# Patient Record
Sex: FEMALE | Race: BLACK OR AFRICAN AMERICAN | NOT HISPANIC OR LATINO
[De-identification: names, ages, dates, MRNs, and addresses within clinical notes are randomized per-mention and may not be internally consistent; named-entity substitution may affect disease eponyms.]

---

## 2017-01-09 ENCOUNTER — APPOINTMENT (OUTPATIENT)
Dept: OPHTHALMOLOGY | Facility: CLINIC | Age: 72
End: 2017-01-09

## 2017-02-27 ENCOUNTER — APPOINTMENT (OUTPATIENT)
Dept: OPHTHALMOLOGY | Facility: CLINIC | Age: 72
End: 2017-02-27

## 2017-04-24 ENCOUNTER — APPOINTMENT (OUTPATIENT)
Dept: OPHTHALMOLOGY | Facility: CLINIC | Age: 72
End: 2017-04-24

## 2017-06-18 ENCOUNTER — INPATIENT (INPATIENT)
Facility: HOSPITAL | Age: 72
LOS: 11 days | Discharge: HOME HEALTH | End: 2017-06-30
Attending: HOSPITALIST | Admitting: HOSPITALIST
Payer: MEDICARE

## 2017-06-18 VITALS
RESPIRATION RATE: 16 BRPM | TEMPERATURE: 99 F | HEART RATE: 72 BPM | OXYGEN SATURATION: 97 % | SYSTOLIC BLOOD PRESSURE: 157 MMHG | DIASTOLIC BLOOD PRESSURE: 56 MMHG

## 2017-06-18 DIAGNOSIS — Z98.89 OTHER SPECIFIED POSTPROCEDURAL STATES: Chronic | ICD-10-CM

## 2017-06-18 DIAGNOSIS — E11.9 TYPE 2 DIABETES MELLITUS WITHOUT COMPLICATIONS: ICD-10-CM

## 2017-06-18 DIAGNOSIS — Z89.511 ACQUIRED ABSENCE OF RIGHT LEG BELOW KNEE: Chronic | ICD-10-CM

## 2017-06-18 DIAGNOSIS — L08.9 LOCAL INFECTION OF THE SKIN AND SUBCUTANEOUS TISSUE, UNSPECIFIED: ICD-10-CM

## 2017-06-18 DIAGNOSIS — I25.10 ATHEROSCLEROTIC HEART DISEASE OF NATIVE CORONARY ARTERY WITHOUT ANGINA PECTORIS: ICD-10-CM

## 2017-06-18 DIAGNOSIS — Z90.710 ACQUIRED ABSENCE OF BOTH CERVIX AND UTERUS: Chronic | ICD-10-CM

## 2017-06-18 DIAGNOSIS — I73.9 PERIPHERAL VASCULAR DISEASE, UNSPECIFIED: ICD-10-CM

## 2017-06-18 DIAGNOSIS — N18.3 CHRONIC KIDNEY DISEASE, STAGE 3 (MODERATE): ICD-10-CM

## 2017-06-18 DIAGNOSIS — I10 ESSENTIAL (PRIMARY) HYPERTENSION: ICD-10-CM

## 2017-06-18 DIAGNOSIS — Z29.9 ENCOUNTER FOR PROPHYLACTIC MEASURES, UNSPECIFIED: ICD-10-CM

## 2017-06-18 DIAGNOSIS — Z95.1 PRESENCE OF AORTOCORONARY BYPASS GRAFT: Chronic | ICD-10-CM

## 2017-06-18 LAB
ALBUMIN SERPL ELPH-MCNC: 3.9 G/DL — SIGNIFICANT CHANGE UP (ref 3.3–5)
ALP SERPL-CCNC: 88 U/L — SIGNIFICANT CHANGE UP (ref 40–120)
ALT FLD-CCNC: 14 U/L — SIGNIFICANT CHANGE UP (ref 4–33)
APPEARANCE UR: CLEAR — SIGNIFICANT CHANGE UP
AST SERPL-CCNC: 16 U/L — SIGNIFICANT CHANGE UP (ref 4–32)
BASE EXCESS BLDV CALC-SCNC: 0.6 MMOL/L — SIGNIFICANT CHANGE UP
BASOPHILS # BLD AUTO: 0.02 K/UL — SIGNIFICANT CHANGE UP (ref 0–0.2)
BASOPHILS NFR BLD AUTO: 0.2 % — SIGNIFICANT CHANGE UP (ref 0–2)
BILIRUB SERPL-MCNC: 0.3 MG/DL — SIGNIFICANT CHANGE UP (ref 0.2–1.2)
BILIRUB UR-MCNC: NEGATIVE — SIGNIFICANT CHANGE UP
BLOOD GAS VENOUS - CREATININE: 1.37 MG/DL — HIGH (ref 0.5–1.3)
BLOOD UR QL VISUAL: NEGATIVE — SIGNIFICANT CHANGE UP
BUN SERPL-MCNC: 37 MG/DL — HIGH (ref 7–23)
CALCIUM SERPL-MCNC: 8.9 MG/DL — SIGNIFICANT CHANGE UP (ref 8.4–10.5)
CHLORIDE BLDV-SCNC: 110 MMOL/L — HIGH (ref 96–108)
CHLORIDE SERPL-SCNC: 102 MMOL/L — SIGNIFICANT CHANGE UP (ref 98–107)
CO2 SERPL-SCNC: 25 MMOL/L — SIGNIFICANT CHANGE UP (ref 22–31)
COLOR SPEC: SIGNIFICANT CHANGE UP
CREAT SERPL-MCNC: 1.46 MG/DL — HIGH (ref 0.5–1.3)
CRP SERPL-MCNC: 89.2 MG/L — HIGH (ref 0.3–5)
EOSINOPHIL # BLD AUTO: 0.31 K/UL — SIGNIFICANT CHANGE UP (ref 0–0.5)
EOSINOPHIL NFR BLD AUTO: 3.5 % — SIGNIFICANT CHANGE UP (ref 0–6)
ERYTHROCYTE [SEDIMENTATION RATE] IN BLOOD: 94 MM/HR — HIGH (ref 4–25)
GAS PNL BLDV: 136 MMOL/L — SIGNIFICANT CHANGE UP (ref 136–146)
GLUCOSE BLDV-MCNC: 190 — HIGH (ref 70–99)
GLUCOSE SERPL-MCNC: 225 MG/DL — HIGH (ref 70–99)
GLUCOSE UR-MCNC: NEGATIVE — SIGNIFICANT CHANGE UP
HBA1C BLD-MCNC: 7.8 % — HIGH (ref 4–5.6)
HCO3 BLDV-SCNC: 23 MMOL/L — SIGNIFICANT CHANGE UP (ref 20–27)
HCT VFR BLD CALC: 33.5 % — LOW (ref 34.5–45)
HCT VFR BLDV CALC: 31.8 % — LOW (ref 34.5–45)
HGB BLD-MCNC: 10.3 G/DL — LOW (ref 11.5–15.5)
HGB BLDV-MCNC: 10.3 G/DL — LOW (ref 11.5–15.5)
IMM GRANULOCYTES NFR BLD AUTO: 0.1 % — SIGNIFICANT CHANGE UP (ref 0–1.5)
KETONES UR-MCNC: NEGATIVE — SIGNIFICANT CHANGE UP
LACTATE BLDV-MCNC: 1.1 MMOL/L — SIGNIFICANT CHANGE UP (ref 0.5–2)
LEUKOCYTE ESTERASE UR-ACNC: HIGH
LYMPHOCYTES # BLD AUTO: 2.17 K/UL — SIGNIFICANT CHANGE UP (ref 1–3.3)
LYMPHOCYTES # BLD AUTO: 24.5 % — SIGNIFICANT CHANGE UP (ref 13–44)
MCHC RBC-ENTMCNC: 28 PG — SIGNIFICANT CHANGE UP (ref 27–34)
MCHC RBC-ENTMCNC: 30.7 % — LOW (ref 32–36)
MCV RBC AUTO: 91 FL — SIGNIFICANT CHANGE UP (ref 80–100)
MONOCYTES # BLD AUTO: 0.69 K/UL — SIGNIFICANT CHANGE UP (ref 0–0.9)
MONOCYTES NFR BLD AUTO: 7.8 % — SIGNIFICANT CHANGE UP (ref 2–14)
NEUTROPHILS # BLD AUTO: 5.65 K/UL — SIGNIFICANT CHANGE UP (ref 1.8–7.4)
NEUTROPHILS NFR BLD AUTO: 63.9 % — SIGNIFICANT CHANGE UP (ref 43–77)
NITRITE UR-MCNC: NEGATIVE — SIGNIFICANT CHANGE UP
PCO2 BLDV: 52 MMHG — HIGH (ref 41–51)
PH BLDV: 7.32 PH — SIGNIFICANT CHANGE UP (ref 7.32–7.43)
PH UR: 6 — SIGNIFICANT CHANGE UP (ref 4.6–8)
PLATELET # BLD AUTO: 247 K/UL — SIGNIFICANT CHANGE UP (ref 150–400)
PMV BLD: 11.8 FL — SIGNIFICANT CHANGE UP (ref 7–13)
PO2 BLDV: < 24 MMHG — LOW (ref 35–40)
POTASSIUM BLDV-SCNC: 4.7 MMOL/L — HIGH (ref 3.4–4.5)
POTASSIUM SERPL-MCNC: 4.6 MMOL/L — SIGNIFICANT CHANGE UP (ref 3.5–5.3)
POTASSIUM SERPL-SCNC: 4.6 MMOL/L — SIGNIFICANT CHANGE UP (ref 3.5–5.3)
PROT SERPL-MCNC: 8 G/DL — SIGNIFICANT CHANGE UP (ref 6–8.3)
PROT UR-MCNC: 10 — SIGNIFICANT CHANGE UP
RBC # BLD: 3.68 M/UL — LOW (ref 3.8–5.2)
RBC # FLD: 14.8 % — HIGH (ref 10.3–14.5)
RBC CASTS # UR COMP ASSIST: SIGNIFICANT CHANGE UP (ref 0–?)
SAO2 % BLDV: 25.7 % — LOW (ref 60–85)
SODIUM SERPL-SCNC: 140 MMOL/L — SIGNIFICANT CHANGE UP (ref 135–145)
SP GR SPEC: 1.01 — SIGNIFICANT CHANGE UP (ref 1–1.03)
SQUAMOUS # UR AUTO: SIGNIFICANT CHANGE UP
UROBILINOGEN FLD QL: NORMAL E.U. — SIGNIFICANT CHANGE UP (ref 0.1–0.2)
WBC # BLD: 8.85 K/UL — SIGNIFICANT CHANGE UP (ref 3.8–10.5)
WBC # FLD AUTO: 8.85 K/UL — SIGNIFICANT CHANGE UP (ref 3.8–10.5)
WBC UR QL: SIGNIFICANT CHANGE UP (ref 0–?)

## 2017-06-18 PROCEDURE — 93971 EXTREMITY STUDY: CPT | Mod: 26,LT

## 2017-06-18 PROCEDURE — 71010: CPT | Mod: 26

## 2017-06-18 PROCEDURE — 99223 1ST HOSP IP/OBS HIGH 75: CPT | Mod: GC

## 2017-06-18 PROCEDURE — 73630 X-RAY EXAM OF FOOT: CPT | Mod: 26,LT

## 2017-06-18 RX ORDER — ATORVASTATIN CALCIUM 80 MG/1
20 TABLET, FILM COATED ORAL AT BEDTIME
Qty: 0 | Refills: 0 | Status: DISCONTINUED | OUTPATIENT
Start: 2017-06-18 | End: 2017-06-30

## 2017-06-18 RX ORDER — SODIUM CHLORIDE 9 MG/ML
500 INJECTION INTRAMUSCULAR; INTRAVENOUS; SUBCUTANEOUS ONCE
Qty: 0 | Refills: 0 | Status: COMPLETED | OUTPATIENT
Start: 2017-06-18 | End: 2017-06-18

## 2017-06-18 RX ORDER — DEXTROSE 50 % IN WATER 50 %
12.5 SYRINGE (ML) INTRAVENOUS ONCE
Qty: 0 | Refills: 0 | Status: DISCONTINUED | OUTPATIENT
Start: 2017-06-18 | End: 2017-06-30

## 2017-06-18 RX ORDER — INSULIN LISPRO 100/ML
VIAL (ML) SUBCUTANEOUS
Qty: 0 | Refills: 0 | Status: DISCONTINUED | OUTPATIENT
Start: 2017-06-18 | End: 2017-06-30

## 2017-06-18 RX ORDER — CARVEDILOL PHOSPHATE 80 MG/1
12.5 CAPSULE, EXTENDED RELEASE ORAL EVERY 12 HOURS
Qty: 0 | Refills: 0 | Status: DISCONTINUED | OUTPATIENT
Start: 2017-06-18 | End: 2017-06-30

## 2017-06-18 RX ORDER — INSULIN GLARGINE 100 [IU]/ML
80 INJECTION, SOLUTION SUBCUTANEOUS AT BEDTIME
Qty: 0 | Refills: 0 | Status: DISCONTINUED | OUTPATIENT
Start: 2017-06-18 | End: 2017-06-21

## 2017-06-18 RX ORDER — VALSARTAN 80 MG/1
320 TABLET ORAL DAILY
Qty: 0 | Refills: 0 | Status: DISCONTINUED | OUTPATIENT
Start: 2017-06-18 | End: 2017-06-30

## 2017-06-18 RX ORDER — INSULIN LISPRO 100/ML
VIAL (ML) SUBCUTANEOUS AT BEDTIME
Qty: 0 | Refills: 0 | Status: DISCONTINUED | OUTPATIENT
Start: 2017-06-18 | End: 2017-06-30

## 2017-06-18 RX ORDER — ASPIRIN/CALCIUM CARB/MAGNESIUM 324 MG
81 TABLET ORAL DAILY
Qty: 0 | Refills: 0 | Status: DISCONTINUED | OUTPATIENT
Start: 2017-06-18 | End: 2017-06-30

## 2017-06-18 RX ORDER — DEXTROSE 50 % IN WATER 50 %
1 SYRINGE (ML) INTRAVENOUS ONCE
Qty: 0 | Refills: 0 | Status: DISCONTINUED | OUTPATIENT
Start: 2017-06-18 | End: 2017-06-30

## 2017-06-18 RX ORDER — KETOROLAC TROMETHAMINE 0.5 %
1 DROPS OPHTHALMIC (EYE)
Qty: 0 | Refills: 0 | Status: DISCONTINUED | OUTPATIENT
Start: 2017-06-18 | End: 2017-06-30

## 2017-06-18 RX ORDER — VANCOMYCIN HCL 1 G
1000 VIAL (EA) INTRAVENOUS EVERY 24 HOURS
Qty: 0 | Refills: 0 | Status: DISCONTINUED | OUTPATIENT
Start: 2017-06-19 | End: 2017-06-20

## 2017-06-18 RX ORDER — TIMOLOL 0.5 %
1 DROPS OPHTHALMIC (EYE)
Qty: 0 | Refills: 0 | Status: DISCONTINUED | OUTPATIENT
Start: 2017-06-18 | End: 2017-06-30

## 2017-06-18 RX ORDER — ISOSORBIDE MONONITRATE 60 MG/1
30 TABLET, EXTENDED RELEASE ORAL DAILY
Qty: 0 | Refills: 0 | Status: DISCONTINUED | OUTPATIENT
Start: 2017-06-18 | End: 2017-06-30

## 2017-06-18 RX ORDER — PIPERACILLIN AND TAZOBACTAM 4; .5 G/20ML; G/20ML
3.38 INJECTION, POWDER, LYOPHILIZED, FOR SOLUTION INTRAVENOUS ONCE
Qty: 0 | Refills: 0 | Status: COMPLETED | OUTPATIENT
Start: 2017-06-18 | End: 2017-06-18

## 2017-06-18 RX ORDER — BRIMONIDINE TARTRATE 2 MG/MG
1 SOLUTION/ DROPS OPHTHALMIC
Qty: 0 | Refills: 0 | Status: DISCONTINUED | OUTPATIENT
Start: 2017-06-18 | End: 2017-06-30

## 2017-06-18 RX ORDER — PIPERACILLIN AND TAZOBACTAM 4; .5 G/20ML; G/20ML
3.38 INJECTION, POWDER, LYOPHILIZED, FOR SOLUTION INTRAVENOUS EVERY 12 HOURS
Qty: 0 | Refills: 0 | Status: DISCONTINUED | OUTPATIENT
Start: 2017-06-18 | End: 2017-06-27

## 2017-06-18 RX ORDER — HEPARIN SODIUM 5000 [USP'U]/ML
5000 INJECTION INTRAVENOUS; SUBCUTANEOUS EVERY 8 HOURS
Qty: 0 | Refills: 0 | Status: DISCONTINUED | OUTPATIENT
Start: 2017-06-18 | End: 2017-06-30

## 2017-06-18 RX ORDER — VANCOMYCIN HCL 1 G
1000 VIAL (EA) INTRAVENOUS ONCE
Qty: 0 | Refills: 0 | Status: COMPLETED | OUTPATIENT
Start: 2017-06-18 | End: 2017-06-18

## 2017-06-18 RX ORDER — DEXTROSE 50 % IN WATER 50 %
25 SYRINGE (ML) INTRAVENOUS ONCE
Qty: 0 | Refills: 0 | Status: DISCONTINUED | OUTPATIENT
Start: 2017-06-18 | End: 2017-06-30

## 2017-06-18 RX ORDER — GLUCAGON INJECTION, SOLUTION 0.5 MG/.1ML
1 INJECTION, SOLUTION SUBCUTANEOUS ONCE
Qty: 0 | Refills: 0 | Status: DISCONTINUED | OUTPATIENT
Start: 2017-06-18 | End: 2017-06-30

## 2017-06-18 RX ORDER — INSULIN LISPRO 100/ML
26 VIAL (ML) SUBCUTANEOUS
Qty: 0 | Refills: 0 | Status: DISCONTINUED | OUTPATIENT
Start: 2017-06-18 | End: 2017-06-21

## 2017-06-18 RX ADMIN — HEPARIN SODIUM 5000 UNIT(S): 5000 INJECTION INTRAVENOUS; SUBCUTANEOUS at 22:36

## 2017-06-18 RX ADMIN — BRIMONIDINE TARTRATE 1 DROP(S): 2 SOLUTION/ DROPS OPHTHALMIC at 22:36

## 2017-06-18 RX ADMIN — INSULIN GLARGINE 80 UNIT(S): 100 INJECTION, SOLUTION SUBCUTANEOUS at 22:37

## 2017-06-18 RX ADMIN — Medication 250 MILLIGRAM(S): at 14:27

## 2017-06-18 RX ADMIN — Medication 1 DROP(S): at 22:36

## 2017-06-18 RX ADMIN — ATORVASTATIN CALCIUM 20 MILLIGRAM(S): 80 TABLET, FILM COATED ORAL at 22:36

## 2017-06-18 RX ADMIN — PIPERACILLIN AND TAZOBACTAM 200 GRAM(S): 4; .5 INJECTION, POWDER, LYOPHILIZED, FOR SOLUTION INTRAVENOUS at 20:01

## 2017-06-18 RX ADMIN — Medication: at 19:40

## 2017-06-18 RX ADMIN — SODIUM CHLORIDE 500 MILLILITER(S): 9 INJECTION INTRAMUSCULAR; INTRAVENOUS; SUBCUTANEOUS at 13:43

## 2017-06-18 RX ADMIN — Medication: at 22:47

## 2017-06-18 NOTE — DISCHARGE NOTE ADULT - CARE PLAN
Principal Discharge DX:	Foot infection  Goal:	Treatment of the infection.  Instructions for follow-up, activity and diet:	Continue to take antibiotics at home. You were on an IV antibiotic in the hospital. Now you will take oral medication. Take levaquin 500 mg daily until 7/7 (you need to take it for a total of 10 days after your surgery, which was on 6/28).  Secondary Diagnosis:	CAD (coronary artery disease)  Goal:	Maintenance treatment.  Instructions for follow-up, activity and diet:	Continue taking your aspirin and plavix  Secondary Diagnosis:	Hypertension  Goal:	Control of blood pressure  Instructions for follow-up, activity and diet:	Continue to take your home blood pressure medications (coreg and valsartan)  Secondary Diagnosis:	Hypercholesterolemia  Goal:	Maintenance treatment  Instructions for follow-up, activity and diet:	Continue to take your cholesterol lowering medication (atorvastatin 20 mg).  Secondary Diagnosis:	DM (diabetes mellitus)  Goal:	Control of blood sugars  Instructions for follow-up, activity and diet:	Continue to take your long and short acting insulin. You will go home with 50 Units of Lantus, which you will take every night at bedtime. Continue your short acting insulin (Humalog).  Secondary Diagnosis:	PVD (peripheral vascular disease)  Goal:	Maintenance treatment  Instructions for follow-up, activity and diet:	Continue to take your aspirin and plavix.  Follow up with your vascular surgeon (name and number listed below) Principal Discharge DX:	Foot infection  Goal:	Treatment of the infection.  Instructions for follow-up, activity and diet:	Continue to take antibiotics at home. You were on an IV antibiotic in the hospital. Now you will take oral medication. Take levaquin 500 mg daily until 7/7 (you need to take it for a total of 10 days after your surgery, which was on 6/28).   Please follow up with Dr. Trimble at [facility] care center [phone]   Weight bearing as tolerated in surgical shoe  Visiting Nurse service to change packing daily and cover with 4x4 sterile gauze, abdominal pad and kerlix.  Secondary Diagnosis:	CAD (coronary artery disease)  Goal:	Maintenance treatment.  Instructions for follow-up, activity and diet:	Continue taking your aspirin and plavix  Secondary Diagnosis:	Hypertension  Goal:	Control of blood pressure  Instructions for follow-up, activity and diet:	Continue to take your home blood pressure medications (coreg and valsartan)  Secondary Diagnosis:	Hypercholesterolemia  Goal:	Maintenance treatment  Instructions for follow-up, activity and diet:	Continue to take your cholesterol lowering medication (atorvastatin 20 mg).  Secondary Diagnosis:	DM (diabetes mellitus)  Goal:	Control of blood sugars  Instructions for follow-up, activity and diet:	Continue to take your long and short acting insulin. You will go home with 50 Units of Lantus, which you will take every night at bedtime. Continue your short acting insulin (Humalog).  Secondary Diagnosis:	PVD (peripheral vascular disease)  Goal:	Maintenance treatment  Instructions for follow-up, activity and diet:	Continue to take your aspirin and plavix.  Follow up with your vascular surgeon (name and number listed below) Principal Discharge DX:	Foot infection  Goal:	Treatment of the infection.  Instructions for follow-up, activity and diet:	Continue to take antibiotics at home. You were on an IV antibiotic in the hospital. Now you will take oral medication. Take levaquin 500 mg daily until 7/7/17 (you need to take it for a total of 10 days after your surgery, which was on 6/28).   Please follow up with Dr. Trimble at [facility] care center [phone]   Weight bearing as tolerated in surgical shoe.  Visiting Nurse service to change packing daily and cover with 4x4 sterile gauze, abdominal pad and kerlix.  Secondary Diagnosis:	CAD (coronary artery disease)  Goal:	Maintenance treatment.  Instructions for follow-up, activity and diet:	Continue taking your aspirin and plavix  Secondary Diagnosis:	Hypertension  Goal:	Control of blood pressure  Instructions for follow-up, activity and diet:	Continue to take your home blood pressure medications (coreg and valsartan)  Secondary Diagnosis:	Hypercholesterolemia  Goal:	Maintenance treatment  Instructions for follow-up, activity and diet:	Continue to take your cholesterol lowering medication (atorvastatin 20 mg).  Secondary Diagnosis:	DM (diabetes mellitus)  Goal:	Control of blood sugars  Instructions for follow-up, activity and diet:	Your original doses of lantus 80 units and humalog 26 units before meals were decreased while in the hospital due to hypoglycemia. You will be discharged on 40 Units of Lantus, which you will take every night at bedtime.  Secondary Diagnosis:	PVD (peripheral vascular disease)  Goal:	Maintenance treatment  Instructions for follow-up, activity and diet:	You received a stent in one of the leg vessels while in the hospital and was started on plavix.  Continue to take your aspirin and plavix.  Follow up with your vascular surgeon (name and number listed below) Principal Discharge DX:	Foot infection  Goal:	Treatment of the infection.  Instructions for follow-up, activity and diet:	Continue to take antibiotics at home. You were on an IV antibiotic in the hospital. Now you will take oral medication. Take levaquin 500 mg daily until 7/7/17 (you need to take it for a total of 10 days after your surgery, which was on 6/28).   Please follow up with Dr. Trimble at [facility] care center [phone]   Weight bearing as tolerated in surgical shoe.  Visiting Nurse service to change packing daily and cover with 4x4 sterile gauze, abdominal pad and kerlix.  Secondary Diagnosis:	CAD (coronary artery disease)  Goal:	Maintenance treatment.  Instructions for follow-up, activity and diet:	Continue taking your aspirin and plavix  Secondary Diagnosis:	Hypertension  Goal:	Control of blood pressure  Instructions for follow-up, activity and diet:	Continue to take your home blood pressure medications (coreg and valsartan)  Secondary Diagnosis:	Hypercholesterolemia  Goal:	Maintenance treatment  Instructions for follow-up, activity and diet:	Continue to take your cholesterol lowering medication (atorvastatin 20 mg).  Secondary Diagnosis:	DM (diabetes mellitus)  Goal:	Control of blood sugars  Instructions for follow-up, activity and diet:	Your original doses of lantus 80 units and humalog 26 units before meals were decreased while in the hospital due to hypoglycemia. You will be discharged on 40 Units of Lantus, which you will take every night at bedtime.  Secondary Diagnosis:	PVD (peripheral vascular disease)  Goal:	Maintenance treatment  Instructions for follow-up, activity and diet:	You received a stent in one of the leg vessels while in the hospital and was started on plavix.  Continue to take your aspirin and plavix.  Follow up with your vascular surgeon (name and number listed below) Principal Discharge DX:	Foot infection  Goal:	Treatment of the infection.  Instructions for follow-up, activity and diet:	Continue to take antibiotics at home. You were on an IV antibiotic in the hospital. Now you will take oral medication. Take levaquin 500 mg daily until 7/7/17 (you need to take it for a total of 10 days after your surgery, which was on 6/28).   Please follow up with Dr. Trimble at [facility] care center [phone]   Weight bearing as tolerated in surgical shoe.  Visiting Nurse service to change packing daily and cover with 4x4 sterile gauze, abdominal pad and kerlix.  Secondary Diagnosis:	CAD (coronary artery disease)  Goal:	Maintenance treatment.  Instructions for follow-up, activity and diet:	Continue taking your aspirin and plavix  Secondary Diagnosis:	Hypertension  Goal:	Control of blood pressure  Instructions for follow-up, activity and diet:	Continue to take your home blood pressure medications (coreg and valsartan)  Secondary Diagnosis:	Hypercholesterolemia  Goal:	Maintenance treatment  Instructions for follow-up, activity and diet:	Continue to take your cholesterol lowering medication (atorvastatin 20 mg).  Secondary Diagnosis:	DM (diabetes mellitus)  Goal:	Control of blood sugars  Instructions for follow-up, activity and diet:	Your original doses of lantus 80 units and humalog 26 units before meals were decreased while in the hospital due to hypoglycemia. You will be discharged on 40 Units of Lantus, which you will take every night at bedtime.  Secondary Diagnosis:	PVD (peripheral vascular disease)  Goal:	Maintenance treatment  Instructions for follow-up, activity and diet:	You received a stent in one of the leg vessels while in the hospital and was started on plavix.  Continue to take your aspirin and plavix.  Follow up with your vascular surgeon (name and number listed below) Principal Discharge DX:	Foot infection  Goal:	Treatment of the infection.  Instructions for follow-up, activity and diet:	Continue to take antibiotics at home. You were on an IV antibiotic in the hospital. Now you will take oral medication. Take levaquin 500 mg daily until 7/7/17 (you need to take it for a total of 10 days after your surgery, which was on 6/28).   Please follow up with Dr. Trimble at [facility] care center [phone]   Weight bearing as tolerated in surgical shoe.  Visiting Nurse service to change packing daily and cover with 4x4 sterile gauze, abdominal pad and kerlix.  Secondary Diagnosis:	CAD (coronary artery disease)  Goal:	Maintenance treatment.  Instructions for follow-up, activity and diet:	Continue taking your aspirin and plavix  Secondary Diagnosis:	Hypertension  Goal:	Control of blood pressure  Instructions for follow-up, activity and diet:	Continue to take your home blood pressure medications (coreg and valsartan)  Secondary Diagnosis:	Hypercholesterolemia  Goal:	Maintenance treatment  Instructions for follow-up, activity and diet:	Continue to take your cholesterol lowering medication (atorvastatin 20 mg).  Secondary Diagnosis:	DM (diabetes mellitus)  Goal:	Control of blood sugars  Instructions for follow-up, activity and diet:	Your original doses of lantus 80 units and humalog 26 units before meals were decreased while in the hospital due to hypoglycemia. You will be discharged on 40 Units of Lantus, which you will take every night at bedtime.  Secondary Diagnosis:	PVD (peripheral vascular disease)  Goal:	Maintenance treatment  Instructions for follow-up, activity and diet:	You received a stent in one of the leg vessels while in the hospital and was started on plavix.   Continue to take your aspirin and plavix.  Follow up with your vascular surgeon (name and number listed below) Principal Discharge DX:	Foot infection  Goal:	Treatment of the infection.  Instructions for follow-up, activity and diet:	Continue to take antibiotics at home. You were on an IV antibiotic in the hospital. Now you will take oral medication. Take levaquin 500 mg daily until 7/7/17 (you need to take it for a total of 10 days after your surgery, which was on 6/28).   Please follow up with Dr. Trimble at [facility] care center [phone]   Weight bearing as tolerated in surgical shoe.  Visiting Nurse service to change packing daily and cover with 4x4 sterile gauze, abdominal pad and kerlix.  Secondary Diagnosis:	CAD (coronary artery disease)  Goal:	Maintenance treatment.  Instructions for follow-up, activity and diet:	Continue taking your aspirin and plavix  Secondary Diagnosis:	Hypertension  Goal:	Control of blood pressure  Instructions for follow-up, activity and diet:	Continue to take your home blood pressure medications (coreg and valsartan)  Secondary Diagnosis:	Hypercholesterolemia  Goal:	Maintenance treatment  Instructions for follow-up, activity and diet:	Continue to take your cholesterol lowering medication (atorvastatin 20 mg).  Secondary Diagnosis:	DM (diabetes mellitus)  Goal:	Control of blood sugars  Instructions for follow-up, activity and diet:	Your original doses of lantus 80 units and humalog 26 units before meals were decreased while in the hospital due to hypoglycemia. You will be discharged on 40 Units of Lantus, which you will take every night at bedtime.  Secondary Diagnosis:	PVD (peripheral vascular disease)  Goal:	Maintenance treatment  Instructions for follow-up, activity and diet:	You received a stent in one of the leg vessels while in the hospital and was started on plavix.   Continue to take your aspirin and plavix.  Follow up with your vascular surgeon (name and number listed below) Principal Discharge DX:	Foot infection  Goal:	Treatment of the infection.  Instructions for follow-up, activity and diet:	Continue to take antibiotics at home. You were on an IV antibiotic in the hospital. Now you will take oral medication. Take levaquin 500 mg daily until 7/7/17 (you need to take it for a total of 10 days after your surgery, which was on 6/28).   Please follow up with Dr. Trimble at [facility] care center [phone]   Weight bearing as tolerated in surgical shoe.  Visiting Nurse service to change packing daily and cover with 4x4 sterile gauze, abdominal pad and kerlix.  Secondary Diagnosis:	CAD (coronary artery disease)  Goal:	Maintenance treatment.  Instructions for follow-up, activity and diet:	Continue taking your aspirin and plavix  Secondary Diagnosis:	Hypertension  Goal:	Control of blood pressure  Instructions for follow-up, activity and diet:	Continue to take your home blood pressure medications (coreg and valsartan)  Secondary Diagnosis:	Hypercholesterolemia  Goal:	Maintenance treatment  Instructions for follow-up, activity and diet:	Continue to take your cholesterol lowering medication (atorvastatin 20 mg).  Secondary Diagnosis:	DM (diabetes mellitus)  Goal:	Control of blood sugars  Instructions for follow-up, activity and diet:	Your original doses of lantus 80 units and humalog 26 units before meals were decreased while in the hospital due to hypoglycemia. You will be discharged on 40 Units of Lantus, which you will take every night at bedtime.  Secondary Diagnosis:	PVD (peripheral vascular disease)  Goal:	Maintenance treatment  Instructions for follow-up, activity and diet:	You received a stent in one of the leg vessels while in the hospital and was started on plavix.   Continue to take your aspirin and plavix.  Follow up with your vascular surgeon (name and number listed below) Principal Discharge DX:	Foot infection  Goal:	Treatment of the infection.  Instructions for follow-up, activity and diet:	Continue to take antibiotics at home. You were on an IV antibiotic in the hospital. Now you will take oral medication. Take levaquin 500 mg daily until 7/7/17 (you need to take it for a total of 10 days after your surgery, which was on 6/28).   Please follow up with Dr. Trimble at [facility] [phone]   Weight bearing as tolerated in surgical shoe.  Visiting Nurse service to assess the gauze covering the surgical site.  Keep site covered with 4x4 sterile gauze, abdominal pad and kerlix until follow up in wound care center.  You should follow up with the wound care center within 1 week of hospital discharge.  Secondary Diagnosis:	CAD (coronary artery disease)  Goal:	Maintenance treatment.  Instructions for follow-up, activity and diet:	Continue taking your aspirin and plavix.  Secondary Diagnosis:	Hypertension  Goal:	Control of blood pressure  Instructions for follow-up, activity and diet:	Continue to take your home blood pressure medications (coreg and valsartan)  Secondary Diagnosis:	Hypercholesterolemia  Goal:	Maintenance treatment  Instructions for follow-up, activity and diet:	Continue to take your cholesterol lowering medication (atorvastatin 20 mg).  Secondary Diagnosis:	DM (diabetes mellitus)  Goal:	Control of blood sugars  Instructions for follow-up, activity and diet:	Your original doses of lantus 80 units and humalog 26 units before meals were decreased while in the hospital due to hypoglycemia. You will be discharged on 40 Units of Lantus, which you will take every night at bedtime.  Secondary Diagnosis:	PVD (peripheral vascular disease)  Goal:	Maintenance treatment  Instructions for follow-up, activity and diet:	You received a stent in one of the leg vessels while in the hospital and was started on plavix.   Continue to take your aspirin and plavix.  Follow up with your vascular surgeon (name and number listed below)

## 2017-06-18 NOTE — H&P ADULT - PROBLEM SELECTOR PLAN 4
HgbA1c of 7.8 here  - c/w home dose of Lantus 80 units qhs and humalog 26 units tidac  - monitor FS qac and qhs and titrate insulin accordingly  - carb consistent diet and hypoglycemia protocol

## 2017-06-18 NOTE — DISCHARGE NOTE ADULT - CARE PROVIDER_API CALL
Opal Hackett), Internal Medicine  [address] [place] [address]  Phone: [phone]  Fax: [phone]    Leonid Moraes), Surgery  Vascular  [address] [place] [address]  Phone: [phone]  Fax: [phone] Opal Hackett), Internal Medicine  [address] [place] [address]  Phone: [phone]  Fax: [phone]    Leonid Moraes), Surgery  Vascular  [address] [place] [address]  Phone: [phone]  Fax: [phone]    Emilia Blake ([email or web address]), Surgery  Dept Director  [address] [place] [address]  Phone: [phone]  Fax: [phone]

## 2017-06-18 NOTE — H&P ADULT - ATTENDING COMMENTS
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 +warm mild erythema /with overlying stasis changes +PT pulse.5th Lt digit ulcer Xray neg for OM  neg DVT on LT LE   diabetic foot ulcer-MRI LE r/o OM                            -DUC/PVR                           -podiatry consult                           -empiric abx vanco/zosyn

## 2017-06-18 NOTE — H&P ADULT - PMH
CAD (coronary artery disease)    CAD (coronary artery disease)    Carotid artery stenosis    Carotid stenosis    Diabetes    DM (diabetes mellitus)    HTN (hypertension)    Hypercholesterolemia    Hypertension    Obesity    PAD (peripheral artery disease)  s/p R BKA  PVD (peripheral vascular disease)    S/P CABG x 3  in 2004, [facility]  Stented coronary artery  2010 [facility]  UTI (urinary tract infection)

## 2017-06-18 NOTE — H&P ADULT - PROBLEM SELECTOR PLAN 1
Pt with likely diabetic foot ulcer, no radiographic evidence of OM however with elevated ESR and CRP, afebrile here and no leukocytosis  - podiatry consulted  - c/w renally dosed vancomycin and zosyn  - follow up blood cultures  - f/u MRI foot and DUC/PVR of L foot

## 2017-06-18 NOTE — CONSULT NOTE ADULT - PROBLEM SELECTOR RECOMMENDATION 9
- Pt seen and examined  - sharp excisional debridement of both 4th and 5th L toe wounds to the level of the subQ tissue including debridement of subQ tissue but not beyond to bone or tendon using #15 blade.   - Pt tolerated well  - 4th toe wound flushed and Cx  - Wounds dressed with betadine, DSD  - MRI ordered to r/o OM of 4th or 5th  - DUC/PVR ordered due to hx of PVD and non-palpable DP  - Will discuss w/ attending  - May need sx intervention if MRI positive for OM. No sx planned at this time  - Cont IV abx per ID  - WBAT in sx shoe

## 2017-06-18 NOTE — DISCHARGE NOTE ADULT - PROVIDER TOKENS
LELO:'[id number]',[id number]' TOKEN:'[id number]:'[id number]:'[id number]'

## 2017-06-18 NOTE — ED PROVIDER NOTE - LOCATION
foot/first , forth and fifth toe apparea disclored and whoel foot is erythematous and dislcored, though normal DP palpted/toe

## 2017-06-18 NOTE — ED PROVIDER NOTE - MEDICAL DECISION MAKING DETAILS
will check labs, do xr foot and start iv antibiotics for foot infection with cellulitis of foot, due to previous poor prognosis , will also r/o dvt and admit patient for iv antibiotics and vascular consult, r/o osteomyelitis of foot

## 2017-06-18 NOTE — ED PROVIDER NOTE - OBJECTIVE STATEMENT
71 y/o F with h/o dm, on insulin, htn, hld, cad with cabg, pvd, left carotid endarterectomy, rt aka with prosthetic leg p/w left pinky toe and forth toe infection for 2 days w/o pain and states that left foot is discolored and red. Pt had fever yesterday, chills, no cough,. dysuria.Pt is non smoker

## 2017-06-18 NOTE — DISCHARGE NOTE ADULT - MEDICATION SUMMARY - MEDICATIONS TO STOP TAKING
I will STOP taking the medications listed below when I get home from the hospital:    NovoLog 100 units/mL subcutaneous solution  -- 28 to 30 unit(s) subcutaneous 3 times a day (before meals) based on glucose level per sliding scale    Levemir 100 units/mL subcutaneous solution  -- 80 unit(s) subcutaneous once a day (at bedtime)

## 2017-06-18 NOTE — ED ADULT NURSE NOTE - OBJECTIVE STATEMENT
Pt received to intake room 10b with reports of blister since Thursday that has not been heeling. Open area visualized on L fifth toe, no active drainage observed at this time. Pt received awake A&Ox4, denies pain at this time. 20g PIV placed in R AC, labs drawn and sent per orders. No apparent distress observed at this time, will continue to monitor.

## 2017-06-18 NOTE — H&P ADULT - NSHPPHYSICALEXAM_GEN_ALL_CORE
Vital Signs Last 24 Hrs  T(C): 37.1, Max: 37.1 (06-18 @ 12:40)  T(F): 98.8, Max: 98.8 (06-18 @ 12:40)  HR: 95 (72 - 95)  BP: 142/91 (142/91 - 157/56)  BP(mean): --  RR: 16 (16 - 16)  SpO2: 100% (97% - 100%)      PHYSICAL EXAM:  GENERAL: resting in bed comfortably  EYES: EOMI, PERRLA, conjunctiva and sclera clear  NECK: supple, no JVD  CHEST/LUNG: clear to auscultation bilaterally; no wheezing/rales/rhonchi  HEART: regular rate and rhythm; no murmurs, rubs, or gallops  ABDOMEN: bowel sounds present, soft, non tender, non distended  EXTREMITIES:  no edema, no clubbing or cyanosis, faint DP pulse, palpable PT pulse, foot warm to touch  PSYCH: AAOx3  NEUROLOGY: no focal deficits  SKIN: L 5th toe with blister, mild surrounding erythema, no drainage, foot also warm to touch, no swelling

## 2017-06-18 NOTE — ED PROVIDER NOTE - PSH
History of hysterectomy    Hx of CABG  3v 2004  S/P angioplasty with stent  2009, 3/2014  S/P below knee amputation, right  6/2010  S/P BKA (below knee amputation) unilateral, right    S/P CABG x 3    S/P eye surgery  for glaucoma  S/P hysterectomy  2004

## 2017-06-18 NOTE — H&P ADULT - HISTORY OF PRESENT ILLNESS
73 yo F hx DM2 (on insulin) c/b neuropathy, PVD with R foot gangrene s/p R AKA (2010) now with prosthesis, CAD s/p stents and CABG, HTN, s/p CEA (2014) p/w L 5th toe blister that she noted 2 days ago. She denies any trauma or insect bites. Thinks that wearing tight shoes may have contributed to her developing a blister. It got progressively more red, filled with pus, and had associated L foot swelling, and had subjective fevers yesterday after which she decided to come to the ER Denies any pain or difficulty ambulating at this time. No cough or shortness of breath. No dysuria or increased urinary frequency. No nausea, vomiting, diarrhea, or abdominal pain. No hx of gout.     States that her diabetes is well controlled, said her last HgbA1c (in Feb) was 6.5. Has been measuring her FS at home and have ranged from 90s-150s. She also reports that her plavix was stopped after her CEA in 2014 and is currently only taking aspirin.    In the ER:  /91  HR 95  T 98.8F  RR 16  SaO2 100% RA  Given: 500 cc NS x 1, vancomycin IV x 1 73 yo F hx DM2 (on insulin) c/b neuropathy, PVD with R foot gangrene s/p R AKA (2010) now with prosthesis, CKD (baseline Cr 1.4-1.6), CAD s/p stents and CABG, HTN, s/p CEA (2014) p/w L 5th toe blister that she noted 2 days ago. She denies any trauma or insect bites. Thinks that wearing tight shoes may have contributed to her developing a blister. It got progressively more red, filled with pus, and had associated L foot swelling, and had subjective fevers yesterday after which she decided to come to the ER Denies any pain or difficulty ambulating at this time. No cough or shortness of breath. No dysuria or increased urinary frequency. No nausea, vomiting, diarrhea, or abdominal pain. No hx of gout.     States that her diabetes is well controlled, said her last HgbA1c (in Feb) was 6.5. Has been measuring her FS at home and have ranged from 90s-150s. She also reports that her plavix was stopped after her CEA in 2014 and is currently only taking aspirin.    In the ER:  /91  HR 95  T 98.8F  RR 16  SaO2 100% RA  Given: 500 cc NS x 1, vancomycin IV x 1

## 2017-06-18 NOTE — ED PROVIDER NOTE - PROGRESS NOTE DETAILS
Berta KO- pt goes to [facility] physicians, admitted to hospitalist for left foot cellulitis, possible osteomyelitis of forth and fifth toe, will need vascular and podiatry consult as needed, iv antibiotics, and MRI of foot possibly

## 2017-06-18 NOTE — DISCHARGE NOTE ADULT - CARE PROVIDERS DIRECT ADDRESSES
[email or web address]

## 2017-06-18 NOTE — H&P ADULT - ASSESSMENT
71 yo F hx DM2 (on insulin) c/b neuropathy, PVD with R foot gangrene s/p R AKA (2010) now with prosthesis, CKD (baseline Cr 1.4-1.6), CAD s/p stents and CABG, HTN, s/p CEA (2014) p/w L 5th toe blister that she noted 2 days ago a/w diabetic foot ulcer and r/o OM

## 2017-06-18 NOTE — DISCHARGE NOTE ADULT - ADDITIONAL INSTRUCTIONS
Follow up with your podiatrist. (number is given here- Dr. Marge Nicole)  Follow up with your vascular surgeon (number is given here- Dr. Moraes) Follow up with your podiatrist. (number is given here- Dr. Marge Nicole) within 1-2 weeks of hospital discharge.   Follow up with your vascular surgeon (number is given here- Dr. Moraes) within 1-2 weeks of hospital discharge. Follow up with your podiatrist. (number is given here- Dr. Marge Nicole) within 1-2 weeks of hospital discharge.     Follow up with your vascular surgeon (number is given here- Dr. Moraes) within 1-2 weeks of hospital discharge. Follow up with the wound care center and your podiatrist. (number is given here- Dr. Marge Nicole) within 1 week of hospital discharge.     Follow up with your vascular surgeon (number is given here- Dr. Moraes) within 1-2 weeks of hospital discharge.

## 2017-06-18 NOTE — DISCHARGE NOTE ADULT - SECONDARY DIAGNOSIS.
CAD (coronary artery disease) Hypertension Hypercholesterolemia DM (diabetes mellitus) PVD (peripheral vascular disease)

## 2017-06-18 NOTE — DISCHARGE NOTE ADULT - PATIENT PORTAL LINK FT
“You can access the FollowHealth Patient Portal, offered by [facility], by registering with the following website: [email or web address]”

## 2017-06-18 NOTE — H&P ADULT - PROBLEM SELECTOR PLAN 6
BP controlled  - c/w home meds (diovan, coreg, and imdur) with hold parameters and continue to monitor

## 2017-06-18 NOTE — DISCHARGE NOTE ADULT - MEDICATION SUMMARY - MEDICATIONS TO TAKE
I will START or STAY ON the medications listed below when I get home from the hospital:    aspirin 81 mg oral tablet  -- 1 tab(s) by mouth once a day  -- Indication: For CAD (coronary artery disease)    valsartan 320 mg oral tablet  -- 1 tab(s) by mouth once a day  -- Indication: For Hypertension    isosorbide mononitrate 30 mg oral tablet, extended release  -- 1 tab(s) by mouth once a day  -- Indication: For Hypertension    Lantus 100 units/mL subcutaneous solution  -- 40 subcutaneous once a day (at bedtime)  -- Do not drink alcoholic beverages when taking this medication.  It is very important that you take or use this exactly as directed.  Do not skip doses or discontinue unless directed by your doctor.  Keep in refrigerator.  Do not freeze.    -- Indication: For Diabetes mellitus    atorvastatin 20 mg oral tablet  -- 1 tab(s) by mouth once a day (at bedtime)  -- Indication: For Hyperlipidemia    clopidogrel 75 mg oral tablet  -- 1 tab(s) by mouth once a day  -- Indication: For Perpherial vascular disease status angioplasty to leg    carvedilol 12.5 mg oral tablet  -- 1 tab(s) by mouth every 12 hours  -- Indication: For Hypertension    ketorolac 0.5% ophthalmic solution  -- 1 drop(s) to each affected eye 2 times a day  -- Indication: For Glaucoma    Combigan 0.2%-0.5% ophthalmic solution  -- 1 drop(s) to each affected eye every 12 hours  -- Indication: For Glaucoma    levoFLOXacin 500 mg oral tablet  -- 1 tab(s) by mouth every 24 hours End Date: 7/7/2017  -- Indication: For Osteomyelitis

## 2017-06-18 NOTE — H&P ADULT - NSHPREVIEWOFSYSTEMS_GEN_ALL_CORE
REVIEW OF SYSTEMS:    CONSTITUTIONAL: (+) subjective fevers, no chills, no weight loss  EYES/ENT: No visual changes;  No dizziness/vertigo or throat pain   NECK: No pain or stiffness  RESPIRATORY: No shortness of breath, no cough or wheezing   CARDIOVASCULAR: No chest pain or palpitations, no dyspnea on exertion  GASTROINTESTINAL: No nausea/vomiting/diarrhea, no abdominal pain, no melena or BRBPR, no constipation  GENITOURINARY: No dysuria, increased frequency or hematuria  NEUROLOGICAL: (+) intermittent L foot numbness  SKIN: (+) L fifth toe ulcer  All other review of systems is negative unless indicated above

## 2017-06-18 NOTE — DISCHARGE NOTE ADULT - INSTRUCTIONS
If any complaints of nausea, vomiting, fever, or change in mental status call primary MD or return to emergency room Consistent Carbohydrate with evening snack

## 2017-06-18 NOTE — CONSULT NOTE ADULT - SUBJECTIVE AND OBJECTIVE BOX
Patient is a 72y old  Female who presents with a chief complaint of L fifth toe blister (18 Jun 2017 19:18)      HPI:  73 yo F hx DM2 (on insulin) c/b neuropathy, PVD with R foot gangrene s/p R AKA (2010) now with prosthesis, CKD (baseline Cr 1.4-1.6), CAD s/p stents and CABG, HTN, s/p CEA (2014) p/w L 5th toe blister that she noted 2 days ago. She denies any trauma or insect bites. Thinks that wearing tight shoes may have contributed to her developing a blister. It got progressively more red, filled with pus, and had associated L foot swelling, and had subjective fevers yesterday after which she decided to come to the ER Denies any pain or difficulty ambulating at this time. No cough or shortness of breath. No dysuria or increased urinary frequency. No nausea, vomiting, diarrhea, or abdominal pain. No hx of gout.     States that her diabetes is well controlled, said her last HgbA1c (in Feb) was 6.5. Has been measuring her FS at home and have ranged from 90s-150s. She also reports that her plavix was stopped after her CEA in 2014 and is currently only taking aspirin.    In the ER:  /91  HR 95  T 98.8F  RR 16  SaO2 100% RA  Given: 500 cc NS x 1, vancomycin IV x 1 (18 Jun 2017 18:03)      PAST MEDICAL & SURGICAL HISTORY:  UTI (urinary tract infection)  Carotid stenosis  PVD (peripheral vascular disease)  CAD (coronary artery disease)  Hypertension  Diabetes  Obesity  Hypercholesterolemia  HTN (hypertension)  DM (diabetes mellitus)  Carotid artery stenosis  PAD (peripheral artery disease): s/p R BKA  Stented coronary artery: 2010 [facility]  S/P CABG x 3: in 2004, [facility]  CAD (coronary artery disease)  History of hysterectomy  S/P BKA (below knee amputation) unilateral, right  S/P CABG x 3  S/P eye surgery: for glaucoma  S/P below knee amputation, right: 6/2010  S/P angioplasty with stent: 2009, 3/2014  S/P hysterectomy: 2004  Hx of CABG: 3v 2004  CAD (Coronary Artery Disease)  HTN (Hypertension)  Diabetes      MEDICATIONS  (STANDING):  piperacillin/tazobactam IVPB. 3.375Gram(s) IV Intermittent every 12 hours  heparin  Injectable 5000Unit(s) SubCutaneous every 8 hours  insulin glargine Injectable (LANTUS) 80Unit(s) SubCutaneous at bedtime  insulin lispro Injectable (HumaLOG) 26Unit(s) SubCutaneous three times a day before meals  insulin lispro (HumaLOG) corrective regimen sliding scale  SubCutaneous three times a day before meals  insulin lispro (HumaLOG) corrective regimen sliding scale  SubCutaneous at bedtime  dextrose 50% Injectable 12.5Gram(s) IV Push once  dextrose 50% Injectable 25Gram(s) IV Push once  dextrose 50% Injectable 25Gram(s) IV Push once  aspirin enteric coated 81milliGRAM(s) Oral daily  valsartan 320milliGRAM(s) Oral daily  isosorbide   mononitrate ER Tablet (IMDUR) 30milliGRAM(s) Oral daily  atorvastatin 20milliGRAM(s) Oral at bedtime  carvedilol 12.5milliGRAM(s) Oral every 12 hours  ketorolac 0.5% Ophthalmic Solution 1Drop(s) Both EYES two times a day  timolol 0.5% Solution 1Drop(s) Both EYES two times a day  brimonidine 0.2% Ophthalmic Solution 1Drop(s) Both EYES two times a day    MEDICATIONS  (PRN):  dextrose Gel 1Dose(s) Oral once PRN Blood Glucose LESS THAN 70 milliGRAM(s)/deciliter  glucagon  Injectable 1milliGRAM(s) IntraMuscular once PRN Glucose LESS THAN 70 milligrams/deciliter      Allergies    sulfa drugs (Hives)  sulfa drugs (Rash)  Sulfac 10% (Hives)    Intolerances        VITALS:    Vital Signs Last 24 Hrs  T(C): 37.1, Max: 37.1 (06-18 @ 12:40)  T(F): 98.8, Max: 98.8 (06-18 @ 12:40)  HR: 95 (72 - 95)  BP: 142/91 (142/91 - 157/56)  BP(mean): --  RR: 16 (16 - 16)  SpO2: 100% (97% - 100%)    LABS:                          10.3   8.85  )-----------( 247      ( 18 Jun 2017 13:31 )             33.5       06-18    140  |  102  |  37<H>  ----------------------------<  225<H>  4.6   |  25  |  1.46<H>    Ca    8.9      18 Jun 2017 13:31    TPro  8.0  /  Alb  3.9  /  TBili  0.3  /  DBili  x   /  AST  16  /  ALT  14  /  AlkPhos  88  06-18      CAPILLARY BLOOD GLUCOSE    Sedimentation Rate, Erythrocyte: 94 mm/hr (06.18.17 @ 13:31)    C-Reactive Protein, Serum: 89.2 mg/L (06.18.17 @ 13:31)    LOWER EXTREMITY PHYSICAL EXAM:    Vasular: DP/PT _/4, B/L, CFT <_ seconds B/L, Temperature gradient _, B/L.   Neuro: Epicritic sensation _ to the level of _, B/L.  Musculoskeletal/Ortho:  Skin:  Wound #1:   Location:  Size:  Depth:  Wound bed:   Drainage:   Odor:   Periwound:  Etiology:     RADIOLOGY & ADDITIONAL STUDIES:  EXAM:  RAD FOOT MIN 3 VIEWS LT      PROCEDURE DATE:  Jun 18 2017     INTERPRETATION:  CLINICAL INDICATION: left 5th toe blister; for   osteomyelitis    EXAM:  Frontal, oblique, and lateral left foot from 6/18/2017 at 1405. No   similar prior studies available for comparison.    IMPRESSION:  No tracking soft tissue gas collections, radiopaque foreign bodies, or   gross radiographic evidence for osteomyelitis.    Suggestion of an old healed proximal 5th metatarsal shaft fracture   deformity. No dislocations or acute appearing fractures.     Tarsometatarsal alignment maintained without evidence for a Lisfranc   injury.    Mild 1st MTP joint osteoarthritic change. Small well marginated cystic   erosion with overhanging cortex in dorsomedial basilar aspect of hallux   proximal phalanx may gouty in etiology the proper clinical and lab   context. Preserved remaining visualized joint spaces. No additional   suspected joint margin erosions and no intra-articular or periarticular   calcifications.    Generalized osteopenia otherwise no discrete lytic or blastic lesions.     Vascular calcifications.    BULMARO PHELAN M.D., ATTENDING RADIOLOGIST  This document has been electronically signed. Jun 18 2017  2:59PM Patient is a 72y old  Female who presents with a chief complaint of L fifth toe blister (18 Jun 2017 19:18)      HPI:  73 yo F hx DM2 (on insulin) c/b neuropathy, PVD with R foot gangrene s/p R AKA (2010) now with prosthesis, CKD (baseline Cr 1.4-1.6), CAD s/p stents and CABG, HTN, s/p CEA (2014) p/w L 5th toe blister that she noted 2 days ago. She denies any trauma or insect bites. Thinks that wearing tight shoes may have contributed to her developing a blister. It got progressively more red, filled with pus, and had associated L foot swelling, and had subjective fevers yesterday after which she decided to come to the ER Denies any pain or difficulty ambulating at this time. No cough or shortness of breath. No dysuria or increased urinary frequency. No nausea, vomiting, diarrhea, or abdominal pain. No hx of gout.     States that her diabetes is well controlled, said her last HgbA1c (in Feb) was 6.5. Has been measuring her FS at home and have ranged from 90s-150s. She also reports that her plavix was stopped after her CEA in 2014 and is currently only taking aspirin.    In the ER:  /91  HR 95  T 98.8F  RR 16  SaO2 100% RA  Given: 500 cc NS x 1, vancomycin IV x 1 (18 Jun 2017 18:03)      PAST MEDICAL & SURGICAL HISTORY:  UTI (urinary tract infection)  Carotid stenosis  PVD (peripheral vascular disease)  CAD (coronary artery disease)  Hypertension  Diabetes  Obesity  Hypercholesterolemia  HTN (hypertension)  DM (diabetes mellitus)  Carotid artery stenosis  PAD (peripheral artery disease): s/p R BKA  Stented coronary artery: 2010 [facility]  S/P CABG x 3: in 2004, [facility]  CAD (coronary artery disease)  History of hysterectomy  S/P BKA (below knee amputation) unilateral, right  S/P CABG x 3  S/P eye surgery: for glaucoma  S/P below knee amputation, right: 6/2010  S/P angioplasty with stent: 2009, 3/2014  S/P hysterectomy: 2004  Hx of CABG: 3v 2004  CAD (Coronary Artery Disease)  HTN (Hypertension)  Diabetes      MEDICATIONS  (STANDING):  piperacillin/tazobactam IVPB. 3.375Gram(s) IV Intermittent every 12 hours  heparin  Injectable 5000Unit(s) SubCutaneous every 8 hours  insulin glargine Injectable (LANTUS) 80Unit(s) SubCutaneous at bedtime  insulin lispro Injectable (HumaLOG) 26Unit(s) SubCutaneous three times a day before meals  insulin lispro (HumaLOG) corrective regimen sliding scale  SubCutaneous three times a day before meals  insulin lispro (HumaLOG) corrective regimen sliding scale  SubCutaneous at bedtime  dextrose 50% Injectable 12.5Gram(s) IV Push once  dextrose 50% Injectable 25Gram(s) IV Push once  dextrose 50% Injectable 25Gram(s) IV Push once  aspirin enteric coated 81milliGRAM(s) Oral daily  valsartan 320milliGRAM(s) Oral daily  isosorbide   mononitrate ER Tablet (IMDUR) 30milliGRAM(s) Oral daily  atorvastatin 20milliGRAM(s) Oral at bedtime  carvedilol 12.5milliGRAM(s) Oral every 12 hours  ketorolac 0.5% Ophthalmic Solution 1Drop(s) Both EYES two times a day  timolol 0.5% Solution 1Drop(s) Both EYES two times a day  brimonidine 0.2% Ophthalmic Solution 1Drop(s) Both EYES two times a day    MEDICATIONS  (PRN):  dextrose Gel 1Dose(s) Oral once PRN Blood Glucose LESS THAN 70 milliGRAM(s)/deciliter  glucagon  Injectable 1milliGRAM(s) IntraMuscular once PRN Glucose LESS THAN 70 milligrams/deciliter      Allergies    sulfa drugs (Hives)  sulfa drugs (Rash)  Sulfac 10% (Hives)    Intolerances        VITALS:    Vital Signs Last 24 Hrs  T(C): 37.1, Max: 37.1 (06-18 @ 12:40)  T(F): 98.8, Max: 98.8 (06-18 @ 12:40)  HR: 95 (72 - 95)  BP: 142/91 (142/91 - 157/56)  BP(mean): --  RR: 16 (16 - 16)  SpO2: 100% (97% - 100%)    LABS:                          10.3   8.85  )-----------( 247      ( 18 Jun 2017 13:31 )             33.5       06-18    140  |  102  |  37<H>  ----------------------------<  225<H>  4.6   |  25  |  1.46<H>    Ca    8.9      18 Jun 2017 13:31    TPro  8.0  /  Alb  3.9  /  TBili  0.3  /  DBili  x   /  AST  16  /  ALT  14  /  AlkPhos  88  06-18      CAPILLARY BLOOD GLUCOSE    Sedimentation Rate, Erythrocyte: 94 mm/hr (06.18.17 @ 13:31)    C-Reactive Protein, Serum: 89.2 mg/L (06.18.17 @ 13:31)    LOWER EXTREMITY PHYSICAL EXAM:    Vasular: DP 0/4, PT 2/4. Temp gradient WNL LLE.  Neuro: Epicritic sensation diminished to the level of the foot, L.  Musculoskeletal/Ortho: R leg s/p AKA  Skin: L foot 5th dorsal DIPJ and lateral 4th digit ulcers to subQ, no PTB, no malodor, no fluctuance, no purulence, periwound erythema and edema noted to the midfoot.     RADIOLOGY & ADDITIONAL STUDIES:  EXAM:  RAD FOOT MIN 3 VIEWS LT      PROCEDURE DATE:  Jun 18 2017     INTERPRETATION:  CLINICAL INDICATION: left 5th toe blister; for   osteomyelitis    EXAM:  Frontal, oblique, and lateral left foot from 6/18/2017 at 1405. No   similar prior studies available for comparison.    IMPRESSION:  No tracking soft tissue gas collections, radiopaque foreign bodies, or   gross radiographic evidence for osteomyelitis.    Suggestion of an old healed proximal 5th metatarsal shaft fracture   deformity. No dislocations or acute appearing fractures.     Tarsometatarsal alignment maintained without evidence for a Lisfranc   injury.    Mild 1st MTP joint osteoarthritic change. Small well marginated cystic   erosion with overhanging cortex in dorsomedial basilar aspect of hallux   proximal phalanx may gouty in etiology the proper clinical and lab   context. Preserved remaining visualized joint spaces. No additional   suspected joint margin erosions and no intra-articular or periarticular   calcifications.    Generalized osteopenia otherwise no discrete lytic or blastic lesions.     Vascular calcifications.    BULMARO PHELAN M.D., ATTENDING RADIOLOGIST  This document has been electronically signed. Jun 18 2017  2:59PM

## 2017-06-18 NOTE — H&P ADULT - NSHPLABSRESULTS_GEN_ALL_CORE
Reviewed: No leukocytosis, Cr of 1.46 (around baseline), elevated ESR of 94 and CRP of 89                          10.3   8.85  )-----------( 247      ( 18 Jun 2017 13:31 )             33.5   06-18    140  |  102  |  37<H>  ----------------------------<  225<H>  4.6   |  25  |  1.46<H>    Ca    8.9      18 Jun 2017 13:31    TPro  8.0  /  Alb  3.9  /  TBili  0.3  /  DBili  x   /  AST  16  /  ALT  14  /  AlkPhos  88  06-18    - UA pending  - Blood cultures pending results    IMAGING:  - CXR: LLL linear scarring, otherwise clear lungs  - X-ray L foot: No tracking soft tissue gas collections, radiopaque foreign bodies, or gross radiographic evidence for osteomyelitis. Suggestion of an old healed proximal 5th metatarsal shaft fracture deformity. No dislocations or acute appearing fractures. Tarsometatarsal alignment maintained without evidence for a Lisfranc injury. Mild 1st MTP joint osteoarthritic change. Small well marginated cystic erosion with overhanging cortex in dorsomedial basilar aspect of hallux proximal phalanx may gouty in etiology the proper clinical and lab context. Preserved remaining visualized joint spaces. No additional suspected joint margin erosions and no intra-articular or periarticular calcifications. Generalized osteopenia otherwise no discrete lytic or blastic lesions.     Vascular calcifications.

## 2017-06-18 NOTE — ED PROVIDER NOTE - PMH
CAD (coronary artery disease)    CAD (coronary artery disease)    Carotid artery stenosis    Carotid stenosis    Diabetes    DM (diabetes mellitus)    HTN (hypertension)    Hypercholesterolemia    Hypertension    Obesity    PAD (peripheral artery disease)  s/p R BKA  PVD (peripheral vascular disease)    S/P CABG x 3  in 2004, [facility]  Stented coronary artery  2010 [facility]  UTI (urinary tract infection)

## 2017-06-18 NOTE — H&P ADULT - PROBLEM SELECTOR PLAN 2
Pt with hx of PVD  - L foot warm to touch and palpable PT pulse, faint DP pulse  - will obtain DUC/PVR of L foot  - c/w aspirin and statin

## 2017-06-18 NOTE — DISCHARGE NOTE ADULT - HOME CARE AGENCY
[facility]  [phone]   RN visit day after discharge.  PT evaluation when available.

## 2017-06-18 NOTE — H&P ADULT - NSHPOUTPATIENTPROVIDERS_GEN_ALL_CORE
Dr. Opal Hackett (PCP, [phone])  Dr. Alondra Sebastian (Endocrinologist, [place], [phone])  Dr. Coy Cm (Cardiologist)

## 2017-06-18 NOTE — DISCHARGE NOTE ADULT - PLAN OF CARE
Treatment of the infection. Continue to take antibiotics at home. You were on an IV antibiotic in the hospital. Now you will take oral medication. Take levaquin 500 mg daily until 7/7 (you need to take it for a total of 10 days after your surgery, which was on 6/28). Maintenance treatment Continue to take your cholesterol lowering medication (atorvastatin 20 mg). Control of blood pressure Continue to take your home blood pressure medications (coreg and valsartan) Control of blood sugars Continue to take your long and short acting insulin. You will go home with 50 Units of Lantus, which you will take every night at bedtime. Continue your short acting insulin (Humalog). Continue taking your aspirin and plavix Maintenance treatment. Continue to take your aspirin and plavix.  Follow up with your vascular surgeon (name and number listed below) Continue to take antibiotics at home. You were on an IV antibiotic in the hospital. Now you will take oral medication. Take levaquin 500 mg daily until 7/7 (you need to take it for a total of 10 days after your surgery, which was on 6/28).   Please follow up with Dr. Trimble [facility] [phone]   Weight bearing as tolerated in surgical shoe  Visiting Nurse service to change packing daily and cover with 4x4 sterile gauze, abdominal pad and kerlix. Your original doses of lantus 80 units and humalog 26 units before meals were decreased while in the hospital due to hypoglycemia. You will be discharged on 40 Units of Lantus, which you will take every night at bedtime. You received a stent in one of the leg vessels while in the hospital and was started on plavix.  Continue to take your aspirin and plavix.  Follow up with your vascular surgeon (name and number listed below) Continue to take antibiotics at home. You were on an IV antibiotic in the hospital. Now you will take oral medication. Take levaquin 500 mg daily until 7/7/17 (you need to take it for a total of 10 days after your surgery, which was on 6/28).   Please follow up with Dr. Trimble at [facility] [phone]   Weight bearing as tolerated in surgical shoe.  Visiting Nurse service to change packing daily and cover with 4x4 sterile gauze, abdominal pad and kerlix. You received a stent in one of the leg vessels while in the hospital and was started on plavix.   Continue to take your aspirin and plavix.  Follow up with your vascular surgeon (name and number listed below) Continue taking your aspirin and plavix. Continue to take antibiotics at home. You were on an IV antibiotic in the hospital. Now you will take oral medication. Take levaquin 500 mg daily until 7/7/17 (you need to take it for a total of 10 days after your surgery, which was on 6/28).   Please follow up with Dr. Trimble at [facility] [phone]   Weight bearing as tolerated in surgical shoe.  Visiting Nurse service to assess the gauze covering the surgical site.  Keep site covered with 4x4 sterile gauze, abdominal pad and kerlix until follow up in wound care center.  You should follow up with the wound care center within 1 week of hospital discharge.

## 2017-06-18 NOTE — DISCHARGE NOTE ADULT - HOSPITAL COURSE
73 yo F hx DM2 (on insulin) c/b neuropathy, PVD with R foot gangrene s/p R AKA (2010) now with prosthesis, CKD (baseline Cr 1.4-1.6), CAD s/p stents and CABG, HTN, s/p CEA (2014) p/w L 5th toe blister that she noted 2 days ago. She denies any trauma or insect bites. Thinks that wearing tight shoes may have contributed to her developing a blister. It got progressively more red, filled with pus, and had associated L foot swelling, and had subjective fevers yesterday after which she decided to come to the ER Denies any pain or difficulty ambulating at this time. No cough or shortness of breath. No dysuria or increased urinary frequency. No nausea, vomiting, diarrhea, or abdominal pain. No hx of gout.     States that her diabetes is well controlled, said her last HgbA1c (in Feb) was 6.5. Has been measuring her FS at home and have ranged from 90s-150s. She also reports that her plavix was stopped after her CEA in 2014 and is currently only taking aspirin.    In the ER:  /91  HR 95  T 98.8F  RR 16  SaO2 100% RA  Given: 500 cc NS x 1, vancomycin IV x 1    Hospital course:  Pt admitted to medicine for likely infected diabetic foot ulcer and r/o OM. Podiatry was consulted and pt started on vanc/zosy, MRI of L foot and UDC/PVR showed _____. 71 yo F hx DM2 (on insulin) c/b neuropathy, PVD with R foot gangrene s/p R AKA (2010) now with prosthesis, CKD (baseline Cr 1.4-1.6), CAD s/p stents and CABG, HTN, s/p CEA (2014) p/w L 5th toe blister that she noted 2 days ago. She denies any trauma or insect bites. Thinks that wearing tight shoes may have contributed to her developing a blister. It got progressively more red, filled with pus, and had associated L foot swelling, and had subjective fevers yesterday after which she decided to come to the ER Denies any pain or difficulty ambulating at this time. No cough or shortness of breath. No dysuria or increased urinary frequency. No nausea, vomiting, diarrhea, or abdominal pain. No hx of gout.     States that her diabetes is well controlled, said her last HgbA1c (in Feb) was 6.5. Has been measuring her FS at home and have ranged from 90s-150s. She also reports that her plavix was stopped after her CEA in 2014 and is currently only taking aspirin.    In the ER:  /91  HR 95  T 98.8F  RR 16  SaO2 100% RA  Given: 500 cc NS x 1, vancomycin IV x 1    Hospital course:  Pt admitted to medicine for likely infected diabetic foot ulcer and r/o OM. Podiatry was consulted and pt started on vanc/zosyn, MRI of L foot and DUC/PVR showed _____. 73 yo F hx DM2 (on insulin) c/b neuropathy, PVD with R foot gangrene s/p R AKA (2010) now with prosthesis, CKD (baseline Cr 1.4-1.6), CAD s/p stents and CABG, HTN, s/p CEA (2014) p/w L 5th toe blister that she noted 2 days ago. She denies any trauma or insect bites. Thinks that wearing tight shoes may have contributed to her developing a blister. It got progressively more red, filled with pus, and had associated L foot swelling, and had subjective fevers yesterday after which she decided to come to the ER Denies any pain or difficulty ambulating at this time. No cough or shortness of breath. No dysuria or increased urinary frequency. No nausea, vomiting, diarrhea, or abdominal pain. No hx of gout.     States that her diabetes is well controlled, said her last HgbA1c (in Feb) was 6.5. Has been measuring her FS at home and have ranged from 90s-150s. She also reports that her plavix was stopped after her CEA in 2014 and is currently only taking aspirin.    In the ER:  /91  HR 95  T 98.8F  RR 16  SaO2 100% RA  Given: 500 cc NS x 1, vancomycin IV x 1    Hospital course:  Pt admitted to medicine for likely infected diabetic foot ulcer and r/o OM. Podiatry was consulted and pt started on vanc/zosyn, MRI of L foot showed reactive osteitis versus early or low-grade osteomyelitis involving the 5th middle and distal phalanges and proximal phalanx head. Wound cultures returned growing CNS and serratia marscescens; vancomycin was d/maritza. Blood cultures returend with no growth. Pt spiked a fever on 6/22 and repeat blood cultures were sent, which were negative. DUC/PVR showed severely decreased LLE DUC. Vascular surgery was consulted and pt was taken for angiogram with stenting to the L SFA on 6/21. Repeat DUC/PVR on 6/22 still showed severely decreased LLE DUC. Pt was scheduled for L 5th digit amputation on 6/29.    Pt became hypoglycemic on 6/20 and 6/21 and her Lantus was decreased and Humalog was held. Her FS improved and ___________.    Pt had DAMARI on 6/23, likely 2/2 vascular angiogram on 6/21. Pt was started on IVF. 71 yo F hx DM2 (on insulin) c/b neuropathy, PVD with R foot gangrene s/p R AKA (2010) now with prosthesis, CKD (baseline Cr 1.4-1.6), CAD s/p stents and CABG, HTN, s/p CEA (2014) p/w L 5th toe blister that she noted 2 days ago. She denies any trauma or insect bites. Thinks that wearing tight shoes may have contributed to her developing a blister. It got progressively more red, filled with pus, and had associated L foot swelling, and had subjective fevers yesterday after which she decided to come to the ER Denies any pain or difficulty ambulating at this time. No cough or shortness of breath. No dysuria or increased urinary frequency. No nausea, vomiting, diarrhea, or abdominal pain. No hx of gout.     States that her diabetes is well controlled, said her last HgbA1c (in Feb) was 6.5. Has been measuring her FS at home and have ranged from 90s-150s. She also reports that her plavix was stopped after her CEA in 2014 and is currently only taking aspirin.    In the ER:  .  /91  HR 95  T 98.8F  RR 16  SaO2 100% RA  Given: 500 cc NS x 1, vancomycin IV x 1    /91  HR 95  T 98.8F  RR 16  SaO2 100% RA  Given: 500 cc NS x 1, vancomycin IV x 1    Hospital course:  Pt admitted to medicine for likely infected diabetic foot ulcer and r/o OM. Podiatry was consulted and pt started on vanc/zosyn, MRI of L foot showed reactive osteitis versus early or low-grade osteomyelitis involving the 5th middle and distal phalanges and proximal phalanx head. Wound cultures returned growing CNS and serratia marscescens; vancomycin was d/maritza. Blood cultures returend with no growth. Pt spiked a fever on 6/22 and repeat blood cultures were sent, which were negative. DUC/PVR showed severely decreased LLE DUC. Vascular surgery was consulted and pt was taken for angiogram with stenting to the L SFA on 6/21. Repeat DUC/PVR on 6/22 still showed severely decreased LLE DUC. Pt was scheduled for L 5th digit amputation.  Pt became hypoglycemic on 6/20 and 6/21 and her Lantus was decreased to 50U and Humalog was held. Her FS improved.  Pt had DAMARI on 6/23, likely 2/2 vascular angiogram on 6/21. Pt was started on IVF. Creatinine gradually improved 6/24 onward, by 6/29 Creatinine levels have stabilized (Cr 1.23, at baseline). Patient had amputation of left LE 4th and 5th digits on 6/28, with bone cultures obtained.  Was switched ertapenem 1g daily on 6/27, and this is being continued until bone cultures return. The Infectious Disease team recommended continuing antibiotics for a total of 10 days post-op (until July 7th).  If cultures don't change, can change Abx to levaquin 500 mg po daily through 7/7. Patient is currently transitioned to Lantus 40U (long acting insulin was stopped temporarily due to her being NPO for procedure, then gradually increased to 25U on 6/28, and 40U on 6/29. 71 yo F hx DM2 (on insulin) c/b neuropathy, PVD with R foot gangrene s/p R AKA (2010) now with prosthesis, CKD (baseline Cr 1.4-1.6), CAD s/p stents and CABG, HTN, s/p CEA (2014) p/w L 5th toe blister that she noted 2 days ago. She denies any trauma or insect bites. Thinks that wearing tight shoes may have contributed to her developing a blister. It got progressively more red, filled with pus, and had associated L foot swelling, and had subjective fevers yesterday after which she decided to come to the ER Denies any pain or difficulty ambulating at this time. No cough or shortness of breath. No dysuria or increased urinary frequency. No nausea, vomiting, diarrhea, or abdominal pain. No hx of gout.     States that her diabetes is well controlled, said her last HgbA1c (in Feb) was 6.5. Has been measuring her FS at home and have ranged from 90s-150s. She also reports that her plavix was stopped after her CEA in 2014 and is currently only taking aspirin.    In the ER:  .  /91  HR 95  T 98.8F  RR 16  SaO2 100% RA  Given: 500 cc NS x 1, vancomycin IV x 1    /91  HR 95  T 98.8F  RR 16  SaO2 100% RA  Given: 500 cc NS x 1, vancomycin IV x 1    Hospital course:  Pt admitted to medicine for likely infected diabetic foot ulcer and r/o OM. Podiatry was consulted and pt started on vanc/zosyn, MRI of L foot showed reactive osteitis versus early or low-grade osteomyelitis involving the 5th middle and distal phalanges and proximal phalanx head. Wound cultures returned growing CNS and serratia marscescens; vancomycin was d/maritza. Blood cultures returend with no growth. Pt spiked a fever on 6/22 and repeat blood cultures were sent, which were negative. DUC/PVR showed severely decreased LLE DUC. Vascular surgery was consulted and pt was taken for angiogram with stenting to the L-SFA on 6/21. Repeat DUC/PVR on 6/22 still showed severely decreased LLE DUC. Pt was scheduled for L 5th digit amputation.  Pt became hypoglycemic on 6/20 and 6/21 and her Lantus was decreased to 50U and Humalog was held. Her FS improved.  Pt had DAMARI on 6/23, likely 2/2 vascular angiogram on 6/21. Pt was started on IVF. Creatinine gradually improved 6/24 onward, by 6/29 Creatinine levels have stabilized (Cr 1.23, at baseline). Patient had amputation of left LE 4th and 5th digits on 6/28, with bone cultures obtained.  Was switched ertapenem 1g daily on 6/27, and this is being continued until bone cultures return. The Infectious Disease team recommended continuing antibiotics for a total of 10 days post-op (until July 7th).  Cultures didnt grow any organisms so Abx changed to levaquin 500 mg po on day of discharge w/ plan to cont through 7/7.   In terms of T2DM, pt had hypoglycemia earlier in hospital course & insulin regimen was adjusted while inpatient. She is going to be discharged on lantus 40U (humalog was d/c'ed). Pt should have further titration of her insulin regimen as outpatient.  Pt will be d/c'ed to home w/ home PT & plan to follow up with podiatry and vascular surgery.

## 2017-06-19 LAB
APTT BLD: 32 SEC — SIGNIFICANT CHANGE UP (ref 27.5–37.4)
BUN SERPL-MCNC: 27 MG/DL — HIGH (ref 7–23)
CALCIUM SERPL-MCNC: 9.1 MG/DL — SIGNIFICANT CHANGE UP (ref 8.4–10.5)
CHLORIDE SERPL-SCNC: 104 MMOL/L — SIGNIFICANT CHANGE UP (ref 98–107)
CO2 SERPL-SCNC: 23 MMOL/L — SIGNIFICANT CHANGE UP (ref 22–31)
CREAT SERPL-MCNC: 1.38 MG/DL — HIGH (ref 0.5–1.3)
GLUCOSE SERPL-MCNC: 188 MG/DL — HIGH (ref 70–99)
HCT VFR BLD CALC: 31 % — LOW (ref 34.5–45)
HGB BLD-MCNC: 9.6 G/DL — LOW (ref 11.5–15.5)
INR BLD: 1.08 — SIGNIFICANT CHANGE UP (ref 0.88–1.17)
MAGNESIUM SERPL-MCNC: 1.8 MG/DL — SIGNIFICANT CHANGE UP (ref 1.6–2.6)
MCHC RBC-ENTMCNC: 27.9 PG — SIGNIFICANT CHANGE UP (ref 27–34)
MCHC RBC-ENTMCNC: 31 % — LOW (ref 32–36)
MCV RBC AUTO: 90.1 FL — SIGNIFICANT CHANGE UP (ref 80–100)
PHOSPHATE SERPL-MCNC: 3.5 MG/DL — SIGNIFICANT CHANGE UP (ref 2.5–4.5)
PLATELET # BLD AUTO: 251 K/UL — SIGNIFICANT CHANGE UP (ref 150–400)
PMV BLD: 11.5 FL — SIGNIFICANT CHANGE UP (ref 7–13)
POTASSIUM SERPL-MCNC: 5 MMOL/L — SIGNIFICANT CHANGE UP (ref 3.5–5.3)
POTASSIUM SERPL-SCNC: 5 MMOL/L — SIGNIFICANT CHANGE UP (ref 3.5–5.3)
PROTHROM AB SERPL-ACNC: 12.1 SEC — SIGNIFICANT CHANGE UP (ref 9.8–13.1)
RBC # BLD: 3.44 M/UL — LOW (ref 3.8–5.2)
RBC # FLD: 14.8 % — HIGH (ref 10.3–14.5)
SODIUM SERPL-SCNC: 140 MMOL/L — SIGNIFICANT CHANGE UP (ref 135–145)
SPECIMEN SOURCE: SIGNIFICANT CHANGE UP
SPECIMEN SOURCE: SIGNIFICANT CHANGE UP
WBC # BLD: 9.06 K/UL — SIGNIFICANT CHANGE UP (ref 3.8–10.5)
WBC # FLD AUTO: 9.06 K/UL — SIGNIFICANT CHANGE UP (ref 3.8–10.5)

## 2017-06-19 PROCEDURE — 93923 UPR/LXTR ART STDY 3+ LVLS: CPT | Mod: 26

## 2017-06-19 PROCEDURE — 99233 SBSQ HOSP IP/OBS HIGH 50: CPT | Mod: GC

## 2017-06-19 RX ORDER — ACETAMINOPHEN 500 MG
650 TABLET ORAL EVERY 6 HOURS
Qty: 0 | Refills: 0 | Status: DISCONTINUED | OUTPATIENT
Start: 2017-06-19 | End: 2017-06-30

## 2017-06-19 RX ADMIN — HEPARIN SODIUM 5000 UNIT(S): 5000 INJECTION INTRAVENOUS; SUBCUTANEOUS at 14:05

## 2017-06-19 RX ADMIN — Medication 81 MILLIGRAM(S): at 12:39

## 2017-06-19 RX ADMIN — CARVEDILOL PHOSPHATE 12.5 MILLIGRAM(S): 80 CAPSULE, EXTENDED RELEASE ORAL at 18:11

## 2017-06-19 RX ADMIN — Medication 26 UNIT(S): at 18:09

## 2017-06-19 RX ADMIN — INSULIN GLARGINE 80 UNIT(S): 100 INJECTION, SOLUTION SUBCUTANEOUS at 21:39

## 2017-06-19 RX ADMIN — Medication 1: at 08:56

## 2017-06-19 RX ADMIN — Medication 250 MILLIGRAM(S): at 14:05

## 2017-06-19 RX ADMIN — PIPERACILLIN AND TAZOBACTAM 25 GRAM(S): 4; .5 INJECTION, POWDER, LYOPHILIZED, FOR SOLUTION INTRAVENOUS at 06:00

## 2017-06-19 RX ADMIN — Medication 26 UNIT(S): at 12:39

## 2017-06-19 RX ADMIN — ISOSORBIDE MONONITRATE 30 MILLIGRAM(S): 60 TABLET, EXTENDED RELEASE ORAL at 12:39

## 2017-06-19 RX ADMIN — ATORVASTATIN CALCIUM 20 MILLIGRAM(S): 80 TABLET, FILM COATED ORAL at 21:40

## 2017-06-19 RX ADMIN — BRIMONIDINE TARTRATE 1 DROP(S): 2 SOLUTION/ DROPS OPHTHALMIC at 18:08

## 2017-06-19 RX ADMIN — CARVEDILOL PHOSPHATE 12.5 MILLIGRAM(S): 80 CAPSULE, EXTENDED RELEASE ORAL at 06:54

## 2017-06-19 RX ADMIN — PIPERACILLIN AND TAZOBACTAM 25 GRAM(S): 4; .5 INJECTION, POWDER, LYOPHILIZED, FOR SOLUTION INTRAVENOUS at 18:12

## 2017-06-19 RX ADMIN — Medication 1 DROP(S): at 06:55

## 2017-06-19 RX ADMIN — Medication 26 UNIT(S): at 08:56

## 2017-06-19 RX ADMIN — BRIMONIDINE TARTRATE 1 DROP(S): 2 SOLUTION/ DROPS OPHTHALMIC at 06:56

## 2017-06-19 RX ADMIN — Medication 650 MILLIGRAM(S): at 16:15

## 2017-06-19 RX ADMIN — HEPARIN SODIUM 5000 UNIT(S): 5000 INJECTION INTRAVENOUS; SUBCUTANEOUS at 06:54

## 2017-06-19 RX ADMIN — VALSARTAN 320 MILLIGRAM(S): 80 TABLET ORAL at 06:54

## 2017-06-19 RX ADMIN — Medication 650 MILLIGRAM(S): at 15:42

## 2017-06-19 RX ADMIN — Medication 1: at 18:08

## 2017-06-19 RX ADMIN — HEPARIN SODIUM 5000 UNIT(S): 5000 INJECTION INTRAVENOUS; SUBCUTANEOUS at 21:39

## 2017-06-19 RX ADMIN — Medication 1 DROP(S): at 18:10

## 2017-06-19 RX ADMIN — Medication 1 DROP(S): at 18:12

## 2017-06-19 NOTE — PROGRESS NOTE ADULT - PROBLEM SELECTOR PLAN 5
s/p stents and CABG, currently asymptomatic  - c/w aspirin, statin, beta blocker  - pt reports being off of plavix since 2014 after her CEA  - contact outpatient Cardiologist (Dr. Coy Cm) to clarify on Monday

## 2017-06-19 NOTE — PROGRESS NOTE ADULT - SUBJECTIVE AND OBJECTIVE BOX
Patient is a 72y old  Female who presents with a chief complaint of blister to left fifth toe (2017 07:34)      INTERVAL HPI/OVERNIGHT EVENTS: Pt feeling well, currently has no pain in her left foot. No SOB or CP, no fevers or chills.    MEDICATIONS (STANDING):  vancomycin  IVPB 1000milliGRAM(s) IV Intermittent every 24 hours  piperacillin/tazobactam IVPB. 3.375Gram(s) IV Intermittent every 12 hours  heparin  Injectable 5000Unit(s) SubCutaneous every 8 hours  insulin glargine Injectable (LANTUS) 80Unit(s) SubCutaneous at bedtime  insulin lispro Injectable (HumaLOG) 26Unit(s) SubCutaneous three times a day before meals  insulin lispro (HumaLOG) corrective regimen sliding scale  SubCutaneous three times a day before meals  insulin lispro (HumaLOG) corrective regimen sliding scale  SubCutaneous at bedtime  dextrose 50% Injectable 12.5Gram(s) IV Push once  dextrose 50% Injectable 25Gram(s) IV Push once  dextrose 50% Injectable 25Gram(s) IV Push once  aspirin enteric coated 81milliGRAM(s) Oral daily  valsartan 320milliGRAM(s) Oral daily  isosorbide   mononitrate ER Tablet (IMDUR) 30milliGRAM(s) Oral daily  atorvastatin 20milliGRAM(s) Oral at bedtime  carvedilol 12.5milliGRAM(s) Oral every 12 hours  ketorolac 0.5% Ophthalmic Solution 1Drop(s) Both EYES two times a day  timolol 0.5% Solution 1Drop(s) Both EYES two times a day  brimonidine 0.2% Ophthalmic Solution 1Drop(s) Both EYES two times a day    MEDICATIONS  (PRN):  dextrose Gel 1Dose(s) Oral once PRN  glucagon  Injectable 1milliGRAM(s) IntraMuscular once PRN      REVIEW OF SYSTEMS:  CONSTITUTIONAL: No fever  RESPIRATORY: No shortness of breath  CARDIOVASCULAR: No chest pain  GASTROINTESTINAL: No abdominal or epigastric pain   MUSCULOSKELETAL: No left foot pain    T(F): 97.8, Max: 98.8 (-18 @ 21:04)  HR: 62 (62 - 95)  BP: 121/51 (121/51 - 142/91)  RR: 18 (16 - 18)  SpO2: 97% (97% - 100%)  Wt(kg): --    CAPILLARY BLOOD GLUCOSE  125 (2017 12:13)  185 (2017 07:31)  266 (2017 22:45)    I&O's Summary      PHYSICAL EXAM:  GENERAL: NAD, well-groomed, well-developed  HEAD:  Atraumatic, Normocephalic  EYES: EOMI, PERRLA, conjunctiva and sclera clear  ENMT: Moist mucous membranes  NECK: Supple, No JVD  NERVOUS SYSTEM:  Alert & Oriented X3, Good concentration; Motor Strength 5/5 B/L upper and lower extremities  CHEST/LUNG: Clear to percussion bilaterally; No rales, rhonchi, wheezing, or rubs  HEART: Regular rate and rhythm; No murmurs, rubs, or gallops  ABDOMEN: Soft, Nontender, Nondistended; Bowel sounds present  EXTREMITIES: L foot wrapped, 2+ Peripheral Pulses, No clubbing, cyanosis, or edema  SKIN: No rashes or lesions    LABS:                        9.6    9.06  )-----------( 251      ( 2017 05:30 )             31.0         140  |  104  |  27<H>  ----------------------------<  188<H>  5.0   |  23  |  1.38<H>    Ca    9.1      2017 05:30  Phos  3.5       Mg     1.8         TPro  8.0  /  Alb  3.9  /  TBili  0.3  /  DBili  x   /  AST  16  /  ALT  14  /  AlkPhos  88  06-18    PT/INR - ( 2017 05:30 )   PT: 12.1 SEC;   INR: 1.08          PTT - ( 2017 05:30 )  PTT:32.0 SEC  Urinalysis Basic - ( 2017 17:59 )    Color: PLYEL / Appearance: CLEAR / S.008 / pH: 6.0  Gluc: NEGATIVE / Ketone: NEGATIVE  / Bili: NEGATIVE / Urobili: NORMAL E.U.   Blood: NEGATIVE / Protein: 10 / Nitrite: NEGATIVE   Leuk Esterase: LARGE / RBC: 2-5 / WBC 10-25   Sq Epi: FEW / Non Sq Epi: x / Bacteria: x          RADIOLOGY & ADDITIONAL TESTS:    XRay:    CTScan:    MRI:     Imaging Personally Reviewed:  [ ] YES  [ ] NO    Consultant(s) Notes Reviewed:  [ ] YES  [ ] NO    Care Discussed with Consultants/Other Providers [ ] YES  [ ] NO Patient is a 72y old  Female who presents with a chief complaint of blister to left fifth toe (2017 07:34)      INTERVAL HPI/OVERNIGHT EVENTS: Pt feeling well, currently has no pain in her left foot. No SOB or CP, no fevers or chills.    MEDICATIONS (STANDING):  vancomycin  IVPB 1000milliGRAM(s) IV Intermittent every 24 hours  piperacillin/tazobactam IVPB. 3.375Gram(s) IV Intermittent every 12 hours  heparin  Injectable 5000Unit(s) SubCutaneous every 8 hours  insulin glargine Injectable (LANTUS) 80Unit(s) SubCutaneous at bedtime  insulin lispro Injectable (HumaLOG) 26Unit(s) SubCutaneous three times a day before meals  insulin lispro (HumaLOG) corrective regimen sliding scale  SubCutaneous three times a day before meals  insulin lispro (HumaLOG) corrective regimen sliding scale  SubCutaneous at bedtime  dextrose 50% Injectable 12.5Gram(s) IV Push once  dextrose 50% Injectable 25Gram(s) IV Push once  dextrose 50% Injectable 25Gram(s) IV Push once  aspirin enteric coated 81milliGRAM(s) Oral daily  valsartan 320milliGRAM(s) Oral daily  isosorbide   mononitrate ER Tablet (IMDUR) 30milliGRAM(s) Oral daily  atorvastatin 20milliGRAM(s) Oral at bedtime  carvedilol 12.5milliGRAM(s) Oral every 12 hours  ketorolac 0.5% Ophthalmic Solution 1Drop(s) Both EYES two times a day  timolol 0.5% Solution 1Drop(s) Both EYES two times a day  brimonidine 0.2% Ophthalmic Solution 1Drop(s) Both EYES two times a day    MEDICATIONS  (PRN):  dextrose Gel 1Dose(s) Oral once PRN  glucagon  Injectable 1milliGRAM(s) IntraMuscular once PRN      REVIEW OF SYSTEMS:  CONSTITUTIONAL: No fever  RESPIRATORY: No shortness of breath  CARDIOVASCULAR: No chest pain  GASTROINTESTINAL: No abdominal or epigastric pain   MUSCULOSKELETAL: No left foot pain    T(F): 97.8, Max: 98.8 (-18 @ 21:04)  HR: 62 (62 - 95)  BP: 121/51 (121/51 - 142/91)  RR: 18 (16 - 18)  SpO2: 97% (97% - 100%)  Wt(kg): --    CAPILLARY BLOOD GLUCOSE  125 (2017 12:13)  185 (2017 07:31)  266 (2017 22:45)    I&O's Summary      PHYSICAL EXAM:  GENERAL: NAD, well-groomed, well-developed  HEAD:  Atraumatic, Normocephalic  EYES: EOMI, PERRLA, conjunctiva and sclera clear  ENMT: Moist mucous membranes  NECK: Supple, No JVD  NERVOUS SYSTEM:  Alert & Oriented X3, Good concentration; Motor Strength 5/5 B/L upper and lower extremities  CHEST/LUNG: Clear to percussion bilaterally; No rales, rhonchi, wheezing, or rubs  HEART: Regular rate and rhythm; No murmurs, rubs, or gallops  ABDOMEN: Soft, Nontender, Nondistended; Bowel sounds present  EXTREMITIES: L foot wrapped, R BKA, L PT pulse palpable but DP pulse not palpable; No clubbing, cyanosis, or edema  SKIN: Chronic skin darkening over LLE    LABS:                        9.6    9.06  )-----------( 251      ( 2017 05:30 )             31.0     06-19    140  |  104  |  27<H>  ----------------------------<  188<H>  5.0   |  23  |  1.38<H>    Ca    9.1      2017 05:30  Phos  3.5     -  Mg     1.8         TPro  8.0  /  Alb  3.9  /  TBili  0.3  /  DBili  x   /  AST  16  /  ALT  14  /  AlkPhos  88  06-18    PT/INR - ( 2017 05:30 )   PT: 12.1 SEC;   INR: 1.08          PTT - ( 2017 05:30 )  PTT:32.0 SEC  Urinalysis Basic - ( 2017 17:59 )    Color: PLYEL / Appearance: CLEAR / S.008 / pH: 6.0  Gluc: NEGATIVE / Ketone: NEGATIVE  / Bili: NEGATIVE / Urobili: NORMAL E.U.   Blood: NEGATIVE / Protein: 10 / Nitrite: NEGATIVE   Leuk Esterase: LARGE / RBC: 2-5 / WBC 10-25   Sq Epi: FEW / Non Sq Epi: x / Bacteria: x          RADIOLOGY & ADDITIONAL TESTS:    XRay:    CTScan:    MRI:     Imaging Personally Reviewed:  [ ] YES  [ ] NO    Consultant(s) Notes Reviewed:  [ ] YES  [ ] NO    Care Discussed with Consultants/Other Providers [ ] YES  [ ] NO

## 2017-06-19 NOTE — PROGRESS NOTE ADULT - ASSESSMENT
73 yo F hx DM2 (on insulin) c/b neuropathy, PVD with R foot gangrene s/p R AKA (2010) now with prosthesis, CKD (baseline Cr 1.4-1.6), CAD s/p stents and CABG, HTN, s/p CEA (2014) p/w L 5th toe blister that she noted 2 days ago a/w diabetic foot ulcer and r/o OM

## 2017-06-19 NOTE — PROGRESS NOTE ADULT - PROBLEM SELECTOR PLAN 4
HgbA1c of 7.8  - c/w home dose of Lantus 80 units qhs and humalog 26 units tidac  - monitor FS qac and qhs and titrate insulin accordingly  - carb consistent diet and hypoglycemia protocol

## 2017-06-19 NOTE — PROGRESS NOTE ADULT - SUBJECTIVE AND OBJECTIVE BOX
pt seen at bedside in [place], Dressing c/d/i  left foot  left 5th toe ulcer medial and lateral and 4th toe lateral ulcer slight necrotic less edema and erythema   no pus noted  applied DSD  await MRi and DUC  will follow

## 2017-06-19 NOTE — PROGRESS NOTE ADULT - PROBLEM SELECTOR PLAN 1
Pt with likely diabetic foot ulcer vs PVD ulcer, no radiographic evidence of OM however with elevated ESR and CRP, afebrile and no leukocytosis  -Podiatry consulted, recommend checking MRI of the L foot to r/o osteomyelitis and to continue empirically on IV antibiotics for now. Will decide on surgery depending on MRI results. S/p bedside debridement yesterday  - c/w renally dosed vancomycin and zosyn  - follow up blood cultures  -DUC/PVR of L foot showing severely decreased LLE DUC

## 2017-06-19 NOTE — PROGRESS NOTE ADULT - PROBLEM SELECTOR PLAN 2
Pt with hx of PVD  - L foot warm to touch and palpable PT pulse, faint DP pulse  -LLE DUC severely decreased  - c/w aspirin and statin

## 2017-06-20 LAB
BUN SERPL-MCNC: 29 MG/DL — HIGH (ref 7–23)
BUN SERPL-MCNC: 30 MG/DL — HIGH (ref 7–23)
BUN SERPL-MCNC: 30 MG/DL — HIGH (ref 7–23)
CALCIUM SERPL-MCNC: 9.2 MG/DL — SIGNIFICANT CHANGE UP (ref 8.4–10.5)
CALCIUM SERPL-MCNC: 9.3 MG/DL — SIGNIFICANT CHANGE UP (ref 8.4–10.5)
CALCIUM SERPL-MCNC: 9.3 MG/DL — SIGNIFICANT CHANGE UP (ref 8.4–10.5)
CHLORIDE SERPL-SCNC: 102 MMOL/L — SIGNIFICANT CHANGE UP (ref 98–107)
CHLORIDE SERPL-SCNC: 102 MMOL/L — SIGNIFICANT CHANGE UP (ref 98–107)
CHLORIDE SERPL-SCNC: 104 MMOL/L — SIGNIFICANT CHANGE UP (ref 98–107)
CO2 SERPL-SCNC: 19 MMOL/L — LOW (ref 22–31)
CO2 SERPL-SCNC: 23 MMOL/L — SIGNIFICANT CHANGE UP (ref 22–31)
CO2 SERPL-SCNC: 23 MMOL/L — SIGNIFICANT CHANGE UP (ref 22–31)
CREAT SERPL-MCNC: 1.54 MG/DL — HIGH (ref 0.5–1.3)
CREAT SERPL-MCNC: 1.59 MG/DL — HIGH (ref 0.5–1.3)
CREAT SERPL-MCNC: 1.61 MG/DL — HIGH (ref 0.5–1.3)
GLUCOSE SERPL-MCNC: 137 MG/DL — HIGH (ref 70–99)
GLUCOSE SERPL-MCNC: 50 MG/DL — LOW (ref 70–99)
GLUCOSE SERPL-MCNC: 78 MG/DL — SIGNIFICANT CHANGE UP (ref 70–99)
HCT VFR BLD CALC: 32.8 % — LOW (ref 34.5–45)
HGB BLD-MCNC: 10.1 G/DL — LOW (ref 11.5–15.5)
MAGNESIUM SERPL-MCNC: 1.8 MG/DL — SIGNIFICANT CHANGE UP (ref 1.6–2.6)
MCHC RBC-ENTMCNC: 28.1 PG — SIGNIFICANT CHANGE UP (ref 27–34)
MCHC RBC-ENTMCNC: 30.8 % — LOW (ref 32–36)
MCV RBC AUTO: 91.1 FL — SIGNIFICANT CHANGE UP (ref 80–100)
PHOSPHATE SERPL-MCNC: 4.1 MG/DL — SIGNIFICANT CHANGE UP (ref 2.5–4.5)
PLATELET # BLD AUTO: 275 K/UL — SIGNIFICANT CHANGE UP (ref 150–400)
PMV BLD: 11.9 FL — SIGNIFICANT CHANGE UP (ref 7–13)
POTASSIUM SERPL-MCNC: 4.5 MMOL/L — SIGNIFICANT CHANGE UP (ref 3.5–5.3)
POTASSIUM SERPL-MCNC: 5.5 MMOL/L — HIGH (ref 3.5–5.3)
POTASSIUM SERPL-MCNC: 5.6 MMOL/L — HIGH (ref 3.5–5.3)
POTASSIUM SERPL-SCNC: 4.5 MMOL/L — SIGNIFICANT CHANGE UP (ref 3.5–5.3)
POTASSIUM SERPL-SCNC: 5.5 MMOL/L — HIGH (ref 3.5–5.3)
POTASSIUM SERPL-SCNC: 5.6 MMOL/L — HIGH (ref 3.5–5.3)
RBC # BLD: 3.6 M/UL — LOW (ref 3.8–5.2)
RBC # FLD: 14.5 % — SIGNIFICANT CHANGE UP (ref 10.3–14.5)
SODIUM SERPL-SCNC: 141 MMOL/L — SIGNIFICANT CHANGE UP (ref 135–145)
SODIUM SERPL-SCNC: 142 MMOL/L — SIGNIFICANT CHANGE UP (ref 135–145)
SODIUM SERPL-SCNC: 143 MMOL/L — SIGNIFICANT CHANGE UP (ref 135–145)
SPECIMEN SOURCE: SIGNIFICANT CHANGE UP
VANCOMYCIN TROUGH SERPL-MCNC: 9.2 UG/ML — LOW (ref 10–20)
WBC # BLD: 10.76 K/UL — HIGH (ref 3.8–10.5)
WBC # FLD AUTO: 10.76 K/UL — HIGH (ref 3.8–10.5)

## 2017-06-20 PROCEDURE — 99222 1ST HOSP IP/OBS MODERATE 55: CPT | Mod: 57,GC

## 2017-06-20 PROCEDURE — 99233 SBSQ HOSP IP/OBS HIGH 50: CPT | Mod: GC

## 2017-06-20 RX ORDER — VANCOMYCIN HCL 1 G
1250 VIAL (EA) INTRAVENOUS ONCE
Qty: 0 | Refills: 0 | Status: COMPLETED | OUTPATIENT
Start: 2017-06-20 | End: 2017-06-20

## 2017-06-20 RX ORDER — SODIUM POLYSTYRENE SULFONATE 4.1 MEQ/G
30 POWDER, FOR SUSPENSION ORAL ONCE
Qty: 0 | Refills: 0 | Status: COMPLETED | OUTPATIENT
Start: 2017-06-20 | End: 2017-06-20

## 2017-06-20 RX ORDER — INSULIN LISPRO 100/ML
10 VIAL (ML) SUBCUTANEOUS ONCE
Qty: 0 | Refills: 0 | Status: COMPLETED | OUTPATIENT
Start: 2017-06-20 | End: 2017-06-20

## 2017-06-20 RX ORDER — VANCOMYCIN HCL 1 G
VIAL (EA) INTRAVENOUS
Qty: 0 | Refills: 0 | Status: DISCONTINUED | OUTPATIENT
Start: 2017-06-20 | End: 2017-06-21

## 2017-06-20 RX ORDER — VANCOMYCIN HCL 1 G
1250 VIAL (EA) INTRAVENOUS EVERY 24 HOURS
Qty: 0 | Refills: 0 | Status: DISCONTINUED | OUTPATIENT
Start: 2017-06-21 | End: 2017-06-21

## 2017-06-20 RX ORDER — SODIUM CHLORIDE 9 MG/ML
1000 INJECTION INTRAMUSCULAR; INTRAVENOUS; SUBCUTANEOUS
Qty: 0 | Refills: 0 | Status: DISCONTINUED | OUTPATIENT
Start: 2017-06-20 | End: 2017-06-21

## 2017-06-20 RX ADMIN — Medication 166.67 MILLIGRAM(S): at 16:00

## 2017-06-20 RX ADMIN — PIPERACILLIN AND TAZOBACTAM 25 GRAM(S): 4; .5 INJECTION, POWDER, LYOPHILIZED, FOR SOLUTION INTRAVENOUS at 06:15

## 2017-06-20 RX ADMIN — Medication 650 MILLIGRAM(S): at 17:43

## 2017-06-20 RX ADMIN — Medication 1 DROP(S): at 06:17

## 2017-06-20 RX ADMIN — SODIUM CHLORIDE 75 MILLILITER(S): 9 INJECTION INTRAMUSCULAR; INTRAVENOUS; SUBCUTANEOUS at 12:50

## 2017-06-20 RX ADMIN — BRIMONIDINE TARTRATE 1 DROP(S): 2 SOLUTION/ DROPS OPHTHALMIC at 17:52

## 2017-06-20 RX ADMIN — Medication 1 DROP(S): at 17:52

## 2017-06-20 RX ADMIN — VALSARTAN 320 MILLIGRAM(S): 80 TABLET ORAL at 06:16

## 2017-06-20 RX ADMIN — CARVEDILOL PHOSPHATE 12.5 MILLIGRAM(S): 80 CAPSULE, EXTENDED RELEASE ORAL at 06:16

## 2017-06-20 RX ADMIN — Medication 81 MILLIGRAM(S): at 12:50

## 2017-06-20 RX ADMIN — Medication 26 UNIT(S): at 09:08

## 2017-06-20 RX ADMIN — Medication 1: at 09:08

## 2017-06-20 RX ADMIN — Medication 10 UNIT(S): at 17:51

## 2017-06-20 RX ADMIN — ISOSORBIDE MONONITRATE 30 MILLIGRAM(S): 60 TABLET, EXTENDED RELEASE ORAL at 12:49

## 2017-06-20 RX ADMIN — Medication 650 MILLIGRAM(S): at 17:00

## 2017-06-20 RX ADMIN — HEPARIN SODIUM 5000 UNIT(S): 5000 INJECTION INTRAVENOUS; SUBCUTANEOUS at 06:15

## 2017-06-20 RX ADMIN — SODIUM POLYSTYRENE SULFONATE 30 GRAM(S): 4.1 POWDER, FOR SUSPENSION ORAL at 09:13

## 2017-06-20 RX ADMIN — INSULIN GLARGINE 80 UNIT(S): 100 INJECTION, SOLUTION SUBCUTANEOUS at 22:23

## 2017-06-20 RX ADMIN — HEPARIN SODIUM 5000 UNIT(S): 5000 INJECTION INTRAVENOUS; SUBCUTANEOUS at 22:23

## 2017-06-20 RX ADMIN — BRIMONIDINE TARTRATE 1 DROP(S): 2 SOLUTION/ DROPS OPHTHALMIC at 06:15

## 2017-06-20 RX ADMIN — Medication 1 DROP(S): at 06:15

## 2017-06-20 RX ADMIN — CARVEDILOL PHOSPHATE 12.5 MILLIGRAM(S): 80 CAPSULE, EXTENDED RELEASE ORAL at 17:51

## 2017-06-20 RX ADMIN — ATORVASTATIN CALCIUM 20 MILLIGRAM(S): 80 TABLET, FILM COATED ORAL at 22:23

## 2017-06-20 RX ADMIN — Medication 26 UNIT(S): at 12:49

## 2017-06-20 RX ADMIN — PIPERACILLIN AND TAZOBACTAM 25 GRAM(S): 4; .5 INJECTION, POWDER, LYOPHILIZED, FOR SOLUTION INTRAVENOUS at 18:20

## 2017-06-20 NOTE — CONSULT NOTE ADULT - SUBJECTIVE AND OBJECTIVE BOX
[facility] General Surgery Consultation     Patient is a 72y old  Female who presents with a chief complaint of blister to left fifth toe (2017 07:34)      HPI:  73 yo F hx DM2 (on insulin) c/b neuropathy, PVD with R foot gangrene s/p R BKA () now with prosthesis, CKD (baseline Cr 1.4-1.6), CAD s/p stents and CABG, HTN, s/p CEA () p/w L 5th toe blister that she noted 2 days ago. She denies any trauma or insect bites. Thinks that wearing tight shoes may have contributed to her developing a blister. It got progressively more red, filled with pus, and had associated L foot swelling, and had subjective fevers yesterday after which she decided to come to the ER Denies any pain or difficulty ambulating at this time. No cough or shortness of breath. No dysuria or increased urinary frequency. No nausea, vomiting, diarrhea, or abdominal pain. No hx of gout.     States that her diabetes is well controlled, said her last HgbA1c (in Feb) was 6.5. Has been measuring her FS at home and have ranged from 90s-150s. She also reports that her plavix was stopped after her CEA in  and is currently only taking aspirin.    In the ER:  /91  HR 95  T 98.8F  RR 16  SaO2 100% RA  Given: 500 cc NS x 1, vancomycin IV x 1 (2017 18:03)    CONSULT: Patient seen and examined at bedside. Denies pain currently. Endorses h/o R BKA at [place] (unclear by whom), following ?angiogram - per patient, "they did a lot of tests to try to save my foot but they couldn't". R BKA for R toe gangrene. Patient is s/p L 5th toe bedside debridement with podiatry. Consult called for PVD, evaluate for possible angiogram/angioplasty.      PAST MEDICAL & SURGICAL HISTORY:  UTI (urinary tract infection)  Carotid stenosis  PVD (peripheral vascular disease)  CAD (coronary artery disease)  Hypertension  Diabetes  Obesity  Hypercholesterolemia  HTN (hypertension)  DM (diabetes mellitus)  Carotid artery stenosis  PAD (peripheral artery disease): s/p R BKA  Stented coronary artery:  [facility]  S/P CABG x 3: in , [facility]  CAD (coronary artery disease)  History of hysterectomy  S/P BKA (below knee amputation) unilateral, right  S/P CABG x 3  S/P eye surgery: for glaucoma  S/P below knee amputation, right: 2010  S/P angioplasty with stent: , 3/2014  S/P hysterectomy:   Hx of CABG: 3v   CAD (Coronary Artery Disease)  HTN (Hypertension)  Diabetes      FAMILY HISTORY:  Family history of diabetes mellitus (Sibling)      SOCIAL HISTORY:    MEDICATIONS  (STANDING):  piperacillin/tazobactam IVPB. 3.375Gram(s) IV Intermittent every 12 hours  heparin  Injectable 5000Unit(s) SubCutaneous every 8 hours  insulin glargine Injectable (LANTUS) 80Unit(s) SubCutaneous at bedtime  insulin lispro Injectable (HumaLOG) 26Unit(s) SubCutaneous three times a day before meals  insulin lispro (HumaLOG) corrective regimen sliding scale  SubCutaneous three times a day before meals  insulin lispro (HumaLOG) corrective regimen sliding scale  SubCutaneous at bedtime  dextrose 50% Injectable 12.5Gram(s) IV Push once  dextrose 50% Injectable 25Gram(s) IV Push once  dextrose 50% Injectable 25Gram(s) IV Push once  aspirin enteric coated 81milliGRAM(s) Oral daily  valsartan 320milliGRAM(s) Oral daily  isosorbide   mononitrate ER Tablet (IMDUR) 30milliGRAM(s) Oral daily  atorvastatin 20milliGRAM(s) Oral at bedtime  carvedilol 12.5milliGRAM(s) Oral every 12 hours  ketorolac 0.5% Ophthalmic Solution 1Drop(s) Both EYES two times a day  timolol 0.5% Solution 1Drop(s) Both EYES two times a day  brimonidine 0.2% Ophthalmic Solution 1Drop(s) Both EYES two times a day  sodium chloride 0.9%. 1000milliLiter(s) IV Continuous <Continuous>  vancomycin  IVPB  IV Intermittent     MEDICATIONS  (PRN):  dextrose Gel 1Dose(s) Oral once PRN Blood Glucose LESS THAN 70 milliGRAM(s)/deciliter  glucagon  Injectable 1milliGRAM(s) IntraMuscular once PRN Glucose LESS THAN 70 milligrams/deciliter  acetaminophen   Tablet. 650milliGRAM(s) Oral every 6 hours PRN Mild and moderate pain    Allergies    sulfa drugs (Hives)  sulfa drugs (Rash)  Sulfac 10% (Hives)    Intolerances        Vital Signs Last 24 Hrs  T(C): 37.9, Max: 37.9 (-20 @ 14:41)  T(F): 100.3, Max: 100.3 (06-20 @ 14:41)  HR: 82 (61 - 82)  BP: 160/70 (112/50 - 160/70)  BP(mean): --  RR: 19 (18 - 19)  SpO2: 99% (97% - 99%)  Daily     Daily     PHYSICAL EXAM:      Constitutional: NAD    Eyes: Anicteric    Extremities: S/p R BKA. LLE with 5th toe eschar, erythema. Dressing by podiatry.    Vascular: LLE with palp femoral, pop signal, DP signal.     Neurological: AAO x 4    Psychiatric: Affect appropriate.                              10.1   10.76 )-----------( 275      ( 2017 06:20 )             32.8     06-20    141  |  102  |  30<H>  ----------------------------<  78  5.5<H>   |  19<L>  |  1.54<H>    Ca    9.3      2017 15:14  Phos  4.1     06-20  Mg     1.8     06-20      PT/INR - ( 2017 05:30 )   PT: 12.1 SEC;   INR: 1.08          PTT - ( 2017 05:30 )  PTT:32.0 SEC  Urinalysis Basic - ( 2017 17:59 )    Color: PLYEL / Appearance: CLEAR / S.008 / pH: 6.0  Gluc: NEGATIVE / Ketone: NEGATIVE  / Bili: NEGATIVE / Urobili: NORMAL E.U.   Blood: NEGATIVE / Protein: 10 / Nitrite: NEGATIVE   Leuk Esterase: LARGE / RBC: 2-5 / WBC 10-25   Sq Epi: FEW / Non Sq Epi: x / Bacteria: x        Radiographic Findings:   DUC/PVR:   "Summary/Impressions:  Left lower extremity DUC is severely decreased (0.32).  Nearly flat left toe waveforms. Waveform analysis suggests  the presence of severe infrapopliteal arterial disease,  including severe small vessel arterial disease in the left  foot."

## 2017-06-20 NOTE — CONSULT NOTE ADULT - ASSESSMENT
A/P: 72F LLE PVD, nonhealing ulcer  - Continue with ASA, Statin  - Local wound care per primary team and podiatry  - Plan for diagnostic angiogram in Cath lab tomorrow, 6/21.  - D/w Dr. Leonid Moraes.

## 2017-06-20 NOTE — PHYSICAL THERAPY INITIAL EVALUATION ADULT - PLANNED THERAPY INTERVENTIONS, PT EVAL
Pt left supine in bed in NAD; call thapa in reach; ANDREW Joseph./strengthening/bed mobility training/gait training/balance training/transfer training

## 2017-06-20 NOTE — PROGRESS NOTE ADULT - PROBLEM SELECTOR PLAN 4
HgbA1c of 7.8  - c/w home dose of Lantus 80 units qhs and humalog 26 units tidac  - FS currently well controlled  - monitor FS qac and qhs and titrate insulin accordingly  - carb consistent diet and hypoglycemia protocol

## 2017-06-20 NOTE — PROGRESS NOTE ADULT - PROBLEM SELECTOR PLAN 5
s/p stents and CABG, currently asymptomatic  - c/w aspirin, statin, beta blocker  - pt reports being off of plavix since 2014 after her CEA

## 2017-06-20 NOTE — PROGRESS NOTE ADULT - PROBLEM SELECTOR PLAN 1
Pt with likely diabetic foot ulcer vs PVD ulcer, no radiographic evidence of OM however with elevated ESR and CRP, afebrile and no leukocytosis  -Podiatry consulted, recommend checking MRI of the L foot to r/o osteomyelitis and to continue empirically on IV antibiotics for now. Will decide on surgery depending on MRI results. S/p bedside debridement on 6/18.  - c/w renally dosed vancomycin and zosyn  - Blood cultures negative at 24h  -DUC/PVR of L foot showing severely decreased LLE DUC Pt with likely diabetic foot ulcer vs PVD ulcer, no radiographic evidence of OM however with elevated ESR and CRP, afebrile and no leukocytosis  -Podiatry consulted, recommend checking MRI of the L foot to r/o osteomyelitis and to continue empirically on IV antibiotics for now. Will decide on surgery depending on MRI results. S/p bedside debridement on 6/18.  -Vascular consult  - c/w renally dosed vancomycin and zosyn  - Blood cultures negative at 24h  -DUC/PVR of L foot showing severely decreased LLE DUC

## 2017-06-20 NOTE — PROGRESS NOTE ADULT - SUBJECTIVE AND OBJECTIVE BOX
Patient is a 72y old  Female who presents with a chief complaint of blister to left fifth toe (2017 07:34)      INTERVAL HPI/OVERNIGHT EVENTS:    MEDICATIONS (STANDING):  vancomycin  IVPB 1000milliGRAM(s) IV Intermittent every 24 hours  piperacillin/tazobactam IVPB. 3.375Gram(s) IV Intermittent every 12 hours  heparin  Injectable 5000Unit(s) SubCutaneous every 8 hours  insulin glargine Injectable (LANTUS) 80Unit(s) SubCutaneous at bedtime  insulin lispro Injectable (HumaLOG) 26Unit(s) SubCutaneous three times a day before meals  insulin lispro (HumaLOG) corrective regimen sliding scale  SubCutaneous three times a day before meals  insulin lispro (HumaLOG) corrective regimen sliding scale  SubCutaneous at bedtime  dextrose 50% Injectable 12.5Gram(s) IV Push once  dextrose 50% Injectable 25Gram(s) IV Push once  dextrose 50% Injectable 25Gram(s) IV Push once  aspirin enteric coated 81milliGRAM(s) Oral daily  valsartan 320milliGRAM(s) Oral daily  isosorbide   mononitrate ER Tablet (IMDUR) 30milliGRAM(s) Oral daily  atorvastatin 20milliGRAM(s) Oral at bedtime  carvedilol 12.5milliGRAM(s) Oral every 12 hours  ketorolac 0.5% Ophthalmic Solution 1Drop(s) Both EYES two times a day  timolol 0.5% Solution 1Drop(s) Both EYES two times a day  brimonidine 0.2% Ophthalmic Solution 1Drop(s) Both EYES two times a day    MEDICATIONS  (PRN):  dextrose Gel 1Dose(s) Oral once PRN  glucagon  Injectable 1milliGRAM(s) IntraMuscular once PRN  acetaminophen   Tablet. 650milliGRAM(s) Oral every 6 hours PRN      REVIEW OF SYSTEMS:  CONSTITUTIONAL: No fever, weight loss, or fatigue  EYES: No eye pain, visual disturbances, or discharge  ENMT:  No difficulty hearing, tinnitus, vertigo; No sinus or throat pain  NECK: No pain or stiffness  BREASTS: No pain, masses, or nipple discharge  RESPIRATORY: No cough, wheezing, chills or hemoptysis; No shortness of breath  CARDIOVASCULAR: No chest pain, palpitations, dizziness, or leg swelling  GASTROINTESTINAL: No abdominal or epigastric pain. No nausea, vomiting, or hematemesis; No diarrhea or constipation. No melena or hematochezia.  GENITOURINARY: No dysuria, frequency, hematuria, or incontinence  NEUROLOGICAL: No headaches, memory loss, loss of strength, numbness, or tremors  SKIN: No itching, burning, rashes, or lesions   MUSCULOSKELETAL: No joint pain or swelling; No muscle, back, or extremity pain    T(F): 98.7, Max: 98.7 ( @ 22:02)  HR: 67 (61 - 67)  BP: 144/66 (112/50 - 144/66)  RR: 18 (17 - 18)  SpO2: 98% (97% - 98%)  Wt(kg): --  CAPILLARY BLOOD GLUCOSE  133 (2017 22:02)  156 (2017 17:30)  125 (2017 12:13)  185 (2017 07:31)    I&O's Summary      PHYSICAL EXAM:  GENERAL: NAD, well-groomed, well-developed  HEAD:  Atraumatic, Normocephalic  EYES: EOMI, PERRLA, conjunctiva and sclera clear  ENMT: No tonsillar erythema, exudates, or enlargement; Moist mucous membranes, Good dentition, No lesions  NECK: Supple, No JVD, Normal thyroid  NERVOUS SYSTEM:  Alert & Oriented X3, Good concentration; Motor Strength 5/5 B/L upper and lower extremities; DTRs 2+ intact and symmetric  CHEST/LUNG: Clear to percussion bilaterally; No rales, rhonchi, wheezing, or rubs  HEART: Regular rate and rhythm; No murmurs, rubs, or gallops  ABDOMEN: Soft, Nontender, Nondistended; Bowel sounds present  EXTREMITIES:  2+ Peripheral Pulses, No clubbing, cyanosis, or edema  LYMPH: No lymphadenopathy noted  SKIN: No rashes or lesions    LABS:                        9.6    9.06  )-----------( 251      ( 2017 05:30 )             31.0         140  |  104  |  27<H>  ----------------------------<  188<H>  5.0   |  23  |  1.38<H>    Ca    9.1      2017 05:30  Phos  3.5       Mg     1.8     06-19    TPro  8.0  /  Alb  3.9  /  TBili  0.3  /  DBili  x   /  AST  16  /  ALT  14  /  AlkPhos  88  06-18    PT/INR - ( 2017 05:30 )   PT: 12.1 SEC;   INR: 1.08          PTT - ( 2017 05:30 )  PTT:32.0 SEC  Urinalysis Basic - ( 2017 17:59 )    Color: PLYEL / Appearance: CLEAR / S.008 / pH: 6.0  Gluc: NEGATIVE / Ketone: NEGATIVE  / Bili: NEGATIVE / Urobili: NORMAL E.U.   Blood: NEGATIVE / Protein: 10 / Nitrite: NEGATIVE   Leuk Esterase: LARGE / RBC: 2-5 / WBC 10-25   Sq Epi: FEW / Non Sq Epi: x / Bacteria: x          RADIOLOGY & ADDITIONAL TESTS:    XRay:    CTScan:    MRI:     Imaging Personally Reviewed:  [ ] YES  [ ] NO    Consultant(s) Notes Reviewed:  [ ] YES  [ ] NO    Care Discussed with Consultants/Other Providers [ ] YES  [ ] NO Patient is a 72y old  Female who presents with a chief complaint of blister to left fifth toe (2017 07:34)      INTERVAL HPI/OVERNIGHT EVENTS: Pt feeling "so-so", stating that she is having intermittent sharp pain in her L lower leg around the shin/ankle area. No fevers, chills, CP, SOB or abdominal pain.    MEDICATIONS (STANDING):  vancomycin  IVPB 1000milliGRAM(s) IV Intermittent every 24 hours  piperacillin/tazobactam IVPB. 3.375Gram(s) IV Intermittent every 12 hours  heparin  Injectable 5000Unit(s) SubCutaneous every 8 hours  insulin glargine Injectable (LANTUS) 80Unit(s) SubCutaneous at bedtime  insulin lispro Injectable (HumaLOG) 26Unit(s) SubCutaneous three times a day before meals  insulin lispro (HumaLOG) corrective regimen sliding scale  SubCutaneous three times a day before meals  insulin lispro (HumaLOG) corrective regimen sliding scale  SubCutaneous at bedtime  dextrose 50% Injectable 12.5Gram(s) IV Push once  dextrose 50% Injectable 25Gram(s) IV Push once  dextrose 50% Injectable 25Gram(s) IV Push once  aspirin enteric coated 81milliGRAM(s) Oral daily  valsartan 320milliGRAM(s) Oral daily  isosorbide   mononitrate ER Tablet (IMDUR) 30milliGRAM(s) Oral daily  atorvastatin 20milliGRAM(s) Oral at bedtime  carvedilol 12.5milliGRAM(s) Oral every 12 hours  ketorolac 0.5% Ophthalmic Solution 1Drop(s) Both EYES two times a day  timolol 0.5% Solution 1Drop(s) Both EYES two times a day  brimonidine 0.2% Ophthalmic Solution 1Drop(s) Both EYES two times a day    MEDICATIONS  (PRN):  dextrose Gel 1Dose(s) Oral once PRN  glucagon  Injectable 1milliGRAM(s) IntraMuscular once PRN  acetaminophen   Tablet. 650milliGRAM(s) Oral every 6 hours PRN      REVIEW OF SYSTEMS:  CONSTITUTIONAL: No fever, weight loss, or fatigue  EYES: No eye pain, visual disturbances, or discharge  ENMT:  No difficulty hearing, tinnitus, vertigo; No sinus or throat pain  NECK: No pain or stiffness  BREASTS: No pain, masses, or nipple discharge  RESPIRATORY: No cough, wheezing, chills or hemoptysis; No shortness of breath  CARDIOVASCULAR: No chest pain, palpitations, dizziness, or leg swelling  GASTROINTESTINAL: No abdominal or epigastric pain. No nausea, vomiting, or hematemesis; No diarrhea or constipation. No melena or hematochezia.  GENITOURINARY: No dysuria, frequency, hematuria, or incontinence  NEUROLOGICAL: No headaches, memory loss, loss of strength, numbness, or tremors  SKIN: No itching, burning, rashes, or lesions   MUSCULOSKELETAL: No joint pain or swelling; No muscle, back, or extremity pain    T(F): 98.7, Max: 98.7 ( @ 22:02)  HR: 67 (61 - 67)  BP: 144/66 (112/50 - 144/66)  RR: 18 (17 - 18)  SpO2: 98% (97% - 98%)  Wt(kg): --  CAPILLARY BLOOD GLUCOSE  133 (2017 22:02)  156 (2017 17:30)  125 (2017 12:13)  185 (2017 07:31)    I&O's Summary      PHYSICAL EXAM:  GENERAL: NAD, well-groomed, well-developed  HEAD:  Atraumatic, Normocephalic  EYES: EOMI, PERRLA, conjunctiva and sclera clear  ENMT: No tonsillar erythema, exudates, or enlargement; Moist mucous membranes, Good dentition, No lesions  NECK: Supple, No JVD, Normal thyroid  NERVOUS SYSTEM:  Alert & Oriented X3, Good concentration; Motor Strength 5/5 B/L upper and lower extremities; DTRs 2+ intact and symmetric  CHEST/LUNG: Clear to percussion bilaterally; No rales, rhonchi, wheezing, or rubs  HEART: Regular rate and rhythm; No murmurs, rubs, or gallops  ABDOMEN: Soft, Nontender, Nondistended; Bowel sounds present  EXTREMITIES:  2+ Peripheral Pulses, No clubbing, cyanosis, or edema  LYMPH: No lymphadenopathy noted  SKIN: No rashes or lesions    LABS:                        9.6    9.06  )-----------( 251      ( 2017 05:30 )             31.0     -    140  |  104  |  27<H>  ----------------------------<  188<H>  5.0   |  23  |  1.38<H>    Ca    9.1      2017 05:30  Phos  3.5     06-  Mg     1.8     -    TPro  8.0  /  Alb  3.9  /  TBili  0.3  /  DBili  x   /  AST  16  /  ALT  14  /  AlkPhos  88  06-18    PT/INR - ( 2017 05:30 )   PT: 12.1 SEC;   INR: 1.08          PTT - ( 2017 05:30 )  PTT:32.0 SEC  Urinalysis Basic - ( 2017 17:59 )    Color: PLYEL / Appearance: CLEAR / S.008 / pH: 6.0  Gluc: NEGATIVE / Ketone: NEGATIVE  / Bili: NEGATIVE / Urobili: NORMAL E.U.   Blood: NEGATIVE / Protein: 10 / Nitrite: NEGATIVE   Leuk Esterase: LARGE / RBC: 2-5 / WBC 10-25   Sq Epi: FEW / Non Sq Epi: x / Bacteria: x          RADIOLOGY & ADDITIONAL TESTS:    XRay:    CTScan:    MRI:     Imaging Personally Reviewed:  [ ] YES  [ ] NO    Consultant(s) Notes Reviewed:  [ ] YES  [ ] NO    Care Discussed with Consultants/Other Providers [ ] YES  [ ] NO Patient is a 72y old  Female who presents with a chief complaint of blister to left fifth toe (2017 07:34)      INTERVAL HPI/OVERNIGHT EVENTS: Pt feeling "so-so", stating that she is having intermittent sharp pain in her L lower leg around the shin/ankle area. No fevers, chills, CP, SOB or abdominal pain.    MEDICATIONS (STANDING):  vancomycin  IVPB 1000milliGRAM(s) IV Intermittent every 24 hours  piperacillin/tazobactam IVPB. 3.375Gram(s) IV Intermittent every 12 hours  heparin  Injectable 5000Unit(s) SubCutaneous every 8 hours  insulin glargine Injectable (LANTUS) 80Unit(s) SubCutaneous at bedtime  insulin lispro Injectable (HumaLOG) 26Unit(s) SubCutaneous three times a day before meals  insulin lispro (HumaLOG) corrective regimen sliding scale  SubCutaneous three times a day before meals  insulin lispro (HumaLOG) corrective regimen sliding scale  SubCutaneous at bedtime  dextrose 50% Injectable 12.5Gram(s) IV Push once  dextrose 50% Injectable 25Gram(s) IV Push once  dextrose 50% Injectable 25Gram(s) IV Push once  aspirin enteric coated 81milliGRAM(s) Oral daily  valsartan 320milliGRAM(s) Oral daily  isosorbide   mononitrate ER Tablet (IMDUR) 30milliGRAM(s) Oral daily  atorvastatin 20milliGRAM(s) Oral at bedtime  carvedilol 12.5milliGRAM(s) Oral every 12 hours  ketorolac 0.5% Ophthalmic Solution 1Drop(s) Both EYES two times a day  timolol 0.5% Solution 1Drop(s) Both EYES two times a day  brimonidine 0.2% Ophthalmic Solution 1Drop(s) Both EYES two times a day    MEDICATIONS  (PRN):  dextrose Gel 1Dose(s) Oral once PRN  glucagon  Injectable 1milliGRAM(s) IntraMuscular once PRN  acetaminophen   Tablet. 650milliGRAM(s) Oral every 6 hours PRN      REVIEW OF SYSTEMS:  CONSTITUTIONAL: No fever, weight loss, or fatigue  RESPIRATORY: No shortness of breath  CARDIOVASCULAR: No chest pain  GASTROINTESTINAL: No abdominal or epigastric pain. No nausea, vomiting  MUSCULOSKELETAL: LLE pain    T(F): 98.7, Max: 98.7 (- @ 22:02)  HR: 67 (61 - 67)  BP: 144/66 (112/50 - 144/66)  RR: 18 (17 - 18)  SpO2: 98% (97% - 98%)  Wt(kg): --    CAPILLARY BLOOD GLUCOSE  157 (2017 08:21)  133 (2017 22:02)  156 (2017 17:30)  125 (2017 12:13)    I&O's Summary      PHYSICAL EXAM:  GENERAL: NAD, well-developed  HEAD:  Atraumatic, Normocephalic  EYES: EOMI, PERRLA, conjunctiva and sclera clear  ENMT: Moist mucous membranes  NECK: Supple, No JVD  NERVOUS SYSTEM:  Alert & Oriented X3, Good concentration; Motor Strength 5/5 LLE  CHEST/LUNG: Clear to percussion bilaterally; No rales, rhonchi, wheezing, or rubs  HEART: Regular rate and rhythm; systolic murmur, rubs, or gallops  ABDOMEN: Soft, Nontender, Nondistended; Bowel sounds present  EXTREMITIES: Unable to palpate L DP pulse, L foot wrapped  LYMPH: No lymphadenopathy noted  SKIN: No rashes or lesions    LABS:                        9.6    9.06  )-----------( 251      ( 2017 05:30 )             31.0     06-19    140  |  104  |  27<H>  ----------------------------<  188<H>  5.0   |  23  |  1.38<H>    Ca    9.1      2017 05:30  Phos  3.5     -19  Mg     1.8     -19    TPro  8.0  /  Alb  3.9  /  TBili  0.3  /  DBili  x   /  AST  16  /  ALT  14  /  AlkPhos  88  06-18    PT/INR - ( 2017 05:30 )   PT: 12.1 SEC;   INR: 1.08          PTT - ( 2017 05:30 )  PTT:32.0 SEC  Urinalysis Basic - ( 2017 17:59 )    Color: PLYEL / Appearance: CLEAR / S.008 / pH: 6.0  Gluc: NEGATIVE / Ketone: NEGATIVE  / Bili: NEGATIVE / Urobili: NORMAL E.U.   Blood: NEGATIVE / Protein: 10 / Nitrite: NEGATIVE   Leuk Esterase: LARGE / RBC: 2-5 / WBC 10-25   Sq Epi: FEW / Non Sq Epi: x / Bacteria: x          RADIOLOGY & ADDITIONAL TESTS:    XRay:    CTScan:    MRI:     Imaging Personally Reviewed:  [ ] YES  [ ] NO    Consultant(s) Notes Reviewed:  [ ] YES  [ ] NO    Care Discussed with Consultants/Other Providers [ ] YES  [ ] NO Patient is a 72y old  Female who presents with a chief complaint of blister to left fifth toe (2017 07:34)      INTERVAL HPI/OVERNIGHT EVENTS: Pt feeling "so-so", stating that she is having intermittent sharp pain in her L lower leg around the shin/ankle area. No fevers, chills, CP, SOB or abdominal pain.    MEDICATIONS (STANDING):  vancomycin  IVPB 1000milliGRAM(s) IV Intermittent every 24 hours  piperacillin/tazobactam IVPB. 3.375Gram(s) IV Intermittent every 12 hours  heparin  Injectable 5000Unit(s) SubCutaneous every 8 hours  insulin glargine Injectable (LANTUS) 80Unit(s) SubCutaneous at bedtime  insulin lispro Injectable (HumaLOG) 26Unit(s) SubCutaneous three times a day before meals  insulin lispro (HumaLOG) corrective regimen sliding scale  SubCutaneous three times a day before meals  insulin lispro (HumaLOG) corrective regimen sliding scale  SubCutaneous at bedtime  dextrose 50% Injectable 12.5Gram(s) IV Push once  dextrose 50% Injectable 25Gram(s) IV Push once  dextrose 50% Injectable 25Gram(s) IV Push once  aspirin enteric coated 81milliGRAM(s) Oral daily  valsartan 320milliGRAM(s) Oral daily  isosorbide   mononitrate ER Tablet (IMDUR) 30milliGRAM(s) Oral daily  atorvastatin 20milliGRAM(s) Oral at bedtime  carvedilol 12.5milliGRAM(s) Oral every 12 hours  ketorolac 0.5% Ophthalmic Solution 1Drop(s) Both EYES two times a day  timolol 0.5% Solution 1Drop(s) Both EYES two times a day  brimonidine 0.2% Ophthalmic Solution 1Drop(s) Both EYES two times a day    MEDICATIONS  (PRN):  dextrose Gel 1Dose(s) Oral once PRN  glucagon  Injectable 1milliGRAM(s) IntraMuscular once PRN  acetaminophen   Tablet. 650milliGRAM(s) Oral every 6 hours PRN      REVIEW OF SYSTEMS:  CONSTITUTIONAL: No fever, weight loss, or fatigue  RESPIRATORY: No shortness of breath  CARDIOVASCULAR: No chest pain  GASTROINTESTINAL: No abdominal or epigastric pain. No nausea, vomiting  MUSCULOSKELETAL: LLE pain    T(F): 98.7, Max: 98.7 (- @ 22:02)  HR: 67 (61 - 67)  BP: 144/66 (112/50 - 144/66)  RR: 18 (17 - 18)  SpO2: 98% (97% - 98%)  Wt(kg): --    CAPILLARY BLOOD GLUCOSE  157 (2017 08:21)  133 (2017 22:02)  156 (2017 17:30)  125 (2017 12:13)    I&O's Summary      PHYSICAL EXAM:  GENERAL: NAD, well-developed  HEAD:  Atraumatic, Normocephalic  EYES: EOMI, PERRLA, conjunctiva and sclera clear  ENMT: Moist mucous membranes  NECK: Supple, No JVD  NERVOUS SYSTEM:  Alert & Oriented X3, Good concentration; Motor Strength 5/5 LLE  CHEST/LUNG: Clear to percussion bilaterally; No rales, rhonchi, wheezing, or rubs  HEART: Regular rate and rhythm; systolic murmur, rubs, or gallops  ABDOMEN: Soft, Nontender, Nondistended; Bowel sounds present  EXTREMITIES: Unable to palpate L DP pulse, L foot wrapped  LYMPH: No lymphadenopathy noted  SKIN: Darkening of skin over LLE    LABS:                                   10.1   10.76 )-----------( 275      ( 2017 06:20 )             32.8     06-20    142  |  104  |  30<H>  ----------------------------<  137<H>  5.6<H>   |  23  |  1.61<H>    Ca    9.3      2017 06:20  Phos  4.1     06-20  Mg     1.8     06-20    PT/INR - ( 2017 05:30 )   PT: 12.1 SEC;   INR: 1.08          PTT - ( 2017 05:30 )  PTT:32.0 SEC  Urinalysis Basic - ( 2017 17:59 )    Color: PLYEL / Appearance: CLEAR / S.008 / pH: 6.0  Gluc: NEGATIVE / Ketone: NEGATIVE  / Bili: NEGATIVE / Urobili: NORMAL E.U.   Blood: NEGATIVE / Protein: 10 / Nitrite: NEGATIVE   Leuk Esterase: LARGE / RBC: 2-5 / WBC 10-25   Sq Epi: FEW / Non Sq Epi: x / Bacteria: x          RADIOLOGY & ADDITIONAL TESTS:    XRay:    CTScan:    MRI:     Imaging Personally Reviewed:  [ ] YES  [ ] NO    Consultant(s) Notes Reviewed:  [ ] YES  [ ] NO    Care Discussed with Consultants/Other Providers [ ] YES  [ ] NO

## 2017-06-20 NOTE — CHART NOTE - NSCHARTNOTEFT_GEN_A_CORE
Pt's pre-dinner FS 53. Pt given apple juice with improvement of FS to 105 after 20 minutes. Will hold off on full dose of pre-meal Humalog 26 units and administer 10 units as a one-time dose instead; gave verbal instructions to RN. No insulin sliding scale coverage required. Will recheck FS at bedtime to determine if full dose of Lantus 80u will be administered. Continue to monitor FS with meals afterward.

## 2017-06-20 NOTE — PROGRESS NOTE ADULT - SUBJECTIVE AND OBJECTIVE BOX
pt seen at bedside in NAD, dressing to left c/d/i  left 5th toe eschar noted on PIPJ dry stable eschar on lateral 4th noted as well dry and stable, less erythema and edema  applied betadine with DSD to left foot  await MRI  continue ABX reviewed DUC  recommend Vasc consult

## 2017-06-21 LAB
-  AMIKACIN: SIGNIFICANT CHANGE UP
-  AMPICILLIN/SULBACTAM: SIGNIFICANT CHANGE UP
-  AMPICILLIN: SIGNIFICANT CHANGE UP
-  AZTREONAM: SIGNIFICANT CHANGE UP
-  CEFAZOLIN: SIGNIFICANT CHANGE UP
-  CEFEPIME: SIGNIFICANT CHANGE UP
-  CEFOXITIN: SIGNIFICANT CHANGE UP
-  CEFTAZIDIME: SIGNIFICANT CHANGE UP
-  CEFTRIAXONE: SIGNIFICANT CHANGE UP
-  CIPROFLOXACIN: SIGNIFICANT CHANGE UP
-  ERTAPENEM: SIGNIFICANT CHANGE UP
-  GENTAMICIN: SIGNIFICANT CHANGE UP
-  IMIPENEM: SIGNIFICANT CHANGE UP
-  LEVOFLOXACIN: SIGNIFICANT CHANGE UP
-  MEROPENEM: SIGNIFICANT CHANGE UP
-  PIPERACILLIN/TAZOBACTAM: SIGNIFICANT CHANGE UP
-  TIGECYCLINE: SIGNIFICANT CHANGE UP
-  TOBRAMYCIN: SIGNIFICANT CHANGE UP
-  TRIMETHOPRIM/SULFAMETHOXAZOLE: SIGNIFICANT CHANGE UP
APTT BLD: 32.7 SEC — SIGNIFICANT CHANGE UP (ref 27.5–37.4)
BACTERIA WND CULT: SIGNIFICANT CHANGE UP
BASOPHILS # BLD AUTO: 0.03 K/UL — SIGNIFICANT CHANGE UP (ref 0–0.2)
BASOPHILS NFR BLD AUTO: 0.3 % — SIGNIFICANT CHANGE UP (ref 0–2)
BLD GP AB SCN SERPL QL: NEGATIVE — SIGNIFICANT CHANGE UP
BUN SERPL-MCNC: 28 MG/DL — HIGH (ref 7–23)
CALCIUM SERPL-MCNC: 8.7 MG/DL — SIGNIFICANT CHANGE UP (ref 8.4–10.5)
CHLORIDE SERPL-SCNC: 105 MMOL/L — SIGNIFICANT CHANGE UP (ref 98–107)
CO2 SERPL-SCNC: 25 MMOL/L — SIGNIFICANT CHANGE UP (ref 22–31)
CREAT SERPL-MCNC: 1.51 MG/DL — HIGH (ref 0.5–1.3)
EOSINOPHIL # BLD AUTO: 0.24 K/UL — SIGNIFICANT CHANGE UP (ref 0–0.5)
EOSINOPHIL NFR BLD AUTO: 2.1 % — SIGNIFICANT CHANGE UP (ref 0–6)
GLUCOSE SERPL-MCNC: 99 MG/DL — SIGNIFICANT CHANGE UP (ref 70–99)
HCT VFR BLD CALC: 31.4 % — LOW (ref 34.5–45)
HGB BLD-MCNC: 9.7 G/DL — LOW (ref 11.5–15.5)
IMM GRANULOCYTES NFR BLD AUTO: 0.2 % — SIGNIFICANT CHANGE UP (ref 0–1.5)
INR BLD: 1.15 — SIGNIFICANT CHANGE UP (ref 0.88–1.17)
LYMPHOCYTES # BLD AUTO: 2.51 K/UL — SIGNIFICANT CHANGE UP (ref 1–3.3)
LYMPHOCYTES # BLD AUTO: 21.5 % — SIGNIFICANT CHANGE UP (ref 13–44)
MAGNESIUM SERPL-MCNC: 1.6 MG/DL — SIGNIFICANT CHANGE UP (ref 1.6–2.6)
MCHC RBC-ENTMCNC: 27.7 PG — SIGNIFICANT CHANGE UP (ref 27–34)
MCHC RBC-ENTMCNC: 30.9 % — LOW (ref 32–36)
MCV RBC AUTO: 89.7 FL — SIGNIFICANT CHANGE UP (ref 80–100)
METHOD TYPE: SIGNIFICANT CHANGE UP
MONOCYTES # BLD AUTO: 0.62 K/UL — SIGNIFICANT CHANGE UP (ref 0–0.9)
MONOCYTES NFR BLD AUTO: 5.3 % — SIGNIFICANT CHANGE UP (ref 2–14)
NEUTROPHILS # BLD AUTO: 8.28 K/UL — HIGH (ref 1.8–7.4)
NEUTROPHILS NFR BLD AUTO: 70.6 % — SIGNIFICANT CHANGE UP (ref 43–77)
ORGANISM # SPEC MICROSCOPIC CNT: SIGNIFICANT CHANGE UP
PHOSPHATE SERPL-MCNC: 4.1 MG/DL — SIGNIFICANT CHANGE UP (ref 2.5–4.5)
PLATELET # BLD AUTO: 258 K/UL — SIGNIFICANT CHANGE UP (ref 150–400)
PMV BLD: 11.7 FL — SIGNIFICANT CHANGE UP (ref 7–13)
POTASSIUM SERPL-MCNC: 4.1 MMOL/L — SIGNIFICANT CHANGE UP (ref 3.5–5.3)
POTASSIUM SERPL-SCNC: 4.1 MMOL/L — SIGNIFICANT CHANGE UP (ref 3.5–5.3)
PROTHROM AB SERPL-ACNC: 12.9 SEC — SIGNIFICANT CHANGE UP (ref 9.8–13.1)
RBC # BLD: 3.5 M/UL — LOW (ref 3.8–5.2)
RBC # FLD: 14.5 % — SIGNIFICANT CHANGE UP (ref 10.3–14.5)
RH IG SCN BLD-IMP: POSITIVE — SIGNIFICANT CHANGE UP
SODIUM SERPL-SCNC: 142 MMOL/L — SIGNIFICANT CHANGE UP (ref 135–145)
SPECIMEN SOURCE: SIGNIFICANT CHANGE UP
SPECIMEN SOURCE: SIGNIFICANT CHANGE UP
WBC # BLD: 11.7 K/UL — HIGH (ref 3.8–10.5)
WBC # FLD AUTO: 11.7 K/UL — HIGH (ref 3.8–10.5)

## 2017-06-21 PROCEDURE — 37221: CPT | Mod: LT,GC

## 2017-06-21 PROCEDURE — 37228: CPT | Mod: LT,GC

## 2017-06-21 PROCEDURE — 76937 US GUIDE VASCULAR ACCESS: CPT | Mod: 26,GC

## 2017-06-21 PROCEDURE — 75630 X-RAY AORTA LEG ARTERIES: CPT | Mod: 26,59,GC

## 2017-06-21 PROCEDURE — 99233 SBSQ HOSP IP/OBS HIGH 50: CPT | Mod: GC

## 2017-06-21 PROCEDURE — 37226: CPT | Mod: LT,GC

## 2017-06-21 PROCEDURE — 73720 MRI LWR EXTREMITY W/O&W/DYE: CPT | Mod: 26,LT

## 2017-06-21 RX ORDER — SODIUM CHLORIDE 9 MG/ML
500 INJECTION INTRAMUSCULAR; INTRAVENOUS; SUBCUTANEOUS
Qty: 0 | Refills: 0 | Status: DISCONTINUED | OUTPATIENT
Start: 2017-06-21 | End: 2017-06-21

## 2017-06-21 RX ORDER — CLOPIDOGREL BISULFATE 75 MG/1
300 TABLET, FILM COATED ORAL ONCE
Qty: 0 | Refills: 0 | Status: COMPLETED | OUTPATIENT
Start: 2017-06-21 | End: 2017-06-21

## 2017-06-21 RX ORDER — INSULIN GLARGINE 100 [IU]/ML
60 INJECTION, SOLUTION SUBCUTANEOUS AT BEDTIME
Qty: 0 | Refills: 0 | Status: DISCONTINUED | OUTPATIENT
Start: 2017-06-21 | End: 2017-06-22

## 2017-06-21 RX ORDER — CLOPIDOGREL BISULFATE 75 MG/1
75 TABLET, FILM COATED ORAL DAILY
Qty: 0 | Refills: 0 | Status: DISCONTINUED | OUTPATIENT
Start: 2017-06-22 | End: 2017-06-30

## 2017-06-21 RX ADMIN — INSULIN GLARGINE 60 UNIT(S): 100 INJECTION, SOLUTION SUBCUTANEOUS at 22:34

## 2017-06-21 RX ADMIN — Medication 1 DROP(S): at 18:34

## 2017-06-21 RX ADMIN — VALSARTAN 320 MILLIGRAM(S): 80 TABLET ORAL at 05:41

## 2017-06-21 RX ADMIN — HEPARIN SODIUM 5000 UNIT(S): 5000 INJECTION INTRAVENOUS; SUBCUTANEOUS at 13:49

## 2017-06-21 RX ADMIN — Medication 1 DROP(S): at 05:42

## 2017-06-21 RX ADMIN — SODIUM CHLORIDE 75 MILLILITER(S): 9 INJECTION INTRAMUSCULAR; INTRAVENOUS; SUBCUTANEOUS at 12:42

## 2017-06-21 RX ADMIN — PIPERACILLIN AND TAZOBACTAM 25 GRAM(S): 4; .5 INJECTION, POWDER, LYOPHILIZED, FOR SOLUTION INTRAVENOUS at 18:32

## 2017-06-21 RX ADMIN — CLOPIDOGREL BISULFATE 300 MILLIGRAM(S): 75 TABLET, FILM COATED ORAL at 14:30

## 2017-06-21 RX ADMIN — BRIMONIDINE TARTRATE 1 DROP(S): 2 SOLUTION/ DROPS OPHTHALMIC at 05:43

## 2017-06-21 RX ADMIN — PIPERACILLIN AND TAZOBACTAM 25 GRAM(S): 4; .5 INJECTION, POWDER, LYOPHILIZED, FOR SOLUTION INTRAVENOUS at 06:17

## 2017-06-21 RX ADMIN — ISOSORBIDE MONONITRATE 30 MILLIGRAM(S): 60 TABLET, EXTENDED RELEASE ORAL at 13:50

## 2017-06-21 RX ADMIN — HEPARIN SODIUM 5000 UNIT(S): 5000 INJECTION INTRAVENOUS; SUBCUTANEOUS at 22:34

## 2017-06-21 RX ADMIN — ATORVASTATIN CALCIUM 20 MILLIGRAM(S): 80 TABLET, FILM COATED ORAL at 22:34

## 2017-06-21 RX ADMIN — CARVEDILOL PHOSPHATE 12.5 MILLIGRAM(S): 80 CAPSULE, EXTENDED RELEASE ORAL at 18:33

## 2017-06-21 RX ADMIN — BRIMONIDINE TARTRATE 1 DROP(S): 2 SOLUTION/ DROPS OPHTHALMIC at 18:34

## 2017-06-21 RX ADMIN — Medication 81 MILLIGRAM(S): at 08:52

## 2017-06-21 RX ADMIN — CARVEDILOL PHOSPHATE 12.5 MILLIGRAM(S): 80 CAPSULE, EXTENDED RELEASE ORAL at 05:41

## 2017-06-21 NOTE — PROGRESS NOTE ADULT - PROBLEM SELECTOR PLAN 1
Pt with likely diabetic foot ulcer vs PVD ulcer, no radiographic evidence of OM however with elevated ESR and CRP, afebrile and no leukocytosis  -Podiatry consulted, recommend checking MRI of the L foot to r/o osteomyelitis and to continue empirically on IV antibiotics for now. Will decide on surgery depending on MRI results. S/p bedside debridement on 6/18.  - c/w renally dosed vancomycin and zosyn  - Blood cultures negative at 48h Pt with likely diabetic foot ulcer vs PVD ulcer, no radiographic evidence of OM however with elevated ESR and CRP, afebrile and no leukocytosis  -Podiatry consulted, recommend checking MRI of the L foot to r/o osteomyelitis and to continue empirically on IV antibiotics for now. Will decide on surgery depending on MRI results. S/p bedside debridement on 6/18.  - C/w renally dosed vancomycin and zosyn (Day 4 of abx)  - Blood cultures negative at 48h  - Wound cultures growing CNS Staph and GNR TBD

## 2017-06-21 NOTE — PROGRESS NOTE ADULT - PROBLEM SELECTOR PLAN 4
HgbA1c of 7.8  - c/w home dose of Lantus 80 units qhs and humalog 26 units tidac  - FS currently well controlled  - monitor FS qac and qhs and titrate insulin accordingly  - carb consistent diet and hypoglycemia protocol HgbA1c of 7.8  -Pt hypoglycemic to 50s yesterday, full dose of pre-dinner Humalog not given  - c/w home dose of Lantus 80 units qhs and humalog 26 units tidac but will f/u with FS from today and readjust insulin doses as necessary  - monitor FS qac and qhs  - carb consistent diet and hypoglycemia protocol

## 2017-06-21 NOTE — BRIEF OPERATIVE NOTE - OPERATION/FINDINGS
The patient was found to have lesions in AT and SFA. Balloon angioplasty was performed with resolution of lesions in both vessels. 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

## 2017-06-21 NOTE — PROGRESS NOTE ADULT - PROBLEM SELECTOR PLAN 3
Pt with baseline Cr of 1.4-.16, currently at baseline  - renally dose medications  - monitor Cr daily

## 2017-06-21 NOTE — PROGRESS NOTE ADULT - SUBJECTIVE AND OBJECTIVE BOX
Patient is a 72y old  Female who presents with a chief complaint of blister to left fifth toe (19 Jun 2017 07:34)      INTERVAL HPI/OVERNIGHT EVENTS:    MEDICATIONS (STANDING):  piperacillin/tazobactam IVPB. 3.375Gram(s) IV Intermittent every 12 hours  heparin  Injectable 5000Unit(s) SubCutaneous every 8 hours  insulin glargine Injectable (LANTUS) 80Unit(s) SubCutaneous at bedtime  insulin lispro Injectable (HumaLOG) 26Unit(s) SubCutaneous three times a day before meals  insulin lispro (HumaLOG) corrective regimen sliding scale  SubCutaneous three times a day before meals  insulin lispro (HumaLOG) corrective regimen sliding scale  SubCutaneous at bedtime  dextrose 50% Injectable 12.5Gram(s) IV Push once  dextrose 50% Injectable 25Gram(s) IV Push once  dextrose 50% Injectable 25Gram(s) IV Push once  aspirin enteric coated 81milliGRAM(s) Oral daily  valsartan 320milliGRAM(s) Oral daily  isosorbide   mononitrate ER Tablet (IMDUR) 30milliGRAM(s) Oral daily  atorvastatin 20milliGRAM(s) Oral at bedtime  carvedilol 12.5milliGRAM(s) Oral every 12 hours  ketorolac 0.5% Ophthalmic Solution 1Drop(s) Both EYES two times a day  timolol 0.5% Solution 1Drop(s) Both EYES two times a day  brimonidine 0.2% Ophthalmic Solution 1Drop(s) Both EYES two times a day  sodium chloride 0.9%. 1000milliLiter(s) IV Continuous <Continuous>  vancomycin  IVPB 1250milliGRAM(s) IV Intermittent every 24 hours  vancomycin  IVPB  IV Intermittent     MEDICATIONS  (PRN):  dextrose Gel 1Dose(s) Oral once PRN  glucagon  Injectable 1milliGRAM(s) IntraMuscular once PRN  acetaminophen   Tablet. 650milliGRAM(s) Oral every 6 hours PRN      REVIEW OF SYSTEMS:  CONSTITUTIONAL: No fever, weight loss, or fatigue  EYES: No eye pain, visual disturbances, or discharge  ENMT:  No difficulty hearing, tinnitus, vertigo; No sinus or throat pain  NECK: No pain or stiffness  BREASTS: No pain, masses, or nipple discharge  RESPIRATORY: No cough, wheezing, chills or hemoptysis; No shortness of breath  CARDIOVASCULAR: No chest pain, palpitations, dizziness, or leg swelling  GASTROINTESTINAL: No abdominal or epigastric pain. No nausea, vomiting, or hematemesis; No diarrhea or constipation. No melena or hematochezia.  GENITOURINARY: No dysuria, frequency, hematuria, or incontinence  NEUROLOGICAL: No headaches, memory loss, loss of strength, numbness, or tremors  SKIN: No itching, burning, rashes, or lesions   MUSCULOSKELETAL: No joint pain or swelling; No muscle, back, or extremity pain    T(F): 99.1, Max: 100.4 (06-20 @ 17:49)  HR: 68 (66 - 82)  BP: 139/61 (123/56 - 160/70)  RR: 18 (16 - 19)  SpO2: 98% (97% - 99%)  Wt(kg): --  CAPILLARY BLOOD GLUCOSE  106 (21 Jun 2017 05:45)  183 (20 Jun 2017 22:04)  105 (20 Jun 2017 17:37)  53 (20 Jun 2017 17:16)  149 (20 Jun 2017 11:57)  157 (20 Jun 2017 08:21)    I&O's Summary    I & Os for current day (as of 21 Jun 2017 07:47)  =============================================  IN: 375 ml / OUT: 0 ml / NET: 375 ml      PHYSICAL EXAM:  GENERAL: NAD, well-groomed, well-developed  HEAD:  Atraumatic, Normocephalic  EYES: EOMI, PERRLA, conjunctiva and sclera clear  ENMT: No tonsillar erythema, exudates, or enlargement; Moist mucous membranes, Good dentition, No lesions  NECK: Supple, No JVD, Normal thyroid  NERVOUS SYSTEM:  Alert & Oriented X3, Good concentration; Motor Strength 5/5 B/L upper and lower extremities; DTRs 2+ intact and symmetric  CHEST/LUNG: Clear to percussion bilaterally; No rales, rhonchi, wheezing, or rubs  HEART: Regular rate and rhythm; No murmurs, rubs, or gallops  ABDOMEN: Soft, Nontender, Nondistended; Bowel sounds present  EXTREMITIES:  2+ Peripheral Pulses, No clubbing, cyanosis, or edema  LYMPH: No lymphadenopathy noted  SKIN: No rashes or lesions    LABS:                        9.7    11.70 )-----------( 258      ( 21 Jun 2017 05:50 )             31.4     06-21    142  |  105  |  28<H>  ----------------------------<  99  4.1   |  25  |  1.51<H>    Ca    8.7      21 Jun 2017 05:50  Phos  4.1     06-21  Mg     1.6     06-21      PT/INR - ( 21 Jun 2017 05:50 )   PT: 12.9 SEC;   INR: 1.15          PTT - ( 21 Jun 2017 05:50 )  PTT:32.7 SEC        RADIOLOGY & ADDITIONAL TESTS:    XRay:    CTScan:    MRI:     Imaging Personally Reviewed:  [ ] YES  [ ] NO    Consultant(s) Notes Reviewed:  [ ] YES  [ ] NO    Care Discussed with Consultants/Other Providers [ ] YES  [ ] NO Patient is a 72y old  Female who presents with a chief complaint of blister to left fifth toe (19 Jun 2017 07:34)      INTERVAL HPI/OVERNIGHT EVENTS: Pt feeling well. TMax 100.3 in last 24h.    MEDICATIONS (STANDING):  piperacillin/tazobactam IVPB. 3.375Gram(s) IV Intermittent every 12 hours  heparin  Injectable 5000Unit(s) SubCutaneous every 8 hours  insulin glargine Injectable (LANTUS) 80Unit(s) SubCutaneous at bedtime  insulin lispro Injectable (HumaLOG) 26Unit(s) SubCutaneous three times a day before meals  insulin lispro (HumaLOG) corrective regimen sliding scale  SubCutaneous three times a day before meals  insulin lispro (HumaLOG) corrective regimen sliding scale  SubCutaneous at bedtime  dextrose 50% Injectable 12.5Gram(s) IV Push once  dextrose 50% Injectable 25Gram(s) IV Push once  dextrose 50% Injectable 25Gram(s) IV Push once  aspirin enteric coated 81milliGRAM(s) Oral daily  valsartan 320milliGRAM(s) Oral daily  isosorbide   mononitrate ER Tablet (IMDUR) 30milliGRAM(s) Oral daily  atorvastatin 20milliGRAM(s) Oral at bedtime  carvedilol 12.5milliGRAM(s) Oral every 12 hours  ketorolac 0.5% Ophthalmic Solution 1Drop(s) Both EYES two times a day  timolol 0.5% Solution 1Drop(s) Both EYES two times a day  brimonidine 0.2% Ophthalmic Solution 1Drop(s) Both EYES two times a day  sodium chloride 0.9%. 1000milliLiter(s) IV Continuous <Continuous>  vancomycin  IVPB 1250milliGRAM(s) IV Intermittent every 24 hours  vancomycin  IVPB  IV Intermittent     MEDICATIONS  (PRN):  dextrose Gel 1Dose(s) Oral once PRN  glucagon  Injectable 1milliGRAM(s) IntraMuscular once PRN  acetaminophen   Tablet. 650milliGRAM(s) Oral every 6 hours PRN      REVIEW OF SYSTEMS:  CONSTITUTIONAL: No fever, weight loss, or fatigue  EYES: No eye pain, visual disturbances, or discharge  ENMT:  No difficulty hearing, tinnitus, vertigo; No sinus or throat pain  NECK: No pain or stiffness  BREASTS: No pain, masses, or nipple discharge  RESPIRATORY: No cough, wheezing, chills or hemoptysis; No shortness of breath  CARDIOVASCULAR: No chest pain, palpitations, dizziness, or leg swelling  GASTROINTESTINAL: No abdominal or epigastric pain. No nausea, vomiting, or hematemesis; No diarrhea or constipation. No melena or hematochezia.  GENITOURINARY: No dysuria, frequency, hematuria, or incontinence  NEUROLOGICAL: No headaches, memory loss, loss of strength, numbness, or tremors  SKIN: No itching, burning, rashes, or lesions   MUSCULOSKELETAL: No joint pain or swelling; No muscle, back, or extremity pain    T(F): 99.1, Max: 100.4 (06-20 @ 17:49)  HR: 68 (66 - 82)  BP: 139/61 (123/56 - 160/70)  RR: 18 (16 - 19)  SpO2: 98% (97% - 99%)  Wt(kg): --  CAPILLARY BLOOD GLUCOSE  106 (21 Jun 2017 05:45)  183 (20 Jun 2017 22:04)  105 (20 Jun 2017 17:37)  53 (20 Jun 2017 17:16)  149 (20 Jun 2017 11:57)  157 (20 Jun 2017 08:21)    I&O's Summary    I & Os for current day (as of 21 Jun 2017 07:47)  =============================================  IN: 375 ml / OUT: 0 ml / NET: 375 ml      PHYSICAL EXAM:  GENERAL: NAD, well-groomed, well-developed  HEAD:  Atraumatic, Normocephalic  EYES: EOMI, PERRLA, conjunctiva and sclera clear  ENMT: No tonsillar erythema, exudates, or enlargement; Moist mucous membranes, Good dentition, No lesions  NECK: Supple, No JVD, Normal thyroid  NERVOUS SYSTEM:  Alert & Oriented X3, Good concentration; Motor Strength 5/5 B/L upper and lower extremities; DTRs 2+ intact and symmetric  CHEST/LUNG: Clear to percussion bilaterally; No rales, rhonchi, wheezing, or rubs  HEART: Regular rate and rhythm; No murmurs, rubs, or gallops  ABDOMEN: Soft, Nontender, Nondistended; Bowel sounds present  EXTREMITIES:  2+ Peripheral Pulses, No clubbing, cyanosis, or edema  LYMPH: No lymphadenopathy noted  SKIN: No rashes or lesions    LABS:                        9.7    11.70 )-----------( 258      ( 21 Jun 2017 05:50 )             31.4     06-21    142  |  105  |  28<H>  ----------------------------<  99  4.1   |  25  |  1.51<H>    Ca    8.7      21 Jun 2017 05:50  Phos  4.1     06-21  Mg     1.6     06-21      PT/INR - ( 21 Jun 2017 05:50 )   PT: 12.9 SEC;   INR: 1.15          PTT - ( 21 Jun 2017 05:50 )  PTT:32.7 SEC        RADIOLOGY & ADDITIONAL TESTS:    XRay:    CTScan:    MRI:     Imaging Personally Reviewed:  [ ] YES  [ ] NO    Consultant(s) Notes Reviewed:  [ ] YES  [ ] NO    Care Discussed with Consultants/Other Providers [ ] YES  [ ] NO Patient is a 72y old  Female who presents with a chief complaint of blister to left fifth toe (19 Jun 2017 07:34)      INTERVAL HPI/OVERNIGHT EVENTS: Pt feeling well. TMax 100.4 in last 24h, blood cultures sent yesterday afternoon. Pain in her L foot and ankle improving. NPO after midnight for vascular angiogram today. Vancomycin dose increased to 1250 q24h for vanc trough of 9.2.    MEDICATIONS (STANDING):  piperacillin/tazobactam IVPB. 3.375Gram(s) IV Intermittent every 12 hours  heparin  Injectable 5000Unit(s) SubCutaneous every 8 hours  insulin glargine Injectable (LANTUS) 80Unit(s) SubCutaneous at bedtime  insulin lispro Injectable (HumaLOG) 26Unit(s) SubCutaneous three times a day before meals  insulin lispro (HumaLOG) corrective regimen sliding scale  SubCutaneous three times a day before meals  insulin lispro (HumaLOG) corrective regimen sliding scale  SubCutaneous at bedtime  dextrose 50% Injectable 12.5Gram(s) IV Push once  dextrose 50% Injectable 25Gram(s) IV Push once  dextrose 50% Injectable 25Gram(s) IV Push once  aspirin enteric coated 81milliGRAM(s) Oral daily  valsartan 320milliGRAM(s) Oral daily  isosorbide   mononitrate ER Tablet (IMDUR) 30milliGRAM(s) Oral daily  atorvastatin 20milliGRAM(s) Oral at bedtime  carvedilol 12.5milliGRAM(s) Oral every 12 hours  ketorolac 0.5% Ophthalmic Solution 1Drop(s) Both EYES two times a day  timolol 0.5% Solution 1Drop(s) Both EYES two times a day  brimonidine 0.2% Ophthalmic Solution 1Drop(s) Both EYES two times a day  sodium chloride 0.9%. 1000milliLiter(s) IV Continuous <Continuous>  vancomycin  IVPB 1250milliGRAM(s) IV Intermittent every 24 hours  vancomycin  IVPB  IV Intermittent     MEDICATIONS  (PRN):  dextrose Gel 1Dose(s) Oral once PRN  glucagon  Injectable 1milliGRAM(s) IntraMuscular once PRN  acetaminophen   Tablet. 650milliGRAM(s) Oral every 6 hours PRN      REVIEW OF SYSTEMS:  CONSTITUTIONAL: No fever  RESPIRATORY: No shortness of breath  CARDIOVASCULAR: No chest pain  GASTROINTESTINAL: No abdominal or epigastric pain. No nausea, vomiting  GENITOURINARY: No dysuria  SKIN: L foot wound  MUSCULOSKELETAL: LLE pain, improving    T(F): 99.1, Max: 100.4 (06-20 @ 17:49)  HR: 68 (66 - 82)  BP: 139/61 (123/56 - 160/70)  RR: 18 (16 - 19)  SpO2: 98% (97% - 99%)  Wt(kg): --    CAPILLARY BLOOD GLUCOSE  106 (21 Jun 2017 05:45)  183 (20 Jun 2017 22:04)  105 (20 Jun 2017 17:37)  53 (20 Jun 2017 17:16)  149 (20 Jun 2017 11:57)  157 (20 Jun 2017 08:21)    I&O's Summary    I & Os for current day (as of 21 Jun 2017 07:47)  =============================================  IN: 375 ml / OUT: 0 ml / NET: 375 ml    PHYSICAL EXAM:  GENERAL: NAD, well-developed  HEAD:  Atraumatic, Normocephalic  EYES: EOMI, PERRLA, conjunctiva and sclera clear  ENMT: Moist mucous membranes  NECK: Supple, No JVD  NERVOUS SYSTEM:  Alert & Oriented X3, Good concentration; Motor Strength 5/5 LLE  CHEST/LUNG: Clear to percussion bilaterally; No rales, rhonchi, wheezing, or rubs  HEART: Regular rate and rhythm; systolic murmur, rubs, or gallops  ABDOMEN: Soft, Nontender, Nondistended; Bowel sounds present  EXTREMITIES: Unable to palpate L DP pulse, L foot wrapped  LYMPH: No lymphadenopathy noted  SKIN: Darkening of skin over LLE, L foot wrapped in Kerlex, clean and dry.    LABS:                        9.7    11.70 )-----------( 258      ( 21 Jun 2017 05:50 )             31.4     06-21    142  |  105  |  28<H>  ----------------------------<  99  4.1   |  25  |  1.51<H>    Ca    8.7      21 Jun 2017 05:50  Phos  4.1     06-21  Mg     1.6     06-21      PT/INR - ( 21 Jun 2017 05:50 )   PT: 12.9 SEC;   INR: 1.15          PTT - ( 21 Jun 2017 05:50 )  PTT:32.7 SEC        RADIOLOGY & ADDITIONAL TESTS:    XRay:    CTScan:    MRI:     Imaging Personally Reviewed:  [ ] YES  [ ] NO    Consultant(s) Notes Reviewed:  [ ] YES  [ ] NO    Care Discussed with Consultants/Other Providers [ ] YES  [ ] NO

## 2017-06-21 NOTE — PROGRESS NOTE ADULT - PROBLEM SELECTOR PLAN 2
Pt with hx of PVD  -LLE DUC/PVR severely decreased  -Vascular consulted- pt NPO after midnight for vascular angiogram today  -C/w aspirin and statin -LLE DUC/PVR severely decreased  -Vascular consulted- pt NPO after midnight for vascular angiogram today  -C/w aspirin and statin

## 2017-06-21 NOTE — PROGRESS NOTE ADULT - SUBJECTIVE AND OBJECTIVE BOX
pt seen at bedside in NAD. left foot dressing c/d/i  left foot 4,5th toe eschar stable and dry  decreased edema and erythema   applied betadine with DSD  await angio today and MRI  will follow up

## 2017-06-21 NOTE — PROGRESS NOTE ADULT - SUBJECTIVE AND OBJECTIVE BOX
Vascular Surgery     The patient underwent diagnostic and therapeutic angiography. The patient was noted to have lesions present in the proximal AT and distal SFA both of which were managed with balloon angioplasty. 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 This was managed via endovascular stent deployment. At the end of the case a palpable dorsalis pedis pulse was appreciated in the left lower extremity. The patient is to have repeat DUC / PVRs and follow up in the office with Dr. Moraes in one month. Plavix was initiated as well.

## 2017-06-22 LAB
BASOPHILS # BLD AUTO: 0.01 K/UL — SIGNIFICANT CHANGE UP (ref 0–0.2)
BASOPHILS NFR BLD AUTO: 0.1 % — SIGNIFICANT CHANGE UP (ref 0–2)
BUN SERPL-MCNC: 24 MG/DL — HIGH (ref 7–23)
CALCIUM SERPL-MCNC: 8.8 MG/DL — SIGNIFICANT CHANGE UP (ref 8.4–10.5)
CHLORIDE SERPL-SCNC: 103 MMOL/L — SIGNIFICANT CHANGE UP (ref 98–107)
CO2 SERPL-SCNC: 20 MMOL/L — LOW (ref 22–31)
CREAT SERPL-MCNC: 1.42 MG/DL — HIGH (ref 0.5–1.3)
EOSINOPHIL # BLD AUTO: 0.21 K/UL — SIGNIFICANT CHANGE UP (ref 0–0.5)
EOSINOPHIL NFR BLD AUTO: 1.9 % — SIGNIFICANT CHANGE UP (ref 0–6)
GLUCOSE SERPL-MCNC: 130 MG/DL — HIGH (ref 70–99)
HCT VFR BLD CALC: 29.9 % — LOW (ref 34.5–45)
HGB BLD-MCNC: 9.2 G/DL — LOW (ref 11.5–15.5)
IMM GRANULOCYTES NFR BLD AUTO: 0.2 % — SIGNIFICANT CHANGE UP (ref 0–1.5)
LYMPHOCYTES # BLD AUTO: 1.57 K/UL — SIGNIFICANT CHANGE UP (ref 1–3.3)
LYMPHOCYTES # BLD AUTO: 13.9 % — SIGNIFICANT CHANGE UP (ref 13–44)
MAGNESIUM SERPL-MCNC: 1.6 MG/DL — SIGNIFICANT CHANGE UP (ref 1.6–2.6)
MCHC RBC-ENTMCNC: 27.7 PG — SIGNIFICANT CHANGE UP (ref 27–34)
MCHC RBC-ENTMCNC: 30.8 % — LOW (ref 32–36)
MCV RBC AUTO: 90.1 FL — SIGNIFICANT CHANGE UP (ref 80–100)
MONOCYTES # BLD AUTO: 0.73 K/UL — SIGNIFICANT CHANGE UP (ref 0–0.9)
MONOCYTES NFR BLD AUTO: 6.5 % — SIGNIFICANT CHANGE UP (ref 2–14)
NEUTROPHILS # BLD AUTO: 8.72 K/UL — HIGH (ref 1.8–7.4)
NEUTROPHILS NFR BLD AUTO: 77.4 % — HIGH (ref 43–77)
PHOSPHATE SERPL-MCNC: 2.9 MG/DL — SIGNIFICANT CHANGE UP (ref 2.5–4.5)
PLATELET # BLD AUTO: 261 K/UL — SIGNIFICANT CHANGE UP (ref 150–400)
PMV BLD: 11 FL — SIGNIFICANT CHANGE UP (ref 7–13)
POTASSIUM SERPL-MCNC: 4.4 MMOL/L — SIGNIFICANT CHANGE UP (ref 3.5–5.3)
POTASSIUM SERPL-SCNC: 4.4 MMOL/L — SIGNIFICANT CHANGE UP (ref 3.5–5.3)
RBC # BLD: 3.32 M/UL — LOW (ref 3.8–5.2)
RBC # FLD: 14.7 % — HIGH (ref 10.3–14.5)
SODIUM SERPL-SCNC: 141 MMOL/L — SIGNIFICANT CHANGE UP (ref 135–145)
WBC # BLD: 11.26 K/UL — HIGH (ref 3.8–10.5)
WBC # FLD AUTO: 11.26 K/UL — HIGH (ref 3.8–10.5)

## 2017-06-22 PROCEDURE — 93923 UPR/LXTR ART STDY 3+ LVLS: CPT | Mod: 26

## 2017-06-22 PROCEDURE — 99233 SBSQ HOSP IP/OBS HIGH 50: CPT | Mod: GC

## 2017-06-22 PROCEDURE — 99232 SBSQ HOSP IP/OBS MODERATE 35: CPT | Mod: GC

## 2017-06-22 RX ORDER — ACETAMINOPHEN 500 MG
650 TABLET ORAL EVERY 6 HOURS
Qty: 0 | Refills: 0 | Status: DISCONTINUED | OUTPATIENT
Start: 2017-06-22 | End: 2017-06-30

## 2017-06-22 RX ORDER — INSULIN GLARGINE 100 [IU]/ML
50 INJECTION, SOLUTION SUBCUTANEOUS AT BEDTIME
Qty: 0 | Refills: 0 | Status: DISCONTINUED | OUTPATIENT
Start: 2017-06-22 | End: 2017-06-26

## 2017-06-22 RX ADMIN — Medication 1 DROP(S): at 18:13

## 2017-06-22 RX ADMIN — BRIMONIDINE TARTRATE 1 DROP(S): 2 SOLUTION/ DROPS OPHTHALMIC at 18:12

## 2017-06-22 RX ADMIN — HEPARIN SODIUM 5000 UNIT(S): 5000 INJECTION INTRAVENOUS; SUBCUTANEOUS at 05:49

## 2017-06-22 RX ADMIN — Medication 1 DROP(S): at 05:50

## 2017-06-22 RX ADMIN — HEPARIN SODIUM 5000 UNIT(S): 5000 INJECTION INTRAVENOUS; SUBCUTANEOUS at 14:21

## 2017-06-22 RX ADMIN — ATORVASTATIN CALCIUM 20 MILLIGRAM(S): 80 TABLET, FILM COATED ORAL at 22:35

## 2017-06-22 RX ADMIN — CLOPIDOGREL BISULFATE 75 MILLIGRAM(S): 75 TABLET, FILM COATED ORAL at 12:29

## 2017-06-22 RX ADMIN — HEPARIN SODIUM 5000 UNIT(S): 5000 INJECTION INTRAVENOUS; SUBCUTANEOUS at 22:36

## 2017-06-22 RX ADMIN — PIPERACILLIN AND TAZOBACTAM 25 GRAM(S): 4; .5 INJECTION, POWDER, LYOPHILIZED, FOR SOLUTION INTRAVENOUS at 05:51

## 2017-06-22 RX ADMIN — Medication 1: at 18:12

## 2017-06-22 RX ADMIN — INSULIN GLARGINE 50 UNIT(S): 100 INJECTION, SOLUTION SUBCUTANEOUS at 22:36

## 2017-06-22 RX ADMIN — PIPERACILLIN AND TAZOBACTAM 25 GRAM(S): 4; .5 INJECTION, POWDER, LYOPHILIZED, FOR SOLUTION INTRAVENOUS at 18:08

## 2017-06-22 RX ADMIN — BRIMONIDINE TARTRATE 1 DROP(S): 2 SOLUTION/ DROPS OPHTHALMIC at 05:50

## 2017-06-22 RX ADMIN — Medication 650 MILLIGRAM(S): at 06:27

## 2017-06-22 RX ADMIN — CARVEDILOL PHOSPHATE 12.5 MILLIGRAM(S): 80 CAPSULE, EXTENDED RELEASE ORAL at 05:49

## 2017-06-22 RX ADMIN — CARVEDILOL PHOSPHATE 12.5 MILLIGRAM(S): 80 CAPSULE, EXTENDED RELEASE ORAL at 18:12

## 2017-06-22 RX ADMIN — VALSARTAN 320 MILLIGRAM(S): 80 TABLET ORAL at 05:49

## 2017-06-22 RX ADMIN — Medication 81 MILLIGRAM(S): at 12:28

## 2017-06-22 RX ADMIN — Medication 1 DROP(S): at 18:14

## 2017-06-22 RX ADMIN — ISOSORBIDE MONONITRATE 30 MILLIGRAM(S): 60 TABLET, EXTENDED RELEASE ORAL at 12:29

## 2017-06-22 NOTE — PROGRESS NOTE ADULT - PROBLEM SELECTOR PLAN 2
-LLE DUC/PVR severely decreased  -Vascular consulted- pt NPO after midnight for vascular angiogram today  -C/w aspirin and statin -LLE DUC/PVR severely decreased  -Vascular consulted- pt s/p angiogram with stenting yesterday  -C/w aspirin, Plavix and statin

## 2017-06-22 NOTE — PROGRESS NOTE ADULT - ASSESSMENT
71 yo F with LF 4th and 5th digital ulcerations  Pt seen and examined  Dressed with betadine and DSD  MRI reviewed   Awaiting vascular recommendations for possible amputation/intervention, pending success of vascular intervention  Cont local wound care  Cont IV abx

## 2017-06-22 NOTE — PROGRESS NOTE ADULT - SUBJECTIVE AND OBJECTIVE BOX
Patient is a 72y old  Female who presents with a chief complaint of blister to left fifth toe (19 Jun 2017 07:34)      INTERVAL HPI/OVERNIGHT EVENTS: Pt feeling okay, has not been eating as much as before. Pt hypoglycemic to     MEDICATIONS (STANDING):  piperacillin/tazobactam IVPB. 3.375Gram(s) IV Intermittent every 12 hours  heparin  Injectable 5000Unit(s) SubCutaneous every 8 hours  insulin lispro (HumaLOG) corrective regimen sliding scale  SubCutaneous three times a day before meals  insulin lispro (HumaLOG) corrective regimen sliding scale  SubCutaneous at bedtime  dextrose 50% Injectable 12.5Gram(s) IV Push once  dextrose 50% Injectable 25Gram(s) IV Push once  dextrose 50% Injectable 25Gram(s) IV Push once  aspirin enteric coated 81milliGRAM(s) Oral daily  valsartan 320milliGRAM(s) Oral daily  isosorbide   mononitrate ER Tablet (IMDUR) 30milliGRAM(s) Oral daily  atorvastatin 20milliGRAM(s) Oral at bedtime  carvedilol 12.5milliGRAM(s) Oral every 12 hours  ketorolac 0.5% Ophthalmic Solution 1Drop(s) Both EYES two times a day  timolol 0.5% Solution 1Drop(s) Both EYES two times a day  brimonidine 0.2% Ophthalmic Solution 1Drop(s) Both EYES two times a day  clopidogrel Tablet 75milliGRAM(s) Oral daily  insulin glargine Injectable (LANTUS) 60Unit(s) SubCutaneous at bedtime    MEDICATIONS  (PRN):  dextrose Gel 1Dose(s) Oral once PRN  glucagon  Injectable 1milliGRAM(s) IntraMuscular once PRN  acetaminophen   Tablet. 650milliGRAM(s) Oral every 6 hours PRN  acetaminophen   Tablet 650milliGRAM(s) Oral every 6 hours PRN      REVIEW OF SYSTEMS:  CONSTITUTIONAL: No fever, weight loss, or fatigue  EYES: No eye pain, visual disturbances, or discharge  ENMT:  No difficulty hearing, tinnitus, vertigo; No sinus or throat pain  NECK: No pain or stiffness  BREASTS: No pain, masses, or nipple discharge  RESPIRATORY: No cough, wheezing, chills or hemoptysis; No shortness of breath  CARDIOVASCULAR: No chest pain, palpitations, dizziness, or leg swelling  GASTROINTESTINAL: No abdominal or epigastric pain. No nausea, vomiting, or hematemesis; No diarrhea or constipation. No melena or hematochezia.  GENITOURINARY: No dysuria, frequency, hematuria, or incontinence  NEUROLOGICAL: No headaches, memory loss, loss of strength, numbness, or tremors  SKIN: No itching, burning, rashes, or lesions   MUSCULOSKELETAL: No joint pain or swelling; No muscle, back, or extremity pain    T(F): 99.3, Max: 100.8 (06-22 @ 05:46)  HR: 73 (70 - 81)  BP: 145/55 (145/55 - 160/76)  RR: 18 (17 - 18)  SpO2: 98% (97% - 100%)  Wt(kg): --  CAPILLARY BLOOD GLUCOSE  244 (21 Jun 2017 22:15)  113 (21 Jun 2017 18:22)  123 (21 Jun 2017 13:48)  64 (21 Jun 2017 13:12)    I&O's Summary    I & Os for current day (as of 22 Jun 2017 09:57)  =============================================  IN: 500 ml / OUT: 0 ml / NET: 500 ml      PHYSICAL EXAM:  GENERAL: NAD, well-groomed, well-developed  HEAD:  Atraumatic, Normocephalic  EYES: EOMI, PERRLA, conjunctiva and sclera clear  ENMT: No tonsillar erythema, exudates, or enlargement; Moist mucous membranes, Good dentition, No lesions  NECK: Supple, No JVD, Normal thyroid  NERVOUS SYSTEM:  Alert & Oriented X3, Good concentration; Motor Strength 5/5 B/L upper and lower extremities; DTRs 2+ intact and symmetric  CHEST/LUNG: Clear to percussion bilaterally; No rales, rhonchi, wheezing, or rubs  HEART: Regular rate and rhythm; No murmurs, rubs, or gallops  ABDOMEN: Soft, Nontender, Nondistended; Bowel sounds present  EXTREMITIES:  2+ Peripheral Pulses, No clubbing, cyanosis, or edema  LYMPH: No lymphadenopathy noted  SKIN: No rashes or lesions    LABS:                        9.2    11.26 )-----------( 261      ( 22 Jun 2017 06:00 )             29.9     06-22    141  |  103  |  24<H>  ----------------------------<  130<H>  4.4   |  20<L>  |  1.42<H>    Ca    8.8      22 Jun 2017 06:00  Phos  2.9     06-22  Mg     1.6     06-22      PT/INR - ( 21 Jun 2017 05:50 )   PT: 12.9 SEC;   INR: 1.15          PTT - ( 21 Jun 2017 05:50 )  PTT:32.7 SEC        RADIOLOGY & ADDITIONAL TESTS:    XRay:    CTScan:    MRI:     Imaging Personally Reviewed:  [ ] YES  [ ] NO    Consultant(s) Notes Reviewed:  [ ] YES  [ ] NO    Care Discussed with Consultants/Other Providers [ ] YES  [ ] NO Patient is a 72y old  Female who presents with a chief complaint of blister to left fifth toe (19 Jun 2017 07:34)      INTERVAL HPI/OVERNIGHT EVENTS: Pt feeling okay, has not been eating as much as before. Pt hypoglycemic to 64 yesterday at lunch time; Lantus was decreased to 60u and Humalog was held. Spiked fever to 100.8 this morning and given Tylenol. Went for vascular angiogram yesterday with stent to L SFA and balloon angioplasty to L popliteal.    MEDICATIONS (STANDING):  piperacillin/tazobactam IVPB. 3.375Gram(s) IV Intermittent every 12 hours  heparin  Injectable 5000Unit(s) SubCutaneous every 8 hours  insulin lispro (HumaLOG) corrective regimen sliding scale  SubCutaneous three times a day before meals  insulin lispro (HumaLOG) corrective regimen sliding scale  SubCutaneous at bedtime  dextrose 50% Injectable 12.5Gram(s) IV Push once  dextrose 50% Injectable 25Gram(s) IV Push once  dextrose 50% Injectable 25Gram(s) IV Push once  aspirin enteric coated 81milliGRAM(s) Oral daily  valsartan 320milliGRAM(s) Oral daily  isosorbide   mononitrate ER Tablet (IMDUR) 30milliGRAM(s) Oral daily  atorvastatin 20milliGRAM(s) Oral at bedtime  carvedilol 12.5milliGRAM(s) Oral every 12 hours  ketorolac 0.5% Ophthalmic Solution 1Drop(s) Both EYES two times a day  timolol 0.5% Solution 1Drop(s) Both EYES two times a day  brimonidine 0.2% Ophthalmic Solution 1Drop(s) Both EYES two times a day  clopidogrel Tablet 75milliGRAM(s) Oral daily  insulin glargine Injectable (LANTUS) 60Unit(s) SubCutaneous at bedtime    MEDICATIONS  (PRN):  dextrose Gel 1Dose(s) Oral once PRN  glucagon  Injectable 1milliGRAM(s) IntraMuscular once PRN  acetaminophen   Tablet. 650milliGRAM(s) Oral every 6 hours PRN  acetaminophen   Tablet 650milliGRAM(s) Oral every 6 hours PRN      REVIEW OF SYSTEMS:  CONSTITUTIONAL: Fever  RESPIRATORY: No shortness of breath  CARDIOVASCULAR: No chest pain  GASTROINTESTINAL: No abdominal or epigastric pain. No nausea, vomiting  SKIN: L foot wound  MUSCULOSKELETAL: No LLE pain    T(F): 99.3, Max: 100.8 (06-22 @ 05:46)  HR: 73 (70 - 81)  BP: 145/55 (145/55 - 160/76)  RR: 18 (17 - 18)  SpO2: 98% (97% - 100%)  Wt(kg): --    CAPILLARY BLOOD GLUCOSE  244 (21 Jun 2017 22:15)  113 (21 Jun 2017 18:22)  123 (21 Jun 2017 13:48)  64 (21 Jun 2017 13:12)    I&O's Summary    I & Os for current day (as of 22 Jun 2017 09:57)  =============================================  IN: 500 ml / OUT: 0 ml / NET: 500 ml    PHYSICAL EXAM:  GENERAL: NAD, well-developed  HEAD:  Atraumatic, Normocephalic  EYES: EOMI, PERRLA, conjunctiva and sclera clear  ENMT: Moist mucous membranes  NECK: Supple, No JVD  NERVOUS SYSTEM:  Alert & Oriented X3, Good concentration; Motor Strength 5/5 LLE  CHEST/LUNG: Clear to percussion bilaterally; No rales, rhonchi, wheezing, or rubs  HEART: Regular rate and rhythm; systolic murmur, rubs, or gallops  ABDOMEN: Soft, Nontender, Nondistended; Bowel sounds present  EXTREMITIES: Unable to palpate L DP pulse, L foot wrapped, R BKA  LYMPH: No lymphadenopathy noted  SKIN: Darkening of skin over LLE, L foot wrapped in Kerlex, clean and dry. Left foot warmer to touch than yesterday.    LABS:                        9.2    11.26 )-----------( 261      ( 22 Jun 2017 06:00 )             29.9     06-22    141  |  103  |  24<H>  ----------------------------<  130<H>  4.4   |  20<L>  |  1.42<H>    Ca    8.8      22 Jun 2017 06:00  Phos  2.9     06-22  Mg     1.6     06-22      PT/INR - ( 21 Jun 2017 05:50 )   PT: 12.9 SEC;   INR: 1.15          PTT - ( 21 Jun 2017 05:50 )  PTT:32.7 SEC        RADIOLOGY & ADDITIONAL TESTS:    XRay:    CTScan:    MRI:   EXAM:  MRI FOOT W O-W CON  LT        PROCEDURE DATE:  Jun 21 2017         INTERPRETATION:  CLINICAL INDICATION: Peripheral vascular disease; left   5th toe blister; evaluate for osteomyelitis    TECHNIQUE:  Multiplanar and multisequence left forefoot and midfoot MRI performed   before and after administration of Gadavist IV without reported   complications. No prior MRI studies available for comparison. Reviewed in   conjunction with left foot radiographs from 6/18/2017.     FINDINGS:  Old healed proximal 5th metatarsal shaft posttraumatic deformity again   noted.    Slightly enhancing T2 marrow signal abnormality in the 5th middle and   distal phalanges and proximal phalanx head but without gross T1 marrow   signal alteration or cortical destructive changes.    Geographic shaped areas of slightly heterogeneous marrow signal   abnormality with serpentine borders noted in the 2nd, 4th, and distal 1st   metatarsal shafts which could represent marrow infarcts.    1st MTP joint osteoarthritic change again noted with mild reactive marrow   signal alteration along the articular margins and subcortical cystic   change.    No discrete circumscribed rim-enhancing uniform fluid signal intensity   collections seen to indicate a discrete drainable abscess.    Lisfranc ligament visualized and intact with tarsometatarsal alignment   maintained.    Generalized atrophic change of the plantar musculature.  No discrete osseous or soft tissue mass lesions.    IMPRESSION:  Reactive osteitis versus early or low-grade osteomyelitis involving the   5th middle and distal phalanges and proximal phalanx head.    Marrow infarcts involving the 2nd, 4th, and distal 1st metatarsal shafts.    No discrete drainable abscess collections.    1st MTP joint arthritic change     Imaging Personally Reviewed:  [ ] YES  [ ] NO    Consultant(s) Notes Reviewed:  [X] YES  [ ] NO    Care Discussed with Consultants/Other Providers [ ] YES  [ ] NO

## 2017-06-22 NOTE — PROGRESS NOTE ADULT - PROBLEM SELECTOR PLAN 5
s/p stents and CABG, currently asymptomatic  - c/w aspirin, statin, beta blocker  - pt reports being off of plavix since 2014 after her CEA s/p stents and CABG, currently asymptomatic  - c/w aspirin, Plavix, statin, beta blocker

## 2017-06-22 NOTE — PROGRESS NOTE ADULT - PROBLEM SELECTOR PLAN 1
Pt with likely diabetic foot ulcer vs PVD ulcer, no radiographic evidence of OM however with elevated ESR and CRP, afebrile and no leukocytosis  -Podiatry consulted, recommend checking MRI of the L foot to r/o osteomyelitis and to continue empirically on IV antibiotics for now. Will decide on surgery depending on MRI results. S/p bedside debridement on 6/18.  - C/w renally dosed vancomycin and zosyn (Day 4 of abx)  - Blood cultures negative at 48h  - Wound cultures growing CNS Staph and GNR TBD Pt with likely diabetic foot ulcer vs PVD ulcer, no radiographic evidence of OM however with elevated ESR and CRP, afebrile and no leukocytosis  -Podiatry consulted, recommend checking MRI of the L foot to r/o osteomyelitis and to continue empirically on IV antibiotics for now. Will decide on surgery depending on MRI results. S/p bedside debridement on 6/18.  -MRI showing reactive osteitis versus early or low-grade osteomyelitis involving the   5th middle and distal phalanges and proximal phalanx head with no abscess  - C/w renally dosed zosyn (Day 5 of abx)  - Wound culture growing serratia marcescens sensitive to Zosyn and CNS, vanc d/maritza yesterday  - Blood cultures negative at 48h

## 2017-06-22 NOTE — PROGRESS NOTE ADULT - SUBJECTIVE AND OBJECTIVE BOX
VASCULAR SURGERY CONSULT NOTE  --------------------------------------------------------------------------------------------    Patient is a 72y old  Female who presents with a chief complaint of blister to left fifth toe (19 Jun 2017 07:34)    Patient is not having any pain in her LLE.  No acute events ovn.  Had temp this AM of 38.2.      PAST MEDICAL & SURGICAL HISTORY:  UTI (urinary tract infection)  Carotid stenosis  PVD (peripheral vascular disease)  CAD (coronary artery disease)  Hypertension  Diabetes  Obesity  Hypercholesterolemia  HTN (hypertension)  DM (diabetes mellitus)  Carotid artery stenosis  PAD (peripheral artery disease): s/p R BKA  Stented coronary artery: 2010 [facility]  S/P CABG x 3: in 2004, [facility]  CAD (coronary artery disease)  History of hysterectomy  S/P BKA (below knee amputation) unilateral, right  S/P CABG x 3  S/P eye surgery: for glaucoma  S/P below knee amputation, right: 6/2010  S/P angioplasty with stent: 2009, 3/2014  S/P hysterectomy: 2004  Hx of CABG: 3v 2004  CAD (Coronary Artery Disease)  HTN (Hypertension)  Diabetes      CURRENT MEDICATIONS  MEDICATIONS (STANDING): piperacillin/tazobactam IVPB. 3.375Gram(s) IV Intermittent every 12 hours  heparin  Injectable 5000Unit(s) SubCutaneous every 8 hours  insulin lispro (HumaLOG) corrective regimen sliding scale  SubCutaneous three times a day before meals  insulin lispro (HumaLOG) corrective regimen sliding scale  SubCutaneous at bedtime  dextrose 50% Injectable 12.5Gram(s) IV Push once  dextrose 50% Injectable 25Gram(s) IV Push once  dextrose 50% Injectable 25Gram(s) IV Push once  aspirin enteric coated 81milliGRAM(s) Oral daily  valsartan 320milliGRAM(s) Oral daily  isosorbide   mononitrate ER Tablet (IMDUR) 30milliGRAM(s) Oral daily  atorvastatin 20milliGRAM(s) Oral at bedtime  carvedilol 12.5milliGRAM(s) Oral every 12 hours  clopidogrel Tablet 75milliGRAM(s) Oral daily  insulin glargine Injectable (LANTUS) 60Unit(s) SubCutaneous at bedtime    MEDICATIONS (PRN):dextrose Gel 1Dose(s) Oral once PRN Blood Glucose LESS THAN 70 milliGRAM(s)/deciliter  glucagon  Injectable 1milliGRAM(s) IntraMuscular once PRN Glucose LESS THAN 70 milligrams/deciliter  acetaminophen   Tablet. 650milliGRAM(s) Oral every 6 hours PRN Mild and moderate pain  acetaminophen   Tablet 650milliGRAM(s) Oral every 6 hours PRN For Temp greater than 38 C (100.4 F)    --------------------------------------------------------------------------------------------    Vitals:   T(C): 38.2, Max: 38.2 (06-22 @ 05:46)  HR: 73 (70 - 81)  BP: 145/55 (145/55 - 160/76)  BP(mean): --  RR: 18 (17 - 18)  SpO2: 98% (97% - 100%)  Wt(kg): --  CAPILLARY BLOOD GLUCOSE  244 (21 Jun 2017 22:15)  113 (21 Jun 2017 18:22)  123 (21 Jun 2017 13:48)  64 (21 Jun 2017 13:12)    CAPILLARY BLOOD GLUCOSE  244 (21 Jun 2017 22:15)  113 (21 Jun 2017 18:22)  123 (21 Jun 2017 13:48)  64 (21 Jun 2017 13:12)    I & Os for 24h ending 06-21 @ 07:00  =============================================  IN:    sodium chloride 0.9%: 375 ml    Total IN: 375 ml  ---------------------------------------------  OUT:    Total OUT: 0 ml  ---------------------------------------------  Total NET: 375 ml    I & Os for current day (as of 06-22 @ 06:51)  =============================================  IN:    Oral Fluid: 400 ml    IV PiggyBack: 100 ml    Total IN: 500 ml  ---------------------------------------------  OUT:    Total OUT: 0 ml  ---------------------------------------------  Total NET: 500 ml    Height (cm): 167.6 (06-19 @ 07:31)  Weight (kg): 81.6 (06-19 @ 07:31)  BMI (kg/m2): 29 (06-19 @ 07:31)  BSA (m2): 1.91 (06-19 @ 07:31)    PHYSICAL EXAM: ***  General: NAD, Lying in bed comfortably  HEENT: NC/AT  Resp: Good effort  Vascular: dopplerable DP of LLE.  No dop PT.  --------------------------------------------------------------------------------------------    LABS  CBC (06-21 @ 05:50)                              9.7<L>                         11.70<H>  )----------------(  258        70.6  % Neutrophils, 21.5  % Lymphocytes, ANC: 8.28<H>                              31.4<L>    BMP (06-21 @ 05:50)             142     |  105     |  28<H> 		Ca++ --      Ca 8.7                ---------------------------------( 99    		Mg 1.6                4.1     |  25      |  1.51<H>			Ph 4.1         Coags (06-21 @ 05:50)  aPTT 32.7 / INR 1.15 / PT 12.9          --------------------------------------------------------------------------------------------    MICROBIOLOGY    -> BLOOD VENOUS Culture (06-20 @ 17:05)     NG    NG  NG    -> OTHER Culture (06-18 @ 21:36)     NG    Serratia marcescens  Staphylococcus sp.,coag neg  NG    -> BLOOD PERIPHERAL Culture (06-18 @ 14:59)     NG    NG  NG      --------------------------------------------------------------------------------------------    IMAGING      ASSESSMENT: Patient is a 72y old f with ****    PLAN:   -   -   -   -   - Patient seen/examined or Plan Discussed with Fellow,    - Plan to be discussed with Attending,  VASCULAR SURGERY CONSULT NOTE  --------------------------------------------------------------------------------------------    Patient is a 72y old  Female who presents with a chief complaint of blister to left fifth toe (19 Jun 2017 07:34)    Patient is not having any pain in her LLE.  No acute events ovn.  Had temp this AM of 38.2.      PAST MEDICAL & SURGICAL HISTORY:  UTI (urinary tract infection)  Carotid stenosis  PVD (peripheral vascular disease)  CAD (coronary artery disease)  Hypertension  Diabetes  Obesity  Hypercholesterolemia  HTN (hypertension)  DM (diabetes mellitus)  Carotid artery stenosis  PAD (peripheral artery disease): s/p R BKA  Stented coronary artery: 2010 [facility]  S/P CABG x 3: in 2004, [facility]  CAD (coronary artery disease)  History of hysterectomy  S/P BKA (below knee amputation) unilateral, right  S/P CABG x 3  S/P eye surgery: for glaucoma  S/P below knee amputation, right: 6/2010  S/P angioplasty with stent: 2009, 3/2014  S/P hysterectomy: 2004  Hx of CABG: 3v 2004  CAD (Coronary Artery Disease)  HTN (Hypertension)  Diabetes      CURRENT MEDICATIONS  MEDICATIONS (STANDING): piperacillin/tazobactam IVPB. 3.375Gram(s) IV Intermittent every 12 hours  heparin  Injectable 5000Unit(s) SubCutaneous every 8 hours  insulin lispro (HumaLOG) corrective regimen sliding scale  SubCutaneous three times a day before meals  insulin lispro (HumaLOG) corrective regimen sliding scale  SubCutaneous at bedtime  dextrose 50% Injectable 12.5Gram(s) IV Push once  dextrose 50% Injectable 25Gram(s) IV Push once  dextrose 50% Injectable 25Gram(s) IV Push once  aspirin enteric coated 81milliGRAM(s) Oral daily  valsartan 320milliGRAM(s) Oral daily  isosorbide   mononitrate ER Tablet (IMDUR) 30milliGRAM(s) Oral daily  atorvastatin 20milliGRAM(s) Oral at bedtime  carvedilol 12.5milliGRAM(s) Oral every 12 hours  clopidogrel Tablet 75milliGRAM(s) Oral daily  insulin glargine Injectable (LANTUS) 60Unit(s) SubCutaneous at bedtime    MEDICATIONS (PRN):dextrose Gel 1Dose(s) Oral once PRN Blood Glucose LESS THAN 70 milliGRAM(s)/deciliter  glucagon  Injectable 1milliGRAM(s) IntraMuscular once PRN Glucose LESS THAN 70 milligrams/deciliter  acetaminophen   Tablet. 650milliGRAM(s) Oral every 6 hours PRN Mild and moderate pain  acetaminophen   Tablet 650milliGRAM(s) Oral every 6 hours PRN For Temp greater than 38 C (100.4 F)    --------------------------------------------------------------------------------------------    Vitals:   T(C): 38.2, Max: 38.2 (06-22 @ 05:46)  HR: 73 (70 - 81)  BP: 145/55 (145/55 - 160/76)  BP(mean): --  RR: 18 (17 - 18)  SpO2: 98% (97% - 100%)  Wt(kg): --  CAPILLARY BLOOD GLUCOSE  244 (21 Jun 2017 22:15)  113 (21 Jun 2017 18:22)  123 (21 Jun 2017 13:48)  64 (21 Jun 2017 13:12)    CAPILLARY BLOOD GLUCOSE  244 (21 Jun 2017 22:15)  113 (21 Jun 2017 18:22)  123 (21 Jun 2017 13:48)  64 (21 Jun 2017 13:12)    I & Os for 24h ending 06-21 @ 07:00  =============================================  IN:    sodium chloride 0.9%: 375 ml    Total IN: 375 ml  ---------------------------------------------  OUT:    Total OUT: 0 ml  ---------------------------------------------  Total NET: 375 ml    I & Os for current day (as of 06-22 @ 06:51)  =============================================  IN:    Oral Fluid: 400 ml    IV PiggyBack: 100 ml    Total IN: 500 ml  ---------------------------------------------  OUT:    Total OUT: 0 ml  ---------------------------------------------  Total NET: 500 ml    Height (cm): 167.6 (06-19 @ 07:31)  Weight (kg): 81.6 (06-19 @ 07:31)  BMI (kg/m2): 29 (06-19 @ 07:31)  BSA (m2): 1.91 (06-19 @ 07:31)    PHYSICAL EXAM: ***  General: NAD, Lying in bed comfortably  HEENT: NC/AT  Resp: Good effort  Vascular: dopplerable DP of LLE.  No dop PT.  --------------------------------------------------------------------------------------------    LABS  CBC (06-21 @ 05:50)                              9.7<L>                         11.70<H>  )----------------(  258        70.6  % Neutrophils, 21.5  % Lymphocytes, ANC: 8.28<H>                              31.4<L>    BMP (06-21 @ 05:50)             142     |  105     |  28<H> 		Ca++ --      Ca 8.7                ---------------------------------( 99    		Mg 1.6                4.1     |  25      |  1.51<H>			Ph 4.1         Coags (06-21 @ 05:50)  aPTT 32.7 / INR 1.15 / PT 12.9          --------------------------------------------------------------------------------------------    MICROBIOLOGY    -> BLOOD VENOUS Culture (06-20 @ 17:05)     NG    NG  NG    -> OTHER Culture (06-18 @ 21:36)     NG    Serratia marcescens  Staphylococcus sp.,coag neg  NG    -> BLOOD PERIPHERAL Culture (06-18 @ 14:59)     NG    NG  NG      --------------------------------------------------------------------------------------------    IMAGING      ASSESSMENT: Patient is a 72y old f with s/p LLE angio with angioplasty and stenting.    PLAN:   - DUC/PVRs today.  - Continue plavix.  -Monitor clinically.

## 2017-06-22 NOTE — PROGRESS NOTE ADULT - SUBJECTIVE AND OBJECTIVE BOX
Patient is a 72y old  Female who presents with a chief complaint of blister to left fifth toe (19 Jun 2017 07:34)       INTERVAL HPI/OVERNIGHT EVENTS:  Patient seen and evaluated at bedside.  Pt is resting comfortable in NAD. Denies N/V/F/C.  Pain rated at X/10    Allergies    sulfa drugs (Hives)  sulfa drugs (Rash)  Sulfac 10% (Hives)    Intolerances        Vital Signs Last 24 Hrs  T(C): 37.4, Max: 38.2 (06-22 @ 05:46)  T(F): 99.3, Max: 100.8 (06-22 @ 05:46)  HR: 73 (70 - 81)  BP: 145/55 (145/55 - 160/76)  BP(mean): --  RR: 18 (17 - 18)  SpO2: 98% (97% - 100%)    LABS:                        9.2    11.26 )-----------( 261      ( 22 Jun 2017 06:00 )             29.9     06-22    141  |  103  |  24<H>  ----------------------------<  130<H>  4.4   |  20<L>  |  1.42<H>    Ca    8.8      22 Jun 2017 06:00  Phos  2.9     06-22  Mg     1.6     06-22      PT/INR - ( 21 Jun 2017 05:50 )   PT: 12.9 SEC;   INR: 1.15          PTT - ( 21 Jun 2017 05:50 )  PTT:32.7 SEC    CAPILLARY BLOOD GLUCOSE  244 (21 Jun 2017 22:15)  113 (21 Jun 2017 18:22)  123 (21 Jun 2017 13:48)  64 (21 Jun 2017 13:12)      Lower Extremity Physical Exam:  LF 5th dorsal, and 4th lateral digit ulcerations deep to subq. No erythema, no edema, no drainage, no purulence, no malodor, no clinical signs of active infection noted.    RADIOLOGY & ADDITIONAL TESTS:  EXAM:  MRI FOOT W O-W CON  LT        PROCEDURE DATE:  Jun 21 2017         INTERPRETATION:  CLINICAL INDICATION: Peripheral vascular disease; left   5th toe blister; evaluate for osteomyelitis    TECHNIQUE:  Multiplanar and multisequence left forefoot and midfoot MRI performed   before and after administration of Gadavist IV without reported   complications. No prior MRI studies available for comparison. Reviewed in   conjunction with left foot radiographs from 6/18/2017.     FINDINGS:  Old healed proximal 5th metatarsal shaft posttraumatic deformity again   noted.    Slightly enhancing T2 marrow signal abnormality in the 5th middle and   distal phalanges and proximal phalanx head but without gross T1 marrow   signal alteration or cortical destructive changes.    Geographic shaped areas of slightly heterogeneous marrow signal   abnormality with serpentine borders noted in the 2nd, 4th, and distal 1st   metatarsal shafts which could represent marrow infarcts.    1st MTP joint osteoarthritic change again noted with mild reactive marrow   signal alteration along the articular margins and subcortical cystic   change.    No discrete circumscribed rim-enhancing uniform fluid signal intensity   collections seen to indicate a discrete drainable abscess.    Lisfranc ligament visualized and intact with tarsometatarsal alignment   maintained.    Generalized atrophic change of the plantar musculature.  No discrete osseous or soft tissue mass lesions.    IMPRESSION:  Reactive osteitis versus early or low-grade osteomyelitis involving the   5th middle and distal phalanges and proximal phalanx head.    Marrow infarcts involving the 2nd, 4th, and distal 1st metatarsal shafts.    No discrete drainable abscess collections.    1st MTP joint arthritic change                   BULMARO PHELAN M.D., ATTENDING RADIOLOGIST  This document has been electronically signed. Jun 22 2017 10:22AM

## 2017-06-22 NOTE — PROGRESS NOTE ADULT - PROBLEM SELECTOR PLAN 4
HgbA1c of 7.8  -Pt hypoglycemic to 50s yesterday, full dose of pre-dinner Humalog not given  - c/w home dose of Lantus 80 units qhs and humalog 26 units tidac but will f/u with FS from today and readjust insulin doses as necessary  - monitor FS qac and qhs  - carb consistent diet and hypoglycemia protocol HgbA1c of 7.8  -Pt hypoglycemic to 60s yesterday, Lantus decreased to 60 and Humalog held  - c/w Lantus 60u qHS and ISS, but will f/u with FS from today and readjust insulin doses as necessary  - monitor FS qac and qhs  - carb consistent diet and hypoglycemia protocol

## 2017-06-23 DIAGNOSIS — N17.9 ACUTE KIDNEY FAILURE, UNSPECIFIED: ICD-10-CM

## 2017-06-23 LAB
BACTERIA BLD CULT: SIGNIFICANT CHANGE UP
BACTERIA BLD CULT: SIGNIFICANT CHANGE UP
BASOPHILS # BLD AUTO: 0.02 K/UL — SIGNIFICANT CHANGE UP (ref 0–0.2)
BASOPHILS NFR BLD AUTO: 0.2 % — SIGNIFICANT CHANGE UP (ref 0–2)
BUN SERPL-MCNC: 34 MG/DL — HIGH (ref 7–23)
CALCIUM SERPL-MCNC: 8.8 MG/DL — SIGNIFICANT CHANGE UP (ref 8.4–10.5)
CHLORIDE SERPL-SCNC: 103 MMOL/L — SIGNIFICANT CHANGE UP (ref 98–107)
CO2 SERPL-SCNC: 20 MMOL/L — LOW (ref 22–31)
CREAT SERPL-MCNC: 2.07 MG/DL — HIGH (ref 0.5–1.3)
EOSINOPHIL # BLD AUTO: 0.44 K/UL — SIGNIFICANT CHANGE UP (ref 0–0.5)
EOSINOPHIL NFR BLD AUTO: 4.1 % — SIGNIFICANT CHANGE UP (ref 0–6)
GLUCOSE SERPL-MCNC: 100 MG/DL — HIGH (ref 70–99)
HCT VFR BLD CALC: 28.5 % — LOW (ref 34.5–45)
HGB BLD-MCNC: 8.8 G/DL — LOW (ref 11.5–15.5)
IMM GRANULOCYTES NFR BLD AUTO: 0.3 % — SIGNIFICANT CHANGE UP (ref 0–1.5)
LYMPHOCYTES # BLD AUTO: 2.36 K/UL — SIGNIFICANT CHANGE UP (ref 1–3.3)
LYMPHOCYTES # BLD AUTO: 21.8 % — SIGNIFICANT CHANGE UP (ref 13–44)
MAGNESIUM SERPL-MCNC: 1.7 MG/DL — SIGNIFICANT CHANGE UP (ref 1.6–2.6)
MCHC RBC-ENTMCNC: 27.8 PG — SIGNIFICANT CHANGE UP (ref 27–34)
MCHC RBC-ENTMCNC: 30.9 % — LOW (ref 32–36)
MCV RBC AUTO: 89.9 FL — SIGNIFICANT CHANGE UP (ref 80–100)
MONOCYTES # BLD AUTO: 0.66 K/UL — SIGNIFICANT CHANGE UP (ref 0–0.9)
MONOCYTES NFR BLD AUTO: 6.1 % — SIGNIFICANT CHANGE UP (ref 2–14)
NEUTROPHILS # BLD AUTO: 7.34 K/UL — SIGNIFICANT CHANGE UP (ref 1.8–7.4)
NEUTROPHILS NFR BLD AUTO: 67.5 % — SIGNIFICANT CHANGE UP (ref 43–77)
PHOSPHATE SERPL-MCNC: 4 MG/DL — SIGNIFICANT CHANGE UP (ref 2.5–4.5)
PLATELET # BLD AUTO: 240 K/UL — SIGNIFICANT CHANGE UP (ref 150–400)
PMV BLD: 11.4 FL — SIGNIFICANT CHANGE UP (ref 7–13)
POTASSIUM SERPL-MCNC: 4 MMOL/L — SIGNIFICANT CHANGE UP (ref 3.5–5.3)
POTASSIUM SERPL-SCNC: 4 MMOL/L — SIGNIFICANT CHANGE UP (ref 3.5–5.3)
RBC # BLD: 3.17 M/UL — LOW (ref 3.8–5.2)
RBC # FLD: 14.6 % — HIGH (ref 10.3–14.5)
SODIUM SERPL-SCNC: 139 MMOL/L — SIGNIFICANT CHANGE UP (ref 135–145)
WBC # BLD: 10.85 K/UL — HIGH (ref 3.8–10.5)
WBC # FLD AUTO: 10.85 K/UL — HIGH (ref 3.8–10.5)

## 2017-06-23 PROCEDURE — 99233 SBSQ HOSP IP/OBS HIGH 50: CPT | Mod: GC

## 2017-06-23 PROCEDURE — 99232 SBSQ HOSP IP/OBS MODERATE 35: CPT | Mod: GC

## 2017-06-23 RX ORDER — SODIUM CHLORIDE 9 MG/ML
1000 INJECTION INTRAMUSCULAR; INTRAVENOUS; SUBCUTANEOUS
Qty: 0 | Refills: 0 | Status: DISCONTINUED | OUTPATIENT
Start: 2017-06-23 | End: 2017-06-24

## 2017-06-23 RX ADMIN — CLOPIDOGREL BISULFATE 75 MILLIGRAM(S): 75 TABLET, FILM COATED ORAL at 11:45

## 2017-06-23 RX ADMIN — BRIMONIDINE TARTRATE 1 DROP(S): 2 SOLUTION/ DROPS OPHTHALMIC at 18:02

## 2017-06-23 RX ADMIN — Medication 1 DROP(S): at 05:51

## 2017-06-23 RX ADMIN — HEPARIN SODIUM 5000 UNIT(S): 5000 INJECTION INTRAVENOUS; SUBCUTANEOUS at 12:59

## 2017-06-23 RX ADMIN — HEPARIN SODIUM 5000 UNIT(S): 5000 INJECTION INTRAVENOUS; SUBCUTANEOUS at 21:47

## 2017-06-23 RX ADMIN — PIPERACILLIN AND TAZOBACTAM 25 GRAM(S): 4; .5 INJECTION, POWDER, LYOPHILIZED, FOR SOLUTION INTRAVENOUS at 05:51

## 2017-06-23 RX ADMIN — Medication 1: at 12:58

## 2017-06-23 RX ADMIN — Medication 1: at 18:04

## 2017-06-23 RX ADMIN — PIPERACILLIN AND TAZOBACTAM 25 GRAM(S): 4; .5 INJECTION, POWDER, LYOPHILIZED, FOR SOLUTION INTRAVENOUS at 17:58

## 2017-06-23 RX ADMIN — INSULIN GLARGINE 50 UNIT(S): 100 INJECTION, SOLUTION SUBCUTANEOUS at 21:47

## 2017-06-23 RX ADMIN — Medication 1 DROP(S): at 18:03

## 2017-06-23 RX ADMIN — VALSARTAN 320 MILLIGRAM(S): 80 TABLET ORAL at 05:51

## 2017-06-23 RX ADMIN — Medication 1 DROP(S): at 18:02

## 2017-06-23 RX ADMIN — ATORVASTATIN CALCIUM 20 MILLIGRAM(S): 80 TABLET, FILM COATED ORAL at 21:47

## 2017-06-23 RX ADMIN — ISOSORBIDE MONONITRATE 30 MILLIGRAM(S): 60 TABLET, EXTENDED RELEASE ORAL at 11:45

## 2017-06-23 RX ADMIN — HEPARIN SODIUM 5000 UNIT(S): 5000 INJECTION INTRAVENOUS; SUBCUTANEOUS at 05:51

## 2017-06-23 RX ADMIN — BRIMONIDINE TARTRATE 1 DROP(S): 2 SOLUTION/ DROPS OPHTHALMIC at 05:51

## 2017-06-23 RX ADMIN — Medication 81 MILLIGRAM(S): at 11:45

## 2017-06-23 RX ADMIN — CARVEDILOL PHOSPHATE 12.5 MILLIGRAM(S): 80 CAPSULE, EXTENDED RELEASE ORAL at 05:51

## 2017-06-23 NOTE — PROGRESS NOTE ADULT - PROBLEM SELECTOR PLAN 2
-LLE DUC/PVR severely decreased  -Vascular consulted- pt s/p angiogram with stenting and repeat DUC/PVR yesterday; f/u recs regarding further intervention  -C/w aspirin, Plavix and statin

## 2017-06-23 NOTE — PROGRESS NOTE ADULT - PROBLEM SELECTOR PLAN 3
Baseline Cr of 1.4-.16  - renally dose medications  - monitor Cr daily Likely 2/2 angiogram on 6/21 and decreased PO intake  -Start NS @100cc/hr for 10h  -Baseline Cr of 1.4-.16  -Renally dose medications  -Monitor Cr daily

## 2017-06-23 NOTE — PROGRESS NOTE ADULT - SUBJECTIVE AND OBJECTIVE BOX
Patient is a 72y old  Female who presents with a chief complaint of blister to left fifth toe (19 Jun 2017 07:34)      INTERVAL HPI/OVERNIGHT EVENTS:    MEDICATIONS (STANDING):  piperacillin/tazobactam IVPB. 3.375Gram(s) IV Intermittent every 12 hours  heparin  Injectable 5000Unit(s) SubCutaneous every 8 hours  insulin lispro (HumaLOG) corrective regimen sliding scale  SubCutaneous three times a day before meals  insulin lispro (HumaLOG) corrective regimen sliding scale  SubCutaneous at bedtime  dextrose 50% Injectable 12.5Gram(s) IV Push once  dextrose 50% Injectable 25Gram(s) IV Push once  dextrose 50% Injectable 25Gram(s) IV Push once  aspirin enteric coated 81milliGRAM(s) Oral daily  valsartan 320milliGRAM(s) Oral daily  isosorbide   mononitrate ER Tablet (IMDUR) 30milliGRAM(s) Oral daily  atorvastatin 20milliGRAM(s) Oral at bedtime  carvedilol 12.5milliGRAM(s) Oral every 12 hours  ketorolac 0.5% Ophthalmic Solution 1Drop(s) Both EYES two times a day  timolol 0.5% Solution 1Drop(s) Both EYES two times a day  brimonidine 0.2% Ophthalmic Solution 1Drop(s) Both EYES two times a day  clopidogrel Tablet 75milliGRAM(s) Oral daily  insulin glargine Injectable (LANTUS) 50Unit(s) SubCutaneous at bedtime    MEDICATIONS  (PRN):  dextrose Gel 1Dose(s) Oral once PRN  glucagon  Injectable 1milliGRAM(s) IntraMuscular once PRN  acetaminophen   Tablet. 650milliGRAM(s) Oral every 6 hours PRN  acetaminophen   Tablet 650milliGRAM(s) Oral every 6 hours PRN      REVIEW OF SYSTEMS:  CONSTITUTIONAL: No fever, weight loss, or fatigue  EYES: No eye pain, visual disturbances, or discharge  ENMT:  No difficulty hearing, tinnitus, vertigo; No sinus or throat pain  NECK: No pain or stiffness  BREASTS: No pain, masses, or nipple discharge  RESPIRATORY: No cough, wheezing, chills or hemoptysis; No shortness of breath  CARDIOVASCULAR: No chest pain, palpitations, dizziness, or leg swelling  GASTROINTESTINAL: No abdominal or epigastric pain. No nausea, vomiting, or hematemesis; No diarrhea or constipation. No melena or hematochezia.  GENITOURINARY: No dysuria, frequency, hematuria, or incontinence  NEUROLOGICAL: No headaches, memory loss, loss of strength, numbness, or tremors  SKIN: No itching, burning, rashes, or lesions   MUSCULOSKELETAL: No joint pain or swelling; No muscle, back, or extremity pain    T(F): 99.1, Max: 99.3 (06-22 @ 08:33)  HR: 72 (72 - 79)  BP: 116/57 (111/52 - 151/61)  RR: 18 (16 - 18)  SpO2: 96% (96% - 100%)  Wt(kg): --  CAPILLARY BLOOD GLUCOSE  220 (22 Jun 2017 22:04)  194 (22 Jun 2017 17:30)  131 (22 Jun 2017 12:14)  102 (22 Jun 2017 08:33)    I&O's Summary    I & Os for current day (as of 23 Jun 2017 07:19)  =============================================  IN: 100 ml / OUT: 0 ml / NET: 100 ml      PHYSICAL EXAM:  GENERAL: NAD, well-groomed, well-developed  HEAD:  Atraumatic, Normocephalic  EYES: EOMI, PERRLA, conjunctiva and sclera clear  ENMT: No tonsillar erythema, exudates, or enlargement; Moist mucous membranes, Good dentition, No lesions  NECK: Supple, No JVD, Normal thyroid  NERVOUS SYSTEM:  Alert & Oriented X3, Good concentration; Motor Strength 5/5 B/L upper and lower extremities; DTRs 2+ intact and symmetric  CHEST/LUNG: Clear to percussion bilaterally; No rales, rhonchi, wheezing, or rubs  HEART: Regular rate and rhythm; No murmurs, rubs, or gallops  ABDOMEN: Soft, Nontender, Nondistended; Bowel sounds present  EXTREMITIES:  2+ Peripheral Pulses, No clubbing, cyanosis, or edema  LYMPH: No lymphadenopathy noted  SKIN: No rashes or lesions    LABS:                        9.2    11.26 )-----------( 261      ( 22 Jun 2017 06:00 )             29.9     06-22    141  |  103  |  24<H>  ----------------------------<  130<H>  4.4   |  20<L>  |  1.42<H>    Ca    8.8      22 Jun 2017 06:00  Phos  2.9     06-22  Mg     1.6     06-22              RADIOLOGY & ADDITIONAL TESTS:    XRay:    CTScan:    MRI:     Imaging Personally Reviewed:  [ ] YES  [ ] NO    Consultant(s) Notes Reviewed:  [ ] YES  [ ] NO    Care Discussed with Consultants/Other Providers [ ] YES  [ ] NO Patient is a 72y old  Female who presents with a chief complaint of blister to left fifth toe (19 Jun 2017 07:34)      INTERVAL HPI/OVERNIGHT EVENTS: Pt feeling "not bad." Currently having her L foot dressing changed by podiatry and states she is not in any pain. Reports poor appetite, has only been eating about half of her meals. Last fever 36h ago.    MEDICATIONS (STANDING):  piperacillin/tazobactam IVPB. 3.375Gram(s) IV Intermittent every 12 hours  heparin  Injectable 5000Unit(s) SubCutaneous every 8 hours  insulin lispro (HumaLOG) corrective regimen sliding scale  SubCutaneous three times a day before meals  insulin lispro (HumaLOG) corrective regimen sliding scale  SubCutaneous at bedtime  dextrose 50% Injectable 12.5Gram(s) IV Push once  dextrose 50% Injectable 25Gram(s) IV Push once  dextrose 50% Injectable 25Gram(s) IV Push once  aspirin enteric coated 81milliGRAM(s) Oral daily  valsartan 320milliGRAM(s) Oral daily  isosorbide   mononitrate ER Tablet (IMDUR) 30milliGRAM(s) Oral daily  atorvastatin 20milliGRAM(s) Oral at bedtime  carvedilol 12.5milliGRAM(s) Oral every 12 hours  ketorolac 0.5% Ophthalmic Solution 1Drop(s) Both EYES two times a day  timolol 0.5% Solution 1Drop(s) Both EYES two times a day  brimonidine 0.2% Ophthalmic Solution 1Drop(s) Both EYES two times a day  clopidogrel Tablet 75milliGRAM(s) Oral daily  insulin glargine Injectable (LANTUS) 50Unit(s) SubCutaneous at bedtime    MEDICATIONS  (PRN):  dextrose Gel 1Dose(s) Oral once PRN  glucagon  Injectable 1milliGRAM(s) IntraMuscular once PRN  acetaminophen   Tablet. 650milliGRAM(s) Oral every 6 hours PRN  acetaminophen   Tablet 650milliGRAM(s) Oral every 6 hours PRN      REVIEW OF SYSTEMS:  CONSTITUTIONAL: No fever  RESPIRATORY:  No shortness of breath  CARDIOVASCULAR: No chest pain  GASTROINTESTINAL: No abdominal or epigastric pain  SKIN: L foot wound   MUSCULOSKELETAL: No LLE pain    T(F): 99.1, Max: 99.3 (06-22 @ 08:33)  HR: 72 (72 - 79)  BP: 116/57 (111/52 - 151/61)  RR: 18 (16 - 18)  SpO2: 96% (96% - 100%)  Wt(kg): --    CAPILLARY BLOOD GLUCOSE  111 (23 Jun 2017 08:36)  220 (22 Jun 2017 22:04)  194 (22 Jun 2017 17:30)  131 (22 Jun 2017 12:14)    I&O's Summary    I & Os for current day (as of 23 Jun 2017 07:19)  =============================================  IN: 100 ml / OUT: 0 ml / NET: 100 ml    PHYSICAL EXAM:  GENERAL: NAD, well-developed  HEAD:  Atraumatic, Normocephalic  EYES: EOMI, PERRLA, conjunctiva and sclera clear  ENMT: Moist mucous membranes  NECK: Supple, No JVD  NERVOUS SYSTEM:  Alert & Oriented X3, Good concentration; Motor Strength 5/5 LLE  CHEST/LUNG: Clear to percussion bilaterally; No rales, rhonchi, wheezing, or rubs  HEART: Regular rate and rhythm; systolic murmur, rubs, or gallops  ABDOMEN: Soft, Nontender, Nondistended; Bowel sounds present  EXTREMITIES: L foot wrapped, R BKA  SKIN: Darkening of skin over LLE, L foot wrapped in Kerlex, clean and dry. Left foot warmer to touch than yesterday.    LABS:                                   8.8    10.85 )-----------( 240      ( 23 Jun 2017 06:45 )             28.5       06-23    139  |  103  |  34<H>  ----------------------------<  100<H>  4.0   |  20<L>  |  2.07<H>    Ca    8.8      23 Jun 2017 06:45  Phos  4.0     06-23  Mg     1.7     06-23    Culture - Wound with Gram Stain (06.18.17 @ 21:36)    -  Cefepime: S <=4 TAI    -  Trimethoprim/Sulfamethoxazole: S <=2/38 TAI    Culture - Wound with Gram Stain:   MODERATE    -  Amikacin: S <=16 TAI    -  Cefoxitin: R <=8 TAI    -  Ceftazidime: S <=1 TAI    -  Ciprofloxacin: S <=1 TAI    -  Meropenem: S <=1 TAI    -  Tobramycin: S <=4 TAI    -  Ampicillin: R 16 TAI    -  Ampicillin/Sulbactam: R 16/8 TAI    -  Aztreonam: S <=4 TAI    -  Cefazolin: R >16 TAI    -  Ceftriaxone: S <=1 TAI    -  Piperacillin/Tazobactam: S <=16 TAI    -  Tigecycline: S <=2 TAI    -  Ertapenem: S <=1 TAI    -  Gentamicin: S <=4 TAI    -  Imipenem: S <=1 TAI    -  Levofloxacin: S <=2 TAI    Specimen Source: OTHER    Organism Identification: Serratia marcescens  Staphylococcus sp.,coag neg    Organism: Staphylococcus sp.,coag neg    Organism: Serratia marcescens    Method Type: MICROSCAN NEG URINE COMBO 61    Culture - Blood (06.20.17 @ 17:05)    Culture - Blood:   NO ORGANISMS ISOLATED  NO ORGANISMS ISOLATED AT 48 HRS.    Specimen Source: BLOOD PERIPHERAL      RADIOLOGY & ADDITIONAL TESTS:    XRay:    CTScan:    MRI:   EXAM:  MRI FOOT W O-W CON  LT        PROCEDURE DATE:  Jun 21 2017         INTERPRETATION:  CLINICAL INDICATION: Peripheral vascular disease; left   5th toe blister; evaluate for osteomyelitis    TECHNIQUE:  Multiplanar and multisequence left forefoot and midfoot MRI performed   before and after administration of Gadavist IV without reported   complications. No prior MRI studies available for comparison. Reviewed in   conjunction with left foot radiographs from 6/18/2017.     FINDINGS:  Old healed proximal 5th metatarsal shaft posttraumatic deformity again   noted.    Slightly enhancing T2 marrow signal abnormality in the 5th middle and   distal phalanges and proximal phalanx head but without gross T1 marrow   signal alteration or cortical destructive changes.    Geographic shaped areas of slightly heterogeneous marrow signal   abnormality with serpentine borders noted in the 2nd, 4th, and distal 1st   metatarsal shafts which could represent marrow infarcts.    1st MTP joint osteoarthritic change again noted with mild reactive marrow   signal alteration along the articular margins and subcortical cystic   change.    No discrete circumscribed rim-enhancing uniform fluid signal intensity   collections seen to indicate a discrete drainable abscess.    Lisfranc ligament visualized and intact with tarsometatarsal alignment   maintained.    Generalized atrophic change of the plantar musculature.  No discrete osseous or soft tissue mass lesions.    IMPRESSION:  Reactive osteitis versus early or low-grade osteomyelitis involving the   5th middle and distal phalanges and proximal phalanx head.    Marrow infarcts involving the 2nd, 4th, and distal 1st metatarsal shafts.    No discrete drainable abscess collections.    1st MTP joint arthritic change     Imaging Personally Reviewed:  [ ] YES  [ ] NO    Consultant(s) Notes Reviewed:  [X] YES  [ ] NO    Care Discussed with Consultants/Other Providers [ ] YES  [ ] NO

## 2017-06-23 NOTE — PROGRESS NOTE ADULT - SUBJECTIVE AND OBJECTIVE BOX
VASCULAR SURGERY CONSULT NOTE  --------------------------------------------------------------------------------------------    Patient is not having any pain at this time.  No acute events overnight.      PAST MEDICAL & SURGICAL HISTORY:  UTI (urinary tract infection)  Carotid stenosis  PVD (peripheral vascular disease)  CAD (coronary artery disease)  Hypertension  Diabetes  Obesity  Hypercholesterolemia  HTN (hypertension)  DM (diabetes mellitus)  Carotid artery stenosis  PAD (peripheral artery disease): s/p R BKA  Stented coronary artery: 2010 [facility]  S/P CABG x 3: in 2004, [facility]  CAD (coronary artery disease)  History of hysterectomy  S/P BKA (below knee amputation) unilateral, right  S/P CABG x 3  S/P eye surgery: for glaucoma  S/P below knee amputation, right: 6/2010  S/P angioplasty with stent: 2009, 3/2014  S/P hysterectomy: 2004  Hx of CABG: 3v 2004  CAD (Coronary Artery Disease)  HTN (Hypertension)  Diabetes      CURRENT MEDICATIONS  MEDICATIONS (STANDING): piperacillin/tazobactam IVPB. 3.375Gram(s) IV Intermittent every 12 hours  heparin  Injectable 5000Unit(s) SubCutaneous every 8 hours  insulin lispro (HumaLOG) corrective regimen sliding scale  SubCutaneous three times a day before meals  insulin lispro (HumaLOG) corrective regimen sliding scale  SubCutaneous at bedtime  dextrose 50% Injectable 12.5Gram(s) IV Push once  dextrose 50% Injectable 25Gram(s) IV Push once  dextrose 50% Injectable 25Gram(s) IV Push once  aspirin enteric coated 81milliGRAM(s) Oral daily  valsartan 320milliGRAM(s) Oral daily  isosorbide   mononitrate ER Tablet (IMDUR) 30milliGRAM(s) Oral daily  atorvastatin 20milliGRAM(s) Oral at bedtime  carvedilol 12.5milliGRAM(s) Oral every 12 hours  clopidogrel Tablet 75milliGRAM(s) Oral daily  insulin glargine Injectable (LANTUS) 50Unit(s) SubCutaneous at bedtime    MEDICATIONS (PRN):dextrose Gel 1Dose(s) Oral once PRN Blood Glucose LESS THAN 70 milliGRAM(s)/deciliter  glucagon  Injectable 1milliGRAM(s) IntraMuscular once PRN Glucose LESS THAN 70 milligrams/deciliter  acetaminophen   Tablet. 650milliGRAM(s) Oral every 6 hours PRN Mild and moderate pain  acetaminophen   Tablet 650milliGRAM(s) Oral every 6 hours PRN For Temp greater than 38 C (100.4 F)    --------------------------------------------------------------------------------------------    Vitals:   T(C): 37.3, Max: 37.3 (06-23 @ 05:48)  HR: 72 (72 - 79)  BP: 116/57 (111/52 - 151/61)  BP(mean): --  RR: 18 (16 - 18)  SpO2: 96% (96% - 100%)  Wt(kg): --  CAPILLARY BLOOD GLUCOSE  220 (22 Jun 2017 22:04)  194 (22 Jun 2017 17:30)  131 (22 Jun 2017 12:14)    CAPILLARY BLOOD GLUCOSE  220 (22 Jun 2017 22:04)  194 (22 Jun 2017 17:30)  131 (22 Jun 2017 12:14)      I & Os for current day (as of 06-23 @ 08:37)  =============================================  IN:    IV PiggyBack: 100 ml    Total IN: 100 ml  ---------------------------------------------  OUT:    Total OUT: 0 ml  ---------------------------------------------  Total NET: 100 ml    Height (cm): 167.6 (06-19 @ 07:31)  Weight (kg): 81.6 (06-19 @ 07:31)  BMI (kg/m2): 29 (06-19 @ 07:31)  BSA (m2): 1.91 (06-19 @ 07:31)    PHYSICAL EXAM:  General: NAD,  HEENT: NC/AT  Cardio: Regular rate  Resp: Good effort  Musculoskeletal/skin: Dressing is C/D/I.  LLE is warm to touch.  --------------------------------------------------------------------------------------------    LABS  CBC (06-23 @ 06:45)                              8.8<L>                         10.85<H>  )----------------(  240        67.5  % Neutrophils, 21.8  % Lymphocytes, ANC: 7.34                                28.5<L>  CBC (06-22 @ 06:00)                              9.2<L>                         11.26<H>  )----------------(  261        77.4<H>% Neutrophils, 13.9  % Lymphocytes, ANC: 8.72<H>                              29.9<L>    BMP (06-23 @ 06:45)             139     |  103     |  34<H> 		Ca++ --      Ca 8.8                ---------------------------------( 100<H>		Mg 1.7                4.0     |  20<L>   |  2.07<H>			Ph 4.0     BMP (06-22 @ 06:00)             141     |  103     |  24<H> 		Ca++ --      Ca 8.8                ---------------------------------( 130<H>		Mg 1.6                4.4     |  20<L>   |  1.42<H>			Ph 2.9                 --------------------------------------------------------------------------------------------    MICROBIOLOGY    -> BLOOD VENOUS Culture (06-20 @ 17:05)     NG    NG  NG    -> OTHER Culture (06-18 @ 21:36)     NG    Serratia marcescens  Staphylococcus sp.,coag neg  NG    -> BLOOD PERIPHERAL Culture (06-18 @ 14:59)     NG    NG  NG      --------------------------------------------------------------------------------------------    IMAGING      ASSESSMENT: Patient is a 72y old f s/p LLE angio with angioplasty and stenting.    PLAN:   -Patient may follow up with Dr. Moraes in 1 month at his office.  -F/u podiatry recs.  -Please call with any questions # [phone].  - Plan discussed with Attending, Dr. Moraes

## 2017-06-23 NOTE — PROGRESS NOTE ADULT - PROBLEM SELECTOR PLAN 4
HgbA1c of 7.8  -Lantus decreased to 60 and Humalog held 2/2 hypoglycemia the past 2 days  - c/w Lantus 60u qHS and ISS, but will f/u with FS from today and readjust insulin doses as necessary  - monitor FS qac and qhs  - carb consistent diet and hypoglycemia protocol

## 2017-06-23 NOTE — PROGRESS NOTE ADULT - SUBJECTIVE AND OBJECTIVE BOX
Patient is a 72y old  Female who presents with a chief complaint of blister to left fifth toe (19 Jun 2017 07:34)    Podiatry - left 5th digit and 4th lateral digit ulcers to subcutaneous tissue, ischemic changes      INTERVAL HPI/OVERNIGHT EVENTS:    MEDICATIONS  (STANDING):  piperacillin/tazobactam IVPB. 3.375Gram(s) IV Intermittent every 12 hours  heparin  Injectable 5000Unit(s) SubCutaneous every 8 hours  insulin lispro (HumaLOG) corrective regimen sliding scale  SubCutaneous three times a day before meals  insulin lispro (HumaLOG) corrective regimen sliding scale  SubCutaneous at bedtime  dextrose 50% Injectable 12.5Gram(s) IV Push once  dextrose 50% Injectable 25Gram(s) IV Push once  dextrose 50% Injectable 25Gram(s) IV Push once  aspirin enteric coated 81milliGRAM(s) Oral daily  valsartan 320milliGRAM(s) Oral daily  isosorbide   mononitrate ER Tablet (IMDUR) 30milliGRAM(s) Oral daily  atorvastatin 20milliGRAM(s) Oral at bedtime  carvedilol 12.5milliGRAM(s) Oral every 12 hours  ketorolac 0.5% Ophthalmic Solution 1Drop(s) Both EYES two times a day  timolol 0.5% Solution 1Drop(s) Both EYES two times a day  brimonidine 0.2% Ophthalmic Solution 1Drop(s) Both EYES two times a day  clopidogrel Tablet 75milliGRAM(s) Oral daily  insulin glargine Injectable (LANTUS) 50Unit(s) SubCutaneous at bedtime    MEDICATIONS  (PRN):  dextrose Gel 1Dose(s) Oral once PRN Blood Glucose LESS THAN 70 milliGRAM(s)/deciliter  glucagon  Injectable 1milliGRAM(s) IntraMuscular once PRN Glucose LESS THAN 70 milligrams/deciliter  acetaminophen   Tablet. 650milliGRAM(s) Oral every 6 hours PRN Mild and moderate pain  acetaminophen   Tablet 650milliGRAM(s) Oral every 6 hours PRN For Temp greater than 38 C (100.4 F)      Allergies    sulfa drugs (Hives)  sulfa drugs (Rash)  Sulfac 10% (Hives)    Intolerances        REVIEW OF SYSTEMS:  CONSTITUTIONAL: No fever, weight loss, or fatigue  EYES: No eye pain, visual disturbances, or discharge  ENMT:  No difficulty hearing, tinnitus, vertigo; No sinus or throat pain  NECK: No pain or stiffness  BREASTS: No pain, masses, or nipple discharge  RESPIRATORY: No cough, wheezing, chills or hemoptysis; No shortness of breath  CARDIOVASCULAR: No chest pain, palpitations, dizziness, or leg swelling  GASTROINTESTINAL: No abdominal or epigastric pain. No nausea, vomiting, or hematemesis; No diarrhea or constipation. No melena or hematochezia.  GENITOURINARY: No dysuria, frequency, hematuria, or incontinence  NEUROLOGICAL: No headaches, memory loss, loss of strength, numbness, or tremors  SKIN: No itching, burning, rashes, or lesions   LYMPH NODES: No enlarged glands  ENDOCRINE: No heat or cold intolerance; No hair loss  MUSCULOSKELETAL: No joint pain or swelling; No muscle, back, or extremity pain  PSYCHIATRIC: No depression, anxiety, mood swings, or difficulty sleeping  HEME/LYMPH: No easy bruising, or bleeding gums  ALLERY AND IMMUNOLOGIC: No hives or eczema    Vital Signs Last 24 Hrs  T(C): 37.3, Max: 37.4 (06-22 @ 08:33)  T(F): 99.1, Max: 99.3 (06-22 @ 08:33)  HR: 72 (72 - 79)  BP: 116/57 (111/52 - 151/61)  BP(mean): --  RR: 18 (16 - 18)  SpO2: 96% (96% - 100%)    PHYSICAL EXAM:  GENERAL: NAD, well-groomed, well-developed  HEAD:  Atraumatic, Normocephalic  EYES: EOMI, PERRLA, conjunctiva and sclera clear  ENMT: No tonsillar erythema, exudates, or enlargement; Moist mucous membranes, Good dentition, No lesions  NECK: Supple, No JVD, Normal thyroid  NERVOUS SYSTEM:  Alert & Oriented X3, Good concentration; Motor Strength 5/5 B/L upper and lower extremities; DTRs 2+ intact and symmetric Sensorium present absent  CHEST/LUNG: Clear to percussion bilaterally; No rales, rhonchi, wheezing, or rubs  HEART: Regular rate and rhythm; No murmurs, rubs, or gallops  ABDOMEN: Soft, Nontender, Nondistended; Bowel sounds present  EXTREMITIES:  2+ Peripheral Pulses, No clubbing, cyanosis, or edema Vascularity Skin appearance hair absent or present  LYMPH: No lymphadenopathy noted  SKIN: No rashes or lesions  ORTHOPEDIC: foot deformities  ULCER site width length depth  appearence base drainage odor cellulitis granulation necrotic tissue   PAIN:    I&O's Detail    I & Os for current day (as of 23 Jun 2017 07:52)  =============================================  IN:    IV PiggyBack: 100 ml    Total IN: 100 ml  ---------------------------------------------  OUT:    Total OUT: 0 ml  ---------------------------------------------  Total NET: 100 ml      LABS:                        9.2    11.26 )-----------( 261      ( 22 Jun 2017 06:00 )             29.9     06-22    141  |  103  |  24<H>  ----------------------------<  130<H>  4.4   |  20<L>  |  1.42<H>    Ca    8.8      22 Jun 2017 06:00  Phos  2.9     06-22  Mg     1.6     06-22          CAPILLARY BLOOD GLUCOSE  220 (22 Jun 2017 22:04)  194 (22 Jun 2017 17:30)  131 (22 Jun 2017 12:14)  102 (22 Jun 2017 08:33)      MICHAELA:  LF 5th dorsal, and 4th lateral digit ulcerations deep to subq. No erythema, no edema, no drainage, no purulence, no malodor, no clinical signs of active infection noted.  Dusky appearance to 4th digit, no CFT observed to 4th digit.

## 2017-06-23 NOTE — PROGRESS NOTE ADULT - PROBLEM SELECTOR PLAN 1
Pt with likely diabetic foot ulcer vs PVD ulcer, no radiographic evidence of OM however with elevated ESR and CRP, afebrile and no leukocytosis  -Podiatry consulted, recommend checking MRI of the L foot to r/o osteomyelitis and to continue empirically on IV antibiotics for now. Will decide on surgery depending on MRI results. S/p bedside debridement on 6/18.  -MRI showing reactive osteitis versus early or low-grade osteomyelitis involving the   5th middle and distal phalanges and proximal phalanx head with no abscess  - C/w renally dosed zosyn (Day 6 of abx)  - Wound culture growing serratia marcescens sensitive to Zosyn and CNS, vanc d/maritza  - Blood cultures negative at 48h

## 2017-06-23 NOTE — PROGRESS NOTE ADULT - ASSESSMENT
71yo F with lateral 4th digit and fifth digit ulcerations to subcutaneous tissue  ·	Patient examined and evaluated  ·	Patient dressed with Betadine and DSD  ·	Patient in no acute pain at this time  ·	Booked for L fifth digit amp 6/29  ·	f/u vascular recommendations

## 2017-06-24 LAB
BASOPHILS # BLD AUTO: 0.02 K/UL — SIGNIFICANT CHANGE UP (ref 0–0.2)
BASOPHILS NFR BLD AUTO: 0.2 % — SIGNIFICANT CHANGE UP (ref 0–2)
BUN SERPL-MCNC: 36 MG/DL — HIGH (ref 7–23)
CALCIUM SERPL-MCNC: 8.9 MG/DL — SIGNIFICANT CHANGE UP (ref 8.4–10.5)
CHLORIDE SERPL-SCNC: 101 MMOL/L — SIGNIFICANT CHANGE UP (ref 98–107)
CO2 SERPL-SCNC: 19 MMOL/L — LOW (ref 22–31)
CREAT SERPL-MCNC: 1.78 MG/DL — HIGH (ref 0.5–1.3)
EOSINOPHIL # BLD AUTO: 0.52 K/UL — HIGH (ref 0–0.5)
EOSINOPHIL NFR BLD AUTO: 4.7 % — SIGNIFICANT CHANGE UP (ref 0–6)
GLUCOSE SERPL-MCNC: 195 MG/DL — HIGH (ref 70–99)
HCT VFR BLD CALC: 28.2 % — LOW (ref 34.5–45)
HGB BLD-MCNC: 8.7 G/DL — LOW (ref 11.5–15.5)
IMM GRANULOCYTES NFR BLD AUTO: 0.3 % — SIGNIFICANT CHANGE UP (ref 0–1.5)
LYMPHOCYTES # BLD AUTO: 1.63 K/UL — SIGNIFICANT CHANGE UP (ref 1–3.3)
LYMPHOCYTES # BLD AUTO: 14.7 % — SIGNIFICANT CHANGE UP (ref 13–44)
MAGNESIUM SERPL-MCNC: 1.7 MG/DL — SIGNIFICANT CHANGE UP (ref 1.6–2.6)
MCHC RBC-ENTMCNC: 27.5 PG — SIGNIFICANT CHANGE UP (ref 27–34)
MCHC RBC-ENTMCNC: 30.9 % — LOW (ref 32–36)
MCV RBC AUTO: 89.2 FL — SIGNIFICANT CHANGE UP (ref 80–100)
MONOCYTES # BLD AUTO: 0.68 K/UL — SIGNIFICANT CHANGE UP (ref 0–0.9)
MONOCYTES NFR BLD AUTO: 6.1 % — SIGNIFICANT CHANGE UP (ref 2–14)
NEUTROPHILS # BLD AUTO: 8.2 K/UL — HIGH (ref 1.8–7.4)
NEUTROPHILS NFR BLD AUTO: 74 % — SIGNIFICANT CHANGE UP (ref 43–77)
PHOSPHATE SERPL-MCNC: 3.7 MG/DL — SIGNIFICANT CHANGE UP (ref 2.5–4.5)
PLATELET # BLD AUTO: 262 K/UL — SIGNIFICANT CHANGE UP (ref 150–400)
PMV BLD: 10.3 FL — SIGNIFICANT CHANGE UP (ref 7–13)
POTASSIUM SERPL-MCNC: 4.2 MMOL/L — SIGNIFICANT CHANGE UP (ref 3.5–5.3)
POTASSIUM SERPL-SCNC: 4.2 MMOL/L — SIGNIFICANT CHANGE UP (ref 3.5–5.3)
RBC # BLD: 3.16 M/UL — LOW (ref 3.8–5.2)
RBC # FLD: 14.5 % — SIGNIFICANT CHANGE UP (ref 10.3–14.5)
SODIUM SERPL-SCNC: 139 MMOL/L — SIGNIFICANT CHANGE UP (ref 135–145)
WBC # BLD: 11.08 K/UL — HIGH (ref 3.8–10.5)
WBC # FLD AUTO: 11.08 K/UL — HIGH (ref 3.8–10.5)

## 2017-06-24 PROCEDURE — 99233 SBSQ HOSP IP/OBS HIGH 50: CPT | Mod: GC

## 2017-06-24 RX ORDER — SODIUM CHLORIDE 9 MG/ML
1000 INJECTION INTRAMUSCULAR; INTRAVENOUS; SUBCUTANEOUS
Qty: 0 | Refills: 0 | Status: DISCONTINUED | OUTPATIENT
Start: 2017-06-24 | End: 2017-06-28

## 2017-06-24 RX ADMIN — CARVEDILOL PHOSPHATE 12.5 MILLIGRAM(S): 80 CAPSULE, EXTENDED RELEASE ORAL at 17:20

## 2017-06-24 RX ADMIN — HEPARIN SODIUM 5000 UNIT(S): 5000 INJECTION INTRAVENOUS; SUBCUTANEOUS at 14:27

## 2017-06-24 RX ADMIN — Medication 1: at 09:04

## 2017-06-24 RX ADMIN — HEPARIN SODIUM 5000 UNIT(S): 5000 INJECTION INTRAVENOUS; SUBCUTANEOUS at 21:34

## 2017-06-24 RX ADMIN — Medication 1: at 12:31

## 2017-06-24 RX ADMIN — Medication 1: at 22:06

## 2017-06-24 RX ADMIN — Medication 81 MILLIGRAM(S): at 11:26

## 2017-06-24 RX ADMIN — CLOPIDOGREL BISULFATE 75 MILLIGRAM(S): 75 TABLET, FILM COATED ORAL at 11:26

## 2017-06-24 RX ADMIN — Medication 1 DROP(S): at 17:16

## 2017-06-24 RX ADMIN — Medication 1 DROP(S): at 05:59

## 2017-06-24 RX ADMIN — ISOSORBIDE MONONITRATE 30 MILLIGRAM(S): 60 TABLET, EXTENDED RELEASE ORAL at 11:26

## 2017-06-24 RX ADMIN — Medication 1 DROP(S): at 17:17

## 2017-06-24 RX ADMIN — BRIMONIDINE TARTRATE 1 DROP(S): 2 SOLUTION/ DROPS OPHTHALMIC at 05:59

## 2017-06-24 RX ADMIN — CARVEDILOL PHOSPHATE 12.5 MILLIGRAM(S): 80 CAPSULE, EXTENDED RELEASE ORAL at 05:59

## 2017-06-24 RX ADMIN — SODIUM CHLORIDE 100 MILLILITER(S): 9 INJECTION INTRAMUSCULAR; INTRAVENOUS; SUBCUTANEOUS at 12:02

## 2017-06-24 RX ADMIN — PIPERACILLIN AND TAZOBACTAM 25 GRAM(S): 4; .5 INJECTION, POWDER, LYOPHILIZED, FOR SOLUTION INTRAVENOUS at 05:59

## 2017-06-24 RX ADMIN — HEPARIN SODIUM 5000 UNIT(S): 5000 INJECTION INTRAVENOUS; SUBCUTANEOUS at 05:59

## 2017-06-24 RX ADMIN — ATORVASTATIN CALCIUM 20 MILLIGRAM(S): 80 TABLET, FILM COATED ORAL at 21:34

## 2017-06-24 RX ADMIN — VALSARTAN 320 MILLIGRAM(S): 80 TABLET ORAL at 05:59

## 2017-06-24 RX ADMIN — PIPERACILLIN AND TAZOBACTAM 25 GRAM(S): 4; .5 INJECTION, POWDER, LYOPHILIZED, FOR SOLUTION INTRAVENOUS at 17:18

## 2017-06-24 RX ADMIN — Medication 1: at 17:17

## 2017-06-24 RX ADMIN — BRIMONIDINE TARTRATE 1 DROP(S): 2 SOLUTION/ DROPS OPHTHALMIC at 17:18

## 2017-06-24 RX ADMIN — INSULIN GLARGINE 50 UNIT(S): 100 INJECTION, SOLUTION SUBCUTANEOUS at 22:06

## 2017-06-24 NOTE — PROGRESS NOTE ADULT - ASSESSMENT
71yo F with lateral 4th digit and fifth digit ulcerations to subcutaneous tissue  ·	Patient examined and evaluated  ·	Patient dressed with Betadine and DSD  ·	Patient in no acute pain at this time  ·	Booked for L fifth digit amp 6/29  ·	will try to move up  ·	f/u vascular recommendations

## 2017-06-24 NOTE — PROVIDER CONTACT NOTE (OTHER) - BACKGROUND
Pt was admitted for local infection of skin and subcutaneous tissue on 6/18/17. PMH of diabetes, CAD, UTI, PVD, HTN, obesity, carotid stenosis. Pt also has right BKA.

## 2017-06-24 NOTE — PROGRESS NOTE ADULT - SUBJECTIVE AND OBJECTIVE BOX
Patient is a 72y old  Female who presents with a chief complaint of blister to left fifth toe (19 Jun 2017 07:34)      SUBJECTIVE / OVERNIGHT EVENTS:    MEDICATIONS  (STANDING):  piperacillin/tazobactam IVPB. 3.375Gram(s) IV Intermittent every 12 hours  heparin  Injectable 5000Unit(s) SubCutaneous every 8 hours  insulin lispro (HumaLOG) corrective regimen sliding scale  SubCutaneous three times a day before meals  insulin lispro (HumaLOG) corrective regimen sliding scale  SubCutaneous at bedtime  dextrose 50% Injectable 12.5Gram(s) IV Push once  dextrose 50% Injectable 25Gram(s) IV Push once  dextrose 50% Injectable 25Gram(s) IV Push once  aspirin enteric coated 81milliGRAM(s) Oral daily  valsartan 320milliGRAM(s) Oral daily  isosorbide   mononitrate ER Tablet (IMDUR) 30milliGRAM(s) Oral daily  atorvastatin 20milliGRAM(s) Oral at bedtime  carvedilol 12.5milliGRAM(s) Oral every 12 hours  ketorolac 0.5% Ophthalmic Solution 1Drop(s) Both EYES two times a day  timolol 0.5% Solution 1Drop(s) Both EYES two times a day  brimonidine 0.2% Ophthalmic Solution 1Drop(s) Both EYES two times a day  clopidogrel Tablet 75milliGRAM(s) Oral daily  insulin glargine Injectable (LANTUS) 50Unit(s) SubCutaneous at bedtime  sodium chloride 0.9%. 1000milliLiter(s) IV Continuous <Continuous>    MEDICATIONS  (PRN):  dextrose Gel 1Dose(s) Oral once PRN Blood Glucose LESS THAN 70 milliGRAM(s)/deciliter  glucagon  Injectable 1milliGRAM(s) IntraMuscular once PRN Glucose LESS THAN 70 milligrams/deciliter  acetaminophen   Tablet. 650milliGRAM(s) Oral every 6 hours PRN Mild and moderate pain  acetaminophen   Tablet 650milliGRAM(s) Oral every 6 hours PRN For Temp greater than 38 C (100.4 F)      Vital Signs Last 24 Hrs  T(C): 37.8, Max: 38.2 (06-24 @ 05:50)  HR: 67 (59 - 73)  BP: 146/64 (105/49 - 146/64)  RR: 18 (18 - 18)  SpO2: 97% (97% - 99%)  Wt(kg): --  CAPILLARY BLOOD GLUCOSE  175 (24 Jun 2017 08:45)  238 (23 Jun 2017 21:38)  182 (23 Jun 2017 17:05)  187 (23 Jun 2017 12:24)    I&O's Summary    I & Os for current day (as of 24 Jun 2017 09:37)  =============================================  IN: 100 ml / OUT: 0 ml / NET: 100 ml      PHYSICAL EXAM:  GENERAL: NAD, well-developed  HEAD:  Atraumatic, Normocephalic  EYES: EOMI, PERRLA, conjunctiva and sclera clear  NECK: Supple, No JVD  CHEST/LUNG: Clear to auscultation bilaterally; No wheeze  HEART: Regular rate and rhythm; No murmurs, rubs, or gallops  ABDOMEN: Soft, Nontender, Nondistended; Bowel sounds present  EXTREMITIES:  2+ Peripheral Pulses, No clubbing, cyanosis, or edema  PSYCH: AAOx3  NEUROLOGY: non-focal  SKIN: No rashes or lesions    LABS:  (06-24 @ 05:35)                        8.7  11.08 )-----------( 262                 28.2    Neutrophils = 8.20 (74.0%)  Lymphocytes = 1.63 (14.7%)  Eosinophils = 0.52 (4.7%)  Basophils = 0.02 (0.2%)  Monocytes = 0.68 (6.1%)  Bands = --%    WBC Trend: 11.08<--, 10.85<--, 11.26<--  Hb Trend: 8.7<--, 8.8<--, 9.2<--, 9.7<--, 10.1<--  Plt Trend: 262<--, 240<--, 261<--, 258<--, 275<--  06-24    139  |  101  |  36<H>  ----------------------------<  195<H>  4.2   |  19<L>  |  1.78<H>    Ca    8.9      24 Jun 2017 05:35  Phos  3.7     06-24  Mg     1.7     06-24      Creatinine Trend: 1.78<--, 2.07<--, 1.42<--, 1.51<--, 1.59<--, 1.54<--            Microbiology:  Urine Cx:  Blood Cx:    RADIOLOGY & ADDITIONAL TESTS:  X- Ray:  CT:  Ultrasound:  [ ] imaging personally reviewed and interpreted by me    Consultant(s) Notes Reviewed:      Care Discussed with Consultants/Other Providers: Patient is a 72y old  Female who presents with a chief complaint of blister to left fifth toe (19 Jun 2017 07:34)      SUBJECTIVE / OVERNIGHT EVENTS: Pt feels well today.     MEDICATIONS  (STANDING):  piperacillin/tazobactam IVPB. 3.375Gram(s) IV Intermittent every 12 hours  heparin  Injectable 5000Unit(s) SubCutaneous every 8 hours  insulin lispro (HumaLOG) corrective regimen sliding scale  SubCutaneous three times a day before meals  insulin lispro (HumaLOG) corrective regimen sliding scale  SubCutaneous at bedtime  dextrose 50% Injectable 12.5Gram(s) IV Push once  dextrose 50% Injectable 25Gram(s) IV Push once  dextrose 50% Injectable 25Gram(s) IV Push once  aspirin enteric coated 81milliGRAM(s) Oral daily  valsartan 320milliGRAM(s) Oral daily  isosorbide   mononitrate ER Tablet (IMDUR) 30milliGRAM(s) Oral daily  atorvastatin 20milliGRAM(s) Oral at bedtime  carvedilol 12.5milliGRAM(s) Oral every 12 hours  ketorolac 0.5% Ophthalmic Solution 1Drop(s) Both EYES two times a day  timolol 0.5% Solution 1Drop(s) Both EYES two times a day  brimonidine 0.2% Ophthalmic Solution 1Drop(s) Both EYES two times a day  clopidogrel Tablet 75milliGRAM(s) Oral daily  insulin glargine Injectable (LANTUS) 50Unit(s) SubCutaneous at bedtime  sodium chloride 0.9%. 1000milliLiter(s) IV Continuous <Continuous>    MEDICATIONS  (PRN):  dextrose Gel 1Dose(s) Oral once PRN Blood Glucose LESS THAN 70 milliGRAM(s)/deciliter  glucagon  Injectable 1milliGRAM(s) IntraMuscular once PRN Glucose LESS THAN 70 milligrams/deciliter  acetaminophen   Tablet. 650milliGRAM(s) Oral every 6 hours PRN Mild and moderate pain  acetaminophen   Tablet 650milliGRAM(s) Oral every 6 hours PRN For Temp greater than 38 C (100.4 F)      Vital Signs Last 24 Hrs  T(C): 37.8, Max: 38.2 (06-24 @ 05:50)  HR: 67 (59 - 73)  BP: 146/64 (105/49 - 146/64)  RR: 18 (18 - 18)  SpO2: 97% (97% - 99%)  Wt(kg): --  CAPILLARY BLOOD GLUCOSE  175 (24 Jun 2017 08:45)  238 (23 Jun 2017 21:38)  182 (23 Jun 2017 17:05)  187 (23 Jun 2017 12:24)    I&O's Summary    I & Os for current day (as of 24 Jun 2017 09:37)  =============================================  IN: 100 ml / OUT: 0 ml / NET: 100 ml      PHYSICAL EXAM:  GENERAL: NAD, well-developed  HEAD:  Atraumatic, Normocephalic  EYES: EOMI, PERRLA, conjunctiva and sclera clear  NECK: Supple, No JVD  CHEST/LUNG: Clear to auscultation bilaterally; No wheeze  HEART: Regular rate and rhythm; No murmurs, rubs, or gallops  ABDOMEN: Soft, Nontender, Nondistended; Bowel sounds present  EXTREMITIES:  2+ Peripheral Pulses, No clubbing, cyanosis, or edema  PSYCH: AAOx3  NEUROLOGY: non-focal  SKIN: No rashes or lesions    LABS:  (06-24 @ 05:35)                        8.7  11.08 )-----------( 262                 28.2    Neutrophils = 8.20 (74.0%)  Lymphocytes = 1.63 (14.7%)  Eosinophils = 0.52 (4.7%)  Basophils = 0.02 (0.2%)  Monocytes = 0.68 (6.1%)  Bands = --%    WBC Trend: 11.08<--, 10.85<--, 11.26<--  Hb Trend: 8.7<--, 8.8<--, 9.2<--, 9.7<--, 10.1<--  Plt Trend: 262<--, 240<--, 261<--, 258<--, 275<--  06-24    139  |  101  |  36<H>  ----------------------------<  195<H>  4.2   |  19<L>  |  1.78<H>    Ca    8.9      24 Jun 2017 05:35  Phos  3.7     06-24  Mg     1.7     06-24      Creatinine Trend: 1.78<--, 2.07<--, 1.42<--, 1.51<--, 1.59<--, 1.54<--            Microbiology:  Urine Cx:  Blood Cx:    RADIOLOGY & ADDITIONAL TESTS:  X- Ray:  CT:  Ultrasound:  [ ] imaging personally reviewed and interpreted by me    Consultant(s) Notes Reviewed:      Care Discussed with Consultants/Other Providers: Patient is a 72y old  Female who presents with a chief complaint of blister to left fifth toe (19 Jun 2017 07:34)      SUBJECTIVE / OVERNIGHT EVENTS: Pt feels well today.  No complaints.     MEDICATIONS  (STANDING):  piperacillin/tazobactam IVPB. 3.375Gram(s) IV Intermittent every 12 hours  heparin  Injectable 5000Unit(s) SubCutaneous every 8 hours  insulin lispro (HumaLOG) corrective regimen sliding scale  SubCutaneous three times a day before meals  insulin lispro (HumaLOG) corrective regimen sliding scale  SubCutaneous at bedtime  dextrose 50% Injectable 12.5Gram(s) IV Push once  dextrose 50% Injectable 25Gram(s) IV Push once  dextrose 50% Injectable 25Gram(s) IV Push once  aspirin enteric coated 81milliGRAM(s) Oral daily  valsartan 320milliGRAM(s) Oral daily  isosorbide   mononitrate ER Tablet (IMDUR) 30milliGRAM(s) Oral daily  atorvastatin 20milliGRAM(s) Oral at bedtime  carvedilol 12.5milliGRAM(s) Oral every 12 hours  ketorolac 0.5% Ophthalmic Solution 1Drop(s) Both EYES two times a day  timolol 0.5% Solution 1Drop(s) Both EYES two times a day  brimonidine 0.2% Ophthalmic Solution 1Drop(s) Both EYES two times a day  clopidogrel Tablet 75milliGRAM(s) Oral daily  insulin glargine Injectable (LANTUS) 50Unit(s) SubCutaneous at bedtime  sodium chloride 0.9%. 1000milliLiter(s) IV Continuous <Continuous>    MEDICATIONS  (PRN):  dextrose Gel 1Dose(s) Oral once PRN Blood Glucose LESS THAN 70 milliGRAM(s)/deciliter  glucagon  Injectable 1milliGRAM(s) IntraMuscular once PRN Glucose LESS THAN 70 milligrams/deciliter  acetaminophen   Tablet. 650milliGRAM(s) Oral every 6 hours PRN Mild and moderate pain  acetaminophen   Tablet 650milliGRAM(s) Oral every 6 hours PRN For Temp greater than 38 C (100.4 F)      Vital Signs Last 24 Hrs  T(C): 37.8, Max: 38.2 (06-24 @ 05:50)  HR: 67 (59 - 73)  BP: 146/64 (105/49 - 146/64)  RR: 18 (18 - 18)  SpO2: 97% (97% - 99%)  Wt(kg): --  CAPILLARY BLOOD GLUCOSE  175 (24 Jun 2017 08:45)  238 (23 Jun 2017 21:38)  182 (23 Jun 2017 17:05)  187 (23 Jun 2017 12:24)    I&O's Summary    I & Os for current day (as of 24 Jun 2017 09:37)  =============================================  IN: 100 ml / OUT: 0 ml / NET: 100 ml      PHYSICAL EXAM:  GENERAL: NAD, well-developed  HEAD:  Atraumatic, Normocephalic  EYES: EOMI, PERRLA, conjunctiva and sclera clear  NECK: Supple, No JVD  CHEST/LUNG: Clear to auscultation bilaterally; No wheeze  HEART: Regular rate and rhythm; No murmurs, rubs, or gallops  ABDOMEN: Soft, Nontender, Nondistended; Bowel sounds present  EXTREMITIES:  s/p   PSYCH: AAOx3  NEUROLOGY: non-focal  SKIN: No rashes or lesions    LABS:  (06-24 @ 05:35)                        8.7  11.08 )-----------( 262                 28.2    Neutrophils = 8.20 (74.0%)  Lymphocytes = 1.63 (14.7%)  Eosinophils = 0.52 (4.7%)  Basophils = 0.02 (0.2%)  Monocytes = 0.68 (6.1%)  Bands = --%    WBC Trend: 11.08<--, 10.85<--, 11.26<--  Hb Trend: 8.7<--, 8.8<--, 9.2<--, 9.7<--, 10.1<--  Plt Trend: 262<--, 240<--, 261<--, 258<--, 275<--  06-24    139  |  101  |  36<H>  ----------------------------<  195<H>  4.2   |  19<L>  |  1.78<H>    Ca    8.9      24 Jun 2017 05:35  Phos  3.7     06-24  Mg     1.7     06-24      Creatinine Trend: 1.78<--, 2.07<--, 1.42<--, 1.51<--, 1.59<--, 1.54<--            Microbiology:  Urine Cx:  Blood Cx:    RADIOLOGY & ADDITIONAL TESTS:  X- Ray:  CT:  Ultrasound:  [ ] imaging personally reviewed and interpreted by me    Consultant(s) Notes Reviewed:      Care Discussed with Consultants/Other Providers: Patient is a 72y old  Female who presents with a chief complaint of blister to left fifth toe (19 Jun 2017 07:34)      SUBJECTIVE / OVERNIGHT EVENTS: Pt feels well today.  No complaints. Still having intermittent fevers.     MEDICATIONS  (STANDING):  piperacillin/tazobactam IVPB. 3.375Gram(s) IV Intermittent every 12 hours  heparin  Injectable 5000Unit(s) SubCutaneous every 8 hours  insulin lispro (HumaLOG) corrective regimen sliding scale  SubCutaneous three times a day before meals  insulin lispro (HumaLOG) corrective regimen sliding scale  SubCutaneous at bedtime  dextrose 50% Injectable 12.5Gram(s) IV Push once  dextrose 50% Injectable 25Gram(s) IV Push once  dextrose 50% Injectable 25Gram(s) IV Push once  aspirin enteric coated 81milliGRAM(s) Oral daily  valsartan 320milliGRAM(s) Oral daily  isosorbide   mononitrate ER Tablet (IMDUR) 30milliGRAM(s) Oral daily  atorvastatin 20milliGRAM(s) Oral at bedtime  carvedilol 12.5milliGRAM(s) Oral every 12 hours  ketorolac 0.5% Ophthalmic Solution 1Drop(s) Both EYES two times a day  timolol 0.5% Solution 1Drop(s) Both EYES two times a day  brimonidine 0.2% Ophthalmic Solution 1Drop(s) Both EYES two times a day  clopidogrel Tablet 75milliGRAM(s) Oral daily  insulin glargine Injectable (LANTUS) 50Unit(s) SubCutaneous at bedtime  sodium chloride 0.9%. 1000milliLiter(s) IV Continuous <Continuous>    MEDICATIONS  (PRN):  dextrose Gel 1Dose(s) Oral once PRN Blood Glucose LESS THAN 70 milliGRAM(s)/deciliter  glucagon  Injectable 1milliGRAM(s) IntraMuscular once PRN Glucose LESS THAN 70 milligrams/deciliter  acetaminophen   Tablet. 650milliGRAM(s) Oral every 6 hours PRN Mild and moderate pain  acetaminophen   Tablet 650milliGRAM(s) Oral every 6 hours PRN For Temp greater than 38 C (100.4 F)      Vital Signs Last 24 Hrs  T(C): 37.8, Max: 38.2 (06-24 @ 05:50)  HR: 67 (59 - 73)  BP: 146/64 (105/49 - 146/64)  RR: 18 (18 - 18)  SpO2: 97% (97% - 99%)  Wt(kg): --  CAPILLARY BLOOD GLUCOSE  175 (24 Jun 2017 08:45)  238 (23 Jun 2017 21:38)  182 (23 Jun 2017 17:05)  187 (23 Jun 2017 12:24)    I&O's Summary    I & Os for current day (as of 24 Jun 2017 09:37)  =============================================  IN: 100 ml / OUT: 0 ml / NET: 100 ml      PHYSICAL EXAM:  GENERAL: NAD, well-developed  HEAD:  Atraumatic, Normocephalic  EYES: EOMI, PERRLA, conjunctiva and sclera clear  NECK: Supple, No JVD  CHEST/LUNG: Clear to auscultation bilaterally; No wheeze  HEART: Regular rate and rhythm; No murmurs, rubs, or gallops  ABDOMEN: Soft, Nontender, Nondistended; Bowel sounds present  EXTREMITIES:  s/p   PSYCH: AAOx3  NEUROLOGY: non-focal  SKIN: No rashes or lesions    LABS:  (06-24 @ 05:35)                        8.7  11.08 )-----------( 262                 28.2    Neutrophils = 8.20 (74.0%)  Lymphocytes = 1.63 (14.7%)  Eosinophils = 0.52 (4.7%)  Basophils = 0.02 (0.2%)  Monocytes = 0.68 (6.1%)  Bands = --%    WBC Trend: 11.08<--, 10.85<--, 11.26<--  Hb Trend: 8.7<--, 8.8<--, 9.2<--, 9.7<--, 10.1<--  Plt Trend: 262<--, 240<--, 261<--, 258<--, 275<--  06-24    139  |  101  |  36<H>  ----------------------------<  195<H>  4.2   |  19<L>  |  1.78<H>    Ca    8.9      24 Jun 2017 05:35  Phos  3.7     06-24  Mg     1.7     06-24      Creatinine Trend: 1.78<--, 2.07<--, 1.42<--, 1.51<--, 1.59<--, 1.54<--            Microbiology:  Urine Cx:  Blood Cx:    RADIOLOGY & ADDITIONAL TESTS:  X- Ray:  CT:  Ultrasound:  [ ] imaging personally reviewed and interpreted by me    Consultant(s) Notes Reviewed:      Care Discussed with Consultants/Other Providers: Patient is a 72y old  Female who presents with a chief complaint F/U of DAMARI      SUBJECTIVE / OVERNIGHT EVENTS: Pt feels well today.  No complaints. Still having intermittent fevers.     MEDICATIONS  (STANDING):  piperacillin/tazobactam IVPB. 3.375Gram(s) IV Intermittent every 12 hours  heparin  Injectable 5000Unit(s) SubCutaneous every 8 hours  insulin lispro (HumaLOG) corrective regimen sliding scale  SubCutaneous three times a day before meals  insulin lispro (HumaLOG) corrective regimen sliding scale  SubCutaneous at bedtime  dextrose 50% Injectable 12.5Gram(s) IV Push once  dextrose 50% Injectable 25Gram(s) IV Push once  dextrose 50% Injectable 25Gram(s) IV Push once  aspirin enteric coated 81milliGRAM(s) Oral daily  valsartan 320milliGRAM(s) Oral daily  isosorbide   mononitrate ER Tablet (IMDUR) 30milliGRAM(s) Oral daily  atorvastatin 20milliGRAM(s) Oral at bedtime  carvedilol 12.5milliGRAM(s) Oral every 12 hours  ketorolac 0.5% Ophthalmic Solution 1Drop(s) Both EYES two times a day  timolol 0.5% Solution 1Drop(s) Both EYES two times a day  brimonidine 0.2% Ophthalmic Solution 1Drop(s) Both EYES two times a day  clopidogrel Tablet 75milliGRAM(s) Oral daily  insulin glargine Injectable (LANTUS) 50Unit(s) SubCutaneous at bedtime  sodium chloride 0.9%. 1000milliLiter(s) IV Continuous <Continuous>    MEDICATIONS  (PRN):  dextrose Gel 1Dose(s) Oral once PRN Blood Glucose LESS THAN 70 milliGRAM(s)/deciliter  glucagon  Injectable 1milliGRAM(s) IntraMuscular once PRN Glucose LESS THAN 70 milligrams/deciliter  acetaminophen   Tablet. 650milliGRAM(s) Oral every 6 hours PRN Mild and moderate pain  acetaminophen   Tablet 650milliGRAM(s) Oral every 6 hours PRN For Temp greater than 38 C (100.4 F)      Vital Signs Last 24 Hrs  T(C): 37.8, Max: 38.2 (06-24 @ 05:50)  HR: 67 (59 - 73)  BP: 146/64 (105/49 - 146/64)  RR: 18 (18 - 18)  SpO2: 97% (97% - 99%)  Wt(kg): --  CAPILLARY BLOOD GLUCOSE  175 (24 Jun 2017 08:45)  238 (23 Jun 2017 21:38)  182 (23 Jun 2017 17:05)  187 (23 Jun 2017 12:24)    I&O's Summary    I & Os for current day (as of 24 Jun 2017 09:37)  =============================================  IN: 100 ml / OUT: 0 ml / NET: 100 ml      PHYSICAL EXAM:  GENERAL: NAD, well-developed  HEAD:  Atraumatic, Normocephalic  EYES: EOMI, PERRLA, conjunctiva and sclera clear  NECK: Supple, No JVD  CHEST/LUNG: Clear to auscultation bilaterally; No wheeze  HEART: Regular rate and rhythm; No murmurs, rubs, or gallops  ABDOMEN: Soft, Nontender, Nondistended; Bowel sounds present  EXTREMITIES:  s/p amputation, dressing c/d/i  PSYCH: AAOx3  NEUROLOGY: non-focal  SKIN: No rashes or lesions    LABS:  (06-24 @ 05:35)                        8.7  11.08 )-----------( 262                 28.2    Neutrophils = 8.20 (74.0%)  Lymphocytes = 1.63 (14.7%)  Eosinophils = 0.52 (4.7%)  Basophils = 0.02 (0.2%)  Monocytes = 0.68 (6.1%)  Bands = --%    WBC Trend: 11.08<--, 10.85<--, 11.26<--  Hb Trend: 8.7<--, 8.8<--, 9.2<--, 9.7<--, 10.1<--  Plt Trend: 262<--, 240<--, 261<--, 258<--, 275<--  06-24    139  |  101  |  36<H>  ----------------------------<  195<H>  4.2   |  19<L>  |  1.78<H>    Ca    8.9      24 Jun 2017 05:35  Phos  3.7     06-24  Mg     1.7     06-24      Creatinine Trend: 1.78<--, 2.07<--, 1.42<--, 1.51<--, 1.59<--, 1.54<--            Microbiology:  Urine Cx:  Blood Cx:    RADIOLOGY & ADDITIONAL TESTS:  X- Ray:  CT:  Ultrasound:  [ ] imaging personally reviewed and interpreted by me    Consultant(s) Notes Reviewed:      Care Discussed with Consultants/Other Providers:

## 2017-06-24 NOTE — PROGRESS NOTE ADULT - PROBLEM SELECTOR PLAN 2
-LLE DUC/PVR severely decreased  -Vascular consulted- pt s/p angiogram with stenting and repeat DUC/PVR yesterday; f/u recs regarding further intervention  -C/w aspirin, Plavix and statin -LLE DUC/PVR severely decreased  -Vascular consulted- pt s/p angiogram with stenting   -C/w aspirin, Plavix and statin Likely DM foot ulcer vs PVD ulcer, w/ MRI showing low grade OM. Wound cx growing serratia marcescens sensitive to Zosyn.   -Podiatry following, OR scheduled for 6/29, in discussion w/ them to see if surgery can be expedited  -C/w zosyn

## 2017-06-24 NOTE — PROGRESS NOTE ADULT - PROBLEM SELECTOR PLAN 4
HgbA1c of 7.8  -Lantus decreased to 60 and Humalog held 2/2 hypoglycemia the past 2 days  - c/w Lantus 60u qHS and ISS, but will f/u with FS from today and readjust insulin doses as necessary  - monitor FS qac and qhs  - carb consistent diet and hypoglycemia protocol Initially on lantus 80U, humalog 26U TID, regimen decreased 2/2 hypoglycemia  -Currently on lantus 50U & ISS

## 2017-06-24 NOTE — PROGRESS NOTE ADULT - PROBLEM SELECTOR PLAN 3
Likely 2/2 angiogram on 6/21 and decreased PO intake  -Start NS @100cc/hr for 10h  -Baseline Cr of 1.4-.16  -Renally dose medications  -Monitor Cr daily DAMARI likely related to contrast load. Baseline Cr 1.4 to 1.6  -C/w IVF, trend BMP -LLE DUC/PVR severely decreased  -Vascular consulted- pt s/p angiogram with stenting   -C/w aspirin, Plavix and statin

## 2017-06-24 NOTE — PROGRESS NOTE ADULT - PROBLEM SELECTOR PLAN 1
Pt with likely diabetic foot ulcer vs PVD ulcer, no radiographic evidence of OM however with elevated ESR and CRP, afebrile and no leukocytosis  -Podiatry consulted, recommend checking MRI of the L foot to r/o osteomyelitis and to continue empirically on IV antibiotics for now. Will decide on surgery depending on MRI results. S/p bedside debridement on 6/18.  -MRI showing reactive osteitis versus early or low-grade osteomyelitis involving the   5th middle and distal phalanges and proximal phalanx head with no abscess  - C/w renally dosed zosyn (Day 6 of abx)  - Wound culture growing serratia marcescens sensitive to Zosyn and CNS, vanc d/maritza  - Blood cultures negative at 48h Likely DM foot ulcer vs PVD ulcer, w/ MRI showing low grade OM. Wound cx growing serratia marcescens sensitive to Zosyn.   -Podiatry following, OR scheduled for 6/29, in discussion w/ them to see if surgery can be expedited  -C/w zosyn DAMARI likely related to contrast load. Slight improvement.  -C/w IVF, trend BMP

## 2017-06-24 NOTE — PROVIDER CONTACT NOTE (OTHER) - ACTION/TREATMENT ORDERED:
Hold carvedilol, no further orders given
apple juice given, will repeat FS in 15 min.MD aware.
Above noted. FS recheck is 123.
Per MD, hold off on tylenol PO and blood cultures at this time. Repeat oral temp in 1 hour and reassess if tylenol and blood cultures are needed.
Tylenol only per MD.

## 2017-06-24 NOTE — PROVIDER CONTACT NOTE (OTHER) - ASSESSMENT
Pt is alert and oriented. No s/s of distress. Vitals WNL except for elevated temp. No complaints of pain.

## 2017-06-24 NOTE — PROVIDER CONTACT NOTE (OTHER) - RECOMMENDATIONS
Administer tylenol PO and draw blood cultures
BC,UA and tylenol
hold humalog, and administer 15g of carb
Hold carvedilol, no further orders given.

## 2017-06-24 NOTE — PROGRESS NOTE ADULT - ASSESSMENT
71 yo F hx DM2 (on insulin) c/b neuropathy, PVD with R foot gangrene s/p R AKA (2010) now with prosthesis, CKD (baseline Cr 1.4-1.6), CAD s/p stents and CABG, HTN, s/p CEA (2014) p/w L 5th toe blister that she noted 2 days ago a/w diabetic foot ulcer and r/o OM 71 yo F hx DM2 (on insulin) c/b neuropathy, PVD with R foot gangrene s/p R AKA (2010) now with prosthesis, CKD (baseline Cr 1.4-1.6), CAD s/p stents and CABG, HTN, s/p CEA (2014) p/w L 5th toe blister

## 2017-06-24 NOTE — PROGRESS NOTE ADULT - SUBJECTIVE AND OBJECTIVE BOX
Patient is a 72y old  Female who presents with a chief complaint of blister to left fifth toe (19 Jun 2017 07:34)       INTERVAL HPI/OVERNIGHT EVENTS:  Patient seen and evaluated at bedside.  Pt is resting comfortable in NAD. Denies N/V/F/C.  Pain rated at X/10    Allergies    sulfa drugs (Hives)  sulfa drugs (Rash)  Sulfac 10% (Hives)    Intolerances        Vital Signs Last 24 Hrs  T(C): 37.8, Max: 38.2 (06-24 @ 05:50)  T(F): 100.1, Max: 100.7 (06-24 @ 05:50)  HR: 67 (59 - 73)  BP: 146/64 (105/49 - 146/64)  BP(mean): --  RR: 18 (18 - 18)  SpO2: 97% (97% - 99%)    LABS:                        8.7    11.08 )-----------( 262      ( 24 Jun 2017 05:35 )             28.2     06-24    139  |  101  |  36<H>  ----------------------------<  195<H>  4.2   |  19<L>  |  1.78<H>    Ca    8.9      24 Jun 2017 05:35  Phos  3.7     06-24  Mg     1.7     06-24          CAPILLARY BLOOD GLUCOSE  238 (23 Jun 2017 21:38)  182 (23 Jun 2017 17:05)  187 (23 Jun 2017 12:24)  111 (23 Jun 2017 08:36)      MICHAELA:  LF 5th dorsal, and 4th lateral digit ulcerations deep to subq. No erythema, no edema, no drainage, no purulence, no malodor, no clinical signs of active infection noted.  Dusky appearance to 4th digit, no CFT observed to 4th digit.    RADIOLOGY & ADDITIONAL TESTS:

## 2017-06-25 LAB
BACTERIA BLD CULT: SIGNIFICANT CHANGE UP
BACTERIA BLD CULT: SIGNIFICANT CHANGE UP
BASOPHILS # BLD AUTO: 0.05 K/UL — SIGNIFICANT CHANGE UP (ref 0–0.2)
BASOPHILS NFR BLD AUTO: 0.6 % — SIGNIFICANT CHANGE UP (ref 0–2)
BUN SERPL-MCNC: 35 MG/DL — HIGH (ref 7–23)
CALCIUM SERPL-MCNC: 8.8 MG/DL — SIGNIFICANT CHANGE UP (ref 8.4–10.5)
CHLORIDE SERPL-SCNC: 105 MMOL/L — SIGNIFICANT CHANGE UP (ref 98–107)
CO2 SERPL-SCNC: 19 MMOL/L — LOW (ref 22–31)
CREAT SERPL-MCNC: 1.57 MG/DL — HIGH (ref 0.5–1.3)
EOSINOPHIL # BLD AUTO: 0.52 K/UL — HIGH (ref 0–0.5)
EOSINOPHIL NFR BLD AUTO: 5.8 % — SIGNIFICANT CHANGE UP (ref 0–6)
GLUCOSE SERPL-MCNC: 136 MG/DL — HIGH (ref 70–99)
HCT VFR BLD CALC: 25.6 % — LOW (ref 34.5–45)
HCT VFR BLD CALC: 25.6 % — LOW (ref 34.5–45)
HGB BLD-MCNC: 8 G/DL — LOW (ref 11.5–15.5)
HGB BLD-MCNC: 8 G/DL — LOW (ref 11.5–15.5)
IMM GRANULOCYTES NFR BLD AUTO: 0.2 % — SIGNIFICANT CHANGE UP (ref 0–1.5)
LYMPHOCYTES # BLD AUTO: 2 K/UL — SIGNIFICANT CHANGE UP (ref 1–3.3)
LYMPHOCYTES # BLD AUTO: 22.1 % — SIGNIFICANT CHANGE UP (ref 13–44)
MAGNESIUM SERPL-MCNC: 1.8 MG/DL — SIGNIFICANT CHANGE UP (ref 1.6–2.6)
MCHC RBC-ENTMCNC: 27.9 PG — SIGNIFICANT CHANGE UP (ref 27–34)
MCHC RBC-ENTMCNC: 27.9 PG — SIGNIFICANT CHANGE UP (ref 27–34)
MCHC RBC-ENTMCNC: 31.3 % — LOW (ref 32–36)
MCHC RBC-ENTMCNC: 31.3 % — LOW (ref 32–36)
MCV RBC AUTO: 89.2 FL — SIGNIFICANT CHANGE UP (ref 80–100)
MCV RBC AUTO: 89.2 FL — SIGNIFICANT CHANGE UP (ref 80–100)
MONOCYTES # BLD AUTO: 0.57 K/UL — SIGNIFICANT CHANGE UP (ref 0–0.9)
MONOCYTES NFR BLD AUTO: 6.3 % — SIGNIFICANT CHANGE UP (ref 2–14)
NEUTROPHILS # BLD AUTO: 5.87 K/UL — SIGNIFICANT CHANGE UP (ref 1.8–7.4)
NEUTROPHILS NFR BLD AUTO: 65 % — SIGNIFICANT CHANGE UP (ref 43–77)
PHOSPHATE SERPL-MCNC: 4.3 MG/DL — SIGNIFICANT CHANGE UP (ref 2.5–4.5)
PLATELET # BLD AUTO: 255 K/UL — SIGNIFICANT CHANGE UP (ref 150–400)
PLATELET # BLD AUTO: 255 K/UL — SIGNIFICANT CHANGE UP (ref 150–400)
PMV BLD: 11 FL — SIGNIFICANT CHANGE UP (ref 7–13)
PMV BLD: 11 FL — SIGNIFICANT CHANGE UP (ref 7–13)
POTASSIUM SERPL-MCNC: 4.4 MMOL/L — SIGNIFICANT CHANGE UP (ref 3.5–5.3)
POTASSIUM SERPL-SCNC: 4.4 MMOL/L — SIGNIFICANT CHANGE UP (ref 3.5–5.3)
RBC # BLD: 2.87 M/UL — LOW (ref 3.8–5.2)
RBC # BLD: 2.87 M/UL — LOW (ref 3.8–5.2)
RBC # FLD: 14.6 % — HIGH (ref 10.3–14.5)
RBC # FLD: 14.6 % — HIGH (ref 10.3–14.5)
SODIUM SERPL-SCNC: 139 MMOL/L — SIGNIFICANT CHANGE UP (ref 135–145)
WBC # BLD: 9.03 K/UL — SIGNIFICANT CHANGE UP (ref 3.8–10.5)
WBC # BLD: 9.03 K/UL — SIGNIFICANT CHANGE UP (ref 3.8–10.5)
WBC # FLD AUTO: 9.03 K/UL — SIGNIFICANT CHANGE UP (ref 3.8–10.5)
WBC # FLD AUTO: 9.03 K/UL — SIGNIFICANT CHANGE UP (ref 3.8–10.5)

## 2017-06-25 PROCEDURE — 99233 SBSQ HOSP IP/OBS HIGH 50: CPT | Mod: GC

## 2017-06-25 RX ADMIN — HEPARIN SODIUM 5000 UNIT(S): 5000 INJECTION INTRAVENOUS; SUBCUTANEOUS at 22:15

## 2017-06-25 RX ADMIN — HEPARIN SODIUM 5000 UNIT(S): 5000 INJECTION INTRAVENOUS; SUBCUTANEOUS at 13:19

## 2017-06-25 RX ADMIN — SODIUM CHLORIDE 75 MILLILITER(S): 9 INJECTION INTRAMUSCULAR; INTRAVENOUS; SUBCUTANEOUS at 06:32

## 2017-06-25 RX ADMIN — PIPERACILLIN AND TAZOBACTAM 25 GRAM(S): 4; .5 INJECTION, POWDER, LYOPHILIZED, FOR SOLUTION INTRAVENOUS at 18:10

## 2017-06-25 RX ADMIN — SODIUM CHLORIDE 75 MILLILITER(S): 9 INJECTION INTRAMUSCULAR; INTRAVENOUS; SUBCUTANEOUS at 18:55

## 2017-06-25 RX ADMIN — BRIMONIDINE TARTRATE 1 DROP(S): 2 SOLUTION/ DROPS OPHTHALMIC at 18:08

## 2017-06-25 RX ADMIN — CLOPIDOGREL BISULFATE 75 MILLIGRAM(S): 75 TABLET, FILM COATED ORAL at 13:18

## 2017-06-25 RX ADMIN — CARVEDILOL PHOSPHATE 12.5 MILLIGRAM(S): 80 CAPSULE, EXTENDED RELEASE ORAL at 18:55

## 2017-06-25 RX ADMIN — Medication 2: at 18:08

## 2017-06-25 RX ADMIN — Medication 1 DROP(S): at 18:09

## 2017-06-25 RX ADMIN — INSULIN GLARGINE 50 UNIT(S): 100 INJECTION, SOLUTION SUBCUTANEOUS at 22:15

## 2017-06-25 RX ADMIN — ATORVASTATIN CALCIUM 20 MILLIGRAM(S): 80 TABLET, FILM COATED ORAL at 22:15

## 2017-06-25 RX ADMIN — CARVEDILOL PHOSPHATE 12.5 MILLIGRAM(S): 80 CAPSULE, EXTENDED RELEASE ORAL at 06:23

## 2017-06-25 RX ADMIN — Medication 1 DROP(S): at 18:10

## 2017-06-25 RX ADMIN — SODIUM CHLORIDE 75 MILLILITER(S): 9 INJECTION INTRAMUSCULAR; INTRAVENOUS; SUBCUTANEOUS at 22:16

## 2017-06-25 RX ADMIN — Medication 1 DROP(S): at 06:22

## 2017-06-25 RX ADMIN — BRIMONIDINE TARTRATE 1 DROP(S): 2 SOLUTION/ DROPS OPHTHALMIC at 06:22

## 2017-06-25 RX ADMIN — HEPARIN SODIUM 5000 UNIT(S): 5000 INJECTION INTRAVENOUS; SUBCUTANEOUS at 06:23

## 2017-06-25 RX ADMIN — ISOSORBIDE MONONITRATE 30 MILLIGRAM(S): 60 TABLET, EXTENDED RELEASE ORAL at 13:19

## 2017-06-25 RX ADMIN — VALSARTAN 320 MILLIGRAM(S): 80 TABLET ORAL at 06:23

## 2017-06-25 RX ADMIN — Medication 81 MILLIGRAM(S): at 13:19

## 2017-06-25 RX ADMIN — PIPERACILLIN AND TAZOBACTAM 25 GRAM(S): 4; .5 INJECTION, POWDER, LYOPHILIZED, FOR SOLUTION INTRAVENOUS at 06:23

## 2017-06-25 NOTE — PROGRESS NOTE ADULT - PROBLEM SELECTOR PLAN 4
Initially on lantus 80U, humalog 26U TID, regimen decreased 2/2 hypoglycemia  -Currently on lantus 50U & ISS

## 2017-06-25 NOTE — PROGRESS NOTE ADULT - PROBLEM SELECTOR PLAN 2
DAMARI likely related to contrast load. Slight improvement. Creatinine decreased to 1.78 today from 2.07 (6/24 value)  -C/w IVF, trend BMP

## 2017-06-25 NOTE — PROGRESS NOTE ADULT - SUBJECTIVE AND OBJECTIVE BOX
Patient is a 72y old  Female who presents with a chief complaint of blister to left fifth toe (19 Jun 2017 07:34)       INTERVAL HPI/OVERNIGHT EVENTS: No acute events overnight. Patient reports no currentpain on her left 5th toe blister, but has had some intermittent shooting pain down the left foot. She has marked loss of sensation in the left toes. She has no sensation on her left foot 3rd-5th digits. No fevers, chills, N/V, pain or other complaints. She does have a dry cough since last week.     MEDICATIONS  (STANDING):  piperacillin/tazobactam IVPB. 3.375Gram(s) IV Intermittent every 12 hours  heparin  Injectable 5000Unit(s) SubCutaneous every 8 hours  insulin lispro (HumaLOG) corrective regimen sliding scale  SubCutaneous three times a day before meals  insulin lispro (HumaLOG) corrective regimen sliding scale  SubCutaneous at bedtime  dextrose 50% Injectable 12.5Gram(s) IV Push once  dextrose 50% Injectable 25Gram(s) IV Push once  dextrose 50% Injectable 25Gram(s) IV Push once  aspirin enteric coated 81milliGRAM(s) Oral daily  valsartan 320milliGRAM(s) Oral daily  isosorbide   mononitrate ER Tablet (IMDUR) 30milliGRAM(s) Oral daily  atorvastatin 20milliGRAM(s) Oral at bedtime  carvedilol 12.5milliGRAM(s) Oral every 12 hours  ketorolac 0.5% Ophthalmic Solution 1Drop(s) Both EYES two times a day  timolol 0.5% Solution 1Drop(s) Both EYES two times a day  brimonidine 0.2% Ophthalmic Solution 1Drop(s) Both EYES two times a day  clopidogrel Tablet 75milliGRAM(s) Oral daily  insulin glargine Injectable (LANTUS) 50Unit(s) SubCutaneous at bedtime  sodium chloride 0.9%. 1000milliLiter(s) IV Continuous <Continuous>    MEDICATIONS  (PRN):  dextrose Gel 1Dose(s) Oral once PRN Blood Glucose LESS THAN 70 milliGRAM(s)/deciliter  glucagon  Injectable 1milliGRAM(s) IntraMuscular once PRN Glucose LESS THAN 70 milligrams/deciliter  acetaminophen   Tablet. 650milliGRAM(s) Oral every 6 hours PRN Mild and moderate pain  acetaminophen   Tablet 650milliGRAM(s) Oral every 6 hours PRN For Temp greater than 38 C (100.4 F)      Allergies    sulfa drugs (Hives)  sulfa drugs (Rash)  Sulfac 10% (Hives)    Intolerances        REVIEW OF SYSTEMS:  CONSTITUTIONAL: No fever, weight loss, or fatigue  EYES: No eye pain, visual disturbances, or discharge  ENMT:  No difficulty hearing, tinnitus, vertigo; No sinus or throat pain  RESPIRATORY: No cough, wheezing, chills or hemoptysis; No shortness of breath  CARDIOVASCULAR: No chest pain, palpitations, dizziness, or leg swelling  GASTROINTESTINAL: No abdominal or epigastric pain. No nausea, vomiting, or hematemesis; No diarrhea or constipation. No melena or hematochezia.  GENITOURINARY: No dysuria, frequency, hematuria, or incontinence  NEUROLOGICAL: Numbness in left foot 3rd-5th digits. No headaches, loss of strength, or tremors  SKIN: Blister in left foot 5th digit. No itching, burning.  LYMPH NODES: No enlarged glands  ENDOCRINE: No heat or cold intolerance; No polydipsia or polyuria  MUSCULOSKELETAL: No joint pain or swelling;   PSYCHIATRIC: Denies depression, anxiety  HEME/LYMPH: No easy bruising, or bleeding gums  ALLERGY AND IMMUNOLOGIC: No hives or eczema    Vital Signs Last 24 Hrs  T(C): 37.3, Max: 37.3 (06-25 @ 06:02)  T(F): 99.1, Max: 99.1 (06-25 @ 06:02)  HR: 59 (59 - 66)  BP: 153/68 (127/51 - 153/68)  BP(mean): --  RR: 17 (16 - 18)  SpO2: 100% (98% - 100%)    PHYSICAL EXAM:  GENERAL: Obese, NAD, right lower extremity below the knee amputation, left foot bandaged over 5th digit blister.  HEAD:  Atraumatic, Normocephalic  EYES: EOMI, PERRLA, conjunctiva and sclera clear  ENMT: No tonsillar erythema, exudates, or enlargement; Moist mucous membranes, Good dentition  NECK: Supple, No JVD  NERVOUS SYSTEM: AOX3, motor intact, sensation impaired in left lower extremity 3rd-5th digits.   PSYCHIATRIC: Appropriate affect and mood  CHEST/LUNG: Clear to auscultation bilaterally; No rales, rhonchi, wheezing, or rubs  HEART: Regular rate and rhythm; No murmurs, rubs, or gallops. No LE edema  ABDOMEN: Soft, Nontender, Nondistended; Bowel sounds present  EXTREMITIES:  2+ Peripheral Pulses, No clubbing, cyanosis  SKIN: Left foot 5th digit blister, erythematous, dry, no discharge.     LABS:                        8.0    9.03  )-----------( 255      ( 25 Jun 2017 06:15 )             25.6     25 Jun 2017 06:15    139    |  105    |  35     ----------------------------<  136    4.4     |  19     |  1.57     Ca    8.8        25 Jun 2017 06:15  Phos  4.3       25 Jun 2017 06:15  Mg     1.8       25 Jun 2017 06:15        CAPILLARY BLOOD GLUCOSE  111 (25 Jun 2017 08:44)  218 (24 Jun 2017 22:05)  170 (24 Jun 2017 16:40)  172 (24 Jun 2017 12:36)    BLOOD CULTURE    RADIOLOGY & ADDITIONAL TESTS:    Imaging Personally Reviewed:  [ ] YES     Consultant(s) Notes Reviewed:      Care Discussed with Consultants/Other Providers:

## 2017-06-25 NOTE — PROGRESS NOTE ADULT - PROBLEM SELECTOR PLAN 1
Likely DM foot ulcer vs PVD ulcer, w/ MRI showing low grade OM. Wound cx growing serratia marcescens sensitive to Zosyn.   -Podiatry following, OR scheduled for 6/29, in discussion w/ them to see if surgery can be expedited  -C/w zosyn until surgery date. Likely DM foot ulcer vs PVD ulcer, w/ MRI showing low grade OM. Wound cx growing serratia marcescens sensitive to Zosyn.   -Podiatry following, OR scheduled for 6/29, earlier surgery cannot be scheduled (spoke with podiatry resident today).   -C/w zosyn until surgery date.

## 2017-06-25 NOTE — PROGRESS NOTE ADULT - ASSESSMENT
71 yo F hx DM2 (on insulin) c/b neuropathy, PVD with R foot gangrene s/p R AKA (2010) now with prosthesis, CKD (baseline Cr 1.4-1.6), CAD s/p stents and CABG, HTN, s/p CEA (2014) p/w L 5th toe blister

## 2017-06-25 NOTE — PROGRESS NOTE ADULT - PROBLEM SELECTOR PLAN 3
-LLE DUC/PVR severely decreased.  - Loss of sensation in 3rd-5th left foot digits.   -Vascular consulted- pt s/p angiogram with stenting   -C/w aspirin, Plavix and statin

## 2017-06-26 LAB
BUN SERPL-MCNC: 31 MG/DL — HIGH (ref 7–23)
CALCIUM SERPL-MCNC: 8.8 MG/DL — SIGNIFICANT CHANGE UP (ref 8.4–10.5)
CHLORIDE SERPL-SCNC: 107 MMOL/L — SIGNIFICANT CHANGE UP (ref 98–107)
CO2 SERPL-SCNC: 17 MMOL/L — LOW (ref 22–31)
CREAT SERPL-MCNC: 1.41 MG/DL — HIGH (ref 0.5–1.3)
GLUCOSE SERPL-MCNC: 141 MG/DL — HIGH (ref 70–99)
HCT VFR BLD CALC: 27.3 % — LOW (ref 34.5–45)
HGB BLD-MCNC: 8.4 G/DL — LOW (ref 11.5–15.5)
MAGNESIUM SERPL-MCNC: 1.8 MG/DL — SIGNIFICANT CHANGE UP (ref 1.6–2.6)
MCHC RBC-ENTMCNC: 27.5 PG — SIGNIFICANT CHANGE UP (ref 27–34)
MCHC RBC-ENTMCNC: 30.8 % — LOW (ref 32–36)
MCV RBC AUTO: 89.5 FL — SIGNIFICANT CHANGE UP (ref 80–100)
PHOSPHATE SERPL-MCNC: 3.7 MG/DL — SIGNIFICANT CHANGE UP (ref 2.5–4.5)
PLATELET # BLD AUTO: 278 K/UL — SIGNIFICANT CHANGE UP (ref 150–400)
PMV BLD: 10.8 FL — SIGNIFICANT CHANGE UP (ref 7–13)
POTASSIUM SERPL-MCNC: 4.3 MMOL/L — SIGNIFICANT CHANGE UP (ref 3.5–5.3)
POTASSIUM SERPL-SCNC: 4.3 MMOL/L — SIGNIFICANT CHANGE UP (ref 3.5–5.3)
RBC # BLD: 3.05 M/UL — LOW (ref 3.8–5.2)
RBC # FLD: 14.7 % — HIGH (ref 10.3–14.5)
SODIUM SERPL-SCNC: 141 MMOL/L — SIGNIFICANT CHANGE UP (ref 135–145)
WBC # BLD: 10.2 K/UL — SIGNIFICANT CHANGE UP (ref 3.8–10.5)
WBC # FLD AUTO: 10.2 K/UL — SIGNIFICANT CHANGE UP (ref 3.8–10.5)

## 2017-06-26 PROCEDURE — 99233 SBSQ HOSP IP/OBS HIGH 50: CPT | Mod: GC

## 2017-06-26 RX ORDER — INSULIN GLARGINE 100 [IU]/ML
25 INJECTION, SOLUTION SUBCUTANEOUS AT BEDTIME
Qty: 0 | Refills: 0 | Status: COMPLETED | OUTPATIENT
Start: 2017-06-26 | End: 2017-06-26

## 2017-06-26 RX ADMIN — ATORVASTATIN CALCIUM 20 MILLIGRAM(S): 80 TABLET, FILM COATED ORAL at 22:15

## 2017-06-26 RX ADMIN — PIPERACILLIN AND TAZOBACTAM 25 GRAM(S): 4; .5 INJECTION, POWDER, LYOPHILIZED, FOR SOLUTION INTRAVENOUS at 17:57

## 2017-06-26 RX ADMIN — ISOSORBIDE MONONITRATE 30 MILLIGRAM(S): 60 TABLET, EXTENDED RELEASE ORAL at 14:04

## 2017-06-26 RX ADMIN — BRIMONIDINE TARTRATE 1 DROP(S): 2 SOLUTION/ DROPS OPHTHALMIC at 17:55

## 2017-06-26 RX ADMIN — Medication 81 MILLIGRAM(S): at 14:05

## 2017-06-26 RX ADMIN — BRIMONIDINE TARTRATE 1 DROP(S): 2 SOLUTION/ DROPS OPHTHALMIC at 06:08

## 2017-06-26 RX ADMIN — Medication 1 DROP(S): at 17:55

## 2017-06-26 RX ADMIN — VALSARTAN 320 MILLIGRAM(S): 80 TABLET ORAL at 06:08

## 2017-06-26 RX ADMIN — SODIUM CHLORIDE 75 MILLILITER(S): 9 INJECTION INTRAMUSCULAR; INTRAVENOUS; SUBCUTANEOUS at 17:58

## 2017-06-26 RX ADMIN — HEPARIN SODIUM 5000 UNIT(S): 5000 INJECTION INTRAVENOUS; SUBCUTANEOUS at 22:15

## 2017-06-26 RX ADMIN — Medication 1 DROP(S): at 06:08

## 2017-06-26 RX ADMIN — INSULIN GLARGINE 25 UNIT(S): 100 INJECTION, SOLUTION SUBCUTANEOUS at 22:15

## 2017-06-26 RX ADMIN — HEPARIN SODIUM 5000 UNIT(S): 5000 INJECTION INTRAVENOUS; SUBCUTANEOUS at 06:08

## 2017-06-26 RX ADMIN — Medication 1: at 17:52

## 2017-06-26 RX ADMIN — PIPERACILLIN AND TAZOBACTAM 25 GRAM(S): 4; .5 INJECTION, POWDER, LYOPHILIZED, FOR SOLUTION INTRAVENOUS at 06:08

## 2017-06-26 RX ADMIN — HEPARIN SODIUM 5000 UNIT(S): 5000 INJECTION INTRAVENOUS; SUBCUTANEOUS at 14:04

## 2017-06-26 RX ADMIN — CARVEDILOL PHOSPHATE 12.5 MILLIGRAM(S): 80 CAPSULE, EXTENDED RELEASE ORAL at 06:08

## 2017-06-26 RX ADMIN — Medication 1 DROP(S): at 06:07

## 2017-06-26 RX ADMIN — CARVEDILOL PHOSPHATE 12.5 MILLIGRAM(S): 80 CAPSULE, EXTENDED RELEASE ORAL at 17:54

## 2017-06-26 RX ADMIN — CLOPIDOGREL BISULFATE 75 MILLIGRAM(S): 75 TABLET, FILM COATED ORAL at 14:05

## 2017-06-26 NOTE — PROGRESS NOTE ADULT - PROBLEM SELECTOR PLAN 1
Likely DM foot ulcer vs PVD ulcer, w/ MRI showing low grade OM. Wound cx growing serratia marcescens sensitive to Zosyn.   -Podiatry following, OR date changed to 6/27 per podiatry note.  -C/w zosyn until surgery date.

## 2017-06-26 NOTE — PROGRESS NOTE ADULT - ASSESSMENT
73 yo F hx DM2 (on insulin) c/b neuropathy, PVD with R foot gangrene s/p R AKA (2010) now with prosthesis, CKD (baseline Cr 1.4-1.6), CAD s/p stents and CABG, HTN, s/p CEA (2014) p/w L 5th toe blister

## 2017-06-26 NOTE — DIETITIAN INITIAL EVALUATION ADULT. - NS AS NUTRI INTERV ED CONTENT
1) Continue Consistent Carbohydrate (no snack), DASH/TLC (cholesterol and sodium restricted) diet which remains appropriate     2) Suggest outpatient follow-up with an endocrinologist and Registered Dietitian to ensure long-term diabetes diet comprehension and adherence./Purpose of the nutrition education/Priority modifications

## 2017-06-26 NOTE — PROGRESS NOTE ADULT - SUBJECTIVE AND OBJECTIVE BOX
Patient is a 72y old  Female who presents with a chief complaint of blister to left fifth toe (19 Jun 2017 07:34)       INTERVAL HPI/OVERNIGHT EVENTS: No events overnight. Patient resting comfortably. She was seen by podiatry this morning and is aware/agreeable for plan for OR for left digit amputation     MEDICATIONS  (STANDING):  piperacillin/tazobactam IVPB. 3.375Gram(s) IV Intermittent every 12 hours  heparin  Injectable 5000Unit(s) SubCutaneous every 8 hours  insulin lispro (HumaLOG) corrective regimen sliding scale  SubCutaneous three times a day before meals  insulin lispro (HumaLOG) corrective regimen sliding scale  SubCutaneous at bedtime  dextrose 50% Injectable 12.5Gram(s) IV Push once  dextrose 50% Injectable 25Gram(s) IV Push once  dextrose 50% Injectable 25Gram(s) IV Push once  aspirin enteric coated 81milliGRAM(s) Oral daily  valsartan 320milliGRAM(s) Oral daily  isosorbide   mononitrate ER Tablet (IMDUR) 30milliGRAM(s) Oral daily  atorvastatin 20milliGRAM(s) Oral at bedtime  carvedilol 12.5milliGRAM(s) Oral every 12 hours  ketorolac 0.5% Ophthalmic Solution 1Drop(s) Both EYES two times a day  timolol 0.5% Solution 1Drop(s) Both EYES two times a day  brimonidine 0.2% Ophthalmic Solution 1Drop(s) Both EYES two times a day  clopidogrel Tablet 75milliGRAM(s) Oral daily  insulin glargine Injectable (LANTUS) 50Unit(s) SubCutaneous at bedtime  sodium chloride 0.9%. 1000milliLiter(s) IV Continuous <Continuous>    MEDICATIONS  (PRN):  dextrose Gel 1Dose(s) Oral once PRN Blood Glucose LESS THAN 70 milliGRAM(s)/deciliter  glucagon  Injectable 1milliGRAM(s) IntraMuscular once PRN Glucose LESS THAN 70 milligrams/deciliter  acetaminophen   Tablet. 650milliGRAM(s) Oral every 6 hours PRN Mild and moderate pain  acetaminophen   Tablet 650milliGRAM(s) Oral every 6 hours PRN For Temp greater than 38 C (100.4 F)      Allergies    sulfa drugs (Hives)  sulfa drugs (Rash)  Sulfac 10% (Hives)    Intolerances        REVIEW OF SYSTEMS:  CONSTITUTIONAL: No fever, weight loss, or fatigue  EYES: No eye pain, visual disturbances, or discharge  ENMT:  No difficulty hearing, tinnitus, vertigo; No sinus or throat pain  RESPIRATORY: No cough, wheezing, chills or hemoptysis; No shortness of breath  CARDIOVASCULAR: No chest pain, palpitations, dizziness, or leg swelling  GASTROINTESTINAL: No abdominal or epigastric pain. No nausea, vomiting, or hematemesis; No diarrhea or constipation. No melena or hematochezia.  GENITOURINARY: No dysuria, frequency, hematuria, or incontinence  NEUROLOGICAL: No headaches, loss of strength, numbness, or tremors  SKIN: No itching, burning, rashes, or lesions   LYMPH NODES: No enlarged glands  ENDOCRINE: No heat or cold intolerance; No polydipsia or polyuria  MUSCULOSKELETAL: No joint pain or swelling;   PSYCHIATRIC: Denies depression, anxiety  HEME/LYMPH: No easy bruising, or bleeding gums  ALLERGY AND IMMUNOLOGIC: No hives or eczema    Vital Signs Last 24 Hrs  T(C): 37.2, Max: 37.6 (06-25 @ 21:36)  T(F): 98.9, Max: 99.7 (06-25 @ 21:36)  HR: 65 (65 - 68)  BP: 117/58 (117/58 - 134/50)  BP(mean): --  RR: 18 (16 - 18)  SpO2: 95% (95% - 98%)    PHYSICAL EXAM:  GENERAL: NAD, well-groomed, well-developed  HEAD:  Atraumatic, Normocephalic  EYES: EOMI, PERRLA, conjunctiva and sclera clear  ENMT: No tonsillar erythema, exudates, or enlargement; Moist mucous membranes, Good dentition  NECK: Supple, No JVD  NERVOUS SYSTEM: AOX3, motor and sensation grossly intact in b/l UE and b/l LE  PSYCHIATRIC: Appropriate affect and mood  CHEST/LUNG: Clear to auscultation bilaterally; No rales, rhonchi, wheezing, or rubs  HEART: Regular rate and rhythm; No murmurs, rubs, or gallops. No LE edema  ABDOMEN: Soft, Nontender, Nondistended; Bowel sounds present  EXTREMITIES:  2+ Peripheral Pulses, No clubbing, cyanosis  SKIN: No rashes or lesions    LABS:                        8.4    10.20 )-----------( 278      ( 26 Jun 2017 06:45 )             27.3     26 Jun 2017 06:45    141    |  107    |  31     ----------------------------<  141    4.3     |  17     |  1.41     Ca    8.8        26 Jun 2017 06:45  Phos  3.7       26 Jun 2017 06:45  Mg     1.8       26 Jun 2017 06:45        CAPILLARY BLOOD GLUCOSE  106 (26 Jun 2017 08:39)  188 (25 Jun 2017 21:52)  204 (25 Jun 2017 17:39)  137 (25 Jun 2017 12:39)    BLOOD CULTURE    RADIOLOGY & ADDITIONAL TESTS:    Imaging Personally Reviewed:  [ ] YES     Consultant(s) Notes Reviewed:      Care Discussed with Consultants/Other Providers: Patient is a 72y old  Female who presents with a chief complaint of blister to left fifth toe (19 Jun 2017 07:34)       INTERVAL HPI/OVERNIGHT EVENTS: No events overnight. Patient resting comfortably. She was seen by podiatry this morning, they plan on OR for amputation tomorrow 6/27. No complaints of pain, lightheadedness, N/V/D/C, or other complaints.     MEDICATIONS  (STANDING):  piperacillin/tazobactam IVPB. 3.375Gram(s) IV Intermittent every 12 hours  heparin  Injectable 5000Unit(s) SubCutaneous every 8 hours  insulin lispro (HumaLOG) corrective regimen sliding scale  SubCutaneous three times a day before meals  insulin lispro (HumaLOG) corrective regimen sliding scale  SubCutaneous at bedtime  dextrose 50% Injectable 12.5Gram(s) IV Push once  dextrose 50% Injectable 25Gram(s) IV Push once  dextrose 50% Injectable 25Gram(s) IV Push once  aspirin enteric coated 81milliGRAM(s) Oral daily  valsartan 320milliGRAM(s) Oral daily  isosorbide   mononitrate ER Tablet (IMDUR) 30milliGRAM(s) Oral daily  atorvastatin 20milliGRAM(s) Oral at bedtime  carvedilol 12.5milliGRAM(s) Oral every 12 hours  ketorolac 0.5% Ophthalmic Solution 1Drop(s) Both EYES two times a day  timolol 0.5% Solution 1Drop(s) Both EYES two times a day  brimonidine 0.2% Ophthalmic Solution 1Drop(s) Both EYES two times a day  clopidogrel Tablet 75milliGRAM(s) Oral daily  insulin glargine Injectable (LANTUS) 50Unit(s) SubCutaneous at bedtime  sodium chloride 0.9%. 1000milliLiter(s) IV Continuous <Continuous>    MEDICATIONS  (PRN):  dextrose Gel 1Dose(s) Oral once PRN Blood Glucose LESS THAN 70 milliGRAM(s)/deciliter  glucagon  Injectable 1milliGRAM(s) IntraMuscular once PRN Glucose LESS THAN 70 milligrams/deciliter  acetaminophen   Tablet. 650milliGRAM(s) Oral every 6 hours PRN Mild and moderate pain  acetaminophen   Tablet 650milliGRAM(s) Oral every 6 hours PRN For Temp greater than 38 C (100.4 F)      Allergies    sulfa drugs (Hives)  sulfa drugs (Rash)  Sulfac 10% (Hives)    Intolerances        REVIEW OF SYSTEMS:  CONSTITUTIONAL: No fever, weight loss, or fatigue  EYES: No eye pain, visual disturbances, or discharge  ENMT:  No difficulty hearing, tinnitus, vertigo; No sinus or throat pain  RESPIRATORY: No cough, wheezing, chills or hemoptysis; No shortness of breath  CARDIOVASCULAR: No chest pain, palpitations, dizziness, or leg swelling  GASTROINTESTINAL: No abdominal or epigastric pain. No nausea, vomiting, or hematemesis; No diarrhea or constipation. No melena or hematochezia.  GENITOURINARY: No dysuria, frequency, hematuria, or incontinence  NEUROLOGICAL: Loss of sensation in left foot digits. No headaches, loss of strength, numbness, or tremors  SKIN: Blistering in left foot 5h digit. No itching, burning.  LYMPH NODES: No enlarged glands  ENDOCRINE: No heat or cold intolerance; No polydipsia or polyuria  MUSCULOSKELETAL: No joint pain or swelling;   PSYCHIATRIC: Denies depression, anxiety        Vital Signs Last 24 Hrs  T(C): 37.2, Max: 37.6 (06-25 @ 21:36)  T(F): 98.9, Max: 99.7 (06-25 @ 21:36)  HR: 65 (65 - 68)  BP: 117/58 (117/58 - 134/50)  BP(mean): --  RR: 18 (16 - 18)  SpO2: 95% (95% - 98%)    PHYSICAL EXAM:  GENERAL: NAD, obese  HEAD:  Atraumatic, Normocephalic  EYES: EOMI, PERRLA, conjunctiva and sclera clear  ENMT: No tonsillar erythema, exudates, or enlargement; Moist mucous membranes,   NECK: Supple, No JVD  NERVOUS SYSTEM: AOX3, motor grossly intact. Sensation reduced in left lower extremity digits (3rd-5th)  PSYCHIATRIC: Appropriate affect and mood  CHEST/LUNG: Clear to auscultation bilaterally; No rales, rhonchi, wheezing, or rubs  HEART: Regular rate and rhythm; No murmurs, rubs, or gallops. No LE edema  ABDOMEN: Soft, Nontender, Nondistended; Bowel sounds present  EXTREMITIES:  Right below the knee amputation. Left foot bandaged.   SKIN: left 5th digit lateral blister (covered in wrap)    LABS:                        8.4    10.20 )-----------( 278      ( 26 Jun 2017 06:45 )             27.3     26 Jun 2017 06:45    141    |  107    |  31     ----------------------------<  141    4.3     |  17     |  1.41     Ca    8.8        26 Jun 2017 06:45  Phos  3.7       26 Jun 2017 06:45  Mg     1.8       26 Jun 2017 06:45        CAPILLARY BLOOD GLUCOSE  106 (26 Jun 2017 08:39)  188 (25 Jun 2017 21:52)  204 (25 Jun 2017 17:39)  137 (25 Jun 2017 12:39)    BLOOD CULTURE    RADIOLOGY & ADDITIONAL TESTS:    Imaging Personally Reviewed:  [ ] YES     Consultant(s) Notes Reviewed:      Care Discussed with Consultants/Other Providers:

## 2017-06-26 NOTE — PROGRESS NOTE ADULT - SUBJECTIVE AND OBJECTIVE BOX
pt seen at [facility] in NAD. dressing to left foot c/d/i  s/p revasc  left 5th toe ulcer eschar stable + om on MRI  left 4th toe ischemic noted stable  applied Betadine with DSd  plan OR tomorrow as addon for 4,5th ray amps   explained plan to pt and pt is agreeable to surgery. pt aware of all risk benefits and alternative treatments  no guarantees given or implied pt to be NPO tonite

## 2017-06-26 NOTE — PROGRESS NOTE ADULT - PROBLEM SELECTOR PLAN 2
DAMARI likely related to contrast load. Showing daily improvement. Creatinine decreased to 1.41 from 1.57.   -C/w IVF, trend BMP

## 2017-06-26 NOTE — PROGRESS NOTE ADULT - PROBLEM SELECTOR PLAN 4
Initially on lantus 80U, humalog 26U TID, regimen decreased 2/2 hypoglycemia  - Currently on lantus 50U & ISS  - Glucose levels well controlled, .

## 2017-06-26 NOTE — DIETITIAN INITIAL EVALUATION ADULT. - OTHER INFO
Nutrition assessment completed for length of stay; admitted for blister of left 5th toe. Met with patient, reports appetite is fair. Denies food allergies, GI distress (nausea/vomiting/constipation/diarrhea), or issues with chewing/swallowing. Patient reports ~25 year h/o Type 2 DM and checked Finger sticks 4 times everyday averaging "usually less than 150's". Patient is aware of HbA1C level discussed. Consistent Carbohydrate diet reviewed and reinforced. She recalls weight PTA of 189#, but unable to provide any confirmation on weight loss or changes of recent.

## 2017-06-27 LAB
APTT BLD: 33.8 SEC — SIGNIFICANT CHANGE UP (ref 27.5–37.4)
BLD GP AB SCN SERPL QL: NEGATIVE — SIGNIFICANT CHANGE UP
BUN SERPL-MCNC: 28 MG/DL — HIGH (ref 7–23)
CALCIUM SERPL-MCNC: 8.9 MG/DL — SIGNIFICANT CHANGE UP (ref 8.4–10.5)
CHLORIDE SERPL-SCNC: 108 MMOL/L — HIGH (ref 98–107)
CO2 SERPL-SCNC: 17 MMOL/L — LOW (ref 22–31)
CREAT SERPL-MCNC: 1.37 MG/DL — HIGH (ref 0.5–1.3)
GLUCOSE SERPL-MCNC: 103 MG/DL — HIGH (ref 70–99)
HCT VFR BLD CALC: 25.6 % — LOW (ref 34.5–45)
HGB BLD-MCNC: 8 G/DL — LOW (ref 11.5–15.5)
INR BLD: 1.1 — SIGNIFICANT CHANGE UP (ref 0.88–1.17)
MAGNESIUM SERPL-MCNC: 1.8 MG/DL — SIGNIFICANT CHANGE UP (ref 1.6–2.6)
MCHC RBC-ENTMCNC: 28 PG — SIGNIFICANT CHANGE UP (ref 27–34)
MCHC RBC-ENTMCNC: 31.3 % — LOW (ref 32–36)
MCV RBC AUTO: 89.5 FL — SIGNIFICANT CHANGE UP (ref 80–100)
PHOSPHATE SERPL-MCNC: 3.2 MG/DL — SIGNIFICANT CHANGE UP (ref 2.5–4.5)
PLATELET # BLD AUTO: 276 K/UL — SIGNIFICANT CHANGE UP (ref 150–400)
PMV BLD: 11.7 FL — SIGNIFICANT CHANGE UP (ref 7–13)
POTASSIUM SERPL-MCNC: 4.3 MMOL/L — SIGNIFICANT CHANGE UP (ref 3.5–5.3)
POTASSIUM SERPL-SCNC: 4.3 MMOL/L — SIGNIFICANT CHANGE UP (ref 3.5–5.3)
PROTHROM AB SERPL-ACNC: 12.4 SEC — SIGNIFICANT CHANGE UP (ref 9.8–13.1)
RBC # BLD: 2.86 M/UL — LOW (ref 3.8–5.2)
RBC # FLD: 14.9 % — HIGH (ref 10.3–14.5)
RH IG SCN BLD-IMP: POSITIVE — SIGNIFICANT CHANGE UP
SODIUM SERPL-SCNC: 140 MMOL/L — SIGNIFICANT CHANGE UP (ref 135–145)
WBC # BLD: 10.08 K/UL — SIGNIFICANT CHANGE UP (ref 3.8–10.5)
WBC # FLD AUTO: 10.08 K/UL — SIGNIFICANT CHANGE UP (ref 3.8–10.5)

## 2017-06-27 PROCEDURE — 99233 SBSQ HOSP IP/OBS HIGH 50: CPT | Mod: GC

## 2017-06-27 PROCEDURE — 99222 1ST HOSP IP/OBS MODERATE 55: CPT | Mod: GC

## 2017-06-27 RX ORDER — ERTAPENEM SODIUM 1 G/1
1000 INJECTION, POWDER, LYOPHILIZED, FOR SOLUTION INTRAMUSCULAR; INTRAVENOUS EVERY 24 HOURS
Qty: 0 | Refills: 0 | Status: DISCONTINUED | OUTPATIENT
Start: 2017-06-27 | End: 2017-06-30

## 2017-06-27 RX ADMIN — CARVEDILOL PHOSPHATE 12.5 MILLIGRAM(S): 80 CAPSULE, EXTENDED RELEASE ORAL at 18:58

## 2017-06-27 RX ADMIN — Medication 1 DROP(S): at 06:09

## 2017-06-27 RX ADMIN — PIPERACILLIN AND TAZOBACTAM 25 GRAM(S): 4; .5 INJECTION, POWDER, LYOPHILIZED, FOR SOLUTION INTRAVENOUS at 06:10

## 2017-06-27 RX ADMIN — VALSARTAN 320 MILLIGRAM(S): 80 TABLET ORAL at 06:09

## 2017-06-27 RX ADMIN — ISOSORBIDE MONONITRATE 30 MILLIGRAM(S): 60 TABLET, EXTENDED RELEASE ORAL at 12:21

## 2017-06-27 RX ADMIN — CLOPIDOGREL BISULFATE 75 MILLIGRAM(S): 75 TABLET, FILM COATED ORAL at 12:21

## 2017-06-27 RX ADMIN — ERTAPENEM SODIUM 120 MILLIGRAM(S): 1 INJECTION, POWDER, LYOPHILIZED, FOR SOLUTION INTRAMUSCULAR; INTRAVENOUS at 18:57

## 2017-06-27 RX ADMIN — Medication 1 DROP(S): at 18:57

## 2017-06-27 RX ADMIN — Medication 1 DROP(S): at 18:58

## 2017-06-27 RX ADMIN — Medication 81 MILLIGRAM(S): at 12:21

## 2017-06-27 RX ADMIN — ATORVASTATIN CALCIUM 20 MILLIGRAM(S): 80 TABLET, FILM COATED ORAL at 22:13

## 2017-06-27 RX ADMIN — SODIUM CHLORIDE 75 MILLILITER(S): 9 INJECTION INTRAMUSCULAR; INTRAVENOUS; SUBCUTANEOUS at 06:10

## 2017-06-27 RX ADMIN — BRIMONIDINE TARTRATE 1 DROP(S): 2 SOLUTION/ DROPS OPHTHALMIC at 18:58

## 2017-06-27 RX ADMIN — SODIUM CHLORIDE 75 MILLILITER(S): 9 INJECTION INTRAMUSCULAR; INTRAVENOUS; SUBCUTANEOUS at 08:15

## 2017-06-27 RX ADMIN — CARVEDILOL PHOSPHATE 12.5 MILLIGRAM(S): 80 CAPSULE, EXTENDED RELEASE ORAL at 06:09

## 2017-06-27 RX ADMIN — HEPARIN SODIUM 5000 UNIT(S): 5000 INJECTION INTRAVENOUS; SUBCUTANEOUS at 13:55

## 2017-06-27 RX ADMIN — Medication 1 DROP(S): at 06:10

## 2017-06-27 RX ADMIN — BRIMONIDINE TARTRATE 1 DROP(S): 2 SOLUTION/ DROPS OPHTHALMIC at 06:10

## 2017-06-27 NOTE — CONSULT NOTE ADULT - ASSESSMENT
71 y/o female with DM2 c/b neuropathy, PVD with R foot gangrene s/p R AKA (2010) now with prosthesis, CKD (baseline Cr 1.4-1.6), CAD s/p stents and CABG, HTN, s/p CEA (2014) p/w L 5th toe pus filled blister with fevers. s/p angioplasty now plan for amputation.    1- 71 y/o female with DM2 c/b neuropathy, PVD with R foot gangrene s/p R AKA (2010) now with prosthesis, CKD (baseline Cr 1.4-1.6), CAD s/p stents and CABG, HTN, s/p CEA (2014) p/w L 5th toe pus filled blister with fevers. s/p angioplasty now plan for amputation.    1- Left 5 th toe osteomyelitis  - to go to OR for amputation  - on zosyn day 10   - wound grew serratia

## 2017-06-27 NOTE — PROGRESS NOTE ADULT - SUBJECTIVE AND OBJECTIVE BOX
Patient is a 72y old  Female who presents with a chief complaint of blister to left fifth toe (19 Jun 2017 07:34)       INTERVAL HPI/OVERNIGHT EVENTS: No acute events overnight. Patient to go for left LE 4th and 5th digit amputation today at 11:30 (confirmed by podiatry). She has no complaints this morning. No lightheadedness, pain in the feet or elsewhere, SOB, N/V/D/C. Patient stable for surgery.     MEDICATIONS  (STANDING):  piperacillin/tazobactam IVPB. 3.375 Gram(s) IV Intermittent every 12 hours  heparin  Injectable 5000 Unit(s) SubCutaneous every 8 hours  insulin lispro (HumaLOG) corrective regimen sliding scale   SubCutaneous three times a day before meals  insulin lispro (HumaLOG) corrective regimen sliding scale   SubCutaneous at bedtime  dextrose 50% Injectable 12.5 Gram(s) IV Push once  dextrose 50% Injectable 25 Gram(s) IV Push once  dextrose 50% Injectable 25 Gram(s) IV Push once  aspirin enteric coated 81 milliGRAM(s) Oral daily  valsartan 320 milliGRAM(s) Oral daily  isosorbide   mononitrate ER Tablet (IMDUR) 30 milliGRAM(s) Oral daily  atorvastatin 20 milliGRAM(s) Oral at bedtime  carvedilol 12.5 milliGRAM(s) Oral every 12 hours  ketorolac 0.5% Ophthalmic Solution 1 Drop(s) Both EYES two times a day  timolol 0.5% Solution 1 Drop(s) Both EYES two times a day  brimonidine 0.2% Ophthalmic Solution 1 Drop(s) Both EYES two times a day  clopidogrel Tablet 75 milliGRAM(s) Oral daily  sodium chloride 0.9%. 1000 milliLiter(s) (75 mL/Hr) IV Continuous <Continuous>    MEDICATIONS  (PRN):  dextrose Gel 1 Dose(s) Oral once PRN Blood Glucose LESS THAN 70 milliGRAM(s)/deciliter  glucagon  Injectable 1 milliGRAM(s) IntraMuscular once PRN Glucose LESS THAN 70 milligrams/deciliter  acetaminophen   Tablet. 650 milliGRAM(s) Oral every 6 hours PRN Mild and moderate pain  acetaminophen   Tablet 650 milliGRAM(s) Oral every 6 hours PRN For Temp greater than 38 C (100.4 F)      Allergies    sulfa drugs (Hives)  sulfa drugs (Rash)  Sulfac 10% (Hives)    Intolerances        REVIEW OF SYSTEMS:  CONSTITUTIONAL: No fever, weight loss, or fatigue  EYES: No eye pain, visual disturbances, or discharge  ENMT:  No difficulty hearing, tinnitus, vertigo; No sinus or throat pain  RESPIRATORY: No cough, wheezing, chills or hemoptysis; No shortness of breath  CARDIOVASCULAR: No chest pain, palpitations, dizziness, or leg swelling  GASTROINTESTINAL: No abdominal or epigastric pain. No nausea, vomiting, or hematemesis; No diarrhea or constipation. No melena or hematochezia.  GENITOURINARY: No dysuria, frequency, hematuria, or incontinence  NEUROLOGICAL: No headaches, loss of strength, numbness, or tremors  SKIN: Lesion in left small toe.  No itching or burning   ENDOCRINE: No heat or cold intolerance; No polydipsia or polyuria  MUSCULOSKELETAL: No joint pain or swelling;   PSYCHIATRIC: Denies depression, anxiety      Vital Signs Last 24 Hrs  T(C): 37.7 (27 Jun 2017 06:08), Max: 37.7 (27 Jun 2017 06:08)  T(F): 99.8 (27 Jun 2017 06:08), Max: 99.8 (27 Jun 2017 06:08)  HR: 65 (27 Jun 2017 06:08) (65 - 70)  BP: 138/64 (27 Jun 2017 06:08) (129/54 - 142/62)  BP(mean): --  RR: 18 (27 Jun 2017 06:08) (18 - 19)  SpO2: 96% (27 Jun 2017 06:08) (96% - 99%)    PHYSICAL EXAM:  GENERAL: NAD, well-developed  HEAD:  Atraumatic, Normocephalic  EYES: EOMI, PERRLA, conjunctiva and sclera clear  ENMT: No tonsillar erythema, exudates, or enlargement; Moist mucous membranes, Good dentition  NECK: Supple, No JVD  NERVOUS SYSTEM: AOX3, motor and sensation grossly intact in b/l UE and b/l LE  PSYCHIATRIC: Appropriate affect and mood  CHEST/LUNG: Clear to auscultation bilaterally; No rales, rhonchi, wheezing, or rubs  HEART: Regular rate and rhythm; No murmurs, rubs, or gallops. No LE edema  ABDOMEN: Soft, obese, Nontender, Nondistended; Bowel sounds present  EXTREMITIES:  Right lower extremity below knee amputation,  2+ Peripheral Pulses, No clubbing, cyanosis  SKIN: Lesion on left LE 5th digit.     LABS:                        8.0    10.08 )-----------( 276      ( 27 Jun 2017 06:00 )             25.6     27 Jun 2017 06:00    140    |  108    |  28     ----------------------------<  103    4.3     |  17     |  1.37     Ca    8.9        27 Jun 2017 06:00  Phos  3.2       27 Jun 2017 06:00  Mg     1.8       27 Jun 2017 06:00      PT/INR - ( 27 Jun 2017 06:00 )   PT: 12.4 SEC;   INR: 1.10          PTT - ( 27 Jun 2017 06:00 )  PTT:33.8 SEC  CAPILLARY BLOOD GLUCOSE  94 (27 Jun 2017 08:50)  206 (26 Jun 2017 22:18)  184 (26 Jun 2017 17:27)  139 (26 Jun 2017 12:45)        BLOOD CULTURE    RADIOLOGY & ADDITIONAL TESTS:    Imaging Personally Reviewed:  [ ] YES     Consultant(s) Notes Reviewed:      Care Discussed with Consultants/Other Providers:

## 2017-06-27 NOTE — CONSULT NOTE ADULT - SUBJECTIVE AND OBJECTIVE BOX
Patient is a 72y old  Female who presents with a chief complaint of blister to left fifth toe (19 Jun 2017 07:34)    HPI:  73 yo F hx DM2 (on insulin) c/b neuropathy, PVD with R foot gangrene s/p R AKA (2010) now with prosthesis, CKD (baseline Cr 1.4-1.6), CAD s/p stents and CABG, HTN, s/p CEA (2014) p/w L 5th toe blister that she noted 2 days ago. She denies any trauma or insect bites. Thinks that wearing tight shoes may have contributed to her developing a blister. It got progressively more red, filled with pus, and had associated L foot swelling, and had subjective fevers yesterday after which she decided to come to the ER Denies any pain or difficulty ambulating at this time. No cough or shortness of breath. No dysuria or increased urinary frequency. No nausea, vomiting, diarrhea, or abdominal pain. No hx of gout.     States that her diabetes is well controlled, said her last HgbA1c (in Feb) was 6.5. Has been measuring her FS at home and have ranged from 90s-150s. She also reports that her plavix was stopped after her CEA in 2014 and is currently only taking aspirin.    In the ER:  /91  HR 95  T 98.8F  RR 16  SaO2 100% RA  Given: 500 cc NS x 1, vancomycin IV x 1 (18 Jun 2017 18:03)    No sick contacts. No recent travel. No recent antibiotics.     REVIEW OF SYSTEMS  All as below unless noted otherwise    General:  Denies fever and chills. 	  Ophthalmologic: Denies any visual complaints. 	  ENMT: No throat pain.  Respiratory: No cough, sputum. No shortness of breath.   Cardiovascular: No chest pain.   Gastrointestinal: No nausea or vomiting. No abdominal pain or diarrhea.   Genitourinary: No burning urine, no frequency. No flank pain  Musculoskeletal: No joint swelling or pain.   Neurological: No confusion. 	  Skin: No rash    PAST MEDICAL & SURGICAL HISTORY:  UTI (urinary tract infection)  Carotid stenosis  PVD (peripheral vascular disease)  CAD (coronary artery disease)  Hypertension  Diabetes  Obesity  Hypercholesterolemia  HTN (hypertension)  DM (diabetes mellitus)  Carotid artery stenosis  PAD (peripheral artery disease): s/p R BKA  Stented coronary artery: 2010 [facility]  S/P CABG x 3: in 2004, [facility]  CAD (coronary artery disease)  History of hysterectomy  S/P BKA (below knee amputation) unilateral, right  S/P CABG x 3  S/P eye surgery: for glaucoma  S/P below knee amputation, right: 6/2010  S/P angioplasty with stent: 2009, 3/2014  S/P hysterectomy: 2004  Hx of CABG: 3v 2004    FAMILY HISTORY:  Family history of diabetes mellitus (Sibling)    SOCIAL HISTORY:  non smoker      ANTIMICROBIALS:    piperacillin/tazobactam IVPB. 3.375 every 12 hours      OTHER MEDS:    heparin  Injectable 5000 Unit(s) SubCutaneous every 8 hours  insulin lispro (HumaLOG) corrective regimen sliding scale   SubCutaneous three times a day before meals  insulin lispro (HumaLOG) corrective regimen sliding scale   SubCutaneous at bedtime  dextrose Gel 1 Dose(s) Oral once PRN  dextrose 50% Injectable 12.5 Gram(s) IV Push once  dextrose 50% Injectable 25 Gram(s) IV Push once  dextrose 50% Injectable 25 Gram(s) IV Push once  glucagon  Injectable 1 milliGRAM(s) IntraMuscular once PRN  aspirin enteric coated 81 milliGRAM(s) Oral daily  valsartan 320 milliGRAM(s) Oral daily  isosorbide   mononitrate ER Tablet (IMDUR) 30 milliGRAM(s) Oral daily  atorvastatin 20 milliGRAM(s) Oral at bedtime  carvedilol 12.5 milliGRAM(s) Oral every 12 hours  ketorolac 0.5% Ophthalmic Solution 1 Drop(s) Both EYES two times a day  timolol 0.5% Solution 1 Drop(s) Both EYES two times a day  brimonidine 0.2% Ophthalmic Solution 1 Drop(s) Both EYES two times a day  acetaminophen   Tablet. 650 milliGRAM(s) Oral every 6 hours PRN  clopidogrel Tablet 75 milliGRAM(s) Oral daily  acetaminophen   Tablet 650 milliGRAM(s) Oral every 6 hours PRN  sodium chloride 0.9%. 1000 milliLiter(s) IV Continuous <Continuous>      Allergies    sulfa drugs (Hives)  sulfa drugs (Rash)  Sulfac 10% (Hives)    Intolerances        Vital Signs Last 24 Hrs  T(C): 37.7 (27 Jun 2017 06:08), Max: 37.7 (27 Jun 2017 06:08)  T(F): 99.8 (27 Jun 2017 06:08), Max: 99.8 (27 Jun 2017 06:08)  HR: 65 (27 Jun 2017 06:08) (65 - 70)  BP: 138/64 (27 Jun 2017 06:08) (129/54 - 142/62)  BP(mean): --  RR: 18 (27 Jun 2017 06:08) (18 - 19)  SpO2: 96% (27 Jun 2017 06:08) (96% - 99%)  CAPILLARY BLOOD GLUCOSE  105 (27 Jun 2017 12:15)        Daily     Daily     PHYSICAL EXAM:  patient in no acute respiratory distress.  Constitutional: Comfortable. Awake and alert  Eyes: PERRL EOMI. No discharge.  ENMT: No sinus tenderness.  No pharyngeal erythema.   Neck: Supple  Respiratory: Good air entry bilaterally, no wheezes, rhonchi, or crackles  Cardiovascular:S1 S2 wnl, No murmurs  Gastrointestinal: Soft, no tenderness , bowel sounds present,   Genitourinary: No suprapubic tenderness. no CVA tenderness   Musculoskeletal: No joint swelling. No edema.  Vascular: peripheral pulses felt  Neurological: No grossly focal deficits  Skin: No rash   Lymph Nodes: No palpable Lymphadenopathy   Psychiatric: Affect normal                            8.0    10.08 )-----------( 276      ( 27 Jun 2017 06:00 )             25.6     WBC Count: 10.08 (06-27 @ 06:00)  WBC Count: 10.20 (06-26 @ 06:45)  WBC Count: 9.03 (06-25 @ 06:15)  WBC Count: 9.03 (06-25 @ 06:15)  WBC Count: 11.08 (06-24 @ 05:35)  WBC Count: 10.85 (06-23 @ 06:45)  WBC Count: 11.26 (06-22 @ 06:00)  WBC Count: 11.70 (06-21 @ 05:50)    06-27    140  |  108<H>  |  28<H>  ----------------------------<  103<H>  4.3   |  17<L>  |  1.37<H>    Ca    8.9      27 Jun 2017 06:00  Phos  3.2     06-27  Mg     1.8     06-27        PT/INR - ( 27 Jun 2017 06:00 )   PT: 12.4 SEC;   INR: 1.10          PTT - ( 27 Jun 2017 06:00 )  PTT:33.8 SEC                MICROBIOLOGY:    Culture - Blood (06.20.17 @ 17:05)    Culture - Blood:   NO ORGANISMS ISOLATED    Specimen Source: BLOOD PERIPHERAL    Culture - Blood (06.20.17 @ 17:05)    Culture - Blood:   NO ORGANISMS ISOLATED    Specimen Source: BLOOD VENOUS    Culture - Wound with Gram Stain (06.18.17 @ 21:36)    -  Amikacin: S <=16 TAI    -  Cefoxitin: R <=8 TAI    -  Ceftazidime: S <=1 TAI    -  Ciprofloxacin: S <=1 TAI    -  Meropenem: S <=1 TAI    -  Tobramycin: S <=4 TAI    -  Cefepime: S <=4 TAI    -  Trimethoprim/Sulfamethoxazole: S <=2/38 TAI    Culture - Wound with Gram Stain:   MODERATE    -  Ertapenem: S <=1 TAI    -  Gentamicin: S <=4 TAI    -  Imipenem: S <=1 TAI    -  Levofloxacin: S <=2 TAI    -  Ampicillin: R 16 TAI    -  Ampicillin/Sulbactam: R 16/8 TAI    -  Aztreonam: S <=4 TAI    -  Cefazolin: R >16 TAI    -  Ceftriaxone: S <=1 TAI    -  Piperacillin/Tazobactam: S <=16 TAI    -  Tigecycline: S <=2 TAI    Specimen Source: OTHER    Organism Identification: Serratia marcescens  Staphylococcus sp.,coag neg    Organism: Staphylococcus sp.,coag neg    Organism: Serratia marcescens    Method Type: MICROSCAN NEG URINE COMBO 61          RADIOLOGY:        EXAM:  MRI FOOT W O-W CON  LT    PROCEDURE DATE:  Jun 21 2017   IMPRESSION:  Reactive osteitis versus early or low-grade osteomyelitis involving the   5th middle and distal phalanges and proximal phalanx head.  Marrow infarcts involving the 2nd, 4th, and distal 1st metatarsal shafts.  No discrete drainable abscess collections.  1st MTP joint arthritic change           EXAM:  US DPLX LWR EXT VEINS LTD LT    PROCEDURE DATE:  Jun 18 2017   IMPRESSION:   No evidence of left lower extremity deep venous thrombosis in the   visualized venous segments.        EXAM:  RAD CHEST AP PA 1 VIEW    PROCEDURE DATE:  Jun 18 2017   IMPRESSION:  Horizontally oriented linear scarring again noted in peripheral left   lower lung near the cardiac apex. Focal subsegmental atelectasis or   scarring in peripheral right lower lung CP region. Clear remaining   visualized lungs. No pleural effusions or pneumothorax.  Stable sternal wires, mediastinal clips, and cardiac and mediastinal   silhouettes.  Trachea midline.  Unremarkable osseous structures. Patient is a 72y old  Female who presents with a chief complaint of blister to left fifth toe (19 Jun 2017 07:34)    HPI:  71 y/o female with DM2 c/b neuropathy, PVD with R foot gangrene s/p R AKA (2010) now with prosthesis, CKD (baseline Cr 1.4-1.6), CAD s/p stents and CABG, HTN, s/p CEA (2014) p/w L 5th toe pus filled blister with fevers.  She denies any trauma or insect bites. Thinks that wearing tight shoes may have contributed to her developing a blister.   febrile during this admission 100.4 on 6/20, 100.8 on 6/22, 100.7 on 6/24. Highest WBC was 11 now normal.  Was on vancomycin with zosyn from 6/18 to 6/21 then just on zosyn. The wound culture grew serratia and CNS.   on 06/21/2017 for PAD she had and angiogram-lesions in AT and SFA. Balloon angioplasty was performed with resolution of lesions in both vessels. 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  plan for left LE 4th and 5th digit amputation         REVIEW OF SYSTEMS  All as below unless noted otherwise    General:  Denies fever and chills. 	  Ophthalmologic: Denies any visual complaints. 	  ENMT: No throat pain.  Respiratory: No cough, sputum. No shortness of breath.   Cardiovascular: No chest pain.   Gastrointestinal: No nausea or vomiting. No abdominal pain or diarrhea.   Genitourinary: No burning urine, no frequency. No flank pain  Musculoskeletal: No joint swelling or pain.   Neurological: No confusion. 	  Skin: No rash    PAST MEDICAL & SURGICAL HISTORY:  UTI (urinary tract infection)  Carotid stenosis  PVD (peripheral vascular disease)  CAD (coronary artery disease)  Hypertension  Diabetes  Obesity  Hypercholesterolemia  HTN (hypertension)  DM (diabetes mellitus)  Carotid artery stenosis  PAD (peripheral artery disease): s/p R BKA  Stented coronary artery: 2010 [facility]  S/P CABG x 3: in 2004, [facility]  CAD (coronary artery disease)  History of hysterectomy  S/P BKA (below knee amputation) unilateral, right  S/P CABG x 3  S/P eye surgery: for glaucoma  S/P below knee amputation, right: 6/2010  S/P angioplasty with stent: 2009, 3/2014  S/P hysterectomy: 2004  Hx of CABG: 3v 2004    FAMILY HISTORY:  Family history of diabetes mellitus (Sibling)    SOCIAL HISTORY:  non smoker      ANTIMICROBIALS:    piperacillin/tazobactam IVPB. 3.375 every 12 hours      OTHER MEDS:    heparin  Injectable 5000 Unit(s) SubCutaneous every 8 hours  insulin lispro (HumaLOG) corrective regimen sliding scale   SubCutaneous three times a day before meals  insulin lispro (HumaLOG) corrective regimen sliding scale   SubCutaneous at bedtime  aspirin enteric coated 81 milliGRAM(s) Oral daily  valsartan 320 milliGRAM(s) Oral daily  isosorbide   mononitrate ER Tablet (IMDUR) 30 milliGRAM(s) Oral daily  atorvastatin 20 milliGRAM(s) Oral at bedtime  carvedilol 12.5 milliGRAM(s) Oral every 12 hours  ketorolac 0.5% Ophthalmic Solution 1 Drop(s) Both EYES two times a day  timolol 0.5% Solution 1 Drop(s) Both EYES two times a day  brimonidine 0.2% Ophthalmic Solution 1 Drop(s) Both EYES two times a day  acetaminophen   Tablet. 650 milliGRAM(s) Oral every 6 hours PRN  clopidogrel Tablet 75 milliGRAM(s) Oral daily  acetaminophen   Tablet 650 milliGRAM(s) Oral every 6 hours PRN  sodium chloride 0.9%. 1000 milliLiter(s) IV Continuous <Continuous>      Allergies  sulfa drugs (Hives)    Vital Signs Last 24 Hrs  T(C): 37.7 (27 Jun 2017 06:08), Max: 37.7 (27 Jun 2017 06:08)  T(F): 99.8 (27 Jun 2017 06:08), Max: 99.8 (27 Jun 2017 06:08)  HR: 65 (27 Jun 2017 06:08) (65 - 70)  BP: 138/64 (27 Jun 2017 06:08) (129/54 - 142/62)  RR: 18 (27 Jun 2017 06:08) (18 - 19)  SpO2: 96% (27 Jun 2017 06:08) (96% - 99%)  CAPILLARY BLOOD GLUCOSE  105 (27 Jun 2017 12:15)      PHYSICAL EXAM:  patient in no acute respiratory distress.  Constitutional: Comfortable. Awake and alert  Eyes: PERRL EOMI. No discharge.  ENMT: No sinus tenderness.  No pharyngeal erythema.   Neck: Supple  Respiratory: Good air entry bilaterally, no wheezes, rhonchi, or crackles  Cardiovascular:S1 S2 wnl, No murmurs  Gastrointestinal: Soft, no tenderness , bowel sounds present,   Genitourinary: No suprapubic tenderness. no CVA tenderness   Musculoskeletal: No joint swelling. No edema.  Vascular: peripheral pulses felt  Neurological: No grossly focal deficits  Skin: No rash   Lymph Nodes: No palpable Lymphadenopathy   Psychiatric: Affect normal                            8.0    10.08 )-----------( 276      ( 27 Jun 2017 06:00 )             25.6     WBC Count: 10.08 (06-27 @ 06:00)  WBC Count: 10.20 (06-26 @ 06:45)  WBC Count: 9.03 (06-25 @ 06:15)  WBC Count: 9.03 (06-25 @ 06:15)  WBC Count: 11.08 (06-24 @ 05:35)  WBC Count: 10.85 (06-23 @ 06:45)  WBC Count: 11.26 (06-22 @ 06:00)  WBC Count: 11.70 (06-21 @ 05:50)    06-27    140  |  108<H>  |  28<H>  ----------------------------<  103<H>  4.3   |  17<L>  |  1.37<H>    Ca    8.9      27 Jun 2017 06:00  Phos  3.2     06-27  Mg     1.8     06-27        PT/INR - ( 27 Jun 2017 06:00 )   PT: 12.4 SEC;   INR: 1.10     PTT - ( 27 Jun 2017 06:00 )  PTT:33.8 SEC      MICROBIOLOGY:    Culture - Blood (06.20.17 @ 17:05)    Culture - Blood:   NO ORGANISMS ISOLATED    Specimen Source: BLOOD PERIPHERAL    Culture - Blood (06.20.17 @ 17:05)    Culture - Blood:   NO ORGANISMS ISOLATED    Specimen Source: BLOOD VENOUS    Culture - Wound with Gram Stain (06.18.17 @ 21:36)    -  Amikacin: S <=16 TAI    -  Cefoxitin: R <=8 TAI    -  Ceftazidime: S <=1 TAI    -  Ciprofloxacin: S <=1 TAI    -  Meropenem: S <=1 TAI    -  Tobramycin: S <=4 TAI    -  Cefepime: S <=4 TAI    -  Trimethoprim/Sulfamethoxazole: S <=2/38 TAI    Culture - Wound with Gram Stain:   MODERATE    -  Ertapenem: S <=1 TAI    -  Gentamicin: S <=4 TAI    -  Imipenem: S <=1 TAI    -  Levofloxacin: S <=2 TAI    -  Ampicillin: R 16 TAI    -  Ampicillin/Sulbactam: R 16/8 TAI    -  Aztreonam: S <=4 TAI    -  Cefazolin: R >16 TAI    -  Ceftriaxone: S <=1 TAI    -  Piperacillin/Tazobactam: S <=16 TAI    -  Tigecycline: S <=2 TAI    Specimen Source: OTHER    Organism Identification: Serratia marcescens  Staphylococcus sp.,coag neg    Organism: Staphylococcus sp.,coag neg    Organism: Serratia marcescens    Method Type: MICROSCAN NEG URINE COMBO 61          RADIOLOGY:        EXAM:  MRI FOOT W O-W CON  LT    PROCEDURE DATE:  Jun 21 2017   IMPRESSION:  Reactive osteitis versus early or low-grade osteomyelitis involving the   5th middle and distal phalanges and proximal phalanx head.  Marrow infarcts involving the 2nd, 4th, and distal 1st metatarsal shafts.  No discrete drainable abscess collections.  1st MTP joint arthritic change           EXAM:  US DPLX LWR EXT VEINS LTD LT    PROCEDURE DATE:  Jun 18 2017   IMPRESSION:   No evidence of left lower extremity deep venous thrombosis in the   visualized venous segments.        EXAM:  RAD CHEST AP PA 1 VIEW    PROCEDURE DATE:  Jun 18 2017   IMPRESSION:  Horizontally oriented linear scarring again noted in peripheral left   lower lung near the cardiac apex. Focal subsegmental atelectasis or   scarring in peripheral right lower lung CP region. Clear remaining   visualized lungs. No pleural effusions or pneumothorax.  Stable sternal wires, mediastinal clips, and cardiac and mediastinal   silhouettes.  Trachea midline.  Unremarkable osseous structures. Patient is a 72y old  Female who presents with a chief complaint of blister to left fifth toe (19 Jun 2017 07:34)    HPI:  73 y/o female with DM2 c/b neuropathy, PVD with R foot gangrene s/p R AKA (2010) now with prosthesis, CKD (baseline Cr 1.4-1.6), CAD s/p stents and CABG, HTN, s/p CEA (2014) p/w L 5th toe pus filled blister with fevers.  She denies any trauma or insect bites. Thinks that wearing tight shoes may have contributed to her developing a blister.   febrile during this admission 100.4 on 6/20, 100.8 on 6/22, 100.7 on 6/24. Highest WBC was 11 now normal.  Was on vancomycin with zosyn from 6/18 to 6/21 then just on zosyn. The wound culture grew serratia and CNS.   on 06/21/2017 for PAD she had and angiogram-lesions in AT and SFA. Balloon angioplasty was performed with resolution of lesions in both vessels. 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  plan for left LE 4th and 5th digit amputation   She has left foot pain.         REVIEW OF SYSTEMS  All as below unless noted otherwise    General:  Has no fever and chills today	  Ophthalmologic: Denies any visual complaints. 	  ENMT: No throat pain.  Respiratory: has a dry cough. No shortness of breath.   Cardiovascular: No chest pain.   Gastrointestinal: No nausea or vomiting. No abdominal pain or diarrhea.   Genitourinary: No burning urine, no frequency. No flank pain  Musculoskeletal/ skin: left foot pain   Neurological: No confusion. 	      PAST MEDICAL & SURGICAL HISTORY:  Carotid stenosis  CAD (coronary artery disease)  Hypertension  Diabetes  Obesity  Hypercholesterolemia  PAD (peripheral artery disease): s/p R BKA  Stented coronary artery: 2010 [facility]  S/P CABG x 3: in 2004, [facility]  History of hysterectomy  S/P eye surgery: for glaucoma  S/P angioplasty with stent: 2009, 3/2014  S/P hysterectomy: 2004    FAMILY HISTORY:  Family history of diabetes mellitus (Sibling)    SOCIAL HISTORY:  non smoker      ANTIMICROBIALS:    piperacillin/tazobactam IVPB. 3.375 every 12 hours      OTHER MEDS:    heparin  Injectable 5000 Unit(s) SubCutaneous every 8 hours  insulin lispro (HumaLOG) corrective regimen sliding scale   SubCutaneous three times a day before meals  insulin lispro (HumaLOG) corrective regimen sliding scale   SubCutaneous at bedtime  aspirin enteric coated 81 milliGRAM(s) Oral daily  valsartan 320 milliGRAM(s) Oral daily  isosorbide   mononitrate ER Tablet (IMDUR) 30 milliGRAM(s) Oral daily  atorvastatin 20 milliGRAM(s) Oral at bedtime  carvedilol 12.5 milliGRAM(s) Oral every 12 hours  ketorolac 0.5% Ophthalmic Solution 1 Drop(s) Both EYES two times a day  timolol 0.5% Solution 1 Drop(s) Both EYES two times a day  brimonidine 0.2% Ophthalmic Solution 1 Drop(s) Both EYES two times a day  acetaminophen   Tablet. 650 milliGRAM(s) Oral every 6 hours PRN  clopidogrel Tablet 75 milliGRAM(s) Oral daily  acetaminophen   Tablet 650 milliGRAM(s) Oral every 6 hours PRN  sodium chloride 0.9%. 1000 milliLiter(s) IV Continuous <Continuous>      Allergies  sulfa drugs - facial swelling    Vital Signs Last 24 Hrs  T(C): 37.7 (27 Jun 2017 06:08), Max: 37.7 (27 Jun 2017 06:08)  T(F): 99.8 (27 Jun 2017 06:08), Max: 99.8 (27 Jun 2017 06:08)  HR: 65 (27 Jun 2017 06:08) (65 - 70)  BP: 138/64 (27 Jun 2017 06:08) (129/54 - 142/62)  RR: 18 (27 Jun 2017 06:08) (18 - 19)  SpO2: 96% (27 Jun 2017 06:08) (96% - 99%)  CAPILLARY BLOOD GLUCOSE  105 (27 Jun 2017 12:15)      PHYSICAL EXAM:  patient in no acute respiratory distress.  Constitutional: Comfortable. Awake and alert  Eyes: PERRL EOMI. No discharge.  ENMT: No sinus tenderness.  No pharyngeal erythema.   Neck: Supple  Respiratory: Good air entry bilaterally, no wheezes, rhonchi, or crackles  Cardiovascular:S1 S2 wnl, No murmurs  Gastrointestinal: Soft, no tenderness , bowel sounds present,   Genitourinary: No suprapubic tenderness. no CVA tenderness   Musculoskeletal: Right below knee amputation  Left leg swollen warm  left foot bandaged   Vascular: peripheral pulses felt  Neurological: No grossly focal deficits  Skin: No rash   Lymph Nodes: No palpable Lymphadenopathy   Psychiatric: Affect normal                            8.0    10.08 )-----------( 276      ( 27 Jun 2017 06:00 )             25.6     WBC Count: 10.08 (06-27 @ 06:00)  WBC Count: 10.20 (06-26 @ 06:45)  WBC Count: 9.03 (06-25 @ 06:15)  WBC Count: 9.03 (06-25 @ 06:15)  WBC Count: 11.08 (06-24 @ 05:35)  WBC Count: 10.85 (06-23 @ 06:45)  WBC Count: 11.26 (06-22 @ 06:00)  WBC Count: 11.70 (06-21 @ 05:50)    06-27    140  |  108<H>  |  28<H>  ----------------------------<  103<H>  4.3   |  17<L>  |  1.37<H>    Ca    8.9      27 Jun 2017 06:00  Phos  3.2     06-27  Mg     1.8     06-27        PT/INR - ( 27 Jun 2017 06:00 )   PT: 12.4 SEC;   INR: 1.10     PTT - ( 27 Jun 2017 06:00 )  PTT:33.8 SEC      MICROBIOLOGY:    Culture - Blood (06.20.17 @ 17:05)    Culture - Blood:   NO ORGANISMS ISOLATED    Specimen Source: BLOOD PERIPHERAL    Culture - Blood (06.20.17 @ 17:05)    Culture - Blood:   NO ORGANISMS ISOLATED    Specimen Source: BLOOD VENOUS    Culture - Wound with Gram Stain (06.18.17 @ 21:36)    -  Amikacin: S <=16 TAI    -  Cefoxitin: R <=8 TAI    -  Ceftazidime: S <=1 TAI    -  Ciprofloxacin: S <=1 TAI    -  Meropenem: S <=1 TAI    -  Tobramycin: S <=4 TAI    -  Cefepime: S <=4 TAI    -  Trimethoprim/Sulfamethoxazole: S <=2/38 TAI    Culture - Wound with Gram Stain:   MODERATE    -  Ertapenem: S <=1 TAI    -  Gentamicin: S <=4 TAI    -  Imipenem: S <=1 TAI    -  Levofloxacin: S <=2 TAI    -  Ampicillin: R 16 TAI    -  Ampicillin/Sulbactam: R 16/8 TAI    -  Aztreonam: S <=4 TAI    -  Cefazolin: R >16 TAI    -  Ceftriaxone: S <=1 TAI    -  Piperacillin/Tazobactam: S <=16 TAI    -  Tigecycline: S <=2 TAI    Specimen Source: OTHER    Organism Identification: Serratia marcescens  Staphylococcus sp.,coag neg    Organism: Staphylococcus sp.,coag neg    Organism: Serratia marcescens    Method Type: MICROSCAN NEG URINE COMBO 61          RADIOLOGY:        EXAM:  MRI FOOT W O-W CON  LT    PROCEDURE DATE:  Jun 21 2017   IMPRESSION:  Reactive osteitis versus early or low-grade osteomyelitis involving the   5th middle and distal phalanges and proximal phalanx head.  Marrow infarcts involving the 2nd, 4th, and distal 1st metatarsal shafts.  No discrete drainable abscess collections.  1st MTP joint arthritic change           EXAM:  US DPLX LWR EXT VEINS LTD LT    PROCEDURE DATE:  Jun 18 2017   IMPRESSION:   No evidence of left lower extremity deep venous thrombosis in the   visualized venous segments.        EXAM:  RAD CHEST AP PA 1 VIEW    PROCEDURE DATE:  Jun 18 2017   IMPRESSION:  Horizontally oriented linear scarring again noted in peripheral left   lower lung near the cardiac apex. Focal subsegmental atelectasis or   scarring in peripheral right lower lung CP region. Clear remaining   visualized lungs. No pleural effusions or pneumothorax.  Stable sternal wires, mediastinal clips, and cardiac and mediastinal   silhouettes.  Trachea midline.  Unremarkable osseous structures.

## 2017-06-27 NOTE — CONSULT NOTE ADULT - ATTENDING COMMENTS
72 year old with DM with right fifth toe ulcer with imaging suggestive of Osteomyelitis.  Plan is for surgical resection tomorrow.    Continue zosyn pending wound cultures.    Duration of antibiotics will depend on the whether the surgical margin is clean. 72 year old with DM with right fifth toe ulcer with imaging suggestive of Osteomyelitis.  Plan is for surgical resection tomorrow.    Change zosyn to ertapenem 1 gm iv daily pending  wound cultures.    Duration of antibiotics will depend on the whether the surgical margin is clean.

## 2017-06-27 NOTE — PROGRESS NOTE ADULT - SUBJECTIVE AND OBJECTIVE BOX
Patient is a 72y old  Female who presents with a chief complaint of blister to left fifth toe (19 Jun 2017 07:34)      INTERVAL HPI/OVERNIGHT EVENTS:   Pt is scheduled for left foot partial 4th and 5th ray resection with  at 11:30 am. Patient is aware of procedure and is NPO since midnight.    MEDICATIONS  (STANDING):  piperacillin/tazobactam IVPB. 3.375 Gram(s) IV Intermittent every 12 hours  heparin  Injectable 5000 Unit(s) SubCutaneous every 8 hours  insulin lispro (HumaLOG) corrective regimen sliding scale   SubCutaneous three times a day before meals  insulin lispro (HumaLOG) corrective regimen sliding scale   SubCutaneous at bedtime  dextrose 50% Injectable 12.5 Gram(s) IV Push once  dextrose 50% Injectable 25 Gram(s) IV Push once  dextrose 50% Injectable 25 Gram(s) IV Push once  aspirin enteric coated 81 milliGRAM(s) Oral daily  valsartan 320 milliGRAM(s) Oral daily  isosorbide   mononitrate ER Tablet (IMDUR) 30 milliGRAM(s) Oral daily  atorvastatin 20 milliGRAM(s) Oral at bedtime  carvedilol 12.5 milliGRAM(s) Oral every 12 hours  ketorolac 0.5% Ophthalmic Solution 1 Drop(s) Both EYES two times a day  timolol 0.5% Solution 1 Drop(s) Both EYES two times a day  brimonidine 0.2% Ophthalmic Solution 1 Drop(s) Both EYES two times a day  clopidogrel Tablet 75 milliGRAM(s) Oral daily  sodium chloride 0.9%. 1000 milliLiter(s) (75 mL/Hr) IV Continuous <Continuous>    MEDICATIONS  (PRN):  dextrose Gel 1 Dose(s) Oral once PRN Blood Glucose LESS THAN 70 milliGRAM(s)/deciliter  glucagon  Injectable 1 milliGRAM(s) IntraMuscular once PRN Glucose LESS THAN 70 milligrams/deciliter  acetaminophen   Tablet. 650 milliGRAM(s) Oral every 6 hours PRN Mild and moderate pain  acetaminophen   Tablet 650 milliGRAM(s) Oral every 6 hours PRN For Temp greater than 38 C (100.4 F)      Allergies    sulfa drugs (Hives)  sulfa drugs (Rash)  Sulfac 10% (Hives)    Intolerances        Vital Signs Last 24 Hrs  T(C): 37.7 (27 Jun 2017 06:08), Max: 37.7 (27 Jun 2017 06:08)  T(F): 99.8 (27 Jun 2017 06:08), Max: 99.8 (27 Jun 2017 06:08)  HR: 65 (27 Jun 2017 06:08) (65 - 70)  BP: 138/64 (27 Jun 2017 06:08) (129/54 - 142/62)  BP(mean): --  RR: 18 (27 Jun 2017 06:08) (18 - 19)  SpO2: 96% (27 Jun 2017 06:08) (96% - 99%)    LABS:                        8.0    10.08 )-----------( 276      ( 27 Jun 2017 06:00 )             25.6     06-27    140  |  108<H>  |  28<H>  ----------------------------<  103<H>  4.3   |  17<L>  |  1.37<H>    Ca    8.9      27 Jun 2017 06:00  Phos  3.2     06-27  Mg     1.8     06-27      PT/INR - ( 27 Jun 2017 06:00 )   PT: 12.4 SEC;   INR: 1.10          PTT - ( 27 Jun 2017 06:00 )  PTT:33.8 SEC    CAPILLARY BLOOD GLUCOSE  206 (26 Jun 2017 22:18)  184 (26 Jun 2017 17:27)  139 (26 Jun 2017 12:45)  106 (26 Jun 2017 08:39)          RADIOLOGY & ADDITIONAL TESTS:    Plan:   To OR today at 11:30 am with Dr. Bird for a left foot partial 4th and 5th ray resection.   CXR on sunrise.  EKG on sunrise.  Medically cleared and documented in chart.  Consent signed and in chart.  Procedure was explained to patient in detail. All alternatives, risks and complications were discussed. All questions answered.

## 2017-06-27 NOTE — PROGRESS NOTE ADULT - ASSESSMENT
73 yo F hx DM2 (on insulin) c/b neuropathy, PVD with R foot gangrene s/p R AKA (2010) now with prosthesis, CKD (baseline Cr 1.4-1.6), CAD s/p stents and CABG, HTN, s/p CEA (2014) p/w L 5th toe blister 71 yo F hx DM2 (on insulin) c/b neuropathy, PVD with R foot gangrene s/p R AKA (2010) now with prosthesis, CKD (baseline Cr 1.4-1.6), CAD s/p stents and CABG, HTN, s/p CEA (2014) p/w L 5th toe blister, to go for amputation today 6/27.

## 2017-06-27 NOTE — PROGRESS NOTE ADULT - PROBLEM SELECTOR PLAN 4
Initially on lantus 80U, humalog 26U TID, regimen decreased 2/2 hypoglycemia  - Was on lantus half dose (25U) overnight due to NPO for surgery. Glucose level well controlled. Continue same dose tonight after procedure, if level rises significantly can resume 50U lantus.

## 2017-06-27 NOTE — PROGRESS NOTE ADULT - PROBLEM SELECTOR PLAN 2
DAMARI likely related to contrast load. Showing daily improvement. Creatinine decreased to 1.37.   -C/w IVF, trend BMP

## 2017-06-27 NOTE — PROGRESS NOTE ADULT - PROBLEM SELECTOR PLAN 1
Likely DM foot ulcer vs PVD ulcer, w/ MRI showing low grade OM. Wound cx growing serratia marcescens sensitive to Zosyn.   -Podiatry to perform left LE 4th and 5th digit amputation surgery today 6/27.   -C/w zosyn Likely DM foot ulcer vs PVD ulcer, w/ MRI showing low grade OM. Wound cx growing serratia marcescens sensitive to Zosyn.   -Podiatry to perform left LE 4th and 5th digit amputation surgery today 6/27.   - Patient medically stable for surgery (normotensive, afebrile, satting well on room air, Hgb 8.0 similar to previous)  -C/w zosyn

## 2017-06-28 ENCOUNTER — RESULT REVIEW (OUTPATIENT)
Age: 72
End: 2017-06-28

## 2017-06-28 LAB
APTT BLD: 35 SEC — SIGNIFICANT CHANGE UP (ref 27.5–37.4)
BUN SERPL-MCNC: 24 MG/DL — HIGH (ref 7–23)
CALCIUM SERPL-MCNC: 9.1 MG/DL — SIGNIFICANT CHANGE UP (ref 8.4–10.5)
CHLORIDE SERPL-SCNC: 107 MMOL/L — SIGNIFICANT CHANGE UP (ref 98–107)
CO2 SERPL-SCNC: 18 MMOL/L — LOW (ref 22–31)
CREAT SERPL-MCNC: 1.21 MG/DL — SIGNIFICANT CHANGE UP (ref 0.5–1.3)
GLUCOSE SERPL-MCNC: 140 MG/DL — HIGH (ref 70–99)
GRAM STN WND: SIGNIFICANT CHANGE UP
GRAM STN WND: SIGNIFICANT CHANGE UP
HCT VFR BLD CALC: 26.9 % — LOW (ref 34.5–45)
HGB BLD-MCNC: 8.3 G/DL — LOW (ref 11.5–15.5)
INR BLD: 1.1 — SIGNIFICANT CHANGE UP (ref 0.88–1.17)
MCHC RBC-ENTMCNC: 27.9 PG — SIGNIFICANT CHANGE UP (ref 27–34)
MCHC RBC-ENTMCNC: 30.9 % — LOW (ref 32–36)
MCV RBC AUTO: 90.6 FL — SIGNIFICANT CHANGE UP (ref 80–100)
PLATELET # BLD AUTO: 299 K/UL — SIGNIFICANT CHANGE UP (ref 150–400)
PMV BLD: 11.3 FL — SIGNIFICANT CHANGE UP (ref 7–13)
POTASSIUM SERPL-MCNC: 4.5 MMOL/L — SIGNIFICANT CHANGE UP (ref 3.5–5.3)
POTASSIUM SERPL-SCNC: 4.5 MMOL/L — SIGNIFICANT CHANGE UP (ref 3.5–5.3)
PROTHROM AB SERPL-ACNC: 12.3 SEC — SIGNIFICANT CHANGE UP (ref 9.8–13.1)
RBC # BLD: 2.97 M/UL — LOW (ref 3.8–5.2)
RBC # FLD: 14.9 % — HIGH (ref 10.3–14.5)
SODIUM SERPL-SCNC: 142 MMOL/L — SIGNIFICANT CHANGE UP (ref 135–145)
SPECIMEN SOURCE: SIGNIFICANT CHANGE UP
SPECIMEN SOURCE: SIGNIFICANT CHANGE UP
WBC # BLD: 12.09 K/UL — HIGH (ref 3.8–10.5)
WBC # FLD AUTO: 12.09 K/UL — HIGH (ref 3.8–10.5)

## 2017-06-28 PROCEDURE — 99232 SBSQ HOSP IP/OBS MODERATE 35: CPT

## 2017-06-28 PROCEDURE — 99233 SBSQ HOSP IP/OBS HIGH 50: CPT | Mod: GC

## 2017-06-28 PROCEDURE — 73630 X-RAY EXAM OF FOOT: CPT | Mod: 26,LT

## 2017-06-28 PROCEDURE — 88311 DECALCIFY TISSUE: CPT | Mod: 26

## 2017-06-28 PROCEDURE — 88305 TISSUE EXAM BY PATHOLOGIST: CPT | Mod: 26

## 2017-06-28 RX ORDER — SODIUM CHLORIDE 9 MG/ML
1000 INJECTION INTRAMUSCULAR; INTRAVENOUS; SUBCUTANEOUS
Qty: 0 | Refills: 0 | Status: DISCONTINUED | OUTPATIENT
Start: 2017-06-28 | End: 2017-06-28

## 2017-06-28 RX ORDER — INSULIN GLARGINE 100 [IU]/ML
25 INJECTION, SOLUTION SUBCUTANEOUS AT BEDTIME
Qty: 0 | Refills: 0 | Status: DISCONTINUED | OUTPATIENT
Start: 2017-06-28 | End: 2017-06-29

## 2017-06-28 RX ADMIN — CARVEDILOL PHOSPHATE 12.5 MILLIGRAM(S): 80 CAPSULE, EXTENDED RELEASE ORAL at 17:15

## 2017-06-28 RX ADMIN — Medication 81 MILLIGRAM(S): at 11:16

## 2017-06-28 RX ADMIN — Medication 1 DROP(S): at 05:58

## 2017-06-28 RX ADMIN — ERTAPENEM SODIUM 120 MILLIGRAM(S): 1 INJECTION, POWDER, LYOPHILIZED, FOR SOLUTION INTRAMUSCULAR; INTRAVENOUS at 18:00

## 2017-06-28 RX ADMIN — CARVEDILOL PHOSPHATE 12.5 MILLIGRAM(S): 80 CAPSULE, EXTENDED RELEASE ORAL at 05:59

## 2017-06-28 RX ADMIN — CLOPIDOGREL BISULFATE 75 MILLIGRAM(S): 75 TABLET, FILM COATED ORAL at 11:16

## 2017-06-28 RX ADMIN — BRIMONIDINE TARTRATE 1 DROP(S): 2 SOLUTION/ DROPS OPHTHALMIC at 05:58

## 2017-06-28 RX ADMIN — BRIMONIDINE TARTRATE 1 DROP(S): 2 SOLUTION/ DROPS OPHTHALMIC at 17:14

## 2017-06-28 RX ADMIN — SODIUM CHLORIDE 50 MILLILITER(S): 9 INJECTION INTRAMUSCULAR; INTRAVENOUS; SUBCUTANEOUS at 10:08

## 2017-06-28 RX ADMIN — Medication 1 DROP(S): at 17:13

## 2017-06-28 RX ADMIN — VALSARTAN 320 MILLIGRAM(S): 80 TABLET ORAL at 05:59

## 2017-06-28 RX ADMIN — SODIUM CHLORIDE 75 MILLILITER(S): 9 INJECTION INTRAMUSCULAR; INTRAVENOUS; SUBCUTANEOUS at 06:01

## 2017-06-28 RX ADMIN — Medication 1: at 17:15

## 2017-06-28 RX ADMIN — ATORVASTATIN CALCIUM 20 MILLIGRAM(S): 80 TABLET, FILM COATED ORAL at 22:26

## 2017-06-28 RX ADMIN — INSULIN GLARGINE 25 UNIT(S): 100 INJECTION, SOLUTION SUBCUTANEOUS at 22:35

## 2017-06-28 RX ADMIN — ISOSORBIDE MONONITRATE 30 MILLIGRAM(S): 60 TABLET, EXTENDED RELEASE ORAL at 11:16

## 2017-06-28 RX ADMIN — Medication 1: at 12:26

## 2017-06-28 RX ADMIN — HEPARIN SODIUM 5000 UNIT(S): 5000 INJECTION INTRAVENOUS; SUBCUTANEOUS at 13:07

## 2017-06-28 RX ADMIN — HEPARIN SODIUM 5000 UNIT(S): 5000 INJECTION INTRAVENOUS; SUBCUTANEOUS at 22:26

## 2017-06-28 NOTE — PROGRESS NOTE ADULT - PROBLEM SELECTOR PLAN 2
DAMARI likely related to contrast load. Showing daily improvement. Creatinine decreased to 1.21 from  1.37.   -C/w IVF, trend BMP

## 2017-06-28 NOTE — PROGRESS NOTE ADULT - SUBJECTIVE AND OBJECTIVE BOX
Patient is a 72y old  Female who presents with a chief complaint of blister to left fifth toe (19 Jun 2017 07:34)       INTERVAL HPI/OVERNIGHT EVENTS: No acute events overnight. Denied lightheadedness, pain, SOB, or N/V/D/C.  Patient was NPO after midnight for Left LE 4th and 5th digit amputation. Patient underwent surgery this morning, returned with no issues. Her zosyn was switched to IV ertapenem 1g IV daily, pending wound cultures.     MEDICATIONS  (STANDING):  heparin  Injectable 5000 Unit(s) SubCutaneous every 8 hours  insulin lispro (HumaLOG) corrective regimen sliding scale   SubCutaneous three times a day before meals  insulin lispro (HumaLOG) corrective regimen sliding scale   SubCutaneous at bedtime  dextrose 50% Injectable 12.5 Gram(s) IV Push once  dextrose 50% Injectable 25 Gram(s) IV Push once  dextrose 50% Injectable 25 Gram(s) IV Push once  aspirin enteric coated 81 milliGRAM(s) Oral daily  valsartan 320 milliGRAM(s) Oral daily  isosorbide   mononitrate ER Tablet (IMDUR) 30 milliGRAM(s) Oral daily  atorvastatin 20 milliGRAM(s) Oral at bedtime  carvedilol 12.5 milliGRAM(s) Oral every 12 hours  ketorolac 0.5% Ophthalmic Solution 1 Drop(s) Both EYES two times a day  timolol 0.5% Solution 1 Drop(s) Both EYES two times a day  brimonidine 0.2% Ophthalmic Solution 1 Drop(s) Both EYES two times a day  clopidogrel Tablet 75 milliGRAM(s) Oral daily  sodium chloride 0.9%. 1000 milliLiter(s) (75 mL/Hr) IV Continuous <Continuous>  ertapenem  IVPB 1000 milliGRAM(s) IV Intermittent every 24 hours  sodium chloride 0.9%. 1000 milliLiter(s) (50 mL/Hr) IV Continuous <Continuous>    MEDICATIONS  (PRN):  dextrose Gel 1 Dose(s) Oral once PRN Blood Glucose LESS THAN 70 milliGRAM(s)/deciliter  glucagon  Injectable 1 milliGRAM(s) IntraMuscular once PRN Glucose LESS THAN 70 milligrams/deciliter  acetaminophen   Tablet. 650 milliGRAM(s) Oral every 6 hours PRN Mild and moderate pain  acetaminophen   Tablet 650 milliGRAM(s) Oral every 6 hours PRN For Temp greater than 38 C (100.4 F)      Allergies    sulfa drugs (Hives)  sulfa drugs (Rash)  Sulfac 10% (Hives)    Intolerances        REVIEW OF SYSTEMS:  CONSTITUTIONAL: No fever, weight loss, or fatigue  EYES: No eye pain, visual disturbances, or discharge  ENMT:  No difficulty hearing, tinnitus, vertigo; No sinus or throat pain  RESPIRATORY: No cough, wheezing, chills or hemoptysis; No shortness of breath  CARDIOVASCULAR: No chest pain, palpitations, dizziness, or leg swelling  GASTROINTESTINAL: No abdominal or epigastric pain. No nausea, vomiting, or hematemesis; No diarrhea or constipation. No melena or hematochezia.  GENITOURINARY: No dysuria, frequency, hematuria, or incontinence  NEUROLOGICAL: No headaches, loss of strength, numbness, or tremors  SKIN: No itching, burning, rashes, or lesions   LYMPH NODES: No enlarged glands  ENDOCRINE: No heat or cold intolerance; No polydipsia or polyuria  MUSCULOSKELETAL: No joint pain or swelling;   PSYCHIATRIC: Denies depression, anxiety  HEME/LYMPH: No easy bruising, or bleeding gums  ALLERGY AND IMMUNOLOGIC: No hives or eczema    Vital Signs Last 24 Hrs  T(C): 37.1 (28 Jun 2017 11:20), Max: 38.4 (28 Jun 2017 07:05)  T(F): 98.8 (28 Jun 2017 11:20), Max: 101.1 (28 Jun 2017 07:05)  HR: 66 (28 Jun 2017 11:20) (62 - 70)  BP: 148/66 (28 Jun 2017 11:20) (139/56 - 169/65)  BP(mean): --  RR: 16 (28 Jun 2017 11:20) (11 - 20)  SpO2: 97% (28 Jun 2017 11:20) (94% - 100%)    PHYSICAL EXAM: unable to assess, patient was not in room  GENERAL: NAD, well-groomed, well-developed  HEAD:  Atraumatic, Normocephalic  EYES: EOMI, PERRLA, conjunctiva and sclera clear  ENMT: No tonsillar erythema, exudates, or enlargement; Moist mucous membranes, Good dentition  NECK: Supple, No JVD  NERVOUS SYSTEM: AOX3, motor and sensation grossly intact in b/l UE and b/l LE  PSYCHIATRIC: Appropriate affect and mood  CHEST/LUNG: Clear to auscultation bilaterally; No rales, rhonchi, wheezing, or rubs  HEART: Regular rate and rhythm; No murmurs, rubs, or gallops. No LE edema  ABDOMEN: Soft, Nontender, Nondistended; Bowel sounds present  EXTREMITIES: Right LE amputation below the knee, Left foot bandaged with ulceration in 5th digit.  2+ Peripheral Pulses, No clubbing, cyanosis  SKIN: No rashes or lesions    LABS:                        8.3    12.09 )-----------( 299      ( 28 Jun 2017 06:15 )             26.9     28 Jun 2017 06:15    142    |  107    |  24     ----------------------------<  140    4.5     |  18     |  1.21     Ca    9.1        28 Jun 2017 06:15      PT/INR - ( 28 Jun 2017 06:00 )   PT: 12.3 SEC;   INR: 1.10          PTT - ( 28 Jun 2017 06:00 )  PTT:35.0 SEC  CAPILLARY BLOOD GLUCOSE  153 (28 Jun 2017 12:19)  135 (28 Jun 2017 06:24)  135 (28 Jun 2017 06:06)  181 (27 Jun 2017 22:16)  142 (27 Jun 2017 16:58)        BLOOD CULTURE    RADIOLOGY & ADDITIONAL TESTS:    Imaging Personally Reviewed:  [ ] YES     Consultant(s) Notes Reviewed:      Care Discussed with Consultants/Other Providers:

## 2017-06-28 NOTE — PROGRESS NOTE ADULT - PROBLEM SELECTOR PLAN 4
Initially on lantus 80U, humalog 26U TID, regimen decreased 2/2 hypoglycemia  - Was on lantus half dose (25U) overnight due to NPO for surgery. Glucose level well controlled. Resume 50U lantus tonight as patient will be eating again.

## 2017-06-28 NOTE — PROGRESS NOTE ADULT - ASSESSMENT
72 year old with DM with right fifth toe ulcer with imaging suggestive of Osteomyelitis.  S/p amputation  Continue ertapenem pending OR cultures.    Podiatry suspects margins are clean.  If suspicion is low for residual OM, should be able to change to po antibiotics soon.

## 2017-06-28 NOTE — PROGRESS NOTE ADULT - PROBLEM SELECTOR PLAN 1
Likely DM foot ulcer vs PVD ulcer, w/ MRI showing low grade OM. Wound cx growing serratia marcescens sensitive to Zosyn.   -S/p left LE 4th and 5th digit amputation surgery today 6/28.   - Zosyn d/c'ed 6/27, switched to IV ertapenem 1g daily, continue until wound culture returns.

## 2017-06-28 NOTE — PROGRESS NOTE ADULT - ASSESSMENT
73 yo F hx DM2 (on insulin) c/b neuropathy, PVD with R foot gangrene s/p R AKA (2010) now with prosthesis, CKD (baseline Cr 1.4-1.6), CAD s/p stents and CABG, HTN, s/p CEA (2014) p/w L 5th toe blister, s/p amputation of left LE 4th and 5th digits on 6/28.

## 2017-06-28 NOTE — PROGRESS NOTE ADULT - SUBJECTIVE AND OBJECTIVE BOX
Podiatry Attending Pre-op  Pt to OR for amputation of left 4,5th ray due to OM and beginning of wet gangrene. Pt with low grade temp in holding 100.1 maybe from beginning of wet gangrene. pt aware of all risks benefits and alternative treatments. Consent signed and in chart  all questions answered no guarantees given or implied

## 2017-06-28 NOTE — PROGRESS NOTE ADULT - SUBJECTIVE AND OBJECTIVE BOX
Follow Up:      Inverval History/ROS:Patient is a 72y old  Female who presents with a chief complaint of blister to left fifth toe (19 Jun 2017 07:34)  S/p 4th/ 5th toe amputation. Pain controlled. No fever.       Allergies    sulfa drugs (Hives)  sulfa drugs (Rash)  Sulfac 10% (Hives)    Intolerances        ANTIMICROBIALS:  ertapenem  IVPB 1000 every 24 hours      OTHER MEDS:  heparin  Injectable 5000 Unit(s) SubCutaneous every 8 hours  insulin lispro (HumaLOG) corrective regimen sliding scale   SubCutaneous three times a day before meals  insulin lispro (HumaLOG) corrective regimen sliding scale   SubCutaneous at bedtime  dextrose Gel 1 Dose(s) Oral once PRN  dextrose 50% Injectable 12.5 Gram(s) IV Push once  dextrose 50% Injectable 25 Gram(s) IV Push once  dextrose 50% Injectable 25 Gram(s) IV Push once  glucagon  Injectable 1 milliGRAM(s) IntraMuscular once PRN  aspirin enteric coated 81 milliGRAM(s) Oral daily  valsartan 320 milliGRAM(s) Oral daily  isosorbide   mononitrate ER Tablet (IMDUR) 30 milliGRAM(s) Oral daily  atorvastatin 20 milliGRAM(s) Oral at bedtime  carvedilol 12.5 milliGRAM(s) Oral every 12 hours  ketorolac 0.5% Ophthalmic Solution 1 Drop(s) Both EYES two times a day  timolol 0.5% Solution 1 Drop(s) Both EYES two times a day  brimonidine 0.2% Ophthalmic Solution 1 Drop(s) Both EYES two times a day  acetaminophen   Tablet. 650 milliGRAM(s) Oral every 6 hours PRN  clopidogrel Tablet 75 milliGRAM(s) Oral daily  acetaminophen   Tablet 650 milliGRAM(s) Oral every 6 hours PRN  sodium chloride 0.9%. 1000 milliLiter(s) IV Continuous <Continuous>  sodium chloride 0.9%. 1000 milliLiter(s) IV Continuous <Continuous>      Vital Signs Last 24 Hrs  T(C): 37.1 (28 Jun 2017 11:20), Max: 38.4 (28 Jun 2017 07:05)  T(F): 98.8 (28 Jun 2017 11:20), Max: 101.1 (28 Jun 2017 07:05)  HR: 66 (28 Jun 2017 11:20) (62 - 70)  BP: 148/66 (28 Jun 2017 11:20) (139/56 - 169/65)  BP(mean): --  RR: 16 (28 Jun 2017 11:20) (11 - 20)  SpO2: 97% (28 Jun 2017 11:20) (94% - 100%)    PHYSICAL EXAM:  General: [ x] non-toxic  HEAD/EYES: [ ] PERRL [x] white sclera [ ] icterus  ENT:  [ ] normal [ x] supple [ ] thrush [ ] pharyngeal exudate  Cardiovascular:   [ ] murmur [ x] normal [ ] PPM/AICD  Respiratory:  [x ] clear to ausculation bilaterally  GI:  [ x] soft, non-tender, normal bowel sounds  :  [ ] howe [ ] no CVA tenderness   Musculoskeletal:  [ ] no synovitis  Neurologic:  [ x] non-focal exam   Skin:  [x ] no rash  Lymph: [ ] no lymphadenopathy  Psychiatric:  [ ] appropriate affect [x ] alert & oriented  Lines:  [x ] no phlebitis [ ] central line                                8.3    12.09 )-----------( 299      ( 28 Jun 2017 06:15 )             26.9       06-28    142  |  107  |  24<H>  ----------------------------<  140<H>  4.5   |  18<L>  |  1.21    Ca    9.1      28 Jun 2017 06:15  Phos  3.2     06-27  Mg     1.8     06-27            MICROBIOLOGY:    RADIOLOGY:

## 2017-06-28 NOTE — BRIEF OPERATIVE NOTE - POST-OP DX
PAD (peripheral artery disease)  06/21/2017    Active  Alex Harper
Other acute osteomyelitis of left foot  06/28/2017    Active  Beau Tena  PAD (peripheral artery disease)  06/21/2017    Active  Alex Harper

## 2017-06-29 ENCOUNTER — TRANSCRIPTION ENCOUNTER (OUTPATIENT)
Age: 72
End: 2017-06-29

## 2017-06-29 LAB
BUN SERPL-MCNC: 24 MG/DL — HIGH (ref 7–23)
CALCIUM SERPL-MCNC: 8.9 MG/DL — SIGNIFICANT CHANGE UP (ref 8.4–10.5)
CHLORIDE SERPL-SCNC: 107 MMOL/L — SIGNIFICANT CHANGE UP (ref 98–107)
CO2 SERPL-SCNC: 19 MMOL/L — LOW (ref 22–31)
CREAT SERPL-MCNC: 1.23 MG/DL — SIGNIFICANT CHANGE UP (ref 0.5–1.3)
CULTURE - ACID FAST SMEAR CONCENTRATED: SIGNIFICANT CHANGE UP
CULTURE - ACID FAST SMEAR CONCENTRATED: SIGNIFICANT CHANGE UP
GLUCOSE SERPL-MCNC: 163 MG/DL — HIGH (ref 70–99)
HCT VFR BLD CALC: 25.4 % — LOW (ref 34.5–45)
HGB BLD-MCNC: 7.8 G/DL — LOW (ref 11.5–15.5)
MCHC RBC-ENTMCNC: 27.9 PG — SIGNIFICANT CHANGE UP (ref 27–34)
MCHC RBC-ENTMCNC: 30.7 % — LOW (ref 32–36)
MCV RBC AUTO: 90.7 FL — SIGNIFICANT CHANGE UP (ref 80–100)
NRBC # FLD: 0 — SIGNIFICANT CHANGE UP
PLATELET # BLD AUTO: 287 K/UL — SIGNIFICANT CHANGE UP (ref 150–400)
PMV BLD: 11.6 FL — SIGNIFICANT CHANGE UP (ref 7–13)
POTASSIUM SERPL-MCNC: 4.8 MMOL/L — SIGNIFICANT CHANGE UP (ref 3.5–5.3)
POTASSIUM SERPL-SCNC: 4.8 MMOL/L — SIGNIFICANT CHANGE UP (ref 3.5–5.3)
RBC # BLD: 2.8 M/UL — LOW (ref 3.8–5.2)
RBC # FLD: 14.6 % — HIGH (ref 10.3–14.5)
SODIUM SERPL-SCNC: 140 MMOL/L — SIGNIFICANT CHANGE UP (ref 135–145)
SPECIMEN SOURCE: SIGNIFICANT CHANGE UP
WBC # BLD: 9.63 K/UL — SIGNIFICANT CHANGE UP (ref 3.8–10.5)
WBC # FLD AUTO: 9.63 K/UL — SIGNIFICANT CHANGE UP (ref 3.8–10.5)

## 2017-06-29 PROCEDURE — 99232 SBSQ HOSP IP/OBS MODERATE 35: CPT

## 2017-06-29 PROCEDURE — 99233 SBSQ HOSP IP/OBS HIGH 50: CPT | Mod: GC

## 2017-06-29 RX ORDER — INSULIN GLARGINE 100 [IU]/ML
50 INJECTION, SOLUTION SUBCUTANEOUS AT BEDTIME
Qty: 0 | Refills: 0 | Status: DISCONTINUED | OUTPATIENT
Start: 2017-06-29 | End: 2017-06-29

## 2017-06-29 RX ORDER — INSULIN GLARGINE 100 [IU]/ML
40 INJECTION, SOLUTION SUBCUTANEOUS AT BEDTIME
Qty: 0 | Refills: 0 | Status: DISCONTINUED | OUTPATIENT
Start: 2017-06-29 | End: 2017-06-30

## 2017-06-29 RX ADMIN — Medication 1 DROP(S): at 17:26

## 2017-06-29 RX ADMIN — HEPARIN SODIUM 5000 UNIT(S): 5000 INJECTION INTRAVENOUS; SUBCUTANEOUS at 13:02

## 2017-06-29 RX ADMIN — ERTAPENEM SODIUM 120 MILLIGRAM(S): 1 INJECTION, POWDER, LYOPHILIZED, FOR SOLUTION INTRAMUSCULAR; INTRAVENOUS at 18:00

## 2017-06-29 RX ADMIN — INSULIN GLARGINE 40 UNIT(S): 100 INJECTION, SOLUTION SUBCUTANEOUS at 22:05

## 2017-06-29 RX ADMIN — CARVEDILOL PHOSPHATE 12.5 MILLIGRAM(S): 80 CAPSULE, EXTENDED RELEASE ORAL at 17:27

## 2017-06-29 RX ADMIN — BRIMONIDINE TARTRATE 1 DROP(S): 2 SOLUTION/ DROPS OPHTHALMIC at 17:26

## 2017-06-29 RX ADMIN — ISOSORBIDE MONONITRATE 30 MILLIGRAM(S): 60 TABLET, EXTENDED RELEASE ORAL at 12:27

## 2017-06-29 RX ADMIN — Medication 81 MILLIGRAM(S): at 12:27

## 2017-06-29 RX ADMIN — ATORVASTATIN CALCIUM 20 MILLIGRAM(S): 80 TABLET, FILM COATED ORAL at 22:06

## 2017-06-29 RX ADMIN — Medication 1 DROP(S): at 06:36

## 2017-06-29 RX ADMIN — Medication 1: at 08:51

## 2017-06-29 RX ADMIN — Medication 650 MILLIGRAM(S): at 12:57

## 2017-06-29 RX ADMIN — CLOPIDOGREL BISULFATE 75 MILLIGRAM(S): 75 TABLET, FILM COATED ORAL at 12:27

## 2017-06-29 RX ADMIN — HEPARIN SODIUM 5000 UNIT(S): 5000 INJECTION INTRAVENOUS; SUBCUTANEOUS at 22:06

## 2017-06-29 RX ADMIN — VALSARTAN 320 MILLIGRAM(S): 80 TABLET ORAL at 06:36

## 2017-06-29 RX ADMIN — Medication 650 MILLIGRAM(S): at 13:57

## 2017-06-29 RX ADMIN — BRIMONIDINE TARTRATE 1 DROP(S): 2 SOLUTION/ DROPS OPHTHALMIC at 06:36

## 2017-06-29 RX ADMIN — CARVEDILOL PHOSPHATE 12.5 MILLIGRAM(S): 80 CAPSULE, EXTENDED RELEASE ORAL at 06:36

## 2017-06-29 RX ADMIN — Medication 2: at 12:26

## 2017-06-29 RX ADMIN — HEPARIN SODIUM 5000 UNIT(S): 5000 INJECTION INTRAVENOUS; SUBCUTANEOUS at 06:36

## 2017-06-29 RX ADMIN — Medication 2: at 17:28

## 2017-06-29 NOTE — PROGRESS NOTE ADULT - PROBLEM SELECTOR PLAN 1
Likely DM foot ulcer vs PVD ulcer, w/ MRI showing low grade OM. Wound cx growing serratia marcescens sensitive to Zosyn.   -S/p left LE 4th and 5th digit amputation surgery today 6/28.   - Zosyn d/c'ed 6/27, switched to IV ertapenem 1g daily, continue until final bone culture returns. Gram stain revealed no WBC's or organisms. Culture negative for yeast/mold.

## 2017-06-29 NOTE — PROGRESS NOTE ADULT - SUBJECTIVE AND OBJECTIVE BOX
Patient is a 72y old  Female who presents with a chief complaint of blister to left fifth toe (19 Jun 2017 07:34)       INTERVAL HPI/OVERNIGHT EVENTS:  Patient seen and evaluated at bedside.  Pt is resting comfortable in NAD. Denies N/V/F/C.  Pain rated at X/10    Allergies    sulfa drugs (Hives)  sulfa drugs (Rash)  Sulfac 10% (Hives)    Intolerances        Vital Signs Last 24 Hrs  T(C): 37.5 (29 Jun 2017 06:33), Max: 37.7 (28 Jun 2017 21:24)  T(F): 99.5 (29 Jun 2017 06:33), Max: 99.8 (28 Jun 2017 21:24)  HR: 67 (29 Jun 2017 06:33) (66 - 78)  BP: 144/69 (29 Jun 2017 06:33) (143/67 - 153/70)  BP(mean): --  RR: 18 (29 Jun 2017 06:33) (16 - 18)  SpO2: 99% (29 Jun 2017 06:33) (97% - 99%)    LABS:                        7.8    9.63  )-----------( 287      ( 29 Jun 2017 05:50 )             25.4     06-29    140  |  107  |  24<H>  ----------------------------<  163<H>  4.8   |  19<L>  |  1.23    Ca    8.9      29 Jun 2017 05:50      PT/INR - ( 28 Jun 2017 06:00 )   PT: 12.3 SEC;   INR: 1.10          PTT - ( 28 Jun 2017 06:00 )  PTT:35.0 SEC    CAPILLARY BLOOD GLUCOSE  169 (29 Jun 2017 08:18)  181 (28 Jun 2017 22:19)  151 (28 Jun 2017 17:15)  153 (28 Jun 2017 12:19)          Lower Extremity Physical Exam:  Vascular: DP/PT _/4 B/L, CFT <_sec x 10, Temp gradient_ B/L  Neurology: Epicritic sensation _ to level of _, B/L  Musculoskeletal/Ortho: RIGHT BKA, LEFT partial ray resection 4 and 5.  Skin: proximal incision closed with sutures (intact).  Open distally with packing. Minimal sanguinous drainage.  No acute SOI. No malodor.      RADIOLOGY & ADDITIONAL TESTS:  < from: Xray Foot AP + Lateral + Oblique, Left (06.28.17 @ 09:56) >    EXAM:  RAD FOOT MIN 3 VIEWS LT        PROCEDURE DATE:  Jun 28 2017         INTERPRETATION:  CLINICAL INDICATION: baseline postoperative evaluation   status post partial left 4th and 5th ray amputations    EXAM:  Frontal, oblique, and lateral left foot from 6/28/2017 at 0947. Compared   to prior study from 6/18/2017.    IMPRESSION:  Partial 4th and 5th ray amputation defects through the mid metatarsal   shafts noted with postsurgical changes in the overlying soft tissues.    No proximally tracking gas collections beyond the amputation margins and   no focal areas of osteomyelitis.    Remainder of foot unchanged including old healed proximal 5th metatarsal   shaft fracture deformity, 1st MTP joint arthritic change, and granular   dystrophic calcifications in the distal medial left calf.    Correlate with intraoperative findings.                  BULMARO PHELAN M.D., ATTENDING RADIOLOGIST  This document has been electronically signed. Jun 29 2017  9:09AM                  < end of copied text >

## 2017-06-29 NOTE — PROGRESS NOTE ADULT - PROBLEM SELECTOR PLAN 4
Initially on lantus 80U, humalog 26U TID, regimen decreased 2/2 hypoglycemia  - Was on lantus half dose (25U) overnight due to NPO for surgery. Glucose level well controlled. Resume 50U lantus tonight as patient will be eating again. Initially on lantus 80U, humalog 26U TID, regimen decreased 2/2 hypoglycemia  - Patient's FS in past day have been normal to elevated, can resume 50U lantus at bedtime.

## 2017-06-29 NOTE — PROGRESS NOTE ADULT - SUBJECTIVE AND OBJECTIVE BOX
ANESTHESIA POSTOP CHECK    72y Female POSTOP DAY 1 S/P     Vital Signs Last 24 Hrs  T(C): 37.5 (29 Jun 2017 06:33), Max: 37.7 (28 Jun 2017 21:24)  T(F): 99.5 (29 Jun 2017 06:33), Max: 99.8 (28 Jun 2017 21:24)  HR: 67 (29 Jun 2017 06:33) (67 - 78)  BP: 144/69 (29 Jun 2017 06:33) (143/67 - 153/70)  BP(mean): --  RR: 18 (29 Jun 2017 06:33) (16 - 18)  SpO2: 99% (29 Jun 2017 06:33) (97% - 99%)  I&O's Summary    28 Jun 2017 07:01  -  29 Jun 2017 07:00  --------------------------------------------------------  IN: 500 mL / OUT: 450 mL / NET: 50 mL        [x ] NO APPARENT ANESTHESIA COMPLICATIONS      Comments: NO ANESTHESIA RELATED ISSUES POSTOP.

## 2017-06-29 NOTE — PROGRESS NOTE ADULT - SUBJECTIVE AND OBJECTIVE BOX
F/u: Left 5 th toe osteomyelitis s/p amputation    Interval History/ROS:  no fever or chills.  no chest pain. no sob. no cough.   no nausea or vomiting.   no abdominal pain, diarrhea or burning urine.   no headache.     Allergies  sulfa drugs (Hives)      ANTIMICROBIALS:    ertapenem  IVPB 1000 every 24 hours      Vital Signs Last 24 Hrs  T(C): 37.5 (29 Jun 2017 06:33), Max: 37.7 (28 Jun 2017 21:24)  T(F): 99.5 (29 Jun 2017 06:33), Max: 99.8 (28 Jun 2017 21:24)  HR: 67 (29 Jun 2017 06:33) (66 - 78)  BP: 144/69 (29 Jun 2017 06:33) (143/67 - 153/70)  RR: 18 (29 Jun 2017 06:33) (16 - 18)  SpO2: 99% (29 Jun 2017 06:33) (97% - 99%)    PHYSICAL EXAM:  General:  non-toxic  HEAD/EYES:  PERRL  white sclera  ENT:  normal  supple  Cardiovascular:   no murmur   Respiratory:   clear to ausculation bilaterally  GI:   soft, non-tender, normal bowel sounds  :   no CVA tenderness   Musculoskeletal:  no synovitis  Neurologic:   non-focal exam   Skin:   no rash  Lymph:  no lymphadenopathy  Psychiatric:   appropriate affect, alert & oriented  Lines:  no phlebitis                          7.8    9.63  )-----------( 287      ( 29 Jun 2017 05:50 )             25.4      06-29    140  |  107  |  24<H>  ----------------------------<  163<H>  4.8   |  19<L>  |  1.23    Ca    8.9      29 Jun 2017 05:50      MIcrobiology:   Culture - Surg Site Aerob/Anaer w/Gm St (06.28.17 @ 10:26)    Gram Stain Wound:   NOS^No Organisms Seen  WBC^White Blood Cells  QNTY CELLS IN GRAM STAIN: NO CELLS SEEN    Specimen Source: BONE    Culture - Surg Site Aerob/Anaer w/Gm St (06.28.17 @ 10:23)    Gram Stain Wound:   NOS^No Organisms Seen  WBC^White Blood Cells  QNTY CELLS IN GRAM STAIN: NO CELLS SEEN    Culture - Surgical Site:   NO GROWTH - PRELIMINARY RESULTS    Specimen Source: BONE    Culture - Blood (06.20.17 @ 17:05)    Culture - Blood:   NO ORGANISMS ISOLATED    Specimen Source: BLOOD PERIPHERAL        RADIOLOGY:    EXAM:  RAD FOOT MIN 3 VIEWS LT    PROCEDURE DATE:  Jun 28 2017   IMPRESSION:  Partial 4th and 5th ray amputation defects through the mid metatarsal   shafts noted with postsurgical changes in the overlying soft tissues.  No proximally tracking gas collections beyond the amputation margins and   no focal areas of osteomyelitis.  Remainder of foot unchanged including old healed proximal 5th metatarsal   shaft fracture deformity, 1st MTP joint arthritic change, and granular   dystrophic calcifications in the distal medial left calf.  Correlate with intraoperative findings. F/u: Left 5 th toe osteomyelitis s/p amputation    Interval History/ROS: No left foot pain.   no fever or chills.  no chest pain. no sob. no cough.   no nausea or vomiting.   no abdominal pain, diarrhea or burning urine.   no headache.     Allergies  sulfa drugs (Hives)      ANTIMICROBIALS:    ertapenem  IVPB 1000 every 24 hours      Vital Signs Last 24 Hrs  T(C): 37.5 (29 Jun 2017 06:33), Max: 37.7 (28 Jun 2017 21:24)  T(F): 99.5 (29 Jun 2017 06:33), Max: 99.8 (28 Jun 2017 21:24)  HR: 67 (29 Jun 2017 06:33) (66 - 78)  BP: 144/69 (29 Jun 2017 06:33) (143/67 - 153/70)  RR: 18 (29 Jun 2017 06:33) (16 - 18)  SpO2: 99% (29 Jun 2017 06:33) (97% - 99%)    PHYSICAL EXAM:  General:  non-toxic  HEAD/EYES:  PERRL  white sclera  ENT:  normal  supple  Cardiovascular:   no murmur   Respiratory:   clear to ausculation bilaterally  GI:   soft, non-tender, normal bowel sounds  :   no CVA tenderness   Musculoskeletal:  left foot bandage.   Neurologic:   non-focal exam   Skin:   no rash  Lymph:  no lymphadenopathy  Psychiatric:   appropriate affect, alert & oriented  Lines:  no phlebitis                          7.8    9.63  )-----------( 287      ( 29 Jun 2017 05:50 )             25.4      06-29    140  |  107  |  24<H>  ----------------------------<  163<H>  4.8   |  19<L>  |  1.23    Ca    8.9      29 Jun 2017 05:50      MIcrobiology:   Culture - Surg Site Aerob/Anaer w/Gm St (06.28.17 @ 10:26)    Gram Stain Wound:   NOS^No Organisms Seen  WBC^White Blood Cells  QNTY CELLS IN GRAM STAIN: NO CELLS SEEN    Specimen Source: BONE    Culture - Surg Site Aerob/Anaer w/Gm St (06.28.17 @ 10:23)    Gram Stain Wound:   NOS^No Organisms Seen  WBC^White Blood Cells  QNTY CELLS IN GRAM STAIN: NO CELLS SEEN    Culture - Surgical Site:   NO GROWTH - PRELIMINARY RESULTS    Specimen Source: BONE    Culture - Blood (06.20.17 @ 17:05)    Culture - Blood:   NO ORGANISMS ISOLATED    Specimen Source: BLOOD PERIPHERAL        RADIOLOGY:    EXAM:  RAD FOOT MIN 3 VIEWS LT    PROCEDURE DATE:  Jun 28 2017   IMPRESSION:  Partial 4th and 5th ray amputation defects through the mid metatarsal   shafts noted with postsurgical changes in the overlying soft tissues.  No proximally tracking gas collections beyond the amputation margins and   no focal areas of osteomyelitis.  Remainder of foot unchanged including old healed proximal 5th metatarsal   shaft fracture deformity, 1st MTP joint arthritic change, and granular   dystrophic calcifications in the distal medial left calf.  Correlate with intraoperative findings.

## 2017-06-29 NOTE — PROGRESS NOTE ADULT - PROBLEM SELECTOR PLAN 2
DAMARI likely related to contrast load. Showing daily improvement and today appears to have stabilized. Creatinine decreased to 1.21 from  1.37.   -C/w IVF, trend BMP

## 2017-06-29 NOTE — PROGRESS NOTE ADULT - ASSESSMENT
71 y/o female with DM2 c/b neuropathy, PVD with R foot gangrene s/p R AKA (2010) now with prosthesis, CKD (baseline Cr 1.4-1.6), CAD s/p stents and CABG, HTN, s/p CEA (2014) p/w L 5th toe pus filled blister with fevers. s/p angioplasty now plan for amputation.    1- Left 5 th toe osteomyelitis  - S/p left LE 4th and 5th digit amputation   - bone looks clean  - on Ertapenem day 3  - wound grew serratia

## 2017-06-29 NOTE — PROGRESS NOTE ADULT - ASSESSMENT
POD #1 s/p LEFT 4 and 5 partial ray resection.  Plan: Patient seen and evaluated.  All questions answered. Wound without SOI at this time.  Will tailor antibiotics per culture results, will likely require 10-14 days PO antibiotics.  PT consult pending.  When stable recommend D/C to [facility].  Patient to follow up in wound care center.

## 2017-06-29 NOTE — PROGRESS NOTE ADULT - SUBJECTIVE AND OBJECTIVE BOX
Patient is a 72y old  Female who presents with a chief complaint of blister to left fifth toe (19 Jun 2017 07:34)       INTERVAL HPI/OVERNIGHT EVENTS: No acute events overnight. Patient has mild pain at site of amputation. She complains of a dry cough that causes pain at her ribs bilaterally. Otherwise no complaints. No fevers, chills, N/V/D/C, SOB, or lightheadedness.     MEDICATIONS  (STANDING):  heparin  Injectable 5000 Unit(s) SubCutaneous every 8 hours  insulin lispro (HumaLOG) corrective regimen sliding scale   SubCutaneous three times a day before meals  insulin lispro (HumaLOG) corrective regimen sliding scale   SubCutaneous at bedtime  dextrose 50% Injectable 12.5 Gram(s) IV Push once  dextrose 50% Injectable 25 Gram(s) IV Push once  dextrose 50% Injectable 25 Gram(s) IV Push once  aspirin enteric coated 81 milliGRAM(s) Oral daily  valsartan 320 milliGRAM(s) Oral daily  isosorbide   mononitrate ER Tablet (IMDUR) 30 milliGRAM(s) Oral daily  atorvastatin 20 milliGRAM(s) Oral at bedtime  carvedilol 12.5 milliGRAM(s) Oral every 12 hours  ketorolac 0.5% Ophthalmic Solution 1 Drop(s) Both EYES two times a day  timolol 0.5% Solution 1 Drop(s) Both EYES two times a day  brimonidine 0.2% Ophthalmic Solution 1 Drop(s) Both EYES two times a day  clopidogrel Tablet 75 milliGRAM(s) Oral daily  ertapenem  IVPB 1000 milliGRAM(s) IV Intermittent every 24 hours  insulin glargine Injectable (LANTUS) 25 Unit(s) SubCutaneous at bedtime    MEDICATIONS  (PRN):  dextrose Gel 1 Dose(s) Oral once PRN Blood Glucose LESS THAN 70 milliGRAM(s)/deciliter  glucagon  Injectable 1 milliGRAM(s) IntraMuscular once PRN Glucose LESS THAN 70 milligrams/deciliter  acetaminophen   Tablet. 650 milliGRAM(s) Oral every 6 hours PRN Mild and moderate pain  acetaminophen   Tablet 650 milliGRAM(s) Oral every 6 hours PRN For Temp greater than 38 C (100.4 F)      Allergies    sulfa drugs (Hives)  sulfa drugs (Rash)  Sulfac 10% (Hives)    Intolerances        REVIEW OF SYSTEMS:  CONSTITUTIONAL: No fever, weight loss, or fatigue  RESPIRATORY: Nonproductive cough. No wheezing, no hemoptysis; No shortness of breath  CARDIOVASCULAR: No chest pain or dizziness  GASTROINTESTINAL: No abdominal or epigastric pain. No nausea, vomiting, or hematemesis; No diarrhea or constipation.   GENITOURINARY: No dysuria, frequency, hematuria, or incontinence  NEUROLOGICAL: No headache      Vital Signs Last 24 Hrs  T(C): 37.5 (29 Jun 2017 06:33), Max: 37.7 (28 Jun 2017 21:24)  T(F): 99.5 (29 Jun 2017 06:33), Max: 99.8 (28 Jun 2017 21:24)  HR: 67 (29 Jun 2017 06:33) (66 - 78)  BP: 144/69 (29 Jun 2017 06:33) (143/67 - 153/70)  BP(mean): --  RR: 18 (29 Jun 2017 06:33) (16 - 18)  SpO2: 99% (29 Jun 2017 06:33) (97% - 99%)    PHYSICAL EXAM:  GENERAL: NAD, obese  HEAD:  Atraumatic, Normocephalic  EYES: EOMI, PERRLA, conjunctiva and sclera clear  ENMT:  Moist mucous membranes, Good dentition  NERVOUS SYSTEM: AOX3, motor and sensation grossly intact in b/l UE and b/l LE  PSYCHIATRIC: Appropriate affect and mood  CHEST/LUNG: Clear to auscultation bilaterally; No rales, rhonchi, wheezing, or rubs  HEART: Regular rate and rhythm; No murmurs, rubs, or gallops.   ABDOMEN: Soft, obese, nontender, Nondistended; Bowel sounds present  EXTREMITIES: Right LE amputation below the knee, left foot in bandage, 4th and 5th LE digits amputated. 2+ Peripheral Pulses, No clubbing, cyanosis  SKIN: No rashes or lesions    LABS:                        7.8    9.63  )-----------( 287      ( 29 Jun 2017 05:50 )             25.4     29 Jun 2017 05:50    140    |  107    |  24     ----------------------------<  163    4.8     |  19     |  1.23     Ca    8.9        29 Jun 2017 05:50      PT/INR - ( 28 Jun 2017 06:00 )   PT: 12.3 SEC;   INR: 1.10          PTT - ( 28 Jun 2017 06:00 )  PTT:35.0 SEC  CAPILLARY BLOOD GLUCOSE  169 (29 Jun 2017 08:18)  181 (28 Jun 2017 22:19)  151 (28 Jun 2017 17:15)  153 (28 Jun 2017 12:19)        BLOOD CULTURE    RADIOLOGY & ADDITIONAL TESTS:    Imaging Personally Reviewed:  [ ] YES     Consultant(s) Notes Reviewed:      Care Discussed with Consultants/Other Providers:

## 2017-06-30 VITALS
HEART RATE: 69 BPM | DIASTOLIC BLOOD PRESSURE: 52 MMHG | OXYGEN SATURATION: 96 % | RESPIRATION RATE: 18 BRPM | TEMPERATURE: 100 F | SYSTOLIC BLOOD PRESSURE: 152 MMHG

## 2017-06-30 LAB
HCT VFR BLD CALC: 25.3 % — LOW (ref 34.5–45)
HGB BLD-MCNC: 7.9 G/DL — LOW (ref 11.5–15.5)
MCHC RBC-ENTMCNC: 28.2 PG — SIGNIFICANT CHANGE UP (ref 27–34)
MCHC RBC-ENTMCNC: 31.2 % — LOW (ref 32–36)
MCV RBC AUTO: 90.4 FL — SIGNIFICANT CHANGE UP (ref 80–100)
NRBC # FLD: 0 — SIGNIFICANT CHANGE UP
PLATELET # BLD AUTO: 313 K/UL — SIGNIFICANT CHANGE UP (ref 150–400)
PMV BLD: 11.6 FL — SIGNIFICANT CHANGE UP (ref 7–13)
RBC # BLD: 2.8 M/UL — LOW (ref 3.8–5.2)
RBC # FLD: 14.8 % — HIGH (ref 10.3–14.5)
WBC # BLD: 9.98 K/UL — SIGNIFICANT CHANGE UP (ref 3.8–10.5)
WBC # FLD AUTO: 9.98 K/UL — SIGNIFICANT CHANGE UP (ref 3.8–10.5)

## 2017-06-30 PROCEDURE — 99239 HOSP IP/OBS DSCHRG MGMT >30: CPT

## 2017-06-30 RX ORDER — INSULIN ASPART 100 [IU]/ML
20 INJECTION, SOLUTION SUBCUTANEOUS
Qty: 0 | Refills: 0 | COMMUNITY

## 2017-06-30 RX ORDER — INSULIN GLARGINE 100 [IU]/ML
40 INJECTION, SOLUTION SUBCUTANEOUS
Qty: 30 | Refills: 0 | OUTPATIENT
Start: 2017-06-30 | End: 2017-07-30

## 2017-06-30 RX ORDER — VALSARTAN 80 MG/1
1 TABLET ORAL
Qty: 0 | Refills: 0 | COMMUNITY

## 2017-06-30 RX ORDER — CARVEDILOL PHOSPHATE 80 MG/1
1 CAPSULE, EXTENDED RELEASE ORAL
Qty: 60 | Refills: 0 | OUTPATIENT
Start: 2017-06-30 | End: 2017-07-30

## 2017-06-30 RX ORDER — CARVEDILOL PHOSPHATE 80 MG/1
1 CAPSULE, EXTENDED RELEASE ORAL
Qty: 0 | Refills: 0 | COMMUNITY

## 2017-06-30 RX ORDER — CLOPIDOGREL BISULFATE 75 MG/1
1 TABLET, FILM COATED ORAL
Qty: 30 | Refills: 0 | OUTPATIENT
Start: 2017-06-30 | End: 2017-07-30

## 2017-06-30 RX ORDER — ISOSORBIDE MONONITRATE 60 MG/1
1 TABLET, EXTENDED RELEASE ORAL
Qty: 0 | Refills: 0 | COMMUNITY
Start: 2017-06-30

## 2017-06-30 RX ORDER — INSULIN DETEMIR 100/ML (3)
80 INSULIN PEN (ML) SUBCUTANEOUS
Qty: 0 | Refills: 0 | COMMUNITY

## 2017-06-30 RX ORDER — VALSARTAN 80 MG/1
1 TABLET ORAL
Qty: 0 | Refills: 0 | COMMUNITY
Start: 2017-06-30

## 2017-06-30 RX ADMIN — ISOSORBIDE MONONITRATE 30 MILLIGRAM(S): 60 TABLET, EXTENDED RELEASE ORAL at 11:35

## 2017-06-30 RX ADMIN — Medication 1 DROP(S): at 05:54

## 2017-06-30 RX ADMIN — Medication 1: at 08:39

## 2017-06-30 RX ADMIN — HEPARIN SODIUM 5000 UNIT(S): 5000 INJECTION INTRAVENOUS; SUBCUTANEOUS at 05:54

## 2017-06-30 RX ADMIN — Medication 81 MILLIGRAM(S): at 11:35

## 2017-06-30 RX ADMIN — CARVEDILOL PHOSPHATE 12.5 MILLIGRAM(S): 80 CAPSULE, EXTENDED RELEASE ORAL at 05:54

## 2017-06-30 RX ADMIN — VALSARTAN 320 MILLIGRAM(S): 80 TABLET ORAL at 05:54

## 2017-06-30 RX ADMIN — CLOPIDOGREL BISULFATE 75 MILLIGRAM(S): 75 TABLET, FILM COATED ORAL at 11:35

## 2017-06-30 RX ADMIN — Medication 2: at 12:23

## 2017-06-30 RX ADMIN — BRIMONIDINE TARTRATE 1 DROP(S): 2 SOLUTION/ DROPS OPHTHALMIC at 05:54

## 2017-06-30 NOTE — PROGRESS NOTE ADULT - PROBLEM SELECTOR PLAN 4
Initially on lantus 80U, humalog 26U TID, regimen decreased 2/2 hypoglycemia  - Patient's FS in past day have been normal to elevated (FS this morning is 179), was increased from 25 to 40U lantus at bedtime on 6/29, can resume 50U lantus at bedtime today and after discharge to home. .

## 2017-06-30 NOTE — PROGRESS NOTE ADULT - PROBLEM SELECTOR PROBLEM 6
Hypertension

## 2017-06-30 NOTE — PROGRESS NOTE ADULT - PROVIDER SPECIALTY LIST ADULT
Anesthesia
Infectious Disease
Infectious Disease
Internal Medicine
Podiatry
Surgery
Vascular Surgery
Vascular Surgery
Internal Medicine
Podiatry
Podiatry

## 2017-06-30 NOTE — PROGRESS NOTE ADULT - ASSESSMENT
POD #1 s/p LEFT 4 and 5 partial ray resection.  Plan:   Patient seen and evaluated.     Wound without SOI at this time.    PO Levaquin till 7/7 per ID recs  When stable recommend D/C to [facility].    Patient to follow up in wound care center.

## 2017-06-30 NOTE — PROGRESS NOTE ADULT - ATTENDING COMMENTS
pt case was cancelled due to pt eating. will try to book for tomorrow NPO past midnight
reactive osteitis on MRI left 5th toe  Surgery next week  Await vascular consult  podiatry to follow
72 year old with DM s/p toe amputation- podiatry has low suspicion for residual infection.    Continue antibiotics for 10 days post op.  Cultures to date without growth from OR.    Continue ertapenem, if culture results do not change- can change to levaquin 500 mg po daily through 7/7    Call ID service with questions
Pain controlled. C/w Vanc/Zosyn. Awaiting MRI. Apprec Podiatry f/u.
Pt seen and examined, chart and labs reviewed.  The patient is s/p L 4th and 5th toe amputations and tolerated procedure well.  Had low grade periprocedural temperature 100.2, but is currently being appropriately treated with IV abx.  Will await official pathology, bone culture and sensitivity of wound culture result prior to tailoring abx therapy. Appreciate ID recommendations.
Pt seen and examined, agree with resident note as above.  Pt denies acute complaints, was seen ambulating with physical therapy this morning.  Bone cultures so far negative and surgical margins appear clean.  ID recs appreciated.  Can likely d/c home tomorrow with PO antibiotics.
Pt seen and examined, chart and labs reviewed.  Case discussed with team and pt at bedside.  Pt currently denies acute complaints, anxiously awaiting procedure for tomorrow.  Pt states that her last operation was in 2014 for a carotid endoarterectomy and she tolerated general anesthesia well.  Currently is able to ambulate 1-2 blocks without any SOB, chest pain; is limited due to chronic back pain.  RCRI score of 2, the patient is at intermediate risk for a low risk operative procedure.  The patient is otherwise medically optimized for scheduled procedure.  Given that she had recent vascular stent placed in lower extremity, would keep patient on ASA/Plavix.  Pt to receive 1/2 dose of Lantus tonight for NPO status.
Pt seen and examined, chart and labs reviewed.  Agree with resident note as above.  Pt to follow up with wound care/podiatry as outpatient.  Medically stable for d/c home today. D/C time 33 minutes.
Pt seen and examined, chart and labs reviewed.  Case discussed with patient team and patient at bedside.  Pt was supposed to be scheduled for today, but was found to be eating so case cancelled.  Podiatry to take patient tomorrow for toe amputations.   Continue with IV Zosyn for now, will likely be able to get definitive bone cultures and biopsy from surgery.
Pt seen and examined, plan of care d/w patient and daughter at bedside. Awaiting MRI. c/w Vanc/Zosyn. f/u blood cultures. Appreciate Podiatry f/u. LLE DUC severely decreased, Vascular Sx consult
Low grade temp of 100.3 and 100.4 yesterday PM, blood cultures resent. Vancomycin dosing adjusted for subtherapeutic level. S/p angiogram this AM w/ balloon angioplasties and stent. Plavix started. Awaiting MRI. Wound cx growing Serratia, sensitive to Zosyn.
Still w/ low grade fevers despite abx. S/p LLE angioplasty & stenting yesterday. MRI inconclusive for OM. f/u Podiatry and Vascular Surgery re ? surgical intervention. Insulin regimen downtitrated 2/2 hypoglycemic episodes, monitor FS closely.
Patient seen and examined by me. Case discussed with resident and agree with findings and plan as documented in the resident's note.  Awaiting OR time.  Renal function better; Optimize glycemic control.
Appreciate Podiatry f/u - per their note, plan for L 4th and 5th toe amputation on 6/29, will f/u Podiatry whether OR date can be moved up, or if patient can be d/c'ed home in the interim. Rise in SCr today, likely 2/2 angiogram on Wednesday, started IVF hydration. Titrating insulin regimen with close monitoring of FS.
Appreciate Podiatry f/u - per their note, plan for L 4th and 5th toe amputation on 6/29, will f/u Podiatry whether OR date can be moved up, or if patient can be d/c'ed home in the interim. DAMARI - likely 2/2 angiogram on Wednesday, on IVF hydration - improving. Titrating insulin regimen with close monitoring of FS.  Patient seen and examined. Agree with resident note.

## 2017-06-30 NOTE — PROGRESS NOTE ADULT - PROBLEM SELECTOR PLAN 3
-LLE DUC/PVR severely decreased.  - Loss of sensation in 3rd-5th left foot digits.   -Vascular consulted- pt s/p angiogram with stenting   Patient instructed to follow up with vascular surgery.   -C/w aspirin, Plavix and statin

## 2017-06-30 NOTE — PROGRESS NOTE ADULT - PROBLEM SELECTOR PLAN 2
DAMARI likely related to contrast load. Creatinine has stabilized back to baseline levels s/p IV fluids.    - DAMARI likely related to contrast load. Creatinine has stabilized back to baseline levels s/p IV fluids.

## 2017-06-30 NOTE — PROGRESS NOTE ADULT - SUBJECTIVE AND OBJECTIVE BOX
Patient is a 72y old  Female who presents with a chief complaint of blister to left fifth toe (19 Jun 2017 07:34)       INTERVAL HPI/OVERNIGHT EVENTS: No issues overnight. Patient has no complaints of fever, pain at the amputation site, SOB, lightheadedness, dizziness, N/V/D/C. I explained to her to follow up with vascular surgery and podiatry after her discharge.     MEDICATIONS  (STANDING):  heparin  Injectable 5000 Unit(s) SubCutaneous every 8 hours  insulin lispro (HumaLOG) corrective regimen sliding scale   SubCutaneous three times a day before meals  insulin lispro (HumaLOG) corrective regimen sliding scale   SubCutaneous at bedtime  dextrose 50% Injectable 12.5 Gram(s) IV Push once  dextrose 50% Injectable 25 Gram(s) IV Push once  dextrose 50% Injectable 25 Gram(s) IV Push once  aspirin enteric coated 81 milliGRAM(s) Oral daily  valsartan 320 milliGRAM(s) Oral daily  isosorbide   mononitrate ER Tablet (IMDUR) 30 milliGRAM(s) Oral daily  atorvastatin 20 milliGRAM(s) Oral at bedtime  carvedilol 12.5 milliGRAM(s) Oral every 12 hours  ketorolac 0.5% Ophthalmic Solution 1 Drop(s) Both EYES two times a day  timolol 0.5% Solution 1 Drop(s) Both EYES two times a day  brimonidine 0.2% Ophthalmic Solution 1 Drop(s) Both EYES two times a day  clopidogrel Tablet 75 milliGRAM(s) Oral daily  ertapenem  IVPB 1000 milliGRAM(s) IV Intermittent every 24 hours  insulin glargine Injectable (LANTUS) 40 Unit(s) SubCutaneous at bedtime    MEDICATIONS  (PRN):  dextrose Gel 1 Dose(s) Oral once PRN Blood Glucose LESS THAN 70 milliGRAM(s)/deciliter  glucagon  Injectable 1 milliGRAM(s) IntraMuscular once PRN Glucose LESS THAN 70 milligrams/deciliter  acetaminophen   Tablet. 650 milliGRAM(s) Oral every 6 hours PRN Mild and moderate pain  acetaminophen   Tablet 650 milliGRAM(s) Oral every 6 hours PRN For Temp greater than 38 C (100.4 F)      Allergies    sulfa drugs (Hives)  sulfa drugs (Rash)  Sulfac 10% (Hives)    Intolerances        REVIEW OF SYSTEMS:  CONSTITUTIONAL: No fever  RESPIRATORY: Nonproductive cough. No wheezing, chills or hemoptysis; No shortness of breath  CARDIOVASCULAR: No chest pain or dizziness.   GASTROINTESTINAL: No abdominal pain. No nausea or vomiting. No diarrhea or constipation. No melena or hematochezia.  GENITOURINARY: No dysuria, frequency, hematuria, or incontinence  NEUROLOGICAL: No headaches, loss of strength, numbness, or tremors  SKIN: No itching, burning, rashes, or lesions   MUSCULOSKELETAL: No joint pain or swelling; No pain at amputation site on LE 4th and 5th digits      Vital Signs Last 24 Hrs  T(C): 36.8 (30 Jun 2017 05:46), Max: 37.7 (29 Jun 2017 12:22)  T(F): 98.3 (30 Jun 2017 05:46), Max: 99.9 (29 Jun 2017 12:22)  HR: 64 (30 Jun 2017 05:46) (62 - 65)  BP: 128/58 (30 Jun 2017 05:46) (112/50 - 136/62)  BP(mean): --  RR: 18 (30 Jun 2017 05:46) (18 - 18)  SpO2: 97% (30 Jun 2017 05:46) (95% - 100%)    PHYSICAL EXAM:  GENERAL: NAD  HEAD:  Atraumatic, Normocephalic  EYES: EOMI, PERRLA, conjunctiva and sclera clear  ENMT: Moist mucous membranes  NERVOUS SYSTEM: AOX3, motor and sensation grossly intact in b/l UE and b/l LE  PSYCHIATRIC: Appropriate affect and mood  CHEST/LUNG: Clear to auscultation bilaterally; No rales, rhonchi, wheezing, or rubs  HEART: Regular rate and rhythm; No murmurs, rubs, or gallops.   ABDOMEN: Soft, obese, Nontender, Nondistended; Bowel sounds present  EXTREMITIES: Right LE amputation below the knee. Left LE amputation 4th and 5th digits, now bandaged. 2+ Peripheral Pulses, No clubbing, cyanosis      LABS:                        7.9    9.98  )-----------( 313      ( 30 Jun 2017 05:19 )             25.3       Ca    8.9        29 Jun 2017 05:50        CAPILLARY BLOOD GLUCOSE  179 (30 Jun 2017 08:27)  246 (29 Jun 2017 22:18)  202 (29 Jun 2017 17:11)  207 (29 Jun 2017 12:24)        BLOOD CULTURE    RADIOLOGY & ADDITIONAL TESTS:    Imaging Personally Reviewed:  [ ] YES     Consultant(s) Notes Reviewed:      Care Discussed with Consultants/Other Providers:

## 2017-06-30 NOTE — PROGRESS NOTE ADULT - PROBLEM SELECTOR PROBLEM 4
Diabetes

## 2017-06-30 NOTE — PROGRESS NOTE ADULT - PROBLEM SELECTOR PLAN 1
Likely DM foot ulcer vs PVD ulcer, w/ MRI showing low grade OM. Wound cx growing serratia marcescens sensitive to Zosyn.   -S/p left LE 4th and 5th digit amputation surgery on 6/28.   - Zosyn d/c'ed 6/27, switched to IV ertapenem 1g daily, continue until final bone culture returns. Gram stain revealed no WBC's or organisms. Culture negative for yeast/mold. Likely DM foot ulcer vs PVD ulcer, w/ MRI showing low grade OM. s/p Zosyn treatment, d/c'ed 6/27  -S/p left LE 4th and 5th digit amputation surgery on 6/28.   - Patient started IV ertapenem 1g daily on 6/27, continue until final bone culture returns. Gram stain revealed no WBC's or organisms. Culture negative for yeast/mold. Bone/wound showing no signs of infection now, and patient afebrile, no leukocytosis.  - Ertapenem IV changed to PO levaquin 500 mg daily, which per ID recommendations will be continued for a total of 10 days post op (until 7/7)

## 2017-06-30 NOTE — PROGRESS NOTE ADULT - SUBJECTIVE AND OBJECTIVE BOX
Patient is a 72y old  female s/p LF P4, P5RR packed open on 6/28. Patient is AOx3.        INTERVAL HPI/OVERNIGHT EVENTS:  Patient seen and evaluated at bedside.  Pt is resting comfortable in NAD. Denies N/V/F/C.  Pain rated at 0/10    Allergies    sulfa drugs (Hives)  sulfa drugs (Rash)  Sulfac 10% (Hives)    Intolerances        Vital Signs Last 24 Hrs  T(C): 36.8 (30 Jun 2017 05:46), Max: 37.7 (29 Jun 2017 12:22)  T(F): 98.3 (30 Jun 2017 05:46), Max: 99.9 (29 Jun 2017 12:22)  HR: 64 (30 Jun 2017 05:46) (62 - 65)  BP: 128/58 (30 Jun 2017 05:46) (112/50 - 136/62)  BP(mean): --  RR: 18 (30 Jun 2017 05:46) (18 - 18)  SpO2: 97% (30 Jun 2017 05:46) (95% - 100%)    LABS:                        7.9    9.98  )-----------( 313      ( 30 Jun 2017 05:19 )             25.3     06-29    140  |  107  |  24<H>  ----------------------------<  163<H>  4.8   |  19<L>  |  1.23    Ca    8.9      29 Jun 2017 05:50          CAPILLARY BLOOD GLUCOSE  246 (29 Jun 2017 22:18)  202 (29 Jun 2017 17:11)  207 (29 Jun 2017 12:24)          Lower Extremity Physical Exam:  Vascular: DP/PT _/4 B/L, CFT <_sec x 10, Temp gradient_ B/L  Neurology: Epicritic sensation _ to level of _, B/L  Musculoskeletal/Ortho: RIGHT BKA, LEFT partial ray resection 4 and 5.  Skin: proximal incision closed with sutures (intact).  Open distally with packing. Minimal sanguinous drainage.  No acute SOI. No malodor.Lower Extremity Physical Patient is a 72y old  female s/p LF P4, P5RR packed open on 6/28. Patient is AOx3.        INTERVAL HPI/OVERNIGHT EVENTS:  Patient seen and evaluated at bedside.  Pt is resting comfortable in NAD. Denies N/V/F/C.  Pain rated at 0/10    Allergies    sulfa drugs (Hives)  sulfa drugs (Rash)  Sulfac 10% (Hives)    Intolerances        Vital Signs Last 24 Hrs  T(C): 36.8 (30 Jun 2017 05:46), Max: 37.7 (29 Jun 2017 12:22)  T(F): 98.3 (30 Jun 2017 05:46), Max: 99.9 (29 Jun 2017 12:22)  HR: 64 (30 Jun 2017 05:46) (62 - 65)  BP: 128/58 (30 Jun 2017 05:46) (112/50 - 136/62)  BP(mean): --  RR: 18 (30 Jun 2017 05:46) (18 - 18)  SpO2: 97% (30 Jun 2017 05:46) (95% - 100%)    LABS:                        7.9    9.98  )-----------( 313      ( 30 Jun 2017 05:19 )             25.3     06-29    140  |  107  |  24<H>  ----------------------------<  163<H>  4.8   |  19<L>  |  1.23    Ca    8.9      29 Jun 2017 05:50          CAPILLARY BLOOD GLUCOSE  246 (29 Jun 2017 22:18)  202 (29 Jun 2017 17:11)  207 (29 Jun 2017 12:24)          Lower Extremity Physical Exam:  Vascular/Neurology: Neurovascular status unchanged.  Musculoskeletal/Ortho: RIGHT BKA, LEFT partial ray resection 4 and 5.  Skin: proximal incision closed with sutures (intact).  Open distally with packing. Minimal sanguinous drainage.  No acute SOI. No malodor.Lower Extremity Physical

## 2017-06-30 NOTE — PROGRESS NOTE ADULT - PROBLEM SELECTOR PLAN 6
BP controlled  - c/w home meds (diovan, coreg, and imdur) with hold parameters and continue to monitor
BP controlled but elevated today at 153/68.  - c/w home meds (diovan, coreg, and imdur) with hold parameters and continue to monitor
BP controlled on home medications.   - c/w home meds (diovan, coreg, and imdur) with hold parameters and continue to monitor

## 2017-06-30 NOTE — PROGRESS NOTE ADULT - PROBLEM SELECTOR PROBLEM 5
CAD (coronary artery disease)

## 2017-06-30 NOTE — PROGRESS NOTE ADULT - PROBLEM SELECTOR PROBLEM 7
Need for prophylactic measure

## 2017-06-30 NOTE — PROGRESS NOTE ADULT - ASSESSMENT
71 yo F hx DM2 (on insulin) c/b neuropathy, PVD with R foot gangrene s/p R AKA (2010) now with prosthesis, CKD (baseline Cr 1.4-1.6), CAD s/p stents and CABG, HTN, s/p CEA (2014) p/w L 5th toe blister, s/p amputation of left LE 4th and 5th digits on 6/28. Currently stable.

## 2017-07-03 LAB
CULTURE - SURGICAL SITE: SIGNIFICANT CHANGE UP
CULTURE - SURGICAL SITE: SIGNIFICANT CHANGE UP
SURGICAL PATHOLOGY STUDY: SIGNIFICANT CHANGE UP

## 2017-07-04 ENCOUNTER — INPATIENT (INPATIENT)
Facility: HOSPITAL | Age: 72
LOS: 8 days | Discharge: NURSING, REHAB OR LONG-TERM CARE | End: 2017-07-13
Attending: HOSPITALIST | Admitting: HOSPITALIST
Payer: MEDICARE

## 2017-07-04 VITALS
RESPIRATION RATE: 18 BRPM | OXYGEN SATURATION: 98 % | SYSTOLIC BLOOD PRESSURE: 123 MMHG | DIASTOLIC BLOOD PRESSURE: 65 MMHG | HEART RATE: 78 BPM | TEMPERATURE: 100 F

## 2017-07-04 DIAGNOSIS — Z98.89 OTHER SPECIFIED POSTPROCEDURAL STATES: Chronic | ICD-10-CM

## 2017-07-04 DIAGNOSIS — I25.810 ATHEROSCLEROSIS OF CORONARY ARTERY BYPASS GRAFT(S) WITHOUT ANGINA PECTORIS: ICD-10-CM

## 2017-07-04 DIAGNOSIS — Z95.1 PRESENCE OF AORTOCORONARY BYPASS GRAFT: Chronic | ICD-10-CM

## 2017-07-04 DIAGNOSIS — Z90.710 ACQUIRED ABSENCE OF BOTH CERVIX AND UTERUS: Chronic | ICD-10-CM

## 2017-07-04 DIAGNOSIS — Z29.9 ENCOUNTER FOR PROPHYLACTIC MEASURES, UNSPECIFIED: ICD-10-CM

## 2017-07-04 DIAGNOSIS — E11.21 TYPE 2 DIABETES MELLITUS WITH DIABETIC NEPHROPATHY: ICD-10-CM

## 2017-07-04 DIAGNOSIS — Z89.511 ACQUIRED ABSENCE OF RIGHT LEG BELOW KNEE: Chronic | ICD-10-CM

## 2017-07-04 DIAGNOSIS — L08.9 LOCAL INFECTION OF THE SKIN AND SUBCUTANEOUS TISSUE, UNSPECIFIED: ICD-10-CM

## 2017-07-04 DIAGNOSIS — L02.612 CUTANEOUS ABSCESS OF LEFT FOOT: ICD-10-CM

## 2017-07-04 DIAGNOSIS — N17.9 ACUTE KIDNEY FAILURE, UNSPECIFIED: ICD-10-CM

## 2017-07-04 LAB
ALBUMIN SERPL ELPH-MCNC: 3 G/DL — LOW (ref 3.3–5)
ALP SERPL-CCNC: 76 U/L — SIGNIFICANT CHANGE UP (ref 40–120)
ALT FLD-CCNC: 17 U/L — SIGNIFICANT CHANGE UP (ref 4–33)
AST SERPL-CCNC: 16 U/L — SIGNIFICANT CHANGE UP (ref 4–32)
BASE EXCESS BLDV CALC-SCNC: 0.8 MMOL/L — SIGNIFICANT CHANGE UP
BASOPHILS # BLD AUTO: 0.02 K/UL — SIGNIFICANT CHANGE UP (ref 0–0.2)
BASOPHILS NFR BLD AUTO: 0.2 % — SIGNIFICANT CHANGE UP (ref 0–2)
BILIRUB SERPL-MCNC: 0.2 MG/DL — SIGNIFICANT CHANGE UP (ref 0.2–1.2)
BLOOD GAS VENOUS - CREATININE: 1.26 MG/DL — SIGNIFICANT CHANGE UP (ref 0.5–1.3)
BUN SERPL-MCNC: 20 MG/DL — SIGNIFICANT CHANGE UP (ref 7–23)
CALCIUM SERPL-MCNC: 9 MG/DL — SIGNIFICANT CHANGE UP (ref 8.4–10.5)
CHLORIDE BLDV-SCNC: 109 MMOL/L — HIGH (ref 96–108)
CHLORIDE SERPL-SCNC: 103 MMOL/L — SIGNIFICANT CHANGE UP (ref 98–107)
CO2 SERPL-SCNC: 23 MMOL/L — SIGNIFICANT CHANGE UP (ref 22–31)
CREAT SERPL-MCNC: 1.31 MG/DL — HIGH (ref 0.5–1.3)
EOSINOPHIL # BLD AUTO: 0.39 K/UL — SIGNIFICANT CHANGE UP (ref 0–0.5)
EOSINOPHIL NFR BLD AUTO: 4.8 % — SIGNIFICANT CHANGE UP (ref 0–6)
GAS PNL BLDV: 137 MMOL/L — SIGNIFICANT CHANGE UP (ref 136–146)
GLUCOSE BLDV-MCNC: 122 — HIGH (ref 70–99)
GLUCOSE SERPL-MCNC: 124 MG/DL — HIGH (ref 70–99)
HCO3 BLDV-SCNC: 24 MMOL/L — SIGNIFICANT CHANGE UP (ref 20–27)
HCT VFR BLD CALC: 24.7 % — LOW (ref 34.5–45)
HCT VFR BLDV CALC: 24.7 % — LOW (ref 34.5–45)
HGB BLD-MCNC: 7.7 G/DL — LOW (ref 11.5–15.5)
HGB BLDV-MCNC: 7.9 G/DL — LOW (ref 11.5–15.5)
IMM GRANULOCYTES # BLD AUTO: 0.03 # — SIGNIFICANT CHANGE UP
IMM GRANULOCYTES NFR BLD AUTO: 0.4 % — SIGNIFICANT CHANGE UP (ref 0–1.5)
LACTATE BLDV-MCNC: 1.6 MMOL/L — SIGNIFICANT CHANGE UP (ref 0.5–2)
LYMPHOCYTES # BLD AUTO: 1.62 K/UL — SIGNIFICANT CHANGE UP (ref 1–3.3)
LYMPHOCYTES # BLD AUTO: 19.9 % — SIGNIFICANT CHANGE UP (ref 13–44)
MCHC RBC-ENTMCNC: 28.3 PG — SIGNIFICANT CHANGE UP (ref 27–34)
MCHC RBC-ENTMCNC: 31.2 % — LOW (ref 32–36)
MCV RBC AUTO: 90.8 FL — SIGNIFICANT CHANGE UP (ref 80–100)
MONOCYTES # BLD AUTO: 0.46 K/UL — SIGNIFICANT CHANGE UP (ref 0–0.9)
MONOCYTES NFR BLD AUTO: 5.6 % — SIGNIFICANT CHANGE UP (ref 2–14)
NEUTROPHILS # BLD AUTO: 5.64 K/UL — SIGNIFICANT CHANGE UP (ref 1.8–7.4)
NEUTROPHILS NFR BLD AUTO: 69.1 % — SIGNIFICANT CHANGE UP (ref 43–77)
NRBC # FLD: 0 — SIGNIFICANT CHANGE UP
PCO2 BLDV: 42 MMHG — SIGNIFICANT CHANGE UP (ref 41–51)
PH BLDV: 7.39 PH — SIGNIFICANT CHANGE UP (ref 7.32–7.43)
PLATELET # BLD AUTO: 367 K/UL — SIGNIFICANT CHANGE UP (ref 150–400)
PMV BLD: 11 FL — SIGNIFICANT CHANGE UP (ref 7–13)
PO2 BLDV: 24 MMHG — LOW (ref 35–40)
POTASSIUM BLDV-SCNC: 4.4 MMOL/L — SIGNIFICANT CHANGE UP (ref 3.4–4.5)
POTASSIUM SERPL-MCNC: 4.7 MMOL/L — SIGNIFICANT CHANGE UP (ref 3.5–5.3)
POTASSIUM SERPL-SCNC: 4.7 MMOL/L — SIGNIFICANT CHANGE UP (ref 3.5–5.3)
PROT SERPL-MCNC: 7.6 G/DL — SIGNIFICANT CHANGE UP (ref 6–8.3)
RBC # BLD: 2.72 M/UL — LOW (ref 3.8–5.2)
RBC # FLD: 15 % — HIGH (ref 10.3–14.5)
SAO2 % BLDV: 33.5 % — LOW (ref 60–85)
SODIUM SERPL-SCNC: 141 MMOL/L — SIGNIFICANT CHANGE UP (ref 135–145)
WBC # BLD: 8.16 K/UL — SIGNIFICANT CHANGE UP (ref 3.8–10.5)
WBC # FLD AUTO: 8.16 K/UL — SIGNIFICANT CHANGE UP (ref 3.8–10.5)

## 2017-07-04 PROCEDURE — 99223 1ST HOSP IP/OBS HIGH 75: CPT | Mod: GC

## 2017-07-04 PROCEDURE — 73630 X-RAY EXAM OF FOOT: CPT | Mod: 26,LT

## 2017-07-04 RX ORDER — DEXTROSE 50 % IN WATER 50 %
12.5 SYRINGE (ML) INTRAVENOUS ONCE
Qty: 0 | Refills: 0 | Status: DISCONTINUED | OUTPATIENT
Start: 2017-07-04 | End: 2017-07-13

## 2017-07-04 RX ORDER — GLUCAGON INJECTION, SOLUTION 0.5 MG/.1ML
1 INJECTION, SOLUTION SUBCUTANEOUS ONCE
Qty: 0 | Refills: 0 | Status: DISCONTINUED | OUTPATIENT
Start: 2017-07-04 | End: 2017-07-13

## 2017-07-04 RX ORDER — DEXTROSE 50 % IN WATER 50 %
25 SYRINGE (ML) INTRAVENOUS ONCE
Qty: 0 | Refills: 0 | Status: DISCONTINUED | OUTPATIENT
Start: 2017-07-04 | End: 2017-07-13

## 2017-07-04 RX ORDER — INSULIN LISPRO 100/ML
VIAL (ML) SUBCUTANEOUS
Qty: 0 | Refills: 0 | Status: DISCONTINUED | OUTPATIENT
Start: 2017-07-04 | End: 2017-07-13

## 2017-07-04 RX ORDER — INSULIN GLARGINE 100 [IU]/ML
30 INJECTION, SOLUTION SUBCUTANEOUS AT BEDTIME
Qty: 0 | Refills: 0 | Status: DISCONTINUED | OUTPATIENT
Start: 2017-07-04 | End: 2017-07-04

## 2017-07-04 RX ORDER — KETOROLAC TROMETHAMINE 0.5 %
1 DROPS OPHTHALMIC (EYE)
Qty: 0 | Refills: 0 | Status: DISCONTINUED | OUTPATIENT
Start: 2017-07-04 | End: 2017-07-13

## 2017-07-04 RX ORDER — HEPARIN SODIUM 5000 [USP'U]/ML
5000 INJECTION INTRAVENOUS; SUBCUTANEOUS EVERY 8 HOURS
Qty: 0 | Refills: 0 | Status: DISCONTINUED | OUTPATIENT
Start: 2017-07-04 | End: 2017-07-13

## 2017-07-04 RX ORDER — INSULIN GLARGINE 100 [IU]/ML
25 INJECTION, SOLUTION SUBCUTANEOUS AT BEDTIME
Qty: 0 | Refills: 0 | Status: DISCONTINUED | OUTPATIENT
Start: 2017-07-04 | End: 2017-07-05

## 2017-07-04 RX ORDER — INSULIN DETEMIR 100/ML (3)
45 INSULIN PEN (ML) SUBCUTANEOUS
Qty: 0 | Refills: 0 | COMMUNITY

## 2017-07-04 RX ORDER — ZINC SULFATE TAB 220 MG (50 MG ZINC EQUIVALENT) 220 (50 ZN) MG
220 TAB ORAL DAILY
Qty: 0 | Refills: 0 | Status: DISCONTINUED | OUTPATIENT
Start: 2017-07-04 | End: 2017-07-13

## 2017-07-04 RX ORDER — CARVEDILOL PHOSPHATE 80 MG/1
12.5 CAPSULE, EXTENDED RELEASE ORAL EVERY 12 HOURS
Qty: 0 | Refills: 0 | Status: DISCONTINUED | OUTPATIENT
Start: 2017-07-04 | End: 2017-07-05

## 2017-07-04 RX ORDER — DEXTROSE 50 % IN WATER 50 %
50 SYRINGE (ML) INTRAVENOUS ONCE
Qty: 0 | Refills: 0 | Status: COMPLETED | OUTPATIENT
Start: 2017-07-04 | End: 2017-07-04

## 2017-07-04 RX ORDER — ASPIRIN/CALCIUM CARB/MAGNESIUM 324 MG
81 TABLET ORAL DAILY
Qty: 0 | Refills: 0 | Status: DISCONTINUED | OUTPATIENT
Start: 2017-07-04 | End: 2017-07-13

## 2017-07-04 RX ORDER — ATORVASTATIN CALCIUM 80 MG/1
20 TABLET, FILM COATED ORAL AT BEDTIME
Qty: 0 | Refills: 0 | Status: DISCONTINUED | OUTPATIENT
Start: 2017-07-04 | End: 2017-07-13

## 2017-07-04 RX ORDER — ERTAPENEM SODIUM 1 G/1
1000 INJECTION, POWDER, LYOPHILIZED, FOR SOLUTION INTRAMUSCULAR; INTRAVENOUS EVERY 24 HOURS
Qty: 0 | Refills: 0 | Status: DISCONTINUED | OUTPATIENT
Start: 2017-07-05 | End: 2017-07-11

## 2017-07-04 RX ORDER — CLOPIDOGREL BISULFATE 75 MG/1
75 TABLET, FILM COATED ORAL DAILY
Qty: 0 | Refills: 0 | Status: DISCONTINUED | OUTPATIENT
Start: 2017-07-04 | End: 2017-07-13

## 2017-07-04 RX ORDER — TIMOLOL 0.5 %
1 DROPS OPHTHALMIC (EYE)
Qty: 0 | Refills: 0 | Status: DISCONTINUED | OUTPATIENT
Start: 2017-07-04 | End: 2017-07-13

## 2017-07-04 RX ORDER — BRIMONIDINE TARTRATE 2 MG/MG
1 SOLUTION/ DROPS OPHTHALMIC
Qty: 0 | Refills: 0 | Status: DISCONTINUED | OUTPATIENT
Start: 2017-07-04 | End: 2017-07-13

## 2017-07-04 RX ORDER — INSULIN LISPRO 100/ML
VIAL (ML) SUBCUTANEOUS AT BEDTIME
Qty: 0 | Refills: 0 | Status: DISCONTINUED | OUTPATIENT
Start: 2017-07-04 | End: 2017-07-13

## 2017-07-04 RX ORDER — ACETAMINOPHEN 500 MG
650 TABLET ORAL EVERY 6 HOURS
Qty: 0 | Refills: 0 | Status: DISCONTINUED | OUTPATIENT
Start: 2017-07-04 | End: 2017-07-13

## 2017-07-04 RX ORDER — ISOSORBIDE MONONITRATE 60 MG/1
30 TABLET, EXTENDED RELEASE ORAL DAILY
Qty: 0 | Refills: 0 | Status: DISCONTINUED | OUTPATIENT
Start: 2017-07-04 | End: 2017-07-05

## 2017-07-04 RX ORDER — DEXTROSE 50 % IN WATER 50 %
1 SYRINGE (ML) INTRAVENOUS ONCE
Qty: 0 | Refills: 0 | Status: DISCONTINUED | OUTPATIENT
Start: 2017-07-04 | End: 2017-07-13

## 2017-07-04 RX ORDER — SODIUM CHLORIDE 9 MG/ML
1000 INJECTION INTRAMUSCULAR; INTRAVENOUS; SUBCUTANEOUS
Qty: 0 | Refills: 0 | Status: DISCONTINUED | OUTPATIENT
Start: 2017-07-04 | End: 2017-07-05

## 2017-07-04 RX ORDER — SODIUM CHLORIDE 9 MG/ML
1000 INJECTION, SOLUTION INTRAVENOUS
Qty: 0 | Refills: 0 | Status: DISCONTINUED | OUTPATIENT
Start: 2017-07-04 | End: 2017-07-13

## 2017-07-04 RX ADMIN — Medication 50 MILLILITER(S): at 17:04

## 2017-07-04 RX ADMIN — SODIUM CHLORIDE 75 MILLILITER(S): 9 INJECTION INTRAMUSCULAR; INTRAVENOUS; SUBCUTANEOUS at 21:09

## 2017-07-04 RX ADMIN — ATORVASTATIN CALCIUM 20 MILLIGRAM(S): 80 TABLET, FILM COATED ORAL at 22:26

## 2017-07-04 RX ADMIN — INSULIN GLARGINE 25 UNIT(S): 100 INJECTION, SOLUTION SUBCUTANEOUS at 22:31

## 2017-07-04 RX ADMIN — Medication 100 MILLIGRAM(S): at 16:14

## 2017-07-04 RX ADMIN — HEPARIN SODIUM 5000 UNIT(S): 5000 INJECTION INTRAVENOUS; SUBCUTANEOUS at 22:29

## 2017-07-04 NOTE — ED PROVIDER NOTE - PMH
CAD (coronary artery disease)    CAD (coronary artery disease)    Carotid artery stenosis    Carotid stenosis    Diabetes    DM (diabetes mellitus)    HTN (hypertension)    Hypercholesterolemia    Hypertension    Obesity    PAD (peripheral artery disease)  s/p R BKA  PVD (peripheral vascular disease)    S/P CABG x 3  in 2004, [facility]  Stented coronary artery  2010 [facility]  UTI (urinary tract infection)

## 2017-07-04 NOTE — H&P ADULT - PROBLEM SELECTOR PLAN 3
- Will continue aspirin, Plavix, carvedilol, isosorbide, atorvastatin No acute issues presently  Will continue aspirin, Plavix, carvedilol, isosorbide, atorvastatin

## 2017-07-04 NOTE — H&P ADULT - NSHPSOCIALHISTORY_GEN_ALL_CORE
Lives with , and two daughters  No drug, alcohol, tobacco use  Administers medications herself  Functional

## 2017-07-04 NOTE — H&P ADULT - NSHPLABSRESULTS_GEN_ALL_CORE
07-04    141  |  103  |  20  ----------------------------<  124<H>  4.7   |  23  |  1.31<H>    Ca    9.0      04 Jul 2017 14:40    TPro  7.6  /  Alb  3.0<L>  /  TBili  0.2  /  DBili  x   /  AST  16  /  ALT  17  /  AlkPhos  76  07-04                                              7.7    8.16  )-----------( 367      ( 04 Jul 2017 14:40 )             24.7     CAPILLARY BLOOD GLUCOSE  183 (04 Jul 2017 18:06)  66 (04 Jul 2017 16:54)

## 2017-07-04 NOTE — CONSULT NOTE ADULT - SUBJECTIVE AND OBJECTIVE BOX
Vascular Surgery Consult    HPI: 72F h/o peripheral vascular disease s/p right AKA and recent admission for infected wound of L foot s/p angiogram with balloon angioplasty of AT and SFA as well as stent placement into SFA and 4th and 5th toe amputations, now presenting with wound of 1st digit of L foot. She has had fevers over past few days, including an objective one in ED today as well as pruritis along the medial aspect of shin. She had been discharged on a course of levaquin to be completed on 7/7/17 for a total of 10 days. This morning her wound care nurse noted purulent discharge from her first toe and recommended that she come into hospital. She denies leg pain. She has been able to ambulate with her R leg prosthesis.       PAST MEDICAL & SURGICAL HISTORY:  UTI (urinary tract infection)  Carotid stenosis  PVD (peripheral vascular disease)  CAD (coronary artery disease)  Hypertension  Diabetes  Obesity  Hypercholesterolemia  HTN (hypertension)  DM (diabetes mellitus)  Carotid artery stenosis  PAD (peripheral artery disease): s/p R BKA  Stented coronary artery: 2010 [facility]  S/P CABG x 3: in 2004, [facility]  CAD (coronary artery disease)  History of hysterectomy  S/P BKA (below knee amputation) unilateral, right  S/P CABG x 3  S/P eye surgery: for glaucoma  S/P below knee amputation, right: 6/2010  S/P angioplasty with stent: 2009, 3/2014  S/P hysterectomy: 2004  Hx of CABG: 3v 2004      MEDICATIONS  (STANDING):  heparin  Injectable 5000 Unit(s) SubCutaneous every 8 hours  insulin lispro (HumaLOG) corrective regimen sliding scale   SubCutaneous three times a day before meals  insulin lispro (HumaLOG) corrective regimen sliding scale   SubCutaneous at bedtime  dextrose 5%. 1000 milliLiter(s) (50 mL/Hr) IV Continuous <Continuous>  dextrose 50% Injectable 12.5 Gram(s) IV Push once  dextrose 50% Injectable 25 Gram(s) IV Push once  aspirin  chewable 81 milliGRAM(s) Oral daily  isosorbide   mononitrate ER Tablet (IMDUR) 30 milliGRAM(s) Oral daily  dextrose 50% Injectable 25 Gram(s) IV Push once  insulin glargine Injectable (LANTUS) 25 Unit(s) SubCutaneous at bedtime  atorvastatin 20 milliGRAM(s) Oral at bedtime  clopidogrel Tablet 75 milliGRAM(s) Oral daily  carvedilol 12.5 milliGRAM(s) Oral every 12 hours  ketorolac 0.5% Ophthalmic Solution 1 Drop(s) Both EYES two times a day  brimonidine 0.2% Ophthalmic Solution 1 Drop(s) Both EYES two times a day  timolol 0.5% Solution 1 Drop(s) Both EYES two times a day    MEDICATIONS  (PRN):  acetaminophen   Tablet 650 milliGRAM(s) Oral every 6 hours PRN For Temp greater than 38 C (100.4 F)  dextrose Gel 1 Dose(s) Oral once PRN Blood Glucose LESS THAN 70 milliGRAM(s)/deciliter  glucagon  Injectable 1 milliGRAM(s) IntraMuscular once PRN Glucose LESS THAN 70 milligrams/deciliter      Allergies    sulfa drugs (Hives)  sulfa drugs (Rash)  Sulfac 10% (Hives)    Intolerances        Vital Signs Last 24 Hrs  T(C): 37.4 (04 Jul 2017 16:54), Max: 38 (04 Jul 2017 13:41)  T(F): 99.3 (04 Jul 2017 16:54), Max: 100.4 (04 Jul 2017 13:41)  HR: 78 (04 Jul 2017 16:54) (73 - 78)  BP: 129/62 (04 Jul 2017 16:54) (123/65 - 139/53)  BP(mean): --  RR: 16 (04 Jul 2017 16:54) (16 - 18)  SpO2: 100% (04 Jul 2017 16:54) (98% - 100%)    I&O's Summary      Physical Exam:  General: NAD  Respiratory: Nonlabored  Cardiovascular: RRR  Abdominal: Soft, nondistended, nontender  Extremities: Right lower extremity with prosthesis in place. Left lower extremity warm. Chronic skin changes along shin. Dry/cracking skin on dorsum and plantar aspect of foot. Wound proximal to toe-nail on dorsum of 1st toe. Unable to extrude pus.   The site of 4th and 5th toe amputations is partially open, no purulent discharge, no bleeding. No tenderness.  Pulse exam: Left DP signal, no PT signal.     LABS:                        7.7    8.16  )-----------( 367      ( 04 Jul 2017 14:40 )             24.7     07-04    141  |  103  |  20  ----------------------------<  124<H>  4.7   |  23  |  1.31<H>    Ca    9.0      04 Jul 2017 14:40    TPro  7.6  /  Alb  3.0<L>  /  TBili  0.2  /  DBili  x   /  AST  16  /  ALT  17  /  AlkPhos  76  07-04        IMAGING:    Assessment/Plan: AndersonyFemale presents with wound of her left foot and low grade fevers, no leukocytosis, s/p recent angioplasty and stent placement with preserved DP signal.  -Wound care  -IV antibiotics  -MRI to assess for osteomyelitis  -Repeat DUC/PVR's  -Continue patient's clopidogrel and aspirin    Discussed with Vascular Fellow.

## 2017-07-04 NOTE — H&P ADULT - PROBLEM SELECTOR PLAN 6
DVT ppx: heparin SQ  Diet: DASH, CC'  Out of bed to chair  Ambulation as tolerated (wears RLE prosthesis)  Fall precautions

## 2017-07-04 NOTE — H&P ADULT - PROBLEM SELECTOR PLAN 2
most likely prerenal in setting of infection  - Will given IVF for 10 hours and recheck BMP in AM  - Holding ARB most likely prerenal in setting of infection and dehydration   - Will given IVF for 10 hours and recheck BMP in AM  - Holding ARB presently 1,31(previously 1.23 on 06/29/2017  most likely prerenal in setting of infection and dehydration   - Will given IVF for 10 hours and recheck BMP in AM  - Holding ARB for now (re-evaluate for continuation)

## 2017-07-04 NOTE — ED PROVIDER NOTE - PROGRESS NOTE DETAILS
Podiatry saw pt, they request mri foot and admission under med service. They will follow pt in-house. Vascular surgery consulted.

## 2017-07-04 NOTE — ED PROVIDER NOTE - MEDICAL DECISION MAKING DETAILS
left hallus black with drainage, pt with low grade fever at home->iv abx, blood gas, basic labs, xray toe, adm for amputation

## 2017-07-04 NOTE — H&P ADULT - NSHPPHYSICALEXAM_GEN_ALL_CORE
PHYSICAL EXAM:  GENERAL: NAD, well-groomed, well-developed  HEAD:  Atraumatic, Normocephalic  EYES: EOMI, conjunctiva and sclera clear  ENMT: No tonsillar erythema, exudates, or enlargement; Moist mucous membranes, Good dentition, No lesions  NECK: L carotid bruit    CHEST/LUNG: Clear to percussion bilaterally; No rales, rhonchi, wheezing, or rubs  HEART: Regular rate and rhythm; No murmurs, rubs, or gallops  ABDOMEN: Soft, Nontender, Nondistended; Bowel sounds present  EXTREMITIES:  R LE prosthetic, L LE dressed- 2+ pitting edema to knees   SKIN: hyperpigmentation and venous changes on LLE  NERVOUS SYSTEM:  Alert & Oriented X3, Good concentration

## 2017-07-04 NOTE — H&P ADULT - PROBLEM SELECTOR PLAN 5
DVT ppx: heparin SQ  Diet: DASH, CC Hemoglobin = 7.7  - No sign of gross blood loss  - Most likely due to chronic diseases  - Consider anemia profile for possibility of coexisting iron deficiency

## 2017-07-04 NOTE — ED PROVIDER NOTE - OBJECTIVE STATEMENT
72yF hx IDDM2, neuropathy, PVD with R foot gangrene s/p R AKA (2010), L foot s/p 4th and 5th digit amputation on 6/29, CAD s/p stents and CABG, s/p CEA (2014), HTN sent to ED for hallus infxn. Pt discharged 6/30 with levaquin outpt. Despite taking her abx has noted continued swelling and pain to lle and drainage from left foot. Has daily wound care rn visits. Today wound care rn noted pus draining from lateral aspect of left hallus. Pt unable to feel toes. Sent to ED by Wound Care rn in setting of temp >100F.

## 2017-07-04 NOTE — ED ADULT NURSE NOTE - PMH
CAD (coronary artery disease)    CAD (coronary artery disease)    Carotid artery stenosis    Carotid stenosis    Diabetes    DM (diabetes mellitus)    HTN (hypertension)    Hypercholesterolemia    Hypertension    Obesity    PAD (peripheral artery disease)  s/p R BKA  PVD (peripheral vascular disease)    S/P CABG x 3  in 2004, [facility]  Stented coronary artery  2010 [facility]  UTI (urinary tract infection)

## 2017-07-04 NOTE — H&P ADULT - PROBLEM SELECTOR PLAN 1
s/p bedside I & D with 5 cc of pus removal   - Will continue clindamycin 600 q8hrs  - Would cultures pending  - Foot XRay pending, MRI pending for osteomyelitis rule out  - Podiatry team following  - Tylenol as needed for fevers   - Wound care consulted s/p bedside I & D with 5 cc of pus removal   - Will start ertapenem   - Would cultures pending  - Foot XRay pending, MRI pending for osteomyelitis rule out  - Podiatry team following  - Tylenol as needed for fevers   - Wound care consulted s/p bedside I & D with 5 cc of pus removal   - Was discharged home on Levaquin 500 mg QD  - S/P Clindamycin 900 mg in the ED, will instead resume Ertapenem 1000 mg IV QD that patient was receiving prior to discharge  - Would cultures pending  - Foot XRay official results in progress (please follow/up)   - MRI pending for osteomyelitis rule out (ordered, but not yet performed)  - Podiatry team following (appreciated)  - Tylenol as needed for fevers   - multivitamin (nephro-jennifer), zinc sulfate to aid wound healing (no Vit C in the setting of renal failure)  - Wound care consulted  - Suggest ID consult in the AM

## 2017-07-04 NOTE — ED PROVIDER NOTE - ATTENDING CONTRIBUTION TO CARE
agree with resident note  72yF hx IDDM2, neuropathy, PVD with R foot gangrene s/p R AKA (2010), L foot s/p 4th and 5th digit amputation on 6/29, CAD s/p stents and CABG, s/p CEA (2014), HTN.  who presents with left hallux infection.  Visiting nurse saw pt and saw pus draining from big toe.  Denies fever.    PE: well appearing; afebrile; VSS; CTAB/L; s1 s2 no m/r/g exT: left foot ulcer to lateral aspect with minimal drainage; pus draining from big toe

## 2017-07-04 NOTE — CONSULT NOTE ADULT - ASSESSMENT
73 yo F presents to ER for left hallux wound with purulent drainage.   - Pt seen and examined  - I & D performed including a 2 cm incision, with 5 cc of pus expressed from abscess  - Dry, sterile, dressing applied.  - Wound culture ordered  - Waiting on final xray read  - MRI ordered   - Rec admission for OM workup and IV abx  - D/w attending

## 2017-07-04 NOTE — ED ADULT NURSE NOTE - OBJECTIVE STATEMENT
Awake, oriented x3.  Purulent drainage noted from the L great toe.  Warm to touch.  Vitals stable.  IV accessed, labs sent.  Waiting for MD evaluation.  Fall precaution in place.  Family at the bedside.

## 2017-07-04 NOTE — H&P ADULT - PMH
CAD (coronary artery disease)    CAD (coronary artery disease)    Carotid artery stenosis    Carotid stenosis    Diabetes    DM (diabetes mellitus)    HTN (hypertension)    Hypercholesterolemia    Hypertension    Obesity    PAD (peripheral artery disease)  s/p R BKA  PVD (peripheral vascular disease)    S/P CABG x 3  in 2004, [facility]  Stented coronary artery  2010 [facility]  UTI (urinary tract infection) CAD (coronary artery disease)    Carotid artery stenosis    DM (diabetes mellitus)    Hypercholesterolemia    Hypertension    Obesity    PAD (peripheral artery disease)  s/p R BKA  S/P CABG x 3  in 2004, [facility]  Stented coronary artery  2010 [facility]

## 2017-07-04 NOTE — ED PROVIDER NOTE - PHYSICAL EXAMINATION
LLE: darkening and tenderness to lower leg, numbness to toes, s/p 4th and 5th digit amputation, sutures in place and pus draining, 1st digit dark w/ pus draining from lateral aspect, no sensation in 1st digit.

## 2017-07-04 NOTE — ED ADULT NURSE NOTE - CHIEF COMPLAINT
The patient is a 72y Female c/o L ankle pain on and off today, post op for L foot 4th, 5th toe amputation, d/maritza last friday.  Wound care visiting nurse came today and noted purulent draining to the L great toe, sent to ED.  History of dm, R bka, walks with R prosthesis, uses cane or walker as baseline.

## 2017-07-04 NOTE — H&P ADULT - HISTORY OF PRESENT ILLNESS
72 F pmhx DM2 c/b neuropathy, PVD s/p R AKA (2010) w/ prosthesis, CKD Stage III, CAD s/p stents, CABG and L CEA, HTN presenting from home because wound care nurse advised patient to come to the ED for concerns for L first toe infection. Patient was recently discharged from the hospital after being diagnosed with L 4th and 5th digit infection s/p stenting to the L SFA on 6/21 and L 4th and 5th partial metatarsal amputations. Patient was discharged on Levoquin with plans to complete antibiotic course on July 7th. Patient reports she has been having low grade temperatures  at home since she was discharged. Wound care nurse came for scheduled dressing changes today and noted open wound and drainage from L first digit. Patient was advised to come to the ED. Patient reports no recent trauma at the site. Daughter notes the patient's skin was dry and cracked. Patient has been taking her recommended insulin regimen of Lantus 40 at bedtime. Fingersticks have been . Patient did not have any symptoms of hypoglycemia. Denies nausea, vomiting, headaches, chest pain, SOB, palpitations, abdominal pain, diarrhea, constipation, dysuria, polyuria.     In the ED T 100.4 HR 78 /65 RR18 SpO2 98 FS 66. Patient was given clindamycin and dextrose.

## 2017-07-04 NOTE — H&P ADULT - ASSESSMENT
72 F pmhx DM2 c/b neuropathy, PVD s/p R AKA (2010) w/ prosthesis, CKD Stage III, CAD s/p stents, CABG and L CEA, HTN presenting L hallux infection s/p bedside I & D with 5 cc of pus expressed from abscess

## 2017-07-04 NOTE — H&P ADULT - NSHPREVIEWOFSYSTEMS_GEN_ALL_CORE
REVIEW OF SYSTEMS:  CONSTITUTIONAL: no weight loss, or fatigue  EYES: No eye pain, visual disturbances, or discharge  ENMT:  No sinus or throat pain  RESPIRATORY: No cough, wheezing, chills or hemoptysis; No shortness of breath  CARDIOVASCULAR: No chest pain, palpitations, dizziness, or leg swelling  GASTROINTESTINAL: No abdominal or epigastric pain. No nausea, vomiting, or hematemesis; No diarrhea or constipation.   GENITOURINARY: No dysuria, frequency, hematuria, or incontinence  NEUROLOGICAL: No headaches, memory loss, loss of strength, numbness, or tremors  SKIN: No itching, burning, rashes, or lesions   MUSCULOSKELETAL: L leg neuropathy pain   HEME/LYMPH: No easy bruising, or bleeding gums  ALLERY AND IMMUNOLOGIC: No hives or eczema

## 2017-07-04 NOTE — H&P ADULT - PROBLEM SELECTOR PLAN 4
Ha1c 7.9 06/17  - Will given Lantus 25 at bedtime (instead of home Lantus 40) in setting of hypoglycemia   - ISS and FS Ha1c 7.9 06/17  - Will given Lantus 25 at bedtime (instead of home Lantus 40) in setting of hypoglycemia (FS = 66 in the ED)  - ISS and FS

## 2017-07-05 DIAGNOSIS — I73.9 PERIPHERAL VASCULAR DISEASE, UNSPECIFIED: ICD-10-CM

## 2017-07-05 DIAGNOSIS — D64.9 ANEMIA, UNSPECIFIED: ICD-10-CM

## 2017-07-05 DIAGNOSIS — N17.9 ACUTE KIDNEY FAILURE, UNSPECIFIED: ICD-10-CM

## 2017-07-05 DIAGNOSIS — N18.3 CHRONIC KIDNEY DISEASE, STAGE 3 (MODERATE): ICD-10-CM

## 2017-07-05 LAB
24R-OH-CALCIDIOL SERPL-MCNC: 8.5 NG/ML — LOW (ref 30–100)
BUN SERPL-MCNC: 16 MG/DL — SIGNIFICANT CHANGE UP (ref 7–23)
CALCIUM SERPL-MCNC: 8.7 MG/DL — SIGNIFICANT CHANGE UP (ref 8.4–10.5)
CHLORIDE SERPL-SCNC: 105 MMOL/L — SIGNIFICANT CHANGE UP (ref 98–107)
CO2 SERPL-SCNC: 23 MMOL/L — SIGNIFICANT CHANGE UP (ref 22–31)
CREAT SERPL-MCNC: 1.08 MG/DL — SIGNIFICANT CHANGE UP (ref 0.5–1.3)
FERRITIN SERPL-MCNC: 114 NG/ML — SIGNIFICANT CHANGE UP (ref 15–150)
GLUCOSE SERPL-MCNC: 146 MG/DL — HIGH (ref 70–99)
HCT VFR BLD CALC: 27 % — LOW (ref 34.5–45)
HGB BLD-MCNC: 8.2 G/DL — LOW (ref 11.5–15.5)
IRON SATN MFR SERPL: 186 UG/DL — SIGNIFICANT CHANGE UP (ref 140–530)
IRON SATN MFR SERPL: 19 UG/DL — LOW (ref 30–160)
MAGNESIUM SERPL-MCNC: 1.8 MG/DL — SIGNIFICANT CHANGE UP (ref 1.6–2.6)
MCHC RBC-ENTMCNC: 27.9 PG — SIGNIFICANT CHANGE UP (ref 27–34)
MCHC RBC-ENTMCNC: 30.4 % — LOW (ref 32–36)
MCV RBC AUTO: 91.8 FL — SIGNIFICANT CHANGE UP (ref 80–100)
NRBC # FLD: 0 — SIGNIFICANT CHANGE UP
OB PNL STL: NEGATIVE — SIGNIFICANT CHANGE UP
PHOSPHATE SERPL-MCNC: 3.4 MG/DL — SIGNIFICANT CHANGE UP (ref 2.5–4.5)
PLATELET # BLD AUTO: 401 K/UL — HIGH (ref 150–400)
PMV BLD: 11.4 FL — SIGNIFICANT CHANGE UP (ref 7–13)
POTASSIUM SERPL-MCNC: 4.6 MMOL/L — SIGNIFICANT CHANGE UP (ref 3.5–5.3)
POTASSIUM SERPL-SCNC: 4.6 MMOL/L — SIGNIFICANT CHANGE UP (ref 3.5–5.3)
RBC # BLD: 2.94 M/UL — LOW (ref 3.8–5.2)
RBC # FLD: 15 % — HIGH (ref 10.3–14.5)
RETICS #: 76.7 10X3/UL — HIGH (ref 17–73)
RETICS/RBC NFR: 2.6 % — HIGH (ref 0.5–2.5)
SODIUM SERPL-SCNC: 144 MMOL/L — SIGNIFICANT CHANGE UP (ref 135–145)
SPECIMEN SOURCE: SIGNIFICANT CHANGE UP
TSH SERPL-MCNC: 0.6 UIU/ML — SIGNIFICANT CHANGE UP (ref 0.27–4.2)
UIBC SERPL-MCNC: 167 UG/DL — SIGNIFICANT CHANGE UP (ref 110–370)
WBC # BLD: 6.47 K/UL — SIGNIFICANT CHANGE UP (ref 3.8–10.5)
WBC # FLD AUTO: 6.47 K/UL — SIGNIFICANT CHANGE UP (ref 3.8–10.5)

## 2017-07-05 PROCEDURE — 99222 1ST HOSP IP/OBS MODERATE 55: CPT

## 2017-07-05 PROCEDURE — 99232 SBSQ HOSP IP/OBS MODERATE 35: CPT

## 2017-07-05 PROCEDURE — 99233 SBSQ HOSP IP/OBS HIGH 50: CPT | Mod: GC

## 2017-07-05 PROCEDURE — 99222 1ST HOSP IP/OBS MODERATE 55: CPT | Mod: GC

## 2017-07-05 PROCEDURE — 93922 UPR/L XTREMITY ART 2 LEVELS: CPT | Mod: 26,LT

## 2017-07-05 PROCEDURE — 73720 MRI LWR EXTREMITY W/O&W/DYE: CPT | Mod: 26,LT

## 2017-07-05 RX ORDER — VANCOMYCIN HCL 1 G
VIAL (EA) INTRAVENOUS
Qty: 0 | Refills: 0 | Status: DISCONTINUED | OUTPATIENT
Start: 2017-07-05 | End: 2017-07-07

## 2017-07-05 RX ORDER — INSULIN GLARGINE 100 [IU]/ML
30 INJECTION, SOLUTION SUBCUTANEOUS AT BEDTIME
Qty: 0 | Refills: 0 | Status: DISCONTINUED | OUTPATIENT
Start: 2017-07-05 | End: 2017-07-06

## 2017-07-05 RX ORDER — ISOSORBIDE MONONITRATE 60 MG/1
30 TABLET, EXTENDED RELEASE ORAL DAILY
Qty: 0 | Refills: 0 | Status: DISCONTINUED | OUTPATIENT
Start: 2017-07-05 | End: 2017-07-13

## 2017-07-05 RX ORDER — VANCOMYCIN HCL 1 G
1250 VIAL (EA) INTRAVENOUS ONCE
Qty: 0 | Refills: 0 | Status: COMPLETED | OUTPATIENT
Start: 2017-07-05 | End: 2017-07-05

## 2017-07-05 RX ORDER — VANCOMYCIN HCL 1 G
1250 VIAL (EA) INTRAVENOUS EVERY 24 HOURS
Qty: 0 | Refills: 0 | Status: DISCONTINUED | OUTPATIENT
Start: 2017-07-06 | End: 2017-07-07

## 2017-07-05 RX ORDER — CARVEDILOL PHOSPHATE 80 MG/1
12.5 CAPSULE, EXTENDED RELEASE ORAL EVERY 12 HOURS
Qty: 0 | Refills: 0 | Status: DISCONTINUED | OUTPATIENT
Start: 2017-07-05 | End: 2017-07-13

## 2017-07-05 RX ORDER — VALSARTAN 80 MG/1
320 TABLET ORAL DAILY
Qty: 0 | Refills: 0 | Status: DISCONTINUED | OUTPATIENT
Start: 2017-07-05 | End: 2017-07-13

## 2017-07-05 RX ADMIN — Medication 81 MILLIGRAM(S): at 12:33

## 2017-07-05 RX ADMIN — Medication 1 TABLET(S): at 12:33

## 2017-07-05 RX ADMIN — Medication 1: at 09:18

## 2017-07-05 RX ADMIN — Medication 1 DROP(S): at 06:25

## 2017-07-05 RX ADMIN — BRIMONIDINE TARTRATE 1 DROP(S): 2 SOLUTION/ DROPS OPHTHALMIC at 17:54

## 2017-07-05 RX ADMIN — Medication 1 DROP(S): at 17:54

## 2017-07-05 RX ADMIN — ERTAPENEM SODIUM 120 MILLIGRAM(S): 1 INJECTION, POWDER, LYOPHILIZED, FOR SOLUTION INTRAMUSCULAR; INTRAVENOUS at 00:46

## 2017-07-05 RX ADMIN — ATORVASTATIN CALCIUM 20 MILLIGRAM(S): 80 TABLET, FILM COATED ORAL at 21:57

## 2017-07-05 RX ADMIN — INSULIN GLARGINE 30 UNIT(S): 100 INJECTION, SOLUTION SUBCUTANEOUS at 22:40

## 2017-07-05 RX ADMIN — ZINC SULFATE TAB 220 MG (50 MG ZINC EQUIVALENT) 220 MILLIGRAM(S): 220 (50 ZN) TAB at 12:33

## 2017-07-05 RX ADMIN — CLOPIDOGREL BISULFATE 75 MILLIGRAM(S): 75 TABLET, FILM COATED ORAL at 12:32

## 2017-07-05 RX ADMIN — HEPARIN SODIUM 5000 UNIT(S): 5000 INJECTION INTRAVENOUS; SUBCUTANEOUS at 15:01

## 2017-07-05 RX ADMIN — VALSARTAN 320 MILLIGRAM(S): 80 TABLET ORAL at 09:19

## 2017-07-05 RX ADMIN — HEPARIN SODIUM 5000 UNIT(S): 5000 INJECTION INTRAVENOUS; SUBCUTANEOUS at 06:19

## 2017-07-05 RX ADMIN — Medication 1: at 12:35

## 2017-07-05 RX ADMIN — ISOSORBIDE MONONITRATE 30 MILLIGRAM(S): 60 TABLET, EXTENDED RELEASE ORAL at 12:33

## 2017-07-05 RX ADMIN — CARVEDILOL PHOSPHATE 12.5 MILLIGRAM(S): 80 CAPSULE, EXTENDED RELEASE ORAL at 17:52

## 2017-07-05 RX ADMIN — CARVEDILOL PHOSPHATE 12.5 MILLIGRAM(S): 80 CAPSULE, EXTENDED RELEASE ORAL at 06:19

## 2017-07-05 RX ADMIN — Medication 1 DROP(S): at 06:24

## 2017-07-05 RX ADMIN — Medication 166.67 MILLIGRAM(S): at 09:19

## 2017-07-05 RX ADMIN — BRIMONIDINE TARTRATE 1 DROP(S): 2 SOLUTION/ DROPS OPHTHALMIC at 06:25

## 2017-07-05 RX ADMIN — Medication 2: at 17:52

## 2017-07-05 RX ADMIN — HEPARIN SODIUM 5000 UNIT(S): 5000 INJECTION INTRAVENOUS; SUBCUTANEOUS at 21:58

## 2017-07-05 NOTE — PROGRESS NOTE ADULT - PROBLEM SELECTOR PLAN 1
S/p bedside I & D in ED  - S/P Clindamycin 900 mg x1 in ED, will instead resume Ertapenem 1000 mg IV QD that patient was receiving prior to discharge and start vancomycin to cover for polymicrobial infection  - Will consult ID for further antibiotic recommendations  - F/u podiatry recs  - F/u vascular surgery recs- recommend repeat DUC/PVRs in order to determine necessity of interventions  - Wound cultures pending, f/u blood cultures  - F/u L foot x-ray final read  - MRI pending to r/o osteomyelitis  - Wound care consulted

## 2017-07-05 NOTE — PROGRESS NOTE ADULT - ASSESSMENT
Assessment/Plan: 72y Female s/p LLE angio with angioplasty of AT and SFA, SFA stent placement; R AKA (2010) w/ prosthesis, CKD Stage III, CAD s/p stents, CABG, HTN, DM c/b neuropathy presents with a L hallux wound s/p I&D by podiatry. XR preliminarily negative for OM.     - Need for vascular intervention pending results of DUC/PVR  - MRI to r/o OM pending   - C/w Abx for wound  - F/u wound cultures Assessment/Plan: 72y Female s/p LLE angio with angioplasty of AT and SFA, SFA stent placement; R AKA (2010) w/ prosthesis, CKD Stage III, CAD s/p stents, CABG, HTN, DM c/b neuropathy presents with a L hallux wound s/p I&D by podiatry. XR preliminarily negative for OM. DUC/PVR during last admission after angioplasty and stent shows improvement.    - Need for vascular intervention pending results of DUC/PVR  - MRI to r/o OM pending   - F/u podiatry  - C/w Abx for wound. F/u wound cultures.  - C/w ASA and Plavix  - Rest of care per primary team Assessment/Plan: 72y Female s/p LLE angio with angioplasty of AT and SFA, SFA stent placement; R AKA (2010) w/ prosthesis, CKD Stage III, CAD s/p stents, CABG, HTN, DM c/b neuropathy presents with a L hallux wound s/p I&D by podiatry. Afebrile, no leukocytosis. XR preliminarily negative for OM. DUC/PVR during last admission after angioplasty and stent shows improvement.    - Need for vascular intervention pending results of DUC/PVR  - MRI to r/o OM pending   - F/u podiatry  - C/w Abx for wound. F/u wound cultures.  - C/w ASA and Plavix  - Rest of care per primary team

## 2017-07-05 NOTE — PROGRESS NOTE ADULT - PROBLEM SELECTOR PLAN 3
S/p stent to L SFA on last admission in June 2017  -F/u vascular surgery recs  -F/u repeat DUC/PVRs  -C/w ASA and Plavix

## 2017-07-05 NOTE — PROGRESS NOTE ADULT - PROBLEM SELECTOR PLAN 5
Ha1c 7.9 06/17  - Given Lantus 25u given hypoglycemia to 66 in ED yesterday  - Monitor FS today and will readjust Lantus dose as needed, pt currently not on any pre-meal Humalog  - ISS and FS with meals and at bedtime

## 2017-07-05 NOTE — CONSULT NOTE ADULT - ASSESSMENT
72 year old with DM, CKD, PVD s/p revascularization/ toe amputation who presents with low grade fever and pus at first toe.       Left 4th/ 5th toes- s/p amputation. Margins are clear. Antibiotics through 7/7 should be adequate for this process    Left First toe- now draining pus. There is a left tow abscess and concern for underlying OM  Podiatry is following  Continue vanco/ ertapenem pending would culture/ blood culture/ imaging

## 2017-07-05 NOTE — PROGRESS NOTE ADULT - ASSESSMENT
73 yo F presents to ER for left hallux wound with purulent drainage.   - Pt seen and examined  - hallux wound dressed with betadine. Collagenase to open surgical wound  - Awaiting Wound culture   - MRI pending  - awaiting MRI for pod plan  - seen w/ attending

## 2017-07-05 NOTE — PROGRESS NOTE ADULT - SUBJECTIVE AND OBJECTIVE BOX
Patient is a 72y old  Female who presents with a chief complaint of Toe infection (04 Jul 2017 23:00)       INTERVAL HPI/OVERNIGHT EVENTS:  Patient seen and evaluated at bedside.  Pt is resting comfortable in NAD. Denies N/V/F/C.  Pain rated at 0/10    Allergies    sulfa drugs (Hives)  sulfa drugs (Rash)  Sulfac 10% (Hives)    Intolerances        Vital Signs Last 24 Hrs  T(C): 36.7 (05 Jul 2017 06:15), Max: 38 (04 Jul 2017 13:41)  T(F): 98.1 (05 Jul 2017 06:15), Max: 100.4 (04 Jul 2017 13:41)  HR: 62 (05 Jul 2017 09:14) (62 - 80)  BP: 141/62 (05 Jul 2017 09:14) (123/65 - 183/77)  BP(mean): --  RR: 16 (05 Jul 2017 06:15) (16 - 18)  SpO2: 98% (05 Jul 2017 06:15) (97% - 100%)    LABS:                        8.2    6.47  )-----------( 401      ( 05 Jul 2017 05:20 )             27.0     07-05    144  |  105  |  16  ----------------------------<  146<H>  4.6   |  23  |  1.08    Ca    8.7      05 Jul 2017 05:20  Phos  3.4     07-05  Mg     1.8     07-05    TPro  7.6  /  Alb  3.0<L>  /  TBili  0.2  /  DBili  x   /  AST  16  /  ALT  17  /  AlkPhos  76  07-04        CAPILLARY BLOOD GLUCOSE  164 (05 Jul 2017 08:27)  203 (04 Jul 2017 20:55)  183 (04 Jul 2017 18:06)  66 (04 Jul 2017 16:54)          Lower Extremity Physical Exam:  Vascular: DP 0/4, PT 2/4. Temp gradient WNL LLE.  Neuro: Epicritic sensation diminished to the level of the foot, L.  Musculoskeletal/Ortho: R leg s/p AKA, L foot s/p partial 4th and 5th ray resections. Surgical site left partially open.   proximal incision closed with sutures (intact). Open distally with packing. Minimal sanguinous drainage.  fibronecrotic base, no signs of infection    Left hallux, i and D site dorsal wound probes to capsule, no malodor, mild erythema noted, no active drainage, no tunneling, no purulence noted    RADIOLOGY & ADDITIONAL TESTS:

## 2017-07-05 NOTE — CONSULT NOTE ADULT - SUBJECTIVE AND OBJECTIVE BOX
HPI:  72 F pmhx DM2 c/b neuropathy, PVD s/p R AKA (2010) w/ prosthesis, CKD Stage III, CAD s/p stents, CABG and L CEA, HTN presenting from home because wound care nurse advised patient to come to the ED for concerns for L first toe infection.    katlyn was recently discharged from the hospital after being diagnosed with L 4th and 5th digit infection s/p stenting to the L SFA on 6/21 and L 4th and 5th partial metatarsal amputations. Patient was discharged on Levaquin with plans to complete antibiotic course on July 7th.     Patient reports she has been having low grade temperatures  at home since she was discharged. Wound care nurse came for scheduled dressing changes today and noted open wound and drainage from L first digit. Patient was advised to come to the ED. Patient reports no recent trauma at the site. Daughter notes the patient's skin was dry and cracked. Patient has been taking her recommended insulin regimen of Lantus 40 at bedtime. Fingersticks have been . Patient did not have any symptoms of hypoglycemia. Denies nausea, vomiting, headaches, chest pain, SOB, palpitations, abdominal pain, diarrhea, constipation, dysuria, polyuria.     In the ED T 100.4 HR 78 /65 RR18 SpO2 98 FS 66. Patient was given clindamycin and dextrose. (04 Jul 2017 19:41)      PAST MEDICAL & SURGICAL HISTORY:  CAD (coronary artery disease)  Hypertension  Obesity  Hypercholesterolemia  DM (diabetes mellitus)  Carotid artery stenosis  PAD (peripheral artery disease): s/p R BKA  Stented coronary artery: 2010 [facility]  S/P CABG x 3: in 2004, [facility]  History of hysterectomy  S/P BKA (below knee amputation) unilateral, right  S/P CABG x 3  S/P eye surgery: for glaucoma  S/P below knee amputation, right: 6/2010  S/P angioplasty with stent: 2009, 3/2014  S/P hysterectomy: 2004  Hx of CABG: 3v 2004      Allergies    sulfa drugs (Hives)  sulfa drugs (Rash)  Sulfac 10% (Hives)    Intolerances        ANTIMICROBIALS:  ertapenem  IVPB 1000 every 24 hours  vancomycin  IVPB        OTHER MEDS:  heparin  Injectable 5000 Unit(s) SubCutaneous every 8 hours  acetaminophen   Tablet 650 milliGRAM(s) Oral every 6 hours PRN  insulin lispro (HumaLOG) corrective regimen sliding scale   SubCutaneous three times a day before meals  insulin lispro (HumaLOG) corrective regimen sliding scale   SubCutaneous at bedtime  dextrose 5%. 1000 milliLiter(s) IV Continuous <Continuous>  dextrose Gel 1 Dose(s) Oral once PRN  dextrose 50% Injectable 12.5 Gram(s) IV Push once  dextrose 50% Injectable 25 Gram(s) IV Push once  aspirin  chewable 81 milliGRAM(s) Oral daily  dextrose 50% Injectable 25 Gram(s) IV Push once  glucagon  Injectable 1 milliGRAM(s) IntraMuscular once PRN  insulin glargine Injectable (LANTUS) 25 Unit(s) SubCutaneous at bedtime  atorvastatin 20 milliGRAM(s) Oral at bedtime  clopidogrel Tablet 75 milliGRAM(s) Oral daily  ketorolac 0.5% Ophthalmic Solution 1 Drop(s) Both EYES two times a day  brimonidine 0.2% Ophthalmic Solution 1 Drop(s) Both EYES two times a day  timolol 0.5% Solution 1 Drop(s) Both EYES two times a day  zinc sulfate 220 milliGRAM(s) Oral daily  Nephro-jennifer 1 Tablet(s) Oral daily  carvedilol 12.5 milliGRAM(s) Oral every 12 hours  isosorbide   mononitrate ER Tablet (IMDUR) 30 milliGRAM(s) Oral daily  valsartan 320 milliGRAM(s) Oral daily      SOCIAL HISTORY: no tobacco, no IDU    FAMILY HISTORY:  Family history of diabetes mellitus (Sibling)      REVIEW OF SYSTEMS  [  ] ROS unobtainable because:    [ x ] All other systems negative except as noted below:	    Constitutional:  [x ] fever [ ] weight loss  Skin:  [ x] draining would left toe	  Eyes: [ ] icterus [ ] inflammation	  ENMT: [ ] discharge [ ] thrush [ ] ulcers [ ] exudates  Respiratory: [ ] dyspnea [ ] hemoptysis [ ] cough [ ] sputum	  Cardiovascular:  [ ] chest pain [ ] palpitations [ ] edema	  Gastrointestinal:  [ ] nausea [ ] vomiting [ ] diarrhea [ ] constipation [ ] pain	  Genitourinary:  [ ] dysuria [ ] frequency [ ] hematuria [ ] discharge [ ] flank pain  Musculoskeletal:  [ ] myalgias [ ] arthralgias [ ] arthritis	  Neurological:  [ ] headache [ ] seizures	  Psychiatric:  [ ] anxiety [ ] depression	  Hematology/Lymphatics:  [ ] lymphadenopathy  Endocrine:  [ ] adrenal [ ] thyroid  Allergic/Immunologic:	 [ ] transplant [ ] seasonal    PHYSICAL EXAM:  General: [x ] non-toxic  HEAD/EYES: [ ] PERRL [x ] white sclera [ ] icterus  ENT:  [x ] normal [x ] supple [ ] thrush [ ] pharyngeal exudate  Cardiovascular:   [ ] murmur [x ] normal [ ] PPM/AICD  Respiratory:  [x ] clear to ausculation bilaterally  GI:  [x ] soft, non-tender, normal bowel sounds  :  [ ] howe [x ] no CVA tenderness   Musculoskeletal:  [x ] no synovitis  Neurologic:  [x ] non-focal exam   Skin:  [ ] dressing in place left foot  Lymph: [ ] no lymphadenopathy  Psychiatric:  [ ] appropriate affect [x ] alert & oriented  Lines:  x[ ] no phlebitis [ ] central line          Drug Dosing Weight  Height (cm): 167.64 (04 Jul 2017 20:55)  Weight (kg): 88.4 (04 Jul 2017 20:55)  BMI (kg/m2): 31.5 (04 Jul 2017 20:55)  BSA (m2): 1.98 (04 Jul 2017 20:55)    Vital Signs Last 24 Hrs  T(F): 98.1 (07-05-17 @ 06:15), Max: 100.4 (07-04-17 @ 13:41)    Vital Signs Last 24 Hrs  HR: 62 (07-05-17 @ 09:14) (62 - 80)  BP: 141/62 (07-05-17 @ 09:14) (123/65 - 183/77)  RR: 16 (07-05-17 @ 06:15)  SpO2: 98% (07-05-17 @ 06:15) (97% - 100%)  Wt(kg): --                          8.2    6.47  )-----------( 401      ( 05 Jul 2017 05:20 )             27.0       07-05    144  |  105  |  16  ----------------------------<  146<H>  4.6   |  23  |  1.08    Ca    8.7      05 Jul 2017 05:20  Phos  3.4     07-05  Mg     1.8     07-05    TPro  7.6  /  Alb  3.0<L>  /  TBili  0.2  /  DBili  x   /  AST  16  /  ALT  17  /  AlkPhos  76  07-04          MICROBIOLOGY:  Blood Culture 7/4: testing  Wound culture 7/4: testing    RADIOLOGY:

## 2017-07-05 NOTE — PHYSICAL THERAPY INITIAL EVALUATION ADULT - ACTIVE RANGE OF MOTION EXAMINATION, REHAB EVAL
bilateral  lower extremity Active ROM was WFL (within functional limits)/except s/p R BKA/bilateral upper extremity Active ROM was WFL (within functional limits)

## 2017-07-05 NOTE — PROGRESS NOTE ADULT - PROBLEM SELECTOR PLAN 2
Creatinine currently at pt's baseline   - S/p IVF for 10h  - Will restart home dose of valsartan given elevated BP

## 2017-07-05 NOTE — PROGRESS NOTE ADULT - SUBJECTIVE AND OBJECTIVE BOX
Patient is a 72y old  Female who presents with a chief complaint of Toe infection (04 Jul 2017 23:00)      INTERVAL HPI/OVERNIGHT EVENTS:    MEDICATIONS (STANDING):  heparin  Injectable 5000 Unit(s) SubCutaneous every 8 hours  insulin lispro (HumaLOG) corrective regimen sliding scale   SubCutaneous three times a day before meals  insulin lispro (HumaLOG) corrective regimen sliding scale   SubCutaneous at bedtime  dextrose 5%. 1000 milliLiter(s) IV Continuous <Continuous>  dextrose 50% Injectable 12.5 Gram(s) IV Push once  dextrose 50% Injectable 25 Gram(s) IV Push once  aspirin  chewable 81 milliGRAM(s) Oral daily  dextrose 50% Injectable 25 Gram(s) IV Push once  insulin glargine Injectable (LANTUS) 25 Unit(s) SubCutaneous at bedtime  atorvastatin 20 milliGRAM(s) Oral at bedtime  clopidogrel Tablet 75 milliGRAM(s) Oral daily  ketorolac 0.5% Ophthalmic Solution 1 Drop(s) Both EYES two times a day  brimonidine 0.2% Ophthalmic Solution 1 Drop(s) Both EYES two times a day  timolol 0.5% Solution 1 Drop(s) Both EYES two times a day  sodium chloride 0.9%. 1000 milliLiter(s) IV Continuous <Continuous>  ertapenem  IVPB 1000 milliGRAM(s) IV Intermittent every 24 hours  zinc sulfate 220 milliGRAM(s) Oral daily  Nephro-jennifer 1 Tablet(s) Oral daily  carvedilol 12.5 milliGRAM(s) Oral every 12 hours  isosorbide   mononitrate ER Tablet (IMDUR) 30 milliGRAM(s) Oral daily    MEDICATIONS  (PRN):  acetaminophen   Tablet 650 milliGRAM(s) Oral every 6 hours PRN  dextrose Gel 1 Dose(s) Oral once PRN  glucagon  Injectable 1 milliGRAM(s) IntraMuscular once PRN      REVIEW OF SYSTEMS:  CONSTITUTIONAL: No fever, weight loss, or fatigue  EYES: No eye pain, visual disturbances, or discharge  ENMT:  No difficulty hearing, tinnitus, vertigo; No sinus or throat pain  NECK: No pain or stiffness  BREASTS: No pain, masses, or nipple discharge  RESPIRATORY: No cough, wheezing, chills or hemoptysis; No shortness of breath  CARDIOVASCULAR: No chest pain, palpitations, dizziness, or leg swelling  GASTROINTESTINAL: No abdominal or epigastric pain. No nausea, vomiting, or hematemesis; No diarrhea or constipation. No melena or hematochezia.  GENITOURINARY: No dysuria, frequency, hematuria, or incontinence  NEUROLOGICAL: No headaches, memory loss, loss of strength, numbness, or tremors  SKIN: No itching, burning, rashes, or lesions   MUSCULOSKELETAL: No joint pain or swelling; No muscle, back, or extremity pain    T(F): 98.1 (07-05-17 @ 06:15), Max: 100.4 (07-04-17 @ 13:41)  HR: 64 (07-05-17 @ 07:15) (64 - 80)  BP: 131/59 (07-05-17 @ 07:15) (123/65 - 183/77)  RR: 16 (07-05-17 @ 06:15) (16 - 18)  SpO2: 98% (07-05-17 @ 06:15) (97% - 100%)  Wt(kg): --  CAPILLARY BLOOD GLUCOSE  203 (04 Jul 2017 20:55)  183 (04 Jul 2017 18:06)  66 (04 Jul 2017 16:54)        I&O's Summary      PHYSICAL EXAM:  GENERAL: NAD, well-groomed, well-developed  HEAD:  Atraumatic, Normocephalic  EYES: EOMI, PERRLA, conjunctiva and sclera clear  ENMT: No tonsillar erythema, exudates, or enlargement; Moist mucous membranes, Good dentition, No lesions  NECK: Supple, No JVD, Normal thyroid  NERVOUS SYSTEM:  Alert & Oriented X3, Good concentration; Motor Strength 5/5 B/L upper and lower extremities; DTRs 2+ intact and symmetric  CHEST/LUNG: Clear to percussion bilaterally; No rales, rhonchi, wheezing, or rubs  HEART: Regular rate and rhythm; No murmurs, rubs, or gallops  ABDOMEN: Soft, Nontender, Nondistended; Bowel sounds present  EXTREMITIES:  2+ Peripheral Pulses, No clubbing, cyanosis, or edema  LYMPH: No lymphadenopathy noted  SKIN: No rashes or lesions    LABS:                        8.2    6.47  )-----------( 401      ( 05 Jul 2017 05:20 )             27.0     07-04    141  |  103  |  20  ----------------------------<  124<H>  4.7   |  23  |  1.31<H>    Ca    9.0      04 Jul 2017 14:40    TPro  7.6  /  Alb  3.0<L>  /  TBili  0.2  /  DBili  x   /  AST  16  /  ALT  17  /  AlkPhos  76  07-04            RADIOLOGY & ADDITIONAL TESTS:    XRay:    CTScan:    MRI:     Imaging Personally Reviewed:  [ ] YES  [ ] NO    Consultant(s) Notes Reviewed:  [ ] YES  [ ] NO    Care Discussed with Consultants/Other Providers [ ] YES  [ ] NO Patient is a 72y old  Female who presents with a chief complaint of Toe infection (04 Jul 2017 23:00)      INTERVAL HPI/OVERNIGHT EVENTS: Tmax 100.4 in ED. Pt feeling well, has no pain in her left leg. Dry cough improving.    MEDICATIONS (STANDING):  heparin  Injectable 5000 Unit(s) SubCutaneous every 8 hours  insulin lispro (HumaLOG) corrective regimen sliding scale   SubCutaneous three times a day before meals  insulin lispro (HumaLOG) corrective regimen sliding scale   SubCutaneous at bedtime  dextrose 5%. 1000 milliLiter(s) IV Continuous <Continuous>  dextrose 50% Injectable 12.5 Gram(s) IV Push once  dextrose 50% Injectable 25 Gram(s) IV Push once  aspirin  chewable 81 milliGRAM(s) Oral daily  dextrose 50% Injectable 25 Gram(s) IV Push once  insulin glargine Injectable (LANTUS) 25 Unit(s) SubCutaneous at bedtime  atorvastatin 20 milliGRAM(s) Oral at bedtime  clopidogrel Tablet 75 milliGRAM(s) Oral daily  ketorolac 0.5% Ophthalmic Solution 1 Drop(s) Both EYES two times a day  brimonidine 0.2% Ophthalmic Solution 1 Drop(s) Both EYES two times a day  timolol 0.5% Solution 1 Drop(s) Both EYES two times a day  sodium chloride 0.9%. 1000 milliLiter(s) IV Continuous <Continuous>  ertapenem  IVPB 1000 milliGRAM(s) IV Intermittent every 24 hours  zinc sulfate 220 milliGRAM(s) Oral daily  Nephro-jennifer 1 Tablet(s) Oral daily  carvedilol 12.5 milliGRAM(s) Oral every 12 hours  isosorbide   mononitrate ER Tablet (IMDUR) 30 milliGRAM(s) Oral daily    MEDICATIONS  (PRN):  acetaminophen   Tablet 650 milliGRAM(s) Oral every 6 hours PRN  dextrose Gel 1 Dose(s) Oral once PRN  glucagon  Injectable 1 milliGRAM(s) IntraMuscular once PRN      REVIEW OF SYSTEMS:  CONSTITUTIONAL: Fever  RESPIRATORY: Cough  CARDIOVASCULAR: No chest pain  GASTROINTESTINAL: No abdominal or epigastric pain. No nausea, vomiting  NEUROLOGICAL: Decreased sensation LLE  SKIN: Abscess of L hallux  MUSCULOSKELETAL: No joint pain    T(F): 98.1 (07-05-17 @ 06:15), Max: 100.4 (07-04-17 @ 13:41)  HR: 64 (07-05-17 @ 07:15) (64 - 80)  BP: 131/59 (07-05-17 @ 07:15) (123/65 - 183/77)  RR: 16 (07-05-17 @ 06:15) (16 - 18)  SpO2: 98% (07-05-17 @ 06:15) (97% - 100%)  Wt(kg): --    CAPILLARY BLOOD GLUCOSE  164 (05 Jul 2017 08:27)  203 (04 Jul 2017 20:55)  183 (04 Jul 2017 18:06)  66 (04 Jul 2017 16:54)    I&O's Summary      PHYSICAL EXAM:  GENERAL: NAD, well-groomed, well-developed  HEAD:  Atraumatic, Normocephalic  EYES: EOMI, PERRLA, conjunctiva and sclera clear  ENMT: No tonsillar erythema, exudates, or enlargement; Moist mucous membranes, Good dentition, No lesions  NECK: Supple, No JVD, Normal thyroid  NERVOUS SYSTEM:  Alert & Oriented X3, Good concentration; Motor Strength 5/5 B/L upper and lower extremities; DTRs 2+ intact and symmetric  CHEST/LUNG: Clear to percussion bilaterally; No rales, rhonchi, wheezing, or rubs  HEART: Regular rate and rhythm; No murmurs, rubs, or gallops  ABDOMEN: Soft, Nontender, Nondistended; Bowel sounds present  EXTREMITIES: R BKA  LYMPH: No lymphadenopathy noted  SKIN: No rashes or lesions    LABS:                        8.2    6.47  )-----------( 401      ( 05 Jul 2017 05:20 )             27.0     07-04    141  |  103  |  20  ----------------------------<  124<H>  4.7   |  23  |  1.31<H>    Ca    9.0      04 Jul 2017 14:40    TPro  7.6  /  Alb  3.0<L>  /  TBili  0.2  /  DBili  x   /  AST  16  /  ALT  17  /  AlkPhos  76  07-04            RADIOLOGY & ADDITIONAL TESTS:    XRay:    CTScan:    MRI:     Imaging Personally Reviewed:  [ ] YES  [ ] NO    Consultant(s) Notes Reviewed:  [ ] YES  [ ] NO    Care Discussed with Consultants/Other Providers [ ] YES  [ ] NO Patient is a 72y old  Female who presents with a chief complaint of Toe infection (04 Jul 2017 23:00)      INTERVAL HPI/OVERNIGHT EVENTS: Tmax 100.4 in ED. Pt feeling well, has no pain in her left leg. Dry cough improving.    MEDICATIONS (STANDING):  heparin  Injectable 5000 Unit(s) SubCutaneous every 8 hours  insulin lispro (HumaLOG) corrective regimen sliding scale   SubCutaneous three times a day before meals  insulin lispro (HumaLOG) corrective regimen sliding scale   SubCutaneous at bedtime  dextrose 5%. 1000 milliLiter(s) IV Continuous <Continuous>  dextrose 50% Injectable 12.5 Gram(s) IV Push once  dextrose 50% Injectable 25 Gram(s) IV Push once  aspirin  chewable 81 milliGRAM(s) Oral daily  dextrose 50% Injectable 25 Gram(s) IV Push once  insulin glargine Injectable (LANTUS) 25 Unit(s) SubCutaneous at bedtime  atorvastatin 20 milliGRAM(s) Oral at bedtime  clopidogrel Tablet 75 milliGRAM(s) Oral daily  ketorolac 0.5% Ophthalmic Solution 1 Drop(s) Both EYES two times a day  brimonidine 0.2% Ophthalmic Solution 1 Drop(s) Both EYES two times a day  timolol 0.5% Solution 1 Drop(s) Both EYES two times a day  sodium chloride 0.9%. 1000 milliLiter(s) IV Continuous <Continuous>  ertapenem  IVPB 1000 milliGRAM(s) IV Intermittent every 24 hours  zinc sulfate 220 milliGRAM(s) Oral daily  Nephro-jennifer 1 Tablet(s) Oral daily  carvedilol 12.5 milliGRAM(s) Oral every 12 hours  isosorbide   mononitrate ER Tablet (IMDUR) 30 milliGRAM(s) Oral daily    MEDICATIONS  (PRN):  acetaminophen   Tablet 650 milliGRAM(s) Oral every 6 hours PRN  dextrose Gel 1 Dose(s) Oral once PRN  glucagon  Injectable 1 milliGRAM(s) IntraMuscular once PRN      REVIEW OF SYSTEMS:  CONSTITUTIONAL: Fever  RESPIRATORY: Cough  CARDIOVASCULAR: No chest pain  GASTROINTESTINAL: No abdominal or epigastric pain. No nausea, vomiting  NEUROLOGICAL: Decreased sensation LLE  SKIN: Abscess of L hallux  MUSCULOSKELETAL: No joint pain    T(F): 98.1 (07-05-17 @ 06:15), Max: 100.4 (07-04-17 @ 13:41)  HR: 64 (07-05-17 @ 07:15) (64 - 80)  BP: 131/59 (07-05-17 @ 07:15) (123/65 - 183/77)  RR: 16 (07-05-17 @ 06:15) (16 - 18)  SpO2: 98% (07-05-17 @ 06:15) (97% - 100%)  Wt(kg): --    CAPILLARY BLOOD GLUCOSE  164 (05 Jul 2017 08:27)  203 (04 Jul 2017 20:55)  183 (04 Jul 2017 18:06)  66 (04 Jul 2017 16:54)    I&O's Summary      PHYSICAL EXAM:  GENERAL: NAD, well-groomed, well-developed  HEAD:  Atraumatic, Normocephalic  EYES: EOMI, PERRLA, conjunctiva and sclera clear  ENMT: Moist mucous membranes  NECK: Supple, No JVD  NERVOUS SYSTEM:  Alert & Oriented X3, Good concentration; Motor Strength 5/5 B/L upper and lower extremities  CHEST/LUNG: Clear to percussion bilaterally; No rales, rhonchi, wheezing, or rubs  HEART: Regular rate and rhythm; systolic murmur, rubs, or gallops  ABDOMEN: Soft, Nontender, Nondistended; Bowel sounds present  EXTREMITIES: R BKA, L foot wrapped in Kerlix  LYMPH: No lymphadenopathy noted  SKIN: L foot wrapped in Kerlix, dry/intact with betadyne staining    LABS:                        8.2    6.47  )-----------( 401      ( 05 Jul 2017 05:20 )             27.0     07-04    141  |  103  |  20  ----------------------------<  124<H>  4.7   |  23  |  1.31<H>    Ca    9.0      04 Jul 2017 14:40    TPro  7.6  /  Alb  3.0<L>  /  TBili  0.2  /  DBili  x   /  AST  16  /  ALT  17  /  AlkPhos  76  07-04            RADIOLOGY & ADDITIONAL TESTS:    XRay:    CTScan:    MRI:     Imaging Personally Reviewed:  [ ] YES  [ ] NO    Consultant(s) Notes Reviewed:  [ ] YES  [ ] NO    Care Discussed with Consultants/Other Providers [ ] YES  [ ] NO Patient is a 72y old  Female who presents with a chief complaint of Toe infection (04 Jul 2017 23:00)      INTERVAL HPI/OVERNIGHT EVENTS: Tmax 100.4 in ED. Pt feeling well, has no pain in her left leg. Dry cough improving.    MEDICATIONS (STANDING):  heparin  Injectable 5000 Unit(s) SubCutaneous every 8 hours  insulin lispro (HumaLOG) corrective regimen sliding scale   SubCutaneous three times a day before meals  insulin lispro (HumaLOG) corrective regimen sliding scale   SubCutaneous at bedtime  dextrose 5%. 1000 milliLiter(s) IV Continuous <Continuous>  dextrose 50% Injectable 12.5 Gram(s) IV Push once  dextrose 50% Injectable 25 Gram(s) IV Push once  aspirin  chewable 81 milliGRAM(s) Oral daily  dextrose 50% Injectable 25 Gram(s) IV Push once  insulin glargine Injectable (LANTUS) 25 Unit(s) SubCutaneous at bedtime  atorvastatin 20 milliGRAM(s) Oral at bedtime  clopidogrel Tablet 75 milliGRAM(s) Oral daily  ketorolac 0.5% Ophthalmic Solution 1 Drop(s) Both EYES two times a day  brimonidine 0.2% Ophthalmic Solution 1 Drop(s) Both EYES two times a day  timolol 0.5% Solution 1 Drop(s) Both EYES two times a day  sodium chloride 0.9%. 1000 milliLiter(s) IV Continuous <Continuous>  ertapenem  IVPB 1000 milliGRAM(s) IV Intermittent every 24 hours  zinc sulfate 220 milliGRAM(s) Oral daily  Nephro-jennifer 1 Tablet(s) Oral daily  carvedilol 12.5 milliGRAM(s) Oral every 12 hours  isosorbide   mononitrate ER Tablet (IMDUR) 30 milliGRAM(s) Oral daily    MEDICATIONS  (PRN):  acetaminophen   Tablet 650 milliGRAM(s) Oral every 6 hours PRN  dextrose Gel 1 Dose(s) Oral once PRN  glucagon  Injectable 1 milliGRAM(s) IntraMuscular once PRN      REVIEW OF SYSTEMS:  CONSTITUTIONAL: Fever  RESPIRATORY: Cough  CARDIOVASCULAR: No chest pain  GASTROINTESTINAL: No abdominal or epigastric pain. No nausea, vomiting  NEUROLOGICAL: Decreased sensation LLE  SKIN: Abscess of L hallux  MUSCULOSKELETAL: No joint pain    T(F): 98.1 (07-05-17 @ 06:15), Max: 100.4 (07-04-17 @ 13:41)  HR: 64 (07-05-17 @ 07:15) (64 - 80)  BP: 131/59 (07-05-17 @ 07:15) (123/65 - 183/77)  RR: 16 (07-05-17 @ 06:15) (16 - 18)  SpO2: 98% (07-05-17 @ 06:15) (97% - 100%)  Wt(kg): --    CAPILLARY BLOOD GLUCOSE  164 (05 Jul 2017 08:27)  203 (04 Jul 2017 20:55)  183 (04 Jul 2017 18:06)  66 (04 Jul 2017 16:54)    I&O's Summary      PHYSICAL EXAM:  GENERAL: NAD, well-groomed, well-developed  HEAD:  Atraumatic, Normocephalic  EYES: EOMI, PERRLA, conjunctiva and sclera clear  ENMT: Moist mucous membranes  NECK: Supple, No JVD  NERVOUS SYSTEM:  Alert & Oriented X3, Good concentration; Motor Strength 5/5 B/L upper and LLE. Decreased sensation of left foot  CHEST/LUNG: Clear to percussion bilaterally; No rales, rhonchi, wheezing, or rubs  HEART: Regular rate and rhythm; systolic murmur, rubs, or gallops  ABDOMEN: Soft, Nontender, Nondistended; Bowel sounds present  EXTREMITIES: R BKA, L foot wrapped in Kerlix  LYMPH: No lymphadenopathy noted  SKIN: L foot wrapped in Kerlix, dry/intact with betadyne staining    LABS:                        8.2    6.47  )-----------( 401      ( 05 Jul 2017 05:20 )             27.0     07-05    144  |  105  |  16  ----------------------------<  146<H>  4.6   |  23  |  1.08    Ca    8.7      05 Jul 2017 05:20  Phos  3.4     07-05  Mg     1.8     07-05    TPro  7.6  /  Alb  3.0<L>  /  TBili  0.2  /  DBili  x   /  AST  16  /  ALT  17  /  AlkPhos  76  07-04    RADIOLOGY & ADDITIONAL TESTS:    XRay:    EXAM:  RAD FOOT MIN 3 VIEWS LT        PROCEDURE DATE:  Jul 4 2017         INTERPRETATION:  CLINICAL INFORMATION: Infected toe ulcer.    TECHNIQUE: 3 views of the left foot.    COMPARISON: X-ray of the left foot dated 6/20/2017.    FINDINGS:     The patient is status post transmetatarsal resection of the left fourth   and fifth digits. The surgical margins are sharp and unchanged in   appearance from 6/28/2017. A chronic fracture deformity of the base of   the left fifth metatarsal is again noted. The remaining bones of the left   foot demonstrate generalized osteopenia without evidence of acute   fracture or dislocation. Degenerative changes of the first   metatarsophalangeal joint. There is no osseous erosion or periosteal   reaction. Vascular calcifications are noted in the soft tissues.    IMPRESSION:    No radiographic evidence of advanced osteomyelitis. Postsurgical and   chronic degenerative changes as above. If there is persistent high   clinical concern for osteomyelitis, MRI may be obtained for better   evaluation.    CTScan:    MRI:     Imaging Personally Reviewed:  [ ] YES  [ ] NO    Consultant(s) Notes Reviewed:  [X] YES  [ ] NO    Care Discussed with Consultants/Other Providers [ ] YES  [ ] NO

## 2017-07-05 NOTE — PROGRESS NOTE ADULT - ASSESSMENT
73yo F with PMH of Type 2 DM (HgbA1c 7.8%) c/b neuropathy, PVD s/p R BKA (2010) and stent to L SFA, CKD Stage III, CAD s/p stents, CABG and L CEA, and HTN presenting with L hallux infection s/p bedside I & D with 5 cc of pus expressed from abscess

## 2017-07-05 NOTE — PROGRESS NOTE ADULT - SUBJECTIVE AND OBJECTIVE BOX
VASCULAR SURGERY  Pager: [phone]      SUBJECTIVE:   Patient seen and examined for follow-up of L foot hallux wound. Denies pain, fevers/chills. Decreased sensation, but intact motor.     OBJECTIVE:   T(C): 36.7 (07-05-17 @ 06:15), Max: 38 (07-04-17 @ 13:41)  HR: 80 (07-05-17 @ 06:15) (71 - 80)  BP: 183/77 (07-05-17 @ 06:15) (123/65 - 183/77)  RR: 16 (07-05-17 @ 06:15) (16 - 18)  SpO2: 98% (07-05-17 @ 06:15) (97% - 100%)  Wt(kg): --  CAPILLARY BLOOD GLUCOSE  203 (04 Jul 2017 20:55)  183 (04 Jul 2017 18:06)  66 (04 Jul 2017 16:54)        Physical exam:  General: NAD VASCULAR SURGERY  Pager: [phone]      SUBJECTIVE:   Patient seen and examined for follow-up of L foot hallux wound. Denies pain, fevers/chills. Decreased sensation, but intact motor.     OBJECTIVE:   T(C): 36.7 (07-05-17 @ 06:15), Max: 38 (07-04-17 @ 13:41)  HR: 80 (07-05-17 @ 06:15) (71 - 80)  BP: 183/77 (07-05-17 @ 06:15) (123/65 - 183/77)  RR: 16 (07-05-17 @ 06:15) (16 - 18)  SpO2: 98% (07-05-17 @ 06:15) (97% - 100%)  Wt(kg): --  CAPILLARY BLOOD GLUCOSE  203 (04 Jul 2017 20:55)  183 (04 Jul 2017 18:06)  66 (04 Jul 2017 16:54)        Physical exam:  General: NAD  L hallux wound - erythematous, but no wendy purulence  Foot warm, paresthesia secondary to DM, motor intact VASCULAR SURGERY  Pager: [phone]      SUBJECTIVE:   Patient seen and examined for follow-up of L foot hallux wound. Denies pain, fevers/chills. Decreased sensation, but intact motor.     OBJECTIVE:   T(C): 36.7 (07-05-17 @ 06:15), Max: 38 (07-04-17 @ 13:41)  HR: 80 (07-05-17 @ 06:15) (71 - 80)  BP: 183/77 (07-05-17 @ 06:15) (123/65 - 183/77)  RR: 16 (07-05-17 @ 06:15) (16 - 18)  SpO2: 98% (07-05-17 @ 06:15) (97% - 100%)  Wt(kg): --  CAPILLARY BLOOD GLUCOSE  203 (04 Jul 2017 20:55)  183 (04 Jul 2017 18:06)  66 (04 Jul 2017 16:54)        Physical exam:  General: NAD  L hallux wound - erythematous, but no wendy purulence  Foot warm, paresthesia secondary to DM, motor intact      LABS:  CBC Full  -  ( 05 Jul 2017 05:20 )  WBC Count : 6.47 K/uL  Hemoglobin : 8.2 g/dL  Hematocrit : 27.0 %  Platelet Count - Automated : 401 K/uL  Mean Cell Volume : 91.8 fL  Mean Cell Hemoglobin : 27.9 pg  Mean Cell Hemoglobin Concentration : 30.4 %      07-05    144  |  105  |  16  ----------------------------<  146<H>  4.6   |  23  |  1.08    Ca    8.7      05 Jul 2017 05:20  Phos  3.4     07-05  Mg     1.8     07-05

## 2017-07-06 DIAGNOSIS — E55.9 VITAMIN D DEFICIENCY, UNSPECIFIED: ICD-10-CM

## 2017-07-06 LAB
BASOPHILS # BLD AUTO: 0.01 K/UL — SIGNIFICANT CHANGE UP (ref 0–0.2)
BASOPHILS NFR BLD AUTO: 0.2 % — SIGNIFICANT CHANGE UP (ref 0–2)
BASOPHILS NFR SPEC: 0 % — SIGNIFICANT CHANGE UP (ref 0–2)
BUN SERPL-MCNC: 14 MG/DL — SIGNIFICANT CHANGE UP (ref 7–23)
CALCIUM SERPL-MCNC: 8.6 MG/DL — SIGNIFICANT CHANGE UP (ref 8.4–10.5)
CHLORIDE SERPL-SCNC: 105 MMOL/L — SIGNIFICANT CHANGE UP (ref 98–107)
CO2 SERPL-SCNC: 23 MMOL/L — SIGNIFICANT CHANGE UP (ref 22–31)
CREAT SERPL-MCNC: 0.92 MG/DL — SIGNIFICANT CHANGE UP (ref 0.5–1.3)
EOSINOPHIL # BLD AUTO: 0.4 K/UL — SIGNIFICANT CHANGE UP (ref 0–0.5)
EOSINOPHIL NFR BLD AUTO: 7.8 % — HIGH (ref 0–6)
EOSINOPHIL NFR FLD: 2 % — SIGNIFICANT CHANGE UP (ref 0–6)
ERYTHROCYTE [SEDIMENTATION RATE] IN BLOOD: 111 MM/HR — HIGH (ref 4–25)
GLUCOSE SERPL-MCNC: 175 MG/DL — HIGH (ref 70–99)
HCT VFR BLD CALC: 25.6 % — LOW (ref 34.5–45)
HGB BLD-MCNC: 7.8 G/DL — LOW (ref 11.5–15.5)
IMM GRANULOCYTES # BLD AUTO: 0.02 # — SIGNIFICANT CHANGE UP
IMM GRANULOCYTES NFR BLD AUTO: 0.4 % — SIGNIFICANT CHANGE UP (ref 0–1.5)
LG PLATELETS BLD QL AUTO: SLIGHT — SIGNIFICANT CHANGE UP
LYMPHOCYTES # BLD AUTO: 1.74 K/UL — SIGNIFICANT CHANGE UP (ref 1–3.3)
LYMPHOCYTES # BLD AUTO: 33.9 % — SIGNIFICANT CHANGE UP (ref 13–44)
LYMPHOCYTES NFR SPEC AUTO: 36 % — SIGNIFICANT CHANGE UP (ref 13–44)
MACROCYTES BLD QL: SIGNIFICANT CHANGE UP
MANUAL SMEAR VERIFICATION: SIGNIFICANT CHANGE UP
MCHC RBC-ENTMCNC: 27.8 PG — SIGNIFICANT CHANGE UP (ref 27–34)
MCHC RBC-ENTMCNC: 30.5 % — LOW (ref 32–36)
MCV RBC AUTO: 91.1 FL — SIGNIFICANT CHANGE UP (ref 80–100)
MICROCYTES BLD QL: SLIGHT — SIGNIFICANT CHANGE UP
MONOCYTES # BLD AUTO: 0.39 K/UL — SIGNIFICANT CHANGE UP (ref 0–0.9)
MONOCYTES NFR BLD AUTO: 7.6 % — SIGNIFICANT CHANGE UP (ref 2–14)
MONOCYTES NFR BLD: 5 % — SIGNIFICANT CHANGE UP (ref 2–9)
MYELOCYTES NFR BLD: 1 % — HIGH (ref 0–0)
NEUTROPHIL AB SER-ACNC: 55 % — SIGNIFICANT CHANGE UP (ref 43–77)
NEUTROPHILS # BLD AUTO: 2.57 K/UL — SIGNIFICANT CHANGE UP (ref 1.8–7.4)
NEUTROPHILS NFR BLD AUTO: 50.1 % — SIGNIFICANT CHANGE UP (ref 43–77)
NEUTS BAND # BLD: 1 % — SIGNIFICANT CHANGE UP (ref 0–6)
NRBC # FLD: 0 — SIGNIFICANT CHANGE UP
OVALOCYTES BLD QL SMEAR: SLIGHT — SIGNIFICANT CHANGE UP
PLATELET # BLD AUTO: 306 K/UL — SIGNIFICANT CHANGE UP (ref 150–400)
PLATELET COUNT - ESTIMATE: NORMAL — SIGNIFICANT CHANGE UP
PMV BLD: 11.2 FL — SIGNIFICANT CHANGE UP (ref 7–13)
POTASSIUM SERPL-MCNC: 4.5 MMOL/L — SIGNIFICANT CHANGE UP (ref 3.5–5.3)
POTASSIUM SERPL-SCNC: 4.5 MMOL/L — SIGNIFICANT CHANGE UP (ref 3.5–5.3)
RBC # BLD: 2.81 M/UL — LOW (ref 3.8–5.2)
RBC # FLD: 14.9 % — HIGH (ref 10.3–14.5)
SODIUM SERPL-SCNC: 141 MMOL/L — SIGNIFICANT CHANGE UP (ref 135–145)
WBC # BLD: 5.13 K/UL — SIGNIFICANT CHANGE UP (ref 3.8–10.5)
WBC # FLD AUTO: 5.13 K/UL — SIGNIFICANT CHANGE UP (ref 3.8–10.5)

## 2017-07-06 PROCEDURE — 99233 SBSQ HOSP IP/OBS HIGH 50: CPT | Mod: GC

## 2017-07-06 PROCEDURE — 93010 ELECTROCARDIOGRAM REPORT: CPT

## 2017-07-06 PROCEDURE — 99232 SBSQ HOSP IP/OBS MODERATE 35: CPT

## 2017-07-06 RX ORDER — ERGOCALCIFEROL 1.25 MG/1
50000 CAPSULE ORAL
Qty: 0 | Refills: 0 | Status: DISCONTINUED | OUTPATIENT
Start: 2017-07-06 | End: 2017-07-13

## 2017-07-06 RX ORDER — INSULIN GLARGINE 100 [IU]/ML
15 INJECTION, SOLUTION SUBCUTANEOUS AT BEDTIME
Qty: 0 | Refills: 0 | Status: DISCONTINUED | OUTPATIENT
Start: 2017-07-06 | End: 2017-07-07

## 2017-07-06 RX ADMIN — ERGOCALCIFEROL 50000 UNIT(S): 1.25 CAPSULE ORAL at 15:14

## 2017-07-06 RX ADMIN — ISOSORBIDE MONONITRATE 30 MILLIGRAM(S): 60 TABLET, EXTENDED RELEASE ORAL at 13:06

## 2017-07-06 RX ADMIN — Medication 2: at 13:08

## 2017-07-06 RX ADMIN — Medication 166.67 MILLIGRAM(S): at 09:24

## 2017-07-06 RX ADMIN — Medication 1: at 18:06

## 2017-07-06 RX ADMIN — Medication 1 DROP(S): at 18:47

## 2017-07-06 RX ADMIN — Medication 1 DROP(S): at 06:21

## 2017-07-06 RX ADMIN — CLOPIDOGREL BISULFATE 75 MILLIGRAM(S): 75 TABLET, FILM COATED ORAL at 13:06

## 2017-07-06 RX ADMIN — BRIMONIDINE TARTRATE 1 DROP(S): 2 SOLUTION/ DROPS OPHTHALMIC at 18:47

## 2017-07-06 RX ADMIN — HEPARIN SODIUM 5000 UNIT(S): 5000 INJECTION INTRAVENOUS; SUBCUTANEOUS at 22:49

## 2017-07-06 RX ADMIN — Medication 81 MILLIGRAM(S): at 13:06

## 2017-07-06 RX ADMIN — BRIMONIDINE TARTRATE 1 DROP(S): 2 SOLUTION/ DROPS OPHTHALMIC at 06:22

## 2017-07-06 RX ADMIN — Medication 1: at 09:21

## 2017-07-06 RX ADMIN — Medication 1: at 22:49

## 2017-07-06 RX ADMIN — HEPARIN SODIUM 5000 UNIT(S): 5000 INJECTION INTRAVENOUS; SUBCUTANEOUS at 15:14

## 2017-07-06 RX ADMIN — CARVEDILOL PHOSPHATE 12.5 MILLIGRAM(S): 80 CAPSULE, EXTENDED RELEASE ORAL at 18:46

## 2017-07-06 RX ADMIN — CARVEDILOL PHOSPHATE 12.5 MILLIGRAM(S): 80 CAPSULE, EXTENDED RELEASE ORAL at 06:21

## 2017-07-06 RX ADMIN — ATORVASTATIN CALCIUM 20 MILLIGRAM(S): 80 TABLET, FILM COATED ORAL at 22:49

## 2017-07-06 RX ADMIN — ZINC SULFATE TAB 220 MG (50 MG ZINC EQUIVALENT) 220 MILLIGRAM(S): 220 (50 ZN) TAB at 13:07

## 2017-07-06 RX ADMIN — ERTAPENEM SODIUM 120 MILLIGRAM(S): 1 INJECTION, POWDER, LYOPHILIZED, FOR SOLUTION INTRAMUSCULAR; INTRAVENOUS at 00:14

## 2017-07-06 RX ADMIN — Medication 1 TABLET(S): at 13:07

## 2017-07-06 RX ADMIN — VALSARTAN 320 MILLIGRAM(S): 80 TABLET ORAL at 06:21

## 2017-07-06 RX ADMIN — HEPARIN SODIUM 5000 UNIT(S): 5000 INJECTION INTRAVENOUS; SUBCUTANEOUS at 06:21

## 2017-07-06 RX ADMIN — INSULIN GLARGINE 15 UNIT(S): 100 INJECTION, SOLUTION SUBCUTANEOUS at 22:50

## 2017-07-06 NOTE — PROGRESS NOTE ADULT - PROBLEM SELECTOR PLAN 2
Creatinine currently at pt's baseline   - Monitor BMP daily  - Avoid nephrotoxic drugs  - Renally dose medications

## 2017-07-06 NOTE — PROGRESS NOTE ADULT - PROBLEM SELECTOR PLAN 1
S/p bedside I & D in ED  - S/P Clindamycin 900 mg x1 in ED, instead resumed Ertapenem 1000 mg IV QD that patient was receiving prior to discharge and added vancomycin to cover for polymicrobial infection  - Will consult ID for further antibiotic recommendations  - F/u podiatry recs  - F/u vascular surgery recs- awaiting comment on DUC/PVRs in order to determine necessity of interventions  - Wound cultures pending,   - blood cultures show no growth at 24 hours  - F/u L foot x-ray final read  - MRI pending to r/o osteomyelitis  - Wound care consulted S/p bedside I & D in ED  - S/P Clindamycin 900 mg x1 in ED, instead resumed Ertapenem 1000 mg IV QD that patient was receiving prior to discharge and added vancomycin to cover for polymicrobial infection  - ID agrees with antibiotic regimen. Advises to continue until 7/7  - F/u podiatry recs  - F/u vascular surgery recs- awaiting comment on DUC/PVRs in order to determine necessity of interventions  - Wound cultures pending,   - blood cultures show no growth at 24 hours  - MRI pending to r/o osteomyelitis  - Wound care consulted

## 2017-07-06 NOTE — PROGRESS NOTE ADULT - PROBLEM SELECTOR PLAN 5
Ha1c 7.9 06/17  - Given Lantus 25u given hypoglycemia to 66 in ED tuesday  - pre-dinner  and bedtime 221; increase Lantus to 30  - ISS and FS with meals and at bedtime Ha1c 7.9 06/17  - Given Lantus 25u given hypoglycemia to 66 in ED tuesday  - increased Lantus to 30; morning   - ISS and FS with meals and at bedtime

## 2017-07-06 NOTE — PROGRESS NOTE ADULT - PROBLEM SELECTOR PLAN 3
S/p stent to L SFA on last admission in June 2017  -F/u vascular surgery recs  -Repeat DUC/PVRs showing mild improvement from last procedure  -C/w ASA and Plavix

## 2017-07-06 NOTE — PROGRESS NOTE ADULT - ASSESSMENT
72 year old with DM, CKD, PVD s/p revascularization/ toe amputation who presents with low grade fever and pus at first toe.       Left 4th/ 5th toes- s/p amputation. Margins are clear. Antibiotics through 7/7 should be adequate for this process    Left First toe- now draining pus. There is a left tow abscess and concern for underlying OM  Podiatry is following  Continue vanco/ ertapenem pending would culture/ imaging

## 2017-07-06 NOTE — PROGRESS NOTE ADULT - SUBJECTIVE AND OBJECTIVE BOX
Patient is a 72y old  Female who presents with a chief complaint of Toe infection (04 Jul 2017 23:00)       INTERVAL HPI/OVERNIGHT EVENTS:  Patient seen and evaluated at bedside.  Pt is resting comfortable in NAD. Denies N/V/F/C.     Allergies    sulfa drugs (Hives)  sulfa drugs (Rash)  Sulfac 10% (Hives)    Intolerances        Vital Signs Last 24 Hrs  T(C): 37.2 (06 Jul 2017 06:13), Max: 37.3 (05 Jul 2017 15:25)  T(F): 99 (06 Jul 2017 06:13), Max: 99.2 (05 Jul 2017 15:25)  HR: 67 (06 Jul 2017 06:13) (65 - 77)  BP: 145/59 (06 Jul 2017 06:13) (129/53 - 161/66)  BP(mean): --  RR: 16 (06 Jul 2017 06:13) (16 - 18)  SpO2: 98% (06 Jul 2017 06:13) (96% - 98%)    LABS:                        7.8    5.13  )-----------( 306      ( 06 Jul 2017 06:00 )             25.6     07-06    141  |  105  |  14  ----------------------------<  175<H>  4.5   |  23  |  0.92    Ca    8.6      06 Jul 2017 06:00  Phos  3.4     07-05  Mg     1.8     07-05    TPro  7.6  /  Alb  3.0<L>  /  TBili  0.2  /  DBili  x   /  AST  16  /  ALT  17  /  AlkPhos  76  07-04        CAPILLARY BLOOD GLUCOSE  167 (06 Jul 2017 08:31)  214 (05 Jul 2017 22:20)  221 (05 Jul 2017 17:04)  193 (05 Jul 2017 12:22)          Lower Extremity Physical Exam:  Patient is a 72y old  Female who presents with a chief complaint of Toe infection (04 Jul 2017 23:00)       INTERVAL HPI/OVERNIGHT EVENTS:  Patient seen and evaluated at bedside.  Pt is resting comfortable in NAD. Denies N/V/F/C.  Pain rated at X/10    Allergies    sulfa drugs (Hives)  sulfa drugs (Rash)  Sulfac 10% (Hives)    Intolerances        Vital Signs Last 24 Hrs  T(C): 37.2 (06 Jul 2017 06:13), Max: 37.3 (05 Jul 2017 15:25)  T(F): 99 (06 Jul 2017 06:13), Max: 99.2 (05 Jul 2017 15:25)  HR: 67 (06 Jul 2017 06:13) (65 - 77)  BP: 145/59 (06 Jul 2017 06:13) (129/53 - 161/66)  BP(mean): --  RR: 16 (06 Jul 2017 06:13) (16 - 18)  SpO2: 98% (06 Jul 2017 06:13) (96% - 98%)    LABS:                        7.8    5.13  )-----------( 306      ( 06 Jul 2017 06:00 )             25.6     07-06    141  |  105  |  14  ----------------------------<  175<H>  4.5   |  23  |  0.92    Ca    8.6      06 Jul 2017 06:00  Phos  3.4     07-05  Mg     1.8     07-05    TPro  7.6  /  Alb  3.0<L>  /  TBili  0.2  /  DBili  x   /  AST  16  /  ALT  17  /  AlkPhos  76  07-04      Lower Extremity Physical Exam:    CAPILLARY BLOOD GLUCOSE  Vascular: DP 0/4, PT 2/4. Temp gradient WNL LLE.  Neuro: Epicritic sensation diminished to the level of the foot, L.  Musculoskeletal/Ortho: R leg s/p AKA, L foot s/p partial 4th and 5th ray resections. Surgical site left partially open.   proximal incision closed with sutures (intact). Open distally with packing. Minimal sanguinous drainage.  fibronecrotic base, no signs of infection    Left hallux, i and D site dorsal wound probes to bone, no malodor, mild erythema noted, no active drainage, no tunneling, no purulence [id number] (06 Jul 2017 08:31)  214 (05 Jul 2017 22:20)  221 (05 Jul 2017 17:04)  193 (05 Jul 2017 12:22)        RADIOLOGY & ADDITIONAL TESTS:  < from: Xray Foot AP + Lateral + Oblique, Left (07.04.17 @ 17:30) >  NTERPRETATION:  CLINICAL INFORMATION: Infected toe ulcer.    TECHNIQUE: 3 views of the left foot.    COMPARISON: X-ray of the left foot dated 6/20/2017.    FINDINGS:     The patient is status post transmetatarsal resection of the left fourth   and fifth digits. The surgical margins are sharp and unchanged in   appearance from 6/28/2017. A chronic fracture deformity of the base of   the left fifth metatarsal is again noted. The remaining bones of the left   foot demonstrate generalized osteopenia without evidence of acute   fracture or dislocation. Degenerative changes of the first   metatarsophalangeal joint. There is no osseous erosion or periosteal   reaction. Vascular calcifications are noted in the soft tissues.    IMPRESSION:    No radiographic evidence of advanced osteomyelitis. Postsurgical and   chronic degenerative changes as above. If there is persistent high   clinical concern for osteomyelitis, MRI may be obtained for better   evaluation.    < end of copied text >

## 2017-07-06 NOTE — PROGRESS NOTE ADULT - PROBLEM SELECTOR PLAN 1
S/p bedside I & D in ED  - C/w vancomycin and ertapenem for broad spectrum coverage  - F/u ID recommendations  - F/u podiatry recs  - F/u vascular surgery recs- recommend repeat DUC/PVRs in order to determine necessity of interventions  - Wound cultures pending, f/u blood cultures NGTD at 24h  - L foot x-ray negative for OM  - MRI pending to r/o osteomyelitis

## 2017-07-06 NOTE — PROGRESS NOTE ADULT - PROBLEM SELECTOR PLAN 3
S/p stent to L SFA on last admission in June 2017  -F/u vascular surgery recs  -F/u repeat UDC/PVRs  -C/w ASA and Plavix

## 2017-07-06 NOTE — PROGRESS NOTE ADULT - ASSESSMENT
71 yo F presents to ER for left hallux wound with purulent drainage.   - Pt seen and examined  - Wounds dressed with betadine  - MRI shows changes consistent with OM - awaiting official read  - Pod plan for left foot transmetatarsal amputation pending vascular opinion   - seen w/ attending 71 yo F presents to ER for left hallux wound with purulent drainage.   - Pt seen and examined  - Wounds dressed with betadine  - MRI shows changes consistent with OM - awaiting official read  - Pt added on for left foot TMA with NIDIA tomorrow  - seen w/ attending

## 2017-07-06 NOTE — PROGRESS NOTE ADULT - SUBJECTIVE AND OBJECTIVE BOX
Patient is a 72y old  Female who presents with a chief complaint of Toe infection (04 Jul 2017 23:00)      INTERVAL HPI/OVERNIGHT EVENTS: Pt feeling well, has no pain in her left leg but mild itching at wound dressing site. Dry cough improving.    MEDICATIONS (STANDING):  heparin  Injectable 5000 Unit(s) SubCutaneous every 8 hours  insulin lispro (HumaLOG) corrective regimen sliding scale   SubCutaneous three times a day before meals  insulin lispro (HumaLOG) corrective regimen sliding scale   SubCutaneous at bedtime  dextrose 5%. 1000 milliLiter(s) IV Continuous <Continuous>  dextrose 50% Injectable 12.5 Gram(s) IV Push once  dextrose 50% Injectable 25 Gram(s) IV Push once  aspirin  chewable 81 milliGRAM(s) Oral daily  dextrose 50% Injectable 25 Gram(s) IV Push once  insulin glargine Injectable (LANTUS) 25 Unit(s) SubCutaneous at bedtime  atorvastatin 20 milliGRAM(s) Oral at bedtime  clopidogrel Tablet 75 milliGRAM(s) Oral daily  ketorolac 0.5% Ophthalmic Solution 1 Drop(s) Both EYES two times a day  brimonidine 0.2% Ophthalmic Solution 1 Drop(s) Both EYES two times a day  timolol 0.5% Solution 1 Drop(s) Both EYES two times a day  sodium chloride 0.9%. 1000 milliLiter(s) IV Continuous <Continuous>  ertapenem  IVPB 1000 milliGRAM(s) IV Intermittent every 24 hours  zinc sulfate 220 milliGRAM(s) Oral daily  Nephro-jennifer 1 Tablet(s) Oral daily  carvedilol 12.5 milliGRAM(s) Oral every 12 hours  isosorbide   mononitrate ER Tablet (IMDUR) 30 milliGRAM(s) Oral daily    MEDICATIONS  (PRN):  acetaminophen   Tablet 650 milliGRAM(s) Oral every 6 hours PRN  dextrose Gel 1 Dose(s) Oral once PRN  glucagon  Injectable 1 milliGRAM(s) IntraMuscular once PRN      REVIEW OF SYSTEMS:  CONSTITUTIONAL: Fever; no headaches or fatigue  RESPIRATORY: dry Cough improving; no hematemesis.  CARDIOVASCULAR: No chest pain, palpitations, or dizziness  GASTROINTESTINAL: No abdominal or epigastric pain. No nausea, vomiting  NEUROLOGICAL: Decreased sensation LLE  SKIN: Abscess of L hallux  MUSCULOSKELETAL: long-standing mild R lower back pain upon ambulation.    Vital Signs Last 24 Hrs  T(C): 37.2 (06 Jul 2017 06:13), Max: 37.3 (05 Jul 2017 15:25)  T(F): 99 (06 Jul 2017 06:13), Max: 99.2 (05 Jul 2017 15:25)  HR: 67 (06 Jul 2017 06:13) (62 - 77)  BP: 145/59 (06 Jul 2017 06:13) (129/53 - 161/66)  BP(mean): --  RR: 16 (06 Jul 2017 06:13) (16 - 18)  SpO2: 98% (06 Jul 2017 06:13) (96% - 98%)      CAPILLARY BLOOD GLUCOSECAPILLARY BLOOD GLUCOSE  214 (05 Jul 2017 22:20)  221 (05 Jul 2017 17:04)  193 (05 Jul 2017 12:22)  164 (05 Jul 2017 08:27)      I&O's Summary      PHYSICAL EXAM:  GENERAL: NAD, well-groomed, well-developed  HEAD:  Atraumatic, Normocephalic  EYES: EOMI, PERRLA, conjunctiva and sclera clear  ENMT: Moist mucous membranes  NECK: Supple  NERVOUS SYSTEM:  Alert & Oriented X3, Good concentration; Motor Strength 5/5 B/L upper and LLE. Decreased sensation of left foot  CHEST/LUNG: Clear to percussion bilaterally; No rales, rhonchi, wheezing, or rubs  HEART: Regular rate and rhythm; systolic murmur, no rubs or gallops  ABDOMEN: Soft, Nontender, Nondistended; Bowel sounds present  EXTREMITIES: R BKA, L foot wrapped in Kerlix  LYMPH: No lymphadenopathy noted  SKIN: L foot wrapped in Kerlix, dry with betadyne staining. small crack at heel of L foot. dark pigmentation and warmth of LLE.    LABS:  CBC Full  -  ( 06 Jul 2017 06:00 )  WBC Count : 5.13 K/uL  Hemoglobin : 7.8 g/dL  Hematocrit : 25.6 %  Platelet Count - Automated : 306 K/uL  Mean Cell Volume : 91.1 fL  Mean Cell Hemoglobin : 27.8 pg  Mean Cell Hemoglobin Concentration : 30.5 %  Auto Neutrophil # : 2.57 K/uL  Auto Lymphocyte # : 1.74 K/uL  Auto Monocyte # : 0.39 K/uL  Auto Eosinophil # : 0.40 K/uL  Auto Basophil # : 0.01 K/uL  Auto Neutrophil % : 50.1 %  Auto Lymphocyte % : 33.9 %  Auto Monocyte % : 7.6 %  Auto Eosinophil % : 7.8 %  Auto Basophil % : 0.2 %          07-05    144  |  105  |  16  ----------------------------<  146<H>  4.6   |  23  |  1.08    Ca    8.7      05 Jul 2017 05:20  Phos  3.4     07-05  Mg     1.8     07-05    TPro  7.6  /  Alb  3.0<L>  /  TBili  0.2  /  DBili  x   /  AST  16  /  ALT  17  /  AlkPhos  76  07-04                    RADIOLOGY & ADDITIONAL TESTS:    XRay:    EXAM:  RAD FOOT MIN 3 VIEWS LT        PROCEDURE DATE:  Jul 4 2017         INTERPRETATION:  CLINICAL INFORMATION: Infected toe ulcer.    TECHNIQUE: 3 views of the left foot.    COMPARISON: X-ray of the left foot dated 6/20/2017.    FINDINGS:     The patient is status post transmetatarsal resection of the left fourth   and fifth digits. The surgical margins are sharp and unchanged in   appearance from 6/28/2017. A chronic fracture deformity of the base of   the left fifth metatarsal is again noted. The remaining bones of the left   foot demonstrate generalized osteopenia without evidence of acute   fracture or dislocation. Degenerative changes of the first   metatarsophalangeal joint. There is no osseous erosion or periosteal   reaction. Vascular calcifications are noted in the soft tissues.    IMPRESSION:    No radiographic evidence of advanced osteomyelitis. Postsurgical and   chronic degenerative changes as above. If there is persistent high   clinical concern for osteomyelitis, MRI may be obtained for better   evaluation.    CTScan:    MRI:         Imaging Personally Reviewed:  [ ] YES  [ ] NO    Consultant(s) Notes Reviewed:  [X] YES  [ ] NO    Care Discussed with Consultants/Other Providers [ ] YES  [ ] NO Patient is a 72y old  Female who presents with a chief complaint of Toe infection (04 Jul 2017 23:00)      INTERVAL HPI/OVERNIGHT EVENTS: Pt feeling well, has no pain in her left leg but mild itching at wound dressing site. Dry cough improving.    MEDICATIONS (STANDING):  heparin  Injectable 5000 Unit(s) SubCutaneous every 8 hours  insulin lispro (HumaLOG) corrective regimen sliding scale   SubCutaneous three times a day before meals  insulin lispro (HumaLOG) corrective regimen sliding scale   SubCutaneous at bedtime  dextrose 5%. 1000 milliLiter(s) IV Continuous <Continuous>  dextrose 50% Injectable 12.5 Gram(s) IV Push once  dextrose 50% Injectable 25 Gram(s) IV Push once  aspirin  chewable 81 milliGRAM(s) Oral daily  dextrose 50% Injectable 25 Gram(s) IV Push once  insulin glargine Injectable (LANTUS) 25 Unit(s) SubCutaneous at bedtime  atorvastatin 20 milliGRAM(s) Oral at bedtime  clopidogrel Tablet 75 milliGRAM(s) Oral daily  ketorolac 0.5% Ophthalmic Solution 1 Drop(s) Both EYES two times a day  brimonidine 0.2% Ophthalmic Solution 1 Drop(s) Both EYES two times a day  timolol 0.5% Solution 1 Drop(s) Both EYES two times a day  sodium chloride 0.9%. 1000 milliLiter(s) IV Continuous <Continuous>  ertapenem  IVPB 1000 milliGRAM(s) IV Intermittent every 24 hours  zinc sulfate 220 milliGRAM(s) Oral daily  Nephro-jennifer 1 Tablet(s) Oral daily  carvedilol 12.5 milliGRAM(s) Oral every 12 hours  isosorbide   mononitrate ER Tablet (IMDUR) 30 milliGRAM(s) Oral daily    MEDICATIONS  (PRN):  acetaminophen   Tablet 650 milliGRAM(s) Oral every 6 hours PRN  dextrose Gel 1 Dose(s) Oral once PRN  glucagon  Injectable 1 milliGRAM(s) IntraMuscular once PRN      REVIEW OF SYSTEMS:  CONSTITUTIONAL: Fever; no headaches or fatigue  RESPIRATORY: dry Cough improving; no hematemesis.  CARDIOVASCULAR: No chest pain, palpitations, or dizziness  GASTROINTESTINAL: No abdominal or epigastric pain. No nausea, vomiting  NEUROLOGICAL: Decreased sensation LLE  SKIN: Abscess of L hallux  MUSCULOSKELETAL: long-standing mild R lower back pain upon ambulation.    Vital Signs Last 24 Hrs  T(C): 37.2 (06 Jul 2017 06:13), Max: 37.3 (05 Jul 2017 15:25)  T(F): 99 (06 Jul 2017 06:13), Max: 99.2 (05 Jul 2017 15:25)  HR: 67 (06 Jul 2017 06:13) (62 - 77)  BP: 145/59 (06 Jul 2017 06:13) (129/53 - 161/66)  BP(mean): --  RR: 16 (06 Jul 2017 06:13) (16 - 18)  SpO2: 98% (06 Jul 2017 06:13) (96% - 98%)      07-06    141  |  105  |  14  ----------------------------<  175<H>  4.5   |  23  |  0.92    Ca    8.6      06 Jul 2017 06:00  Phos  3.4     07-05  Mg     1.8     07-05    TPro  7.6  /  Alb  3.0<L>  /  TBili  0.2  /  DBili  x   /  AST  16  /  ALT  17  /  AlkPhos  76  07-04    CAPILLARY BLOOD GLUCOSE  214 (05 Jul 2017 22:20)  221 (05 Jul 2017 17:04)  193 (05 Jul 2017 12:22)  164 (05 Jul 2017 08:27)      I&O's Summary      PHYSICAL EXAM:  GENERAL: NAD, well-groomed, well-developed  HEAD:  Atraumatic, Normocephalic  EYES: EOMI, PERRLA, conjunctiva and sclera clear  ENMT: Moist mucous membranes  NECK: Supple  NERVOUS SYSTEM:  Alert & Oriented X3, Good concentration; Motor Strength 5/5 B/L upper and LLE. Decreased sensation of left foot  CHEST/LUNG: Clear to percussion bilaterally; No rales, rhonchi, wheezing, or rubs  HEART: Regular rate and rhythm; systolic murmur, no rubs or gallops  ABDOMEN: Soft, Nontender, Nondistended; Bowel sounds present  EXTREMITIES: R BKA, L foot wrapped in Kerlix  LYMPH: No lymphadenopathy noted  SKIN: L foot wrapped in Kerlix, dry with betadyne staining. small crack at heel of L foot. dark pigmentation and warmth of LLE.    LABS:  CBC Full  -  ( 06 Jul 2017 06:00 )  WBC Count : 5.13 K/uL  Hemoglobin : 7.8 g/dL  Hematocrit : 25.6 %  Platelet Count - Automated : 306 K/uL  Mean Cell Volume : 91.1 fL  Mean Cell Hemoglobin : 27.8 pg  Mean Cell Hemoglobin Concentration : 30.5 %  Auto Neutrophil # : 2.57 K/uL  Auto Lymphocyte # : 1.74 K/uL  Auto Monocyte # : 0.39 K/uL  Auto Eosinophil # : 0.40 K/uL  Auto Basophil # : 0.01 K/uL  Auto Neutrophil % : 50.1 %  Auto Lymphocyte % : 33.9 %  Auto Monocyte % : 7.6 %  Auto Eosinophil % : 7.8 %  Auto Basophil % : 0.2 %      07-06    141  |  105  |  14  ----------------------------<  175<H>  4.5   |  23  |  0.92    Ca    8.6      06 Jul 2017 06:00  Phos  3.4     07-05  Mg     1.8     07-05    TPro  7.6  /  Alb  3.0<L>  /  TBili  0.2  /  DBili  x   /  AST  16  /  ALT  17  /  AlkPhos  76  07-04                    RADIOLOGY & ADDITIONAL TESTS:    XRay:    EXAM:  RAD FOOT MIN 3 VIEWS LT        PROCEDURE DATE:  Jul 4 2017         INTERPRETATION:  CLINICAL INFORMATION: Infected toe ulcer.    TECHNIQUE: 3 views of the left foot.    COMPARISON: X-ray of the left foot dated 6/20/2017.    FINDINGS:     The patient is status post transmetatarsal resection of the left fourth   and fifth digits. The surgical margins are sharp and unchanged in   appearance from 6/28/2017. A chronic fracture deformity of the base of   the left fifth metatarsal is again noted. The remaining bones of the left   foot demonstrate generalized osteopenia without evidence of acute   fracture or dislocation. Degenerative changes of the first   metatarsophalangeal joint. There is no osseous erosion or periosteal   reaction. Vascular calcifications are noted in the soft tissues.    IMPRESSION:    No radiographic evidence of advanced osteomyelitis. Postsurgical and   chronic degenerative changes as above. If there is persistent high   clinical concern for osteomyelitis, MRI may be obtained for better   evaluation.    CTScan:    MRI:         Imaging Personally Reviewed:  [ ] YES  [ ] NO    Consultant(s) Notes Reviewed:  [X] YES  [ ] NO    Care Discussed with Consultants/Other Providers [X ] YES  [ ] NO

## 2017-07-06 NOTE — PROGRESS NOTE ADULT - SUBJECTIVE AND OBJECTIVE BOX
Patient is a 72y old  Female who presents with a chief complaint of Toe infection (04 Jul 2017 23:00)      INTERVAL HPI/OVERNIGHT EVENTS: Pt feeling well. Has some mild itching on her L foot where her dressing is. Afebrile overnight.    MEDICATIONS (STANDING):  heparin  Injectable 5000 Unit(s) SubCutaneous every 8 hours  insulin lispro (HumaLOG) corrective regimen sliding scale   SubCutaneous three times a day before meals  insulin lispro (HumaLOG) corrective regimen sliding scale   SubCutaneous at bedtime  dextrose 5%. 1000 milliLiter(s) IV Continuous <Continuous>  dextrose 50% Injectable 12.5 Gram(s) IV Push once  dextrose 50% Injectable 25 Gram(s) IV Push once  dextrose 50% Injectable 25 Gram(s) IV Push once  aspirin  chewable 81 milliGRAM(s) Oral daily  atorvastatin 20 milliGRAM(s) Oral at bedtime  clopidogrel Tablet 75 milliGRAM(s) Oral daily  ketorolac 0.5% Ophthalmic Solution 1 Drop(s) Both EYES two times a day  brimonidine 0.2% Ophthalmic Solution 1 Drop(s) Both EYES two times a day  timolol 0.5% Solution 1 Drop(s) Both EYES two times a day  ertapenem  IVPB 1000 milliGRAM(s) IV Intermittent every 24 hours  zinc sulfate 220 milliGRAM(s) Oral daily  Nephro-jennifer 1 Tablet(s) Oral daily  carvedilol 12.5 milliGRAM(s) Oral every 12 hours  isosorbide   mononitrate ER Tablet (IMDUR) 30 milliGRAM(s) Oral daily  vancomycin  IVPB   IV Intermittent   vancomycin  IVPB 1250 milliGRAM(s) IV Intermittent every 24 hours  valsartan 320 milliGRAM(s) Oral daily  insulin glargine Injectable (LANTUS) 30 Unit(s) SubCutaneous at bedtime  ergocalciferol 50871 Unit(s) Oral <User Schedule>    MEDICATIONS  (PRN):  acetaminophen   Tablet 650 milliGRAM(s) Oral every 6 hours PRN  dextrose Gel 1 Dose(s) Oral once PRN  glucagon  Injectable 1 milliGRAM(s) IntraMuscular once PRN      REVIEW OF SYSTEMS:  CONSTITUTIONAL: No fever  RESPIRATORY: No shortness of breath  CARDIOVASCULAR: No chest pain  GASTROINTESTINAL: No abdominal or epigastric pain. No nausea, vomiting  SKIN: Itching  MUSCULOSKELETAL: No left foot pain    T(F): 99 (07-06-17 @ 06:13), Max: 99.2 (07-05-17 @ 15:25)  HR: 64 (07-06-17 @ 13:04) (64 - 77)  BP: 158/65 (07-06-17 @ 13:04) (129/53 - 158/65)  RR: 16 (07-06-17 @ 06:13) (16 - 18)  SpO2: 98% (07-06-17 @ 06:13) (96% - 98%)  Wt(kg): --    CAPILLARY BLOOD GLUCOSE  212 (06 Jul 2017 12:16)  167 (06 Jul 2017 08:31)  214 (05 Jul 2017 22:20)  221 (05 Jul 2017 17:04)      I&O's Summary    PHYSICAL EXAM:  GENERAL: NAD, well-groomed, well-developed  HEAD:  Atraumatic, Normocephalic  EYES: EOMI, PERRLA, conjunctiva and sclera clear  ENMT: Moist mucous membranes  NECK: Supple, No JVD  NERVOUS SYSTEM:  Alert & Oriented X3, Good concentration; Motor Strength 5/5 B/L upper and LLE. Decreased sensation of left foot  CHEST/LUNG: Clear to percussion bilaterally; No rales, rhonchi, wheezing, or rubs  HEART: Regular rate and rhythm; systolic murmur, rubs, or gallops  ABDOMEN: Soft, Nontender, Nondistended; Bowel sounds present  EXTREMITIES: R BKA, L foot wrapped in Kerlix  LYMPH: No lymphadenopathy noted  SKIN: L foot wrapped in Kerlix, dry/intact      LABS:                        7.8    5.13  )-----------( 306      ( 06 Jul 2017 06:00 )             25.6     07-06    141  |  105  |  14  ----------------------------<  175<H>  4.5   |  23  |  0.92    Ca    8.6      06 Jul 2017 06:00  Phos  3.4     07-05  Mg     1.8     07-05    TPro  7.6  /  Alb  3.0<L>  /  TBili  0.2  /  DBili  x   /  AST  16  /  ALT  17  /  AlkPhos  76  07-04            RADIOLOGY & ADDITIONAL TESTS:    XRay:     EXAM:  RAD FOOT MIN 3 VIEWS LT        PROCEDURE DATE:  Jul 4 2017       INTERPRETATION:  CLINICAL INFORMATION: Infected toe ulcer.    TECHNIQUE: 3 views of the left foot.    COMPARISON: X-ray of the left foot dated 6/20/2017.    FINDINGS:     The patient is status post transmetatarsal resection of the left fourth   and fifth digits. The surgical margins are sharp and unchanged in   appearance from 6/28/2017. A chronic fracture deformity of the base of   the left fifth metatarsal is again noted. The remaining bones of the left   foot demonstrate generalized osteopenia without evidence of acute   fracture or dislocation. Degenerative changes of the first   metatarsophalangeal joint. There is no osseous erosion or periosteal   reaction. Vascular calcifications are noted in the soft tissues.    IMPRESSION:    No radiographic evidence of advanced osteomyelitis. Postsurgical and   chronic degenerative changes as above. If there is persistent high   clinical concern for osteomyelitis, MRI may be obtained for better   evaluation.    CTScan:    MRI:     Imaging Personally Reviewed:  [ ] YES  [ ] NO    Consultant(s) Notes Reviewed:  [X] YES  [ ] NO    Care Discussed with Consultants/Other Providers [X] YES  [ ] NO

## 2017-07-06 NOTE — PROGRESS NOTE ADULT - SUBJECTIVE AND OBJECTIVE BOX
Follow Up:      Inverval History/ROS:Patient is a 72y old  Female who presents with a chief complaint of Toe infection (04 Jul 2017 23:00)    No fever. No diarrhea.     Allergies    sulfa drugs (Hives)  sulfa drugs (Rash)  Sulfac 10% (Hives)    Intolerances        ANTIMICROBIALS:  ertapenem  IVPB 1000 every 24 hours  vancomycin  IVPB    vancomycin  IVPB 1250 every 24 hours      OTHER MEDS:  heparin  Injectable 5000 Unit(s) SubCutaneous every 8 hours  acetaminophen   Tablet 650 milliGRAM(s) Oral every 6 hours PRN  insulin lispro (HumaLOG) corrective regimen sliding scale   SubCutaneous three times a day before meals  insulin lispro (HumaLOG) corrective regimen sliding scale   SubCutaneous at bedtime  dextrose 5%. 1000 milliLiter(s) IV Continuous <Continuous>  dextrose Gel 1 Dose(s) Oral once PRN  dextrose 50% Injectable 12.5 Gram(s) IV Push once  dextrose 50% Injectable 25 Gram(s) IV Push once  aspirin  chewable 81 milliGRAM(s) Oral daily  dextrose 50% Injectable 25 Gram(s) IV Push once  glucagon  Injectable 1 milliGRAM(s) IntraMuscular once PRN  atorvastatin 20 milliGRAM(s) Oral at bedtime  clopidogrel Tablet 75 milliGRAM(s) Oral daily  ketorolac 0.5% Ophthalmic Solution 1 Drop(s) Both EYES two times a day  brimonidine 0.2% Ophthalmic Solution 1 Drop(s) Both EYES two times a day  timolol 0.5% Solution 1 Drop(s) Both EYES two times a day  zinc sulfate 220 milliGRAM(s) Oral daily  Nephro-jennifer 1 Tablet(s) Oral daily  carvedilol 12.5 milliGRAM(s) Oral every 12 hours  isosorbide   mononitrate ER Tablet (IMDUR) 30 milliGRAM(s) Oral daily  valsartan 320 milliGRAM(s) Oral daily  insulin glargine Injectable (LANTUS) 30 Unit(s) SubCutaneous at bedtime  ergocalciferol 56087 Unit(s) Oral <User Schedule>      Vital Signs Last 24 Hrs  T(C): 37.2 (06 Jul 2017 06:13), Max: 37.3 (05 Jul 2017 15:25)  T(F): 99 (06 Jul 2017 06:13), Max: 99.2 (05 Jul 2017 15:25)  HR: 67 (06 Jul 2017 06:13) (65 - 77)  BP: 145/59 (06 Jul 2017 06:13) (129/53 - 161/66)  BP(mean): --  RR: 16 (06 Jul 2017 06:13) (16 - 18)  SpO2: 98% (06 Jul 2017 06:13) (96% - 98%)    PHYSICAL EXAM:  General: [x ] non-toxic  HEAD/EYES: [ ] PERRL [x ] white sclera [ ] icterus  ENT:  [ ] normal [x ] supple [ ] thrush [ ] pharyngeal exudate  Cardiovascular:   [ ] murmur [ x] normal [ ] PPM/AICD  Respiratory:  [ ] clear to ausculation bilaterally  GI:  [x ] soft, non-tender, normal bowel sounds  :  [ ] howe [ ] no CVA tenderness   Musculoskeletal:  [ ] no synovitis  Neurologic:  [x ] non-focal exam   Skin:  [ ] no rash  Lymph: [x ] no lymphadenopathy  Psychiatric:  [ ] appropriate affect [x ] alert & oriented  Lines:  [x ] no phlebitis [ ] central line                                7.8    5.13  )-----------( 306      ( 06 Jul 2017 06:00 )             25.6       07-06    141  |  105  |  14  ----------------------------<  175<H>  4.5   |  23  |  0.92    Ca    8.6      06 Jul 2017 06:00  Phos  3.4     07-05  Mg     1.8     07-05    TPro  7.6  /  Alb  3.0<L>  /  TBili  0.2  /  DBili  x   /  AST  16  /  ALT  17  /  AlkPhos  76  07-04          MICROBIOLOGY:    Blood culture 7/4 : NGTD    Wound culture: testing    RADIOLOGY:

## 2017-07-06 NOTE — PROGRESS NOTE ADULT - ASSESSMENT
71yo F with PMH of Type 2 DM (HgbA1c 7.8%) c/b neuropathy, PVD s/p R BKA (2010) and stent to L SFA, CKD Stage III, CAD s/p stents, CABG and L CEA, and HTN presenting with L hallux infection s/p bedside I & D with 5 cc of pus expressed from abscess

## 2017-07-06 NOTE — PROGRESS NOTE ADULT - PROBLEM SELECTOR PLAN 5
Ha1c 7.9 06/17  - Lantus increased to 30u qHS  - Monitor FS today and will readjust Lantus dose as needed, pt currently not on any pre-meal Humalog  - ISS and FS with meals and at bedtime

## 2017-07-06 NOTE — PROGRESS NOTE ADULT - PROBLEM SELECTOR PLAN 2
Creatinine currently at pt's baseline   - S/p IVF for 10h  - restarted home dose of valsartan given elevated BP

## 2017-07-07 ENCOUNTER — RESULT REVIEW (OUTPATIENT)
Age: 72
End: 2017-07-07

## 2017-07-07 DIAGNOSIS — D72.1 EOSINOPHILIA: ICD-10-CM

## 2017-07-07 LAB
BASOPHILS # BLD AUTO: 0 K/UL — SIGNIFICANT CHANGE UP (ref 0–0.2)
BASOPHILS NFR BLD AUTO: 0 % — SIGNIFICANT CHANGE UP (ref 0–2)
BASOPHILS NFR SPEC: 0 % — SIGNIFICANT CHANGE UP (ref 0–2)
EOSINOPHIL # BLD AUTO: 0.47 K/UL — SIGNIFICANT CHANGE UP (ref 0–0.5)
EOSINOPHIL NFR BLD AUTO: 9 % — HIGH (ref 0–6)
EOSINOPHIL NFR FLD: 2 % — SIGNIFICANT CHANGE UP (ref 0–6)
GRAM STN WND: SIGNIFICANT CHANGE UP
HCT VFR BLD CALC: 28.2 % — LOW (ref 34.5–45)
HGB BLD-MCNC: 8.5 G/DL — LOW (ref 11.5–15.5)
IMM GRANULOCYTES # BLD AUTO: 0.01 # — SIGNIFICANT CHANGE UP
IMM GRANULOCYTES NFR BLD AUTO: 0.2 % — SIGNIFICANT CHANGE UP (ref 0–1.5)
LG PLATELETS BLD QL AUTO: SLIGHT — SIGNIFICANT CHANGE UP
LYMPHOCYTES # BLD AUTO: 1.69 K/UL — SIGNIFICANT CHANGE UP (ref 1–3.3)
LYMPHOCYTES # BLD AUTO: 32.2 % — SIGNIFICANT CHANGE UP (ref 13–44)
LYMPHOCYTES NFR SPEC AUTO: 36 % — SIGNIFICANT CHANGE UP (ref 13–44)
MACROCYTES BLD QL: SLIGHT — SIGNIFICANT CHANGE UP
MANUAL SMEAR VERIFICATION: SIGNIFICANT CHANGE UP
MCHC RBC-ENTMCNC: 27.3 PG — SIGNIFICANT CHANGE UP (ref 27–34)
MCHC RBC-ENTMCNC: 30.1 % — LOW (ref 32–36)
MCV RBC AUTO: 90.7 FL — SIGNIFICANT CHANGE UP (ref 80–100)
MONOCYTES # BLD AUTO: 0.29 K/UL — SIGNIFICANT CHANGE UP (ref 0–0.9)
MONOCYTES NFR BLD AUTO: 5.5 % — SIGNIFICANT CHANGE UP (ref 2–14)
MONOCYTES NFR BLD: 3 % — SIGNIFICANT CHANGE UP (ref 2–9)
NEUTROPHIL AB SER-ACNC: 58 % — SIGNIFICANT CHANGE UP (ref 43–77)
NEUTROPHILS # BLD AUTO: 2.79 K/UL — SIGNIFICANT CHANGE UP (ref 1.8–7.4)
NEUTROPHILS NFR BLD AUTO: 53.1 % — SIGNIFICANT CHANGE UP (ref 43–77)
NEUTS BAND # BLD: 1 % — SIGNIFICANT CHANGE UP (ref 0–6)
NRBC # FLD: 0 — SIGNIFICANT CHANGE UP
OVALOCYTES BLD QL SMEAR: SLIGHT — SIGNIFICANT CHANGE UP
PLATELET # BLD AUTO: 124 K/UL — LOW (ref 150–400)
PLATELET COUNT - ESTIMATE: SIGNIFICANT CHANGE UP
PMV BLD: 11.5 FL — SIGNIFICANT CHANGE UP (ref 7–13)
RBC # BLD: 3.11 M/UL — LOW (ref 3.8–5.2)
RBC # FLD: 14.8 % — HIGH (ref 10.3–14.5)
REVIEW TO FOLLOW: YES — SIGNIFICANT CHANGE UP
SPECIMEN SOURCE: SIGNIFICANT CHANGE UP
VANCOMYCIN TROUGH SERPL-MCNC: 9.7 UG/ML — LOW (ref 10–20)
WBC # BLD: 5.25 K/UL — SIGNIFICANT CHANGE UP (ref 3.8–10.5)
WBC # FLD AUTO: 5.25 K/UL — SIGNIFICANT CHANGE UP (ref 3.8–10.5)

## 2017-07-07 PROCEDURE — 99233 SBSQ HOSP IP/OBS HIGH 50: CPT | Mod: GC

## 2017-07-07 PROCEDURE — 88305 TISSUE EXAM BY PATHOLOGIST: CPT | Mod: 26

## 2017-07-07 PROCEDURE — 88311 DECALCIFY TISSUE: CPT | Mod: 26

## 2017-07-07 PROCEDURE — 99232 SBSQ HOSP IP/OBS MODERATE 35: CPT

## 2017-07-07 PROCEDURE — 73630 X-RAY EXAM OF FOOT: CPT | Mod: 26,LT

## 2017-07-07 RX ORDER — ACETAMINOPHEN 500 MG
650 TABLET ORAL EVERY 6 HOURS
Qty: 0 | Refills: 0 | Status: DISCONTINUED | OUTPATIENT
Start: 2017-07-07 | End: 2017-07-13

## 2017-07-07 RX ORDER — SODIUM CHLORIDE 9 MG/ML
1000 INJECTION INTRAMUSCULAR; INTRAVENOUS; SUBCUTANEOUS
Qty: 0 | Refills: 0 | Status: DISCONTINUED | OUTPATIENT
Start: 2017-07-07 | End: 2017-07-07

## 2017-07-07 RX ORDER — MORPHINE SULFATE 50 MG/1
2 CAPSULE, EXTENDED RELEASE ORAL EVERY 6 HOURS
Qty: 0 | Refills: 0 | Status: DISCONTINUED | OUTPATIENT
Start: 2017-07-07 | End: 2017-07-13

## 2017-07-07 RX ORDER — FENTANYL CITRATE 50 UG/ML
50 INJECTION INTRAVENOUS
Qty: 0 | Refills: 0 | Status: DISCONTINUED | OUTPATIENT
Start: 2017-07-07 | End: 2017-07-07

## 2017-07-07 RX ORDER — INSULIN GLARGINE 100 [IU]/ML
30 INJECTION, SOLUTION SUBCUTANEOUS AT BEDTIME
Qty: 0 | Refills: 0 | Status: DISCONTINUED | OUTPATIENT
Start: 2017-07-07 | End: 2017-07-08

## 2017-07-07 RX ORDER — VANCOMYCIN HCL 1 G
1000 VIAL (EA) INTRAVENOUS EVERY 12 HOURS
Qty: 0 | Refills: 0 | Status: DISCONTINUED | OUTPATIENT
Start: 2017-07-08 | End: 2017-07-09

## 2017-07-07 RX ORDER — OXYCODONE AND ACETAMINOPHEN 5; 325 MG/1; MG/1
2 TABLET ORAL EVERY 6 HOURS
Qty: 0 | Refills: 0 | Status: DISCONTINUED | OUTPATIENT
Start: 2017-07-07 | End: 2017-07-13

## 2017-07-07 RX ORDER — ONDANSETRON 8 MG/1
4 TABLET, FILM COATED ORAL ONCE
Qty: 0 | Refills: 0 | Status: DISCONTINUED | OUTPATIENT
Start: 2017-07-07 | End: 2017-07-07

## 2017-07-07 RX ORDER — VANCOMYCIN HCL 1 G
VIAL (EA) INTRAVENOUS
Qty: 0 | Refills: 0 | Status: DISCONTINUED | OUTPATIENT
Start: 2017-07-07 | End: 2017-07-07

## 2017-07-07 RX ORDER — VANCOMYCIN HCL 1 G
1250 VIAL (EA) INTRAVENOUS ONCE
Qty: 0 | Refills: 0 | Status: COMPLETED | OUTPATIENT
Start: 2017-07-07 | End: 2017-07-07

## 2017-07-07 RX ORDER — FENTANYL CITRATE 50 UG/ML
25 INJECTION INTRAVENOUS
Qty: 0 | Refills: 0 | Status: DISCONTINUED | OUTPATIENT
Start: 2017-07-07 | End: 2017-07-07

## 2017-07-07 RX ADMIN — INSULIN GLARGINE 30 UNIT(S): 100 INJECTION, SOLUTION SUBCUTANEOUS at 21:42

## 2017-07-07 RX ADMIN — ATORVASTATIN CALCIUM 20 MILLIGRAM(S): 80 TABLET, FILM COATED ORAL at 21:42

## 2017-07-07 RX ADMIN — CARVEDILOL PHOSPHATE 12.5 MILLIGRAM(S): 80 CAPSULE, EXTENDED RELEASE ORAL at 05:18

## 2017-07-07 RX ADMIN — VALSARTAN 320 MILLIGRAM(S): 80 TABLET ORAL at 05:18

## 2017-07-07 RX ADMIN — HEPARIN SODIUM 5000 UNIT(S): 5000 INJECTION INTRAVENOUS; SUBCUTANEOUS at 21:42

## 2017-07-07 RX ADMIN — BRIMONIDINE TARTRATE 1 DROP(S): 2 SOLUTION/ DROPS OPHTHALMIC at 05:17

## 2017-07-07 RX ADMIN — Medication 2: at 17:11

## 2017-07-07 RX ADMIN — HEPARIN SODIUM 5000 UNIT(S): 5000 INJECTION INTRAVENOUS; SUBCUTANEOUS at 15:11

## 2017-07-07 RX ADMIN — Medication 166.67 MILLIGRAM(S): at 09:30

## 2017-07-07 RX ADMIN — ERTAPENEM SODIUM 120 MILLIGRAM(S): 1 INJECTION, POWDER, LYOPHILIZED, FOR SOLUTION INTRAMUSCULAR; INTRAVENOUS at 00:54

## 2017-07-07 RX ADMIN — Medication 1 DROP(S): at 05:17

## 2017-07-07 RX ADMIN — CARVEDILOL PHOSPHATE 12.5 MILLIGRAM(S): 80 CAPSULE, EXTENDED RELEASE ORAL at 17:10

## 2017-07-07 RX ADMIN — Medication 1: at 22:47

## 2017-07-07 RX ADMIN — BRIMONIDINE TARTRATE 1 DROP(S): 2 SOLUTION/ DROPS OPHTHALMIC at 17:10

## 2017-07-07 RX ADMIN — Medication 1 DROP(S): at 17:10

## 2017-07-07 NOTE — PROGRESS NOTE ADULT - PROBLEM SELECTOR PLAN 5
Ha1c 7.9 06/17  - Lantus increased to 30u qHS, gave 15u last night due to planned surgery today  - Monitor FS today and will readjust Lantus dose as needed, pt currently not on any pre-meal Humalog  - ISS and FS with meals and at bedtime

## 2017-07-07 NOTE — PROGRESS NOTE ADULT - PROBLEM SELECTOR PLAN 1
S/p bedside I & D in ED  - C/w vancomycin and ertapenem for broad spectrum coverage  - MRI of L foot showing changes consistent with OM  - Pt to go to OR today for L transmetatarsal amputation  - F/u ID recommendations  - F/u podiatry recs  - F/u vascular surgery recs- recommend repeat DUC/PVRs which showed mild improvement from last DUC/PVRs  - Wound cultures pending, f/u blood cultures NGTD at 48h

## 2017-07-07 NOTE — PROGRESS NOTE ADULT - SUBJECTIVE AND OBJECTIVE BOX
VASCULAR SURGERY  Pager: [phone]    SUBJECTIVE:   No complaints of pain from L foot hallux wound. Decreased sensation, but intact motor.     OBJECTIVE:   Vital Signs Last 24 Hrs  T(C): 36.9 (07 Jul 2017 08:17), Max: 37.2 (06 Jul 2017 13:55)  T(F): 98.4 (07 Jul 2017 08:17), Max: 99 (06 Jul 2017 13:55)  HR: 63 (07 Jul 2017 08:17) (62 - 72)  BP: 183/73 (07 Jul 2017 08:17) (158/65 - 189/92)  BP(mean): --  RR: 16 (07 Jul 2017 08:17) (16 - 20)  SpO2: 96% (07 Jul 2017 08:17) (95% - 99%)    Physical exam:  General: NAD  L hallux wound - erythematous, no purulence  Foot warm with paresthesia secondary to DM      LABS:  CBC Full  -  ( 07 Jul 2017 07:30 )  WBC Count : 5.25 K/uL  Hemoglobin : 8.5 g/dL  Hematocrit : 28.2 %  Platelet Count - Automated : 124 K/uL  Mean Cell Volume : 90.7 fL  Mean Cell Hemoglobin : 27.3 pg  Mean Cell Hemoglobin Concentration : 30.1 %  Auto Neutrophil # : 2.79 K/uL  Auto Lymphocyte # : 1.69 K/uL  Auto Monocyte # : 0.29 K/uL  Auto Eosinophil # : 0.47 K/uL  Auto Basophil # : 0.00 K/uL  Auto Neutrophil % : 53.1 %  Auto Lymphocyte % : 32.2 %  Auto Monocyte % : 5.5 %  Auto Eosinophil % : 9.0 %  Auto Basophil % : 0.0 %

## 2017-07-07 NOTE — CHART NOTE - NSCHARTNOTEFT_GEN_A_CORE
Pt's vancomycin trough from this morning 9.7. Received call from OR that pt will need vancomycin STAT in OR and that 1250 bag of vancomycin was sent down. Pt will receive 1-time dose of vancomycin 1250 in OR this morning despite low vancomycin trough, will increase vancomycin dose to 1500 q24h with next dose due tomorrow morning.

## 2017-07-07 NOTE — PROVIDER CONTACT NOTE (OTHER) - ACTION/TREATMENT ORDERED:
Give pt antihypertensive medications as ordered.
Give antihypertensive medications as prescribed. Reassess.

## 2017-07-07 NOTE — PROGRESS NOTE ADULT - SUBJECTIVE AND OBJECTIVE BOX
Patient is a 72y old  Female who presents with a chief complaint of Toe infection (04 Jul 2017 23:00)      INTERVAL HPI/OVERNIGHT EVENTS: Pt feeling well, left for surgery at 750 am.    MEDICATIONS (STANDING):  heparin  Injectable 5000 Unit(s) SubCutaneous every 8 hours  insulin lispro (HumaLOG) corrective regimen sliding scale   SubCutaneous three times a day before meals  insulin lispro (HumaLOG) corrective regimen sliding scale   SubCutaneous at bedtime  dextrose 5%. 1000 milliLiter(s) IV Continuous <Continuous>  dextrose 50% Injectable 12.5 Gram(s) IV Push once  dextrose 50% Injectable 25 Gram(s) IV Push once  aspirin  chewable 81 milliGRAM(s) Oral daily  dextrose 50% Injectable 25 Gram(s) IV Push once  insulin glargine Injectable (LANTUS) 25 Unit(s) SubCutaneous at bedtime  atorvastatin 20 milliGRAM(s) Oral at bedtime  clopidogrel Tablet 75 milliGRAM(s) Oral daily  ketorolac 0.5% Ophthalmic Solution 1 Drop(s) Both EYES two times a day  brimonidine 0.2% Ophthalmic Solution 1 Drop(s) Both EYES two times a day  timolol 0.5% Solution 1 Drop(s) Both EYES two times a day  sodium chloride 0.9%. 1000 milliLiter(s) IV Continuous <Continuous>  ertapenem  IVPB 1000 milliGRAM(s) IV Intermittent every 24 hours  zinc sulfate 220 milliGRAM(s) Oral daily  Nephro-jennifer 1 Tablet(s) Oral daily  carvedilol 12.5 milliGRAM(s) Oral every 12 hours  isosorbide   mononitrate ER Tablet (IMDUR) 30 milliGRAM(s) Oral daily    MEDICATIONS  (PRN):  acetaminophen   Tablet 650 milliGRAM(s) Oral every 6 hours PRN  dextrose Gel 1 Dose(s) Oral once PRN  glucagon  Injectable 1 milliGRAM(s) IntraMuscular once PRN      REVIEW OF SYSTEMS:  CONSTITUTIONAL: Fever; no headaches or fatigue  RESPIRATORY: dry Cough improving; no hematemesis.  CARDIOVASCULAR: No chest pain, palpitations, or dizziness  GASTROINTESTINAL: No abdominal or epigastric pain. No nausea, vomiting  NEUROLOGICAL: Decreased sensation LLE  SKIN: Abscess of L hallux  MUSCULOSKELETAL: long-standing mild R lower back pain upon ambulation.    Vital Signs Last 24 Hrs  T(C): 36.9 (07 Jul 2017 07:35), Max: 37.2 (06 Jul 2017 13:55)  T(F): 98.4 (07 Jul 2017 07:35), Max: 99 (06 Jul 2017 13:55)  HR: 62 (07 Jul 2017 07:35) (62 - 72)  BP: 168/66 (07 Jul 2017 07:35) (158/65 - 189/92)  BP(mean): --  RR: 18 (07 Jul 2017 07:35) (16 - 20)  SpO2: 98% (07 Jul 2017 07:35) (95% - 99%)Vital Signs Last 24 Hrs      CAPILLARY BLOOD GLUCOSE  252 (06 Jul 2017 22:38)  193 (06 Jul 2017 17:07)  212 (06 Jul 2017 12:16)  167 (06 Jul 2017 08:31)      I&O's Summary      PHYSICAL EXAM:  GENERAL: NAD, well-groomed, well-developed  HEAD:  Atraumatic, Normocephalic  EYES: EOMI, PERRLA, conjunctiva and sclera clear  ENMT: Moist mucous membranes  NECK: Supple  NERVOUS SYSTEM:  Alert & Oriented X3, Good concentration; Motor Strength 5/5 B/L upper and LLE. Decreased sensation of left foot  CHEST/LUNG: Clear to percussion bilaterally; No rales, rhonchi, wheezing, or rubs  HEART: Regular rate and rhythm; systolic murmur, no rubs or gallops  ABDOMEN: Soft, Nontender, Nondistended; Bowel sounds present  EXTREMITIES: R BKA, L foot wrapped in Kerlix  LYMPH: No lymphadenopathy noted  SKIN: L foot wrapped in Kerlix, dry with betadyne staining. small crack at heel of L foot. dark pigmentation and warmth of LLE.    LABS:  CBC Full  -  ( 06 Jul 2017 06:00 )  WBC Count : 5.13 K/uL  Hemoglobin : 7.8 g/dL  Hematocrit : 25.6 %  Platelet Count - Automated : 306 K/uL  Mean Cell Volume : 91.1 fL  Mean Cell Hemoglobin : 27.8 pg  Mean Cell Hemoglobin Concentration : 30.5 %  Auto Neutrophil # : 2.57 K/uL  Auto Lymphocyte # : 1.74 K/uL  Auto Monocyte # : 0.39 K/uL  Auto Eosinophil # : 0.40 K/uL  Auto Basophil # : 0.01 K/uL  Auto Neutrophil % : 50.1 %  Auto Lymphocyte % : 33.9 %  Auto Monocyte % : 7.6 %  Auto Eosinophil % : 7.8 %  Auto Basophil % : 0.2 %      07-06    141  |  105  |  14  ----------------------------<  175<H>  4.5   |  23  |  0.92    Ca    8.6      06 Jul 2017 06:00  Phos  3.4     07-05  Mg     1.8     07-05    TPro  7.6  /  Alb  3.0<L>  /  TBili  0.2  /  DBili  x   /  AST  16  /  ALT  17  /  AlkPhos  76  07-04                    RADIOLOGY & ADDITIONAL TESTS:    XRay:    EXAM:  RAD FOOT MIN 3 VIEWS LT        PROCEDURE DATE:  Jul 4 2017         INTERPRETATION:  CLINICAL INFORMATION: Infected toe ulcer.    TECHNIQUE: 3 views of the left foot.    COMPARISON: X-ray of the left foot dated 6/20/2017.    FINDINGS:     The patient is status post transmetatarsal resection of the left fourth   and fifth digits. The surgical margins are sharp and unchanged in   appearance from 6/28/2017. A chronic fracture deformity of the base of   the left fifth metatarsal is again noted. The remaining bones of the left   foot demonstrate generalized osteopenia without evidence of acute   fracture or dislocation. Degenerative changes of the first   metatarsophalangeal joint. There is no osseous erosion or periosteal   reaction. Vascular calcifications are noted in the soft tissues.    IMPRESSION:    No radiographic evidence of advanced osteomyelitis. Postsurgical and   chronic degenerative changes as above. If there is persistent high   clinical concern for osteomyelitis, MRI may be obtained for better   evaluation.    CTScan:    MRI: INTERPRETATION:  CLINICAL INDICATION: Left hallux infection; evaluate for   abscess and/or osteomyelitis    TECHNIQUE:  Multiplanar and multisequence left forefoot and midfoot MRI performed   before and after administration of 8 cc of Gadavist IV without reported   complications and with 2 cc discarded. Compared to prior MRI study from   6/21/2017 and reviewed in conjunction with left foot radiographs from   7/4/2017.    FINDINGS:  Partial 4th and 5th ray amputation defects with overlying soft tissue   defect again noted.    Developed hallux soft tissue swelling with postcontrast enhancement   compatible with cellulitis. Developed rim-enhancing fluid collections   compatible with abscess along dorsal and dorsomedial aspects of 1st MTP   joint and developed overlying fluid-filled medial skin surface blister.   The more dorsal collection is less than a centimeter. The dorsomedial   collection is broad narrow and elongated measuring approximately 2.6 cm   dorsoplantar x 0.3 cm.    Extensive heterogeneous postcontrast enhancement of the hallux proximal   phalanx, 1st metatarsal head, and hallux distal phalangeal tuft noted   with associated cortical destructive changes involving the proximal   phalanx and 1st metatarsal head compatible with changes of osteomyelitis.     Redemonstrated marrow infarct related signal abnormality in the 2nd,   remnant 4th, and distal 1st metatarsal shafts. No additional marrow   signal abnormalities.    IMPRESSION:  New hallux cellulitis with underlying abscess collections dorsally and   dorsal medially and with evidence for osteomyelitis involving the hallux   phalanges and 1st metatarsal head.    Residual marrow infarct related signal abnormalities in the 2nd, remnant   4th, and distal 1st metatarsal shafts.            Imaging Personally Reviewed:  [ ] YES  [ ] NO    Consultant(s) Notes Reviewed:  [X] YES  [ ] NO    Care Discussed with Consultants/Other Providers [X ] YES  [ ] NO

## 2017-07-07 NOTE — PROGRESS NOTE ADULT - SUBJECTIVE AND OBJECTIVE BOX
Patient is a 72y old  Female who presents with a chief complaint of Toe infection (04 Jul 2017 23:00)      INTERVAL HPI/OVERNIGHT EVENTS:    MEDICATIONS (STANDING):  heparin  Injectable 5000 Unit(s) SubCutaneous every 8 hours  insulin lispro (HumaLOG) corrective regimen sliding scale   SubCutaneous three times a day before meals  insulin lispro (HumaLOG) corrective regimen sliding scale   SubCutaneous at bedtime  dextrose 5%. 1000 milliLiter(s) IV Continuous <Continuous>  dextrose 50% Injectable 12.5 Gram(s) IV Push once  dextrose 50% Injectable 25 Gram(s) IV Push once  dextrose 50% Injectable 25 Gram(s) IV Push once  aspirin  chewable 81 milliGRAM(s) Oral daily  atorvastatin 20 milliGRAM(s) Oral at bedtime  clopidogrel Tablet 75 milliGRAM(s) Oral daily  ketorolac 0.5% Ophthalmic Solution 1 Drop(s) Both EYES two times a day  brimonidine 0.2% Ophthalmic Solution 1 Drop(s) Both EYES two times a day  timolol 0.5% Solution 1 Drop(s) Both EYES two times a day  ertapenem  IVPB 1000 milliGRAM(s) IV Intermittent every 24 hours  zinc sulfate 220 milliGRAM(s) Oral daily  Nephro-jennifer 1 Tablet(s) Oral daily  carvedilol 12.5 milliGRAM(s) Oral every 12 hours  isosorbide   mononitrate ER Tablet (IMDUR) 30 milliGRAM(s) Oral daily  vancomycin  IVPB   IV Intermittent   vancomycin  IVPB 1250 milliGRAM(s) IV Intermittent every 24 hours  valsartan 320 milliGRAM(s) Oral daily  ergocalciferol 87383 Unit(s) Oral <User Schedule>  insulin glargine Injectable (LANTUS) 15 Unit(s) SubCutaneous at bedtime    MEDICATIONS  (PRN):  acetaminophen   Tablet 650 milliGRAM(s) Oral every 6 hours PRN  dextrose Gel 1 Dose(s) Oral once PRN  glucagon  Injectable 1 milliGRAM(s) IntraMuscular once PRN      REVIEW OF SYSTEMS:  CONSTITUTIONAL: No fever, weight loss, or fatigue  EYES: No eye pain, visual disturbances, or discharge  ENMT:  No difficulty hearing, tinnitus, vertigo; No sinus or throat pain  NECK: No pain or stiffness  BREASTS: No pain, masses, or nipple discharge  RESPIRATORY: No cough, wheezing, chills or hemoptysis; No shortness of breath  CARDIOVASCULAR: No chest pain, palpitations, dizziness, or leg swelling  GASTROINTESTINAL: No abdominal or epigastric pain. No nausea, vomiting, or hematemesis; No diarrhea or constipation. No melena or hematochezia.  GENITOURINARY: No dysuria, frequency, hematuria, or incontinence  NEUROLOGICAL: No headaches, memory loss, loss of strength, numbness, or tremors  SKIN: No itching, burning, rashes, or lesions   MUSCULOSKELETAL: No joint pain or swelling; No muscle, back, or extremity pain    T(F): 98.4 (07-07-17 @ 07:35), Max: 99 (07-06-17 @ 13:55)  HR: 62 (07-07-17 @ 07:35) (62 - 72)  BP: 168/66 (07-07-17 @ 07:35) (158/65 - 189/92)  RR: 18 (07-07-17 @ 07:35) (16 - 20)  SpO2: 98% (07-07-17 @ 07:35) (95% - 99%)  Wt(kg): --  CAPILLARY BLOOD GLUCOSE  252 (06 Jul 2017 22:38)  193 (06 Jul 2017 17:07)  212 (06 Jul 2017 12:16)  167 (06 Jul 2017 08:31)        I&O's Summary      PHYSICAL EXAM:  GENERAL: NAD, well-groomed, well-developed  HEAD:  Atraumatic, Normocephalic  EYES: EOMI, PERRLA, conjunctiva and sclera clear  ENMT: No tonsillar erythema, exudates, or enlargement; Moist mucous membranes, Good dentition, No lesions  NECK: Supple, No JVD, Normal thyroid  NERVOUS SYSTEM:  Alert & Oriented X3, Good concentration; Motor Strength 5/5 B/L upper and lower extremities; DTRs 2+ intact and symmetric  CHEST/LUNG: Clear to percussion bilaterally; No rales, rhonchi, wheezing, or rubs  HEART: Regular rate and rhythm; No murmurs, rubs, or gallops  ABDOMEN: Soft, Nontender, Nondistended; Bowel sounds present  EXTREMITIES:  2+ Peripheral Pulses, No clubbing, cyanosis, or edema  LYMPH: No lymphadenopathy noted  SKIN: No rashes or lesions    LABS:                        7.8    5.13  )-----------( 306      ( 06 Jul 2017 06:00 )             25.6     07-06    141  |  105  |  14  ----------------------------<  175<H>  4.5   |  23  |  0.92    Ca    8.6      06 Jul 2017 06:00              RADIOLOGY & ADDITIONAL TESTS:    XRay:    CTScan:    MRI:     Imaging Personally Reviewed:  [ ] YES  [ ] NO    Consultant(s) Notes Reviewed:  [ ] YES  [ ] NO    Care Discussed with Consultants/Other Providers [ ] YES  [ ] NO Patient is a 72y old  Female who presents with a chief complaint of Toe infection (04 Jul 2017 23:00)      INTERVAL HPI/OVERNIGHT EVENTS: Afebrile, going to OR today for L TMA. Given 15u Lantus yesterday night.    MEDICATIONS (STANDING):  heparin  Injectable 5000 Unit(s) SubCutaneous every 8 hours  insulin lispro (HumaLOG) corrective regimen sliding scale   SubCutaneous three times a day before meals  insulin lispro (HumaLOG) corrective regimen sliding scale   SubCutaneous at bedtime  dextrose 5%. 1000 milliLiter(s) IV Continuous <Continuous>  dextrose 50% Injectable 12.5 Gram(s) IV Push once  dextrose 50% Injectable 25 Gram(s) IV Push once  dextrose 50% Injectable 25 Gram(s) IV Push once  aspirin  chewable 81 milliGRAM(s) Oral daily  atorvastatin 20 milliGRAM(s) Oral at bedtime  clopidogrel Tablet 75 milliGRAM(s) Oral daily  ketorolac 0.5% Ophthalmic Solution 1 Drop(s) Both EYES two times a day  brimonidine 0.2% Ophthalmic Solution 1 Drop(s) Both EYES two times a day  timolol 0.5% Solution 1 Drop(s) Both EYES two times a day  ertapenem  IVPB 1000 milliGRAM(s) IV Intermittent every 24 hours  zinc sulfate 220 milliGRAM(s) Oral daily  Nephro-jennifer 1 Tablet(s) Oral daily  carvedilol 12.5 milliGRAM(s) Oral every 12 hours  isosorbide   mononitrate ER Tablet (IMDUR) 30 milliGRAM(s) Oral daily  vancomycin  IVPB   IV Intermittent   vancomycin  IVPB 1250 milliGRAM(s) IV Intermittent every 24 hours  valsartan 320 milliGRAM(s) Oral daily  ergocalciferol 74633 Unit(s) Oral <User Schedule>  insulin glargine Injectable (LANTUS) 15 Unit(s) SubCutaneous at bedtime    MEDICATIONS  (PRN):  acetaminophen   Tablet 650 milliGRAM(s) Oral every 6 hours PRN  dextrose Gel 1 Dose(s) Oral once PRN  glucagon  Injectable 1 milliGRAM(s) IntraMuscular once PRN      REVIEW OF SYSTEMS:  Unable to obtain as pt off the floor in OR.    T(F): 98.4 (07-07-17 @ 07:35), Max: 99 (07-06-17 @ 13:55)  HR: 62 (07-07-17 @ 07:35) (62 - 72)  BP: 168/66 (07-07-17 @ 07:35) (158/65 - 189/92)  RR: 18 (07-07-17 @ 07:35) (16 - 20)  SpO2: 98% (07-07-17 @ 07:35) (95% - 99%)  Wt(kg): --    CAPILLARY BLOOD GLUCOSE  252 (06 Jul 2017 22:38)  193 (06 Jul 2017 17:07)  212 (06 Jul 2017 12:16)  167 (06 Jul 2017 08:31)        I&O's Summary      PHYSICAL EXAM:    Did not examine pt as pt is currently off the floor in OR.    LABS:                                   8.5    5.25  )-----------( 124      ( 07 Jul 2017 07:30 )             28.2     07-06    141  |  105  |  14  ----------------------------<  175<H>  4.5   |  23  |  0.92    Ca    8.6      06 Jul 2017 06:00    RADIOLOGY & ADDITIONAL TESTS:    XRay:    CTScan:    MRI:     Imaging Personally Reviewed:  [ ] YES  [ ] NO    Consultant(s) Notes Reviewed:  [X] YES  [ ] NO    Care Discussed with Consultants/Other Providers [X] YES  [ ] NO

## 2017-07-07 NOTE — PROGRESS NOTE ADULT - PROBLEM SELECTOR PLAN 7
Vitamin D level 8  - pt given 50k units ergocalciferol Stable, at baseline  - Likely 2/2 anemia of chronic disease,

## 2017-07-07 NOTE — PROGRESS NOTE ADULT - ASSESSMENT
Assessment:   Patient is s/p transmetatarsal amputation and NIDIA of LEFT foot . POD #0.    Plan:   - Pt seen and examined.  - Continue IV abx.   - Awaiting OR culutures/pathology.  - Patient is strictly NWB  - Elevate L LE  - Continue fluids until pt can tolerate food   - Pain control as needed

## 2017-07-07 NOTE — PROGRESS NOTE ADULT - SUBJECTIVE AND OBJECTIVE BOX
Follow Up:      Inverval History/ROS:Patient is a 72y old  Female who presents with a chief complaint of Toe infection (04 Jul 2017 23:00)  Her MRi shows an abscess and OM.    Plan is for OR today.       Allergies    sulfa drugs (Hives)  sulfa drugs (Rash)  Sulfac 10% (Hives)    Intolerances        ANTIMICROBIALS:  ertapenem  IVPB 1000 every 24 hours  vancomycin  IVPB    vancomycin  IVPB 1250 every 24 hours      OTHER MEDS:  heparin  Injectable 5000 Unit(s) SubCutaneous every 8 hours  acetaminophen   Tablet 650 milliGRAM(s) Oral every 6 hours PRN  insulin lispro (HumaLOG) corrective regimen sliding scale   SubCutaneous three times a day before meals  insulin lispro (HumaLOG) corrective regimen sliding scale   SubCutaneous at bedtime  dextrose 5%. 1000 milliLiter(s) IV Continuous <Continuous>  dextrose Gel 1 Dose(s) Oral once PRN  dextrose 50% Injectable 12.5 Gram(s) IV Push once  dextrose 50% Injectable 25 Gram(s) IV Push once  dextrose 50% Injectable 25 Gram(s) IV Push once  glucagon  Injectable 1 milliGRAM(s) IntraMuscular once PRN  aspirin  chewable 81 milliGRAM(s) Oral daily  atorvastatin 20 milliGRAM(s) Oral at bedtime  clopidogrel Tablet 75 milliGRAM(s) Oral daily  ketorolac 0.5% Ophthalmic Solution 1 Drop(s) Both EYES two times a day  brimonidine 0.2% Ophthalmic Solution 1 Drop(s) Both EYES two times a day  timolol 0.5% Solution 1 Drop(s) Both EYES two times a day  zinc sulfate 220 milliGRAM(s) Oral daily  Nephro-jennifer 1 Tablet(s) Oral daily  carvedilol 12.5 milliGRAM(s) Oral every 12 hours  isosorbide   mononitrate ER Tablet (IMDUR) 30 milliGRAM(s) Oral daily  valsartan 320 milliGRAM(s) Oral daily  ergocalciferol 78178 Unit(s) Oral <User Schedule>  insulin glargine Injectable (LANTUS) 15 Unit(s) SubCutaneous at bedtime      Vital Signs Last 24 Hrs  T(C): 36.7 (07 Jul 2017 05:12), Max: 37.2 (06 Jul 2017 13:55)  T(F): 98.1 (07 Jul 2017 05:12), Max: 99 (06 Jul 2017 13:55)  HR: 69 (07 Jul 2017 05:12) (62 - 72)  BP: 189/92 (07 Jul 2017 05:12) (158/65 - 189/92)  BP(mean): --  RR: 16 (07 Jul 2017 05:12) (16 - 20)  SpO2: 95% (07 Jul 2017 05:12) (95% - 99%)    PHYSICAL EXAM:  General: [x ] non-toxic  HEAD/EYES: [ ] PERRL x[ ] white sclera [ ] icterus  ENT:  [ ] normal [x ] supple [ ] thrush [ ] pharyngeal exudate  Cardiovascular:   [ ] murmur [ x] normal [ ] PPM/AICD  Respiratory:  [ x] clear to ausculation bilaterally  GI:  [x ] soft, non-tender, normal bowel sounds  :  [ ] howe [ ] no CVA tenderness   Musculoskeletal:  [ ] no synovitis  Neurologic:  [x ] non-focal exam   Skin:  [ ] no rash  Lymph: [ ] no lymphadenopathy  Psychiatric:  [ ] appropriate affect [ ] alert & oriented  Lines:  [ x) no phlebitis                              7.8    5.13  )-----------( 306      ( 06 Jul 2017 06:00 )             25.6       07-06    141  |  105  |  14  ----------------------------<  175<H>  4.5   |  23  |  0.92    Ca    8.6      06 Jul 2017 06:00            MICROBIOLOGY:    RADIOLOGY:

## 2017-07-07 NOTE — PROGRESS NOTE ADULT - PROBLEM SELECTOR PLAN 2
Creatinine currently at pt's baseline   - S/p IVF for 10h  - now home dose of valsartan given elevated BP

## 2017-07-07 NOTE — PROGRESS NOTE ADULT - PROBLEM SELECTOR PLAN 5
Ha1c 7.9 06/17  - given half-dose of lantus (15 Units) in prep for surgery   - ISS and FS with meals and at bedtime

## 2017-07-07 NOTE — PRE-OP CHECKLIST - SELECT TESTS ORDERED
CMP/INR/CBC/Urinalysis/PT/PTT PT/PTT/Hepatic Function/Urinalysis/INR/CMP/CBC PT/PTT/CMP/Type and Screen/INR/CBC/Urinalysis/Hepatic Function

## 2017-07-07 NOTE — PROGRESS NOTE ADULT - PROBLEM SELECTOR PLAN 1
S/p bedside I & D in ED  - S/P Clindamycin 900 mg x1 in ED, instead resumed Ertapenem 1000 mg IV QD that patient was receiving prior to discharge and added vancomycin to cover for polymicrobial infection  - ID agrees with antibiotic regimen. Advises to continue until 7/7  - podiatry recommends TMA with NIDIA this morning.  - Wound cultures show no growth at 24   - blood cultures show no growth at 48 hours  - MRI pending to r/o osteomyelitis  - Wound care consulted

## 2017-07-07 NOTE — PROGRESS NOTE ADULT - PROBLEM SELECTOR PLAN 3
S/p stent to L SFA on last admission in June 2017  - TMA  -C/w ASA and Plavix S/p stent to L SFA on last admission in June 2017  - TMA today  -C/w ASA and Plavix after surgery

## 2017-07-07 NOTE — PROGRESS NOTE ADULT - ASSESSMENT
72 year old with DM, CKD, PVD s/p revascularization/ toe amputation who presents with low grade fever and pus at first toe.     Imaging with first toe abscess with associated OM.  Plan is for OR debridement    Continue vanco/ ertapenem pending would OR cultures  Check a vanco through.     Please call the ID service [phone] with questions or concerns over the weekend

## 2017-07-07 NOTE — PROGRESS NOTE ADULT - PROBLEM SELECTOR PLAN 6
Stable, at baseline  - Likely 2/2 anemia of chronic disease, 9.0 % today. 7.8% on 7/6  - consulted with ID, was told to keep monitoring but is no cause for alteration in therapy.

## 2017-07-07 NOTE — PROGRESS NOTE ADULT - ASSESSMENT
73yo F with PMH of Type 2 DM (HgbA1c 7.8%) c/b neuropathy, PVD s/p R BKA (2010) and stent to L SFA, CKD Stage III, CAD s/p stents, CABG and L CEA, and HTN presenting with L hallux infection s/p bedside I & D. MRI positive for OM of the L hallux, pt to go to OR on 7/7 for L transmetatarsal amputation.

## 2017-07-07 NOTE — BRIEF OPERATIVE NOTE - PROCEDURE
Transmetatarsal amputation of left foot  07/07/2017    Active  CVALLE Transmetatarsal amputation of left foot  07/07/2017    Active  Hansel Childers

## 2017-07-07 NOTE — PROGRESS NOTE ADULT - PROBLEM SELECTOR PLAN 8
7.8% on 7/6  - consult with ID if not lower with AM labs today Vitamin D level 8  - pt given 50k units ergocalciferol

## 2017-07-07 NOTE — PROGRESS NOTE ADULT - SUBJECTIVE AND OBJECTIVE BOX
Patient is a 72y old  Female who presents with a chief complaint of Toe infection (04 Jul 2017 23:00)      INTERVAL HPI/OVERNIGHT EVENTS:   Pt is s/p transmetatarsal of LEFT foot with NIDIA.  Pt is POD #0.  Pt reports pain well controlled,   Pt seen bedside     MEDICATIONS  (STANDING):  heparin  Injectable 5000 Unit(s) SubCutaneous every 8 hours  insulin lispro (HumaLOG) corrective regimen sliding scale   SubCutaneous three times a day before meals  insulin lispro (HumaLOG) corrective regimen sliding scale   SubCutaneous at bedtime  dextrose 5%. 1000 milliLiter(s) (50 mL/Hr) IV Continuous <Continuous>  dextrose 50% Injectable 12.5 Gram(s) IV Push once  dextrose 50% Injectable 25 Gram(s) IV Push once  dextrose 50% Injectable 25 Gram(s) IV Push once  aspirin  chewable 81 milliGRAM(s) Oral daily  atorvastatin 20 milliGRAM(s) Oral at bedtime  clopidogrel Tablet 75 milliGRAM(s) Oral daily  ketorolac 0.5% Ophthalmic Solution 1 Drop(s) Both EYES two times a day  brimonidine 0.2% Ophthalmic Solution 1 Drop(s) Both EYES two times a day  timolol 0.5% Solution 1 Drop(s) Both EYES two times a day  ertapenem  IVPB 1000 milliGRAM(s) IV Intermittent every 24 hours  zinc sulfate 220 milliGRAM(s) Oral daily  Nephro-jennifer 1 Tablet(s) Oral daily  carvedilol 12.5 milliGRAM(s) Oral every 12 hours  isosorbide   mononitrate ER Tablet (IMDUR) 30 milliGRAM(s) Oral daily  valsartan 320 milliGRAM(s) Oral daily  ergocalciferol 43272 Unit(s) Oral <User Schedule>  insulin glargine Injectable (LANTUS) 15 Unit(s) SubCutaneous at bedtime  sodium chloride 0.9%. 1000 milliLiter(s) (70 mL/Hr) IV Continuous <Continuous>    MEDICATIONS  (PRN):  acetaminophen   Tablet 650 milliGRAM(s) Oral every 6 hours PRN For Temp greater than 38 C (100.4 F)  dextrose Gel 1 Dose(s) Oral once PRN Blood Glucose LESS THAN 70 milliGRAM(s)/deciliter  glucagon  Injectable 1 milliGRAM(s) IntraMuscular once PRN Glucose LESS THAN 70 milligrams/deciliter  fentaNYL    Injectable 25 MICROGram(s) IV Push every 5 minutes PRN Moderate Pain  fentaNYL    Injectable 50 MICROGram(s) IV Push every 5 minutes PRN Severe Pain  ondansetron Injectable 4 milliGRAM(s) IV Push once PRN Nausea and/or Vomiting  acetaminophen   Tablet. 650 milliGRAM(s) Oral every 6 hours PRN Mild Pain (1 - 3)  oxyCODONE    5 mG/acetaminophen 325 mG 2 Tablet(s) Oral every 6 hours PRN Moderate Pain (4 - 6)  morphine  - Injectable 2 milliGRAM(s) IV Push every 6 hours PRN Severe Pain (7 - 10)      Allergies    sulfa drugs (Hives)  sulfa drugs (Rash)  Sulfac 10% (Hives)    Intolerances        Vital Signs Last 24 Hrs  T(C): 36.7 (07 Jul 2017 11:20), Max: 37.2 (06 Jul 2017 13:55)  T(F): 98.1 (07 Jul 2017 11:20), Max: 99 (06 Jul 2017 13:55)  HR: 63 (07 Jul 2017 11:35) (62 - 72)  BP: 139/48 (07 Jul 2017 11:35) (111/- - 189/92)  BP(mean): --  RR: 20 (07 Jul 2017 11:35) (14 - 20)  SpO2: 97% (07 Jul 2017 11:35) (95% - 99%)    LABS:                        8.5    5.25  )-----------( 124      ( 07 Jul 2017 07:30 )             28.2     07-06    141  |  105  |  14  ----------------------------<  175<H>  4.5   |  23  |  0.92    Ca    8.6      06 Jul 2017 06:00          CAPILLARY BLOOD GLUCOSE  180 (07 Jul 2017 08:30)  180 (07 Jul 2017 08:17)  180 (07 Jul 2017 03:01)  252 (06 Jul 2017 22:38)  193 (06 Jul 2017 17:07)  212 (06 Jul 2017 12:16)          RADIOLOGY & ADDITIONAL TESTS:    Imaging Personally Reviewed:  [x] YES  [ ] NO    Consultant(s) Notes Reviewed:  [x] YES  [ ] NO    Eosinophilia  Vitamin D deficiency  PVD (peripheral vascular disease)  CKD (chronic kidney disease) stage 3, GFR 30-59 ml/min  Normocytic normochromic anemia  Acute on chronic renal failure  Need for prophylactic measure  Type 2 diabetes mellitus with diabetic nephropathy, with long-term current use of insulin  Coronary artery disease involving coronary bypass graft of native heart without angina pectoris  DAMARI (acute kidney injury)  Abscess of toe, left      Lower Extremity Physical Exam:  Dressing clean, dry and intact. No strikethrough. Pt is doing Well. Posterior splint intact. Temp grad WNL

## 2017-07-07 NOTE — BRIEF OPERATIVE NOTE - POST-OP DX
Osteomyelitis of foot, left, acute  07/07/2017    Active  Hansel Childers Abscess of foot including toes, left  07/08/2017    Active  Kavon Kraus  Osteomyelitis of foot, left, acute  07/07/2017    Active  Hansel Childers

## 2017-07-07 NOTE — PROGRESS NOTE ADULT - ASSESSMENT
Assessment/Plan: 72y Female s/p LLE angio with angioplasty of AT and SFA, SFA stent placement; R AKA (2010) w/ prosthesis, CKD Stage III, CAD s/p stents, CABG, HTN, DM c/b neuropathy presents with a L hallux wound s/p I&D by podiatry. Repeat DUC/PVR significantly improved compared to last admission.    - No vascular intervention needed at this time  - C/w ASA and Plavix  - F/u with podiatry  - Rest of care per primary team  - Please page [phone] if there are further questions

## 2017-07-08 ENCOUNTER — TRANSCRIPTION ENCOUNTER (OUTPATIENT)
Age: 72
End: 2017-07-08

## 2017-07-08 LAB
BASOPHILS # BLD AUTO: 0.01 K/UL — SIGNIFICANT CHANGE UP (ref 0–0.2)
BASOPHILS NFR BLD AUTO: 0.1 % — SIGNIFICANT CHANGE UP (ref 0–2)
BUN SERPL-MCNC: 13 MG/DL — SIGNIFICANT CHANGE UP (ref 7–23)
CALCIUM SERPL-MCNC: 8 MG/DL — LOW (ref 8.4–10.5)
CHLORIDE SERPL-SCNC: 105 MMOL/L — SIGNIFICANT CHANGE UP (ref 98–107)
CO2 SERPL-SCNC: 24 MMOL/L — SIGNIFICANT CHANGE UP (ref 22–31)
CREAT SERPL-MCNC: 0.94 MG/DL — SIGNIFICANT CHANGE UP (ref 0.5–1.3)
EOSINOPHIL # BLD AUTO: 0.41 K/UL — SIGNIFICANT CHANGE UP (ref 0–0.5)
EOSINOPHIL NFR BLD AUTO: 4.7 % — SIGNIFICANT CHANGE UP (ref 0–6)
GLUCOSE SERPL-MCNC: 200 MG/DL — HIGH (ref 70–99)
HCT VFR BLD CALC: 22.3 % — LOW (ref 34.5–45)
HCT VFR BLD CALC: 24.8 % — LOW (ref 34.5–45)
HGB BLD-MCNC: 7 G/DL — CRITICAL LOW (ref 11.5–15.5)
HGB BLD-MCNC: 7.6 G/DL — LOW (ref 11.5–15.5)
IMM GRANULOCYTES # BLD AUTO: 0.07 # — SIGNIFICANT CHANGE UP
IMM GRANULOCYTES NFR BLD AUTO: 0.8 % — SIGNIFICANT CHANGE UP (ref 0–1.5)
LYMPHOCYTES # BLD AUTO: 1.89 K/UL — SIGNIFICANT CHANGE UP (ref 1–3.3)
LYMPHOCYTES # BLD AUTO: 21.8 % — SIGNIFICANT CHANGE UP (ref 13–44)
MAGNESIUM SERPL-MCNC: 1.6 MG/DL — SIGNIFICANT CHANGE UP (ref 1.6–2.6)
MANUAL SMEAR VERIFICATION: SIGNIFICANT CHANGE UP
MCHC RBC-ENTMCNC: 27.2 PG — SIGNIFICANT CHANGE UP (ref 27–34)
MCHC RBC-ENTMCNC: 28.1 PG — SIGNIFICANT CHANGE UP (ref 27–34)
MCHC RBC-ENTMCNC: 30.6 % — LOW (ref 32–36)
MCHC RBC-ENTMCNC: 31.4 % — LOW (ref 32–36)
MCV RBC AUTO: 88.9 FL — SIGNIFICANT CHANGE UP (ref 80–100)
MCV RBC AUTO: 89.6 FL — SIGNIFICANT CHANGE UP (ref 80–100)
MONOCYTES # BLD AUTO: 0.47 K/UL — SIGNIFICANT CHANGE UP (ref 0–0.9)
MONOCYTES NFR BLD AUTO: 5.4 % — SIGNIFICANT CHANGE UP (ref 2–14)
NEUTROPHILS # BLD AUTO: 5.8 K/UL — SIGNIFICANT CHANGE UP (ref 1.8–7.4)
NEUTROPHILS NFR BLD AUTO: 67.2 % — SIGNIFICANT CHANGE UP (ref 43–77)
NRBC # FLD: 0 — SIGNIFICANT CHANGE UP
NRBC # FLD: 0 — SIGNIFICANT CHANGE UP
PHOSPHATE SERPL-MCNC: 3.4 MG/DL — SIGNIFICANT CHANGE UP (ref 2.5–4.5)
PLATELET # BLD AUTO: 315 K/UL — SIGNIFICANT CHANGE UP (ref 150–400)
PLATELET # BLD AUTO: 338 K/UL — SIGNIFICANT CHANGE UP (ref 150–400)
PMV BLD: 11.2 FL — SIGNIFICANT CHANGE UP (ref 7–13)
PMV BLD: 11.4 FL — SIGNIFICANT CHANGE UP (ref 7–13)
POTASSIUM SERPL-MCNC: 4.5 MMOL/L — SIGNIFICANT CHANGE UP (ref 3.5–5.3)
POTASSIUM SERPL-SCNC: 4.5 MMOL/L — SIGNIFICANT CHANGE UP (ref 3.5–5.3)
RBC # BLD: 2.49 M/UL — LOW (ref 3.8–5.2)
RBC # BLD: 2.79 M/UL — LOW (ref 3.8–5.2)
RBC # FLD: 14.9 % — HIGH (ref 10.3–14.5)
RBC # FLD: 15 % — HIGH (ref 10.3–14.5)
SODIUM SERPL-SCNC: 142 MMOL/L — SIGNIFICANT CHANGE UP (ref 135–145)
WBC # BLD: 10.27 K/UL — SIGNIFICANT CHANGE UP (ref 3.8–10.5)
WBC # BLD: 8.65 K/UL — SIGNIFICANT CHANGE UP (ref 3.8–10.5)
WBC # FLD AUTO: 10.27 K/UL — SIGNIFICANT CHANGE UP (ref 3.8–10.5)
WBC # FLD AUTO: 8.65 K/UL — SIGNIFICANT CHANGE UP (ref 3.8–10.5)

## 2017-07-08 PROCEDURE — 99233 SBSQ HOSP IP/OBS HIGH 50: CPT

## 2017-07-08 RX ORDER — INSULIN GLARGINE 100 [IU]/ML
40 INJECTION, SOLUTION SUBCUTANEOUS AT BEDTIME
Qty: 0 | Refills: 0 | Status: DISCONTINUED | OUTPATIENT
Start: 2017-07-08 | End: 2017-07-13

## 2017-07-08 RX ADMIN — CARVEDILOL PHOSPHATE 12.5 MILLIGRAM(S): 80 CAPSULE, EXTENDED RELEASE ORAL at 06:33

## 2017-07-08 RX ADMIN — Medication 81 MILLIGRAM(S): at 12:25

## 2017-07-08 RX ADMIN — Medication 1: at 22:13

## 2017-07-08 RX ADMIN — BRIMONIDINE TARTRATE 1 DROP(S): 2 SOLUTION/ DROPS OPHTHALMIC at 17:39

## 2017-07-08 RX ADMIN — HEPARIN SODIUM 5000 UNIT(S): 5000 INJECTION INTRAVENOUS; SUBCUTANEOUS at 13:53

## 2017-07-08 RX ADMIN — ATORVASTATIN CALCIUM 20 MILLIGRAM(S): 80 TABLET, FILM COATED ORAL at 22:12

## 2017-07-08 RX ADMIN — INSULIN GLARGINE 40 UNIT(S): 100 INJECTION, SOLUTION SUBCUTANEOUS at 22:13

## 2017-07-08 RX ADMIN — CLOPIDOGREL BISULFATE 75 MILLIGRAM(S): 75 TABLET, FILM COATED ORAL at 12:25

## 2017-07-08 RX ADMIN — ERTAPENEM SODIUM 120 MILLIGRAM(S): 1 INJECTION, POWDER, LYOPHILIZED, FOR SOLUTION INTRAMUSCULAR; INTRAVENOUS at 00:06

## 2017-07-08 RX ADMIN — Medication 2: at 08:41

## 2017-07-08 RX ADMIN — CARVEDILOL PHOSPHATE 12.5 MILLIGRAM(S): 80 CAPSULE, EXTENDED RELEASE ORAL at 17:39

## 2017-07-08 RX ADMIN — Medication 1 DROP(S): at 06:34

## 2017-07-08 RX ADMIN — Medication 1 DROP(S): at 17:39

## 2017-07-08 RX ADMIN — ISOSORBIDE MONONITRATE 30 MILLIGRAM(S): 60 TABLET, EXTENDED RELEASE ORAL at 12:25

## 2017-07-08 RX ADMIN — ZINC SULFATE TAB 220 MG (50 MG ZINC EQUIVALENT) 220 MILLIGRAM(S): 220 (50 ZN) TAB at 12:25

## 2017-07-08 RX ADMIN — Medication 1 TABLET(S): at 12:25

## 2017-07-08 RX ADMIN — VALSARTAN 320 MILLIGRAM(S): 80 TABLET ORAL at 06:33

## 2017-07-08 RX ADMIN — Medication 2: at 17:40

## 2017-07-08 RX ADMIN — HEPARIN SODIUM 5000 UNIT(S): 5000 INJECTION INTRAVENOUS; SUBCUTANEOUS at 22:12

## 2017-07-08 RX ADMIN — Medication 2: at 12:28

## 2017-07-08 RX ADMIN — BRIMONIDINE TARTRATE 1 DROP(S): 2 SOLUTION/ DROPS OPHTHALMIC at 06:34

## 2017-07-08 RX ADMIN — Medication 250 MILLIGRAM(S): at 17:47

## 2017-07-08 RX ADMIN — Medication 250 MILLIGRAM(S): at 06:34

## 2017-07-08 RX ADMIN — HEPARIN SODIUM 5000 UNIT(S): 5000 INJECTION INTRAVENOUS; SUBCUTANEOUS at 06:33

## 2017-07-08 NOTE — PROGRESS NOTE ADULT - PROBLEM SELECTOR PLAN 3
S/p stent to L SFA on last admission in June 2017  -C/w ASA and Plavix after surgery Ha1c 7.9 06/17  - AM   - increase lantus to 40  - ISS and FS with meals and at bedtime

## 2017-07-08 NOTE — PROGRESS NOTE ADULT - ASSESSMENT
73yo F with PMH of Type 2 DM (HgbA1c 7.8%) c/b neuropathy, PVD s/p R BKA (2010) and stent to L SFA, CKD Stage III, CAD s/p stents, CABG and L CEA, and HTN presenting with L hallux infection s/p bedside I & D with 5 cc of pus expressed from abscess. s/p L TMA with NIDIA.

## 2017-07-08 NOTE — PROGRESS NOTE ADULT - PROBLEM SELECTOR PLAN 5
Ha1c 7.9 06/17  - Lantus increased to 30u qHS, gave 15u last night due to planned surgery today  - Monitor FS today and will readjust Lantus dose as needed, pt currently not on any pre-meal Humalog  - ISS and FS with meals and at bedtime Ha1c 7.9 06/17  - Lantus increased to 40u qHS, gave 15u 2 nights ago due to planned surgery  - Monitor FS and will readjust Lantus dose as needed, pt currently not on any pre-meal Humalog  - ISS and FS with meals and at bedtime

## 2017-07-08 NOTE — PROGRESS NOTE ADULT - ASSESSMENT
Assessment:   Patient is s/p LEFT TMA and NIDIA. POD #1.    Plan:   - Pt seen and examined.  - Dressings left intact, will change tomorrow.  - Continue IV abx.   - Awaiting OR culutures/pathology.  - Continue NWB in posterior splint.  CAM boot ordered.  - Will continue to follow.

## 2017-07-08 NOTE — PROGRESS NOTE ADULT - PROBLEM SELECTOR PROBLEM 3
PVD (peripheral vascular disease) Type 2 diabetes mellitus with diabetic nephropathy, with long-term current use of insulin

## 2017-07-08 NOTE — PROGRESS NOTE ADULT - PROBLEM SELECTOR PLAN 5
Ha1c 7.9 06/17  - AM   - increase lantus to 40  - ISS and FS with meals and at bedtime Stable  -C/w aspirin, Plavix, carvedilol, isosorbide, atorvastatin

## 2017-07-08 NOTE — PROGRESS NOTE ADULT - PROBLEM SELECTOR PLAN 4
Stable  -C/w aspirin, Plavix, carvedilol, isosorbide, atorvastatin Stable  -C/w ASA, Plavix, carvedilol, isosorbide, atorvastatin Stable, no current CP  -C/w ASA, Plavix, carvedilol, isosorbide, atorvastatin

## 2017-07-08 NOTE — PROGRESS NOTE ADULT - PROBLEM SELECTOR PLAN 1
s/p TMA with NIDIA  - postop XR of L foot showed no areas of osteomyelitis  - ID agrees with current antibiotic regimen of vanc and ertapenem, pending surgical pathology report.  - Wound cultures preop show no growth at 48 hrs  - blood cultures show no growth at 48 hours  - Wound care consulted

## 2017-07-08 NOTE — PROGRESS NOTE ADULT - PROBLEM SELECTOR PLAN 1
S/p bedside I & D in ED  - C/w vancomycin and ertapenem for broad spectrum coverage  - MRI of L foot showing changes consistent with OM  - Pt to go to OR today for L transmetatarsal amputation  - F/u ID recommendations  - F/u podiatry recs  - F/u vascular surgery recs- recommend repeat DUC/PVRs which showed mild improvement from last DUC/PVRs  - Wound cultures pending, f/u blood cultures NGTD at 48h POD #1 for L TMA  - C/w vancomycin and ertapenem for broad spectrum coverage as per ID recs, will f/u OR surg path and wound cultures  - MRI of L foot showing changes consistent with OM  - F/u ID recommendations  - F/u podiatry recs  - Wound cultures with NGTD at 24h, f/u blood cultures NGTD at 48h

## 2017-07-08 NOTE — PROGRESS NOTE ADULT - PROBLEM SELECTOR PLAN 3
S/p stent to L SFA on last admission in June 2017  -F/u vascular surgery recs  -Repeat DUC/PVRs showing mild improvement from last procedure  -C/w ASA and Plavix S/p stent to L SFA on last admission in June 2017  -F/u vascular surgery recs- no further interventions required at this time  -Repeat DUC/PVRs showing improvement from last procedure  -C/w ASA and Plavix

## 2017-07-08 NOTE — PROGRESS NOTE ADULT - PROBLEM SELECTOR PLAN 6
4.8% today, down from 9.0 yesterday S/p stent to L SFA on last admission in June 2017  -C/w ASA and Plavix after surgery

## 2017-07-08 NOTE — PROGRESS NOTE ADULT - SUBJECTIVE AND OBJECTIVE BOX
Patient is a 72y old  Female who presents with a chief complaint of Toe infection (04 Jul 2017 23:00)      INTERVAL HPI/OVERNIGHT EVENTS:   Pt is s/p LEFT TMA and NIDIA.  Pt is POD #1.  Pt reports pain well controlled, no overnight events. Pt in NAD.    MEDICATIONS  (STANDING):  heparin  Injectable 5000 Unit(s) SubCutaneous every 8 hours  insulin lispro (HumaLOG) corrective regimen sliding scale   SubCutaneous three times a day before meals  insulin lispro (HumaLOG) corrective regimen sliding scale   SubCutaneous at bedtime  dextrose 5%. 1000 milliLiter(s) (50 mL/Hr) IV Continuous <Continuous>  dextrose 50% Injectable 12.5 Gram(s) IV Push once  dextrose 50% Injectable 25 Gram(s) IV Push once  dextrose 50% Injectable 25 Gram(s) IV Push once  aspirin  chewable 81 milliGRAM(s) Oral daily  atorvastatin 20 milliGRAM(s) Oral at bedtime  clopidogrel Tablet 75 milliGRAM(s) Oral daily  ketorolac 0.5% Ophthalmic Solution 1 Drop(s) Both EYES two times a day  brimonidine 0.2% Ophthalmic Solution 1 Drop(s) Both EYES two times a day  timolol 0.5% Solution 1 Drop(s) Both EYES two times a day  ertapenem  IVPB 1000 milliGRAM(s) IV Intermittent every 24 hours  zinc sulfate 220 milliGRAM(s) Oral daily  Nephro-jennifer 1 Tablet(s) Oral daily  carvedilol 12.5 milliGRAM(s) Oral every 12 hours  isosorbide   mononitrate ER Tablet (IMDUR) 30 milliGRAM(s) Oral daily  valsartan 320 milliGRAM(s) Oral daily  ergocalciferol 17236 Unit(s) Oral <User Schedule>  insulin glargine Injectable (LANTUS) 30 Unit(s) SubCutaneous at bedtime  vancomycin  IVPB 1000 milliGRAM(s) IV Intermittent every 12 hours    MEDICATIONS  (PRN):  acetaminophen   Tablet 650 milliGRAM(s) Oral every 6 hours PRN For Temp greater than 38 C (100.4 F)  dextrose Gel 1 Dose(s) Oral once PRN Blood Glucose LESS THAN 70 milliGRAM(s)/deciliter  glucagon  Injectable 1 milliGRAM(s) IntraMuscular once PRN Glucose LESS THAN 70 milligrams/deciliter  acetaminophen   Tablet. 650 milliGRAM(s) Oral every 6 hours PRN Mild Pain (1 - 3)  oxyCODONE    5 mG/acetaminophen 325 mG 2 Tablet(s) Oral every 6 hours PRN Moderate Pain (4 - 6)  morphine  - Injectable 2 milliGRAM(s) IV Push every 6 hours PRN Severe Pain (7 - 10)      Allergies    sulfa drugs (Hives)  sulfa drugs (Rash)  Sulfac 10% (Hives)    Intolerances        Vital Signs Last 24 Hrs  T(C): 37.2 (08 Jul 2017 06:06), Max: 37.7 (07 Jul 2017 21:46)  T(F): 99 (08 Jul 2017 06:06), Max: 99.8 (07 Jul 2017 21:46)  HR: 74 (08 Jul 2017 06:06) (63 - 78)  BP: 140/64 (08 Jul 2017 06:06) (111/- - 167/58)  BP(mean): --  RR: 18 (08 Jul 2017 06:06) (14 - 20)  SpO2: 97% (08 Jul 2017 06:06) (96% - 99%)    LABS:                        7.0    8.65  )-----------( 315      ( 08 Jul 2017 05:55 )             22.3     07-08    142  |  105  |  13  ----------------------------<  200<H>  4.5   |  24  |  0.94    Ca    8.0<L>      08 Jul 2017 05:55  Phos  3.4     07-08  Mg     1.6     07-08          CAPILLARY BLOOD GLUCOSE  292 (07 Jul 2017 22:20)  242 (07 Jul 2017 16:59)  180 (07 Jul 2017 08:30)          RADIOLOGY & ADDITIONAL TESTS:    Imaging Personally Reviewed:  [x] YES  [ ] NO    Consultant(s) Notes Reviewed:  [x] YES  [ ] NO    Eosinophilia  Vitamin D deficiency  PVD (peripheral vascular disease)  CKD (chronic kidney disease) stage 3, GFR 30-59 ml/min  Normocytic normochromic anemia  Acute on chronic renal failure  Need for prophylactic measure  Type 2 diabetes mellitus with diabetic nephropathy, with long-term current use of insulin  Coronary artery disease involving coronary bypass graft of native heart without angina pectoris  DAMARI (acute kidney injury)  Abscess of toe, left      Lower Extremity Physical Exam:  Vascular:  DP pulses palpable.  PT pulses palpable.  CFT <5 seconds to all digits.  Mild edema present, consistent with post op course.    Neuro:  Epicritic sensation absent. Denies numbness, tingling, burning.     Derm:  Skin is warm, dry, supple. Sutures intact, incision well coapted, no signs of dehiscence. No hematomas. No acute SOI.  Dressings C/D/I.    Msk:  LEFT TMA, RIGHT BKA    Radiology  Patient ID: [id number] Patient Name: NIK MELISSA   YOB: 1945 Sex: F            EXAM: RAD FOOT MIN 3 VIEWS LT      PROCEDURE DATE: Jul 7 2017        INTERPRETATION: CLINICAL INDICATION: baseline postoperative evaluation  status post left foot TMA    EXAM:  Portable frontal, oblique, and lateral left foot from 7/7/2017 1156.  Compared to preoperative left foot radiographs from 7/4/2017.    IMPRESSION:  Status post TMA with sharp and smooth osseous stump margins and with  multiple radiodense antibiotic cement beads predominantly over the medial  osseous stumps.    Postsurgical changes in the overlying soft tissues.    No proximally tracking gas collections beyond the amputation site and no  focal areas of osteomyelitis.    Redemonstrated vascular calcifications in distal calf subcutaneous  dystrophic calcifications.    Remainder of the foot is unremarkable.    Splint material supports the plantar and posterior surfaces of the extremity.                  BULMARO PHELAN M.D., ATTENDING RADIOLOGIST  This document has been electronically signed. Jul 7 2017 1:42PM

## 2017-07-08 NOTE — PROGRESS NOTE ADULT - PROBLEM SELECTOR PLAN 8
Vitamin D level 8  - pt given 50k units ergocalciferol 4.8% today, down from 9.0 yesterday  -resolved

## 2017-07-08 NOTE — PROGRESS NOTE ADULT - PROBLEM SELECTOR PLAN 7
Hb 7.0, Hct 22.0  - Likely 2/2 anemia of chronic disease, Vitamin D level 8  - pt given 50k units ergocalciferol

## 2017-07-08 NOTE — PROGRESS NOTE ADULT - PROBLEM SELECTOR PLAN 2
Creatinine improved compared to baseline  - c/w home dose of valsartan Hb 7.0, Hct 22.0  - Likely 2/2 anemia of chronic disease,

## 2017-07-08 NOTE — PROGRESS NOTE ADULT - PROBLEM SELECTOR PLAN 2
Creatinine currently at pt's baseline   - Monitor BMP daily  - Avoid nephrotoxic drugs  - Renally dose medications Creatinine currently improved from pt's baseline and GFR >70  - Monitor BMP daily  - Avoid nephrotoxic drugs  - Unclear if pt has CKD given improvement in creatinine to WNL over the past 5 days, will continue to monitor; does not need med to be renally dosed

## 2017-07-08 NOTE — PROGRESS NOTE ADULT - PROBLEM SELECTOR PLAN 9
DVT ppx: HSQ was stopped prior to surgery.  Diet: DASH, CC  Pending PT consult DVT ppx: HSQ  Diet: DASH, CC  Pending PT consult

## 2017-07-08 NOTE — PROGRESS NOTE ADULT - PROBLEM SELECTOR PROBLEM 4
Coronary artery disease involving coronary bypass graft of native heart without angina pectoris CKD (chronic kidney disease) stage 3, GFR 30-59 ml/min

## 2017-07-08 NOTE — PROGRESS NOTE ADULT - SUBJECTIVE AND OBJECTIVE BOX
Patient is a 72y old  Female who presents with a chief complaint of Toe infection (04 Jul 2017 23:00)      INTERVAL HPI/OVERNIGHT EVENTS: pt slept well, complains of sharp pain in L foot around area of hallux. no fever or shortness of breath    MEDICATIONS  (STANDING):  heparin  Injectable 5000 Unit(s) SubCutaneous every 8 hours  insulin lispro (HumaLOG) corrective regimen sliding scale   SubCutaneous three times a day before meals  insulin lispro (HumaLOG) corrective regimen sliding scale   SubCutaneous at bedtime  dextrose 5%. 1000 milliLiter(s) (50 mL/Hr) IV Continuous <Continuous>  dextrose 50% Injectable 12.5 Gram(s) IV Push once  dextrose 50% Injectable 25 Gram(s) IV Push once  dextrose 50% Injectable 25 Gram(s) IV Push once  aspirin  chewable 81 milliGRAM(s) Oral daily  atorvastatin 20 milliGRAM(s) Oral at bedtime  clopidogrel Tablet 75 milliGRAM(s) Oral daily  ketorolac 0.5% Ophthalmic Solution 1 Drop(s) Both EYES two times a day  brimonidine 0.2% Ophthalmic Solution 1 Drop(s) Both EYES two times a day  timolol 0.5% Solution 1 Drop(s) Both EYES two times a day  ertapenem  IVPB 1000 milliGRAM(s) IV Intermittent every 24 hours  zinc sulfate 220 milliGRAM(s) Oral daily  Nephro-jennifer 1 Tablet(s) Oral daily  carvedilol 12.5 milliGRAM(s) Oral every 12 hours  isosorbide   mononitrate ER Tablet (IMDUR) 30 milliGRAM(s) Oral daily  valsartan 320 milliGRAM(s) Oral daily  ergocalciferol 51091 Unit(s) Oral <User Schedule>  insulin glargine Injectable (LANTUS) 30 Unit(s) SubCutaneous at bedtime  vancomycin  IVPB 1000 milliGRAM(s) IV Intermittent every 12 hours    MEDICATIONS  (PRN):  acetaminophen   Tablet 650 milliGRAM(s) Oral every 6 hours PRN For Temp greater than 38 C (100.4 F)  dextrose Gel 1 Dose(s) Oral once PRN Blood Glucose LESS THAN 70 milliGRAM(s)/deciliter  glucagon  Injectable 1 milliGRAM(s) IntraMuscular once PRN Glucose LESS THAN 70 milligrams/deciliter  acetaminophen   Tablet. 650 milliGRAM(s) Oral every 6 hours PRN Mild Pain (1 - 3)  oxyCODONE    5 mG/acetaminophen 325 mG 2 Tablet(s) Oral every 6 hours PRN Moderate Pain (4 - 6)  morphine  - Injectable 2 milliGRAM(s) IV Push every 6 hours PRN Severe Pain (7 - 10)        REVIEW OF SYSTEMS:  CONSTITUTIONAL: Fever; no headaches or fatigue  RESPIRATORY: no cough, no shortness of breath; no hematemesis.  CARDIOVASCULAR: No chest pain, palpitations, or dizziness  GASTROINTESTINAL: No abdominal or epigastric pain. No nausea, vomiting  NEUROLOGICAL: Decreased sensation LLE, sharp pain around left big toe.  SKIN: mild itching in groin area that is improving.  MUSCULOSKELETAL: long-standing mild R lower back pain upon ambulation.    Vital Signs Last 24 Hrs  T(C): 37.2 (08 Jul 2017 06:06), Max: 37.7 (07 Jul 2017 21:46)  T(F): 99 (08 Jul 2017 06:06), Max: 99.8 (07 Jul 2017 21:46)  HR: 74 (08 Jul 2017 06:06) (63 - 78)  BP: 140/64 (08 Jul 2017 06:06) (111/- - 167/58)  RR: 18 (08 Jul 2017 06:06) (14 - 20)  SpO2: 97% (08 Jul 2017 06:06) (96% - 99%)      CAPILLARY BLOOD GLUCOSE  243 (08 Jul 2017 08:30)  292 (07 Jul 2017 22:20)  242 (07 Jul 2017 16:59)      I&O's Summary      PHYSICAL EXAM:  GENERAL: NAD, well-groomed, well-developed  HEAD:  Atraumatic, Normocephalic  EYES: EOMI, PERRLA, conjunctiva and sclera clear  ENMT: Moist mucous membranes  NECK: Supple  NERVOUS SYSTEM:  Alert & Oriented X3, Good concentration; Motor Strength 5/5 B/L upper and LLE. Decreased sensation of left foot  CHEST/LUNG: Clear to auscultation bilaterally; No rales, rhonchi, wheezing, or rubs  HEART: Regular rate and rhythm; systolic murmur; no rubs or gallops  ABDOMEN: Soft, Nontender, Nondistended; Bowel sounds present  EXTREMITIES: R BKA, L foot s/p TMA, wrapped in splint and boot. no swelling or tenderness of L foot.  LYMPH: No lymphadenopathy noted  SKIN: L foot wrapped in Kerlix, dry with betadyne staining. small crack at heel of L foot.    LABS:    CBC Full  -  ( 08 Jul 2017 05:55 )  WBC Count : 8.65 K/uL  Hemoglobin : 7.0 g/dL  Hematocrit : 22.3 %  Platelet Count - Automated : 315 K/uL  Mean Cell Volume : 89.6 fL  Mean Cell Hemoglobin : 28.1 pg  Mean Cell Hemoglobin Concentration : 31.4 %  Auto Neutrophil # : 5.80 K/uL  Auto Lymphocyte # : 1.89 K/uL  Auto Monocyte # : 0.47 K/uL  Auto Eosinophil # : 0.41 K/uL  Auto Basophil # : 0.01 K/uL  Auto Neutrophil % : 67.2 %  Auto Lymphocyte % : 21.8 %  Auto Monocyte % : 5.4 %  Auto Eosinophil % : 4.7 %  Auto Basophil % : 0.1 %      07-08    142  |  105  |  13  ----------------------------<  200<H>  4.5   |  24  |  0.94    Ca    8.0<L>      08 Jul 2017 05:55  Phos  3.4     07-08  Mg     1.6     07-08                        RADIOLOGY & ADDITIONAL TESTS:    XRay:  EXAM:  RAD FOOT MIN 3 VIEWS LT        PROCEDURE DATE:  Jul 7 2017         INTERPRETATION:  CLINICAL INDICATION: baseline postoperative evaluation   status post left foot TMA    EXAM:  Portable frontal, oblique, and lateral left foot from 7/7/2017 1156.   Compared to preoperative left foot radiographs from 7/4/2017.    IMPRESSION:  Status post TMA with sharp and smooth osseous stump margins and with   multiple radiodense antibiotic cement beads predominantly over the medial   osseous stumps.     Postsurgical changes in the overlying soft tissues.    No proximally tracking gas collections beyond the amputation site and no   focal areas of osteomyelitis.    Redemonstrated vascular calcifications in distal calf subcutaneous   dystrophic calcifications.    Remainder of the foot is unremarkable.    Splint material supports the plantar and posterior surfaces of the   extremity.            CTScan:    MRI: INTERPRETATION:  CLINICAL INDICATION: Left hallux infection; evaluate for   abscess and/or osteomyelitis    TECHNIQUE:  Multiplanar and multisequence left forefoot and midfoot MRI performed   < from: Xray Foot AP + Lateral + Oblique, Left (07.07.17 @ 12:05) >  before and after administration of 8 cc of Gadavist IV without reported   complications and with 2 cc discarded. Compared to prior MRI study from   6/21/2017 and reviewed in conjunction with left foot radiographs from   7/4/2017.    FINDINGS:  Partial 4th and 5th ray amputation defects with overlying soft tissue   defect again noted.    Developed hallux soft tissue swelling with postcontrast enhancement   compatible with cellulitis. Developed rim-enhancing fluid collections   compatible with abscess along dorsal and dorsomedial aspects of 1st MTP   joint and developed overlying fluid-filled medial skin surface blister.   The more dorsal collection is less than a centimeter. The dorsomedial   collection is broad narrow and elongated measuring approximately 2.6 cm   dorsoplantar x 0.3 cm.    Extensive heterogeneous postcontrast enhancement of the hallux proximal   phalanx, 1st metatarsal head, and hallux distal phalangeal tuft noted   with associated cortical destructive changes involving the proximal   phalanx and 1st metatarsal head compatible with changes of osteomyelitis.     Redemonstrated marrow infarct related signal abnormality in the 2nd,   remnant 4th, and distal 1st metatarsal shafts. No additional marrow   signal abnormalities.    IMPRESSION:  New hallux cellulitis with underlying abscess collections dorsally and   dorsal medially and with evidence for osteomyelitis involving the hallux   phalanges and 1st metatarsal head.    Residual marrow infarct related signal abnormalities in the 2nd, remnant   4th, and distal 1st metatarsal shafts.            Imaging Personally Reviewed:  [ ] YES  [ ] NO    Consultant(s) Notes Reviewed:  [X] YES  [ ] NO    Care Discussed with Consultants/Other Providers [X ] YES  [ ] NO Patient is a 72y old  Female who presents with a chief complaint of Toe infection (04 Jul 2017 23:00)      INTERVAL HPI/OVERNIGHT EVENTS: pt slept well, complains of sharp pain in L foot around area of hallux. no fever or shortness of breath    MEDICATIONS  (STANDING):  heparin  Injectable 5000 Unit(s) SubCutaneous every 8 hours  insulin lispro (HumaLOG) corrective regimen sliding scale   SubCutaneous three times a day before meals  insulin lispro (HumaLOG) corrective regimen sliding scale   SubCutaneous at bedtime  dextrose 5%. 1000 milliLiter(s) (50 mL/Hr) IV Continuous <Continuous>  dextrose 50% Injectable 12.5 Gram(s) IV Push once  dextrose 50% Injectable 25 Gram(s) IV Push once  dextrose 50% Injectable 25 Gram(s) IV Push once  aspirin  chewable 81 milliGRAM(s) Oral daily  atorvastatin 20 milliGRAM(s) Oral at bedtime  clopidogrel Tablet 75 milliGRAM(s) Oral daily  ketorolac 0.5% Ophthalmic Solution 1 Drop(s) Both EYES two times a day  brimonidine 0.2% Ophthalmic Solution 1 Drop(s) Both EYES two times a day  timolol 0.5% Solution 1 Drop(s) Both EYES two times a day  ertapenem  IVPB 1000 milliGRAM(s) IV Intermittent every 24 hours  zinc sulfate 220 milliGRAM(s) Oral daily  Nephro-jennifer 1 Tablet(s) Oral daily  carvedilol 12.5 milliGRAM(s) Oral every 12 hours  isosorbide   mononitrate ER Tablet (IMDUR) 30 milliGRAM(s) Oral daily  valsartan 320 milliGRAM(s) Oral daily  ergocalciferol 10434 Unit(s) Oral <User Schedule>  insulin glargine Injectable (LANTUS) 30 Unit(s) SubCutaneous at bedtime  vancomycin  IVPB 1000 milliGRAM(s) IV Intermittent every 12 hours    MEDICATIONS  (PRN):  acetaminophen   Tablet 650 milliGRAM(s) Oral every 6 hours PRN For Temp greater than 38 C (100.4 F)  dextrose Gel 1 Dose(s) Oral once PRN Blood Glucose LESS THAN 70 milliGRAM(s)/deciliter  glucagon  Injectable 1 milliGRAM(s) IntraMuscular once PRN Glucose LESS THAN 70 milligrams/deciliter  acetaminophen   Tablet. 650 milliGRAM(s) Oral every 6 hours PRN Mild Pain (1 - 3)  oxyCODONE    5 mG/acetaminophen 325 mG 2 Tablet(s) Oral every 6 hours PRN Moderate Pain (4 - 6)  morphine  - Injectable 2 milliGRAM(s) IV Push every 6 hours PRN Severe Pain (7 - 10)        REVIEW OF SYSTEMS:  CONSTITUTIONAL: no fever; no headaches or fatigue  RESPIRATORY: no cough, no shortness of breath; no hematemesis.  CARDIOVASCULAR: No chest pain, palpitations, or dizziness  GASTROINTESTINAL: No abdominal or epigastric pain. No nausea, vomiting  NEUROLOGICAL: Decreased sensation LLE, sharp pain around left big toe.  SKIN: mild itching in groin area that is improving.  MUSCULOSKELETAL: long-standing mild R lower back pain upon ambulation.    Vital Signs Last 24 Hrs  T(C): 37.2 (08 Jul 2017 06:06), Max: 37.7 (07 Jul 2017 21:46)  T(F): 99 (08 Jul 2017 06:06), Max: 99.8 (07 Jul 2017 21:46)  HR: 74 (08 Jul 2017 06:06) (63 - 78)  BP: 140/64 (08 Jul 2017 06:06) (111/- - 167/58)  RR: 18 (08 Jul 2017 06:06) (14 - 20)  SpO2: 97% (08 Jul 2017 06:06) (96% - 99%)      CAPILLARY BLOOD GLUCOSE  243 (08 Jul 2017 08:30)  292 (07 Jul 2017 22:20)  242 (07 Jul 2017 16:59)      I&O's Summary      PHYSICAL EXAM:  GENERAL: NAD, well-groomed, well-developed  HEAD:  Atraumatic, Normocephalic  EYES: EOMI, PERRLA, conjunctiva and sclera clear  ENMT: Moist mucous membranes  NECK: Supple  NERVOUS SYSTEM:  Alert & Oriented X3, Good concentration; Motor Strength 5/5 B/L upper and LLE. Decreased sensation of left foot  CHEST/LUNG: Clear to auscultation bilaterally; No rales, rhonchi, wheezing, or rubs  HEART: Regular rate and rhythm; systolic murmur; no rubs or gallops  ABDOMEN: Soft, Nontender, Nondistended; Bowel sounds present  EXTREMITIES: R BKA, L foot s/p TMA, wrapped in splint and boot. no swelling or tenderness of L foot.  LYMPH: No lymphadenopathy noted  SKIN: L foot wrapped in splint and boot.    LABS:    CBC Full  -  ( 08 Jul 2017 05:55 )  WBC Count : 8.65 K/uL  Hemoglobin : 7.0 g/dL  Hematocrit : 22.3 %  Platelet Count - Automated : 315 K/uL  Mean Cell Volume : 89.6 fL  Mean Cell Hemoglobin : 28.1 pg  Mean Cell Hemoglobin Concentration : 31.4 %  Auto Neutrophil # : 5.80 K/uL  Auto Lymphocyte # : 1.89 K/uL  Auto Monocyte # : 0.47 K/uL  Auto Eosinophil # : 0.41 K/uL  Auto Basophil # : 0.01 K/uL  Auto Neutrophil % : 67.2 %  Auto Lymphocyte % : 21.8 %  Auto Monocyte % : 5.4 %  Auto Eosinophil % : 4.7 %  Auto Basophil % : 0.1 %      07-08    142  |  105  |  13  ----------------------------<  200<H>  4.5   |  24  |  0.94    Ca    8.0<L>      08 Jul 2017 05:55  Phos  3.4     07-08  Mg     1.6     07-08                        RADIOLOGY & ADDITIONAL TESTS:    XRay:  EXAM:  RAD FOOT MIN 3 VIEWS LT        PROCEDURE DATE:  Jul 7 2017         INTERPRETATION:  CLINICAL INDICATION: baseline postoperative evaluation   status post left foot TMA    EXAM:  Portable frontal, oblique, and lateral left foot from 7/7/2017 1156.   Compared to preoperative left foot radiographs from 7/4/2017.    IMPRESSION:  Status post TMA with sharp and smooth osseous stump margins and with   multiple radiodense antibiotic cement beads predominantly over the medial   osseous stumps.     Postsurgical changes in the overlying soft tissues.    No proximally tracking gas collections beyond the amputation site and no   focal areas of osteomyelitis.    Redemonstrated vascular calcifications in distal calf subcutaneous   dystrophic calcifications.    Remainder of the foot is unremarkable.    Splint material supports the plantar and posterior surfaces of the   extremity.            CTScan:    MRI: INTERPRETATION:  CLINICAL INDICATION: Left hallux infection; evaluate for   abscess and/or osteomyelitis    TECHNIQUE:  Multiplanar and multisequence left forefoot and midfoot MRI performed   < from: Xray Foot AP + Lateral + Oblique, Left (07.07.17 @ 12:05) >  before and after administration of 8 cc of Gadavist IV without reported   complications and with 2 cc discarded. Compared to prior MRI study from   6/21/2017 and reviewed in conjunction with left foot radiographs from   7/4/2017.    FINDINGS:  Partial 4th and 5th ray amputation defects with overlying soft tissue   defect again noted.    Developed hallux soft tissue swelling with postcontrast enhancement   compatible with cellulitis. Developed rim-enhancing fluid collections   compatible with abscess along dorsal and dorsomedial aspects of 1st MTP   joint and developed overlying fluid-filled medial skin surface blister.   The more dorsal collection is less than a centimeter. The dorsomedial   collection is broad narrow and elongated measuring approximately 2.6 cm   dorsoplantar x 0.3 cm.    Extensive heterogeneous postcontrast enhancement of the hallux proximal   phalanx, 1st metatarsal head, and hallux distal phalangeal tuft noted   with associated cortical destructive changes involving the proximal   phalanx and 1st metatarsal head compatible with changes of osteomyelitis.     Redemonstrated marrow infarct related signal abnormality in the 2nd,   remnant 4th, and distal 1st metatarsal shafts. No additional marrow   signal abnormalities.    IMPRESSION:  New hallux cellulitis with underlying abscess collections dorsally and   dorsal medially and with evidence for osteomyelitis involving the hallux   phalanges and 1st metatarsal head.    Residual marrow infarct related signal abnormalities in the 2nd, remnant   4th, and distal 1st metatarsal shafts.            Imaging Personally Reviewed:  [ ] YES  [ ] NO    Consultant(s) Notes Reviewed:  [X] YES  [ ] NO    Care Discussed with Consultants/Other Providers [X ] YES  [ ] NO

## 2017-07-08 NOTE — PROGRESS NOTE ADULT - SUBJECTIVE AND OBJECTIVE BOX
Patient is a 72y old  Female who presents with a chief complaint of Toe infection (04 Jul 2017 23:00)      INTERVAL HPI/OVERNIGHT EVENTS: Pt having some pain in her L foot whenever she tries to move it and has some mild itching. No SOB, chest pain or abdominal pain. Afebrile overnight.    MEDICATIONS (STANDING):  heparin  Injectable 5000 Unit(s) SubCutaneous every 8 hours  insulin lispro (HumaLOG) corrective regimen sliding scale   SubCutaneous three times a day before meals  insulin lispro (HumaLOG) corrective regimen sliding scale   SubCutaneous at bedtime  dextrose 5%. 1000 milliLiter(s) IV Continuous <Continuous>  dextrose 50% Injectable 12.5 Gram(s) IV Push once  dextrose 50% Injectable 25 Gram(s) IV Push once  dextrose 50% Injectable 25 Gram(s) IV Push once  aspirin  chewable 81 milliGRAM(s) Oral daily  atorvastatin 20 milliGRAM(s) Oral at bedtime  clopidogrel Tablet 75 milliGRAM(s) Oral daily  ketorolac 0.5% Ophthalmic Solution 1 Drop(s) Both EYES two times a day  brimonidine 0.2% Ophthalmic Solution 1 Drop(s) Both EYES two times a day  timolol 0.5% Solution 1 Drop(s) Both EYES two times a day  ertapenem  IVPB 1000 milliGRAM(s) IV Intermittent every 24 hours  zinc sulfate 220 milliGRAM(s) Oral daily  Nephro-jennifer 1 Tablet(s) Oral daily  carvedilol 12.5 milliGRAM(s) Oral every 12 hours  isosorbide   mononitrate ER Tablet (IMDUR) 30 milliGRAM(s) Oral daily  valsartan 320 milliGRAM(s) Oral daily  ergocalciferol 93003 Unit(s) Oral <User Schedule>  vancomycin  IVPB 1000 milliGRAM(s) IV Intermittent every 12 hours  insulin glargine Injectable (LANTUS) 40 Unit(s) SubCutaneous at bedtime    MEDICATIONS  (PRN):  acetaminophen   Tablet 650 milliGRAM(s) Oral every 6 hours PRN  dextrose Gel 1 Dose(s) Oral once PRN  glucagon  Injectable 1 milliGRAM(s) IntraMuscular once PRN  acetaminophen   Tablet. 650 milliGRAM(s) Oral every 6 hours PRN  oxyCODONE    5 mG/acetaminophen 325 mG 2 Tablet(s) Oral every 6 hours PRN  morphine  - Injectable 2 milliGRAM(s) IV Push every 6 hours PRN      REVIEW OF SYSTEMS:  CONSTITUTIONAL: No fevere  RESPIRATORY: No shortness of breath  CARDIOVASCULAR: No chest pain  GASTROINTESTINAL: No abdominal or epigastric pain. No nausea, vomiting  SKIN: Mild itching  MUSCULOSKELETAL: L foot pain    T(F): 99 (07-08-17 @ 06:06), Max: 99.8 (07-07-17 @ 21:46)  HR: 74 (07-08-17 @ 06:06) (63 - 78)  BP: 140/64 (07-08-17 @ 06:06) (111/- - 167/58)  RR: 18 (07-08-17 @ 06:06) (14 - 20)  SpO2: 97% (07-08-17 @ 06:06) (96% - 99%)  Wt(kg): --    CAPILLARY BLOOD GLUCOSE  243 (08 Jul 2017 08:30)  292 (07 Jul 2017 22:20)  242 (07 Jul 2017 16:59)        I&O's Summary    07 Jul 2017 07:01  -  08 Jul 2017 07:00  --------------------------------------------------------  IN: 550 mL / OUT: 400 mL / NET: 150 mL      PHYSICAL EXAM:  GENERAL: NAD, well-groomed, well-developed  HEAD:  Atraumatic, Normocephalic  EYES: EOMI, PERRLA, conjunctiva and sclera clear  ENMT: Moist mucous membranes  NECK: Supple, No JVD  NERVOUS SYSTEM:  Alert & Oriented X3, Good concentration; Motor Strength 5/5 B/L upper and LLE. Decreased sensation of left foot  CHEST/LUNG: Clear to percussion bilaterally; No rales, rhonchi, wheezing, or rubs  HEART: Regular rate and rhythm; systolic murmur, rubs, or gallops  ABDOMEN: Soft, Nontender, Nondistended; Bowel sounds present  EXTREMITIES: R BKA, L foot wrapped in Kerlix  LYMPH: No lymphadenopathy noted  SKIN: L foot wrapped in Kerlix, dry/intact    LABS:                        7.0    8.65  )-----------( 315      ( 08 Jul 2017 05:55 )             22.3     07-08    142  |  105  |  13  ----------------------------<  200<H>  4.5   |  24  |  0.94    Ca    8.0<L>      08 Jul 2017 05:55  Phos  3.4     07-08  Mg     1.6     07-08              RADIOLOGY & ADDITIONAL TESTS:    XRay:    CTScan:    MRI:     Imaging Personally Reviewed:  [ ] YES  [ ] NO    Consultant(s) Notes Reviewed:  [ ] YES  [ ] NO    Care Discussed with Consultants/Other Providers [ ] YES  [ ] NO Patient is a 72y old  Female who presents with a chief complaint of Toe infection (04 Jul 2017 23:00)      INTERVAL HPI/OVERNIGHT EVENTS: Pt having some pain in her L foot whenever she tries to move it and has some mild itching. No SOB, chest pain or abdominal pain. Afebrile overnight.    MEDICATIONS (STANDING):  heparin  Injectable 5000 Unit(s) SubCutaneous every 8 hours  insulin lispro (HumaLOG) corrective regimen sliding scale   SubCutaneous three times a day before meals  insulin lispro (HumaLOG) corrective regimen sliding scale   SubCutaneous at bedtime  dextrose 5%. 1000 milliLiter(s) IV Continuous <Continuous>  dextrose 50% Injectable 12.5 Gram(s) IV Push once  dextrose 50% Injectable 25 Gram(s) IV Push once  dextrose 50% Injectable 25 Gram(s) IV Push once  aspirin  chewable 81 milliGRAM(s) Oral daily  atorvastatin 20 milliGRAM(s) Oral at bedtime  clopidogrel Tablet 75 milliGRAM(s) Oral daily  ketorolac 0.5% Ophthalmic Solution 1 Drop(s) Both EYES two times a day  brimonidine 0.2% Ophthalmic Solution 1 Drop(s) Both EYES two times a day  timolol 0.5% Solution 1 Drop(s) Both EYES two times a day  ertapenem  IVPB 1000 milliGRAM(s) IV Intermittent every 24 hours  zinc sulfate 220 milliGRAM(s) Oral daily  Nephro-jennifer 1 Tablet(s) Oral daily  carvedilol 12.5 milliGRAM(s) Oral every 12 hours  isosorbide   mononitrate ER Tablet (IMDUR) 30 milliGRAM(s) Oral daily  valsartan 320 milliGRAM(s) Oral daily  ergocalciferol 76274 Unit(s) Oral <User Schedule>  vancomycin  IVPB 1000 milliGRAM(s) IV Intermittent every 12 hours  insulin glargine Injectable (LANTUS) 40 Unit(s) SubCutaneous at bedtime    MEDICATIONS  (PRN):  acetaminophen   Tablet 650 milliGRAM(s) Oral every 6 hours PRN  dextrose Gel 1 Dose(s) Oral once PRN  glucagon  Injectable 1 milliGRAM(s) IntraMuscular once PRN  acetaminophen   Tablet. 650 milliGRAM(s) Oral every 6 hours PRN  oxyCODONE    5 mG/acetaminophen 325 mG 2 Tablet(s) Oral every 6 hours PRN  morphine  - Injectable 2 milliGRAM(s) IV Push every 6 hours PRN      REVIEW OF SYSTEMS:  CONSTITUTIONAL: No fevere  RESPIRATORY: No shortness of breath  CARDIOVASCULAR: No chest pain  GASTROINTESTINAL: No abdominal or epigastric pain. No nausea, vomiting  SKIN: Mild itching  MUSCULOSKELETAL: L foot pain    T(F): 99 (07-08-17 @ 06:06), Max: 99.8 (07-07-17 @ 21:46)  HR: 74 (07-08-17 @ 06:06) (63 - 78)  BP: 140/64 (07-08-17 @ 06:06) (111/- - 167/58)  RR: 18 (07-08-17 @ 06:06) (14 - 20)  SpO2: 97% (07-08-17 @ 06:06) (96% - 99%)  Wt(kg): --    CAPILLARY BLOOD GLUCOSE  243 (08 Jul 2017 08:30)  292 (07 Jul 2017 22:20)  242 (07 Jul 2017 16:59)        I&O's Summary    07 Jul 2017 07:01  -  08 Jul 2017 07:00  --------------------------------------------------------  IN: 550 mL / OUT: 400 mL / NET: 150 mL      PHYSICAL EXAM:  GENERAL: NAD, well-groomed, well-developed  HEAD:  Atraumatic, Normocephalic  EYES: EOMI, PERRLA, conjunctiva and sclera clear  ENMT: Moist mucous membranes  NECK: Supple, No JVD  NERVOUS SYSTEM:  Alert & Oriented X3, Good concentration; Motor Strength 5/5 B/L upper and LLE  CHEST/LUNG: Clear to percussion bilaterally; No rales, rhonchi, wheezing, or rubs  HEART: Regular rate and rhythm; systolic murmur, rubs, or gallops  ABDOMEN: Soft, Nontender, Nondistended; Bowel sounds present  EXTREMITIES: R BKA, L foot in boot and wrapped in Kerlix  LYMPH: No lymphadenopathy noted  SKIN: L foot in boot and wrapped in Kerlix, dry/intact    LABS:                        7.0    8.65  )-----------( 315      ( 08 Jul 2017 05:55 )             22.3     07-08    142  |  105  |  13  ----------------------------<  200<H>  4.5   |  24  |  0.94    Ca    8.0<L>      08 Jul 2017 05:55  Phos  3.4     07-08  Mg     1.6     07-08              RADIOLOGY & ADDITIONAL TESTS:    XRay:    CTScan:    MRI:     Imaging Personally Reviewed:  [ ] YES  [ ] NO    Consultant(s) Notes Reviewed:  [X] YES  [ ] NO    Care Discussed with Consultants/Other Providers [X] YES  [ ] NO

## 2017-07-08 NOTE — PROGRESS NOTE ADULT - ASSESSMENT
73yo F with PMH of Type 2 DM (HgbA1c 7.8%) c/b neuropathy, PVD s/p R BKA (2010) and stent to L SFA, CKD Stage III, CAD s/p stents, CABG and L CEA, and HTN presenting with L hallux infection s/p bedside I & D. MRI positive for OM of the L hallux, pt to go to OR on 7/7 for L transmetatarsal amputation. 73yo F with PMH of Type 2 DM (HgbA1c 7.8%) c/b neuropathy, PVD s/p R BKA (2010) and stent to L SFA, CKD Stage III, CAD s/p stents, CABG and L CEA, and HTN presenting with L hallux infection s/p bedside I & D. MRI positive for OM of the L hallux, s/p OR on 7/7 for L transmetatarsal amputation.

## 2017-07-08 NOTE — PROGRESS NOTE ADULT - PROBLEM SELECTOR PLAN 4
Stable  -C/w aspirin, Plavix, carvedilol, isosorbide, atorvastatin Creatinine improved compared to baseline  - c/w home dose of valsartan

## 2017-07-08 NOTE — PROGRESS NOTE ADULT - PROBLEM SELECTOR PLAN 8
DVT ppx: HSQ (AM dose held today)  Diet: DASH, CC  PT recs- will re-evaluate after surgery DVT ppx: HSQ  Diet: DASH, CC  PT recs- will re-evaluate after surgery

## 2017-07-08 NOTE — PROGRESS NOTE ADULT - PROBLEM SELECTOR PROBLEM 5
Type 2 diabetes mellitus with diabetic nephropathy, with long-term current use of insulin Coronary artery disease involving coronary bypass graft of native heart without angina pectoris

## 2017-07-09 ENCOUNTER — TRANSCRIPTION ENCOUNTER (OUTPATIENT)
Age: 72
End: 2017-07-09

## 2017-07-09 LAB
BACTERIA BLD CULT: SIGNIFICANT CHANGE UP
BACTERIA BLD CULT: SIGNIFICANT CHANGE UP
BASOPHILS # BLD AUTO: 0.01 K/UL — SIGNIFICANT CHANGE UP (ref 0–0.2)
BASOPHILS NFR BLD AUTO: 0.1 % — SIGNIFICANT CHANGE UP (ref 0–2)
BUN SERPL-MCNC: 13 MG/DL — SIGNIFICANT CHANGE UP (ref 7–23)
CALCIUM SERPL-MCNC: 8.6 MG/DL — SIGNIFICANT CHANGE UP (ref 8.4–10.5)
CHLORIDE SERPL-SCNC: 104 MMOL/L — SIGNIFICANT CHANGE UP (ref 98–107)
CO2 SERPL-SCNC: 22 MMOL/L — SIGNIFICANT CHANGE UP (ref 22–31)
CREAT SERPL-MCNC: 0.97 MG/DL — SIGNIFICANT CHANGE UP (ref 0.5–1.3)
EOSINOPHIL # BLD AUTO: 0.65 K/UL — HIGH (ref 0–0.5)
EOSINOPHIL NFR BLD AUTO: 7.1 % — HIGH (ref 0–6)
GLUCOSE SERPL-MCNC: 175 MG/DL — HIGH (ref 70–99)
HCT VFR BLD CALC: 23 % — LOW (ref 34.5–45)
HGB BLD-MCNC: 7.1 G/DL — LOW (ref 11.5–15.5)
IMM GRANULOCYTES # BLD AUTO: 0.04 # — SIGNIFICANT CHANGE UP
IMM GRANULOCYTES NFR BLD AUTO: 0.4 % — SIGNIFICANT CHANGE UP (ref 0–1.5)
LYMPHOCYTES # BLD AUTO: 2.17 K/UL — SIGNIFICANT CHANGE UP (ref 1–3.3)
LYMPHOCYTES # BLD AUTO: 23.6 % — SIGNIFICANT CHANGE UP (ref 13–44)
MAGNESIUM SERPL-MCNC: 1.6 MG/DL — SIGNIFICANT CHANGE UP (ref 1.6–2.6)
MCHC RBC-ENTMCNC: 27.7 PG — SIGNIFICANT CHANGE UP (ref 27–34)
MCHC RBC-ENTMCNC: 30.9 % — LOW (ref 32–36)
MCV RBC AUTO: 89.8 FL — SIGNIFICANT CHANGE UP (ref 80–100)
MONOCYTES # BLD AUTO: 0.51 K/UL — SIGNIFICANT CHANGE UP (ref 0–0.9)
MONOCYTES NFR BLD AUTO: 5.6 % — SIGNIFICANT CHANGE UP (ref 2–14)
NEUTROPHILS # BLD AUTO: 5.8 K/UL — SIGNIFICANT CHANGE UP (ref 1.8–7.4)
NEUTROPHILS NFR BLD AUTO: 63.2 % — SIGNIFICANT CHANGE UP (ref 43–77)
NRBC # FLD: 0 — SIGNIFICANT CHANGE UP
PHOSPHATE SERPL-MCNC: 2.8 MG/DL — SIGNIFICANT CHANGE UP (ref 2.5–4.5)
PLATELET # BLD AUTO: 315 K/UL — SIGNIFICANT CHANGE UP (ref 150–400)
PMV BLD: 11.3 FL — SIGNIFICANT CHANGE UP (ref 7–13)
POTASSIUM SERPL-MCNC: 4.4 MMOL/L — SIGNIFICANT CHANGE UP (ref 3.5–5.3)
POTASSIUM SERPL-SCNC: 4.4 MMOL/L — SIGNIFICANT CHANGE UP (ref 3.5–5.3)
RBC # BLD: 2.56 M/UL — LOW (ref 3.8–5.2)
RBC # FLD: 14.8 % — HIGH (ref 10.3–14.5)
SODIUM SERPL-SCNC: 142 MMOL/L — SIGNIFICANT CHANGE UP (ref 135–145)
VANCOMYCIN TROUGH SERPL-MCNC: 23.2 UG/ML — HIGH (ref 10–20)
WBC # BLD: 9.18 K/UL — SIGNIFICANT CHANGE UP (ref 3.8–10.5)
WBC # FLD AUTO: 9.18 K/UL — SIGNIFICANT CHANGE UP (ref 3.8–10.5)

## 2017-07-09 PROCEDURE — 99233 SBSQ HOSP IP/OBS HIGH 50: CPT

## 2017-07-09 RX ORDER — INSULIN LISPRO 100/ML
2 VIAL (ML) SUBCUTANEOUS
Qty: 0 | Refills: 0 | Status: DISCONTINUED | OUTPATIENT
Start: 2017-07-09 | End: 2017-07-10

## 2017-07-09 RX ORDER — VANCOMYCIN HCL 1 G
750 VIAL (EA) INTRAVENOUS EVERY 12 HOURS
Qty: 0 | Refills: 0 | Status: DISCONTINUED | OUTPATIENT
Start: 2017-07-10 | End: 2017-07-11

## 2017-07-09 RX ADMIN — HEPARIN SODIUM 5000 UNIT(S): 5000 INJECTION INTRAVENOUS; SUBCUTANEOUS at 06:10

## 2017-07-09 RX ADMIN — ERTAPENEM SODIUM 120 MILLIGRAM(S): 1 INJECTION, POWDER, LYOPHILIZED, FOR SOLUTION INTRAMUSCULAR; INTRAVENOUS at 00:54

## 2017-07-09 RX ADMIN — Medication 1 DROP(S): at 18:44

## 2017-07-09 RX ADMIN — VALSARTAN 320 MILLIGRAM(S): 80 TABLET ORAL at 06:09

## 2017-07-09 RX ADMIN — BRIMONIDINE TARTRATE 1 DROP(S): 2 SOLUTION/ DROPS OPHTHALMIC at 06:10

## 2017-07-09 RX ADMIN — HEPARIN SODIUM 5000 UNIT(S): 5000 INJECTION INTRAVENOUS; SUBCUTANEOUS at 14:11

## 2017-07-09 RX ADMIN — INSULIN GLARGINE 40 UNIT(S): 100 INJECTION, SOLUTION SUBCUTANEOUS at 21:53

## 2017-07-09 RX ADMIN — CARVEDILOL PHOSPHATE 12.5 MILLIGRAM(S): 80 CAPSULE, EXTENDED RELEASE ORAL at 18:43

## 2017-07-09 RX ADMIN — HEPARIN SODIUM 5000 UNIT(S): 5000 INJECTION INTRAVENOUS; SUBCUTANEOUS at 21:54

## 2017-07-09 RX ADMIN — ZINC SULFATE TAB 220 MG (50 MG ZINC EQUIVALENT) 220 MILLIGRAM(S): 220 (50 ZN) TAB at 12:56

## 2017-07-09 RX ADMIN — ATORVASTATIN CALCIUM 20 MILLIGRAM(S): 80 TABLET, FILM COATED ORAL at 21:53

## 2017-07-09 RX ADMIN — ISOSORBIDE MONONITRATE 30 MILLIGRAM(S): 60 TABLET, EXTENDED RELEASE ORAL at 12:56

## 2017-07-09 RX ADMIN — Medication 1 DROP(S): at 06:11

## 2017-07-09 RX ADMIN — CLOPIDOGREL BISULFATE 75 MILLIGRAM(S): 75 TABLET, FILM COATED ORAL at 12:56

## 2017-07-09 RX ADMIN — Medication 81 MILLIGRAM(S): at 12:56

## 2017-07-09 RX ADMIN — CARVEDILOL PHOSPHATE 12.5 MILLIGRAM(S): 80 CAPSULE, EXTENDED RELEASE ORAL at 06:09

## 2017-07-09 RX ADMIN — Medication 1: at 17:37

## 2017-07-09 RX ADMIN — Medication 1 TABLET(S): at 12:56

## 2017-07-09 RX ADMIN — BRIMONIDINE TARTRATE 1 DROP(S): 2 SOLUTION/ DROPS OPHTHALMIC at 18:44

## 2017-07-09 RX ADMIN — Medication 2: at 08:53

## 2017-07-09 RX ADMIN — Medication 3: at 12:56

## 2017-07-09 RX ADMIN — Medication 2 UNIT(S): at 17:38

## 2017-07-09 RX ADMIN — Medication 250 MILLIGRAM(S): at 06:10

## 2017-07-09 NOTE — DISCHARGE NOTE ADULT - PATIENT PORTAL LINK FT
“You can access the [employer] Patient Portal, offered by [facility], by registering with the following website: [email or web address]”

## 2017-07-09 NOTE — DISCHARGE NOTE ADULT - COMMUNITY RESOURCES
[facility], [address] [place] [address], [place] [address], [phone]. [facility]

## 2017-07-09 NOTE — PROGRESS NOTE ADULT - PROBLEM SELECTOR PLAN 1
POD #2 for L TMA  - Patient stable for discharge per podiatry reccs.  - Need to consult ID to discuss what oral antibiotics patient can go home with.   Will then discontinue vancomycin and ertapenem for broad spectrum coverage.  - MRI of L foot showing changes consistent with OM  - Wound cultures and blood cultures with no growth to date. POD #2 for L TMA  - Patient stable for discharge per podiatry reccs.  - ID consulted to discuss what oral antibiotics patient can go home with. Can continue on levofloxacin 500 mg QD x 3 days when discharged.   Until then, will continue IV vancomycin and ertapenem for broad spectrum coverage.  - MRI of L foot showing changes consistent with OM  - Wound cultures and blood cultures with no growth to date.

## 2017-07-09 NOTE — PROGRESS NOTE ADULT - PROBLEM SELECTOR PLAN 3
S/p stent to L SFA on last admission in June 2017  -Repeat DUC/PVRs showing improvement from last procedure  -C/w ASA and Plavix

## 2017-07-09 NOTE — PROGRESS NOTE ADULT - SUBJECTIVE AND OBJECTIVE BOX
Patient is a 72y old  Female who presents with a chief complaint of Toe infection (04 Jul 2017 23:00)       INTERVAL HPI/OVERNIGHT EVENTS:  Patient seen and evaluated at bedside POD #2 s/p LEFT TMA and NIDIA.  Pt is resting comfortable in NAD. Denies N/V/F/C.  Pain is well controlled.    Allergies    sulfa drugs (Hives)  sulfa drugs (Rash)  Sulfac 10% (Hives)    Intolerances        Vital Signs Last 24 Hrs  T(C): 36.7 (09 Jul 2017 06:37), Max: 37.4 (08 Jul 2017 21:05)  T(F): 98.1 (09 Jul 2017 06:37), Max: 99.4 (08 Jul 2017 21:05)  HR: 74 (09 Jul 2017 06:37) (74 - 76)  BP: 146/58 (09 Jul 2017 06:37) (134/51 - 146/58)  BP(mean): --  RR: 18 (09 Jul 2017 06:37) (16 - 18)  SpO2: 95% (09 Jul 2017 06:37) (93% - 95%)    LABS:                        7.1    9.18  )-----------( 315      ( 09 Jul 2017 06:07 )             23.0     07-09    142  |  104  |  13  ----------------------------<  175<H>  4.4   |  22  |  0.97    Ca    8.6      09 Jul 2017 06:07  Phos  2.8     07-09  Mg     1.6     07-09          CAPILLARY BLOOD GLUCOSE  284 (08 Jul 2017 22:10)  202 (08 Jul 2017 16:55)  207 (08 Jul 2017 12:27)  243 (08 Jul 2017 08:30)          Lower Extremity Physical Exam:  No change in neurovascular status.  LEFT TMA and NIDIA incisions are clean, closed, and dry.  Dressings C/D/I.  Sutures intact.  No signs of wound dehiscence. No drainage, no purulence, no erythema, no malodor, nor any other acute SOI.  No calf pain with compression.      RADIOLOGY & ADDITIONAL TESTS:  < from: Xray Foot AP + Lateral + Oblique, Left (07.07.17 @ 12:05) >    IMPRESSION:  Status post TMA with sharp and smooth osseous stump margins and with   multiple radiodense antibiotic cement beads predominantly over the medial   osseous stumps.     Postsurgical changes in the overlying soft tissues.    No proximally tracking gas collections beyond the amputation site and no   focal areas of osteomyelitis.    Redemonstrated vascular calcifications in distal calf subcutaneous   dystrophic calcifications.    Remainder of the foot is unremarkable.    Splint material supports the plantar and posterior surfaces of the   extremity.    < end of copied text >

## 2017-07-09 NOTE — PROGRESS NOTE ADULT - ASSESSMENT
s/p LEFT TMA and NIDIA    Plan: Patient seen and evaluated.  All questions answered to patient's satisfaction.  Cultures and postop films reviewed. I believe that I resected out all infected bone and soft tissue during the procedure.  The patient is to remain NWB until further instructed to the LEFT foot.  From my standpoint, patient is stable for D/C.  We can tailor abx in outpatient setting.  She is to follow up as outpatient within 1 week.

## 2017-07-09 NOTE — DISCHARGE NOTE ADULT - MEDICATION SUMMARY - MEDICATIONS TO CHANGE
I will SWITCH the dose or number of times a day I take the medications listed below when I get home from the hospital:    levoFLOXacin 500 mg oral tablet  -- 1 tab(s) by mouth every 24 hours End Date: 7/7/2017    Levemir 100 units/mL subcutaneous solution  -- 40 unit(s) subcutaneous once a day (at bedtime)

## 2017-07-09 NOTE — DISCHARGE NOTE ADULT - SECONDARY DIAGNOSIS.
Abscess of toe, left Acute on chronic renal failure Normocytic normochromic anemia Type 2 diabetes mellitus with diabetic nephropathy, with long-term current use of insulin Vitamin D deficiency Coronary artery disease involving coronary bypass graft of native heart without angina pectoris CKD (chronic kidney disease) stage 3, GFR 30-59 ml/min

## 2017-07-09 NOTE — DISCHARGE NOTE ADULT - CARE PROVIDER_API CALL
Opal Hackett), Internal Medicine  [address] [place] [address]  Phone: [phone]  Fax: [phone]    Kavon Kraus (DPRIVER), Surgery  [address] [place] [address]  Phone: [phone]  Fax: [phone]

## 2017-07-09 NOTE — PROGRESS NOTE ADULT - PROBLEM SELECTOR PLAN 5
Ha1c 7.9 06/17  - Lantus increased to 40u qHS yesterday. FS continue to be elevated > 200.   - Will add pre-meal humalog to control blood glucose.   - Monitor FS and will readjust Lantus dose as needed, pt currently not on any pre-meal Humalog  - ISS and FS with meals and at bedtime Ha1c 7.9 06/17  - Lantus increased to 40u qHS yesterday. FS continue to be elevated > 200.   - Will add 2U pre-meal humalog to control blood glucose.   - Monitor FS and will readjust as needed  - ISS and FS with meals and at bedtime

## 2017-07-09 NOTE — DISCHARGE NOTE ADULT - CONDITION (STATED IN TERMS THAT PERMIT A SPECIFIC MEASURABLE COMPARISON WITH CONDITION ON ADMISSION):
pt has been stable at the present time, has been denies any pain. Had FS level within normal limits.VSS,afebrile.

## 2017-07-09 NOTE — DISCHARGE NOTE ADULT - PLAN OF CARE
amputation of Left metatarsals, clearance of infection You were hospitalized because you developed an infection in your left big toe that spread to the bones in your foot. As a result, we performed surgery on July 7 to remove the infected bone on your foot. We put you on antibiotics to help treat the infection as well. You will continue your recovery in a rehab facility. We are giving you one more day of antibiotic medication to help clear the infection. resolution of kidney injury Your kidney function was decreased when you arrived in our hospital. Your kidney function then improved and stabilized within the hospital Improvement of hemoglobin level Your anemia worsened during following your foot surgery, and on Tuesday your hemoglobin level was 6.2, so we gave you a transfusion of 1 unit of red blood cells. Your hemoglobin has improved back to 7.9, which is a safe level to have you discharged. We recommend that you take iron pills to continue your anemia recovery. stabilization of blood sugar levels your blood sugar levels were controlled with insulin at bedtime and before meals. We set your bedtime insulin Lantus level to 35 Units and your premeal Humalog levels at 4 units before each meal. improvement of your vitamin D levels we noticed that your vitamin D levels were low, so we gave you 50,000 units of ergocalciferol. You should take this medication by mouth once per week. your coronary artery disease was stable. You should continue your medications as they were before. Your kidney function was decreased when you arrived in our hospital. Your kidney function then improved and stabilized within your hospital stay. Clearance of infection You were hospitalized because you developed an infection in your left big toe that spread to the bones in your foot. As a result, you had surgery on July 7 to remove the infected bone on your foot. We put you on antibiotics to help treat the infection as well. You will continue your recovery in a rehab facility. We are giving you one more day of antibiotic medication to help clear the infection. Improvement of hemoglobin levels Blood sugar control Your blood sugar levels were controlled with insulin at bedtime and before meals. You are being discharged on 35 units of Levemir at bedtime and 3 units of Humalog with meals. Your Vitamin D levels were low and you were started on  You should take this medication by mouth once per week. Your anemia worsened following your foot surgery, and on Tuesday your hemoglobin level was 6.2, so you were given a transfusion of 1 unit of red blood cells. Your hemoglobin improved back to 7.9, which is a safe level to have you discharged. We recommend that you take iron pills for iron deficiency. You were hospitalized because you developed an infection in your left big toe that spread to the bones in your foot. As a result, you had surgery on July 7 to remove the infected bone in your foot. You were started on antibiotics to help treat the infection as well. You will continue your recovery in a rehab facility. You will be given one more day of antibiotics to help clear the infection. Your coronary artery disease was stable. You should continue your home medications. Your kidney function remained stable while you were in the hospital.

## 2017-07-09 NOTE — PROGRESS NOTE ADULT - SUBJECTIVE AND OBJECTIVE BOX
Patient is a 72y old  Female who presents with a chief complaint of Toe infection (09 Jul 2017 09:44)       INTERVAL HPI/OVERNIGHT EVENTS: Patient is POD #2. No acute events overnight. Patient afebrile. Denies N/V/ SOB or severe pain. Only has mild itching on left leg proximal to amputation site.     MEDICATIONS  (STANDING):  heparin  Injectable 5000 Unit(s) SubCutaneous every 8 hours  insulin lispro (HumaLOG) corrective regimen sliding scale   SubCutaneous three times a day before meals  insulin lispro (HumaLOG) corrective regimen sliding scale   SubCutaneous at bedtime  dextrose 5%. 1000 milliLiter(s) (50 mL/Hr) IV Continuous <Continuous>  dextrose 50% Injectable 12.5 Gram(s) IV Push once  dextrose 50% Injectable 25 Gram(s) IV Push once  dextrose 50% Injectable 25 Gram(s) IV Push once  aspirin  chewable 81 milliGRAM(s) Oral daily  atorvastatin 20 milliGRAM(s) Oral at bedtime  clopidogrel Tablet 75 milliGRAM(s) Oral daily  ketorolac 0.5% Ophthalmic Solution 1 Drop(s) Both EYES two times a day  brimonidine 0.2% Ophthalmic Solution 1 Drop(s) Both EYES two times a day  timolol 0.5% Solution 1 Drop(s) Both EYES two times a day  ertapenem  IVPB 1000 milliGRAM(s) IV Intermittent every 24 hours  zinc sulfate 220 milliGRAM(s) Oral daily  Nephro-jennifer 1 Tablet(s) Oral daily  carvedilol 12.5 milliGRAM(s) Oral every 12 hours  isosorbide   mononitrate ER Tablet (IMDUR) 30 milliGRAM(s) Oral daily  valsartan 320 milliGRAM(s) Oral daily  ergocalciferol 88555 Unit(s) Oral <User Schedule>  vancomycin  IVPB 1000 milliGRAM(s) IV Intermittent every 12 hours  insulin glargine Injectable (LANTUS) 40 Unit(s) SubCutaneous at bedtime    MEDICATIONS  (PRN):  acetaminophen   Tablet 650 milliGRAM(s) Oral every 6 hours PRN For Temp greater than 38 C (100.4 F)  dextrose Gel 1 Dose(s) Oral once PRN Blood Glucose LESS THAN 70 milliGRAM(s)/deciliter  glucagon  Injectable 1 milliGRAM(s) IntraMuscular once PRN Glucose LESS THAN 70 milligrams/deciliter  acetaminophen   Tablet. 650 milliGRAM(s) Oral every 6 hours PRN Mild Pain (1 - 3)  oxyCODONE    5 mG/acetaminophen 325 mG 2 Tablet(s) Oral every 6 hours PRN Moderate Pain (4 - 6)  morphine  - Injectable 2 milliGRAM(s) IV Push every 6 hours PRN Severe Pain (7 - 10)      Allergies    sulfa drugs (Hives)  sulfa drugs (Rash)  Sulfac 10% (Hives)    Intolerances        REVIEW OF SYSTEMS:  CONSTITUTIONAL: No fever  RESPIRATORY: No shortness of breath  CARDIOVASCULAR: No chest pain or dizziness  GASTROINTESTINAL: No nausea, vomiting, or diarrhea.   NEUROLOGICAL: Numbness at LLE at amputation site.   SKIN: Itching proximal to LLE amputation site.       Vital Signs Last 24 Hrs  T(C): 36.7 (09 Jul 2017 06:37), Max: 37.4 (08 Jul 2017 21:05)  T(F): 98.1 (09 Jul 2017 06:37), Max: 99.4 (08 Jul 2017 21:05)  HR: 74 (09 Jul 2017 06:37) (74 - 76)  BP: 146/58 (09 Jul 2017 06:37) (134/51 - 146/58)  BP(mean): --  RR: 18 (09 Jul 2017 06:37) (16 - 18)  SpO2: 95% (09 Jul 2017 06:37) (93% - 95%)    PHYSICAL EXAM:  GENERAL: NAD  HEAD:  Atraumatic, Normocephalic  EYES: EOMI, PERRLA, conjunctiva and sclera clear  ENMT:  Moist mucous membranes  NERVOUS SYSTEM: AOX3  PSYCHIATRIC: Appropriate affect and mood  CHEST/LUNG: Clear to auscultation bilaterally  HEART: Regular rate and rhythm  ABDOMEN: Soft, Nontender, Nondistended; Bowel sounds present  EXTREMITIES:  2+ Peripheral Pulses, RLE below the knee amputation, LLE s/p TMA POD #2, wrapped in dressing      LABS:                        7.1    9.18  )-----------( 315      ( 09 Jul 2017 06:07 )             23.0     09 Jul 2017 06:07    142    |  104    |  13     ----------------------------<  175    4.4     |  22     |  0.97     Ca    8.6        09 Jul 2017 06:07  Phos  2.8       09 Jul 2017 06:07  Mg     1.6       09 Jul 2017 06:07        CAPILLARY BLOOD GLUCOSE  277 (09 Jul 2017 12:29)  213 (09 Jul 2017 08:44)  284 (08 Jul 2017 22:10)  202 (08 Jul 2017 16:55)        BLOOD CULTURE- not growing organisms    RADIOLOGY & ADDITIONAL TESTS:    Imaging Personally Reviewed:  [X ] YES     Consultant(s) Notes Reviewed:      Care Discussed with Consultants/Other Providers:

## 2017-07-09 NOTE — PROGRESS NOTE ADULT - ASSESSMENT
73yo F with PMH of Type 2 DM (HgbA1c 7.8%) c/b neuropathy, PVD s/p R BKA (2010) and stent to L SFA, CKD Stage III, CAD s/p stents, CABG and L CEA, and HTN presenting with L hallux infection s/p bedside I & D. MRI positive for OM of the L hallux, s/p OR on 7/7 for L transmetatarsal amputation.

## 2017-07-09 NOTE — DISCHARGE NOTE ADULT - HOSPITAL COURSE
72 F pmhx DM2 c/b neuropathy, PVD s/p R AKA (2010) w/ prosthesis, CKD Stage III, CAD s/p stents, CABG and L CEA, HTN presenting from home because wound care nurse advised patient to come to the ED for concerns for L first toe infection. Patient was recently discharged from the hospital after being diagnosed with L 4th and 5th digit infection s/p stenting to the L SFA on 6/21 and L 4th and 5th partial metatarsal amputations. Patient was discharged on Levoquin with plans to complete antibiotic course on July 7th. Patient reports she has been having low grade temperatures  at home since she was discharged. Wound care nurse noted open wound and drainage from L first digit. Patient was advised to come to the ED. Patient reports no recent trauma at the site. Daughter notes the patient's skin was dry and cracked. Patient has been taking her recommended insulin regimen of Lantus 40 at bedtime. Fingersticks have been . Patient did not have any symptoms of hypoglycemia. Denies nausea, vomiting, headaches, chest pain, SOB, palpitations, abdominal pain, diarrhea, constipation, dysuria, polyuria.     In the ED T 100.4 HR 78 /65 RR18 SpO2 98 FS 66. Patient was given clindamycin and dextrose. Patient had bedside I&D with 5 cc of pus removal in the ED.     Pt. resumed on ertapenem 1000 IV QD and started on vancomycin IV. She was given IV fluids due to acute on chronic renal failure (likely prerenal in setting of infection an ddehydration). Patient underwent transmetatarsal amputation of the left foot with NIDIA on 7/7/2017. She is non weight bearing and a CAM boot was ordered. 72 F pmhx DM2 c/b neuropathy, PVD s/p R AKA (2010) w/ prosthesis, CKD Stage III, CAD s/p stents, CABG and L CEA, HTN presenting from home because wound care nurse advised patient to come to the ED for concerns for L first toe infection. Patient was recently discharged from the hospital after being diagnosed with L 4th and 5th digit infection s/p stenting to the L SFA on 6/21 and L 4th and 5th partial metatarsal amputations. Patient was discharged on Levoquin with plans to complete antibiotic course on July 7th. Patient reports she has been having low grade temperatures  at home since she was discharged. Wound care nurse noted open wound and drainage from L first digit. Patient was advised to come to the ED. Patient reports no recent trauma at the site. Daughter notes the patient's skin was dry and cracked. Patient has been taking her recommended insulin regimen of Lantus 40 at bedtime. Fingersticks have been . Patient did not have any symptoms of hypoglycemia. Denies nausea, vomiting, headaches, chest pain, SOB, palpitations, abdominal pain, diarrhea, constipation, dysuria, polyuria.     In the ED T 100.4 HR 78 /65 RR18 SpO2 98 FS 66. Patient was given clindamycin and dextrose. Patient had bedside I&D with 5 cc of pus removal in the ED.     Pt. resumed ertapenem 1000 IV QD and started on vancomycin IV. She was given IV fluids due to acute on chronic renal failure (likely prerenal in setting of infection and dehydration). MRI of L Foot showed Acute OM, and patient underwent transmetatarsal amputation of the left foot with NIDIA on 7/7/2017. She is non weight bearing and a CAM boot was provided to her to use during rehab. Pt was transitioned to oral levofloxacin 500 mg QD for 3 days on 7/12.    Following surgery, pt developed worsening of anemia on 7/8 with Hb trending down to 6.2 on 7/11, at which point 1u PRBCs was transfused. Pt Hb subsequently resolved to 7.9 on 7/13. 72 F pmhx DM2 c/b neuropathy, PVD s/p R AKA (2010) w/ prosthesis, CKD Stage III, CAD s/p stents, CABG and L CEA, HTN presenting from home because wound care nurse advised patient to come to the ED for concerns for L first toe infection. Patient was recently discharged from the hospital after being diagnosed with L 4th and 5th digit infection s/p stenting to the L SFA on 6/21 and L 4th and 5th partial metatarsal amputations. Patient was discharged on Levoquin with plans to complete antibiotic course on July 7th. Patient reports she has been having low grade temperatures  at home since she was discharged. Wound care nurse noted open wound and drainage from L first digit. Patient was advised to come to the ED. Patient reports no recent trauma at the site. Daughter notes the patient's skin was dry and cracked. Patient has been taking her recommended insulin regimen of Lantus 40 at bedtime. Fingersticks have been . Patient did not have any symptoms of hypoglycemia. Denies nausea, vomiting, headaches, chest pain, SOB, palpitations, abdominal pain, diarrhea, constipation, dysuria, polyuria.   72 F pmhx DM2 c/b neuropathy, PVD s/p R AKA (2010) w/ prosthesis, CKD Stage III, CAD s/p stents, CABG and L CEA, HTN presenting from home because wound care nurse advised patient to come to the ED for concerns for L first toe infection. Patient was recently discharged from the hospital after being diagnosed with L 4th and 5th digit infection s/p stenting to the L SFA on 6/21 and L 4th and 5th partial metatarsal amputations. Patient was discharged on Levoquin with plans to complete antibiotic course on July 7th. Patient reports she has been having low grade temperatures  at home since she was discharged. Wound care nurse came for scheduled dressing changes today and noted open wound and drainage from L first digit. Patient was advised to come to the ED. Patient reports no recent trauma at the site. Daughter notes the patient's skin was dry and cracked. Patient has been taking her recommended insulin regimen of Lantus 40 at bedtime. Fingersticks have been . Patient did not have any symptoms of hypoglycemia. Denies nausea, vomiting, headaches, chest pain, SOB, palpitations, abdominal pain, diarrhea, constipation, dysuria, polyuria.     In the ED T 100.4 HR 78 /65 RR18 SpO2 98 FS 66. Patient was given clindamycin and dextrose. Patient had bedside I&D with 5 cc of pus removal in the ED.     Pt was admitted to the medicine service. Pt was continued on ertapenem and started on vancomycin IV. ID was consulted and agreed with continuing empirically on ertapenem vancomycin. She was given IV fluids due to DAMARI (likely prerenal in setting of infection and dehydration). MRI of the L Foot showed acute OM, and patient underwent transmetatarsal amputation of the left foot with NIDIA on 7/7/2017. She is non weight bearing and a CAM boot was provided to her to use during rehab. Wound cultures from the OR returned negative at 72h and pt was transitioned to oral levofloxacin 500 mg QD for 3 days to be completed on 7/14.    Following surgery, pt developed worsening of anemia on 7/8 with Hb trending down to 6.2 on 7/11, at which point 1u PRBCs was transfused. Pt Hb subsequently resolved to 7.9 on 7/13.    Pt was restarted on her home dose of Lantus and remained hyperglycemic; she was subsequently started on Humalog qmeals. On the day of discharge, pt's AM FS was 80 and her Lantus at bedtime was decreased from 40 units to 35 units. 72 F pmhx DM2 c/b neuropathy, PVD s/p R AKA (2010) w/ prosthesis, CKD Stage III, CAD s/p stents, CABG and L CEA, HTN presenting from home because wound care nurse advised patient to come to the ED for concerns for L first toe infection. Patient was recently discharged from the hospital after being diagnosed with L 4th and 5th digit infection s/p stenting to the L SFA on 6/21 and L 4th and 5th partial metatarsal amputations. Patient was discharged on Levoquin with plans to complete antibiotic course on July 7th. Patient reports she has been having low grade temperatures  at home since she was discharged. Wound care nurse noted open wound and drainage from L first digit. Patient was advised to come to the ED. Patient reports no recent trauma at the site. Daughter notes the patient's skin was dry and cracked. Patient has been taking her recommended insulin regimen of Lantus 40 at bedtime. Fingersticks have been . Patient did not have any symptoms of hypoglycemia. Denies nausea, vomiting, headaches, chest pain, SOB, palpitations, abdominal pain, diarrhea, constipation, dysuria, polyuria.   72 F pmhx DM2 c/b neuropathy, PVD s/p R AKA (2010) w/ prosthesis, CKD Stage III, CAD s/p stents, CABG and L CEA, HTN presenting from home because wound care nurse advised patient to come to the ED for concerns for L first toe infection. Patient was recently discharged from the hospital after being diagnosed with L 4th and 5th digit infection s/p stenting to the L SFA on 6/21 and L 4th and 5th partial metatarsal amputations. Patient was discharged on Levoquin with plans to complete antibiotic course on July 7th. Patient reports she has been having low grade temperatures  at home since she was discharged. Wound care nurse came for scheduled dressing changes today and noted open wound and drainage from L first digit. Patient was advised to come to the ED. Patient reports no recent trauma at the site. Daughter notes the patient's skin was dry and cracked. Patient has been taking her recommended insulin regimen of Lantus 40 at bedtime. Fingersticks have been . Patient did not have any symptoms of hypoglycemia. Denies nausea, vomiting, headaches, chest pain, SOB, palpitations, abdominal pain, diarrhea, constipation, dysuria, polyuria.     In the ED T 100.4 HR 78 /65 RR18 SpO2 98 FS 66. Patient was given clindamycin and dextrose. Patient had bedside I&D with 5 cc of pus removal in the ED.     Pt was admitted to the medicine service. Pt was continued on ertapenem and started on vancomycin IV. ID was consulted and agreed with continuing empirically on ertapenem vancomycin. She was given IV fluids due to DAMARI (likely prerenal in setting of infection and dehydration). MRI of the L Foot showed acute OM, and patient underwent transmetatarsal amputation of the left foot with NIDIA on 7/7/2017. She is non weight bearing and a CAM boot was provided to her to use during rehab. Wound cultures from the OR returned negative at 72h and pt was transitioned to oral levofloxacin 500 mg QD for 3 days to be completed on 7/14.    Following surgery, pt developed worsening of anemia on 7/8 with Hb trending down to 6.2 on 7/11, at which point 1u PRBCs was transfused. Pt Hb subsequently resolved to 7.9 on 7/13.    Pt was restarted on her home dose of Lantus and remained hyperglycemic; she was subsequently started on Humalog qmeals. On the day of discharge, pt's AM FS was 80 and her Lantus at bedtime was decreased from 40 units to 35 units. Pt will be d/maritza on Lantus 35 and Humalog 3 qmeals. 72 F pmhx DM2 c/b neuropathy, PVD s/p R AKA (2010) w/ prosthesis, CKD Stage III, CAD s/p stents, CABG and L CEA, HTN presenting from home because wound care nurse advised patient to come to the ED for concerns for L first toe infection. Patient was recently discharged from the hospital after being diagnosed with L 4th and 5th digit infection s/p stenting to the L SFA on 6/21 and L 4th and 5th partial metatarsal amputations. Patient was discharged on Levoquin with plans to complete antibiotic course on July 7th. Patient reports she has been having low grade temperatures  at home since she was discharged. Wound care nurse noted open wound and drainage from L first digit. Patient was advised to come to the ED. Patient reports no recent trauma at the site. Daughter notes the patient's skin was dry and cracked. Patient has been taking her recommended insulin regimen of Lantus 40 at bedtime. Fingersticks have been . Patient did not have any symptoms of hypoglycemia. Denies nausea, vomiting, headaches, chest pain, SOB, palpitations, abdominal pain, diarrhea, constipation, dysuria, polyuria.   72 F pmhx DM2 c/b neuropathy, PVD s/p R AKA (2010) w/ prosthesis, CKD Stage III, CAD s/p stents, CABG and L CEA, HTN presenting from home because wound care nurse advised patient to come to the ED for concerns for L first toe infection. Patient was recently discharged from the hospital after being diagnosed with L 4th and 5th digit infection s/p stenting to the L SFA on 6/21 and L 4th and 5th partial metatarsal amputations. Patient was discharged on Levoquin with plans to complete antibiotic course on July 7th. Patient reports she has been having low grade temperatures  at home since she was discharged. Wound care nurse came for scheduled dressing changes today and noted open wound and drainage from L first digit. Patient was advised to come to the ED. Patient reports no recent trauma at the site. Daughter notes the patient's skin was dry and cracked. Patient has been taking her recommended insulin regimen of Lantus 40 at bedtime. Fingersticks have been . Patient did not have any symptoms of hypoglycemia. Denies nausea, vomiting, headaches, chest pain, SOB, palpitations, abdominal pain, diarrhea, constipation, dysuria, polyuria.     In the ED T 100.4 HR 78 /65 RR18 SpO2 98 FS 66. Patient was given clindamycin and dextrose. Patient had bedside I&D with 5 cc of pus removal in the ED.     Pt was admitted to the medicine service. Pt was continued on ertapenem and started on vancomycin IV. ID was consulted and agreed with continuing empirically on ertapenem vancomycin. She was given IV fluids due to DAMARI (likely prerenal in setting of infection and dehydration). MRI of the L Foot showed acute OM, and patient underwent transmetatarsal amputation of the left foot with NIDIA on 7/7/2017. She is non weight bearing and a CAM boot was provided to her to use during rehab. Wound cultures from the OR returned negative at 72h and pt was transitioned to oral levofloxacin 500 mg QD for 3 days to be completed on 7/14.    Following surgery, pt developed worsening of anemia on 7/8 with Hb trending down to 6.2 on 7/11, at which point 1u PRBCs was transfused. Pt Hb subsequently resolved to 7.9 on 7/13.    Pt was restarted on her home dose of Lantus and remained hyperglycemic; she was subsequently started on Humalog qmeals. On the day of discharge, pt's AM FS was 80 and her Lantus at bedtime was decreased from 40 units to 35 units. Pt will be d/maritza on Lantus 35 and Humalog 3 qmeals.  medicine attending addendum- she p/w OM requiring transmet. she was previously revascularized. attention to infection and offloading the foot were implemented with podiatry.   post op course c/b anemia- likely all due to surgery as not other focus of blood loss found. she also had mild fluid overload (HFpEF) that responded to lasix x 1, also had atelectasis.  stable upon DC.

## 2017-07-09 NOTE — DISCHARGE NOTE ADULT - ADDITIONAL INSTRUCTIONS
Wound-care: Please keep dressing and posterior splint clean, dry and intact until follow-up with Dr. Kraus  Follow-up: Please follow up with Dr. Kraus w/i 1wk of discharge, please call [phone] ([place]) or [phone] ([place]) to schedule appointment  Weight-bearing: Pt to remain non-weightbearing to the left foot  Antibiotics: Please continue antibiotics as per infectious disease Wound-care: Please apply 4x4, Kerlix, and ACE bandage.  Please apply very light compression with ACE.  Please apply CAM boot.    Follow-up: Please follow up with Dr. Kraus w/i 1wk of discharge, please call [phone] ([place]) or [phone] ([place]) to schedule appointment  Weight-bearing: Pt to remain non-weightbearing to the left foot  Antibiotics: Please continue antibiotics as per infectious disease Wound-care: Please apply 4x4, Kerlix, and ACE bandage.  Please apply very light compression with ACE.  Please apply CAM boot.    Follow-up: Please follow up with Dr. Kraus w/i 1wk of discharge, please call [phone] ([place]) or [phone] ([place]) to schedule appointment  Weight-bearing: Pt to remain non-weightbearing to the left foot  Antibiotics: Please continue antibiotics until 7/14. Wound-care: Please apply 4x4, Kerlix, and ACE bandage.  Please apply very light compression with ACE.  Please apply CAM boot.    Follow-up: Please follow up with Dr. Kraus within 1 week of discharge, please call [phone] ([place]) or [phone] ([place]) to schedule an appointment.    Weight-bearing: Pt to remain non-weight bearing on the left foot.    Antibiotics: Please continue antibiotics until 7/14.

## 2017-07-09 NOTE — DISCHARGE NOTE ADULT - MEDICATION SUMMARY - MEDICATIONS TO TAKE
I will START or STAY ON the medications listed below when I get home from the hospital:    aspirin 81 mg oral tablet  -- 1 tab(s) by mouth once a day  -- Indication: For PVD (peripheral vascular disease)    Tylenol 325 mg oral tablet  -- 2 tab(s) by mouth every 4 hours, As Needed  -- Indication: For Pain    valsartan 320 mg oral tablet  -- 1 tab(s) by mouth once a day  -- Indication: For Blood pressure    isosorbide mononitrate 30 mg oral tablet, extended release  -- 1 tab(s) by mouth once a day  -- Indication: For Coronary artery disease involving coronary bypass graft of native heart without angina pectoris    Levemir 100 units/mL subcutaneous solution  -- 35 unit(s) subcutaneous once a day (at bedtime)  -- Indication: For Diabetes    insulin lispro 100 units/mL subcutaneous solution  -- 3 unit(s) subcutaneous 3 times a day (before meals)  -- Indication: For Diabetes    atorvastatin 20 mg oral tablet  -- 1 tab(s) by mouth once a day (at bedtime)  -- Indication: For High cholesterol    clopidogrel 75 mg oral tablet  -- 1 tab(s) by mouth once a day  -- Indication: For PVD (peripheral vascular disease)    carvedilol 12.5 mg oral tablet  -- 1 tab(s) by mouth every 12 hours  -- Indication: For Coronary artery disease involving coronary bypass graft of native heart without angina pectoris    ketorolac 0.5% ophthalmic solution  -- 1 drop(s) to each affected eye 2 times a day  -- Indication: For Eye drops    Combigan 0.2%-0.5% ophthalmic solution  -- 1 drop(s) to each affected eye every 12 hours  -- Indication: For Eye drops    levoFLOXacin 500 mg oral tablet  -- 1 tab(s) by mouth once a day, last dose on 7/14  -- Indication: For Osteomyelitis I will START or STAY ON the medications listed below when I get home from the hospital:    aspirin 81 mg oral tablet  -- 1 tab(s) by mouth once a day  -- Indication: For PVD (peripheral vascular disease)    Tylenol 325 mg oral tablet  -- 2 tab(s) by mouth every 4 hours, As Needed  -- Indication: For Pain    valsartan 320 mg oral tablet  -- 1 tab(s) by mouth once a day  -- Indication: For Blood pressure    isosorbide mononitrate 30 mg oral tablet, extended release  -- 1 tab(s) by mouth once a day  -- Indication: For Coronary artery disease involving coronary bypass graft of native heart without angina pectoris    Levemir 100 units/mL subcutaneous solution  -- 35 unit(s) subcutaneous once a day (at bedtime)  -- Indication: For Diabetes    insulin lispro 100 units/mL subcutaneous solution  -- 3 unit(s) subcutaneous 3 times a day (before meals)  -- Indication: For Diabetes    atorvastatin 20 mg oral tablet  -- 1 tab(s) by mouth once a day (at bedtime)  -- Indication: For High cholesterol    clopidogrel 75 mg oral tablet  -- 1 tab(s) by mouth once a day  -- Indication: For PVD (peripheral vascular disease)    carvedilol 12.5 mg oral tablet  -- 1 tab(s) by mouth every 12 hours  -- Indication: For Coronary artery disease involving coronary bypass graft of native heart without angina pectoris    ketorolac 0.5% ophthalmic solution  -- 1 drop(s) to each affected eye 2 times a day  -- Indication: For Eye drops    Combigan 0.2%-0.5% ophthalmic solution  -- 1 drop(s) to each affected eye every 12 hours  -- Indication: For Eye drops    levoFLOXacin 500 mg oral tablet  -- 1 tab(s) by mouth once a day, last dose on 7/14  -- Indication: For Osteomyelitis    ergocalciferol 50,000 intl units oral tablet  -- 1 tab(s) by mouth every 7 days  -- Indication: For Vitamin D deficiency

## 2017-07-09 NOTE — DISCHARGE NOTE ADULT - CARE PLAN
Principal Discharge DX:	Acute osteomyelitis  Secondary Diagnosis:	Abscess of toe, left  Secondary Diagnosis:	Acute on chronic renal failure  Secondary Diagnosis:	Normocytic normochromic anemia  Secondary Diagnosis:	Type 2 diabetes mellitus with diabetic nephropathy, with long-term current use of insulin  Secondary Diagnosis:	Vitamin D deficiency Principal Discharge DX:	Acute osteomyelitis  Goal:	amputation of Left metatarsals, clearance of infection  Instructions for follow-up, activity and diet:	You were hospitalized because you developed an infection in your left big toe that spread to the bones in your foot. As a result, we performed surgery on July 7 to remove the infected bone on your foot. We put you on antibiotics to help treat the infection as well. You will continue your recovery in a rehab facility. We are giving you one more day of antibiotic medication to help clear the infection.  Secondary Diagnosis:	Acute on chronic renal failure  Goal:	resolution of kidney injury  Instructions for follow-up, activity and diet:	Your kidney function was decreased when you arrived in our hospital. Your kidney function then improved and stabilized within the hospital  Secondary Diagnosis:	Normocytic normochromic anemia  Secondary Diagnosis:	Type 2 diabetes mellitus with diabetic nephropathy, with long-term current use of insulin  Secondary Diagnosis:	Vitamin D deficiency  Secondary Diagnosis:	Coronary artery disease involving coronary bypass graft of native heart without angina pectoris Principal Discharge DX:	Acute osteomyelitis  Goal:	amputation of Left metatarsals, clearance of infection  Instructions for follow-up, activity and diet:	You were hospitalized because you developed an infection in your left big toe that spread to the bones in your foot. As a result, we performed surgery on July 7 to remove the infected bone on your foot. We put you on antibiotics to help treat the infection as well. You will continue your recovery in a rehab facility. We are giving you one more day of antibiotic medication to help clear the infection.  Secondary Diagnosis:	Acute on chronic renal failure  Goal:	resolution of kidney injury  Instructions for follow-up, activity and diet:	Your kidney function was decreased when you arrived in our hospital. Your kidney function then improved and stabilized within your hospital stay.  Secondary Diagnosis:	Normocytic normochromic anemia  Goal:	Improvement of hemoglobin level  Instructions for follow-up, activity and diet:	Your anemia worsened during following your foot surgery, and on Tuesday your hemoglobin level was 6.2, so we gave you a transfusion of 1 unit of red blood cells. Your hemoglobin has improved back to 7.9, which is a safe level to have you discharged. We recommend that you take iron pills to continue your anemia recovery.  Secondary Diagnosis:	Type 2 diabetes mellitus with diabetic nephropathy, with long-term current use of insulin  Goal:	stabilization of blood sugar levels  Instructions for follow-up, activity and diet:	your blood sugar levels were controlled with insulin at bedtime and before meals. We set your bedtime insulin Lantus level to 35 Units and your premeal Humalog levels at 4 units before each meal.  Secondary Diagnosis:	Vitamin D deficiency  Goal:	improvement of your vitamin D levels  Instructions for follow-up, activity and diet:	we noticed that your vitamin D levels were low, so we gave you 50,000 units of ergocalciferol. You should take this medication by mouth once per week.  Secondary Diagnosis:	Coronary artery disease involving coronary bypass graft of native heart without angina pectoris  Instructions for follow-up, activity and diet:	your coronary artery disease was stable. You should continue your medications as they were before. Principal Discharge DX:	Acute osteomyelitis  Goal:	Clearance of infection  Instructions for follow-up, activity and diet:	You were hospitalized because you developed an infection in your left big toe that spread to the bones in your foot. As a result, you had surgery on July 7 to remove the infected bone on your foot. We put you on antibiotics to help treat the infection as well. You will continue your recovery in a rehab facility. We are giving you one more day of antibiotic medication to help clear the infection.  Secondary Diagnosis:	Acute on chronic renal failure  Goal:	resolution of kidney injury  Instructions for follow-up, activity and diet:	Your kidney function was decreased when you arrived in our hospital. Your kidney function then improved and stabilized within your hospital stay.  Secondary Diagnosis:	Normocytic normochromic anemia  Goal:	Improvement of hemoglobin level  Instructions for follow-up, activity and diet:	Your anemia worsened during following your foot surgery, and on Tuesday your hemoglobin level was 6.2, so we gave you a transfusion of 1 unit of red blood cells. Your hemoglobin has improved back to 7.9, which is a safe level to have you discharged. We recommend that you take iron pills to continue your anemia recovery.  Secondary Diagnosis:	Type 2 diabetes mellitus with diabetic nephropathy, with long-term current use of insulin  Goal:	stabilization of blood sugar levels  Instructions for follow-up, activity and diet:	your blood sugar levels were controlled with insulin at bedtime and before meals. We set your bedtime insulin Lantus level to 35 Units and your premeal Humalog levels at 4 units before each meal.  Secondary Diagnosis:	Vitamin D deficiency  Goal:	improvement of your vitamin D levels  Instructions for follow-up, activity and diet:	we noticed that your vitamin D levels were low, so we gave you 50,000 units of ergocalciferol. You should take this medication by mouth once per week.  Secondary Diagnosis:	Coronary artery disease involving coronary bypass graft of native heart without angina pectoris  Instructions for follow-up, activity and diet:	your coronary artery disease was stable. You should continue your medications as they were before. Principal Discharge DX:	Acute osteomyelitis  Goal:	Clearance of infection  Instructions for follow-up, activity and diet:	You were hospitalized because you developed an infection in your left big toe that spread to the bones in your foot. As a result, you had surgery on July 7 to remove the infected bone in your foot. You were started on antibiotics to help treat the infection as well. You will continue your recovery in a rehab facility. You will be given one more day of antibiotics to help clear the infection.  Secondary Diagnosis:	Normocytic normochromic anemia  Goal:	Improvement of hemoglobin levels  Instructions for follow-up, activity and diet:	Your anemia worsened following your foot surgery, and on Tuesday your hemoglobin level was 6.2, so you were given a transfusion of 1 unit of red blood cells. Your hemoglobin improved back to 7.9, which is a safe level to have you discharged. We recommend that you take iron pills for iron deficiency.  Secondary Diagnosis:	Type 2 diabetes mellitus with diabetic nephropathy, with long-term current use of insulin  Goal:	Blood sugar control  Instructions for follow-up, activity and diet:	Your blood sugar levels were controlled with insulin at bedtime and before meals. You are being discharged on 35 units of Levemir at bedtime and 3 units of Humalog with meals.  Secondary Diagnosis:	Vitamin D deficiency  Instructions for follow-up, activity and diet:	Your Vitamin D levels were low and you were started on  You should take this medication by mouth once per week.  Secondary Diagnosis:	Coronary artery disease involving coronary bypass graft of native heart without angina pectoris  Instructions for follow-up, activity and diet:	your coronary artery disease was stable. You should continue your medications as they were before.  Secondary Diagnosis:	CKD (chronic kidney disease) stage 3, GFR 30-59 ml/min Principal Discharge DX:	Acute osteomyelitis  Goal:	Clearance of infection  Instructions for follow-up, activity and diet:	You were hospitalized because you developed an infection in your left big toe that spread to the bones in your foot. As a result, you had surgery on July 7 to remove the infected bone in your foot. You were started on antibiotics to help treat the infection as well. You will continue your recovery in a rehab facility. You will be given one more day of antibiotics to help clear the infection.  Secondary Diagnosis:	Normocytic normochromic anemia  Goal:	Improvement of hemoglobin levels  Instructions for follow-up, activity and diet:	Your anemia worsened following your foot surgery, and on Tuesday your hemoglobin level was 6.2, so you were given a transfusion of 1 unit of red blood cells. Your hemoglobin improved back to 7.9, which is a safe level to have you discharged. We recommend that you take iron pills for iron deficiency.  Secondary Diagnosis:	Type 2 diabetes mellitus with diabetic nephropathy, with long-term current use of insulin  Goal:	Blood sugar control  Instructions for follow-up, activity and diet:	Your blood sugar levels were controlled with insulin at bedtime and before meals. You are being discharged on 35 units of Levemir at bedtime and 3 units of Humalog with meals.  Secondary Diagnosis:	Vitamin D deficiency  Instructions for follow-up, activity and diet:	Your Vitamin D levels were low and you were started on  You should take this medication by mouth once per week.  Secondary Diagnosis:	Coronary artery disease involving coronary bypass graft of native heart without angina pectoris  Instructions for follow-up, activity and diet:	Your coronary artery disease was stable. You should continue your home medications.  Secondary Diagnosis:	CKD (chronic kidney disease) stage 3, GFR 30-59 ml/min  Instructions for follow-up, activity and diet:	Your kidney function remained stable while you were in the hospital.

## 2017-07-10 DIAGNOSIS — M86.10 OTHER ACUTE OSTEOMYELITIS, UNSPECIFIED SITE: ICD-10-CM

## 2017-07-10 DIAGNOSIS — D50.0 IRON DEFICIENCY ANEMIA SECONDARY TO BLOOD LOSS (CHRONIC): ICD-10-CM

## 2017-07-10 LAB
BUN SERPL-MCNC: 15 MG/DL — SIGNIFICANT CHANGE UP (ref 7–23)
CALCIUM SERPL-MCNC: 8.5 MG/DL — SIGNIFICANT CHANGE UP (ref 8.4–10.5)
CHLORIDE SERPL-SCNC: 104 MMOL/L — SIGNIFICANT CHANGE UP (ref 98–107)
CO2 SERPL-SCNC: 24 MMOL/L — SIGNIFICANT CHANGE UP (ref 22–31)
CREAT SERPL-MCNC: 1.11 MG/DL — SIGNIFICANT CHANGE UP (ref 0.5–1.3)
GLUCOSE SERPL-MCNC: 125 MG/DL — HIGH (ref 70–99)
HCT VFR BLD CALC: 22.4 % — LOW (ref 34.5–45)
HCT VFR BLD CALC: 23.1 % — LOW (ref 34.5–45)
HGB BLD-MCNC: 7 G/DL — CRITICAL LOW (ref 11.5–15.5)
HGB BLD-MCNC: 7.1 G/DL — LOW (ref 11.5–15.5)
MAGNESIUM SERPL-MCNC: 1.7 MG/DL — SIGNIFICANT CHANGE UP (ref 1.6–2.6)
MCHC RBC-ENTMCNC: 27.8 PG — SIGNIFICANT CHANGE UP (ref 27–34)
MCHC RBC-ENTMCNC: 28.2 PG — SIGNIFICANT CHANGE UP (ref 27–34)
MCHC RBC-ENTMCNC: 30.7 % — LOW (ref 32–36)
MCHC RBC-ENTMCNC: 31.3 % — LOW (ref 32–36)
MCV RBC AUTO: 90.3 FL — SIGNIFICANT CHANGE UP (ref 80–100)
MCV RBC AUTO: 90.6 FL — SIGNIFICANT CHANGE UP (ref 80–100)
NRBC # FLD: 0 — SIGNIFICANT CHANGE UP
NRBC # FLD: 0 — SIGNIFICANT CHANGE UP
PHOSPHATE SERPL-MCNC: 3.9 MG/DL — SIGNIFICANT CHANGE UP (ref 2.5–4.5)
PLATELET # BLD AUTO: 311 K/UL — SIGNIFICANT CHANGE UP (ref 150–400)
PLATELET # BLD AUTO: 313 K/UL — SIGNIFICANT CHANGE UP (ref 150–400)
PMV BLD: 11.3 FL — SIGNIFICANT CHANGE UP (ref 7–13)
PMV BLD: 11.3 FL — SIGNIFICANT CHANGE UP (ref 7–13)
POTASSIUM SERPL-MCNC: 3.9 MMOL/L — SIGNIFICANT CHANGE UP (ref 3.5–5.3)
POTASSIUM SERPL-SCNC: 3.9 MMOL/L — SIGNIFICANT CHANGE UP (ref 3.5–5.3)
RBC # BLD: 2.48 M/UL — LOW (ref 3.8–5.2)
RBC # BLD: 2.55 M/UL — LOW (ref 3.8–5.2)
RBC # FLD: 15 % — HIGH (ref 10.3–14.5)
RBC # FLD: 15.1 % — HIGH (ref 10.3–14.5)
SODIUM SERPL-SCNC: 140 MMOL/L — SIGNIFICANT CHANGE UP (ref 135–145)
SPECIMEN SOURCE: SIGNIFICANT CHANGE UP
WBC # BLD: 10.99 K/UL — HIGH (ref 3.8–10.5)
WBC # BLD: 9.37 K/UL — SIGNIFICANT CHANGE UP (ref 3.8–10.5)
WBC # FLD AUTO: 10.99 K/UL — HIGH (ref 3.8–10.5)
WBC # FLD AUTO: 9.37 K/UL — SIGNIFICANT CHANGE UP (ref 3.8–10.5)

## 2017-07-10 PROCEDURE — 99233 SBSQ HOSP IP/OBS HIGH 50: CPT | Mod: GC

## 2017-07-10 RX ORDER — INSULIN LISPRO 100/ML
4 VIAL (ML) SUBCUTANEOUS
Qty: 0 | Refills: 0 | Status: DISCONTINUED | OUTPATIENT
Start: 2017-07-10 | End: 2017-07-13

## 2017-07-10 RX ADMIN — INSULIN GLARGINE 40 UNIT(S): 100 INJECTION, SOLUTION SUBCUTANEOUS at 22:10

## 2017-07-10 RX ADMIN — ZINC SULFATE TAB 220 MG (50 MG ZINC EQUIVALENT) 220 MILLIGRAM(S): 220 (50 ZN) TAB at 13:23

## 2017-07-10 RX ADMIN — CARVEDILOL PHOSPHATE 12.5 MILLIGRAM(S): 80 CAPSULE, EXTENDED RELEASE ORAL at 05:58

## 2017-07-10 RX ADMIN — Medication 2: at 18:03

## 2017-07-10 RX ADMIN — Medication 2: at 13:23

## 2017-07-10 RX ADMIN — HEPARIN SODIUM 5000 UNIT(S): 5000 INJECTION INTRAVENOUS; SUBCUTANEOUS at 21:31

## 2017-07-10 RX ADMIN — HEPARIN SODIUM 5000 UNIT(S): 5000 INJECTION INTRAVENOUS; SUBCUTANEOUS at 15:16

## 2017-07-10 RX ADMIN — ATORVASTATIN CALCIUM 20 MILLIGRAM(S): 80 TABLET, FILM COATED ORAL at 21:31

## 2017-07-10 RX ADMIN — BRIMONIDINE TARTRATE 1 DROP(S): 2 SOLUTION/ DROPS OPHTHALMIC at 05:59

## 2017-07-10 RX ADMIN — Medication 1 DROP(S): at 18:06

## 2017-07-10 RX ADMIN — VALSARTAN 320 MILLIGRAM(S): 80 TABLET ORAL at 05:58

## 2017-07-10 RX ADMIN — Medication 2 UNIT(S): at 13:24

## 2017-07-10 RX ADMIN — BRIMONIDINE TARTRATE 1 DROP(S): 2 SOLUTION/ DROPS OPHTHALMIC at 18:06

## 2017-07-10 RX ADMIN — HEPARIN SODIUM 5000 UNIT(S): 5000 INJECTION INTRAVENOUS; SUBCUTANEOUS at 05:58

## 2017-07-10 RX ADMIN — CLOPIDOGREL BISULFATE 75 MILLIGRAM(S): 75 TABLET, FILM COATED ORAL at 13:23

## 2017-07-10 RX ADMIN — Medication 1 DROP(S): at 18:05

## 2017-07-10 RX ADMIN — Medication 1 TABLET(S): at 13:23

## 2017-07-10 RX ADMIN — Medication 2 UNIT(S): at 09:03

## 2017-07-10 RX ADMIN — Medication 81 MILLIGRAM(S): at 13:23

## 2017-07-10 RX ADMIN — Medication 2 UNIT(S): at 18:05

## 2017-07-10 RX ADMIN — ISOSORBIDE MONONITRATE 30 MILLIGRAM(S): 60 TABLET, EXTENDED RELEASE ORAL at 13:23

## 2017-07-10 RX ADMIN — Medication 1 DROP(S): at 05:59

## 2017-07-10 RX ADMIN — ERTAPENEM SODIUM 120 MILLIGRAM(S): 1 INJECTION, POWDER, LYOPHILIZED, FOR SOLUTION INTRAMUSCULAR; INTRAVENOUS at 23:35

## 2017-07-10 RX ADMIN — Medication 150 MILLIGRAM(S): at 19:25

## 2017-07-10 RX ADMIN — Medication 150 MILLIGRAM(S): at 05:58

## 2017-07-10 RX ADMIN — CARVEDILOL PHOSPHATE 12.5 MILLIGRAM(S): 80 CAPSULE, EXTENDED RELEASE ORAL at 18:03

## 2017-07-10 RX ADMIN — ERTAPENEM SODIUM 120 MILLIGRAM(S): 1 INJECTION, POWDER, LYOPHILIZED, FOR SOLUTION INTRAMUSCULAR; INTRAVENOUS at 00:53

## 2017-07-10 NOTE — PROGRESS NOTE ADULT - PROBLEM SELECTOR PLAN 2
Creatinine currently improved from pt's baseline and GFR >70  - Monitor BMP daily  - Avoid nephrotoxic drugs  - Unclear if pt has CKD given improvement in creatinine to WNL over the past 5 days, will continue to monitor; does not need med to be renally dosed Likely 2/2 recent surgery  -Hgb 7.0 in AM, repeat 7.1, will need to discuss with pt if she is agreeable to blood transfusion  -Monitor CBC daily, no overt signs of bleeding  -Monitor vitals

## 2017-07-10 NOTE — PROGRESS NOTE ADULT - ASSESSMENT
73 y/o F s/p L foot TMA (7/7/17)  - Pt seen and examined  - Dressing changed  - Keep non weight bearing to L foot  - Cont w/ posterior splint  - Keep dressing clean, dry and intact  - Awaiting OR cultures for d/c abx   - Cont w/ ID recs for IV abx

## 2017-07-10 NOTE — PROGRESS NOTE ADULT - SUBJECTIVE AND OBJECTIVE BOX
Patient is a 72y old  Female who presents with a chief complaint of Toe infection (09 Jul 2017 09:44)       INTERVAL HPI/OVERNIGHT EVENTS: Patient is POD #3. No acute events overnight. Patient afebrile. Denies N/V/SOB or severe pain. Only has mild itching on left leg proximal to splint.     MEDICATIONS  (STANDING):  heparin  Injectable 5000 Unit(s) SubCutaneous every 8 hours  insulin lispro (HumaLOG) corrective regimen sliding scale   SubCutaneous three times a day before meals  insulin lispro (HumaLOG) corrective regimen sliding scale   SubCutaneous at bedtime  dextrose 5%. 1000 milliLiter(s) (50 mL/Hr) IV Continuous <Continuous>  dextrose 50% Injectable 12.5 Gram(s) IV Push once  dextrose 50% Injectable 25 Gram(s) IV Push once  dextrose 50% Injectable 25 Gram(s) IV Push once  aspirin  chewable 81 milliGRAM(s) Oral daily  atorvastatin 20 milliGRAM(s) Oral at bedtime  clopidogrel Tablet 75 milliGRAM(s) Oral daily  ketorolac 0.5% Ophthalmic Solution 1 Drop(s) Both EYES two times a day  brimonidine 0.2% Ophthalmic Solution 1 Drop(s) Both EYES two times a day  timolol 0.5% Solution 1 Drop(s) Both EYES two times a day  ertapenem  IVPB 1000 milliGRAM(s) IV Intermittent every 24 hours  zinc sulfate 220 milliGRAM(s) Oral daily  Nephro-jennifer 1 Tablet(s) Oral daily  carvedilol 12.5 milliGRAM(s) Oral every 12 hours  isosorbide   mononitrate ER Tablet (IMDUR) 30 milliGRAM(s) Oral daily  valsartan 320 milliGRAM(s) Oral daily  ergocalciferol 92133 Unit(s) Oral <User Schedule>  vancomycin  IVPB 1000 milliGRAM(s) IV Intermittent every 12 hours  insulin glargine Injectable (LANTUS) 40 Unit(s) SubCutaneous at bedtime    MEDICATIONS  (PRN):  acetaminophen   Tablet 650 milliGRAM(s) Oral every 6 hours PRN For Temp greater than 38 C (100.4 F)  dextrose Gel 1 Dose(s) Oral once PRN Blood Glucose LESS THAN 70 milliGRAM(s)/deciliter  glucagon  Injectable 1 milliGRAM(s) IntraMuscular once PRN Glucose LESS THAN 70 milligrams/deciliter  acetaminophen   Tablet. 650 milliGRAM(s) Oral every 6 hours PRN Mild Pain (1 - 3)  oxyCODONE    5 mG/acetaminophen 325 mG 2 Tablet(s) Oral every 6 hours PRN Moderate Pain (4 - 6)  morphine  - Injectable 2 milliGRAM(s) IV Push every 6 hours PRN Severe Pain (7 - 10)      Allergies    sulfa drugs (Hives)  sulfa drugs (Rash)  Sulfac 10% (Hives)    Intolerances        REVIEW OF SYSTEMS:  CONSTITUTIONAL: No fever, no fatigue, no headaches  RESPIRATORY: No shortness of breath, improving dry cough  CARDIOVASCULAR: No chest pain or dizziness, no palpitations  GASTROINTESTINAL: no abdominal pain. No nausea, vomiting, or diarrhea.   NEUROLOGICAL: numbness at LLE at amputation site.   SKIN: Itching proximal to LLE splint site.       Vital Signs Last 24 Hrs  T(C): 37.1 (10 Jul 2017 06:15), Max: 37.4 (09 Jul 2017 21:07)  T(F): 98.8 (10 Jul 2017 06:15), Max: 99.4 (09 Jul 2017 21:07)  HR: 74 (10 Jul 2017 06:15) (69 - 80)  BP: 137/64 (10 Jul 2017 06:15) (133/61 - 149/72)  BP(mean): --  RR: 18 (10 Jul 2017 06:15) (18 - 18)  SpO2: 96% (10 Jul 2017 06:15) (95% - 96%)    PHYSICAL EXAM:  GENERAL: NAD  HEAD:  Atraumatic, Normocephalic  EYES: EOMI, conjunctiva and sclera clear  ENMT:  Moist mucous membranes  NERVOUS SYSTEM: A&O x 3  PSYCHIATRIC: Appropriate affect and mood  CHEST/LUNG: Clear to auscultation bilaterally  HEART: Regular rate and rhythm  ABDOMEN: Soft, Nontender, Nondistended; Bowel sounds present  EXTREMITIES:  2+ Peripheral Pulses, RLE below the knee amputation, LLE s/p TMA POD #2, wrapped in dressing      LABS:                                   7.0    9.37  )-----------( 311      ( 10 Jul 2017 06:45 )             22.4       07-10    140  |  104  |  15  ----------------------------<  125<H>  3.9   |  24  |  1.11    Ca    8.5      10 Jul 2017 06:45  Phos  3.9     07-10  Mg     1.7     07-10            CAPILLARY BLOOD GLUCOSE  144 (09 Jul 2017 22:09)  195 (09 Jul 2017 16:34)  277 (09 Jul 2017 12:29)  213 (09 Jul 2017 08:44)              BLOOD CULTURE- not growing organisms (culture from 7-4)  WOUND CULTURE - not growing organisms after 4 days  SURGICAL CULTURE - not growing organisms after 1 day    RADIOLOGY & ADDITIONAL TESTS:    Imaging Personally Reviewed:  [X ] YES     Consultant(s) Notes Reviewed:      Care Discussed with Consultants/Other Providers:

## 2017-07-10 NOTE — PROGRESS NOTE ADULT - PROBLEM SELECTOR PLAN 6
Ha1c 7.9 06/17  - FS elevated > 200 yesterday, added 2U pre-meal humalog to control blood glucose.   - today .  - Monitor FS and will readjust as needed  - ISS and FS with meals and at bedtime

## 2017-07-10 NOTE — PROGRESS NOTE ADULT - PROBLEM SELECTOR PROBLEM 6
Normocytic normochromic anemia Type 2 diabetes mellitus with diabetic nephropathy, with long-term current use of insulin

## 2017-07-10 NOTE — PROGRESS NOTE ADULT - PROBLEM SELECTOR PROBLEM 4
Coronary artery disease involving coronary bypass graft of native heart without angina pectoris PVD (peripheral vascular disease)

## 2017-07-10 NOTE — PROGRESS NOTE ADULT - PROBLEM SELECTOR PLAN 1
POD #3 for L TMA  - C/w vancomycin and ertapenem for broad spectrum coverage as per ID recs, d/c on Levaquin for 3 days, will f/u OR surg path and wound cultures  - MRI of L foot showing changes consistent with OM  - F/u ID recommendations  - F/u podiatry recs  - Wound cultures with NGTD at 24h, f/u blood cultures NGTD at 48h POD #1 for L TMA  - C/w vancomycin and ertapenem for broad spectrum coverage as per ID recs, will f/u OR surg path and wound cultures  - MRI of L foot showing changes consistent with OM  - F/u ID recommendations  - F/u podiatry recs  - Wound cultures with NGTD at 72h, f/u blood cultures NGTD at 48h.

## 2017-07-10 NOTE — PROGRESS NOTE ADULT - PROBLEM SELECTOR PLAN 6
Stable, at baseline  - Likely 2/2 anemia of chronic disease  - Trend CBC daily Ha1c 7.9 06/17  - C/w Lantus 40u qHS and Humalog 2u qmeals  - ISS and FS with meals and at bedtime

## 2017-07-10 NOTE — PROGRESS NOTE ADULT - PROBLEM SELECTOR PLAN 1
POD #3 for L TMA  - Patient stable for discharge per podiatry reccs.  - ID consulted to discuss what oral antibiotics patient can go home with. Can continue on levofloxacin 500 mg QD x 3 days when discharged.   Until then, will continue IV vancomycin and ertapenem for broad spectrum coverage.  - MRI of L foot showing changes consistent with OM  - Wound cultures and blood cultures with no growth to date.

## 2017-07-10 NOTE — PROGRESS NOTE ADULT - SUBJECTIVE AND OBJECTIVE BOX
Patient is a 72y old  Female who presents with a chief complaint of Toe infection (09 Jul 2017 09:44)      INTERVAL HPI/OVERNIGHT EVENTS:    MEDICATIONS (STANDING):  heparin  Injectable 5000 Unit(s) SubCutaneous every 8 hours  insulin lispro (HumaLOG) corrective regimen sliding scale   SubCutaneous three times a day before meals  insulin lispro (HumaLOG) corrective regimen sliding scale   SubCutaneous at bedtime  dextrose 5%. 1000 milliLiter(s) IV Continuous <Continuous>  dextrose 50% Injectable 12.5 Gram(s) IV Push once  dextrose 50% Injectable 25 Gram(s) IV Push once  dextrose 50% Injectable 25 Gram(s) IV Push once  aspirin  chewable 81 milliGRAM(s) Oral daily  atorvastatin 20 milliGRAM(s) Oral at bedtime  clopidogrel Tablet 75 milliGRAM(s) Oral daily  ketorolac 0.5% Ophthalmic Solution 1 Drop(s) Both EYES two times a day  brimonidine 0.2% Ophthalmic Solution 1 Drop(s) Both EYES two times a day  timolol 0.5% Solution 1 Drop(s) Both EYES two times a day  ertapenem  IVPB 1000 milliGRAM(s) IV Intermittent every 24 hours  zinc sulfate 220 milliGRAM(s) Oral daily  Nephro-jennifer 1 Tablet(s) Oral daily  carvedilol 12.5 milliGRAM(s) Oral every 12 hours  isosorbide   mononitrate ER Tablet (IMDUR) 30 milliGRAM(s) Oral daily  valsartan 320 milliGRAM(s) Oral daily  ergocalciferol 08711 Unit(s) Oral <User Schedule>  insulin glargine Injectable (LANTUS) 40 Unit(s) SubCutaneous at bedtime  insulin lispro Injectable (HumaLOG) 2 Unit(s) SubCutaneous three times a day before meals  vancomycin  IVPB 750 milliGRAM(s) IV Intermittent every 12 hours    MEDICATIONS  (PRN):  acetaminophen   Tablet 650 milliGRAM(s) Oral every 6 hours PRN  dextrose Gel 1 Dose(s) Oral once PRN  glucagon  Injectable 1 milliGRAM(s) IntraMuscular once PRN  acetaminophen   Tablet. 650 milliGRAM(s) Oral every 6 hours PRN  oxyCODONE    5 mG/acetaminophen 325 mG 2 Tablet(s) Oral every 6 hours PRN  morphine  - Injectable 2 milliGRAM(s) IV Push every 6 hours PRN      REVIEW OF SYSTEMS:  CONSTITUTIONAL: No fever, weight loss, or fatigue  EYES: No eye pain, visual disturbances, or discharge  ENMT:  No difficulty hearing, tinnitus, vertigo; No sinus or throat pain  NECK: No pain or stiffness  BREASTS: No pain, masses, or nipple discharge  RESPIRATORY: No cough, wheezing, chills or hemoptysis; No shortness of breath  CARDIOVASCULAR: No chest pain, palpitations, dizziness, or leg swelling  GASTROINTESTINAL: No abdominal or epigastric pain. No nausea, vomiting, or hematemesis; No diarrhea or constipation. No melena or hematochezia.  GENITOURINARY: No dysuria, frequency, hematuria, or incontinence  NEUROLOGICAL: No headaches, memory loss, loss of strength, numbness, or tremors  SKIN: No itching, burning, rashes, or lesions   MUSCULOSKELETAL: No joint pain or swelling; No muscle, back, or extremity pain    T(F): 98.8 (07-10-17 @ 06:15), Max: 99.4 (07-09-17 @ 21:07)  HR: 74 (07-10-17 @ 06:15) (69 - 80)  BP: 137/64 (07-10-17 @ 06:15) (133/61 - 149/72)  RR: 18 (07-10-17 @ 06:15) (18 - 18)  SpO2: 96% (07-10-17 @ 06:15) (95% - 96%)  Wt(kg): --  CAPILLARY BLOOD GLUCOSE  119 (10 Jul 2017 08:31)  144 (09 Jul 2017 22:09)  195 (09 Jul 2017 16:34)  277 (09 Jul 2017 12:29)        I&O's Summary    09 Jul 2017 07:01  -  10 Jul 2017 07:00  --------------------------------------------------------  IN: 490 mL / OUT: 0 mL / NET: 490 mL        PHYSICAL EXAM:  GENERAL: NAD, well-groomed, well-developed  HEAD:  Atraumatic, Normocephalic  EYES: EOMI, PERRLA, conjunctiva and sclera clear  ENMT: No tonsillar erythema, exudates, or enlargement; Moist mucous membranes, Good dentition, No lesions  NECK: Supple, No JVD, Normal thyroid  NERVOUS SYSTEM:  Alert & Oriented X3, Good concentration; Motor Strength 5/5 B/L upper and lower extremities; DTRs 2+ intact and symmetric  CHEST/LUNG: Clear to percussion bilaterally; No rales, rhonchi, wheezing, or rubs  HEART: Regular rate and rhythm; No murmurs, rubs, or gallops  ABDOMEN: Soft, Nontender, Nondistended; Bowel sounds present  EXTREMITIES:  2+ Peripheral Pulses, No clubbing, cyanosis, or edema  LYMPH: No lymphadenopathy noted  SKIN: No rashes or lesions    LABS:                        7.0    9.37  )-----------( 311      ( 10 Jul 2017 06:45 )             22.4     07-10    140  |  104  |  15  ----------------------------<  125<H>  3.9   |  24  |  1.11    Ca    8.5      10 Jul 2017 06:45  Phos  3.9     07-10  Mg     1.7     07-10              RADIOLOGY & ADDITIONAL TESTS:    XRay:    CTScan:    MRI:     Imaging Personally Reviewed:  [ ] YES  [ ] NO    Consultant(s) Notes Reviewed:  [ ] YES  [ ] NO    Care Discussed with Consultants/Other Providers [ ] YES  [ ] NO Patient is a 72y old  Female who presents with a chief complaint of Toe infection (09 Jul 2017 09:44)      INTERVAL HPI/OVERNIGHT EVENTS: No overnight events, pt feeling well. Afebrile overnight. No pain in left foot.    MEDICATIONS (STANDING):  heparin  Injectable 5000 Unit(s) SubCutaneous every 8 hours  insulin lispro (HumaLOG) corrective regimen sliding scale   SubCutaneous three times a day before meals  insulin lispro (HumaLOG) corrective regimen sliding scale   SubCutaneous at bedtime  dextrose 5%. 1000 milliLiter(s) IV Continuous <Continuous>  dextrose 50% Injectable 12.5 Gram(s) IV Push once  dextrose 50% Injectable 25 Gram(s) IV Push once  dextrose 50% Injectable 25 Gram(s) IV Push once  aspirin  chewable 81 milliGRAM(s) Oral daily  atorvastatin 20 milliGRAM(s) Oral at bedtime  clopidogrel Tablet 75 milliGRAM(s) Oral daily  ketorolac 0.5% Ophthalmic Solution 1 Drop(s) Both EYES two times a day  brimonidine 0.2% Ophthalmic Solution 1 Drop(s) Both EYES two times a day  timolol 0.5% Solution 1 Drop(s) Both EYES two times a day  ertapenem  IVPB 1000 milliGRAM(s) IV Intermittent every 24 hours  zinc sulfate 220 milliGRAM(s) Oral daily  Nephro-jennifer 1 Tablet(s) Oral daily  carvedilol 12.5 milliGRAM(s) Oral every 12 hours  isosorbide   mononitrate ER Tablet (IMDUR) 30 milliGRAM(s) Oral daily  valsartan 320 milliGRAM(s) Oral daily  ergocalciferol 86651 Unit(s) Oral <User Schedule>  insulin glargine Injectable (LANTUS) 40 Unit(s) SubCutaneous at bedtime  insulin lispro Injectable (HumaLOG) 2 Unit(s) SubCutaneous three times a day before meals  vancomycin  IVPB 750 milliGRAM(s) IV Intermittent every 12 hours    MEDICATIONS  (PRN):  acetaminophen   Tablet 650 milliGRAM(s) Oral every 6 hours PRN  dextrose Gel 1 Dose(s) Oral once PRN  glucagon  Injectable 1 milliGRAM(s) IntraMuscular once PRN  acetaminophen   Tablet. 650 milliGRAM(s) Oral every 6 hours PRN  oxyCODONE    5 mG/acetaminophen 325 mG 2 Tablet(s) Oral every 6 hours PRN  morphine  - Injectable 2 milliGRAM(s) IV Push every 6 hours PRN      REVIEW OF SYSTEMS:  CONSTITUTIONAL: No fever  RESPIRATORY: No cough, No shortness of breath  CARDIOVASCULAR: No chest pain  GASTROINTESTINAL: No abdominal or epigastric pain. No nausea, vomiting  SKIN: Mild itching    T(F): 98.8 (07-10-17 @ 06:15), Max: 99.4 (07-09-17 @ 21:07)  HR: 74 (07-10-17 @ 06:15) (69 - 80)  BP: 137/64 (07-10-17 @ 06:15) (133/61 - 149/72)  RR: 18 (07-10-17 @ 06:15) (18 - 18)  SpO2: 96% (07-10-17 @ 06:15) (95% - 96%)  Wt(kg): --    CAPILLARY BLOOD GLUCOSE  119 (10 Jul 2017 08:31)  144 (09 Jul 2017 22:09)  195 (09 Jul 2017 16:34)  277 (09 Jul 2017 12:29)        I&O's Summary    09 Jul 2017 07:01  -  10 Jul 2017 07:00  --------------------------------------------------------  IN: 490 mL / OUT: 0 mL / NET: 490 mL    PHYSICAL EXAM:  GENERAL: NAD, well-groomed, well-developed  HEAD:  Atraumatic, Normocephalic  EYES: EOMI, PERRLA, conjunctiva and sclera clear  ENMT: Moist mucous membranes  NECK: Supple, No JVD  NERVOUS SYSTEM:  Alert & Oriented X3, Good concentration; Motor Strength 5/5 B/L upper and LLE  CHEST/LUNG: Clear to percussion bilaterally; No rales, rhonchi, wheezing, or rubs  HEART: Regular rate and rhythm; systolic murmur, rubs, or gallops  ABDOMEN: Soft, Nontender, Nondistended; Bowel sounds present  EXTREMITIES: R BKA, L foot in boot and wrapped in Kerlix  LYMPH: No lymphadenopathy noted  SKIN: L foot in boot and wrapped in Kerlix, dry/intact    LABS:                        7.0    9.37  )-----------( 311      ( 10 Jul 2017 06:45 )             22.4     07-10    140  |  104  |  15  ----------------------------<  125<H>  3.9   |  24  |  1.11    Ca    8.5      10 Jul 2017 06:45  Phos  3.9     07-10  Mg     1.7     07-10              RADIOLOGY & ADDITIONAL TESTS:    XRay:    CTScan:    MRI:     Imaging Personally Reviewed:  [ ] YES  [ ] NO    Consultant(s) Notes Reviewed:  [X] YES  [ ] NO    Care Discussed with Consultants/Other Providers [ ] YES  [ ] NO

## 2017-07-10 NOTE — PROGRESS NOTE ADULT - PROBLEM SELECTOR PLAN 3
S/p stent to L SFA on last admission in June 2017  -F/u vascular surgery recs- no further interventions required at this time  -Repeat DUC/PVRs showing improvement from last procedure  -C/w ASA and Plavix Creatinine currently improved from pt's baseline  - Monitor BMP daily  - Avoid nephrotoxic drugs  - Unclear if pt has CKD given improvement in creatinine to WNL over the past 5 days, will continue to monitor; does not need med to be renally dosed

## 2017-07-10 NOTE — PROGRESS NOTE ADULT - PROBLEM SELECTOR PLAN 3
Hb 7.0 from AM labs  - Likely 2/2 anemia of chronic disease  - repeat CBC at noon for consideration of transfusion with blood typing  - Trend CBC daily

## 2017-07-10 NOTE — PROGRESS NOTE ADULT - PROBLEM SELECTOR PLAN 5
Ha1c 7.9 06/17  - Lantus increased to 40u qHS, gave 15u 2 nights ago due to planned surgery  - Monitor FS and will readjust Lantus dose as needed, pt currently not on any pre-meal Humalog  - ISS and FS with meals and at bedtime Stable, no current CP  -C/w ASA, Plavix, carvedilol, isosorbide, atorvastatin

## 2017-07-10 NOTE — PROGRESS NOTE ADULT - SUBJECTIVE AND OBJECTIVE BOX
Patient is a 72y old  Female who presents with a chief complaint of Toe infection (09 Jul 2017 09:44)    INTERVAL HPI/OVERNIGHT EVENTS:  Patient seen and evaluated at bedside.  Pt is resting comfortable in NAD. Denies N/V/F/C.  Pain rated at 0/10    Allergies    sulfa drugs (Hives)  sulfa drugs (Rash)  Sulfac 10% (Hives)    Intolerances    Vital Signs Last 24 Hrs  T(C): 37.1 (10 Jul 2017 06:15), Max: 37.4 (09 Jul 2017 21:07)  T(F): 98.8 (10 Jul 2017 06:15), Max: 99.4 (09 Jul 2017 21:07)  HR: 74 (10 Jul 2017 06:15) (69 - 80)  BP: 137/64 (10 Jul 2017 06:15) (133/61 - 149/72)  BP(mean): --  RR: 18 (10 Jul 2017 06:15) (18 - 18)  SpO2: 96% (10 Jul 2017 06:15) (95% - 96%)    LABS:                        7.0    9.37  )-----------( 311      ( 10 Jul 2017 06:45 )             22.4     07-10    140  |  104  |  15  ----------------------------<  125<H>  3.9   |  24  |  1.11    Ca    8.5      10 Jul 2017 06:45  Phos  3.9     07-10  Mg     1.7     07-10    CAPILLARY BLOOD GLUCOSE  119 (10 Jul 2017 08:31)  144 (09 Jul 2017 22:09)  195 (09 Jul 2017 16:34)  277 (09 Jul 2017 12:29)  213 (09 Jul 2017 08:44)    Lower Extremity Physical Exam:  Vasc: Pedal pulses weakly palpable, becca.   Neuro: Epicritic sensation diminished to ankles, becca  Msk: R AKA, L TMA  Derm: L foot s/p TMA, closed, sutures intact, incision well coapted, no dehiscence, no drainage, no signs of necrosis, no hematoma.     RADIOLOGY & ADDITIONAL TESTS:  < from: Xray Foot AP + Lateral + Oblique, Left (07.07.17 @ 12:05) >  EXAM:  RAD FOOT MIN 3 VIEWS LT        PROCEDURE DATE:  Jul 7 2017         INTERPRETATION:  CLINICAL INDICATION: baseline postoperative evaluation   status post left foot TMA    EXAM:  Portable frontal, oblique, and lateral left foot from 7/7/2017 1156.   Compared to preoperative left foot radiographs from 7/4/2017.    IMPRESSION:  Status post TMA with sharp and smooth osseous stump margins and with   multiple radiodense antibiotic cement beads predominantly over the medial   osseous stumps.     Postsurgical changes in the overlying soft tissues.    No proximally tracking gas collections beyond the amputation site and no   focal areas of osteomyelitis.    Redemonstrated vascular calcifications in distal calf subcutaneous   dystrophic calcifications.    Remainder of the foot is unremarkable.    Splint material supports the plantar and posterior surfaces of the   extremity.    < end of copied text >      Culture - Surg Site Aerob/Anaer w/Gm St (07.07.17 @ 13:10)    Culture - Surgical Site:   NO GROWTH - PRELIMINARY RESULTS  NO ORGANISMS ISOLATED AT 24 HOURS    Gram Stain Wound:   NOS^No Organisms Seen  WBC^White Blood Cells  QNTY CELLS IN GRAM STAIN: NO CELLS SEEN    Specimen Source: FOOT

## 2017-07-10 NOTE — PROGRESS NOTE ADULT - ASSESSMENT
71yo F with PMH of Type 2 DM (HgbA1c 7.8%) c/b neuropathy, PVD s/p R BKA (2010) and stent to L SFA, CKD Stage III, CAD s/p stents, CABG and L CEA, and HTN presenting with L hallux infection s/p bedside I & D. MRI positive for OM of the L hallux, s/p OR on 7/7 for L transmetatarsal amputation.

## 2017-07-10 NOTE — PROGRESS NOTE ADULT - PROBLEM SELECTOR PLAN 4
Stable, no current CP  -C/w ASA, Plavix, carvedilol, isosorbide, atorvastatin S/p stent to L SFA on last admission in June 2017  -F/u vascular surgery recs- no further interventions required at this time  -Repeat DUC/PVRs showing improvement from last procedure  -C/w ASA and Plavix

## 2017-07-11 DIAGNOSIS — A49.8 OTHER BACTERIAL INFECTIONS OF UNSPECIFIED SITE: ICD-10-CM

## 2017-07-11 LAB
BASOPHILS # BLD AUTO: 0 K/UL — SIGNIFICANT CHANGE UP (ref 0–0.2)
BASOPHILS NFR BLD AUTO: 0 % — SIGNIFICANT CHANGE UP (ref 0–2)
BLD GP AB SCN SERPL QL: NEGATIVE — SIGNIFICANT CHANGE UP
BUN SERPL-MCNC: 19 MG/DL — SIGNIFICANT CHANGE UP (ref 7–23)
CALCIUM SERPL-MCNC: 8.3 MG/DL — LOW (ref 8.4–10.5)
CHLORIDE SERPL-SCNC: 102 MMOL/L — SIGNIFICANT CHANGE UP (ref 98–107)
CO2 SERPL-SCNC: 24 MMOL/L — SIGNIFICANT CHANGE UP (ref 22–31)
CREAT SERPL-MCNC: 1.25 MG/DL — SIGNIFICANT CHANGE UP (ref 0.5–1.3)
EOSINOPHIL # BLD AUTO: 0.79 K/UL — HIGH (ref 0–0.5)
EOSINOPHIL NFR BLD AUTO: 9.8 % — HIGH (ref 0–6)
GLUCOSE SERPL-MCNC: 184 MG/DL — HIGH (ref 70–99)
HCT VFR BLD CALC: 19.6 % — CRITICAL LOW (ref 34.5–45)
HCT VFR BLD CALC: 21.1 % — LOW (ref 34.5–45)
HCT VFR BLD CALC: 24.8 % — LOW (ref 34.5–45)
HGB BLD-MCNC: 6.2 G/DL — CRITICAL LOW (ref 11.5–15.5)
HGB BLD-MCNC: 6.6 G/DL — CRITICAL LOW (ref 11.5–15.5)
HGB BLD-MCNC: 8 G/DL — LOW (ref 11.5–15.5)
IMM GRANULOCYTES # BLD AUTO: 0.02 # — SIGNIFICANT CHANGE UP
IMM GRANULOCYTES NFR BLD AUTO: 0.2 % — SIGNIFICANT CHANGE UP (ref 0–1.5)
LYMPHOCYTES # BLD AUTO: 1.8 K/UL — SIGNIFICANT CHANGE UP (ref 1–3.3)
LYMPHOCYTES # BLD AUTO: 22.4 % — SIGNIFICANT CHANGE UP (ref 13–44)
MAGNESIUM SERPL-MCNC: 1.7 MG/DL — SIGNIFICANT CHANGE UP (ref 1.6–2.6)
MCHC RBC-ENTMCNC: 28.1 PG — SIGNIFICANT CHANGE UP (ref 27–34)
MCHC RBC-ENTMCNC: 28.3 PG — SIGNIFICANT CHANGE UP (ref 27–34)
MCHC RBC-ENTMCNC: 28.4 PG — SIGNIFICANT CHANGE UP (ref 27–34)
MCHC RBC-ENTMCNC: 31.3 % — LOW (ref 32–36)
MCHC RBC-ENTMCNC: 31.6 % — LOW (ref 32–36)
MCHC RBC-ENTMCNC: 32.3 % — SIGNIFICANT CHANGE UP (ref 32–36)
MCV RBC AUTO: 87.9 FL — SIGNIFICANT CHANGE UP (ref 80–100)
MCV RBC AUTO: 89.5 FL — SIGNIFICANT CHANGE UP (ref 80–100)
MCV RBC AUTO: 89.8 FL — SIGNIFICANT CHANGE UP (ref 80–100)
MONOCYTES # BLD AUTO: 0.41 K/UL — SIGNIFICANT CHANGE UP (ref 0–0.9)
MONOCYTES NFR BLD AUTO: 5.1 % — SIGNIFICANT CHANGE UP (ref 2–14)
NEUTROPHILS # BLD AUTO: 5.01 K/UL — SIGNIFICANT CHANGE UP (ref 1.8–7.4)
NEUTROPHILS NFR BLD AUTO: 62.5 % — SIGNIFICANT CHANGE UP (ref 43–77)
NRBC # FLD: 0 — SIGNIFICANT CHANGE UP
PHOSPHATE SERPL-MCNC: 3.8 MG/DL — SIGNIFICANT CHANGE UP (ref 2.5–4.5)
PLATELET # BLD AUTO: 269 K/UL — SIGNIFICANT CHANGE UP (ref 150–400)
PLATELET # BLD AUTO: 272 K/UL — SIGNIFICANT CHANGE UP (ref 150–400)
PLATELET # BLD AUTO: 280 K/UL — SIGNIFICANT CHANGE UP (ref 150–400)
PMV BLD: 10.8 FL — SIGNIFICANT CHANGE UP (ref 7–13)
PMV BLD: 11.1 FL — SIGNIFICANT CHANGE UP (ref 7–13)
PMV BLD: 11.3 FL — SIGNIFICANT CHANGE UP (ref 7–13)
POTASSIUM SERPL-MCNC: 4.1 MMOL/L — SIGNIFICANT CHANGE UP (ref 3.5–5.3)
POTASSIUM SERPL-SCNC: 4.1 MMOL/L — SIGNIFICANT CHANGE UP (ref 3.5–5.3)
RBC # BLD: 2.19 M/UL — LOW (ref 3.8–5.2)
RBC # BLD: 2.35 M/UL — LOW (ref 3.8–5.2)
RBC # BLD: 2.82 M/UL — LOW (ref 3.8–5.2)
RBC # FLD: 15 % — HIGH (ref 10.3–14.5)
RBC # FLD: 15 % — HIGH (ref 10.3–14.5)
RBC # FLD: 15.5 % — HIGH (ref 10.3–14.5)
RH IG SCN BLD-IMP: POSITIVE — SIGNIFICANT CHANGE UP
SODIUM SERPL-SCNC: 139 MMOL/L — SIGNIFICANT CHANGE UP (ref 135–145)
WBC # BLD: 8.03 K/UL — SIGNIFICANT CHANGE UP (ref 3.8–10.5)
WBC # BLD: 8.87 K/UL — SIGNIFICANT CHANGE UP (ref 3.8–10.5)
WBC # BLD: 9.61 K/UL — SIGNIFICANT CHANGE UP (ref 3.8–10.5)
WBC # FLD AUTO: 8.03 K/UL — SIGNIFICANT CHANGE UP (ref 3.8–10.5)
WBC # FLD AUTO: 8.87 K/UL — SIGNIFICANT CHANGE UP (ref 3.8–10.5)
WBC # FLD AUTO: 9.61 K/UL — SIGNIFICANT CHANGE UP (ref 3.8–10.5)

## 2017-07-11 PROCEDURE — 99233 SBSQ HOSP IP/OBS HIGH 50: CPT | Mod: GC

## 2017-07-11 PROCEDURE — 99232 SBSQ HOSP IP/OBS MODERATE 35: CPT

## 2017-07-11 RX ORDER — SENNA PLUS 8.6 MG/1
2 TABLET ORAL AT BEDTIME
Qty: 0 | Refills: 0 | Status: DISCONTINUED | OUTPATIENT
Start: 2017-07-11 | End: 2017-07-13

## 2017-07-11 RX ORDER — DOCUSATE SODIUM 100 MG
100 CAPSULE ORAL THREE TIMES A DAY
Qty: 0 | Refills: 0 | Status: DISCONTINUED | OUTPATIENT
Start: 2017-07-11 | End: 2017-07-13

## 2017-07-11 RX ADMIN — Medication 4 UNIT(S): at 17:58

## 2017-07-11 RX ADMIN — ATORVASTATIN CALCIUM 20 MILLIGRAM(S): 80 TABLET, FILM COATED ORAL at 21:10

## 2017-07-11 RX ADMIN — HEPARIN SODIUM 5000 UNIT(S): 5000 INJECTION INTRAVENOUS; SUBCUTANEOUS at 21:10

## 2017-07-11 RX ADMIN — Medication 1 DROP(S): at 18:00

## 2017-07-11 RX ADMIN — HEPARIN SODIUM 5000 UNIT(S): 5000 INJECTION INTRAVENOUS; SUBCUTANEOUS at 05:26

## 2017-07-11 RX ADMIN — Medication 1 TABLET(S): at 12:46

## 2017-07-11 RX ADMIN — Medication 100 MILLIGRAM(S): at 21:10

## 2017-07-11 RX ADMIN — Medication 1 DROP(S): at 05:26

## 2017-07-11 RX ADMIN — Medication 1: at 17:58

## 2017-07-11 RX ADMIN — Medication 2: at 08:54

## 2017-07-11 RX ADMIN — Medication 1: at 12:44

## 2017-07-11 RX ADMIN — INSULIN GLARGINE 40 UNIT(S): 100 INJECTION, SOLUTION SUBCUTANEOUS at 22:17

## 2017-07-11 RX ADMIN — CARVEDILOL PHOSPHATE 12.5 MILLIGRAM(S): 80 CAPSULE, EXTENDED RELEASE ORAL at 05:26

## 2017-07-11 RX ADMIN — CARVEDILOL PHOSPHATE 12.5 MILLIGRAM(S): 80 CAPSULE, EXTENDED RELEASE ORAL at 17:59

## 2017-07-11 RX ADMIN — Medication 150 MILLIGRAM(S): at 06:12

## 2017-07-11 RX ADMIN — ISOSORBIDE MONONITRATE 30 MILLIGRAM(S): 60 TABLET, EXTENDED RELEASE ORAL at 12:46

## 2017-07-11 RX ADMIN — CLOPIDOGREL BISULFATE 75 MILLIGRAM(S): 75 TABLET, FILM COATED ORAL at 12:46

## 2017-07-11 RX ADMIN — BRIMONIDINE TARTRATE 1 DROP(S): 2 SOLUTION/ DROPS OPHTHALMIC at 05:26

## 2017-07-11 RX ADMIN — SENNA PLUS 2 TABLET(S): 8.6 TABLET ORAL at 21:10

## 2017-07-11 RX ADMIN — Medication 4 UNIT(S): at 08:54

## 2017-07-11 RX ADMIN — VALSARTAN 320 MILLIGRAM(S): 80 TABLET ORAL at 05:28

## 2017-07-11 RX ADMIN — ZINC SULFATE TAB 220 MG (50 MG ZINC EQUIVALENT) 220 MILLIGRAM(S): 220 (50 ZN) TAB at 12:45

## 2017-07-11 RX ADMIN — Medication 4 UNIT(S): at 12:44

## 2017-07-11 RX ADMIN — BRIMONIDINE TARTRATE 1 DROP(S): 2 SOLUTION/ DROPS OPHTHALMIC at 17:59

## 2017-07-11 RX ADMIN — Medication 81 MILLIGRAM(S): at 12:45

## 2017-07-11 NOTE — PROGRESS NOTE ADULT - ASSESSMENT
73yo F with PMH of Type 2 DM (HgbA1c 7.8%) c/b neuropathy, PVD s/p R BKA (2010) and stent to L SFA, CKD Stage III, CAD s/p stents, CABG and L CEA, and HTN presenting with L hallux infection s/p bedside I & D. MRI positive for OM of the L hallux, s/p OR on 7/7 for L transmetatarsal amputation. Course c/b acute blood loss anemia post-op, s/p 1u PRBCs.

## 2017-07-11 NOTE — PROGRESS NOTE ADULT - PROBLEM SELECTOR PLAN 3
Hb 7.0 from AM labs  - Likely 2/2 anemia of chronic disease  - repeat CBC at noon for consideration of transfusion with blood typing  - Trend CBC daily Creatinine trending up, but still improved from pt's baseline (1.3-1.5) and GFR >70  - Avoid nephrotoxic drugs  - continue to monitor BMP

## 2017-07-11 NOTE — PROGRESS NOTE ADULT - NSHPATTENDINGPLANDISCUSS_GEN_ALL_CORE
patient
resident
Patient and Dr. Nasreen Hernadez who are in agreement with plan described above.
R2

## 2017-07-11 NOTE — PROGRESS NOTE ADULT - PROBLEM SELECTOR PLAN 6
Ha1c 7.9 06/17  - C/w Lantus 40u qHS and Humalog 2u qmeals  - ISS and FS with meals and at bedtime Ha1c 7.9 06/17  - C/w Lantus 40u qHS and Humalog increased to 4u qmeals  - Continue to monitor FS today, may need further increase in Lantus and Humalog  - ISS and FS with meals and at bedtime

## 2017-07-11 NOTE — PROGRESS NOTE ADULT - PROBLEM SELECTOR PLAN 2
Creatinine currently improved from pt's baseline (1.3-1.5) and GFR >70  - Avoid nephrotoxic drugs Hb 6.6 from AM labs; stat Hb showed Hb 6.2  - Likely 2/2 anemia of chronic disease or complication of surgery  - pt will receive 1u PRBCs  - Trend CBC daily

## 2017-07-11 NOTE — PROGRESS NOTE ADULT - ASSESSMENT
POD#4 s/p LEFT TMA and NIDIA.    Plan: Patient seen and evaluated.  All questions answered to patient's satisfaction.  Although no clean margins were taken, the bone remaining in the foot following the surgery was devoid of any signs of infection.  It was white, hard, and intact.  I believe that all infected bone was resected.  From my standpoint, the patient can be discharged to rehab and follow up in the wound care center.  She is to remain NWB to the LEFT foot with CAM boot until further instructed.  Once the sutures are removed she will likely be able to begin WB with CAM boot.

## 2017-07-11 NOTE — PROGRESS NOTE ADULT - PROBLEM SELECTOR PLAN 1
-s/p tma  -podiatry input noted -all infected bone/tissue removed  -no need for long term abx  -local care per podiatry  -levofloxacin 500 mg po daily x 3 days from 7/12

## 2017-07-11 NOTE — PROGRESS NOTE ADULT - PROBLEM SELECTOR PLAN 4
S/p stent to L SFA on last admission in June 2017  -Repeat DUC/PVRs showing improvement from last procedure  -C/w ASA and Plavix Ha1c 7.9 06/17  - FS continued to be elevated > 200 yesterday, increased to 4U pre-meal humalog to control blood glucose.   - today .  - Monitor FS and will readjust as needed  - ISS and FS with meals and at bedtime

## 2017-07-11 NOTE — PROGRESS NOTE ADULT - PROBLEM SELECTOR PLAN 4
S/p stent to L SFA on last admission in June 2017  -F/u vascular surgery recs- no further interventions required at this time  -Repeat DUC/PVRs showing improvement from last procedure  -C/w ASA and Plavix S/p stent to L SFA on last admission in June 2017  -Vascular surgery recs- no further interventions required at this time  -Repeat DUC/PVRs showing improvement from last procedure  -C/w ASA and Plavix

## 2017-07-11 NOTE — PROGRESS NOTE ADULT - SUBJECTIVE AND OBJECTIVE BOX
NIK MELISSA 72y [id number]    Patient is a 72y old  Female who presents with a chief complaint of Toe infection (09 Jul 2017 09:44)      Follow Up/CC:  foot ifnection    Interval History/ROS: stable    Allergies    sulfa drugs (Hives)  sulfa drugs (Rash)  Sulfac 10% (Hives)    Intolerances        ANTIMICROBIALS:      MEDICATIONS  (STANDING):  heparin  Injectable 5000 Unit(s) SubCutaneous every 8 hours  insulin lispro (HumaLOG) corrective regimen sliding scale   SubCutaneous three times a day before meals  insulin lispro (HumaLOG) corrective regimen sliding scale   SubCutaneous at bedtime  dextrose 5%. 1000 milliLiter(s) (50 mL/Hr) IV Continuous <Continuous>  dextrose 50% Injectable 12.5 Gram(s) IV Push once  dextrose 50% Injectable 25 Gram(s) IV Push once  dextrose 50% Injectable 25 Gram(s) IV Push once  aspirin  chewable 81 milliGRAM(s) Oral daily  atorvastatin 20 milliGRAM(s) Oral at bedtime  clopidogrel Tablet 75 milliGRAM(s) Oral daily  ketorolac 0.5% Ophthalmic Solution 1 Drop(s) Both EYES two times a day  brimonidine 0.2% Ophthalmic Solution 1 Drop(s) Both EYES two times a day  timolol 0.5% Solution 1 Drop(s) Both EYES two times a day  zinc sulfate 220 milliGRAM(s) Oral daily  Nephro-jennifer 1 Tablet(s) Oral daily  carvedilol 12.5 milliGRAM(s) Oral every 12 hours  isosorbide   mononitrate ER Tablet (IMDUR) 30 milliGRAM(s) Oral daily  valsartan 320 milliGRAM(s) Oral daily  ergocalciferol 42621 Unit(s) Oral <User Schedule>  insulin glargine Injectable (LANTUS) 40 Unit(s) SubCutaneous at bedtime  insulin lispro Injectable (HumaLOG) 4 Unit(s) SubCutaneous three times a day before meals  docusate sodium 100 milliGRAM(s) Oral three times a day  senna 2 Tablet(s) Oral at bedtime    MEDICATIONS  (PRN):  acetaminophen   Tablet 650 milliGRAM(s) Oral every 6 hours PRN For Temp greater than 38 C (100.4 F)  dextrose Gel 1 Dose(s) Oral once PRN Blood Glucose LESS THAN 70 milliGRAM(s)/deciliter  glucagon  Injectable 1 milliGRAM(s) IntraMuscular once PRN Glucose LESS THAN 70 milligrams/deciliter  acetaminophen   Tablet. 650 milliGRAM(s) Oral every 6 hours PRN Mild Pain (1 - 3)  oxyCODONE    5 mG/acetaminophen 325 mG 2 Tablet(s) Oral every 6 hours PRN Moderate Pain (4 - 6)  morphine  - Injectable 2 milliGRAM(s) IV Push every 6 hours PRN Severe Pain (7 - 10)        Vital Signs Last 24 Hrs  T(C): 37.4 (11 Jul 2017 12:33), Max: 37.7 (11 Jul 2017 09:50)  T(F): 99.4 (11 Jul 2017 12:33), Max: 99.9 (11 Jul 2017 09:50)  HR: 69 (11 Jul 2017 12:33) (68 - 71)  BP: 163/60 (11 Jul 2017 12:33) (125/57 - 163/60)  BP(mean): --  RR: 16 (11 Jul 2017 12:33) (16 - 18)  SpO2: 96% (11 Jul 2017 12:33) (93% - 96%)    CBC Full  -  ( 11 Jul 2017 15:00 )  WBC Count : 9.61 K/uL  Hemoglobin : 8.0 g/dL  Hematocrit : 24.8 %  Platelet Count - Automated : 269 K/uL  Mean Cell Volume : 87.9 fL  Mean Cell Hemoglobin : 28.4 pg  Mean Cell Hemoglobin Concentration : 32.3 %  Auto Neutrophil # : x  Auto Lymphocyte # : x  Auto Monocyte # : x  Auto Eosinophil # : x  Auto Basophil # : x  Auto Neutrophil % : x  Auto Lymphocyte % : x  Auto Monocyte % : x  Auto Eosinophil % : x  Auto Basophil % : x    07-11    139  |  102  |  19  ----------------------------<  184<H>  4.1   |  24  |  1.25    Ca    8.3<L>      11 Jul 2017 05:20  Phos  3.8     07-11  Mg     1.7     07-11            MICROBIOLOGY:    Culture - Yeast and Fungus (07.07.17 @ 13:10)    Culture - Yeast and Fungus:   CULTURE NEGATIVE FOR YEASTS AND MOLDS AFTER 2 DAYS    Specimen Source: FOOT    Culture - Surg Site Aerob/Anaer w/Gm St (07.07.17 @ 13:10)    Culture - Surgical Site:   NO GROWTH - PRELIMINARY RESULTS  NO ORGANISMS ISOLATED AT 24 HOURS  NO ORGANISMS ISOLATED AT 48 HRS.  NO ORGANISMS AT 72 HRS.    Gram Stain Wound:   NOS^No Organisms Seen  WBC^White Blood Cells  QNTY CELLS IN GRAM STAIN: NO CELLS SEEN    Specimen Source: FOOT    Culture - Wound with Gram Stain (07.04.17 @ 17:35)    Culture - Wound with Gram Stain:   NO ORGANISMS ISOLATED AT 24 HOURS  NO ORGANISMS ISOLATED AT 48 HRS.  NO ORGANISMS AT 72 HRS.  NO GROWTH AFTER 4 DAYS INCUBATION  NO GROWTH AFTER 4 DAYS INCUBATION  NO GROWTH AFTER 5 DAYS INCUBATION    Specimen Source: OTHER                      RADIOLOGY    CXR:    CT HEAD:    CT CHEST:    CT ABDOMEN:    MRI:    OTHER:

## 2017-07-11 NOTE — PROGRESS NOTE ADULT - PROBLEM SELECTOR PLAN 1
POD #3 for L TMA  - Patient stable for discharge per podiatry recs.  - ID consulted to discuss what oral antibiotics patient can go home with. Can continue on levofloxacin 500 mg QD x 3 days when discharged.   Until then, will continue IV vancomycin and ertapenem for broad spectrum coverage.  - MRI of L foot showing changes consistent with OM  - Wound cultures and blood cultures with no growth to date.

## 2017-07-11 NOTE — PROVIDER CONTACT NOTE (CRITICAL VALUE NOTIFICATION) - ACTION/TREATMENT ORDERED:
repeat CBC done and sent as ordered. pt seen by team awaiting for repeat CBC results. will endorse to incoming nurse

## 2017-07-11 NOTE — PROGRESS NOTE ADULT - PROBLEM SELECTOR PLAN 2
Likely 2/2 recent surgery  -Hgb 7.0 in AM, repeat 7.1, will need to discuss with pt if she is agreeable to blood transfusion  -Monitor CBC daily, no overt signs of bleeding  -Monitor vitals Likely 2/2 recent surgery  -Hgb 6.6, repeat 6.2, 1u PRBCs ordered  - F/u post-transfusion CBC, transfuse if Hgb<7  -Monitor CBC daily, no overt signs of bleeding  -Monitor vitals

## 2017-07-11 NOTE — PROGRESS NOTE ADULT - PROBLEM SELECTOR PLAN 1
POD #1 for L TMA  - C/w vancomycin and ertapenem for broad spectrum coverage as per ID recs, will f/u OR surg path and wound cultures  - MRI of L foot showing changes consistent with OM  - F/u ID recommendations  - F/u podiatry recs  - Wound cultures with NGTD at 72h, f/u blood cultures NGTD at 48h. POD #1 for L TMA  - C/w vancomycin and ertapenem for broad spectrum coverage as per ID recs, will f/u OR surg path and wound cultures; if wound margins are clear, can discharge on Levaquin 500mg PO for 3 days after discharge  - MRI of L foot showing changes consistent with OM  - F/u ID recommendations  - F/u podiatry recs  - Wound cultures with NGTD at 72h, f/u blood cultures NGTD at 48h.

## 2017-07-11 NOTE — PROGRESS NOTE ADULT - SUBJECTIVE AND OBJECTIVE BOX
Patient is a 72y old  Female who presents with a chief complaint of Toe infection (09 Jul 2017 09:44)       INTERVAL HPI/OVERNIGHT EVENTS:  Patient seen and evaluated at bedside POD#4 s/p LEFT TMA and NIDIA.  Pt is resting comfortable in NAD. Denies N/V/F/C.  Pain rated at 1/10    Allergies    sulfa drugs (Hives)  sulfa drugs (Rash)  Sulfac 10% (Hives)    Intolerances        Vital Signs Last 24 Hrs  T(C): 37.2 (11 Jul 2017 05:24), Max: 37.4 (10 Jul 2017 21:09)  T(F): 99 (11 Jul 2017 05:24), Max: 99.4 (10 Jul 2017 21:09)  HR: 70 (11 Jul 2017 05:24) (70 - 75)  BP: 134/59 (11 Jul 2017 05:24) (125/57 - 134/59)  BP(mean): --  RR: 18 (11 Jul 2017 05:24) (18 - 18)  SpO2: 95% (11 Jul 2017 05:24) (93% - 95%)    LABS:                        6.2    8.87  )-----------( 280      ( 11 Jul 2017 07:00 )             19.6     07-11    139  |  102  |  19  ----------------------------<  184<H>  4.1   |  24  |  1.25    Ca    8.3<L>      11 Jul 2017 05:20  Phos  3.8     07-11  Mg     1.7     07-11          CAPILLARY BLOOD GLUCOSE  200 (11 Jul 2017 08:28)  245 (10 Jul 2017 21:51)  210 (10 Jul 2017 17:08)  219 (10 Jul 2017 12:07)          Lower Extremity Physical Exam:  No change in neurovascular status.  There is mild serous drainage medially consistent with abx beads and postop status.  The wound is clean with intact sutures. No erythema, mild edema, no purulence, no crepitus, no fluctuance, no warmth, no malodor.  Flap viable with brisk capillary refill. NIDIA site clean, closed, and dry.    RADIOLOGY & ADDITIONAL TESTS:

## 2017-07-11 NOTE — PROGRESS NOTE ADULT - PROBLEM SELECTOR PLAN 3
Creatinine currently improved from pt's baseline  - Monitor BMP daily  - Avoid nephrotoxic drugs  - Unclear if pt has CKD given improvement in creatinine to WNL over the past 5 days, will continue to monitor; does not need med to be renally dosed Creatinine currently improved from pt's baseline  - Creatinine increasing over past few days since starting on vancomycin  - Monitor BMP daily  - Avoid nephrotoxic drugs  - Unclear if pt has CKD given improvement in creatinine to WNL over the past 5 days, will continue to monitor

## 2017-07-11 NOTE — PROGRESS NOTE ADULT - SUBJECTIVE AND OBJECTIVE BOX
Patient is a 72y old  Female who presents with a chief complaint of Toe infection (09 Jul 2017 09:44)       INTERVAL HPI/OVERNIGHT EVENTS: Patient is POD #4. AM labs showed Hb 6.6, ordered stat CBC and are considering transfusion. Patient afebrile. Denies N/V/SOB or severe pain. Only has mild itching on left leg proximal to splint.     MEDICATIONS  (STANDING):  heparin  Injectable 5000 Unit(s) SubCutaneous every 8 hours  insulin lispro (HumaLOG) corrective regimen sliding scale   SubCutaneous three times a day before meals  insulin lispro (HumaLOG) corrective regimen sliding scale   SubCutaneous at bedtime  dextrose 5%. 1000 milliLiter(s) (50 mL/Hr) IV Continuous <Continuous>  dextrose 50% Injectable 12.5 Gram(s) IV Push once  dextrose 50% Injectable 25 Gram(s) IV Push once  dextrose 50% Injectable 25 Gram(s) IV Push once  aspirin  chewable 81 milliGRAM(s) Oral daily  atorvastatin 20 milliGRAM(s) Oral at bedtime  clopidogrel Tablet 75 milliGRAM(s) Oral daily  ketorolac 0.5% Ophthalmic Solution 1 Drop(s) Both EYES two times a day  brimonidine 0.2% Ophthalmic Solution 1 Drop(s) Both EYES two times a day  timolol 0.5% Solution 1 Drop(s) Both EYES two times a day  ertapenem  IVPB 1000 milliGRAM(s) IV Intermittent every 24 hours  zinc sulfate 220 milliGRAM(s) Oral daily  Nephro-jennifer 1 Tablet(s) Oral daily  carvedilol 12.5 milliGRAM(s) Oral every 12 hours  isosorbide   mononitrate ER Tablet (IMDUR) 30 milliGRAM(s) Oral daily  valsartan 320 milliGRAM(s) Oral daily  ergocalciferol 14642 Unit(s) Oral <User Schedule>  insulin glargine Injectable (LANTUS) 40 Unit(s) SubCutaneous at bedtime  vancomycin  IVPB 750 milliGRAM(s) IV Intermittent every 12 hours  insulin lispro Injectable (HumaLOG) 4 Unit(s) SubCutaneous three times a day before meals    MEDICATIONS  (PRN):  acetaminophen   Tablet 650 milliGRAM(s) Oral every 6 hours PRN For Temp greater than 38 C (100.4 F)  dextrose Gel 1 Dose(s) Oral once PRN Blood Glucose LESS THAN 70 milliGRAM(s)/deciliter  glucagon  Injectable 1 milliGRAM(s) IntraMuscular once PRN Glucose LESS THAN 70 milligrams/deciliter  acetaminophen   Tablet. 650 milliGRAM(s) Oral every 6 hours PRN Mild Pain (1 - 3)  oxyCODONE    5 mG/acetaminophen 325 mG 2 Tablet(s) Oral every 6 hours PRN Moderate Pain (4 - 6)  morphine  - Injectable 2 milliGRAM(s) IV Push every 6 hours PRN Severe Pain (7 - 10)      Allergies    sulfa drugs (Hives)  sulfa drugs (Rash)  Sulfac 10% (Hives)    Intolerances        REVIEW OF SYSTEMS:  CONSTITUTIONAL: No fever, no fatigue, no headaches  RESPIRATORY: No shortness of breath, improving dry cough  CARDIOVASCULAR: No chest pain or dizziness, no palpitations  GASTROINTESTINAL: no abdominal pain. No nausea, vomiting, or diarrhea.   NEUROLOGICAL: numbness at LLE at amputation site.   SKIN: Itching proximal to LLE splint site.       Vital Signs Last 24 Hrs  T(C): 37.2 (11 Jul 2017 05:24), Max: 37.4 (10 Jul 2017 21:09)  T(F): 99 (11 Jul 2017 05:24), Max: 99.4 (10 Jul 2017 21:09)  HR: 70 (11 Jul 2017 05:24) (70 - 75)  BP: 134/59 (11 Jul 2017 05:24) (125/57 - 134/59)  BP(mean): --  RR: 18 (11 Jul 2017 05:24) (18 - 18)  SpO2: 95% (11 Jul 2017 05:24) (93% - 95%)    PHYSICAL EXAM:  GENERAL: NAD  HEAD:  Atraumatic, Normocephalic  EYES: EOMI, conjunctiva and sclera clear  ENMT:  Moist mucous membranes  NERVOUS SYSTEM: A&O x 3  PSYCHIATRIC: Appropriate affect and mood  CHEST/LUNG: Clear to auscultation bilaterally  HEART: Regular rate and rhythm  ABDOMEN: Soft, Nontender, Nondistended; Bowel sounds present  EXTREMITIES:  2+ Peripheral Pulses, RLE below the knee amputation, LLE s/p TMA POD #2, wrapped in dressing      LABS:                                              6.6    8.03  )-----------( 272      ( 11 Jul 2017 05:20 )             21.1     07-11    139  |  102  |  19  ----------------------------<  184<H>  4.1   |  24  |  1.25    Ca    8.3<L>      11 Jul 2017 05:20  Phos  3.8     07-11  Mg     1.7     07-11            CAPILLARY BLOOD GLUCOSE  245 (10 Jul 2017 21:51)  210 (10 Jul 2017 17:08)  219 (10 Jul 2017 12:07)  119 (10 Jul 2017 08:31)              BLOOD CULTURE- not growing organisms (culture from 7-4)  WOUND CULTURE - not growing organisms after 4 days  SURGICAL CULTURE - not growing organisms after 1 day    RADIOLOGY & ADDITIONAL TESTS:    Imaging Personally Reviewed:  [X ] YES     Consultant(s) Notes Reviewed:      Care Discussed with Consultants/Other Providers: Patient is a 72y old  Female who presents with a chief complaint of Toe infection (09 Jul 2017 09:44)       INTERVAL HPI/OVERNIGHT EVENTS: Patient is POD #4. AM labs showed Hb 6.6, ordered stat CBC and are planning for transfusion. Patient afebrile, vitals are stable. Denies N/V/SOB or severe pain. Has not had BM overnight.     MEDICATIONS  (STANDING):  heparin  Injectable 5000 Unit(s) SubCutaneous every 8 hours  insulin lispro (HumaLOG) corrective regimen sliding scale   SubCutaneous three times a day before meals  insulin lispro (HumaLOG) corrective regimen sliding scale   SubCutaneous at bedtime  dextrose 5%. 1000 milliLiter(s) (50 mL/Hr) IV Continuous <Continuous>  dextrose 50% Injectable 12.5 Gram(s) IV Push once  dextrose 50% Injectable 25 Gram(s) IV Push once  dextrose 50% Injectable 25 Gram(s) IV Push once  aspirin  chewable 81 milliGRAM(s) Oral daily  atorvastatin 20 milliGRAM(s) Oral at bedtime  clopidogrel Tablet 75 milliGRAM(s) Oral daily  ketorolac 0.5% Ophthalmic Solution 1 Drop(s) Both EYES two times a day  brimonidine 0.2% Ophthalmic Solution 1 Drop(s) Both EYES two times a day  timolol 0.5% Solution 1 Drop(s) Both EYES two times a day  ertapenem  IVPB 1000 milliGRAM(s) IV Intermittent every 24 hours  zinc sulfate 220 milliGRAM(s) Oral daily  Nephro-jennifer 1 Tablet(s) Oral daily  carvedilol 12.5 milliGRAM(s) Oral every 12 hours  isosorbide   mononitrate ER Tablet (IMDUR) 30 milliGRAM(s) Oral daily  valsartan 320 milliGRAM(s) Oral daily  ergocalciferol 84277 Unit(s) Oral <User Schedule>  insulin glargine Injectable (LANTUS) 40 Unit(s) SubCutaneous at bedtime  vancomycin  IVPB 750 milliGRAM(s) IV Intermittent every 12 hours  insulin lispro Injectable (HumaLOG) 4 Unit(s) SubCutaneous three times a day before meals    MEDICATIONS  (PRN):  acetaminophen   Tablet 650 milliGRAM(s) Oral every 6 hours PRN For Temp greater than 38 C (100.4 F)  dextrose Gel 1 Dose(s) Oral once PRN Blood Glucose LESS THAN 70 milliGRAM(s)/deciliter  glucagon  Injectable 1 milliGRAM(s) IntraMuscular once PRN Glucose LESS THAN 70 milligrams/deciliter  acetaminophen   Tablet. 650 milliGRAM(s) Oral every 6 hours PRN Mild Pain (1 - 3)  oxyCODONE    5 mG/acetaminophen 325 mG 2 Tablet(s) Oral every 6 hours PRN Moderate Pain (4 - 6)  morphine  - Injectable 2 milliGRAM(s) IV Push every 6 hours PRN Severe Pain (7 - 10)      Allergies    sulfa drugs (Hives)  sulfa drugs (Rash)  Sulfac 10% (Hives)    Intolerances        REVIEW OF SYSTEMS:  CONSTITUTIONAL: No fever, no fatigue, no headaches  RESPIRATORY: No shortness of breath, improving dry cough  CARDIOVASCULAR: No chest pain or dizziness, no palpitations  GASTROINTESTINAL: no abdominal pain. No nausea, vomiting, or diarrhea.   NEUROLOGICAL: numbness at LLE at amputation site.   SKIN: Itching proximal to LLE splint site.       Vital Signs Last 24 Hrs  T(C): 37.2 (11 Jul 2017 05:24), Max: 37.4 (10 Jul 2017 21:09)  T(F): 99 (11 Jul 2017 05:24), Max: 99.4 (10 Jul 2017 21:09)  HR: 70 (11 Jul 2017 05:24) (70 - 75)  BP: 134/59 (11 Jul 2017 05:24) (125/57 - 134/59)  RR: 18 (11 Jul 2017 05:24) (18 - 18)  SpO2: 95% (11 Jul 2017 05:24) (93% - 95%)    PHYSICAL EXAM:  GENERAL: NAD  HEAD:  Atraumatic, Normocephalic  EYES: EOMI, conjunctiva and sclera clear  ENMT:  Moist mucous membranes  NERVOUS SYSTEM: A&O x 3  PSYCHIATRIC: Appropriate affect and mood  CHEST/LUNG: Clear to auscultation bilaterally  HEART: Regular rate and rhythm  ABDOMEN: Soft, Nontender, Nondistended; Bowel sounds present  EXTREMITIES:  2+ Peripheral Pulses, RLE below the knee amputation, LLE s/p TMA POD #4, wrapped in dressing      LABS:                                                       6.2    8.87  )-----------( 280      ( 11 Jul 2017 07:00 )             19.6     07-11    139  |  102  |  19  ----------------------------<  184<H>  4.1   |  24  |  1.25    Ca    8.3<L>      11 Jul 2017 05:20  Phos  3.8     07-11  Mg     1.7     07-11            CAPILLARY BLOOD GLUCOSE  245 (10 Jul 2017 21:51)  210 (10 Jul 2017 17:08)  219 (10 Jul 2017 12:07)  119 (10 Jul 2017 08:31)              BLOOD CULTURE- not growing organisms (culture from 7-4)  WOUND CULTURE - not growing organisms after 4 days  SURGICAL CULTURE - not growing organisms after 1 day    RADIOLOGY & ADDITIONAL TESTS:    Imaging Personally Reviewed:  [X ] YES     Consultant(s) Notes Reviewed:      Care Discussed with Consultants/Other Providers: Patient is a 72y old  Female who presents with a chief complaint of Toe infection (09 Jul 2017 09:44)       INTERVAL HPI/OVERNIGHT EVENTS: Patient is POD #4. AM labs showed Hb 6.6, ordered stat CBC which showed 6.2; pt agrees to transfusion. Patient afebrile, vitals are stable. Denies N/V/SOB or severe pain. Has not had BM overnight.     MEDICATIONS  (STANDING):  heparin  Injectable 5000 Unit(s) SubCutaneous every 8 hours  insulin lispro (HumaLOG) corrective regimen sliding scale   SubCutaneous three times a day before meals  insulin lispro (HumaLOG) corrective regimen sliding scale   SubCutaneous at bedtime  dextrose 5%. 1000 milliLiter(s) (50 mL/Hr) IV Continuous <Continuous>  dextrose 50% Injectable 12.5 Gram(s) IV Push once  dextrose 50% Injectable 25 Gram(s) IV Push once  dextrose 50% Injectable 25 Gram(s) IV Push once  aspirin  chewable 81 milliGRAM(s) Oral daily  atorvastatin 20 milliGRAM(s) Oral at bedtime  clopidogrel Tablet 75 milliGRAM(s) Oral daily  ketorolac 0.5% Ophthalmic Solution 1 Drop(s) Both EYES two times a day  brimonidine 0.2% Ophthalmic Solution 1 Drop(s) Both EYES two times a day  timolol 0.5% Solution 1 Drop(s) Both EYES two times a day  ertapenem  IVPB 1000 milliGRAM(s) IV Intermittent every 24 hours  zinc sulfate 220 milliGRAM(s) Oral daily  Nephro-jennifer 1 Tablet(s) Oral daily  carvedilol 12.5 milliGRAM(s) Oral every 12 hours  isosorbide   mononitrate ER Tablet (IMDUR) 30 milliGRAM(s) Oral daily  valsartan 320 milliGRAM(s) Oral daily  ergocalciferol 15744 Unit(s) Oral <User Schedule>  insulin glargine Injectable (LANTUS) 40 Unit(s) SubCutaneous at bedtime  vancomycin  IVPB 750 milliGRAM(s) IV Intermittent every 12 hours  insulin lispro Injectable (HumaLOG) 4 Unit(s) SubCutaneous three times a day before meals    MEDICATIONS  (PRN):  acetaminophen   Tablet 650 milliGRAM(s) Oral every 6 hours PRN For Temp greater than 38 C (100.4 F)  dextrose Gel 1 Dose(s) Oral once PRN Blood Glucose LESS THAN 70 milliGRAM(s)/deciliter  glucagon  Injectable 1 milliGRAM(s) IntraMuscular once PRN Glucose LESS THAN 70 milligrams/deciliter  acetaminophen   Tablet. 650 milliGRAM(s) Oral every 6 hours PRN Mild Pain (1 - 3)  oxyCODONE    5 mG/acetaminophen 325 mG 2 Tablet(s) Oral every 6 hours PRN Moderate Pain (4 - 6)  morphine  - Injectable 2 milliGRAM(s) IV Push every 6 hours PRN Severe Pain (7 - 10)      Allergies    sulfa drugs (Hives)  sulfa drugs (Rash)  Sulfac 10% (Hives)    Intolerances        REVIEW OF SYSTEMS:  CONSTITUTIONAL: No fever, no fatigue, no headaches  RESPIRATORY: No shortness of breath, improving dry cough  CARDIOVASCULAR: No chest pain or dizziness, no palpitations  GASTROINTESTINAL: no abdominal pain. No nausea, vomiting, or diarrhea.   NEUROLOGICAL: numbness at LLE at amputation site.   SKIN: no rashes or hives.       Vital Signs Last 24 Hrs  T(C): 37.2 (11 Jul 2017 05:24), Max: 37.4 (10 Jul 2017 21:09)  T(F): 99 (11 Jul 2017 05:24), Max: 99.4 (10 Jul 2017 21:09)  HR: 70 (11 Jul 2017 05:24) (70 - 75)  BP: 134/59 (11 Jul 2017 05:24) (125/57 - 134/59)  RR: 18 (11 Jul 2017 05:24) (18 - 18)  SpO2: 95% (11 Jul 2017 05:24) (93% - 95%)    PHYSICAL EXAM:  GENERAL: NAD  HEAD:  Atraumatic, Normocephalic  EYES: EOMI, conjunctiva pale; and sclera clear  ENMT:  Moist mucous membranes  NERVOUS SYSTEM: A&O x 3  PSYCHIATRIC: Appropriate affect and mood  CHEST/LUNG: Clear to auscultation bilaterally  HEART: Regular rate and rhythm, systolic murmur.  ABDOMEN: Soft, Nontender, Nondistended; Bowel sounds present  EXTREMITIES:  2+ Peripheral Pulses, RLE below the knee amputation, LLE s/p TMA POD #4, wrapped in dressing      LABS:                                                       6.2    8.87  )-----------( 280      ( 11 Jul 2017 07:00 )             19.6     07-11    139  |  102  |  19  ----------------------------<  184<H>  4.1   |  24  |  1.25    Ca    8.3<L>      11 Jul 2017 05:20  Phos  3.8     07-11  Mg     1.7     07-11            CAPILLARY BLOOD GLUCOSE  245 (10 Jul 2017 21:51)  210 (10 Jul 2017 17:08)  219 (10 Jul 2017 12:07)  119 (10 Jul 2017 08:31)              BLOOD CULTURE- not growing organisms (culture from 7-4)  WOUND CULTURE - not growing organisms after 4 days  SURGICAL CULTURE - not growing organisms after 1 day    RADIOLOGY & ADDITIONAL TESTS:    Imaging Personally Reviewed:  [X ] YES     Consultant(s) Notes Reviewed:      Care Discussed with Consultants/Other Providers:

## 2017-07-11 NOTE — PROGRESS NOTE ADULT - PROBLEM SELECTOR PLAN 8
DVT ppx: HSQ  Diet: DASH, CC  PT recs- will re-evaluate after surgery DVT ppx: HSQ  Diet: DASH, CC  PT recs- Rehab

## 2017-07-11 NOTE — PROGRESS NOTE ADULT - ASSESSMENT
71yo F with PMH of Type 2 DM (HgbA1c 7.8%) c/b neuropathy, PVD s/p R BKA (2010) and stent to L SFA, CKD Stage III, CAD s/p stents, CABG and L CEA, and HTN presenting with L hallux infection s/p bedside I & D. MRI positive for OM of the L hallux, s/p OR on 7/7 for L transmetatarsal amputation. 73yo F with PMH of Type 2 DM (HgbA1c 7.8%) c/b neuropathy, PVD s/p R BKA (2010) and stent to L SFA, CKD Stage III, CAD s/p stents, CABG and L CEA, and HTN presenting with L hallux infection s/p bedside I & D. MRI positive for OM of the L hallux, s/p OR on 7/7 for L transmetatarsal amputation. Course c/b acute blood loss anemia post-op, s/p 1u PRBCs.

## 2017-07-11 NOTE — PROGRESS NOTE ADULT - PROBLEM SELECTOR PLAN 6
Ha1c 7.9 06/17  - FS elevated > 200 yesterday, added 2U pre-meal humalog to control blood glucose.   - today .  - Monitor FS and will readjust as needed  - ISS and FS with meals and at bedtime Stable, no current CP  -C/w ASA, Plavix, carvedilol, isosorbide, atorvastatin

## 2017-07-11 NOTE — PROGRESS NOTE ADULT - SUBJECTIVE AND OBJECTIVE BOX
Patient is a 72y old  Female who presents with a chief complaint of Toe infection (09 Jul 2017 09:44)      INTERVAL HPI/OVERNIGHT EVENTS:    MEDICATIONS (STANDING):  heparin  Injectable 5000 Unit(s) SubCutaneous every 8 hours  insulin lispro (HumaLOG) corrective regimen sliding scale   SubCutaneous three times a day before meals  insulin lispro (HumaLOG) corrective regimen sliding scale   SubCutaneous at bedtime  dextrose 5%. 1000 milliLiter(s) IV Continuous <Continuous>  dextrose 50% Injectable 12.5 Gram(s) IV Push once  dextrose 50% Injectable 25 Gram(s) IV Push once  dextrose 50% Injectable 25 Gram(s) IV Push once  aspirin  chewable 81 milliGRAM(s) Oral daily  atorvastatin 20 milliGRAM(s) Oral at bedtime  clopidogrel Tablet 75 milliGRAM(s) Oral daily  ketorolac 0.5% Ophthalmic Solution 1 Drop(s) Both EYES two times a day  brimonidine 0.2% Ophthalmic Solution 1 Drop(s) Both EYES two times a day  timolol 0.5% Solution 1 Drop(s) Both EYES two times a day  ertapenem  IVPB 1000 milliGRAM(s) IV Intermittent every 24 hours  zinc sulfate 220 milliGRAM(s) Oral daily  Nephro-jennifer 1 Tablet(s) Oral daily  carvedilol 12.5 milliGRAM(s) Oral every 12 hours  isosorbide   mononitrate ER Tablet (IMDUR) 30 milliGRAM(s) Oral daily  valsartan 320 milliGRAM(s) Oral daily  ergocalciferol 52511 Unit(s) Oral <User Schedule>  insulin glargine Injectable (LANTUS) 40 Unit(s) SubCutaneous at bedtime  vancomycin  IVPB 750 milliGRAM(s) IV Intermittent every 12 hours  insulin lispro Injectable (HumaLOG) 4 Unit(s) SubCutaneous three times a day before meals  docusate sodium 100 milliGRAM(s) Oral three times a day  senna 2 Tablet(s) Oral at bedtime    MEDICATIONS  (PRN):  acetaminophen   Tablet 650 milliGRAM(s) Oral every 6 hours PRN  dextrose Gel 1 Dose(s) Oral once PRN  glucagon  Injectable 1 milliGRAM(s) IntraMuscular once PRN  acetaminophen   Tablet. 650 milliGRAM(s) Oral every 6 hours PRN  oxyCODONE    5 mG/acetaminophen 325 mG 2 Tablet(s) Oral every 6 hours PRN  morphine  - Injectable 2 milliGRAM(s) IV Push every 6 hours PRN      REVIEW OF SYSTEMS:  CONSTITUTIONAL: No fever, weight loss, or fatigue  EYES: No eye pain, visual disturbances, or discharge  ENMT:  No difficulty hearing, tinnitus, vertigo; No sinus or throat pain  NECK: No pain or stiffness  BREASTS: No pain, masses, or nipple discharge  RESPIRATORY: No cough, wheezing, chills or hemoptysis; No shortness of breath  CARDIOVASCULAR: No chest pain, palpitations, dizziness, or leg swelling  GASTROINTESTINAL: No abdominal or epigastric pain. No nausea, vomiting, or hematemesis; No diarrhea or constipation. No melena or hematochezia.  GENITOURINARY: No dysuria, frequency, hematuria, or incontinence  NEUROLOGICAL: No headaches, memory loss, loss of strength, numbness, or tremors  SKIN: No itching, burning, rashes, or lesions   MUSCULOSKELETAL: No joint pain or swelling; No muscle, back, or extremity pain    T(F): 99.9 (07-11-17 @ 09:50), Max: 99.9 (07-11-17 @ 09:50)  HR: 69 (07-11-17 @ 09:50) (68 - 75)  BP: 128/49 (07-11-17 @ 09:50) (125/57 - 136/50)  RR: 18 (07-11-17 @ 09:50) (16 - 18)  SpO2: 95% (07-11-17 @ 09:50) (93% - 95%)  Wt(kg): --  CAPILLARY BLOOD GLUCOSE  200 (11 Jul 2017 08:28)  245 (10 Jul 2017 21:51)  210 (10 Jul 2017 17:08)  219 (10 Jul 2017 12:07)        I&O's Summary      PHYSICAL EXAM:  GENERAL: NAD, well-groomed, well-developed  HEAD:  Atraumatic, Normocephalic  EYES: EOMI, PERRLA, conjunctiva and sclera clear  ENMT: No tonsillar erythema, exudates, or enlargement; Moist mucous membranes, Good dentition, No lesions  NECK: Supple, No JVD, Normal thyroid  NERVOUS SYSTEM:  Alert & Oriented X3, Good concentration; Motor Strength 5/5 B/L upper and lower extremities; DTRs 2+ intact and symmetric  CHEST/LUNG: Clear to percussion bilaterally; No rales, rhonchi, wheezing, or rubs  HEART: Regular rate and rhythm; No murmurs, rubs, or gallops  ABDOMEN: Soft, Nontender, Nondistended; Bowel sounds present  EXTREMITIES:  2+ Peripheral Pulses, No clubbing, cyanosis, or edema  LYMPH: No lymphadenopathy noted  SKIN: No rashes or lesions    LABS:                        6.2    8.87  )-----------( 280      ( 11 Jul 2017 07:00 )             19.6     07-11    139  |  102  |  19  ----------------------------<  184<H>  4.1   |  24  |  1.25    Ca    8.3<L>      11 Jul 2017 05:20  Phos  3.8     07-11  Mg     1.7     07-11              RADIOLOGY & ADDITIONAL TESTS:    XRay:    CTScan:    MRI:     Imaging Personally Reviewed:  [ ] YES  [ ] NO    Consultant(s) Notes Reviewed:  [ ] YES  [ ] NO    Care Discussed with Consultants/Other Providers [ ] YES  [ ] NO Patient is a 72y old  Female who presents with a chief complaint of Toe infection (09 Jul 2017 09:44)      INTERVAL HPI/OVERNIGHT EVENTS: Pt feeling well but hasn't been eating as much. Her daughter requested to see a nutritionist yesterday. Afebrile overnight. Minimal pain in left foot.    MEDICATIONS (STANDING):  heparin  Injectable 5000 Unit(s) SubCutaneous every 8 hours  insulin lispro (HumaLOG) corrective regimen sliding scale   SubCutaneous three times a day before meals  insulin lispro (HumaLOG) corrective regimen sliding scale   SubCutaneous at bedtime  dextrose 5%. 1000 milliLiter(s) IV Continuous <Continuous>  dextrose 50% Injectable 12.5 Gram(s) IV Push once  dextrose 50% Injectable 25 Gram(s) IV Push once  dextrose 50% Injectable 25 Gram(s) IV Push once  aspirin  chewable 81 milliGRAM(s) Oral daily  atorvastatin 20 milliGRAM(s) Oral at bedtime  clopidogrel Tablet 75 milliGRAM(s) Oral daily  ketorolac 0.5% Ophthalmic Solution 1 Drop(s) Both EYES two times a day  brimonidine 0.2% Ophthalmic Solution 1 Drop(s) Both EYES two times a day  timolol 0.5% Solution 1 Drop(s) Both EYES two times a day  ertapenem  IVPB 1000 milliGRAM(s) IV Intermittent every 24 hours  zinc sulfate 220 milliGRAM(s) Oral daily  Nephro-jennifer 1 Tablet(s) Oral daily  carvedilol 12.5 milliGRAM(s) Oral every 12 hours  isosorbide   mononitrate ER Tablet (IMDUR) 30 milliGRAM(s) Oral daily  valsartan 320 milliGRAM(s) Oral daily  ergocalciferol 74352 Unit(s) Oral <User Schedule>  insulin glargine Injectable (LANTUS) 40 Unit(s) SubCutaneous at bedtime  vancomycin  IVPB 750 milliGRAM(s) IV Intermittent every 12 hours  insulin lispro Injectable (HumaLOG) 4 Unit(s) SubCutaneous three times a day before meals  docusate sodium 100 milliGRAM(s) Oral three times a day  senna 2 Tablet(s) Oral at bedtime    MEDICATIONS  (PRN):  acetaminophen   Tablet 650 milliGRAM(s) Oral every 6 hours PRN  dextrose Gel 1 Dose(s) Oral once PRN  glucagon  Injectable 1 milliGRAM(s) IntraMuscular once PRN  acetaminophen   Tablet. 650 milliGRAM(s) Oral every 6 hours PRN  oxyCODONE    5 mG/acetaminophen 325 mG 2 Tablet(s) Oral every 6 hours PRN  morphine  - Injectable 2 milliGRAM(s) IV Push every 6 hours PRN      REVIEW OF SYSTEMS:  CONSTITUTIONAL: No fever  RESPIRATORY: No shortness of breath  CARDIOVASCULAR: No chest pain  GASTROINTESTINAL: No abdominal or epigastric pain. No nausea, vomiting  SKIN: No itching, burning, rashes, or lesions   MUSCULOSKELETAL: Minimal left foot pain    T(F): 99.9 (07-11-17 @ 09:50), Max: 99.9 (07-11-17 @ 09:50)  HR: 69 (07-11-17 @ 09:50) (68 - 75)  BP: 128/49 (07-11-17 @ 09:50) (125/57 - 136/50)  RR: 18 (07-11-17 @ 09:50) (16 - 18)  SpO2: 95% (07-11-17 @ 09:50) (93% - 95%)  Wt(kg): --    CAPILLARY BLOOD GLUCOSE  200 (11 Jul 2017 08:28)  245 (10 Jul 2017 21:51)  210 (10 Jul 2017 17:08)  219 (10 Jul 2017 12:07)        I&O's Summary    PHYSICAL EXAM:  GENERAL: NAD, well-groomed, well-developed  HEAD:  Atraumatic, Normocephalic  EYES: EOMI, PERRLA, conjunctiva and sclera clear  ENMT: Moist mucous membranes  NECK: Supple, No JVD  NERVOUS SYSTEM:  Alert & Oriented X3, Good concentration; Motor Strength 5/5 B/L upper and LLE  CHEST/LUNG: Clear to percussion bilaterally; No rales, rhonchi, wheezing, or rubs  HEART: Regular rate and rhythm; systolic murmur, rubs, or gallops  ABDOMEN: Soft, Nontender, Nondistended; Bowel sounds present  EXTREMITIES: R BKA, L foot in boot and wrapped in Kerlix  LYMPH: No lymphadenopathy noted  SKIN: L foot in boot and wrapped in Kerlix, dry/intact    LABS:                        6.2    8.87  )-----------( 280      ( 11 Jul 2017 07:00 )             19.6     07-11    139  |  102  |  19  ----------------------------<  184<H>  4.1   |  24  |  1.25    Ca    8.3<L>      11 Jul 2017 05:20  Phos  3.8     07-11  Mg     1.7     07-11              RADIOLOGY & ADDITIONAL TESTS:    XRay:    CTScan:    MRI:     Imaging Personally Reviewed:  [ ] YES  [ ] NO    Consultant(s) Notes Reviewed:  [ ] YES  [ ] NO    Care Discussed with Consultants/Other Providers [ ] YES  [ ] NO

## 2017-07-11 NOTE — PROVIDER CONTACT NOTE (CRITICAL VALUE NOTIFICATION) - ASSESSMENT
Pt alert and responsive. no signs of acute distress. denies discomfort. no visible signs of active bleed. vital signs stable.

## 2017-07-11 NOTE — PROGRESS NOTE ADULT - PROBLEM SELECTOR PLAN 5
Stable, no current CP  -C/w ASA, Plavix, carvedilol, isosorbide, atorvastatin S/p stent to L SFA on last admission in June 2017  -Repeat DUC/PVRs showing improvement from last procedure  -C/w ASA and Plavix

## 2017-07-11 NOTE — PROGRESS NOTE ADULT - PROBLEM SELECTOR PLAN 8
DVT ppx: HSQ  Diet: DASH, CC  PT recs- needs reevaluation before patient can be discharged DVT ppx: HSQ  Diet: DASH, CC  PT recs- now recommends inpat rehab, which patient agrees to.

## 2017-07-12 LAB
BASOPHILS # BLD AUTO: 0.02 K/UL — SIGNIFICANT CHANGE UP (ref 0–0.2)
BASOPHILS NFR BLD AUTO: 0.2 % — SIGNIFICANT CHANGE UP (ref 0–2)
BUN SERPL-MCNC: 19 MG/DL — SIGNIFICANT CHANGE UP (ref 7–23)
CALCIUM SERPL-MCNC: 8.5 MG/DL — SIGNIFICANT CHANGE UP (ref 8.4–10.5)
CHLORIDE SERPL-SCNC: 106 MMOL/L — SIGNIFICANT CHANGE UP (ref 98–107)
CO2 SERPL-SCNC: 22 MMOL/L — SIGNIFICANT CHANGE UP (ref 22–31)
CREAT SERPL-MCNC: 1.2 MG/DL — SIGNIFICANT CHANGE UP (ref 0.5–1.3)
EOSINOPHIL # BLD AUTO: 0.88 K/UL — HIGH (ref 0–0.5)
EOSINOPHIL NFR BLD AUTO: 10.3 % — HIGH (ref 0–6)
GLUCOSE SERPL-MCNC: 128 MG/DL — HIGH (ref 70–99)
HCT VFR BLD CALC: 25.3 % — LOW (ref 34.5–45)
HGB BLD-MCNC: 8.2 G/DL — LOW (ref 11.5–15.5)
IMM GRANULOCYTES # BLD AUTO: 0.03 # — SIGNIFICANT CHANGE UP
IMM GRANULOCYTES NFR BLD AUTO: 0.4 % — SIGNIFICANT CHANGE UP (ref 0–1.5)
LYMPHOCYTES # BLD AUTO: 1.94 K/UL — SIGNIFICANT CHANGE UP (ref 1–3.3)
LYMPHOCYTES # BLD AUTO: 22.7 % — SIGNIFICANT CHANGE UP (ref 13–44)
MCHC RBC-ENTMCNC: 28.5 PG — SIGNIFICANT CHANGE UP (ref 27–34)
MCHC RBC-ENTMCNC: 32.4 % — SIGNIFICANT CHANGE UP (ref 32–36)
MCV RBC AUTO: 87.8 FL — SIGNIFICANT CHANGE UP (ref 80–100)
MONOCYTES # BLD AUTO: 0.46 K/UL — SIGNIFICANT CHANGE UP (ref 0–0.9)
MONOCYTES NFR BLD AUTO: 5.4 % — SIGNIFICANT CHANGE UP (ref 2–14)
NEUTROPHILS # BLD AUTO: 5.23 K/UL — SIGNIFICANT CHANGE UP (ref 1.8–7.4)
NEUTROPHILS NFR BLD AUTO: 61 % — SIGNIFICANT CHANGE UP (ref 43–77)
NRBC # FLD: 0 — SIGNIFICANT CHANGE UP
PLATELET # BLD AUTO: 299 K/UL — SIGNIFICANT CHANGE UP (ref 150–400)
PMV BLD: 11.1 FL — SIGNIFICANT CHANGE UP (ref 7–13)
POTASSIUM SERPL-MCNC: 4.1 MMOL/L — SIGNIFICANT CHANGE UP (ref 3.5–5.3)
POTASSIUM SERPL-SCNC: 4.1 MMOL/L — SIGNIFICANT CHANGE UP (ref 3.5–5.3)
RBC # BLD: 2.88 M/UL — LOW (ref 3.8–5.2)
RBC # FLD: 15.5 % — HIGH (ref 10.3–14.5)
SODIUM SERPL-SCNC: 143 MMOL/L — SIGNIFICANT CHANGE UP (ref 135–145)
WBC # BLD: 8.56 K/UL — SIGNIFICANT CHANGE UP (ref 3.8–10.5)
WBC # FLD AUTO: 8.56 K/UL — SIGNIFICANT CHANGE UP (ref 3.8–10.5)

## 2017-07-12 PROCEDURE — 99239 HOSP IP/OBS DSCHRG MGMT >30: CPT

## 2017-07-12 PROCEDURE — 99232 SBSQ HOSP IP/OBS MODERATE 35: CPT

## 2017-07-12 RX ORDER — FUROSEMIDE 40 MG
20 TABLET ORAL ONCE
Qty: 0 | Refills: 0 | Status: COMPLETED | OUTPATIENT
Start: 2017-07-12 | End: 2017-07-12

## 2017-07-12 RX ADMIN — Medication 4 UNIT(S): at 13:01

## 2017-07-12 RX ADMIN — ZINC SULFATE TAB 220 MG (50 MG ZINC EQUIVALENT) 220 MILLIGRAM(S): 220 (50 ZN) TAB at 13:00

## 2017-07-12 RX ADMIN — Medication 1 DROP(S): at 18:01

## 2017-07-12 RX ADMIN — VALSARTAN 320 MILLIGRAM(S): 80 TABLET ORAL at 05:38

## 2017-07-12 RX ADMIN — HEPARIN SODIUM 5000 UNIT(S): 5000 INJECTION INTRAVENOUS; SUBCUTANEOUS at 05:38

## 2017-07-12 RX ADMIN — Medication 1: at 18:02

## 2017-07-12 RX ADMIN — SENNA PLUS 2 TABLET(S): 8.6 TABLET ORAL at 21:56

## 2017-07-12 RX ADMIN — BRIMONIDINE TARTRATE 1 DROP(S): 2 SOLUTION/ DROPS OPHTHALMIC at 18:03

## 2017-07-12 RX ADMIN — Medication 1 TABLET(S): at 13:00

## 2017-07-12 RX ADMIN — CARVEDILOL PHOSPHATE 12.5 MILLIGRAM(S): 80 CAPSULE, EXTENDED RELEASE ORAL at 05:38

## 2017-07-12 RX ADMIN — INSULIN GLARGINE 40 UNIT(S): 100 INJECTION, SOLUTION SUBCUTANEOUS at 21:56

## 2017-07-12 RX ADMIN — Medication 1 DROP(S): at 05:38

## 2017-07-12 RX ADMIN — ATORVASTATIN CALCIUM 20 MILLIGRAM(S): 80 TABLET, FILM COATED ORAL at 21:56

## 2017-07-12 RX ADMIN — Medication 81 MILLIGRAM(S): at 13:00

## 2017-07-12 RX ADMIN — Medication 4 UNIT(S): at 08:50

## 2017-07-12 RX ADMIN — Medication 1 DROP(S): at 17:55

## 2017-07-12 RX ADMIN — Medication 100 MILLIGRAM(S): at 21:56

## 2017-07-12 RX ADMIN — HEPARIN SODIUM 5000 UNIT(S): 5000 INJECTION INTRAVENOUS; SUBCUTANEOUS at 14:01

## 2017-07-12 RX ADMIN — CLOPIDOGREL BISULFATE 75 MILLIGRAM(S): 75 TABLET, FILM COATED ORAL at 13:00

## 2017-07-12 RX ADMIN — CARVEDILOL PHOSPHATE 12.5 MILLIGRAM(S): 80 CAPSULE, EXTENDED RELEASE ORAL at 18:01

## 2017-07-12 RX ADMIN — Medication 4 UNIT(S): at 18:03

## 2017-07-12 RX ADMIN — Medication 20 MILLIGRAM(S): at 18:06

## 2017-07-12 RX ADMIN — BRIMONIDINE TARTRATE 1 DROP(S): 2 SOLUTION/ DROPS OPHTHALMIC at 05:38

## 2017-07-12 RX ADMIN — Medication 100 MILLIGRAM(S): at 16:41

## 2017-07-12 RX ADMIN — Medication 100 MILLIGRAM(S): at 05:38

## 2017-07-12 RX ADMIN — ISOSORBIDE MONONITRATE 30 MILLIGRAM(S): 60 TABLET, EXTENDED RELEASE ORAL at 13:00

## 2017-07-12 RX ADMIN — HEPARIN SODIUM 5000 UNIT(S): 5000 INJECTION INTRAVENOUS; SUBCUTANEOUS at 21:56

## 2017-07-12 NOTE — PROGRESS NOTE ADULT - ASSESSMENT
71yo F with PMH of Type 2 DM (HgbA1c 7.8%) c/b neuropathy, PVD s/p R BKA (2010) and stent to L SFA, CKD Stage III, CAD s/p stents, CABG and L CEA, and HTN presenting with L hallux infection s/p bedside I & D. MRI positive for OM of the L hallux, s/p OR on 7/7 for L transmetatarsal amputation. Course c/b acute blood loss anemia post-op, s/p 1u PRBCs.

## 2017-07-12 NOTE — PROGRESS NOTE ADULT - PROBLEM SELECTOR PLAN 3
Creatinine currently improved from pt's baseline  - Creatinine increasing over past few days since starting on vancomycin, improving today  - Monitor BMP daily  - Avoid nephrotoxic drugs  - Unclear if pt has CKD given improvement in creatinine to WNL over the past 5 days, will continue to monitor

## 2017-07-12 NOTE — PROGRESS NOTE ADULT - PROBLEM SELECTOR PLAN 4
Ha1c 7.9 06/17  - FS continued to be elevated > 200 yesterday, increased to 4U pre-meal humalog to control blood glucose.   - today .  - Monitor FS and will readjust as needed  - ISS and FS with meals and at bedtime Ha1c 7.9 06/17  - FS at 172 at bedtime after increase to 4U pre-meal humalog to control blood glucose.  - Monitor FS and will readjust as needed  - ISS and FS with meals and at bedtime Ha1c 7.9 06/17  - FS at 172 at bedtime after increase to 4U pre-meal humalog to control blood glucose. AM . consider lowering humalog of still low after lunch.  - Monitor FS and will readjust as needed  - ISS and FS with meals and at bedtime

## 2017-07-12 NOTE — PROGRESS NOTE ADULT - ASSESSMENT
A/P: 72 year old female s/p LF TMA w/ NIDIA, closed   Patient seen and evaluated.  All questions answered to patient's satisfaction.    From my standpoint, the patient can be discharged to rehab and follow up in the wound care center.    She is to remain NWB to the LEFT foot with CAM boot until further instructed.    Once the sutures are removed she will likely be able to begin WB with CAM boot.  Will discuss with attending A/P: 72 year old female s/p LF TMA w/ NIDIA, and application of antibiotic beads, closed   Patient seen and evaluated.  All questions answered to patient's satisfaction.    From my standpoint, the patient can be discharged to rehab and follow up in the wound care center.    She is to remain NWB to the LEFT foot with CAM boot until further instructed.    Once the sutures are removed she will likely be able to begin WB with CAM boot.  Will discuss with attending

## 2017-07-12 NOTE — PROGRESS NOTE ADULT - SUBJECTIVE AND OBJECTIVE BOX
Patient is a 72y old  Female who presents with a chief complaint of Toe infection (09 Jul 2017 09:44)       INTERVAL HPI/OVERNIGHT EVENTS: Patient is POD #5. Says she slept well, has had no dizziness or shortness of breath overnight. AM Hb showed Hb 8.2. ID says pt can go start PO Levaquin today.     MEDICATIONS  (STANDING):  heparin  Injectable 5000 Unit(s) SubCutaneous every 8 hours  insulin lispro (HumaLOG) corrective regimen sliding scale   SubCutaneous three times a day before meals  insulin lispro (HumaLOG) corrective regimen sliding scale   SubCutaneous at bedtime  dextrose 5%. 1000 milliLiter(s) (50 mL/Hr) IV Continuous <Continuous>  dextrose 50% Injectable 12.5 Gram(s) IV Push once  dextrose 50% Injectable 25 Gram(s) IV Push once  dextrose 50% Injectable 25 Gram(s) IV Push once  aspirin  chewable 81 milliGRAM(s) Oral daily  atorvastatin 20 milliGRAM(s) Oral at bedtime  clopidogrel Tablet 75 milliGRAM(s) Oral daily  ketorolac 0.5% Ophthalmic Solution 1 Drop(s) Both EYES two times a day  brimonidine 0.2% Ophthalmic Solution 1 Drop(s) Both EYES two times a day  timolol 0.5% Solution 1 Drop(s) Both EYES two times a day  zinc sulfate 220 milliGRAM(s) Oral daily  Nephro-jennifer 1 Tablet(s) Oral daily  carvedilol 12.5 milliGRAM(s) Oral every 12 hours  isosorbide   mononitrate ER Tablet (IMDUR) 30 milliGRAM(s) Oral daily  valsartan 320 milliGRAM(s) Oral daily  ergocalciferol 35724 Unit(s) Oral <User Schedule>  insulin glargine Injectable (LANTUS) 40 Unit(s) SubCutaneous at bedtime  insulin lispro Injectable (HumaLOG) 4 Unit(s) SubCutaneous three times a day before meals  docusate sodium 100 milliGRAM(s) Oral three times a day  senna 2 Tablet(s) Oral at bedtime  levoFLOXacin  Tablet 500 milliGRAM(s) Oral daily    MEDICATIONS  (PRN):  acetaminophen   Tablet 650 milliGRAM(s) Oral every 6 hours PRN For Temp greater than 38 C (100.4 F)  dextrose Gel 1 Dose(s) Oral once PRN Blood Glucose LESS THAN 70 milliGRAM(s)/deciliter  glucagon  Injectable 1 milliGRAM(s) IntraMuscular once PRN Glucose LESS THAN 70 milligrams/deciliter  acetaminophen   Tablet. 650 milliGRAM(s) Oral every 6 hours PRN Mild Pain (1 - 3)  oxyCODONE    5 mG/acetaminophen 325 mG 2 Tablet(s) Oral every 6 hours PRN Moderate Pain (4 - 6)  morphine  - Injectable 2 milliGRAM(s) IV Push every 6 hours PRN Severe Pain (7 - 10)        Allergies    sulfa drugs (Hives)  sulfa drugs (Rash)  Sulfac 10% (Hives)    Intolerances        REVIEW OF SYSTEMS:  CONSTITUTIONAL: No fever, no fatigue, no headaches  RESPIRATORY: No shortness of breath, dry cough stable  CARDIOVASCULAR: No chest pain or dizziness, no palpitations  GASTROINTESTINAL: no abdominal pain. No nausea, vomiting, or diarrhea.   NEUROLOGICAL: numbness at LLE at amputation site. no headaches.  SKIN: no rashes or hives.     Vital Signs Last 24 Hrs  T(C): 37.2 (12 Jul 2017 05:36), Max: 37.7 (11 Jul 2017 09:50)  T(F): 99 (12 Jul 2017 05:36), Max: 99.9 (11 Jul 2017 09:50)  HR: 70 (12 Jul 2017 05:36) (68 - 72)  BP: 154/72 (12 Jul 2017 05:36) (128/49 - 163/60)  RR: 17 (12 Jul 2017 05:36) (16 - 18)  SpO2: 95% (12 Jul 2017 05:36) (93% - 96%)    PHYSICAL EXAM:  GENERAL: NAD  HEAD:  Atraumatic, Normocephalic  EYES: EOMI, conjunctiva pale; and sclera clear  ENMT:  Moist mucous membranes  NERVOUS SYSTEM: A&O x 3  PSYCHIATRIC: Appropriate affect and mood  CHEST/LUNG: Bilateral inspiratory rales at lower lung fields.  HEART: Regular rate and rhythm; systolic murmur.  ABDOMEN: Soft, Nontender, Nondistended; Bowel sounds present  EXTREMITIES:  2+ Peripheral Pulses, RLE below the knee amputation, LLE s/p TMA POD #5, wrapped in dressing and walking boot      LABS:                                                                  8.2    8.56  )-----------( 299      ( 12 Jul 2017 05:50 )             25.3   Auto Eosinophils: 10.3 %      07-12    143  |  106  |  19  ----------------------------<  128<H>  4.1   |  22  |  1.20    Ca    8.5      12 Jul 2017 05:50  Phos  3.8     07-11  Mg     1.7     07-11      CAPILLARY BLOOD GLUCOSE  172 (11 Jul 2017 21:56)  158 (11 Jul 2017 17:02)  181 (11 Jul 2017 12:16)  200 (11 Jul 2017 08:28)                    BLOOD CULTURE- not growing organisms (culture from 7-4)  WOUND CULTURE - not growing organisms after 4 days  SURGICAL CULTURE - not growing organisms after 1 day    RADIOLOGY & ADDITIONAL TESTS:    Imaging Personally Reviewed:  [X ] YES     Consultant(s) Notes Reviewed:      Care Discussed with Consultants/Other Providers: Patient is a 72y old  Female who presents with a chief complaint of Toe infection (09 Jul 2017 09:44)       INTERVAL HPI/OVERNIGHT EVENTS: Patient is POD #5. Says she slept well, has had no dizziness or shortness of breath overnight. AM Hb showed Hb 8.2. ID says pt can start PO Levaquin today. Podiatry inspected wound and found no sign of hematoma or necrosis.    MEDICATIONS  (STANDING):  heparin  Injectable 5000 Unit(s) SubCutaneous every 8 hours  insulin lispro (HumaLOG) corrective regimen sliding scale   SubCutaneous three times a day before meals  insulin lispro (HumaLOG) corrective regimen sliding scale   SubCutaneous at bedtime  dextrose 5%. 1000 milliLiter(s) (50 mL/Hr) IV Continuous <Continuous>  dextrose 50% Injectable 12.5 Gram(s) IV Push once  dextrose 50% Injectable 25 Gram(s) IV Push once  dextrose 50% Injectable 25 Gram(s) IV Push once  aspirin  chewable 81 milliGRAM(s) Oral daily  atorvastatin 20 milliGRAM(s) Oral at bedtime  clopidogrel Tablet 75 milliGRAM(s) Oral daily  ketorolac 0.5% Ophthalmic Solution 1 Drop(s) Both EYES two times a day  brimonidine 0.2% Ophthalmic Solution 1 Drop(s) Both EYES two times a day  timolol 0.5% Solution 1 Drop(s) Both EYES two times a day  zinc sulfate 220 milliGRAM(s) Oral daily  Nephro-jennifer 1 Tablet(s) Oral daily  carvedilol 12.5 milliGRAM(s) Oral every 12 hours  isosorbide   mononitrate ER Tablet (IMDUR) 30 milliGRAM(s) Oral daily  valsartan 320 milliGRAM(s) Oral daily  ergocalciferol 45747 Unit(s) Oral <User Schedule>  insulin glargine Injectable (LANTUS) 40 Unit(s) SubCutaneous at bedtime  insulin lispro Injectable (HumaLOG) 4 Unit(s) SubCutaneous three times a day before meals  docusate sodium 100 milliGRAM(s) Oral three times a day  senna 2 Tablet(s) Oral at bedtime  levoFLOXacin  Tablet 500 milliGRAM(s) Oral daily    MEDICATIONS  (PRN):  acetaminophen   Tablet 650 milliGRAM(s) Oral every 6 hours PRN For Temp greater than 38 C (100.4 F)  dextrose Gel 1 Dose(s) Oral once PRN Blood Glucose LESS THAN 70 milliGRAM(s)/deciliter  glucagon  Injectable 1 milliGRAM(s) IntraMuscular once PRN Glucose LESS THAN 70 milligrams/deciliter  acetaminophen   Tablet. 650 milliGRAM(s) Oral every 6 hours PRN Mild Pain (1 - 3)  oxyCODONE    5 mG/acetaminophen 325 mG 2 Tablet(s) Oral every 6 hours PRN Moderate Pain (4 - 6)  morphine  - Injectable 2 milliGRAM(s) IV Push every 6 hours PRN Severe Pain (7 - 10)        Allergies    sulfa drugs (Hives)  sulfa drugs (Rash)  Sulfac 10% (Hives)    Intolerances        REVIEW OF SYSTEMS:  CONSTITUTIONAL: No fever, no fatigue, no headaches  RESPIRATORY: No shortness of breath, dry cough stable  CARDIOVASCULAR: No chest pain or dizziness, no palpitations  GASTROINTESTINAL: no abdominal pain. No nausea, vomiting, hematochezia, or diarrhea.   NEUROLOGICAL: no headaches.  SKIN: no rashes or hives.     Vital Signs Last 24 Hrs  T(C): 37.2 (12 Jul 2017 05:36), Max: 37.7 (11 Jul 2017 09:50)  T(F): 99 (12 Jul 2017 05:36), Max: 99.9 (11 Jul 2017 09:50)  HR: 70 (12 Jul 2017 05:36) (68 - 72)  BP: 154/72 (12 Jul 2017 05:36) (128/49 - 163/60)  RR: 17 (12 Jul 2017 05:36) (16 - 18)  SpO2: 95% (12 Jul 2017 05:36) (93% - 96%)    PHYSICAL EXAM:  GENERAL: NAD  HEAD:  Atraumatic, Normocephalic  EYES: EOMI, conjunctiva pale; and sclera clear  ENMT:  Moist mucous membranes  NERVOUS SYSTEM: A&O x 3  PSYCHIATRIC: Appropriate affect and mood  CHEST/LUNG: Bilateral inspiratory rales at lower lung fields.  HEART: Regular rate and rhythm; systolic murmur.  ABDOMEN: Soft, Nontender, Nondistended; Bowel sounds present  EXTREMITIES:  2+ Peripheral Pulses, RLE below the knee amputation, LLE s/p TMA POD #5, wrapped in dressing and walking boot; upon removal of dressing, an axial suture line is noted over the distal edge of the left foot with a small area of serous drainage at the medial edge. No signs of fluctuance or hematoma are present. no areas of necrosis visible over plantar aspect. sensation is intact on plantar aspect of foot.      LABS:                                                                  8.2    8.56  )-----------( 299      ( 12 Jul 2017 05:50 )             25.3   Auto Eosinophils: 10.3 %      07-12    143  |  106  |  19  ----------------------------<  128<H>  4.1   |  22  |  1.20    Ca    8.5      12 Jul 2017 05:50  Phos  3.8     07-11  Mg     1.7     07-11      CAPILLARY BLOOD GLUCOSE  172 (11 Jul 2017 21:56)  158 (11 Jul 2017 17:02)  181 (11 Jul 2017 12:16)  200 (11 Jul 2017 08:28)        BLOOD CULTURE- not growing organisms (culture from 7-4)  WOUND CULTURE - not growing organisms after 4 days  SURGICAL CULTURE - not growing organisms after 1 day    RADIOLOGY & ADDITIONAL TESTS:    Imaging Personally Reviewed:  [X ] YES     Consultant(s) Notes Reviewed:      Care Discussed with Consultants/Other Providers: Patient is a 72y old  Female who presents with a chief complaint of Toe infection (09 Jul 2017 09:44)       INTERVAL HPI/OVERNIGHT EVENTS: Patient is POD #5. Says she slept well, has had no dizziness or shortness of breath overnight. AM Hb showed Hb 8.2. ID says pt can start PO Levaquin today. Podiatry inspected wound and found no sign of hematoma or necrosis.    MEDICATIONS  (STANDING):  heparin  Injectable 5000 Unit(s) SubCutaneous every 8 hours  insulin lispro (HumaLOG) corrective regimen sliding scale   SubCutaneous three times a day before meals  insulin lispro (HumaLOG) corrective regimen sliding scale   SubCutaneous at bedtime  dextrose 5%. 1000 milliLiter(s) (50 mL/Hr) IV Continuous <Continuous>  dextrose 50% Injectable 12.5 Gram(s) IV Push once  dextrose 50% Injectable 25 Gram(s) IV Push once  dextrose 50% Injectable 25 Gram(s) IV Push once  aspirin  chewable 81 milliGRAM(s) Oral daily  atorvastatin 20 milliGRAM(s) Oral at bedtime  clopidogrel Tablet 75 milliGRAM(s) Oral daily  ketorolac 0.5% Ophthalmic Solution 1 Drop(s) Both EYES two times a day  brimonidine 0.2% Ophthalmic Solution 1 Drop(s) Both EYES two times a day  timolol 0.5% Solution 1 Drop(s) Both EYES two times a day  zinc sulfate 220 milliGRAM(s) Oral daily  Nephro-jennifer 1 Tablet(s) Oral daily  carvedilol 12.5 milliGRAM(s) Oral every 12 hours  isosorbide   mononitrate ER Tablet (IMDUR) 30 milliGRAM(s) Oral daily  valsartan 320 milliGRAM(s) Oral daily  ergocalciferol 05234 Unit(s) Oral <User Schedule>  insulin glargine Injectable (LANTUS) 40 Unit(s) SubCutaneous at bedtime  insulin lispro Injectable (HumaLOG) 4 Unit(s) SubCutaneous three times a day before meals  docusate sodium 100 milliGRAM(s) Oral three times a day  senna 2 Tablet(s) Oral at bedtime  levoFLOXacin  Tablet 500 milliGRAM(s) Oral daily    MEDICATIONS  (PRN):  acetaminophen   Tablet 650 milliGRAM(s) Oral every 6 hours PRN For Temp greater than 38 C (100.4 F)  dextrose Gel 1 Dose(s) Oral once PRN Blood Glucose LESS THAN 70 milliGRAM(s)/deciliter  glucagon  Injectable 1 milliGRAM(s) IntraMuscular once PRN Glucose LESS THAN 70 milligrams/deciliter  acetaminophen   Tablet. 650 milliGRAM(s) Oral every 6 hours PRN Mild Pain (1 - 3)  oxyCODONE    5 mG/acetaminophen 325 mG 2 Tablet(s) Oral every 6 hours PRN Moderate Pain (4 - 6)  morphine  - Injectable 2 milliGRAM(s) IV Push every 6 hours PRN Severe Pain (7 - 10)        Allergies    sulfa drugs (Hives)  sulfa drugs (Rash)  Sulfac 10% (Hives)    Intolerances        REVIEW OF SYSTEMS:  CONSTITUTIONAL: No fever, no fatigue, no headaches  RESPIRATORY: No shortness of breath, dry cough stable  CARDIOVASCULAR: No chest pain or dizziness, no palpitations  GASTROINTESTINAL: no abdominal pain. No nausea, vomiting, hematochezia, or diarrhea.   NEUROLOGICAL: no headaches.  SKIN: no rashes or hives.     Vital Signs Last 24 Hrs  T(C): 37.2 (12 Jul 2017 05:36), Max: 37.7 (11 Jul 2017 09:50)  T(F): 99 (12 Jul 2017 05:36), Max: 99.9 (11 Jul 2017 09:50)  HR: 70 (12 Jul 2017 05:36) (68 - 72)  BP: 154/72 (12 Jul 2017 05:36) (128/49 - 163/60)  RR: 17 (12 Jul 2017 05:36) (16 - 18)  SpO2: 95% (12 Jul 2017 05:36) (93% - 96%)    PHYSICAL EXAM:  GENERAL: NAD  HEAD:  Atraumatic, Normocephalic  EYES: EOMI, conjunctiva pale; and sclera clear  ENMT:  Moist mucous membranes  NERVOUS SYSTEM: A&O x 3  PSYCHIATRIC: Appropriate affect and mood  CHEST/LUNG: Bilateral inspiratory rales at lower lung fields.  HEART: Regular rate and rhythm; systolic murmur.  ABDOMEN: Soft, Nontender, Nondistended; Bowel sounds present  EXTREMITIES:  2+ Peripheral Pulses, RLE below the knee amputation, LLE s/p TMA POD #5, wrapped in dressing and walking boot; upon removal of dressing, an axial suture line is noted over the distal edge of the left foot with a small area of serous drainage at the medial edge. No signs of fluctuance or hematoma are present. no areas of necrosis visible over plantar aspect. sensation is intact on plantar aspect of foot.      LABS:                                                                  8.2    8.56  )-----------( 299      ( 12 Jul 2017 05:50 )             25.3   Auto Eosinophils: 10.3 %      07-12    143  |  106  |  19  ----------------------------<  128<H>  4.1   |  22  |  1.20    Ca    8.5      12 Jul 2017 05:50  Phos  3.8     07-11  Mg     1.7     07-11      CAPILLARY BLOOD GLUCOSE  127 (12 Jul 2017 08:29)  172 (11 Jul 2017 21:56)  158 (11 Jul 2017 17:02)  181 (11 Jul 2017 12:16)    BLOOD CULTURE- not growing organisms (culture from 7-4)  WOUND CULTURE - not growing organisms after 4 days  SURGICAL CULTURE - not growing organisms after 1 day    RADIOLOGY & ADDITIONAL TESTS:    Imaging Personally Reviewed:  [X ] YES     Consultant(s) Notes Reviewed:      Care Discussed with Consultants/Other Providers:

## 2017-07-12 NOTE — PROGRESS NOTE ADULT - SUBJECTIVE AND OBJECTIVE BOX
Patient is a 72y old  Female who presents with a chief complaint of Toe infection (09 Jul 2017 09:44)       INTERVAL HPI/OVERNIGHT EVENTS:  Patient seen and evaluated at bedside.  Pt is resting comfortable in NAD. Denies N/V/F/C.  Pain rated at 0/10    Allergies    sulfa drugs (Hives)  sulfa drugs (Rash)  Sulfac 10% (Hives)    Intolerances        Vital Signs Last 24 Hrs  T(C): 37.2 (12 Jul 2017 05:36), Max: 37.7 (11 Jul 2017 09:50)  T(F): 99 (12 Jul 2017 05:36), Max: 99.9 (11 Jul 2017 09:50)  HR: 70 (12 Jul 2017 05:36) (68 - 72)  BP: 154/72 (12 Jul 2017 05:36) (128/49 - 163/60)  BP(mean): --  RR: 17 (12 Jul 2017 05:36) (16 - 18)  SpO2: 95% (12 Jul 2017 05:36) (93% - 96%)    LABS:                        8.2    8.56  )-----------( 299      ( 12 Jul 2017 05:50 )             25.3     07-12    143  |  106  |  19  ----------------------------<  128<H>  4.1   |  22  |  1.20    Ca    8.5      12 Jul 2017 05:50  Phos  3.8     07-11  Mg     1.7     07-11          CAPILLARY BLOOD GLUCOSE  172 (11 Jul 2017 21:56)  158 (11 Jul 2017 17:02)  181 (11 Jul 2017 12:16)  200 (11 Jul 2017 08:28)          Lower Extremity Physical Exam:  No change in neurovascular status.  There is mild serous drainage medially consistent with abx beads and postop status.  The wound is clean with intact sutures. No erythema, mild edema, no purulence, no crepitus, no fluctuance, no warmth, no malodor.  Flap viable with brisk capillary refill. NIDIA site clean, closed, and dry.POD#4 s/p LEFT TMA and NIDIA. Patient is a 72y old  Female who presents with a chief complaint of Toe infection (09 Jul 2017 09:44)       INTERVAL HPI/OVERNIGHT EVENTS:  Patient seen and evaluated at bedside.  Pt is resting comfortable in NAD. Denies N/V/F/C.  Pain rated at 0/10    Allergies    sulfa drugs (Hives)  sulfa drugs (Rash)  Sulfac 10% (Hives)    Intolerances        Vital Signs Last 24 Hrs  T(C): 37.2 (12 Jul 2017 05:36), Max: 37.7 (11 Jul 2017 09:50)  T(F): 99 (12 Jul 2017 05:36), Max: 99.9 (11 Jul 2017 09:50)  HR: 70 (12 Jul 2017 05:36) (68 - 72)  BP: 154/72 (12 Jul 2017 05:36) (128/49 - 163/60)  BP(mean): --  RR: 17 (12 Jul 2017 05:36) (16 - 18)  SpO2: 95% (12 Jul 2017 05:36) (93% - 96%)    LABS:                        8.2    8.56  )-----------( 299      ( 12 Jul 2017 05:50 )             25.3     07-12    143  |  106  |  19  ----------------------------<  128<H>  4.1   |  22  |  1.20    Ca    8.5      12 Jul 2017 05:50  Phos  3.8     07-11  Mg     1.7     07-11          CAPILLARY BLOOD GLUCOSE  172 (11 Jul 2017 21:56)  158 (11 Jul 2017 17:02)  181 (11 Jul 2017 12:16)  200 (11 Jul 2017 08:28)          Lower Extremity Physical Exam:  No change in neurovascular status.  There is mild serous drainage medially consistent with abx beads and postop status.  The wound is clean with intact sutures. No erythema, mild edema, no purulence, no crepitus, no fluctuance, no warmth, no malodor.  Flap viable with brisk capillary refill. NIDIA site clean, closed, and dry.POD#5 s/p LEFT TMA and NIDIA. Patient is a 72y old  Female who presents with a chief complaint of Toe infection (09 Jul 2017 09:44)       INTERVAL HPI/OVERNIGHT EVENTS:  Patient seen and evaluated at bedside 6 days s/p LEFT TMA.  Pt is resting comfortable in NAD. Denies N/V/F/C.  Pain rated at 0/10    Allergies    sulfa drugs (Hives)  sulfa drugs (Rash)  Sulfac 10% (Hives)    Intolerances        Vital Signs Last 24 Hrs  T(C): 37.2 (12 Jul 2017 05:36), Max: 37.7 (11 Jul 2017 09:50)  T(F): 99 (12 Jul 2017 05:36), Max: 99.9 (11 Jul 2017 09:50)  HR: 70 (12 Jul 2017 05:36) (68 - 72)  BP: 154/72 (12 Jul 2017 05:36) (128/49 - 163/60)  BP(mean): --  RR: 17 (12 Jul 2017 05:36) (16 - 18)  SpO2: 95% (12 Jul 2017 05:36) (93% - 96%)    LABS:                        8.2    8.56  )-----------( 299      ( 12 Jul 2017 05:50 )             25.3     07-12    143  |  106  |  19  ----------------------------<  128<H>  4.1   |  22  |  1.20    Ca    8.5      12 Jul 2017 05:50  Phos  3.8     07-11  Mg     1.7     07-11          CAPILLARY BLOOD GLUCOSE  172 (11 Jul 2017 21:56)  158 (11 Jul 2017 17:02)  181 (11 Jul 2017 12:16)  200 (11 Jul 2017 08:28)          Lower Extremity Physical Exam:  No change in neurovascular status.  There is mild serous drainage medially consistent with abx beads and postop status.  The wound is clean with intact sutures. No erythema, mild edema, no purulence, no crepitus, no fluctuance, no warmth, no malodor.  Flap viable with brisk capillary refill. NIDIA site clean, closed, and dry.POD#6 s/p LEFT TMA and NIDIA.

## 2017-07-12 NOTE — PROGRESS NOTE ADULT - PROBLEM SELECTOR PLAN 8
DVT ppx: HSQ  Diet: DASH, CC  PT recs- now recommends inpat rehab, which patient agrees to. DVT ppx: HSQ  Diet: DASH, CC  PT recs- inpat rehab

## 2017-07-12 NOTE — PROGRESS NOTE ADULT - SUBJECTIVE AND OBJECTIVE BOX
Patient is a 72y old  Female who presents with a chief complaint of Toe infection (09 Jul 2017 09:44)      INTERVAL HPI/OVERNIGHT EVENTS:    MEDICATIONS (STANDING):  heparin  Injectable 5000 Unit(s) SubCutaneous every 8 hours  insulin lispro (HumaLOG) corrective regimen sliding scale   SubCutaneous three times a day before meals  insulin lispro (HumaLOG) corrective regimen sliding scale   SubCutaneous at bedtime  dextrose 5%. 1000 milliLiter(s) IV Continuous <Continuous>  dextrose 50% Injectable 12.5 Gram(s) IV Push once  dextrose 50% Injectable 25 Gram(s) IV Push once  dextrose 50% Injectable 25 Gram(s) IV Push once  aspirin  chewable 81 milliGRAM(s) Oral daily  atorvastatin 20 milliGRAM(s) Oral at bedtime  clopidogrel Tablet 75 milliGRAM(s) Oral daily  ketorolac 0.5% Ophthalmic Solution 1 Drop(s) Both EYES two times a day  brimonidine 0.2% Ophthalmic Solution 1 Drop(s) Both EYES two times a day  timolol 0.5% Solution 1 Drop(s) Both EYES two times a day  zinc sulfate 220 milliGRAM(s) Oral daily  Nephro-jennifer 1 Tablet(s) Oral daily  carvedilol 12.5 milliGRAM(s) Oral every 12 hours  isosorbide   mononitrate ER Tablet (IMDUR) 30 milliGRAM(s) Oral daily  valsartan 320 milliGRAM(s) Oral daily  ergocalciferol 21655 Unit(s) Oral <User Schedule>  insulin glargine Injectable (LANTUS) 40 Unit(s) SubCutaneous at bedtime  insulin lispro Injectable (HumaLOG) 4 Unit(s) SubCutaneous three times a day before meals  docusate sodium 100 milliGRAM(s) Oral three times a day  senna 2 Tablet(s) Oral at bedtime  levoFLOXacin  Tablet 500 milliGRAM(s) Oral daily    MEDICATIONS  (PRN):  acetaminophen   Tablet 650 milliGRAM(s) Oral every 6 hours PRN  dextrose Gel 1 Dose(s) Oral once PRN  glucagon  Injectable 1 milliGRAM(s) IntraMuscular once PRN  acetaminophen   Tablet. 650 milliGRAM(s) Oral every 6 hours PRN  oxyCODONE    5 mG/acetaminophen 325 mG 2 Tablet(s) Oral every 6 hours PRN  morphine  - Injectable 2 milliGRAM(s) IV Push every 6 hours PRN      REVIEW OF SYSTEMS:  CONSTITUTIONAL: No fever, weight loss, or fatigue  EYES: No eye pain, visual disturbances, or discharge  ENMT:  No difficulty hearing, tinnitus, vertigo; No sinus or throat pain  NECK: No pain or stiffness  BREASTS: No pain, masses, or nipple discharge  RESPIRATORY: No cough, wheezing, chills or hemoptysis; No shortness of breath  CARDIOVASCULAR: No chest pain, palpitations, dizziness, or leg swelling  GASTROINTESTINAL: No abdominal or epigastric pain. No nausea, vomiting, or hematemesis; No diarrhea or constipation. No melena or hematochezia.  GENITOURINARY: No dysuria, frequency, hematuria, or incontinence  NEUROLOGICAL: No headaches, memory loss, loss of strength, numbness, or tremors  SKIN: No itching, burning, rashes, or lesions   MUSCULOSKELETAL: No joint pain or swelling; No muscle, back, or extremity pain    T(F): 99 (07-12-17 @ 05:36), Max: 99.9 (07-11-17 @ 09:50)  HR: 70 (07-12-17 @ 05:36) (68 - 72)  BP: 154/72 (07-12-17 @ 05:36) (128/49 - 163/60)  RR: 17 (07-12-17 @ 05:36) (16 - 18)  SpO2: 95% (07-12-17 @ 05:36) (93% - 96%)  Wt(kg): --  CAPILLARY BLOOD GLUCOSE  127 (12 Jul 2017 08:29)  172 (11 Jul 2017 21:56)  158 (11 Jul 2017 17:02)  181 (11 Jul 2017 12:16)        I&O's Summary      PHYSICAL EXAM:  GENERAL: NAD, well-groomed, well-developed  HEAD:  Atraumatic, Normocephalic  EYES: EOMI, PERRLA, conjunctiva and sclera clear  ENMT: No tonsillar erythema, exudates, or enlargement; Moist mucous membranes, Good dentition, No lesions  NECK: Supple, No JVD, Normal thyroid  NERVOUS SYSTEM:  Alert & Oriented X3, Good concentration; Motor Strength 5/5 B/L upper and lower extremities; DTRs 2+ intact and symmetric  CHEST/LUNG: Clear to percussion bilaterally; No rales, rhonchi, wheezing, or rubs  HEART: Regular rate and rhythm; No murmurs, rubs, or gallops  ABDOMEN: Soft, Nontender, Nondistended; Bowel sounds present  EXTREMITIES:  2+ Peripheral Pulses, No clubbing, cyanosis, or edema  LYMPH: No lymphadenopathy noted  SKIN: No rashes or lesions    LABS:                        8.2    8.56  )-----------( 299      ( 12 Jul 2017 05:50 )             25.3     07-12    143  |  106  |  19  ----------------------------<  128<H>  4.1   |  22  |  1.20    Ca    8.5      12 Jul 2017 05:50  Phos  3.8     07-11  Mg     1.7     07-11              RADIOLOGY & ADDITIONAL TESTS:    XRay:    CTScan:    MRI:     Imaging Personally Reviewed:  [ ] YES  [ ] NO    Consultant(s) Notes Reviewed:  [ ] YES  [ ] NO    Care Discussed with Consultants/Other Providers [ ] YES  [ ] NO Patient is a 72y old  Female who presents with a chief complaint of Toe infection (09 Jul 2017 09:44)      INTERVAL HPI/OVERNIGHT EVENTS: Pt feeling well, no complaints and afebrile overnight. Looking forward to being discharged from the hospital.    MEDICATIONS (STANDING):  heparin  Injectable 5000 Unit(s) SubCutaneous every 8 hours  insulin lispro (HumaLOG) corrective regimen sliding scale   SubCutaneous three times a day before meals  insulin lispro (HumaLOG) corrective regimen sliding scale   SubCutaneous at bedtime  dextrose 5%. 1000 milliLiter(s) IV Continuous <Continuous>  dextrose 50% Injectable 12.5 Gram(s) IV Push once  dextrose 50% Injectable 25 Gram(s) IV Push once  dextrose 50% Injectable 25 Gram(s) IV Push once  aspirin  chewable 81 milliGRAM(s) Oral daily  atorvastatin 20 milliGRAM(s) Oral at bedtime  clopidogrel Tablet 75 milliGRAM(s) Oral daily  ketorolac 0.5% Ophthalmic Solution 1 Drop(s) Both EYES two times a day  brimonidine 0.2% Ophthalmic Solution 1 Drop(s) Both EYES two times a day  timolol 0.5% Solution 1 Drop(s) Both EYES two times a day  zinc sulfate 220 milliGRAM(s) Oral daily  Nephro-jennifer 1 Tablet(s) Oral daily  carvedilol 12.5 milliGRAM(s) Oral every 12 hours  isosorbide   mononitrate ER Tablet (IMDUR) 30 milliGRAM(s) Oral daily  valsartan 320 milliGRAM(s) Oral daily  ergocalciferol 44119 Unit(s) Oral <User Schedule>  insulin glargine Injectable (LANTUS) 40 Unit(s) SubCutaneous at bedtime  insulin lispro Injectable (HumaLOG) 4 Unit(s) SubCutaneous three times a day before meals  docusate sodium 100 milliGRAM(s) Oral three times a day  senna 2 Tablet(s) Oral at bedtime  levoFLOXacin  Tablet 500 milliGRAM(s) Oral daily    MEDICATIONS  (PRN):  acetaminophen   Tablet 650 milliGRAM(s) Oral every 6 hours PRN  dextrose Gel 1 Dose(s) Oral once PRN  glucagon  Injectable 1 milliGRAM(s) IntraMuscular once PRN  acetaminophen   Tablet. 650 milliGRAM(s) Oral every 6 hours PRN  oxyCODONE    5 mG/acetaminophen 325 mG 2 Tablet(s) Oral every 6 hours PRN  morphine  - Injectable 2 milliGRAM(s) IV Push every 6 hours PRN      REVIEW OF SYSTEMS:  CONSTITUTIONAL: No fever  RESPIRATORY: Dry cough  CARDIOVASCULAR: No chest pain  GASTROINTESTINAL: No abdominal or epigastric pain. No nausea, vomiting  SKIN: Itching     T(F): 99 (07-12-17 @ 05:36), Max: 99.9 (07-11-17 @ 09:50)  HR: 70 (07-12-17 @ 05:36) (68 - 72)  BP: 154/72 (07-12-17 @ 05:36) (128/49 - 163/60)  RR: 17 (07-12-17 @ 05:36) (16 - 18)  SpO2: 95% (07-12-17 @ 05:36) (93% - 96%)  Wt(kg): --    CAPILLARY BLOOD GLUCOSE  127 (12 Jul 2017 08:29)  172 (11 Jul 2017 21:56)  158 (11 Jul 2017 17:02)  181 (11 Jul 2017 12:16)        I&O's Summary    PHYSICAL EXAM:  GENERAL: NAD, well-groomed, well-developed  HEAD:  Atraumatic, Normocephalic  EYES: EOMI, PERRLA, conjunctiva and sclera clear  ENMT: Moist mucous membranes  NECK: Supple, No JVD  NERVOUS SYSTEM:  Alert & Oriented X3, Good concentration; Motor Strength 5/5 B/L upper and LLE  CHEST/LUNG: Rhonchi b/l lower lobes  HEART: Regular rate and rhythm; systolic murmur, rubs, or gallops  ABDOMEN: Soft, Nontender, Nondistended; Bowel sounds present  EXTREMITIES: R BKA, L foot in boot and wrapped in Kerlix  LYMPH: No lymphadenopathy noted  SKIN: L foot in boot and wrapped in Kerlix, dry/intact    LABS:                        8.2    8.56  )-----------( 299      ( 12 Jul 2017 05:50 )             25.3     07-12    143  |  106  |  19  ----------------------------<  128<H>  4.1   |  22  |  1.20    Ca    8.5      12 Jul 2017 05:50  Phos  3.8     07-11  Mg     1.7     07-11              RADIOLOGY & ADDITIONAL TESTS:    XRay:    CTScan:    MRI:     Imaging Personally Reviewed:  [ ] YES  [ ] NO    Consultant(s) Notes Reviewed:  [X] YES  [ ] NO    Care Discussed with Consultants/Other Providers [ ] YES  [ ] NO

## 2017-07-12 NOTE — PROGRESS NOTE ADULT - PROBLEM SELECTOR PLAN 1
POD #5 for L TMA  - Vanco and ertapenem yesterday changed to Levaquin 500mg PO qd, to continue for 2 more days  - MRI of L foot showing changes consistent with OM  - F/u podiatry recs  - Wound cultures with NGTD at 72h, f/u blood cultures NGTD at 48h.

## 2017-07-12 NOTE — DIETITIAN INITIAL EVALUATION ADULT. - PERTINENT MEDS FT
Colace, Senna, Humalog Sliding Scale, Lantus, Vitamin D, Coreg, Imdur, Lipitor, Nephrovite, Zinc Sulfate

## 2017-07-12 NOTE — PROGRESS NOTE ADULT - PROBLEM SELECTOR PLAN 3
Creatinine trending up, but still improved from pt's baseline (1.3-1.5) and GFR >70  - Avoid nephrotoxic drugs  - continue to monitor BMP Cr stabilized at 1.20, still below baseline  - Avoid nephrotoxic drugs  - continue to monitor BMP

## 2017-07-12 NOTE — DIETITIAN INITIAL EVALUATION ADULT. - OTHER INFO
Nutrition consult received for poor PO; admitted for left first toe infection. Met with patient, reports feeling overall discomfort contributing to lack of appetite. Denies food allergies, GI distress (nausea/vomiting/constipation/diarrhea), or issues with chewing/swallowing. Patient is familiar to this dietitian from a recent previous admission (see Dietitian Initial Eval 6/26/17) where she has been provided with Consistent Carbohydrate, Carb counting diet education. UBW reported at the time "189#" and (6/19) 179.8#; however current weight (7/7) 194.8#. Patient denies any significant recent weight changes. Consistent Carbohydrate diet reinforced briefly as extensive education is not appropriate given patient's current condition. Amenable to Glucerna Shakes twice a day which she has had in the past.

## 2017-07-12 NOTE — PROGRESS NOTE ADULT - PROBLEM SELECTOR PLAN 2
Likely 2/2 recent surgery  - Transfused 1u PRBCs yesterday with improvement in Hgb to 8.2 today  - Monitor CBC daily, no overt signs of bleeding, transfuse if Hgb<7  - Monitor vitals

## 2017-07-12 NOTE — PROGRESS NOTE ADULT - PROBLEM SELECTOR PLAN 4
S/p stent to L SFA on last admission in June 2017  -Vascular surgery recs- no further interventions required at this time  -Repeat DUC/PVRs showing improvement from last procedure  -C/w ASA and Plavix

## 2017-07-12 NOTE — PROGRESS NOTE ADULT - PROBLEM SELECTOR PLAN 6
Ha1c 7.9 06/17  - C/w Lantus 40u qHS and Humalog 4u qmeals  - Continue to monitor FS today, may need to decrease Humalog dose  - ISS and FS with meals and at bedtime Ha1c 7.9 06/17  - C/w Lantus 40u qHS and Humalog 4u qmeals  - Continue to monitor FS, currently well controlled  - ISS and FS with meals and at bedtime

## 2017-07-12 NOTE — PROGRESS NOTE ADULT - PROBLEM SELECTOR PLAN 1
POD #3 for L TMA  - Patient stable for discharge per podiatry recs.  - ID consulted to discuss what oral antibiotics patient can go home with. Can continue on levofloxacin 500 mg QD x 3 days when discharged.   Until then, will continue IV vancomycin and ertapenem for broad spectrum coverage.  - MRI of L foot showing changes consistent with OM  - Wound cultures and blood cultures with no growth to date. POD #5 for L TMA  - Patient stable for discharge per podiatry recs.  - ID consulted to discuss what oral antibiotics patient can go home with. Can start levofloxacin 500 mg QD x 3 days today. stable for discharge per ID.  - MRI of L foot showing changes consistent with OM  - Wound cultures and blood cultures with no growth to date.

## 2017-07-12 NOTE — DIETITIAN INITIAL EVALUATION ADULT. - NS AS NUTRI INTERV MEALS SNACK
Carbohydrate - modified diet/General/healthful diet/1) Continue Consistent Carbohydrate, DASH/TLC (cholesterol and sodium restricted) diet     2) Recommend Glucerna Shake 8oz. 2x daily (will provide additional ~440kcals, 20g protein)    3) Suggest outpatient follow-up with an endocrinologist and Registered Dietitian to ensure long-term diabetes diet comprehension and adherence.

## 2017-07-12 NOTE — PROGRESS NOTE ADULT - SUBJECTIVE AND OBJECTIVE BOX
Follow Up:      Inverval History/ROS:Patient is a 72y old  Female who presents with a chief complaint of Toe infection (09 Jul 2017 09:44)  No events. No diarrhea. No fever.       Allergies    sulfa drugs (Hives)  sulfa drugs (Rash)  Sulfac 10% (Hives)    Intolerances        ANTIMICROBIALS:  levoFLOXacin  Tablet 500 daily      OTHER MEDS:  heparin  Injectable 5000 Unit(s) SubCutaneous every 8 hours  acetaminophen   Tablet 650 milliGRAM(s) Oral every 6 hours PRN  insulin lispro (HumaLOG) corrective regimen sliding scale   SubCutaneous three times a day before meals  insulin lispro (HumaLOG) corrective regimen sliding scale   SubCutaneous at bedtime  dextrose 5%. 1000 milliLiter(s) IV Continuous <Continuous>  dextrose Gel 1 Dose(s) Oral once PRN  dextrose 50% Injectable 12.5 Gram(s) IV Push once  dextrose 50% Injectable 25 Gram(s) IV Push once  dextrose 50% Injectable 25 Gram(s) IV Push once  glucagon  Injectable 1 milliGRAM(s) IntraMuscular once PRN  aspirin  chewable 81 milliGRAM(s) Oral daily  atorvastatin 20 milliGRAM(s) Oral at bedtime  clopidogrel Tablet 75 milliGRAM(s) Oral daily  ketorolac 0.5% Ophthalmic Solution 1 Drop(s) Both EYES two times a day  brimonidine 0.2% Ophthalmic Solution 1 Drop(s) Both EYES two times a day  timolol 0.5% Solution 1 Drop(s) Both EYES two times a day  zinc sulfate 220 milliGRAM(s) Oral daily  Nephro-jennifer 1 Tablet(s) Oral daily  carvedilol 12.5 milliGRAM(s) Oral every 12 hours  isosorbide   mononitrate ER Tablet (IMDUR) 30 milliGRAM(s) Oral daily  valsartan 320 milliGRAM(s) Oral daily  ergocalciferol 82098 Unit(s) Oral <User Schedule>  acetaminophen   Tablet. 650 milliGRAM(s) Oral every 6 hours PRN  oxyCODONE    5 mG/acetaminophen 325 mG 2 Tablet(s) Oral every 6 hours PRN  morphine  - Injectable 2 milliGRAM(s) IV Push every 6 hours PRN  insulin glargine Injectable (LANTUS) 40 Unit(s) SubCutaneous at bedtime  insulin lispro Injectable (HumaLOG) 4 Unit(s) SubCutaneous three times a day before meals  docusate sodium 100 milliGRAM(s) Oral three times a day  senna 2 Tablet(s) Oral at bedtime      Vital Signs Last 24 Hrs  T(C): 37.2 (12 Jul 2017 05:36), Max: 37.3 (11 Jul 2017 21:02)  T(F): 99 (12 Jul 2017 05:36), Max: 99.2 (11 Jul 2017 21:02)  HR: 70 (12 Jul 2017 05:36) (70 - 72)  BP: 154/72 (12 Jul 2017 05:36) (154/60 - 155/77)  BP(mean): --  RR: 17 (12 Jul 2017 05:36) (16 - 17)  SpO2: 95% (12 Jul 2017 05:36) (93% - 95%)    PHYSICAL EXAM:  General: [x ] non-toxic  HEAD/EYES: [ ] PERRL [x ] white sclera [ ] icterus  ENT:  [x ] normal [ ] supple [ ] thrush [ ] pharyngeal exudate  Cardiovascular:   [ ] murmur [ x] normal [ ] PPM/AICD  Respiratory:  [x ] clear to ausculation bilaterally  GI:  [x ] soft, non-tender, normal bowel sounds  :  [ ] howe [ ] no CVA tenderness   Musculoskeletal:  [x ] no synovitis  Neurologic:  [ ] non-focal exam   Skin:  [ ] no rash  Lymph: [ ] no lymphadenopathy  Psychiatric:  [ ] appropriate affect [ ] alert & oriented  Lines:  [ x] no phlebitis [ ] central line                                8.2    8.56  )-----------( 299      ( 12 Jul 2017 05:50 )             25.3       07-12    143  |  106  |  19  ----------------------------<  128<H>  4.1   |  22  |  1.20    Ca    8.5      12 Jul 2017 05:50  Phos  3.8     07-11  Mg     1.7     07-11            MICROBIOLOGY:    RADIOLOGY:

## 2017-07-12 NOTE — PROGRESS NOTE ADULT - PROBLEM SELECTOR PLAN 2
Hb 6.6 from AM labs; stat Hb showed Hb 6.2  - Likely 2/2 anemia of chronic disease or complication of surgery  - pt will receive 1u PRBCs  - Trend CBC daily Hb 8.2 s/p 1u PRBCs yesterday.  - Likely 2/2 anemia of chronic disease or complication of surgery  - Trend CBC daily

## 2017-07-13 VITALS
OXYGEN SATURATION: 95 % | DIASTOLIC BLOOD PRESSURE: 61 MMHG | TEMPERATURE: 100 F | HEART RATE: 79 BPM | SYSTOLIC BLOOD PRESSURE: 160 MMHG | RESPIRATION RATE: 18 BRPM

## 2017-07-13 LAB
BASOPHILS # BLD AUTO: 0.01 K/UL — SIGNIFICANT CHANGE UP (ref 0–0.2)
BASOPHILS NFR BLD AUTO: 0.1 % — SIGNIFICANT CHANGE UP (ref 0–2)
BUN SERPL-MCNC: 18 MG/DL — SIGNIFICANT CHANGE UP (ref 7–23)
CALCIUM SERPL-MCNC: 8.7 MG/DL — SIGNIFICANT CHANGE UP (ref 8.4–10.5)
CHLORIDE SERPL-SCNC: 106 MMOL/L — SIGNIFICANT CHANGE UP (ref 98–107)
CO2 SERPL-SCNC: 22 MMOL/L — SIGNIFICANT CHANGE UP (ref 22–31)
CREAT SERPL-MCNC: 1.21 MG/DL — SIGNIFICANT CHANGE UP (ref 0.5–1.3)
CULTURE - SURGICAL SITE: SIGNIFICANT CHANGE UP
EOSINOPHIL # BLD AUTO: 1.03 K/UL — HIGH (ref 0–0.5)
EOSINOPHIL NFR BLD AUTO: 10.6 % — HIGH (ref 0–6)
GLUCOSE SERPL-MCNC: 76 MG/DL — SIGNIFICANT CHANGE UP (ref 70–99)
HCT VFR BLD CALC: 25 % — LOW (ref 34.5–45)
HGB BLD-MCNC: 7.9 G/DL — LOW (ref 11.5–15.5)
IMM GRANULOCYTES # BLD AUTO: 0.04 # — SIGNIFICANT CHANGE UP
IMM GRANULOCYTES NFR BLD AUTO: 0.4 % — SIGNIFICANT CHANGE UP (ref 0–1.5)
LYMPHOCYTES # BLD AUTO: 2.1 K/UL — SIGNIFICANT CHANGE UP (ref 1–3.3)
LYMPHOCYTES # BLD AUTO: 21.6 % — SIGNIFICANT CHANGE UP (ref 13–44)
MCHC RBC-ENTMCNC: 27.5 PG — SIGNIFICANT CHANGE UP (ref 27–34)
MCHC RBC-ENTMCNC: 31.6 % — LOW (ref 32–36)
MCV RBC AUTO: 87.1 FL — SIGNIFICANT CHANGE UP (ref 80–100)
MONOCYTES # BLD AUTO: 0.49 K/UL — SIGNIFICANT CHANGE UP (ref 0–0.9)
MONOCYTES NFR BLD AUTO: 5 % — SIGNIFICANT CHANGE UP (ref 2–14)
NEUTROPHILS # BLD AUTO: 6.04 K/UL — SIGNIFICANT CHANGE UP (ref 1.8–7.4)
NEUTROPHILS NFR BLD AUTO: 62.3 % — SIGNIFICANT CHANGE UP (ref 43–77)
NRBC # FLD: 0 — SIGNIFICANT CHANGE UP
PLATELET # BLD AUTO: 279 K/UL — SIGNIFICANT CHANGE UP (ref 150–400)
PMV BLD: 11.1 FL — SIGNIFICANT CHANGE UP (ref 7–13)
POTASSIUM SERPL-MCNC: 4.4 MMOL/L — SIGNIFICANT CHANGE UP (ref 3.5–5.3)
POTASSIUM SERPL-SCNC: 4.4 MMOL/L — SIGNIFICANT CHANGE UP (ref 3.5–5.3)
RBC # BLD: 2.87 M/UL — LOW (ref 3.8–5.2)
RBC # FLD: 15.1 % — HIGH (ref 10.3–14.5)
SODIUM SERPL-SCNC: 141 MMOL/L — SIGNIFICANT CHANGE UP (ref 135–145)
WBC # BLD: 9.71 K/UL — SIGNIFICANT CHANGE UP (ref 3.8–10.5)
WBC # FLD AUTO: 9.71 K/UL — SIGNIFICANT CHANGE UP (ref 3.8–10.5)

## 2017-07-13 PROCEDURE — 99233 SBSQ HOSP IP/OBS HIGH 50: CPT

## 2017-07-13 RX ORDER — INSULIN GLARGINE 100 [IU]/ML
35 INJECTION, SOLUTION SUBCUTANEOUS AT BEDTIME
Qty: 0 | Refills: 0 | Status: DISCONTINUED | OUTPATIENT
Start: 2017-07-13 | End: 2017-07-13

## 2017-07-13 RX ORDER — INSULIN DETEMIR 100/ML (3)
40 INSULIN PEN (ML) SUBCUTANEOUS
Qty: 0 | Refills: 0 | COMMUNITY

## 2017-07-13 RX ORDER — INSULIN LISPRO 100/ML
3 VIAL (ML) SUBCUTANEOUS
Qty: 0 | Refills: 0 | COMMUNITY
Start: 2017-07-13

## 2017-07-13 RX ADMIN — Medication 1 DROP(S): at 06:42

## 2017-07-13 RX ADMIN — HEPARIN SODIUM 5000 UNIT(S): 5000 INJECTION INTRAVENOUS; SUBCUTANEOUS at 06:42

## 2017-07-13 RX ADMIN — ZINC SULFATE TAB 220 MG (50 MG ZINC EQUIVALENT) 220 MILLIGRAM(S): 220 (50 ZN) TAB at 13:05

## 2017-07-13 RX ADMIN — Medication 100 MILLIGRAM(S): at 06:41

## 2017-07-13 RX ADMIN — HEPARIN SODIUM 5000 UNIT(S): 5000 INJECTION INTRAVENOUS; SUBCUTANEOUS at 13:05

## 2017-07-13 RX ADMIN — CARVEDILOL PHOSPHATE 12.5 MILLIGRAM(S): 80 CAPSULE, EXTENDED RELEASE ORAL at 06:42

## 2017-07-13 RX ADMIN — BRIMONIDINE TARTRATE 1 DROP(S): 2 SOLUTION/ DROPS OPHTHALMIC at 06:42

## 2017-07-13 RX ADMIN — VALSARTAN 320 MILLIGRAM(S): 80 TABLET ORAL at 06:41

## 2017-07-13 RX ADMIN — CLOPIDOGREL BISULFATE 75 MILLIGRAM(S): 75 TABLET, FILM COATED ORAL at 13:04

## 2017-07-13 RX ADMIN — ERGOCALCIFEROL 50000 UNIT(S): 1.25 CAPSULE ORAL at 13:04

## 2017-07-13 RX ADMIN — ISOSORBIDE MONONITRATE 30 MILLIGRAM(S): 60 TABLET, EXTENDED RELEASE ORAL at 13:04

## 2017-07-13 RX ADMIN — Medication 81 MILLIGRAM(S): at 13:04

## 2017-07-13 RX ADMIN — Medication 100 MILLIGRAM(S): at 13:04

## 2017-07-13 RX ADMIN — Medication 1 TABLET(S): at 13:03

## 2017-07-13 RX ADMIN — Medication 4 UNIT(S): at 13:05

## 2017-07-13 NOTE — PROGRESS NOTE ADULT - SUBJECTIVE AND OBJECTIVE BOX
Patient is a 72y old  Female who presents with a chief complaint of Toe infection (09 Jul 2017 09:44)      INTERVAL HPI/OVERNIGHT EVENTS:    MEDICATIONS (STANDING):  heparin  Injectable 5000 Unit(s) SubCutaneous every 8 hours  insulin lispro (HumaLOG) corrective regimen sliding scale   SubCutaneous three times a day before meals  insulin lispro (HumaLOG) corrective regimen sliding scale   SubCutaneous at bedtime  dextrose 5%. 1000 milliLiter(s) IV Continuous <Continuous>  dextrose 50% Injectable 12.5 Gram(s) IV Push once  dextrose 50% Injectable 25 Gram(s) IV Push once  dextrose 50% Injectable 25 Gram(s) IV Push once  aspirin  chewable 81 milliGRAM(s) Oral daily  atorvastatin 20 milliGRAM(s) Oral at bedtime  clopidogrel Tablet 75 milliGRAM(s) Oral daily  ketorolac 0.5% Ophthalmic Solution 1 Drop(s) Both EYES two times a day  brimonidine 0.2% Ophthalmic Solution 1 Drop(s) Both EYES two times a day  timolol 0.5% Solution 1 Drop(s) Both EYES two times a day  zinc sulfate 220 milliGRAM(s) Oral daily  Nephro-jennifer 1 Tablet(s) Oral daily  carvedilol 12.5 milliGRAM(s) Oral every 12 hours  isosorbide   mononitrate ER Tablet (IMDUR) 30 milliGRAM(s) Oral daily  valsartan 320 milliGRAM(s) Oral daily  ergocalciferol 11364 Unit(s) Oral <User Schedule>  insulin lispro Injectable (HumaLOG) 4 Unit(s) SubCutaneous three times a day before meals  docusate sodium 100 milliGRAM(s) Oral three times a day  senna 2 Tablet(s) Oral at bedtime  levoFLOXacin  Tablet 500 milliGRAM(s) Oral daily  insulin glargine Injectable (LANTUS) 35 Unit(s) SubCutaneous at bedtime    MEDICATIONS  (PRN):  acetaminophen   Tablet 650 milliGRAM(s) Oral every 6 hours PRN  dextrose Gel 1 Dose(s) Oral once PRN  glucagon  Injectable 1 milliGRAM(s) IntraMuscular once PRN  acetaminophen   Tablet. 650 milliGRAM(s) Oral every 6 hours PRN  oxyCODONE    5 mG/acetaminophen 325 mG 2 Tablet(s) Oral every 6 hours PRN  morphine  - Injectable 2 milliGRAM(s) IV Push every 6 hours PRN      REVIEW OF SYSTEMS:  CONSTITUTIONAL: No fever, weight loss, or fatigue  EYES: No eye pain, visual disturbances, or discharge  ENMT:  No difficulty hearing, tinnitus, vertigo; No sinus or throat pain  NECK: No pain or stiffness  BREASTS: No pain, masses, or nipple discharge  RESPIRATORY: No cough, wheezing, chills or hemoptysis; No shortness of breath  CARDIOVASCULAR: No chest pain, palpitations, dizziness, or leg swelling  GASTROINTESTINAL: No abdominal or epigastric pain. No nausea, vomiting, or hematemesis; No diarrhea or constipation. No melena or hematochezia.  GENITOURINARY: No dysuria, frequency, hematuria, or incontinence  NEUROLOGICAL: No headaches, memory loss, loss of strength, numbness, or tremors  SKIN: No itching, burning, rashes, or lesions   MUSCULOSKELETAL: No joint pain or swelling; No muscle, back, or extremity pain    T(F): 98.5 (07-13-17 @ 06:35), Max: 99.3 (07-12-17 @ 21:05)  HR: 66 (07-13-17 @ 06:35) (61 - 76)  BP: 155/66 (07-13-17 @ 06:35) (139/42 - 166/70)  RR: 16 (07-13-17 @ 06:35) (16 - 17)  SpO2: 94% (07-13-17 @ 06:35) (94% - 95%)  Wt(kg): --  CAPILLARY BLOOD GLUCOSE  80 (13 Jul 2017 08:55)  73 (13 Jul 2017 08:31)  145 (12 Jul 2017 21:38)  155 (12 Jul 2017 16:42)  140 (12 Jul 2017 12:09)        I&O's Summary      PHYSICAL EXAM:  GENERAL: NAD, well-groomed, well-developed  HEAD:  Atraumatic, Normocephalic  EYES: EOMI, PERRLA, conjunctiva and sclera clear  ENMT: No tonsillar erythema, exudates, or enlargement; Moist mucous membranes, Good dentition, No lesions  NECK: Supple, No JVD, Normal thyroid  NERVOUS SYSTEM:  Alert & Oriented X3, Good concentration; Motor Strength 5/5 B/L upper and lower extremities; DTRs 2+ intact and symmetric  CHEST/LUNG: Clear to percussion bilaterally; No rales, rhonchi, wheezing, or rubs  HEART: Regular rate and rhythm; No murmurs, rubs, or gallops  ABDOMEN: Soft, Nontender, Nondistended; Bowel sounds present  EXTREMITIES:  2+ Peripheral Pulses, No clubbing, cyanosis, or edema  LYMPH: No lymphadenopathy noted  SKIN: No rashes or lesions    LABS:                        7.9    9.71  )-----------( 279      ( 13 Jul 2017 06:30 )             25.0     07-13    141  |  106  |  18  ----------------------------<  76  4.4   |  22  |  1.21    Ca    8.7      13 Jul 2017 06:30              RADIOLOGY & ADDITIONAL TESTS:    XRay:    CTScan:    MRI:     Imaging Personally Reviewed:  [ ] YES  [ ] NO    Consultant(s) Notes Reviewed:  [ ] YES  [ ] NO    Care Discussed with Consultants/Other Providers [ ] YES  [ ] NO Patient is a 72y old  Female who presents with a chief complaint of Toe infection (09 Jul 2017 09:44)      INTERVAL HPI/OVERNIGHT EVENTS: Pt feeling well. No pain in L foot, no nausea or vomiting. Afebrile overnight.    MEDICATIONS (STANDING):  heparin  Injectable 5000 Unit(s) SubCutaneous every 8 hours  insulin lispro (HumaLOG) corrective regimen sliding scale   SubCutaneous three times a day before meals  insulin lispro (HumaLOG) corrective regimen sliding scale   SubCutaneous at bedtime  dextrose 5%. 1000 milliLiter(s) IV Continuous <Continuous>  dextrose 50% Injectable 12.5 Gram(s) IV Push once  dextrose 50% Injectable 25 Gram(s) IV Push once  dextrose 50% Injectable 25 Gram(s) IV Push once  aspirin  chewable 81 milliGRAM(s) Oral daily  atorvastatin 20 milliGRAM(s) Oral at bedtime  clopidogrel Tablet 75 milliGRAM(s) Oral daily  ketorolac 0.5% Ophthalmic Solution 1 Drop(s) Both EYES two times a day  brimonidine 0.2% Ophthalmic Solution 1 Drop(s) Both EYES two times a day  timolol 0.5% Solution 1 Drop(s) Both EYES two times a day  zinc sulfate 220 milliGRAM(s) Oral daily  Nephro-jennifer 1 Tablet(s) Oral daily  carvedilol 12.5 milliGRAM(s) Oral every 12 hours  isosorbide   mononitrate ER Tablet (IMDUR) 30 milliGRAM(s) Oral daily  valsartan 320 milliGRAM(s) Oral daily  ergocalciferol 86838 Unit(s) Oral <User Schedule>  insulin lispro Injectable (HumaLOG) 4 Unit(s) SubCutaneous three times a day before meals  docusate sodium 100 milliGRAM(s) Oral three times a day  senna 2 Tablet(s) Oral at bedtime  levoFLOXacin  Tablet 500 milliGRAM(s) Oral daily  insulin glargine Injectable (LANTUS) 35 Unit(s) SubCutaneous at bedtime    MEDICATIONS  (PRN):  acetaminophen   Tablet 650 milliGRAM(s) Oral every 6 hours PRN  dextrose Gel 1 Dose(s) Oral once PRN  glucagon  Injectable 1 milliGRAM(s) IntraMuscular once PRN  acetaminophen   Tablet. 650 milliGRAM(s) Oral every 6 hours PRN  oxyCODONE    5 mG/acetaminophen 325 mG 2 Tablet(s) Oral every 6 hours PRN  morphine  - Injectable 2 milliGRAM(s) IV Push every 6 hours PRN      REVIEW OF SYSTEMS:  CONSTITUTIONAL: No fever  RESPIRATORY: No shortness of breath  CARDIOVASCULAR: No chest pain  GASTROINTESTINAL: No abdominal or epigastric pain. No nausea, vomiting  MUSCULOSKELETAL: No left foot pain    T(F): 98.5 (07-13-17 @ 06:35), Max: 99.3 (07-12-17 @ 21:05)  HR: 66 (07-13-17 @ 06:35) (61 - 76)  BP: 155/66 (07-13-17 @ 06:35) (139/42 - 166/70)  RR: 16 (07-13-17 @ 06:35) (16 - 17)  SpO2: 94% (07-13-17 @ 06:35) (94% - 95%)  Wt(kg): --    CAPILLARY BLOOD GLUCOSE  80 (13 Jul 2017 08:55)  73 (13 Jul 2017 08:31)  145 (12 Jul 2017 21:38)  155 (12 Jul 2017 16:42)  140 (12 Jul 2017 12:09)        I&O's Summary    PHYSICAL EXAM:  GENERAL: NAD, well-groomed, well-developed  HEAD:  Atraumatic, Normocephalic  EYES: EOMI, PERRLA, conjunctiva and sclera clear  ENMT: Moist mucous membranes  NECK: Supple, No JVD  NERVOUS SYSTEM:  Alert & Oriented X3, Good concentration; Motor Strength 5/5 B/L upper and LLE  CHEST/LUNG: CTA b/l  HEART: Regular rate and rhythm; systolic murmur, rubs, or gallops  ABDOMEN: Soft, Nontender, Nondistended; Bowel sounds present  EXTREMITIES: R BKA, L foot in boot and wrapped in Kerlix  LYMPH: No lymphadenopathy noted  SKIN: L foot in boot and wrapped in Kerlix, dry/intact    LABS:                        7.9    9.71  )-----------( 279      ( 13 Jul 2017 06:30 )             25.0     07-13    141  |  106  |  18  ----------------------------<  76  4.4   |  22  |  1.21    Ca    8.7      13 Jul 2017 06:30              RADIOLOGY & ADDITIONAL TESTS:    XRay:    CTScan:    MRI:     Imaging Personally Reviewed:  [ ] YES  [ ] NO    Consultant(s) Notes Reviewed:  [X] YES  [ ] NO    Care Discussed with Consultants/Other Providers [ ] YES  [ ] NO

## 2017-07-13 NOTE — PROGRESS NOTE ADULT - PROBLEM SELECTOR PLAN 6
Ha1c 7.9 06/17  - Lantus decreased from 40u to 35u and Humalog 4u qmeals, FS this AM 73, AM Humalog dose held  - Continue to monitor FS, currently well controlled  - ISS and FS with meals and at bedtime

## 2017-07-13 NOTE — PROGRESS NOTE ADULT - PROBLEM SELECTOR PLAN 2
Likely 2/2 recent surgery  - S/p 1u PRBCs on 7/10, Hgb stable today at 7.9  - Monitor CBC daily, no overt signs of bleeding, transfuse if Hgb<7  - Monitor vitals

## 2017-07-13 NOTE — PROGRESS NOTE ADULT - ASSESSMENT
A/P: 72 year old female s/p LF TMA w/ NIDIA, and application of antibiotic beads, closed   Patient seen and evaluated.  All questions answered to patient's satisfaction.    From my standpoint, the patient can be discharged to rehab and follow up in the wound care center.    She is to remain NWB to the LEFT foot with CAM boot until further instructed.    Once the sutures are removed she will likely be able to begin WB with CAM boot.  Will discuss with attending

## 2017-07-13 NOTE — PROGRESS NOTE ADULT - ATTENDING COMMENTS
agree with above
agree with above
plan for repeat DUC/PVR
Will sign off. Call with questions.
Pt seen and examined, chart and labs reviewed.  Agree with resident note as above.  Pt is s/p L TMA, will await surgical pathology for clean margins.  Continue with IV abx for now until surgical pathology returns.    #L hallux osteomyelitis: s/p L TMA, cont IV abx, follow up surg path.  If surgical margins are negative, can likely transition to PO abx, await wound cultures for sensitivities.    #PVD s/p L SFA: on ASA/Plavix    Agree with remainder of resident note.
hgb stable.  she had some rales at bases that decreased post-tussive, but some remained and there was a ? of JVD.  she may have a component of HFpEF, will c/w BP control and fluid management, one dose of lasix now.  of note, she is asymptomatic and thus can go to rehab if bed is available. an outpatient echo is fine.
Patient seen and examined, d/w housestaff, agree with progress note above.    Left hallux OM s/p TMA, appreciate Podiatry and ID input, stable from podiatry perspective for discharge, c/w IV vanco/ertapenem for now, can do PO levofloxacin on discharge per team d/w ID, CAM boot, f/u final PT recs. Premeal humalog added to insulin regimen, monitor FS, titrate regimen as needed for better glycemic control to aid healing. If remains stable, hopefully will be able to start d/c planning in AM...
Podiatry input appreciated.  no complaints.  stable for DC- see dc summary for details.    rales improved but still evidence of atelectasis on exam- add incentive spirometry.  no evidence of ADHF.
Patient seen and examined, d/w resident. Agree with progress note above.     In brief, 73 yo F, DM2 A1C 7.9, c/b neuropathy, CKD (?III), PVD s/p R BKA and L FA stent, CAD s/p stents, CABG, HTN s/p L TMA for left hallux OM, currently on IV vanco/ertapenem per ID/pod recs pending surgical path, dressing changes as per podiatry, eventual PT eval, CAM boot. Monitor vanc level. Pain control, monitor for constipation. Lantus dose recently increased (to 40 U), FS in 200s, to consider adding on premeal humalog for better glycemic control to aid healing, team to continue to monitor FS and titrate regimen as needed. Hgb 7.6 on repeat CBC, no need for transfusion at this time, will continue to monitor. Continue rest of meds as above.
Pt seen and examined, chart and labs reviewed.  Agree with resident note as above. MRI results now reported, which is consistent with L hallux osteomyelitis, pt scheduled for TMA by podiatry in AM.  The patient is intermediate risk for an intermediate risk procedure.  RCRI is 2 and pt has tolerated general anesthesia in the past (most recent amputation was under local with sedation).  The patient is medically optimized for scheduled procedure and may proceed to OR.  Would continue with ASA/Plavix given recent SFA stent placement.  Will half Lantus dose tonight given NPO status after midnight.  Hold AM dose of HSQ in anticipation of procedure.
the drop in hgb is likely from the surgical wound.  no other obvious source.  will transfuse I unit of PRBC's and reassess.
Patient seen and examined by me. Case discussed with resident and agree with the resident's findings and plan as documented in the resident's note.  -DUC/PVR reviewed with evidence of disease in left foot; f/u vasc recs  -Awaiting Wound culture   -MRI pending to r/o osteomyelitis  -awaiting MRI for podiatry plan; f/u podiatry plan  -c/w vanco and ertapenem IV  -check ESR in am  -Left 4th/ 5th toes s/p amputation. Margins are clear. Antibiotics through 7/7 should be adequate for this process per ID; f/u ID recs
Jaiden Bennett  Division of Infectious Disease  Pager [phone]  After 5pm/weekend #[phone]    Will sign off, recall ID if needed #[phone].
stable s/p transmet for OM.  attention to offloading the foot, infection, and vascular supply all being addressed.    await path to decide on duration of abx's.

## 2017-07-13 NOTE — PROGRESS NOTE ADULT - PROBLEM SELECTOR PLAN 1
POD #6 for L TMA  - C/w Levaquin 500mg PO qd, to continue for 1 more day  - MRI of L foot showing changes consistent with OM  - F/u podiatry recs  - Wound cultures with NGTD at 72h, f/u blood cultures NGTD at 48h.

## 2017-07-13 NOTE — PROGRESS NOTE ADULT - SUBJECTIVE AND OBJECTIVE BOX
Patient is a 72y old  Female who presents with a chief complaint of Toe infection (09 Jul 2017 09:44)       INTERVAL HPI/OVERNIGHT EVENTS: Patient is POD #6. Says she slept well, has had no dizziness or shortness of breath overnight.  MEDICATIONS  (STANDING):  heparin  Injectable 5000 Unit(s) SubCutaneous every 8 hours  insulin lispro (HumaLOG) corrective regimen sliding scale   SubCutaneous three times a day before meals  insulin lispro (HumaLOG) corrective regimen sliding scale   SubCutaneous at bedtime  dextrose 5%. 1000 milliLiter(s) (50 mL/Hr) IV Continuous <Continuous>  dextrose 50% Injectable 12.5 Gram(s) IV Push once  dextrose 50% Injectable 25 Gram(s) IV Push once  dextrose 50% Injectable 25 Gram(s) IV Push once  aspirin  chewable 81 milliGRAM(s) Oral daily  atorvastatin 20 milliGRAM(s) Oral at bedtime  clopidogrel Tablet 75 milliGRAM(s) Oral daily  ketorolac 0.5% Ophthalmic Solution 1 Drop(s) Both EYES two times a day  brimonidine 0.2% Ophthalmic Solution 1 Drop(s) Both EYES two times a day  timolol 0.5% Solution 1 Drop(s) Both EYES two times a day  zinc sulfate 220 milliGRAM(s) Oral daily  Nephro-jennifer 1 Tablet(s) Oral daily  carvedilol 12.5 milliGRAM(s) Oral every 12 hours  isosorbide   mononitrate ER Tablet (IMDUR) 30 milliGRAM(s) Oral daily  valsartan 320 milliGRAM(s) Oral daily  ergocalciferol 56046 Unit(s) Oral <User Schedule>  insulin glargine Injectable (LANTUS) 40 Unit(s) SubCutaneous at bedtime  insulin lispro Injectable (HumaLOG) 4 Unit(s) SubCutaneous three times a day before meals  docusate sodium 100 milliGRAM(s) Oral three times a day  senna 2 Tablet(s) Oral at bedtime  levoFLOXacin  Tablet 500 milliGRAM(s) Oral daily    MEDICATIONS  (PRN):  acetaminophen   Tablet 650 milliGRAM(s) Oral every 6 hours PRN For Temp greater than 38 C (100.4 F)  dextrose Gel 1 Dose(s) Oral once PRN Blood Glucose LESS THAN 70 milliGRAM(s)/deciliter  glucagon  Injectable 1 milliGRAM(s) IntraMuscular once PRN Glucose LESS THAN 70 milligrams/deciliter  acetaminophen   Tablet. 650 milliGRAM(s) Oral every 6 hours PRN Mild Pain (1 - 3)  oxyCODONE    5 mG/acetaminophen 325 mG 2 Tablet(s) Oral every 6 hours PRN Moderate Pain (4 - 6)  morphine  - Injectable 2 milliGRAM(s) IV Push every 6 hours PRN Severe Pain (7 - 10)        Allergies    sulfa drugs (Hives)  sulfa drugs (Rash)  Sulfac 10% (Hives)    Intolerances        REVIEW OF SYSTEMS:  CONSTITUTIONAL: No fever, no fatigue, no headaches  RESPIRATORY: No shortness of breath, dry cough stable  CARDIOVASCULAR: No chest pain or dizziness, no palpitations  GASTROINTESTINAL: no abdominal pain. No nausea, vomiting, hematochezia, or diarrhea.   NEUROLOGICAL: no headaches.  SKIN: no rashes or hives.     Vital Signs Last 24 Hrs  T(C): 36.9 (13 Jul 2017 06:35), Max: 37.4 (12 Jul 2017 21:05)  T(F): 98.5 (13 Jul 2017 06:35), Max: 99.3 (12 Jul 2017 21:05)  HR: 66 (13 Jul 2017 06:35) (61 - 76)  BP: 155/66 (13 Jul 2017 06:35) (139/42 - 166/70)  BP(mean): --  RR: 16 (13 Jul 2017 06:35) (16 - 17)  SpO2: 94% (13 Jul 2017 06:35) (94% - 95%)    PHYSICAL EXAM:    GENERAL: NAD  HEAD:  Atraumatic, Normocephalic  EYES: EOMI, conjunctiva pale; and sclera clear  ENMT:  Moist mucous membranes  NERVOUS SYSTEM: A&O x 3  PSYCHIATRIC: Appropriate affect and mood  CHEST/LUNG: faint inspiratory rales at lower lung fields bilaterally.  HEART: Regular rate and rhythm; systolic murmur.  ABDOMEN: Soft, Nontender, Nondistended; Bowel sounds present  EXTREMITIES:  2+ Peripheral Pulses, RLE below the knee amputation, LLE s/p TMA POD #6, wrapped in dressing and walking boot.      LABS:                                                                  8.2    8.56  )-----------( 299      ( 12 Jul 2017 05:50 )             25.3   Auto Eosinophils: 10.3 %      07-12    143  |  106  |  19  ----------------------------<  128<H>  4.1   |  22  |  1.20    Ca    8.5      12 Jul 2017 05:50  Phos  3.8     07-11  Mg     1.7     07-11      CAPILLARY BLOOD GLUCOSE  145 (12 Jul 2017 21:38)  155 (12 Jul 2017 16:42)  140 (12 Jul 2017 12:09)  127 (12 Jul 2017 08:29)            BLOOD CULTURE- not growing organisms (culture from 7-4)  WOUND CULTURE - not growing organisms after 4 days  SURGICAL CULTURE - not growing organisms after 1 day    RADIOLOGY & ADDITIONAL TESTS:    Surgical Pathology Report (07.07.17 @ 09:55)    Surgical Pathology Report:   ACCESSION No:  [id number]    NIK MELISSA                       1        Surgical Final Report          Final Diagnosis    Foot, left forefoot, amputation  - Skin ulceration with underlying soft tissue necrosis and  panniculitis  - Marrow fibrosis and minimal lymphoplasmacytic infiltrates  at shaved bone resection margin    Verified by: LUCIE LIMA MD Pathologist  (Electronic Signature)  Reported on: 07/12/17 10:36 EDT,[address] [place] [address]  _________________________________________________________________      Imaging Personally Reviewed:  [ ] YES     Consultant(s) Notes Reviewed:      Care Discussed with Consultants/Other Providers:

## 2017-07-13 NOTE — PROGRESS NOTE ADULT - PROBLEM SELECTOR PLAN 1
POD #6 for L TMA  - Patient stable for discharge per podiatry recs.  - ID consulted to discuss what oral antibiotics patient can go home with. on day 2 levofloxacin 500 mg QD x 3 days today. stable for discharge per ID.  - MRI of L foot showing changes consistent with OM  - Wound cultures and blood cultures with no growth to date.

## 2017-07-13 NOTE — PROGRESS NOTE ADULT - PROBLEM SELECTOR PLAN 3
Creatinine currently improved from pt's baseline  - Monitor BMP daily  - Avoid nephrotoxic drugs  - Unclear if pt has CKD given improvement in creatinine to WNL over the past 5 days, will continue to monitor

## 2017-07-13 NOTE — PROGRESS NOTE ADULT - PROBLEM SELECTOR PLAN 4
Ha1c 7.9 06/17  - FS at 145 at bedtime  - Monitor FS and will readjust as needed  - ISS and FS with meals and at bedtime

## 2017-07-13 NOTE — PROGRESS NOTE ADULT - SUBJECTIVE AND OBJECTIVE BOX
Patient is a 72y old  Female who presents with a chief complaint of Toe infection (09 Jul 2017 09:44)       INTERVAL HPI/OVERNIGHT EVENTS:  Patient seen and evaluated at bedside 6 days s/p LEFT TMA.  Pt is resting comfortable in NAD. Denies N/V/F/C.  Pain rated at 0/10    Allergies    sulfa drugs (Hives)  sulfa drugs (Rash)  Sulfac 10% (Hives)    Intolerances        Vital Signs Last 24 Hrs  T(C): 37.2 (12 Jul 2017 05:36), Max: 37.7 (11 Jul 2017 09:50)  T(F): 99 (12 Jul 2017 05:36), Max: 99.9 (11 Jul 2017 09:50)  HR: 70 (12 Jul 2017 05:36) (68 - 72)  BP: 154/72 (12 Jul 2017 05:36) (128/49 - 163/60)  BP(mean): --  RR: 17 (12 Jul 2017 05:36) (16 - 18)  SpO2: 95% (12 Jul 2017 05:36) (93% - 96%)    LABS:                        8.2    8.56  )-----------( 299      ( 12 Jul 2017 05:50 )             25.3     07-12    143  |  106  |  19  ----------------------------<  128<H>  4.1   |  22  |  1.20    Ca    8.5      12 Jul 2017 05:50  Phos  3.8     07-11  Mg     1.7     07-11          CAPILLARY BLOOD GLUCOSE  172 (11 Jul 2017 21:56)  158 (11 Jul 2017 17:02)  181 (11 Jul 2017 12:16)  200 (11 Jul 2017 08:28)          Lower Extremity Physical Exam:  No change in neurovascular status.  There is mild serous drainage medially consistent with abx beads and postop status.  The wound is clean with intact sutures. No erythema, mild edema, no purulence, no crepitus, no fluctuance, no warmth, no malodor.  Flap viable with brisk capillary refill. NIDIA site clean, closed, and dry.POD#6 s/p LEFT TMA and NIDIA.

## 2017-07-13 NOTE — PROGRESS NOTE ADULT - PROBLEM SELECTOR PLAN 2
Hb 8.2 s/p 1u PRBCs yesterday.  - Likely 2/2 anemia of chronic disease or complication of surgery  - Trend CBC daily

## 2017-07-14 ENCOUNTER — APPOINTMENT (OUTPATIENT)
Dept: WOUND CARE | Facility: CLINIC | Age: 72
End: 2017-07-14

## 2017-07-24 ENCOUNTER — APPOINTMENT (OUTPATIENT)
Dept: OPHTHALMOLOGY | Facility: CLINIC | Age: 72
End: 2017-07-24

## 2017-07-25 LAB
FUNGUS SPEC QL CULT: SIGNIFICANT CHANGE UP
FUNGUS SPEC QL CULT: SIGNIFICANT CHANGE UP

## 2017-08-07 ENCOUNTER — APPOINTMENT (OUTPATIENT)
Dept: WOUND CARE | Facility: CLINIC | Age: 72
End: 2017-08-07
Payer: MEDICARE

## 2017-08-07 VITALS — HEART RATE: 70 BPM | SYSTOLIC BLOOD PRESSURE: 179 MMHG | DIASTOLIC BLOOD PRESSURE: 73 MMHG | RESPIRATION RATE: 16 BRPM

## 2017-08-07 LAB — FUNGUS SPEC QL CULT: SIGNIFICANT CHANGE UP

## 2017-08-07 PROCEDURE — 99204 OFFICE O/P NEW MOD 45 MIN: CPT

## 2017-08-07 RX ORDER — SYRINGE AND NEEDLE,INSULIN,1ML 31 GX5/16"
31G X 5/16" SYRINGE, EMPTY DISPOSABLE MISCELLANEOUS
Qty: 200 | Refills: 0 | Status: ACTIVE | COMMUNITY
Start: 2016-08-17

## 2017-08-09 LAB
ACID FAST STN SPEC: SIGNIFICANT CHANGE UP
ACID FAST STN SPEC: SIGNIFICANT CHANGE UP

## 2017-08-14 ENCOUNTER — APPOINTMENT (OUTPATIENT)
Dept: WOUND CARE | Facility: CLINIC | Age: 72
End: 2017-08-14

## 2017-08-25 ENCOUNTER — APPOINTMENT (OUTPATIENT)
Dept: WOUND CARE | Facility: CLINIC | Age: 72
End: 2017-08-25
Payer: MEDICARE

## 2017-08-25 ENCOUNTER — RESULT REVIEW (OUTPATIENT)
Age: 72
End: 2017-08-25

## 2017-08-25 VITALS
HEART RATE: 70 BPM | DIASTOLIC BLOOD PRESSURE: 74 MMHG | TEMPERATURE: 97.6 F | BODY MASS INDEX: 29.73 KG/M2 | RESPIRATION RATE: 16 BRPM | SYSTOLIC BLOOD PRESSURE: 145 MMHG | HEIGHT: 66 IN | WEIGHT: 185 LBS

## 2017-08-25 PROCEDURE — 99203 OFFICE O/P NEW LOW 30 MIN: CPT | Mod: 25

## 2017-08-25 PROCEDURE — 11042 DBRDMT SUBQ TIS 1ST 20SQCM/<: CPT

## 2017-08-29 ENCOUNTER — APPOINTMENT (OUTPATIENT)
Dept: WOUND CARE | Facility: CLINIC | Age: 72
End: 2017-08-29
Payer: MEDICARE

## 2017-08-29 PROCEDURE — 11042 DBRDMT SUBQ TIS 1ST 20SQCM/<: CPT

## 2017-09-01 ENCOUNTER — APPOINTMENT (OUTPATIENT)
Dept: WOUND CARE | Facility: CLINIC | Age: 72
End: 2017-09-01
Payer: MEDICARE

## 2017-09-01 PROCEDURE — 11042 DBRDMT SUBQ TIS 1ST 20SQCM/<: CPT

## 2017-09-08 ENCOUNTER — APPOINTMENT (OUTPATIENT)
Dept: WOUND CARE | Facility: CLINIC | Age: 72
End: 2017-09-08

## 2017-09-18 ENCOUNTER — APPOINTMENT (OUTPATIENT)
Dept: OPHTHALMOLOGY | Facility: CLINIC | Age: 72
End: 2017-09-18
Payer: MEDICARE

## 2017-09-18 DIAGNOSIS — H25.11 AGE-RELATED NUCLEAR CATARACT, RIGHT EYE: ICD-10-CM

## 2017-09-18 PROCEDURE — 92012 INTRM OPH EXAM EST PATIENT: CPT

## 2017-09-26 ENCOUNTER — APPOINTMENT (OUTPATIENT)
Dept: WOUND CARE | Facility: CLINIC | Age: 72
End: 2017-09-26
Payer: MEDICARE

## 2017-09-26 PROCEDURE — 11042 DBRDMT SUBQ TIS 1ST 20SQCM/<: CPT

## 2017-10-04 ENCOUNTER — OTHER (OUTPATIENT)
Age: 72
End: 2017-10-04

## 2017-10-09 ENCOUNTER — APPOINTMENT (OUTPATIENT)
Dept: HYPERBARIC MEDICINE | Facility: CLINIC | Age: 72
End: 2017-10-09

## 2017-10-10 ENCOUNTER — APPOINTMENT (OUTPATIENT)
Dept: HYPERBARIC MEDICINE | Facility: CLINIC | Age: 72
End: 2017-10-10

## 2017-10-11 ENCOUNTER — APPOINTMENT (OUTPATIENT)
Dept: HYPERBARIC MEDICINE | Facility: CLINIC | Age: 72
End: 2017-10-11
Payer: MEDICARE

## 2017-10-11 ENCOUNTER — OUTPATIENT (OUTPATIENT)
Dept: OUTPATIENT SERVICES | Facility: HOSPITAL | Age: 72
LOS: 1 days | End: 2017-10-11
Payer: COMMERCIAL

## 2017-10-11 DIAGNOSIS — Z90.710 ACQUIRED ABSENCE OF BOTH CERVIX AND UTERUS: Chronic | ICD-10-CM

## 2017-10-11 DIAGNOSIS — Z95.1 PRESENCE OF AORTOCORONARY BYPASS GRAFT: Chronic | ICD-10-CM

## 2017-10-11 DIAGNOSIS — M79.673 PAIN IN UNSPECIFIED FOOT: ICD-10-CM

## 2017-10-11 DIAGNOSIS — Z98.89 OTHER SPECIFIED POSTPROCEDURAL STATES: Chronic | ICD-10-CM

## 2017-10-11 DIAGNOSIS — Z89.511 ACQUIRED ABSENCE OF RIGHT LEG BELOW KNEE: Chronic | ICD-10-CM

## 2017-10-11 PROCEDURE — 99183 HYPERBARIC OXYGEN THERAPY: CPT

## 2017-10-11 PROCEDURE — G0277: CPT

## 2017-10-11 PROCEDURE — 82962 GLUCOSE BLOOD TEST: CPT

## 2017-10-12 ENCOUNTER — OUTPATIENT (OUTPATIENT)
Dept: OUTPATIENT SERVICES | Facility: HOSPITAL | Age: 72
LOS: 1 days | End: 2017-10-12
Payer: COMMERCIAL

## 2017-10-12 ENCOUNTER — APPOINTMENT (OUTPATIENT)
Dept: HYPERBARIC MEDICINE | Facility: CLINIC | Age: 72
End: 2017-10-12
Payer: MEDICARE

## 2017-10-12 DIAGNOSIS — M79.673 PAIN IN UNSPECIFIED FOOT: ICD-10-CM

## 2017-10-12 DIAGNOSIS — Z95.1 PRESENCE OF AORTOCORONARY BYPASS GRAFT: Chronic | ICD-10-CM

## 2017-10-12 DIAGNOSIS — Z89.511 ACQUIRED ABSENCE OF RIGHT LEG BELOW KNEE: Chronic | ICD-10-CM

## 2017-10-12 DIAGNOSIS — Z90.710 ACQUIRED ABSENCE OF BOTH CERVIX AND UTERUS: Chronic | ICD-10-CM

## 2017-10-12 DIAGNOSIS — Z98.89 OTHER SPECIFIED POSTPROCEDURAL STATES: Chronic | ICD-10-CM

## 2017-10-12 PROCEDURE — 82962 GLUCOSE BLOOD TEST: CPT

## 2017-10-12 PROCEDURE — G0277: CPT

## 2017-10-12 PROCEDURE — 99183 HYPERBARIC OXYGEN THERAPY: CPT

## 2017-10-13 ENCOUNTER — APPOINTMENT (OUTPATIENT)
Dept: HYPERBARIC MEDICINE | Facility: CLINIC | Age: 72
End: 2017-10-13
Payer: MEDICARE

## 2017-10-13 ENCOUNTER — OUTPATIENT (OUTPATIENT)
Dept: OUTPATIENT SERVICES | Facility: HOSPITAL | Age: 72
LOS: 1 days | End: 2017-10-13
Payer: COMMERCIAL

## 2017-10-13 ENCOUNTER — APPOINTMENT (OUTPATIENT)
Dept: WOUND CARE | Facility: CLINIC | Age: 72
End: 2017-10-13
Payer: MEDICARE

## 2017-10-13 DIAGNOSIS — Z90.710 ACQUIRED ABSENCE OF BOTH CERVIX AND UTERUS: Chronic | ICD-10-CM

## 2017-10-13 DIAGNOSIS — Z98.89 OTHER SPECIFIED POSTPROCEDURAL STATES: Chronic | ICD-10-CM

## 2017-10-13 DIAGNOSIS — Z89.511 ACQUIRED ABSENCE OF RIGHT LEG BELOW KNEE: Chronic | ICD-10-CM

## 2017-10-13 DIAGNOSIS — Z95.1 PRESENCE OF AORTOCORONARY BYPASS GRAFT: Chronic | ICD-10-CM

## 2017-10-13 PROCEDURE — 99183 HYPERBARIC OXYGEN THERAPY: CPT

## 2017-10-13 PROCEDURE — 82962 GLUCOSE BLOOD TEST: CPT

## 2017-10-13 PROCEDURE — G0277: CPT

## 2017-10-13 PROCEDURE — 11042 DBRDMT SUBQ TIS 1ST 20SQCM/<: CPT | Mod: 79

## 2017-10-16 ENCOUNTER — APPOINTMENT (OUTPATIENT)
Dept: HYPERBARIC MEDICINE | Facility: CLINIC | Age: 72
End: 2017-10-16
Payer: MEDICARE

## 2017-10-16 ENCOUNTER — OUTPATIENT (OUTPATIENT)
Dept: OUTPATIENT SERVICES | Facility: HOSPITAL | Age: 72
LOS: 1 days | End: 2017-10-16
Payer: COMMERCIAL

## 2017-10-16 DIAGNOSIS — Z98.89 OTHER SPECIFIED POSTPROCEDURAL STATES: Chronic | ICD-10-CM

## 2017-10-16 DIAGNOSIS — M79.673 PAIN IN UNSPECIFIED FOOT: ICD-10-CM

## 2017-10-16 DIAGNOSIS — Z95.1 PRESENCE OF AORTOCORONARY BYPASS GRAFT: Chronic | ICD-10-CM

## 2017-10-16 DIAGNOSIS — Z90.710 ACQUIRED ABSENCE OF BOTH CERVIX AND UTERUS: Chronic | ICD-10-CM

## 2017-10-16 DIAGNOSIS — Z89.511 ACQUIRED ABSENCE OF RIGHT LEG BELOW KNEE: Chronic | ICD-10-CM

## 2017-10-16 PROCEDURE — 82962 GLUCOSE BLOOD TEST: CPT

## 2017-10-16 PROCEDURE — 99183 HYPERBARIC OXYGEN THERAPY: CPT

## 2017-10-16 PROCEDURE — G0277: CPT

## 2017-10-17 ENCOUNTER — APPOINTMENT (OUTPATIENT)
Dept: HYPERBARIC MEDICINE | Facility: CLINIC | Age: 72
End: 2017-10-17
Payer: MEDICARE

## 2017-10-17 ENCOUNTER — OUTPATIENT (OUTPATIENT)
Dept: OUTPATIENT SERVICES | Facility: HOSPITAL | Age: 72
LOS: 1 days | End: 2017-10-17
Payer: COMMERCIAL

## 2017-10-17 DIAGNOSIS — S98.312A COMPLETE TRAUMATIC AMPUTATION OF LEFT MIDFOOT, INITIAL ENCOUNTER: ICD-10-CM

## 2017-10-17 DIAGNOSIS — E11.621 TYPE 2 DIABETES MELLITUS WITH FOOT ULCER: ICD-10-CM

## 2017-10-17 DIAGNOSIS — Z95.1 PRESENCE OF AORTOCORONARY BYPASS GRAFT: Chronic | ICD-10-CM

## 2017-10-17 DIAGNOSIS — Z90.710 ACQUIRED ABSENCE OF BOTH CERVIX AND UTERUS: Chronic | ICD-10-CM

## 2017-10-17 DIAGNOSIS — Z98.89 OTHER SPECIFIED POSTPROCEDURAL STATES: Chronic | ICD-10-CM

## 2017-10-17 DIAGNOSIS — T86.821 SKIN GRAFT (ALLOGRAFT) (AUTOGRAFT) FAILURE: ICD-10-CM

## 2017-10-17 DIAGNOSIS — Z89.511 ACQUIRED ABSENCE OF RIGHT LEG BELOW KNEE: Chronic | ICD-10-CM

## 2017-10-17 DIAGNOSIS — M79.673 PAIN IN UNSPECIFIED FOOT: ICD-10-CM

## 2017-10-17 PROCEDURE — G0277: CPT

## 2017-10-17 PROCEDURE — 82962 GLUCOSE BLOOD TEST: CPT

## 2017-10-17 PROCEDURE — 99183 HYPERBARIC OXYGEN THERAPY: CPT

## 2017-10-18 ENCOUNTER — APPOINTMENT (OUTPATIENT)
Dept: HYPERBARIC MEDICINE | Facility: CLINIC | Age: 72
End: 2017-10-18
Payer: MEDICARE

## 2017-10-18 ENCOUNTER — OUTPATIENT (OUTPATIENT)
Dept: OUTPATIENT SERVICES | Facility: HOSPITAL | Age: 72
LOS: 1 days | End: 2017-10-18
Payer: COMMERCIAL

## 2017-10-18 DIAGNOSIS — Z90.710 ACQUIRED ABSENCE OF BOTH CERVIX AND UTERUS: Chronic | ICD-10-CM

## 2017-10-18 DIAGNOSIS — Z89.511 ACQUIRED ABSENCE OF RIGHT LEG BELOW KNEE: Chronic | ICD-10-CM

## 2017-10-18 DIAGNOSIS — Z98.89 OTHER SPECIFIED POSTPROCEDURAL STATES: Chronic | ICD-10-CM

## 2017-10-18 DIAGNOSIS — Z95.1 PRESENCE OF AORTOCORONARY BYPASS GRAFT: Chronic | ICD-10-CM

## 2017-10-18 PROCEDURE — 99183 HYPERBARIC OXYGEN THERAPY: CPT

## 2017-10-18 PROCEDURE — G0277: CPT

## 2017-10-18 PROCEDURE — 82962 GLUCOSE BLOOD TEST: CPT

## 2017-10-19 ENCOUNTER — APPOINTMENT (OUTPATIENT)
Dept: HYPERBARIC MEDICINE | Facility: CLINIC | Age: 72
End: 2017-10-19
Payer: MEDICARE

## 2017-10-19 ENCOUNTER — OUTPATIENT (OUTPATIENT)
Dept: OUTPATIENT SERVICES | Facility: HOSPITAL | Age: 72
LOS: 1 days | End: 2017-10-19
Payer: COMMERCIAL

## 2017-10-19 DIAGNOSIS — Z90.710 ACQUIRED ABSENCE OF BOTH CERVIX AND UTERUS: Chronic | ICD-10-CM

## 2017-10-19 DIAGNOSIS — M79.673 PAIN IN UNSPECIFIED FOOT: ICD-10-CM

## 2017-10-19 DIAGNOSIS — Z98.89 OTHER SPECIFIED POSTPROCEDURAL STATES: Chronic | ICD-10-CM

## 2017-10-19 DIAGNOSIS — Z89.511 ACQUIRED ABSENCE OF RIGHT LEG BELOW KNEE: Chronic | ICD-10-CM

## 2017-10-19 DIAGNOSIS — Z95.1 PRESENCE OF AORTOCORONARY BYPASS GRAFT: Chronic | ICD-10-CM

## 2017-10-19 PROCEDURE — 99183 HYPERBARIC OXYGEN THERAPY: CPT

## 2017-10-19 PROCEDURE — 82962 GLUCOSE BLOOD TEST: CPT

## 2017-10-19 PROCEDURE — G0277: CPT

## 2017-10-20 ENCOUNTER — APPOINTMENT (OUTPATIENT)
Dept: WOUND CARE | Facility: CLINIC | Age: 72
End: 2017-10-20
Payer: MEDICARE

## 2017-10-20 ENCOUNTER — APPOINTMENT (OUTPATIENT)
Dept: HYPERBARIC MEDICINE | Facility: CLINIC | Age: 72
End: 2017-10-20
Payer: MEDICARE

## 2017-10-20 ENCOUNTER — OUTPATIENT (OUTPATIENT)
Dept: OUTPATIENT SERVICES | Facility: HOSPITAL | Age: 72
LOS: 1 days | End: 2017-10-20
Payer: COMMERCIAL

## 2017-10-20 VITALS
DIASTOLIC BLOOD PRESSURE: 70 MMHG | SYSTOLIC BLOOD PRESSURE: 153 MMHG | WEIGHT: 185 LBS | HEART RATE: 64 BPM | TEMPERATURE: 97.5 F | RESPIRATION RATE: 16 BRPM | HEIGHT: 66 IN | BODY MASS INDEX: 29.73 KG/M2

## 2017-10-20 DIAGNOSIS — Z89.511 ACQUIRED ABSENCE OF RIGHT LEG BELOW KNEE: Chronic | ICD-10-CM

## 2017-10-20 DIAGNOSIS — Z98.89 OTHER SPECIFIED POSTPROCEDURAL STATES: Chronic | ICD-10-CM

## 2017-10-20 DIAGNOSIS — Z90.710 ACQUIRED ABSENCE OF BOTH CERVIX AND UTERUS: Chronic | ICD-10-CM

## 2017-10-20 DIAGNOSIS — Z95.1 PRESENCE OF AORTOCORONARY BYPASS GRAFT: Chronic | ICD-10-CM

## 2017-10-20 PROCEDURE — G0277: CPT

## 2017-10-20 PROCEDURE — 82962 GLUCOSE BLOOD TEST: CPT

## 2017-10-20 PROCEDURE — 11042 DBRDMT SUBQ TIS 1ST 20SQCM/<: CPT | Mod: 79

## 2017-10-20 PROCEDURE — 99183 HYPERBARIC OXYGEN THERAPY: CPT

## 2017-10-23 ENCOUNTER — OUTPATIENT (OUTPATIENT)
Dept: OUTPATIENT SERVICES | Facility: HOSPITAL | Age: 72
LOS: 1 days | End: 2017-10-23
Payer: COMMERCIAL

## 2017-10-23 ENCOUNTER — APPOINTMENT (OUTPATIENT)
Dept: HYPERBARIC MEDICINE | Facility: CLINIC | Age: 72
End: 2017-10-23
Payer: MEDICARE

## 2017-10-23 DIAGNOSIS — Z98.89 OTHER SPECIFIED POSTPROCEDURAL STATES: Chronic | ICD-10-CM

## 2017-10-23 DIAGNOSIS — Z90.710 ACQUIRED ABSENCE OF BOTH CERVIX AND UTERUS: Chronic | ICD-10-CM

## 2017-10-23 DIAGNOSIS — Z95.1 PRESENCE OF AORTOCORONARY BYPASS GRAFT: Chronic | ICD-10-CM

## 2017-10-23 DIAGNOSIS — Z89.511 ACQUIRED ABSENCE OF RIGHT LEG BELOW KNEE: Chronic | ICD-10-CM

## 2017-10-23 PROCEDURE — 99183 HYPERBARIC OXYGEN THERAPY: CPT

## 2017-10-23 PROCEDURE — 82962 GLUCOSE BLOOD TEST: CPT

## 2017-10-23 PROCEDURE — G0277: CPT

## 2017-10-24 ENCOUNTER — OUTPATIENT (OUTPATIENT)
Dept: OUTPATIENT SERVICES | Facility: HOSPITAL | Age: 72
LOS: 1 days | End: 2017-10-24
Payer: COMMERCIAL

## 2017-10-24 ENCOUNTER — APPOINTMENT (OUTPATIENT)
Dept: HYPERBARIC MEDICINE | Facility: CLINIC | Age: 72
End: 2017-10-24
Payer: MEDICARE

## 2017-10-24 DIAGNOSIS — Z98.89 OTHER SPECIFIED POSTPROCEDURAL STATES: Chronic | ICD-10-CM

## 2017-10-24 DIAGNOSIS — Z89.511 ACQUIRED ABSENCE OF RIGHT LEG BELOW KNEE: Chronic | ICD-10-CM

## 2017-10-24 DIAGNOSIS — M79.673 PAIN IN UNSPECIFIED FOOT: ICD-10-CM

## 2017-10-24 DIAGNOSIS — Z90.710 ACQUIRED ABSENCE OF BOTH CERVIX AND UTERUS: Chronic | ICD-10-CM

## 2017-10-24 DIAGNOSIS — Z95.1 PRESENCE OF AORTOCORONARY BYPASS GRAFT: Chronic | ICD-10-CM

## 2017-10-24 PROCEDURE — G0277: CPT

## 2017-10-24 PROCEDURE — 99183 HYPERBARIC OXYGEN THERAPY: CPT

## 2017-10-24 PROCEDURE — 82962 GLUCOSE BLOOD TEST: CPT

## 2017-10-25 ENCOUNTER — APPOINTMENT (OUTPATIENT)
Dept: HYPERBARIC MEDICINE | Facility: CLINIC | Age: 72
End: 2017-10-25
Payer: MEDICARE

## 2017-10-25 ENCOUNTER — OUTPATIENT (OUTPATIENT)
Dept: OUTPATIENT SERVICES | Facility: HOSPITAL | Age: 72
LOS: 1 days | End: 2017-10-25
Payer: COMMERCIAL

## 2017-10-25 DIAGNOSIS — Z98.89 OTHER SPECIFIED POSTPROCEDURAL STATES: Chronic | ICD-10-CM

## 2017-10-25 DIAGNOSIS — M79.673 PAIN IN UNSPECIFIED FOOT: ICD-10-CM

## 2017-10-25 DIAGNOSIS — Z95.1 PRESENCE OF AORTOCORONARY BYPASS GRAFT: Chronic | ICD-10-CM

## 2017-10-25 DIAGNOSIS — Z90.710 ACQUIRED ABSENCE OF BOTH CERVIX AND UTERUS: Chronic | ICD-10-CM

## 2017-10-25 DIAGNOSIS — Z89.511 ACQUIRED ABSENCE OF RIGHT LEG BELOW KNEE: Chronic | ICD-10-CM

## 2017-10-25 PROCEDURE — 99183 HYPERBARIC OXYGEN THERAPY: CPT

## 2017-10-25 PROCEDURE — G0277: CPT

## 2017-10-25 PROCEDURE — 82962 GLUCOSE BLOOD TEST: CPT

## 2017-10-26 ENCOUNTER — APPOINTMENT (OUTPATIENT)
Dept: HYPERBARIC MEDICINE | Facility: CLINIC | Age: 72
End: 2017-10-26
Payer: MEDICARE

## 2017-10-26 ENCOUNTER — OUTPATIENT (OUTPATIENT)
Dept: OUTPATIENT SERVICES | Facility: HOSPITAL | Age: 72
LOS: 1 days | End: 2017-10-26
Payer: COMMERCIAL

## 2017-10-26 DIAGNOSIS — Z90.710 ACQUIRED ABSENCE OF BOTH CERVIX AND UTERUS: Chronic | ICD-10-CM

## 2017-10-26 DIAGNOSIS — Z95.1 PRESENCE OF AORTOCORONARY BYPASS GRAFT: Chronic | ICD-10-CM

## 2017-10-26 DIAGNOSIS — Z98.89 OTHER SPECIFIED POSTPROCEDURAL STATES: Chronic | ICD-10-CM

## 2017-10-26 DIAGNOSIS — Z89.511 ACQUIRED ABSENCE OF RIGHT LEG BELOW KNEE: Chronic | ICD-10-CM

## 2017-10-26 PROCEDURE — 99183 HYPERBARIC OXYGEN THERAPY: CPT

## 2017-10-26 PROCEDURE — G0277: CPT

## 2017-10-26 PROCEDURE — 82962 GLUCOSE BLOOD TEST: CPT

## 2017-10-27 ENCOUNTER — OUTPATIENT (OUTPATIENT)
Dept: OUTPATIENT SERVICES | Facility: HOSPITAL | Age: 72
LOS: 1 days | End: 2017-10-27
Payer: COMMERCIAL

## 2017-10-27 ENCOUNTER — APPOINTMENT (OUTPATIENT)
Dept: HYPERBARIC MEDICINE | Facility: CLINIC | Age: 72
End: 2017-10-27
Payer: MEDICARE

## 2017-10-27 DIAGNOSIS — Z90.710 ACQUIRED ABSENCE OF BOTH CERVIX AND UTERUS: Chronic | ICD-10-CM

## 2017-10-27 DIAGNOSIS — M79.673 PAIN IN UNSPECIFIED FOOT: ICD-10-CM

## 2017-10-27 DIAGNOSIS — Z98.89 OTHER SPECIFIED POSTPROCEDURAL STATES: Chronic | ICD-10-CM

## 2017-10-27 DIAGNOSIS — Z89.511 ACQUIRED ABSENCE OF RIGHT LEG BELOW KNEE: Chronic | ICD-10-CM

## 2017-10-27 DIAGNOSIS — Z95.1 PRESENCE OF AORTOCORONARY BYPASS GRAFT: Chronic | ICD-10-CM

## 2017-10-27 PROCEDURE — G0277: CPT

## 2017-10-27 PROCEDURE — 82962 GLUCOSE BLOOD TEST: CPT

## 2017-10-27 PROCEDURE — 99183 HYPERBARIC OXYGEN THERAPY: CPT

## 2017-10-30 ENCOUNTER — OUTPATIENT (OUTPATIENT)
Dept: OUTPATIENT SERVICES | Facility: HOSPITAL | Age: 72
LOS: 1 days | End: 2017-10-30
Payer: COMMERCIAL

## 2017-10-30 ENCOUNTER — APPOINTMENT (OUTPATIENT)
Dept: HYPERBARIC MEDICINE | Facility: CLINIC | Age: 72
End: 2017-10-30
Payer: MEDICARE

## 2017-10-30 DIAGNOSIS — T86.821 SKIN GRAFT (ALLOGRAFT) (AUTOGRAFT) FAILURE: ICD-10-CM

## 2017-10-30 DIAGNOSIS — Z89.511 ACQUIRED ABSENCE OF RIGHT LEG BELOW KNEE: Chronic | ICD-10-CM

## 2017-10-30 DIAGNOSIS — S98.312A COMPLETE TRAUMATIC AMPUTATION OF LEFT MIDFOOT, INITIAL ENCOUNTER: ICD-10-CM

## 2017-10-30 DIAGNOSIS — E11.621 TYPE 2 DIABETES MELLITUS WITH FOOT ULCER: ICD-10-CM

## 2017-10-30 DIAGNOSIS — M79.673 PAIN IN UNSPECIFIED FOOT: ICD-10-CM

## 2017-10-30 DIAGNOSIS — Z95.1 PRESENCE OF AORTOCORONARY BYPASS GRAFT: Chronic | ICD-10-CM

## 2017-10-30 DIAGNOSIS — Z98.89 OTHER SPECIFIED POSTPROCEDURAL STATES: Chronic | ICD-10-CM

## 2017-10-30 DIAGNOSIS — Z90.710 ACQUIRED ABSENCE OF BOTH CERVIX AND UTERUS: Chronic | ICD-10-CM

## 2017-10-30 PROCEDURE — 99183 HYPERBARIC OXYGEN THERAPY: CPT

## 2017-10-30 PROCEDURE — 82962 GLUCOSE BLOOD TEST: CPT

## 2017-10-30 PROCEDURE — G0277: CPT

## 2017-10-31 ENCOUNTER — OUTPATIENT (OUTPATIENT)
Dept: OUTPATIENT SERVICES | Facility: HOSPITAL | Age: 72
LOS: 1 days | End: 2017-10-31
Payer: COMMERCIAL

## 2017-10-31 ENCOUNTER — APPOINTMENT (OUTPATIENT)
Dept: HYPERBARIC MEDICINE | Facility: CLINIC | Age: 72
End: 2017-10-31
Payer: MEDICARE

## 2017-10-31 DIAGNOSIS — Z89.511 ACQUIRED ABSENCE OF RIGHT LEG BELOW KNEE: Chronic | ICD-10-CM

## 2017-10-31 DIAGNOSIS — Z98.89 OTHER SPECIFIED POSTPROCEDURAL STATES: Chronic | ICD-10-CM

## 2017-10-31 DIAGNOSIS — Z95.1 PRESENCE OF AORTOCORONARY BYPASS GRAFT: Chronic | ICD-10-CM

## 2017-10-31 DIAGNOSIS — M79.673 PAIN IN UNSPECIFIED FOOT: ICD-10-CM

## 2017-10-31 DIAGNOSIS — Z90.710 ACQUIRED ABSENCE OF BOTH CERVIX AND UTERUS: Chronic | ICD-10-CM

## 2017-10-31 PROCEDURE — 99183 HYPERBARIC OXYGEN THERAPY: CPT

## 2017-10-31 PROCEDURE — 82962 GLUCOSE BLOOD TEST: CPT

## 2017-10-31 PROCEDURE — G0277: CPT

## 2017-11-01 ENCOUNTER — APPOINTMENT (OUTPATIENT)
Dept: HYPERBARIC MEDICINE | Facility: CLINIC | Age: 72
End: 2017-11-01

## 2017-11-02 ENCOUNTER — OUTPATIENT (OUTPATIENT)
Dept: OUTPATIENT SERVICES | Facility: HOSPITAL | Age: 72
LOS: 1 days | End: 2017-11-02
Payer: COMMERCIAL

## 2017-11-02 ENCOUNTER — APPOINTMENT (OUTPATIENT)
Dept: HYPERBARIC MEDICINE | Facility: CLINIC | Age: 72
End: 2017-11-02
Payer: MEDICARE

## 2017-11-02 DIAGNOSIS — Z95.1 PRESENCE OF AORTOCORONARY BYPASS GRAFT: Chronic | ICD-10-CM

## 2017-11-02 DIAGNOSIS — Z90.710 ACQUIRED ABSENCE OF BOTH CERVIX AND UTERUS: Chronic | ICD-10-CM

## 2017-11-02 DIAGNOSIS — Z89.511 ACQUIRED ABSENCE OF RIGHT LEG BELOW KNEE: Chronic | ICD-10-CM

## 2017-11-02 DIAGNOSIS — Z98.89 OTHER SPECIFIED POSTPROCEDURAL STATES: Chronic | ICD-10-CM

## 2017-11-02 PROCEDURE — 82962 GLUCOSE BLOOD TEST: CPT

## 2017-11-02 PROCEDURE — 99183 HYPERBARIC OXYGEN THERAPY: CPT

## 2017-11-02 PROCEDURE — G0277: CPT

## 2017-11-03 ENCOUNTER — APPOINTMENT (OUTPATIENT)
Dept: HYPERBARIC MEDICINE | Facility: CLINIC | Age: 72
End: 2017-11-03
Payer: MEDICARE

## 2017-11-03 ENCOUNTER — OUTPATIENT (OUTPATIENT)
Dept: OUTPATIENT SERVICES | Facility: HOSPITAL | Age: 72
LOS: 1 days | End: 2017-11-03
Payer: COMMERCIAL

## 2017-11-03 ENCOUNTER — APPOINTMENT (OUTPATIENT)
Dept: WOUND CARE | Facility: CLINIC | Age: 72
End: 2017-11-03
Payer: MEDICARE

## 2017-11-03 DIAGNOSIS — Z89.511 ACQUIRED ABSENCE OF RIGHT LEG BELOW KNEE: Chronic | ICD-10-CM

## 2017-11-03 DIAGNOSIS — Z90.710 ACQUIRED ABSENCE OF BOTH CERVIX AND UTERUS: Chronic | ICD-10-CM

## 2017-11-03 DIAGNOSIS — Z98.89 OTHER SPECIFIED POSTPROCEDURAL STATES: Chronic | ICD-10-CM

## 2017-11-03 DIAGNOSIS — Z95.1 PRESENCE OF AORTOCORONARY BYPASS GRAFT: Chronic | ICD-10-CM

## 2017-11-03 PROCEDURE — G0277: CPT

## 2017-11-03 PROCEDURE — 82962 GLUCOSE BLOOD TEST: CPT

## 2017-11-03 PROCEDURE — 99183 HYPERBARIC OXYGEN THERAPY: CPT

## 2017-11-03 PROCEDURE — 11042 DBRDMT SUBQ TIS 1ST 20SQCM/<: CPT

## 2017-11-06 ENCOUNTER — APPOINTMENT (OUTPATIENT)
Dept: HYPERBARIC MEDICINE | Facility: CLINIC | Age: 72
End: 2017-11-06
Payer: MEDICARE

## 2017-11-06 ENCOUNTER — OUTPATIENT (OUTPATIENT)
Dept: OUTPATIENT SERVICES | Facility: HOSPITAL | Age: 72
LOS: 1 days | End: 2017-11-06
Payer: COMMERCIAL

## 2017-11-06 DIAGNOSIS — S98.312A COMPLETE TRAUMATIC AMPUTATION OF LEFT MIDFOOT, INITIAL ENCOUNTER: ICD-10-CM

## 2017-11-06 DIAGNOSIS — Z95.1 PRESENCE OF AORTOCORONARY BYPASS GRAFT: Chronic | ICD-10-CM

## 2017-11-06 DIAGNOSIS — T86.821 SKIN GRAFT (ALLOGRAFT) (AUTOGRAFT) FAILURE: ICD-10-CM

## 2017-11-06 DIAGNOSIS — E11.621 TYPE 2 DIABETES MELLITUS WITH FOOT ULCER: ICD-10-CM

## 2017-11-06 DIAGNOSIS — Z98.89 OTHER SPECIFIED POSTPROCEDURAL STATES: Chronic | ICD-10-CM

## 2017-11-06 DIAGNOSIS — L97.523 NON-PRESSURE CHRONIC ULCER OF OTHER PART OF LEFT FOOT WITH NECROSIS OF MUSCLE: ICD-10-CM

## 2017-11-06 DIAGNOSIS — Z89.511 ACQUIRED ABSENCE OF RIGHT LEG BELOW KNEE: Chronic | ICD-10-CM

## 2017-11-06 DIAGNOSIS — M79.673 PAIN IN UNSPECIFIED FOOT: ICD-10-CM

## 2017-11-06 DIAGNOSIS — Z90.710 ACQUIRED ABSENCE OF BOTH CERVIX AND UTERUS: Chronic | ICD-10-CM

## 2017-11-06 PROCEDURE — 82962 GLUCOSE BLOOD TEST: CPT

## 2017-11-06 PROCEDURE — 99183 HYPERBARIC OXYGEN THERAPY: CPT

## 2017-11-06 PROCEDURE — G0277: CPT

## 2017-11-07 ENCOUNTER — APPOINTMENT (OUTPATIENT)
Dept: HYPERBARIC MEDICINE | Facility: CLINIC | Age: 72
End: 2017-11-07
Payer: MEDICARE

## 2017-11-07 ENCOUNTER — OUTPATIENT (OUTPATIENT)
Dept: OUTPATIENT SERVICES | Facility: HOSPITAL | Age: 72
LOS: 1 days | End: 2017-11-07
Payer: COMMERCIAL

## 2017-11-07 DIAGNOSIS — L97.523 NON-PRESSURE CHRONIC ULCER OF OTHER PART OF LEFT FOOT WITH NECROSIS OF MUSCLE: ICD-10-CM

## 2017-11-07 DIAGNOSIS — Z98.89 OTHER SPECIFIED POSTPROCEDURAL STATES: Chronic | ICD-10-CM

## 2017-11-07 DIAGNOSIS — E11.621 TYPE 2 DIABETES MELLITUS WITH FOOT ULCER: ICD-10-CM

## 2017-11-07 DIAGNOSIS — T86.821 SKIN GRAFT (ALLOGRAFT) (AUTOGRAFT) FAILURE: ICD-10-CM

## 2017-11-07 DIAGNOSIS — Z89.511 ACQUIRED ABSENCE OF RIGHT LEG BELOW KNEE: Chronic | ICD-10-CM

## 2017-11-07 DIAGNOSIS — Z90.710 ACQUIRED ABSENCE OF BOTH CERVIX AND UTERUS: Chronic | ICD-10-CM

## 2017-11-07 DIAGNOSIS — Z95.1 PRESENCE OF AORTOCORONARY BYPASS GRAFT: Chronic | ICD-10-CM

## 2017-11-07 DIAGNOSIS — S98.312A COMPLETE TRAUMATIC AMPUTATION OF LEFT MIDFOOT, INITIAL ENCOUNTER: ICD-10-CM

## 2017-11-07 PROCEDURE — 99183 HYPERBARIC OXYGEN THERAPY: CPT

## 2017-11-07 PROCEDURE — G0277: CPT

## 2017-11-07 PROCEDURE — 82962 GLUCOSE BLOOD TEST: CPT

## 2017-11-08 ENCOUNTER — OUTPATIENT (OUTPATIENT)
Dept: OUTPATIENT SERVICES | Facility: HOSPITAL | Age: 72
LOS: 1 days | End: 2017-11-08
Payer: COMMERCIAL

## 2017-11-08 ENCOUNTER — APPOINTMENT (OUTPATIENT)
Dept: HYPERBARIC MEDICINE | Facility: CLINIC | Age: 72
End: 2017-11-08
Payer: MEDICARE

## 2017-11-08 DIAGNOSIS — Z95.1 PRESENCE OF AORTOCORONARY BYPASS GRAFT: Chronic | ICD-10-CM

## 2017-11-08 DIAGNOSIS — Z90.710 ACQUIRED ABSENCE OF BOTH CERVIX AND UTERUS: Chronic | ICD-10-CM

## 2017-11-08 DIAGNOSIS — Z98.89 OTHER SPECIFIED POSTPROCEDURAL STATES: Chronic | ICD-10-CM

## 2017-11-08 DIAGNOSIS — Z89.511 ACQUIRED ABSENCE OF RIGHT LEG BELOW KNEE: Chronic | ICD-10-CM

## 2017-11-08 PROCEDURE — 82962 GLUCOSE BLOOD TEST: CPT

## 2017-11-08 PROCEDURE — G0277: CPT

## 2017-11-08 PROCEDURE — 99183 HYPERBARIC OXYGEN THERAPY: CPT

## 2017-11-09 ENCOUNTER — OUTPATIENT (OUTPATIENT)
Dept: OUTPATIENT SERVICES | Facility: HOSPITAL | Age: 72
LOS: 1 days | End: 2017-11-09
Payer: COMMERCIAL

## 2017-11-09 ENCOUNTER — APPOINTMENT (OUTPATIENT)
Dept: HYPERBARIC MEDICINE | Facility: CLINIC | Age: 72
End: 2017-11-09
Payer: MEDICARE

## 2017-11-09 DIAGNOSIS — Z90.710 ACQUIRED ABSENCE OF BOTH CERVIX AND UTERUS: Chronic | ICD-10-CM

## 2017-11-09 DIAGNOSIS — Z89.511 ACQUIRED ABSENCE OF RIGHT LEG BELOW KNEE: Chronic | ICD-10-CM

## 2017-11-09 DIAGNOSIS — Z95.1 PRESENCE OF AORTOCORONARY BYPASS GRAFT: Chronic | ICD-10-CM

## 2017-11-09 DIAGNOSIS — Z98.89 OTHER SPECIFIED POSTPROCEDURAL STATES: Chronic | ICD-10-CM

## 2017-11-09 PROCEDURE — 99183 HYPERBARIC OXYGEN THERAPY: CPT

## 2017-11-09 PROCEDURE — G0277: CPT

## 2017-11-09 PROCEDURE — 82962 GLUCOSE BLOOD TEST: CPT

## 2017-11-10 ENCOUNTER — APPOINTMENT (OUTPATIENT)
Dept: WOUND CARE | Facility: CLINIC | Age: 72
End: 2017-11-10
Payer: MEDICARE

## 2017-11-10 ENCOUNTER — APPOINTMENT (OUTPATIENT)
Dept: HYPERBARIC MEDICINE | Facility: CLINIC | Age: 72
End: 2017-11-10
Payer: MEDICARE

## 2017-11-10 ENCOUNTER — OUTPATIENT (OUTPATIENT)
Dept: OUTPATIENT SERVICES | Facility: HOSPITAL | Age: 72
LOS: 1 days | End: 2017-11-10
Payer: COMMERCIAL

## 2017-11-10 DIAGNOSIS — Z90.710 ACQUIRED ABSENCE OF BOTH CERVIX AND UTERUS: Chronic | ICD-10-CM

## 2017-11-10 DIAGNOSIS — M79.673 PAIN IN UNSPECIFIED FOOT: ICD-10-CM

## 2017-11-10 DIAGNOSIS — Z95.1 PRESENCE OF AORTOCORONARY BYPASS GRAFT: Chronic | ICD-10-CM

## 2017-11-10 DIAGNOSIS — Z98.89 OTHER SPECIFIED POSTPROCEDURAL STATES: Chronic | ICD-10-CM

## 2017-11-10 DIAGNOSIS — Z89.511 ACQUIRED ABSENCE OF RIGHT LEG BELOW KNEE: Chronic | ICD-10-CM

## 2017-11-10 PROCEDURE — G0277: CPT

## 2017-11-10 PROCEDURE — 82962 GLUCOSE BLOOD TEST: CPT

## 2017-11-10 PROCEDURE — 11042 DBRDMT SUBQ TIS 1ST 20SQCM/<: CPT

## 2017-11-10 PROCEDURE — 99183 HYPERBARIC OXYGEN THERAPY: CPT

## 2017-11-13 ENCOUNTER — APPOINTMENT (OUTPATIENT)
Dept: HYPERBARIC MEDICINE | Facility: CLINIC | Age: 72
End: 2017-11-13
Payer: MEDICARE

## 2017-11-13 ENCOUNTER — OUTPATIENT (OUTPATIENT)
Dept: OUTPATIENT SERVICES | Facility: HOSPITAL | Age: 72
LOS: 1 days | End: 2017-11-13
Payer: COMMERCIAL

## 2017-11-13 DIAGNOSIS — S98.312A COMPLETE TRAUMATIC AMPUTATION OF LEFT MIDFOOT, INITIAL ENCOUNTER: ICD-10-CM

## 2017-11-13 DIAGNOSIS — E11.621 TYPE 2 DIABETES MELLITUS WITH FOOT ULCER: ICD-10-CM

## 2017-11-13 DIAGNOSIS — Z89.511 ACQUIRED ABSENCE OF RIGHT LEG BELOW KNEE: Chronic | ICD-10-CM

## 2017-11-13 DIAGNOSIS — T86.821 SKIN GRAFT (ALLOGRAFT) (AUTOGRAFT) FAILURE: ICD-10-CM

## 2017-11-13 DIAGNOSIS — Z90.710 ACQUIRED ABSENCE OF BOTH CERVIX AND UTERUS: Chronic | ICD-10-CM

## 2017-11-13 DIAGNOSIS — Z95.1 PRESENCE OF AORTOCORONARY BYPASS GRAFT: Chronic | ICD-10-CM

## 2017-11-13 DIAGNOSIS — L97.523 NON-PRESSURE CHRONIC ULCER OF OTHER PART OF LEFT FOOT WITH NECROSIS OF MUSCLE: ICD-10-CM

## 2017-11-13 DIAGNOSIS — Z98.89 OTHER SPECIFIED POSTPROCEDURAL STATES: Chronic | ICD-10-CM

## 2017-11-13 PROCEDURE — 99183 HYPERBARIC OXYGEN THERAPY: CPT

## 2017-11-13 PROCEDURE — 82962 GLUCOSE BLOOD TEST: CPT

## 2017-11-13 PROCEDURE — G0277: CPT

## 2017-11-14 ENCOUNTER — APPOINTMENT (OUTPATIENT)
Dept: HYPERBARIC MEDICINE | Facility: CLINIC | Age: 72
End: 2017-11-14
Payer: MEDICARE

## 2017-11-14 ENCOUNTER — OUTPATIENT (OUTPATIENT)
Dept: OUTPATIENT SERVICES | Facility: HOSPITAL | Age: 72
LOS: 1 days | End: 2017-11-14
Payer: COMMERCIAL

## 2017-11-14 DIAGNOSIS — M79.673 PAIN IN UNSPECIFIED FOOT: ICD-10-CM

## 2017-11-14 DIAGNOSIS — Z89.511 ACQUIRED ABSENCE OF RIGHT LEG BELOW KNEE: Chronic | ICD-10-CM

## 2017-11-14 DIAGNOSIS — Z98.89 OTHER SPECIFIED POSTPROCEDURAL STATES: Chronic | ICD-10-CM

## 2017-11-14 DIAGNOSIS — Z90.710 ACQUIRED ABSENCE OF BOTH CERVIX AND UTERUS: Chronic | ICD-10-CM

## 2017-11-14 DIAGNOSIS — Z95.1 PRESENCE OF AORTOCORONARY BYPASS GRAFT: Chronic | ICD-10-CM

## 2017-11-14 PROCEDURE — 99183 HYPERBARIC OXYGEN THERAPY: CPT

## 2017-11-14 PROCEDURE — G0277: CPT

## 2017-11-14 PROCEDURE — 82962 GLUCOSE BLOOD TEST: CPT

## 2017-11-15 ENCOUNTER — APPOINTMENT (OUTPATIENT)
Dept: HYPERBARIC MEDICINE | Facility: CLINIC | Age: 72
End: 2017-11-15
Payer: MEDICARE

## 2017-11-15 ENCOUNTER — OUTPATIENT (OUTPATIENT)
Dept: OUTPATIENT SERVICES | Facility: HOSPITAL | Age: 72
LOS: 1 days | End: 2017-11-15
Payer: COMMERCIAL

## 2017-11-15 DIAGNOSIS — Z90.710 ACQUIRED ABSENCE OF BOTH CERVIX AND UTERUS: Chronic | ICD-10-CM

## 2017-11-15 DIAGNOSIS — Z98.89 OTHER SPECIFIED POSTPROCEDURAL STATES: Chronic | ICD-10-CM

## 2017-11-15 DIAGNOSIS — Z89.511 ACQUIRED ABSENCE OF RIGHT LEG BELOW KNEE: Chronic | ICD-10-CM

## 2017-11-15 DIAGNOSIS — M79.673 PAIN IN UNSPECIFIED FOOT: ICD-10-CM

## 2017-11-15 DIAGNOSIS — Z95.1 PRESENCE OF AORTOCORONARY BYPASS GRAFT: Chronic | ICD-10-CM

## 2017-11-15 PROCEDURE — 82962 GLUCOSE BLOOD TEST: CPT

## 2017-11-15 PROCEDURE — G0277: CPT

## 2017-11-15 PROCEDURE — 99183 HYPERBARIC OXYGEN THERAPY: CPT

## 2017-11-16 ENCOUNTER — OUTPATIENT (OUTPATIENT)
Dept: OUTPATIENT SERVICES | Facility: HOSPITAL | Age: 72
LOS: 1 days | End: 2017-11-16
Payer: COMMERCIAL

## 2017-11-16 ENCOUNTER — APPOINTMENT (OUTPATIENT)
Dept: HYPERBARIC MEDICINE | Facility: CLINIC | Age: 72
End: 2017-11-16
Payer: MEDICARE

## 2017-11-16 DIAGNOSIS — Z98.89 OTHER SPECIFIED POSTPROCEDURAL STATES: Chronic | ICD-10-CM

## 2017-11-16 DIAGNOSIS — Z95.1 PRESENCE OF AORTOCORONARY BYPASS GRAFT: Chronic | ICD-10-CM

## 2017-11-16 DIAGNOSIS — Z90.710 ACQUIRED ABSENCE OF BOTH CERVIX AND UTERUS: Chronic | ICD-10-CM

## 2017-11-16 DIAGNOSIS — Z89.511 ACQUIRED ABSENCE OF RIGHT LEG BELOW KNEE: Chronic | ICD-10-CM

## 2017-11-16 PROCEDURE — 82962 GLUCOSE BLOOD TEST: CPT

## 2017-11-16 PROCEDURE — G0277: CPT

## 2017-11-16 PROCEDURE — 99183 HYPERBARIC OXYGEN THERAPY: CPT

## 2017-11-17 ENCOUNTER — OUTPATIENT (OUTPATIENT)
Dept: OUTPATIENT SERVICES | Facility: HOSPITAL | Age: 72
LOS: 1 days | End: 2017-11-17
Payer: COMMERCIAL

## 2017-11-17 ENCOUNTER — APPOINTMENT (OUTPATIENT)
Dept: HYPERBARIC MEDICINE | Facility: CLINIC | Age: 72
End: 2017-11-17
Payer: MEDICARE

## 2017-11-17 DIAGNOSIS — Z89.511 ACQUIRED ABSENCE OF RIGHT LEG BELOW KNEE: Chronic | ICD-10-CM

## 2017-11-17 DIAGNOSIS — Z90.710 ACQUIRED ABSENCE OF BOTH CERVIX AND UTERUS: Chronic | ICD-10-CM

## 2017-11-17 DIAGNOSIS — Z98.89 OTHER SPECIFIED POSTPROCEDURAL STATES: Chronic | ICD-10-CM

## 2017-11-17 DIAGNOSIS — Z95.1 PRESENCE OF AORTOCORONARY BYPASS GRAFT: Chronic | ICD-10-CM

## 2017-11-17 PROCEDURE — G0277: CPT

## 2017-11-17 PROCEDURE — 99183 HYPERBARIC OXYGEN THERAPY: CPT

## 2017-11-17 PROCEDURE — 82962 GLUCOSE BLOOD TEST: CPT

## 2017-11-20 ENCOUNTER — APPOINTMENT (OUTPATIENT)
Dept: HYPERBARIC MEDICINE | Facility: CLINIC | Age: 72
End: 2017-11-20
Payer: MEDICARE

## 2017-11-20 ENCOUNTER — OUTPATIENT (OUTPATIENT)
Dept: OUTPATIENT SERVICES | Facility: HOSPITAL | Age: 72
LOS: 1 days | End: 2017-11-20
Payer: COMMERCIAL

## 2017-11-20 DIAGNOSIS — Z90.710 ACQUIRED ABSENCE OF BOTH CERVIX AND UTERUS: Chronic | ICD-10-CM

## 2017-11-20 DIAGNOSIS — Z98.89 OTHER SPECIFIED POSTPROCEDURAL STATES: Chronic | ICD-10-CM

## 2017-11-20 DIAGNOSIS — M79.673 PAIN IN UNSPECIFIED FOOT: ICD-10-CM

## 2017-11-20 DIAGNOSIS — Z89.511 ACQUIRED ABSENCE OF RIGHT LEG BELOW KNEE: Chronic | ICD-10-CM

## 2017-11-20 DIAGNOSIS — E11.621 TYPE 2 DIABETES MELLITUS WITH FOOT ULCER: ICD-10-CM

## 2017-11-20 DIAGNOSIS — Z95.1 PRESENCE OF AORTOCORONARY BYPASS GRAFT: Chronic | ICD-10-CM

## 2017-11-20 DIAGNOSIS — T86.821 SKIN GRAFT (ALLOGRAFT) (AUTOGRAFT) FAILURE: ICD-10-CM

## 2017-11-20 DIAGNOSIS — S98.312A COMPLETE TRAUMATIC AMPUTATION OF LEFT MIDFOOT, INITIAL ENCOUNTER: ICD-10-CM

## 2017-11-20 PROCEDURE — 99183 HYPERBARIC OXYGEN THERAPY: CPT

## 2017-11-20 PROCEDURE — G0277: CPT

## 2017-11-20 PROCEDURE — 82962 GLUCOSE BLOOD TEST: CPT

## 2017-11-21 ENCOUNTER — APPOINTMENT (OUTPATIENT)
Dept: HYPERBARIC MEDICINE | Facility: CLINIC | Age: 72
End: 2017-11-21
Payer: MEDICARE

## 2017-11-21 ENCOUNTER — OUTPATIENT (OUTPATIENT)
Dept: OUTPATIENT SERVICES | Facility: HOSPITAL | Age: 72
LOS: 1 days | End: 2017-11-21
Payer: COMMERCIAL

## 2017-11-21 ENCOUNTER — APPOINTMENT (OUTPATIENT)
Dept: WOUND CARE | Facility: CLINIC | Age: 72
End: 2017-11-21
Payer: MEDICARE

## 2017-11-21 DIAGNOSIS — Z89.511 ACQUIRED ABSENCE OF RIGHT LEG BELOW KNEE: Chronic | ICD-10-CM

## 2017-11-21 DIAGNOSIS — Z95.1 PRESENCE OF AORTOCORONARY BYPASS GRAFT: Chronic | ICD-10-CM

## 2017-11-21 DIAGNOSIS — Z98.89 OTHER SPECIFIED POSTPROCEDURAL STATES: Chronic | ICD-10-CM

## 2017-11-21 DIAGNOSIS — Z90.710 ACQUIRED ABSENCE OF BOTH CERVIX AND UTERUS: Chronic | ICD-10-CM

## 2017-11-21 PROCEDURE — G0277: CPT

## 2017-11-21 PROCEDURE — 75710 ARTERY X-RAYS ARM/LEG: CPT | Mod: 26

## 2017-11-21 PROCEDURE — C9733: CPT

## 2017-11-21 PROCEDURE — 82962 GLUCOSE BLOOD TEST: CPT

## 2017-11-21 PROCEDURE — 15860 IV NJX TST VASC FLO FLAP/GRF: CPT

## 2017-11-21 PROCEDURE — 99183 HYPERBARIC OXYGEN THERAPY: CPT

## 2017-11-21 PROCEDURE — 11042 DBRDMT SUBQ TIS 1ST 20SQCM/<: CPT

## 2017-11-22 ENCOUNTER — APPOINTMENT (OUTPATIENT)
Dept: HYPERBARIC MEDICINE | Facility: CLINIC | Age: 72
End: 2017-11-22
Payer: MEDICARE

## 2017-11-22 ENCOUNTER — OUTPATIENT (OUTPATIENT)
Dept: OUTPATIENT SERVICES | Facility: HOSPITAL | Age: 72
LOS: 1 days | End: 2017-11-22
Payer: COMMERCIAL

## 2017-11-22 DIAGNOSIS — L97.523 NON-PRESSURE CHRONIC ULCER OF OTHER PART OF LEFT FOOT WITH NECROSIS OF MUSCLE: ICD-10-CM

## 2017-11-22 DIAGNOSIS — E11.621 TYPE 2 DIABETES MELLITUS WITH FOOT ULCER: ICD-10-CM

## 2017-11-22 DIAGNOSIS — S98.312A COMPLETE TRAUMATIC AMPUTATION OF LEFT MIDFOOT, INITIAL ENCOUNTER: ICD-10-CM

## 2017-11-22 DIAGNOSIS — T86.821 SKIN GRAFT (ALLOGRAFT) (AUTOGRAFT) FAILURE: ICD-10-CM

## 2017-11-22 DIAGNOSIS — Z98.89 OTHER SPECIFIED POSTPROCEDURAL STATES: Chronic | ICD-10-CM

## 2017-11-22 DIAGNOSIS — Z95.1 PRESENCE OF AORTOCORONARY BYPASS GRAFT: Chronic | ICD-10-CM

## 2017-11-22 DIAGNOSIS — Z89.511 ACQUIRED ABSENCE OF RIGHT LEG BELOW KNEE: Chronic | ICD-10-CM

## 2017-11-22 DIAGNOSIS — Z90.710 ACQUIRED ABSENCE OF BOTH CERVIX AND UTERUS: Chronic | ICD-10-CM

## 2017-11-22 DIAGNOSIS — M79.673 PAIN IN UNSPECIFIED FOOT: ICD-10-CM

## 2017-11-22 PROCEDURE — 99183 HYPERBARIC OXYGEN THERAPY: CPT

## 2017-11-22 PROCEDURE — G0277: CPT

## 2017-11-22 PROCEDURE — 82962 GLUCOSE BLOOD TEST: CPT

## 2017-11-24 ENCOUNTER — APPOINTMENT (OUTPATIENT)
Dept: HYPERBARIC MEDICINE | Facility: CLINIC | Age: 72
End: 2017-11-24

## 2017-11-27 ENCOUNTER — APPOINTMENT (OUTPATIENT)
Dept: HYPERBARIC MEDICINE | Facility: CLINIC | Age: 72
End: 2017-11-27

## 2017-11-27 DIAGNOSIS — T86.821 SKIN GRAFT (ALLOGRAFT) (AUTOGRAFT) FAILURE: ICD-10-CM

## 2017-11-27 DIAGNOSIS — M79.673 PAIN IN UNSPECIFIED FOOT: ICD-10-CM

## 2017-11-27 DIAGNOSIS — S98.312A COMPLETE TRAUMATIC AMPUTATION OF LEFT MIDFOOT, INITIAL ENCOUNTER: ICD-10-CM

## 2017-11-27 DIAGNOSIS — L97.523 NON-PRESSURE CHRONIC ULCER OF OTHER PART OF LEFT FOOT WITH NECROSIS OF MUSCLE: ICD-10-CM

## 2017-11-27 DIAGNOSIS — E11.621 TYPE 2 DIABETES MELLITUS WITH FOOT ULCER: ICD-10-CM

## 2017-11-28 ENCOUNTER — APPOINTMENT (OUTPATIENT)
Dept: HYPERBARIC MEDICINE | Facility: CLINIC | Age: 72
End: 2017-11-28

## 2017-11-29 ENCOUNTER — APPOINTMENT (OUTPATIENT)
Dept: HYPERBARIC MEDICINE | Facility: CLINIC | Age: 72
End: 2017-11-29

## 2017-11-29 DIAGNOSIS — L97.523 NON-PRESSURE CHRONIC ULCER OF OTHER PART OF LEFT FOOT WITH NECROSIS OF MUSCLE: ICD-10-CM

## 2017-11-29 DIAGNOSIS — S98.312A COMPLETE TRAUMATIC AMPUTATION OF LEFT MIDFOOT, INITIAL ENCOUNTER: ICD-10-CM

## 2017-11-29 DIAGNOSIS — E11.621 TYPE 2 DIABETES MELLITUS WITH FOOT ULCER: ICD-10-CM

## 2017-11-29 DIAGNOSIS — T86.821 SKIN GRAFT (ALLOGRAFT) (AUTOGRAFT) FAILURE: ICD-10-CM

## 2017-11-30 ENCOUNTER — APPOINTMENT (OUTPATIENT)
Dept: HYPERBARIC MEDICINE | Facility: CLINIC | Age: 72
End: 2017-11-30

## 2017-12-01 ENCOUNTER — APPOINTMENT (OUTPATIENT)
Dept: HYPERBARIC MEDICINE | Facility: CLINIC | Age: 72
End: 2017-12-01

## 2017-12-05 ENCOUNTER — APPOINTMENT (OUTPATIENT)
Dept: WOUND CARE | Facility: CLINIC | Age: 72
End: 2017-12-05
Payer: MEDICARE

## 2017-12-05 PROCEDURE — 11042 DBRDMT SUBQ TIS 1ST 20SQCM/<: CPT

## 2017-12-11 ENCOUNTER — APPOINTMENT (OUTPATIENT)
Dept: OPHTHALMOLOGY | Facility: CLINIC | Age: 72
End: 2017-12-11
Payer: MEDICARE

## 2017-12-11 DIAGNOSIS — H20.9 UNSPECIFIED IRIDOCYCLITIS: ICD-10-CM

## 2017-12-11 PROCEDURE — 92134 CPTRZ OPH DX IMG PST SGM RTA: CPT

## 2017-12-11 PROCEDURE — 92012 INTRM OPH EXAM EST PATIENT: CPT

## 2017-12-19 ENCOUNTER — APPOINTMENT (OUTPATIENT)
Dept: WOUND CARE | Facility: CLINIC | Age: 72
End: 2017-12-19
Payer: MEDICARE

## 2017-12-19 VITALS
BODY MASS INDEX: 29.73 KG/M2 | HEART RATE: 69 BPM | TEMPERATURE: 97.6 F | DIASTOLIC BLOOD PRESSURE: 71 MMHG | HEIGHT: 66 IN | SYSTOLIC BLOOD PRESSURE: 150 MMHG | WEIGHT: 185 LBS | RESPIRATION RATE: 16 BRPM

## 2017-12-19 PROCEDURE — 11042 DBRDMT SUBQ TIS 1ST 20SQCM/<: CPT

## 2018-01-01 NOTE — PROGRESS NOTE ADULT - PROBLEM SELECTOR PROBLEM 3
DOL 7 for this 34+weeker stable in room air. S/P negative sepsis workup.  Infant is in heated isolette, failed crib.  Tolerating  feeds of Neosure or FEBM  to as tolerated Q3.  Asymptomatic VSD.  For outpatient followup. PVD (peripheral vascular disease)

## 2018-01-05 ENCOUNTER — APPOINTMENT (OUTPATIENT)
Dept: WOUND CARE | Facility: CLINIC | Age: 73
End: 2018-01-05
Payer: MEDICARE

## 2018-01-05 VITALS
RESPIRATION RATE: 16 BRPM | TEMPERATURE: 98.6 F | DIASTOLIC BLOOD PRESSURE: 76 MMHG | SYSTOLIC BLOOD PRESSURE: 186 MMHG | HEIGHT: 66 IN | HEART RATE: 65 BPM

## 2018-01-05 PROCEDURE — 11042 DBRDMT SUBQ TIS 1ST 20SQCM/<: CPT

## 2018-01-19 ENCOUNTER — APPOINTMENT (OUTPATIENT)
Dept: WOUND CARE | Facility: CLINIC | Age: 73
End: 2018-01-19
Payer: MEDICARE

## 2018-01-19 VITALS
HEART RATE: 65 BPM | HEIGHT: 66 IN | BODY MASS INDEX: 29.73 KG/M2 | TEMPERATURE: 97.6 F | SYSTOLIC BLOOD PRESSURE: 124 MMHG | DIASTOLIC BLOOD PRESSURE: 63 MMHG | RESPIRATION RATE: 16 BRPM | WEIGHT: 185 LBS

## 2018-01-19 DIAGNOSIS — T86.821 SKIN GRAFT (ALLOGRAFT) (AUTOGRAFT) FAILURE: ICD-10-CM

## 2018-01-19 PROCEDURE — 11042 DBRDMT SUBQ TIS 1ST 20SQCM/<: CPT

## 2018-02-02 ENCOUNTER — APPOINTMENT (OUTPATIENT)
Dept: WOUND CARE | Facility: CLINIC | Age: 73
End: 2018-02-02
Payer: MEDICARE

## 2018-02-02 PROCEDURE — YYYYY: CPT

## 2018-02-15 ENCOUNTER — APPOINTMENT (OUTPATIENT)
Dept: OPHTHALMOLOGY | Facility: CLINIC | Age: 73
End: 2018-02-15
Payer: MEDICARE

## 2018-02-15 DIAGNOSIS — H20.13 CHRONIC IRIDOCYCLITIS, BILATERAL: ICD-10-CM

## 2018-02-15 DIAGNOSIS — H18.51 ENDOTHELIAL CORNEAL DYSTROPHY: ICD-10-CM

## 2018-02-15 PROCEDURE — 92014 COMPRE OPH EXAM EST PT 1/>: CPT

## 2018-02-15 PROCEDURE — 92136 OPHTHALMIC BIOMETRY: CPT

## 2018-02-15 PROCEDURE — 92134 CPTRZ OPH DX IMG PST SGM RTA: CPT

## 2018-02-16 ENCOUNTER — APPOINTMENT (OUTPATIENT)
Dept: WOUND CARE | Facility: CLINIC | Age: 73
End: 2018-02-16
Payer: MEDICARE

## 2018-02-16 VITALS
TEMPERATURE: 97.6 F | SYSTOLIC BLOOD PRESSURE: 128 MMHG | HEIGHT: 66 IN | HEART RATE: 66 BPM | DIASTOLIC BLOOD PRESSURE: 78 MMHG | WEIGHT: 185 LBS | RESPIRATION RATE: 16 BRPM | BODY MASS INDEX: 29.73 KG/M2

## 2018-02-16 PROCEDURE — 11042 DBRDMT SUBQ TIS 1ST 20SQCM/<: CPT

## 2018-02-21 ENCOUNTER — APPOINTMENT (OUTPATIENT)
Dept: WOUND CARE | Facility: CLINIC | Age: 73
End: 2018-02-21
Payer: MEDICARE

## 2018-02-21 DIAGNOSIS — L97.522 NON-PRESSURE CHRONIC ULCER OF OTHER PART OF LEFT FOOT WITH FAT LAYER EXPOSED: ICD-10-CM

## 2018-02-21 PROCEDURE — 11042 DBRDMT SUBQ TIS 1ST 20SQCM/<: CPT

## 2018-02-28 ENCOUNTER — RX RENEWAL (OUTPATIENT)
Age: 73
End: 2018-02-28

## 2018-02-28 ENCOUNTER — APPOINTMENT (OUTPATIENT)
Dept: WOUND CARE | Facility: CLINIC | Age: 73
End: 2018-02-28
Payer: MEDICARE

## 2018-02-28 VITALS
HEIGHT: 66 IN | WEIGHT: 185 LBS | SYSTOLIC BLOOD PRESSURE: 129 MMHG | HEART RATE: 63 BPM | BODY MASS INDEX: 29.73 KG/M2 | RESPIRATION RATE: 16 BRPM | TEMPERATURE: 97 F | DIASTOLIC BLOOD PRESSURE: 67 MMHG

## 2018-02-28 PROCEDURE — 11042 DBRDMT SUBQ TIS 1ST 20SQCM/<: CPT

## 2018-03-03 ENCOUNTER — INPATIENT (INPATIENT)
Facility: HOSPITAL | Age: 73
LOS: 23 days | Discharge: NURSING, REHAB OR LONG-TERM CARE | End: 2018-03-27
Attending: HOSPITALIST | Admitting: HOSPITALIST
Payer: MEDICARE

## 2018-03-03 VITALS
SYSTOLIC BLOOD PRESSURE: 152 MMHG | HEART RATE: 91 BPM | DIASTOLIC BLOOD PRESSURE: 58 MMHG | TEMPERATURE: 103 F | RESPIRATION RATE: 16 BRPM | OXYGEN SATURATION: 99 %

## 2018-03-03 DIAGNOSIS — I10 ESSENTIAL (PRIMARY) HYPERTENSION: ICD-10-CM

## 2018-03-03 DIAGNOSIS — L03.116 CELLULITIS OF LEFT LOWER LIMB: ICD-10-CM

## 2018-03-03 DIAGNOSIS — Z95.1 PRESENCE OF AORTOCORONARY BYPASS GRAFT: Chronic | ICD-10-CM

## 2018-03-03 DIAGNOSIS — A41.9 SEPSIS, UNSPECIFIED ORGANISM: ICD-10-CM

## 2018-03-03 DIAGNOSIS — Z98.89 OTHER SPECIFIED POSTPROCEDURAL STATES: Chronic | ICD-10-CM

## 2018-03-03 DIAGNOSIS — Z90.710 ACQUIRED ABSENCE OF BOTH CERVIX AND UTERUS: Chronic | ICD-10-CM

## 2018-03-03 DIAGNOSIS — Z89.511 ACQUIRED ABSENCE OF RIGHT LEG BELOW KNEE: Chronic | ICD-10-CM

## 2018-03-03 DIAGNOSIS — E11.9 TYPE 2 DIABETES MELLITUS WITHOUT COMPLICATIONS: ICD-10-CM

## 2018-03-03 DIAGNOSIS — Z29.9 ENCOUNTER FOR PROPHYLACTIC MEASURES, UNSPECIFIED: ICD-10-CM

## 2018-03-03 DIAGNOSIS — N17.9 ACUTE KIDNEY FAILURE, UNSPECIFIED: ICD-10-CM

## 2018-03-03 DIAGNOSIS — E78.00 PURE HYPERCHOLESTEROLEMIA, UNSPECIFIED: ICD-10-CM

## 2018-03-03 DIAGNOSIS — I25.10 ATHEROSCLEROTIC HEART DISEASE OF NATIVE CORONARY ARTERY WITHOUT ANGINA PECTORIS: ICD-10-CM

## 2018-03-03 LAB
ALBUMIN SERPL ELPH-MCNC: 3.2 G/DL — LOW (ref 3.3–5)
ALP SERPL-CCNC: 84 U/L — SIGNIFICANT CHANGE UP (ref 40–120)
ALT FLD-CCNC: 28 U/L — SIGNIFICANT CHANGE UP (ref 4–33)
ANISOCYTOSIS BLD QL: SLIGHT — SIGNIFICANT CHANGE UP
AST SERPL-CCNC: 27 U/L — SIGNIFICANT CHANGE UP (ref 4–32)
B PERT DNA SPEC QL NAA+PROBE: SIGNIFICANT CHANGE UP
BASE EXCESS BLDV CALC-SCNC: -2.2 MMOL/L — SIGNIFICANT CHANGE UP
BASE EXCESS BLDV CALC-SCNC: 0.3 MMOL/L — SIGNIFICANT CHANGE UP
BASOPHILS # BLD AUTO: 0.02 K/UL — SIGNIFICANT CHANGE UP (ref 0–0.2)
BASOPHILS NFR BLD AUTO: 0.1 % — SIGNIFICANT CHANGE UP (ref 0–2)
BASOPHILS NFR SPEC: 0.8 % — SIGNIFICANT CHANGE UP (ref 0–2)
BILIRUB SERPL-MCNC: 0.3 MG/DL — SIGNIFICANT CHANGE UP (ref 0.2–1.2)
BLOOD GAS VENOUS - CREATININE: 1.6 MG/DL — HIGH (ref 0.5–1.3)
BLOOD GAS VENOUS - CREATININE: 1.67 MG/DL — HIGH (ref 0.5–1.3)
BUN SERPL-MCNC: 42 MG/DL — HIGH (ref 7–23)
C PNEUM DNA SPEC QL NAA+PROBE: NOT DETECTED — SIGNIFICANT CHANGE UP
CALCIUM SERPL-MCNC: 8.7 MG/DL — SIGNIFICANT CHANGE UP (ref 8.4–10.5)
CHLORIDE BLDV-SCNC: 100 MMOL/L — SIGNIFICANT CHANGE UP (ref 96–108)
CHLORIDE BLDV-SCNC: 98 MMOL/L — SIGNIFICANT CHANGE UP (ref 96–108)
CHLORIDE SERPL-SCNC: 95 MMOL/L — LOW (ref 98–107)
CO2 SERPL-SCNC: 23 MMOL/L — SIGNIFICANT CHANGE UP (ref 22–31)
CREAT SERPL-MCNC: 1.71 MG/DL — HIGH (ref 0.5–1.3)
CRP SERPL-MCNC: 343.8 MG/L — HIGH
EOSINOPHIL # BLD AUTO: 0.02 K/UL — SIGNIFICANT CHANGE UP (ref 0–0.5)
EOSINOPHIL NFR BLD AUTO: 0.1 % — SIGNIFICANT CHANGE UP (ref 0–6)
EOSINOPHIL NFR FLD: 0 % — SIGNIFICANT CHANGE UP (ref 0–6)
ERYTHROCYTE [SEDIMENTATION RATE] IN BLOOD: 96 MM/HR — HIGH (ref 4–25)
FLUAV H1 2009 PAND RNA SPEC QL NAA+PROBE: NOT DETECTED — SIGNIFICANT CHANGE UP
FLUAV H1 RNA SPEC QL NAA+PROBE: NOT DETECTED — SIGNIFICANT CHANGE UP
FLUAV H3 RNA SPEC QL NAA+PROBE: NOT DETECTED — SIGNIFICANT CHANGE UP
FLUAV SUBTYP SPEC NAA+PROBE: SIGNIFICANT CHANGE UP
FLUBV RNA SPEC QL NAA+PROBE: NOT DETECTED — SIGNIFICANT CHANGE UP
GAS PNL BLDV: 128 MMOL/L — LOW (ref 136–146)
GAS PNL BLDV: 129 MMOL/L — LOW (ref 136–146)
GIANT PLATELETS BLD QL SMEAR: PRESENT — SIGNIFICANT CHANGE UP
GLUCOSE BLDC GLUCOMTR-MCNC: 304 MG/DL — HIGH (ref 70–99)
GLUCOSE BLDC GLUCOMTR-MCNC: 349 MG/DL — HIGH (ref 70–99)
GLUCOSE BLDV-MCNC: 235 — HIGH (ref 70–99)
GLUCOSE BLDV-MCNC: 267 — HIGH (ref 70–99)
GLUCOSE SERPL-MCNC: 248 MG/DL — HIGH (ref 70–99)
HADV DNA SPEC QL NAA+PROBE: NOT DETECTED — SIGNIFICANT CHANGE UP
HCO3 BLDV-SCNC: 22 MMOL/L — SIGNIFICANT CHANGE UP (ref 20–27)
HCO3 BLDV-SCNC: 24 MMOL/L — SIGNIFICANT CHANGE UP (ref 20–27)
HCOV 229E RNA SPEC QL NAA+PROBE: NOT DETECTED — SIGNIFICANT CHANGE UP
HCOV HKU1 RNA SPEC QL NAA+PROBE: NOT DETECTED — SIGNIFICANT CHANGE UP
HCOV NL63 RNA SPEC QL NAA+PROBE: NOT DETECTED — SIGNIFICANT CHANGE UP
HCOV OC43 RNA SPEC QL NAA+PROBE: NOT DETECTED — SIGNIFICANT CHANGE UP
HCT VFR BLD CALC: 31.9 % — LOW (ref 34.5–45)
HCT VFR BLDV CALC: 26.6 % — LOW (ref 34.5–45)
HCT VFR BLDV CALC: 30.8 % — LOW (ref 34.5–45)
HGB BLD-MCNC: 10 G/DL — LOW (ref 11.5–15.5)
HGB BLDV-MCNC: 10 G/DL — LOW (ref 11.5–15.5)
HGB BLDV-MCNC: 8.6 G/DL — LOW (ref 11.5–15.5)
HMPV RNA SPEC QL NAA+PROBE: NOT DETECTED — SIGNIFICANT CHANGE UP
HPIV1 RNA SPEC QL NAA+PROBE: NOT DETECTED — SIGNIFICANT CHANGE UP
HPIV2 RNA SPEC QL NAA+PROBE: NOT DETECTED — SIGNIFICANT CHANGE UP
HPIV3 RNA SPEC QL NAA+PROBE: NOT DETECTED — SIGNIFICANT CHANGE UP
HPIV4 RNA SPEC QL NAA+PROBE: NOT DETECTED — SIGNIFICANT CHANGE UP
IMM GRANULOCYTES # BLD AUTO: 0.13 # — SIGNIFICANT CHANGE UP
IMM GRANULOCYTES NFR BLD AUTO: 0.9 % — SIGNIFICANT CHANGE UP (ref 0–1.5)
LACTATE BLDV-MCNC: 1.5 MMOL/L — SIGNIFICANT CHANGE UP (ref 0.5–2)
LACTATE BLDV-MCNC: 2.5 MMOL/L — HIGH (ref 0.5–2)
LYMPHOCYTES # BLD AUTO: 1.03 K/UL — SIGNIFICANT CHANGE UP (ref 1–3.3)
LYMPHOCYTES # BLD AUTO: 6.9 % — LOW (ref 13–44)
LYMPHOCYTES NFR SPEC AUTO: 8.7 % — LOW (ref 13–44)
M PNEUMO DNA SPEC QL NAA+PROBE: NOT DETECTED — SIGNIFICANT CHANGE UP
MACROCYTES BLD QL: SLIGHT — SIGNIFICANT CHANGE UP
MCHC RBC-ENTMCNC: 26.5 PG — LOW (ref 27–34)
MCHC RBC-ENTMCNC: 31.3 % — LOW (ref 32–36)
MCV RBC AUTO: 84.4 FL — SIGNIFICANT CHANGE UP (ref 80–100)
MONOCYTES # BLD AUTO: 0.77 K/UL — SIGNIFICANT CHANGE UP (ref 0–0.9)
MONOCYTES NFR BLD AUTO: 5.2 % — SIGNIFICANT CHANGE UP (ref 2–14)
MONOCYTES NFR BLD: 0.9 % — LOW (ref 2–9)
NEUTROPHIL AB SER-ACNC: 83.5 % — HIGH (ref 43–77)
NEUTROPHILS # BLD AUTO: 12.9 K/UL — HIGH (ref 1.8–7.4)
NEUTROPHILS NFR BLD AUTO: 86.8 % — HIGH (ref 43–77)
NEUTS BAND # BLD: 2.6 % — SIGNIFICANT CHANGE UP (ref 0–6)
NRBC # FLD: 0 — SIGNIFICANT CHANGE UP
PCO2 BLDV: 43 MMHG — SIGNIFICANT CHANGE UP (ref 41–51)
PCO2 BLDV: 43 MMHG — SIGNIFICANT CHANGE UP (ref 41–51)
PH BLDV: 7.34 PH — SIGNIFICANT CHANGE UP (ref 7.32–7.43)
PH BLDV: 7.38 PH — SIGNIFICANT CHANGE UP (ref 7.32–7.43)
PLATELET # BLD AUTO: 209 K/UL — SIGNIFICANT CHANGE UP (ref 150–400)
PLATELET COUNT - ESTIMATE: NORMAL — SIGNIFICANT CHANGE UP
PMV BLD: 11.7 FL — SIGNIFICANT CHANGE UP (ref 7–13)
PO2 BLDV: 35 MMHG — SIGNIFICANT CHANGE UP (ref 35–40)
PO2 BLDV: < 24 MMHG — LOW (ref 35–40)
POIKILOCYTOSIS BLD QL AUTO: SLIGHT — SIGNIFICANT CHANGE UP
POTASSIUM BLDV-SCNC: 5 MMOL/L — HIGH (ref 3.4–4.5)
POTASSIUM BLDV-SCNC: 5.6 MMOL/L — HIGH (ref 3.4–4.5)
POTASSIUM SERPL-MCNC: 5.5 MMOL/L — HIGH (ref 3.5–5.3)
POTASSIUM SERPL-SCNC: 5.5 MMOL/L — HIGH (ref 3.5–5.3)
PROT SERPL-MCNC: 7.8 G/DL — SIGNIFICANT CHANGE UP (ref 6–8.3)
RBC # BLD: 3.78 M/UL — LOW (ref 3.8–5.2)
RBC # FLD: 15.5 % — HIGH (ref 10.3–14.5)
REVIEW TO FOLLOW: YES — SIGNIFICANT CHANGE UP
RSV RNA SPEC QL NAA+PROBE: NOT DETECTED — SIGNIFICANT CHANGE UP
RV+EV RNA SPEC QL NAA+PROBE: NOT DETECTED — SIGNIFICANT CHANGE UP
SAO2 % BLDV: 33.8 % — LOW (ref 60–85)
SAO2 % BLDV: 61.5 % — SIGNIFICANT CHANGE UP (ref 60–85)
SODIUM SERPL-SCNC: 130 MMOL/L — LOW (ref 135–145)
VARIANT LYMPHS # BLD: 3.5 % — SIGNIFICANT CHANGE UP
WBC # BLD: 14.87 K/UL — HIGH (ref 3.8–10.5)
WBC # FLD AUTO: 14.87 K/UL — HIGH (ref 3.8–10.5)

## 2018-03-03 PROCEDURE — 73620 X-RAY EXAM OF FOOT: CPT | Mod: 26,LT

## 2018-03-03 PROCEDURE — 99223 1ST HOSP IP/OBS HIGH 75: CPT | Mod: GC

## 2018-03-03 PROCEDURE — 71046 X-RAY EXAM CHEST 2 VIEWS: CPT | Mod: 26

## 2018-03-03 RX ORDER — ACETAMINOPHEN 500 MG
975 TABLET ORAL ONCE
Qty: 0 | Refills: 0 | Status: COMPLETED | OUTPATIENT
Start: 2018-03-03 | End: 2018-03-03

## 2018-03-03 RX ORDER — ACETAMINOPHEN 500 MG
2 TABLET ORAL
Qty: 0 | Refills: 0 | COMMUNITY

## 2018-03-03 RX ORDER — VANCOMYCIN HCL 1 G
1000 VIAL (EA) INTRAVENOUS EVERY 24 HOURS
Qty: 0 | Refills: 0 | Status: DISCONTINUED | OUTPATIENT
Start: 2018-03-04 | End: 2018-03-06

## 2018-03-03 RX ORDER — SODIUM CHLORIDE 9 MG/ML
1000 INJECTION INTRAMUSCULAR; INTRAVENOUS; SUBCUTANEOUS
Qty: 0 | Refills: 0 | Status: DISCONTINUED | OUTPATIENT
Start: 2018-03-03 | End: 2018-03-04

## 2018-03-03 RX ORDER — VANCOMYCIN HCL 1 G
1000 VIAL (EA) INTRAVENOUS ONCE
Qty: 0 | Refills: 0 | Status: COMPLETED | OUTPATIENT
Start: 2018-03-03 | End: 2018-03-03

## 2018-03-03 RX ORDER — GLUCAGON INJECTION, SOLUTION 0.5 MG/.1ML
1 INJECTION, SOLUTION SUBCUTANEOUS ONCE
Qty: 0 | Refills: 0 | Status: DISCONTINUED | OUTPATIENT
Start: 2018-03-03 | End: 2018-03-05

## 2018-03-03 RX ORDER — SODIUM CHLORIDE 9 MG/ML
1000 INJECTION, SOLUTION INTRAVENOUS
Qty: 0 | Refills: 0 | Status: DISCONTINUED | OUTPATIENT
Start: 2018-03-03 | End: 2018-03-05

## 2018-03-03 RX ORDER — INSULIN DETEMIR 100/ML (3)
25 INSULIN PEN (ML) SUBCUTANEOUS AT BEDTIME
Qty: 0 | Refills: 0 | Status: DISCONTINUED | OUTPATIENT
Start: 2018-03-03 | End: 2018-03-04

## 2018-03-03 RX ORDER — ERGOCALCIFEROL 1.25 MG/1
1 CAPSULE ORAL
Qty: 0 | Refills: 0 | COMMUNITY

## 2018-03-03 RX ORDER — PIPERACILLIN AND TAZOBACTAM 4; .5 G/20ML; G/20ML
3.38 INJECTION, POWDER, LYOPHILIZED, FOR SOLUTION INTRAVENOUS ONCE
Qty: 0 | Refills: 0 | Status: COMPLETED | OUTPATIENT
Start: 2018-03-03 | End: 2018-03-03

## 2018-03-03 RX ORDER — DEXTROSE 50 % IN WATER 50 %
1 SYRINGE (ML) INTRAVENOUS ONCE
Qty: 0 | Refills: 0 | Status: DISCONTINUED | OUTPATIENT
Start: 2018-03-03 | End: 2018-03-05

## 2018-03-03 RX ORDER — ATORVASTATIN CALCIUM 80 MG/1
20 TABLET, FILM COATED ORAL AT BEDTIME
Qty: 0 | Refills: 0 | Status: DISCONTINUED | OUTPATIENT
Start: 2018-03-03 | End: 2018-03-27

## 2018-03-03 RX ORDER — INSULIN LISPRO 100/ML
VIAL (ML) SUBCUTANEOUS
Qty: 0 | Refills: 0 | Status: DISCONTINUED | OUTPATIENT
Start: 2018-03-03 | End: 2018-03-27

## 2018-03-03 RX ORDER — DEXTROSE 50 % IN WATER 50 %
25 SYRINGE (ML) INTRAVENOUS ONCE
Qty: 0 | Refills: 0 | Status: DISCONTINUED | OUTPATIENT
Start: 2018-03-03 | End: 2018-03-05

## 2018-03-03 RX ORDER — DEXTROSE 50 % IN WATER 50 %
25 SYRINGE (ML) INTRAVENOUS ONCE
Qty: 0 | Refills: 0 | Status: DISCONTINUED | OUTPATIENT
Start: 2018-03-03 | End: 2018-03-27

## 2018-03-03 RX ORDER — PIPERACILLIN AND TAZOBACTAM 4; .5 G/20ML; G/20ML
3.38 INJECTION, POWDER, LYOPHILIZED, FOR SOLUTION INTRAVENOUS EVERY 8 HOURS
Qty: 0 | Refills: 0 | Status: DISCONTINUED | OUTPATIENT
Start: 2018-03-03 | End: 2018-03-05

## 2018-03-03 RX ORDER — INSULIN DETEMIR 100/ML (3)
35 INSULIN PEN (ML) SUBCUTANEOUS
Qty: 0 | Refills: 0 | COMMUNITY

## 2018-03-03 RX ORDER — SODIUM POLYSTYRENE SULFONATE 4.1 MEQ/G
30 POWDER, FOR SUSPENSION ORAL ONCE
Qty: 0 | Refills: 0 | Status: COMPLETED | OUTPATIENT
Start: 2018-03-03 | End: 2018-03-03

## 2018-03-03 RX ORDER — SODIUM CHLORIDE 9 MG/ML
1000 INJECTION INTRAMUSCULAR; INTRAVENOUS; SUBCUTANEOUS ONCE
Qty: 0 | Refills: 0 | Status: COMPLETED | OUTPATIENT
Start: 2018-03-03 | End: 2018-03-03

## 2018-03-03 RX ORDER — MIDODRINE HYDROCHLORIDE 2.5 MG/1
10 TABLET ORAL ONCE
Qty: 0 | Refills: 0 | Status: COMPLETED | OUTPATIENT
Start: 2018-03-03 | End: 2018-03-03

## 2018-03-03 RX ORDER — KETOROLAC TROMETHAMINE 0.5 %
1 DROPS OPHTHALMIC (EYE)
Qty: 0 | Refills: 0 | COMMUNITY

## 2018-03-03 RX ORDER — HEPARIN SODIUM 5000 [USP'U]/ML
5000 INJECTION INTRAVENOUS; SUBCUTANEOUS EVERY 12 HOURS
Qty: 0 | Refills: 0 | Status: DISCONTINUED | OUTPATIENT
Start: 2018-03-03 | End: 2018-03-12

## 2018-03-03 RX ORDER — CLOPIDOGREL BISULFATE 75 MG/1
75 TABLET, FILM COATED ORAL DAILY
Qty: 0 | Refills: 0 | Status: DISCONTINUED | OUTPATIENT
Start: 2018-03-03 | End: 2018-03-12

## 2018-03-03 RX ORDER — INSULIN LISPRO 100/ML
8 VIAL (ML) SUBCUTANEOUS
Qty: 0 | Refills: 0 | Status: DISCONTINUED | OUTPATIENT
Start: 2018-03-03 | End: 2018-03-04

## 2018-03-03 RX ORDER — DEXTROSE 50 % IN WATER 50 %
12.5 SYRINGE (ML) INTRAVENOUS ONCE
Qty: 0 | Refills: 0 | Status: DISCONTINUED | OUTPATIENT
Start: 2018-03-03 | End: 2018-03-07

## 2018-03-03 RX ORDER — ASPIRIN/CALCIUM CARB/MAGNESIUM 324 MG
81 TABLET ORAL DAILY
Qty: 0 | Refills: 0 | Status: DISCONTINUED | OUTPATIENT
Start: 2018-03-03 | End: 2018-03-10

## 2018-03-03 RX ADMIN — Medication 975 MILLIGRAM(S): at 12:46

## 2018-03-03 RX ADMIN — Medication 8 UNIT(S): at 17:31

## 2018-03-03 RX ADMIN — ATORVASTATIN CALCIUM 20 MILLIGRAM(S): 80 TABLET, FILM COATED ORAL at 21:51

## 2018-03-03 RX ADMIN — SODIUM CHLORIDE 75 MILLILITER(S): 9 INJECTION INTRAMUSCULAR; INTRAVENOUS; SUBCUTANEOUS at 17:31

## 2018-03-03 RX ADMIN — SODIUM CHLORIDE 2000 MILLILITER(S): 9 INJECTION INTRAMUSCULAR; INTRAVENOUS; SUBCUTANEOUS at 16:11

## 2018-03-03 RX ADMIN — Medication 975 MILLIGRAM(S): at 12:05

## 2018-03-03 RX ADMIN — SODIUM CHLORIDE 4000 MILLILITER(S): 9 INJECTION INTRAMUSCULAR; INTRAVENOUS; SUBCUTANEOUS at 14:20

## 2018-03-03 RX ADMIN — HEPARIN SODIUM 5000 UNIT(S): 5000 INJECTION INTRAVENOUS; SUBCUTANEOUS at 17:32

## 2018-03-03 RX ADMIN — Medication 25 UNIT(S): at 22:56

## 2018-03-03 RX ADMIN — MIDODRINE HYDROCHLORIDE 10 MILLIGRAM(S): 2.5 TABLET ORAL at 17:31

## 2018-03-03 RX ADMIN — PIPERACILLIN AND TAZOBACTAM 200 GRAM(S): 4; .5 INJECTION, POWDER, LYOPHILIZED, FOR SOLUTION INTRAVENOUS at 12:05

## 2018-03-03 RX ADMIN — Medication 4: at 17:31

## 2018-03-03 RX ADMIN — PIPERACILLIN AND TAZOBACTAM 25 GRAM(S): 4; .5 INJECTION, POWDER, LYOPHILIZED, FOR SOLUTION INTRAVENOUS at 21:51

## 2018-03-03 RX ADMIN — Medication 250 MILLIGRAM(S): at 12:45

## 2018-03-03 RX ADMIN — SODIUM POLYSTYRENE SULFONATE 30 GRAM(S): 4.1 POWDER, FOR SUSPENSION ORAL at 16:15

## 2018-03-03 RX ADMIN — Medication 4: at 22:56

## 2018-03-03 NOTE — H&P ADULT - NSHPREVIEWOFSYSTEMS_GEN_ALL_CORE
REVIEW OF SYSTEMS    CONSTITUTIONAL:  + fatigue, fevers, and chills   HEENT: Denies headache, blurry vision + rhinorrhea   SKIN:  Denies rash or itching.  CARDIOVASCULAR:  Denies chest pain, DONALD  RESPIRATORY:  Denies shortness of breath, cough or sputum.  GASTROINTESTINAL:  Denies anorexia, nausea, vomiting or diarrhea. No abdominal pain or blood.  GENITOURINARY:  Denies any hematuria, dysuria  NEUROLOGICAL:  Denies headache, dizziness, syncope, paralysis, ataxia, numbness or tingling in the extremities. No change in bowel or bladder control.  MUSCULOSKELETAL:  Denies muscle, back pain, joint pain or stiffness.  HEMATOLOGIC:  Denies anemia, bleeding or bruising.  LYMPHATICS:  Denies enlarged nodes.   PSYCHIATRIC:  Denies history of depression or anxiety.  ENDOCRINOLOGIC:  Denies reports of sweating, cold or heat intolerance. No polyuria or polydipsia.  ALLERGIES:  Denies history of asthma, hives, eczema or rhinitis.

## 2018-03-03 NOTE — H&P ADULT - PROBLEM SELECTOR PLAN 3
- stable  - continue ASA and plavix - baseline Cr around 0.8-1  - current Cr at 1.71, likely pre-renal secondary to hypoperfusion given sepsis   - continue hydration with IV fluids

## 2018-03-03 NOTE — ED PROVIDER NOTE - MEDICAL DECISION MAKING DETAILS
73 Y/O F PMH DM presents with left leg pain and fever. PE reveals warmth, erythema and tenderness consistent with cellulitis. Labs obtained, broad spectrum antibiotic therapy began, podiatry consult ordered.

## 2018-03-03 NOTE — ED PROVIDER NOTE - MUSCULOSKELETAL MINIMAL EXAM
R Leg Below the Knee Amputation, L leg amputation of all digits. L leg is warm, erythematous and tender. Only visible break in the skin is at the distal foot.

## 2018-03-03 NOTE — ED PROVIDER NOTE - PMH
CAD (coronary artery disease)    Carotid artery stenosis    DM (diabetes mellitus)    Hypercholesterolemia    Hypertension    Obesity    PAD (peripheral artery disease)  s/p R BKA  S/P CABG x 3  in 2004, [facility]  Stented coronary artery  2010 [facility]

## 2018-03-03 NOTE — H&P ADULT - PROBLEM SELECTOR PLAN 4
- HgbA1c in the AM  - will reduce home doses of basal-bolus insulin while in the hospital  - Levemir 25U and humalog 8U with ISS and FS - stable  - continue ASA and plavix

## 2018-03-03 NOTE — ED PROVIDER NOTE - PROGRESS NOTE DETAILS
Benny: Seen by podiatry, no acuter surgical intervention. wbc and crp elevated. will admit to medicine.

## 2018-03-03 NOTE — H&P ADULT - PROBLEM SELECTOR PLAN 1
- patient with fevers, leukocytosis, and DAMARI  - s/p 1L of fluid in the ED, giving another fluid bolus now and midodrine 10mg x1 for relative hypotension (usually pressure runs in the 150s and now 110s)  - will continue vanc 1gm q24 based on CrCl of 40, and zosyn 3.375 q8, BCx collected in the ED  - likely source is left leg, MRI pending - patient with fevers, leukocytosis, and DAMARI  - s/p 1L of fluid in the ED, giving another fluid bolus now and midodrine 10mg x1 for relative hypotension (usually pressure runs in the 150s and now 110s)  - will continue vanc 1gm q24 based on CrCl of 40, and zosyn 3.375 q8, BCx collected in the ED, monitor vanc level  - likely source is left leg, MRI pending

## 2018-03-03 NOTE — H&P ADULT - NSHPPHYSICALEXAM_GEN_ALL_CORE
PHYSICAL EXAM:    GENERAL: Comfortable, no acute distress   HEAD:  Normocephalic, atraumatic  EYES: EOMI, PERRLA  HEENT: Moist mucous membranes, well healed scar on left side of neck  NECK: Supple, No JVD  NERVOUS SYSTEM:  AAOx3, strength and sensation grossly intact bilaterally   CHEST/LUNG: Clear to auscultation bilaterally, no wheezes or crackles   HEART: Regular rate and rhythm, no murmurs  ABDOMEN: + BS, soft, non tender, non distended  EXTREMITIES: Right BKA stump clean without erythema, L transmetatarsal amputation wrapped, c/d/i, left leg warm to touch with hyperpigmentation, erythema  MUSCULOSKELETAL: No muscle tenderness, no joint tenderness  SKIN: dry skin in LE bilaterally

## 2018-03-03 NOTE — H&P ADULT - PROBLEM SELECTOR PLAN 2
- patient s/p L transmetatarsal amputation in 7/2017, has been following with wound care/podiatry since and has been having weekly dressing changes  - podiatry saw patient in the ED, wrapped and dressed wound, appreciate recs   - elevated ESR and CRP, xrays not suggestive of osteomyelitis but will order MRI given clinical picture and suspicion \  - continue antibiotics as listed above

## 2018-03-03 NOTE — CONSULT NOTE ADULT - SUBJECTIVE AND OBJECTIVE BOX
Patient is a 72y old  Female who presents with a chief complaint of Fevers and leg pain (03 Mar 2018 15:47)      HPI:  Patient is a 71 y/o F with a PMH significant for T2DM, PVD s/p R BKA and L transmetatarsal amputation, CAD s/p CABG, CKD stage 3, and HTN who presents to the ED with fevers and left leg pain. Pt realtes that she saw Dr. Nai Caruso for her routine wound care appt. Thursday morning pt states she began feeling pain and noticing erythema in the left leg. Pt relates fevers and chills at home. Pt denies nausea or vomitting. No recent travel, no sick contacts..        PAST MEDICAL & SURGICAL HISTORY:  CAD (coronary artery disease)  Hypertension  Obesity  Hypercholesterolemia  DM (diabetes mellitus)  Carotid artery stenosis  PAD (peripheral artery disease): s/p R BKA  Stented coronary artery: 2010 [facility]  S/P CABG x 3: in 2004, [facility]  History of hysterectomy  S/P BKA (below knee amputation) unilateral, right  S/P CABG x 3  S/P eye surgery: for glaucoma  S/P below knee amputation, right: 6/2010  S/P angioplasty with stent: 2009, 3/2014  S/P hysterectomy: 2004  Hx of CABG: 3v 2004      MEDICATIONS  (STANDING):  aspirin enteric coated 81 milliGRAM(s) Oral daily  atorvastatin 20 milliGRAM(s) Oral at bedtime  clopidogrel Tablet 75 milliGRAM(s) Oral daily  dextrose 5%. 1000 milliLiter(s) (50 mL/Hr) IV Continuous <Continuous>  dextrose 50% Injectable 12.5 Gram(s) IV Push once  dextrose 50% Injectable 25 Gram(s) IV Push once  dextrose 50% Injectable 25 Gram(s) IV Push once  heparin  Injectable 5000 Unit(s) SubCutaneous every 12 hours  insulin detemir injectable (LEVEMIR) 25 Unit(s) SubCutaneous at bedtime  insulin lispro (HumaLOG) corrective regimen sliding scale   SubCutaneous Before meals and at bedtime  insulin lispro Injectable (HumaLOG) 8 Unit(s) SubCutaneous three times a day before meals  midodrine 10 milliGRAM(s) Oral once  piperacillin/tazobactam IVPB. 3.375 Gram(s) IV Intermittent every 8 hours  sodium chloride 0.9%. 1000 milliLiter(s) (75 mL/Hr) IV Continuous <Continuous>    MEDICATIONS  (PRN):  dextrose Gel 1 Dose(s) Oral once PRN Blood Glucose LESS THAN 70 milliGRAM(s)/deciliter  glucagon  Injectable 1 milliGRAM(s) IntraMuscular once PRN Glucose LESS THAN 70 milligrams/deciliter      Allergies    sulfa drugs (Hives)  sulfa drugs (Rash)  Sulfac 10% (Hives)    Intolerances        VITALS:    Vital Signs Last 24 Hrs  T(C): 37.1 (03 Mar 2018 16:54), Max: 39.5 (03 Mar 2018 12:02)  T(F): 98.7 (03 Mar 2018 16:54), Max: 103.1 (03 Mar 2018 12:02)  HR: 85 (03 Mar 2018 16:54) (81 - 93)  BP: 127/47 (03 Mar 2018 16:54) (107/40 - 165/55)  BP(mean): --  RR: 17 (03 Mar 2018 16:54) (16 - 18)  SpO2: 100% (03 Mar 2018 16:54) (97% - 100%)    LABS:                          10.0   14.87 )-----------( 209      ( 03 Mar 2018 11:50 )             31.9       03-03    130<L>  |  95<L>  |  42<H>  ----------------------------<  248<H>  5.5<H>   |  23  |  1.71<H>    Ca    8.7      03 Mar 2018 11:50    TPro  7.8  /  Alb  3.2<L>  /  TBili  0.3  /  DBili  x   /  AST  27  /  ALT  28  /  AlkPhos  84  03-03      CAPILLARY BLOOD GLUCOSE      POCT Blood Glucose.: 280 mg/dL (03 Mar 2018 10:34)          LOWER EXTREMITY PHYSICAL EXAM:    Vasular: DP/PT 0/4, CFT to LF flap difficult to assess, but foot is warm plantarly and dorsally   Neuro: Epicritic sensation absent  Musculoskeletal/Ortho: R leg BKA, L foot TMA  Skin:  Wound #1: dorsal, distal left foot 1st interspace.  Size: 3.8 x 1.2 x 2.6 (deep) cm  Depth: 2.6 cm to periosteum, does not probe to bone  Wound bed: granular   Drainage: Minimal, scant seropurulent drainage  Odor: None  Periwound: hyperkeratotic, no surrounding erythema of the wound  Etiology: Surgical    LLE cellulitis beginning at the ankle extends just distal to the knee, does not appear to originate at the wound

## 2018-03-03 NOTE — H&P ADULT - ATTENDING COMMENTS
Patient seen and examined, d/w HS, agree with above.     73 yo F w/ DM2, PVD s/p R BKA and L transmetatarsal amputation, CAD s/p CABG, CKD stage 3, HTN a/w sepsis 2/2 LLE cellulitis vs OM (concern for OM given elevated ESR/CRP) and DAMARI on CKD3. Currently reports feeling better s/p IV fluids and antibiotics. C/w broad spectrum abx (monitor vanc level), f/u culture results, MRI re: OM, appreciate podiatry recs. Will consider obtaining ID consult (especially if imaging positive for OM, previously seen by ID Dr. Kim on prior admission). Monitor renal function, avoid nephrotoxins. Hold BP meds at this time. C/w basal/bolus insulin, sliding scale, monitor FS, f/u A1C.

## 2018-03-03 NOTE — ED ADULT TRIAGE NOTE - CHIEF COMPLAINT QUOTE
c/o left foot chronic wound pain, described as a shooting pain since Wednesday night, (+) fever (t-max 103.4).  PMH: DM, HTN, HLD,

## 2018-03-03 NOTE — H&P ADULT - PMH
CAD (coronary artery disease)    Carotid artery stenosis    DM (diabetes mellitus)    Hypercholesterolemia    Hypertension    Obesity    PAD (peripheral artery disease)  s/p R BKA  S/P CABG x 3  in 2004, [facility]  Stented coronary artery  2010 [facility]

## 2018-03-03 NOTE — H&P ADULT - PROBLEM SELECTOR PLAN 7
- DVT ppx subq heparin   - Consistent Carb Diet   - Dispo pending resolution of symptoms   - PT consult - stable  - continue atorvastatin

## 2018-03-03 NOTE — H&P ADULT - ASSESSMENT
Patient is a 73 y/o F with a PMH significant for T2DM, PVD s/p R BKA and L transmetatarsal amputation, CAD s/p CABG, CKD stage 3, and HTN who presents to the ED with fevers and left leg pain, found to have sepsis likely secondary to osteomyelitis. Patient is a 73 y/o F with a PMH significant for T2DM, PVD s/p R BKA and L transmetatarsal amputation, CAD s/p CABG, CKD stage 3, and HTN who presents to the ED with fevers and left leg pain, found to have sepsis (fever, leukocytosis) likely secondary to LLE cellulitis vs osteomyelitis.

## 2018-03-03 NOTE — H&P ADULT - NSHPLABSRESULTS_GEN_ALL_CORE
10.0   14.87 )-----------( 209      ( 03 Mar 2018 11:50 )             31.9         03-03    130<L>  |  95<L>  |  42<H>  ----------------------------<  248<H>  5.5<H>   |  23  |  1.71<H>    Ca    8.7      03 Mar 2018 11:50    TPro  7.8  /  Alb  3.2<L>  /  TBili  0.3  /  DBili  x   /  AST  27  /  ALT  28  /  AlkPhos  84  03-03      Blood Cultures pending     RVP negative      < from: Xray Foot AP + Lateral, Left (03.03.18 @ 12:22) >    INTERPRETATION:  Dorsal soft tissue ulcer distal to the osteotomy margins   without radiographic evidence for underlying osteomyelitis. Given   extensive soft tissue swelling and depth of the ulcer, if there is   ongoing concern for osteomyelitis, consider contrast-enhanced MRIof the   left forefoot for more sensitive evaluation.     Follow up official report.    < end of copied text > Labs reviewed: leukocytosis (14.87), elevated ESR 96/.8, hyperkalemia s/p Kayexalate, DAMARI on CKD3, RVP negative, cultures pending                10.0   14.87 )-----------( 209      ( 03 Mar 2018 11:50 )             31.9     03-03    130<L>  |  95<L>  |  42<H>  ----------------------------<  248<H>  5.5<H>   |  23  |  1.71<H>    Ca    8.7      03 Mar 2018 11:50    TPro  7.8  /  Alb  3.2<L>  /  TBili  0.3  /  DBili  x   /  AST  27  /  ALT  28  /  AlkPhos  84  03-03      Blood Cultures pending   RVP negative      < from: Xray Foot AP + Lateral, Left (03.03.18 @ 12:22) >    INTERPRETATION:  Dorsal soft tissue ulcer distal to the osteotomy margins   without radiographic evidence for underlying osteomyelitis. Given   extensive soft tissue swelling and depth of the ulcer, if there is   ongoing concern for osteomyelitis, consider contrast-enhanced MRIof the   left forefoot for more sensitive evaluation.     Follow up official report.    < end of copied text >    MRI pending    EKG personally reviewed: NSR 78 bpm, no peaked t-waves

## 2018-03-03 NOTE — CONSULT NOTE ADULT - ASSESSMENT
71 yo F s/p L foot TMA, with chronic dorsal wound at TMA site  - Pt seen and examined  - Wound does not appear to be acutely infected, however in the setting of LLE cellulitis, wound drainage was cultured  - No debridement necessary at this time, pt was recently at her wound care visit, wound appears healthy  - Wound packed w/ 1/4" iodoform packing  - Rec admit for IV abx for LLE cellulitis  - No surgical intervention on the foot at this time  - Local wound care of left foot w/ packing  - MRI ordered to r/o abscess or OM   - D/w attending

## 2018-03-03 NOTE — ED ADULT NURSE NOTE - OBJECTIVE STATEMENT
Received pt into spot #10. Pt A/O x3 calm cooperative. Present with open wound to dorsal part of foot by where toes would be. Pt has hx of all toes amputated on that foot. Scant amount of serosangious drainage noted. Swelling noted to left foot with redness. Pt's generalized body very warm to touch. Oral temp 103.1. Dr. narayanan made aware. Tylenol 975mg oral tab dose ordered and given. Pt started on Zosyn 3.375gm IVPB as ordered.  at bedside

## 2018-03-03 NOTE — H&P ADULT - HISTORY OF PRESENT ILLNESS
Patient is a 71 y/o F with a PMH significant for T2DM, PVD s/p R BKA and L transmetatarsal amputation, CAD s/p CABG, CKD stage 3, and HTN who presents to the ED with fevers and left leg pain. The patient was in her usual state of health till Thursday when she began to experience 10/10 shooting pain down her left leg from her knee down. She denied any numbness or tingling. She also noticed fevers of 103-104 at home associated with chills and fatigue. She noticed that her leg was warm and seemed more swollen than usual. She was taking tylenol with minimal relief in her pain. She had gone to her podiatrist's office on Wednesday and her dressing had been changed and she was told that everything looked okay. She states that she noticed her foot had some foul smelling discharge She denied any CP, SOB, abdominal pain, nausea, vomiting, diarrhea, or dysuria. Endorses some rhinorrhea and cough that began a few days ago but denies sick contacts. No recent travel.    Vitals in the ED T- 102.7 (T max 103.1), HR-91 BP-152/58 RR-16 and sating 99% on room air. She received 1L of NS, 925mg tylenol PO, and one dose each of vancomycin and zosyn. Patient is a 73 yo F with a PMH significant for T2DM, PVD s/p R BKA and L transmetatarsal amputation, CAD s/p CABG, CKD stage 3, and HTN who presents to the ED with fevers and left leg pain. The patient was in her usual state of health till Thursday when she began to experience 10/10 shooting pain down her left leg from her knee down. She denied any numbness or tingling. She also noticed fevers of 103-104 at home associated with chills and fatigue. She noticed that her leg was warm and seemed more swollen than usual. She was taking tylenol with minimal relief in her pain. She had gone to her podiatrist's office on Wednesday and her dressing had been changed and she was told that everything looked okay. She states that she noticed her foot had some foul smelling discharge She denied any CP, SOB, abdominal pain, nausea, vomiting, diarrhea, or dysuria. Endorses some rhinorrhea and cough that began a few days ago but denies sick contacts. No recent travel.    Vitals in the ED T- 102.7 (T max 103.1), HR-91 BP-152/58 RR-16 and sating 99% on room air. She received 1L of NS, 925mg tylenol PO, and one dose each of vancomycin and zosyn.

## 2018-03-03 NOTE — H&P ADULT - PROBLEM SELECTOR PLAN 5
- will hold all home anti-hypertensives at this time given sepsis - HgbA1c in the AM  - will reduce home doses of basal-bolus insulin while in the hospital  - Levemir 25U and humalog 8U with ISS and FS

## 2018-03-03 NOTE — H&P ADULT - PROBLEM SELECTOR PLAN 8
- DVT ppx subq heparin   - Consistent Carb Diet   - Dispo pending resolution of symptoms   - PT consult

## 2018-03-03 NOTE — H&P ADULT - NSHPSOCIALHISTORY_GEN_ALL_CORE
Patient lives with her  and two daughters at home. She is a retired account. Denies tobacco or recreational drug use. Drinks alcohol socially.

## 2018-03-03 NOTE — ED PROVIDER NOTE - OBJECTIVE STATEMENT
73 Y/O F PMH DM, HTN, HLD, CAD,  H/O L toes and R leg amputation due to DM presents with 9/10 Left leg pain and fever for the past 4 days. S/P excision of L leg digits in July and she has been seeing wound care and receiving hyperbaric oxygen which she recently stopped requiring. She denies noticing acute injury or changes in the leg despite the leg appearing erythematous and warm on exam. She denies CP, Palpitations, SOB, Difficulty breathing or other acute changes.

## 2018-03-03 NOTE — ED PROVIDER NOTE - ATTENDING CONTRIBUTION TO CARE
72f, multiple cardiac comorbidities, s/p right bka and amputation of all toes, admission in 07/17 for ostemoyelitis. c/o 3 days of left foot pain and fever. follows in wound care and sees them once a week for dressing changes. saw her podiatrist 1 week ago and said wound was fine. she reports several days of rhinorrhea and cough as well no sore throat, body aches, no dysuria, change in stools. h/a. exam, febrile, other vs wnl, nad. HEENT xam normal, hs and lungs normal, abdo benign.left lower leg is diffusely hot, tender from the left calf to left foot. distal wound with s/a of drainage and tenderness. Cellulitis +/- foot infection/osteo. will get iv abx, foot xr. podiatry consult. will get cxr although leg is clear source of infection

## 2018-03-04 LAB
BASOPHILS # BLD AUTO: 0.03 K/UL — SIGNIFICANT CHANGE UP (ref 0–0.2)
BASOPHILS NFR BLD AUTO: 0.3 % — SIGNIFICANT CHANGE UP (ref 0–2)
BUN SERPL-MCNC: 40 MG/DL — HIGH (ref 7–23)
CALCIUM SERPL-MCNC: 8.1 MG/DL — LOW (ref 8.4–10.5)
CHLORIDE SERPL-SCNC: 100 MMOL/L — SIGNIFICANT CHANGE UP (ref 98–107)
CO2 SERPL-SCNC: 21 MMOL/L — LOW (ref 22–31)
CREAT SERPL-MCNC: 1.61 MG/DL — HIGH (ref 0.5–1.3)
EOSINOPHIL # BLD AUTO: 0.09 K/UL — SIGNIFICANT CHANGE UP (ref 0–0.5)
EOSINOPHIL NFR BLD AUTO: 0.8 % — SIGNIFICANT CHANGE UP (ref 0–6)
GLUCOSE BLDC GLUCOMTR-MCNC: 260 MG/DL — HIGH (ref 70–99)
GLUCOSE BLDC GLUCOMTR-MCNC: 266 MG/DL — HIGH (ref 70–99)
GLUCOSE BLDC GLUCOMTR-MCNC: 326 MG/DL — HIGH (ref 70–99)
GLUCOSE BLDC GLUCOMTR-MCNC: 358 MG/DL — HIGH (ref 70–99)
GLUCOSE SERPL-MCNC: 286 MG/DL — HIGH (ref 70–99)
HBA1C BLD-MCNC: 8.1 % — HIGH (ref 4–5.6)
HCT VFR BLD CALC: 27.9 % — LOW (ref 34.5–45)
HGB BLD-MCNC: 8.6 G/DL — LOW (ref 11.5–15.5)
IMM GRANULOCYTES # BLD AUTO: 0.07 # — SIGNIFICANT CHANGE UP
IMM GRANULOCYTES NFR BLD AUTO: 0.6 % — SIGNIFICANT CHANGE UP (ref 0–1.5)
LYMPHOCYTES # BLD AUTO: 1.54 K/UL — SIGNIFICANT CHANGE UP (ref 1–3.3)
LYMPHOCYTES # BLD AUTO: 13.7 % — SIGNIFICANT CHANGE UP (ref 13–44)
MAGNESIUM SERPL-MCNC: 1.8 MG/DL — SIGNIFICANT CHANGE UP (ref 1.6–2.6)
MANUAL SMEAR VERIFICATION: SIGNIFICANT CHANGE UP
MCHC RBC-ENTMCNC: 26.3 PG — LOW (ref 27–34)
MCHC RBC-ENTMCNC: 30.8 % — LOW (ref 32–36)
MCV RBC AUTO: 85.3 FL — SIGNIFICANT CHANGE UP (ref 80–100)
MONOCYTES # BLD AUTO: 0.77 K/UL — SIGNIFICANT CHANGE UP (ref 0–0.9)
MONOCYTES NFR BLD AUTO: 6.8 % — SIGNIFICANT CHANGE UP (ref 2–14)
NEUTROPHILS # BLD AUTO: 8.78 K/UL — HIGH (ref 1.8–7.4)
NEUTROPHILS NFR BLD AUTO: 77.8 % — HIGH (ref 43–77)
NRBC # FLD: 0 — SIGNIFICANT CHANGE UP
PHOSPHATE SERPL-MCNC: 3.9 MG/DL — SIGNIFICANT CHANGE UP (ref 2.5–4.5)
PLATELET # BLD AUTO: 163 K/UL — SIGNIFICANT CHANGE UP (ref 150–400)
PMV BLD: 12 FL — SIGNIFICANT CHANGE UP (ref 7–13)
POTASSIUM SERPL-MCNC: 4.2 MMOL/L — SIGNIFICANT CHANGE UP (ref 3.5–5.3)
POTASSIUM SERPL-SCNC: 4.2 MMOL/L — SIGNIFICANT CHANGE UP (ref 3.5–5.3)
RBC # BLD: 3.27 M/UL — LOW (ref 3.8–5.2)
RBC # FLD: 15.7 % — HIGH (ref 10.3–14.5)
SODIUM SERPL-SCNC: 134 MMOL/L — LOW (ref 135–145)
SPECIMEN SOURCE: SIGNIFICANT CHANGE UP
WBC # BLD: 11.28 K/UL — HIGH (ref 3.8–10.5)
WBC # FLD AUTO: 11.28 K/UL — HIGH (ref 3.8–10.5)

## 2018-03-04 PROCEDURE — 99233 SBSQ HOSP IP/OBS HIGH 50: CPT | Mod: GC

## 2018-03-04 PROCEDURE — 99222 1ST HOSP IP/OBS MODERATE 55: CPT | Mod: GC

## 2018-03-04 RX ORDER — INSULIN DETEMIR 100/ML (3)
29 INSULIN PEN (ML) SUBCUTANEOUS AT BEDTIME
Qty: 0 | Refills: 0 | Status: DISCONTINUED | OUTPATIENT
Start: 2018-03-04 | End: 2018-03-12

## 2018-03-04 RX ORDER — SODIUM CHLORIDE 9 MG/ML
1000 INJECTION INTRAMUSCULAR; INTRAVENOUS; SUBCUTANEOUS
Qty: 0 | Refills: 0 | Status: DISCONTINUED | OUTPATIENT
Start: 2018-03-04 | End: 2018-03-05

## 2018-03-04 RX ORDER — INSULIN LISPRO 100/ML
9 VIAL (ML) SUBCUTANEOUS
Qty: 0 | Refills: 0 | Status: DISCONTINUED | OUTPATIENT
Start: 2018-03-04 | End: 2018-03-06

## 2018-03-04 RX ORDER — ACETAMINOPHEN 500 MG
650 TABLET ORAL EVERY 6 HOURS
Qty: 0 | Refills: 0 | Status: DISCONTINUED | OUTPATIENT
Start: 2018-03-04 | End: 2018-03-10

## 2018-03-04 RX ADMIN — Medication 29 UNIT(S): at 22:03

## 2018-03-04 RX ADMIN — Medication 4: at 12:15

## 2018-03-04 RX ADMIN — HEPARIN SODIUM 5000 UNIT(S): 5000 INJECTION INTRAVENOUS; SUBCUTANEOUS at 05:32

## 2018-03-04 RX ADMIN — PIPERACILLIN AND TAZOBACTAM 25 GRAM(S): 4; .5 INJECTION, POWDER, LYOPHILIZED, FOR SOLUTION INTRAVENOUS at 05:32

## 2018-03-04 RX ADMIN — Medication 3: at 22:03

## 2018-03-04 RX ADMIN — Medication 5: at 17:35

## 2018-03-04 RX ADMIN — Medication 81 MILLIGRAM(S): at 12:15

## 2018-03-04 RX ADMIN — Medication 250 MILLIGRAM(S): at 12:15

## 2018-03-04 RX ADMIN — HEPARIN SODIUM 5000 UNIT(S): 5000 INJECTION INTRAVENOUS; SUBCUTANEOUS at 17:35

## 2018-03-04 RX ADMIN — Medication 9 UNIT(S): at 08:22

## 2018-03-04 RX ADMIN — Medication 650 MILLIGRAM(S): at 01:41

## 2018-03-04 RX ADMIN — Medication 9 UNIT(S): at 12:15

## 2018-03-04 RX ADMIN — Medication 3: at 08:22

## 2018-03-04 RX ADMIN — CLOPIDOGREL BISULFATE 75 MILLIGRAM(S): 75 TABLET, FILM COATED ORAL at 12:15

## 2018-03-04 RX ADMIN — ATORVASTATIN CALCIUM 20 MILLIGRAM(S): 80 TABLET, FILM COATED ORAL at 22:03

## 2018-03-04 RX ADMIN — Medication 650 MILLIGRAM(S): at 13:49

## 2018-03-04 RX ADMIN — Medication 9 UNIT(S): at 17:35

## 2018-03-04 RX ADMIN — PIPERACILLIN AND TAZOBACTAM 25 GRAM(S): 4; .5 INJECTION, POWDER, LYOPHILIZED, FOR SOLUTION INTRAVENOUS at 16:18

## 2018-03-04 NOTE — PROGRESS NOTE ADULT - SUBJECTIVE AND OBJECTIVE BOX
Patient is a 72y old  Female who presents with a chief complaint of Fevers and leg pain (03 Mar 2018 15:47)       INTERVAL HPI/OVERNIGHT EVENTS:  Patient seen and evaluated at bedside.  Pt is resting comfortable in NAD. Denies N/V/F/C.      Allergies    sulfa drugs (Hives)  sulfa drugs (Rash)  Sulfac 10% (Hives)    Intolerances        Vital Signs Last 24 Hrs  T(C): 37.4 (04 Mar 2018 09:32), Max: 38.2 (04 Mar 2018 01:15)  T(F): 99.3 (04 Mar 2018 09:32), Max: 100.7 (04 Mar 2018 01:15)  HR: 82 (04 Mar 2018 09:32) (74 - 94)  BP: 135/- (04 Mar 2018 09:32) (105/52 - 140/58)  BP(mean): --  RR: 19 (04 Mar 2018 09:32) (17 - 19)  SpO2: 98% (04 Mar 2018 09:32) (94% - 100%)    LABS:                        8.6    11.28 )-----------( 163      ( 04 Mar 2018 07:18 )             27.9     03-04    134<L>  |  100  |  40<H>  ----------------------------<  286<H>  4.2   |  21<L>  |  1.61<H>    Ca    8.1<L>      04 Mar 2018 07:18  Phos  3.9     03-04  Mg     1.8     03-04    TPro  7.8  /  Alb  3.2<L>  /  TBili  0.3  /  DBili  x   /  AST  27  /  ALT  28  /  AlkPhos  84  03-03        CAPILLARY BLOOD GLUCOSE      POCT Blood Glucose.: 326 mg/dL (04 Mar 2018 12:04)  POCT Blood Glucose.: 266 mg/dL (04 Mar 2018 08:17)  POCT Blood Glucose.: 349 mg/dL (03 Mar 2018 22:54)  POCT Blood Glucose.: 304 mg/dL (03 Mar 2018 17:29)      Lower Extremity Physical Exam:  Vasular: DP/PT 0/4, CFT to LF flap difficult to assess, but foot is warm plantarly and dorsally   Neuro: Epicritic sensation absent  Musculoskeletal/Ortho: R leg BKA, L foot TMA  Skin:  Wound #1: dorsal, distal left foot 1st interspace.  Size: 3.8 x 1.2 x 2.6 (deep) cm  Depth: 2.6 cm to periosteum, does not probe to bone  Wound bed: fibrogranular   Drainage: Minimal, serous drainage  Odor: None  Periwound: hyperkeratotic, no surrounding erythema of the wound  Etiology: Surgical    LLE cellulitis beginning at the ankle extends just distal to the knee, does not appear to originate at the wound, decreased since yesterday, posterior distal leg oozing noted with no fluctuance, no crepitus and no malodor.

## 2018-03-04 NOTE — PROGRESS NOTE ADULT - SUBJECTIVE AND OBJECTIVE BOX
Chelsey Olivares MD  Medicine Team 2  Pager [phone]        Subjective: Patient was febrile to 100.7 overnight.         VITAL SIGNS:  Vital Signs Last 24 Hrs  T(C): 37 (04 Mar 2018 05:33), Max: 39.5 (03 Mar 2018 12:02)  T(F): 98.6 (04 Mar 2018 05:33), Max: 103.1 (03 Mar 2018 12:02)  HR: 74 (04 Mar 2018 05:33) (74 - 94)  BP: 105/52 (04 Mar 2018 05:33) (105/52 - 165/55)  BP(mean): --  RR: 17 (04 Mar 2018 05:33) (16 - 18)  SpO2: 95% (04 Mar 2018 05:33) (94% - 100%)      PHYSICAL EXAM:     GENERAL: no acute distress  HEENT: PERRLA, EOMI, moist oropharynx   RESPIRATORY: CTAB, no w/c   CARDIOVASCULAR: RRR, no murmurs, gallops, rubs  ABDOMINAL: soft, non-tender, non-distended, positive bowel sounds   EXTREMITIES: no clubbing, cyanosis, or edema  NEUROLOGICAL: alert and oriented x 3, non-focal  SKIN: no rashes or lesions   MUSCULOSKELETAL: no gross joint deformity                          10.0   14.87 )-----------( 209      ( 03 Mar 2018 11:50 )             31.9     03-03    130<L>  |  95<L>  |  42<H>  ----------------------------<  248<H>  5.5<H>   |  23  |  1.71<H>    Ca    8.7      03 Mar 2018 11:50    TPro  7.8  /  Alb  3.2<L>  /  TBili  0.3  /  DBili  x   /  AST  27  /  ALT  28  /  AlkPhos  84  03-03      CAPILLARY BLOOD GLUCOSE      POCT Blood Glucose.: 349 mg/dL (03 Mar 2018 22:54)  POCT Blood Glucose.: 304 mg/dL (03 Mar 2018 17:29)  POCT Blood Glucose.: 280 mg/dL (03 Mar 2018 10:34)      MEDICATIONS  (STANDING):  aspirin enteric coated 81 milliGRAM(s) Oral daily  atorvastatin 20 milliGRAM(s) Oral at bedtime  clopidogrel Tablet 75 milliGRAM(s) Oral daily  dextrose 5%. 1000 milliLiter(s) (50 mL/Hr) IV Continuous <Continuous>  dextrose 50% Injectable 12.5 Gram(s) IV Push once  dextrose 50% Injectable 25 Gram(s) IV Push once  dextrose 50% Injectable 25 Gram(s) IV Push once  heparin  Injectable 5000 Unit(s) SubCutaneous every 12 hours  insulin detemir injectable (LEVEMIR) 25 Unit(s) SubCutaneous at bedtime  insulin lispro (HumaLOG) corrective regimen sliding scale   SubCutaneous Before meals and at bedtime  insulin lispro Injectable (HumaLOG) 8 Unit(s) SubCutaneous three times a day before meals  piperacillin/tazobactam IVPB. 3.375 Gram(s) IV Intermittent every 8 hours  sodium chloride 0.9%. 1000 milliLiter(s) (75 mL/Hr) IV Continuous <Continuous>  vancomycin  IVPB 1000 milliGRAM(s) IV Intermittent every 24 hours    MEDICATIONS  (PRN):  acetaminophen   Tablet 650 milliGRAM(s) Oral every 6 hours PRN For Temp greater than 38 C (100.4 F)  dextrose Gel 1 Dose(s) Oral once PRN Blood Glucose LESS THAN 70 milliGRAM(s)/deciliter  glucagon  Injectable 1 milliGRAM(s) IntraMuscular once PRN Glucose LESS THAN 70 milligrams/deciliter Chelsey Olivares MD  Medicine Team 2  Pager [phone]        Subjective: Patient was febrile to 100.7 overnight.         VITAL SIGNS:  Vital Signs Last 24 Hrs  T(C): 37 (04 Mar 2018 05:33), Max: 39.5 (03 Mar 2018 12:02)  T(F): 98.6 (04 Mar 2018 05:33), Max: 103.1 (03 Mar 2018 12:02)  HR: 74 (04 Mar 2018 05:33) (74 - 94)  BP: 105/52 (04 Mar 2018 05:33) (105/52 - 165/55)  BP(mean): --  RR: 17 (04 Mar 2018 05:33) (16 - 18)  SpO2: 95% (04 Mar 2018 05:33) (94% - 100%)      PHYSICAL EXAM:     GENERAL: no acute distress  HEENT: PERRLA, EOMI, moist oropharynx   RESPIRATORY: CTAB, no wheezes or crackles   CARDIOVASCULAR: RRR, no murmurs  ABDOMINAL: soft, non-tender, non-distended, positive bowel sounds   EXTREMITIES: no clubbing, cyanosis, right BKA with left transmetatarsal amputation, no drainage or purulence noted, left leg warm to touch compared to the right   NEUROLOGICAL: alert and oriented x 3, non-focal  SKIN: dry skin in extremities bilaterally   MUSCULOSKELETAL: no gross joint deformity                          10.0   14.87 )-----------( 209      ( 03 Mar 2018 11:50 )             31.9     03-03    130<L>  |  95<L>  |  42<H>  ----------------------------<  248<H>  5.5<H>   |  23  |  1.71<H>    Ca    8.7      03 Mar 2018 11:50    TPro  7.8  /  Alb  3.2<L>  /  TBili  0.3  /  DBili  x   /  AST  27  /  ALT  28  /  AlkPhos  84  03-03      CAPILLARY BLOOD GLUCOSE      POCT Blood Glucose.: 349 mg/dL (03 Mar 2018 22:54)  POCT Blood Glucose.: 304 mg/dL (03 Mar 2018 17:29)  POCT Blood Glucose.: 280 mg/dL (03 Mar 2018 10:34)      MEDICATIONS  (STANDING):  aspirin enteric coated 81 milliGRAM(s) Oral daily  atorvastatin 20 milliGRAM(s) Oral at bedtime  clopidogrel Tablet 75 milliGRAM(s) Oral daily  dextrose 5%. 1000 milliLiter(s) (50 mL/Hr) IV Continuous <Continuous>  dextrose 50% Injectable 12.5 Gram(s) IV Push once  dextrose 50% Injectable 25 Gram(s) IV Push once  dextrose 50% Injectable 25 Gram(s) IV Push once  heparin  Injectable 5000 Unit(s) SubCutaneous every 12 hours  insulin detemir injectable (LEVEMIR) 25 Unit(s) SubCutaneous at bedtime  insulin lispro (HumaLOG) corrective regimen sliding scale   SubCutaneous Before meals and at bedtime  insulin lispro Injectable (HumaLOG) 8 Unit(s) SubCutaneous three times a day before meals  piperacillin/tazobactam IVPB. 3.375 Gram(s) IV Intermittent every 8 hours  sodium chloride 0.9%. 1000 milliLiter(s) (75 mL/Hr) IV Continuous <Continuous>  vancomycin  IVPB 1000 milliGRAM(s) IV Intermittent every 24 hours    MEDICATIONS  (PRN):  acetaminophen   Tablet 650 milliGRAM(s) Oral every 6 hours PRN For Temp greater than 38 C (100.4 F)  dextrose Gel 1 Dose(s) Oral once PRN Blood Glucose LESS THAN 70 milliGRAM(s)/deciliter  glucagon  Injectable 1 milliGRAM(s) IntraMuscular once PRN Glucose LESS THAN 70 milligrams/deciliter Chelsey Olivares MD  Medicine Team 2  Pager [phone]        Subjective: Patient was febrile to 100.7 overnight. Otherwise, denies any complaints. No CP, SOB, nausea, vomiting, diarrhea.         VITAL SIGNS:  Vital Signs Last 24 Hrs  T(C): 37 (04 Mar 2018 05:33), Max: 39.5 (03 Mar 2018 12:02)  T(F): 98.6 (04 Mar 2018 05:33), Max: 103.1 (03 Mar 2018 12:02)  HR: 74 (04 Mar 2018 05:33) (74 - 94)  BP: 105/52 (04 Mar 2018 05:33) (105/52 - 165/55)  BP(mean): --  RR: 17 (04 Mar 2018 05:33) (16 - 18)  SpO2: 95% (04 Mar 2018 05:33) (94% - 100%)      PHYSICAL EXAM:     GENERAL: no acute distress  HEENT: PERRLA, EOMI, moist oropharynx   RESPIRATORY: CTAB, no wheezes or crackles   CARDIOVASCULAR: RRR, no murmurs  ABDOMINAL: soft, non-tender, non-distended, positive bowel sounds   EXTREMITIES: no clubbing, cyanosis, right BKA with left transmetatarsal amputation, no drainage or purulence noted, left leg warm to touch compared to the right   NEUROLOGICAL: alert and oriented x 3, non-focal  SKIN: dry skin in extremities bilaterally   MUSCULOSKELETAL: no gross joint deformity                          10.0   14.87 )-----------( 209      ( 03 Mar 2018 11:50 )             31.9     03-03    130<L>  |  95<L>  |  42<H>  ----------------------------<  248<H>  5.5<H>   |  23  |  1.71<H>    Ca    8.7      03 Mar 2018 11:50    TPro  7.8  /  Alb  3.2<L>  /  TBili  0.3  /  DBili  x   /  AST  27  /  ALT  28  /  AlkPhos  84  03-03      CAPILLARY BLOOD GLUCOSE      POCT Blood Glucose.: 349 mg/dL (03 Mar 2018 22:54)  POCT Blood Glucose.: 304 mg/dL (03 Mar 2018 17:29)  POCT Blood Glucose.: 280 mg/dL (03 Mar 2018 10:34)      MEDICATIONS  (STANDING):  aspirin enteric coated 81 milliGRAM(s) Oral daily  atorvastatin 20 milliGRAM(s) Oral at bedtime  clopidogrel Tablet 75 milliGRAM(s) Oral daily  dextrose 5%. 1000 milliLiter(s) (50 mL/Hr) IV Continuous <Continuous>  dextrose 50% Injectable 12.5 Gram(s) IV Push once  dextrose 50% Injectable 25 Gram(s) IV Push once  dextrose 50% Injectable 25 Gram(s) IV Push once  heparin  Injectable 5000 Unit(s) SubCutaneous every 12 hours  insulin detemir injectable (LEVEMIR) 25 Unit(s) SubCutaneous at bedtime  insulin lispro (HumaLOG) corrective regimen sliding scale   SubCutaneous Before meals and at bedtime  insulin lispro Injectable (HumaLOG) 8 Unit(s) SubCutaneous three times a day before meals  piperacillin/tazobactam IVPB. 3.375 Gram(s) IV Intermittent every 8 hours  sodium chloride 0.9%. 1000 milliLiter(s) (75 mL/Hr) IV Continuous <Continuous>  vancomycin  IVPB 1000 milliGRAM(s) IV Intermittent every 24 hours    MEDICATIONS  (PRN):  acetaminophen   Tablet 650 milliGRAM(s) Oral every 6 hours PRN For Temp greater than 38 C (100.4 F)  dextrose Gel 1 Dose(s) Oral once PRN Blood Glucose LESS THAN 70 milliGRAM(s)/deciliter  glucagon  Injectable 1 milliGRAM(s) IntraMuscular once PRN Glucose LESS THAN 70 milligrams/deciliter

## 2018-03-04 NOTE — CONSULT NOTE ADULT - SUBJECTIVE AND OBJECTIVE BOX
Infectious Diseases Consult note  Patient is a 72y old  Female who presents with a chief complaint of Fevers and leg pain (03 Mar 2018 15:47)    NIK MELISSA  MRN-[id number]  HPI:  72 female with T2DM, PVD s/p R BKA and L transmetatarsal amputation, CAD s/p CABG, CKD stage 3, HTN, HLD here with fevers 103 with chills and left leg pain for 3 days PTA. She had gone to her podiatrist's office on Wednesday and her dressing had been changed and she was told that everything looked okay. Admitted for Cellulitis of left lower extremity concern for osteomyelitis with elevated ESR and CRP on vancomycin and zosyn.   No sick contacts. No recent travel.       REVIEW OF SYSTEMS  All as below unless noted otherwise    General:  Denies fever and chills. 	  Ophthalmologic: Denies any visual complaints. 	  ENMT: No throat pain.  Respiratory: No cough, sputum. No shortness of breath.   Cardiovascular: No chest pain.   Gastrointestinal: No nausea or vomiting. No abdominal pain or diarrhea.   Genitourinary: No burning urine, no frequency. No flank pain  Musculoskeletal: No joint swelling or pain.   Neurological: No confusion. 	  Skin: No rash    PAST MEDICAL & SURGICAL HISTORY:  CAD (coronary artery disease)  Hypertension  Obesity  Hypercholesterolemia  DM (diabetes mellitus)  Carotid artery stenosis  PAD (peripheral artery disease): s/p R BKA  Stented coronary artery:  [facility]  S/P CABG x 3: in , [facility]  S/P CABG x 3  S/P eye surgery: for glaucoma  S/P below knee amputation, right: 2010  S/P angioplasty with stent: , 3/2014  S/P hysterectomy:     FAMILY HISTORY:  Family history of diabetes mellitus (Sibling)    SOCIAL HISTORY:  non smoker      ANTIMICROBIALS:    piperacillin/tazobactam IVPB. 3.375 every 8 hours  vancomycin  IVPB 1000 every 24 hours    OTHER MEDS:    acetaminophen   Tablet 650 milliGRAM(s) Oral every 6 hours PRN  aspirin enteric coated 81 milliGRAM(s) Oral daily  atorvastatin 20 milliGRAM(s) Oral at bedtime  clopidogrel Tablet 75 milliGRAM(s) Oral daily  glucagon  Injectable 1 milliGRAM(s) IntraMuscular once PRN  heparin  Injectable 5000 Unit(s) SubCutaneous every 12 hours  insulin detemir injectable (LEVEMIR) 29 Unit(s) SubCutaneous at bedtime  insulin lispro (HumaLOG) corrective regimen sliding scale   SubCutaneous Before meals and at bedtime  insulin lispro Injectable (HumaLOG) 9 Unit(s) SubCutaneous three times a day before meals  sodium chloride 0.9%. 1000 milliLiter(s) IV Continuous <Continuous>    Allergies  sulfa drugs (Hives)      Vital Signs Last 24 Hrs  T(C): 37.4 (04 Mar 2018 09:32), Max: 39.5 (03 Mar 2018 12:02)  T(F): 99.3 (04 Mar 2018 09:32), Max: 103.1 (03 Mar 2018 12:02)  HR: 82 (04 Mar 2018 09:32) (74 - 94)  BP: 135/- (04 Mar 2018 09:32) (105/52 - 165/55)  RR: 19 (04 Mar 2018 09:32) (16 - 19)  SpO2: 98% (04 Mar 2018 09:32) (94% - 100%)  POCT Blood Glucose.: 266 mg/dL (04 Mar 2018 08:17)   Daily Weight in k (04 Mar 2018 05:33)        PHYSICAL EXAM:  patient in no acute respiratory distress.  Constitutional: Comfortable. Awake and alert  Eyes: PERRL EOMI. No discharge.  ENMT: No sinus tenderness.  No pharyngeal erythema.   Neck: Supple. No thyromegaly   Respiratory: Good air entry bilaterally, no wheezes, rhonchi, or crackles  Cardiovascular:S1 S2 wnl, No murmurs  Gastrointestinal: Soft, no tenderness , bowel sounds present,   Genitourinary: No suprapubic tenderness. no CVA tenderness   Musculoskeletal: No joint swelling. No edema.  Vascular: peripheral pulses felt  Neurological: No grossly focal deficits  Skin: No rash   Lymph Nodes: No palpable Lymphadenopathy   Psychiatric: Affect normal  Lines:                           8.6    11.28 )-----------( 163      ( 04 Mar 2018 07:18 )             27.9     WBC Count: 11.28 ( @ 07:18)  WBC Count: 14.87 ( @ 11:50)        134<L>  |  100  |  40<H>  ----------------------------<  286<H>  4.2   |  21<L>  |  1.61<H>    Ca    8.1<L>      04 Mar 2018 07:18  Phos  3.9     -  Mg     1.8     -    TPro  7.8  /  Alb  3.2<L>  /  TBili  0.3  /  DBili  x   /  AST  27  /  ALT  28  /  AlkPhos  84      Blood Gas Venous - Lactate: 2.5: Please note updated reference range. mmol/L (18 @ 11:50)    Sedimentation Rate, Erythrocyte: 96 mm/hr (18 @ 11:50)    C-Reactive Protein, Serum: 343.8 mg/L (18 @ 11:50)      RVP:   @ 12:30  229E Coronavirus: --           Adenovirus: NOT DETECTED  Bordetella Pertussis NOT DETECTED  Chlamydia Pneumoniae NOT DETECTED  Entero/Rhinovirus NOT DETECTED  HKU1 Coronavirus --           hMPV NOT DETECTED  Influenza A NOT DETECTED (any subtype)  Influenza AH1 NOT DETECTED  Influenza AH1 2009 NOT DETECTED  Influenza AH3 NOT DETECTED  Influenza B NOT DETECTED  Mycoplasma pneumoniae NOT DETECTED This nucleic acid amplification assay was performed using  the [employer]. The following pathogens are tested  for: Adenovirus, Coronavirus 229E, Coronavirus HKU1,  Coronavirus NL63, Coronavirus OC43, Human Metapneumovirus  (HMPV), Rhinovirus/Enterovirus, Influenza A H1, Influenza A  H1 2009 (Pandemic H1 2009), Influenza A H3, Influenza A (Flu  A) subtype not identified, Influenza B, Parainfluenza Virus  (types 1, 2, 3, 4), Respiratory Syncytial Virus (RSV),  Bordetella pertussis, Chlamydophila pneumoniae, and  Mycoplasma pneumoniae. A negative FilmArray result does not  always exclude the possibility of viral or bacterial  infection. Laboratory results should always be interpreted  in the context of clinical findings.  NL63 Coronavirus --           OC43 Coronavirus --           Parainfluenza 1 NOT DETECTED  Parainfluenza 2 NOT DETECTED  Parainfluenza 3 NOT DETECTED  Parainfluenza 4 NOT DETECTED  Resp Syncytial Virus NOT DETECTED          MICROBIOLOGY:    Culture - Wound with Gram Stain (collected 03 Mar 2018 17:44)  Source: LEG - LEFT  Preliminary Report (04 Mar 2018 07:25):    CULTURE IN PROGRESS, FURTHER REPORT TO FOLLOW.    Blood cultures pending.      RADIOLOGY:    < from: Xray Chest 2 Views PA/Lat (18 @ 12:22) >  Left midlung linear atelectasis with otherwise clear   lungs.    Follow up official report.    < from: Xray Foot AP + Lateral, Left (18 @ 12:22) >   Dorsal soft tissue ulcer distal to the osteotomy margins   without radiographic evidence for underlying osteomyelitis. Given   extensive soft tissue swelling and depth of the ulcer, if there is   ongoing concern for osteomyelitis, consider contrast-enhanced MRIof the   left forefoot for more sensitive evaluation.     Follow up official report. Infectious Diseases Consult note  Patient is a 72y old  Female who presents with a chief complaint of Fevers and leg pain (03 Mar 2018 15:47)    NIK MELISSA  MRN-[id number]  HPI:  72 female with T2DM, PVD s/p R BKA and L transmetatarsal amputation, CAD s/p CABG, CKD stage 3, HTN, HLD here with fevers 103 with chills and left leg pain for 3 days PTA. She had gone to her podiatrist's office on Wednesday and her dressing had been changed and she was told that everything looked okay. Admitted for Cellulitis of left lower extremity concern for osteomyelitis with elevated ESR and CRP on vancomycin and zosyn.   No sick contacts. No recent travel.   in 2017 she had a culture growing serratia resistant to cefoxitin and unasyn  and coagulase negative staphylococcus.      REVIEW OF SYSTEMS  All as below unless noted otherwise    General:  Denies fever and chills. 	  Ophthalmologic: Denies any visual complaints. 	  ENMT: No throat pain.  Respiratory: No cough, sputum. No shortness of breath.   Cardiovascular: No chest pain.   Gastrointestinal: No nausea or vomiting. No abdominal pain or diarrhea.   Genitourinary: No burning urine, no frequency. No flank pain  Musculoskeletal: No joint swelling or pain.   Neurological: No confusion. 	  Skin: No rash    PAST MEDICAL & SURGICAL HISTORY:  CAD (coronary artery disease)  Hypertension  Obesity  Hypercholesterolemia  DM (diabetes mellitus)  Carotid artery stenosis  PAD (peripheral artery disease): s/p R BKA  Stented coronary artery:  [facility]  S/P CABG x 3: in , [facility]  S/P CABG x 3  S/P eye surgery: for glaucoma  S/P below knee amputation, right: 2010  S/P angioplasty with stent: , 3/2014  S/P hysterectomy:     FAMILY HISTORY:  Family history of diabetes mellitus (Sibling)    SOCIAL HISTORY:  non smoker      ANTIMICROBIALS:    piperacillin/tazobactam IVPB. 3.375 every 8 hours  vancomycin  IVPB 1000 every 24 hours    OTHER MEDS:    acetaminophen   Tablet 650 milliGRAM(s) Oral every 6 hours PRN  aspirin enteric coated 81 milliGRAM(s) Oral daily  atorvastatin 20 milliGRAM(s) Oral at bedtime  clopidogrel Tablet 75 milliGRAM(s) Oral daily  glucagon  Injectable 1 milliGRAM(s) IntraMuscular once PRN  heparin  Injectable 5000 Unit(s) SubCutaneous every 12 hours  insulin detemir injectable (LEVEMIR) 29 Unit(s) SubCutaneous at bedtime  insulin lispro (HumaLOG) corrective regimen sliding scale   SubCutaneous Before meals and at bedtime  insulin lispro Injectable (HumaLOG) 9 Unit(s) SubCutaneous three times a day before meals  sodium chloride 0.9%. 1000 milliLiter(s) IV Continuous <Continuous>    Allergies  sulfa drugs (Hives)      Vital Signs Last 24 Hrs  T(C): 37.4 (04 Mar 2018 09:32), Max: 39.5 (03 Mar 2018 12:02)  T(F): 99.3 (04 Mar 2018 09:32), Max: 103.1 (03 Mar 2018 12:02)  HR: 82 (04 Mar 2018 09:32) (74 - 94)  BP: 135/- (04 Mar 2018 09:32) (105/52 - 165/55)  RR: 19 (04 Mar 2018 09:32) (16 - 19)  SpO2: 98% (04 Mar 2018 09:32) (94% - 100%)  POCT Blood Glucose.: 266 mg/dL (04 Mar 2018 08:17)   Daily Weight in k (04 Mar 2018 05:33)        PHYSICAL EXAM:  patient in no acute respiratory distress.  Constitutional: Comfortable. Awake and alert  Eyes: PERRL EOMI. No discharge.  ENMT: No sinus tenderness.  No pharyngeal erythema.   Neck: Supple. No thyromegaly   Respiratory: Good air entry bilaterally, no wheezes, rhonchi, or crackles  Cardiovascular:S1 S2 wnl, No murmurs  Gastrointestinal: Soft, no tenderness , bowel sounds present,   Genitourinary: No suprapubic tenderness. no CVA tenderness   Musculoskeletal: No joint swelling. No edema.  Vascular: peripheral pulses felt  Neurological: No grossly focal deficits  Skin: No rash   Lymph Nodes: No palpable Lymphadenopathy   Psychiatric: Affect normal  Lines:                           8.6    11.28 )-----------( 163      ( 04 Mar 2018 07:18 )             27.9     WBC Count: 11.28 ( @ 07:18)  WBC Count: 14.87 ( @ 11:50)        134<L>  |  100  |  40<H>  ----------------------------<  286<H>  4.2   |  21<L>  |  1.61<H>    Ca    8.1<L>      04 Mar 2018 07:18  Phos  3.9     03-  Mg     1.8     -    TPro  7.8  /  Alb  3.2<L>  /  TBili  0.3  /  DBili  x   /  AST  27  /  ALT  28  /  AlkPhos  84  03-    Blood Gas Venous - Lactate: 2.5: Please note updated reference range. mmol/L (18 @ 11:50)    Sedimentation Rate, Erythrocyte: 96 mm/hr (18 @ 11:50)    C-Reactive Protein, Serum: 343.8 mg/L (18 @ 11:50)      RVP:   @ 12:30  229E Coronavirus: --           Adenovirus: NOT DETECTED  Bordetella Pertussis NOT DETECTED  Chlamydia Pneumoniae NOT DETECTED  Entero/Rhinovirus NOT DETECTED  HKU1 Coronavirus --           hMPV NOT DETECTED  Influenza A NOT DETECTED (any subtype)  Influenza AH1 NOT DETECTED  Influenza AH1 2009 NOT DETECTED  Influenza AH3 NOT DETECTED  Influenza B NOT DETECTED  Mycoplasma pneumoniae NOT DETECTED This nucleic acid amplification assay was performed using  the [employer]. The following pathogens are tested  for: Adenovirus, Coronavirus 229E, Coronavirus HKU1,  Coronavirus NL63, Coronavirus OC43, Human Metapneumovirus  (HMPV), Rhinovirus/Enterovirus, Influenza A H1, Influenza A  H1 2009 (Pandemic H1 2009), Influenza A H3, Influenza A (Flu  A) subtype not identified, Influenza B, Parainfluenza Virus  (types 1, 2, 3, 4), Respiratory Syncytial Virus (RSV),  Bordetella pertussis, Chlamydophila pneumoniae, and  Mycoplasma pneumoniae. A negative FilmArray result does not  always exclude the possibility of viral or bacterial  infection. Laboratory results should always be interpreted  in the context of clinical findings.  NL63 Coronavirus --           OC43 Coronavirus --           Parainfluenza 1 NOT DETECTED  Parainfluenza 2 NOT DETECTED  Parainfluenza 3 NOT DETECTED  Parainfluenza 4 NOT DETECTED  Resp Syncytial Virus NOT DETECTED          MICROBIOLOGY:    Culture - Wound with Gram Stain (collected 03 Mar 2018 17:44)  Source: LEG - LEFT  Preliminary Report (04 Mar 2018 07:25):    CULTURE IN PROGRESS, FURTHER REPORT TO FOLLOW.    Blood cultures pending.      RADIOLOGY:    < from: Xray Chest 2 Views PA/Lat (18 @ 12:22) >  Left midlung linear atelectasis with otherwise clear   lungs.    Follow up official report.    < from: Xray Foot AP + Lateral, Left (18 @ 12:22) >   Dorsal soft tissue ulcer distal to the osteotomy margins   without radiographic evidence for underlying osteomyelitis. Given   extensive soft tissue swelling and depth of the ulcer, if there is   ongoing concern for osteomyelitis, consider contrast-enhanced MRIof the   left forefoot for more sensitive evaluation.     Follow up official report. Infectious Diseases Consult note  Patient is a 72y old  Female who presents with a chief complaint of Fevers and leg pain (03 Mar 2018 15:47)    NIK MELISSA  MRN-[id number]  HPI:  72 female with T2DM, PVD s/p R BKA and L transmetatarsal amputation, CAD s/p CABG, CKD stage 3, HTN, HLD here with fevers 103 with chills and left leg pain for 3 days PTA. She had gone to her podiatrist's office on Wednesday and her dressing had been changed and she was told that everything looked okay. Admitted for Cellulitis of left lower extremity concern for osteomyelitis with elevated ESR and CRP on vancomycin and zosyn.   No sick contacts. No recent travel. Seen by podiatry; Wound does not appear to be acutely infected, however in the setting of LLE cellulitis, wound drainage was cultured- No surgical intervention on the foot at this time.   in 2017 she had a culture growing serratia resistant to cefoxitin and unasyn and coagulase negative staphylococcus.      REVIEW OF SYSTEMS  All as below unless noted otherwise    General:  has fever and chills. 	  Ophthalmologic: Denies any visual complaints. 	  ENMT: No throat pain.  Respiratory: has cough, no sputum. No shortness of breath.   Cardiovascular: No chest pain.   Gastrointestinal: No nausea or vomiting. No abdominal pain or diarrhea.   Genitourinary: No burning urine, no frequency. No flank pain  Musculoskeletal: has left leg pain  Neurological: No confusion. 	  Skin: No rash    PAST MEDICAL & SURGICAL HISTORY:  CAD (coronary artery disease)  Hypertension  Obesity  Hypercholesterolemia  DM (diabetes mellitus)  Carotid artery stenosis  PAD (peripheral artery disease): s/p R BKA  Stented coronary artery:  [facility]  S/P CABG x 3: in , [facility]  S/P CABG x 3  S/P eye surgery: for glaucoma  S/P below knee amputation, right: 2010  S/P angioplasty with stent: , 3/2014  S/P hysterectomy:     FAMILY HISTORY:  Family history of diabetes mellitus (Sibling)    SOCIAL HISTORY:    Patient lives with her  and two daughters at home.   She is a retired .   Denies tobacco or recreational drug use. Drinks alcohol socially.      ANTIMICROBIALS:    piperacillin/tazobactam IVPB. 3.375 every 8 hours  vancomycin  IVPB 1000 every 24 hours    OTHER MEDS:    acetaminophen   Tablet 650 milliGRAM(s) Oral every 6 hours PRN  aspirin enteric coated 81 milliGRAM(s) Oral daily  atorvastatin 20 milliGRAM(s) Oral at bedtime  clopidogrel Tablet 75 milliGRAM(s) Oral daily  glucagon  Injectable 1 milliGRAM(s) IntraMuscular once PRN  heparin  Injectable 5000 Unit(s) SubCutaneous every 12 hours  insulin detemir injectable (LEVEMIR) 29 Unit(s) SubCutaneous at bedtime  insulin lispro (HumaLOG) corrective regimen sliding scale   SubCutaneous Before meals and at bedtime  insulin lispro Injectable (HumaLOG) 9 Unit(s) SubCutaneous three times a day before meals  sodium chloride 0.9%. 1000 milliLiter(s) IV Continuous <Continuous>    Allergies  sulfa drugs (Hives)      Vital Signs Last 24 Hrs  T(C): 37.4 (04 Mar 2018 09:32), Max: 39.5 (03 Mar 2018 12:02)  T(F): 99.3 (04 Mar 2018 09:32), Max: 103.1 (03 Mar 2018 12:02)  HR: 82 (04 Mar 2018 09:32) (74 - 94)  BP: 135/- (04 Mar 2018 09:32) (105/52 - 165/55)  RR: 19 (04 Mar 2018 09:32) (16 - 19)  SpO2: 98% (04 Mar 2018 09:32) (94% - 100%)  POCT Blood Glucose.: 266 mg/dL (04 Mar 2018 08:17)   Daily Weight in k (04 Mar 2018 05:33)        PHYSICAL EXAM:  patient in no acute respiratory distress.  Constitutional: Comfortable. Awake and alert  Eyes: PERRL EOMI. No discharge.  ENMT: No sinus tenderness.  No pharyngeal erythema.   Neck: Supple. No thyromegaly   Respiratory: Good air entry bilaterally, no wheezes, rhonchi, or crackles  Cardiovascular:S1 S2 wnl, No murmurs  Gastrointestinal: Soft, no tenderness , bowel sounds present,   Genitourinary: No suprapubic tenderness. no CVA tenderness   Musculoskeletal: right BKA  left Leg erythematous, warm, tender, slightly swollen  left foot TMA  has 2 ulcers - bandaged   Vascular: peripheral pulses felt  Neurological: No grossly focal deficits  Skin: No rash   Lymph Nodes: No palpable Lymphadenopathy   Psychiatric: Affect normal  Lines:                           8.6    11.28 )-----------( 163      ( 04 Mar 2018 07:18 )             27.9     WBC Count: 11.28 ( @ 07:18)  WBC Count: 14.87 ( @ 11:50)        134<L>  |  100  |  40<H>  ----------------------------<  286<H>  4.2   |  21<L>  |  1.61<H>    Ca    8.1<L>      04 Mar 2018 07:18  Phos  3.9     -  Mg     1.8     -    TPro  7.8  /  Alb  3.2<L>  /  TBili  0.3  /  DBili  x   /  AST  27  /  ALT  28  /  AlkPhos  84      Blood Gas Venous - Lactate: 2.5: Please note updated reference range. mmol/L (18 @ 11:50)    Sedimentation Rate, Erythrocyte: 96 mm/hr (18 @ 11:50)    C-Reactive Protein, Serum: 343.8 mg/L (18 @ 11:50)      RVP:   @ 12:30  229E Coronavirus: --           Adenovirus: NOT DETECTED  Bordetella Pertussis NOT DETECTED  Chlamydia Pneumoniae NOT DETECTED  Entero/Rhinovirus NOT DETECTED  HKU1 Coronavirus --           hMPV NOT DETECTED  Influenza A NOT DETECTED (any subtype)  Influenza AH1 NOT DETECTED  Influenza AH1 2009 NOT DETECTED  Influenza AH3 NOT DETECTED  Influenza B NOT DETECTED  Mycoplasma pneumoniae NOT DETECTED This nucleic acid amplification assay was performed using  the [employer]. The following pathogens are tested  for: Adenovirus, Coronavirus 229E, Coronavirus HKU1,  Coronavirus NL63, Coronavirus OC43, Human Metapneumovirus  (HMPV), Rhinovirus/Enterovirus, Influenza A H1, Influenza A  H1 2009 (Pandemic H1 2009), Influenza A H3, Influenza A (Flu  A) subtype not identified, Influenza B, Parainfluenza Virus  (types 1, 2, 3, 4), Respiratory Syncytial Virus (RSV),  Bordetella pertussis, Chlamydophila pneumoniae, and  Mycoplasma pneumoniae. A negative FilmArray result does not  always exclude the possibility of viral or bacterial  infection. Laboratory results should always be interpreted  in the context of clinical findings.  NL63 Coronavirus --           OC43 Coronavirus --           Parainfluenza 1 NOT DETECTED  Parainfluenza 2 NOT DETECTED  Parainfluenza 3 NOT DETECTED  Parainfluenza 4 NOT DETECTED  Resp Syncytial Virus NOT DETECTED          MICROBIOLOGY:    Culture - Wound with Gram Stain (collected 03 Mar 2018 17:44)  Source: LEG - LEFT  Preliminary Report (04 Mar 2018 07:25):    CULTURE IN PROGRESS, FURTHER REPORT TO FOLLOW.    Blood cultures pending.      RADIOLOGY:    < from: Xray Chest 2 Views PA/Lat (18 @ 12:22) >  Left midlung linear atelectasis with otherwise clear   lungs.    Follow up official report.    < from: Xray Foot AP + Lateral, Left (18 @ 12:22) >   Dorsal soft tissue ulcer distal to the osteotomy margins   without radiographic evidence for underlying osteomyelitis. Given   extensive soft tissue swelling and depth of the ulcer, if there is   ongoing concern for osteomyelitis, consider contrast-enhanced MRIof the   left forefoot for more sensitive evaluation.     Follow up official report. Infectious Diseases Consult note  Patient is a 72y old  Female who presents with a chief complaint of Fevers and leg pain (03 Mar 2018 15:47)    NIK MELISSA  MRN-[id number]  HPI:  72 female with T2DM, PVD s/p R BKA and L transmetatarsal amputation, CAD s/p CABG, CKD stage 3, HTN, HLD here with fevers 103 with chills and left leg pain for 3 days PTA. She had gone to her podiatrist's office on Wednesday and her dressing had been changed and she was told that everything looked okay. Admitted for Cellulitis of left lower extremity concern for osteomyelitis with elevated ESR and CRP on vancomycin and zosyn.   No sick contacts. No recent travel. Seen by podiatry; Wound does not appear to be acutely infected, however in the setting of LLE cellulitis, wound drainage was cultured- No surgical intervention on the foot at this time.   in 2017 she had a culture growing serratia resistant to cefoxitin and unasyn and coagulase negative staphylococcus.      REVIEW OF SYSTEMS  All as below unless noted otherwise    General:  has fever and chills. 	  Ophthalmologic: Denies any visual complaints. 	  ENMT: No throat pain.  Respiratory: has cough, no sputum. No shortness of breath.   Cardiovascular: No chest pain.   Gastrointestinal: No nausea or vomiting. No abdominal pain or diarrhea.   Genitourinary: No burning urine, no frequency. No flank pain  Musculoskeletal: has left leg pain  Neurological: No confusion. 	  Skin: No rash    PAST MEDICAL & SURGICAL HISTORY:  CAD (coronary artery disease)  Hypertension  Obesity  Hypercholesterolemia  DM (diabetes mellitus)  Carotid artery stenosis  PAD (peripheral artery disease): s/p R BKA  Stented coronary artery:  [facility]  S/P CABG x 3: in , [facility]  S/P CABG x 3  S/P eye surgery: for glaucoma  S/P below knee amputation, right: 2010  S/P angioplasty with stent: , 3/2014  S/P hysterectomy:     FAMILY HISTORY:  Family history of diabetes mellitus (Sibling)    SOCIAL HISTORY:    Patient lives with her  and two daughters at home.   She is a retired .   Denies tobacco or recreational drug use. Drinks alcohol socially.      ANTIMICROBIALS:    piperacillin/tazobactam IVPB. 3.375 every 8 hours  vancomycin  IVPB 1000 every 24 hours    OTHER MEDS:    acetaminophen   Tablet 650 milliGRAM(s) Oral every 6 hours PRN  aspirin enteric coated 81 milliGRAM(s) Oral daily  atorvastatin 20 milliGRAM(s) Oral at bedtime  clopidogrel Tablet 75 milliGRAM(s) Oral daily  glucagon  Injectable 1 milliGRAM(s) IntraMuscular once PRN  heparin  Injectable 5000 Unit(s) SubCutaneous every 12 hours  insulin detemir injectable (LEVEMIR) 29 Unit(s) SubCutaneous at bedtime  insulin lispro (HumaLOG) corrective regimen sliding scale   SubCutaneous Before meals and at bedtime  insulin lispro Injectable (HumaLOG) 9 Unit(s) SubCutaneous three times a day before meals  sodium chloride 0.9%. 1000 milliLiter(s) IV Continuous <Continuous>    Allergies  sulfa drugs (Hives)      Vital Signs Last 24 Hrs  T(C): 37.4 (04 Mar 2018 09:32), Max: 39.5 (03 Mar 2018 12:02)  T(F): 99.3 (04 Mar 2018 09:32), Max: 103.1 (03 Mar 2018 12:02)  HR: 82 (04 Mar 2018 09:32) (74 - 94)  BP: 135/- (04 Mar 2018 09:32) (105/52 - 165/55)  RR: 19 (04 Mar 2018 09:32) (16 - 19)  SpO2: 98% (04 Mar 2018 09:32) (94% - 100%)  POCT Blood Glucose.: 266 mg/dL (04 Mar 2018 08:17)   Daily Weight in k (04 Mar 2018 05:33)        PHYSICAL EXAM:  patient in no acute respiratory distress.  Constitutional: Comfortable. Awake and alert  Eyes: PERRL EOMI. No discharge.  ENMT: No sinus tenderness.  No pharyngeal erythema.   Neck: Supple. No thyromegaly   Respiratory: Good air entry bilaterally, no wheezes, rhonchi, or crackles  Cardiovascular:S1 S2 wnl, No murmurs  Gastrointestinal: Soft, no tenderness , bowel sounds present,   Genitourinary: No suprapubic tenderness. no CVA tenderness   Musculoskeletal: right BKA  left Leg erythematous, warm, tender, slightly swollen  left foot TMA  has 1 ulcer - long along the TMA - no purulent drainage   Vascular: peripheral pulses felt  Neurological: No grossly focal deficits  Skin: No rash   Lymph Nodes: No palpable Lymphadenopathy   Psychiatric: Affect normal  Lines:                           8.6    11.28 )-----------( 163      ( 04 Mar 2018 07:18 )             27.9     WBC Count: 11.28 ( @ 07:18)  WBC Count: 14.87 ( @ 11:50)        134<L>  |  100  |  40<H>  ----------------------------<  286<H>  4.2   |  21<L>  |  1.61<H>    Ca    8.1<L>      04 Mar 2018 07:18  Phos  3.9     -  Mg     1.8     -    TPro  7.8  /  Alb  3.2<L>  /  TBili  0.3  /  DBili  x   /  AST  27  /  ALT  28  /  AlkPhos  84      Blood Gas Venous - Lactate: 2.5: Please note updated reference range. mmol/L (18 @ 11:50)    Sedimentation Rate, Erythrocyte: 96 mm/hr (18 @ 11:50)    C-Reactive Protein, Serum: 343.8 mg/L (18 @ 11:50)      RVP:   @ 12:30  229E Coronavirus: --           Adenovirus: NOT DETECTED  Bordetella Pertussis NOT DETECTED  Chlamydia Pneumoniae NOT DETECTED  Entero/Rhinovirus NOT DETECTED  HKU1 Coronavirus --           hMPV NOT DETECTED  Influenza A NOT DETECTED (any subtype)  Influenza AH1 NOT DETECTED  Influenza AH1 2009 NOT DETECTED  Influenza AH3 NOT DETECTED  Influenza B NOT DETECTED  Mycoplasma pneumoniae NOT DETECTED This nucleic acid amplification assay was performed using  the [employer]. The following pathogens are tested  for: Adenovirus, Coronavirus 229E, Coronavirus HKU1,  Coronavirus NL63, Coronavirus OC43, Human Metapneumovirus  (HMPV), Rhinovirus/Enterovirus, Influenza A H1, Influenza A  H1 2009 (Pandemic H1 2009), Influenza A H3, Influenza A (Flu  A) subtype not identified, Influenza B, Parainfluenza Virus  (types 1, 2, 3, 4), Respiratory Syncytial Virus (RSV),  Bordetella pertussis, Chlamydophila pneumoniae, and  Mycoplasma pneumoniae. A negative FilmArray result does not  always exclude the possibility of viral or bacterial  infection. Laboratory results should always be interpreted  in the context of clinical findings.  NL63 Coronavirus --           OC43 Coronavirus --           Parainfluenza 1 NOT DETECTED  Parainfluenza 2 NOT DETECTED  Parainfluenza 3 NOT DETECTED  Parainfluenza 4 NOT DETECTED  Resp Syncytial Virus NOT DETECTED          MICROBIOLOGY:    Culture - Wound with Gram Stain (collected 03 Mar 2018 17:44)  Source: LEG - LEFT  Preliminary Report (04 Mar 2018 07:25):    CULTURE IN PROGRESS, FURTHER REPORT TO FOLLOW.    Blood cultures pending.      RADIOLOGY:    < from: Xray Chest 2 Views PA/Lat (18 @ 12:22) >  Left midlung linear atelectasis with otherwise clear   lungs.    Follow up official report.    < from: Xray Foot AP + Lateral, Left (18 @ 12:22) >   Dorsal soft tissue ulcer distal to the osteotomy margins   without radiographic evidence for underlying osteomyelitis. Given   extensive soft tissue swelling and depth of the ulcer, if there is   ongoing concern for osteomyelitis, consider contrast-enhanced MRIof the   left forefoot for more sensitive evaluation.     Follow up official report. Infectious Diseases Consult note  Patient is a 72y old  Female who presents with a chief complaint of Fevers and leg pain (03 Mar 2018 15:47)    NIK MELISSA  MRN-[id number]  HPI:  72 female with T2DM, PVD s/p R BKA and L transmetatarsal amputation, CAD s/p CABG, CKD stage 3, HTN, HLD here with fevers 103 with chills and left leg pain for 3 days PTA. She had gone to her podiatrist's office on Wednesday and her dressing had been changed and she was told that everything looked okay. Admitted for Cellulitis of left lower extremity concern for osteomyelitis with elevated ESR and CRP on vancomycin and zosyn.   No sick contacts. No recent travel. Seen by podiatry; Wound does not appear to be acutely infected, however in the setting of LLE cellulitis, wound drainage was cultured- No surgical intervention on the foot at this time.   in 2017 she had a culture growing serratia resistant to cefoxitin and unasyn and coagulase negative staphylococcus.      REVIEW OF SYSTEMS  All as below unless noted otherwise    General:  has fever and chills. 	  Ophthalmologic: Denies any visual complaints. 	  ENMT: No throat pain.  Respiratory: has cough, no sputum. No shortness of breath.   Cardiovascular: No chest pain.   Gastrointestinal: No nausea or vomiting. No abdominal pain or diarrhea.   Genitourinary: No burning urine, no frequency. No flank pain  Musculoskeletal: has left leg pain  Neurological: No confusion. 	  Skin: No rash    PAST MEDICAL & SURGICAL HISTORY:  CAD (coronary artery disease)  Hypertension  Obesity  Hypercholesterolemia  DM (diabetes mellitus)  Carotid artery stenosis  PAD (peripheral artery disease): s/p R BKA  Stented coronary artery:  [facility]  S/P CABG x 3: in , [facility]  S/P CABG x 3  S/P eye surgery: for glaucoma  S/P below knee amputation, right: 2010  S/P angioplasty with stent: , 3/2014  S/P hysterectomy:     FAMILY HISTORY:  Family history of diabetes mellitus (Sibling)    SOCIAL HISTORY:    Patient lives with her  and two daughters at home.   She is a retired .   Denies tobacco or recreational drug use. Drinks alcohol socially.      ANTIMICROBIALS:    piperacillin/tazobactam IVPB. 3.375 every 8 hours  vancomycin  IVPB 1000 every 24 hours    OTHER MEDS:    acetaminophen   Tablet 650 milliGRAM(s) Oral every 6 hours PRN  aspirin enteric coated 81 milliGRAM(s) Oral daily  atorvastatin 20 milliGRAM(s) Oral at bedtime  clopidogrel Tablet 75 milliGRAM(s) Oral daily  glucagon  Injectable 1 milliGRAM(s) IntraMuscular once PRN  heparin  Injectable 5000 Unit(s) SubCutaneous every 12 hours  insulin detemir injectable (LEVEMIR) 29 Unit(s) SubCutaneous at bedtime  insulin lispro (HumaLOG) corrective regimen sliding scale   SubCutaneous Before meals and at bedtime  insulin lispro Injectable (HumaLOG) 9 Unit(s) SubCutaneous three times a day before meals  sodium chloride 0.9%. 1000 milliLiter(s) IV Continuous <Continuous>    Allergies  sulfa drugs (Hives)      Vital Signs Last 24 Hrs  T(C): 37.4 (04 Mar 2018 09:32), Max: 39.5 (03 Mar 2018 12:02)  T(F): 99.3 (04 Mar 2018 09:32), Max: 103.1 (03 Mar 2018 12:02)  HR: 82 (04 Mar 2018 09:32) (74 - 94)  BP: 135/- (04 Mar 2018 09:32) (105/52 - 165/55)  RR: 19 (04 Mar 2018 09:32) (16 - 19)  SpO2: 98% (04 Mar 2018 09:32) (94% - 100%)  POCT Blood Glucose.: 266 mg/dL (04 Mar 2018 08:17)   Daily Weight in k (04 Mar 2018 05:33)        PHYSICAL EXAM:  patient in no acute respiratory distress.  Constitutional: Comfortable. Awake and alert  Eyes: PERRL EOMI. No discharge.  ENMT: No sinus tenderness.  No pharyngeal erythema.   Neck: Supple. No thyromegaly   Respiratory: Good air entry bilaterally, no wheezes, rhonchi, or crackles  Cardiovascular:S1 S2 wnl, No murmurs  Gastrointestinal: Soft, no tenderness , bowel sounds present,   Genitourinary: No suprapubic tenderness. no CVA tenderness   Musculoskeletal: right BKA  left Leg erythematous, warm, tender, slightly swollen  left foot TMA  has 1 ulcer - long along the TMA - no purulent drainage   Vascular: peripheral pulses felt  Neurological: No grossly focal deficits  Skin: No rash   Lymph Nodes: No palpable Lymphadenopathy   Psychiatric: Affect normal  Lines:                           8.6    11.28 )-----------( 163      ( 04 Mar 2018 07:18 )             27.9     WBC Count: 11.28 ( @ 07:18)  WBC Count: 14.87 ( @ 11:50)        134<L>  |  100  |  40<H>  ----------------------------<  286<H>  4.2   |  21<L>  |  1.61<H>    Ca    8.1<L>      04 Mar 2018 07:18  Phos  3.9     -  Mg     1.8     -    TPro  7.8  /  Alb  3.2<L>  /  TBili  0.3  /  DBili  x   /  AST  27  /  ALT  28  /  AlkPhos  84      Blood Gas Venous - Lactate: 2.5: Please note updated reference range. mmol/L (18 @ 11:50)    Sedimentation Rate, Erythrocyte: 96 mm/hr (18 @ 11:50)    C-Reactive Protein, Serum: 343.8 mg/L (18 @ 11:50)      RVP:   @ 12:30  229E Coronavirus: --           Adenovirus: NOT DETECTED  Bordetella Pertussis NOT DETECTED  Chlamydia Pneumoniae NOT DETECTED  Entero/Rhinovirus NOT DETECTED  HKU1 Coronavirus --           hMPV NOT DETECTED  Influenza A NOT DETECTED (any subtype)  Influenza AH1 NOT DETECTED  Influenza AH1 2009 NOT DETECTED  Influenza AH3 NOT DETECTED  Influenza B NOT DETECTED  Mycoplasma pneumoniae NOT DETECTED This nucleic acid amplification assay was performed using  the [employer]. The following pathogens are tested  for: Adenovirus, Coronavirus 229E, Coronavirus HKU1,  Coronavirus NL63, Coronavirus OC43, Human Metapneumovirus  (HMPV), Rhinovirus/Enterovirus, Influenza A H1, Influenza A  H1 2009 (Pandemic H1 2009), Influenza A H3, Influenza A (Flu  A) subtype not identified, Influenza B, Parainfluenza Virus  (types 1, 2, 3, 4), Respiratory Syncytial Virus (RSV),  Bordetella pertussis, Chlamydophila pneumoniae, and  Mycoplasma pneumoniae. A negative FilmArray result does not  always exclude the possibility of viral or bacterial  infection. Laboratory results should always be interpreted  in the context of clinical findings.  NL63 Coronavirus --           OC43 Coronavirus --           Parainfluenza 1 NOT DETECTED  Parainfluenza 2 NOT DETECTED  Parainfluenza 3 NOT DETECTED  Parainfluenza 4 NOT DETECTED  Resp Syncytial Virus NOT DETECTED    MICROBIOLOGY:    Culture - Wound with Gram Stain (collected 03 Mar 2018 17:44)  Source: LEG - LEFT  Preliminary Report (04 Mar 2018 07:25):    CULTURE IN PROGRESS, FURTHER REPORT TO FOLLOW.    Blood cultures pending.      RADIOLOGY:    < from: Xray Chest 2 Views PA/Lat (18 @ 12:22) >  Left midlung linear atelectasis with otherwise clear   lungs.    Follow up official report.    < from: Xray Foot AP + Lateral, Left (18 @ 12:22) >   Dorsal soft tissue ulcer distal to the osteotomy margins   without radiographic evidence for underlying osteomyelitis. Given   extensive soft tissue swelling and depth of the ulcer, if there is   ongoing concern for osteomyelitis, consider contrast-enhanced MRIof the   left forefoot for more sensitive evaluation.     Follow up official report.

## 2018-03-04 NOTE — CONSULT NOTE ADULT - ASSESSMENT
72 female with T2DM, PVD s/p R BKA and L transmetatarsal amputation, CAD s/p CABG, CKD stage 3, HTN, HLD here with fevers 103 with chills and left leg pain. Admitted for Cellulitis of left lower extremity concern for osteomyelitis with elevated ESR and CRP.  Concern for osteomyelitis   - F/u MRI left LE  - on Vancomycin day 2  - monitor Vancomycin trough  - on Zosyn day 2  - f/u blood cultures          Pending discussion with the attending 72 female with T2DM, PVD s/p R BKA and L transmetatarsal amputation, CAD s/p CABG, CKD stage 3, HTN, HLD here with fevers 103 with chills and left leg pain. Admitted for Cellulitis of left lower extremity concern for osteomyelitis with elevated ESR and CRP.  Concern for osteomyelitis   - F/u MRI left LE  - check dopplers- r/o DVT  - on Vancomycin day 2  - monitor Vancomycin trough  - on Zosyn day 2  - f/u blood cultures          Pending discussion with the attending 72 female with T2DM, PVD s/p R BKA and L transmetatarsal amputation, CAD s/p CABG, CKD stage 3, HTN, HLD here with fevers 103F, chills and left leg pain. Admitted for cellulitis of left lower extremity concern for osteomyelitis pending MRI. Has elevated ESR and CRP. On TMA site on left foot, has an open ulcer - no drainage noted and no surrounding erythema.    Recommend:   - F/u MRI left LE  - check dopplers left LE - r/o DVT  - Continue vancomycin and zosyn  - Monitor vancomycin trough for toxic drug levels  - f/u blood cultures

## 2018-03-04 NOTE — PROGRESS NOTE ADULT - ASSESSMENT
Patient is a 73 y/o F with a PMH significant for T2DM, PVD s/p R BKA and L transmetatarsal amputation, CAD s/p CABG, CKD stage 3, and HTN who presents to the ED with fevers and left leg pain, found to have sepsis (fever, leukocytosis) likely secondary to LLE cellulitis vs osteomyelitis.

## 2018-03-04 NOTE — PROGRESS NOTE ADULT - ASSESSMENT
73 yo F s/p L foot TMA, with chronic dorsal wound at TMA site  - Pt seen and examined  - Wound does not appear to be acutely infected, however in the setting of LLE cellulitis, wound drainage was cultured  - No debridement necessary at this time, pt was recently at her wound care visit, wound appears healthy  - Wound packed w/ 1/4" iodoform packing  - No surgical intervention on the foot at this time  - Local wound care of left foot w/ packing  - MRI ordered to r/o abscess or OM   - will d/w attending

## 2018-03-04 NOTE — PROGRESS NOTE ADULT - PROBLEM SELECTOR PLAN 1
- patient with fevers, leukocytosis, and DAMARI  - s/p 1L of fluid in the ED, giving another fluid bolus now and midodrine 10mg x1 for relative hypotension (usually pressure runs in the 150s and now 110s)  - will continue vanc 1gm q24 based on CrCl of 40, and zosyn 3.375 q8, BCx collected in the ED, monitor vanc level  - likely source is left leg, MRI pending - patient with fevers, leukocytosis, and DAMARI  - continue fluids   - continue vanc 1gm q24 based on CrCl of 40, and zosyn 3.375 q8, BCx collected in the ED, monitor vanc level  - likely source is left leg, MRI pending

## 2018-03-04 NOTE — PROGRESS NOTE ADULT - PROBLEM SELECTOR PLAN 5
- HgbA1c in the AM  - will reduce home doses of basal-bolus insulin while in the hospital  - Levemir 25U and humalog 8U with ISS and FS - HgbA1c pending  - will reduce home doses of basal-bolus insulin while in the hospital  - increased Levemir 29U and humalog 9U with ISS and FS

## 2018-03-04 NOTE — PROGRESS NOTE ADULT - PROBLEM SELECTOR PLAN 3
- baseline Cr around 0.8-1  - current Cr at 1.71, likely pre-renal secondary to hypoperfusion given sepsis   - continue hydration with IV fluids - baseline Cr around 0.8-1  - improving this morning to 1.6, likely pre-renal etiology secondary to hypoperfusion from sepsis   - continue hydration with IV fluids

## 2018-03-04 NOTE — PROGRESS NOTE ADULT - PROBLEM SELECTOR PLAN 2
- patient s/p L transmetatarsal amputation in 7/2017, has been following with wound care/podiatry since and has been having weekly dressing changes  - podiatry saw patient in the ED, wrapped and dressed wound, appreciate recs   - elevated ESR and CRP, xrays not suggestive of osteomyelitis but will order MRI given clinical picture and suspicion \  - continue antibiotics as listed above - continue abx as above, wound culture pending  - podiatry following, appreciate recs  - elevated ESR and CRP, MRI pending - continue abx as above, wound culture pending  - podiatry following, appreciate recs  - elevated ESR and CRP, MRI pending  - ID called and will see the patient

## 2018-03-04 NOTE — PROVIDER CONTACT NOTE (OTHER) - SITUATION
Patient with temp. of 100.7 orally. Cultures were done in ed and she is on vanco and zosyn and iv fluids.

## 2018-03-05 LAB
BUN SERPL-MCNC: 44 MG/DL — HIGH (ref 7–23)
CALCIUM SERPL-MCNC: 8.4 MG/DL — SIGNIFICANT CHANGE UP (ref 8.4–10.5)
CHLORIDE SERPL-SCNC: 101 MMOL/L — SIGNIFICANT CHANGE UP (ref 98–107)
CO2 SERPL-SCNC: 20 MMOL/L — LOW (ref 22–31)
CREAT SERPL-MCNC: 1.64 MG/DL — HIGH (ref 0.5–1.3)
GLUCOSE BLDC GLUCOMTR-MCNC: 166 MG/DL — HIGH (ref 70–99)
GLUCOSE BLDC GLUCOMTR-MCNC: 205 MG/DL — HIGH (ref 70–99)
GLUCOSE BLDC GLUCOMTR-MCNC: 208 MG/DL — HIGH (ref 70–99)
GLUCOSE BLDC GLUCOMTR-MCNC: 268 MG/DL — HIGH (ref 70–99)
GLUCOSE BLDC GLUCOMTR-MCNC: 273 MG/DL — HIGH (ref 70–99)
GLUCOSE SERPL-MCNC: 186 MG/DL — HIGH (ref 70–99)
HCT VFR BLD CALC: 26.4 % — LOW (ref 34.5–45)
HGB BLD-MCNC: 8.7 G/DL — LOW (ref 11.5–15.5)
MAGNESIUM SERPL-MCNC: 1.9 MG/DL — SIGNIFICANT CHANGE UP (ref 1.6–2.6)
MCHC RBC-ENTMCNC: 27.9 PG — SIGNIFICANT CHANGE UP (ref 27–34)
MCHC RBC-ENTMCNC: 33 % — SIGNIFICANT CHANGE UP (ref 32–36)
MCV RBC AUTO: 84.6 FL — SIGNIFICANT CHANGE UP (ref 80–100)
NRBC # FLD: 0 — SIGNIFICANT CHANGE UP
PHOSPHATE SERPL-MCNC: 3.7 MG/DL — SIGNIFICANT CHANGE UP (ref 2.5–4.5)
PLATELET # BLD AUTO: 184 K/UL — SIGNIFICANT CHANGE UP (ref 150–400)
PMV BLD: 12.1 FL — SIGNIFICANT CHANGE UP (ref 7–13)
POTASSIUM SERPL-MCNC: 4.1 MMOL/L — SIGNIFICANT CHANGE UP (ref 3.5–5.3)
POTASSIUM SERPL-SCNC: 4.1 MMOL/L — SIGNIFICANT CHANGE UP (ref 3.5–5.3)
RBC # BLD: 3.12 M/UL — LOW (ref 3.8–5.2)
RBC # FLD: 16 % — HIGH (ref 10.3–14.5)
SODIUM SERPL-SCNC: 136 MMOL/L — SIGNIFICANT CHANGE UP (ref 135–145)
VANCOMYCIN TROUGH SERPL-MCNC: 10.5 UG/ML — SIGNIFICANT CHANGE UP (ref 10–20)
WBC # BLD: 13.53 K/UL — HIGH (ref 3.8–10.5)
WBC # FLD AUTO: 13.53 K/UL — HIGH (ref 3.8–10.5)

## 2018-03-05 PROCEDURE — 93970 EXTREMITY STUDY: CPT | Mod: 26

## 2018-03-05 PROCEDURE — 99232 SBSQ HOSP IP/OBS MODERATE 35: CPT

## 2018-03-05 PROCEDURE — 99233 SBSQ HOSP IP/OBS HIGH 50: CPT | Mod: GC

## 2018-03-05 PROCEDURE — 73719 MRI LOWER EXTREMITY W/DYE: CPT | Mod: 26,LT

## 2018-03-05 RX ORDER — MEROPENEM 1 G/30ML
1000 INJECTION INTRAVENOUS EVERY 12 HOURS
Qty: 0 | Refills: 0 | Status: DISCONTINUED | OUTPATIENT
Start: 2018-03-05 | End: 2018-03-07

## 2018-03-05 RX ORDER — SODIUM CHLORIDE 9 MG/ML
1000 INJECTION INTRAMUSCULAR; INTRAVENOUS; SUBCUTANEOUS
Qty: 0 | Refills: 0 | Status: DISCONTINUED | OUTPATIENT
Start: 2018-03-05 | End: 2018-03-07

## 2018-03-05 RX ORDER — MEROPENEM 1 G/30ML
1000 INJECTION INTRAVENOUS EVERY 12 HOURS
Qty: 0 | Refills: 0 | Status: DISCONTINUED | OUTPATIENT
Start: 2018-03-05 | End: 2018-03-05

## 2018-03-05 RX ORDER — COLLAGENASE CLOSTRIDIUM HIST. 250 UNIT/G
1 OINTMENT (GRAM) TOPICAL DAILY
Qty: 0 | Refills: 0 | Status: DISCONTINUED | OUTPATIENT
Start: 2018-03-05 | End: 2018-03-17

## 2018-03-05 RX ADMIN — Medication 1: at 08:52

## 2018-03-05 RX ADMIN — Medication 250 MILLIGRAM(S): at 17:52

## 2018-03-05 RX ADMIN — Medication 9 UNIT(S): at 14:29

## 2018-03-05 RX ADMIN — HEPARIN SODIUM 5000 UNIT(S): 5000 INJECTION INTRAVENOUS; SUBCUTANEOUS at 17:49

## 2018-03-05 RX ADMIN — Medication 1 APPLICATION(S): at 14:34

## 2018-03-05 RX ADMIN — Medication 9 UNIT(S): at 17:49

## 2018-03-05 RX ADMIN — PIPERACILLIN AND TAZOBACTAM 25 GRAM(S): 4; .5 INJECTION, POWDER, LYOPHILIZED, FOR SOLUTION INTRAVENOUS at 01:01

## 2018-03-05 RX ADMIN — ATORVASTATIN CALCIUM 20 MILLIGRAM(S): 80 TABLET, FILM COATED ORAL at 21:27

## 2018-03-05 RX ADMIN — Medication 81 MILLIGRAM(S): at 14:30

## 2018-03-05 RX ADMIN — Medication 650 MILLIGRAM(S): at 01:43

## 2018-03-05 RX ADMIN — Medication 9 UNIT(S): at 08:52

## 2018-03-05 RX ADMIN — HEPARIN SODIUM 5000 UNIT(S): 5000 INJECTION INTRAVENOUS; SUBCUTANEOUS at 06:42

## 2018-03-05 RX ADMIN — Medication 3: at 17:48

## 2018-03-05 RX ADMIN — CLOPIDOGREL BISULFATE 75 MILLIGRAM(S): 75 TABLET, FILM COATED ORAL at 14:30

## 2018-03-05 RX ADMIN — Medication 3: at 14:29

## 2018-03-05 RX ADMIN — PIPERACILLIN AND TAZOBACTAM 25 GRAM(S): 4; .5 INJECTION, POWDER, LYOPHILIZED, FOR SOLUTION INTRAVENOUS at 10:05

## 2018-03-05 RX ADMIN — Medication 29 UNIT(S): at 23:00

## 2018-03-05 RX ADMIN — SODIUM CHLORIDE 75 MILLILITER(S): 9 INJECTION INTRAMUSCULAR; INTRAVENOUS; SUBCUTANEOUS at 10:08

## 2018-03-05 RX ADMIN — MEROPENEM 100 MILLIGRAM(S): 1 INJECTION INTRAVENOUS at 18:17

## 2018-03-05 NOTE — PROGRESS NOTE ADULT - PROBLEM SELECTOR PLAN 1
- patient with fevers, leukocytosis, and DAMARI  - continue fluids   - continue vanc 1gm q24 based on CrCl of 40, and zosyn 3.375 q8, BCx collected in the ED, monitor vanc level  - likely source is left leg, MRI pending - patient with fevers and leukocytosis   - continue vanc 1gm q24 based on CrCl of 40, and zosyn 3.375 q8, BCx are preliminarily negative, monitor vanc level  - likely source is left leg, MRI pending

## 2018-03-05 NOTE — PROGRESS NOTE ADULT - ATTENDING COMMENTS
continue with antibiotics - await mri for possible osteo.  vanco trough pending.  baseline cr from PMD obtained - pt likely with ckd currently.

## 2018-03-05 NOTE — PROGRESS NOTE ADULT - PROBLEM SELECTOR PLAN 5
- HgbA1c pending  - will reduce home doses of basal-bolus insulin while in the hospital  - increased Levemir 29U and humalog 9U with ISS and FS - HgbA1c 8.1  - on reduced home doses of basal-bolus insulin while in the hospital  - continue Levemir 29U and humalog 9U with ISS and FS

## 2018-03-05 NOTE — PROGRESS NOTE ADULT - SUBJECTIVE AND OBJECTIVE BOX
CC: F/U left leg cellulitis    Interval History/ROS: Patient remains with fever and chills. Has no other complaints. Denies N/V/D/C, abd pain. Eating lunch.    Allergies  sulfa drugs (Hives)  sulfa drugs (Rash)  Sulfac 10% (Hives)      ANTIMICROBIALS:  piperacillin/tazobactam IVPB. 3.375 every 8 hours  vancomycin  IVPB 1000 every 24 hours      PE:    Vital Signs Last 24 Hrs  T(C): 36.8 (05 Mar 2018 04:50), Max: 38.7 (05 Mar 2018 01:46)  T(F): 98.2 (05 Mar 2018 04:50), Max: 101.7 (05 Mar 2018 01:46)  HR: 69 (05 Mar 2018 04:50) (69 - 84)  BP: 128/63 (05 Mar 2018 04:50) (121/61 - 145/64)  BP(mean): --  RR: 18 (05 Mar 2018 04:50) (17 - 18)  SpO2: 100% (05 Mar 2018 04:50) (98% - 100%)    Gen: AOx3, NAD  CV: S1+S2 normal, no murmurs  Resp: Clear bilat, no resp distress  Abd: Soft, nontender, +BS  Ext: Right BKA, left leg with erythema, swelling and warmth to LLE - has two ulcers, one at TMA site packed.  : No Pratt  IV/Skin: No thrombophlebitis  Neuro: no focal deficits    LABS:                          8.7    13.53 )-----------( 184      ( 05 Mar 2018 06:15 )             26.4       03-05    136  |  101  |  44<H>  ----------------------------<  186<H>  4.1   |  20<L>  |  1.64<H>    Ca    8.4      05 Mar 2018 06:15  Phos  3.7     03-05  Mg     1.9     03-05    MICROBIOLOGY:  v  LEG - LEFT  03-03-18 --  --  --      BLOOD PERIPHERAL  03-03-18 --  --  --    RADIOLOGY:  No new images.

## 2018-03-05 NOTE — PROGRESS NOTE ADULT - PROBLEM SELECTOR PLAN 2
- continue abx as above, wound culture pending  - podiatry following, appreciate recs  - elevated ESR and CRP, MRI pending  - ID called and will see the patient - continue abx as above, wound culture pending  - podiatry following, appreciate recs  - elevated ESR and CRP, MRI pending, ID on board - continue abx as above, wound culture growing GNR but waiting for final speciation  - podiatry following, appreciate recs  - elevated ESR and CRP, MRI pending, ID on board

## 2018-03-05 NOTE — PROGRESS NOTE ADULT - ASSESSMENT
72 female with T2DM, PVD s/p R BKA and L transmetatarsal amputation, CAD s/p CABG, CKD stage 3, HTN, HLD here with fevers 103F, chills and left leg pain. Admitted for cellulitis of left lower extremity with warmth and erythema to that leg and concern for osteomyelitis pending MRI. Has elevated ESR and CRP. On TMA site on left foot, has an open ulcer - packed.    Recommend:   - F/u MRI left LE  - check dopplers left LE - r/o DVT  - Continue vancomycin  - Monitor vancomycin trough for toxic drug levels  - Having fevers, chills and leukocytosis on zosyn - wound cx with GNR - would change for now zosyn to meropenem 1 gram IV q 12 hours  - f/u blood cultures    Jose Alberto Palacios MD  Pager [phone]  After 5pm/weekends call [phone]

## 2018-03-05 NOTE — PROGRESS NOTE ADULT - SUBJECTIVE AND OBJECTIVE BOX
pt seen at bedside in NAD. dressing to left with serous strikethrough noted  distal TMA ulceration noted doesn't probe deep multiple small serous bullae noted on lateral foot, ankle and leg  tender with palpation  applied betadine with DSd to bullae and wet to dry packing to distal ulceration  await MRI  continue abx   will follow up

## 2018-03-05 NOTE — PROGRESS NOTE ADULT - PROBLEM SELECTOR PLAN 3
- baseline Cr around 0.8-1  - improving this morning to 1.6, likely pre-renal etiology secondary to hypoperfusion from sepsis   - continue hydration with IV fluids - Per PCP, Creatinine in Jan 2018 was 1.18 but in November was 1.52  - currently at 1.6, may be around baseline given information from PCP, will continue to monitor BMP

## 2018-03-05 NOTE — PROGRESS NOTE ADULT - SUBJECTIVE AND OBJECTIVE BOX
Chelsey Olivares MD  Medicine Team 2  Pager [phone]        Subjective: Overnight, patient was febrile to 101.7 and received tylenol.         VITAL SIGNS:  Vital Signs Last 24 Hrs  T(C): 36.8 (05 Mar 2018 04:50), Max: 39.2 (04 Mar 2018 13:46)  T(F): 98.2 (05 Mar 2018 04:50), Max: 102.6 (04 Mar 2018 13:46)  HR: 69 (05 Mar 2018 04:50) (69 - 93)  BP: 128/63 (05 Mar 2018 04:50) (121/61 - 149/64)  BP(mean): --  RR: 18 (05 Mar 2018 04:50) (17 - 19)  SpO2: 100% (05 Mar 2018 04:50) (97% - 100%)      PHYSICAL EXAM:     GENERAL: no acute distress  HEENT: PERRLA, EOMI, moist oropharynx   RESPIRATORY: CTAB, no w/c   CARDIOVASCULAR: RRR, no murmurs, gallops, rubs  ABDOMINAL: soft, non-tender, non-distended, positive bowel sounds   EXTREMITIES: no clubbing, cyanosis, or edema  NEUROLOGICAL: alert and oriented x 3, non-focal  SKIN: no rashes or lesions   MUSCULOSKELETAL: no gross joint deformity                          8.6    11.28 )-----------( 163      ( 04 Mar 2018 07:18 )             27.9     03-04    134<L>  |  100  |  40<H>  ----------------------------<  286<H>  4.2   |  21<L>  |  1.61<H>    Ca    8.1<L>      04 Mar 2018 07:18  Phos  3.9     03-04  Mg     1.8     03-04    TPro  7.8  /  Alb  3.2<L>  /  TBili  0.3  /  DBili  x   /  AST  27  /  ALT  28  /  AlkPhos  84  03-03      CAPILLARY BLOOD GLUCOSE      POCT Blood Glucose.: 260 mg/dL (04 Mar 2018 21:25)  POCT Blood Glucose.: 358 mg/dL (04 Mar 2018 17:18)  POCT Blood Glucose.: 326 mg/dL (04 Mar 2018 12:04)  POCT Blood Glucose.: 266 mg/dL (04 Mar 2018 08:17)      MEDICATIONS  (STANDING):  aspirin enteric coated 81 milliGRAM(s) Oral daily  atorvastatin 20 milliGRAM(s) Oral at bedtime  clopidogrel Tablet 75 milliGRAM(s) Oral daily  collagenase Ointment 1 Application(s) Topical daily  dextrose 5%. 1000 milliLiter(s) (50 mL/Hr) IV Continuous <Continuous>  dextrose 50% Injectable 12.5 Gram(s) IV Push once  dextrose 50% Injectable 25 Gram(s) IV Push once  dextrose 50% Injectable 25 Gram(s) IV Push once  heparin  Injectable 5000 Unit(s) SubCutaneous every 12 hours  insulin detemir injectable (LEVEMIR) 29 Unit(s) SubCutaneous at bedtime  insulin lispro (HumaLOG) corrective regimen sliding scale   SubCutaneous Before meals and at bedtime  insulin lispro Injectable (HumaLOG) 9 Unit(s) SubCutaneous three times a day before meals  piperacillin/tazobactam IVPB. 3.375 Gram(s) IV Intermittent every 8 hours  sodium chloride 0.9%. 1000 milliLiter(s) (75 mL/Hr) IV Continuous <Continuous>  vancomycin  IVPB 1000 milliGRAM(s) IV Intermittent every 24 hours    MEDICATIONS  (PRN):  acetaminophen   Tablet 650 milliGRAM(s) Oral every 6 hours PRN For Temp greater than 38 C (100.4 F)  dextrose Gel 1 Dose(s) Oral once PRN Blood Glucose LESS THAN 70 milliGRAM(s)/deciliter  glucagon  Injectable 1 milliGRAM(s) IntraMuscular once PRN Glucose LESS THAN 70 milligrams/deciliter Chelsey Olivares MD  Medicine Team 2  Pager [phone]        Subjective: Overnight, patient was febrile to 101.7 and received tylenol. This morning patient feels pain in her left leg which is bothering her. Also has cough with minimal clear sputum production. No CP, SOB, nausea, vomiting, or diarrhea.         VITAL SIGNS:  Vital Signs Last 24 Hrs  T(C): 36.8 (05 Mar 2018 04:50), Max: 39.2 (04 Mar 2018 13:46)  T(F): 98.2 (05 Mar 2018 04:50), Max: 102.6 (04 Mar 2018 13:46)  HR: 69 (05 Mar 2018 04:50) (69 - 93)  BP: 128/63 (05 Mar 2018 04:50) (121/61 - 149/64)  BP(mean): --  RR: 18 (05 Mar 2018 04:50) (17 - 19)  SpO2: 100% (05 Mar 2018 04:50) (97% - 100%)      PHYSICAL EXAM:     GENERAL: sleeping comfortably  HEENT: PERRLA, EOMI, moist oropharynx   RESPIRATORY: CTAB, no wheezes or crackles   CARDIOVASCULAR: RRR, no murmurs  ABDOMINAL: soft, non-tender, non-distended, positive bowel sounds   EXTREMITIES: right BKA, left transmetatarsal amputation dressing c/d/i, left leg warm and erythematous with 2+ edema   NEUROLOGICAL: alert and oriented x 3, non-focal  SKIN: no rashes or lesions   MUSCULOSKELETAL: right BKA and left transmetatarsal amputation                           8.6    11.28 )-----------( 163      ( 04 Mar 2018 07:18 )             27.9     03-04    134<L>  |  100  |  40<H>  ----------------------------<  286<H>  4.2   |  21<L>  |  1.61<H>    Ca    8.1<L>      04 Mar 2018 07:18  Phos  3.9     03-04  Mg     1.8     03-04    TPro  7.8  /  Alb  3.2<L>  /  TBili  0.3  /  DBili  x   /  AST  27  /  ALT  28  /  AlkPhos  84  03-03      CAPILLARY BLOOD GLUCOSE      POCT Blood Glucose.: 260 mg/dL (04 Mar 2018 21:25)  POCT Blood Glucose.: 358 mg/dL (04 Mar 2018 17:18)  POCT Blood Glucose.: 326 mg/dL (04 Mar 2018 12:04)  POCT Blood Glucose.: 266 mg/dL (04 Mar 2018 08:17)      MEDICATIONS  (STANDING):  aspirin enteric coated 81 milliGRAM(s) Oral daily  atorvastatin 20 milliGRAM(s) Oral at bedtime  clopidogrel Tablet 75 milliGRAM(s) Oral daily  collagenase Ointment 1 Application(s) Topical daily  dextrose 5%. 1000 milliLiter(s) (50 mL/Hr) IV Continuous <Continuous>  dextrose 50% Injectable 12.5 Gram(s) IV Push once  dextrose 50% Injectable 25 Gram(s) IV Push once  dextrose 50% Injectable 25 Gram(s) IV Push once  heparin  Injectable 5000 Unit(s) SubCutaneous every 12 hours  insulin detemir injectable (LEVEMIR) 29 Unit(s) SubCutaneous at bedtime  insulin lispro (HumaLOG) corrective regimen sliding scale   SubCutaneous Before meals and at bedtime  insulin lispro Injectable (HumaLOG) 9 Unit(s) SubCutaneous three times a day before meals  piperacillin/tazobactam IVPB. 3.375 Gram(s) IV Intermittent every 8 hours  sodium chloride 0.9%. 1000 milliLiter(s) (75 mL/Hr) IV Continuous <Continuous>  vancomycin  IVPB 1000 milliGRAM(s) IV Intermittent every 24 hours    MEDICATIONS  (PRN):  acetaminophen   Tablet 650 milliGRAM(s) Oral every 6 hours PRN For Temp greater than 38 C (100.4 F)  dextrose Gel 1 Dose(s) Oral once PRN Blood Glucose LESS THAN 70 milliGRAM(s)/deciliter  glucagon  Injectable 1 milliGRAM(s) IntraMuscular once PRN Glucose LESS THAN 70 milligrams/deciliter

## 2018-03-06 ENCOUNTER — TRANSCRIPTION ENCOUNTER (OUTPATIENT)
Age: 73
End: 2018-03-06

## 2018-03-06 DIAGNOSIS — M86.9 OSTEOMYELITIS, UNSPECIFIED: ICD-10-CM

## 2018-03-06 LAB
ANISOCYTOSIS BLD QL: SLIGHT — SIGNIFICANT CHANGE UP
BASOPHILS # BLD AUTO: 0.03 K/UL — SIGNIFICANT CHANGE UP (ref 0–0.2)
BASOPHILS NFR BLD AUTO: 0.2 % — SIGNIFICANT CHANGE UP (ref 0–2)
BASOPHILS NFR SPEC: 0 % — SIGNIFICANT CHANGE UP (ref 0–2)
BUN SERPL-MCNC: 38 MG/DL — HIGH (ref 7–23)
CALCIUM SERPL-MCNC: 8.6 MG/DL — SIGNIFICANT CHANGE UP (ref 8.4–10.5)
CHLORIDE SERPL-SCNC: 103 MMOL/L — SIGNIFICANT CHANGE UP (ref 98–107)
CO2 SERPL-SCNC: 21 MMOL/L — LOW (ref 22–31)
CREAT SERPL-MCNC: 1.59 MG/DL — HIGH (ref 0.5–1.3)
EOSINOPHIL # BLD AUTO: 0.21 K/UL — SIGNIFICANT CHANGE UP (ref 0–0.5)
EOSINOPHIL NFR BLD AUTO: 1.3 % — SIGNIFICANT CHANGE UP (ref 0–6)
EOSINOPHIL NFR FLD: 2.7 % — SIGNIFICANT CHANGE UP (ref 0–6)
GIANT PLATELETS BLD QL SMEAR: PRESENT — SIGNIFICANT CHANGE UP
GLUCOSE BLDC GLUCOMTR-MCNC: 101 MG/DL — HIGH (ref 70–99)
GLUCOSE BLDC GLUCOMTR-MCNC: 211 MG/DL — HIGH (ref 70–99)
GLUCOSE BLDC GLUCOMTR-MCNC: 257 MG/DL — HIGH (ref 70–99)
GLUCOSE BLDC GLUCOMTR-MCNC: 269 MG/DL — HIGH (ref 70–99)
GLUCOSE SERPL-MCNC: 112 MG/DL — HIGH (ref 70–99)
GRAM STN SPEC: SIGNIFICANT CHANGE UP
HCT VFR BLD CALC: 27.9 % — LOW (ref 34.5–45)
HGB BLD-MCNC: 8.8 G/DL — LOW (ref 11.5–15.5)
IMM GRANULOCYTES # BLD AUTO: 0.42 # — SIGNIFICANT CHANGE UP
IMM GRANULOCYTES NFR BLD AUTO: 2.7 % — HIGH (ref 0–1.5)
LYMPHOCYTES # BLD AUTO: 13.7 % — SIGNIFICANT CHANGE UP (ref 13–44)
LYMPHOCYTES # BLD AUTO: 2.16 K/UL — SIGNIFICANT CHANGE UP (ref 1–3.3)
LYMPHOCYTES NFR SPEC AUTO: 9 % — LOW (ref 13–44)
MACROCYTES BLD QL: SIGNIFICANT CHANGE UP
MAGNESIUM SERPL-MCNC: 2 MG/DL — SIGNIFICANT CHANGE UP (ref 1.6–2.6)
MCHC RBC-ENTMCNC: 26 PG — LOW (ref 27–34)
MCHC RBC-ENTMCNC: 31.5 % — LOW (ref 32–36)
MCV RBC AUTO: 82.5 FL — SIGNIFICANT CHANGE UP (ref 80–100)
METHOD TYPE: SIGNIFICANT CHANGE UP
MONOCYTES # BLD AUTO: 0.85 K/UL — SIGNIFICANT CHANGE UP (ref 0–0.9)
MONOCYTES NFR BLD AUTO: 5.4 % — SIGNIFICANT CHANGE UP (ref 2–14)
MONOCYTES NFR BLD: 4.5 % — SIGNIFICANT CHANGE UP (ref 2–9)
NEUTROPHIL AB SER-ACNC: 79.3 % — HIGH (ref 43–77)
NEUTROPHILS # BLD AUTO: 12.14 K/UL — HIGH (ref 1.8–7.4)
NEUTROPHILS NFR BLD AUTO: 76.7 % — SIGNIFICANT CHANGE UP (ref 43–77)
NRBC # FLD: 0 — SIGNIFICANT CHANGE UP
ORGANISM # SPEC MICROSCOPIC CNT: SIGNIFICANT CHANGE UP
PHOSPHATE SERPL-MCNC: 3.7 MG/DL — SIGNIFICANT CHANGE UP (ref 2.5–4.5)
PLATELET # BLD AUTO: 203 K/UL — SIGNIFICANT CHANGE UP (ref 150–400)
PLATELET COUNT - ESTIMATE: NORMAL — SIGNIFICANT CHANGE UP
PMV BLD: 12.4 FL — SIGNIFICANT CHANGE UP (ref 7–13)
POIKILOCYTOSIS BLD QL AUTO: SLIGHT — SIGNIFICANT CHANGE UP
POTASSIUM SERPL-MCNC: 4 MMOL/L — SIGNIFICANT CHANGE UP (ref 3.5–5.3)
POTASSIUM SERPL-SCNC: 4 MMOL/L — SIGNIFICANT CHANGE UP (ref 3.5–5.3)
RBC # BLD: 3.38 M/UL — LOW (ref 3.8–5.2)
RBC # FLD: 15.9 % — HIGH (ref 10.3–14.5)
SODIUM SERPL-SCNC: 138 MMOL/L — SIGNIFICANT CHANGE UP (ref 135–145)
SPECIMEN SOURCE: SIGNIFICANT CHANGE UP
TARGETS BLD QL SMEAR: SLIGHT — SIGNIFICANT CHANGE UP
VARIANT LYMPHS # BLD: 4.5 % — SIGNIFICANT CHANGE UP
WBC # BLD: 15.81 K/UL — HIGH (ref 3.8–10.5)
WBC # FLD AUTO: 15.81 K/UL — HIGH (ref 3.8–10.5)

## 2018-03-06 PROCEDURE — 99233 SBSQ HOSP IP/OBS HIGH 50: CPT

## 2018-03-06 PROCEDURE — 99232 SBSQ HOSP IP/OBS MODERATE 35: CPT

## 2018-03-06 RX ORDER — INSULIN LISPRO 100/ML
10 VIAL (ML) SUBCUTANEOUS
Qty: 0 | Refills: 0 | Status: DISCONTINUED | OUTPATIENT
Start: 2018-03-06 | End: 2018-03-12

## 2018-03-06 RX ORDER — VANCOMYCIN HCL 1 G
750 VIAL (EA) INTRAVENOUS EVERY 12 HOURS
Qty: 0 | Refills: 0 | Status: DISCONTINUED | OUTPATIENT
Start: 2018-03-06 | End: 2018-03-07

## 2018-03-06 RX ORDER — BENZOCAINE AND MENTHOL 5; 1 G/100ML; G/100ML
1 LIQUID ORAL THREE TIMES A DAY
Qty: 0 | Refills: 0 | Status: DISCONTINUED | OUTPATIENT
Start: 2018-03-06 | End: 2018-03-18

## 2018-03-06 RX ADMIN — Medication 2: at 12:17

## 2018-03-06 RX ADMIN — Medication 10 UNIT(S): at 17:39

## 2018-03-06 RX ADMIN — Medication 2: at 00:07

## 2018-03-06 RX ADMIN — Medication 150 MILLIGRAM(S): at 10:18

## 2018-03-06 RX ADMIN — Medication 150 MILLIGRAM(S): at 18:30

## 2018-03-06 RX ADMIN — HEPARIN SODIUM 5000 UNIT(S): 5000 INJECTION INTRAVENOUS; SUBCUTANEOUS at 05:49

## 2018-03-06 RX ADMIN — MEROPENEM 100 MILLIGRAM(S): 1 INJECTION INTRAVENOUS at 17:39

## 2018-03-06 RX ADMIN — Medication 200 MILLIGRAM(S): at 16:03

## 2018-03-06 RX ADMIN — MEROPENEM 100 MILLIGRAM(S): 1 INJECTION INTRAVENOUS at 05:49

## 2018-03-06 RX ADMIN — Medication 650 MILLIGRAM(S): at 05:48

## 2018-03-06 RX ADMIN — Medication 29 UNIT(S): at 21:50

## 2018-03-06 RX ADMIN — HEPARIN SODIUM 5000 UNIT(S): 5000 INJECTION INTRAVENOUS; SUBCUTANEOUS at 17:40

## 2018-03-06 RX ADMIN — Medication 10 UNIT(S): at 12:17

## 2018-03-06 RX ADMIN — Medication 81 MILLIGRAM(S): at 12:17

## 2018-03-06 RX ADMIN — Medication 3: at 21:50

## 2018-03-06 RX ADMIN — CLOPIDOGREL BISULFATE 75 MILLIGRAM(S): 75 TABLET, FILM COATED ORAL at 12:17

## 2018-03-06 RX ADMIN — Medication 9 UNIT(S): at 08:47

## 2018-03-06 RX ADMIN — Medication 3: at 17:38

## 2018-03-06 RX ADMIN — ATORVASTATIN CALCIUM 20 MILLIGRAM(S): 80 TABLET, FILM COATED ORAL at 21:50

## 2018-03-06 NOTE — PROGRESS NOTE ADULT - PROBLEM SELECTOR PLAN 8
- DVT ppx subq heparin   - Consistent Carb Diet   - Dispo pending resolution of symptoms   - PT consult - DVT ppx subq heparin   - Consistent Carb Diet   - Dispo pending resolution of symptoms

## 2018-03-06 NOTE — PROGRESS NOTE ADULT - PROBLEM SELECTOR PLAN 1
- patient continues to have fevers, per ID switched zosyn to meropenem and continuing vancomycin  - source is left foot, MRI showing osteomyelitis

## 2018-03-06 NOTE — PROGRESS NOTE ADULT - PROBLEM SELECTOR PLAN 2
- continue vancomycin and meropenem per ID, wound culture growing GNR but waiting for final speciation  - appreciate podiatry recs about need for surgical intervention for source control - continue vancomycin and meropenem per ID, wound culture growing GNR but waiting for final speciation  - appreciate podiatry recs about need for surgical intervention for source control, will be seeing the patient later today to make a decision - continue vancomycin and meropenem per ID (Day 4), wound culture growing GNR and staph aureus  - appreciate podiatry recs about need for surgical intervention for source control, will be seeing the patient later today to make a decision

## 2018-03-06 NOTE — PROGRESS NOTE ADULT - ATTENDING COMMENTS
pt with mild improvement in her LLE pain.  MRI showing acute osteo - appreciate podiatry follow up - pt is discussing with family regarding possible surgical intervention.  appreciate ID recs - would continue with vanco and meropenem at this time and follow up cultures.  will increase premeal insulin for optimal DM control.

## 2018-03-06 NOTE — DISCHARGE NOTE ADULT - CARE PROVIDERS DIRECT ADDRESSES
[email or web address]

## 2018-03-06 NOTE — DISCHARGE NOTE ADULT - ADDITIONAL INSTRUCTIONS
1. Please follow up with you primary care doctor within 2 weeks of discharge.  2. Please follow up with Dr. Moraes (vascular surgery) within 1 week of discharge ( ~April 3rd). Please call [phone] to make an appointment.

## 2018-03-06 NOTE — PROGRESS NOTE ADULT - SUBJECTIVE AND OBJECTIVE BOX
Patient is a 72y old  Female who presents with a chief complaint of Fevers and leg pain (03 Mar 2018 15:47)       INTERVAL HPI/OVERNIGHT EVENTS:  Patient seen and evaluated at bedside.  Pt is resting comfortable in NAD. Denies N/V/F/C.     Allergies    sulfa drugs (Hives)  sulfa drugs (Rash)  Sulfac 10% (Hives)    Intolerances        Vital Signs Last 24 Hrs  T(C): 38.1 (06 Mar 2018 05:16), Max: 38.1 (06 Mar 2018 05:16)  T(F): 100.5 (06 Mar 2018 05:16), Max: 100.5 (06 Mar 2018 05:16)  HR: 83 (06 Mar 2018 05:16) (83 - 89)  BP: 126/49 (06 Mar 2018 05:16) (126/49 - 143/55)  BP(mean): --  RR: 18 (06 Mar 2018 05:16) (18 - 18)  SpO2: 93% (06 Mar 2018 05:16) (93% - 100%)    LABS:                        8.8    15.81 )-----------( 203      ( 06 Mar 2018 06:30 )             27.9     03-06    138  |  103  |  38<H>  ----------------------------<  112<H>  4.0   |  21<L>  |  1.59<H>    Ca    8.6      06 Mar 2018 06:30  Phos  3.7     03-06  Mg     2.0     03-06          CAPILLARY BLOOD GLUCOSE      POCT Blood Glucose.: 101 mg/dL (06 Mar 2018 08:33)  POCT Blood Glucose.: 205 mg/dL (05 Mar 2018 23:55)  POCT Blood Glucose.: 208 mg/dL (05 Mar 2018 22:48)  POCT Blood Glucose.: 273 mg/dL (05 Mar 2018 17:22)  POCT Blood Glucose.: 268 mg/dL (05 Mar 2018 14:25)      Lower Extremity Physical Exam:  LLE cellulitis beginning at the ankle extends just distal to the knee, does not appear to originate at the wound, decreased since yesterday, posterior distal leg oozing noted with no fluctuance, no crepitus and no malodor.    RADIOLOGY & ADDITIONAL TESTS:  < from: MR Foot w/ IV Cont, Left (03.05.18 @ 14:18) >  Findings:    Patient is status post transmetatarsal amputation of the first through   fifth rays to the level of the proximal metatarsal shafts. There is   diffuse enhancing subcutaneous edema noted throughout the imaged portion   of the ankle and dorsum of the foot consistent with cellulitis. Cutaneous   irregularity is noted along the dorsal aspect of the amputation margin.    There is a cutaneous tract extending from the dorsal cutaneous margin to   the level of the first metatarsal remnant. There is osseous edema and   enhancement within the distal portion of the first metatarsal remnant   with corresponding hypointense T1 signal consistent with acute   osteomyelitis. Acute osteomyelitis is noted throughout the second   metatarsal remnant extending from the distal margin to the proximal   diametaphysis.    There is trace osseous edema within the distal margins of the third and   fourth metatarsal remnants with grossly preserved T1 marrow signal.   Differential considerations include reactive osteitis and early   osteomyelitis. Marrow signal within the fifth metatarsal remnant and the   remainder of the hindfoot is otherwise preserved.    The tibiotalar and subtalar joints are preserved. There is nonspecific   small to moderate tibiotalar/subtalar joint effusion.    Patient is status post Achilles tenotomy at the midportion of the tendon   with a gap between tendon fibers of approximately 4 cm. There is a fluid   collection within the tendon anatomy gap measuring 1 x 2.2 x 3 cm which   may be related to postoperative seroma. Superimposed infection at the   site cannot be excluded. The remaining tendinous structures are otherwise   preserved. There is no evidence of acute ligamentous injury.    There is fatty atrophy of the visualized musculature likely related to   denervation related changes.    Impression:    Diffuse cellulitis throughout lower leg and imaged hindfoot. Status post   transmetatarsal amputation of the first through fifth rays. Acute   osteomyelitis involving the first and second metatarsal remnants distally   as above.    Mild edema and enhancement within the third and fourth metatarsal   remnants distally with grossly preserved T1 marrow signal. Differential   considerations include reactive osteitis and early osteomyelitis.    Status post Achilles tenotomy with a fluid collection in the tenotomy   gap. This may be related to postoperative seroma. Superimposed infection   at this site cannot be excluded.    < end of copied text >

## 2018-03-06 NOTE — DISCHARGE NOTE ADULT - PATIENT PORTAL LINK FT
You can access the [facility] Patient Portal, offered by [facility], by registering with the following website: [email or web address]

## 2018-03-06 NOTE — PROGRESS NOTE ADULT - ASSESSMENT
71 yo F s/p L foot TMA, with chronic dorsal wound at TMA site  - Pt seen and examined  - 2cc purulence expressed from Achilles tenotomy site, cultured  - MRI positive for OM of 1st and 2nd metatarsals  - Discussed treatment options with pt, salvage vs. BKA, salvaging would entail use of long term IV abx with surgery including risks of reinfection and chronic OM  - Pt understands the benefits and risks of both options, communicates understanding  - Pt would like to discuss treatment options with family and decide  - Seen w/ attending 71 yo F s/p L foot TMA, with chronic dorsal wound at TMA site    - Pt seen and examined  - 2cc purulence expressed from Achilles tenotomy site, cultured  - MRI positive for OM of 1st and 2nd metatarsals  - Discussed treatment options with pt, salvage vs. BKA, salvaging would entail use of long term IV abx with surgery including risks of reinfection and chronic OM  - Pt understands the benefits and risks of both options, communicates understanding  - Pt would like to discuss treatment options with family and decide  - Seen w/ attending

## 2018-03-06 NOTE — PROGRESS NOTE ADULT - ASSESSMENT
72 female with T2DM, PVD s/p R BKA and L transmetatarsal amputation, CAD s/p CABG, CKD stage 3, HTN, HLD here with fevers 103F, chills and left leg pain. Admitted for cellulitis of left lower extremity with warmth and erythema to that leg. Has elevated ESR and CRP.     MRI with diffuse cellulitis with acute osteo involving of first and second metatarsal remanants, with enhance of the third and fourth metatarsal, and fluid collection within the tenotomy gap.    Recommend:   - Continue vancomycin  - Monitor vancomycin trough prior to 4th dose for toxic drug levels  - Was having fevers, chills and leukocytosis on zosyn yesterday - wound cx with GNR and s. aureus - would continue meropenem 1 gram IV q 12 hours for now  - F/U Podiatry recommendations

## 2018-03-06 NOTE — DISCHARGE NOTE ADULT - HOSPITAL COURSE
HPI: Patient is a 71 yo F with a PMH significant for T2DM, PVD s/p R BKA and L transmetatarsal amputation, CAD s/p CABG, CKD stage 3, and HTN who presented to the ED with fevers and left leg pain. The patient was in her usual state of health till 3 days prior to admission when she began to experience 10/10 shooting pain down her left leg from her knee down. She denied any numbness or tingling. She also noticed fevers of 103-104 at home associated with chills and fatigue. She noticed that her leg was warm and seemed more swollen than usual. She was taking tylenol with minimal relief in her pain. She had gone to her podiatrist's office on Wednesday and her dressing had been changed and she was told that everything looked okay. She states that she noticed her foot had some foul smelling discharge. She denied any CP, SOB, abdominal pain, nausea, vomiting, diarrhea, or dysuria. Endorses some rhinorrhea and cough that began a few days ago but denies sick contacts. No recent travel.    Hospital Course: The patient was septic on admission with leukocytosis and fevers. She was given IV fluids and started on broad spectrum antibiotics. She received an MRI of her left foot which confirmed osteomyelitis. The cultures from her wound grew gram negative rods and staph aureus. She was treated with vancomycin and meropenem. Podiatry discussed the various options with the family, presenting BKA vs salvage debridement with IV antibiotic therapy.  The patient and family decided that they would go for the ____ HPI: Patient is a 71 yo F with a PMH significant for T2DM, PVD s/p R BKA and L transmetatarsal amputation, CAD s/p CABG, CKD stage 3, and HTN who presented to the ED with fevers and left leg pain. The patient was in her usual state of health till 3 days prior to admission when she began to experience 10/10 shooting pain down her left leg from her knee down. She denied any numbness or tingling. She also noticed fevers of 103-104 at home associated with chills and fatigue. She noticed that her leg was warm and seemed more swollen than usual. She was taking tylenol with minimal relief in her pain. She had gone to her podiatrist's office on Wednesday and her dressing had been changed and she was told that everything looked okay. She states that she noticed her foot had some foul smelling discharge. She denied any CP, SOB, abdominal pain, nausea, vomiting, diarrhea, or dysuria. Endorses some rhinorrhea and cough that began a few days ago but denies sick contacts. No recent travel.    Hospital Course: The patient was septic on admission with leukocytosis and fevers. She was given IV fluids and started on broad spectrum antibiotics. She received an MRI of her left foot which confirmed osteomyelitis. The cultures from her wound grew gram negative rods and staph aureus. She was treated with vancomycin and meropenem. Podiatry discussed the various options with the family, presenting BKA vs salvage debridement with IV antibiotic therapy.  The patient and family decided that they would go for the BKA however prior to this podiatry did an ankle disarticulation for some source control. Eventually, vascular surgery did an AKA. HPI: Patient is a 71 yo F with a PMH significant for T2DM, PVD s/p R BKA and L transmetatarsal amputation, CAD s/p CABG, CKD stage 3, and HTN who presented to the ED with fevers and left leg pain. The patient was in her usual state of health till 3 days prior to admission when she began to experience 10/10 shooting pain down her left leg from her knee down. She denied any numbness or tingling. She also noticed fevers of 103-104 at home associated with chills and fatigue. She noticed that her leg was warm and seemed more swollen than usual. She was taking tylenol with minimal relief in her pain. She had gone to her podiatrist's office on Wednesday and her dressing had been changed and she was told that everything looked okay. She states that she noticed her foot had some foul smelling discharge. She denied any CP, SOB, abdominal pain, nausea, vomiting, diarrhea, or dysuria. Endorses some rhinorrhea and cough that began a few days ago but denies sick contacts. No recent travel.    Hospital Course: The patient was septic on admission with leukocytosis and fevers. She was given IV fluids and started on broad spectrum antibiotics. She received an MRI of her left foot which confirmed osteomyelitis. The cultures from her wound grew gram negative rods and staph aureus. She was treated with vancomycin and meropenem. Podiatry discussed the various options with the family, presenting BKA vs salvage debridement with IV antibiotic therapy.  The patient and family decided that they would go for the BKA however prior to this podiatry did an ankle disarticulation for some source control. Eventually, vascular surgery did an AKA. Post op course was complicated by hypotension, contributed to blood loss and post obstructive uropathy secondary to anesthesia. Patient had resolution of her hypotension, and was restarted on all her antihypertensive medications. She failed two TOVs, and thus will be discharged with a howe with urology follow up. HPI: Patient is a 73 yo F with a PMH significant for T2DM, PVD s/p R BKA and L transmetatarsal amputation, CAD s/p CABG, CKD stage 3, and HTN who presented to the ED with fevers and left leg pain. The patient was in her usual state of health till 3 days prior to admission when she began to experience 10/10 shooting pain down her left leg from her knee down. She denied any numbness or tingling. She also noticed fevers of 103-104 at home associated with chills and fatigue. She noticed that her leg was warm and seemed more swollen than usual. She was taking tylenol with minimal relief in her pain. She had gone to her podiatrist's office on Wednesday and her dressing had been changed and she was told that everything looked okay. She states that she noticed her foot had some foul smelling discharge. She denied any CP, SOB, abdominal pain, nausea, vomiting, diarrhea, or dysuria. Endorses some rhinorrhea and cough that began a few days ago but denies sick contacts. No recent travel.    Hospital Course: The patient was septic on admission with leukocytosis and fevers. She was given IV fluids and started on broad spectrum antibiotics. She received an MRI of her left foot which confirmed osteomyelitis. The cultures from her wound grew gram negative rods and staph aureus. She was treated with vancomycin and meropenem. Podiatry discussed the various options with the family, presenting BKA vs salvage debridement with IV antibiotic therapy.  The patient and family decided that they would go for the BKA however prior to this podiatry did an ankle disarticulation for some source control. Eventually, vascular surgery did an AKA. Post op course was complicated by hypotension, contributed to blood loss and post obstructive uropathy secondary to anesthesia. Patient had resolution of her hypotension, and was restarted on all her antihypertensive medications. She failed two TOVs, and thus will be discharged with a howe with urology follow up. She is stable for acute rehab.

## 2018-03-06 NOTE — PROGRESS NOTE ADULT - PROBLEM SELECTOR PLAN 3
- Per PCP, Creatinine in Jan 2018 was 1.18 but in November was 1.52  - currently at 1.6, may be around baseline given information from PCP, will continue to monitor BMP - Cr. at 1.5 this morning, improved with fluids  - likely new baseline, will monitor BMP

## 2018-03-06 NOTE — DISCHARGE NOTE ADULT - PROVIDER TOKENS
LELO:'[id number]' TOKEN:'[id number]:'[id number]:'[id number]:'[id number]'

## 2018-03-06 NOTE — DISCHARGE NOTE ADULT - MEDICATION SUMMARY - MEDICATIONS TO CHANGE
I will SWITCH the dose or number of times a day I take the medications listed below when I get home from the hospital:    isosorbide mononitrate 30 mg oral tablet, extended release  -- 1 tab(s) by mouth once a day    Levemir 100 units/mL subcutaneous solution  -- 43 unit(s) subcutaneous once a day (at bedtime)    hydrALAZINE 50 mg oral tablet  -- 1 tab(s) by mouth 2 times a day

## 2018-03-06 NOTE — DISCHARGE NOTE ADULT - CARE PROVIDER_API CALL
Jose Carlos Trimble (DPRIVER), Podiatric Medicine and Surgery  [address] [place] [address]  Phone: [phone]  Fax: [phone] Opal Hackett), Internal Medicine  [address] [place] [address]  Phone: [phone]  Fax: [phone]    Leonid Moraes), Surgery  Vascular  [address] [place] [address]  [address]  [place] [address]  Phone: [phone]  Fax: [phone]    Alondra Landin), EndocrinologyMetabDiabetes; Internal Medicine  [address] [place] [address]  Phone: [phone]  Fax: [phone]    Ayo Bacon), Urology  [address] [facility]  [address]  [place] [address]  Phone: [phone]  Fax: [phone]

## 2018-03-06 NOTE — DISCHARGE NOTE ADULT - MEDICATION SUMMARY - MEDICATIONS TO TAKE
I will START or STAY ON the medications listed below when I get home from the hospital:    Actamin 325 mg oral tablet  -- 2 tab(s) by mouth every 6 hours, As needed, For Mild pain  -- Indication: For mild pain    oxyCODONE 5 mg oral tablet  -- 1 tab(s) by mouth every 4 hours, As needed, Moderate Pain (4 - 6)  -- Indication: For moderate pain    oxyCODONE 10 mg oral tablet  -- 1 tab(s) by mouth every 6 hours, As needed, Severe Pain (7 - 10)  -- Indication: For Severe pain    aspirin 81 mg oral tablet, chewable  -- 1 tab(s) by mouth once a day  -- Indication: For CAD (coronary artery disease)    isosorbide mononitrate 60 mg oral tablet, extended release  -- 1 tab(s) by mouth once a day  -- Indication: For Hypertension    sertraline 25 mg oral tablet  -- 1 tab(s) by mouth once a day  -- Indication: For Depression    insulin detemir 100 units/mL subcutaneous solution  -- 23 unit(s) subcutaneous once a day  -- Indication: For Diabetes mellitus    insulin lispro (concentrated) 200 units/mL subcutaneous solution  -- 7  subcutaneous 3 times a day (before meals)  -- Indication: For Diabetes mellitus    HumaLOG KwikPen (Concentrated) 200 units/mL subcutaneous solution  -- 1 units for -200  2 units for -250  3 units for -300  4 units for -350  5 units for - 400  6 units for FS >401  -- Indication: For Diabetes mellitus    atorvastatin 20 mg oral tablet  -- 1 tab(s) by mouth once a day (at bedtime)  -- Indication: For Hypercholesterolemia    clopidogrel 75 mg oral tablet  -- 1 tab(s) by mouth once a day  -- Indication: For CAD (coronary artery disease)    carvedilol 25 mg oral tablet  -- 1 tab(s) by mouth every 12 hours  -- Indication: For Hypertension    ipratropium-albuterol 0.5 mg-2.5 mg/3 mLinhalation solution  -- 3 milliliter(s) inhaled every 6 hours  -- Indication: For Shortness of breath    calamine topical lotion  -- 1 application on skin 3 times a day to affected areas  -- Indication: For Pruritis    nystatin 100,000 units/g topical cream  -- 1 application on skin 2 times a day  -- Indication: For Fungal rash    furosemide 40 mg oral tablet  -- 1 tab(s) by mouth 2 times a day  -- Indication: For Congestive heart failure    hydrALAZINE 50 mg oral tablet  -- 1 tab(s) by mouth every 8 hours  -- Indication: For Hypertension

## 2018-03-06 NOTE — PROGRESS NOTE ADULT - ASSESSMENT
Patient is a 71 y/o F with a PMH significant for T2DM, PVD s/p R BKA and L transmetatarsal amputation, CAD s/p CABG, CKD stage 3, and HTN who presents to the ED with fevers and left leg pain, found to have sepsis (fever, leukocytosis) with MRI confirming osteomyelitis.

## 2018-03-06 NOTE — DISCHARGE NOTE ADULT - PLAN OF CARE
Resolution You were admitted because you had infection of your bone. You underwent two procedures to remove the bones, and completed an extensive course of antibiotics. You were admitted because you had infection of your bone. You underwent two procedures to remove the infected bones, and completed an extensive course of antibiotics. You will need to follow up with the vascular surgeons for management of your above knee amputation. Please continue to take your medications as needed for pain. Ongoing care Please continue to take your aspirin and plavix. Please continue to take your lasix 40mg twice daily. Please continue to follow with your PMD for management of your CKD. Please follow up with a urologist outpatient for management and eventual removal of your howe. Please continue to take all your antihypertensive medications as prescribed.

## 2018-03-06 NOTE — PROGRESS NOTE ADULT - SUBJECTIVE AND OBJECTIVE BOX
Chelsey Olivares MD  Medicine Team 2  Pager [phone]      Subjective: Patient seen and examined. Early this morning, patient spiked a low grade temperature 100.5. She did not feel the fever but this morning notices that her sheets are wet. Feels okay and denies any CP, SOB, nausea, vomiting, or diarrhea but does endorse poor appetite. She has a cough that is productive of clear sputum.         VITAL SIGNS:  Vital Signs Last 24 Hrs  T(C): 38.1 (06 Mar 2018 05:16), Max: 38.1 (06 Mar 2018 05:16)  T(F): 100.5 (06 Mar 2018 05:16), Max: 100.5 (06 Mar 2018 05:16)  HR: 83 (06 Mar 2018 05:16) (83 - 89)  BP: 126/49 (06 Mar 2018 05:16) (126/49 - 143/55)  BP(mean): --  RR: 18 (06 Mar 2018 05:16) (18 - 18)  SpO2: 93% (06 Mar 2018 05:16) (93% - 100%)      PHYSICAL EXAM:     GENERAL: no acute distress, sleeping comfortably   HEENT: PERRLA, EOMI, moist oropharynx   RESPIRATORY: poor effort but no overt crackles or wheezes appreciated   CARDIOVASCULAR: RRR, no murmurs appreciated   ABDOMINAL: soft, non-tender, non-distended, positive bowel sounds   EXTREMITIES: right BKA, left transmetatarsal amputation, dressing with light yellow drainage on the plantar aspect, erythema and edema improved   NEUROLOGICAL: alert and oriented x 3, no focal deficits   SKIN: no rashes or lesions   MUSCULOSKELETAL: right BKA, left transmetatarsal amputation                         8.8    15.81 )-----------( 203      ( 06 Mar 2018 06:30 )             27.9     03-06    138  |  103  |  38<H>  ----------------------------<  112<H>  4.0   |  21<L>  |  1.59<H>    Ca    8.6      06 Mar 2018 06:30  Phos  3.7     03-06  Mg     2.0     03-06        CAPILLARY BLOOD GLUCOSE      POCT Blood Glucose.: 205 mg/dL (05 Mar 2018 23:55)  POCT Blood Glucose.: 208 mg/dL (05 Mar 2018 22:48)  POCT Blood Glucose.: 273 mg/dL (05 Mar 2018 17:22)  POCT Blood Glucose.: 268 mg/dL (05 Mar 2018 14:25)  POCT Blood Glucose.: 166 mg/dL (05 Mar 2018 08:37)      MEDICATIONS  (STANDING):  aspirin enteric coated 81 milliGRAM(s) Oral daily  atorvastatin 20 milliGRAM(s) Oral at bedtime  clopidogrel Tablet 75 milliGRAM(s) Oral daily  collagenase Ointment 1 Application(s) Topical daily  dextrose 50% Injectable 12.5 Gram(s) IV Push once  dextrose 50% Injectable 25 Gram(s) IV Push once  heparin  Injectable 5000 Unit(s) SubCutaneous every 12 hours  insulin detemir injectable (LEVEMIR) 29 Unit(s) SubCutaneous at bedtime  insulin lispro (HumaLOG) corrective regimen sliding scale   SubCutaneous Before meals and at bedtime  insulin lispro Injectable (HumaLOG) 9 Unit(s) SubCutaneous three times a day before meals  meropenem  IVPB 1000 milliGRAM(s) IV Intermittent every 12 hours  sodium chloride 0.9%. 1000 milliLiter(s) (75 mL/Hr) IV Continuous <Continuous>  vancomycin  IVPB 1000 milliGRAM(s) IV Intermittent every 24 hours    MEDICATIONS  (PRN):  acetaminophen   Tablet 650 milliGRAM(s) Oral every 6 hours PRN For Temp greater than 38 C (100.4 F)  benzocaine 15 mG/menthol 3.6 mG Lozenge 1 Lozenge Oral three times a day PRN Sore Throat  guaiFENesin   Syrup  (Sugar-Free) 200 milliGRAM(s) Oral every 6 hours PRN Cough

## 2018-03-06 NOTE — PROGRESS NOTE ADULT - SUBJECTIVE AND OBJECTIVE BOX
CC:    Interval History/ROS:    Allergies  sulfa drugs (Hives)  sulfa drugs (Rash)  Sulfac 10% (Hives)        ANTIMICROBIALS:  meropenem  IVPB 1000 every 12 hours  vancomycin  IVPB 750 every 12 hours      OTHER MEDS:  acetaminophen   Tablet 650 milliGRAM(s) Oral every 6 hours PRN  aspirin enteric coated 81 milliGRAM(s) Oral daily  atorvastatin 20 milliGRAM(s) Oral at bedtime  benzocaine 15 mG/menthol 3.6 mG Lozenge 1 Lozenge Oral three times a day PRN  clopidogrel Tablet 75 milliGRAM(s) Oral daily  collagenase Ointment 1 Application(s) Topical daily  dextrose 50% Injectable 12.5 Gram(s) IV Push once  dextrose 50% Injectable 25 Gram(s) IV Push once  guaiFENesin   Syrup  (Sugar-Free) 200 milliGRAM(s) Oral every 6 hours PRN  heparin  Injectable 5000 Unit(s) SubCutaneous every 12 hours  insulin detemir injectable (LEVEMIR) 29 Unit(s) SubCutaneous at bedtime  insulin lispro (HumaLOG) corrective regimen sliding scale   SubCutaneous Before meals and at bedtime  insulin lispro Injectable (HumaLOG) 10 Unit(s) SubCutaneous three times a day before meals  sodium chloride 0.9%. 1000 milliLiter(s) IV Continuous <Continuous>      PE:    Vital Signs Last 24 Hrs  T(C): 38.1 (06 Mar 2018 05:16), Max: 38.1 (06 Mar 2018 05:16)  T(F): 100.5 (06 Mar 2018 05:16), Max: 100.5 (06 Mar 2018 05:16)  HR: 83 (06 Mar 2018 05:16) (83 - 89)  BP: 126/49 (06 Mar 2018 05:16) (126/49 - 143/55)  BP(mean): --  RR: 18 (06 Mar 2018 05:16) (18 - 18)  SpO2: 93% (06 Mar 2018 05:16) (93% - 100%)    Gen: AOx3, NAD, non-toxic, pleasant  CV: S1+S2 normal, no murmurs, nontachycardic  Resp: Clear bilat, no resp distress, no crackles/wheezes  Abd: Soft, nontender, +BS  Ext: No LE edema, no wounds  : No Pratt  IV/Skin: No thrombophlebitis  Neuro: no focal deficits    LABS:                          8.8    15.81 )-----------( 203      ( 06 Mar 2018 06:30 )             27.9       03-06    138  |  103  |  38<H>  ----------------------------<  112<H>  4.0   |  21<L>  |  1.59<H>    Ca    8.6      06 Mar 2018 06:30  Phos  3.7     03-06  Mg     2.0     03-06    MICROBIOLOGY:  Vancomycin Level, Trough: 10.5 ug/mL (03-05-18 @ 15:47)  v  OTHER  03-06-18 --  --  --      LEG - LEFT  03-03-18 --  --  --      BLOOD PERIPHERAL  03-03-18 --  --  --    RADIOLOGY:    < from: US Duplex Venous Lower Ext Complete, Bilateral (03.05.18 @ 17:17) >  IMPRESSION:     No evidence of bilateral lower extremity deep venous thrombosis.    < end of copied text > CC: F/U cellulitis    Interval History/ROS: Patient remains febrile. Sitting in chair. Increasing WBC. MRI with cellulitis, acute osteomyelitis and fluid collection in achilles tenotomy.    Allergies  sulfa drugs (Hives)  sulfa drugs (Rash)  Sulfac 10% (Hives)    ANTIMICROBIALS:  meropenem  IVPB 1000 every 12 hours  vancomycin  IVPB 750 every 12 hours      OTHER MEDS:  acetaminophen   Tablet 650 milliGRAM(s) Oral every 6 hours PRN  aspirin enteric coated 81 milliGRAM(s) Oral daily  atorvastatin 20 milliGRAM(s) Oral at bedtime  benzocaine 15 mG/menthol 3.6 mG Lozenge 1 Lozenge Oral three times a day PRN  clopidogrel Tablet 75 milliGRAM(s) Oral daily  collagenase Ointment 1 Application(s) Topical daily  dextrose 50% Injectable 12.5 Gram(s) IV Push once  dextrose 50% Injectable 25 Gram(s) IV Push once  guaiFENesin   Syrup  (Sugar-Free) 200 milliGRAM(s) Oral every 6 hours PRN  heparin  Injectable 5000 Unit(s) SubCutaneous every 12 hours  insulin detemir injectable (LEVEMIR) 29 Unit(s) SubCutaneous at bedtime  insulin lispro (HumaLOG) corrective regimen sliding scale   SubCutaneous Before meals and at bedtime  insulin lispro Injectable (HumaLOG) 10 Unit(s) SubCutaneous three times a day before meals  sodium chloride 0.9%. 1000 milliLiter(s) IV Continuous <Continuous>      PE:    Vital Signs Last 24 Hrs  T(C): 38.1 (06 Mar 2018 05:16), Max: 38.1 (06 Mar 2018 05:16)  T(F): 100.5 (06 Mar 2018 05:16), Max: 100.5 (06 Mar 2018 05:16)  HR: 83 (06 Mar 2018 05:16) (83 - 89)  BP: 126/49 (06 Mar 2018 05:16) (126/49 - 143/55)  BP(mean): --  RR: 18 (06 Mar 2018 05:16) (18 - 18)  SpO2: 93% (06 Mar 2018 05:16) (93% - 100%)    Gen: AOx3, NAD, non-toxic, pleasant  CV: S1+S2 normal, no murmurs, nontachycardic  Resp: Clear bilat, no resp distress, no crackles/wheezes  Abd: Soft, nontender, +BS  Ext: No LE edema, no wounds  : No Pratt  IV/Skin: No thrombophlebitis  Neuro: no focal deficits    LABS:                          8.8    15.81 )-----------( 203      ( 06 Mar 2018 06:30 )             27.9       03-06    138  |  103  |  38<H>  ----------------------------<  112<H>  4.0   |  21<L>  |  1.59<H>    Ca    8.6      06 Mar 2018 06:30  Phos  3.7     03-06  Mg     2.0     03-06    MICROBIOLOGY:  Vancomycin Level, Trough: 10.5 ug/mL (03-05-18 @ 15:47)  v  OTHER  03-06-18 --  --  --      LEG - LEFT  03-03-18 --  --  --      BLOOD PERIPHERAL  03-03-18 --  --  --    RADIOLOGY:    < from: US Duplex Venous Lower Ext Complete, Bilateral (03.05.18 @ 17:17) >  IMPRESSION:     No evidence of bilateral lower extremity deep venous thrombosis.    < end of copied text > CC: F/U cellulitis    Interval History/ROS: Patient remains febrile. Sitting in chair. Increasing WBC. MRI with cellulitis, acute osteomyelitis and fluid collection in achilles tenotomy.    Allergies  sulfa drugs (Hives)  sulfa drugs (Rash)  Sulfac 10% (Hives)    ANTIMICROBIALS:  meropenem  IVPB 1000 every 12 hours  vancomycin  IVPB 750 every 12 hours      OTHER MEDS:  acetaminophen   Tablet 650 milliGRAM(s) Oral every 6 hours PRN  aspirin enteric coated 81 milliGRAM(s) Oral daily  atorvastatin 20 milliGRAM(s) Oral at bedtime  benzocaine 15 mG/menthol 3.6 mG Lozenge 1 Lozenge Oral three times a day PRN  clopidogrel Tablet 75 milliGRAM(s) Oral daily  collagenase Ointment 1 Application(s) Topical daily  dextrose 50% Injectable 12.5 Gram(s) IV Push once  dextrose 50% Injectable 25 Gram(s) IV Push once  guaiFENesin   Syrup  (Sugar-Free) 200 milliGRAM(s) Oral every 6 hours PRN  heparin  Injectable 5000 Unit(s) SubCutaneous every 12 hours  insulin detemir injectable (LEVEMIR) 29 Unit(s) SubCutaneous at bedtime  insulin lispro (HumaLOG) corrective regimen sliding scale   SubCutaneous Before meals and at bedtime  insulin lispro Injectable (HumaLOG) 10 Unit(s) SubCutaneous three times a day before meals  sodium chloride 0.9%. 1000 milliLiter(s) IV Continuous <Continuous>      PE:    Vital Signs Last 24 Hrs  T(C): 38.1 (06 Mar 2018 05:16), Max: 38.1 (06 Mar 2018 05:16)  T(F): 100.5 (06 Mar 2018 05:16), Max: 100.5 (06 Mar 2018 05:16)  HR: 83 (06 Mar 2018 05:16) (83 - 89)  BP: 126/49 (06 Mar 2018 05:16) (126/49 - 143/55)  BP(mean): --  RR: 18 (06 Mar 2018 05:16) (18 - 18)  SpO2: 93% (06 Mar 2018 05:16) (93% - 100%)    Gen: AOx3, NAD, non-toxic  CV: S1+S2 normal, no murmurs  Resp: Clear bilat, no resp distress  Abd: Soft, nontender, +BS  Ext: Right BKA, left leg with erythema, swelling and warmth to LLE   : No Pratt  IV/Skin: No thrombophlebitis  Neuro: no focal deficits    LABS:                          8.8    15.81 )-----------( 203      ( 06 Mar 2018 06:30 )             27.9       03-06    138  |  103  |  38<H>  ----------------------------<  112<H>  4.0   |  21<L>  |  1.59<H>    Ca    8.6      06 Mar 2018 06:30  Phos  3.7     03-06  Mg     2.0     03-06    MICROBIOLOGY:  Vancomycin Level, Trough: 10.5 ug/mL (03-05-18 @ 15:47)  v  OTHER  03-06-18 --  --  --      LEG - LEFT  03-03-18 --  --  --      BLOOD PERIPHERAL  03-03-18 --  --  --    RADIOLOGY:    < from: US Duplex Venous Lower Ext Complete, Bilateral (03.05.18 @ 17:17) >  IMPRESSION:     No evidence of bilateral lower extremity deep venous thrombosis.    < end of copied text >    < from: MR Foot w/ IV Cont, Left (03.05.18 @ 14:18) >  Impression:    Diffuse cellulitis throughout lower leg and imaged hindfoot. Status post   transmetatarsal amputation of the first through fifth rays. Acute   osteomyelitis involving the first and second metatarsal remnants distally   as above.    Mild edema and enhancement within the third and fourth metatarsal   remnants distally with grossly preserved T1 marrow signal. Differential   considerations include reactive osteitis and early osteomyelitis.    Status post Achilles tenotomy with a fluid collection in the tenotomy   gap. This may be related to postoperative seroma. Superimposed infection   at this site cannot be excluded.    < end of copied text >

## 2018-03-06 NOTE — DISCHARGE NOTE ADULT - MEDICATION SUMMARY - MEDICATIONS TO STOP TAKING
I will STOP taking the medications listed below when I get home from the hospital:    Combigan 0.2%-0.5% ophthalmic solution  -- 1 drop(s) to each affected eye every 12 hours

## 2018-03-06 NOTE — PROGRESS NOTE ADULT - PROBLEM SELECTOR PLAN 5
- HgbA1c 8.1  - on reduced home doses of basal-bolus insulin while in the hospital  - continue Levemir 29U and humalog 9U with ISS and FS - HgbA1c 8.1  - on reduced home doses of basal-bolus insulin while in the hospital  - continue Levemir 29U and increase humalog to 10U with ISS and FS

## 2018-03-06 NOTE — DISCHARGE NOTE ADULT - CARE PLAN
Principal Discharge DX:	Osteomyelitis of left foot  Secondary Diagnosis:	CAD (coronary artery disease)  Secondary Diagnosis:	Heart failure  Secondary Diagnosis:	Chronic kidney disease  Secondary Diagnosis:	Urinary retention Principal Discharge DX:	Osteomyelitis of left foot  Goal:	Resolution  Assessment and plan of treatment:	You were admitted because you had infection of your bone. You underwent two procedures to remove the bones, and completed an extensive course of antibiotics.  Secondary Diagnosis:	CAD (coronary artery disease)  Secondary Diagnosis:	Heart failure  Secondary Diagnosis:	Chronic kidney disease  Secondary Diagnosis:	Urinary retention Principal Discharge DX:	Osteomyelitis of left foot  Goal:	Resolution  Assessment and plan of treatment:	You were admitted because you had infection of your bone. You underwent two procedures to remove the infected bones, and completed an extensive course of antibiotics. You will need to follow up with the vascular surgeons for management of your above knee amputation. Please continue to take your medications as needed for pain.  Secondary Diagnosis:	CAD (coronary artery disease)  Goal:	Ongoing care  Assessment and plan of treatment:	Please continue to take your aspirin and plavix.  Secondary Diagnosis:	Heart failure  Goal:	Ongoing care  Assessment and plan of treatment:	Please continue to take your lasix 40mg twice daily.  Secondary Diagnosis:	Chronic kidney disease  Goal:	Ongoing care  Assessment and plan of treatment:	Please continue to follow with your PMD for management of your CKD.  Secondary Diagnosis:	Urinary retention  Goal:	Ongoing care  Assessment and plan of treatment:	Please follow up with a urologist outpatient for management and eventual removal of your howe.  Secondary Diagnosis:	Hypertension  Goal:	Ongoing care  Assessment and plan of treatment:	Please continue to take all your antihypertensive medications as prescribed.

## 2018-03-07 ENCOUNTER — APPOINTMENT (OUTPATIENT)
Dept: WOUND CARE | Facility: CLINIC | Age: 73
End: 2018-03-07

## 2018-03-07 LAB
BASOPHILS # BLD AUTO: 0.04 K/UL — SIGNIFICANT CHANGE UP (ref 0–0.2)
BASOPHILS NFR BLD AUTO: 0.2 % — SIGNIFICANT CHANGE UP (ref 0–2)
BUN SERPL-MCNC: 39 MG/DL — HIGH (ref 7–23)
CALCIUM SERPL-MCNC: 8.9 MG/DL — SIGNIFICANT CHANGE UP (ref 8.4–10.5)
CHLORIDE SERPL-SCNC: 106 MMOL/L — SIGNIFICANT CHANGE UP (ref 98–107)
CO2 SERPL-SCNC: 18 MMOL/L — LOW (ref 22–31)
CREAT SERPL-MCNC: 1.44 MG/DL — HIGH (ref 0.5–1.3)
EOSINOPHIL # BLD AUTO: 0.48 K/UL — SIGNIFICANT CHANGE UP (ref 0–0.5)
EOSINOPHIL NFR BLD AUTO: 2.3 % — SIGNIFICANT CHANGE UP (ref 0–6)
GLUCOSE BLDC GLUCOMTR-MCNC: 106 MG/DL — HIGH (ref 70–99)
GLUCOSE BLDC GLUCOMTR-MCNC: 128 MG/DL — HIGH (ref 70–99)
GLUCOSE BLDC GLUCOMTR-MCNC: 148 MG/DL — HIGH (ref 70–99)
GLUCOSE BLDC GLUCOMTR-MCNC: 159 MG/DL — HIGH (ref 70–99)
GLUCOSE SERPL-MCNC: 134 MG/DL — HIGH (ref 70–99)
HCT VFR BLD CALC: 24.8 % — LOW (ref 34.5–45)
HGB BLD-MCNC: 8.4 G/DL — LOW (ref 11.5–15.5)
IMM GRANULOCYTES # BLD AUTO: 0.26 # — SIGNIFICANT CHANGE UP
IMM GRANULOCYTES NFR BLD AUTO: 1.2 % — SIGNIFICANT CHANGE UP (ref 0–1.5)
LYMPHOCYTES # BLD AUTO: 11.3 % — LOW (ref 13–44)
LYMPHOCYTES # BLD AUTO: 2.39 K/UL — SIGNIFICANT CHANGE UP (ref 1–3.3)
MAGNESIUM SERPL-MCNC: 2 MG/DL — SIGNIFICANT CHANGE UP (ref 1.6–2.6)
MANUAL SMEAR VERIFICATION: SIGNIFICANT CHANGE UP
MCHC RBC-ENTMCNC: 27.9 PG — SIGNIFICANT CHANGE UP (ref 27–34)
MCHC RBC-ENTMCNC: 33.9 % — SIGNIFICANT CHANGE UP (ref 32–36)
MCV RBC AUTO: 82.4 FL — SIGNIFICANT CHANGE UP (ref 80–100)
METHOD TYPE: SIGNIFICANT CHANGE UP
MONOCYTES # BLD AUTO: 0.98 K/UL — HIGH (ref 0–0.9)
MONOCYTES NFR BLD AUTO: 4.6 % — SIGNIFICANT CHANGE UP (ref 2–14)
NEUTROPHILS # BLD AUTO: 16.94 K/UL — HIGH (ref 1.8–7.4)
NEUTROPHILS NFR BLD AUTO: 80.4 % — HIGH (ref 43–77)
NRBC # FLD: 0.02 — SIGNIFICANT CHANGE UP
ORGANISM # SPEC MICROSCOPIC CNT: SIGNIFICANT CHANGE UP
PHOSPHATE SERPL-MCNC: 4.1 MG/DL — SIGNIFICANT CHANGE UP (ref 2.5–4.5)
PLATELET # BLD AUTO: 252 K/UL — SIGNIFICANT CHANGE UP (ref 150–400)
PMV BLD: 11.8 FL — SIGNIFICANT CHANGE UP (ref 7–13)
POTASSIUM SERPL-MCNC: 4.2 MMOL/L — SIGNIFICANT CHANGE UP (ref 3.5–5.3)
POTASSIUM SERPL-SCNC: 4.2 MMOL/L — SIGNIFICANT CHANGE UP (ref 3.5–5.3)
RBC # BLD: 3.01 M/UL — LOW (ref 3.8–5.2)
RBC # FLD: 15.8 % — HIGH (ref 10.3–14.5)
SODIUM SERPL-SCNC: 139 MMOL/L — SIGNIFICANT CHANGE UP (ref 135–145)
VANCOMYCIN TROUGH SERPL-MCNC: 22.4 UG/ML — HIGH (ref 10–20)
WBC # BLD: 21.09 K/UL — HIGH (ref 3.8–10.5)
WBC # FLD AUTO: 21.09 K/UL — HIGH (ref 3.8–10.5)

## 2018-03-07 PROCEDURE — 99233 SBSQ HOSP IP/OBS HIGH 50: CPT | Mod: GC

## 2018-03-07 PROCEDURE — 99232 SBSQ HOSP IP/OBS MODERATE 35: CPT

## 2018-03-07 RX ORDER — CEFEPIME 1 G/1
1000 INJECTION, POWDER, FOR SOLUTION INTRAMUSCULAR; INTRAVENOUS EVERY 24 HOURS
Qty: 0 | Refills: 0 | Status: DISCONTINUED | OUTPATIENT
Start: 2018-03-07 | End: 2018-03-08

## 2018-03-07 RX ADMIN — ATORVASTATIN CALCIUM 20 MILLIGRAM(S): 80 TABLET, FILM COATED ORAL at 22:57

## 2018-03-07 RX ADMIN — Medication 200 MILLIGRAM(S): at 12:39

## 2018-03-07 RX ADMIN — HEPARIN SODIUM 5000 UNIT(S): 5000 INJECTION INTRAVENOUS; SUBCUTANEOUS at 05:34

## 2018-03-07 RX ADMIN — Medication 29 UNIT(S): at 22:57

## 2018-03-07 RX ADMIN — Medication 10 UNIT(S): at 17:52

## 2018-03-07 RX ADMIN — Medication 1: at 17:52

## 2018-03-07 RX ADMIN — MEROPENEM 100 MILLIGRAM(S): 1 INJECTION INTRAVENOUS at 05:51

## 2018-03-07 RX ADMIN — Medication 10 UNIT(S): at 12:34

## 2018-03-07 RX ADMIN — Medication 81 MILLIGRAM(S): at 12:35

## 2018-03-07 RX ADMIN — Medication 200 MILLIGRAM(S): at 05:51

## 2018-03-07 RX ADMIN — HEPARIN SODIUM 5000 UNIT(S): 5000 INJECTION INTRAVENOUS; SUBCUTANEOUS at 17:44

## 2018-03-07 RX ADMIN — BENZOCAINE AND MENTHOL 1 LOZENGE: 5; 1 LIQUID ORAL at 12:39

## 2018-03-07 RX ADMIN — Medication 10 UNIT(S): at 08:26

## 2018-03-07 RX ADMIN — Medication 1 APPLICATION(S): at 17:44

## 2018-03-07 RX ADMIN — CLOPIDOGREL BISULFATE 75 MILLIGRAM(S): 75 TABLET, FILM COATED ORAL at 12:34

## 2018-03-07 RX ADMIN — Medication 650 MILLIGRAM(S): at 14:38

## 2018-03-07 RX ADMIN — CEFEPIME 100 MILLIGRAM(S): 1 INJECTION, POWDER, FOR SOLUTION INTRAMUSCULAR; INTRAVENOUS at 12:35

## 2018-03-07 NOTE — PROGRESS NOTE ADULT - SUBJECTIVE AND OBJECTIVE BOX
CC: F/U cellulitis    Interval History/ROS: Patient remains with fever. Denies chills. States she spoke with podiatry and wants to discuss the options with her family.    Allergies  sulfa drugs (Hives)  sulfa drugs (Rash)  Sulfac 10% (Hives)    ANTIMICROBIALS:  cefepime  IVPB 1000 every 24 hours  vancomycin  IVPB 750 every 12 hours    PE:    Vital Signs Last 24 Hrs  T(C): 38.2 (07 Mar 2018 14:20), Max: 38.2 (07 Mar 2018 14:20)  T(F): 100.7 (07 Mar 2018 14:20), Max: 100.7 (07 Mar 2018 14:20)  HR: 79 (07 Mar 2018 14:20) (78 - 79)  BP: 151/65 (07 Mar 2018 14:20) (140/51 - 151/65)  BP(mean): --  RR: 18 (07 Mar 2018 14:20) (18 - 18)  SpO2: 100% (07 Mar 2018 14:20) (97% - 100%)    Gen: AOx3, NAD, pleasant, nontoxic appearing  CV: S1+S2 normal, no murmurs  Resp: Clear bilat, no resp distress  Abd: Soft, nontender, +BS  Ext: Right BKA, left leg with erythema, swelling and warmth  : No Pratt  IV/Skin: No thrombophlebitis  Neuro: no focal deficits    LABS:                          8.4    21.09 )-----------( 252      ( 07 Mar 2018 05:33 )             24.8       03-07    139  |  106  |  39<H>  ----------------------------<  134<H>  4.2   |  18<L>  |  1.44<H>    Ca    8.9      07 Mar 2018 05:33  Phos  4.1     03-07  Mg     2.0     03-07    MICROBIOLOGY:  Vancomycin Level, Trough: 22.4 ug/mL (03-07-18 @ 05:33)  v  OTHER  03-06-18   NO GROWTH - PRELIMINARY RESULTS  --  --      LEG - LEFT  03-03-18 --  --  Proteus mirabilis  Staph. aureus *MRSA*  Strep Beta Hemolytic Grp G    BLOOD PERIPHERAL  03-03-18 --  --  --    RADIOLOGY:    No new images.

## 2018-03-07 NOTE — PROGRESS NOTE ADULT - SUBJECTIVE AND OBJECTIVE BOX
pt seen at bedside in NAD. dressing to left foot c/d/i  left distal TMA with wound dry and stable no pus  posterior achilles stab incision noted that was extended and probed no further pus noted from the area. foot is not edematous and slightly warm and tender WBC at 21  applied wet to dry packing with DSD to left foot and achilles area  foot isn't presenting as source however would benefit from either salvage procedure or BKA   MRI positive for OM of 1st and 2nd metatarsals  - Discussed treatment options with pt, salvage vs. BKA, salvaging would entail use of long term IV abx with surgery including risks of reinfection and chronic OM  - Pt understands the benefits and risks of both options, communicates understanding  - Pt would like to discuss treatment options with family and decide

## 2018-03-07 NOTE — PROGRESS NOTE ADULT - ASSESSMENT
Patient is a 71 y/o F with a PMH significant for T2DM, PVD s/p R BKA and L transmetatarsal amputation, CAD s/p CABG, CKD stage 3, and HTN who presents to the ED with fevers and left leg pain, found to have sepsis (fever, leukocytosis) with MRI confirming osteomyelitis. Waiting for patient and family to make a decision regarding BKA vs salvage therapy with IV antibiotics.

## 2018-03-07 NOTE — PROGRESS NOTE ADULT - PROBLEM SELECTOR PLAN 3
- continues to resolve, Cr at 1.44 today   - monitor BMP, avoid nephrotoxic agents, dose vancomycin appropriately to avoid toxicity

## 2018-03-07 NOTE — PROGRESS NOTE ADULT - PROBLEM SELECTOR PLAN 5
- HgbA1c 8.1  - on reduced home doses of basal-bolus insulin while in the hospital  - continue Levemir 29U and humalog to 10U with ISS and FS

## 2018-03-07 NOTE — PROGRESS NOTE ADULT - ATTENDING COMMENTS
wound cultures growing proteus and MRSA; continue with vanco and cefepime; check vanco trough.  follow up with ID; follow up with pt regarding her decision regarding bka.

## 2018-03-07 NOTE — PROGRESS NOTE ADULT - PROBLEM SELECTOR PLAN 1
- leukocytosis continues to uptrend but no fevers overnight  - need source control, pending decision by family   - continue IV antibiotics for treatment - leukocytosis continues to uptrend but no fevers overnight  - need source control, pending decision by family   - continue abx as detailed below - sepsis resolved  - leukocytosis continues to uptrend but no fevers overnight  - need source control, pending decision by family   - continue abx as per ID.

## 2018-03-07 NOTE — PROGRESS NOTE ADULT - PROBLEM SELECTOR PLAN 2
- continue vancomycin and meropenem per ID (Day 5), wound culture growing GNR and staph aureus  - will adjust dose of vancomycin given elevated trough (20.5 today)   - pending decision regarding BKA vs salvage surgery with IV antibiotics - continue vancomycin and switched meropenem to cefepime given proteus is sensitive (Day 5), follow up staph aureus sensitivities   - will adjust dose of vancomycin given elevated trough (22.5 today)   - pending decision regarding BKA vs salvage surgery with IV antibiotics - continue vancomycin and switched meropenem to cefepime given proteus is sensitive (Day 5), growing MRSA  - will adjust dose of vancomycin given elevated trough (22.5 today)   - pending decision regarding BKA vs salvage surgery with IV antibiotics

## 2018-03-07 NOTE — PROGRESS NOTE ADULT - ASSESSMENT
72 female with T2DM, PVD s/p R BKA and L transmetatarsal amputation, CAD s/p CABG, CKD stage 3, HTN, HLD here with fevers 103F, chills and left leg pain. Admitted for cellulitis of left lower extremity with warmth and erythema to that leg. Has elevated ESR and CRP.     MRI with diffuse cellulitis with acute osteo involving of first and second metatarsal remanants, with enhance of the third and fourth metatarsal, and fluid collection within the tenotomy gap. Wound cx with MRSA, strep Grp G and proteus.    Recommend:   - Hold vancomycin for now - level today 22.4.  - Repeat vanco level in the am (ordered). If < 20 can restart vanco 1250 mg IV q 24 hours.  - Continue cefepime.  - F/U Podiatry recommendations

## 2018-03-07 NOTE — PROGRESS NOTE ADULT - SUBJECTIVE AND OBJECTIVE BOX
Chelsey Olivares MD  Medicine Team 2  Pager [phone]      Chief Complaint:         Subjective        VITAL SIGNS:  Vital Signs Last 24 Hrs  T(C): 37.2 (07 Mar 2018 05:20), Max: 37.7 (06 Mar 2018 21:07)  T(F): 99 (07 Mar 2018 05:20), Max: 99.9 (06 Mar 2018 21:07)  HR: 78 (07 Mar 2018 05:20) (72 - 79)  BP: 140/51 (07 Mar 2018 05:20) (140/51 - 148/59)  BP(mean): --  RR: 18 (07 Mar 2018 05:20) (18 - 18)  SpO2: 98% (07 Mar 2018 05:20) (97% - 98%)      PHYSICAL EXAM:     GENERAL: no acute distress  HEENT: PERRLA, EOMI, moist oropharynx   RESPIRATORY: CTAB, no w/c   CARDIOVASCULAR: RRR, no murmurs, gallops, rubs  ABDOMINAL: soft, non-tender, non-distended, positive bowel sounds   EXTREMITIES: no clubbing, cyanosis, or edema  NEUROLOGICAL: alert and oriented x 3, non-focal  SKIN: no rashes or lesions   MUSCULOSKELETAL: no gross joint deformity                          8.4    21.09 )-----------( 252      ( 07 Mar 2018 05:33 )             24.8     03-07    139  |  106  |  39<H>  ----------------------------<  134<H>  4.2   |  18<L>  |  1.44<H>    Ca    8.9      07 Mar 2018 05:33  Phos  4.1     03-07  Mg     2.0     03-07        CAPILLARY BLOOD GLUCOSE      POCT Blood Glucose.: 269 mg/dL (06 Mar 2018 21:47)  POCT Blood Glucose.: 257 mg/dL (06 Mar 2018 17:24)  POCT Blood Glucose.: 211 mg/dL (06 Mar 2018 12:06)  POCT Blood Glucose.: 101 mg/dL (06 Mar 2018 08:33)      MEDICATIONS  (STANDING):  aspirin enteric coated 81 milliGRAM(s) Oral daily  atorvastatin 20 milliGRAM(s) Oral at bedtime  clopidogrel Tablet 75 milliGRAM(s) Oral daily  collagenase Ointment 1 Application(s) Topical daily  dextrose 50% Injectable 25 Gram(s) IV Push once  heparin  Injectable 5000 Unit(s) SubCutaneous every 12 hours  insulin detemir injectable (LEVEMIR) 29 Unit(s) SubCutaneous at bedtime  insulin lispro (HumaLOG) corrective regimen sliding scale   SubCutaneous Before meals and at bedtime  insulin lispro Injectable (HumaLOG) 10 Unit(s) SubCutaneous three times a day before meals  meropenem  IVPB 1000 milliGRAM(s) IV Intermittent every 12 hours  vancomycin  IVPB 750 milliGRAM(s) IV Intermittent every 12 hours    MEDICATIONS  (PRN):  acetaminophen   Tablet 650 milliGRAM(s) Oral every 6 hours PRN For Temp greater than 38 C (100.4 F)  benzocaine 15 mG/menthol 3.6 mG Lozenge 1 Lozenge Oral three times a day PRN Sore Throat  guaiFENesin   Syrup  (Sugar-Free) 200 milliGRAM(s) Oral every 6 hours PRN Cough Chelsey Olivares MD  Medicine Team 2  Pager [phone]        Subjective: Patient seen and examined. No events overnight. This morning feels okay. Still thinking about whether she wants the have the BKA or a salvage therapy. No fevers, chills, CP, SOB, nausea, or vomiting. Still having some pain in her leg.         VITAL SIGNS:  Vital Signs Last 24 Hrs  T(C): 37.2 (07 Mar 2018 05:20), Max: 37.7 (06 Mar 2018 21:07)  T(F): 99 (07 Mar 2018 05:20), Max: 99.9 (06 Mar 2018 21:07)  HR: 78 (07 Mar 2018 05:20) (72 - 79)  BP: 140/51 (07 Mar 2018 05:20) (140/51 - 148/59)  BP(mean): --  RR: 18 (07 Mar 2018 05:20) (18 - 18)  SpO2: 98% (07 Mar 2018 05:20) (97% - 98%)      PHYSICAL EXAM:     GENERAL: no acute distress  HEENT: PERRLA, EOMI, moist oropharynx   RESPIRATORY: CTAB, no wheezes or crackles   CARDIOVASCULAR: RRR, no murmurs  ABDOMINAL: soft, non-tender, non-distended, positive bowel sounds   EXTREMITIES: right BKA with left transmetatarsal amputation, dressing c/d/i, leg still warm with edema   NEUROLOGICAL: alert and oriented x 3, no focal deficits   SKIN: no rashes or lesions   MUSCULOSKELETAL: no gross joint deformity                          8.4    21.09 )-----------( 252      ( 07 Mar 2018 05:33 )             24.8     03-07    139  |  106  |  39<H>  ----------------------------<  134<H>  4.2   |  18<L>  |  1.44<H>    Ca    8.9      07 Mar 2018 05:33  Phos  4.1     03-07  Mg     2.0     03-07        CAPILLARY BLOOD GLUCOSE      POCT Blood Glucose.: 269 mg/dL (06 Mar 2018 21:47)  POCT Blood Glucose.: 257 mg/dL (06 Mar 2018 17:24)  POCT Blood Glucose.: 211 mg/dL (06 Mar 2018 12:06)  POCT Blood Glucose.: 101 mg/dL (06 Mar 2018 08:33)      MEDICATIONS  (STANDING):  aspirin enteric coated 81 milliGRAM(s) Oral daily  atorvastatin 20 milliGRAM(s) Oral at bedtime  clopidogrel Tablet 75 milliGRAM(s) Oral daily  collagenase Ointment 1 Application(s) Topical daily  dextrose 50% Injectable 25 Gram(s) IV Push once  heparin  Injectable 5000 Unit(s) SubCutaneous every 12 hours  insulin detemir injectable (LEVEMIR) 29 Unit(s) SubCutaneous at bedtime  insulin lispro (HumaLOG) corrective regimen sliding scale   SubCutaneous Before meals and at bedtime  insulin lispro Injectable (HumaLOG) 10 Unit(s) SubCutaneous three times a day before meals  meropenem  IVPB 1000 milliGRAM(s) IV Intermittent every 12 hours  vancomycin  IVPB 750 milliGRAM(s) IV Intermittent every 12 hours    MEDICATIONS  (PRN):  acetaminophen   Tablet 650 milliGRAM(s) Oral every 6 hours PRN For Temp greater than 38 C (100.4 F)  benzocaine 15 mG/menthol 3.6 mG Lozenge 1 Lozenge Oral three times a day PRN Sore Throat  guaiFENesin   Syrup  (Sugar-Free) 200 milliGRAM(s) Oral every 6 hours PRN Cough

## 2018-03-08 LAB
-  AMIKACIN: SIGNIFICANT CHANGE UP
-  AMPICILLIN/SULBACTAM: SIGNIFICANT CHANGE UP
-  AMPICILLIN: SIGNIFICANT CHANGE UP
-  AZTREONAM: SIGNIFICANT CHANGE UP
-  CEFAZOLIN: SIGNIFICANT CHANGE UP
-  CEFAZOLIN: SIGNIFICANT CHANGE UP
-  CEFEPIME: SIGNIFICANT CHANGE UP
-  CEFOXITIN: SIGNIFICANT CHANGE UP
-  CEFTAZIDIME: SIGNIFICANT CHANGE UP
-  CEFTRIAXONE: SIGNIFICANT CHANGE UP
-  CEFTRIAXONE: SIGNIFICANT CHANGE UP
-  CIPROFLOXACIN: SIGNIFICANT CHANGE UP
-  CIPROFLOXACIN: SIGNIFICANT CHANGE UP
-  CLINDAMYCIN: SIGNIFICANT CHANGE UP
-  CLINDAMYCIN: SIGNIFICANT CHANGE UP
-  DAPTOMYCIN: SIGNIFICANT CHANGE UP
-  ERTAPENEM: SIGNIFICANT CHANGE UP
-  ERYTHROMYCIN: SIGNIFICANT CHANGE UP
-  ERYTHROMYCIN: SIGNIFICANT CHANGE UP
-  GENTAMICIN: SIGNIFICANT CHANGE UP
-  GENTAMICIN: SIGNIFICANT CHANGE UP
-  LEVOFLOXACIN: SIGNIFICANT CHANGE UP
-  LINEZOLID: SIGNIFICANT CHANGE UP
-  MEROPENEM: SIGNIFICANT CHANGE UP
-  MOXIFLOXACIN(AEROBIC): SIGNIFICANT CHANGE UP
-  NITROFURANTOIN: SIGNIFICANT CHANGE UP
-  OXACILLIN: SIGNIFICANT CHANGE UP
-  PENICILLIN G: SIGNIFICANT CHANGE UP
-  PENICILLIN: SIGNIFICANT CHANGE UP
-  PIPERACILLIN/TAZOBACTAM: SIGNIFICANT CHANGE UP
-  RIFAMPIN.: SIGNIFICANT CHANGE UP
-  TETRACYCLINE: SIGNIFICANT CHANGE UP
-  TOBRAMYCIN: SIGNIFICANT CHANGE UP
-  TRIMETHOPRIM/SULFAMETHOXAZOLE: SIGNIFICANT CHANGE UP
-  TRIMETHOPRIM/SULFAMETHOXAZOLE: SIGNIFICANT CHANGE UP
-  VANCOMYCIN: SIGNIFICANT CHANGE UP
-  VANCOMYCIN: SIGNIFICANT CHANGE UP
BACTERIA BLD CULT: SIGNIFICANT CHANGE UP
BACTERIA BLD CULT: SIGNIFICANT CHANGE UP
BACTERIA WND CULT: SIGNIFICANT CHANGE UP
BASOPHILS # BLD AUTO: 0.05 K/UL — SIGNIFICANT CHANGE UP (ref 0–0.2)
BASOPHILS NFR BLD AUTO: 0.2 % — SIGNIFICANT CHANGE UP (ref 0–2)
BUN SERPL-MCNC: 38 MG/DL — HIGH (ref 7–23)
CALCIUM SERPL-MCNC: 8.8 MG/DL — SIGNIFICANT CHANGE UP (ref 8.4–10.5)
CHLORIDE SERPL-SCNC: 106 MMOL/L — SIGNIFICANT CHANGE UP (ref 98–107)
CO2 SERPL-SCNC: 24 MMOL/L — SIGNIFICANT CHANGE UP (ref 22–31)
CREAT SERPL-MCNC: 1.33 MG/DL — HIGH (ref 0.5–1.3)
CULTURE RESULTS: SIGNIFICANT CHANGE UP
EOSINOPHIL # BLD AUTO: 0.33 K/UL — SIGNIFICANT CHANGE UP (ref 0–0.5)
EOSINOPHIL NFR BLD AUTO: 1.4 % — SIGNIFICANT CHANGE UP (ref 0–6)
GLUCOSE BLDC GLUCOMTR-MCNC: 124 MG/DL — HIGH (ref 70–99)
GLUCOSE BLDC GLUCOMTR-MCNC: 127 MG/DL — HIGH (ref 70–99)
GLUCOSE BLDC GLUCOMTR-MCNC: 139 MG/DL — HIGH (ref 70–99)
GLUCOSE BLDC GLUCOMTR-MCNC: 162 MG/DL — HIGH (ref 70–99)
GLUCOSE SERPL-MCNC: 145 MG/DL — HIGH (ref 70–99)
HCT VFR BLD CALC: 25.3 % — LOW (ref 34.5–45)
HGB BLD-MCNC: 8.3 G/DL — LOW (ref 11.5–15.5)
IMM GRANULOCYTES # BLD AUTO: 0.26 # — SIGNIFICANT CHANGE UP
IMM GRANULOCYTES NFR BLD AUTO: 1.1 % — SIGNIFICANT CHANGE UP (ref 0–1.5)
LYMPHOCYTES # BLD AUTO: 11.4 % — LOW (ref 13–44)
LYMPHOCYTES # BLD AUTO: 2.74 K/UL — SIGNIFICANT CHANGE UP (ref 1–3.3)
MAGNESIUM SERPL-MCNC: 2.3 MG/DL — SIGNIFICANT CHANGE UP (ref 1.6–2.6)
MCHC RBC-ENTMCNC: 26.9 PG — LOW (ref 27–34)
MCHC RBC-ENTMCNC: 32.8 % — SIGNIFICANT CHANGE UP (ref 32–36)
MCV RBC AUTO: 81.9 FL — SIGNIFICANT CHANGE UP (ref 80–100)
METHOD TYPE: SIGNIFICANT CHANGE UP
MONOCYTES # BLD AUTO: 1.04 K/UL — HIGH (ref 0–0.9)
MONOCYTES NFR BLD AUTO: 4.3 % — SIGNIFICANT CHANGE UP (ref 2–14)
NEUTROPHILS # BLD AUTO: 19.67 K/UL — HIGH (ref 1.8–7.4)
NEUTROPHILS NFR BLD AUTO: 81.6 % — HIGH (ref 43–77)
NRBC # FLD: 0 — SIGNIFICANT CHANGE UP
PHOSPHATE SERPL-MCNC: 4.6 MG/DL — HIGH (ref 2.5–4.5)
PLATELET # BLD AUTO: 327 K/UL — SIGNIFICANT CHANGE UP (ref 150–400)
PMV BLD: 11.7 FL — SIGNIFICANT CHANGE UP (ref 7–13)
POTASSIUM SERPL-MCNC: 4.5 MMOL/L — SIGNIFICANT CHANGE UP (ref 3.5–5.3)
POTASSIUM SERPL-SCNC: 4.5 MMOL/L — SIGNIFICANT CHANGE UP (ref 3.5–5.3)
RBC # BLD: 3.09 M/UL — LOW (ref 3.8–5.2)
RBC # FLD: 15.9 % — HIGH (ref 10.3–14.5)
SODIUM SERPL-SCNC: 141 MMOL/L — SIGNIFICANT CHANGE UP (ref 135–145)
VANCOMYCIN FLD-MCNC: 10.7 UG/ML — SIGNIFICANT CHANGE UP
WBC # BLD: 24.09 K/UL — HIGH (ref 3.8–10.5)
WBC # FLD AUTO: 24.09 K/UL — HIGH (ref 3.8–10.5)

## 2018-03-08 PROCEDURE — 71046 X-RAY EXAM CHEST 2 VIEWS: CPT | Mod: 26

## 2018-03-08 PROCEDURE — 93010 ELECTROCARDIOGRAM REPORT: CPT

## 2018-03-08 PROCEDURE — 99233 SBSQ HOSP IP/OBS HIGH 50: CPT | Mod: GC

## 2018-03-08 PROCEDURE — 99232 SBSQ HOSP IP/OBS MODERATE 35: CPT

## 2018-03-08 RX ORDER — HYDRALAZINE HCL 50 MG
50 TABLET ORAL
Qty: 0 | Refills: 0 | Status: DISCONTINUED | OUTPATIENT
Start: 2018-03-08 | End: 2018-03-08

## 2018-03-08 RX ORDER — ISOSORBIDE MONONITRATE 60 MG/1
30 TABLET, EXTENDED RELEASE ORAL DAILY
Qty: 0 | Refills: 0 | Status: DISCONTINUED | OUTPATIENT
Start: 2018-03-08 | End: 2018-03-21

## 2018-03-08 RX ORDER — SERTRALINE 25 MG/1
25 TABLET, FILM COATED ORAL DAILY
Qty: 0 | Refills: 0 | Status: DISCONTINUED | OUTPATIENT
Start: 2018-03-08 | End: 2018-03-27

## 2018-03-08 RX ORDER — HYDRALAZINE HCL 50 MG
50 TABLET ORAL
Qty: 0 | Refills: 0 | Status: DISCONTINUED | OUTPATIENT
Start: 2018-03-08 | End: 2018-03-18

## 2018-03-08 RX ORDER — VANCOMYCIN HCL 1 G
1250 VIAL (EA) INTRAVENOUS EVERY 24 HOURS
Qty: 0 | Refills: 0 | Status: DISCONTINUED | OUTPATIENT
Start: 2018-03-08 | End: 2018-03-13

## 2018-03-08 RX ORDER — PIPERACILLIN AND TAZOBACTAM 4; .5 G/20ML; G/20ML
3.38 INJECTION, POWDER, LYOPHILIZED, FOR SOLUTION INTRAVENOUS EVERY 8 HOURS
Qty: 0 | Refills: 0 | Status: DISCONTINUED | OUTPATIENT
Start: 2018-03-08 | End: 2018-03-09

## 2018-03-08 RX ORDER — ISOSORBIDE MONONITRATE 60 MG/1
30 TABLET, EXTENDED RELEASE ORAL DAILY
Qty: 0 | Refills: 0 | Status: DISCONTINUED | OUTPATIENT
Start: 2018-03-08 | End: 2018-03-08

## 2018-03-08 RX ADMIN — HEPARIN SODIUM 5000 UNIT(S): 5000 INJECTION INTRAVENOUS; SUBCUTANEOUS at 05:05

## 2018-03-08 RX ADMIN — CLOPIDOGREL BISULFATE 75 MILLIGRAM(S): 75 TABLET, FILM COATED ORAL at 13:13

## 2018-03-08 RX ADMIN — HEPARIN SODIUM 5000 UNIT(S): 5000 INJECTION INTRAVENOUS; SUBCUTANEOUS at 17:39

## 2018-03-08 RX ADMIN — PIPERACILLIN AND TAZOBACTAM 25 GRAM(S): 4; .5 INJECTION, POWDER, LYOPHILIZED, FOR SOLUTION INTRAVENOUS at 14:02

## 2018-03-08 RX ADMIN — ISOSORBIDE MONONITRATE 30 MILLIGRAM(S): 60 TABLET, EXTENDED RELEASE ORAL at 17:39

## 2018-03-08 RX ADMIN — ATORVASTATIN CALCIUM 20 MILLIGRAM(S): 80 TABLET, FILM COATED ORAL at 23:04

## 2018-03-08 RX ADMIN — PIPERACILLIN AND TAZOBACTAM 25 GRAM(S): 4; .5 INJECTION, POWDER, LYOPHILIZED, FOR SOLUTION INTRAVENOUS at 23:04

## 2018-03-08 RX ADMIN — Medication 50 MILLIGRAM(S): at 17:39

## 2018-03-08 RX ADMIN — SERTRALINE 25 MILLIGRAM(S): 25 TABLET, FILM COATED ORAL at 17:39

## 2018-03-08 RX ADMIN — Medication 1: at 13:13

## 2018-03-08 RX ADMIN — Medication 1 APPLICATION(S): at 13:13

## 2018-03-08 RX ADMIN — Medication 10 UNIT(S): at 13:13

## 2018-03-08 RX ADMIN — Medication 650 MILLIGRAM(S): at 14:02

## 2018-03-08 RX ADMIN — Medication 29 UNIT(S): at 23:04

## 2018-03-08 RX ADMIN — Medication 166.67 MILLIGRAM(S): at 11:18

## 2018-03-08 RX ADMIN — Medication 10 UNIT(S): at 08:54

## 2018-03-08 RX ADMIN — Medication 81 MILLIGRAM(S): at 13:13

## 2018-03-08 RX ADMIN — CEFEPIME 100 MILLIGRAM(S): 1 INJECTION, POWDER, FOR SOLUTION INTRAMUSCULAR; INTRAVENOUS at 13:18

## 2018-03-08 RX ADMIN — Medication 10 UNIT(S): at 17:39

## 2018-03-08 NOTE — PROGRESS NOTE ADULT - PROBLEM SELECTOR PLAN 3
- continues to resolve, Cr at 1.44 today   - monitor BMP, avoid nephrotoxic agents, dose vancomycin appropriately to avoid toxicity - stable  - continue ASA and plavix

## 2018-03-08 NOTE — PROGRESS NOTE ADULT - PROBLEM SELECTOR PLAN 1
- sepsis resolved  - leukocytosis continues to uptrend but no fevers overnight  - need source control, pending decision by family   - continue abx as per ID. - continue cefepime given proteus is sensitive (Day 6), growing MRSA  - pending AM vanc trough to redose vancomycin   - patient and family have decided to do BKA so will consult vascular surgery for their help in planning

## 2018-03-08 NOTE — PROGRESS NOTE ADULT - SUBJECTIVE AND OBJECTIVE BOX
Patient is a 72y old  Female who presents with a chief complaint of Fevers and leg pain (06 Mar 2018 17:24)       INTERVAL HPI/OVERNIGHT EVENTS:  Patient seen and evaluated at bedside.  Pt is resting comfortable in NAD. Denies N/V/F/C.  Pain rated at X/10    Allergies    sulfa drugs (Hives)  sulfa drugs (Rash)  Sulfac 10% (Hives)    Intolerances        Vital Signs Last 24 Hrs  T(C): 37.8 (08 Mar 2018 05:03), Max: 38.2 (07 Mar 2018 14:20)  T(F): 100 (08 Mar 2018 05:03), Max: 100.7 (07 Mar 2018 14:20)  HR: 86 (08 Mar 2018 05:03) (70 - 86)  BP: 154/93 (08 Mar 2018 05:03) (139/55 - 154/93)  BP(mean): --  RR: 18 (08 Mar 2018 05:03) (18 - 18)  SpO2: 97% (08 Mar 2018 05:03) (97% - 100%)    LABS:                        8.4    21.09 )-----------( 252      ( 07 Mar 2018 05:33 )             24.8     03-07    139  |  106  |  39<H>  ----------------------------<  134<H>  4.2   |  18<L>  |  1.44<H>    Ca    8.9      07 Mar 2018 05:33  Phos  4.1     03-07  Mg     2.0     03-07          CAPILLARY BLOOD GLUCOSE      POCT Blood Glucose.: 148 mg/dL (07 Mar 2018 22:48)  POCT Blood Glucose.: 159 mg/dL (07 Mar 2018 17:34)  POCT Blood Glucose.: 128 mg/dL (07 Mar 2018 12:25)      Lower Extremity Physical Exam:  LLE cellulitis beginning at the ankle extends just distal to the knee improving, does not appear to originate at the wound, decreased since yesterday, posterior distal leg no purulence no fluctuance, no crepitus and no malodor.    RADIOLOGY & ADDITIONAL TESTS:  < from: MR Foot w/ IV Cont, Left (03.05.18 @ 14:18) >    EXAM:  MR FOOT IC LT        PROCEDURE DATE:  Mar  5 2018         INTERPRETATION:  History: Cellulitis of the left lower extremity. History   of transmetatarsal amputation. Concern for osteomyelitis. Diabetes.   Vasculopathy.    Multiplanar multisequence MRI of the left foot was performed from the   level of the heel to the level of the amputation margin with and without   intravenous contrast.    7 cc of Gadavist was administered intravenously. 0.5 cc was discarded.    Correlation is made with radiographs from March 3, 2018.    Findings:    Patient is status post transmetatarsal amputation of the first through   fifth rays to the level of the proximal metatarsal shafts. There is   diffuse enhancing subcutaneous edema noted throughout the imaged portion   of the ankle and dorsum of the foot consistent with cellulitis. Cutaneous   irregularity is noted along the dorsal aspect of the amputation margin.    There is a cutaneous tract extending from the dorsal cutaneous margin to   the level of the first metatarsal remnant. There is osseous edema and   enhancement within the distal portion of the first metatarsal remnant   with corresponding hypointense T1 signal consistent with acute   osteomyelitis. Acute osteomyelitis is noted throughout the second   metatarsal remnant extending from the distal margin to the proximal   diametaphysis.    There is trace osseous edema within the distal margins of the third and   fourth metatarsal remnants with grossly preserved T1 marrow signal.   Differential considerations include reactive osteitis and early   osteomyelitis. Marrow signal within the fifth metatarsal remnant and the   remainder of the hindfoot is otherwise preserved.    The tibiotalar and subtalar joints are preserved. There is nonspecific   small to moderate tibiotalar/subtalar joint effusion.    Patient is status post Achilles tenotomy at the midportion of the tendon   with a gap between tendon fibers of approximately 4 cm. There is a fluid   collection within the tendon anatomy gap measuring 1 x 2.2 x 3 cm which   may be related to postoperative seroma. Superimposed infection at the   site cannot be excluded. The remaining tendinous structures are otherwise   preserved. There is no evidence of acute ligamentous injury.    There is fatty atrophy of the visualized musculature likely related to   denervation related changes.    Impression:    Diffuse cellulitis throughout lower leg and imaged hindfoot. Status post   transmetatarsal amputation of the first through fifth rays. Acute   osteomyelitis involving the first and second metatarsal remnants distally   as above.    Mild edema and enhancement within the third and fourth metatarsal   remnants distally with grossly preserved T1 marrow signal. Differential   considerations include reactive osteitis and early osteomyelitis.    Status post Achilles tenotomy with a fluid collection in the tenotomy   gap. This may be related to postoperative seroma. Superimposed infection   at this site cannot be excluded.                  RICHA LEUNG M.D., ATTENDING RADIOLOGIST  This document has been electronically signed. Mar  5 2018  4:00PM                  < end of copied text >

## 2018-03-08 NOTE — PROGRESS NOTE ADULT - ASSESSMENT
Patient is a 73 y/o F with a PMH significant for T2DM, PVD s/p R BKA and L transmetatarsal amputation, CAD s/p CABG, CKD stage 3, and HTN who presents to the ED with fevers and left leg pain, found to have sepsis (fever, leukocytosis) with MRI confirming osteomyelitis. Waiting for patient and family to make a decision regarding BKA vs salvage therapy with IV antibiotics.

## 2018-03-08 NOTE — PROGRESS NOTE ADULT - PROBLEM SELECTOR PLAN 4
- stable  - continue ASA and plavix - HgbA1c 8.1  - on reduced home doses of basal-bolus insulin while in the hospital  - continue Levemir 29U and humalog to 10U with ISS and FS

## 2018-03-08 NOTE — PROGRESS NOTE ADULT - PROBLEM SELECTOR PLAN 2
- continue vancomycin and switched meropenem to cefepime given proteus is sensitive (Day 5), growing MRSA  - will adjust dose of vancomycin given elevated trough (22.5 today)   - pending decision regarding BKA vs salvage surgery with IV antibiotics - resolving  - monitor BMP, avoid nephrotoxic agents, dose vancomycin appropriately to avoid toxicity

## 2018-03-08 NOTE — PROGRESS NOTE ADULT - PROBLEM SELECTOR PLAN 7
- stable  - continue atorvastatin - DVT ppx subq heparin   - Consistent Carb Diet   - Dispo pending resolution of symptoms

## 2018-03-08 NOTE — PROGRESS NOTE ADULT - ATTENDING COMMENTS
Pt amenable to BKA - vascular eval pending.  will reach out to pt's outpt cardio for records.  restart pt home anti-htnives.  unclear etiology of worsening leukocytosis - cxr.

## 2018-03-08 NOTE — PROGRESS NOTE ADULT - ASSESSMENT
72 female with T2DM, PVD s/p R BKA and L transmetatarsal amputation, CAD s/p CABG, CKD stage 3, HTN, HLD here with fevers 103F, chills and left leg pain. Admitted for cellulitis of left lower extremity with warmth and erythema to that leg. Has elevated ESR and CRP.     MRI with diffuse cellulitis with acute osteo involving of first and second metatarsal remanants, with enhance of the third and fourth metatarsal, and fluid collection within the tenotomy gap. Wound cx with MRSA, strep Grp G and proteus. Leukocytosis rising.    Recommend:   - Continue vancomycin and would change cefepime to zosyn since remains febrile and rising leukocytosis to broaden coverage - I suspect fever and WBC coming from her LLE infection and would need adequate source control for definitive treatment.  - Monitor vancomycin trough for toxic drug levels  - F/U vascular surgery regarding surgical intervention.

## 2018-03-08 NOTE — PROGRESS NOTE ADULT - PROBLEM SELECTOR PLAN 5
- HgbA1c 8.1  - on reduced home doses of basal-bolus insulin while in the hospital  - continue Levemir 29U and humalog to 10U with ISS and FS - will hold all home anti-hypertensives at this time given sepsis  - BP stable

## 2018-03-08 NOTE — PROGRESS NOTE ADULT - ASSESSMENT
s/p L foot TMA, with chronic dorsal wound at TMA site    Plan:  - Pt seen and examined  - MRI as above, OM of 1st and 2nd metatarsals as well as collection at the tendo Achilles.  - Discussed treatment options with pt, salvage vs. BKA, salvaging would entail use of long term IV abx with surgery including risks of reinfection and chronic OM.   - Patient has spoken with her  and has elected for BKA  - Please consult vasc surg for proximal leg amputation.  - Will cont to follow, will sign off after amputation.

## 2018-03-08 NOTE — PROGRESS NOTE ADULT - PROBLEM SELECTOR PLAN 6
- will hold all home anti-hypertensives at this time given sepsis  - BP stable - stable  - continue atorvastatin

## 2018-03-08 NOTE — PROGRESS NOTE ADULT - SUBJECTIVE AND OBJECTIVE BOX
CC: F/U cellulitis    Interval History/ROS: Patient remains with fever. States she is agreeing to surgery. Denies N/V/D/C, chills.    Allergies  sulfa drugs (Hives)  sulfa drugs (Rash)  Sulfac 10% (Hives)    ANTIMICROBIALS:  cefepime  IVPB 1000 every 24 hours  vancomycin  IVPB 1250 every 24 hours    PE:    Vital Signs Last 24 Hrs  T(C): 38 (08 Mar 2018 13:22), Max: 38.2 (07 Mar 2018 14:20)  T(F): 100.4 (08 Mar 2018 13:22), Max: 100.7 (07 Mar 2018 14:20)  HR: 80 (08 Mar 2018 13:22) (70 - 86)  BP: 162/70 (08 Mar 2018 13:22) (139/55 - 162/70)  BP(mean): --  RR: 17 (08 Mar 2018 13:22) (17 - 18)  SpO2: 100% (08 Mar 2018 13:22) (97% - 100%)    Gen: AOx3, NAD  CV: S1+S2 normal, no murmurs  Resp: Clear bilat, no resp distress  Abd: Soft, nontender, +BS  Ext: Right BKA, left leg with erythema, swelling and warmth  : No Pratt  IV/Skin: No thrombophlebitis  Neuro: no focal deficits      LABS:                          8.3    24.09 )-----------( 327      ( 08 Mar 2018 09:02 )             25.3       03-08    141  |  106  |  38<H>  ----------------------------<  145<H>  4.5   |  24  |  1.33<H>    Ca    8.8      08 Mar 2018 09:02  Phos  4.6     03-08  Mg     2.3     03-08    MICROBIOLOGY:  Vancomycin Level, Random: 10.7 ug/mL (03-08-18 @ 09:02)  v  OTHER  03-06-18   NO GROWTH - PRELIMINARY RESULTS  --  --      LEG - LEFT  03-03-18 --  --  Proteus mirabilis  Staph. aureus *MRSA*  Strep Beta Hemolytic Grp G      BLOOD PERIPHERAL  03-03-18 --  --  --    RADIOLOGY:    No new images.

## 2018-03-08 NOTE — PROGRESS NOTE ADULT - SUBJECTIVE AND OBJECTIVE BOX
Chelsey Olivares MD  Medicine Team 2  Pager [phone]          Subjective: Patient seen and examined. No events overnight. Spoke with  and has elected for BKA. Says she still has cough but denies fevers, chills, CP, SOB, nausea, or vomiting.         VITAL SIGNS:  Vital Signs Last 24 Hrs  T(C): 37.8 (08 Mar 2018 05:03), Max: 38.2 (07 Mar 2018 14:20)  T(F): 100 (08 Mar 2018 05:03), Max: 100.7 (07 Mar 2018 14:20)  HR: 86 (08 Mar 2018 05:03) (70 - 86)  BP: 154/93 (08 Mar 2018 05:03) (139/55 - 154/93)  BP(mean): --  RR: 18 (08 Mar 2018 05:03) (18 - 18)  SpO2: 97% (08 Mar 2018 05:03) (97% - 100%)      PHYSICAL EXAM:     GENERAL: no acute distress  HEENT: PERRLA, EOMI, moist oropharynx   RESPIRATORY: CTAB, no wheezes or crackles, diminished breath sounds at the bases bilaterally   CARDIOVASCULAR: RRR, no murmurs  ABDOMINAL: soft, non-tender, non-distended, positive bowel sounds   EXTREMITIES: right BKA, left transmetatarsal amputation bandage is c/d/i, swelling has decreased  NEUROLOGICAL: alert and oriented x 3, non-focal  SKIN: no rashes or lesions   MUSCULOSKELETAL: right BKA and left transmetatarsal amputation                           8.3    24.09 )-----------( 327      ( 08 Mar 2018 09:02 )             25.3     03-07    139  |  106  |  39<H>  ----------------------------<  134<H>  4.2   |  18<L>  |  1.44<H>    Ca    8.9      07 Mar 2018 05:33  Phos  4.1     03-07  Mg     2.0     03-07        CAPILLARY BLOOD GLUCOSE      POCT Blood Glucose.: 127 mg/dL (08 Mar 2018 08:26)  POCT Blood Glucose.: 148 mg/dL (07 Mar 2018 22:48)  POCT Blood Glucose.: 159 mg/dL (07 Mar 2018 17:34)  POCT Blood Glucose.: 128 mg/dL (07 Mar 2018 12:25)      MEDICATIONS  (STANDING):  aspirin enteric coated 81 milliGRAM(s) Oral daily  atorvastatin 20 milliGRAM(s) Oral at bedtime  cefepime  IVPB 1000 milliGRAM(s) IV Intermittent every 24 hours  clopidogrel Tablet 75 milliGRAM(s) Oral daily  collagenase Ointment 1 Application(s) Topical daily  dextrose 50% Injectable 25 Gram(s) IV Push once  heparin  Injectable 5000 Unit(s) SubCutaneous every 12 hours  insulin detemir injectable (LEVEMIR) 29 Unit(s) SubCutaneous at bedtime  insulin lispro (HumaLOG) corrective regimen sliding scale   SubCutaneous Before meals and at bedtime  insulin lispro Injectable (HumaLOG) 10 Unit(s) SubCutaneous three times a day before meals    MEDICATIONS  (PRN):  acetaminophen   Tablet 650 milliGRAM(s) Oral every 6 hours PRN For Temp greater than 38 C (100.4 F)  benzocaine 15 mG/menthol 3.6 mG Lozenge 1 Lozenge Oral three times a day PRN Sore Throat  guaiFENesin   Syrup  (Sugar-Free) 200 milliGRAM(s) Oral every 6 hours PRN Cough

## 2018-03-09 DIAGNOSIS — N18.9 CHRONIC KIDNEY DISEASE, UNSPECIFIED: ICD-10-CM

## 2018-03-09 LAB
BUN SERPL-MCNC: 36 MG/DL — HIGH (ref 7–23)
CALCIUM SERPL-MCNC: 8.6 MG/DL — SIGNIFICANT CHANGE UP (ref 8.4–10.5)
CHLORIDE SERPL-SCNC: 107 MMOL/L — SIGNIFICANT CHANGE UP (ref 98–107)
CO2 SERPL-SCNC: 20 MMOL/L — LOW (ref 22–31)
CREAT SERPL-MCNC: 1.43 MG/DL — HIGH (ref 0.5–1.3)
GLUCOSE BLDC GLUCOMTR-MCNC: 104 MG/DL — HIGH (ref 70–99)
GLUCOSE BLDC GLUCOMTR-MCNC: 163 MG/DL — HIGH (ref 70–99)
GLUCOSE BLDC GLUCOMTR-MCNC: 177 MG/DL — HIGH (ref 70–99)
GLUCOSE BLDC GLUCOMTR-MCNC: 222 MG/DL — HIGH (ref 70–99)
GLUCOSE SERPL-MCNC: 123 MG/DL — HIGH (ref 70–99)
HCT VFR BLD CALC: 22.2 % — LOW (ref 34.5–45)
HGB BLD-MCNC: 7.3 G/DL — LOW (ref 11.5–15.5)
MAGNESIUM SERPL-MCNC: 1.9 MG/DL — SIGNIFICANT CHANGE UP (ref 1.6–2.6)
MCHC RBC-ENTMCNC: 26.5 PG — LOW (ref 27–34)
MCHC RBC-ENTMCNC: 32.9 % — SIGNIFICANT CHANGE UP (ref 32–36)
MCV RBC AUTO: 80.7 FL — SIGNIFICANT CHANGE UP (ref 80–100)
NRBC # FLD: 0 — SIGNIFICANT CHANGE UP
PHOSPHATE SERPL-MCNC: 4.6 MG/DL — HIGH (ref 2.5–4.5)
PLATELET # BLD AUTO: 333 K/UL — SIGNIFICANT CHANGE UP (ref 150–400)
PMV BLD: 11.5 FL — SIGNIFICANT CHANGE UP (ref 7–13)
POTASSIUM SERPL-MCNC: 4.4 MMOL/L — SIGNIFICANT CHANGE UP (ref 3.5–5.3)
POTASSIUM SERPL-SCNC: 4.4 MMOL/L — SIGNIFICANT CHANGE UP (ref 3.5–5.3)
RBC # BLD: 2.75 M/UL — LOW (ref 3.8–5.2)
RBC # FLD: 15.8 % — HIGH (ref 10.3–14.5)
SODIUM SERPL-SCNC: 143 MMOL/L — SIGNIFICANT CHANGE UP (ref 135–145)
WBC # BLD: 22.94 K/UL — HIGH (ref 3.8–10.5)
WBC # FLD AUTO: 22.94 K/UL — HIGH (ref 3.8–10.5)

## 2018-03-09 PROCEDURE — 99222 1ST HOSP IP/OBS MODERATE 55: CPT

## 2018-03-09 PROCEDURE — 99233 SBSQ HOSP IP/OBS HIGH 50: CPT

## 2018-03-09 PROCEDURE — 99232 SBSQ HOSP IP/OBS MODERATE 35: CPT

## 2018-03-09 RX ORDER — PIPERACILLIN AND TAZOBACTAM 4; .5 G/20ML; G/20ML
3.38 INJECTION, POWDER, LYOPHILIZED, FOR SOLUTION INTRAVENOUS EVERY 8 HOURS
Qty: 0 | Refills: 0 | Status: COMPLETED | OUTPATIENT
Start: 2018-03-09 | End: 2018-03-24

## 2018-03-09 RX ORDER — ACETAMINOPHEN 500 MG
650 TABLET ORAL ONCE
Qty: 0 | Refills: 0 | Status: COMPLETED | OUTPATIENT
Start: 2018-03-09 | End: 2018-03-09

## 2018-03-09 RX ORDER — ACETAMINOPHEN 500 MG
650 TABLET ORAL ONCE
Qty: 0 | Refills: 0 | Status: DISCONTINUED | OUTPATIENT
Start: 2018-03-09 | End: 2018-03-09

## 2018-03-09 RX ADMIN — CLOPIDOGREL BISULFATE 75 MILLIGRAM(S): 75 TABLET, FILM COATED ORAL at 12:16

## 2018-03-09 RX ADMIN — Medication 1 APPLICATION(S): at 12:17

## 2018-03-09 RX ADMIN — PIPERACILLIN AND TAZOBACTAM 25 GRAM(S): 4; .5 INJECTION, POWDER, LYOPHILIZED, FOR SOLUTION INTRAVENOUS at 22:40

## 2018-03-09 RX ADMIN — Medication 650 MILLIGRAM(S): at 03:23

## 2018-03-09 RX ADMIN — Medication 1: at 12:16

## 2018-03-09 RX ADMIN — Medication 2: at 22:39

## 2018-03-09 RX ADMIN — Medication 81 MILLIGRAM(S): at 12:17

## 2018-03-09 RX ADMIN — ISOSORBIDE MONONITRATE 30 MILLIGRAM(S): 60 TABLET, EXTENDED RELEASE ORAL at 12:17

## 2018-03-09 RX ADMIN — Medication 200 MILLIGRAM(S): at 03:10

## 2018-03-09 RX ADMIN — Medication 10 UNIT(S): at 08:54

## 2018-03-09 RX ADMIN — Medication 10 UNIT(S): at 17:29

## 2018-03-09 RX ADMIN — Medication 29 UNIT(S): at 22:40

## 2018-03-09 RX ADMIN — Medication 50 MILLIGRAM(S): at 17:30

## 2018-03-09 RX ADMIN — HEPARIN SODIUM 5000 UNIT(S): 5000 INJECTION INTRAVENOUS; SUBCUTANEOUS at 05:47

## 2018-03-09 RX ADMIN — Medication 650 MILLIGRAM(S): at 12:16

## 2018-03-09 RX ADMIN — SERTRALINE 25 MILLIGRAM(S): 25 TABLET, FILM COATED ORAL at 12:17

## 2018-03-09 RX ADMIN — PIPERACILLIN AND TAZOBACTAM 25 GRAM(S): 4; .5 INJECTION, POWDER, LYOPHILIZED, FOR SOLUTION INTRAVENOUS at 14:23

## 2018-03-09 RX ADMIN — PIPERACILLIN AND TAZOBACTAM 25 GRAM(S): 4; .5 INJECTION, POWDER, LYOPHILIZED, FOR SOLUTION INTRAVENOUS at 05:46

## 2018-03-09 RX ADMIN — Medication 1: at 17:29

## 2018-03-09 RX ADMIN — Medication 166.67 MILLIGRAM(S): at 12:15

## 2018-03-09 RX ADMIN — Medication 50 MILLIGRAM(S): at 05:47

## 2018-03-09 RX ADMIN — ATORVASTATIN CALCIUM 20 MILLIGRAM(S): 80 TABLET, FILM COATED ORAL at 22:40

## 2018-03-09 RX ADMIN — Medication 650 MILLIGRAM(S): at 02:23

## 2018-03-09 RX ADMIN — HEPARIN SODIUM 5000 UNIT(S): 5000 INJECTION INTRAVENOUS; SUBCUTANEOUS at 17:30

## 2018-03-09 RX ADMIN — Medication 10 UNIT(S): at 12:16

## 2018-03-09 NOTE — CONSULT NOTE ADULT - SUBJECTIVE AND OBJECTIVE BOX
Patient is a 73 yo F admitted to the hospital with LLE edema and pain that started 1 week ago.  Patient has an extensive vascular hx including a R BKA in 2005 at an OSH, LLE AT and SFA angioplasty with SFA stent for dissection in 2017 ([facility]) and TMA (2017).  She follows regularly with wound care as an out patient and a week ago, the day after her normal appointment, she had acute onset of pain in the foot inhibiting her from ambulating with her R BKA prosthesis and walker.  She noticed redness and warmth to the LLE.      Initially patient was septic with daily fevers. She is now >48 hours without fevers.  She has negative blood cultures since admission and is on Antibiotics as per ID.      Podiatry has seen the patient and stated that a salvage procedure is not indicated at this time.    Vital Signs Last 24 Hrs  T(C): 38.7 (09 Mar 2018 12:15), Max: 38.7 (09 Mar 2018 12:15)  T(F): 101.7 (09 Mar 2018 12:15), Max: 101.7 (09 Mar 2018 12:15)  HR: 87 (09 Mar 2018 12:15) (63 - 87)  BP: 148/66 (09 Mar 2018 12:15) (124/56 - 162/70)  RR: 18 (09 Mar 2018 12:15) (17 - 18)  SpO2: 97% (09 Mar 2018 12:15) (96% - 100%)    Patient History:    Past Medical History:  CAD (coronary artery disease)    Carotid artery stenosis    DM (diabetes mellitus)    Hypercholesterolemia    Hypertension    Obesity    PAD (peripheral artery disease)  s/p R BKA  S/P CABG x 3  in 2004, [facility]  Stented coronary artery  2010 [facility].     Past Medical History:  CAD (coronary artery disease)    Carotid artery stenosis    DM (diabetes mellitus)    Hypercholesterolemia    Hypertension    Obesity    PAD (peripheral artery disease)  s/p R BKA  S/P CABG x 3  in 2004, [facility]  Stented coronary artery  2010 [facility].    PHYSICAL EXAM:  GENERAL: NAD, well-developed  HEAD:  Atraumatic, Normocephalic  EYES: EOMI, PERRLA, conjunctiva and sclera clear  NECK: Supple, No JVD  CHEST/LUNG: Clear to auscultation bilaterally; No wheeze  HEART: Regular rate and rhythm; No murmurs, rubs, or gallops  ABDOMEN: Soft, Nontender, Nondistended  EXTREMITIES:    Vasular: + DP/PT sig, DP/PT 0/4, CFT to LF flap difficult to assess, foot is warm  Wound  dorsal, distal left foot 1st interspace. Minimal purulent drainage from TMA incision  Size: 3.8 x 1.2 x 2.6 (deep) cm  Depth: 2.6 cm to periosteum, does not probe to bone  PSYCH: AAOx3  NEUROLOGY: non-focal  SKIN: No rashes or lesions

## 2018-03-09 NOTE — PROGRESS NOTE ADULT - PROBLEM SELECTOR PLAN 3
- stable  - continue ASA and plavix - HgbA1c 8.1  - on reduced home doses of basal-bolus insulin while in the hospital  - continue Levemir 29U and humalog to 10U with ISS and FS  - FS well controlled

## 2018-03-09 NOTE — PROGRESS NOTE ADULT - PROBLEM SELECTOR PLAN 4
- HgbA1c 8.1  - on reduced home doses of basal-bolus insulin while in the hospital  - continue Levemir 29U and humalog to 10U with ISS and FS  - FS well controlled - continue home hydralazine and imdur  - BP improved

## 2018-03-09 NOTE — CHART NOTE - NSCHARTNOTEFT_GEN_A_CORE
Will need source control  Spoke to podiatry - OR scheduled for tomorrow  Assuming no contraindication from cardiology, procedure will take place  NPO midnight Will need source control  Spoke to podiatry - OR scheduled for tomorrow  Assuming no contraindication from cardiology, procedure will take place  NPO midnight    Foot amputation by podiatry first given cellulitis  Then BKA by vascular later on once cellulitis heals      Jose Carlos Jones MD  PGY-3 Internal Medicine Resident  [facility] Contact: [phone]  [facility] Contact: Pager #[phone]

## 2018-03-09 NOTE — PROGRESS NOTE ADULT - ASSESSMENT
Patient is a 71 y/o F with a PMH significant for T2DM, PVD s/p R BKA and L transmetatarsal amputation, CAD s/p CABG, CKD stage 3, and HTN who presents to the ED with fevers and left leg pain, found to have sepsis (fever, leukocytosis) with MRI confirming osteomyelitis. Patient and family have decided to pursue BKA, vascular surgery on board.

## 2018-03-09 NOTE — PROGRESS NOTE ADULT - ATTENDING COMMENTS
cardio records from outpt provider in chart - will obtain cardio eval inpt.  follow up with surgery/podiatry regarding further recs.  continue with antibiotics.

## 2018-03-09 NOTE — PROGRESS NOTE ADULT - SUBJECTIVE AND OBJECTIVE BOX
Patient is a 72y old  Female who presents with a chief complaint of Fevers and leg pain (06 Mar 2018 17:24)       INTERVAL HPI/OVERNIGHT EVENTS:  Patient seen and evaluated at bedside.  Pt is resting comfortable in NAD. Denies N/V/F/C.  Pain rated at X/10    Allergies    sulfa drugs (Hives)  sulfa drugs (Rash)  Sulfac 10% (Hives)    Intolerances        Vital Signs Last 24 Hrs  T(C): 37 (09 Mar 2018 05:43), Max: 38 (08 Mar 2018 13:22)  T(F): 98.6 (09 Mar 2018 05:43), Max: 100.4 (08 Mar 2018 13:22)  HR: 63 (09 Mar 2018 05:43) (63 - 80)  BP: 124/56 (09 Mar 2018 05:43) (124/56 - 162/70)  BP(mean): --  RR: 18 (09 Mar 2018 05:43) (17 - 18)  SpO2: 96% (09 Mar 2018 05:43) (96% - 100%)    LABS:                        7.3    22.94 )-----------( 333      ( 09 Mar 2018 05:40 )             22.2     03-09    143  |  107  |  36<H>  ----------------------------<  123<H>  4.4   |  20<L>  |  1.43<H>    Ca    8.6      09 Mar 2018 05:40  Phos  4.6     03-09  Mg     1.9     03-09          CAPILLARY BLOOD GLUCOSE      POCT Blood Glucose.: 104 mg/dL (09 Mar 2018 08:53)  POCT Blood Glucose.: 124 mg/dL (08 Mar 2018 22:42)  POCT Blood Glucose.: 139 mg/dL (08 Mar 2018 17:09)  POCT Blood Glucose.: 162 mg/dL (08 Mar 2018 12:49)      Lower Extremity Physical Exam:  No change in neurovascular status.  Dressings C/D/I. No strikethrough appreciated.  No malodor appreciated.    RADIOLOGY & ADDITIONAL TESTS:

## 2018-03-09 NOTE — CONSULT NOTE ADULT - ATTENDING COMMENTS
agree with above
Pt has no active cardiac conditions. There is no utility in stress testing.  May safely proceed with planned procedure. Would start metoprolol 25 mg daily after the surgery but prior to BKA.
Jose Alberto Palacios MD  Pager [phone]  After 5pm/weekends call [phone]

## 2018-03-09 NOTE — PROGRESS NOTE ADULT - ASSESSMENT
72 female with T2DM, PVD s/p R BKA and L transmetatarsal amputation, CAD s/p CABG, CKD stage 3, HTN, HLD here with fevers 103F, chills and left leg pain. Admitted for cellulitis of left lower extremity with warmth and erythema to that leg. Has elevated ESR and CRP.     MRI with diffuse cellulitis with acute osteo involving of first and second metatarsal remanants, with enhance of the third and fourth metatarsal, and fluid collection within the tenotomy gap. Wound cx with MRSA, strep Grp G and proteus. Leukocytosis slightly improved.    Recommend:   - Continue vancomycin and zosyn  - Monitor vancomycin trough for toxic drug levels  - F/U vascular surgery regarding surgical intervention.

## 2018-03-09 NOTE — CONSULT NOTE ADULT - ASSESSMENT
Patient is a 71 yo F with a PMH significant for T2DM on insulin, PVD s/p R BKA and L transmetatarsal amputation, CAD s/p CABG, CKD stage 3, and HTN admitted for OM of left leg.     Patient has h/o ischemic heart disease s/p CABG in 2004. No h/o heart failure.  TTE on 7/2015 reveals EF 61%. Normal left and right ventricular systolic function. Normal mitral, tricuspid, aortic, and pulmonic valves. MIld MR. Mod pulmonary HTN. Using a prosthetic, patient is able to walk 10 feet before becoming SOB. Pt can walk up 5 steps before becoming SOB. Patient can eat, dress, and use the toilet by herself. Patient cannot do heavy house work nor participate in any type of sport. Patient denies SOB at rest, syncope, palpitations, dizziness, CP.     Functional status: 1 metabolic equivalent. RCRI: Pt has 2 RFs ~3.6% rate of MI, pulmonary edema, ventricular fibrillation, primary cardiac arrest, complete heart block.     Plan:  Given active left foot infection, recommend proceeding with vascular surgery. Patient is high risk for intermediate risk surgery. Patient is optimized from cardiac perspective. No further cardiac testing required at this time. Patient is a 71 yo F with a PMH significant for T2DM on insulin, PVD s/p R BKA and L transmetatarsal amputation, CAD s/p CABG, CKD stage 3, and HTN admitted for OM of left leg.     Patient has h/o ischemic heart disease s/p CABG in 2004. No h/o heart failure.  TTE on 7/2015 reveals EF 61%. Normal left and right ventricular systolic function. Normal mitral, tricuspid, aortic, and pulmonic valves. MIld MR. Mod pulmonary HTN. Using a prosthetic, patient is able to walk 10 feet before becoming SOB. Pt can walk up 5 steps before becoming SOB. Patient can eat, dress, and use the toilet by herself. Patient cannot do heavy house work nor participate in any type of sport. Patient denies SOB at rest, syncope, palpitations, dizziness, CP.     Functional status: 1 metabolic equivalent. RCRI: Pt has 2-3 RFs    Plan:  Given active left foot infection, recommend proceeding with necessary procedure. Patient is at elevated risk. Patient is optimized from cardiac perspective. No further cardiac testing required at this time.  Would consider starting low-dose BB after procedure.

## 2018-03-09 NOTE — PROGRESS NOTE ADULT - PROBLEM SELECTOR PLAN 1
- per ID, switched cefepime to zosyn on 3/8 given persistence of WBC and fevers (Day 7), continue vancomycin for MRSA  - pursuing BKA, appreciated vascular surgery recs  - in anticipation of surgery, EKG in chart, will call patient's cardiologist Dr. Cm ([facility]) - per ID, switched cefepime to zosyn on 3/8 given persistence of WBC and fevers (Day 7), continue vancomycin for MRSA  - pursuing BKA, appreciated vascular surgery recs  - patient will likely need cardiac clearance prior to surgery, will call cardiology to come assess patient for clearance   - EKG in chart

## 2018-03-09 NOTE — CONSULT NOTE ADULT - ASSESSMENT
P:  -podiatry with no plan for salvage at this time, family agreeable to BKA  -will discuss the need for guillotine vs completion   -continued management per primary team    KARLA Alejandro MD, PGYII [id number] A:  Patient is a 72 year old female with PVD and currently cellulitis and infection of previous TMA site.   P:  -podiatry with no plan for salvage at this time, family agreeable to BKA  -will discuss with podiatry if a distal amputation is possible, to allow for an eventual BKA with greater healing potential after further improvement in cellulitis of LLE   -will discuss the need for guillotine vs completion   -continued management per primary team    KARLA Alejandro MD, PGYII [id number]

## 2018-03-09 NOTE — PROGRESS NOTE ADULT - SUBJECTIVE AND OBJECTIVE BOX
CC: Patient is a 72y old  Female who presents with a chief complaint of Fevers and leg pain     Interval History/ROS: Patient remains with left leg pain. Denies fever, chills.    Allergies  sulfa drugs (Hives)  sulfa drugs (Rash)  Sulfac 10% (Hives)    ANTIMICROBIALS:  piperacillin/tazobactam IVPB. 3.375 every 8 hours  vancomycin  IVPB 1250 every 24 hours    PE:    Vital Signs Last 24 Hrs  T(C): 37 (09 Mar 2018 05:43), Max: 38 (08 Mar 2018 13:22)  T(F): 98.6 (09 Mar 2018 05:43), Max: 100.4 (08 Mar 2018 13:22)  HR: 63 (09 Mar 2018 05:43) (63 - 80)  BP: 124/56 (09 Mar 2018 05:43) (124/56 - 162/70)  BP(mean): --  RR: 18 (09 Mar 2018 05:43) (17 - 18)  SpO2: 96% (09 Mar 2018 05:43) (96% - 100%)    Gen: AOx3, pain from left leg  CV: S1+S2 normal, no murmurs  Resp: Clear bilat, no resp distress  Abd: Soft, nontender, +BS  Ext: Right BKA, left leg with warmth - swelling improving  : No Pratt  IV/Skin: No thrombophlebitis  Neuro: no focal deficits    LABS:                          7.3    22.94 )-----------( 333      ( 09 Mar 2018 05:40 )             22.2       03-09    143  |  107  |  36<H>  ----------------------------<  123<H>  4.4   |  20<L>  |  1.43<H>    Ca    8.6      09 Mar 2018 05:40  Phos  4.6     03-09  Mg     1.9     03-09    MICROBIOLOGY:  v  OTHER  03-06-18   RARE  STRDYS^Streptococcus dysgalactiae  --  --      LEG - LEFT  03-03-18 --  --  Proteus mirabilis  Staph. aureus *MRSA*  Strep Beta Hemolytic Grp G      BLOOD PERIPHERAL  03-03-18 --  --  --    RADIOLOGY:  < from: Xray Chest 2 Views PA/Lat (03.08.18 @ 12:12) >  IMPRESSION:   Small bilateral pleural effusions.    < end of copied text >

## 2018-03-09 NOTE — CONSULT NOTE ADULT - SUBJECTIVE AND OBJECTIVE BOX
Patient is a 72y old  Female who presents with a chief complaint of Fevers and leg pain (06 Mar 2018 17:24)      HPI:  Patient is a 71 yo F with a PMH significant for T2DM, PVD s/p R BKA and L transmetatarsal amputation, CAD s/p CABG, CKD stage 3, and HTN who presents to the ED with fevers and left leg pain. The patient was in her usual state of health till Thursday when she began to experience 10/10 shooting pain down her left leg from her knee down. She denied any numbness or tingling. She also noticed fevers of 103-104 at home associated with chills and fatigue. She noticed that her leg was warm and seemed more swollen than usual. She was taking tylenol with minimal relief in her pain. She had gone to her podiatrist's office on Wednesday and her dressing had been changed and she was told that everything looked okay. She states that she noticed her foot had some foul smelling discharge She denied any CP, SOB, abdominal pain, nausea, vomiting, diarrhea, or dysuria. Endorses some rhinorrhea and cough that began a few days ago but denies sick contacts. No recent travel.    Vitals in the ED T- 102.7 (T max 103.1), HR-91 BP-152/58 RR-16 and sating 99% on room air. She received 1L of NS, 925mg tylenol PO, and one dose each of vancomycin and zosyn. (03 Mar 2018 15:47)      PAST MEDICAL & SURGICAL HISTORY:  CAD (coronary artery disease)  Hypertension  Obesity  Hypercholesterolemia  DM (diabetes mellitus)  Carotid artery stenosis  PAD (peripheral artery disease): s/p R BKA  Stented coronary artery: 2010 [facility]  S/P CABG x 3: in 2004, [facility]  History of hysterectomy  S/P BKA (below knee amputation) unilateral, right  S/P CABG x 3  S/P eye surgery: for glaucoma  S/P below knee amputation, right: 6/2010  S/P angioplasty with stent: 2009, 3/2014  S/P hysterectomy: 2004  Hx of CABG: 3v 2004                    MEDICATIONS  (STANDING):  aspirin enteric coated 81 milliGRAM(s) Oral daily  atorvastatin 20 milliGRAM(s) Oral at bedtime  clopidogrel Tablet 75 milliGRAM(s) Oral daily  collagenase Ointment 1 Application(s) Topical daily  dextrose 50% Injectable 25 Gram(s) IV Push once  heparin  Injectable 5000 Unit(s) SubCutaneous every 12 hours  hydrALAZINE 50 milliGRAM(s) Oral two times a day  insulin detemir injectable (LEVEMIR) 29 Unit(s) SubCutaneous at bedtime  insulin lispro (HumaLOG) corrective regimen sliding scale   SubCutaneous Before meals and at bedtime  insulin lispro Injectable (HumaLOG) 10 Unit(s) SubCutaneous three times a day before meals  isosorbide   mononitrate ER Tablet (IMDUR) 30 milliGRAM(s) Oral daily  piperacillin/tazobactam IVPB. 3.375 Gram(s) IV Intermittent every 8 hours  sertraline 25 milliGRAM(s) Oral daily  vancomycin  IVPB 1250 milliGRAM(s) IV Intermittent every 24 hours    MEDICATIONS  (PRN):  acetaminophen   Tablet 650 milliGRAM(s) Oral every 6 hours PRN For Temp greater than 38 C (100.4 F)  benzocaine 15 mG/menthol 3.6 mG Lozenge 1 Lozenge Oral three times a day PRN Sore Throat  guaiFENesin   Syrup  (Sugar-Free) 200 milliGRAM(s) Oral every 6 hours PRN Cough          ECHO  FINDINGS:    < from: Transthoracic Echocardiogram (07.14.15 @ 16:32) >  OBSERVATIONS:  Mitral Valve: Mitral annular calcification, otherwise  normal mitral valve. Mild mitral regurgitation.  Aortic Root: Normal size aortic root. (Ao:2.9 cm).  Aortic Valve: Calcified trileaflet aortic valve with normal  opening.  Left Atrium: Mildly dilated left atrium.  LA volume index =  29 cc/m2.  Left Ventricle: Endocardium not well visualized; grossly  normal left ventricular systolic function. Normal left  ventricular internal dimensions and wall thicknesses.  Right Heart: Normal right atrium. The right ventricle is  not well visualized; grossly normal right ventricular  systolic function. Normal tricuspid valve.  Mild tricuspid  regurgitation. Normal pulmonic valve. Minimal pulmonic  regurgitation.  Pericardium/PleuraNormal pericardium with no pericardial  effusion.  Hemodynamic: Estimated right ventricular systolic pressure  equals 60 mm Hg, assuming right atrial pressure equals 10  mm Hg, consistent with moderate pulmonary hypertension.  ------------------------------------------------------------------------  CONCLUSIONS:  1. Mitral annular calcification, otherwise normal mitral  valve. Mild mitral regurgitation.  2. Mildly dilated left atrium.  LA volume index = 29 cc/m2.  3. Normal left ventricular internal dimensions and wall  thicknesses.  4. Endocardium not well visualized; grossly normal left  ventricular systolic function.  5. The right ventricle is not well visualized; grossly  normal right ventricular systolic function.  6. Estimated right ventricular systolic pressure equals 60  mm Hg, assuming right atrial pressure equals 10 mm Hg,  consistent with moderate pulmonary hypertension.    < end of copied text >        FAMILY HISTORY:  Family history of diabetes mellitus (Sibling)          REVIEW OF SYSTEMS  10 point ROS negative  	          Allergic/Immunologic:	  SOCIAL HISTORY: Lives at home with 2 children and      CIGARETTES: Denies    ALCOHOL: Denies    Vital Signs Last 24 Hrs  T(C): 38.7 (09 Mar 2018 12:15), Max: 38.7 (09 Mar 2018 12:15)  T(F): 101.7 (09 Mar 2018 12:15), Max: 101.7 (09 Mar 2018 12:15)  HR: 87 (09 Mar 2018 12:15) (63 - 87)  BP: 148/66 (09 Mar 2018 12:15) (124/56 - 152/66)  BP(mean): --  RR: 18 (09 Mar 2018 12:15) (18 - 18)  SpO2: 97% (09 Mar 2018 12:15) (96% - 100%)          PHYSICAL EXAM:     GENERAL: no acute distress  HEENT: PERRLA, EOMI, moist oropharynx   RESPIRATORY: poor effort but lungs grossly clear to ausculation   CARDIOVASCULAR: RRR, no murmurs  ABDOMINAL: soft, non-tender, non-distended, positive bowel sounds   EXTREMITIES: right BKA, left foot covered with clean dressing, erythema improved  NEUROLOGICAL: alert and oriented x 3, no focal deficits   SKIN: no rashes or lesions   MUSCULOSKELETAL: right BKA, left transmetatarsal amputation               ECG:    I&O's Detail      LABS:                        7.3    22.94 )-----------( 333      ( 09 Mar 2018 05:40 )             22.2     03-09    143  |  107  |  36<H>  ----------------------------<  123<H>  4.4   |  20<L>  |  1.43<H>    Ca    8.6      09 Mar 2018 05:40  Phos  4.6     03-09  Mg     1.9     03-09              I&O's Summary    BNP  RADIOLOGY & ADDITIONAL STUDIES:

## 2018-03-09 NOTE — CONSULT NOTE ADULT - CONSULT REASON
cardiology pre-op evaluation cardiology pre-op evaluation for foot amputation by podiatry first given cellulitis  Then BKA by vascular later on once cellulitis heals

## 2018-03-09 NOTE — PROGRESS NOTE ADULT - PROBLEM SELECTOR PLAN 2
- resolving  - monitor BMP, avoid nephrotoxic agents, dose vancomycin appropriately to avoid toxicity - stable  - continue ASA and plavix  - will need cardiac clearance prior to vascular surgery, will call cardiology today

## 2018-03-10 ENCOUNTER — RESULT REVIEW (OUTPATIENT)
Age: 73
End: 2018-03-10

## 2018-03-10 LAB
APTT BLD: 36.3 SEC — SIGNIFICANT CHANGE UP (ref 27.5–37.4)
BASOPHILS # BLD AUTO: 0.06 K/UL — SIGNIFICANT CHANGE UP (ref 0–0.2)
BASOPHILS NFR BLD AUTO: 0.3 % — SIGNIFICANT CHANGE UP (ref 0–2)
BLD GP AB SCN SERPL QL: NEGATIVE — SIGNIFICANT CHANGE UP
BUN SERPL-MCNC: 38 MG/DL — HIGH (ref 7–23)
BUN SERPL-MCNC: 39 MG/DL — HIGH (ref 7–23)
CALCIUM SERPL-MCNC: 8.4 MG/DL — SIGNIFICANT CHANGE UP (ref 8.4–10.5)
CALCIUM SERPL-MCNC: 8.5 MG/DL — SIGNIFICANT CHANGE UP (ref 8.4–10.5)
CHLORIDE SERPL-SCNC: 104 MMOL/L — SIGNIFICANT CHANGE UP (ref 98–107)
CHLORIDE SERPL-SCNC: 104 MMOL/L — SIGNIFICANT CHANGE UP (ref 98–107)
CK MB BLD-MCNC: 4.04 NG/ML — SIGNIFICANT CHANGE UP (ref 1–4.7)
CK MB BLD-MCNC: 8.19 NG/ML — HIGH (ref 1–4.7)
CK MB BLD-MCNC: SIGNIFICANT CHANGE UP (ref 0–2.5)
CK SERPL-CCNC: 128 U/L — SIGNIFICANT CHANGE UP (ref 25–170)
CK SERPL-CCNC: 67 U/L — SIGNIFICANT CHANGE UP (ref 25–170)
CO2 SERPL-SCNC: 17 MMOL/L — LOW (ref 22–31)
CO2 SERPL-SCNC: 18 MMOL/L — LOW (ref 22–31)
CREAT SERPL-MCNC: 1.62 MG/DL — HIGH (ref 0.5–1.3)
CREAT SERPL-MCNC: 1.63 MG/DL — HIGH (ref 0.5–1.3)
EOSINOPHIL # BLD AUTO: 0.08 K/UL — SIGNIFICANT CHANGE UP (ref 0–0.5)
EOSINOPHIL NFR BLD AUTO: 0.4 % — SIGNIFICANT CHANGE UP (ref 0–6)
GLUCOSE BLDC GLUCOMTR-MCNC: 159 MG/DL — HIGH (ref 70–99)
GLUCOSE BLDC GLUCOMTR-MCNC: 200 MG/DL — HIGH (ref 70–99)
GLUCOSE BLDC GLUCOMTR-MCNC: 245 MG/DL — HIGH (ref 70–99)
GLUCOSE SERPL-MCNC: 169 MG/DL — HIGH (ref 70–99)
GLUCOSE SERPL-MCNC: 205 MG/DL — HIGH (ref 70–99)
GRAM STN SPEC: SIGNIFICANT CHANGE UP
GRAM STN WND: SIGNIFICANT CHANGE UP
GRAM STN WND: SIGNIFICANT CHANGE UP
HCT VFR BLD CALC: 23.2 % — LOW (ref 34.5–45)
HCT VFR BLD CALC: 31 % — LOW (ref 34.5–45)
HGB BLD-MCNC: 10.2 G/DL — LOW (ref 11.5–15.5)
HGB BLD-MCNC: 7.4 G/DL — LOW (ref 11.5–15.5)
IMM GRANULOCYTES # BLD AUTO: 0.37 # — SIGNIFICANT CHANGE UP
IMM GRANULOCYTES NFR BLD AUTO: 1.8 % — HIGH (ref 0–1.5)
INR BLD: 1.31 — HIGH (ref 0.88–1.17)
LYMPHOCYTES # BLD AUTO: 1.09 K/UL — SIGNIFICANT CHANGE UP (ref 1–3.3)
LYMPHOCYTES # BLD AUTO: 5.3 % — LOW (ref 13–44)
MAGNESIUM SERPL-MCNC: 2.1 MG/DL — SIGNIFICANT CHANGE UP (ref 1.6–2.6)
MCHC RBC-ENTMCNC: 25.9 PG — LOW (ref 27–34)
MCHC RBC-ENTMCNC: 26.9 PG — LOW (ref 27–34)
MCHC RBC-ENTMCNC: 31.9 % — LOW (ref 32–36)
MCHC RBC-ENTMCNC: 32.9 % — SIGNIFICANT CHANGE UP (ref 32–36)
MCV RBC AUTO: 81.1 FL — SIGNIFICANT CHANGE UP (ref 80–100)
MCV RBC AUTO: 81.8 FL — SIGNIFICANT CHANGE UP (ref 80–100)
MONOCYTES # BLD AUTO: 0.72 K/UL — SIGNIFICANT CHANGE UP (ref 0–0.9)
MONOCYTES NFR BLD AUTO: 3.5 % — SIGNIFICANT CHANGE UP (ref 2–14)
NEUTROPHILS # BLD AUTO: 18.19 K/UL — HIGH (ref 1.8–7.4)
NEUTROPHILS NFR BLD AUTO: 88.7 % — HIGH (ref 43–77)
NRBC # FLD: 0 — SIGNIFICANT CHANGE UP
NRBC # FLD: 0 — SIGNIFICANT CHANGE UP
PHOSPHATE SERPL-MCNC: 4.6 MG/DL — HIGH (ref 2.5–4.5)
PLATELET # BLD AUTO: 363 K/UL — SIGNIFICANT CHANGE UP (ref 150–400)
PLATELET # BLD AUTO: 408 K/UL — HIGH (ref 150–400)
PMV BLD: 11.4 FL — SIGNIFICANT CHANGE UP (ref 7–13)
PMV BLD: 11.7 FL — SIGNIFICANT CHANGE UP (ref 7–13)
POTASSIUM SERPL-MCNC: 5 MMOL/L — SIGNIFICANT CHANGE UP (ref 3.5–5.3)
POTASSIUM SERPL-MCNC: 5.3 MMOL/L — SIGNIFICANT CHANGE UP (ref 3.5–5.3)
POTASSIUM SERPL-SCNC: 5 MMOL/L — SIGNIFICANT CHANGE UP (ref 3.5–5.3)
POTASSIUM SERPL-SCNC: 5.3 MMOL/L — SIGNIFICANT CHANGE UP (ref 3.5–5.3)
PROTHROM AB SERPL-ACNC: 14.6 SEC — HIGH (ref 9.8–13.1)
RBC # BLD: 2.86 M/UL — LOW (ref 3.8–5.2)
RBC # BLD: 3.79 M/UL — LOW (ref 3.8–5.2)
RBC # FLD: 15.4 % — HIGH (ref 10.3–14.5)
RBC # FLD: 15.9 % — HIGH (ref 10.3–14.5)
RH IG SCN BLD-IMP: POSITIVE — SIGNIFICANT CHANGE UP
SODIUM SERPL-SCNC: 138 MMOL/L — SIGNIFICANT CHANGE UP (ref 135–145)
SODIUM SERPL-SCNC: 139 MMOL/L — SIGNIFICANT CHANGE UP (ref 135–145)
SPECIMEN SOURCE: SIGNIFICANT CHANGE UP
TROPONIN T SERPL-MCNC: 0.26 NG/ML — HIGH (ref 0–0.06)
TROPONIN T SERPL-MCNC: 0.43 NG/ML — HIGH (ref 0–0.06)
VANCOMYCIN TROUGH SERPL-MCNC: 17.1 UG/ML — SIGNIFICANT CHANGE UP (ref 10–20)
WBC # BLD: 19.24 K/UL — HIGH (ref 3.8–10.5)
WBC # BLD: 20.51 K/UL — HIGH (ref 3.8–10.5)
WBC # FLD AUTO: 19.24 K/UL — HIGH (ref 3.8–10.5)
WBC # FLD AUTO: 20.51 K/UL — HIGH (ref 3.8–10.5)

## 2018-03-10 PROCEDURE — 88307 TISSUE EXAM BY PATHOLOGIST: CPT | Mod: 26

## 2018-03-10 PROCEDURE — 93010 ELECTROCARDIOGRAM REPORT: CPT

## 2018-03-10 PROCEDURE — 99233 SBSQ HOSP IP/OBS HIGH 50: CPT | Mod: GC

## 2018-03-10 PROCEDURE — 93010 ELECTROCARDIOGRAM REPORT: CPT | Mod: 77

## 2018-03-10 PROCEDURE — 73630 X-RAY EXAM OF FOOT: CPT | Mod: 26,LT

## 2018-03-10 PROCEDURE — 71045 X-RAY EXAM CHEST 1 VIEW: CPT | Mod: 26

## 2018-03-10 RX ORDER — HYDROMORPHONE HYDROCHLORIDE 2 MG/ML
0.5 INJECTION INTRAMUSCULAR; INTRAVENOUS; SUBCUTANEOUS
Qty: 0 | Refills: 0 | Status: DISCONTINUED | OUTPATIENT
Start: 2018-03-10 | End: 2018-03-16

## 2018-03-10 RX ORDER — ACETAMINOPHEN 500 MG
1000 TABLET ORAL ONCE
Qty: 0 | Refills: 0 | Status: DISCONTINUED | OUTPATIENT
Start: 2018-03-10 | End: 2018-03-27

## 2018-03-10 RX ORDER — INSULIN LISPRO 100/ML
5 VIAL (ML) SUBCUTANEOUS ONCE
Qty: 0 | Refills: 0 | Status: COMPLETED | OUTPATIENT
Start: 2018-03-10 | End: 2018-03-10

## 2018-03-10 RX ORDER — ASPIRIN/CALCIUM CARB/MAGNESIUM 324 MG
81 TABLET ORAL DAILY
Qty: 0 | Refills: 0 | Status: DISCONTINUED | OUTPATIENT
Start: 2018-03-10 | End: 2018-03-12

## 2018-03-10 RX ORDER — HYDROMORPHONE HYDROCHLORIDE 2 MG/ML
0.5 INJECTION INTRAMUSCULAR; INTRAVENOUS; SUBCUTANEOUS
Qty: 0 | Refills: 0 | Status: DISCONTINUED | OUTPATIENT
Start: 2018-03-10 | End: 2018-03-10

## 2018-03-10 RX ORDER — ONDANSETRON 8 MG/1
4 TABLET, FILM COATED ORAL ONCE
Qty: 0 | Refills: 0 | Status: DISCONTINUED | OUTPATIENT
Start: 2018-03-10 | End: 2018-03-16

## 2018-03-10 RX ORDER — METOCLOPRAMIDE HCL 10 MG
10 TABLET ORAL ONCE
Qty: 0 | Refills: 0 | Status: DISCONTINUED | OUTPATIENT
Start: 2018-03-10 | End: 2018-03-16

## 2018-03-10 RX ORDER — ONDANSETRON 8 MG/1
4 TABLET, FILM COATED ORAL ONCE
Qty: 0 | Refills: 0 | Status: DISCONTINUED | OUTPATIENT
Start: 2018-03-10 | End: 2018-03-10

## 2018-03-10 RX ORDER — SODIUM CHLORIDE 9 MG/ML
1000 INJECTION INTRAMUSCULAR; INTRAVENOUS; SUBCUTANEOUS
Qty: 0 | Refills: 0 | Status: DISCONTINUED | OUTPATIENT
Start: 2018-03-10 | End: 2018-03-11

## 2018-03-10 RX ORDER — METOCLOPRAMIDE HCL 10 MG
10 TABLET ORAL ONCE
Qty: 0 | Refills: 0 | Status: DISCONTINUED | OUTPATIENT
Start: 2018-03-10 | End: 2018-03-10

## 2018-03-10 RX ORDER — ASPIRIN/CALCIUM CARB/MAGNESIUM 324 MG
325 TABLET ORAL ONCE
Qty: 0 | Refills: 0 | Status: DISCONTINUED | OUTPATIENT
Start: 2018-03-10 | End: 2018-03-10

## 2018-03-10 RX ADMIN — HEPARIN SODIUM 5000 UNIT(S): 5000 INJECTION INTRAVENOUS; SUBCUTANEOUS at 16:52

## 2018-03-10 RX ADMIN — Medication 50 MILLIGRAM(S): at 05:05

## 2018-03-10 RX ADMIN — Medication 29 UNIT(S): at 22:26

## 2018-03-10 RX ADMIN — PIPERACILLIN AND TAZOBACTAM 25 GRAM(S): 4; .5 INJECTION, POWDER, LYOPHILIZED, FOR SOLUTION INTRAVENOUS at 16:52

## 2018-03-10 RX ADMIN — ATORVASTATIN CALCIUM 20 MILLIGRAM(S): 80 TABLET, FILM COATED ORAL at 21:55

## 2018-03-10 RX ADMIN — Medication 2: at 22:30

## 2018-03-10 RX ADMIN — Medication 5 UNIT(S): at 17:10

## 2018-03-10 RX ADMIN — PIPERACILLIN AND TAZOBACTAM 25 GRAM(S): 4; .5 INJECTION, POWDER, LYOPHILIZED, FOR SOLUTION INTRAVENOUS at 05:05

## 2018-03-10 RX ADMIN — Medication 166.67 MILLIGRAM(S): at 16:22

## 2018-03-10 RX ADMIN — CLOPIDOGREL BISULFATE 75 MILLIGRAM(S): 75 TABLET, FILM COATED ORAL at 14:24

## 2018-03-10 RX ADMIN — SODIUM CHLORIDE 65 MILLILITER(S): 9 INJECTION INTRAMUSCULAR; INTRAVENOUS; SUBCUTANEOUS at 12:12

## 2018-03-10 RX ADMIN — SERTRALINE 25 MILLIGRAM(S): 25 TABLET, FILM COATED ORAL at 16:52

## 2018-03-10 RX ADMIN — Medication 81 MILLIGRAM(S): at 14:07

## 2018-03-10 NOTE — PROGRESS NOTE ADULT - ASSESSMENT
Plan:   Pt is scheduled for LEFT foot ankle disarticulation with Dr. Meraz at 730. Patient is aware of procedure and is NPO since midnight.  CXR on sunrise.  EKG on sunrise.  Medical/Cardiac clearance since 3/9 and documented in chart.  Consent signed and in chart.  Procedure was explained to patient in detail. All alternatives, risks and complications were discussed. All questions answered.

## 2018-03-10 NOTE — PROGRESS NOTE ADULT - ASSESSMENT
72F h/o DM2, PVD s/p R BKA and L transmetatarsal amputation, CAD s/p CABG, CKD III, HTN adm 3/3/18 with fevers and LLLE pain found to be in sepsis 2/2 cellulitis and OM of LLE.  Plan for ankle disarticulation today 3/10 with Podiatry.

## 2018-03-10 NOTE — BRIEF OPERATIVE NOTE - OPERATION/FINDINGS
s/p LA symes amp, ankle disarticulation and incision and drainage of ankle and Achilles tendon abscess. Stage procedure for BKA w/ Vasc. 5cc of Tophi vs. liquefactive material in Ankle joint. 3cc of purulence from medial mal and lat mal. Tracking of achilles tendon wound up post leg approx 7 cm s/p LA Syme's ankle disarticulation and incision and drainage of ankle and Achilles tendon abscess.  Staged procedure for BKA w/ Vasc. 5cc of Tophi vs. liquefactive material in Ankle joint. 3cc of purulence from medial mal and lat mal. Tracking of achilles tendon wound up post leg approx 5 cm

## 2018-03-10 NOTE — CHART NOTE - NSCHARTNOTEFT_GEN_A_CORE
VASCULAR POST OP CHECK    S: Pt seen and examined. Doing well postoperatively. Pain well controlled. Denies N/V/CP/SOB.       O:   PE:  Gen: NAD, laying comfortably in bed, but appears confused  Resp: Unlabored breathing  Dressing: c/d/i                          10.2   20.51 )-----------( 408      ( 10 Mar 2018 12:20 )             31.0     03-10    139  |  104  |  38<H>  ----------------------------<  205<H>  5.3   |  18<L>  |  1.62<H>    Ca    8.5      10 Mar 2018 12:20  Phos  4.6     03-10  Mg     2.1     03-10        Vital Signs Last 24 Hrs  T(C): 37.1 (10 Mar 2018 16:00), Max: 37.7 (09 Mar 2018 21:31)  T(F): 98.7 (10 Mar 2018 16:00), Max: 99.8 (09 Mar 2018 21:31)  HR: 71 (10 Mar 2018 16:00) (69 - 78)  BP: 124/51 (10 Mar 2018 16:00) (116/99 - 138/72)  BP(mean): --  RR: 19 (10 Mar 2018 16:00) (17 - 28)  SpO2: 100% (10 Mar 2018 16:00) (95% - 100%)  I&O's Summary    CARDIAC MARKERS ( 10 Mar 2018 12:20 )  x     / 0.26 ng/mL / 67 u/L / 4.04 ng/mL / x            A/P s/p ankle disarticulation and I&D with purulence noted tracking to the mid-tibia and EKG intraop    -pain control  - trend cardiac enzymes  -source control  -c/w abx  -pt will likely need BKA at least given spread of disease  - care per primary team    [id number]

## 2018-03-10 NOTE — PROGRESS NOTE ADULT - PROBLEM SELECTOR PLAN 7
- Diet: NPO for procedure, CC otherwise.  - Holding pharmacologic DVT PPX this AM - otherwise HSQ.    Dispo: pending surgical procedure.    Corrina Olson MD  PGY-2 | Internal Medicine  [phone] / [phone]

## 2018-03-10 NOTE — PROGRESS NOTE ADULT - ATTENDING COMMENTS
patient seen and examine at bed side  agree with above plan and management   72F h/o DM2, PVD s/p R BKA and L transmetatarsal amputation, CAD s/p CABG, CKD III, HTN adm 3/3/18 with fevers and LLLE pain found to be in sepsis 2/2 cellulitis and OM of LLE.  Plan for ankle disarticulation today 3/10 with Podiatry.  osteo - continue abx  f/u with ID  CAD/ HTN   continue cardiac medications   f/u with cardio

## 2018-03-10 NOTE — BRIEF OPERATIVE NOTE - PROCEDURE
<<-----Click on this checkbox to enter Procedure Disarticulation of ankle  03/10/2018    Active  DEACON

## 2018-03-10 NOTE — PROGRESS NOTE ADULT - SUBJECTIVE AND OBJECTIVE BOX
Patient is a 72y old  Female who presents with a chief complaint of Fevers and leg pain (06 Mar 2018 17:24)      SUBJECTIVE / OVERNIGHT EVENTS: NAEON.  Spoke to Podiatry resident, who requested 1U pRBCs prior to taking to OR.  T+S pending currently.    MEDICATIONS  (STANDING):  aspirin enteric coated 81 milliGRAM(s) Oral daily  atorvastatin 20 milliGRAM(s) Oral at bedtime  clopidogrel Tablet 75 milliGRAM(s) Oral daily  collagenase Ointment 1 Application(s) Topical daily  dextrose 50% Injectable 25 Gram(s) IV Push once  heparin  Injectable 5000 Unit(s) SubCutaneous every 12 hours  hydrALAZINE 50 milliGRAM(s) Oral two times a day  insulin detemir injectable (LEVEMIR) 29 Unit(s) SubCutaneous at bedtime  insulin lispro (HumaLOG) corrective regimen sliding scale   SubCutaneous Before meals and at bedtime  insulin lispro Injectable (HumaLOG) 10 Unit(s) SubCutaneous three times a day before meals  isosorbide   mononitrate ER Tablet (IMDUR) 30 milliGRAM(s) Oral daily  piperacillin/tazobactam IVPB. 3.375 Gram(s) IV Intermittent every 8 hours  sertraline 25 milliGRAM(s) Oral daily  vancomycin  IVPB 1250 milliGRAM(s) IV Intermittent every 24 hours    MEDICATIONS  (PRN):  acetaminophen   Tablet 650 milliGRAM(s) Oral every 6 hours PRN For Temp greater than 38 C (100.4 F)  benzocaine 15 mG/menthol 3.6 mG Lozenge 1 Lozenge Oral three times a day PRN Sore Throat  guaiFENesin   Syrup  (Sugar-Free) 200 milliGRAM(s) Oral every 6 hours PRN Cough      T(C): 37.5 (03-10-18 @ 06:19), Max: 38.7 (03-09-18 @ 12:15)  HR: 78 (03-10-18 @ 06:19) (74 - 87)  BP: 137/67 (03-10-18 @ 06:19) (118/57 - 148/66)  RR: 18 (03-10-18 @ 06:19) (17 - 18)  SpO2: 97% (03-10-18 @ 06:19) (97% - 98%)    CAPILLARY BLOOD GLUCOSE      POCT Blood Glucose.: 159 mg/dL (10 Mar 2018 06:31)  POCT Blood Glucose.: 222 mg/dL (09 Mar 2018 22:37)  POCT Blood Glucose.: 163 mg/dL (09 Mar 2018 17:20)  POCT Blood Glucose.: 177 mg/dL (09 Mar 2018 12:11)  POCT Blood Glucose.: 104 mg/dL (09 Mar 2018 08:53)    I&O's Summary      PHYSICAL EXAM:  GENERAL:  HEAD:  EYES:  NECK:  CHEST/LUNG:  HEART:  ABDOMEN:  EXTREMITIES:  PSYCH:  NEUROLOGY:  SKIN:    LABS:                        7.4    19.24 )-----------( 363      ( 10 Mar 2018 05:30 )             23.2     03-10    138  |  104  |  39<H>  ----------------------------<  169<H>  5.0   |  17<L>  |  1.63<H>    Ca    8.4      10 Mar 2018 05:30  Phos  4.6     03-10  Mg     2.1     03-10      PT/INR - ( 10 Mar 2018 05:30 )   PT: 14.6 SEC;   INR: 1.31          PTT - ( 10 Mar 2018 05:30 )  PTT:36.3 SEC          RADIOLOGY & ADDITIONAL TESTS:    Imaging Personally Reviewed: no new imaging.    Consultant(s) Notes Reviewed: Podiatry.    Care Discussed with Consultants/Other Providers: Patient is a 72y old  Female who presents with a chief complaint of Fevers and leg pain (06 Mar 2018 17:24)      SUBJECTIVE / OVERNIGHT EVENTS: NAEON.  Spoke to Podiatry resident, who requested 1U pRBCs prior to taking to OR.  Patient reports continued L leg pain today.  She denies chest pain, SOB, N/V, abd pain.    MEDICATIONS  (STANDING):  aspirin enteric coated 81 milliGRAM(s) Oral daily  atorvastatin 20 milliGRAM(s) Oral at bedtime  clopidogrel Tablet 75 milliGRAM(s) Oral daily  collagenase Ointment 1 Application(s) Topical daily  dextrose 50% Injectable 25 Gram(s) IV Push once  heparin  Injectable 5000 Unit(s) SubCutaneous every 12 hours  hydrALAZINE 50 milliGRAM(s) Oral two times a day  insulin detemir injectable (LEVEMIR) 29 Unit(s) SubCutaneous at bedtime  insulin lispro (HumaLOG) corrective regimen sliding scale   SubCutaneous Before meals and at bedtime  insulin lispro Injectable (HumaLOG) 10 Unit(s) SubCutaneous three times a day before meals  isosorbide   mononitrate ER Tablet (IMDUR) 30 milliGRAM(s) Oral daily  piperacillin/tazobactam IVPB. 3.375 Gram(s) IV Intermittent every 8 hours  sertraline 25 milliGRAM(s) Oral daily  vancomycin  IVPB 1250 milliGRAM(s) IV Intermittent every 24 hours    MEDICATIONS  (PRN):  acetaminophen   Tablet 650 milliGRAM(s) Oral every 6 hours PRN For Temp greater than 38 C (100.4 F)  benzocaine 15 mG/menthol 3.6 mG Lozenge 1 Lozenge Oral three times a day PRN Sore Throat  guaiFENesin   Syrup  (Sugar-Free) 200 milliGRAM(s) Oral every 6 hours PRN Cough      T(C): 37.5 (03-10-18 @ 06:19), Max: 38.7 (03-09-18 @ 12:15)  HR: 78 (03-10-18 @ 06:19) (74 - 87)  BP: 137/67 (03-10-18 @ 06:19) (118/57 - 148/66)  RR: 18 (03-10-18 @ 06:19) (17 - 18)  SpO2: 97% (03-10-18 @ 06:19) (97% - 98%)    CAPILLARY BLOOD GLUCOSE      POCT Blood Glucose.: 159 mg/dL (10 Mar 2018 06:31)  POCT Blood Glucose.: 222 mg/dL (09 Mar 2018 22:37)  POCT Blood Glucose.: 163 mg/dL (09 Mar 2018 17:20)  POCT Blood Glucose.: 177 mg/dL (09 Mar 2018 12:11)  POCT Blood Glucose.: 104 mg/dL (09 Mar 2018 08:53)    I&O's Summary    Gen: awake, alert  HENT:  MMM  Eye: sclerae anicteric  CV: normal rate, regular rhythm  Pulm: CTAB anteriorly, no w/r/r auscultated  Abd: +BS, soft, NT, ND  Skin: warmth and tenderness of LLE below knee; s/p R BKA  Ext: no LLE edema  Neuro: answering questions appropriately, following commands appropriately, recent and remote memory intact  Psych: normal mood / affect    LABS:                        7.4    19.24 )-----------( 363      ( 10 Mar 2018 05:30 )             23.2     03-10    138  |  104  |  39<H>  ----------------------------<  169<H>  5.0   |  17<L>  |  1.63<H>    Ca    8.4      10 Mar 2018 05:30  Phos  4.6     03-10  Mg     2.1     03-10      PT/INR - ( 10 Mar 2018 05:30 )   PT: 14.6 SEC;   INR: 1.31          PTT - ( 10 Mar 2018 05:30 )  PTT:36.3 SEC          RADIOLOGY & ADDITIONAL TESTS:    Imaging Personally Reviewed: no new imaging.    Consultant(s) Notes Reviewed: Podiatry.    Care Discussed with Consultants/Other Providers:

## 2018-03-10 NOTE — CHART NOTE - NSCHARTNOTEFT_GEN_A_CORE
R2 MEDICINE UPDATE NOTE    Called to bedside by Podiatry resident Hansel for EKG changes noted on post-operative EKG.  Evaluated patient at bedside.  HR 70s, /76, SpO2 97% post-extubation on face tent.  Patient denies chest pain, chest discomfort, abd pain, SOB.  Spoke to Cardiology fellow Crow, we will trend cardiac enzymes, perform serial EKGs, and continue to monitor for chest pain.  Per Cardiology, TWI could possibly be 2/2 catecholaminergic surge in the immediate post-operative setting given that patient does not have chest pain.  If the patient experiences chest pain or Edwardo elevated or TWI worsen, Cardiology will be informed.  Plan for Edwardo now, then 6pm, as well as EKG @ 2pm, then 6pm.    Informed coverage Medicine resident Dexter of new plan, who expressed understanding.    Corrina Olson MD  PGY-2 | Internal Medicine  [phone] / [phone] R2 MEDICINE UPDATE NOTE    Called to bedside by Podiatry resident Hansel for EKG changes noted on post-operative EKG.  Evaluated patient at bedside.  HR 70s, /76, SpO2 97% post-extubation on face tent.  Patient denies chest pain, chest discomfort, abd pain, SOB.  Spoke to Cardiology fellow Crow, we will trend cardiac enzymes, perform serial EKGs, and continue to monitor for chest pain.  Per Cardiology, TWI could possibly be 2/2 catecholaminergic surge in the immediate post-operative setting given that patient does not have chest pain.  If the patient experiences chest pain or Edwardo elevated or TWI worsen, Cardiology will be informed.  Plan for Edwardo now, then 6pm, as well as EKG @ 6pm.  TTE also ordered per Cardiology recommendations.    Informed coverage Medicine resident Dexter of new plan, who expressed understanding.  Plan to be communicated to NF.    Corrina Olson MD  PGY-2 | Internal Medicine  [phone] / [phone] R2 MEDICINE UPDATE NOTE    Called to bedside by Podiatry resident Hansel for EKG changes noted on post-operative EKG.  Evaluated patient at bedside.  HR 70s, /76, SpO2 97% post-extubation on face tent.  Patient denies chest pain, chest discomfort, abd pain, SOB.  EKG reviewed, new TWI noted in I, II, V4-6.  Spoke to Cardiology fellow Crow, we will trend cardiac enzymes, perform serial EKGs, and continue to monitor for chest pain.  Per Cardiology, TWI could possibly be 2/2 catecholaminergic surge in the immediate post-operative setting given that patient does not have chest pain.  If the patient experiences chest pain or Edwardo elevated or TWI worsen, Cardiology will be informed.  Plan for Edwardo now, then 6pm, as well as EKG @ 6pm.  TTE also ordered per Cardiology recommendations.    Informed coverage Medicine resident Dexter of new plan, who expressed understanding.  Plan to be communicated to NF.    Corrina Olson MD  PGY-2 | Internal Medicine  [phone] / [phone] R2 MEDICINE UPDATE NOTE    Called to bedside by Podiatry resident Hansel for EKG changes noted on post-operative EKG.  Evaluated patient at bedside.  HR 70s, /76, SpO2 97% post-extubation on face tent.  Patient denies chest pain, chest discomfort, abd pain, SOB.  EKG reviewed, new TWI noted in I, II, V4-6.  Spoke to Cardiology fellow Crow, we will trend cardiac enzymes, perform serial EKGs, and continue to monitor for chest pain.  Per Cardiology, TWI could possibly be 2/2 catecholaminergic surge in the immediate post-operative setting given that patient does not have chest pain.  If the patient experiences chest pain or Edwardo elevated or TWI worsen, Cardiology will be informed.  Plan for Edwardo now, then 6pm, as well as EKG @ 6pm.  TTE also ordered per Cardiology recommendations.    Informed coverage Medicine resident Dexter of new plan, who expressed understanding.  Plan to be communicated to NF by coverage resident.    Corrina Olson MD  PGY-2 | Internal Medicine  [phone] / [id number] R2 MEDICINE UPDATE NOTE    Called to bedside by Podiatry resident Hansel for EKG changes noted on post-operative EKG.  Evaluated patient at bedside.  HR 70s, /76, SpO2 97% post-extubation on face tent.  Patient denies chest pain, chest discomfort, abd pain, SOB.  EKG reviewed, new TWI noted in I, II, V4-6.  Spoke to Cardiology fellow Crow, we will trend cardiac enzymes, perform serial EKGs, and continue to monitor for chest pain.  Per Cardiology, TWI could possibly be 2/2 catecholaminergic surge in the immediate post-operative setting given that patient does not have chest pain.  If the patient experiences chest pain or Edwardo elevated or TWI worsen, Cardiology will be informed.  Plan for Edwardo now, then 6pm, as well as EKG @ 6pm.  TTE also ordered per Cardiology recommendations.  Pt to be transferred to tele.    Informed coverage Medicine resident Dexter of new plan, who expressed understanding.  Plan to be communicated to NF by coverage resident.    Corrina Oslon MD  PGY-2 | Internal Medicine  [phone] / [phone] R2 MEDICINE UPDATE NOTE    Called to bedside by Podiatry resident Hansel for EKG changes noted on post-operative EKG.  Evaluated patient at bedside.  HR 70s, /76, SpO2 97% post-extubation on face tent.  Patient denies chest pain, chest discomfort, abd pain, SOB.  EKG reviewed, new TWI noted in I, II, V4-6.  Spoke to Cardiology fellow Crow, we will trend cardiac enzymes, perform serial EKGs, and continue to monitor for chest pain.  Per Cardiology, TWI could possibly be 2/2 catecholaminergic surge in the immediate post-operative setting given that patient does not have chest pain.  If the patient experiences chest pain or Edwardo elevated or TWI worsen, Cardiology will be informed.  Plan for Edwardo now, then 6pm and 12:01am on 3/11, as well as EKG @ 6pm.  TTE also ordered per Cardiology recommendations.  Pt to be transferred to tele.    Informed coverage Medicine resident Dexter of new plan, who expressed understanding.  Plan to be communicated to NF by coverage resident.    Corrina Olson MD  PGY-2 | Internal Medicine  [phone] / [phone]

## 2018-03-10 NOTE — PROGRESS NOTE ADULT - SUBJECTIVE AND OBJECTIVE BOX
Patient is a 72y old  Female who presents with a chief complaint of Fevers and leg pain (06 Mar 2018 17:24)      INTERVAL HPI/OVERNIGHT EVENTS:   Pt is scheduled for LEFT foot ankle disarticulation with Dr. Meraz at 730. Patient is aware of procedure and is NPO since midnight.    MEDICATIONS  (STANDING):  aspirin enteric coated 81 milliGRAM(s) Oral daily  atorvastatin 20 milliGRAM(s) Oral at bedtime  clopidogrel Tablet 75 milliGRAM(s) Oral daily  collagenase Ointment 1 Application(s) Topical daily  dextrose 50% Injectable 25 Gram(s) IV Push once  heparin  Injectable 5000 Unit(s) SubCutaneous every 12 hours  hydrALAZINE 50 milliGRAM(s) Oral two times a day  insulin detemir injectable (LEVEMIR) 29 Unit(s) SubCutaneous at bedtime  insulin lispro (HumaLOG) corrective regimen sliding scale   SubCutaneous Before meals and at bedtime  insulin lispro Injectable (HumaLOG) 10 Unit(s) SubCutaneous three times a day before meals  isosorbide   mononitrate ER Tablet (IMDUR) 30 milliGRAM(s) Oral daily  piperacillin/tazobactam IVPB. 3.375 Gram(s) IV Intermittent every 8 hours  sertraline 25 milliGRAM(s) Oral daily  vancomycin  IVPB 1250 milliGRAM(s) IV Intermittent every 24 hours    MEDICATIONS  (PRN):  acetaminophen   Tablet 650 milliGRAM(s) Oral every 6 hours PRN For Temp greater than 38 C (100.4 F)  benzocaine 15 mG/menthol 3.6 mG Lozenge 1 Lozenge Oral three times a day PRN Sore Throat  guaiFENesin   Syrup  (Sugar-Free) 200 milliGRAM(s) Oral every 6 hours PRN Cough    Allergies    sulfa drugs (Hives)  sulfa drugs (Rash)  Sulfac 10% (Hives)    Intolerances    Vital Signs Last 24 Hrs  T(C): 37.5 (10 Mar 2018 06:19), Max: 38.7 (09 Mar 2018 12:15)  F): 99.5 (10 Mar 2018 06:19), Max: 101.7 (09 Mar 2018 12:15)  HR: 78 (10 Mar 2018 06:19) (74 - 87)  BP: 137/67 (10 Mar 2018 06:19) (118/57 - 148/66)  BP(mean): --  RR: 18 (10 Mar 2018 06:19) (17 - 18)  SpO2: 97% (10 Mar 2018 06:19) (97% - 98%)    LABS:                        7.4    19.24 )-----------( 363      ( 10 Mar 2018 05:30 )             23.2     03-10    138  |  104  |  39<H>  ----------------------------<  169<H>  5.0   |  17<L>  |  1.63<H>    Ca    8.4      10 Mar 2018 05:30  Phos  4.6     03-10  Mg     2.1     03-10    PT/INR - ( 10 Mar 2018 05:30 )   PT: 14.6 SEC;   INR: 1.31       PTT - ( 10 Mar 2018 05:30 )  PTT:36.3 SEC    CAPILLARY BLOOD GLUCOSE    POCT Blood Glucose.: 159 mg/dL (10 Mar 2018 06:31)  POCT Blood Glucose.: 222 mg/dL (09 Mar 2018 22:37)  POCT Blood Glucose.: 163 mg/dL (09 Mar 2018 17:20)  POCT Blood Glucose.: 177 mg/dL (09 Mar 2018 12:11)  POCT Blood Glucose.: 104 mg/dL (09 Mar 2018 08:53)      RADIOLOGY & ADDITIONAL TESTS:

## 2018-03-10 NOTE — PACU DISCHARGE NOTE - COMMENTS
Patient with pronounced ST segment depressions in inferior leads at the end of surgery and into the post-op period.  Medicine and Cardiology notified. CXR - clear, cardiac enzymes showed CK and CKMB normal and troponin = 0.26.  Patient without CP or SOB. Medicine aware that patient is being signed out to telemetry and will follow enzymes.

## 2018-03-10 NOTE — PROGRESS NOTE ADULT - PROBLEM SELECTOR PLAN 1
- ID on board, appreciate involvement.  On vanc / zosyn (day 8 abx).  - Plan for L ankle disarticulation today with Podiatry.  - Cardiology evaluated, patient optimized for procedure given necessity.  Recommended low-dose metoprolol to be started after procedure. - ID on board, appreciate involvement.  On loreto / zosyn (day 8 abx).  - Plan for L ankle disarticulation today with Podiatry.  - Spoke with Podiatry resident, requesting 1U pRBCs prior to procedure, with plan to give another 1U pRBCs due to high vascularity of surgical area and likely blood loss from procedure.  1U pRBCs ordered, to be given in pre-operative area, confirmed by ANDREW Kirkland with pre-op staff.  - Cardiology evaluated, patient optimized for procedure given necessity.  Recommended low-dose metoprolol to be started after procedure.

## 2018-03-10 NOTE — CHART NOTE - NSCHARTNOTEFT_GEN_A_CORE
Called by Nurse in recovery room, informing me that patient had a troponin of 0.26 after EKG post op showed new T wave inversions. Called Crow (cardiology fellow) regarding findings and stated this is likely d/t to DAMARI and catecholaminergic surge in the immediate post-operative setting, as patient is not having active chest pain. Will perform serial EKGs and cardiac enzymes, and if there is CK > 200, MB fraction > 2.5%, and worsening of TWI, we will contact cardiology. Will sign out to NF to recheck Cardiac Enzymes at 8pm and recheck EKG.     Robert Renteria PGY-1  [id number]/[phone]

## 2018-03-10 NOTE — PROGRESS NOTE ADULT - SUBJECTIVE AND OBJECTIVE BOX
ANESTHESIA POSTOP CHECK    72y Female POSTOP Left ankle Decirculation    Intra-operatively the patient became hypotensive and bradycardic.  She was treated with pressors, a transfusion of 2 units pRBCs,  and was intubated, the original anesthetic plan was with an LMA.  The patient seemed to have stabilized.  Then later in the case, the patient developed ST depressions in leads 1 and 2.  Podiatry surgeon was closing the leg at the time of the incident.  The patient was managed with 100% O2 and with BP and heart rate stabililty.  A 12-lead EKG, CXR, and cardiac enzymes were ordered in PACU.   Medicine at the bedside.      Comments:     Will f/u EKG, CXR, and enzymes.  Will discuss with medicine and cardiology.

## 2018-03-11 LAB
BUN SERPL-MCNC: 39 MG/DL — HIGH (ref 7–23)
BUN SERPL-MCNC: 40 MG/DL — HIGH (ref 7–23)
CALCIUM SERPL-MCNC: 8 MG/DL — LOW (ref 8.4–10.5)
CALCIUM SERPL-MCNC: 8.1 MG/DL — LOW (ref 8.4–10.5)
CHLORIDE SERPL-SCNC: 103 MMOL/L — SIGNIFICANT CHANGE UP (ref 98–107)
CHLORIDE SERPL-SCNC: 104 MMOL/L — SIGNIFICANT CHANGE UP (ref 98–107)
CK MB BLD-MCNC: 4.9 NG/ML — HIGH (ref 1–4.7)
CK SERPL-CCNC: 92 U/L — SIGNIFICANT CHANGE UP (ref 25–170)
CO2 SERPL-SCNC: 18 MMOL/L — LOW (ref 22–31)
CO2 SERPL-SCNC: 19 MMOL/L — LOW (ref 22–31)
CREAT SERPL-MCNC: 1.64 MG/DL — HIGH (ref 0.5–1.3)
CREAT SERPL-MCNC: 1.65 MG/DL — HIGH (ref 0.5–1.3)
GLUCOSE BLDC GLUCOMTR-MCNC: 203 MG/DL — HIGH (ref 70–99)
GLUCOSE BLDC GLUCOMTR-MCNC: 207 MG/DL — HIGH (ref 70–99)
GLUCOSE BLDC GLUCOMTR-MCNC: 225 MG/DL — HIGH (ref 70–99)
GLUCOSE BLDC GLUCOMTR-MCNC: 225 MG/DL — HIGH (ref 70–99)
GLUCOSE BLDC GLUCOMTR-MCNC: 252 MG/DL — HIGH (ref 70–99)
GLUCOSE BLDC GLUCOMTR-MCNC: 509 MG/DL — CRITICAL HIGH (ref 70–99)
GLUCOSE SERPL-MCNC: 223 MG/DL — HIGH (ref 70–99)
GLUCOSE SERPL-MCNC: 224 MG/DL — HIGH (ref 70–99)
HCT VFR BLD CALC: 25.5 % — LOW (ref 34.5–45)
HGB BLD-MCNC: 8.5 G/DL — LOW (ref 11.5–15.5)
MAGNESIUM SERPL-MCNC: 2 MG/DL — SIGNIFICANT CHANGE UP (ref 1.6–2.6)
MAGNESIUM SERPL-MCNC: 2 MG/DL — SIGNIFICANT CHANGE UP (ref 1.6–2.6)
MCHC RBC-ENTMCNC: 27.2 PG — SIGNIFICANT CHANGE UP (ref 27–34)
MCHC RBC-ENTMCNC: 33.3 % — SIGNIFICANT CHANGE UP (ref 32–36)
MCV RBC AUTO: 81.7 FL — SIGNIFICANT CHANGE UP (ref 80–100)
NRBC # FLD: 0 — SIGNIFICANT CHANGE UP
PHOSPHATE SERPL-MCNC: 3.9 MG/DL — SIGNIFICANT CHANGE UP (ref 2.5–4.5)
PHOSPHATE SERPL-MCNC: 4 MG/DL — SIGNIFICANT CHANGE UP (ref 2.5–4.5)
PLATELET # BLD AUTO: 357 K/UL — SIGNIFICANT CHANGE UP (ref 150–400)
PMV BLD: 11.4 FL — SIGNIFICANT CHANGE UP (ref 7–13)
POTASSIUM SERPL-MCNC: 4.6 MMOL/L — SIGNIFICANT CHANGE UP (ref 3.5–5.3)
POTASSIUM SERPL-MCNC: 4.7 MMOL/L — SIGNIFICANT CHANGE UP (ref 3.5–5.3)
POTASSIUM SERPL-SCNC: 4.6 MMOL/L — SIGNIFICANT CHANGE UP (ref 3.5–5.3)
POTASSIUM SERPL-SCNC: 4.7 MMOL/L — SIGNIFICANT CHANGE UP (ref 3.5–5.3)
RBC # BLD: 3.12 M/UL — LOW (ref 3.8–5.2)
RBC # FLD: 15.7 % — HIGH (ref 10.3–14.5)
SODIUM SERPL-SCNC: 136 MMOL/L — SIGNIFICANT CHANGE UP (ref 135–145)
SODIUM SERPL-SCNC: 136 MMOL/L — SIGNIFICANT CHANGE UP (ref 135–145)
TROPONIN T SERPL-MCNC: 0.4 NG/ML — HIGH (ref 0–0.06)
WBC # BLD: 17.88 K/UL — HIGH (ref 3.8–10.5)
WBC # FLD AUTO: 17.88 K/UL — HIGH (ref 3.8–10.5)

## 2018-03-11 PROCEDURE — 93010 ELECTROCARDIOGRAM REPORT: CPT

## 2018-03-11 PROCEDURE — 99233 SBSQ HOSP IP/OBS HIGH 50: CPT

## 2018-03-11 RX ORDER — CARVEDILOL PHOSPHATE 80 MG/1
25 CAPSULE, EXTENDED RELEASE ORAL EVERY 12 HOURS
Qty: 0 | Refills: 0 | Status: DISCONTINUED | OUTPATIENT
Start: 2018-03-11 | End: 2018-03-21

## 2018-03-11 RX ADMIN — Medication 50 MILLIGRAM(S): at 06:49

## 2018-03-11 RX ADMIN — HEPARIN SODIUM 5000 UNIT(S): 5000 INJECTION INTRAVENOUS; SUBCUTANEOUS at 06:00

## 2018-03-11 RX ADMIN — Medication 2: at 09:58

## 2018-03-11 RX ADMIN — ISOSORBIDE MONONITRATE 30 MILLIGRAM(S): 60 TABLET, EXTENDED RELEASE ORAL at 13:09

## 2018-03-11 RX ADMIN — CLOPIDOGREL BISULFATE 75 MILLIGRAM(S): 75 TABLET, FILM COATED ORAL at 13:09

## 2018-03-11 RX ADMIN — Medication 10 UNIT(S): at 13:10

## 2018-03-11 RX ADMIN — Medication 50 MILLIGRAM(S): at 18:09

## 2018-03-11 RX ADMIN — PIPERACILLIN AND TAZOBACTAM 25 GRAM(S): 4; .5 INJECTION, POWDER, LYOPHILIZED, FOR SOLUTION INTRAVENOUS at 09:59

## 2018-03-11 RX ADMIN — Medication 10 UNIT(S): at 09:58

## 2018-03-11 RX ADMIN — Medication 2: at 13:09

## 2018-03-11 RX ADMIN — HYDROMORPHONE HYDROCHLORIDE 0.5 MILLIGRAM(S): 2 INJECTION INTRAMUSCULAR; INTRAVENOUS; SUBCUTANEOUS at 15:53

## 2018-03-11 RX ADMIN — Medication 166.67 MILLIGRAM(S): at 11:45

## 2018-03-11 RX ADMIN — Medication 81 MILLIGRAM(S): at 13:11

## 2018-03-11 RX ADMIN — ATORVASTATIN CALCIUM 20 MILLIGRAM(S): 80 TABLET, FILM COATED ORAL at 23:16

## 2018-03-11 RX ADMIN — SERTRALINE 25 MILLIGRAM(S): 25 TABLET, FILM COATED ORAL at 13:09

## 2018-03-11 RX ADMIN — HYDROMORPHONE HYDROCHLORIDE 0.5 MILLIGRAM(S): 2 INJECTION INTRAMUSCULAR; INTRAVENOUS; SUBCUTANEOUS at 16:36

## 2018-03-11 RX ADMIN — Medication 10 UNIT(S): at 18:09

## 2018-03-11 RX ADMIN — Medication 1 APPLICATION(S): at 13:10

## 2018-03-11 RX ADMIN — Medication 29 UNIT(S): at 23:16

## 2018-03-11 RX ADMIN — CARVEDILOL PHOSPHATE 25 MILLIGRAM(S): 80 CAPSULE, EXTENDED RELEASE ORAL at 18:09

## 2018-03-11 RX ADMIN — Medication 2: at 18:09

## 2018-03-11 RX ADMIN — PIPERACILLIN AND TAZOBACTAM 25 GRAM(S): 4; .5 INJECTION, POWDER, LYOPHILIZED, FOR SOLUTION INTRAVENOUS at 01:35

## 2018-03-11 RX ADMIN — HEPARIN SODIUM 5000 UNIT(S): 5000 INJECTION INTRAVENOUS; SUBCUTANEOUS at 18:09

## 2018-03-11 NOTE — PROGRESS NOTE ADULT - ATTENDING COMMENTS
patient seen and examine at bed side  agree with above plan and management     72F h/o DM2, PVD s/p R BKA and L transmetatarsal amputation, CAD s/p CABG, CKD III, HTN adm 3/3/18 with fevers and LLE pain found to be in sepsis 2/2 cellulitis and OM of LLE. Patient is now s/p L ankle disarticulation by podiatry on 3/10 complicated by uptrending troponins with T wave inversions.   positive trop with EKG changes possible demand ischemia  patient is asymptomatic   f/u with cardio   cont abx

## 2018-03-11 NOTE — PROGRESS NOTE ADULT - PROBLEM SELECTOR PLAN 4
- continue Hydralazine, isosorbide mononitrate. - continue Hydralazine, isosorbide mononitrate.  - will restart low dose beta blocker now that patient is post op

## 2018-03-11 NOTE — PHYSICAL THERAPY INITIAL EVALUATION ADULT - NS ASR WT BEARING DETAIL LLE
nonweight-bearing
weight-bearing as tolerated/Although, PT deciding to keep Left LE NWB as pt. is pending MRI of Left LE to r/o osteomyelitis

## 2018-03-11 NOTE — PROGRESS NOTE ADULT - PROBLEM SELECTOR PLAN 5
- CKD III.  - Cr 1.63 today, CrCl remains > 30.  - Continue to monitor. - CKD III.  - Cr 1.64 today, CrCl remains > 30.  - Continue to monitor.

## 2018-03-11 NOTE — PROGRESS NOTE ADULT - SUBJECTIVE AND OBJECTIVE BOX
Patient is a 72y old  Female who presents with a chief complaint of Fevers and leg pain (06 Mar 2018 17:24)       INTERVAL HPI/OVERNIGHT EVENTS:  Patient seen and evaluated at bedside.  Pt is resting comfortable in NAD. Denies N/V/F/C.  Pain rated at 0/10    Allergies    sulfa drugs (Hives)  sulfa drugs (Rash)  Sulfac 10% (Hives)    Intolerances        Vital Signs Last 24 Hrs  T(C): 37.2 (11 Mar 2018 06:48), Max: 37.4 (10 Mar 2018 20:07)  T(F): 99 (11 Mar 2018 06:48), Max: 99.3 (10 Mar 2018 20:07)  HR: 78 (11 Mar 2018 06:48) (69 - 80)  BP: 116/60 (11 Mar 2018 06:48) (116/60 - 140/60)  BP(mean): --  RR: 18 (11 Mar 2018 06:48) (16 - 28)  SpO2: 96% (11 Mar 2018 06:48) (95% - 100%)    LABS:                        8.5    17.88 )-----------( 357      ( 11 Mar 2018 05:20 )             25.5     03-10    139  |  104  |  38<H>  ----------------------------<  205<H>  5.3   |  18<L>  |  1.62<H>    Ca    8.5      10 Mar 2018 12:20  Phos  4.6     03-10  Mg     2.1     03-10      PT/INR - ( 10 Mar 2018 05:30 )   PT: 14.6 SEC;   INR: 1.31          PTT - ( 10 Mar 2018 05:30 )  PTT:36.3 SEC    CAPILLARY BLOOD GLUCOSE      POCT Blood Glucose.: 203 mg/dL (11 Mar 2018 09:41)  POCT Blood Glucose.: 245 mg/dL (10 Mar 2018 22:10)  POCT Blood Glucose.: 200 mg/dL (10 Mar 2018 16:49)      Lower Extremity Physical Exam:  s/p left foot ankle disarticulation, open w/ 1 suture laterally to allow for packing. Cellulitis of the leg appears to be improving, tissue surrounding the distal leg appears viable

## 2018-03-11 NOTE — PROGRESS NOTE ADULT - SUBJECTIVE AND OBJECTIVE BOX
Ace Cedillo M.D.  Pager: [phone]  Off hours and weekends call -  [phone]: [phone]    [id number]    Overnight events: No acute Overnight Events. Pt s/p TMA c/b T wave inversions noted on EKG and mild Troponin. Pt denies any postoperative or current chest pain.    Review Of Systems: Denies SOB/MCDANIEL/CP/Fevers/Chills/Nausea/Vomiting/Diarrhea    Medications:  acetaminophen  IVPB. 1000 milliGRAM(s) IV Intermittent once PRN  aspirin  chewable 81 milliGRAM(s) Oral daily  atorvastatin 20 milliGRAM(s) Oral at bedtime  benzocaine 15 mG/menthol 3.6 mG Lozenge 1 Lozenge Oral three times a day PRN  clopidogrel Tablet 75 milliGRAM(s) Oral daily  collagenase Ointment 1 Application(s) Topical daily  dextrose 50% Injectable 25 Gram(s) IV Push once  guaiFENesin   Syrup  (Sugar-Free) 200 milliGRAM(s) Oral every 6 hours PRN  heparin  Injectable 5000 Unit(s) SubCutaneous every 12 hours  hydrALAZINE 50 milliGRAM(s) Oral two times a day  HYDROmorphone  Injectable 0.5 milliGRAM(s) IV Push every 10 minutes PRN  insulin detemir injectable (LEVEMIR) 29 Unit(s) SubCutaneous at bedtime  insulin lispro (HumaLOG) corrective regimen sliding scale   SubCutaneous Before meals and at bedtime  insulin lispro Injectable (HumaLOG) 10 Unit(s) SubCutaneous three times a day before meals  isosorbide   mononitrate ER Tablet (IMDUR) 30 milliGRAM(s) Oral daily  metoclopramide Injectable 10 milliGRAM(s) IV Push once PRN  ondansetron Injectable 4 milliGRAM(s) IV Push once PRN  piperacillin/tazobactam IVPB. 3.375 Gram(s) IV Intermittent every 8 hours  sertraline 25 milliGRAM(s) Oral daily  vancomycin  IVPB 1250 milliGRAM(s) IV Intermittent every 24 hours    PMH/PSH/FH/SH: Unchanged  Vitals:  T(F): 99, Max: 99 (18 @ 06:48)  HR: 78 (78 - 78)  BP: 116/60 (116/60 - 116/60)  BP(mean): --  RR: 18 (18 - 18)  SpO2: 96% (96% - 96%)  Daily     Daily Weight in k (11 Mar 2018 06:48)  I&O's Summary    10 Mar 2018 06:  -  11 Mar 2018 07:00  --------------------------------------------------------  IN: 300 mL / OUT: 0 mL / NET: 300 mL    11 Mar 2018 07:  -  11 Mar 2018 11:57  --------------------------------------------------------  IN: 120 mL / OUT: 200 mL / NET: -80 mL        Physical Exam:  Appearance:  NAD  Eyes: PERRL, EOMI  HENT: NC/AT  Cardiovascular: S1, S2, RRR, No m/r/g appreciated, No edema, no elevation in JVP  Respiratory: Clear to auscultation bilaterally  Gastrointestinal: Soft, Non-tender, Non-distended, BS+  Musculoskeletal:  No clubbing, BLE amputations  Neurologic: Grossly Normal  Lymphatic: No lymphadenopathy  Psychiatry: AAOx3, Mood & affect appropriate  Skin: No rashes, No ecchymoses, No cyanosis    Labs:                        8.5    17.88 )-----------( 357      ( 11 Mar 2018 05:20 )             25.5     03-11    136  |  104  |  39<H>  ----------------------------<  224<H>  4.6   |  18<L>  |  1.64<H>    Ca    8.0<L>      11 Mar 2018 11:00  Phos  4.0     03-11  Mg     2.0     03-11      PTT:   INR:  CARDIAC MARKERS ( 11 Mar 2018 05:20 )  x     / 0.40 ng/mL / 92 u/L / 4.90 ng/mL / x      CARDIAC MARKERS ( 10 Mar 2018 22:16 )  x     / 0.43 ng/mL / 128 u/L / 8.19 ng/mL / x      CARDIAC MARKERS ( 10 Mar 2018 12:20 )  x     / 0.26 ng/mL / 67 u/L / 4.04 ng/mL / x            EKG: NSR w/ Twave inversions noted in V4/5/6 and mild downsloping ST-Depression    Echo:  < from: Transthoracic Echocardiogram (07.14.15 @ 16:32) >  CONCLUSIONS:  1. Mitral annular calcification, otherwise normal mitral  valve. Mild mitral regurgitation.  2. Mildly dilated left atrium.  LA volume index = 29 cc/m2.  3. Normal left ventricular internal dimensions and wall  thicknesses.  4. Endocardium not well visualized; grossly normal left  ventricular systolic function.  5. The right ventricle is not well visualized; grossly  normal right ventricular systolic function.  6. Estimated right ventricular systolic pressure equals 60  mm Hg, assuming right atrial pressure equals 10 mm Hg,  consistent with moderate pulmonary hypertension.  ------------------------------------------------------------------------  Confirmed on  2015 - 18:28:35 by Kavon Meraz M.D.  ------------------------------------------------------------------------    < end of copied text >    Interpretation of Telemetry: Telemetry reviewed, NSR, no acute overnight events.

## 2018-03-11 NOTE — PHYSICAL THERAPY INITIAL EVALUATION ADULT - PLANNED THERAPY INTERVENTIONS, PT EVAL
Atrial flutter
gait training/postural re-education/balance training/bed mobility training/strengthening/neuromuscular re-education/ROM/transfer training
balance training/strengthening/bed mobility training

## 2018-03-11 NOTE — PHYSICAL THERAPY INITIAL EVALUATION ADULT - PERTINENT HX OF CURRENT PROBLEM, REHAB EVAL
pt is 73 y/o female admitted with history of right BKA, s/p left TMA
Pt presents with history of T2DM, PVD s/p R BKA and L transmetatarsal amputation, CAD s/p CABG, CKD stage 3, and HTN who presents to the ED with fevers and left leg pain, found to have sepsis

## 2018-03-11 NOTE — PHYSICAL THERAPY INITIAL EVALUATION ADULT - ACTIVE RANGE OF MOTION EXAMINATION, REHAB EVAL
Left LE ~1/4 range
bilateral upper extremity Active ROM was WFL (within functional limits)/both hip and knee wfl

## 2018-03-11 NOTE — PHYSICAL THERAPY INITIAL EVALUATION ADULT - CRITERIA FOR SKILLED THERAPEUTIC INTERVENTIONS
therapy frequency/predicted duration of therapy intervention/anticipated equipment needs at discharge/risk reduction/prevention/rehab potential/impairments found/anticipated discharge recommendation
functional limitations in following categories/impairments found

## 2018-03-11 NOTE — PHYSICAL THERAPY INITIAL EVALUATION ADULT - LEVEL OF INDEPENDENCE, REHAB EVAL
Pt deferring mobility today secondary to feeling feverish (102.4). PT will continue to assess when pt. symptoms improve.
minimum assist (75% patients effort)

## 2018-03-11 NOTE — PROGRESS NOTE ADULT - ASSESSMENT
72F h/o DM2, PVD s/p R BKA and L transmetatarsal amputation, CAD s/p CABG, CKD III, HTN adm 3/3/18 with fevers and LLE pain found to be in sepsis 2/2 cellulitis and OM of LLE. Patient is now s/p L ankle disarticulation by podiatry on 3/10 complicated by uptrending troponins with T wave inversions.

## 2018-03-11 NOTE — PROGRESS NOTE ADULT - ASSESSMENT
72F PMHx DM, PVD s/p R BKA and L transmetatarsal amputation, CAD s/p CABG, CKD stage 3, and HTN admitted for OM of left leg now s/p Lt ankle disarticulation w/ EKG changes and mild troponins likely c/w demand ischemia in setting of post operative state unlikely ACS.    - check TTE to eval LV function, last checked in 2015 per EMR.  - c/w asa/plavix  - c/w statin  - c/w hydralazine  - reintroduce home dose of carvedilol  - c/w imdur

## 2018-03-11 NOTE — PROGRESS NOTE ADULT - PROBLEM SELECTOR PLAN 1
- ID on board, appreciate involvement.  On vanc / zosyn (day 9 abx).  - s/p L ankle disarticulation by podiatry on 3/10, appreciate their recs  - will still need BKA by vascular surgery for adequate control - ID on board, appreciate involvement. On vanc / zosyn (day 9 abx).  - s/p L ankle disarticulation by podiatry on 3/10, appreciate their recs  - will still need BKA by vascular surgery for adequate control

## 2018-03-11 NOTE — PROGRESS NOTE ADULT - PROBLEM SELECTOR PLAN 7
- Diet: NPO for procedure, CC otherwise.  - Holding pharmacologic DVT PPX this AM - otherwise HSQ.    Dispo: pending surgical procedure.    Corrina Olson MD  PGY-2 | Internal Medicine  [phone] / [phone] - DVT ppx heparin subq  - CC diet  - Dispo pening ANKIT - DVT ppx heparin subq  - CC diet  - Dispo pending BKA

## 2018-03-11 NOTE — PROGRESS NOTE ADULT - PROBLEM SELECTOR PLAN 2
- Continue ASA, clopidogrel.  - Cardiology on board, want to start low dose beta blocker   - post op period complicated by troponins and T wave inversions an EKG, cardiology on board, troponins are now downtrending   - patient with DAMARI on CKD, likely demand ischemia secondary to stress of surgery

## 2018-03-11 NOTE — PHYSICAL THERAPY INITIAL EVALUATION ADULT - GENERAL OBSERVATIONS, REHAB EVAL
pt seen supine in bed, offer no complaint of.
Consult received, chart reviewed. Patient received supine in bed, NAD, +Left LE dressing c/d/i, Right LE prosthesis present. Patient agreed to EVALUATION from Physical Therapist.

## 2018-03-11 NOTE — PHYSICAL THERAPY INITIAL EVALUATION ADULT - ADDITIONAL COMMENTS
Pt has rolling walker and shower chair    Pt left supine in bed, NAD, all lines/devices intact,  present, +bed alarm. RN aware.
pt report lives with her children in house, amb with right BKA prosthesis and walker.

## 2018-03-11 NOTE — PROGRESS NOTE ADULT - SUBJECTIVE AND OBJECTIVE BOX
Chelsey Olivares MD  Medicine Team 2  Pager [phone]        Subjective: Patient went for her ankle disarticulation yesterday. Post op period was complicated by T wave inversions on EKG with positive trops. Patient was asymptomatic without any CP. Cardiology was notified. Set 2 was uptrending but set 3 this morning is downtrending. Patient continues to be asymptomatic.         VITAL SIGNS:  Vital Signs Last 24 Hrs  T(C): 37.2 (11 Mar 2018 06:48), Max: 37.4 (10 Mar 2018 20:07)  T(F): 99 (11 Mar 2018 06:48), Max: 99.3 (10 Mar 2018 20:07)  HR: 78 (11 Mar 2018 06:48) (69 - 80)  BP: 116/60 (11 Mar 2018 06:48) (116/60 - 140/60)  BP(mean): --  RR: 18 (11 Mar 2018 06:48) (16 - 28)  SpO2: 96% (11 Mar 2018 06:48) (95% - 100%)      PHYSICAL EXAM:     GENERAL: no acute distress  HEENT: PERRLA, EOMI, moist oropharynx   RESPIRATORY: CTAB, no w/c   CARDIOVASCULAR: RRR, no murmurs, gallops, rubs  ABDOMINAL: soft, non-tender, non-distended, positive bowel sounds   EXTREMITIES: no clubbing, cyanosis, or edema  NEUROLOGICAL: alert and oriented x 3, non-focal  SKIN: no rashes or lesions   MUSCULOSKELETAL: no gross joint deformity                          8.5    17.88 )-----------( 357      ( 11 Mar 2018 05:20 )             25.5     03-10    139  |  104  |  38<H>  ----------------------------<  205<H>  5.3   |  18<L>  |  1.62<H>    Ca    8.5      10 Mar 2018 12:20  Phos  4.6     03-10  Mg     2.1     03-10        CAPILLARY BLOOD GLUCOSE      POCT Blood Glucose.: 245 mg/dL (10 Mar 2018 22:10)  POCT Blood Glucose.: 200 mg/dL (10 Mar 2018 16:49)      MEDICATIONS  (STANDING):  aspirin  chewable 81 milliGRAM(s) Oral daily  atorvastatin 20 milliGRAM(s) Oral at bedtime  clopidogrel Tablet 75 milliGRAM(s) Oral daily  collagenase Ointment 1 Application(s) Topical daily  dextrose 50% Injectable 25 Gram(s) IV Push once  heparin  Injectable 5000 Unit(s) SubCutaneous every 12 hours  hydrALAZINE 50 milliGRAM(s) Oral two times a day  insulin detemir injectable (LEVEMIR) 29 Unit(s) SubCutaneous at bedtime  insulin lispro (HumaLOG) corrective regimen sliding scale   SubCutaneous Before meals and at bedtime  insulin lispro Injectable (HumaLOG) 10 Unit(s) SubCutaneous three times a day before meals  isosorbide   mononitrate ER Tablet (IMDUR) 30 milliGRAM(s) Oral daily  piperacillin/tazobactam IVPB. 3.375 Gram(s) IV Intermittent every 8 hours  sertraline 25 milliGRAM(s) Oral daily  sodium chloride 0.9%. 1000 milliLiter(s) (65 mL/Hr) IV Continuous <Continuous>  vancomycin  IVPB 1250 milliGRAM(s) IV Intermittent every 24 hours    MEDICATIONS  (PRN):  acetaminophen  IVPB. 1000 milliGRAM(s) IV Intermittent once PRN Mild Pain (1 - 3)  benzocaine 15 mG/menthol 3.6 mG Lozenge 1 Lozenge Oral three times a day PRN Sore Throat  guaiFENesin   Syrup  (Sugar-Free) 200 milliGRAM(s) Oral every 6 hours PRN Cough  HYDROmorphone  Injectable 0.5 milliGRAM(s) IV Push every 10 minutes PRN Moderate Pain (4 - 6)  metoclopramide Injectable 10 milliGRAM(s) IV Push once PRN Nausea and/or Vomiting  ondansetron Injectable 4 milliGRAM(s) IV Push once PRN Nausea and/or Vomiting Chelsey Olivares MD  Medicine Team 2  Pager [phone]        Subjective: Patient went for her ankle disarticulation yesterday. Post op period was complicated by T wave inversions on EKG with positive trops. Patient was asymptomatic without any CP. Cardiology was notified. Set 2 was uptrending but set 3 this morning is downtrending. Patient continues to be asymptomatic. She denies any CP, SOB        VITAL SIGNS:  Vital Signs Last 24 Hrs  T(C): 37.2 (11 Mar 2018 06:48), Max: 37.4 (10 Mar 2018 20:07)  T(F): 99 (11 Mar 2018 06:48), Max: 99.3 (10 Mar 2018 20:07)  HR: 78 (11 Mar 2018 06:48) (69 - 80)  BP: 116/60 (11 Mar 2018 06:48) (116/60 - 140/60)  BP(mean): --  RR: 18 (11 Mar 2018 06:48) (16 - 28)  SpO2: 96% (11 Mar 2018 06:48) (95% - 100%)      PHYSICAL EXAM:     GENERAL: no acute distress  HEENT: PERRLA, EOMI, moist oropharynx   RESPIRATORY: CTAB, no w/c   CARDIOVASCULAR: RRR, no murmurs, gallops, rubs  ABDOMINAL: soft, non-tender, non-distended, positive bowel sounds   EXTREMITIES: no clubbing, cyanosis, or edema  NEUROLOGICAL: alert and oriented x 3, non-focal  SKIN: no rashes or lesions   MUSCULOSKELETAL: no gross joint deformity                          8.5    17.88 )-----------( 357      ( 11 Mar 2018 05:20 )             25.5     03-10    139  |  104  |  38<H>  ----------------------------<  205<H>  5.3   |  18<L>  |  1.62<H>    Ca    8.5      10 Mar 2018 12:20  Phos  4.6     03-10  Mg     2.1     03-10        CAPILLARY BLOOD GLUCOSE      POCT Blood Glucose.: 245 mg/dL (10 Mar 2018 22:10)  POCT Blood Glucose.: 200 mg/dL (10 Mar 2018 16:49)      MEDICATIONS  (STANDING):  aspirin  chewable 81 milliGRAM(s) Oral daily  atorvastatin 20 milliGRAM(s) Oral at bedtime  clopidogrel Tablet 75 milliGRAM(s) Oral daily  collagenase Ointment 1 Application(s) Topical daily  dextrose 50% Injectable 25 Gram(s) IV Push once  heparin  Injectable 5000 Unit(s) SubCutaneous every 12 hours  hydrALAZINE 50 milliGRAM(s) Oral two times a day  insulin detemir injectable (LEVEMIR) 29 Unit(s) SubCutaneous at bedtime  insulin lispro (HumaLOG) corrective regimen sliding scale   SubCutaneous Before meals and at bedtime  insulin lispro Injectable (HumaLOG) 10 Unit(s) SubCutaneous three times a day before meals  isosorbide   mononitrate ER Tablet (IMDUR) 30 milliGRAM(s) Oral daily  piperacillin/tazobactam IVPB. 3.375 Gram(s) IV Intermittent every 8 hours  sertraline 25 milliGRAM(s) Oral daily  sodium chloride 0.9%. 1000 milliLiter(s) (65 mL/Hr) IV Continuous <Continuous>  vancomycin  IVPB 1250 milliGRAM(s) IV Intermittent every 24 hours    MEDICATIONS  (PRN):  acetaminophen  IVPB. 1000 milliGRAM(s) IV Intermittent once PRN Mild Pain (1 - 3)  benzocaine 15 mG/menthol 3.6 mG Lozenge 1 Lozenge Oral three times a day PRN Sore Throat  guaiFENesin   Syrup  (Sugar-Free) 200 milliGRAM(s) Oral every 6 hours PRN Cough  HYDROmorphone  Injectable 0.5 milliGRAM(s) IV Push every 10 minutes PRN Moderate Pain (4 - 6)  metoclopramide Injectable 10 milliGRAM(s) IV Push once PRN Nausea and/or Vomiting  ondansetron Injectable 4 milliGRAM(s) IV Push once PRN Nausea and/or Vomiting Chelsey Olivares MD  Medicine Team 2  Pager [phone]        Subjective: Patient went for her ankle disarticulation yesterday. Post op period was complicated by T wave inversions on EKG with positive trops. Patient was asymptomatic without any CP. Cardiology was notified. Set 2 was uptrending but set 3 this morning is downtrending. Patient continues to be asymptomatic. She denies any CP, SOB, nausea, vomiting, or abdominal pain. She endorses some mild right sided back pain that she attributes to her positioning in bed. No radiation down her legs. Still has occasional cough.         VITAL SIGNS:  Vital Signs Last 24 Hrs  T(C): 37.2 (11 Mar 2018 06:48), Max: 37.4 (10 Mar 2018 20:07)  T(F): 99 (11 Mar 2018 06:48), Max: 99.3 (10 Mar 2018 20:07)  HR: 78 (11 Mar 2018 06:48) (69 - 80)  BP: 116/60 (11 Mar 2018 06:48) (116/60 - 140/60)  BP(mean): --  RR: 18 (11 Mar 2018 06:48) (16 - 28)  SpO2: 96% (11 Mar 2018 06:48) (95% - 100%)      PHYSICAL EXAM:     GENERAL: no acute distress  HEENT: dry mucus membranes   RESPIRATORY: CTAB, no wheezes or crackles appreciated   CARDIOVASCULAR: RRR, no murmurs  ABDOMINAL: soft, non-tender, non-distended, positive bowel sounds   EXTREMITIES: right BKA, left ankle disarticulation with leg covered in ACE bandage   NEUROLOGICAL: alert and oriented x 3, no focal deficits   SKIN: no rashes or lesions   MUSCULOSKELETAL: no gross joint deformity                          8.5    17.88 )-----------( 357      ( 11 Mar 2018 05:20 )             25.5     03-10    139  |  104  |  38<H>  ----------------------------<  205<H>  5.3   |  18<L>  |  1.62<H>    Ca    8.5      10 Mar 2018 12:20  Phos  4.6     03-10  Mg     2.1     03-10        CAPILLARY BLOOD GLUCOSE      POCT Blood Glucose.: 245 mg/dL (10 Mar 2018 22:10)  POCT Blood Glucose.: 200 mg/dL (10 Mar 2018 16:49)      MEDICATIONS  (STANDING):  aspirin  chewable 81 milliGRAM(s) Oral daily  atorvastatin 20 milliGRAM(s) Oral at bedtime  clopidogrel Tablet 75 milliGRAM(s) Oral daily  collagenase Ointment 1 Application(s) Topical daily  dextrose 50% Injectable 25 Gram(s) IV Push once  heparin  Injectable 5000 Unit(s) SubCutaneous every 12 hours  hydrALAZINE 50 milliGRAM(s) Oral two times a day  insulin detemir injectable (LEVEMIR) 29 Unit(s) SubCutaneous at bedtime  insulin lispro (HumaLOG) corrective regimen sliding scale   SubCutaneous Before meals and at bedtime  insulin lispro Injectable (HumaLOG) 10 Unit(s) SubCutaneous three times a day before meals  isosorbide   mononitrate ER Tablet (IMDUR) 30 milliGRAM(s) Oral daily  piperacillin/tazobactam IVPB. 3.375 Gram(s) IV Intermittent every 8 hours  sertraline 25 milliGRAM(s) Oral daily  sodium chloride 0.9%. 1000 milliLiter(s) (65 mL/Hr) IV Continuous <Continuous>  vancomycin  IVPB 1250 milliGRAM(s) IV Intermittent every 24 hours    MEDICATIONS  (PRN):  acetaminophen  IVPB. 1000 milliGRAM(s) IV Intermittent once PRN Mild Pain (1 - 3)  benzocaine 15 mG/menthol 3.6 mG Lozenge 1 Lozenge Oral three times a day PRN Sore Throat  guaiFENesin   Syrup  (Sugar-Free) 200 milliGRAM(s) Oral every 6 hours PRN Cough  HYDROmorphone  Injectable 0.5 milliGRAM(s) IV Push every 10 minutes PRN Moderate Pain (4 - 6)  metoclopramide Injectable 10 milliGRAM(s) IV Push once PRN Nausea and/or Vomiting  ondansetron Injectable 4 milliGRAM(s) IV Push once PRN Nausea and/or Vomiting

## 2018-03-11 NOTE — PROGRESS NOTE ADULT - ASSESSMENT
s/p L foot ankle disarticulation  - POD #1  - Dressing changed, packing pulled, cellulitis improving  - Wound re-packed  - Cont IV abx  - Awaiting final BKA  - Podiatry will follow until BKA  - Seen with attending s/p LEFT ankle disarticulation    Plan:  - POD #1  - Dressing changed, packing pulled, cellulitis improving  - Wound re-packed  - Cont IV abx  - Awaiting final BKA  - Podiatry will follow until BKA  - Seen with attending

## 2018-03-12 ENCOUNTER — APPOINTMENT (OUTPATIENT)
Dept: OPHTHALMOLOGY | Facility: HOSPITAL | Age: 73
End: 2018-03-12

## 2018-03-12 LAB
BASOPHILS # BLD AUTO: 0.03 K/UL — SIGNIFICANT CHANGE UP (ref 0–0.2)
BASOPHILS NFR BLD AUTO: 0.2 % — SIGNIFICANT CHANGE UP (ref 0–2)
BUN SERPL-MCNC: 45 MG/DL — HIGH (ref 7–23)
CALCIUM SERPL-MCNC: 8.3 MG/DL — LOW (ref 8.4–10.5)
CHLORIDE SERPL-SCNC: 104 MMOL/L — SIGNIFICANT CHANGE UP (ref 98–107)
CK MB BLD-MCNC: 1.66 NG/ML — SIGNIFICANT CHANGE UP (ref 1–4.7)
CK SERPL-CCNC: 35 U/L — SIGNIFICANT CHANGE UP (ref 25–170)
CO2 SERPL-SCNC: 19 MMOL/L — LOW (ref 22–31)
CREAT SERPL-MCNC: 1.84 MG/DL — HIGH (ref 0.5–1.3)
EOSINOPHIL # BLD AUTO: 0.33 K/UL — SIGNIFICANT CHANGE UP (ref 0–0.5)
EOSINOPHIL NFR BLD AUTO: 2.3 % — SIGNIFICANT CHANGE UP (ref 0–6)
GLUCOSE BLDC GLUCOMTR-MCNC: 124 MG/DL — HIGH (ref 70–99)
GLUCOSE BLDC GLUCOMTR-MCNC: 133 MG/DL — HIGH (ref 70–99)
GLUCOSE BLDC GLUCOMTR-MCNC: 202 MG/DL — HIGH (ref 70–99)
GLUCOSE BLDC GLUCOMTR-MCNC: 204 MG/DL — HIGH (ref 70–99)
GLUCOSE BLDC GLUCOMTR-MCNC: 208 MG/DL — HIGH (ref 70–99)
GLUCOSE SERPL-MCNC: 152 MG/DL — HIGH (ref 70–99)
HCT VFR BLD CALC: 24.5 % — LOW (ref 34.5–45)
HGB BLD-MCNC: 8.3 G/DL — LOW (ref 11.5–15.5)
IMM GRANULOCYTES # BLD AUTO: 0.12 # — SIGNIFICANT CHANGE UP
IMM GRANULOCYTES NFR BLD AUTO: 0.8 % — SIGNIFICANT CHANGE UP (ref 0–1.5)
LYMPHOCYTES # BLD AUTO: 1.54 K/UL — SIGNIFICANT CHANGE UP (ref 1–3.3)
LYMPHOCYTES # BLD AUTO: 10.5 % — LOW (ref 13–44)
MAGNESIUM SERPL-MCNC: 2.1 MG/DL — SIGNIFICANT CHANGE UP (ref 1.6–2.6)
MCHC RBC-ENTMCNC: 28.2 PG — SIGNIFICANT CHANGE UP (ref 27–34)
MCHC RBC-ENTMCNC: 33.9 % — SIGNIFICANT CHANGE UP (ref 32–36)
MCV RBC AUTO: 83.3 FL — SIGNIFICANT CHANGE UP (ref 80–100)
MONOCYTES # BLD AUTO: 0.64 K/UL — SIGNIFICANT CHANGE UP (ref 0–0.9)
MONOCYTES NFR BLD AUTO: 4.4 % — SIGNIFICANT CHANGE UP (ref 2–14)
NEUTROPHILS # BLD AUTO: 11.99 K/UL — HIGH (ref 1.8–7.4)
NEUTROPHILS NFR BLD AUTO: 81.8 % — HIGH (ref 43–77)
NRBC # FLD: 0.02 — SIGNIFICANT CHANGE UP
PHOSPHATE SERPL-MCNC: 4.5 MG/DL — SIGNIFICANT CHANGE UP (ref 2.5–4.5)
PLATELET # BLD AUTO: 358 K/UL — SIGNIFICANT CHANGE UP (ref 150–400)
PMV BLD: 11.2 FL — SIGNIFICANT CHANGE UP (ref 7–13)
POTASSIUM SERPL-MCNC: 4.8 MMOL/L — SIGNIFICANT CHANGE UP (ref 3.5–5.3)
POTASSIUM SERPL-SCNC: 4.8 MMOL/L — SIGNIFICANT CHANGE UP (ref 3.5–5.3)
RBC # BLD: 2.94 M/UL — LOW (ref 3.8–5.2)
RBC # FLD: 15.9 % — HIGH (ref 10.3–14.5)
SODIUM SERPL-SCNC: 135 MMOL/L — SIGNIFICANT CHANGE UP (ref 135–145)
SPECIMEN SOURCE: SIGNIFICANT CHANGE UP
TROPONIN T SERPL-MCNC: 0.43 NG/ML — HIGH (ref 0–0.06)
WBC # BLD: 14.65 K/UL — HIGH (ref 3.8–10.5)
WBC # FLD AUTO: 14.65 K/UL — HIGH (ref 3.8–10.5)

## 2018-03-12 PROCEDURE — 99233 SBSQ HOSP IP/OBS HIGH 50: CPT | Mod: GC

## 2018-03-12 PROCEDURE — 99232 SBSQ HOSP IP/OBS MODERATE 35: CPT

## 2018-03-12 RX ORDER — SODIUM CHLORIDE 9 MG/ML
1000 INJECTION INTRAMUSCULAR; INTRAVENOUS; SUBCUTANEOUS
Qty: 0 | Refills: 0 | Status: DISCONTINUED | OUTPATIENT
Start: 2018-03-12 | End: 2018-03-15

## 2018-03-12 RX ORDER — INSULIN DETEMIR 100/ML (3)
15 INSULIN PEN (ML) SUBCUTANEOUS AT BEDTIME
Qty: 0 | Refills: 0 | Status: COMPLETED | OUTPATIENT
Start: 2018-03-12 | End: 2018-03-12

## 2018-03-12 RX ORDER — INSULIN DETEMIR 100/ML (3)
29 INSULIN PEN (ML) SUBCUTANEOUS AT BEDTIME
Qty: 0 | Refills: 0 | Status: DISCONTINUED | OUTPATIENT
Start: 2018-03-13 | End: 2018-03-15

## 2018-03-12 RX ADMIN — PIPERACILLIN AND TAZOBACTAM 25 GRAM(S): 4; .5 INJECTION, POWDER, LYOPHILIZED, FOR SOLUTION INTRAVENOUS at 10:18

## 2018-03-12 RX ADMIN — Medication 50 MILLIGRAM(S): at 18:49

## 2018-03-12 RX ADMIN — Medication 2: at 23:45

## 2018-03-12 RX ADMIN — Medication 81 MILLIGRAM(S): at 10:16

## 2018-03-12 RX ADMIN — CARVEDILOL PHOSPHATE 25 MILLIGRAM(S): 80 CAPSULE, EXTENDED RELEASE ORAL at 18:49

## 2018-03-12 RX ADMIN — Medication 15 UNIT(S): at 23:37

## 2018-03-12 RX ADMIN — CARVEDILOL PHOSPHATE 25 MILLIGRAM(S): 80 CAPSULE, EXTENDED RELEASE ORAL at 05:56

## 2018-03-12 RX ADMIN — PIPERACILLIN AND TAZOBACTAM 25 GRAM(S): 4; .5 INJECTION, POWDER, LYOPHILIZED, FOR SOLUTION INTRAVENOUS at 03:11

## 2018-03-12 RX ADMIN — ATORVASTATIN CALCIUM 20 MILLIGRAM(S): 80 TABLET, FILM COATED ORAL at 23:37

## 2018-03-12 RX ADMIN — SERTRALINE 25 MILLIGRAM(S): 25 TABLET, FILM COATED ORAL at 10:17

## 2018-03-12 RX ADMIN — PIPERACILLIN AND TAZOBACTAM 25 GRAM(S): 4; .5 INJECTION, POWDER, LYOPHILIZED, FOR SOLUTION INTRAVENOUS at 18:50

## 2018-03-12 RX ADMIN — Medication 10 UNIT(S): at 18:49

## 2018-03-12 RX ADMIN — Medication 50 MILLIGRAM(S): at 05:56

## 2018-03-12 RX ADMIN — BENZOCAINE AND MENTHOL 1 LOZENGE: 5; 1 LIQUID ORAL at 03:17

## 2018-03-12 RX ADMIN — HEPARIN SODIUM 5000 UNIT(S): 5000 INJECTION INTRAVENOUS; SUBCUTANEOUS at 05:56

## 2018-03-12 RX ADMIN — ISOSORBIDE MONONITRATE 30 MILLIGRAM(S): 60 TABLET, EXTENDED RELEASE ORAL at 13:35

## 2018-03-12 RX ADMIN — Medication 2: at 18:49

## 2018-03-12 RX ADMIN — CLOPIDOGREL BISULFATE 75 MILLIGRAM(S): 75 TABLET, FILM COATED ORAL at 10:16

## 2018-03-12 RX ADMIN — Medication 166.67 MILLIGRAM(S): at 13:36

## 2018-03-12 RX ADMIN — SODIUM CHLORIDE 75 MILLILITER(S): 9 INJECTION INTRAMUSCULAR; INTRAVENOUS; SUBCUTANEOUS at 13:42

## 2018-03-12 NOTE — PROGRESS NOTE ADULT - PROBLEM SELECTOR PLAN 4
- continue Hydralazine, isosorbide mononitrate.  - will restart low dose beta blocker now that patient is post op - stable  - continue Hydralazine, isosorbide mononitrate, and coreg

## 2018-03-12 NOTE — PROGRESS NOTE ADULT - SUBJECTIVE AND OBJECTIVE BOX
CC: Patient is a 72y old  Female who presents with a chief complaint of Fevers and leg pain     Interval History/ROS: Patient remains with left leg pain. Denies fever, chills.    Allergies  sulfa drugs (Hives)  sulfa drugs (Rash)  Sulfac 10% (Hives)    ANTIMICROBIALS:  piperacillin/tazobactam IVPB. 3.375 every 8 hours  vancomycin  IVPB 1250 every 24 hours    PE:    Vital Signs Last 24 Hrs  T(C): 37.1 (12 Mar 2018 12:58), Max: 37.2 (11 Mar 2018 18:07)  T(F): 98.7 (12 Mar 2018 12:58), Max: 99 (11 Mar 2018 18:07)  HR: 63 (12 Mar 2018 12:58) (63 - 72)  BP: 125/63 (12 Mar 2018 12:58) (105/55 - 125/63)  BP(mean): --  RR: 18 (12 Mar 2018 12:58) (18 - 18)  SpO2: 97% (12 Mar 2018 12:58) (97% - 98%)    Gen: AOx3, NAD  CV: S1+S2 normal, no murmurs  Resp: Clear bilat, no resp distress  Abd: Soft, nontender, +BS  Ext: Right BKA, left leg with warmth, bandaged  : No Pratt  IV/Skin: No thrombophlebitis  Neuro: no focal deficits      LABS:                          8.3    14.65 )-----------( 358      ( 12 Mar 2018 05:53 )             24.5       03-12    135  |  104  |  45<H>  ----------------------------<  152<H>  4.8   |  19<L>  |  1.84<H>    Ca    8.3<L>      12 Mar 2018 05:53  Phos  4.5     03-12  Mg     2.1     03-12    MICROBIOLOGY:  v  ANKLE  03-10-18 --  --  --      ANKLE  03-10-18   NO GROWTH - PRELIMINARY RESULTS  NO ORGANISMS ISOLATED AT 24 HOURS  --  --      ANUS  03-10-18 --  --  --      OTHER  03-06-18   RARE  STRDYS^Streptococcus dysgalactiae  --  --      LEG - LEFT  03-03-18 --  --  Proteus mirabilis  Staph. aureus *MRSA*  Strep Beta Hemolytic Grp G      BLOOD PERIPHERAL  03-03-18 --  --  --    RADIOLOGY:    No new images.

## 2018-03-12 NOTE — PROGRESS NOTE ADULT - ASSESSMENT
72F PMHx DM, PVD s/p R BKA and L transmetatarsal amputation, CAD s/p CABG, CKD stage 3, and HTN admitted for OM of left leg now s/p Lt ankle disarticulation w/ EKG changes and mild troponins likely c/w demand ischemia in setting of post operative state unlikely ACS.    - f/u TTE to eval LV function, last checked in 2015 per EMR.  - c/w asa/plavix  - c/w statin  - c/w hydralazine  - c/w coreg  - c/w imdur

## 2018-03-12 NOTE — PROGRESS NOTE ADULT - ATTENDING COMMENTS
Patient seen and examined.  Case discussed with house staff.  Agree with above as edited.   72yoF with h/o PVD s/p R BKA and L TMA,  DM type 2, CAD s/p CABG, CKD III, HTN admitted with left foot osteomyelitis and cellulitis s/p ankle disarticulation c/b demand ischemia.  d/w ID - c/w current IV abx. Cardiology, vascular, podiatry f/u appreciated.  Plan discussed with patient and family.

## 2018-03-12 NOTE — PROGRESS NOTE ADULT - PROBLEM SELECTOR PLAN 2
- Continue ASA, clopidogrel.  - continue beta blocker  - post op period complicated by troponins and T wave inversions an EKG, cardiology on board  - patient with DAMARI on CKD, likely demand ischemia secondary to stress of surgery - Continue ASA, clopidogrel, and beta blocker  - after ankle disarticulation, EKG showed T wave inversions and uptrending troponins, cards on board, likely demand ischemia secondary to stress of procedure with CKD  - patient continues to be asymptomatic, no events on tele - continue ASA, clopidogrel, and beta blocker  - after ankle disarticulation, EKG showed T wave inversions and uptrending troponins, cards on board, likely demand ischemia secondary to stress of procedure with CKD  - patient continues to be asymptomatic, no events on tele

## 2018-03-12 NOTE — PROGRESS NOTE ADULT - SUBJECTIVE AND OBJECTIVE BOX
pt seen at bedside in NAD. dressing to left c/d/i  s/p left ankle disarticulation   left foot open surgical wound noted with plantar flap the is cool to palpation  tissue granular flap tissue fibrotic no pus no malodor erythema and edema decreased  applied wet to dry packing with DSD  await OR cultures await Vasc plan for BKA  continue local care and abx  will follow

## 2018-03-12 NOTE — PROGRESS NOTE ADULT - ASSESSMENT
72 female with T2DM, PVD s/p R BKA and L transmetatarsal amputation, CAD s/p CABG, CKD stage 3, HTN, HLD here with fevers 103F, chills and left leg pain. Admitted for cellulitis of left lower extremity with warmth and erythema to that leg. Has elevated ESR and CRP.     MRI with diffuse cellulitis with acute osteo involving of first and second metatarsal remanants, with enhance of the third and fourth metatarsal, and fluid collection within the tenotomy gap. Wound cx with MRSA, strep Grp G and proteus; other wound cx with strep dysgalactiae. Leukocytosis improving.     s/p OR for ankle disarticulation and incision and drainage of ankle and Achilles tendon abscess.     Recommend:   - Continue vancomycin and zosyn  - Monitor vancomycin trough for toxic drug levels  - F/U vascular surgery regarding surgical intervention.    Jose Alberto Palacios MD  Pager [phone]  After 5pm/weekends call [phone]

## 2018-03-12 NOTE — PROGRESS NOTE ADULT - ASSESSMENT
[ ] Type & Screen: ordered for am labs  [ ] CBC: ordered for am labs  [ ] BMP: ordered for am labs  [ ] PT/PTT/INR: ordered for am labs  [ ] Urinalysis: n/a  [x ] Chest X-ray: 3/10/18  [ ] EKG: paper chart 3/10/18  [x ] NPO/IVF@ Mdn  [x ] Consent: in chart  [ ] Clearance  [x ] Added on to OR Schedule: 2nd case for 3/13/18 [x ] Type & Screen: ordered for am labs  [x ] CBC: ordered for am labs  [x ] BMP: ordered for am labs  [x ] PT/PTT/INR: ordered for am labs  [ ] Urinalysis: n/a  [x ] Chest X-ray: in PACS 3/10/18  [x ] EKG: in paper chart 3/10/18  [x ] NPO/IVF@ Mdn  [x ] Consent: in chart  [ ] Clearance  [x ] Added on to OR Schedule: 2nd case for 3/13/18

## 2018-03-12 NOTE — PROGRESS NOTE ADULT - SUBJECTIVE AND OBJECTIVE BOX
Overnight Events:   Patient denies CP or SOB this AM.             Medications:  acetaminophen  IVPB. 1000 milliGRAM(s) IV Intermittent once PRN  aspirin  chewable 81 milliGRAM(s) Oral daily  atorvastatin 20 milliGRAM(s) Oral at bedtime  benzocaine 15 mG/menthol 3.6 mG Lozenge 1 Lozenge Oral three times a day PRN  carvedilol 25 milliGRAM(s) Oral every 12 hours  clopidogrel Tablet 75 milliGRAM(s) Oral daily  collagenase Ointment 1 Application(s) Topical daily  dextrose 50% Injectable 25 Gram(s) IV Push once  guaiFENesin   Syrup  (Sugar-Free) 200 milliGRAM(s) Oral every 6 hours PRN  heparin  Injectable 5000 Unit(s) SubCutaneous every 12 hours  hydrALAZINE 50 milliGRAM(s) Oral two times a day  HYDROmorphone  Injectable 0.5 milliGRAM(s) IV Push every 10 minutes PRN  insulin detemir injectable (LEVEMIR) 29 Unit(s) SubCutaneous at bedtime  insulin lispro (HumaLOG) corrective regimen sliding scale   SubCutaneous Before meals and at bedtime  insulin lispro Injectable (HumaLOG) 10 Unit(s) SubCutaneous three times a day before meals  isosorbide   mononitrate ER Tablet (IMDUR) 30 milliGRAM(s) Oral daily  metoclopramide Injectable 10 milliGRAM(s) IV Push once PRN  ondansetron Injectable 4 milliGRAM(s) IV Push once PRN  piperacillin/tazobactam IVPB. 3.375 Gram(s) IV Intermittent every 8 hours  sertraline 25 milliGRAM(s) Oral daily  sodium chloride 0.9%. 1000 milliLiter(s) IV Continuous <Continuous>  vancomycin  IVPB 1250 milliGRAM(s) IV Intermittent every 24 hours      PAST MEDICAL & SURGICAL HISTORY:  CAD (coronary artery disease)  Hypertension  Obesity  Hypercholesterolemia  DM (diabetes mellitus)  Carotid artery stenosis  PAD (peripheral artery disease): s/p R BKA  Stented coronary artery: 2010 [facility]  S/P CABG x 3: in 2004, [facility]  History of hysterectomy  S/P BKA (below knee amputation) unilateral, right  S/P CABG x 3  S/P eye surgery: for glaucoma  S/P below knee amputation, right: 6/2010  S/P angioplasty with stent: 2009, 3/2014  S/P hysterectomy: 2004  Hx of CABG: 3v 2004      Vitals:  T(F): 98.7 (03-12), Max: 99 (03-11)  HR: 63 (03-12) (63 - 72)  BP: 125/63 (03-12) (105/55 - 125/63)  RR: 18 (03-12)  SpO2: 97% (03-12)  I&O's Summary    11 Mar 2018 07:01  -  12 Mar 2018 07:00  --------------------------------------------------------  IN: 120 mL / OUT: 300 mL / NET: -180 mL    12 Mar 2018 07:01  -  12 Mar 2018 17:21  --------------------------------------------------------  IN: 460 mL / OUT: 0 mL / NET: 460 mL        Physical Exam:  Appearance: No acute distress  Eyes: PERRL, EOMI  HENT: Normal oral muscosa  Cardiovascular: RRR, S1, S2, no murmurs, rubs, or gallops; no edema; no JVD  Respiratory: Clear to auscultation bilaterally  Gastrointestinal: soft, non-tender  Musculoskeletal: No clubbing  Neurologic: Non-focal  Psychiatry: AAOx3, mood & affect appropriate                          8.3    14.65 )-----------( 358      ( 12 Mar 2018 05:53 )             24.5     03-12    135  |  104  |  45<H>  ----------------------------<  152<H>  4.8   |  19<L>  |  1.84<H>    Ca    8.3<L>      12 Mar 2018 05:53  Phos  4.5     03-12  Mg     2.1     03-12        CARDIAC MARKERS ( 12 Mar 2018 05:53 )  x     / 0.43 ng/mL / 35 u/L / 1.66 ng/mL / x      CARDIAC MARKERS ( 11 Mar 2018 05:20 )  x     / 0.40 ng/mL / 92 u/L / 4.90 ng/mL / x      CARDIAC MARKERS ( 10 Mar 2018 22:16 )  x     / 0.43 ng/mL / 128 u/L / 8.19 ng/mL / x

## 2018-03-12 NOTE — PROGRESS NOTE ADULT - PROBLEM SELECTOR PLAN 5
- CKD III.  - Cr 1.64 today, CrCl remains > 30.  - Continue to monitor. - CKD III.  - Cr 1.8 today, CrCl remains > 30.  - Continue to monitor. - CKD III at baseline  - Cr 1.8 today, will hydrate with fluids today and check BMP tomorrow  - patient with poor PO intake, likely pre-renal in etiology, urine lytes pending

## 2018-03-12 NOTE — PROGRESS NOTE ADULT - PROBLEM SELECTOR PLAN 3
- HgbA1c 8.1  - A1c 8.1.  - On levemir 29U qHS, lispro 10U qAC.  - FS acceptable.  - Continue ISS. - HgbA1c 8.1  - On levemir 29U qHS, lispro 10U qAC  - FS elevated post surgery, will increase premeal insulin   - Continue ISS. - HgbA1c 8.1  - On levemir 29U qHS, lispro 10U qAC with ISS for correction  - FS elevated post surgery but at goal today so will continue with current regimen

## 2018-03-12 NOTE — PROGRESS NOTE ADULT - ASSESSMENT
72F h/o DM2, PVD s/p R BKA and L transmetatarsal amputation, CAD s/p CABG, CKD III, HTN adm 3/3/18 with fevers and LLE pain found to be in sepsis 2/2 cellulitis and OM of LLE. Patient is now s/p L ankle disarticulation by podiatry on 3/10 complicated by uptrending troponins with T wave inversions. 72F h/o DM2, PVD s/p R BKA and L transmetatarsal amputation, CAD s/p CABG, CKD III, HTN adm 3/3/18 with fevers and LLE pain found to be in sepsis 2/2 cellulitis and OM of LLE. Patient is now s/p L ankle disarticulation by podiatry on 3/10 complicated by uptrending troponins with T wave inversions. Patient has been asymptomatic and hemodynamically stable without any events on telemetry. Pending BKA by vascular surgery.

## 2018-03-12 NOTE — PROGRESS NOTE ADULT - SUBJECTIVE AND OBJECTIVE BOX
Chelsey Olivares MD  Medicine Team 2  Pager [phone]        Subjective: Overnight, no events. This morning        VITAL SIGNS:  Vital Signs Last 24 Hrs  T(C): 36.9 (12 Mar 2018 05:54), Max: 37.4 (11 Mar 2018 13:13)  T(F): 98.4 (12 Mar 2018 05:54), Max: 99.3 (11 Mar 2018 13:13)  HR: 64 (12 Mar 2018 05:54) (64 - 84)  BP: 105/55 (12 Mar 2018 05:54) (105/55 - 134/68)  BP(mean): --  RR: 18 (12 Mar 2018 05:54) (16 - 18)  SpO2: 97% (12 Mar 2018 05:54) (97% - 98%)      PHYSICAL EXAM:     GENERAL: no acute distress  HEENT: PERRLA, EOMI, moist oropharynx   RESPIRATORY: CTAB, no w/c   CARDIOVASCULAR: RRR, no murmurs, gallops, rubs  ABDOMINAL: soft, non-tender, non-distended, positive bowel sounds   EXTREMITIES: no clubbing, cyanosis, or edema  NEUROLOGICAL: alert and oriented x 3, non-focal  SKIN: no rashes or lesions   MUSCULOSKELETAL: no gross joint deformity                          8.3    14.65 )-----------( 358      ( 12 Mar 2018 05:53 )             24.5     03-12    135  |  104  |  45<H>  ----------------------------<  152<H>  4.8   |  19<L>  |  1.84<H>    Ca    8.3<L>      12 Mar 2018 05:53  Phos  4.5     03-12  Mg     2.1     03-12        CAPILLARY BLOOD GLUCOSE      POCT Blood Glucose.: 225 mg/dL (11 Mar 2018 21:42)  POCT Blood Glucose.: 252 mg/dL (11 Mar 2018 21:40)  POCT Blood Glucose.: 509 mg/dL (11 Mar 2018 21:39)  POCT Blood Glucose.: 225 mg/dL (11 Mar 2018 18:04)  POCT Blood Glucose.: 207 mg/dL (11 Mar 2018 13:07)  POCT Blood Glucose.: 203 mg/dL (11 Mar 2018 09:41)      MEDICATIONS  (STANDING):  aspirin  chewable 81 milliGRAM(s) Oral daily  atorvastatin 20 milliGRAM(s) Oral at bedtime  carvedilol 25 milliGRAM(s) Oral every 12 hours  clopidogrel Tablet 75 milliGRAM(s) Oral daily  collagenase Ointment 1 Application(s) Topical daily  dextrose 50% Injectable 25 Gram(s) IV Push once  heparin  Injectable 5000 Unit(s) SubCutaneous every 12 hours  hydrALAZINE 50 milliGRAM(s) Oral two times a day  insulin detemir injectable (LEVEMIR) 29 Unit(s) SubCutaneous at bedtime  insulin lispro (HumaLOG) corrective regimen sliding scale   SubCutaneous Before meals and at bedtime  insulin lispro Injectable (HumaLOG) 10 Unit(s) SubCutaneous three times a day before meals  isosorbide   mononitrate ER Tablet (IMDUR) 30 milliGRAM(s) Oral daily  piperacillin/tazobactam IVPB. 3.375 Gram(s) IV Intermittent every 8 hours  sertraline 25 milliGRAM(s) Oral daily  vancomycin  IVPB 1250 milliGRAM(s) IV Intermittent every 24 hours    MEDICATIONS  (PRN):  acetaminophen  IVPB. 1000 milliGRAM(s) IV Intermittent once PRN Mild Pain (1 - 3)  benzocaine 15 mG/menthol 3.6 mG Lozenge 1 Lozenge Oral three times a day PRN Sore Throat  guaiFENesin   Syrup  (Sugar-Free) 200 milliGRAM(s) Oral every 6 hours PRN Cough  HYDROmorphone  Injectable 0.5 milliGRAM(s) IV Push every 10 minutes PRN Moderate Pain (4 - 6)  metoclopramide Injectable 10 milliGRAM(s) IV Push once PRN Nausea and/or Vomiting  ondansetron Injectable 4 milliGRAM(s) IV Push once PRN Nausea and/or Vomiting Chelsey Olivares MD  Medicine Team 2  Pager [phone]        Subjective: Overnight, no events. This morning the patient feels okay. Denies any fevers, chills, CP, SOB, nausea, vomiting, or diarrhea. Says cough is improving. Worked with PT yesterday.         VITAL SIGNS:  Vital Signs Last 24 Hrs  T(C): 36.9 (12 Mar 2018 05:54), Max: 37.4 (11 Mar 2018 13:13)  T(F): 98.4 (12 Mar 2018 05:54), Max: 99.3 (11 Mar 2018 13:13)  HR: 64 (12 Mar 2018 05:54) (64 - 84)  BP: 105/55 (12 Mar 2018 05:54) (105/55 - 134/68)  BP(mean): --  RR: 18 (12 Mar 2018 05:54) (16 - 18)  SpO2: 97% (12 Mar 2018 05:54) (97% - 98%)      PHYSICAL EXAM:     GENERAL: no acute distress  HEENT: PERRLA, MMM  RESPIRATORY: CTAB, no wheezes or crackles appreciated   CARDIOVASCULAR: RRR, no murmurs  ABDOMINAL: soft, non-tender, non-distended, positive bowel sounds   EXTREMITIES: right BKA, left transmetatarsal amputation, wrapped with ace bandage, edema improved still warm to touch  NEUROLOGICAL: alert and oriented x 3, non-focal  SKIN: no rashes or lesions   MUSCULOSKELETAL: amputations as listed above, able to move all extremities                           8.3    14.65 )-----------( 358      ( 12 Mar 2018 05:53 )             24.5     03-12    135  |  104  |  45<H>  ----------------------------<  152<H>  4.8   |  19<L>  |  1.84<H>    Ca    8.3<L>      12 Mar 2018 05:53  Phos  4.5     03-12  Mg     2.1     03-12        CAPILLARY BLOOD GLUCOSE      POCT Blood Glucose.: 225 mg/dL (11 Mar 2018 21:42)  POCT Blood Glucose.: 252 mg/dL (11 Mar 2018 21:40)  POCT Blood Glucose.: 509 mg/dL (11 Mar 2018 21:39)  POCT Blood Glucose.: 225 mg/dL (11 Mar 2018 18:04)  POCT Blood Glucose.: 207 mg/dL (11 Mar 2018 13:07)  POCT Blood Glucose.: 203 mg/dL (11 Mar 2018 09:41)      MEDICATIONS  (STANDING):  aspirin  chewable 81 milliGRAM(s) Oral daily  atorvastatin 20 milliGRAM(s) Oral at bedtime  carvedilol 25 milliGRAM(s) Oral every 12 hours  clopidogrel Tablet 75 milliGRAM(s) Oral daily  collagenase Ointment 1 Application(s) Topical daily  dextrose 50% Injectable 25 Gram(s) IV Push once  heparin  Injectable 5000 Unit(s) SubCutaneous every 12 hours  hydrALAZINE 50 milliGRAM(s) Oral two times a day  insulin detemir injectable (LEVEMIR) 29 Unit(s) SubCutaneous at bedtime  insulin lispro (HumaLOG) corrective regimen sliding scale   SubCutaneous Before meals and at bedtime  insulin lispro Injectable (HumaLOG) 10 Unit(s) SubCutaneous three times a day before meals  isosorbide   mononitrate ER Tablet (IMDUR) 30 milliGRAM(s) Oral daily  piperacillin/tazobactam IVPB. 3.375 Gram(s) IV Intermittent every 8 hours  sertraline 25 milliGRAM(s) Oral daily  vancomycin  IVPB 1250 milliGRAM(s) IV Intermittent every 24 hours    MEDICATIONS  (PRN):  acetaminophen  IVPB. 1000 milliGRAM(s) IV Intermittent once PRN Mild Pain (1 - 3)  benzocaine 15 mG/menthol 3.6 mG Lozenge 1 Lozenge Oral three times a day PRN Sore Throat  guaiFENesin   Syrup  (Sugar-Free) 200 milliGRAM(s) Oral every 6 hours PRN Cough  HYDROmorphone  Injectable 0.5 milliGRAM(s) IV Push every 10 minutes PRN Moderate Pain (4 - 6)  metoclopramide Injectable 10 milliGRAM(s) IV Push once PRN Nausea and/or Vomiting  ondansetron Injectable 4 milliGRAM(s) IV Push once PRN Nausea and/or Vomiting Chelsey Olivares MD  Medicine Team 2  Pager [phone]    Patient is a 72y old  Female who presents with a chief complaint of Fevers and leg pain (06 Mar 2018 17:24)      Subjective: Overnight, no events. This morning the patient feels okay. Denies any fevers, chills, CP, SOB, nausea, vomiting, or diarrhea. Says cough is improving. Worked with PT yesterday.         VITAL SIGNS:  Vital Signs Last 24 Hrs  T(C): 36.9 (12 Mar 2018 05:54), Max: 37.4 (11 Mar 2018 13:13)  T(F): 98.4 (12 Mar 2018 05:54), Max: 99.3 (11 Mar 2018 13:13)  HR: 64 (12 Mar 2018 05:54) (64 - 84)  BP: 105/55 (12 Mar 2018 05:54) (105/55 - 134/68)  BP(mean): --  RR: 18 (12 Mar 2018 05:54) (16 - 18)  SpO2: 97% (12 Mar 2018 05:54) (97% - 98%)      PHYSICAL EXAM:     GENERAL: no acute distress  HEENT: PERRLA, MMM  RESPIRATORY: CTAB, no wheezes or crackles appreciated   CARDIOVASCULAR: RRR, no murmurs  ABDOMINAL: soft, non-tender, non-distended, positive bowel sounds   EXTREMITIES: right BKA, left transmetatarsal amputation, wrapped with ace bandage, edema improved still warm to touch  NEUROLOGICAL: alert and oriented x 3, non-focal  SKIN: no rashes or lesions   MUSCULOSKELETAL: amputations as listed above, able to move all extremities                           8.3    14.65 )-----------( 358      ( 12 Mar 2018 05:53 )             24.5     03-12    135  |  104  |  45<H>  ----------------------------<  152<H>  4.8   |  19<L>  |  1.84<H>    Ca    8.3<L>      12 Mar 2018 05:53  Phos  4.5     03-12  Mg     2.1     03-12        CAPILLARY BLOOD GLUCOSE      POCT Blood Glucose.: 225 mg/dL (11 Mar 2018 21:42)  POCT Blood Glucose.: 252 mg/dL (11 Mar 2018 21:40)  POCT Blood Glucose.: 509 mg/dL (11 Mar 2018 21:39)  POCT Blood Glucose.: 225 mg/dL (11 Mar 2018 18:04)  POCT Blood Glucose.: 207 mg/dL (11 Mar 2018 13:07)  POCT Blood Glucose.: 203 mg/dL (11 Mar 2018 09:41)      MEDICATIONS  (STANDING):  aspirin  chewable 81 milliGRAM(s) Oral daily  atorvastatin 20 milliGRAM(s) Oral at bedtime  carvedilol 25 milliGRAM(s) Oral every 12 hours  clopidogrel Tablet 75 milliGRAM(s) Oral daily  collagenase Ointment 1 Application(s) Topical daily  dextrose 50% Injectable 25 Gram(s) IV Push once  heparin  Injectable 5000 Unit(s) SubCutaneous every 12 hours  hydrALAZINE 50 milliGRAM(s) Oral two times a day  insulin detemir injectable (LEVEMIR) 29 Unit(s) SubCutaneous at bedtime  insulin lispro (HumaLOG) corrective regimen sliding scale   SubCutaneous Before meals and at bedtime  insulin lispro Injectable (HumaLOG) 10 Unit(s) SubCutaneous three times a day before meals  isosorbide   mononitrate ER Tablet (IMDUR) 30 milliGRAM(s) Oral daily  piperacillin/tazobactam IVPB. 3.375 Gram(s) IV Intermittent every 8 hours  sertraline 25 milliGRAM(s) Oral daily  vancomycin  IVPB 1250 milliGRAM(s) IV Intermittent every 24 hours    MEDICATIONS  (PRN):  acetaminophen  IVPB. 1000 milliGRAM(s) IV Intermittent once PRN Mild Pain (1 - 3)  benzocaine 15 mG/menthol 3.6 mG Lozenge 1 Lozenge Oral three times a day PRN Sore Throat  guaiFENesin   Syrup  (Sugar-Free) 200 milliGRAM(s) Oral every 6 hours PRN Cough  HYDROmorphone  Injectable 0.5 milliGRAM(s) IV Push every 10 minutes PRN Moderate Pain (4 - 6)  metoclopramide Injectable 10 milliGRAM(s) IV Push once PRN Nausea and/or Vomiting  ondansetron Injectable 4 milliGRAM(s) IV Push once PRN Nausea and/or Vomiting

## 2018-03-12 NOTE — PROGRESS NOTE ADULT - PROBLEM SELECTOR PLAN 1
- ID on board, appreciate involvement. On vanc / zosyn (day 10 abx).  - s/p L ankle disarticulation by podiatry on 3/10, appreciate their recs  - will still need BKA by vascular surgery for adequate control - ID on board, appreciate involvement. On vanc / zosyn (day 10 abx).  - s/p L ankle disarticulation by podiatry on 3/10 and still pending BKA by vascular surgery once there is improvement in cellulitis of left leg - ID on board, appreciate involvement, continue vanc / zosyn (day 10)  - s/p L ankle disarticulation by podiatry on 3/10 and still pending BKA by vascular surgery once there is improvement in cellulitis of left leg  - waiting to hear from vascular about when they will plan for surgery or whether patient will go home and come back - ID on board, appreciate involvement, continue vanc / zosyn (day 10)  - s/p L ankle disarticulation by podiatry on 3/10 and still pending BKA by vascular surgery once there is improvement in cellulitis of left leg  - plan for OR tomorrow for BKA, patient is aware, NPO after midnight and preop labs ordered

## 2018-03-12 NOTE — PROGRESS NOTE ADULT - SUBJECTIVE AND OBJECTIVE BOX
Vascular Surgery Pre-op Note  [id number]        Patient is a 72y old  Female who presents with a chief complaint of Fevers and leg pain (06 Mar 2018 17:24)    Diagnosis: osteomyelitis of L foot  Procedure: Left lower extremity below knee amputation  Attending: CORNEL Moraes                          8.3    14.65 )-----------( 358      ( 12 Mar 2018 05:53 )             24.5     03-12    135  |  104  |  45<H>  ----------------------------<  152<H>  4.8   |  19<L>  |  1.84<H>    Ca    8.3<L>      12 Mar 2018 05:53  Phos  4.5     03-12  Mg     2.1     03-12        Type + Screen (03.10.18 @ 06:00)    ABO Interpretation: A    Rh Interpretation: Positive    Antibody Screen: Negative    Xray Chest 1 View-PORTABLE IMMEDIATE (03.10.18 @ 12:33) >  IMPRESSION:  Lungs underinflated.    Increased left basilar markings and strand-like opacities could be   compatible with subsegmental atelectatic changes or infiltrate/pneumonia   in the proper clinical context. Clear remaining visualized lungs. No   pleural effusion or pneumothorax.    Stable sternal wires, multiple small surgical clips, and slightly   prominent appearing cardiac and mediastinal silhouettes.    Trachea midline.    Left neck surgical clips.    < end of copied text >      [ ] Type & Screen: ordered  [ ] CBC: ordered  [ ] BMP: ordered  [ ] PT/PTT/INR: ordered  [ ] Urinalysis: n/a  [x ] Chest X-ray: 3/10/18  [ ] EKG: paper chart 3/10/18  [x ] NPO/IVF@ Mdn  [ ] Consent  [ ] Clearance  [x ] Added on to OR Schedule: 2nd case for 3/13/18 Vascular Surgery Pre-op Note  [id number]        Patient is a 72y old  Female who presents with a chief complaint of Fevers and leg pain (06 Mar 2018 17:24)    Diagnosis: osteomyelitis of L foot  Procedure: Left lower extremity below knee amputation  Attending: CORNEL Moraes    Added on to OR schedule for Tuesday 3/13/18                          8.3    14.65 )-----------( 358      ( 12 Mar 2018 05:53 )             24.5     03-12    135  |  104  |  45<H>  ----------------------------<  152<H>  4.8   |  19<L>  |  1.84<H>    Ca    8.3<L>      12 Mar 2018 05:53  Phos  4.5     03-12  Mg     2.1     03-12        Type + Screen (03.10.18 @ 06:00)    ABO Interpretation: A    Rh Interpretation: Positive    Antibody Screen: Negative    Xray Chest 1 View-PORTABLE IMMEDIATE (03.10.18 @ 12:33) >  IMPRESSION:  Lungs underinflated.    Increased left basilar markings and strand-like opacities could be   compatible with subsegmental atelectatic changes or infiltrate/pneumonia   in the proper clinical context. Clear remaining visualized lungs. No   pleural effusion or pneumothorax.    Stable sternal wires, multiple small surgical clips, and slightly   prominent appearing cardiac and mediastinal silhouettes.    Trachea midline.    Left neck surgical clips.

## 2018-03-13 ENCOUNTER — APPOINTMENT (OUTPATIENT)
Dept: OPHTHALMOLOGY | Facility: CLINIC | Age: 73
End: 2018-03-13

## 2018-03-13 ENCOUNTER — TRANSCRIPTION ENCOUNTER (OUTPATIENT)
Age: 73
End: 2018-03-13

## 2018-03-13 LAB
APTT BLD: 33 SEC — SIGNIFICANT CHANGE UP (ref 27.5–37.4)
BLD GP AB SCN SERPL QL: NEGATIVE — SIGNIFICANT CHANGE UP
BUN SERPL-MCNC: 49 MG/DL — HIGH (ref 7–23)
CALCIUM SERPL-MCNC: 8 MG/DL — LOW (ref 8.4–10.5)
CHLORIDE SERPL-SCNC: 103 MMOL/L — SIGNIFICANT CHANGE UP (ref 98–107)
CO2 SERPL-SCNC: 16 MMOL/L — LOW (ref 22–31)
CREAT SERPL-MCNC: 1.79 MG/DL — HIGH (ref 0.5–1.3)
GLUCOSE BLDC GLUCOMTR-MCNC: 117 MG/DL — HIGH (ref 70–99)
GLUCOSE BLDC GLUCOMTR-MCNC: 131 MG/DL — HIGH (ref 70–99)
GLUCOSE BLDC GLUCOMTR-MCNC: 140 MG/DL — HIGH (ref 70–99)
GLUCOSE BLDC GLUCOMTR-MCNC: 171 MG/DL — HIGH (ref 70–99)
GLUCOSE SERPL-MCNC: 142 MG/DL — HIGH (ref 70–99)
HCT VFR BLD CALC: 25.7 % — LOW (ref 34.5–45)
HGB BLD-MCNC: 8.2 G/DL — LOW (ref 11.5–15.5)
INR BLD: 1.2 — HIGH (ref 0.88–1.17)
MAGNESIUM SERPL-MCNC: 2.3 MG/DL — SIGNIFICANT CHANGE UP (ref 1.6–2.6)
MCHC RBC-ENTMCNC: 27.1 PG — SIGNIFICANT CHANGE UP (ref 27–34)
MCHC RBC-ENTMCNC: 31.9 % — LOW (ref 32–36)
MCV RBC AUTO: 84.8 FL — SIGNIFICANT CHANGE UP (ref 80–100)
NRBC # FLD: 0 — SIGNIFICANT CHANGE UP
PHOSPHATE SERPL-MCNC: 4.7 MG/DL — HIGH (ref 2.5–4.5)
PLATELET # BLD AUTO: 411 K/UL — HIGH (ref 150–400)
PMV BLD: 11.6 FL — SIGNIFICANT CHANGE UP (ref 7–13)
POTASSIUM SERPL-MCNC: 4.8 MMOL/L — SIGNIFICANT CHANGE UP (ref 3.5–5.3)
POTASSIUM SERPL-SCNC: 4.8 MMOL/L — SIGNIFICANT CHANGE UP (ref 3.5–5.3)
PROTHROM AB SERPL-ACNC: 13.8 SEC — HIGH (ref 9.8–13.1)
RBC # BLD: 3.03 M/UL — LOW (ref 3.8–5.2)
RBC # FLD: 16.8 % — HIGH (ref 10.3–14.5)
RH IG SCN BLD-IMP: POSITIVE — SIGNIFICANT CHANGE UP
SODIUM SERPL-SCNC: 135 MMOL/L — SIGNIFICANT CHANGE UP (ref 135–145)
VANCOMYCIN TROUGH SERPL-MCNC: 26 UG/ML — CRITICAL HIGH (ref 10–20)
WBC # BLD: 13.72 K/UL — HIGH (ref 3.8–10.5)
WBC # FLD AUTO: 13.72 K/UL — HIGH (ref 3.8–10.5)

## 2018-03-13 PROCEDURE — 99232 SBSQ HOSP IP/OBS MODERATE 35: CPT

## 2018-03-13 PROCEDURE — 99233 SBSQ HOSP IP/OBS HIGH 50: CPT | Mod: GC

## 2018-03-13 PROCEDURE — 93306 TTE W/DOPPLER COMPLETE: CPT | Mod: 26

## 2018-03-13 RX ORDER — CLOPIDOGREL BISULFATE 75 MG/1
75 TABLET, FILM COATED ORAL DAILY
Qty: 0 | Refills: 0 | Status: DISCONTINUED | OUTPATIENT
Start: 2018-03-13 | End: 2018-03-27

## 2018-03-13 RX ORDER — INSULIN LISPRO 100/ML
10 VIAL (ML) SUBCUTANEOUS
Qty: 0 | Refills: 0 | Status: DISCONTINUED | OUTPATIENT
Start: 2018-03-13 | End: 2018-03-13

## 2018-03-13 RX ORDER — INSULIN LISPRO 100/ML
7 VIAL (ML) SUBCUTANEOUS
Qty: 0 | Refills: 0 | Status: DISCONTINUED | OUTPATIENT
Start: 2018-03-13 | End: 2018-03-15

## 2018-03-13 RX ORDER — HEPARIN SODIUM 5000 [USP'U]/ML
5000 INJECTION INTRAVENOUS; SUBCUTANEOUS EVERY 12 HOURS
Qty: 0 | Refills: 0 | Status: DISCONTINUED | OUTPATIENT
Start: 2018-03-13 | End: 2018-03-27

## 2018-03-13 RX ORDER — ASPIRIN/CALCIUM CARB/MAGNESIUM 324 MG
81 TABLET ORAL DAILY
Qty: 0 | Refills: 0 | Status: DISCONTINUED | OUTPATIENT
Start: 2018-03-13 | End: 2018-03-27

## 2018-03-13 RX ADMIN — ATORVASTATIN CALCIUM 20 MILLIGRAM(S): 80 TABLET, FILM COATED ORAL at 21:55

## 2018-03-13 RX ADMIN — Medication 81 MILLIGRAM(S): at 18:13

## 2018-03-13 RX ADMIN — Medication 29 UNIT(S): at 22:29

## 2018-03-13 RX ADMIN — HEPARIN SODIUM 5000 UNIT(S): 5000 INJECTION INTRAVENOUS; SUBCUTANEOUS at 18:13

## 2018-03-13 RX ADMIN — PIPERACILLIN AND TAZOBACTAM 25 GRAM(S): 4; .5 INJECTION, POWDER, LYOPHILIZED, FOR SOLUTION INTRAVENOUS at 18:13

## 2018-03-13 RX ADMIN — CARVEDILOL PHOSPHATE 25 MILLIGRAM(S): 80 CAPSULE, EXTENDED RELEASE ORAL at 18:13

## 2018-03-13 RX ADMIN — SODIUM CHLORIDE 75 MILLILITER(S): 9 INJECTION INTRAMUSCULAR; INTRAVENOUS; SUBCUTANEOUS at 06:00

## 2018-03-13 RX ADMIN — Medication 50 MILLIGRAM(S): at 18:13

## 2018-03-13 RX ADMIN — SERTRALINE 25 MILLIGRAM(S): 25 TABLET, FILM COATED ORAL at 12:41

## 2018-03-13 RX ADMIN — CARVEDILOL PHOSPHATE 25 MILLIGRAM(S): 80 CAPSULE, EXTENDED RELEASE ORAL at 06:01

## 2018-03-13 RX ADMIN — PIPERACILLIN AND TAZOBACTAM 25 GRAM(S): 4; .5 INJECTION, POWDER, LYOPHILIZED, FOR SOLUTION INTRAVENOUS at 02:42

## 2018-03-13 RX ADMIN — CLOPIDOGREL BISULFATE 75 MILLIGRAM(S): 75 TABLET, FILM COATED ORAL at 18:13

## 2018-03-13 RX ADMIN — Medication 50 MILLIGRAM(S): at 06:01

## 2018-03-13 RX ADMIN — PIPERACILLIN AND TAZOBACTAM 25 GRAM(S): 4; .5 INJECTION, POWDER, LYOPHILIZED, FOR SOLUTION INTRAVENOUS at 11:27

## 2018-03-13 RX ADMIN — Medication 1: at 22:29

## 2018-03-13 RX ADMIN — ISOSORBIDE MONONITRATE 30 MILLIGRAM(S): 60 TABLET, EXTENDED RELEASE ORAL at 12:41

## 2018-03-13 NOTE — PROGRESS NOTE ADULT - SUBJECTIVE AND OBJECTIVE BOX
CC: Patient is a 72y old  Female who presents with a chief complaint of Fevers and leg pain     Interval History/ROS: Patient remains with leg pain. Denies fever, chills.     Allergies  sulfa drugs (Hives)  sulfa drugs (Rash)  Sulfac 10% (Hives)    ANTIMICROBIALS:  piperacillin/tazobactam IVPB. 3.375 every 8 hours    PE:    Vital Signs Last 24 Hrs  T(C): 36.2 (13 Mar 2018 11:16), Max: 37.1 (12 Mar 2018 23:34)  T(F): 97.2 (13 Mar 2018 11:16), Max: 98.7 (12 Mar 2018 23:34)  HR: 56 (13 Mar 2018 13:11) (56 - 63)  BP: 128/67 (13 Mar 2018 13:11) (124/58 - 134/69)  BP(mean): --  RR: 16 (13 Mar 2018 13:11) (16 - 18)  SpO2: 97% (13 Mar 2018 13:11) (97% - 98%)    Gen: AOx3, NAD  CV: S1+S2 normal, no murmurs  Resp: Clear bilat, no resp distress  Abd: Soft, nontender, +BS  Ext: Right BKA, left leg with warmth, bandaged  : No Pratt  IV/Skin: No thrombophlebitis  Neuro: no focal deficits      LABS:                          8.2    13.72 )-----------( 411      ( 13 Mar 2018 05:35 )             25.7       03-13    135  |  103  |  49<H>  ----------------------------<  142<H>  4.8   |  16<L>  |  1.79<H>    Ca    8.0<L>      13 Mar 2018 05:35  Phos  4.7     03-13  Mg     2.3     03-13    MICROBIOLOGY:  Vancomycin Level, Trough: 26.0 ug/mL (03-13-18 @ 12:46)  v  ANKLE  03-10-18 --  --  --      ANKLE  03-10-18   NO GROWTH - PRELIMINARY RESULTS  NO ORGANISMS ISOLATED AT 24 HOURS  NO ORGANISMS ISOLATED AT 48 HRS.  --  --      ANUS  03-10-18 --  --  --      OTHER  03-06-18   RARE  STRDYS^Streptococcus dysgalactiae  --  --      LEG - LEFT  03-03-18 --  --  Proteus mirabilis  Staph. aureus *MRSA*  Strep Beta Hemolytic Grp G      BLOOD PERIPHERAL  03-03-18 --  --  --    RADIOLOGY:    No new images.

## 2018-03-13 NOTE — PROGRESS NOTE ADULT - ATTENDING COMMENTS
Patient seen and examined.  Case discussed with house staff.  Agree with above as edited.   72yoF with h/o PVD s/p R BKA and L TMA,  DM type 2, CAD s/p CABG, CKD III, HTN admitted with left foot osteomyelitis and cellulitis s/p ankle disarticulation c/b demand ischemia.  c/w current IV abx. Ultimately, source control likely needed with BKA. Plan was for BKA today but case cancelled this am. Per vascular, concerned about surgical site healing given degree of infection. prefer c/w abx and hope for clinical improvement prior to BKA.  Cardiology, vascular, podiatry f/u appreciated.

## 2018-03-13 NOTE — PROGRESS NOTE ADULT - ASSESSMENT
72F h/o DM2, PVD s/p R BKA and L transmetatarsal amputation, CAD s/p CABG, CKD III, HTN adm 3/3/18 with fevers and LLE pain found to be in sepsis 2/2 cellulitis and OM of LLE. Patient is now s/p L ankle disarticulation by podiatry on 3/10 complicated by uptrending troponins with T wave inversions. Patient has been asymptomatic and hemodynamically stable without any events on telemetry. Pending BKA by vascular surgery today 3/13.

## 2018-03-13 NOTE — PROGRESS NOTE ADULT - PROBLEM SELECTOR PLAN 1
- ID on board, appreciate involvement, continue vanc / zosyn (day 11)  - s/p L ankle disarticulation by podiatry on 3/10 and plan for BKA by vascular surgery today, has been NPO and anticoagulants held  - vascular wants echo done prior to OR, will expedite - ID on board, appreciate involvement, continue vanc / zosyn (day 11)  - s/p L ankle disarticulation by podiatry on 3/10 and plan for BKA by vascular surgery today, has been NPO and anticoagulants held  - called echo lab to expedite echo prior to surgery - ID on board, appreciate involvement, continue vanc / zosyn (day 11)  - s/p L ankle disarticulation by podiatry on 3/10 and plan for BKA by vascular surgery --> was cancelled for today because wound still needs to heal, no date for surgery at this time - ID on board, appreciate involvement, continue vanc / zosyn (day 11)  - vancomycin level elevated at 26 this morning, holding today's dose and rechecking level tomorrow per ID recs, will likely have to redose to 1gm q24  - s/p L ankle disarticulation by podiatry on 3/10 and plan for BKA by vascular surgery --> was cancelled for today because wound still needs to heal, no date for surgery at this time

## 2018-03-13 NOTE — PROGRESS NOTE ADULT - PROBLEM SELECTOR PLAN 2
- continue beta blocker, held ASA and plavix this AM for surgery  - no events on tele, asymptomatic - continue beta blocker, held ASA and plavix this AM for surgery but will restart today  - no events on tele, asymptomatic

## 2018-03-13 NOTE — PROGRESS NOTE ADULT - PROBLEM SELECTOR PLAN 5
- CKD III at baseline  - Cr 1.8 today, will hydrate with fluids today and check BMP tomorrow  - patient with poor PO intake, likely pre-renal in etiology, urine lytes pending - CKD III at baseline  - Cr 1.79, will continue to hydrate and monitor   - patient with poor PO intake, likely pre-renal in etiology, urine lytes pending - CKD III at baseline  - Cr 1.79, will continue to hydrate and monitor   - patient with poor PO intake, likely pre-renal in etiology

## 2018-03-13 NOTE — PROGRESS NOTE ADULT - PROBLEM SELECTOR PLAN 3
- HgbA1c 8.1  - On levemir 29U qHS, lispro 10U qAC with ISS for correction  - will monitor FS carefull after surgery, may need to increase insulin - HgbA1c 8.1  - On levemir 29U qHS, lispro 10U qAC with ISS for correction  - will monitor FS carefully and increase as needed - HgbA1c 8.1  - On levemir 29U qHS, will hold premeal insulin given low FS and reassess tomorrow   - will monitor FS carefully and increase as needed - HgbA1c 8.1  - On levemir 29U qHS, will reduce premeal insulin for today given low FS (likely secondary to being NPO for surgery) and readjust tomorrow   - will monitor FS carefully and increase as needed

## 2018-03-13 NOTE — PROVIDER CONTACT NOTE (CRITICAL VALUE NOTIFICATION) - ACTION/TREATMENT ORDERED:
WIll contact Cardiology.
awaiting orders  will continue to monitor
MD Olivares aware. Initiate contact precautions. Will continue to monitor.

## 2018-03-13 NOTE — PROGRESS NOTE ADULT - ASSESSMENT
72 female with T2DM, PVD s/p R BKA and L transmetatarsal amputation, CAD s/p CABG, CKD stage 3, HTN, HLD here with fevers 103F, chills and left leg pain. Admitted for cellulitis of left lower extremity with warmth and erythema to that leg. Has elevated ESR and CRP.     MRI with diffuse cellulitis with acute osteo involving of first and second metatarsal remanants, with enhance of the third and fourth metatarsal, and fluid collection within the tenotomy gap. Wound cx with MRSA, strep Grp G and proteus; other wound cx with strep dysgalactiae. Leukocytosis improving.     s/p OR for ankle disarticulation and incision and drainage of ankle and Achilles tendon abscess.     Recommend:   - Vanco level 26 - would hold vancomycin and recheck level in the AM.  - If repeat vanco level <20, can restart vanco at 1 gram IV q 24 hours.  - Continue zosyn.  - F/U vascular surgery regarding further surgical intervention.

## 2018-03-13 NOTE — PROGRESS NOTE ADULT - SUBJECTIVE AND OBJECTIVE BOX
Chelsey Olivares MD  Medicine Team 2  Pager [phone]    Patient is a 72y old  Female who presents with a chief complaint of Fevers and leg pain (06 Mar 2018 17:24)        Subjective: Patient seen and examined.         VITAL SIGNS:  Vital Signs Last 24 Hrs  T(C): 36.6 (13 Mar 2018 05:54), Max: 37.1 (12 Mar 2018 12:58)  T(F): 97.8 (13 Mar 2018 05:54), Max: 98.7 (12 Mar 2018 12:58)  HR: 61 (13 Mar 2018 05:54) (61 - 63)  BP: 134/69 (13 Mar 2018 05:54) (124/58 - 134/69)  BP(mean): --  RR: 16 (13 Mar 2018 05:54) (16 - 18)  SpO2: 97% (13 Mar 2018 05:54) (97% - 97%)      PHYSICAL EXAM:     GENERAL: no acute distress  HEENT: PERRLA, EOMI, moist oropharynx   RESPIRATORY: CTAB, no w/c   CARDIOVASCULAR: RRR, no murmurs, gallops, rubs  ABDOMINAL: soft, non-tender, non-distended, positive bowel sounds   EXTREMITIES: no clubbing, cyanosis, or edema  NEUROLOGICAL: alert and oriented x 3, non-focal  SKIN: no rashes or lesions   MUSCULOSKELETAL: no gross joint deformity                          8.3    14.65 )-----------( 358      ( 12 Mar 2018 05:53 )             24.5     03-12    135  |  104  |  45<H>  ----------------------------<  152<H>  4.8   |  19<L>  |  1.84<H>    Ca    8.3<L>      12 Mar 2018 05:53  Phos  4.5     03-12  Mg     2.1     03-12        CAPILLARY BLOOD GLUCOSE      POCT Blood Glucose.: 202 mg/dL (12 Mar 2018 23:44)  POCT Blood Glucose.: 208 mg/dL (12 Mar 2018 21:31)  POCT Blood Glucose.: 204 mg/dL (12 Mar 2018 17:50)  POCT Blood Glucose.: 124 mg/dL (12 Mar 2018 12:52)  POCT Blood Glucose.: 133 mg/dL (12 Mar 2018 09:02)      MEDICATIONS  (STANDING):  atorvastatin 20 milliGRAM(s) Oral at bedtime  carvedilol 25 milliGRAM(s) Oral every 12 hours  collagenase Ointment 1 Application(s) Topical daily  dextrose 50% Injectable 25 Gram(s) IV Push once  hydrALAZINE 50 milliGRAM(s) Oral two times a day  insulin detemir injectable (LEVEMIR) 29 Unit(s) SubCutaneous at bedtime  insulin lispro (HumaLOG) corrective regimen sliding scale   SubCutaneous Before meals and at bedtime  isosorbide   mononitrate ER Tablet (IMDUR) 30 milliGRAM(s) Oral daily  piperacillin/tazobactam IVPB. 3.375 Gram(s) IV Intermittent every 8 hours  sertraline 25 milliGRAM(s) Oral daily  sodium chloride 0.9%. 1000 milliLiter(s) (75 mL/Hr) IV Continuous <Continuous>  vancomycin  IVPB 1250 milliGRAM(s) IV Intermittent every 24 hours    MEDICATIONS  (PRN):  acetaminophen  IVPB. 1000 milliGRAM(s) IV Intermittent once PRN Mild Pain (1 - 3)  benzocaine 15 mG/menthol 3.6 mG Lozenge 1 Lozenge Oral three times a day PRN Sore Throat  guaiFENesin   Syrup  (Sugar-Free) 200 milliGRAM(s) Oral every 6 hours PRN Cough  HYDROmorphone  Injectable 0.5 milliGRAM(s) IV Push every 10 minutes PRN Moderate Pain (4 - 6)  metoclopramide Injectable 10 milliGRAM(s) IV Push once PRN Nausea and/or Vomiting  ondansetron Injectable 4 milliGRAM(s) IV Push once PRN Nausea and/or Vomiting Chelsey Olivares MD  Medicine Team 2  Pager [phone]    Patient is a 72y old  Female who presents with a chief complaint of Fevers and leg pain (06 Mar 2018 17:24)        Subjective: Patient seen and examined. Overnight there were no acute events. This morning she feels okay. Denies fevers, chills, CP, SOB, nausea, or vomiting.         VITAL SIGNS:  Vital Signs Last 24 Hrs  T(C): 36.6 (13 Mar 2018 05:54), Max: 37.1 (12 Mar 2018 12:58)  T(F): 97.8 (13 Mar 2018 05:54), Max: 98.7 (12 Mar 2018 12:58)  HR: 61 (13 Mar 2018 05:54) (61 - 63)  BP: 134/69 (13 Mar 2018 05:54) (124/58 - 134/69)  BP(mean): --  RR: 16 (13 Mar 2018 05:54) (16 - 18)  SpO2: 97% (13 Mar 2018 05:54) (97% - 97%)      PHYSICAL EXAM:     GENERAL: no acute distress  HEENT: PERRLA, EOMI, moist oropharynx   RESPIRATORY: CTAB, no wheezes or crackles   CARDIOVASCULAR: RRR, no murmurs  ABDOMINAL: soft, non-tender, non-distended, positive bowel sounds   EXTREMITIES: right BKA, left transmetatarsal amputation, erythema improved but still warm to touch, 1+ edema, CE bandage present around foot  NEUROLOGICAL: alert and oriented x 3, non-focal  SKIN: no rashes or lesions   MUSCULOSKELETAL: right BKA, left transmetatarsal amputation                           8.3    14.65 )-----------( 358      ( 12 Mar 2018 05:53 )             24.5     03-12    135  |  104  |  45<H>  ----------------------------<  152<H>  4.8   |  19<L>  |  1.84<H>    Ca    8.3<L>      12 Mar 2018 05:53  Phos  4.5     03-12  Mg     2.1     03-12        CAPILLARY BLOOD GLUCOSE      POCT Blood Glucose.: 202 mg/dL (12 Mar 2018 23:44)  POCT Blood Glucose.: 208 mg/dL (12 Mar 2018 21:31)  POCT Blood Glucose.: 204 mg/dL (12 Mar 2018 17:50)  POCT Blood Glucose.: 124 mg/dL (12 Mar 2018 12:52)  POCT Blood Glucose.: 133 mg/dL (12 Mar 2018 09:02)      MEDICATIONS  (STANDING):  atorvastatin 20 milliGRAM(s) Oral at bedtime  carvedilol 25 milliGRAM(s) Oral every 12 hours  collagenase Ointment 1 Application(s) Topical daily  dextrose 50% Injectable 25 Gram(s) IV Push once  hydrALAZINE 50 milliGRAM(s) Oral two times a day  insulin detemir injectable (LEVEMIR) 29 Unit(s) SubCutaneous at bedtime  insulin lispro (HumaLOG) corrective regimen sliding scale   SubCutaneous Before meals and at bedtime  isosorbide   mononitrate ER Tablet (IMDUR) 30 milliGRAM(s) Oral daily  piperacillin/tazobactam IVPB. 3.375 Gram(s) IV Intermittent every 8 hours  sertraline 25 milliGRAM(s) Oral daily  sodium chloride 0.9%. 1000 milliLiter(s) (75 mL/Hr) IV Continuous <Continuous>  vancomycin  IVPB 1250 milliGRAM(s) IV Intermittent every 24 hours    MEDICATIONS  (PRN):  acetaminophen  IVPB. 1000 milliGRAM(s) IV Intermittent once PRN Mild Pain (1 - 3)  benzocaine 15 mG/menthol 3.6 mG Lozenge 1 Lozenge Oral three times a day PRN Sore Throat  guaiFENesin   Syrup  (Sugar-Free) 200 milliGRAM(s) Oral every 6 hours PRN Cough  HYDROmorphone  Injectable 0.5 milliGRAM(s) IV Push every 10 minutes PRN Moderate Pain (4 - 6)  metoclopramide Injectable 10 milliGRAM(s) IV Push once PRN Nausea and/or Vomiting  ondansetron Injectable 4 milliGRAM(s) IV Push once PRN Nausea and/or Vomiting

## 2018-03-14 PROBLEM — L97.522 CHRONIC TOE ULCER, LEFT, WITH FAT LAYER EXPOSED: Status: ACTIVE | Noted: 2018-03-13

## 2018-03-14 LAB
BUN SERPL-MCNC: 47 MG/DL — HIGH (ref 7–23)
CALCIUM SERPL-MCNC: 8.2 MG/DL — LOW (ref 8.4–10.5)
CHLORIDE SERPL-SCNC: 106 MMOL/L — SIGNIFICANT CHANGE UP (ref 98–107)
CO2 SERPL-SCNC: 17 MMOL/L — LOW (ref 22–31)
CREAT SERPL-MCNC: 1.54 MG/DL — HIGH (ref 0.5–1.3)
GLUCOSE BLDC GLUCOMTR-MCNC: 105 MG/DL — HIGH (ref 70–99)
GLUCOSE BLDC GLUCOMTR-MCNC: 105 MG/DL — HIGH (ref 70–99)
GLUCOSE BLDC GLUCOMTR-MCNC: 146 MG/DL — HIGH (ref 70–99)
GLUCOSE BLDC GLUCOMTR-MCNC: 77 MG/DL — SIGNIFICANT CHANGE UP (ref 70–99)
GLUCOSE SERPL-MCNC: 147 MG/DL — HIGH (ref 70–99)
HCT VFR BLD CALC: 26.5 % — LOW (ref 34.5–45)
HGB BLD-MCNC: 8.7 G/DL — LOW (ref 11.5–15.5)
MAGNESIUM SERPL-MCNC: 2.3 MG/DL — SIGNIFICANT CHANGE UP (ref 1.6–2.6)
MCHC RBC-ENTMCNC: 28.7 PG — SIGNIFICANT CHANGE UP (ref 27–34)
MCHC RBC-ENTMCNC: 32.8 % — SIGNIFICANT CHANGE UP (ref 32–36)
MCV RBC AUTO: 87.5 FL — SIGNIFICANT CHANGE UP (ref 80–100)
NRBC # FLD: 0 — SIGNIFICANT CHANGE UP
PHOSPHATE SERPL-MCNC: 4.6 MG/DL — HIGH (ref 2.5–4.5)
PLATELET # BLD AUTO: 426 K/UL — HIGH (ref 150–400)
PMV BLD: 11.4 FL — SIGNIFICANT CHANGE UP (ref 7–13)
POTASSIUM SERPL-MCNC: 4.5 MMOL/L — SIGNIFICANT CHANGE UP (ref 3.5–5.3)
POTASSIUM SERPL-SCNC: 4.5 MMOL/L — SIGNIFICANT CHANGE UP (ref 3.5–5.3)
RBC # BLD: 3.03 M/UL — LOW (ref 3.8–5.2)
RBC # FLD: 17.3 % — HIGH (ref 10.3–14.5)
SODIUM SERPL-SCNC: 136 MMOL/L — SIGNIFICANT CHANGE UP (ref 135–145)
VANCOMYCIN FLD-MCNC: 20.2 UG/ML — SIGNIFICANT CHANGE UP
WBC # BLD: 12.48 K/UL — HIGH (ref 3.8–10.5)
WBC # FLD AUTO: 12.48 K/UL — HIGH (ref 3.8–10.5)

## 2018-03-14 PROCEDURE — 99232 SBSQ HOSP IP/OBS MODERATE 35: CPT

## 2018-03-14 PROCEDURE — 99233 SBSQ HOSP IP/OBS HIGH 50: CPT | Mod: GC

## 2018-03-14 RX ORDER — VANCOMYCIN HCL 1 G
1000 VIAL (EA) INTRAVENOUS ONCE
Qty: 0 | Refills: 0 | Status: DISCONTINUED | OUTPATIENT
Start: 2018-03-14 | End: 2018-03-14

## 2018-03-14 RX ORDER — INSULIN DETEMIR 100/ML (3)
20 INSULIN PEN (ML) SUBCUTANEOUS ONCE
Qty: 0 | Refills: 0 | Status: COMPLETED | OUTPATIENT
Start: 2018-03-14 | End: 2018-03-14

## 2018-03-14 RX ADMIN — PIPERACILLIN AND TAZOBACTAM 25 GRAM(S): 4; .5 INJECTION, POWDER, LYOPHILIZED, FOR SOLUTION INTRAVENOUS at 17:10

## 2018-03-14 RX ADMIN — Medication 81 MILLIGRAM(S): at 10:13

## 2018-03-14 RX ADMIN — Medication 50 MILLIGRAM(S): at 05:23

## 2018-03-14 RX ADMIN — Medication 7 UNIT(S): at 09:55

## 2018-03-14 RX ADMIN — SERTRALINE 25 MILLIGRAM(S): 25 TABLET, FILM COATED ORAL at 10:13

## 2018-03-14 RX ADMIN — Medication 7 UNIT(S): at 18:16

## 2018-03-14 RX ADMIN — SODIUM CHLORIDE 75 MILLILITER(S): 9 INJECTION INTRAMUSCULAR; INTRAVENOUS; SUBCUTANEOUS at 23:26

## 2018-03-14 RX ADMIN — HEPARIN SODIUM 5000 UNIT(S): 5000 INJECTION INTRAVENOUS; SUBCUTANEOUS at 17:10

## 2018-03-14 RX ADMIN — ISOSORBIDE MONONITRATE 30 MILLIGRAM(S): 60 TABLET, EXTENDED RELEASE ORAL at 10:13

## 2018-03-14 RX ADMIN — Medication 50 MILLIGRAM(S): at 17:10

## 2018-03-14 RX ADMIN — PIPERACILLIN AND TAZOBACTAM 25 GRAM(S): 4; .5 INJECTION, POWDER, LYOPHILIZED, FOR SOLUTION INTRAVENOUS at 10:13

## 2018-03-14 RX ADMIN — CARVEDILOL PHOSPHATE 25 MILLIGRAM(S): 80 CAPSULE, EXTENDED RELEASE ORAL at 05:22

## 2018-03-14 RX ADMIN — SODIUM CHLORIDE 75 MILLILITER(S): 9 INJECTION INTRAMUSCULAR; INTRAVENOUS; SUBCUTANEOUS at 09:54

## 2018-03-14 RX ADMIN — SODIUM CHLORIDE 75 MILLILITER(S): 9 INJECTION INTRAMUSCULAR; INTRAVENOUS; SUBCUTANEOUS at 00:00

## 2018-03-14 RX ADMIN — PIPERACILLIN AND TAZOBACTAM 25 GRAM(S): 4; .5 INJECTION, POWDER, LYOPHILIZED, FOR SOLUTION INTRAVENOUS at 02:18

## 2018-03-14 RX ADMIN — HEPARIN SODIUM 5000 UNIT(S): 5000 INJECTION INTRAVENOUS; SUBCUTANEOUS at 05:22

## 2018-03-14 RX ADMIN — CLOPIDOGREL BISULFATE 75 MILLIGRAM(S): 75 TABLET, FILM COATED ORAL at 10:13

## 2018-03-14 RX ADMIN — Medication 20 UNIT(S): at 23:25

## 2018-03-14 RX ADMIN — ATORVASTATIN CALCIUM 20 MILLIGRAM(S): 80 TABLET, FILM COATED ORAL at 22:17

## 2018-03-14 RX ADMIN — Medication 7 UNIT(S): at 13:33

## 2018-03-14 NOTE — PROGRESS NOTE ADULT - ASSESSMENT
pt seen at bedside in NAD. dressing to left c/d/i  s/p left ankle disarticulation   applied wet to dry packing with DSD  await OR cultures await Vasc plan for BKA  continue local care and abx  will follow

## 2018-03-14 NOTE — PROGRESS NOTE ADULT - ATTENDING COMMENTS
Patient seen and examined.  Case discussed with house staff.  Agree with above as edited.   72yoF with h/o PVD s/p R BKA and L TMA,  DM type 2, CAD s/p CABG, CKD III, HTN admitted with left foot osteomyelitis and cellulitis s/p ankle disarticulation c/b demand ischemia.  c/w current IV abx.   Patient requires BKA for further source control. Vascular surgery following - they want continued abx to improve surgical site to increase chance of wound healing.  Cardiology, vascular, podiatry f/u appreciated.

## 2018-03-14 NOTE — PROGRESS NOTE ADULT - PROBLEM SELECTOR PLAN 5
- CKD III at baseline  - Cr fluctuating between 1.6-1.8  - patient with poor PO intake, avoid nephrotoxic agents, monitor vancomycin levels carefully

## 2018-03-14 NOTE — PROGRESS NOTE ADULT - SUBJECTIVE AND OBJECTIVE BOX
CC: Patient is a 72y old  Female who presents with a chief complaint of Fevers and leg pain     Interval History/ROS: Patient has no complaints today. Denies fever, chills.    Allergies  sulfa drugs (Hives)  sulfa drugs (Rash)  Sulfac 10% (Hives)    ANTIMICROBIALS:  piperacillin/tazobactam IVPB. 3.375 every 8 hours    PE:    Vital Signs Last 24 Hrs  T(C): 36.7 (14 Mar 2018 05:00), Max: 36.7 (14 Mar 2018 05:00)  T(F): 98 (14 Mar 2018 05:00), Max: 98 (14 Mar 2018 05:00)  HR: 60 (14 Mar 2018 05:00) (55 - 60)  BP: 131/78 (14 Mar 2018 05:00) (128/67 - 131/78)  BP(mean): --  RR: 18 (14 Mar 2018 05:00) (16 - 18)  SpO2: 100% (14 Mar 2018 05:00) (97% - 100%)    Gen: AOx3, NAD  CV: S1+S2 normal, no murmurs  Resp: Clear bilat, no resp distress  Abd: Soft, nontender, +BS  Ext: Right BKA, left leg with warmth, bandaged  : No Pratt  IV/Skin: No thrombophlebitis  Neuro: no focal deficits        LABS:                          8.7    12.48 )-----------( 426      ( 14 Mar 2018 04:00 )             26.5       03-14    136  |  106  |  47<H>  ----------------------------<  147<H>  4.5   |  17<L>  |  1.54<H>    Ca    8.2<L>      14 Mar 2018 04:00  Phos  4.6     03-14  Mg     2.3     03-14    MICROBIOLOGY:  Vancomycin Level, Random: 20.2 ug/mL (03-14-18 @ 04:00)  Vancomycin Level, Trough: 26.0 ug/mL (03-13-18 @ 12:46)  v  ANKLE  03-10-18 --  --  --      ANKLE  03-10-18   NO GROWTH - PRELIMINARY RESULTS  NO ORGANISMS ISOLATED AT 24 HOURS  NO ORGANISMS ISOLATED AT 48 HRS.  NO ORGANISMS ISOLATED AT 72 HRS.  --  --      ANUS  03-10-18 --  --  --      OTHER  03-06-18   RARE  STRDYS^Streptococcus dysgalactiae  --  --      LEG - LEFT  03-03-18 --  --  Proteus mirabilis  Staph. aureus *MRSA*  Strep Beta Hemolytic Grp G      BLOOD PERIPHERAL  03-03-18 --  --  --    RADIOLOGY:    No new images.

## 2018-03-14 NOTE — PROGRESS NOTE ADULT - PROBLEM SELECTOR PLAN 1
- ID on board, appreciate involvement, continue vanc / zosyn (day 12)  - pending random vanc level this morning, if below 20 will redose vancomycin per ID recs (1gm q24)  - s/p L ankle disarticulation by podiatry on 3/10 and plan for BKA by vascular surgery, awaiting time/date for surgery - ID on board, appreciate involvement, continue vanc / zosyn (day 12)  - vanc level 20.6 this AM --> will give 1gm today and recheck tomorrow morning and dose by level  - s/p L ankle disarticulation by podiatry on 3/10 and plan for BKA by vascular surgery, awaiting time/date for surgery - ID on board, appreciate involvement, continue vanc / zosyn (day 12)  - vanc level 20.6 this AM, per ID will hold vanc today and recheck level in morning  - s/p L ankle disarticulation by podiatry on 3/10 and plan for BKA by vascular surgery, awaiting time/date for surgery

## 2018-03-14 NOTE — PROGRESS NOTE ADULT - PROBLEM SELECTOR PLAN 3
- HgbA1c 8.1  - On levemir 29U qHS and humalog 7U premeal, will increase if patient's FS are elevated

## 2018-03-14 NOTE — PROGRESS NOTE ADULT - SUBJECTIVE AND OBJECTIVE BOX
Patient is a 72y old  Female who presents with a chief complaint of Fevers and leg pain (06 Mar 2018 17:24)       INTERVAL HPI/OVERNIGHT EVENTS:  Patient seen and evaluated at bedside.  Pt is resting comfortable in NAD. Denies N/V/F/C.  Pain rated at X010    Allergies    sulfa drugs (Hives)  sulfa drugs (Rash)  Sulfac 10% (Hives)    Intolerances    Vital Signs Last 24 Hrs  T(C): 36.7 (14 Mar 2018 05:00), Max: 36.7 (14 Mar 2018 05:00)  T(F): 98 (14 Mar 2018 05:00), Max: 98 (14 Mar 2018 05:00)  HR: 60 (14 Mar 2018 05:00) (55 - 60)  BP: 131/78 (14 Mar 2018 05:00) (128/67 - 131/78)  BP(mean): --  RR: 18 (14 Mar 2018 05:00) (16 - 18)  SpO2: 100% (14 Mar 2018 05:00) (97% - 100%)    LABS:                        8.7    12.48 )-----------( 426      ( 14 Mar 2018 04:00 )             26.5     03-13    135  |  103  |  49<H>  ----------------------------<  142<H>  4.8   |  16<L>  |  1.79<H>    Ca    8.0<L>      13 Mar 2018 05:35  Phos  4.7     03-13  Mg     2.3     03-13      PT/INR - ( 13 Mar 2018 05:35 )   PT: 13.8 SEC;   INR: 1.20       PTT - ( 13 Mar 2018 05:35 )  PTT:33.0 SEC    CAPILLARY BLOOD GLUCOSE    POCT Blood Glucose.: 171 mg/dL (13 Mar 2018 21:52)  POCT Blood Glucose.: 140 mg/dL (13 Mar 2018 17:38)  POCT Blood Glucose.: 117 mg/dL (13 Mar 2018 13:07)  POCT Blood Glucose.: 131 mg/dL (13 Mar 2018 09:09)    Lower Extremity Physical Exam:  s/p left ankle disarticulation   left foot open surgical wound noted with plantar flap the is cool to palpation  tissue granular flap tissue fibrotic no pus no malodor erythema and edema decreased    RADIOLOGY & ADDITIONAL TESTS:

## 2018-03-14 NOTE — PROGRESS NOTE ADULT - ASSESSMENT
72 female with T2DM, PVD s/p R BKA and L transmetatarsal amputation, CAD s/p CABG, CKD stage 3, HTN, HLD here with fevers 103F, chills and left leg pain. Admitted for cellulitis of left lower extremity with warmth and erythema to that leg. Has elevated ESR and CRP.     MRI with diffuse cellulitis with acute osteo involving of first and second metatarsal remanants, with enhance of the third and fourth metatarsal, and fluid collection within the tenotomy gap. Wound cx with MRSA, strep Grp G and proteus; other wound cx with strep dysgalactiae. Leukocytosis improving.     s/p OR for ankle disarticulation and incision and drainage of ankle and Achilles tendon abscess.     Recommend:   - If repeat vanco level <20, can restart vanco at 1 gram IV q 24 hours.  - Continue zosyn.  - F/U vascular surgery regarding further surgical intervention.

## 2018-03-14 NOTE — PROGRESS NOTE ADULT - SUBJECTIVE AND OBJECTIVE BOX
Chelsey Olivares MD  Medicine Team 2  Pager [phone]      Patient is a 72y old  Female who presents with a chief complaint of Fevers and leg pain (06 Mar 2018 17:24)          Subjective: Patient was planned to go for BKA yesterday but case was cancelled by vascular for concern regarding inadequate healing. This morning, patient denies any fevers, chills, CP, SOB nausea, vomiting, or diarrhea. Cough is improved.         VITAL SIGNS:  Vital Signs Last 24 Hrs  T(C): 36.7 (14 Mar 2018 05:00), Max: 36.7 (14 Mar 2018 05:00)  T(F): 98 (14 Mar 2018 05:00), Max: 98 (14 Mar 2018 05:00)  HR: 60 (14 Mar 2018 05:00) (55 - 60)  BP: 131/78 (14 Mar 2018 05:00) (128/67 - 131/78)  BP(mean): --  RR: 18 (14 Mar 2018 05:00) (16 - 18)  SpO2: 100% (14 Mar 2018 05:00) (97% - 100%)      PHYSICAL EXAM:     GENERAL: no acute distress  HEENT: PERRLA, EOMI, moist oropharynx   RESPIRATORY: poor effort but clear to ausculation   CARDIOVASCULAR: RRR, no murmurs  ABDOMINAL: +BS, soft, non tender, non distended   EXTREMITIES: right BKA, left leg with dressing in tact, improved erythema and warmth from before, 1+edema  NEUROLOGICAL: alert and oriented x 3, no focal deficits   SKIN: dry skin on left shin  MUSCULOSKELETAL: strength 5/5 in bl lower extremities                           8.2    13.72 )-----------( 411      ( 13 Mar 2018 05:35 )             25.7     03-13    135  |  103  |  49<H>  ----------------------------<  142<H>  4.8   |  16<L>  |  1.79<H>    Ca    8.0<L>      13 Mar 2018 05:35  Phos  4.7     03-13  Mg     2.3     03-13        CAPILLARY BLOOD GLUCOSE      POCT Blood Glucose.: 171 mg/dL (13 Mar 2018 21:52)  POCT Blood Glucose.: 140 mg/dL (13 Mar 2018 17:38)  POCT Blood Glucose.: 117 mg/dL (13 Mar 2018 13:07)  POCT Blood Glucose.: 131 mg/dL (13 Mar 2018 09:09)      MEDICATIONS  (STANDING):  aspirin  chewable 81 milliGRAM(s) Oral daily  atorvastatin 20 milliGRAM(s) Oral at bedtime  carvedilol 25 milliGRAM(s) Oral every 12 hours  clopidogrel Tablet 75 milliGRAM(s) Oral daily  collagenase Ointment 1 Application(s) Topical daily  dextrose 50% Injectable 25 Gram(s) IV Push once  heparin  Injectable 5000 Unit(s) SubCutaneous every 12 hours  hydrALAZINE 50 milliGRAM(s) Oral two times a day  insulin detemir injectable (LEVEMIR) 29 Unit(s) SubCutaneous at bedtime  insulin lispro (HumaLOG) corrective regimen sliding scale   SubCutaneous Before meals and at bedtime  insulin lispro Injectable (HumaLOG) 7 Unit(s) SubCutaneous three times a day before meals  isosorbide   mononitrate ER Tablet (IMDUR) 30 milliGRAM(s) Oral daily  piperacillin/tazobactam IVPB. 3.375 Gram(s) IV Intermittent every 8 hours  sertraline 25 milliGRAM(s) Oral daily  sodium chloride 0.9%. 1000 milliLiter(s) (75 mL/Hr) IV Continuous <Continuous>    MEDICATIONS  (PRN):  acetaminophen  IVPB. 1000 milliGRAM(s) IV Intermittent once PRN Mild Pain (1 - 3)  benzocaine 15 mG/menthol 3.6 mG Lozenge 1 Lozenge Oral three times a day PRN Sore Throat  guaiFENesin   Syrup  (Sugar-Free) 200 milliGRAM(s) Oral every 6 hours PRN Cough  HYDROmorphone  Injectable 0.5 milliGRAM(s) IV Push every 10 minutes PRN Moderate Pain (4 - 6)  metoclopramide Injectable 10 milliGRAM(s) IV Push once PRN Nausea and/or Vomiting  ondansetron Injectable 4 milliGRAM(s) IV Push once PRN Nausea and/or Vomiting

## 2018-03-15 LAB
BUN SERPL-MCNC: 38 MG/DL — HIGH (ref 7–23)
CALCIUM SERPL-MCNC: 8.2 MG/DL — LOW (ref 8.4–10.5)
CHLORIDE SERPL-SCNC: 112 MMOL/L — HIGH (ref 98–107)
CO2 SERPL-SCNC: 16 MMOL/L — LOW (ref 22–31)
CREAT SERPL-MCNC: 1.25 MG/DL — SIGNIFICANT CHANGE UP (ref 0.5–1.3)
GLUCOSE BLDC GLUCOMTR-MCNC: 189 MG/DL — HIGH (ref 70–99)
GLUCOSE BLDC GLUCOMTR-MCNC: 251 MG/DL — HIGH (ref 70–99)
GLUCOSE BLDC GLUCOMTR-MCNC: 74 MG/DL — SIGNIFICANT CHANGE UP (ref 70–99)
GLUCOSE BLDC GLUCOMTR-MCNC: 94 MG/DL — SIGNIFICANT CHANGE UP (ref 70–99)
GLUCOSE SERPL-MCNC: 66 MG/DL — LOW (ref 70–99)
HCT VFR BLD CALC: 28.8 % — LOW (ref 34.5–45)
HGB BLD-MCNC: 9 G/DL — LOW (ref 11.5–15.5)
MAGNESIUM SERPL-MCNC: 2.2 MG/DL — SIGNIFICANT CHANGE UP (ref 1.6–2.6)
MCHC RBC-ENTMCNC: 27.1 PG — SIGNIFICANT CHANGE UP (ref 27–34)
MCHC RBC-ENTMCNC: 31.3 % — LOW (ref 32–36)
MCV RBC AUTO: 86.7 FL — SIGNIFICANT CHANGE UP (ref 80–100)
NRBC # FLD: 0 — SIGNIFICANT CHANGE UP
PHOSPHATE SERPL-MCNC: 4.2 MG/DL — SIGNIFICANT CHANGE UP (ref 2.5–4.5)
PLATELET # BLD AUTO: 526 K/UL — HIGH (ref 150–400)
PMV BLD: 11.3 FL — SIGNIFICANT CHANGE UP (ref 7–13)
POTASSIUM SERPL-MCNC: 4.6 MMOL/L — SIGNIFICANT CHANGE UP (ref 3.5–5.3)
POTASSIUM SERPL-SCNC: 4.6 MMOL/L — SIGNIFICANT CHANGE UP (ref 3.5–5.3)
RBC # BLD: 3.32 M/UL — LOW (ref 3.8–5.2)
RBC # FLD: 17.9 % — HIGH (ref 10.3–14.5)
SODIUM SERPL-SCNC: 143 MMOL/L — SIGNIFICANT CHANGE UP (ref 135–145)
VANCOMYCIN FLD-MCNC: 13.4 UG/ML — SIGNIFICANT CHANGE UP
WBC # BLD: 12.6 K/UL — HIGH (ref 3.8–10.5)
WBC # FLD AUTO: 12.6 K/UL — HIGH (ref 3.8–10.5)

## 2018-03-15 PROCEDURE — 99233 SBSQ HOSP IP/OBS HIGH 50: CPT | Mod: GC

## 2018-03-15 RX ORDER — VANCOMYCIN HCL 1 G
1000 VIAL (EA) INTRAVENOUS DAILY
Qty: 0 | Refills: 0 | Status: DISCONTINUED | OUTPATIENT
Start: 2018-03-15 | End: 2018-03-17

## 2018-03-15 RX ORDER — INSULIN DETEMIR 100/ML (3)
15 INSULIN PEN (ML) SUBCUTANEOUS DAILY
Qty: 0 | Refills: 0 | Status: DISCONTINUED | OUTPATIENT
Start: 2018-03-15 | End: 2018-03-16

## 2018-03-15 RX ADMIN — SERTRALINE 25 MILLIGRAM(S): 25 TABLET, FILM COATED ORAL at 10:20

## 2018-03-15 RX ADMIN — CLOPIDOGREL BISULFATE 75 MILLIGRAM(S): 75 TABLET, FILM COATED ORAL at 10:20

## 2018-03-15 RX ADMIN — Medication 250 MILLIGRAM(S): at 13:34

## 2018-03-15 RX ADMIN — Medication 3: at 21:55

## 2018-03-15 RX ADMIN — Medication 50 MILLIGRAM(S): at 06:37

## 2018-03-15 RX ADMIN — Medication 1: at 17:47

## 2018-03-15 RX ADMIN — Medication 1 APPLICATION(S): at 10:20

## 2018-03-15 RX ADMIN — Medication 50 MILLIGRAM(S): at 17:25

## 2018-03-15 RX ADMIN — ATORVASTATIN CALCIUM 20 MILLIGRAM(S): 80 TABLET, FILM COATED ORAL at 21:45

## 2018-03-15 RX ADMIN — Medication 81 MILLIGRAM(S): at 10:20

## 2018-03-15 RX ADMIN — PIPERACILLIN AND TAZOBACTAM 25 GRAM(S): 4; .5 INJECTION, POWDER, LYOPHILIZED, FOR SOLUTION INTRAVENOUS at 10:53

## 2018-03-15 RX ADMIN — PIPERACILLIN AND TAZOBACTAM 25 GRAM(S): 4; .5 INJECTION, POWDER, LYOPHILIZED, FOR SOLUTION INTRAVENOUS at 01:57

## 2018-03-15 RX ADMIN — PIPERACILLIN AND TAZOBACTAM 25 GRAM(S): 4; .5 INJECTION, POWDER, LYOPHILIZED, FOR SOLUTION INTRAVENOUS at 17:25

## 2018-03-15 RX ADMIN — ISOSORBIDE MONONITRATE 30 MILLIGRAM(S): 60 TABLET, EXTENDED RELEASE ORAL at 10:20

## 2018-03-15 NOTE — PROGRESS NOTE ADULT - ATTENDING COMMENTS
Patient seen and examined.  Case discussed with house staff.  Agree with above as edited.   72yoF with h/o PVD s/p R BKA and L TMA,  DM type 2, CAD s/p CABG, CKD III, HTN admitted with left foot osteomyelitis and cellulitis s/p ankle disarticulation c/b demand ischemia.  c/w current IV abx.   Patient will need BKA for definitive source control. Vascular surgery following - they want continued abx to improve surgical site to increase chance of wound healing.  Cardiology, vascular, podiatry following

## 2018-03-15 NOTE — PROGRESS NOTE ADULT - PROBLEM SELECTOR PLAN 2
- continue beta blocker, ASA, and plavix  - no events on tele, asymptomatic - HgbA1c 8.1  - concern for hypoglycemia given 66 on BMP this AM, patient endorses poor appetite  - will dose 15U levemir tonight, if next FS is still low will consider starting D5, continue to hold premeal insulin for now

## 2018-03-15 NOTE — DIETITIAN INITIAL EVALUATION ADULT. - PROBLEM SELECTOR PLAN 1
- patient with fevers, leukocytosis, and DAMARI  - s/p 1L of fluid in the ED, giving another fluid bolus now and midodrine 10mg x1 for relative hypotension (usually pressure runs in the 150s and now 110s)  - will continue vanc 1gm q24 based on CrCl of 40, and zosyn 3.375 q8, BCx collected in the ED, monitor vanc level  - likely source is left leg, MRI pending

## 2018-03-15 NOTE — DIETITIAN INITIAL EVALUATION ADULT. - NS AS NUTRI INTERV MEALS SNACK
Diets modified for specific foods and ingredients/1. Suggest: Continue current PO diet order: Consistent Carbohydrate (no snacks), DASH/TLC (cholesterol and sodium restricted); PO supplement: Glucerna Shake 8oz. 1x daily (will provide additional ~220kcals, ~10g protein);       2. Encourage & assist Pt with meals; Monitor PO diet tolerance; Honor food preferences;            3. Monitor labs, weights, hydration status;/Other (specify)

## 2018-03-15 NOTE — DIETITIAN INITIAL EVALUATION ADULT. - OTHER INFO
Pt seen for Length of stay. Pt 73 yo female with Admit diagnosis: Cellulitis of L Lower Extremity; Possible L BKA. Pt appears alert, oriented. Per Pt her appetite not well for "past few months." Food preferences discussed with Pt. No chew/swallow problem voiced; no nausea/vomiting/diarrhea reported @ present. At home Pt tries to avoid sugar reported. No report of weight loss or weight changes voice either. RDN remains available, Pt made aware. Pt seen for Length of stay. Pt 73 yo female with Admit diagnosis: Cellulitis of L Lower Extremity; Possible L BKA. Pt appears alert, oriented. Per Pt her appetite not well for "past few days." Food preferences discussed with Pt. No chew/swallow problem voiced; no nausea/vomiting/diarrhea reported @ present. At home Pt tries to avoid sugar reported. No report of weight loss or weight changes voice either. RDN remains available, Pt made aware.

## 2018-03-15 NOTE — PROGRESS NOTE ADULT - PROBLEM SELECTOR PLAN 5
- CKD III at baseline  - Cr fluctuating between 1.6-1.8  - patient with poor PO intake, avoid nephrotoxic agents, monitor vancomycin levels carefully - CKD III at baseline  - Cr stable  - patient with poor PO intake, avoid nephrotoxic agents, monitor vancomycin levels carefully

## 2018-03-15 NOTE — PROGRESS NOTE ADULT - ASSESSMENT
72M PMHx PVD and currently cellulitis and infection of previous TMA site s/p L ankle disarticulation by Podiatry on 3/10;     -awaiting improvement in wound/cellulitis prior to proceeding w/ formal BKA   -abx per ID  -continued management per primary team      KENTRELL Bal MD (PGY2)    (Please page C team Vascular [phone] for questions/concerns.)

## 2018-03-15 NOTE — PROGRESS NOTE ADULT - ASSESSMENT
s/p LEFT ankle disarticulation    Plan:  Patient seen and evaluated. All questions answered to patient's satisfaction.  Wet to dry dressings applied  Cont abx  NWB LLE  Will cont to follow

## 2018-03-15 NOTE — PROGRESS NOTE ADULT - SUBJECTIVE AND OBJECTIVE BOX
Chelsey Olivares MD  Medicine Team 2  Pager [phone]      Patient is a 72y old  Female who presents with a chief complaint of Fevers and leg pain (06 Mar 2018 17:24)          Subjective: Overnight, no acute events.         VITAL SIGNS:  Vital Signs Last 24 Hrs  T(C): 36.4 (15 Mar 2018 06:00), Max: 36.4 (14 Mar 2018 22:18)  T(F): 97.5 (15 Mar 2018 06:00), Max: 97.6 (14 Mar 2018 22:18)  HR: 55 (15 Mar 2018 06:00) (55 - 61)  BP: 145/63 (15 Mar 2018 06:00) (135/84 - 149/75)  BP(mean): --  RR: 16 (15 Mar 2018 06:00) (16 - 16)  SpO2: 100% (15 Mar 2018 06:00) (98% - 100%)      PHYSICAL EXAM:     GENERAL: no acute distress  HEENT: PERRLA, EOMI, moist oropharynx   RESPIRATORY: CTAB, no w/c   CARDIOVASCULAR: RRR, no murmurs, gallops, rubs  ABDOMINAL: soft, non-tender, non-distended, positive bowel sounds   EXTREMITIES: no clubbing, cyanosis, or edema  NEUROLOGICAL: alert and oriented x 3, non-focal  SKIN: no rashes or lesions   MUSCULOSKELETAL: no gross joint deformity                          8.7    12.48 )-----------( 426      ( 14 Mar 2018 04:00 )             26.5     03-14    136  |  106  |  47<H>  ----------------------------<  147<H>  4.5   |  17<L>  |  1.54<H>    Ca    8.2<L>      14 Mar 2018 04:00  Phos  4.6     03-14  Mg     2.3     03-14        CAPILLARY BLOOD GLUCOSE      POCT Blood Glucose.: 77 mg/dL (14 Mar 2018 22:03)  POCT Blood Glucose.: 105 mg/dL (14 Mar 2018 17:37)  POCT Blood Glucose.: 105 mg/dL (14 Mar 2018 12:45)  POCT Blood Glucose.: 146 mg/dL (14 Mar 2018 09:14)      MEDICATIONS  (STANDING):  aspirin  chewable 81 milliGRAM(s) Oral daily  atorvastatin 20 milliGRAM(s) Oral at bedtime  carvedilol 25 milliGRAM(s) Oral every 12 hours  clopidogrel Tablet 75 milliGRAM(s) Oral daily  collagenase Ointment 1 Application(s) Topical daily  dextrose 50% Injectable 25 Gram(s) IV Push once  heparin  Injectable 5000 Unit(s) SubCutaneous every 12 hours  hydrALAZINE 50 milliGRAM(s) Oral two times a day  insulin detemir injectable (LEVEMIR) 29 Unit(s) SubCutaneous at bedtime  insulin lispro (HumaLOG) corrective regimen sliding scale   SubCutaneous Before meals and at bedtime  insulin lispro Injectable (HumaLOG) 7 Unit(s) SubCutaneous three times a day before meals  isosorbide   mononitrate ER Tablet (IMDUR) 30 milliGRAM(s) Oral daily  piperacillin/tazobactam IVPB. 3.375 Gram(s) IV Intermittent every 8 hours  sertraline 25 milliGRAM(s) Oral daily  sodium chloride 0.9%. 1000 milliLiter(s) (75 mL/Hr) IV Continuous <Continuous>    MEDICATIONS  (PRN):  acetaminophen  IVPB. 1000 milliGRAM(s) IV Intermittent once PRN Mild Pain (1 - 3)  benzocaine 15 mG/menthol 3.6 mG Lozenge 1 Lozenge Oral three times a day PRN Sore Throat  guaiFENesin   Syrup  (Sugar-Free) 200 milliGRAM(s) Oral every 6 hours PRN Cough  HYDROmorphone  Injectable 0.5 milliGRAM(s) IV Push every 10 minutes PRN Moderate Pain (4 - 6)  metoclopramide Injectable 10 milliGRAM(s) IV Push once PRN Nausea and/or Vomiting  ondansetron Injectable 4 milliGRAM(s) IV Push once PRN Nausea and/or Vomiting Chelsey Olivares MD  Medicine Team 2  Pager [phone]      Patient is a 72y old  Female who presents with a chief complaint of Fevers and leg pain (06 Mar 2018 17:24)          Subjective: Overnight, no acute events. This morning patient appears more tired. She says she still has a mild cough but no fevers, chills, CP, SOB, nausea, vomiting, or diarrhea. Worked with PT yesterday and has been out of bed to chair. Endorses poor appetite.         VITAL SIGNS:  Vital Signs Last 24 Hrs  T(C): 36.4 (15 Mar 2018 06:00), Max: 36.4 (14 Mar 2018 22:18)  T(F): 97.5 (15 Mar 2018 06:00), Max: 97.6 (14 Mar 2018 22:18)  HR: 55 (15 Mar 2018 06:00) (55 - 61)  BP: 145/63 (15 Mar 2018 06:00) (135/84 - 149/75)  BP(mean): --  RR: 16 (15 Mar 2018 06:00) (16 - 16)  SpO2: 100% (15 Mar 2018 06:00) (98% - 100%)      PHYSICAL EXAM:     GENERAL: no acute distress, appears tired.   HEENT: dry mucus membranes   RESPIRATORY: coarse breath sounds but no crackles   CARDIOVASCULAR: RRR, no murmurs  ABDOMINAL: +BS, soft, non tender, non distended   EXTREMITIES: right BKA, left foot with dressing that is c/d/i, 1+ edema, warm, dry skin noted on shin   NEUROLOGICAL: alert and oriented x 3, no focal deficits   SKIN: no rashes or lesions   MUSCULOSKELETAL: no gross joint deformity                          8.7    12.48 )-----------( 426      ( 14 Mar 2018 04:00 )             26.5     03-14    136  |  106  |  47<H>  ----------------------------<  147<H>  4.5   |  17<L>  |  1.54<H>    Ca    8.2<L>      14 Mar 2018 04:00  Phos  4.6     03-14  Mg     2.3     03-14        CAPILLARY BLOOD GLUCOSE      POCT Blood Glucose.: 77 mg/dL (14 Mar 2018 22:03)  POCT Blood Glucose.: 105 mg/dL (14 Mar 2018 17:37)  POCT Blood Glucose.: 105 mg/dL (14 Mar 2018 12:45)  POCT Blood Glucose.: 146 mg/dL (14 Mar 2018 09:14)      MEDICATIONS  (STANDING):  aspirin  chewable 81 milliGRAM(s) Oral daily  atorvastatin 20 milliGRAM(s) Oral at bedtime  carvedilol 25 milliGRAM(s) Oral every 12 hours  clopidogrel Tablet 75 milliGRAM(s) Oral daily  collagenase Ointment 1 Application(s) Topical daily  dextrose 50% Injectable 25 Gram(s) IV Push once  heparin  Injectable 5000 Unit(s) SubCutaneous every 12 hours  hydrALAZINE 50 milliGRAM(s) Oral two times a day  insulin detemir injectable (LEVEMIR) 29 Unit(s) SubCutaneous at bedtime  insulin lispro (HumaLOG) corrective regimen sliding scale   SubCutaneous Before meals and at bedtime  insulin lispro Injectable (HumaLOG) 7 Unit(s) SubCutaneous three times a day before meals  isosorbide   mononitrate ER Tablet (IMDUR) 30 milliGRAM(s) Oral daily  piperacillin/tazobactam IVPB. 3.375 Gram(s) IV Intermittent every 8 hours  sertraline 25 milliGRAM(s) Oral daily  sodium chloride 0.9%. 1000 milliLiter(s) (75 mL/Hr) IV Continuous <Continuous>    MEDICATIONS  (PRN):  acetaminophen  IVPB. 1000 milliGRAM(s) IV Intermittent once PRN Mild Pain (1 - 3)  benzocaine 15 mG/menthol 3.6 mG Lozenge 1 Lozenge Oral three times a day PRN Sore Throat  guaiFENesin   Syrup  (Sugar-Free) 200 milliGRAM(s) Oral every 6 hours PRN Cough  HYDROmorphone  Injectable 0.5 milliGRAM(s) IV Push every 10 minutes PRN Moderate Pain (4 - 6)  metoclopramide Injectable 10 milliGRAM(s) IV Push once PRN Nausea and/or Vomiting  ondansetron Injectable 4 milliGRAM(s) IV Push once PRN Nausea and/or Vomiting

## 2018-03-15 NOTE — DIETITIAN INITIAL EVALUATION ADULT. - PROBLEM SELECTOR PLAN 5
- HgbA1c in the AM  - will reduce home doses of basal-bolus insulin while in the hospital  - Levemir 25U and humalog 8U with ISS and FS

## 2018-03-15 NOTE — DIETITIAN INITIAL EVALUATION ADULT. - ENERGY NEEDS
Pt's height: 66"             IBW: 130#+/-10% Pt's height: 66"             IBW: 120.77#+/-10% (adjusted for BKA)

## 2018-03-15 NOTE — DIETITIAN INITIAL EVALUATION ADULT. - PROBLEM SELECTOR PLAN 3
- baseline Cr around 0.8-1  - current Cr at 1.71, likely pre-renal secondary to hypoperfusion given sepsis   - continue hydration with IV fluids

## 2018-03-15 NOTE — PROGRESS NOTE ADULT - ASSESSMENT
72F h/o DM2, PVD s/p R BKA and L transmetatarsal amputation, CAD s/p CABG, CKD III, HTN adm 3/3/18 with fevers and LLE pain found to be in sepsis 2/2 cellulitis and OM of LLE. Patient is now s/p L ankle disarticulation by podiatry on 3/10 complicated by uptrending troponins with T wave inversions. Patient has been asymptomatic and hemodynamically stable without any events on telemetry. Pending BKA by vascular surgery, date unknown at this time.

## 2018-03-15 NOTE — PROGRESS NOTE ADULT - SUBJECTIVE AND OBJECTIVE BOX
Patient is a 72y old  Female who presents with a chief complaint of Fevers and leg pain (06 Mar 2018 17:24)       INTERVAL HPI/OVERNIGHT EVENTS:  Patient seen and evaluated at bedside.  Pt is resting comfortable in NAD. Denies N/V/F/C.  Pain rated at X/10    Allergies    sulfa drugs (Hives)  sulfa drugs (Rash)  Sulfac 10% (Hives)    Intolerances        Vital Signs Last 24 Hrs  T(C): 36.4 (15 Mar 2018 06:00), Max: 36.4 (14 Mar 2018 22:18)  T(F): 97.5 (15 Mar 2018 06:00), Max: 97.6 (14 Mar 2018 22:18)  HR: 55 (15 Mar 2018 06:00) (55 - 61)  BP: 145/63 (15 Mar 2018 06:00) (135/84 - 149/75)  BP(mean): --  RR: 16 (15 Mar 2018 06:00) (16 - 16)  SpO2: 100% (15 Mar 2018 06:00) (98% - 100%)    LABS:                        8.7    12.48 )-----------( 426      ( 14 Mar 2018 04:00 )             26.5     03-14    136  |  106  |  47<H>  ----------------------------<  147<H>  4.5   |  17<L>  |  1.54<H>    Ca    8.2<L>      14 Mar 2018 04:00  Phos  4.6     03-14  Mg     2.3     03-14          CAPILLARY BLOOD GLUCOSE      POCT Blood Glucose.: 77 mg/dL (14 Mar 2018 22:03)  POCT Blood Glucose.: 105 mg/dL (14 Mar 2018 17:37)  POCT Blood Glucose.: 105 mg/dL (14 Mar 2018 12:45)  POCT Blood Glucose.: 146 mg/dL (14 Mar 2018 09:14)      Lower Extremity Physical Exam:  s/p left ankle disarticulation   Dressings with moderate serous strikethrough  LEFT foot open surgical wound noted with plantar flap cool to palpation  tissue granular flap tissue fibrotic no purulence no malodor erythema and edema decreased.    RADIOLOGY & ADDITIONAL TESTS:

## 2018-03-15 NOTE — PROGRESS NOTE ADULT - PROBLEM SELECTOR PLAN 3
- HgbA1c 8.1  - On levemir 20U qHS and humalog 7U premeal, will increase if patient's FS are elevated - continue beta blocker, ASA, and plavix  - no events on tele, asymptomatic

## 2018-03-15 NOTE — PROGRESS NOTE ADULT - SUBJECTIVE AND OBJECTIVE BOX
Date of Admission:    24H hour events:     MEDICATIONS:  aspirin  chewable 81 milliGRAM(s) Oral daily  carvedilol 25 milliGRAM(s) Oral every 12 hours  clopidogrel Tablet 75 milliGRAM(s) Oral daily  heparin  Injectable 5000 Unit(s) SubCutaneous every 12 hours  hydrALAZINE 50 milliGRAM(s) Oral two times a day  isosorbide   mononitrate ER Tablet (IMDUR) 30 milliGRAM(s) Oral daily    piperacillin/tazobactam IVPB. 3.375 Gram(s) IV Intermittent every 8 hours    guaiFENesin   Syrup  (Sugar-Free) 200 milliGRAM(s) Oral every 6 hours PRN    acetaminophen  IVPB. 1000 milliGRAM(s) IV Intermittent once PRN  HYDROmorphone  Injectable 0.5 milliGRAM(s) IV Push every 10 minutes PRN  metoclopramide Injectable 10 milliGRAM(s) IV Push once PRN  ondansetron Injectable 4 milliGRAM(s) IV Push once PRN  sertraline 25 milliGRAM(s) Oral daily      atorvastatin 20 milliGRAM(s) Oral at bedtime  dextrose 50% Injectable 25 Gram(s) IV Push once  insulin detemir injectable (LEVEMIR) 29 Unit(s) SubCutaneous at bedtime  insulin lispro (HumaLOG) corrective regimen sliding scale   SubCutaneous Before meals and at bedtime    benzocaine 15 mG/menthol 3.6 mG Lozenge 1 Lozenge Oral three times a day PRN  collagenase Ointment 1 Application(s) Topical daily  sodium chloride 0.9%. 1000 milliLiter(s) IV Continuous <Continuous>      REVIEW OF SYSTEMS:  Complete 10point ROS negative.    PHYSICAL EXAM:  T(C): 36.4 (03-15-18 @ 06:00), Max: 36.4 (03-14-18 @ 22:18)  HR: 55 (03-15-18 @ 06:00) (55 - 61)  BP: 145/63 (03-15-18 @ 06:00) (135/84 - 149/75)  RR: 16 (03-15-18 @ 06:00) (16 - 16)  SpO2: 100% (03-15-18 @ 06:00) (98% - 100%)  Wt(kg): --  I&O's Summary    14 Mar 2018 07:01  -  15 Mar 2018 07:00  --------------------------------------------------------  IN: 900 mL / OUT: 0 mL / NET: 900 mL    15 Mar 2018 07:01  -  15 Mar 2018 10:36  --------------------------------------------------------  IN: 75 mL / OUT: 125 mL / NET: -50 mL        Appearance: Normal	  HEENT:   Normal oral mucosa, PERRL, EOMI	  Lymphatic: No lymphadenopathy  Cardiovascular: Normal S1 S2, No JVD, No murmurs, No edema  Respiratory: Lungs clear to auscultation	  Psychiatry: A & O x 3, Mood & affect appropriate  Gastrointestinal:  Soft, Non-tender, + BS	  Skin: No rashes, No ecchymoses, No cyanosis	  Neurologic: Non-focal  Extremities: Normal range of motion, No clubbing, cyanosis or edema  Vascular: Peripheral pulses palpable 2+ bilaterally        LABS:	 	    CBC Full  -  ( 15 Mar 2018 07:10 )  WBC Count : 12.60 K/uL  Hemoglobin : 9.0 g/dL  Hematocrit : 28.8 %  Platelet Count - Automated : 526 K/uL  Mean Cell Volume : 86.7 fL  Mean Cell Hemoglobin : 27.1 pg  Mean Cell Hemoglobin Concentration : 31.3 %  Auto Neutrophil # : x  Auto Lymphocyte # : x  Auto Monocyte # : x  Auto Eosinophil # : x  Auto Basophil # : x  Auto Neutrophil % : x  Auto Lymphocyte % : x  Auto Monocyte % : x  Auto Eosinophil % : x  Auto Basophil % : x    03-15    143  |  112<H>  |  38<H>  ----------------------------<  66<L>  4.6   |  16<L>  |  1.25  03-14    136  |  106  |  47<H>  ----------------------------<  147<H>  4.5   |  17<L>  |  1.54<H>    Ca    8.2<L>      15 Mar 2018 07:10  Ca    8.2<L>      14 Mar 2018 04:00  Phos  4.2     03-15  Phos  4.6     03-14  Mg     2.2     03-15  Mg     2.3     03-14      	  	  ASSESSMENT/PLAN: 	    Echo with moderate LV dysfunction, but no active CHF.  May safely proceed with planned BKA. Judicious fluid management.        Guillermo Reaves MD, FACC  [phone]

## 2018-03-15 NOTE — PROGRESS NOTE ADULT - SUBJECTIVE AND OBJECTIVE BOX
Vascular Surgery Progress Note  C team [id number]      Subjective/interval events:  -complaining of itching of LLE.     Objective:  Vital Signs Last 24 Hrs  T(C): 36.4 (15 Mar 2018 06:00), Max: 36.4 (14 Mar 2018 22:18)  T(F): 97.5 (15 Mar 2018 06:00), Max: 97.6 (14 Mar 2018 22:18)  HR: 55 (15 Mar 2018 06:00) (55 - 61)  BP: 145/63 (15 Mar 2018 06:00) (135/84 - 149/75)  BP(mean): --  RR: 16 (15 Mar 2018 06:00) (16 - 16)  SpO2: 100% (15 Mar 2018 06:00) (98% - 100%)      I&O's Summary    14 Mar 2018 07:01  -  15 Mar 2018 07:00  --------------------------------------------------------  IN: 900 mL / OUT: 0 mL / NET: 900 mL    15 Mar 2018 07:01  -  15 Mar 2018 12:33  --------------------------------------------------------  IN: 595 mL / OUT: 125 mL / NET: 470 mL      PE:  Gen: NAD, eating breakfast  Ext: RLE s/p amputation; LLE s/p ankle disarticulation, bleeding deep in wound, exposed bone, no pus or drainage, plantar flap cool to touch      Labs:                        9.0    12.60 )-----------( 526      ( 15 Mar 2018 07:10 )             28.8                         8.3    14.65 )-----------( 358      ( 12 Mar 2018 05:53 )             24.5     03-15    143  |  112<H>  |  38<H>  ----------------------------<  66<L>  4.6   |  16<L>  |  1.25    Ca    8.2<L>      15 Mar 2018 07:10  Phos  4.2     03-15  Mg     2.2     03-15      03-12    135  |  104  |  45<H>  ----------------------------<  152<H>  4.8   |  19<L>  |  1.84<H>    Ca    8.3<L>      12 Mar 2018 05:53  Phos  4.5     03-12  Mg     2.1     03-12        Type + Screen (03.10.18 @ 06:00)    ABO Interpretation: A    Rh Interpretation: Positive    Antibody Screen: Negative    Imaging:    US Duplex Venous Lower Ext Complete, Bilateral (03.05.18 @ 17:17) >  FINDINGS:    There is normal compressibility of the bilateral common femoral, femoral   and popliteal veins. Calf veins are not visualized in the right lower   extremity due to below the knee amputation. Left peroneal veins are not   visualized.    Doppler examination shows normal spontaneous and phasic flow.    IMPRESSION:     No evidence of bilateral lower extremity deep venous thrombosis.      MR Foot w/ IV Cont, Left (03.05.18 @ 14:18) >    Diffuse cellulitis throughout lower leg and imaged hindfoot. Status post   transmetatarsal amputation of the first through fifth rays. Acute   osteomyelitis involving the first and second metatarsal remnants distally   as above.    Mild edema and enhancement within the third and fourth metatarsal   remnants distally with grossly preserved T1 marrow signal. Differential   considerations include reactive osteitis and early osteomyelitis.    Status post Achilles tenotomy with a fluid collection in the tenotomy   gap. This may be related to postoperative seroma. Superimposed infection   at this site cannot be excluded.      Xray Chest 1 View-PORTABLE IMMEDIATE (03.10.18 @ 12:33) >  IMPRESSION:  Lungs underinflated.    Increased left basilar markings and strand-like opacities could be   compatible with subsegmental atelectatic changes or infiltrate/pneumonia   in the proper clinical context. Clear remaining visualized lungs. No   pleural effusion or pneumothorax.    Stable sternal wires, multiple small surgical clips, and slightly   prominent appearing cardiac and mediastinal silhouettes.    Trachea midline.    Left neck surgical clips.

## 2018-03-15 NOTE — DIETITIAN INITIAL EVALUATION ADULT. - DIET TYPE
supplement (specify)/Glucerna Shake 8oz. 1x daily (will provide additional ~220kcals, ~10g protein);/consistent carbohydrate (no snacks)/DASH/TLC (sodium and cholesterol restricted diet)

## 2018-03-15 NOTE — PROGRESS NOTE ADULT - PROBLEM SELECTOR PLAN 1
- ID on board, appreciate involvement, continue vanc / zosyn (day 13)  - dose vanc by level, pending level this AM  - s/p L ankle disarticulation by podiatry on 3/10 and plan for BKA by vascular surgery, awaiting time/date for surgery

## 2018-03-16 LAB
BUN SERPL-MCNC: 31 MG/DL — HIGH (ref 7–23)
CALCIUM SERPL-MCNC: 8.3 MG/DL — LOW (ref 8.4–10.5)
CHLORIDE SERPL-SCNC: 110 MMOL/L — HIGH (ref 98–107)
CO2 SERPL-SCNC: 19 MMOL/L — LOW (ref 22–31)
CREAT SERPL-MCNC: 1.1 MG/DL — SIGNIFICANT CHANGE UP (ref 0.5–1.3)
CULTURE - SURGICAL SITE: SIGNIFICANT CHANGE UP
CULTURE - SURGICAL SITE: SIGNIFICANT CHANGE UP
CULTURE RESULTS: SIGNIFICANT CHANGE UP
GLUCOSE BLDC GLUCOMTR-MCNC: 245 MG/DL — HIGH (ref 70–99)
GLUCOSE BLDC GLUCOMTR-MCNC: 248 MG/DL — HIGH (ref 70–99)
GLUCOSE BLDC GLUCOMTR-MCNC: 274 MG/DL — HIGH (ref 70–99)
GLUCOSE BLDC GLUCOMTR-MCNC: 278 MG/DL — HIGH (ref 70–99)
GLUCOSE SERPL-MCNC: 247 MG/DL — HIGH (ref 70–99)
HCT VFR BLD CALC: 28 % — LOW (ref 34.5–45)
HGB BLD-MCNC: 8.9 G/DL — LOW (ref 11.5–15.5)
MAGNESIUM SERPL-MCNC: 3.1 MG/DL — HIGH (ref 1.6–2.6)
MCHC RBC-ENTMCNC: 28.3 PG — SIGNIFICANT CHANGE UP (ref 27–34)
MCHC RBC-ENTMCNC: 31.8 % — LOW (ref 32–36)
MCV RBC AUTO: 89.2 FL — SIGNIFICANT CHANGE UP (ref 80–100)
NRBC # FLD: 0 — SIGNIFICANT CHANGE UP
PHOSPHATE SERPL-MCNC: 3.7 MG/DL — SIGNIFICANT CHANGE UP (ref 2.5–4.5)
PLATELET # BLD AUTO: 518 K/UL — HIGH (ref 150–400)
PMV BLD: 11.4 FL — SIGNIFICANT CHANGE UP (ref 7–13)
POTASSIUM SERPL-MCNC: 4.9 MMOL/L — SIGNIFICANT CHANGE UP (ref 3.5–5.3)
POTASSIUM SERPL-SCNC: 4.9 MMOL/L — SIGNIFICANT CHANGE UP (ref 3.5–5.3)
RBC # BLD: 3.14 M/UL — LOW (ref 3.8–5.2)
RBC # FLD: 18.4 % — HIGH (ref 10.3–14.5)
SODIUM SERPL-SCNC: 141 MMOL/L — SIGNIFICANT CHANGE UP (ref 135–145)
VANCOMYCIN FLD-MCNC: 17.8 UG/ML — SIGNIFICANT CHANGE UP
WBC # BLD: 12.15 K/UL — HIGH (ref 3.8–10.5)
WBC # FLD AUTO: 12.15 K/UL — HIGH (ref 3.8–10.5)

## 2018-03-16 PROCEDURE — 99232 SBSQ HOSP IP/OBS MODERATE 35: CPT

## 2018-03-16 PROCEDURE — 99233 SBSQ HOSP IP/OBS HIGH 50: CPT | Mod: GC

## 2018-03-16 RX ORDER — INSULIN LISPRO 100/ML
5 VIAL (ML) SUBCUTANEOUS
Qty: 0 | Refills: 0 | Status: DISCONTINUED | OUTPATIENT
Start: 2018-03-16 | End: 2018-03-17

## 2018-03-16 RX ORDER — INSULIN DETEMIR 100/ML (3)
20 INSULIN PEN (ML) SUBCUTANEOUS DAILY
Qty: 0 | Refills: 0 | Status: DISCONTINUED | OUTPATIENT
Start: 2018-03-16 | End: 2018-03-17

## 2018-03-16 RX ORDER — IPRATROPIUM/ALBUTEROL SULFATE 18-103MCG
3 AEROSOL WITH ADAPTER (GRAM) INHALATION ONCE
Qty: 0 | Refills: 0 | Status: COMPLETED | OUTPATIENT
Start: 2018-03-16 | End: 2018-03-16

## 2018-03-16 RX ADMIN — ISOSORBIDE MONONITRATE 30 MILLIGRAM(S): 60 TABLET, EXTENDED RELEASE ORAL at 13:45

## 2018-03-16 RX ADMIN — Medication 250 MILLIGRAM(S): at 15:55

## 2018-03-16 RX ADMIN — Medication 20 UNIT(S): at 22:13

## 2018-03-16 RX ADMIN — Medication 50 MILLIGRAM(S): at 17:13

## 2018-03-16 RX ADMIN — PIPERACILLIN AND TAZOBACTAM 25 GRAM(S): 4; .5 INJECTION, POWDER, LYOPHILIZED, FOR SOLUTION INTRAVENOUS at 17:13

## 2018-03-16 RX ADMIN — Medication 3: at 22:12

## 2018-03-16 RX ADMIN — SERTRALINE 25 MILLIGRAM(S): 25 TABLET, FILM COATED ORAL at 13:44

## 2018-03-16 RX ADMIN — CARVEDILOL PHOSPHATE 25 MILLIGRAM(S): 80 CAPSULE, EXTENDED RELEASE ORAL at 05:59

## 2018-03-16 RX ADMIN — Medication 3: at 18:30

## 2018-03-16 RX ADMIN — CARVEDILOL PHOSPHATE 25 MILLIGRAM(S): 80 CAPSULE, EXTENDED RELEASE ORAL at 17:14

## 2018-03-16 RX ADMIN — Medication 50 MILLIGRAM(S): at 05:59

## 2018-03-16 RX ADMIN — Medication 2: at 09:29

## 2018-03-16 RX ADMIN — PIPERACILLIN AND TAZOBACTAM 25 GRAM(S): 4; .5 INJECTION, POWDER, LYOPHILIZED, FOR SOLUTION INTRAVENOUS at 03:45

## 2018-03-16 RX ADMIN — ATORVASTATIN CALCIUM 20 MILLIGRAM(S): 80 TABLET, FILM COATED ORAL at 22:13

## 2018-03-16 RX ADMIN — CLOPIDOGREL BISULFATE 75 MILLIGRAM(S): 75 TABLET, FILM COATED ORAL at 13:45

## 2018-03-16 RX ADMIN — PIPERACILLIN AND TAZOBACTAM 25 GRAM(S): 4; .5 INJECTION, POWDER, LYOPHILIZED, FOR SOLUTION INTRAVENOUS at 09:33

## 2018-03-16 RX ADMIN — Medication 3 MILLILITER(S): at 21:22

## 2018-03-16 RX ADMIN — Medication 81 MILLIGRAM(S): at 13:44

## 2018-03-16 NOTE — PROGRESS NOTE ADULT - SUBJECTIVE AND OBJECTIVE BOX
Patient is a 72y old  Female who presents with a chief complaint of Fevers and leg pain (06 Mar 2018 17:24)       INTERVAL HPI/OVERNIGHT EVENTS:  Patient seen and evaluated at bedside.  Pt is resting comfortable in NAD. Denies N/V/F/C.      Allergies    sulfa drugs (Hives)  sulfa drugs (Rash)  Sulfac 10% (Hives)    Intolerances        Vital Signs Last 24 Hrs  T(C): 37.1 (16 Mar 2018 06:00), Max: 37.1 (16 Mar 2018 06:00)  T(F): 98.7 (16 Mar 2018 06:00), Max: 98.7 (16 Mar 2018 06:00)  HR: 62 (16 Mar 2018 06:00) (58 - 69)  BP: 146/83 (16 Mar 2018 06:00) (136/80 - 157/76)  BP(mean): --  RR: 16 (16 Mar 2018 06:00) (16 - 18)  SpO2: 99% (16 Mar 2018 06:00) (97% - 99%)    LABS:                        8.9    12.15 )-----------( 518      ( 16 Mar 2018 04:00 )             28.0     03-16    141  |  110<H>  |  31<H>  ----------------------------<  247<H>  4.9   |  19<L>  |  1.10    Ca    8.3<L>      16 Mar 2018 04:00  Phos  3.7     03-16  Mg     3.1     03-16          CAPILLARY BLOOD GLUCOSE      POCT Blood Glucose.: 251 mg/dL (15 Mar 2018 21:45)  POCT Blood Glucose.: 189 mg/dL (15 Mar 2018 17:32)  POCT Blood Glucose.: 94 mg/dL (15 Mar 2018 12:51)  POCT Blood Glucose.: 74 mg/dL (15 Mar 2018 09:18)      Lower Extremity Physical Exam:  s/p left ankle disarticulation   Dressings with moderate serous strikethrough  LEFT foot open surgical wound noted with plantar flap cool to palpation, mild necrosis of flap medially.  tissue granular flap tissue fibrotic no purulence no malodor erythema and edema decreased.

## 2018-03-16 NOTE — PROGRESS NOTE ADULT - ASSESSMENT
72 female with T2DM, PVD s/p R BKA and L transmetatarsal amputation, CAD s/p CABG, CKD stage 3, HTN, HLD here with fevers 103F, chills and left leg pain. Admitted for cellulitis of left lower extremity with warmth and erythema to that leg. Has elevated ESR and CRP.     MRI with diffuse cellulitis with acute osteo involving of first and second metatarsal remanants, with enhance of the third and fourth metatarsal, and fluid collection within the tenotomy gap. Wound cx with MRSA, strep Grp G and proteus; other wound cx with strep dysgalactiae. Leukocytosis improving.     s/p OR for ankle disarticulation and incision and drainage of ankle and Achilles tendon abscess.     Recommend:   - Continue vanco/ zosyn.  - F/U vascular surgery regarding further surgical intervention.

## 2018-03-16 NOTE — PROGRESS NOTE ADULT - ASSESSMENT
s/p LEFT ankle disarticulation    Plan:  Patient seen and evaluated. All questions answered to patient's satisfaction.  Wet to dry dressings applied  Cont abx  NWB LLE  Awaiting resolution of cellulitis, then vascular likely to perform BKA.  Will cont to follow

## 2018-03-16 NOTE — PROGRESS NOTE ADULT - PROBLEM SELECTOR PLAN 5
- CKD III at baseline  - Cr stable  - patient with poor PO intake, avoid nephrotoxic agents, monitor vancomycin levels carefully

## 2018-03-16 NOTE — PROGRESS NOTE ADULT - SUBJECTIVE AND OBJECTIVE BOX
Chelsey Olivares MD  Medicine Team 2  Pager [phone]      Chief Complaint:         Subjective        VITAL SIGNS:  Vital Signs Last 24 Hrs  T(C): 37.1 (16 Mar 2018 06:00), Max: 37.1 (16 Mar 2018 06:00)  T(F): 98.7 (16 Mar 2018 06:00), Max: 98.7 (16 Mar 2018 06:00)  HR: 62 (16 Mar 2018 06:00) (58 - 69)  BP: 146/83 (16 Mar 2018 06:00) (136/80 - 157/76)  BP(mean): --  RR: 16 (16 Mar 2018 06:00) (16 - 18)  SpO2: 99% (16 Mar 2018 06:00) (97% - 99%)      PHYSICAL EXAM:     GENERAL: no acute distress  HEENT: PERRLA, EOMI, moist oropharynx   RESPIRATORY: CTAB, no w/c   CARDIOVASCULAR: RRR, no murmurs, gallops, rubs  ABDOMINAL: soft, non-tender, non-distended, positive bowel sounds   EXTREMITIES: no clubbing, cyanosis, or edema  NEUROLOGICAL: alert and oriented x 3, non-focal  SKIN: no rashes or lesions   MUSCULOSKELETAL: no gross joint deformity                          8.9    12.15 )-----------( 518      ( 16 Mar 2018 04:00 )             28.0     03-16    141  |  110<H>  |  31<H>  ----------------------------<  247<H>  4.9   |  19<L>  |  1.10    Ca    8.3<L>      16 Mar 2018 04:00  Phos  3.7     03-16  Mg     3.1     03-16        CAPILLARY BLOOD GLUCOSE      POCT Blood Glucose.: 251 mg/dL (15 Mar 2018 21:45)  POCT Blood Glucose.: 189 mg/dL (15 Mar 2018 17:32)  POCT Blood Glucose.: 94 mg/dL (15 Mar 2018 12:51)  POCT Blood Glucose.: 74 mg/dL (15 Mar 2018 09:18)      MEDICATIONS  (STANDING):  aspirin  chewable 81 milliGRAM(s) Oral daily  atorvastatin 20 milliGRAM(s) Oral at bedtime  carvedilol 25 milliGRAM(s) Oral every 12 hours  clopidogrel Tablet 75 milliGRAM(s) Oral daily  collagenase Ointment 1 Application(s) Topical daily  dextrose 50% Injectable 25 Gram(s) IV Push once  heparin  Injectable 5000 Unit(s) SubCutaneous every 12 hours  hydrALAZINE 50 milliGRAM(s) Oral two times a day  insulin detemir injectable (LEVEMIR) 15 Unit(s) SubCutaneous daily  insulin lispro (HumaLOG) corrective regimen sliding scale   SubCutaneous Before meals and at bedtime  isosorbide   mononitrate ER Tablet (IMDUR) 30 milliGRAM(s) Oral daily  piperacillin/tazobactam IVPB. 3.375 Gram(s) IV Intermittent every 8 hours  sertraline 25 milliGRAM(s) Oral daily  vancomycin  IVPB 1000 milliGRAM(s) IV Intermittent daily    MEDICATIONS  (PRN):  acetaminophen  IVPB. 1000 milliGRAM(s) IV Intermittent once PRN Mild Pain (1 - 3)  benzocaine 15 mG/menthol 3.6 mG Lozenge 1 Lozenge Oral three times a day PRN Sore Throat  guaiFENesin   Syrup  (Sugar-Free) 200 milliGRAM(s) Oral every 6 hours PRN Cough  HYDROmorphone  Injectable 0.5 milliGRAM(s) IV Push every 10 minutes PRN Moderate Pain (4 - 6)  metoclopramide Injectable 10 milliGRAM(s) IV Push once PRN Nausea and/or Vomiting  ondansetron Injectable 4 milliGRAM(s) IV Push once PRN Nausea and/or Vomiting Chelsey Olivares MD  Medicine Team 2  Pager [phone]      Patient is a 72y old  Female who presents with a chief complaint of Fevers and leg pain (06 Mar 2018 17:24)          Subjective: Overnight, no acute events. This morning denies any complaints but frustrated with waiting around for surgery. Denies fevers, chills, CP, SOB, nausea, vomiting, or diarrhea. Worked with PT yesterday.         VITAL SIGNS:  Vital Signs Last 24 Hrs  T(C): 37.1 (16 Mar 2018 06:00), Max: 37.1 (16 Mar 2018 06:00)  T(F): 98.7 (16 Mar 2018 06:00), Max: 98.7 (16 Mar 2018 06:00)  HR: 62 (16 Mar 2018 06:00) (58 - 69)  BP: 146/83 (16 Mar 2018 06:00) (136/80 - 157/76)  BP(mean): --  RR: 16 (16 Mar 2018 06:00) (16 - 18)  SpO2: 99% (16 Mar 2018 06:00) (97% - 99%)      PHYSICAL EXAM:     GENERAL: no acute distress, appears tired.   HEENT: dry mucus membranes   RESPIRATORY: coarse breath sounds but no crackles   CARDIOVASCULAR: RRR, no murmurs  ABDOMINAL: +BS, soft, non tender, non distended   EXTREMITIES: right BKA, left foot with dressing that is c/d/i, 1+ edema, warm, dry skin noted on shin   NEUROLOGICAL: alert and oriented x 3, no focal deficits   SKIN: no rashes or lesions   MUSCULOSKELETAL: no gross joint deformity                          8.9    12.15 )-----------( 518      ( 16 Mar 2018 04:00 )             28.0     03-16    141  |  110<H>  |  31<H>  ----------------------------<  247<H>  4.9   |  19<L>  |  1.10    Ca    8.3<L>      16 Mar 2018 04:00  Phos  3.7     03-16  Mg     3.1     03-16        CAPILLARY BLOOD GLUCOSE      POCT Blood Glucose.: 251 mg/dL (15 Mar 2018 21:45)  POCT Blood Glucose.: 189 mg/dL (15 Mar 2018 17:32)  POCT Blood Glucose.: 94 mg/dL (15 Mar 2018 12:51)  POCT Blood Glucose.: 74 mg/dL (15 Mar 2018 09:18)      MEDICATIONS  (STANDING):  aspirin  chewable 81 milliGRAM(s) Oral daily  atorvastatin 20 milliGRAM(s) Oral at bedtime  carvedilol 25 milliGRAM(s) Oral every 12 hours  clopidogrel Tablet 75 milliGRAM(s) Oral daily  collagenase Ointment 1 Application(s) Topical daily  dextrose 50% Injectable 25 Gram(s) IV Push once  heparin  Injectable 5000 Unit(s) SubCutaneous every 12 hours  hydrALAZINE 50 milliGRAM(s) Oral two times a day  insulin detemir injectable (LEVEMIR) 15 Unit(s) SubCutaneous daily  insulin lispro (HumaLOG) corrective regimen sliding scale   SubCutaneous Before meals and at bedtime  isosorbide   mononitrate ER Tablet (IMDUR) 30 milliGRAM(s) Oral daily  piperacillin/tazobactam IVPB. 3.375 Gram(s) IV Intermittent every 8 hours  sertraline 25 milliGRAM(s) Oral daily  vancomycin  IVPB 1000 milliGRAM(s) IV Intermittent daily    MEDICATIONS  (PRN):  acetaminophen  IVPB. 1000 milliGRAM(s) IV Intermittent once PRN Mild Pain (1 - 3)  benzocaine 15 mG/menthol 3.6 mG Lozenge 1 Lozenge Oral three times a day PRN Sore Throat  guaiFENesin   Syrup  (Sugar-Free) 200 milliGRAM(s) Oral every 6 hours PRN Cough  HYDROmorphone  Injectable 0.5 milliGRAM(s) IV Push every 10 minutes PRN Moderate Pain (4 - 6)  metoclopramide Injectable 10 milliGRAM(s) IV Push once PRN Nausea and/or Vomiting  ondansetron Injectable 4 milliGRAM(s) IV Push once PRN Nausea and/or Vomiting

## 2018-03-16 NOTE — PROGRESS NOTE ADULT - PROBLEM SELECTOR PLAN 1
- ID on board, appreciate involvement, continue vanc / zosyn (day 14)  - vancomycin level appropriate 17.8, continue 1gm q24 hours  - s/p L ankle disarticulation by podiatry on 3/10 and plan for BKA by vascular surgery, still waiting for improvement in wound

## 2018-03-16 NOTE — PROGRESS NOTE ADULT - SUBJECTIVE AND OBJECTIVE BOX
CC: Patient is a 72y old  Female who presents with a chief complaint of Fevers and leg pain     Interval History/ROS: Patient has no complaints. Denies fever, chills.    Allergies  sulfa drugs (Hives)  sulfa drugs (Rash)  Sulfac 10% (Hives)      ANTIMICROBIALS:  piperacillin/tazobactam IVPB. 3.375 every 8 hours  vancomycin  IVPB 1000 daily    PE:    Vital Signs Last 24 Hrs  T(C): 36.3 (16 Mar 2018 12:38), Max: 37.1 (16 Mar 2018 06:00)  T(F): 97.4 (16 Mar 2018 12:38), Max: 98.7 (16 Mar 2018 06:00)  HR: 66 (16 Mar 2018 12:38) (62 - 69)  BP: 157/80 (16 Mar 2018 12:38) (136/80 - 157/80)  BP(mean): --  RR: 17 (16 Mar 2018 12:38) (16 - 17)  SpO2: 96% (16 Mar 2018 12:38) (96% - 99%)    Gen: AOx3, NAD, non-toxic  CV: S1+S2 normal, no murmurs  Resp: Clear bilat, no resp distress  Abd: Soft, nontender, +BS  Ext: Right BKA, left leg bandaged  : No Pratt  IV/Skin: No thrombophlebitis  Neuro: no focal deficits    LABS:                          8.9    12.15 )-----------( 518      ( 16 Mar 2018 04:00 )             28.0       03-16    141  |  110<H>  |  31<H>  ----------------------------<  247<H>  4.9   |  19<L>  |  1.10    Ca    8.3<L>      16 Mar 2018 04:00  Phos  3.7     03-16  Mg     3.1     03-16            MICROBIOLOGY:  Vancomycin Level, Random: 17.8 ug/mL (03-16-18 @ 04:00)  v  ANKLE  03-10-18 --  --  --      ANKLE  03-10-18   NO GROWTH - PRELIMINARY RESULTS  NO ORGANISMS ISOLATED AT 24 HOURS  NO ORGANISMS ISOLATED AT 48 HRS.  NO ORGANISMS ISOLATED AT 72 HRS.  NO GROWTH AFTER 4 DAYS INCUBATION  NO GROWTH AFTER 5 DAYS INCUBATION  --  --    ANUS  03-10-18 --  --  --    OTHER  03-06-18   RARE  STRDYS^Streptococcus dysgalactiae  --  --    LEG - LEFT  03-03-18 --  --  Proteus mirabilis  Staph. aureus *MRSA*  Strep Beta Hemolytic Grp G    BLOOD PERIPHERAL  03-03-18 --  --  --    RADIOLOGY:    No new images.

## 2018-03-16 NOTE — CHART NOTE - NSCHARTNOTEFT_GEN_A_CORE
Vascular Surgery Chart Note  [facility] team [id number]       Patient added to OR schedule for Tuesday 3/20/18 for LEFT above knee amputation.       Please ensure:  -NPO @ Mdn the night prior w/ IVF  -Pre-op labs sent the afternoon prior to surgery (CBC, BMP/Mg/Phos, PT/PTT/INR, T&S)  -CXR on 3/19/18  -please adjust patient's insulin regimen to reflect NPO status beginning the night prior to surgery  -Vascular Surgery will ensure consent for procedure      KENTRELL Bal MD (PGY2)    (Please page C team [phone] for questions/concerns.)

## 2018-03-16 NOTE — PROGRESS NOTE ADULT - ATTENDING COMMENTS
Patient seen and examined.  Case discussed with house staff.  Agree with above as edited.   72yoF with h/o PVD s/p R BKA and L TMA,  DM type 2, CAD s/p CABG, CKD III, HTN admitted with left foot osteomyelitis and cellulitis s/p ankle disarticulation c/b demand ischemia.  c/w current IV abx.   d/w vascular surgery attending. Plan for OR on 3/20 - will likely require an AKA as BKA may not heal.  d/w patient.  Cardiology, vascular, podiatry following

## 2018-03-16 NOTE — PROGRESS NOTE ADULT - PROBLEM SELECTOR PLAN 2
- HgbA1c 8.1  - FS improved during the day yesterday, will continue levemir 15U and calculate humalog based on 24 hour requirements.

## 2018-03-17 LAB
BUN SERPL-MCNC: 28 MG/DL — HIGH (ref 7–23)
CALCIUM SERPL-MCNC: 8.3 MG/DL — LOW (ref 8.4–10.5)
CHLORIDE SERPL-SCNC: 109 MMOL/L — HIGH (ref 98–107)
CO2 SERPL-SCNC: 18 MMOL/L — LOW (ref 22–31)
CREAT SERPL-MCNC: 1.17 MG/DL — SIGNIFICANT CHANGE UP (ref 0.5–1.3)
GLUCOSE BLDC GLUCOMTR-MCNC: 212 MG/DL — HIGH (ref 70–99)
GLUCOSE BLDC GLUCOMTR-MCNC: 214 MG/DL — HIGH (ref 70–99)
GLUCOSE BLDC GLUCOMTR-MCNC: 225 MG/DL — HIGH (ref 70–99)
GLUCOSE BLDC GLUCOMTR-MCNC: 248 MG/DL — HIGH (ref 70–99)
GLUCOSE SERPL-MCNC: 249 MG/DL — HIGH (ref 70–99)
HCT VFR BLD CALC: 26.8 % — LOW (ref 34.5–45)
HGB BLD-MCNC: 8.4 G/DL — LOW (ref 11.5–15.5)
MAGNESIUM SERPL-MCNC: 2.1 MG/DL — SIGNIFICANT CHANGE UP (ref 1.6–2.6)
MCHC RBC-ENTMCNC: 28 PG — SIGNIFICANT CHANGE UP (ref 27–34)
MCHC RBC-ENTMCNC: 31.3 % — LOW (ref 32–36)
MCV RBC AUTO: 89.3 FL — SIGNIFICANT CHANGE UP (ref 80–100)
NRBC # FLD: 0.02 — SIGNIFICANT CHANGE UP
PHOSPHATE SERPL-MCNC: 3.3 MG/DL — SIGNIFICANT CHANGE UP (ref 2.5–4.5)
PLATELET # BLD AUTO: 456 K/UL — HIGH (ref 150–400)
PMV BLD: 11.2 FL — SIGNIFICANT CHANGE UP (ref 7–13)
POTASSIUM SERPL-MCNC: 5 MMOL/L — SIGNIFICANT CHANGE UP (ref 3.5–5.3)
POTASSIUM SERPL-SCNC: 5 MMOL/L — SIGNIFICANT CHANGE UP (ref 3.5–5.3)
RBC # BLD: 3 M/UL — LOW (ref 3.8–5.2)
RBC # FLD: 18.1 % — HIGH (ref 10.3–14.5)
SODIUM SERPL-SCNC: 140 MMOL/L — SIGNIFICANT CHANGE UP (ref 135–145)
VANCOMYCIN FLD-MCNC: 20.5 UG/ML — SIGNIFICANT CHANGE UP
WBC # BLD: 9.21 K/UL — SIGNIFICANT CHANGE UP (ref 3.8–10.5)
WBC # FLD AUTO: 9.21 K/UL — SIGNIFICANT CHANGE UP (ref 3.8–10.5)

## 2018-03-17 PROCEDURE — 99233 SBSQ HOSP IP/OBS HIGH 50: CPT | Mod: GC

## 2018-03-17 RX ORDER — IPRATROPIUM/ALBUTEROL SULFATE 18-103MCG
3 AEROSOL WITH ADAPTER (GRAM) INHALATION ONCE
Qty: 0 | Refills: 0 | Status: COMPLETED | OUTPATIENT
Start: 2018-03-17 | End: 2018-03-17

## 2018-03-17 RX ORDER — TIOTROPIUM BROMIDE 18 UG/1
1 CAPSULE ORAL; RESPIRATORY (INHALATION) DAILY
Qty: 0 | Refills: 0 | Status: DISCONTINUED | OUTPATIENT
Start: 2018-03-17 | End: 2018-03-18

## 2018-03-17 RX ORDER — INSULIN LISPRO 100/ML
7 VIAL (ML) SUBCUTANEOUS
Qty: 0 | Refills: 0 | Status: DISCONTINUED | OUTPATIENT
Start: 2018-03-17 | End: 2018-03-27

## 2018-03-17 RX ORDER — IPRATROPIUM/ALBUTEROL SULFATE 18-103MCG
3 AEROSOL WITH ADAPTER (GRAM) INHALATION EVERY 6 HOURS
Qty: 0 | Refills: 0 | Status: DISCONTINUED | OUTPATIENT
Start: 2018-03-17 | End: 2018-03-27

## 2018-03-17 RX ORDER — INSULIN DETEMIR 100/ML (3)
23 INSULIN PEN (ML) SUBCUTANEOUS DAILY
Qty: 0 | Refills: 0 | Status: DISCONTINUED | OUTPATIENT
Start: 2018-03-17 | End: 2018-03-19

## 2018-03-17 RX ORDER — ALBUTEROL 90 UG/1
1 AEROSOL, METERED ORAL EVERY 4 HOURS
Qty: 0 | Refills: 0 | Status: DISCONTINUED | OUTPATIENT
Start: 2018-03-17 | End: 2018-03-18

## 2018-03-17 RX ADMIN — Medication 50 MILLIGRAM(S): at 18:27

## 2018-03-17 RX ADMIN — Medication 81 MILLIGRAM(S): at 09:16

## 2018-03-17 RX ADMIN — PIPERACILLIN AND TAZOBACTAM 25 GRAM(S): 4; .5 INJECTION, POWDER, LYOPHILIZED, FOR SOLUTION INTRAVENOUS at 18:27

## 2018-03-17 RX ADMIN — Medication 5 UNIT(S): at 09:15

## 2018-03-17 RX ADMIN — Medication 3 MILLILITER(S): at 22:56

## 2018-03-17 RX ADMIN — Medication 23 UNIT(S): at 22:49

## 2018-03-17 RX ADMIN — Medication 3 MILLILITER(S): at 10:57

## 2018-03-17 RX ADMIN — Medication 2: at 13:21

## 2018-03-17 RX ADMIN — ISOSORBIDE MONONITRATE 30 MILLIGRAM(S): 60 TABLET, EXTENDED RELEASE ORAL at 09:17

## 2018-03-17 RX ADMIN — Medication 7 UNIT(S): at 18:27

## 2018-03-17 RX ADMIN — ATORVASTATIN CALCIUM 20 MILLIGRAM(S): 80 TABLET, FILM COATED ORAL at 21:16

## 2018-03-17 RX ADMIN — Medication 2: at 22:49

## 2018-03-17 RX ADMIN — HEPARIN SODIUM 5000 UNIT(S): 5000 INJECTION INTRAVENOUS; SUBCUTANEOUS at 06:28

## 2018-03-17 RX ADMIN — CLOPIDOGREL BISULFATE 75 MILLIGRAM(S): 75 TABLET, FILM COATED ORAL at 09:17

## 2018-03-17 RX ADMIN — Medication 3 MILLILITER(S): at 07:13

## 2018-03-17 RX ADMIN — Medication 2: at 09:14

## 2018-03-17 RX ADMIN — Medication 2: at 18:27

## 2018-03-17 RX ADMIN — Medication 50 MILLIGRAM(S): at 06:28

## 2018-03-17 RX ADMIN — SERTRALINE 25 MILLIGRAM(S): 25 TABLET, FILM COATED ORAL at 09:17

## 2018-03-17 RX ADMIN — PIPERACILLIN AND TAZOBACTAM 25 GRAM(S): 4; .5 INJECTION, POWDER, LYOPHILIZED, FOR SOLUTION INTRAVENOUS at 09:14

## 2018-03-17 RX ADMIN — PIPERACILLIN AND TAZOBACTAM 25 GRAM(S): 4; .5 INJECTION, POWDER, LYOPHILIZED, FOR SOLUTION INTRAVENOUS at 01:19

## 2018-03-17 RX ADMIN — Medication 3 MILLILITER(S): at 15:39

## 2018-03-17 NOTE — PROGRESS NOTE ADULT - ASSESSMENT
72F h/o DM2, PVD s/p R BKA and L transmetatarsal amputation, CAD s/p CABG, CKD III, HTN adm 3/3/18 with fevers and LLE pain found to be in sepsis 2/2 cellulitis and OM of LLE. Patient is now s/p L ankle disarticulation by podiatry on 3/10 and pending most likely a left AKA on 3/20 by vascular surgery.

## 2018-03-17 NOTE — PROGRESS NOTE ADULT - PROBLEM SELECTOR PLAN 7
- DVT ppx heparin subq  - CC diet  - Dispo pending BKA - DVT ppx heparin subq  - CC diet  - Dispo pending AKA

## 2018-03-17 NOTE — PROGRESS NOTE ADULT - ATTENDING COMMENTS
Patient seen and examined at bedside.  Agree with above as edited.     72 yoF with h/o PVD s/p R BKA and L TMA, uncontrolled DM type 2 with hyperglycemia, CAD s/p CABG, CKD III, HTN admitted with left foot acute osteomyelitis and cellulitis s/p ankle disarticulation c/b demand ischemia now requiring AKA 2/2 spread of infection. c/w IV abx, holding vancomycin today with repeat level in the AM. Patient still with hyperglycemia 2/2 DM2. Increase Levemir to 23U QHS and Humalog 7/7/7.

## 2018-03-17 NOTE — PROGRESS NOTE ADULT - PROBLEM SELECTOR PLAN 2
- HgbA1c 8.1  - FS improved during the day yesterday, will continue levemir 15U and increase premeal to 7U  - continue to check FS - HgbA1c 8.1  - FS still above goal, will increase to Levemir 23U QHS and Humalog 7/7/7  - continue to check FS

## 2018-03-17 NOTE — PROGRESS NOTE ADULT - SUBJECTIVE AND OBJECTIVE BOX
Chelsey Olivares MD  Medicine Team 2  Pager [phone]      Patient is a 72y old  Female who presents with a chief complaint of Fevers and leg pain (06 Mar 2018 17:24)          Subjective: Patient seen and examined. Overnight, continued to complain of audible wheeze so standing duonebs were ordered. This morning, denies fevers, chills, CP, SOB, nausea, vomiting, or abdominal pain. Aware of vascular' s plan for OR on Tuesday.         VITAL SIGNS:  Vital Signs Last 24 Hrs  T(C): 36.5 (17 Mar 2018 06:25), Max: 36.5 (17 Mar 2018 06:25)  T(F): 97.7 (17 Mar 2018 06:25), Max: 97.7 (17 Mar 2018 06:25)  HR: 53 (17 Mar 2018 06:25) (53 - 66)  BP: 155/71 (17 Mar 2018 06:25) (155/71 - 157/80)  BP(mean): --  RR: 18 (17 Mar 2018 06:25) (17 - 18)  SpO2: 100% (17 Mar 2018 06:25) (96% - 100%)      PHYSICAL EXAM:     GENERAL: no acute distress  HEENT: EOMI, moist mucus membranes, no audible stridor on ausculation of the neck  RESPIRATORY: CTAB, no wheezes or crackles appreciated   CARDIOVASCULAR: RRR, no murmurs  ABDOMINAL: soft, non-tender, non-distended, positive bowel sounds   EXTREMITIES: right BKA, left foot with clean bandage around ankle, improved erythema, 1+ edema  NEUROLOGICAL: alert and oriented x 3, no focal deficits   SKIN: scaly dry skin on left shin  MUSCULOSKELETAL: right BKA, left ankle disarticulation                           8.4    9.21  )-----------( 456      ( 17 Mar 2018 05:00 )             26.8     03-17    140  |  109<H>  |  28<H>  ----------------------------<  249<H>  5.0   |  18<L>  |  1.17    Ca    8.3<L>      17 Mar 2018 05:00  Phos  3.3     03-17  Mg     2.1     03-17        CAPILLARY BLOOD GLUCOSE      POCT Blood Glucose.: 278 mg/dL (16 Mar 2018 21:39)  POCT Blood Glucose.: 274 mg/dL (16 Mar 2018 17:31)  POCT Blood Glucose.: 245 mg/dL (16 Mar 2018 12:35)  POCT Blood Glucose.: 248 mg/dL (16 Mar 2018 09:14)      MEDICATIONS  (STANDING):  ALBUTerol/ipratropium for Nebulization 3 milliLiter(s) Nebulizer once  aspirin  chewable 81 milliGRAM(s) Oral daily  atorvastatin 20 milliGRAM(s) Oral at bedtime  carvedilol 25 milliGRAM(s) Oral every 12 hours  clopidogrel Tablet 75 milliGRAM(s) Oral daily  collagenase Ointment 1 Application(s) Topical daily  dextrose 50% Injectable 25 Gram(s) IV Push once  heparin  Injectable 5000 Unit(s) SubCutaneous every 12 hours  hydrALAZINE 50 milliGRAM(s) Oral two times a day  insulin detemir injectable (LEVEMIR) 20 Unit(s) SubCutaneous daily  insulin lispro (HumaLOG) corrective regimen sliding scale   SubCutaneous Before meals and at bedtime  insulin lispro Injectable (HumaLOG) 5 Unit(s) SubCutaneous three times a day before meals  isosorbide   mononitrate ER Tablet (IMDUR) 30 milliGRAM(s) Oral daily  piperacillin/tazobactam IVPB. 3.375 Gram(s) IV Intermittent every 8 hours  sertraline 25 milliGRAM(s) Oral daily  vancomycin  IVPB 1000 milliGRAM(s) IV Intermittent daily    MEDICATIONS  (PRN):  acetaminophen  IVPB. 1000 milliGRAM(s) IV Intermittent once PRN Mild Pain (1 - 3)  benzocaine 15 mG/menthol 3.6 mG Lozenge 1 Lozenge Oral three times a day PRN Sore Throat  guaiFENesin   Syrup  (Sugar-Free) 200 milliGRAM(s) Oral every 6 hours PRN Cough

## 2018-03-17 NOTE — PROGRESS NOTE ADULT - PROBLEM SELECTOR PLAN 1
- ID on board, appreciate involvement, continue vanc / zosyn (day 15)  - vancomycin level 20.5, appreciate ID input, will hold this dose and recheck level tomorrow  - s/p L ankle disarticulation by podiatry on 3/10 and plan for BKA by vascular surgery likely on 3/20 - ID on board, appreciate involvement, continue vanc / zosyn (day 15)  - vancomycin level 20.5, appreciate ID input, will hold this dose and recheck level tomorrow  - s/p L ankle disarticulation by podiatry on 3/10 and plan for AKA by vascular surgery likely on 3/20

## 2018-03-18 LAB
BUN SERPL-MCNC: 22 MG/DL — SIGNIFICANT CHANGE UP (ref 7–23)
CALCIUM SERPL-MCNC: 8.7 MG/DL — SIGNIFICANT CHANGE UP (ref 8.4–10.5)
CHLORIDE SERPL-SCNC: 110 MMOL/L — HIGH (ref 98–107)
CO2 SERPL-SCNC: 20 MMOL/L — LOW (ref 22–31)
CREAT SERPL-MCNC: 1 MG/DL — SIGNIFICANT CHANGE UP (ref 0.5–1.3)
GLUCOSE BLDC GLUCOMTR-MCNC: 129 MG/DL — HIGH (ref 70–99)
GLUCOSE BLDC GLUCOMTR-MCNC: 144 MG/DL — HIGH (ref 70–99)
GLUCOSE BLDC GLUCOMTR-MCNC: 152 MG/DL — HIGH (ref 70–99)
GLUCOSE BLDC GLUCOMTR-MCNC: 152 MG/DL — HIGH (ref 70–99)
GLUCOSE SERPL-MCNC: 153 MG/DL — HIGH (ref 70–99)
HCT VFR BLD CALC: 30.9 % — LOW (ref 34.5–45)
HGB BLD-MCNC: 9.7 G/DL — LOW (ref 11.5–15.5)
MAGNESIUM SERPL-MCNC: 1.9 MG/DL — SIGNIFICANT CHANGE UP (ref 1.6–2.6)
MCHC RBC-ENTMCNC: 27.6 PG — SIGNIFICANT CHANGE UP (ref 27–34)
MCHC RBC-ENTMCNC: 31.4 % — LOW (ref 32–36)
MCV RBC AUTO: 88 FL — SIGNIFICANT CHANGE UP (ref 80–100)
NRBC # FLD: 0 — SIGNIFICANT CHANGE UP
PHOSPHATE SERPL-MCNC: 3 MG/DL — SIGNIFICANT CHANGE UP (ref 2.5–4.5)
PLATELET # BLD AUTO: 585 K/UL — HIGH (ref 150–400)
PMV BLD: 11.1 FL — SIGNIFICANT CHANGE UP (ref 7–13)
POTASSIUM SERPL-MCNC: 4.7 MMOL/L — SIGNIFICANT CHANGE UP (ref 3.5–5.3)
POTASSIUM SERPL-SCNC: 4.7 MMOL/L — SIGNIFICANT CHANGE UP (ref 3.5–5.3)
RBC # BLD: 3.51 M/UL — LOW (ref 3.8–5.2)
RBC # FLD: 18.3 % — HIGH (ref 10.3–14.5)
SODIUM SERPL-SCNC: 142 MMOL/L — SIGNIFICANT CHANGE UP (ref 135–145)
VANCOMYCIN FLD-MCNC: 11.8 UG/ML — SIGNIFICANT CHANGE UP
WBC # BLD: 10.22 K/UL — SIGNIFICANT CHANGE UP (ref 3.8–10.5)
WBC # FLD AUTO: 10.22 K/UL — SIGNIFICANT CHANGE UP (ref 3.8–10.5)

## 2018-03-18 PROCEDURE — 71045 X-RAY EXAM CHEST 1 VIEW: CPT | Mod: 26

## 2018-03-18 PROCEDURE — 99233 SBSQ HOSP IP/OBS HIGH 50: CPT | Mod: GC

## 2018-03-18 RX ORDER — VANCOMYCIN HCL 1 G
1000 VIAL (EA) INTRAVENOUS ONCE
Qty: 0 | Refills: 0 | Status: COMPLETED | OUTPATIENT
Start: 2018-03-18 | End: 2018-03-18

## 2018-03-18 RX ORDER — VANCOMYCIN HCL 1 G
1000 VIAL (EA) INTRAVENOUS EVERY 24 HOURS
Qty: 0 | Refills: 0 | Status: COMPLETED | OUTPATIENT
Start: 2018-03-19 | End: 2018-03-24

## 2018-03-18 RX ORDER — FUROSEMIDE 40 MG
20 TABLET ORAL ONCE
Qty: 0 | Refills: 0 | Status: COMPLETED | OUTPATIENT
Start: 2018-03-18 | End: 2018-03-18

## 2018-03-18 RX ORDER — HYDRALAZINE HCL 50 MG
50 TABLET ORAL EVERY 8 HOURS
Qty: 0 | Refills: 0 | Status: DISCONTINUED | OUTPATIENT
Start: 2018-03-18 | End: 2018-03-21

## 2018-03-18 RX ORDER — VANCOMYCIN HCL 1 G
VIAL (EA) INTRAVENOUS
Qty: 0 | Refills: 0 | Status: COMPLETED | OUTPATIENT
Start: 2018-03-18 | End: 2018-03-24

## 2018-03-18 RX ORDER — FUROSEMIDE 40 MG
40 TABLET ORAL ONCE
Qty: 0 | Refills: 0 | Status: DISCONTINUED | OUTPATIENT
Start: 2018-03-18 | End: 2018-03-18

## 2018-03-18 RX ADMIN — Medication 7 UNIT(S): at 09:32

## 2018-03-18 RX ADMIN — ATORVASTATIN CALCIUM 20 MILLIGRAM(S): 80 TABLET, FILM COATED ORAL at 21:57

## 2018-03-18 RX ADMIN — Medication 250 MILLIGRAM(S): at 09:46

## 2018-03-18 RX ADMIN — Medication 50 MILLIGRAM(S): at 21:57

## 2018-03-18 RX ADMIN — Medication 1: at 18:00

## 2018-03-18 RX ADMIN — Medication 1: at 22:37

## 2018-03-18 RX ADMIN — Medication 7 UNIT(S): at 13:23

## 2018-03-18 RX ADMIN — ISOSORBIDE MONONITRATE 30 MILLIGRAM(S): 60 TABLET, EXTENDED RELEASE ORAL at 09:32

## 2018-03-18 RX ADMIN — PIPERACILLIN AND TAZOBACTAM 25 GRAM(S): 4; .5 INJECTION, POWDER, LYOPHILIZED, FOR SOLUTION INTRAVENOUS at 11:12

## 2018-03-18 RX ADMIN — CLOPIDOGREL BISULFATE 75 MILLIGRAM(S): 75 TABLET, FILM COATED ORAL at 09:32

## 2018-03-18 RX ADMIN — PIPERACILLIN AND TAZOBACTAM 25 GRAM(S): 4; .5 INJECTION, POWDER, LYOPHILIZED, FOR SOLUTION INTRAVENOUS at 17:59

## 2018-03-18 RX ADMIN — Medication 20 MILLIGRAM(S): at 17:59

## 2018-03-18 RX ADMIN — Medication 3 MILLILITER(S): at 03:29

## 2018-03-18 RX ADMIN — Medication 3 MILLILITER(S): at 23:38

## 2018-03-18 RX ADMIN — SERTRALINE 25 MILLIGRAM(S): 25 TABLET, FILM COATED ORAL at 09:32

## 2018-03-18 RX ADMIN — Medication 20 MILLIGRAM(S): at 08:33

## 2018-03-18 RX ADMIN — Medication 50 MILLIGRAM(S): at 05:35

## 2018-03-18 RX ADMIN — Medication 23 UNIT(S): at 22:37

## 2018-03-18 RX ADMIN — Medication 3 MILLILITER(S): at 15:45

## 2018-03-18 RX ADMIN — CARVEDILOL PHOSPHATE 25 MILLIGRAM(S): 80 CAPSULE, EXTENDED RELEASE ORAL at 17:59

## 2018-03-18 RX ADMIN — Medication 81 MILLIGRAM(S): at 09:32

## 2018-03-18 RX ADMIN — PIPERACILLIN AND TAZOBACTAM 25 GRAM(S): 4; .5 INJECTION, POWDER, LYOPHILIZED, FOR SOLUTION INTRAVENOUS at 03:11

## 2018-03-18 RX ADMIN — Medication 50 MILLIGRAM(S): at 13:23

## 2018-03-18 RX ADMIN — HEPARIN SODIUM 5000 UNIT(S): 5000 INJECTION INTRAVENOUS; SUBCUTANEOUS at 18:00

## 2018-03-18 RX ADMIN — Medication 7 UNIT(S): at 18:00

## 2018-03-18 RX ADMIN — HEPARIN SODIUM 5000 UNIT(S): 5000 INJECTION INTRAVENOUS; SUBCUTANEOUS at 05:35

## 2018-03-18 RX ADMIN — Medication 3 MILLILITER(S): at 11:43

## 2018-03-18 NOTE — PROGRESS NOTE ADULT - ATTENDING COMMENTS
73 yo F with DM2, PVD s/p R BKA and L transmetatarsal amputation, CAD s/p CABG, CKD III, HTN adm 3/3/18 with fevers and LLE pain found to be in sepsis 2/2 cellulitis and OM of LLE. Patient now reporting some MCDANIEL and wheeze yesterday. Also noted to have worsening HTN and appears more edematous today. Lungs CTAB on my exam but CXR reveals mild pulmonary edema. Lasix 20mg IV x1 given with some effect, will give another. Increase hydralazine to 50mg po TID for now from BID. Strict I/O's, daily weights. Will have cards re-eval patient given pending AKA on Tuesday. Finger sticks improving. Will need modification on PM on 3/19 given pending vascular surgery.

## 2018-03-18 NOTE — PROGRESS NOTE ADULT - PROBLEM SELECTOR PLAN 1
- ID on board, appreciate involvement, continue vanc / zosyn (day 15)  - vancomycin level 20.5, appreciate ID input, will hold this dose and recheck level tomorrow  - s/p L ankle disarticulation by podiatry on 3/10 and plan for AKA by vascular surgery likely on 3/20 - ID on board, appreciate involvement, continue vanc / zosyn (day 16)  - s/p L ankle disarticulation by podiatry on 3/10 and plan for AKA by vascular surgery likely on 3/20

## 2018-03-18 NOTE — PROGRESS NOTE ADULT - PROBLEM SELECTOR PLAN 4
- stable  - continue Hydralazine, isosorbide mononitrate, and coreg - continue Hydralazine, isosorbide mononitrate, and coreg  - will increase hydralazine to TID instead of BID as pt still hypertensive

## 2018-03-18 NOTE — PROGRESS NOTE ADULT - PROBLEM SELECTOR PLAN 2
- HgbA1c 8.1  - FS still above goal, will increase to Levemir 23U QHS and Humalog 7/7/7  - continue to check FS - HgbA1c 8.1  - Levemir 23U QHS and Humalog 7/7/7; will monitor FSG and adjust accordingly  - Will need change on night of 3/19, for anticipated NPO for surgery

## 2018-03-18 NOTE — PROGRESS NOTE ADULT - SUBJECTIVE AND OBJECTIVE BOX
Patient is a 72y old  Female who presents with a chief complaint of Fevers and leg pain (06 Mar 2018 17:24)    Subjective: Patient seen and examined. Overnight, continued to complain of audible wheeze so standing duonebs were ordered. This morning, denies fevers, chills, CP, SOB, nausea, vomiting, or abdominal pain. Aware of vascular' s plan for OR on Tuesday.         VITAL SIGNS:  Vital Signs Last 24 Hrs  T(C): 36.5 (17 Mar 2018 06:25), Max: 36.5 (17 Mar 2018 06:25)  T(F): 97.7 (17 Mar 2018 06:25), Max: 97.7 (17 Mar 2018 06:25)  HR: 53 (17 Mar 2018 06:25) (53 - 66)  BP: 155/71 (17 Mar 2018 06:25) (155/71 - 157/80)  BP(mean): --  RR: 18 (17 Mar 2018 06:25) (17 - 18)  SpO2: 100% (17 Mar 2018 06:25) (96% - 100%)      PHYSICAL EXAM:     GENERAL: no acute distress  HEENT: EOMI, moist mucus membranes, no audible stridor on ausculation of the neck  RESPIRATORY: CTAB, no wheezes or crackles appreciated   CARDIOVASCULAR: RRR, no murmurs  ABDOMINAL: soft, non-tender, non-distended, positive bowel sounds   EXTREMITIES: right BKA, left foot with clean bandage around ankle, improved erythema, 1+ edema  NEUROLOGICAL: alert and oriented x 3, no focal deficits   SKIN: scaly dry skin on left shin  MUSCULOSKELETAL: right BKA, left ankle disarticulation                           8.4    9.21  )-----------( 456      ( 17 Mar 2018 05:00 )             26.8     03-17    140  |  109<H>  |  28<H>  ----------------------------<  249<H>  5.0   |  18<L>  |  1.17    Ca    8.3<L>      17 Mar 2018 05:00  Phos  3.3     03-17  Mg     2.1     03-17        CAPILLARY BLOOD GLUCOSE      POCT Blood Glucose.: 278 mg/dL (16 Mar 2018 21:39)  POCT Blood Glucose.: 274 mg/dL (16 Mar 2018 17:31)  POCT Blood Glucose.: 245 mg/dL (16 Mar 2018 12:35)  POCT Blood Glucose.: 248 mg/dL (16 Mar 2018 09:14)      MEDICATIONS  (STANDING):  ALBUTerol/ipratropium for Nebulization 3 milliLiter(s) Nebulizer once  aspirin  chewable 81 milliGRAM(s) Oral daily  atorvastatin 20 milliGRAM(s) Oral at bedtime  carvedilol 25 milliGRAM(s) Oral every 12 hours  clopidogrel Tablet 75 milliGRAM(s) Oral daily  collagenase Ointment 1 Application(s) Topical daily  dextrose 50% Injectable 25 Gram(s) IV Push once  heparin  Injectable 5000 Unit(s) SubCutaneous every 12 hours  hydrALAZINE 50 milliGRAM(s) Oral two times a day  insulin detemir injectable (LEVEMIR) 20 Unit(s) SubCutaneous daily  insulin lispro (HumaLOG) corrective regimen sliding scale   SubCutaneous Before meals and at bedtime  insulin lispro Injectable (HumaLOG) 5 Unit(s) SubCutaneous three times a day before meals  isosorbide   mononitrate ER Tablet (IMDUR) 30 milliGRAM(s) Oral daily  piperacillin/tazobactam IVPB. 3.375 Gram(s) IV Intermittent every 8 hours  sertraline 25 milliGRAM(s) Oral daily  vancomycin  IVPB 1000 milliGRAM(s) IV Intermittent daily    MEDICATIONS  (PRN):  acetaminophen  IVPB. 1000 milliGRAM(s) IV Intermittent once PRN Mild Pain (1 - 3)  benzocaine 15 mG/menthol 3.6 mG Lozenge 1 Lozenge Oral three times a day PRN Sore Throat  guaiFENesin   Syrup  (Sugar-Free) 200 milliGRAM(s) Oral every 6 hours PRN Cough Patient is a 72y old  Female who presents with a chief complaint of Fevers and leg pain (06 Mar 2018 17:24)    Subjective: Patient has no complaints. Says she is coping with the depressing news about needing and amputation on Tuesday. Denies f/c/n/v/sob/cp    Patient has wheezing again, and now s/p furosemide 20 IV.    VITAL SIGNS:  Vital Signs Last 24 Hrs  T(C): 36.4 (18 Mar 2018 05:30), Max: 36.4 (17 Mar 2018 21:10)  T(F): 97.5 (18 Mar 2018 05:30), Max: 97.5 (17 Mar 2018 21:10)  HR: 59 (18 Mar 2018 07:34) (55 - 66)  BP: 183/86 (18 Mar 2018 07:34) (160/78 - 191/92)  RR: 18 (18 Mar 2018 07:34) (18 - 18)  SpO2: 98% (18 Mar 2018 07:34) (97% - 100%)    PHYSICAL EXAM:   GENERAL: no acute distress  HEENT: EOMI, moist mucus membranes, no audible stridor on ausculation of the neck  RESPIRATORY: CTAB, mild wheezing at the bases  CARDIOVASCULAR: RRR, no murmurs  ABDOMINAL: soft, non-tender, non-distended, positive bowel sounds   EXTREMITIES: right BKA, left foot with clean bandage around ankle, improved erythema, 1+ edema  NEUROLOGICAL: alert and oriented x 3, no focal deficits   SKIN: scaly dry skin on left shin, no more erythema noted  MUSCULOSKELETAL: right BKA, left ankle disarticulation             LABS:             9.7    10.22 )-----------( 585      ( 18 Mar 2018 06:50 )             30.9     Auto Eosinophil # x     / Auto Eosinophil % x     / Auto Neutrophil # x     / Auto Neutrophil % x     / BANDS % x                            8.4    9.21  )-----------( 456      ( 17 Mar 2018 05:00 )             26.8     Auto Eosinophil # x     / Auto Eosinophil % x     / Auto Neutrophil # x     / Auto Neutrophil % x     / BANDS % x        03-18    142  |  110<H>  |  22  ----------------------------<  153<H>  4.7   |  20<L>  |  1.00  03-17    140  |  109<H>  |  28<H>  ----------------------------<  249<H>  5.0   |  18<L>  |  1.17    Ca    8.7      18 Mar 2018 06:50  Mg     1.9     03-18  Phos  3.0     03-18      CAPILLARY BLOOD GLUCOSE  POCT Blood Glucose.: 278 mg/dL (16 Mar 2018 21:39)  POCT Blood Glucose.: 274 mg/dL (16 Mar 2018 17:31)  POCT Blood Glucose.: 245 mg/dL (16 Mar 2018 12:35)  POCT Blood Glucose.: 248 mg/dL (16 Mar 2018 09:14)      MEDICATIONS  (STANDING):  ALBUTerol    90 MICROgram(s) HFA Inhaler 1 Puff(s) Inhalation every 4 hours  ALBUTerol/ipratropium for Nebulization 3 milliLiter(s) Nebulizer every 6 hours  aspirin  chewable 81 milliGRAM(s) Oral daily  atorvastatin 20 milliGRAM(s) Oral at bedtime  carvedilol 25 milliGRAM(s) Oral every 12 hours  clopidogrel Tablet 75 milliGRAM(s) Oral daily  dextrose 50% Injectable 25 Gram(s) IV Push once  heparin  Injectable 5000 Unit(s) SubCutaneous every 12 hours  hydrALAZINE 50 milliGRAM(s) Oral two times a day  insulin detemir injectable (LEVEMIR) 23 Unit(s) SubCutaneous daily  insulin lispro (HumaLOG) corrective regimen sliding scale   SubCutaneous Before meals and at bedtime  insulin lispro Injectable (HumaLOG) 7 Unit(s) SubCutaneous three times a day before meals  isosorbide   mononitrate ER Tablet (IMDUR) 30 milliGRAM(s) Oral daily  piperacillin/tazobactam IVPB. 3.375 Gram(s) IV Intermittent every 8 hours  sertraline 25 milliGRAM(s) Oral daily  tiotropium 18 MICROgram(s) Capsule 1 Capsule(s) Inhalation daily    MEDICATIONS  (PRN):  acetaminophen  IVPB. 1000 milliGRAM(s) IV Intermittent once PRN Mild Pain (1 - 3)  benzocaine 15 mG/menthol 3.6 mG Lozenge 1 Lozenge Oral three times a day PRN Sore Throat  guaiFENesin   Syrup  (Sugar-Free) 200 milliGRAM(s) Oral every 6 hours PRN Cough Patient is a 72y old  Female who presents with a chief complaint of Fevers and leg pain (06 Mar 2018 17:24)    Subjective: Patient has no complaints. Says she is coping with the depressing news about needing and amputation on Tuesday. Denies f/c/n/v/sob/cp    Patient has wheezing again, and now s/p furosemide 20 IV.    VITAL SIGNS:  Vital Signs Last 24 Hrs  T(C): 36.4 (18 Mar 2018 05:30), Max: 36.4 (17 Mar 2018 21:10)  T(F): 97.5 (18 Mar 2018 05:30), Max: 97.5 (17 Mar 2018 21:10)  HR: 59 (18 Mar 2018 07:34) (55 - 66)  BP: 183/86 (18 Mar 2018 07:34) (160/78 - 191/92)  RR: 18 (18 Mar 2018 07:34) (18 - 18)  SpO2: 98% (18 Mar 2018 07:34) (97% - 100%)    PHYSICAL EXAM:   GENERAL: no acute distress  HEENT: EOMI, moist mucus membranes, no audible stridor on ausculation of the neck  RESPIRATORY: CTAB, mild wheezing at the bases  CARDIOVASCULAR: RRR, no murmurs  ABDOMINAL: soft, non-tender, non-distended, positive bowel sounds   EXTREMITIES: right BKA, left foot with clean bandage around ankle, improved erythema, 1+ edema  NEUROLOGICAL: alert and oriented x 3, no focal deficits   SKIN: scaly dry skin on left shin, no more erythema noted  MUSCULOSKELETAL: right BKA, left ankle disarticulation             LABS:             9.7    10.22 )-----------( 585      ( 18 Mar 2018 06:50 )             30.9     Auto Eosinophil # x     / Auto Eosinophil % x     / Auto Neutrophil # x     / Auto Neutrophil % x     / BANDS % x                            8.4    9.21  )-----------( 456      ( 17 Mar 2018 05:00 )             26.8     Auto Eosinophil # x     / Auto Eosinophil % x     / Auto Neutrophil # x     / Auto Neutrophil % x     / BANDS % x        03-18    142  |  110<H>  |  22  ----------------------------<  153<H>  4.7   |  20<L>  |  1.00  03-17    140  |  109<H>  |  28<H>  ----------------------------<  249<H>  5.0   |  18<L>  |  1.17    Ca    8.7      18 Mar 2018 06:50  Mg     1.9     03-18  Phos  3.0     03-18      CAPILLARY BLOOD GLUCOSE  POCT Blood Glucose.: 278 mg/dL (16 Mar 2018 21:39)  POCT Blood Glucose.: 274 mg/dL (16 Mar 2018 17:31)  POCT Blood Glucose.: 245 mg/dL (16 Mar 2018 12:35)  POCT Blood Glucose.: 248 mg/dL (16 Mar 2018 09:14)      MEDICATIONS  (STANDING):  ALBUTerol    90 MICROgram(s) HFA Inhaler 1 Puff(s) Inhalation every 4 hours  ALBUTerol/ipratropium for Nebulization 3 milliLiter(s) Nebulizer every 6 hours  aspirin  chewable 81 milliGRAM(s) Oral daily  atorvastatin 20 milliGRAM(s) Oral at bedtime  carvedilol 25 milliGRAM(s) Oral every 12 hours  clopidogrel Tablet 75 milliGRAM(s) Oral daily  dextrose 50% Injectable 25 Gram(s) IV Push once  heparin  Injectable 5000 Unit(s) SubCutaneous every 12 hours  hydrALAZINE 50 milliGRAM(s) Oral two times a day  insulin detemir injectable (LEVEMIR) 23 Unit(s) SubCutaneous daily  insulin lispro (HumaLOG) corrective regimen sliding scale   SubCutaneous Before meals and at bedtime  insulin lispro Injectable (HumaLOG) 7 Unit(s) SubCutaneous three times a day before meals  isosorbide   mononitrate ER Tablet (IMDUR) 30 milliGRAM(s) Oral daily  piperacillin/tazobactam IVPB. 3.375 Gram(s) IV Intermittent every 8 hours  sertraline 25 milliGRAM(s) Oral daily  tiotropium 18 MICROgram(s) Capsule 1 Capsule(s) Inhalation daily    MEDICATIONS  (PRN):  acetaminophen  IVPB. 1000 milliGRAM(s) IV Intermittent once PRN Mild Pain (1 - 3)  benzocaine 15 mG/menthol 3.6 mG Lozenge 1 Lozenge Oral three times a day PRN Sore Throat  guaiFENesin   Syrup  (Sugar-Free) 200 milliGRAM(s) Oral every 6 hours PRN Cough    IMAGING PERSONALLY REVIEWED:  < from: Xray Chest 1 View- PORTABLE-Urgent (03.18.18 @ 08:49) >  IMPRESSION:   Mild CHF with small bilateral pleural effusions.    < end of copied text >

## 2018-03-19 ENCOUNTER — TRANSCRIPTION ENCOUNTER (OUTPATIENT)
Age: 73
End: 2018-03-19

## 2018-03-19 DIAGNOSIS — E83.42 HYPOMAGNESEMIA: ICD-10-CM

## 2018-03-19 DIAGNOSIS — I50.33 ACUTE ON CHRONIC DIASTOLIC (CONGESTIVE) HEART FAILURE: ICD-10-CM

## 2018-03-19 LAB
ALBUMIN SERPL ELPH-MCNC: 2.4 G/DL — LOW (ref 3.3–5)
ALP SERPL-CCNC: 150 U/L — HIGH (ref 40–120)
ALT FLD-CCNC: 42 U/L — HIGH (ref 4–33)
APTT BLD: 35.2 SEC — SIGNIFICANT CHANGE UP (ref 27.5–37.4)
AST SERPL-CCNC: 45 U/L — HIGH (ref 4–32)
BILIRUB SERPL-MCNC: 0.5 MG/DL — SIGNIFICANT CHANGE UP (ref 0.2–1.2)
BLD GP AB SCN SERPL QL: NEGATIVE — SIGNIFICANT CHANGE UP
BUN SERPL-MCNC: 22 MG/DL — SIGNIFICANT CHANGE UP (ref 7–23)
BUN SERPL-MCNC: 22 MG/DL — SIGNIFICANT CHANGE UP (ref 7–23)
CALCIUM SERPL-MCNC: 8.4 MG/DL — SIGNIFICANT CHANGE UP (ref 8.4–10.5)
CALCIUM SERPL-MCNC: 8.5 MG/DL — SIGNIFICANT CHANGE UP (ref 8.4–10.5)
CHLORIDE SERPL-SCNC: 106 MMOL/L — SIGNIFICANT CHANGE UP (ref 98–107)
CHLORIDE SERPL-SCNC: 109 MMOL/L — HIGH (ref 98–107)
CO2 SERPL-SCNC: 24 MMOL/L — SIGNIFICANT CHANGE UP (ref 22–31)
CO2 SERPL-SCNC: 24 MMOL/L — SIGNIFICANT CHANGE UP (ref 22–31)
CREAT SERPL-MCNC: 1 MG/DL — SIGNIFICANT CHANGE UP (ref 0.5–1.3)
CREAT SERPL-MCNC: 1.02 MG/DL — SIGNIFICANT CHANGE UP (ref 0.5–1.3)
GLUCOSE BLDC GLUCOMTR-MCNC: 142 MG/DL — HIGH (ref 70–99)
GLUCOSE SERPL-MCNC: 153 MG/DL — HIGH (ref 70–99)
GLUCOSE SERPL-MCNC: 155 MG/DL — HIGH (ref 70–99)
HCT VFR BLD CALC: 29.2 % — LOW (ref 34.5–45)
HGB BLD-MCNC: 9 G/DL — LOW (ref 11.5–15.5)
INR BLD: 1.28 — HIGH (ref 0.88–1.17)
MAGNESIUM SERPL-MCNC: 1.5 MG/DL — LOW (ref 1.6–2.6)
MAGNESIUM SERPL-MCNC: 1.7 MG/DL — SIGNIFICANT CHANGE UP (ref 1.6–2.6)
MCHC RBC-ENTMCNC: 27.1 PG — SIGNIFICANT CHANGE UP (ref 27–34)
MCHC RBC-ENTMCNC: 30.8 % — LOW (ref 32–36)
MCV RBC AUTO: 88 FL — SIGNIFICANT CHANGE UP (ref 80–100)
NRBC # FLD: 0 — SIGNIFICANT CHANGE UP
PHOSPHATE SERPL-MCNC: 3.3 MG/DL — SIGNIFICANT CHANGE UP (ref 2.5–4.5)
PHOSPHATE SERPL-MCNC: 3.5 MG/DL — SIGNIFICANT CHANGE UP (ref 2.5–4.5)
PLATELET # BLD AUTO: 513 K/UL — HIGH (ref 150–400)
PMV BLD: 10.6 FL — SIGNIFICANT CHANGE UP (ref 7–13)
POTASSIUM SERPL-MCNC: 4.2 MMOL/L — SIGNIFICANT CHANGE UP (ref 3.5–5.3)
POTASSIUM SERPL-MCNC: 4.4 MMOL/L — SIGNIFICANT CHANGE UP (ref 3.5–5.3)
POTASSIUM SERPL-SCNC: 4.2 MMOL/L — SIGNIFICANT CHANGE UP (ref 3.5–5.3)
POTASSIUM SERPL-SCNC: 4.4 MMOL/L — SIGNIFICANT CHANGE UP (ref 3.5–5.3)
PROT SERPL-MCNC: 7.5 G/DL — SIGNIFICANT CHANGE UP (ref 6–8.3)
PROTHROM AB SERPL-ACNC: 14.3 SEC — HIGH (ref 9.8–13.1)
RBC # BLD: 3.32 M/UL — LOW (ref 3.8–5.2)
RBC # FLD: 18.2 % — HIGH (ref 10.3–14.5)
RH IG SCN BLD-IMP: POSITIVE — SIGNIFICANT CHANGE UP
SODIUM SERPL-SCNC: 141 MMOL/L — SIGNIFICANT CHANGE UP (ref 135–145)
SODIUM SERPL-SCNC: 143 MMOL/L — SIGNIFICANT CHANGE UP (ref 135–145)
VANCOMYCIN TROUGH SERPL-MCNC: 12.4 UG/ML — SIGNIFICANT CHANGE UP (ref 10–20)
WBC # BLD: 9.92 K/UL — SIGNIFICANT CHANGE UP (ref 3.8–10.5)
WBC # FLD AUTO: 9.92 K/UL — SIGNIFICANT CHANGE UP (ref 3.8–10.5)

## 2018-03-19 PROCEDURE — 71045 X-RAY EXAM CHEST 1 VIEW: CPT | Mod: 26

## 2018-03-19 PROCEDURE — 99232 SBSQ HOSP IP/OBS MODERATE 35: CPT

## 2018-03-19 PROCEDURE — 99232 SBSQ HOSP IP/OBS MODERATE 35: CPT | Mod: GC

## 2018-03-19 PROCEDURE — 99233 SBSQ HOSP IP/OBS HIGH 50: CPT | Mod: GC

## 2018-03-19 RX ORDER — SODIUM CHLORIDE 0.65 %
1 AEROSOL, SPRAY (ML) NASAL ONCE
Qty: 0 | Refills: 0 | Status: COMPLETED | OUTPATIENT
Start: 2018-03-19 | End: 2018-03-19

## 2018-03-19 RX ORDER — SODIUM CHLORIDE 9 MG/ML
1000 INJECTION INTRAMUSCULAR; INTRAVENOUS; SUBCUTANEOUS
Qty: 0 | Refills: 0 | Status: DISCONTINUED | OUTPATIENT
Start: 2018-03-19 | End: 2018-03-19

## 2018-03-19 RX ORDER — SODIUM CHLORIDE 9 MG/ML
1000 INJECTION INTRAMUSCULAR; INTRAVENOUS; SUBCUTANEOUS
Qty: 0 | Refills: 0 | Status: DISCONTINUED | OUTPATIENT
Start: 2018-03-19 | End: 2018-03-20

## 2018-03-19 RX ORDER — INSULIN DETEMIR 100/ML (3)
11 INSULIN PEN (ML) SUBCUTANEOUS DAILY
Qty: 0 | Refills: 0 | Status: DISCONTINUED | OUTPATIENT
Start: 2018-03-19 | End: 2018-03-21

## 2018-03-19 RX ORDER — MAGNESIUM SULFATE 500 MG/ML
2 VIAL (ML) INJECTION ONCE
Qty: 0 | Refills: 0 | Status: COMPLETED | OUTPATIENT
Start: 2018-03-19 | End: 2018-03-19

## 2018-03-19 RX ORDER — FUROSEMIDE 40 MG
40 TABLET ORAL EVERY 24 HOURS
Qty: 0 | Refills: 0 | Status: DISCONTINUED | OUTPATIENT
Start: 2018-03-19 | End: 2018-03-21

## 2018-03-19 RX ORDER — HYDRALAZINE HCL 50 MG
5 TABLET ORAL ONCE
Qty: 0 | Refills: 0 | Status: COMPLETED | OUTPATIENT
Start: 2018-03-19 | End: 2018-03-19

## 2018-03-19 RX ADMIN — Medication 3 MILLILITER(S): at 09:21

## 2018-03-19 RX ADMIN — PIPERACILLIN AND TAZOBACTAM 25 GRAM(S): 4; .5 INJECTION, POWDER, LYOPHILIZED, FOR SOLUTION INTRAVENOUS at 02:18

## 2018-03-19 RX ADMIN — Medication 250 MILLIGRAM(S): at 09:24

## 2018-03-19 RX ADMIN — Medication 7 UNIT(S): at 13:40

## 2018-03-19 RX ADMIN — CLOPIDOGREL BISULFATE 75 MILLIGRAM(S): 75 TABLET, FILM COATED ORAL at 10:55

## 2018-03-19 RX ADMIN — Medication 50 MILLIGRAM(S): at 06:25

## 2018-03-19 RX ADMIN — Medication 7 UNIT(S): at 09:25

## 2018-03-19 RX ADMIN — Medication 50 GRAM(S): at 09:24

## 2018-03-19 RX ADMIN — SERTRALINE 25 MILLIGRAM(S): 25 TABLET, FILM COATED ORAL at 10:56

## 2018-03-19 RX ADMIN — Medication 50 MILLIGRAM(S): at 22:46

## 2018-03-19 RX ADMIN — ATORVASTATIN CALCIUM 20 MILLIGRAM(S): 80 TABLET, FILM COATED ORAL at 22:46

## 2018-03-19 RX ADMIN — PIPERACILLIN AND TAZOBACTAM 25 GRAM(S): 4; .5 INJECTION, POWDER, LYOPHILIZED, FOR SOLUTION INTRAVENOUS at 18:22

## 2018-03-19 RX ADMIN — HEPARIN SODIUM 5000 UNIT(S): 5000 INJECTION INTRAVENOUS; SUBCUTANEOUS at 06:25

## 2018-03-19 RX ADMIN — Medication 40 MILLIGRAM(S): at 11:57

## 2018-03-19 RX ADMIN — Medication 3 MILLILITER(S): at 04:08

## 2018-03-19 RX ADMIN — Medication 50 MILLIGRAM(S): at 13:40

## 2018-03-19 RX ADMIN — Medication 81 MILLIGRAM(S): at 10:56

## 2018-03-19 RX ADMIN — Medication 3 MILLILITER(S): at 16:42

## 2018-03-19 RX ADMIN — PIPERACILLIN AND TAZOBACTAM 25 GRAM(S): 4; .5 INJECTION, POWDER, LYOPHILIZED, FOR SOLUTION INTRAVENOUS at 11:57

## 2018-03-19 RX ADMIN — CARVEDILOL PHOSPHATE 25 MILLIGRAM(S): 80 CAPSULE, EXTENDED RELEASE ORAL at 06:25

## 2018-03-19 RX ADMIN — Medication 11 UNIT(S): at 22:46

## 2018-03-19 RX ADMIN — Medication 2: at 13:40

## 2018-03-19 RX ADMIN — Medication 1 SPRAY(S): at 17:32

## 2018-03-19 RX ADMIN — Medication 7 UNIT(S): at 18:24

## 2018-03-19 RX ADMIN — Medication 2: at 22:48

## 2018-03-19 RX ADMIN — Medication 3 MILLILITER(S): at 22:35

## 2018-03-19 RX ADMIN — ISOSORBIDE MONONITRATE 30 MILLIGRAM(S): 60 TABLET, EXTENDED RELEASE ORAL at 10:56

## 2018-03-19 NOTE — PROGRESS NOTE ADULT - PROBLEM SELECTOR PLAN 6
- continue Atorvastatin. - continue Atorvastatin - CKD III at baseline  - BUN/ Cr WNL   - c/w vanc by level

## 2018-03-19 NOTE — PROGRESS NOTE ADULT - ATTENDING COMMENTS
As above as edited. As above as edited. 73 yo F with DM2, PVD s/p R BKA and LE metatarsal amputation, CAD s/p CABG, CKD3, HTN, chronic stage 2 HFpEF presents with sepsis 2/2 LLE cellulitis and acute osteomyelitis s/p ankle disarticulation 3/18/18 wo resolution of infection now requiring likely left AKA. Plan for OR in AM with vascular surgery, NPO after MN, will give half dose Levemir tonight with preop labs. Patient with new-onset HTN to the 180's yesterday. Suspect this is multifactorial in that patient is very anxious about planned procedure, may be slightly volume overloaded and has been not receiving Coreg 2/2 bradycardia. Now improved with Coreg. Will give one additional dose of Lasix today and ask cards to reassess patient given that patient is planned for vascular procedure for tomorrow. Acute on decompensated HFpEF seems improved as no longer wheezing on exam and reports she thinks SOB is improved. Will also provide incentive spirometer. Hypomagnesemia - Replete.

## 2018-03-19 NOTE — CHART NOTE - NSCHARTNOTEFT_GEN_A_CORE
PRE OPERATIVE NOTE    3/20/18    Pre-op Diagnosis: PAD  Procedure: Left AKA  Surgeon: Dr. CORNEL Moraes                          9.0    9.92  )-----------( 513      ( 19 Mar 2018 17:30 )             29.2     03-19    141  |  106  |  22  ----------------------------<  153<H>  4.2   |  24  |  1.02    Ca    8.5      19 Mar 2018 17:30  Phos  3.5     03-19  Mg     1.7     03-19    TPro  7.5  /  Alb  2.4<L>  /  TBili  0.5  /  DBili  x   /  AST  45<H>  /  ALT  42<H>  /  AlkPhos  150<H>  03-19    LIVER FUNCTIONS - ( 19 Mar 2018 17:30 )  Alb: 2.4 g/dL / Pro: 7.5 g/dL / ALK PHOS: 150 u/L / ALT: 42 u/L / AST: 45 u/L / GGT: x           PT/INR - ( 19 Mar 2018 17:30 )   PT: 14.3 SEC;   INR: 1.28     PTT - ( 19 Mar 2018 17:30 )  PTT:35.2 SEC    CAPILLARY BLOOD GLUCOSE      POCT Blood Glucose.: 226 mg/dL (19 Mar 2018 21:38)      Type & Screen:  Type + Screen (03.19.18 @ 17:09)    ABO Interpretation: A    Rh Interpretation: Positive    Antibody Screen: Negative    CXR:  Xray Chest 1 View- PORTABLE-Urgent (03.18.18 @ 08:49) >    FINDINGS:     Cardiac silhouette not well evaluated. Sternotomy wires. Small bilateral   pleural effusions and mild CHF. No pneumothorax.    IMPRESSION:   Mild CHF with small bilateral pleural effusions.        EKG: in paper chart    Echocardiogram:   TTE with Doppler (w/Cont) (03.13.18 @ 10:08) >    CONCLUSIONS:  1. Mitral annular calcification, otherwise normal mitral  valve. Mild-moderate mitral regurgitation.  2. Normal left ventricular internal dimensions and wall  thicknesses.  3. Endocardium not well visualized; grossly moderate global  left ventricular systolic dysfunction.  Endocardial  visualization enhanced with intravenous injection of echo  contrast (Definity).  4. Unable to accurately evaluate right ventricular size or  systolic function.  5. Estimated right ventricular systolic pressure equals 58  mm Hg, assuming right atrial pressure equals 10 mm Hg,  consistent with moderate pulmonary hypertension.            A/P: 72y Female planned for above procedure  NPO past midnight, except medications  IVF as indicated  AM labs: ordered  Consent in chart  Please adjust insulin regimen to reflect NPO status    carvedilol  furosemide   Injectable  hydrALAZINE  isosorbide   mononitrate ER Tablet (IMDUR)  piperacillin/tazobactam IVPB.  vancomycin  IVPB  vancomycin  IVPB

## 2018-03-19 NOTE — PROGRESS NOTE ADULT - PROBLEM SELECTOR PLAN 7
- DVT ppx heparin subq  - CC diet  - Dispo pending AKA- likely rehab placement - continue Atorvastatin

## 2018-03-19 NOTE — PROGRESS NOTE ADULT - ATTENDING COMMENTS
Chantell Estrada is a 72 year old woman with history of DM, PVD (s/p R BKA and L trans-metatarsal amputation), CAD s/p CABG, CKD stage 3, and HTN admitted for cellulitis of left leg now s/p Left ankle disarticulation. Course noted for ECG changes and mild elevation of troponin T likely due to post-operative demand ischemia. Over the weekend (3/17 – 3/18/18) there was hypertension with  – 190’s mm Hg. TTE noted mitral annular calcification, otherwise normal mitral valve, with mild-moderate regurgitation. There was normal left ventricular size and wall thickness, with grossly moderate global left ventricular systolic dysfunction.  Estimated right ventricular systolic pressure was 58 mm Hg, consistent with moderate pulmonary hypertension. Current regimen to include:  albuterol/ipratropium for nebulization 3 mL nebulizer every 6 hours, EC aspirin 81 mg daily, atorvastatin 20 mg daily at bedtime, carvedilol 25 mg every 12 hours, clopidogrel (Plavix)75 mg daily, furosemide  40 mg IV Push every 24 hours, hydralazine 50 mg every 8 hours, isosorbide  mononitrate ER tablet (IMDUR) 30 mg daily, piperacillin/tazobactam 3.375 Gram(s) IVPB every 8 hours, sertraline 25 mg daily and vancomycin  1000 mg IVPB every 24 hours.

## 2018-03-19 NOTE — PROGRESS NOTE ADULT - SUBJECTIVE AND OBJECTIVE BOX
Overnight Events:   No CP or SOB.    Medications:  acetaminophen  IVPB. 1000 milliGRAM(s) IV Intermittent once PRN  ALBUTerol/ipratropium for Nebulization 3 milliLiter(s) Nebulizer every 6 hours  aspirin  chewable 81 milliGRAM(s) Oral daily  atorvastatin 20 milliGRAM(s) Oral at bedtime  carvedilol 25 milliGRAM(s) Oral every 12 hours  clopidogrel Tablet 75 milliGRAM(s) Oral daily  dextrose 50% Injectable 25 Gram(s) IV Push once  furosemide   Injectable 40 milliGRAM(s) IV Push every 24 hours  heparin  Injectable 5000 Unit(s) SubCutaneous every 12 hours  hydrALAZINE 50 milliGRAM(s) Oral every 8 hours  insulin detemir injectable (LEVEMIR) 23 Unit(s) SubCutaneous daily  insulin lispro (HumaLOG) corrective regimen sliding scale   SubCutaneous Before meals and at bedtime  insulin lispro Injectable (HumaLOG) 7 Unit(s) SubCutaneous three times a day before meals  isosorbide   mononitrate ER Tablet (IMDUR) 30 milliGRAM(s) Oral daily  piperacillin/tazobactam IVPB. 3.375 Gram(s) IV Intermittent every 8 hours  sertraline 25 milliGRAM(s) Oral daily  sodium chloride 0.65% Nasal 1 Spray(s) Both Nostrils once  vancomycin  IVPB      vancomycin  IVPB 1000 milliGRAM(s) IV Intermittent every 24 hours      PAST MEDICAL & SURGICAL HISTORY:  CAD (coronary artery disease)  Hypertension  Obesity  Hypercholesterolemia  DM (diabetes mellitus)  Carotid artery stenosis  PAD (peripheral artery disease): s/p R BKA  Stented coronary artery: 2010 [facility]  S/P CABG x 3: in 2004, [facility]  History of hysterectomy  S/P BKA (below knee amputation) unilateral, right  S/P CABG x 3  S/P eye surgery: for glaucoma  S/P below knee amputation, right: 6/2010  S/P angioplasty with stent: 2009, 3/2014  S/P hysterectomy: 2004  Hx of CABG: 3v 2004      Vitals:  T(F): 98 (03-19), Max: 98.1 (03-18)  HR: 65 (03-19) (59 - 78)  BP: 149/68 (03-19) (128/69 - 191/84)  RR: 18 (03-19)  SpO2: 98% (03-19)  I&O's Summary    18 Mar 2018 07:01  -  19 Mar 2018 07:00  --------------------------------------------------------  IN: 830 mL / OUT: 1700 mL / NET: -870 mL    19 Mar 2018 07:01  -  19 Mar 2018 15:47  --------------------------------------------------------  IN: 480 mL / OUT: 300 mL / NET: 180 mL        Physical Exam:  Appearance: No acute distress  Eyes: PERRL  HENT: Normal oral muscosa  Cardiovascular: RRR, S1, S2, no murmurs, rubs, or gallops; no edema; no JVD  Respiratory: Clear to auscultation bilaterally  Gastrointestinal: soft, non-tender  Musculoskeletal: No clubbing  Neurologic: Non-focal                            9.7    10.22 )-----------( 585      ( 18 Mar 2018 06:50 )             30.9     03-19    143  |  109<H>  |  22  ----------------------------<  155<H>  4.4   |  24  |  1.00    Ca    8.4      19 Mar 2018 06:06  Phos  3.3     03-19  Mg     1.5     03-19      TTE:  CONCLUSIONS:  1. Mitral annular calcification, otherwise normal mitral  valve. Mild-moderate mitral regurgitation.  2. Normal left ventricular internal dimensions and wall  thicknesses.  3. Endocardium not well visualized; grossly moderate global  left ventricular systolic dysfunction.  Endocardial  visualization enhanced with intravenous injection of echo  contrast (Definity).  4. Unable to accurately evaluate right ventricular size or  systolic function.  5. Estimated right ventricular systolic pressure equals 58  mm Hg, assuming right atrial pressure equals 10 mm Hg,  consistent with moderate pulmonary hypertension.

## 2018-03-19 NOTE — PROGRESS NOTE ADULT - SUBJECTIVE AND OBJECTIVE BOX
CC: Patient is a 72y old  Female who presents with a chief complaint of Fevers and leg pain     Interval History/ROS: Patient has no complaints. Denies fever, chills. Tolerating abx well. No diarrhea.     Allergies  sulfa drugs (Hives)  sulfa drugs (Rash)  Sulfac 10% (Hives)      ANTIMICROBIALS:  piperacillin/tazobactam IVPB. 3.375 every 8 hours  vancomycin  IVPB 1000 daily    PE:  Vital Signs Last 24 Hrs  T(C): 36.5 (19 Mar 2018 06:20), Max: 36.7 (18 Mar 2018 21:54)  T(F): 97.7 (19 Mar 2018 06:20), Max: 98.1 (18 Mar 2018 21:54)  HR: 70 (19 Mar 2018 09:21) (62 - 78)  BP: 185/84 (19 Mar 2018 06:20) (175/73 - 191/84)  BP(mean): --  RR: 18 (19 Mar 2018 06:20) (18 - 18)  SpO2: 96% (19 Mar 2018 09:21) (95% - 98%)  Gen: AOx3, NAD, non-toxic  CV: S1+S2 normal, no murmurs  Resp: Clear bilat, no resp distress  Abd: Soft, nontender, +BS  Ext: Right BKA, left leg bandaged  : No Pratt  IV/Skin: No thrombophlebitis  Neuro: no focal deficits    LABS:                           9.7    10.22 )-----------( 585      ( 18 Mar 2018 06:50 )             30.9   03-19    143  |  109<H>  |  22  ----------------------------<  155<H>  4.4   |  24  |  1.00    Ca    8.4      19 Mar 2018 06:06  Phos  3.3     03-19  Mg     1.5     03-19              MICROBIOLOGY:    Vancomycin Level, Random (03.18.18 @ 06:50)  Vancomycin Level, Random: 11.8: Therapeutic ranges have not been established for random          ANKLE  03-10-18 --  --  --NGTD    ANKLE  03-10-18   NO GROWTH - PRELIMINARY RESULTS  NO ORGANISMS ISOLATED AT 24 HOURS  NO ORGANISMS ISOLATED AT 48 HRS.  NO ORGANISMS ISOLATED AT 72 HRS.  NO GROWTH AFTER 4 DAYS INCUBATION  NO GROWTH AFTER 5 DAYS INCUBATION  --  --    ANUS  03-10-18 --  --  --  Specimen Source: ANKLE (03.10.18 @ 10:57)      OTHER  03-06-18   RARE  STRDYS^Streptococcus dysgalactiae  --  --    LEG - LEFT  03-03-18 --  --  Proteus mirabilis  Staph. aureus *MRSA*  Strep Beta Hemolytic Grp G    BLOOD PERIPHERAL  03-03-18 --  --  --    RADIOLOGY:    No new images.

## 2018-03-19 NOTE — PROGRESS NOTE ADULT - PROBLEM SELECTOR PLAN 5
- CKD III at baseline  - BUN/ Cr WNL   - c/w vanc by level - continue beta blocker, ASA, and plavix  - no events on tele, asymptomatic (sinus 60s-70s)

## 2018-03-19 NOTE — PROGRESS NOTE ADULT - PROBLEM SELECTOR PLAN 3
- continue beta blocker, ASA, and plavix  - no events on tele, asymptomatic - HgbA1c 8.1  - Levemir 23U QHS and Humalog 7/7/7; will monitor FSG and adjust accordingly  - will 1/2 lantus tonight for surgery ester -c/w Asa, Coreg, Plavix, Lipitor  -Will give additional dose on Lasix 40mg IV x1 now  -Follow-up further cards recs

## 2018-03-19 NOTE — PROGRESS NOTE ADULT - ASSESSMENT
72F PMHx DM, PVD s/p R BKA and L transmetatarsal amputation, CAD s/p CABG, CKD stage 3, and HTN admitted for OM of left leg now s/p Lt ankle disarticulation w/ EKG changes and mild troponins likely c/w demand ischemia in setting of post operative state unlikely ACS. No awaiting AKA. BP uncontrolled recently but improved after increasing hydralazine and starting furosemide    - c/w asa/plavix  - c/w statin  - c/w hydralazine, (increase as tolerated)  - c/w coreg  - c/w imdur   - consider transitioning patient to PO lasix  - no further workup from cardiac standpoint, patient optimized from cardiac standpoint

## 2018-03-19 NOTE — PROGRESS NOTE ADULT - ASSESSMENT
72F w/ PMHx of DM2, PVD s/p R BKA and L transmetatarsal amputation, CAD s/p CABG, CKD III, HTN, initially presented on 3/3 with fevers and LLE pain admitted for sepsis 2/2 to cellulitis and OM of LLE, s/p ankle adm 3/3/18 with fevers and LLE pain found to be in sepsis 2/2 cellulitis and OM of LLE, s/p ankle disarticulation by podiatry on 3/10, pending AKA on LLE on 3/19. 72F w/ PMHx of DM2, PVD s/p R BKA and L transmetatarsal amputation, CAD s/p CABG, CKD III, HTN, initially presented on 3/3 with fevers and LLE pain admitted for sepsis 2/2 to cellulitis and OM of LLE, s/p ankle adm 3/3/18 with fevers and LLE pain found to be in sepsis 2/2 cellulitis and OM of LLE, s/p ankle disarticulation by podiatry on 3/10, now pending AKA on LLE on 3/20 course c/b SOB likely 2/2 mild acute on chronic decompensated HFpEF.

## 2018-03-19 NOTE — PROGRESS NOTE ADULT - PROBLEM SELECTOR PLAN 1
- wound culture growing MRSA, strep Grp G and proteus and strep dysgalactiae  -c/w vanc/ zosyn per ID recs ( vancy by level)- abx day 17  - s/p L ankle disarticulation by podiatry on 3/10   - plan for AKA tomorrow   - Vascular recs prior to surgery: NPO @ [facility] the night prior w/ IVF, pre op labs, CXR, and insulin adjustment

## 2018-03-19 NOTE — PROGRESS NOTE ADULT - SUBJECTIVE AND OBJECTIVE BOX
Myriammala Aleida PGY 1  Pager: [phone]/ [phone]  Patient is a 72y old  Female who presents with a chief complaint of Fevers and leg pain (06 Mar 2018 17:24)      SUBJECTIVE / OVERNIGHT EVENTS:  Patient seen and examined at bedside. Overnight, patient hypertensive to 180s/80s, with no intervention.     MEDICATIONS  (STANDING):  ALBUTerol/ipratropium for Nebulization 3 milliLiter(s) Nebulizer every 6 hours  aspirin  chewable 81 milliGRAM(s) Oral daily  atorvastatin 20 milliGRAM(s) Oral at bedtime  carvedilol 25 milliGRAM(s) Oral every 12 hours  clopidogrel Tablet 75 milliGRAM(s) Oral daily  dextrose 50% Injectable 25 Gram(s) IV Push once  heparin  Injectable 5000 Unit(s) SubCutaneous every 12 hours  hydrALAZINE 50 milliGRAM(s) Oral every 8 hours  insulin detemir injectable (LEVEMIR) 23 Unit(s) SubCutaneous daily  insulin lispro (HumaLOG) corrective regimen sliding scale   SubCutaneous Before meals and at bedtime  insulin lispro Injectable (HumaLOG) 7 Unit(s) SubCutaneous three times a day before meals  isosorbide   mononitrate ER Tablet (IMDUR) 30 milliGRAM(s) Oral daily  piperacillin/tazobactam IVPB. 3.375 Gram(s) IV Intermittent every 8 hours  sertraline 25 milliGRAM(s) Oral daily  sodium chloride 0.65% Nasal 1 Spray(s) Both Nostrils once  vancomycin  IVPB      vancomycin  IVPB 1000 milliGRAM(s) IV Intermittent every 24 hours    MEDICATIONS  (PRN):  acetaminophen  IVPB. 1000 milliGRAM(s) IV Intermittent once PRN Mild Pain (1 - 3)      OBJECTIVE:    Vital Signs Last 24 Hrs  T(C): 36.5 (19 Mar 2018 06:20), Max: 36.7 (18 Mar 2018 09:30)  T(F): 97.7 (19 Mar 2018 06:20), Max: 98.1 (18 Mar 2018 21:54)  HR: 71 (19 Mar 2018 06:20) (59 - 78)  BP: 185/84 (19 Mar 2018 06:20) (166/78 - 191/84)  BP(mean): --  RR: 18 (19 Mar 2018 06:20) (18 - 18)  SpO2: 95% (19 Mar 2018 06:20) (95% - 98%)    CAPILLARY BLOOD GLUCOSE      POCT Blood Glucose.: 152 mg/dL (18 Mar 2018 22:17)  POCT Blood Glucose.: 152 mg/dL (18 Mar 2018 17:33)  POCT Blood Glucose.: 144 mg/dL (18 Mar 2018 13:03)  POCT Blood Glucose.: 129 mg/dL (18 Mar 2018 08:55)    I&O's Summary    17 Mar 2018 07:01  -  18 Mar 2018 07:00  --------------------------------------------------------  IN: 460 mL / OUT: 0 mL / NET: 460 mL    18 Mar 2018 07:01  -  19 Mar 2018 06:52  --------------------------------------------------------  IN: 830 mL / OUT: 1700 mL / NET: -870 mL        PHYSICAL EXAM:  GENERAL: NAD, well-developed  HEAD:  Atraumatic, Normocephalic  EYES: EOMI, PERRLA, conjunctiva and sclera clear  NECK: Supple, No JVD  CHEST/LUNG: Clear to auscultation bilaterally; No wheeze  HEART: Regular rate and rhythm; No murmurs, rubs, or gallops  ABDOMEN: Soft, Nontender, Nondistended; Bowel sounds present  EXTREMITIES:  2+ Peripheral Pulses, No clubbing, cyanosis, or edema  PSYCH: AAOx3  NEUROLOGY: non-focal  SKIN: No rashes or lesions    LABS:                        9.7    10.22 )-----------( 585      ( 18 Mar 2018 06:50 )             30.9     Auto Eosinophil # x     / Auto Eosinophil % x     / Auto Neutrophil # x     / Auto Neutrophil % x     / BANDS % x        03-18    142  |  110<H>  |  22  ----------------------------<  153<H>  4.7   |  20<L>  |  1.00    Ca    8.7      18 Mar 2018 06:50  Mg     1.9     03-18  Phos  3.0     03-18                RESPIRATORY  VENT:    ABG:     VBG:     RADIOLOGY & ADDITIONAL TESTS:  (Imaging Personally Reviewed)    Consultant(s) Notes Reviewed:      Care Discussed with Consultants/Other Providers: Myriammala Aleida PGY 1  Pager: [phone]/ [phone]  Patient is a 72y old  Female who presents with a chief complaint of Fevers and leg pain (06 Mar 2018 17:24)      SUBJECTIVE / OVERNIGHT EVENTS:  Patient seen and examined at bedside. Overnight, patient hypertensive to 180s/80s, with no intervention.     MEDICATIONS  (STANDING):  ALBUTerol/ipratropium for Nebulization 3 milliLiter(s) Nebulizer every 6 hours  aspirin  chewable 81 milliGRAM(s) Oral daily  atorvastatin 20 milliGRAM(s) Oral at bedtime  carvedilol 25 milliGRAM(s) Oral every 12 hours  clopidogrel Tablet 75 milliGRAM(s) Oral daily  dextrose 50% Injectable 25 Gram(s) IV Push once  heparin  Injectable 5000 Unit(s) SubCutaneous every 12 hours  hydrALAZINE 50 milliGRAM(s) Oral every 8 hours  insulin detemir injectable (LEVEMIR) 23 Unit(s) SubCutaneous daily  insulin lispro (HumaLOG) corrective regimen sliding scale   SubCutaneous Before meals and at bedtime  insulin lispro Injectable (HumaLOG) 7 Unit(s) SubCutaneous three times a day before meals  isosorbide   mononitrate ER Tablet (IMDUR) 30 milliGRAM(s) Oral daily  piperacillin/tazobactam IVPB. 3.375 Gram(s) IV Intermittent every 8 hours  sertraline 25 milliGRAM(s) Oral daily  sodium chloride 0.65% Nasal 1 Spray(s) Both Nostrils once  vancomycin  IVPB      vancomycin  IVPB 1000 milliGRAM(s) IV Intermittent every 24 hours    MEDICATIONS  (PRN):  acetaminophen  IVPB. 1000 milliGRAM(s) IV Intermittent once PRN Mild Pain (1 - 3)      OBJECTIVE:    Vital Signs Last 24 Hrs  T(C): 36.5 (19 Mar 2018 06:20), Max: 36.7 (18 Mar 2018 09:30)  T(F): 97.7 (19 Mar 2018 06:20), Max: 98.1 (18 Mar 2018 21:54)  HR: 71 (19 Mar 2018 06:20) (59 - 78)  BP: 185/84 (19 Mar 2018 06:20) (166/78 - 191/84)  BP(mean): --  RR: 18 (19 Mar 2018 06:20) (18 - 18)  SpO2: 95% (19 Mar 2018 06:20) (95% - 98%)  Tele: no events at this time- sinus 60s-70s      CAPILLARY BLOOD GLUCOSE      POCT Blood Glucose.: 152 mg/dL (18 Mar 2018 22:17)  POCT Blood Glucose.: 152 mg/dL (18 Mar 2018 17:33)  POCT Blood Glucose.: 144 mg/dL (18 Mar 2018 13:03)  POCT Blood Glucose.: 129 mg/dL (18 Mar 2018 08:55)    I&O's Summary    17 Mar 2018 07:01  -  18 Mar 2018 07:00  --------------------------------------------------------  IN: 460 mL / OUT: 0 mL / NET: 460 mL    18 Mar 2018 07:01  -  19 Mar 2018 06:52  --------------------------------------------------------  IN: 830 mL / OUT: 1700 mL / NET: -870 mL        PHYSICAL EXAM:  GENERAL: NAD, well-developed  HEAD:  Atraumatic, Normocephalic  EYES: EOMI, PERRLA, conjunctiva and sclera clear  NECK: Supple, No JVD  CHEST/LUNG: Clear to auscultation bilaterally; No wheeze  HEART: Regular rate and rhythm; No murmurs, rubs, or gallops  ABDOMEN: Soft, Nontender, Nondistended; Bowel sounds present  EXTREMITIES:  2+ Peripheral Pulses, No clubbing, cyanosis, or edema  PSYCH: AAOx3  NEUROLOGY: non-focal  SKIN: No rashes or lesions    LABS:                        9.7    10.22 )-----------( 585      ( 18 Mar 2018 06:50 )             30.9     Auto Eosinophil # x     / Auto Eosinophil % x     / Auto Neutrophil # x     / Auto Neutrophil % x     / BANDS % x        03-18    142  |  110<H>  |  22  ----------------------------<  153<H>  4.7   |  20<L>  |  1.00    Ca    8.7      18 Mar 2018 06:50  Mg     1.9     03-18  Phos  3.0     03-18                RESPIRATORY  VENT:    ABG:     VBG:     RADIOLOGY & ADDITIONAL TESTS:  (Imaging Personally Reviewed)    Consultant(s) Notes Reviewed:      Care Discussed with Consultants/Other Providers: Myriammala Aleida PGY 1  Pager: [phone]/ [phone]  Patient is a 72y old  Female who presents with a chief complaint of Fevers and leg pain (06 Mar 2018 17:24)      SUBJECTIVE / OVERNIGHT EVENTS:  Patient seen and examined at bedside. Overnight, patient hypertensive to 180s/80s, with no intervention.     MEDICATIONS  (STANDING):  ALBUTerol/ipratropium for Nebulization 3 milliLiter(s) Nebulizer every 6 hours  aspirin  chewable 81 milliGRAM(s) Oral daily  atorvastatin 20 milliGRAM(s) Oral at bedtime  carvedilol 25 milliGRAM(s) Oral every 12 hours  clopidogrel Tablet 75 milliGRAM(s) Oral daily  dextrose 50% Injectable 25 Gram(s) IV Push once  heparin  Injectable 5000 Unit(s) SubCutaneous every 12 hours  hydrALAZINE 50 milliGRAM(s) Oral every 8 hours  insulin detemir injectable (LEVEMIR) 23 Unit(s) SubCutaneous daily  insulin lispro (HumaLOG) corrective regimen sliding scale   SubCutaneous Before meals and at bedtime  insulin lispro Injectable (HumaLOG) 7 Unit(s) SubCutaneous three times a day before meals  isosorbide   mononitrate ER Tablet (IMDUR) 30 milliGRAM(s) Oral daily  piperacillin/tazobactam IVPB. 3.375 Gram(s) IV Intermittent every 8 hours  sertraline 25 milliGRAM(s) Oral daily  sodium chloride 0.65% Nasal 1 Spray(s) Both Nostrils once  vancomycin  IVPB      vancomycin  IVPB 1000 milliGRAM(s) IV Intermittent every 24 hours    MEDICATIONS  (PRN):  acetaminophen  IVPB. 1000 milliGRAM(s) IV Intermittent once PRN Mild Pain (1 - 3)      OBJECTIVE:    Vital Signs Last 24 Hrs  T(C): 36.5 (19 Mar 2018 06:20), Max: 36.7 (18 Mar 2018 09:30)  T(F): 97.7 (19 Mar 2018 06:20), Max: 98.1 (18 Mar 2018 21:54)  HR: 71 (19 Mar 2018 06:20) (59 - 78)  BP: 185/84 (19 Mar 2018 06:20) (166/78 - 191/84)  BP(mean): --  RR: 18 (19 Mar 2018 06:20) (18 - 18)  SpO2: 95% (19 Mar 2018 06:20) (95% - 98%)  Tele: no events at this time- sinus 60s-70s      CAPILLARY BLOOD GLUCOSE      POCT Blood Glucose.: 152 mg/dL (18 Mar 2018 22:17)  POCT Blood Glucose.: 152 mg/dL (18 Mar 2018 17:33)  POCT Blood Glucose.: 144 mg/dL (18 Mar 2018 13:03)  POCT Blood Glucose.: 129 mg/dL (18 Mar 2018 08:55)    I&O's Summary    17 Mar 2018 07:01  -  18 Mar 2018 07:00  --------------------------------------------------------  IN: 460 mL / OUT: 0 mL / NET: 460 mL    18 Mar 2018 07:01  -  19 Mar 2018 06:52  --------------------------------------------------------  IN: 830 mL / OUT: 1700 mL / NET: -870 mL        PHYSICAL EXAM:  GENERAL: NAD, well-developed  HEAD:  Atraumatic, Normocephalic  EYES: EOMI, PERRLA, conjunctiva and sclera clear  NECK: Supple, No JVD  CHEST/LUNG: Clear to auscultation bilaterally; No wheeze  HEART: Regular rate and rhythm; No murmurs, rubs, or gallops  ABDOMEN: Soft, Nontender, Nondistended; Bowel sounds present  EXTREMITIES:  2+ Peripheral Pulses, No clubbing, cyanosis, or edema  PSYCH: AAOx3  NEUROLOGY: non-focal  SKIN: No rashes or lesions    LABS:                        9.7    10.22 )-----------( 585      ( 18 Mar 2018 06:50 )             30.9     Auto Eosinophil # x     / Auto Eosinophil % x     / Auto Neutrophil # x     / Auto Neutrophil % x     / BANDS % x        03-18    142  |  110<H>  |  22  ----------------------------<  153<H>  4.7   |  20<L>  |  1.00    Ca    8.7      18 Mar 2018 06:50  Mg     1.9     03-18  Phos  3.0     03-18        Care Discussed with Consultants/Other Providers:  Cardio  Vascular Surgery Myriammala Aleida PGY 1  Pager: [phone]/ [phone]    Patient is a 72y old  Female who presents with a chief complaint of Fevers and leg pain (06 Mar 2018 17:24)      SUBJECTIVE / OVERNIGHT EVENTS:  Patient seen and examined at bedside. Overnight, patient hypertensive to 180s/80s, with no intervention.     MEDICATIONS  (STANDING):  ALBUTerol/ipratropium for Nebulization 3 milliLiter(s) Nebulizer every 6 hours  aspirin  chewable 81 milliGRAM(s) Oral daily  atorvastatin 20 milliGRAM(s) Oral at bedtime  carvedilol 25 milliGRAM(s) Oral every 12 hours  clopidogrel Tablet 75 milliGRAM(s) Oral daily  dextrose 50% Injectable 25 Gram(s) IV Push once  heparin  Injectable 5000 Unit(s) SubCutaneous every 12 hours  hydrALAZINE 50 milliGRAM(s) Oral every 8 hours  insulin detemir injectable (LEVEMIR) 23 Unit(s) SubCutaneous daily  insulin lispro (HumaLOG) corrective regimen sliding scale   SubCutaneous Before meals and at bedtime  insulin lispro Injectable (HumaLOG) 7 Unit(s) SubCutaneous three times a day before meals  isosorbide   mononitrate ER Tablet (IMDUR) 30 milliGRAM(s) Oral daily  piperacillin/tazobactam IVPB. 3.375 Gram(s) IV Intermittent every 8 hours  sertraline 25 milliGRAM(s) Oral daily  sodium chloride 0.65% Nasal 1 Spray(s) Both Nostrils once  vancomycin  IVPB      vancomycin  IVPB 1000 milliGRAM(s) IV Intermittent every 24 hours    MEDICATIONS  (PRN):  acetaminophen  IVPB. 1000 milliGRAM(s) IV Intermittent once PRN Mild Pain (1 - 3)      OBJECTIVE:    Vital Signs Last 24 Hrs  T(C): 36.5 (19 Mar 2018 06:20), Max: 36.7 (18 Mar 2018 09:30)  T(F): 97.7 (19 Mar 2018 06:20), Max: 98.1 (18 Mar 2018 21:54)  HR: 71 (19 Mar 2018 06:20) (59 - 78)  BP: 185/84 (19 Mar 2018 06:20) (166/78 - 191/84)  BP(mean): --  RR: 18 (19 Mar 2018 06:20) (18 - 18)  SpO2: 95% (19 Mar 2018 06:20) (95% - 98%)  Tele: no events at this time- sinus 60s-70s      CAPILLARY BLOOD GLUCOSE      POCT Blood Glucose.: 152 mg/dL (18 Mar 2018 22:17)  POCT Blood Glucose.: 152 mg/dL (18 Mar 2018 17:33)  POCT Blood Glucose.: 144 mg/dL (18 Mar 2018 13:03)  POCT Blood Glucose.: 129 mg/dL (18 Mar 2018 08:55)    I&O's Summary    17 Mar 2018 07:01  -  18 Mar 2018 07:00  --------------------------------------------------------  IN: 460 mL / OUT: 0 mL / NET: 460 mL    18 Mar 2018 07:01  -  19 Mar 2018 06:52  --------------------------------------------------------  IN: 830 mL / OUT: 1700 mL / NET: -870 mL        PHYSICAL EXAM:  GENERAL: NAD, well-developed  HEAD:  Atraumatic, Normocephalic  EYES: EOMI, PERRLA, conjunctiva and sclera clear  NECK: Supple, No JVD  CHEST/LUNG: b/l mild lower lobe crackles and expiratory wheeze in RLL;   HEART: Regular rate and rhythm; No murmurs, rubs, or gallops  ABDOMEN: Soft, Nontender, Nondistended; Bowel sounds present  EXTREMITIES:  BKA of RLE- no edema, warm to touch, and ankle disarticulation of LLE- wrapped and dressed  PSYCH: AAOx3, anxious, upset about AKA  NEUROLOGY: non-focal  SKIN: No rashes or lesions    LABS:                        9.7    10.22 )-----------( 585      ( 18 Mar 2018 06:50 )             30.9     Auto Eosinophil # x     / Auto Eosinophil % x     / Auto Neutrophil # x     / Auto Neutrophil % x     / BANDS % x        03-18    142  |  110<H>  |  22  ----------------------------<  153<H>  4.7   |  20<L>  |  1.00    Ca    8.7      18 Mar 2018 06:50  Mg     1.9     03-18  Phos  3.0     03-18        Care Discussed with Consultants/Other Providers:  Cardio  Vascular Surgery Shae Shin PGY 1  Pager: [phone]/ [phone]    Patient is a 72y old  Female who presents with a chief complaint of Fevers and leg pain (06 Mar 2018 17:24)      SUBJECTIVE / OVERNIGHT EVENTS:  Patient seen and examined at bedside. Overnight, patient hypertensive to 180s/80s, with no intervention. Patient denies any CP, SOB, palpitations, wheezing.    MEDICATIONS  (STANDING):  ALBUTerol/ipratropium for Nebulization 3 milliLiter(s) Nebulizer every 6 hours  aspirin  chewable 81 milliGRAM(s) Oral daily  atorvastatin 20 milliGRAM(s) Oral at bedtime  carvedilol 25 milliGRAM(s) Oral every 12 hours  clopidogrel Tablet 75 milliGRAM(s) Oral daily  dextrose 50% Injectable 25 Gram(s) IV Push once  heparin  Injectable 5000 Unit(s) SubCutaneous every 12 hours  hydrALAZINE 50 milliGRAM(s) Oral every 8 hours  insulin detemir injectable (LEVEMIR) 23 Unit(s) SubCutaneous daily  insulin lispro (HumaLOG) corrective regimen sliding scale   SubCutaneous Before meals and at bedtime  insulin lispro Injectable (HumaLOG) 7 Unit(s) SubCutaneous three times a day before meals  isosorbide   mononitrate ER Tablet (IMDUR) 30 milliGRAM(s) Oral daily  piperacillin/tazobactam IVPB. 3.375 Gram(s) IV Intermittent every 8 hours  sertraline 25 milliGRAM(s) Oral daily  sodium chloride 0.65% Nasal 1 Spray(s) Both Nostrils once  vancomycin  IVPB      vancomycin  IVPB 1000 milliGRAM(s) IV Intermittent every 24 hours    MEDICATIONS  (PRN):  acetaminophen  IVPB. 1000 milliGRAM(s) IV Intermittent once PRN Mild Pain (1 - 3)      OBJECTIVE:    Vital Signs Last 24 Hrs  T(C): 36.5 (19 Mar 2018 06:20), Max: 36.7 (18 Mar 2018 09:30)  T(F): 97.7 (19 Mar 2018 06:20), Max: 98.1 (18 Mar 2018 21:54)  HR: 71 (19 Mar 2018 06:20) (59 - 78)  BP: 185/84 (19 Mar 2018 06:20) (166/78 - 191/84)  BP(mean): --  RR: 18 (19 Mar 2018 06:20) (18 - 18)  SpO2: 95% (19 Mar 2018 06:20) (95% - 98%)  Tele: no events at this time- sinus 60s-70s      CAPILLARY BLOOD GLUCOSE      POCT Blood Glucose.: 152 mg/dL (18 Mar 2018 22:17)  POCT Blood Glucose.: 152 mg/dL (18 Mar 2018 17:33)  POCT Blood Glucose.: 144 mg/dL (18 Mar 2018 13:03)  POCT Blood Glucose.: 129 mg/dL (18 Mar 2018 08:55)    I&O's Summary    17 Mar 2018 07:01  -  18 Mar 2018 07:00  --------------------------------------------------------  IN: 460 mL / OUT: 0 mL / NET: 460 mL    18 Mar 2018 07:01  -  19 Mar 2018 06:52  --------------------------------------------------------  IN: 830 mL / OUT: 1700 mL / NET: -870 mL        PHYSICAL EXAM:  GENERAL: NAD, well-developed  HEAD:  Atraumatic, Normocephalic  EYES: EOMI, PERRLA, conjunctiva and sclera clear  NECK: Supple, No JVD  CHEST/LUNG: b/l mild lower lobe crackles and expiratory wheeze in RLL;   HEART: Regular rate and rhythm; No murmurs, rubs, or gallops  ABDOMEN: Soft, Nontender, Nondistended; Bowel sounds present  EXTREMITIES:  BKA of RLE- no edema, warm to touch, and ankle disarticulation of LLE- wrapped and dressed  PSYCH: AAOx3, anxious, upset about AKA  NEUROLOGY: non-focal  SKIN: No rashes or lesions    LABS:                        9.7    10.22 )-----------( 585      ( 18 Mar 2018 06:50 )             30.9     Auto Eosinophil # x     / Auto Eosinophil % x     / Auto Neutrophil # x     / Auto Neutrophil % x     / BANDS % x        03-18    142  |  110<H>  |  22  ----------------------------<  153<H>  4.7   |  20<L>  |  1.00    Ca    8.7      18 Mar 2018 06:50  Mg     1.9     03-18  Phos  3.0     03-18      Care Discussed with Consultants/Other Providers: Dr. Palacios (Infectious Disease) re: surgery  Cardio  Vascular Surgery

## 2018-03-19 NOTE — PROGRESS NOTE ADULT - PROBLEM SELECTOR PLAN 2
- HgbA1c 8.1  - Levemir 23U QHS and Humalog 7/7/7; will monitor FSG and adjust accordingly  - will 1/2 lantus tonight for surgery ester - continue Hydralazine, isosorbide mononitrate, and coreg  - hydralazine uptitrated to TID yesterday, however patient still hypertensive - continue Hydralazine, isosorbide mononitrate, and coreg  - hydralazine uptitrated to TID yesterday, however patient still hypertensive  - unknown etiology of HTN- patient normotensive 48 hours ago  - possible fluid overload, however exam does not show decompensated HF  - will start lasix 40mg IV for now, and re-eval per Cards

## 2018-03-19 NOTE — PROGRESS NOTE ADULT - PROBLEM SELECTOR PLAN 4
- continue Hydralazine, isosorbide mononitrate, and coreg  - hydralazine uptitrated to TID yesterday, however patient still hypertensive - continue beta blocker, ASA, and plavix  - no events on tele, asymptomatic - continue beta blocker, ASA, and plavix  - no events on tele, asymptomatic (sinus 60s-70s) - HgbA1c 8.1  - Levemir 23U QHS and Humalog 7/7/7; will monitor FSG and adjust accordingly  - will 1/2 lantus tonight for surgery ester

## 2018-03-19 NOTE — PROGRESS NOTE ADULT - ASSESSMENT
72 female with T2DM, PVD s/p R BKA and L transmetatarsal amputation, CAD s/p CABG, CKD stage 3, HTN, HLD here with fevers 103F, chills and left leg pain. Admitted for cellulitis of left lower extremity with warmth and erythema to that leg. Has elevated ESR and CRP.     MRI with diffuse cellulitis with acute osteo involving of first and second metatarsal remanants, with enhance of the third and fourth metatarsal, and fluid collection within the tenotomy gap. Wound cx with MRSA, strep Grp G and proteus; other wound cx with strep dysgalactiae. Leukocytosis improving.     s/p OR for ankle disarticulation and incision and drainage of ankle and Achilles tendon abscess.     Recommend:   - Continue vanco/ zosyn.  - Monitor Vanco trough  - Planned for BKA likely tomorrow 72 female with T2DM, PVD s/p R BKA and L transmetatarsal amputation, CAD s/p CABG, CKD stage 3, HTN, HLD here with fevers 103F, chills and left leg pain. Admitted for cellulitis of left lower extremity with warmth and erythema to that leg. Has elevated ESR and CRP.     MRI with diffuse cellulitis with acute osteo involving of first and second metatarsal remanants, with enhance of the third and fourth metatarsal, and fluid collection within the tenotomy gap. Wound cx with MRSA, strep Grp G and proteus; other wound cx with strep dysgalactiae. Leukocytosis improving.     s/p OR for ankle disarticulation and incision and drainage of ankle and Achilles tendon abscess.     Recommend:   - Continue vanco/ zosyn.  - Monitor vanco trough  - F/U vascular regarding further planned surgery

## 2018-03-20 ENCOUNTER — RESULT REVIEW (OUTPATIENT)
Age: 73
End: 2018-03-20

## 2018-03-20 LAB
BUN SERPL-MCNC: 25 MG/DL — HIGH (ref 7–23)
CALCIUM SERPL-MCNC: 8.4 MG/DL — SIGNIFICANT CHANGE UP (ref 8.4–10.5)
CHLORIDE SERPL-SCNC: 106 MMOL/L — SIGNIFICANT CHANGE UP (ref 98–107)
CO2 SERPL-SCNC: 24 MMOL/L — SIGNIFICANT CHANGE UP (ref 22–31)
CREAT SERPL-MCNC: 1.03 MG/DL — SIGNIFICANT CHANGE UP (ref 0.5–1.3)
GLUCOSE SERPL-MCNC: 212 MG/DL — HIGH (ref 70–99)
HCT VFR BLD CALC: 23.3 % — LOW (ref 34.5–45)
HCT VFR BLD CALC: 30.4 % — LOW (ref 34.5–45)
HGB BLD-MCNC: 7.2 G/DL — LOW (ref 11.5–15.5)
HGB BLD-MCNC: 9.3 G/DL — LOW (ref 11.5–15.5)
MAGNESIUM SERPL-MCNC: 1.7 MG/DL — SIGNIFICANT CHANGE UP (ref 1.6–2.6)
MCHC RBC-ENTMCNC: 27.2 PG — SIGNIFICANT CHANGE UP (ref 27–34)
MCHC RBC-ENTMCNC: 27.7 PG — SIGNIFICANT CHANGE UP (ref 27–34)
MCHC RBC-ENTMCNC: 30.6 % — LOW (ref 32–36)
MCHC RBC-ENTMCNC: 30.9 % — LOW (ref 32–36)
MCV RBC AUTO: 88.9 FL — SIGNIFICANT CHANGE UP (ref 80–100)
MCV RBC AUTO: 89.6 FL — SIGNIFICANT CHANGE UP (ref 80–100)
NRBC # FLD: 0 — SIGNIFICANT CHANGE UP
NRBC # FLD: 0 — SIGNIFICANT CHANGE UP
PHOSPHATE SERPL-MCNC: 3.2 MG/DL — SIGNIFICANT CHANGE UP (ref 2.5–4.5)
PLATELET # BLD AUTO: 504 K/UL — HIGH (ref 150–400)
PLATELET # BLD AUTO: 513 K/UL — HIGH (ref 150–400)
PMV BLD: 11.1 FL — SIGNIFICANT CHANGE UP (ref 7–13)
PMV BLD: 11.3 FL — SIGNIFICANT CHANGE UP (ref 7–13)
POTASSIUM SERPL-MCNC: 4.1 MMOL/L — SIGNIFICANT CHANGE UP (ref 3.5–5.3)
POTASSIUM SERPL-SCNC: 4.1 MMOL/L — SIGNIFICANT CHANGE UP (ref 3.5–5.3)
RBC # BLD: 2.6 M/UL — LOW (ref 3.8–5.2)
RBC # BLD: 3.42 M/UL — LOW (ref 3.8–5.2)
RBC # FLD: 18 % — HIGH (ref 10.3–14.5)
RBC # FLD: 18.3 % — HIGH (ref 10.3–14.5)
SODIUM SERPL-SCNC: 142 MMOL/L — SIGNIFICANT CHANGE UP (ref 135–145)
VANCOMYCIN TROUGH SERPL-MCNC: 15.6 UG/ML — SIGNIFICANT CHANGE UP (ref 10–20)
WBC # BLD: 13.23 K/UL — HIGH (ref 3.8–10.5)
WBC # BLD: 9.67 K/UL — SIGNIFICANT CHANGE UP (ref 3.8–10.5)
WBC # FLD AUTO: 13.23 K/UL — HIGH (ref 3.8–10.5)
WBC # FLD AUTO: 9.67 K/UL — SIGNIFICANT CHANGE UP (ref 3.8–10.5)

## 2018-03-20 PROCEDURE — 88311 DECALCIFY TISSUE: CPT | Mod: 26

## 2018-03-20 PROCEDURE — 99233 SBSQ HOSP IP/OBS HIGH 50: CPT | Mod: GC

## 2018-03-20 PROCEDURE — 88307 TISSUE EXAM BY PATHOLOGIST: CPT | Mod: 26

## 2018-03-20 PROCEDURE — 99232 SBSQ HOSP IP/OBS MODERATE 35: CPT

## 2018-03-20 PROCEDURE — 27590 AMPUTATE LEG AT THIGH: CPT | Mod: RT

## 2018-03-20 RX ORDER — FENTANYL CITRATE 50 UG/ML
25 INJECTION INTRAVENOUS
Qty: 0 | Refills: 0 | Status: DISCONTINUED | OUTPATIENT
Start: 2018-03-20 | End: 2018-03-21

## 2018-03-20 RX ORDER — HYDRALAZINE HCL 50 MG
5 TABLET ORAL ONCE
Qty: 0 | Refills: 0 | Status: COMPLETED | OUTPATIENT
Start: 2018-03-20 | End: 2018-03-20

## 2018-03-20 RX ORDER — OXYCODONE HYDROCHLORIDE 5 MG/1
5 TABLET ORAL EVERY 4 HOURS
Qty: 0 | Refills: 0 | Status: DISCONTINUED | OUTPATIENT
Start: 2018-03-20 | End: 2018-03-21

## 2018-03-20 RX ORDER — HYDROMORPHONE HYDROCHLORIDE 2 MG/ML
0.5 INJECTION INTRAMUSCULAR; INTRAVENOUS; SUBCUTANEOUS
Qty: 0 | Refills: 0 | Status: DISCONTINUED | OUTPATIENT
Start: 2018-03-20 | End: 2018-03-21

## 2018-03-20 RX ORDER — SODIUM CHLORIDE 9 MG/ML
1000 INJECTION INTRAMUSCULAR; INTRAVENOUS; SUBCUTANEOUS
Qty: 0 | Refills: 0 | Status: DISCONTINUED | OUTPATIENT
Start: 2018-03-20 | End: 2018-03-20

## 2018-03-20 RX ORDER — ONDANSETRON 8 MG/1
4 TABLET, FILM COATED ORAL ONCE
Qty: 0 | Refills: 0 | Status: DISCONTINUED | OUTPATIENT
Start: 2018-03-20 | End: 2018-03-21

## 2018-03-20 RX ORDER — HYDROMORPHONE HYDROCHLORIDE 2 MG/ML
0.5 INJECTION INTRAMUSCULAR; INTRAVENOUS; SUBCUTANEOUS EVERY 4 HOURS
Qty: 0 | Refills: 0 | Status: DISCONTINUED | OUTPATIENT
Start: 2018-03-20 | End: 2018-03-24

## 2018-03-20 RX ORDER — SODIUM CHLORIDE 9 MG/ML
1000 INJECTION, SOLUTION INTRAVENOUS
Qty: 0 | Refills: 0 | Status: DISCONTINUED | OUTPATIENT
Start: 2018-03-20 | End: 2018-03-21

## 2018-03-20 RX ADMIN — SODIUM CHLORIDE 60 MILLILITER(S): 9 INJECTION INTRAMUSCULAR; INTRAVENOUS; SUBCUTANEOUS at 18:51

## 2018-03-20 RX ADMIN — Medication 40 MILLIGRAM(S): at 12:12

## 2018-03-20 RX ADMIN — Medication 3: at 09:38

## 2018-03-20 RX ADMIN — SODIUM CHLORIDE 60 MILLILITER(S): 9 INJECTION INTRAMUSCULAR; INTRAVENOUS; SUBCUTANEOUS at 00:00

## 2018-03-20 RX ADMIN — PIPERACILLIN AND TAZOBACTAM 25 GRAM(S): 4; .5 INJECTION, POWDER, LYOPHILIZED, FOR SOLUTION INTRAVENOUS at 02:08

## 2018-03-20 RX ADMIN — SODIUM CHLORIDE 50 MILLILITER(S): 9 INJECTION INTRAMUSCULAR; INTRAVENOUS; SUBCUTANEOUS at 23:55

## 2018-03-20 RX ADMIN — HYDROMORPHONE HYDROCHLORIDE 0.5 MILLIGRAM(S): 2 INJECTION INTRAMUSCULAR; INTRAVENOUS; SUBCUTANEOUS at 19:40

## 2018-03-20 RX ADMIN — Medication 1: at 23:39

## 2018-03-20 RX ADMIN — PIPERACILLIN AND TAZOBACTAM 25 GRAM(S): 4; .5 INJECTION, POWDER, LYOPHILIZED, FOR SOLUTION INTRAVENOUS at 09:39

## 2018-03-20 RX ADMIN — ISOSORBIDE MONONITRATE 30 MILLIGRAM(S): 60 TABLET, EXTENDED RELEASE ORAL at 12:12

## 2018-03-20 RX ADMIN — Medication 1: at 12:54

## 2018-03-20 RX ADMIN — Medication 50 MILLIGRAM(S): at 08:29

## 2018-03-20 RX ADMIN — Medication 5 MILLIGRAM(S): at 00:04

## 2018-03-20 RX ADMIN — HEPARIN SODIUM 5000 UNIT(S): 5000 INJECTION INTRAVENOUS; SUBCUTANEOUS at 05:11

## 2018-03-20 RX ADMIN — Medication 3 MILLILITER(S): at 04:23

## 2018-03-20 RX ADMIN — HYDROMORPHONE HYDROCHLORIDE 0.5 MILLIGRAM(S): 2 INJECTION INTRAMUSCULAR; INTRAVENOUS; SUBCUTANEOUS at 19:10

## 2018-03-20 RX ADMIN — Medication 11 UNIT(S): at 23:39

## 2018-03-20 RX ADMIN — PIPERACILLIN AND TAZOBACTAM 25 GRAM(S): 4; .5 INJECTION, POWDER, LYOPHILIZED, FOR SOLUTION INTRAVENOUS at 18:51

## 2018-03-20 RX ADMIN — SERTRALINE 25 MILLIGRAM(S): 25 TABLET, FILM COATED ORAL at 12:12

## 2018-03-20 RX ADMIN — Medication 3 MILLILITER(S): at 11:38

## 2018-03-20 RX ADMIN — Medication 250 MILLIGRAM(S): at 08:29

## 2018-03-20 RX ADMIN — Medication 5 MILLIGRAM(S): at 05:28

## 2018-03-20 RX ADMIN — Medication 3 MILLILITER(S): at 23:39

## 2018-03-20 RX ADMIN — Medication 81 MILLIGRAM(S): at 12:12

## 2018-03-20 RX ADMIN — HYDROMORPHONE HYDROCHLORIDE 0.5 MILLIGRAM(S): 2 INJECTION INTRAMUSCULAR; INTRAVENOUS; SUBCUTANEOUS at 23:10

## 2018-03-20 RX ADMIN — HYDROMORPHONE HYDROCHLORIDE 0.5 MILLIGRAM(S): 2 INJECTION INTRAMUSCULAR; INTRAVENOUS; SUBCUTANEOUS at 22:55

## 2018-03-20 RX ADMIN — CARVEDILOL PHOSPHATE 25 MILLIGRAM(S): 80 CAPSULE, EXTENDED RELEASE ORAL at 08:29

## 2018-03-20 NOTE — BRIEF OPERATIVE NOTE - POST-OP DX
Osteomyelitis of ankle or foot, acute, left  03/10/2018    Active  Hansel Childers
Osteomyelitis of ankle or foot, acute, left  03/10/2018    Active  Hansel Childers

## 2018-03-20 NOTE — PROGRESS NOTE ADULT - PROBLEM SELECTOR PLAN 1
- wound culture growing MRSA, strep Grp G and proteus and strep dysgalactiae  - continuing antibiotics at this time with vanc and zosyn- day 18  - surgical interventions: s/p L ankle disarticulation by podiatry on 3/10 and  plan for AKA today

## 2018-03-20 NOTE — PRE-OP CHECKLIST - LATEX ALLERGY
Esophagogastroduodenoscopy Procedure Note    Place of Service: [facility]    Patient Name: Larry Ewing    Date of Procedure: 1/30/2018    Surgeon: Jacobo Staton MD    Referring Physician: Jcaobo Staton, *    Primary Care Provider: Kee Earl DO    Operative Procedure: EGD - esophagogastroduodenoscopy    Preoperative Diagnosis: GERD and H/o Eso CA.    Postoperative Diagnosis: Gastritis and Anastomotic site appears intact    Anesthesia Medications administered: MAC as per Anesthesia    Procedure events  Sedation Start Time: as per anesthesia.   Event Tracking     Panel 1     Procedure : ESOPHAGOGASTRODUODENOSCOPY      Event Time In    Procedure Start 1520    Scope In 1520    Cecum Reached     Scope Out 1528    Procedure End 1528             Endoscope: [id number]     Accessories: Biopsy Forceps    Procedure Description:?The patient was placed in the left lateral position and monitored continuously with automatic blood pressure, ECG tracing, pulse oximetry monitoring and direct observations. Bite block placed and oxygen administered by a nasal cannula as needed. Medications were administered incrementally over the course of the procedure to achieve an adequate level of conscious sedation. After adequate sedation, the endoscope was carefully introduced into the oropharynx and passed in to the esophagus. There was normal pharyngeal and laryngeal structure.  The esophagus and GE junction were carefully examined. After advancement of the endoscope into the stomach, a careful examination was performed, including views of the antrum, incisura angularis, corpus and retroflexed views of the cardia and fundus.?   The pylorus was then intubated without any difficulty and the endoscope was advanced to the second portion of the duodenum. Careful examination of the second portion of the duodenum and the bulb was then performed. Findings and intervention are detailed below. The stomach was 
then decompressed and the endoscope withdrawn.  Overall, the patient tolerated the procedure well without undue discomfort, hypotension or desaturation.    Esophagus:   Anastomotic site noted at 20 cm from the incisors. Appears intact. Multiple biopsies obtained    Stomach:  Gastric erythema    Duodenum/small bowel:   Normal      Estimated Blood Loss:   None     Interventions:   Biopsy (single or multiple)    Complications: no    Impression:   1. GERD. History of esophageal CA. Anastomotic site appears intact. Gastritis     Recommendations:   Await pathology results and Resume previous diet    
no
no

## 2018-03-20 NOTE — PROGRESS NOTE ADULT - PROBLEM SELECTOR PLAN 4
- HgbA1c 8.1  - Continue with levemir 23U QHS and Humalog 7/7/7; will monitor FSG and adjust accordingly - HgbA1c 8.1  - Continue with levemir 21U Qdaily for today given patient will be NPO for the OR and Humalog 7/7/7 when po again; will monitor FSG and adjust accordingly

## 2018-03-20 NOTE — PROGRESS NOTE ADULT - PROBLEM SELECTOR PLAN 3
-c/w Asa, Coreg, Plavix, Lipitor  - s/p lasix on 3/19 (40IV) with improvement in BPs  - holding lasix this AM to ensure patient is not over diuresed prior to surgery

## 2018-03-20 NOTE — PROGRESS NOTE ADULT - ASSESSMENT
72y Female s/p L AKA   - Reg diet  - Pain control  - F/u post transfusion CBC; trend H/H  - Dressing change in 48 hours  - F/u GI fxn  - OOB/DVT ppx  - AM labs

## 2018-03-20 NOTE — PROGRESS NOTE ADULT - ASSESSMENT
72 female with T2DM, PVD s/p R BKA and L transmetatarsal amputation, CAD s/p CABG, CKD stage 3, HTN, HLD here with fevers 103F, chills and left leg pain. Admitted for cellulitis of left lower extremity with warmth and erythema to that leg. Has elevated ESR and CRP.     MRI with diffuse cellulitis with acute osteo involving of first and second metatarsal remanants, with enhance of the third and fourth metatarsal, and fluid collection within the tenotomy gap. Wound cx with MRSA, strep Grp G and proteus; other wound cx with strep dysgalactiae. Leukocytosis improving.     s/p OR for ankle disarticulation and incision and drainage of ankle and Achilles tendon abscess. Scheduled for left AKA today.    Recommend:   - Continue vanco/ zosyn.  - F/U vascular regarding further planned surgery.

## 2018-03-20 NOTE — PROGRESS NOTE ADULT - SUBJECTIVE AND OBJECTIVE BOX
Shae Shin PGY 1  Pager: [phone]/ [phone]    Patient is a 72y old  Female who presents with a chief complaint of Fevers and leg pain (06 Mar 2018 17:24)    SUBJECTIVE / OVERNIGHT EVENTS:  Patient seen and examined at bedside. Overnight, patient became hypertensive to 180s, s/p IV hydralazine with adequate response.   Patient expresses being     MEDICATIONS  (STANDING):  ALBUTerol/ipratropium for Nebulization 3 milliLiter(s) Nebulizer every 6 hours  aspirin  chewable 81 milliGRAM(s) Oral daily  atorvastatin 20 milliGRAM(s) Oral at bedtime  carvedilol 25 milliGRAM(s) Oral every 12 hours  clopidogrel Tablet 75 milliGRAM(s) Oral daily  dextrose 50% Injectable 25 Gram(s) IV Push once  furosemide   Injectable 40 milliGRAM(s) IV Push every 24 hours  heparin  Injectable 5000 Unit(s) SubCutaneous every 12 hours  hydrALAZINE 50 milliGRAM(s) Oral every 8 hours  insulin detemir injectable (LEVEMIR) 11 Unit(s) SubCutaneous daily  insulin lispro (HumaLOG) corrective regimen sliding scale   SubCutaneous Before meals and at bedtime  insulin lispro Injectable (HumaLOG) 7 Unit(s) SubCutaneous three times a day before meals  isosorbide   mononitrate ER Tablet (IMDUR) 30 milliGRAM(s) Oral daily  piperacillin/tazobactam IVPB. 3.375 Gram(s) IV Intermittent every 8 hours  sertraline 25 milliGRAM(s) Oral daily  sodium chloride 0.9%. 1000 milliLiter(s) (60 mL/Hr) IV Continuous <Continuous>  vancomycin  IVPB      vancomycin  IVPB 1000 milliGRAM(s) IV Intermittent every 24 hours    MEDICATIONS  (PRN):  acetaminophen  IVPB. 1000 milliGRAM(s) IV Intermittent once PRN Mild Pain (1 - 3)      OBJECTIVE:    Vital Signs Last 24 Hrs  T(C): 36.8 (20 Mar 2018 05:07), Max: 36.8 (20 Mar 2018 05:07)  T(F): 98.2 (20 Mar 2018 05:07), Max: 98.2 (20 Mar 2018 05:07)  HR: 69 (20 Mar 2018 06:10) (57 - 74)  BP: 155/60 (20 Mar 2018 06:10) (128/69 - 184/76)  BP(mean): --  RR: 18 (20 Mar 2018 05:07) (18 - 18)  SpO2: 97% (20 Mar 2018 05:07) (96% - 100%)    CAPILLARY BLOOD GLUCOSE      POCT Blood Glucose.: 226 mg/dL (19 Mar 2018 21:38)  POCT Blood Glucose.: 147 mg/dL (19 Mar 2018 17:36)  POCT Blood Glucose.: 224 mg/dL (19 Mar 2018 12:57)  POCT Blood Glucose.: 142 mg/dL (19 Mar 2018 08:58)    I&O's Summary    18 Mar 2018 07:01  -  19 Mar 2018 07:00  --------------------------------------------------------  IN: 830 mL / OUT: 1700 mL / NET: -870 mL    19 Mar 2018 07:01  -  20 Mar 2018 06:51  --------------------------------------------------------  IN: 710 mL / OUT: 700 mL / NET: 10 mL        PHYSICAL EXAM:  GENERAL: NAD, well-developed  HEAD:  Atraumatic, Normocephalic  EYES: EOMI, PERRLA, conjunctiva and sclera clear  NECK: Supple, No JVD  CHEST/LUNG: Clear to auscultation bilaterally; No wheeze  HEART: Regular rate and rhythm; No murmurs, rubs, or gallops  ABDOMEN: Soft, Nontender, Nondistended; Bowel sounds present  EXTREMITIES:  2+ Peripheral Pulses, No clubbing, cyanosis, or edema  PSYCH: AAOx3  NEUROLOGY: non-focal  SKIN: No rashes or lesions    LABS:                        9.3    9.67  )-----------( 513      ( 20 Mar 2018 04:50 )             30.4     Auto Eosinophil # x     / Auto Eosinophil % x     / Auto Neutrophil # x     / Auto Neutrophil % x     / BANDS % x                            9.0    9.92  )-----------( 513      ( 19 Mar 2018 17:30 )             29.2     Auto Eosinophil # x     / Auto Eosinophil % x     / Auto Neutrophil # x     / Auto Neutrophil % x     / BANDS % x        03-19    141  |  106  |  22  ----------------------------<  153<H>  4.2   |  24  |  1.02  03-19    143  |  109<H>  |  22  ----------------------------<  155<H>  4.4   |  24  |  1.00    Ca    8.5      19 Mar 2018 17:30  Mg     1.7     03-19  Phos  3.5     03-19  TPro  7.5  /  Alb  2.4<L>  /  TBili  0.5  /  DBili  x   /  AST  45<H>  /  ALT  42<H>  /  AlkPhos  150<H>  03-19    PT/INR - ( 19 Mar 2018 17:30 )   PT: 14.3 SEC;   INR: 1.28          PTT - ( 19 Mar 2018 17:30 )  PTT:35.2 SEC            RESPIRATORY  VENT:    ABG:     VBG:     RADIOLOGY & ADDITIONAL TESTS:  (Imaging Personally Reviewed)    Consultant(s) Notes Reviewed:      Care Discussed with Consultants/Other Providers: Shae Shin PGY 1  Pager: [phone]/ [phone]    Patient is a 72y old  Female who presents with a chief complaint of Fevers and leg pain (06 Mar 2018 17:24)    SUBJECTIVE / OVERNIGHT EVENTS:  Patient seen and examined at bedside. Overnight, patient became hypertensive to 180s, s/p IV hydralazine with adequate response. Patient understands surgery is scheduled for today.       MEDICATIONS  (STANDING):  ALBUTerol/ipratropium for Nebulization 3 milliLiter(s) Nebulizer every 6 hours  aspirin  chewable 81 milliGRAM(s) Oral daily  atorvastatin 20 milliGRAM(s) Oral at bedtime  carvedilol 25 milliGRAM(s) Oral every 12 hours  clopidogrel Tablet 75 milliGRAM(s) Oral daily  dextrose 50% Injectable 25 Gram(s) IV Push once  furosemide   Injectable 40 milliGRAM(s) IV Push every 24 hours  heparin  Injectable 5000 Unit(s) SubCutaneous every 12 hours  hydrALAZINE 50 milliGRAM(s) Oral every 8 hours  insulin detemir injectable (LEVEMIR) 11 Unit(s) SubCutaneous daily  insulin lispro (HumaLOG) corrective regimen sliding scale   SubCutaneous Before meals and at bedtime  insulin lispro Injectable (HumaLOG) 7 Unit(s) SubCutaneous three times a day before meals  isosorbide   mononitrate ER Tablet (IMDUR) 30 milliGRAM(s) Oral daily  piperacillin/tazobactam IVPB. 3.375 Gram(s) IV Intermittent every 8 hours  sertraline 25 milliGRAM(s) Oral daily  sodium chloride 0.9%. 1000 milliLiter(s) (60 mL/Hr) IV Continuous <Continuous>  vancomycin  IVPB      vancomycin  IVPB 1000 milliGRAM(s) IV Intermittent every 24 hours    MEDICATIONS  (PRN):  acetaminophen  IVPB. 1000 milliGRAM(s) IV Intermittent once PRN Mild Pain (1 - 3)      OBJECTIVE:    Vital Signs Last 24 Hrs  T(C): 36.8 (20 Mar 2018 05:07), Max: 36.8 (20 Mar 2018 05:07)  T(F): 98.2 (20 Mar 2018 05:07), Max: 98.2 (20 Mar 2018 05:07)  HR: 69 (20 Mar 2018 06:10) (57 - 74)  BP: 155/60 (20 Mar 2018 06:10) (128/69 - 184/76)  BP(mean): --  RR: 18 (20 Mar 2018 05:07) (18 - 18)  SpO2: 97% (20 Mar 2018 05:07) (96% - 100%)    CAPILLARY BLOOD GLUCOSE      POCT Blood Glucose.: 226 mg/dL (19 Mar 2018 21:38)  POCT Blood Glucose.: 147 mg/dL (19 Mar 2018 17:36)  POCT Blood Glucose.: 224 mg/dL (19 Mar 2018 12:57)  POCT Blood Glucose.: 142 mg/dL (19 Mar 2018 08:58)    I&O's Summary    18 Mar 2018 07:01  -  19 Mar 2018 07:00  --------------------------------------------------------  IN: 830 mL / OUT: 1700 mL / NET: -870 mL    19 Mar 2018 07:01  -  20 Mar 2018 06:51  --------------------------------------------------------  IN: 710 mL / OUT: 700 mL / NET: 10 mL        PHYSICAL EXAM:  GENERAL: NAD, well-developed  NECK: Supple, No JVD, no lymphadeopathy  CHEST/LUNG:   HEART: Regular rate and rhythm; No murmurs, rubs, or gallops  ABDOMEN: Soft, Nontender, Nondistended; Bowel sounds present  EXTREMITIES:  extremities warm to touch, BKA on right leg, left leg with ankle disarticulation  PSYCH: AAOx3  NEUROLOGY: non-focal  SKIN: No rashes or lesions    LABS:                        9.3    9.67  )-----------( 513      ( 20 Mar 2018 04:50 )             30.4     Auto Eosinophil # x     / Auto Eosinophil % x     / Auto Neutrophil # x     / Auto Neutrophil % x     / BANDS % x                            9.0    9.92  )-----------( 513      ( 19 Mar 2018 17:30 )             29.2     Auto Eosinophil # x     / Auto Eosinophil % x     / Auto Neutrophil # x     / Auto Neutrophil % x     / BANDS % x        03-19    141  |  106  |  22  ----------------------------<  153<H>  4.2   |  24  |  1.02  03-19    143  |  109<H>  |  22  ----------------------------<  155<H>  4.4   |  24  |  1.00    Ca    8.5      19 Mar 2018 17:30  Mg     1.7     03-19  Phos  3.5     03-19  TPro  7.5  /  Alb  2.4<L>  /  TBili  0.5  /  DBili  x   /  AST  45<H>  /  ALT  42<H>  /  AlkPhos  150<H>  03-19    PT/INR - ( 19 Mar 2018 17:30 )   PT: 14.3 SEC;   INR: 1.28          PTT - ( 19 Mar 2018 17:30 )  PTT:35.2 SEC

## 2018-03-20 NOTE — PROGRESS NOTE ADULT - SUBJECTIVE AND OBJECTIVE BOX
CC: Patient is a 72y old  Female who presents with a chief complaint of Fevers and leg pain (06 Mar 2018 17:24)      Interval History/ROS: Patient has no complaints today. Denies fever, chills. Scheduled for Left AKA today.    Allergies  sulfa drugs (Hives)  sulfa drugs (Rash)  Sulfac 10% (Hives)      ANTIMICROBIALS:  piperacillin/tazobactam IVPB. 3.375 every 8 hours  vancomycin  IVPB    vancomycin  IVPB 1000 every 24 hours    PE:    Vital Signs Last 24 Hrs  T(C): 36.8 (20 Mar 2018 08:25), Max: 36.8 (20 Mar 2018 05:07)  T(F): 98.3 (20 Mar 2018 08:25), Max: 98.3 (20 Mar 2018 08:25)  HR: 70 (20 Mar 2018 09:35) (57 - 75)  BP: 133/53 (20 Mar 2018 09:35) (128/69 - 184/76)  BP(mean): --  RR: 18 (20 Mar 2018 09:35) (18 - 18)  SpO2: 97% (20 Mar 2018 09:35) (96% - 100%)    Gen: AOx3, NAD, non-toxic, pleasant  CV: S1+S2 normal, no murmurs  Resp: Clear bilat, no resp distress  Abd: Soft, nontender, +BS  Ext: Right BKA, left leg bandaged  : No Pratt  IV/Skin: No thrombophlebitis  Neuro: no focal deficits      LABS:                          9.3    9.67  )-----------( 513      ( 20 Mar 2018 04:50 )             30.4       03-20    142  |  106  |  25<H>  ----------------------------<  212<H>  4.1   |  24  |  1.03    Ca    8.4      20 Mar 2018 04:50  Phos  3.2     03-20  Mg     1.7     03-20    TPro  7.5  /  Alb  2.4<L>  /  TBili  0.5  /  DBili  x   /  AST  45<H>  /  ALT  42<H>  /  AlkPhos  150<H>  03-19          MICROBIOLOGY:  Vancomycin Level, Trough: 15.6 ug/mL (03-20-18 @ 04:50)  v  ANKLE  03-10-18 --  --  --      ANKLE  03-10-18   NO GROWTH - PRELIMINARY RESULTS  NO ORGANISMS ISOLATED AT 24 HOURS  NO ORGANISMS ISOLATED AT 48 HRS.  NO ORGANISMS ISOLATED AT 72 HRS.  NO GROWTH AFTER 4 DAYS INCUBATION  NO GROWTH AFTER 5 DAYS INCUBATION  --  --      ANUS  03-10-18 --  --  --      OTHER  03-06-18   RARE  STRDYS^Streptococcus dysgalactiae  --  --      LEG - LEFT  03-03-18 --  --  Proteus mirabilis  Staph. aureus *MRSA*  Strep Beta Hemolytic Grp G      BLOOD PERIPHERAL  03-03-18 --  --  --    RADIOLOGY:    < from: Xray Chest 1 View- PORTABLE-Urgent (03.18.18 @ 08:49) >  IMPRESSION:   Mild CHF with small bilateral pleural effusions.    < end of copied text >

## 2018-03-20 NOTE — PROGRESS NOTE ADULT - SUBJECTIVE AND OBJECTIVE BOX
[facility] GENERAL SURGERY POST-OP NOTE    SUBJECTIVE: Pt seen and evaluated at bedside. Moderate amounts of blood loss during case requiring post-op 1unit pRBC transfusion. Resting comfortably in bed. Pain controlled. Denies nausea/vomiting, CP, palpitations, SOB, lightheaded, dizziness. No PO intake as of yet. Afebrile.     Objective:  Gen: NAD   Pulm: No work on breathing  Card: RRR  Abd: soft, NT/ND  Ext: L amputation stump covered w dressing with no saturated dressings.     Vital Signs Last 24 Hrs  T(C): 36.4 (20 Mar 2018 21:20), Max: 36.8 (20 Mar 2018 04:18)  T(F): 97.5 (20 Mar 2018 21:20), Max: 98.3 (20 Mar 2018 08:25)  HR: 53 (20 Mar 2018 21:30) (50 - 75)  BP: 130/52 (20 Mar 2018 21:30) (109/48 - 184/76)  BP(mean): --  RR: 12 (20 Mar 2018 21:30) (11 - 18)  SpO2: 100% (20 Mar 2018 21:30) (89% - 100%)  I&O's Summary    19 Mar 2018 07:01  -  20 Mar 2018 07:00  --------------------------------------------------------  IN: 710 mL / OUT: 700 mL / NET: 10 mL    20 Mar 2018 07:01  -  20 Mar 2018 21:48  --------------------------------------------------------  IN: 0 mL / OUT: 340 mL / NET: -340 mL      I&O's Detail    19 Mar 2018 07:01  -  20 Mar 2018 07:00  --------------------------------------------------------  IN:    IV PiggyBack: 100 mL    Oral Fluid: 580 mL    sodium chloride 0.9%: 30 mL  Total IN: 710 mL    OUT:    Voided: 700 mL  Total OUT: 700 mL    Total NET: 10 mL      20 Mar 2018 07:01  -  20 Mar 2018 21:48  --------------------------------------------------------  IN:  Total IN: 0 mL    OUT:    Indwelling Catheter - Urethral: 190 mL    Voided: 150 mL  Total OUT: 340 mL    Total NET: -340 mL          MEDICATIONS  (STANDING):  ALBUTerol/ipratropium for Nebulization 3 milliLiter(s) Nebulizer every 6 hours  aspirin  chewable 81 milliGRAM(s) Oral daily  atorvastatin 20 milliGRAM(s) Oral at bedtime  carvedilol 25 milliGRAM(s) Oral every 12 hours  clopidogrel Tablet 75 milliGRAM(s) Oral daily  dextrose 50% Injectable 25 Gram(s) IV Push once  furosemide   Injectable 40 milliGRAM(s) IV Push every 24 hours  heparin  Injectable 5000 Unit(s) SubCutaneous every 12 hours  hydrALAZINE 50 milliGRAM(s) Oral every 8 hours  insulin detemir injectable (LEVEMIR) 11 Unit(s) SubCutaneous daily  insulin lispro (HumaLOG) corrective regimen sliding scale   SubCutaneous Before meals and at bedtime  insulin lispro Injectable (HumaLOG) 7 Unit(s) SubCutaneous three times a day before meals  isosorbide   mononitrate ER Tablet (IMDUR) 30 milliGRAM(s) Oral daily  piperacillin/tazobactam IVPB. 3.375 Gram(s) IV Intermittent every 8 hours  sertraline 25 milliGRAM(s) Oral daily  vancomycin  IVPB      vancomycin  IVPB 1000 milliGRAM(s) IV Intermittent every 24 hours    MEDICATIONS  (PRN):  acetaminophen  IVPB. 1000 milliGRAM(s) IV Intermittent once PRN Mild Pain (1 - 3)  fentaNYL    Injectable 25 MICROGram(s) IV Push every 5 minutes PRN Mild Pain (1 - 3)  HYDROmorphone  Injectable 0.5 milliGRAM(s) IV Push every 4 hours PRN Severe Pain (7 - 10)  HYDROmorphone  Injectable 0.5 milliGRAM(s) IV Push every 10 minutes PRN Severe Pain (7 - 10)  ondansetron Injectable 4 milliGRAM(s) IV Push once PRN Nausea and/or Vomiting  oxyCODONE    IR 5 milliGRAM(s) Oral every 4 hours PRN Moderate Pain (4 - 6)      LABS:                        7.2    13.23 )-----------( 504      ( 20 Mar 2018 18:20 )             23.3     03-20    142  |  106  |  25<H>  ----------------------------<  212<H>  4.1   |  24  |  1.03    Ca    8.4      20 Mar 2018 04:50  Phos  3.2     03-20  Mg     1.7     03-20    TPro  7.5  /  Alb  2.4<L>  /  TBili  0.5  /  DBili  x   /  AST  45<H>  /  ALT  42<H>  /  AlkPhos  150<H>  03-19    PT/INR - ( 19 Mar 2018 17:30 )   PT: 14.3 SEC;   INR: 1.28          PTT - ( 19 Mar 2018 17:30 )  PTT:35.2 SEC      RADIOLOGY & ADDITIONAL STUDIES:

## 2018-03-20 NOTE — PROGRESS NOTE ADULT - ASSESSMENT
72F w/ PMHx of DM2, PVD s/p R BKA and L transmetatarsal amputation, CAD s/p CABG, CKD III, HTN, initially presented on 3/3 with fevers and LLE pain admitted for sepsis 2/2 to cellulitis and OM of LLE, s/p ankle adm 3/3/18 with fevers and LLE pain found to be in sepsis 2/2 cellulitis and OM of LLE, s/p ankle disarticulation by podiatry on 3/10, course c/b SOB likely 2/2 mild acute on chronic decompensated HFpEF, now pending AKA on LLE today.

## 2018-03-20 NOTE — PROGRESS NOTE ADULT - ATTENDING COMMENTS
As above as edited. Seen and evaluated in PACU prior to procedure. 73 yo F with DM2, PVD s/p R BKA and LE metatarsal amputation, CAD s/p CABG, CKD3, HTN, chronic stage 2 HFpEF presents with sepsis 2/2 LLE cellulitis and acute osteomyelitis s/p ankle disarticulation 3/18/18 wo resolution of infection now requiring likely left AKA. Plan for OR in today with vascular surgery. Cards recs from yesterday appreciated, currently optimized from a cardiac stand point and no longer in acute on decompensated HFpEF. Lungs are clear today on my exam. Will hold Lasix given patient is planned for the OR today and is NPO. Will continue with tele monitoring s/p OR as patient had type 2 MI s/p left ankle disarticulation. No medical contraindication to proceed with planned procedure.

## 2018-03-20 NOTE — BRIEF OPERATIVE NOTE - SPECIMENS
PATH- LEFT foot (1) | MICRO- Ankle Joint (1) Ankle Joint Abscess (2) and Medial Mal abscess (3)
L lower extremity

## 2018-03-20 NOTE — PROGRESS NOTE ADULT - PROBLEM SELECTOR PLAN 2
- hypertensive to 180s overnight, s/p hydralazine IV with improvement  - may be secondary to acute on chronic HF, holding lasix this morning pending surgical intervention  - c/w continue Hydralazine, isosorbide mononitrate, and coreg, will adjust medications as necessary after surgery   - cardiology recs appreciated

## 2018-03-21 ENCOUNTER — APPOINTMENT (OUTPATIENT)
Dept: OPHTHALMOLOGY | Facility: CLINIC | Age: 73
End: 2018-03-21

## 2018-03-21 LAB
BUN SERPL-MCNC: 26 MG/DL — HIGH (ref 7–23)
CALCIUM SERPL-MCNC: 8 MG/DL — LOW (ref 8.4–10.5)
CHLORIDE SERPL-SCNC: 108 MMOL/L — HIGH (ref 98–107)
CK MB BLD-MCNC: 2.33 NG/ML — SIGNIFICANT CHANGE UP (ref 1–4.7)
CK SERPL-CCNC: 170 U/L — SIGNIFICANT CHANGE UP (ref 25–170)
CO2 SERPL-SCNC: 24 MMOL/L — SIGNIFICANT CHANGE UP (ref 22–31)
CREAT SERPL-MCNC: 1.19 MG/DL — SIGNIFICANT CHANGE UP (ref 0.5–1.3)
GLUCOSE SERPL-MCNC: 122 MG/DL — HIGH (ref 70–99)
HCT VFR BLD CALC: 23.8 % — LOW (ref 34.5–45)
HCT VFR BLD CALC: 24.5 % — LOW (ref 34.5–45)
HCT VFR BLD CALC: 25 % — LOW (ref 34.5–45)
HCT VFR BLD CALC: 26.8 % — LOW (ref 34.5–45)
HGB BLD-MCNC: 7.5 G/DL — LOW (ref 11.5–15.5)
HGB BLD-MCNC: 7.6 G/DL — LOW (ref 11.5–15.5)
HGB BLD-MCNC: 7.8 G/DL — LOW (ref 11.5–15.5)
HGB BLD-MCNC: 8.3 G/DL — LOW (ref 11.5–15.5)
MAGNESIUM SERPL-MCNC: 1.5 MG/DL — LOW (ref 1.6–2.6)
MCHC RBC-ENTMCNC: 27.6 PG — SIGNIFICANT CHANGE UP (ref 27–34)
MCHC RBC-ENTMCNC: 28.1 PG — SIGNIFICANT CHANGE UP (ref 27–34)
MCHC RBC-ENTMCNC: 28.4 PG — SIGNIFICANT CHANGE UP (ref 27–34)
MCHC RBC-ENTMCNC: 28.7 PG — SIGNIFICANT CHANGE UP (ref 27–34)
MCHC RBC-ENTMCNC: 31 % — LOW (ref 32–36)
MCHC RBC-ENTMCNC: 31 % — LOW (ref 32–36)
MCHC RBC-ENTMCNC: 31.2 % — LOW (ref 32–36)
MCHC RBC-ENTMCNC: 31.5 % — LOW (ref 32–36)
MCV RBC AUTO: 89 FL — SIGNIFICANT CHANGE UP (ref 80–100)
MCV RBC AUTO: 89.9 FL — SIGNIFICANT CHANGE UP (ref 80–100)
MCV RBC AUTO: 91.2 FL — SIGNIFICANT CHANGE UP (ref 80–100)
MCV RBC AUTO: 91.4 FL — SIGNIFICANT CHANGE UP (ref 80–100)
NRBC # FLD: 0 — SIGNIFICANT CHANGE UP
PHOSPHATE SERPL-MCNC: 4.5 MG/DL — SIGNIFICANT CHANGE UP (ref 2.5–4.5)
PLATELET # BLD AUTO: 282 K/UL — SIGNIFICANT CHANGE UP (ref 150–400)
PLATELET # BLD AUTO: 284 K/UL — SIGNIFICANT CHANGE UP (ref 150–400)
PLATELET # BLD AUTO: 322 K/UL — SIGNIFICANT CHANGE UP (ref 150–400)
PLATELET # BLD AUTO: 411 K/UL — HIGH (ref 150–400)
PMV BLD: 10.8 FL — SIGNIFICANT CHANGE UP (ref 7–13)
PMV BLD: 10.9 FL — SIGNIFICANT CHANGE UP (ref 7–13)
PMV BLD: 10.9 FL — SIGNIFICANT CHANGE UP (ref 7–13)
PMV BLD: 11.1 FL — SIGNIFICANT CHANGE UP (ref 7–13)
POTASSIUM SERPL-MCNC: 4.2 MMOL/L — SIGNIFICANT CHANGE UP (ref 3.5–5.3)
POTASSIUM SERPL-SCNC: 4.2 MMOL/L — SIGNIFICANT CHANGE UP (ref 3.5–5.3)
RBC # BLD: 2.61 M/UL — LOW (ref 3.8–5.2)
RBC # BLD: 2.68 M/UL — LOW (ref 3.8–5.2)
RBC # BLD: 2.78 M/UL — LOW (ref 3.8–5.2)
RBC # BLD: 3.01 M/UL — LOW (ref 3.8–5.2)
RBC # FLD: 16.9 % — HIGH (ref 10.3–14.5)
RBC # FLD: 16.9 % — HIGH (ref 10.3–14.5)
RBC # FLD: 17 % — HIGH (ref 10.3–14.5)
RBC # FLD: 17.2 % — HIGH (ref 10.3–14.5)
SODIUM SERPL-SCNC: 143 MMOL/L — SIGNIFICANT CHANGE UP (ref 135–145)
TROPONIN T SERPL-MCNC: 0.21 NG/ML — HIGH (ref 0–0.06)
VANCOMYCIN TROUGH SERPL-MCNC: 14.7 UG/ML — SIGNIFICANT CHANGE UP (ref 10–20)
WBC # BLD: 10.96 K/UL — HIGH (ref 3.8–10.5)
WBC # BLD: 11.11 K/UL — HIGH (ref 3.8–10.5)
WBC # BLD: 12.15 K/UL — HIGH (ref 3.8–10.5)
WBC # BLD: 16.15 K/UL — HIGH (ref 3.8–10.5)
WBC # FLD AUTO: 10.96 K/UL — HIGH (ref 3.8–10.5)
WBC # FLD AUTO: 11.11 K/UL — HIGH (ref 3.8–10.5)
WBC # FLD AUTO: 12.15 K/UL — HIGH (ref 3.8–10.5)
WBC # FLD AUTO: 16.15 K/UL — HIGH (ref 3.8–10.5)

## 2018-03-21 PROCEDURE — 93010 ELECTROCARDIOGRAM REPORT: CPT

## 2018-03-21 PROCEDURE — 99233 SBSQ HOSP IP/OBS HIGH 50: CPT | Mod: GC

## 2018-03-21 PROCEDURE — 99232 SBSQ HOSP IP/OBS MODERATE 35: CPT

## 2018-03-21 RX ORDER — ISOSORBIDE MONONITRATE 60 MG/1
30 TABLET, EXTENDED RELEASE ORAL DAILY
Qty: 0 | Refills: 0 | Status: DISCONTINUED | OUTPATIENT
Start: 2018-03-21 | End: 2018-03-21

## 2018-03-21 RX ORDER — INSULIN DETEMIR 100/ML (3)
23 INSULIN PEN (ML) SUBCUTANEOUS DAILY
Qty: 0 | Refills: 0 | Status: DISCONTINUED | OUTPATIENT
Start: 2018-03-21 | End: 2018-03-27

## 2018-03-21 RX ORDER — FUROSEMIDE 40 MG
40 TABLET ORAL EVERY 24 HOURS
Qty: 0 | Refills: 0 | Status: DISCONTINUED | OUTPATIENT
Start: 2018-03-21 | End: 2018-03-21

## 2018-03-21 RX ORDER — SODIUM CHLORIDE 9 MG/ML
250 INJECTION INTRAMUSCULAR; INTRAVENOUS; SUBCUTANEOUS ONCE
Qty: 0 | Refills: 0 | Status: COMPLETED | OUTPATIENT
Start: 2018-03-21 | End: 2018-03-21

## 2018-03-21 RX ADMIN — Medication 3 MILLILITER(S): at 04:12

## 2018-03-21 RX ADMIN — Medication 3 MILLILITER(S): at 22:23

## 2018-03-21 RX ADMIN — Medication 1: at 18:18

## 2018-03-21 RX ADMIN — OXYCODONE HYDROCHLORIDE 5 MILLIGRAM(S): 5 TABLET ORAL at 15:00

## 2018-03-21 RX ADMIN — PIPERACILLIN AND TAZOBACTAM 25 GRAM(S): 4; .5 INJECTION, POWDER, LYOPHILIZED, FOR SOLUTION INTRAVENOUS at 17:47

## 2018-03-21 RX ADMIN — Medication 40 MILLIGRAM(S): at 10:00

## 2018-03-21 RX ADMIN — OXYCODONE HYDROCHLORIDE 5 MILLIGRAM(S): 5 TABLET ORAL at 21:21

## 2018-03-21 RX ADMIN — OXYCODONE HYDROCHLORIDE 5 MILLIGRAM(S): 5 TABLET ORAL at 14:01

## 2018-03-21 RX ADMIN — Medication 23 UNIT(S): at 09:42

## 2018-03-21 RX ADMIN — HEPARIN SODIUM 5000 UNIT(S): 5000 INJECTION INTRAVENOUS; SUBCUTANEOUS at 05:56

## 2018-03-21 RX ADMIN — HYDROMORPHONE HYDROCHLORIDE 0.5 MILLIGRAM(S): 2 INJECTION INTRAMUSCULAR; INTRAVENOUS; SUBCUTANEOUS at 00:45

## 2018-03-21 RX ADMIN — Medication 7 UNIT(S): at 18:19

## 2018-03-21 RX ADMIN — Medication 81 MILLIGRAM(S): at 12:38

## 2018-03-21 RX ADMIN — CARVEDILOL PHOSPHATE 25 MILLIGRAM(S): 80 CAPSULE, EXTENDED RELEASE ORAL at 05:56

## 2018-03-21 RX ADMIN — HYDROMORPHONE HYDROCHLORIDE 0.5 MILLIGRAM(S): 2 INJECTION INTRAMUSCULAR; INTRAVENOUS; SUBCUTANEOUS at 01:03

## 2018-03-21 RX ADMIN — Medication 3 MILLILITER(S): at 09:39

## 2018-03-21 RX ADMIN — SODIUM CHLORIDE 250 MILLILITER(S): 9 INJECTION INTRAMUSCULAR; INTRAVENOUS; SUBCUTANEOUS at 14:02

## 2018-03-21 RX ADMIN — HYDROMORPHONE HYDROCHLORIDE 0.5 MILLIGRAM(S): 2 INJECTION INTRAMUSCULAR; INTRAVENOUS; SUBCUTANEOUS at 15:45

## 2018-03-21 RX ADMIN — Medication 250 MILLIGRAM(S): at 12:33

## 2018-03-21 RX ADMIN — SERTRALINE 25 MILLIGRAM(S): 25 TABLET, FILM COATED ORAL at 12:38

## 2018-03-21 RX ADMIN — HYDROMORPHONE HYDROCHLORIDE 0.5 MILLIGRAM(S): 2 INJECTION INTRAMUSCULAR; INTRAVENOUS; SUBCUTANEOUS at 10:01

## 2018-03-21 RX ADMIN — OXYCODONE HYDROCHLORIDE 5 MILLIGRAM(S): 5 TABLET ORAL at 21:50

## 2018-03-21 RX ADMIN — Medication 7 UNIT(S): at 09:42

## 2018-03-21 RX ADMIN — HEPARIN SODIUM 5000 UNIT(S): 5000 INJECTION INTRAVENOUS; SUBCUTANEOUS at 17:47

## 2018-03-21 RX ADMIN — PIPERACILLIN AND TAZOBACTAM 25 GRAM(S): 4; .5 INJECTION, POWDER, LYOPHILIZED, FOR SOLUTION INTRAVENOUS at 03:22

## 2018-03-21 RX ADMIN — HYDROMORPHONE HYDROCHLORIDE 0.5 MILLIGRAM(S): 2 INJECTION INTRAMUSCULAR; INTRAVENOUS; SUBCUTANEOUS at 06:12

## 2018-03-21 RX ADMIN — HYDROMORPHONE HYDROCHLORIDE 0.5 MILLIGRAM(S): 2 INJECTION INTRAMUSCULAR; INTRAVENOUS; SUBCUTANEOUS at 10:15

## 2018-03-21 RX ADMIN — Medication 7 UNIT(S): at 12:39

## 2018-03-21 RX ADMIN — SODIUM CHLORIDE 75 MILLILITER(S): 9 INJECTION, SOLUTION INTRAVENOUS at 00:01

## 2018-03-21 RX ADMIN — CLOPIDOGREL BISULFATE 75 MILLIGRAM(S): 75 TABLET, FILM COATED ORAL at 12:39

## 2018-03-21 RX ADMIN — ATORVASTATIN CALCIUM 20 MILLIGRAM(S): 80 TABLET, FILM COATED ORAL at 21:21

## 2018-03-21 RX ADMIN — Medication 3 MILLILITER(S): at 15:22

## 2018-03-21 RX ADMIN — HYDROMORPHONE HYDROCHLORIDE 0.5 MILLIGRAM(S): 2 INJECTION INTRAMUSCULAR; INTRAVENOUS; SUBCUTANEOUS at 05:56

## 2018-03-21 RX ADMIN — PIPERACILLIN AND TAZOBACTAM 25 GRAM(S): 4; .5 INJECTION, POWDER, LYOPHILIZED, FOR SOLUTION INTRAVENOUS at 09:43

## 2018-03-21 RX ADMIN — HYDROMORPHONE HYDROCHLORIDE 0.5 MILLIGRAM(S): 2 INJECTION INTRAMUSCULAR; INTRAVENOUS; SUBCUTANEOUS at 15:30

## 2018-03-21 NOTE — PROGRESS NOTE ADULT - ASSESSMENT
72M PMHx PVD and currently cellulitis and infection of previous TMA site s/p L ankle disarticulation by Podiatry on 3/10;     -awaiting improvement in wound/cellulitis prior to proceeding w/ formal BKA   -abx per ID  -continued management per primary team      KENTRELL Bal MD (PGY2)    (Please page C team Vascular [phone] for questions/concerns.) 72M PMHx PVD admitted w/ cellulitis and infection of previous TMA site s/p L ankle disarticulation (Podiatry 3/10), s/p L AKA (3/20).    -pain management per primary team  -f/u am labs, trend CBC  -Leave OR dressing, Vascular will change   -abx per ID  -continued management per primary team      KENTRELL Bal MD (PGY2)    (Please page C team Vascular [phone] for questions/concerns.)

## 2018-03-21 NOTE — PROGRESS NOTE ADULT - SUBJECTIVE AND OBJECTIVE BOX
Shae Shin PGY 1  Pager: [phone]/ [phone]  Patient is a 72y old  Female who presents with a chief complaint of Fevers and leg pain (06 Mar 2018 17:24)      SUBJECTIVE / OVERNIGHT EVENTS:  Patient seen and examined at bedside with no acute events.   Patient s/p AKA yesterday, with moderate amount of blood loss, requiring 1 unit of PRBC. This morning, patient complains of _____    MEDICATIONS  (STANDING):  ALBUTerol/ipratropium for Nebulization 3 milliLiter(s) Nebulizer every 6 hours  aspirin  chewable 81 milliGRAM(s) Oral daily  atorvastatin 20 milliGRAM(s) Oral at bedtime  carvedilol 25 milliGRAM(s) Oral every 12 hours  clopidogrel Tablet 75 milliGRAM(s) Oral daily  dextrose 50% Injectable 25 Gram(s) IV Push once  furosemide   Injectable 40 milliGRAM(s) IV Push every 24 hours  heparin  Injectable 5000 Unit(s) SubCutaneous every 12 hours  hydrALAZINE 50 milliGRAM(s) Oral every 8 hours  insulin detemir injectable (LEVEMIR) 11 Unit(s) SubCutaneous daily  insulin lispro (HumaLOG) corrective regimen sliding scale   SubCutaneous Before meals and at bedtime  insulin lispro Injectable (HumaLOG) 7 Unit(s) SubCutaneous three times a day before meals  isosorbide   mononitrate ER Tablet (IMDUR) 30 milliGRAM(s) Oral daily  lactated ringers. 1000 milliLiter(s) (75 mL/Hr) IV Continuous <Continuous>  piperacillin/tazobactam IVPB. 3.375 Gram(s) IV Intermittent every 8 hours  sertraline 25 milliGRAM(s) Oral daily  vancomycin  IVPB      vancomycin  IVPB 1000 milliGRAM(s) IV Intermittent every 24 hours    MEDICATIONS  (PRN):  acetaminophen  IVPB. 1000 milliGRAM(s) IV Intermittent once PRN Mild Pain (1 - 3)  HYDROmorphone  Injectable 0.5 milliGRAM(s) IV Push every 4 hours PRN Severe Pain (7 - 10)  oxyCODONE    IR 5 milliGRAM(s) Oral every 4 hours PRN Moderate Pain (4 - 6)      OBJECTIVE:    Vital Signs Last 24 Hrs  T(C): 36.5 (21 Mar 2018 05:55), Max: 37.1 (21 Mar 2018 00:00)  T(F): 97.7 (21 Mar 2018 05:55), Max: 98.8 (21 Mar 2018 00:00)  HR: 63 (21 Mar 2018 05:55) (50 - 85)  BP: 109/45 (21 Mar 2018 05:55) (103/46 - 178/71)  BP(mean): 69 (21 Mar 2018 00:00) (62 - 71)  RR: 18 (21 Mar 2018 05:55) (11 - 18)  SpO2: 95% (21 Mar 2018 05:55) (89% - 100%)    CAPILLARY BLOOD GLUCOSE      POCT Blood Glucose.: 155 mg/dL (20 Mar 2018 23:34)  POCT Blood Glucose.: 156 mg/dL (20 Mar 2018 21:56)  POCT Blood Glucose.: 169 mg/dL (20 Mar 2018 19:06)  POCT Blood Glucose.: 200 mg/dL (20 Mar 2018 12:31)  POCT Blood Glucose.: 253 mg/dL (20 Mar 2018 09:04)    I&O's Summary    20 Mar 2018 07:01  -  21 Mar 2018 07:00  --------------------------------------------------------  IN: 1120 mL / OUT: 427 mL / NET: 693 mL        PHYSICAL EXAM:  GENERAL: NAD, well-developed  EYES: EOMI, PERRLA, conjunctiva and sclera clear  CHEST/LUNG:   HEART: Regular rate and rhythm; No murmurs, rubs, or gallops  ABDOMEN: Soft, Nontender, Nondistended; Bowel sounds present  EXTREMITIES: BKA on right lower extremity, warm to touch; AKA on LLE- with dressing intact   PSYCH: AAOx3  NEUROLOGY: non-focal  SKIN: No rashes or lesions    LABS:                        8.3    16.15 )-----------( 411      ( 20 Mar 2018 23:40 )             26.8     Auto Eosinophil # x     / Auto Eosinophil % x     / Auto Neutrophil # x     / Auto Neutrophil % x     / BANDS % x                            7.2    13.23 )-----------( 504      ( 20 Mar 2018 18:20 )             23.3     Auto Eosinophil # x     / Auto Eosinophil % x     / Auto Neutrophil # x     / Auto Neutrophil % x     / BANDS % x                            9.3    9.67  )-----------( 513      ( 20 Mar 2018 04:50 )             30.4     Auto Eosinophil # x     / Auto Eosinophil % x     / Auto Neutrophil # x     / Auto Neutrophil % x     / BANDS % x        03-20    142  |  106  |  25<H>  ----------------------------<  212<H>  4.1   |  24  |  1.03  03-19    141  |  106  |  22  ----------------------------<  153<H>  4.2   |  24  |  1.02    Ca    8.4      20 Mar 2018 04:50  Mg     1.7     03-20  Phos  3.2     03-20  TPro  7.5  /  Alb  2.4<L>  /  TBili  0.5  /  DBili  x   /  AST  45<H>  /  ALT  42<H>  /  AlkPhos  150<H>  03-19    PT/INR - ( 19 Mar 2018 17:30 )   PT: 14.3 SEC;   INR: 1.28          PTT - ( 19 Mar 2018 17:30 )  PTT:35.2 SEC              Consultant(s) Notes Reviewed:      Vascular surgery Shae Shin PGY 1  Pager: [phone]/ [phone]  Patient is a 72y old  Female who presents with a chief complaint of Fevers and leg pain (06 Mar 2018 17:24)      SUBJECTIVE / OVERNIGHT EVENTS:  Patient seen and examined at bedside with no acute events.   Patient s/p AKA yesterday, with moderate amount of blood loss, requiring 1 unit of PRBC. This morning, patient complains of pain at the site of her amputation, however it is controlled on the pain medication regimen she is current on.     MEDICATIONS  (STANDING):  ALBUTerol/ipratropium for Nebulization 3 milliLiter(s) Nebulizer every 6 hours  aspirin  chewable 81 milliGRAM(s) Oral daily  atorvastatin 20 milliGRAM(s) Oral at bedtime  carvedilol 25 milliGRAM(s) Oral every 12 hours  clopidogrel Tablet 75 milliGRAM(s) Oral daily  dextrose 50% Injectable 25 Gram(s) IV Push once  furosemide   Injectable 40 milliGRAM(s) IV Push every 24 hours  heparin  Injectable 5000 Unit(s) SubCutaneous every 12 hours  hydrALAZINE 50 milliGRAM(s) Oral every 8 hours  insulin detemir injectable (LEVEMIR) 11 Unit(s) SubCutaneous daily  insulin lispro (HumaLOG) corrective regimen sliding scale   SubCutaneous Before meals and at bedtime  insulin lispro Injectable (HumaLOG) 7 Unit(s) SubCutaneous three times a day before meals  isosorbide   mononitrate ER Tablet (IMDUR) 30 milliGRAM(s) Oral daily  lactated ringers. 1000 milliLiter(s) (75 mL/Hr) IV Continuous <Continuous>  piperacillin/tazobactam IVPB. 3.375 Gram(s) IV Intermittent every 8 hours  sertraline 25 milliGRAM(s) Oral daily  vancomycin  IVPB      vancomycin  IVPB 1000 milliGRAM(s) IV Intermittent every 24 hours    MEDICATIONS  (PRN):  acetaminophen  IVPB. 1000 milliGRAM(s) IV Intermittent once PRN Mild Pain (1 - 3)  HYDROmorphone  Injectable 0.5 milliGRAM(s) IV Push every 4 hours PRN Severe Pain (7 - 10)  oxyCODONE    IR 5 milliGRAM(s) Oral every 4 hours PRN Moderate Pain (4 - 6)      OBJECTIVE:    Vital Signs Last 24 Hrs  T(C): 36.5 (21 Mar 2018 05:55), Max: 37.1 (21 Mar 2018 00:00)  T(F): 97.7 (21 Mar 2018 05:55), Max: 98.8 (21 Mar 2018 00:00)  HR: 63 (21 Mar 2018 05:55) (50 - 85)  BP: 109/45 (21 Mar 2018 05:55) (103/46 - 178/71)  BP(mean): 69 (21 Mar 2018 00:00) (62 - 71)  RR: 18 (21 Mar 2018 05:55) (11 - 18)  SpO2: 95% (21 Mar 2018 05:55) (89% - 100%)    CAPILLARY BLOOD GLUCOSE      POCT Blood Glucose.: 155 mg/dL (20 Mar 2018 23:34)  POCT Blood Glucose.: 156 mg/dL (20 Mar 2018 21:56)  POCT Blood Glucose.: 169 mg/dL (20 Mar 2018 19:06)  POCT Blood Glucose.: 200 mg/dL (20 Mar 2018 12:31)  POCT Blood Glucose.: 253 mg/dL (20 Mar 2018 09:04)    I&O's Summary    20 Mar 2018 07:01  -  21 Mar 2018 07:00  --------------------------------------------------------  IN: 1120 mL / OUT: 427 mL / NET: 693 mL        PHYSICAL EXAM:  GENERAL: NAD, well-developed  EYES: EOMI, PERRLA, conjunctiva and sclera clear  CHEST/LUNG: mild lower lobe crackles b/l  HEART: Regular rate and rhythm; No murmurs, rubs, or gallops  ABDOMEN: Soft, Nontender, Nondistended; Bowel sounds present  EXTREMITIES: BKA on right lower extremity, warm to touch; AKA on LLE- with dressing intact   PSYCH: AAOx3  NEUROLOGY: non-focal  SKIN: No rashes or lesions    LABS:                        8.3    16.15 )-----------( 411      ( 20 Mar 2018 23:40 )             26.8     Auto Eosinophil # x     / Auto Eosinophil % x     / Auto Neutrophil # x     / Auto Neutrophil % x     / BANDS % x                            7.2    13.23 )-----------( 504      ( 20 Mar 2018 18:20 )             23.3     Auto Eosinophil # x     / Auto Eosinophil % x     / Auto Neutrophil # x     / Auto Neutrophil % x     / BANDS % x                            9.3    9.67  )-----------( 513      ( 20 Mar 2018 04:50 )             30.4     Auto Eosinophil # x     / Auto Eosinophil % x     / Auto Neutrophil # x     / Auto Neutrophil % x     / BANDS % x        03-20    142  |  106  |  25<H>  ----------------------------<  212<H>  4.1   |  24  |  1.03  03-19    141  |  106  |  22  ----------------------------<  153<H>  4.2   |  24  |  1.02    Ca    8.4      20 Mar 2018 04:50  Mg     1.7     03-20  Phos  3.2     03-20  TPro  7.5  /  Alb  2.4<L>  /  TBili  0.5  /  DBili  x   /  AST  45<H>  /  ALT  42<H>  /  AlkPhos  150<H>  03-19    PT/INR - ( 19 Mar 2018 17:30 )   PT: 14.3 SEC;   INR: 1.28          PTT - ( 19 Mar 2018 17:30 )  PTT:35.2 SEC              Consultant(s) Notes Reviewed:      Vascular surgery

## 2018-03-21 NOTE — PROGRESS NOTE ADULT - SUBJECTIVE AND OBJECTIVE BOX
Overnight Events:   Pain at amputation site. No CP or SOB.      Medications:  acetaminophen  IVPB. 1000 milliGRAM(s) IV Intermittent once PRN  ALBUTerol/ipratropium for Nebulization 3 milliLiter(s) Nebulizer every 6 hours  aspirin  chewable 81 milliGRAM(s) Oral daily  atorvastatin 20 milliGRAM(s) Oral at bedtime  carvedilol 25 milliGRAM(s) Oral every 12 hours  clopidogrel Tablet 75 milliGRAM(s) Oral daily  dextrose 50% Injectable 25 Gram(s) IV Push once  furosemide   Injectable 40 milliGRAM(s) IV Push every 24 hours  heparin  Injectable 5000 Unit(s) SubCutaneous every 12 hours  hydrALAZINE 50 milliGRAM(s) Oral every 8 hours  HYDROmorphone  Injectable 0.5 milliGRAM(s) IV Push every 4 hours PRN  insulin detemir injectable (LEVEMIR) 23 Unit(s) SubCutaneous daily  insulin lispro (HumaLOG) corrective regimen sliding scale   SubCutaneous Before meals and at bedtime  insulin lispro Injectable (HumaLOG) 7 Unit(s) SubCutaneous three times a day before meals  isosorbide   mononitrate ER Tablet (IMDUR) 30 milliGRAM(s) Oral daily  oxyCODONE    IR 5 milliGRAM(s) Oral every 4 hours PRN  piperacillin/tazobactam IVPB. 3.375 Gram(s) IV Intermittent every 8 hours  sertraline 25 milliGRAM(s) Oral daily  vancomycin  IVPB      vancomycin  IVPB 1000 milliGRAM(s) IV Intermittent every 24 hours      PAST MEDICAL & SURGICAL HISTORY:  CAD (coronary artery disease)  Hypertension  Obesity  Hypercholesterolemia  DM (diabetes mellitus)  Carotid artery stenosis  PAD (peripheral artery disease): s/p R BKA  Stented coronary artery: 2010 [facility]  S/P CABG x 3: in 2004, [facility]  History of hysterectomy  S/P BKA (below knee amputation) unilateral, right  S/P CABG x 3  S/P eye surgery: for glaucoma  S/P below knee amputation, right: 6/2010  S/P angioplasty with stent: 2009, 3/2014  S/P hysterectomy: 2004  Hx of CABG: 3v 2004      Vitals:  T(F): 97.7 (03-21), Max: 98.8 (03-21)  HR: 63 (03-21) (50 - 85)  BP: 109/45 (03-21) (103/46 - 178/71)  RR: 18 (03-21)  SpO2: 95% (03-21)  I&O's Summary    20 Mar 2018 07:01  -  21 Mar 2018 07:00  --------------------------------------------------------  IN: 1120 mL / OUT: 577 mL / NET: 543 mL          Physical Exam:  Appearance: No acute distress  Eyes: EOMI  HENT: Normal oral muscosa  Cardiovascular: RRR, S1, S2, no murmurs, no edema; no JVD  Respiratory: Clear to auscultation bilaterally  Gastrointestinal: soft, non-tender  Musculoskeletal: s/p AKA  Neurologic: Non-focal                          8.3    16.15 )-----------( 411      ( 20 Mar 2018 23:40 )             26.8     03-20    142  |  106  |  25<H>  ----------------------------<  212<H>  4.1   |  24  |  1.03    Ca    8.4      20 Mar 2018 04:50  Phos  3.2     03-20  Mg     1.7     03-20    TPro  7.5  /  Alb  2.4<L>  /  TBili  0.5  /  DBili  x   /  AST  45<H>  /  ALT  42<H>  /  AlkPhos  150<H>  03-19    PT/INR - ( 19 Mar 2018 17:30 )   PT: 14.3 SEC;   INR: 1.28          PTT - ( 19 Mar 2018 17:30 )  PTT:35.2 SEC      TTE:  CONCLUSIONS:  1. Mitral annular calcification, otherwise normal mitral  valve. Mild-moderate mitral regurgitation.  2. Normal left ventricular internal dimensions and wall  thicknesses.  3. Endocardium not well visualized; grossly moderate global  left ventricular systolic dysfunction.  Endocardial  visualization enhanced with intravenous injection of echo  contrast (Definity).  4. Unable to accurately evaluate right ventricular size or  systolic function.  5. Estimated right ventricular systolic pressure equals 58  mm Hg, assuming right atrial pressure equals 10 mm Hg,  consistent with moderate pulmonary hypertension.

## 2018-03-21 NOTE — PROGRESS NOTE ADULT - ATTENDING COMMENTS
As above as edited. Seen and evaluated at bedside this AM. 71 yo F with DM2, PVD s/p R BKA and LE metatarsal amputation, CAD s/p CABG, CKD3, HTN, chronic stage 2 HFpEF presents with sepsis 2/2 LLE cellulitis and acute osteomyelitis s/p ankle disarticulation 3/18/18 wo resolution of infection now POD#1 s/p AKA. Patient tolerated procedure well, some blood loss was noted for which patient received 1U PRBC with appropriate response. This AM, she denies CP, SOB, palpitations, dizziness. She does c/o some pain at her left AKA site, but reports it is currently manageable. Also of note, patient's BP noticed to be lower 2/2 volume loss, limb loss, anesthesia. Will hold of on diuresis, BP meds with strict hold parameters. Continue with tele for now given that patient developed type 2 MI s/p ankle disarticulation. Will f/u further vascular surgery recommendations. As above as edited. Seen and evaluated at bedside this AM. 71 yo F with DM2, PVD s/p R BKA and LE metatarsal amputation, CAD s/p CABG, CKD3, HTN, chronic stage 2 HFpEF presents with sepsis 2/2 LLE cellulitis and acute osteomyelitis s/p ankle disarticulation 3/18/18 wo resolution of infection now POD#1 s/p AKA. Patient tolerated procedure well, some blood loss was noted for which patient received 1U PRBC with appropriate response. This AM, she denies CP, SOB, palpitations, dizziness. She does c/o some pain at her left AKA site, but reports it is currently manageable. Also of note, patient's BP noticed to be lower 2/2 volume loss, limb loss, anesthesia. Will hold of on diuresis, BP meds with strict hold parameters. Continue with tele for now given that patient developed type 2 MI s/p ankle disarticulation. Will f/u further vascular surgery recommendations. will f/u with ID re: abx duration now that patient is s/p AKA.

## 2018-03-21 NOTE — PROGRESS NOTE ADULT - PROBLEM SELECTOR PLAN 2
- BPs soft overnight- likely secondary to blood loss during procedure and anesthetic effects  - continue to monitor for now and continue blood pressure medications  - will hold off on diuresis at this time due to concern for blood loss  - c/w continue Hydralazine, isosorbide mononitrate, and coreg

## 2018-03-21 NOTE — PROGRESS NOTE ADULT - SUBJECTIVE AND OBJECTIVE BOX
Vascular Surgery Progress Note  C team [id number]      Subjective/interval events:  -complaining of itching of LLE.     Objective:  Vital Signs Last 24 Hrs  T(C): 36.4 (15 Mar 2018 06:00), Max: 36.4 (14 Mar 2018 22:18)  T(F): 97.5 (15 Mar 2018 06:00), Max: 97.6 (14 Mar 2018 22:18)  HR: 55 (15 Mar 2018 06:00) (55 - 61)  BP: 145/63 (15 Mar 2018 06:00) (135/84 - 149/75)  BP(mean): --  RR: 16 (15 Mar 2018 06:00) (16 - 16)  SpO2: 100% (15 Mar 2018 06:00) (98% - 100%)      I&O's Summary    14 Mar 2018 07:01  -  15 Mar 2018 07:00  --------------------------------------------------------  IN: 900 mL / OUT: 0 mL / NET: 900 mL    15 Mar 2018 07:01  -  15 Mar 2018 12:33  --------------------------------------------------------  IN: 595 mL / OUT: 125 mL / NET: 470 mL      PE:  Gen: NAD, eating breakfast  Ext: RLE s/p amputation; LLE s/p ankle disarticulation, bleeding deep in wound, exposed bone, no pus or drainage, plantar flap cool to touch      Labs:                        9.0    12.60 )-----------( 526      ( 15 Mar 2018 07:10 )             28.8                         8.3    14.65 )-----------( 358      ( 12 Mar 2018 05:53 )             24.5     03-15    143  |  112<H>  |  38<H>  ----------------------------<  66<L>  4.6   |  16<L>  |  1.25    Ca    8.2<L>      15 Mar 2018 07:10  Phos  4.2     03-15  Mg     2.2     03-15      03-12    135  |  104  |  45<H>  ----------------------------<  152<H>  4.8   |  19<L>  |  1.84<H>    Ca    8.3<L>      12 Mar 2018 05:53  Phos  4.5     03-12  Mg     2.1     03-12        Type + Screen (03.10.18 @ 06:00)    ABO Interpretation: A    Rh Interpretation: Positive    Antibody Screen: Negative    Imaging:    US Duplex Venous Lower Ext Complete, Bilateral (03.05.18 @ 17:17) >  FINDINGS:    There is normal compressibility of the bilateral common femoral, femoral   and popliteal veins. Calf veins are not visualized in the right lower   extremity due to below the knee amputation. Left peroneal veins are not   visualized.    Doppler examination shows normal spontaneous and phasic flow.    IMPRESSION:     No evidence of bilateral lower extremity deep venous thrombosis.      MR Foot w/ IV Cont, Left (03.05.18 @ 14:18) >    Diffuse cellulitis throughout lower leg and imaged hindfoot. Status post   transmetatarsal amputation of the first through fifth rays. Acute   osteomyelitis involving the first and second metatarsal remnants distally   as above.    Mild edema and enhancement within the third and fourth metatarsal   remnants distally with grossly preserved T1 marrow signal. Differential   considerations include reactive osteitis and early osteomyelitis.    Status post Achilles tenotomy with a fluid collection in the tenotomy   gap. This may be related to postoperative seroma. Superimposed infection   at this site cannot be excluded.      Xray Chest 1 View-PORTABLE IMMEDIATE (03.10.18 @ 12:33) >  IMPRESSION:  Lungs underinflated.    Increased left basilar markings and strand-like opacities could be   compatible with subsegmental atelectatic changes or infiltrate/pneumonia   in the proper clinical context. Clear remaining visualized lungs. No   pleural effusion or pneumothorax.    Stable sternal wires, multiple small surgical clips, and slightly   prominent appearing cardiac and mediastinal silhouettes.    Trachea midline.    Left neck surgical clips. Vascular Surgery Progress Note  C team [id number]      Subjective/interval events:  -complaining of some pain  -h/h drop post-op (7.2/23.3 from 9.3/30.4 pre-op)  -s/p 1U yesterday evening, appropriate response (.3/26.8)      Objective:  Vital Signs Last 24 Hrs  T(C): 36.5 (21 Mar 2018 05:55), Max: 37.1 (21 Mar 2018 00:00)  T(F): 97.7 (21 Mar 2018 05:55), Max: 98.8 (21 Mar 2018 00:00)  HR: 63 (21 Mar 2018 05:55) (50 - 85)  BP: 109/45 (21 Mar 2018 05:55) (103/46 - 178/71)  BP(mean): 69 (21 Mar 2018 00:00) (62 - 71)  RR: 18 (21 Mar 2018 05:55) (11 - 18)  SpO2: 95% (21 Mar 2018 05:55) (89% - 100%)      I&O's Summary    20 Mar 2018 07:01  -  21 Mar 2018 07:00  --------------------------------------------------------  IN: 1120 mL / OUT: 577 mL / NET: 543 mL          PE:  Gen: NAD, depressed affect  Ext: RLE s/p amputation; s/p LLE AKA, ace wrap dry, OR dressing left in place      MEDICATIONS  (STANDING):  ALBUTerol/ipratropium for Nebulization 3 milliLiter(s) Nebulizer every 6 hours  aspirin  chewable 81 milliGRAM(s) Oral daily  atorvastatin 20 milliGRAM(s) Oral at bedtime  carvedilol 25 milliGRAM(s) Oral every 12 hours  clopidogrel Tablet 75 milliGRAM(s) Oral daily  dextrose 50% Injectable 25 Gram(s) IV Push once  furosemide   Injectable 40 milliGRAM(s) IV Push every 24 hours  heparin  Injectable 5000 Unit(s) SubCutaneous every 12 hours  hydrALAZINE 50 milliGRAM(s) Oral every 8 hours  insulin detemir injectable (LEVEMIR) 23 Unit(s) SubCutaneous daily  insulin lispro (HumaLOG) corrective regimen sliding scale   SubCutaneous Before meals and at bedtime  insulin lispro Injectable (HumaLOG) 7 Unit(s) SubCutaneous three times a day before meals  isosorbide   mononitrate ER Tablet (IMDUR) 30 milliGRAM(s) Oral daily  lactated ringers. 1000 milliLiter(s) (75 mL/Hr) IV Continuous <Continuous>  piperacillin/tazobactam IVPB. 3.375 Gram(s) IV Intermittent every 8 hours  sertraline 25 milliGRAM(s) Oral daily  vancomycin  IVPB      vancomycin  IVPB 1000 milliGRAM(s) IV Intermittent every 24 hours    MEDICATIONS  (PRN):  acetaminophen  IVPB. 1000 milliGRAM(s) IV Intermittent once PRN Mild Pain (1 - 3)  HYDROmorphone  Injectable 0.5 milliGRAM(s) IV Push every 4 hours PRN Severe Pain (7 - 10)  oxyCODONE    IR 5 milliGRAM(s) Oral every 4 hours PRN Moderate Pain (4 - 6)        Labs:                          8.3    16.15 )-----------( 411      ( 20 Mar 2018 23:40 )             26.8                         9.0    12.60 )-----------( 526      ( 15 Mar 2018 07:10 )             28.8                         8.3    14.65 )-----------( 358      ( 12 Mar 2018 05:53 )             24.5     03-20    142  |  106  |  25<H>  ----------------------------<  212<H>  4.1   |  24  |  1.03    Ca    8.4      20 Mar 2018 04:50  Phos  3.2     03-20  Mg     1.7     03-20    TPro  7.5  /  Alb  2.4<L>  /  TBili  0.5  /  DBili  x   /  AST  45<H>  /  ALT  42<H>  /  AlkPhos  150<H>  03-19      03-15    143  |  112<H>  |  38<H>  ----------------------------<  66<L>  4.6   |  16<L>  |  1.25    Ca    8.2<L>      15 Mar 2018 07:10  Phos  4.2     03-15  Mg     2.2     03-15      03-12    135  |  104  |  45<H>  ----------------------------<  152<H>  4.8   |  19<L>  |  1.84<H>    Ca    8.3<L>      12 Mar 2018 05:53  Phos  4.5     03-12  Mg     2.1     03-12        Type + Screen (03.10.18 @ 06:00)    ABO Interpretation: A    Rh Interpretation: Positive    Antibody Screen: Negative        Imaging:  US Duplex Venous Lower Ext Complete, Bilateral (03.05.18 @ 17:17) >  FINDINGS:    There is normal compressibility of the bilateral common femoral, femoral   and popliteal veins. Calf veins are not visualized in the right lower   extremity due to below the knee amputation. Left peroneal veins are not   visualized.    Doppler examination shows normal spontaneous and phasic flow.    IMPRESSION:     No evidence of bilateral lower extremity deep venous thrombosis.      MR Foot w/ IV Cont, Left (03.05.18 @ 14:18) >    Diffuse cellulitis throughout lower leg and imaged hindfoot. Status post   transmetatarsal amputation of the first through fifth rays. Acute   osteomyelitis involving the first and second metatarsal remnants distally   as above.    Mild edema and enhancement within the third and fourth metatarsal   remnants distally with grossly preserved T1 marrow signal. Differential   considerations include reactive osteitis and early osteomyelitis.    Status post Achilles tenotomy with a fluid collection in the tenotomy   gap. This may be related to postoperative seroma. Superimposed infection   at this site cannot be excluded.      Xray Chest 1 View-PORTABLE IMMEDIATE (03.10.18 @ 12:33) >  IMPRESSION:  Lungs underinflated.    Increased left basilar markings and strand-like opacities could be   compatible with subsegmental atelectatic changes or infiltrate/pneumonia   in the proper clinical context. Clear remaining visualized lungs. No   pleural effusion or pneumothorax.    Stable sternal wires, multiple small surgical clips, and slightly   prominent appearing cardiac and mediastinal silhouettes.    Trachea midline.    Left neck surgical clips.

## 2018-03-21 NOTE — PROGRESS NOTE ADULT - PROBLEM SELECTOR PLAN 1
-s/p AKA of the LLE by vascular surgery on 3/20  - wound culture growing MRSA, strep Grp G and proteus and strep dysgalactiae  - continuing antibiotics at this time with vanc and zosyn- day 18- f/u with ID regarding duration of abx now that patient is s/p AKA  - s/p L ankle disarticulation by podiatry on 3/10

## 2018-03-21 NOTE — PROGRESS NOTE ADULT - PROBLEM SELECTOR PLAN 3
- c/w Asa, Coreg, Plavix, Lipitor  - monitor for signs of decompensates HF  - hold off on lasix at this time- will restart standing lasix when BPs trend towards normotensive

## 2018-03-21 NOTE — PROGRESS NOTE ADULT - SUBJECTIVE AND OBJECTIVE BOX
ANESTHESIA POSTOP CHECK    72y Female POSTOP DAY 1 S/P     Vital Signs Last 24 Hrs  T(C): 36.5 (21 Mar 2018 09:57), Max: 37.1 (21 Mar 2018 00:00)  T(F): 97.7 (21 Mar 2018 09:57), Max: 98.8 (21 Mar 2018 00:00)  HR: 58 (21 Mar 2018 09:57) (50 - 85)  BP: 107/43 (21 Mar 2018 09:57) (103/46 - 178/71)  BP(mean): 69 (21 Mar 2018 00:00) (62 - 71)  RR: 18 (21 Mar 2018 10:05) (11 - 18)  SpO2: 97% (21 Mar 2018 10:05) (89% - 100%)  I&O's Summary    20 Mar 2018 07:01  -  21 Mar 2018 07:00  --------------------------------------------------------  IN: 1120 mL / OUT: 577 mL / NET: 543 mL        [ x] NO APPARENT ANESTHESIA COMPLICATIONS      Comments:

## 2018-03-21 NOTE — PROGRESS NOTE ADULT - PROBLEM SELECTOR PLAN 5
- continue beta blocker, ASA, and plavix  - no events on tele, asymptomatic (sinus 60s-70s)  - c/w tele for now as patient with type II MI after surgery by podiatry

## 2018-03-21 NOTE — PROGRESS NOTE ADULT - ASSESSMENT
72F PMHx DM, PVD s/p R BKA and L transmetatarsal amputation, CAD s/p CABG, CKD stage 3, and HTN admitted for OM of left leg now s/p Lt ankle disarticulation w/ EKG changes and mild troponins likely c/w demand ischemia in setting of post operative state unlikely ACS. Patient with moderate LV systolic dysfunction. S/p AKA, had moderate blood loss during procedure. Receiving transfusion.    - c/w asa/plavix  - c/w statin  - c/w hydralazine  - c/w coreg  - c/w imdur   - would monitor for decompensation in respiratory status with transfusions, diurese as needed  - call back with questions

## 2018-03-21 NOTE — PROGRESS NOTE ADULT - SUBJECTIVE AND OBJECTIVE BOX
pt seen at bedside in NAD. pt is s/p L AKA yesterday.  pt resting comfortably  will sign off at this time since pt s/p L aka and right Bka

## 2018-03-21 NOTE — PROGRESS NOTE ADULT - ASSESSMENT
72 female with T2DM, PVD s/p R BKA and L transmetatarsal amputation, CAD s/p CABG, CKD stage 3, HTN, HLD here with fevers 103F, chills and left leg pain. Admitted for cellulitis of left lower extremity with warmth and erythema to that leg. Has elevated ESR and CRP.     MRI with diffuse cellulitis with acute osteo involving of first and second metatarsal remanants, with enhance of the third and fourth metatarsal, and fluid collection within the tenotomy gap. Wound cx with MRSA, strep Grp G and proteus; other wound cx with strep dysgalactiae.     s/p OR for ankle disarticulation and incision and drainage of ankle and Achilles tendon abscess. s/p left AKA yesterday.    Recommend:   - Continue vanco/ zosyn.  - If no concern for residual infection, can complete a short course of antibiotics anticipate to complete 3/24/2018 pending clinical improvement.     Jose Alberto Palacios MD  Pager [phone]  After 5pm/weekends call [phone]

## 2018-03-21 NOTE — PROGRESS NOTE ADULT - SUBJECTIVE AND OBJECTIVE BOX
CC: Patient is a 72y old  Female who presents with a chief complaint of Fevers and leg pain     Interval History/ROS: Patient s/p OR for left AKA yesterday. Occasional dry cough. Denies fever, chills.    Allergies  sulfa drugs (Hives)  sulfa drugs (Rash)  Sulfac 10% (Hives)    ANTIMICROBIALS:  piperacillin/tazobactam IVPB. 3.375 every 8 hours  vancomycin  IVPB    vancomycin  IVPB 1000 every 24 hours    PE:    Vital Signs Last 24 Hrs  T(C): 36.5 (21 Mar 2018 09:57), Max: 37.1 (21 Mar 2018 00:00)  T(F): 97.7 (21 Mar 2018 09:57), Max: 98.8 (21 Mar 2018 00:00)  HR: 58 (21 Mar 2018 09:57) (50 - 85)  BP: 107/43 (21 Mar 2018 09:57) (103/46 - 178/71)  BP(mean): 69 (21 Mar 2018 00:00) (62 - 71)  RR: 18 (21 Mar 2018 10:05) (11 - 18)  SpO2: 97% (21 Mar 2018 10:05) (89% - 100%)    Gen: Awake, alert NAD, non-toxic, pleasant  CV: S1+S2 normal, no murmurs  Resp: Clear bilat, no resp distress  Abd: Soft, nontender, +BS  Ext: left leg bandaged s/p AKA  : No Pratt  IV/Skin: No thrombophlebitis  Neuro: no focal deficits    LABS:                          7.8    10.96 )-----------( 322      ( 21 Mar 2018 10:41 )             25.0       03-20    142  |  106  |  25<H>  ----------------------------<  212<H>  4.1   |  24  |  1.03    Ca    8.4      20 Mar 2018 04:50  Phos  3.2     03-20  Mg     1.7     03-20    TPro  7.5  /  Alb  2.4<L>  /  TBili  0.5  /  DBili  x   /  AST  45<H>  /  ALT  42<H>  /  AlkPhos  150<H>  03-19          MICROBIOLOGY:  v  ANKLE  03-10-18 --  --  --      ANKLE  03-10-18   NO GROWTH - PRELIMINARY RESULTS  NO ORGANISMS ISOLATED AT 24 HOURS  NO ORGANISMS ISOLATED AT 48 HRS.  NO ORGANISMS ISOLATED AT 72 HRS.  NO GROWTH AFTER 4 DAYS INCUBATION  NO GROWTH AFTER 5 DAYS INCUBATION  --  --      ANUS  03-10-18 --  --  --      OTHER  03-06-18   RARE  STRDYS^Streptococcus dysgalactiae  --  --      LEG - LEFT  03-03-18 --  --  Proteus mirabilis  Staph. aureus *MRSA*  Strep Beta Hemolytic Grp G    BLOOD PERIPHERAL  03-03-18 --  --  --    RADIOLOGY:    No new images.

## 2018-03-22 DIAGNOSIS — I95.9 HYPOTENSION, UNSPECIFIED: ICD-10-CM

## 2018-03-22 DIAGNOSIS — N17.9 ACUTE KIDNEY FAILURE, UNSPECIFIED: ICD-10-CM

## 2018-03-22 DIAGNOSIS — R33.9 RETENTION OF URINE, UNSPECIFIED: ICD-10-CM

## 2018-03-22 LAB
BLD GP AB SCN SERPL QL: NEGATIVE — SIGNIFICANT CHANGE UP
BUN SERPL-MCNC: 31 MG/DL — HIGH (ref 7–23)
CALCIUM SERPL-MCNC: 8.2 MG/DL — LOW (ref 8.4–10.5)
CHLORIDE SERPL-SCNC: 105 MMOL/L — SIGNIFICANT CHANGE UP (ref 98–107)
CK MB BLD-MCNC: 1 — SIGNIFICANT CHANGE UP (ref 0–2.5)
CK MB BLD-MCNC: 2.17 NG/ML — SIGNIFICANT CHANGE UP (ref 1–4.7)
CK SERPL-CCNC: 218 U/L — HIGH (ref 25–170)
CO2 SERPL-SCNC: 26 MMOL/L — SIGNIFICANT CHANGE UP (ref 22–31)
CREAT SERPL-MCNC: 1.46 MG/DL — HIGH (ref 0.5–1.3)
GLUCOSE SERPL-MCNC: 179 MG/DL — HIGH (ref 70–99)
HCT VFR BLD CALC: 25 % — LOW (ref 34.5–45)
HCT VFR BLD CALC: 31.5 % — LOW (ref 34.5–45)
HGB BLD-MCNC: 7.8 G/DL — LOW (ref 11.5–15.5)
HGB BLD-MCNC: 9.9 G/DL — LOW (ref 11.5–15.5)
MAGNESIUM SERPL-MCNC: 1.7 MG/DL — SIGNIFICANT CHANGE UP (ref 1.6–2.6)
MCHC RBC-ENTMCNC: 28.3 PG — SIGNIFICANT CHANGE UP (ref 27–34)
MCHC RBC-ENTMCNC: 28.9 PG — SIGNIFICANT CHANGE UP (ref 27–34)
MCHC RBC-ENTMCNC: 31.2 % — LOW (ref 32–36)
MCHC RBC-ENTMCNC: 31.4 % — LOW (ref 32–36)
MCV RBC AUTO: 90 FL — SIGNIFICANT CHANGE UP (ref 80–100)
MCV RBC AUTO: 92.6 FL — SIGNIFICANT CHANGE UP (ref 80–100)
NRBC # FLD: 0 — SIGNIFICANT CHANGE UP
NRBC # FLD: 0.02 — SIGNIFICANT CHANGE UP
PHOSPHATE SERPL-MCNC: 4.7 MG/DL — HIGH (ref 2.5–4.5)
PLATELET # BLD AUTO: 267 K/UL — SIGNIFICANT CHANGE UP (ref 150–400)
PLATELET # BLD AUTO: 294 K/UL — SIGNIFICANT CHANGE UP (ref 150–400)
PMV BLD: 11.1 FL — SIGNIFICANT CHANGE UP (ref 7–13)
PMV BLD: 11.5 FL — SIGNIFICANT CHANGE UP (ref 7–13)
POTASSIUM SERPL-MCNC: 4.4 MMOL/L — SIGNIFICANT CHANGE UP (ref 3.5–5.3)
POTASSIUM SERPL-SCNC: 4.4 MMOL/L — SIGNIFICANT CHANGE UP (ref 3.5–5.3)
RBC # BLD: 2.7 M/UL — LOW (ref 3.8–5.2)
RBC # BLD: 3.5 M/UL — LOW (ref 3.8–5.2)
RBC # FLD: 16.8 % — HIGH (ref 10.3–14.5)
RBC # FLD: 17.2 % — HIGH (ref 10.3–14.5)
RH IG SCN BLD-IMP: POSITIVE — SIGNIFICANT CHANGE UP
SODIUM SERPL-SCNC: 142 MMOL/L — SIGNIFICANT CHANGE UP (ref 135–145)
TROPONIN T SERPL-MCNC: 0.18 NG/ML — HIGH (ref 0–0.06)
VANCOMYCIN TROUGH SERPL-MCNC: 20.3 UG/ML — HIGH (ref 10–20)
WBC # BLD: 11.68 K/UL — HIGH (ref 3.8–10.5)
WBC # BLD: 9.81 K/UL — SIGNIFICANT CHANGE UP (ref 3.8–10.5)
WBC # FLD AUTO: 11.68 K/UL — HIGH (ref 3.8–10.5)
WBC # FLD AUTO: 9.81 K/UL — SIGNIFICANT CHANGE UP (ref 3.8–10.5)

## 2018-03-22 PROCEDURE — 99233 SBSQ HOSP IP/OBS HIGH 50: CPT | Mod: GC

## 2018-03-22 PROCEDURE — 99232 SBSQ HOSP IP/OBS MODERATE 35: CPT

## 2018-03-22 PROCEDURE — 99222 1ST HOSP IP/OBS MODERATE 55: CPT | Mod: GC

## 2018-03-22 RX ORDER — FUROSEMIDE 40 MG
40 TABLET ORAL EVERY 24 HOURS
Qty: 0 | Refills: 0 | Status: DISCONTINUED | OUTPATIENT
Start: 2018-03-22 | End: 2018-03-22

## 2018-03-22 RX ORDER — CARVEDILOL PHOSPHATE 80 MG/1
25 CAPSULE, EXTENDED RELEASE ORAL EVERY 12 HOURS
Qty: 0 | Refills: 0 | Status: DISCONTINUED | OUTPATIENT
Start: 2018-03-22 | End: 2018-03-22

## 2018-03-22 RX ORDER — OXYCODONE HYDROCHLORIDE 5 MG/1
10 TABLET ORAL EVERY 6 HOURS
Qty: 0 | Refills: 0 | Status: DISCONTINUED | OUTPATIENT
Start: 2018-03-22 | End: 2018-03-27

## 2018-03-22 RX ORDER — FUROSEMIDE 40 MG
20 TABLET ORAL ONCE
Qty: 0 | Refills: 0 | Status: COMPLETED | OUTPATIENT
Start: 2018-03-22 | End: 2018-03-22

## 2018-03-22 RX ORDER — NYSTATIN CREAM 100000 [USP'U]/G
1 CREAM TOPICAL
Qty: 0 | Refills: 0 | Status: DISCONTINUED | OUTPATIENT
Start: 2018-03-22 | End: 2018-03-27

## 2018-03-22 RX ADMIN — Medication 7 UNIT(S): at 09:50

## 2018-03-22 RX ADMIN — OXYCODONE HYDROCHLORIDE 10 MILLIGRAM(S): 5 TABLET ORAL at 14:31

## 2018-03-22 RX ADMIN — Medication 81 MILLIGRAM(S): at 13:25

## 2018-03-22 RX ADMIN — Medication 1: at 22:04

## 2018-03-22 RX ADMIN — ATORVASTATIN CALCIUM 20 MILLIGRAM(S): 80 TABLET, FILM COATED ORAL at 22:05

## 2018-03-22 RX ADMIN — HYDROMORPHONE HYDROCHLORIDE 0.5 MILLIGRAM(S): 2 INJECTION INTRAMUSCULAR; INTRAVENOUS; SUBCUTANEOUS at 02:50

## 2018-03-22 RX ADMIN — HYDROMORPHONE HYDROCHLORIDE 0.5 MILLIGRAM(S): 2 INJECTION INTRAMUSCULAR; INTRAVENOUS; SUBCUTANEOUS at 02:17

## 2018-03-22 RX ADMIN — Medication 3 MILLILITER(S): at 03:24

## 2018-03-22 RX ADMIN — HYDROMORPHONE HYDROCHLORIDE 0.5 MILLIGRAM(S): 2 INJECTION INTRAMUSCULAR; INTRAVENOUS; SUBCUTANEOUS at 09:50

## 2018-03-22 RX ADMIN — Medication 1: at 13:24

## 2018-03-22 RX ADMIN — SERTRALINE 25 MILLIGRAM(S): 25 TABLET, FILM COATED ORAL at 13:25

## 2018-03-22 RX ADMIN — OXYCODONE HYDROCHLORIDE 10 MILLIGRAM(S): 5 TABLET ORAL at 15:30

## 2018-03-22 RX ADMIN — HEPARIN SODIUM 5000 UNIT(S): 5000 INJECTION INTRAVENOUS; SUBCUTANEOUS at 18:19

## 2018-03-22 RX ADMIN — CLOPIDOGREL BISULFATE 75 MILLIGRAM(S): 75 TABLET, FILM COATED ORAL at 13:26

## 2018-03-22 RX ADMIN — PIPERACILLIN AND TAZOBACTAM 25 GRAM(S): 4; .5 INJECTION, POWDER, LYOPHILIZED, FOR SOLUTION INTRAVENOUS at 02:17

## 2018-03-22 RX ADMIN — PIPERACILLIN AND TAZOBACTAM 25 GRAM(S): 4; .5 INJECTION, POWDER, LYOPHILIZED, FOR SOLUTION INTRAVENOUS at 10:52

## 2018-03-22 RX ADMIN — PIPERACILLIN AND TAZOBACTAM 25 GRAM(S): 4; .5 INJECTION, POWDER, LYOPHILIZED, FOR SOLUTION INTRAVENOUS at 18:19

## 2018-03-22 RX ADMIN — Medication 7 UNIT(S): at 18:19

## 2018-03-22 RX ADMIN — Medication 3 MILLILITER(S): at 10:00

## 2018-03-22 RX ADMIN — Medication 23 UNIT(S): at 09:49

## 2018-03-22 RX ADMIN — HEPARIN SODIUM 5000 UNIT(S): 5000 INJECTION INTRAVENOUS; SUBCUTANEOUS at 05:37

## 2018-03-22 RX ADMIN — Medication 1: at 09:50

## 2018-03-22 RX ADMIN — Medication 20 MILLIGRAM(S): at 08:17

## 2018-03-22 RX ADMIN — Medication 3 MILLILITER(S): at 21:44

## 2018-03-22 RX ADMIN — Medication 7 UNIT(S): at 13:25

## 2018-03-22 RX ADMIN — Medication 3 MILLILITER(S): at 15:50

## 2018-03-22 RX ADMIN — Medication 2: at 18:19

## 2018-03-22 RX ADMIN — HYDROMORPHONE HYDROCHLORIDE 0.5 MILLIGRAM(S): 2 INJECTION INTRAMUSCULAR; INTRAVENOUS; SUBCUTANEOUS at 10:05

## 2018-03-22 NOTE — PROGRESS NOTE ADULT - PROBLEM SELECTOR PLAN 7
- continue Atorvastatin -DAMARI on CKD3  -Could be pre-renal in the setting of hypotension as well as post-renal 2/2 urinary retention  -BP's improved today, will also give 1U PRBC  -Straight cath as per hospital protocol with possible howe thereafter in case patient fails TOV

## 2018-03-22 NOTE — CONSULT NOTE ADULT - ASSESSMENT
s/p L AKA  1. PT- bed mobility,transfers, gait and balance training  2. OT- ADL'S  3. pain control  4. Hypotension- continue to monitor  5. Patient would benefit from acute rehab, needs a multidisciplinary team including PT, OT  Can tolerate 3 hours of therapy a day.  Will follow.

## 2018-03-22 NOTE — PROGRESS NOTE ADULT - PROBLEM SELECTOR PLAN 2
- post op patient hypotensive to 90s/40s  - reported blood loss during procedure, and transfused 1 unit PRBC  - no signs of blood loss at site of surgery or otherwise  - cardiac enzymes and ekg checked as patient had type 2 MI post ankle disarticulation on 3/10  - Trops initially .21 and down to .18, CKMB neg and EKG without ischemic changes noted  - unclear etiology of hypotension, may be secondary to opioid medications  - clincially- patient asymptomatic  - holding anti hypertensives at this time (hydralazine and imdur) - post op patient hypotensive to 90s/40s  - reported blood loss during procedure, and transfused 1 unit PRBC  - no signs of blood loss at site of surgery or otherwise  - cardiac enzymes and ekg checked as patient had type 2 MI post ankle disarticulation on 3/10  - Trops initially .21 and down to .18, CKMB neg and EKG without ischemic changes noted  - unclear etiology of hypotension, may be secondary to opioid medications  - clincially- patient asymptomatic  - holding anti hypertensives at this time (hydralazine and imdur)  - will give 1 units PRBC as patient may have some demand ischemia 2/2 blood loss - post op patient hypotensive to 90s/40s  - reported blood loss during procedure, and transfused 1 unit PRBC  - no signs of blood loss at site of surgery or otherwise  - cardiac enzymes and ekg checked as patient had type 2 MI post ankle disarticulation on 3/10  - Trops initially 0.21 and down to 0.18, CKMB neg and EKG without ischemic changes noted  - unclear etiology of hypotension, may be secondary to opioid medications  - clincially- patient asymptomatic  - holding anti hypertensives at this time (hydralazine and imdur)  - will give 1 units PRBC as patient may have some demand ischemia 2/2 blood loss

## 2018-03-22 NOTE — PROGRESS NOTE ADULT - PROBLEM SELECTOR PLAN 1
- s/p AKA of the LLE by vascular surgery on 3/20  - per ID, continue with antibiotics until 3/24- vancomycin and zosyn  - s/p L ankle disarticulation by podiatry on 3/10  - PT/OT eval pending post AKA  - pain control with oxycodone and IV morphine PRN- well controlled per patient - s/p AKA of the LLE by vascular surgery on 3/20  - per ID, continue with antibiotics until 3/24- vancomycin (by level) and zosyn  - s/p L ankle disarticulation by podiatry on 3/10  - PT/OT eval pending post AKA  - pain control with oxycodone and IV morphine PRN- well controlled per patient - s/p AKA of the LLE by vascular surgery on 3/20  - per ID, continue with antibiotics until 3/24- vancomycin (by level) and zosyn  - s/p L ankle disarticulation by podiatry on 3/10  - PT/OT eval pending post AKA  - pain control with oxycodone and IV dilaudid PRN- well controlled per patient

## 2018-03-22 NOTE — PROGRESS NOTE ADULT - SUBJECTIVE AND OBJECTIVE BOX
CC: Patient is a 72y old  Female who presents with a chief complaint of Fevers and leg pain     Interval History/ROS: Patient has no complaints. Some soreness in left leg. Post op patient was hypotensive to SBP 90's responded to IVF.     Allergies  sulfa drugs (Hives)  sulfa drugs (Rash)  Sulfac 10% (Hives)    ANTIMICROBIALS:  piperacillin/tazobactam IVPB. 3.375 every 8 hours  vancomycin  IVPB    vancomycin  IVPB 1000 every 24 hours    PE:    Vital Signs Last 24 Hrs  T(C): 36.4 (22 Mar 2018 05:35), Max: 36.8 (21 Mar 2018 13:47)  T(F): 97.6 (22 Mar 2018 05:35), Max: 98.2 (21 Mar 2018 13:47)  HR: 77 (22 Mar 2018 10:00) (56 - 83)  BP: 127/47 (22 Mar 2018 09:05) (93/40 - 138/53)  BP(mean): --  RR: 18 (22 Mar 2018 09:05) (18 - 18)  SpO2: 99% (22 Mar 2018 09:05) (92% - 100%)    Gen: Awake, alert, NAD  CV: S1+S2 normal, no murmurs  Resp: Clear bilat, no resp distress  Abd: Soft, nontender, +BS  Ext: left leg bandaged s/p AKA  : No Pratt  IV/Skin: No thrombophlebitis  Neuro: no focal deficits    LABS:                          7.8    11.68 )-----------( 294      ( 22 Mar 2018 05:00 )             25.0       03-22    142  |  105  |  31<H>  ----------------------------<  179<H>  4.4   |  26  |  1.46<H>    Ca    8.2<L>      22 Mar 2018 05:00  Phos  4.7     03-22  Mg     1.7     03-22    MICROBIOLOGY:  v  ANKLE  03-10-18 --  --  --      ANKLE  03-10-18   NO GROWTH - PRELIMINARY RESULTS  NO ORGANISMS ISOLATED AT 24 HOURS  NO ORGANISMS ISOLATED AT 48 HRS.  NO ORGANISMS ISOLATED AT 72 HRS.  NO GROWTH AFTER 4 DAYS INCUBATION  NO GROWTH AFTER 5 DAYS INCUBATION  --  --      ANUS  03-10-18 --  --  --      OTHER  03-06-18   RARE  STRDYS^Streptococcus dysgalactiae  --  --      LEG - LEFT  03-03-18 --  --  Proteus mirabilis  Staph. aureus *MRSA*  Strep Beta Hemolytic Grp G    BLOOD PERIPHERAL  03-03-18 --  --  --    RADIOLOGY:    No new images.

## 2018-03-22 NOTE — PROGRESS NOTE ADULT - PROBLEM SELECTOR PLAN 5
- continue beta blocker, ASA, and plavix  - no events on tele, asymptomatic (sinus 60s-70s)  - c/w tele for now as patient with type II MI after surgery by podiatry and hypotensive during last 24 hours

## 2018-03-22 NOTE — PROGRESS NOTE ADULT - SUBJECTIVE AND OBJECTIVE BOX
Shae Shin PGY 1  Pager: [phone]/ [phone]  Patient is a 72y old  Female who presents with a chief complaint of Fevers and leg pain (06 Mar 2018 17:24)      SUBJECTIVE / OVERNIGHT EVENTS:  No acute events overnight. In the PM on 3/21, patient was becoming markedly hypotensive to the systolic low 90s. There were no signs of blood loss at the time. Cardiac enzymes reveal trop of .2 with no elevation of CKMB and EKG revealed no changes. CBC showed drop in Hgb by .8, however repeat was stable. Patient was given a 250mL bolus to which she responded.     MEDICATIONS  (STANDING):  ALBUTerol/ipratropium for Nebulization 3 milliLiter(s) Nebulizer every 6 hours  aspirin  chewable 81 milliGRAM(s) Oral daily  atorvastatin 20 milliGRAM(s) Oral at bedtime  clopidogrel Tablet 75 milliGRAM(s) Oral daily  dextrose 50% Injectable 25 Gram(s) IV Push once  heparin  Injectable 5000 Unit(s) SubCutaneous every 12 hours  insulin detemir injectable (LEVEMIR) 23 Unit(s) SubCutaneous daily  insulin lispro (HumaLOG) corrective regimen sliding scale   SubCutaneous Before meals and at bedtime  insulin lispro Injectable (HumaLOG) 7 Unit(s) SubCutaneous three times a day before meals  piperacillin/tazobactam IVPB. 3.375 Gram(s) IV Intermittent every 8 hours  sertraline 25 milliGRAM(s) Oral daily  vancomycin  IVPB      vancomycin  IVPB 1000 milliGRAM(s) IV Intermittent every 24 hours    MEDICATIONS  (PRN):  acetaminophen  IVPB. 1000 milliGRAM(s) IV Intermittent once PRN Mild Pain (1 - 3)  HYDROmorphone  Injectable 0.5 milliGRAM(s) IV Push every 4 hours PRN Severe Pain (7 - 10)  oxyCODONE    IR 5 milliGRAM(s) Oral every 4 hours PRN Moderate Pain (4 - 6)      OBJECTIVE:    Vital Signs Last 24 Hrs  T(C): 36.4 (22 Mar 2018 05:35), Max: 36.8 (21 Mar 2018 13:47)  T(F): 97.6 (22 Mar 2018 05:35), Max: 98.2 (21 Mar 2018 13:47)  HR: 56 (22 Mar 2018 05:35) (56 - 83)  BP: 138/53 (22 Mar 2018 05:35) (93/40 - 138/53)  BP(mean): --  RR: 18 (22 Mar 2018 05:35) (18 - 18)  SpO2: 100% (22 Mar 2018 05:35) (90% - 100%)    CAPILLARY BLOOD GLUCOSE      POCT Blood Glucose.: 223 mg/dL (21 Mar 2018 21:52)  POCT Blood Glucose.: 183 mg/dL (21 Mar 2018 17:35)  POCT Blood Glucose.: 115 mg/dL (21 Mar 2018 12:28)  POCT Blood Glucose.: 127 mg/dL (21 Mar 2018 09:18)    I&O's Summary    20 Mar 2018 07:01  -  21 Mar 2018 07:00  --------------------------------------------------------  IN: 1120 mL / OUT: 577 mL / NET: 543 mL    21 Mar 2018 07:01  -  22 Mar 2018 06:53  --------------------------------------------------------  IN: 120 mL / OUT: 0 mL / NET: 120 mL        PHYSICAL EXAM:  GENERAL: NAD, laying in bed  EYES: EOMI, PERRLA, conjunctiva and sclera clear  CHEST/LUNG:  lower lobe crackles b/l, unchanged from yesterday   HEART: Regular rate and rhythm; No murmurs, rubs, or gallops  ABDOMEN: Soft, Nontender, Nondistended; Bowel sounds present  EXTREMITIES: BKA on right lower extremity, warm to touch; AKA on LLE- with dressing intact, no oozing noted through dressing   NEUROLOGY: non-focal  SKIN: No rashes or lesions        LABS:                        7.8    11.68 )-----------( 294      ( 22 Mar 2018 05:00 )             25.0     Auto Eosinophil # x     / Auto Eosinophil % x     / Auto Neutrophil # x     / Auto Neutrophil % x     / BANDS % x                            7.6    12.15 )-----------( 284      ( 21 Mar 2018 22:30 )             24.5     Auto Eosinophil # x     / Auto Eosinophil % x     / Auto Neutrophil # x     / Auto Neutrophil % x     / BANDS % x                            7.5    11.11 )-----------( 282      ( 21 Mar 2018 14:44 )             23.8     Auto Eosinophil # x     / Auto Eosinophil % x     / Auto Neutrophil # x     / Auto Neutrophil % x     / BANDS % x        03-22    142  |  105  |  31<H>  ----------------------------<  179<H>  4.4   |  26  |  1.46<H>  03-21    143  |  108<H>  |  26<H>  ----------------------------<  122<H>  4.2   |  24  |  1.19    Ca    8.2<L>      22 Mar 2018 05:00  Mg     1.7     03-22  Phos  4.7     03-22      CARDIAC MARKERS ( 21 Mar 2018 14:44 )  x     / 0.21 ng/mL / 170 u/L / 2.33 ng/mL / x Shae Shin PGY 1  Pager: [phone]/ [phone]  Patient is a 72y old  Female who presents with a chief complaint of Fevers and leg pain (06 Mar 2018 17:24)      SUBJECTIVE / OVERNIGHT EVENTS:  No acute events overnight. In the PM on 3/21, patient was becoming markedly hypotensive to the systolic low 90s. There were no signs of blood loss at the time. Cardiac enzymes reveal trop of .2 with no elevation of CKMB and EKG revealed no changes. CBC showed drop in Hgb by .8, however repeat was stable. Patient was given a 250mL bolus to which she responded.     MEDICATIONS  (STANDING):  ALBUTerol/ipratropium for Nebulization 3 milliLiter(s) Nebulizer every 6 hours  aspirin  chewable 81 milliGRAM(s) Oral daily  atorvastatin 20 milliGRAM(s) Oral at bedtime  clopidogrel Tablet 75 milliGRAM(s) Oral daily  dextrose 50% Injectable 25 Gram(s) IV Push once  heparin  Injectable 5000 Unit(s) SubCutaneous every 12 hours  insulin detemir injectable (LEVEMIR) 23 Unit(s) SubCutaneous daily  insulin lispro (HumaLOG) corrective regimen sliding scale   SubCutaneous Before meals and at bedtime  insulin lispro Injectable (HumaLOG) 7 Unit(s) SubCutaneous three times a day before meals  piperacillin/tazobactam IVPB. 3.375 Gram(s) IV Intermittent every 8 hours  sertraline 25 milliGRAM(s) Oral daily  vancomycin  IVPB      vancomycin  IVPB 1000 milliGRAM(s) IV Intermittent every 24 hours    MEDICATIONS  (PRN):  acetaminophen  IVPB. 1000 milliGRAM(s) IV Intermittent once PRN Mild Pain (1 - 3)  HYDROmorphone  Injectable 0.5 milliGRAM(s) IV Push every 4 hours PRN Severe Pain (7 - 10)  oxyCODONE    IR 5 milliGRAM(s) Oral every 4 hours PRN Moderate Pain (4 - 6)      OBJECTIVE:    Vital Signs Last 24 Hrs  T(C): 36.4 (22 Mar 2018 05:35), Max: 36.8 (21 Mar 2018 13:47)  T(F): 97.6 (22 Mar 2018 05:35), Max: 98.2 (21 Mar 2018 13:47)  HR: 56 (22 Mar 2018 05:35) (56 - 83)  BP: 138/53 (22 Mar 2018 05:35) (93/40 - 138/53)  BP(mean): --  RR: 18 (22 Mar 2018 05:35) (18 - 18)  SpO2: 100% (22 Mar 2018 05:35) (90% - 100%)    CAPILLARY BLOOD GLUCOSE      POCT Blood Glucose.: 223 mg/dL (21 Mar 2018 21:52)  POCT Blood Glucose.: 183 mg/dL (21 Mar 2018 17:35)  POCT Blood Glucose.: 115 mg/dL (21 Mar 2018 12:28)  POCT Blood Glucose.: 127 mg/dL (21 Mar 2018 09:18)    I&O's Summary    20 Mar 2018 07:01  -  21 Mar 2018 07:00  --------------------------------------------------------  IN: 1120 mL / OUT: 577 mL / NET: 543 mL    21 Mar 2018 07:01  -  22 Mar 2018 06:53  --------------------------------------------------------  IN: 120 mL / OUT: 0 mL / NET: 120 mL        PHYSICAL EXAM:  GENERAL: NAD, laying in bed  EYES: EOMI, PERRLA, conjunctiva and sclera clear  CHEST/LUNG:  lower lobe crackles b/l, expiratory wheeze in lower lobes  HEART: Regular rate and rhythm; No murmurs, rubs, or gallops  ABDOMEN: Soft, Nontender, Nondistended; Bowel sounds present  EXTREMITIES: BKA on right lower extremity, warm to touch; AKA on LLE- with dressing intact, no oozing noted through dressing   NEUROLOGY: non-focal  SKIN: No rashes or lesions        LABS:                        7.8    11.68 )-----------( 294      ( 22 Mar 2018 05:00 )             25.0     Auto Eosinophil # x     / Auto Eosinophil % x     / Auto Neutrophil # x     / Auto Neutrophil % x     / BANDS % x                            7.6    12.15 )-----------( 284      ( 21 Mar 2018 22:30 )             24.5     Auto Eosinophil # x     / Auto Eosinophil % x     / Auto Neutrophil # x     / Auto Neutrophil % x     / BANDS % x                            7.5    11.11 )-----------( 282      ( 21 Mar 2018 14:44 )             23.8     Auto Eosinophil # x     / Auto Eosinophil % x     / Auto Neutrophil # x     / Auto Neutrophil % x     / BANDS % x        03-22    142  |  105  |  31<H>  ----------------------------<  179<H>  4.4   |  26  |  1.46<H>  03-21    143  |  108<H>  |  26<H>  ----------------------------<  122<H>  4.2   |  24  |  1.19    Ca    8.2<L>      22 Mar 2018 05:00  Mg     1.7     03-22  Phos  4.7     03-22      CARDIAC MARKERS ( 21 Mar 2018 14:44 )  x     / 0.21 ng/mL / 170 u/L / 2.33 ng/mL / x Shae Shin PGY 1  Pager: [phone]/ [phone]  Patient is a 72y old  Female who presents with a chief complaint of Fevers and leg pain (06 Mar 2018 17:24)      SUBJECTIVE / OVERNIGHT EVENTS:  No acute events overnight. In the PM on 3/21, patient was becoming markedly hypotensive to the systolic low 90s. There were no signs of blood loss at the time. Cardiac enzymes reveal trop of 0.2 with no elevation of CKMB and EKG revealed no changes. CBC showed drop in Hgb by 0.8, however repeat was stable. Patient was given a 250mL bolus to which she responded.     MEDICATIONS  (STANDING):  ALBUTerol/ipratropium for Nebulization 3 milliLiter(s) Nebulizer every 6 hours  aspirin  chewable 81 milliGRAM(s) Oral daily  atorvastatin 20 milliGRAM(s) Oral at bedtime  clopidogrel Tablet 75 milliGRAM(s) Oral daily  dextrose 50% Injectable 25 Gram(s) IV Push once  heparin  Injectable 5000 Unit(s) SubCutaneous every 12 hours  insulin detemir injectable (LEVEMIR) 23 Unit(s) SubCutaneous daily  insulin lispro (HumaLOG) corrective regimen sliding scale   SubCutaneous Before meals and at bedtime  insulin lispro Injectable (HumaLOG) 7 Unit(s) SubCutaneous three times a day before meals  piperacillin/tazobactam IVPB. 3.375 Gram(s) IV Intermittent every 8 hours  sertraline 25 milliGRAM(s) Oral daily  vancomycin  IVPB      vancomycin  IVPB 1000 milliGRAM(s) IV Intermittent every 24 hours    MEDICATIONS  (PRN):  acetaminophen  IVPB. 1000 milliGRAM(s) IV Intermittent once PRN Mild Pain (1 - 3)  HYDROmorphone  Injectable 0.5 milliGRAM(s) IV Push every 4 hours PRN Severe Pain (7 - 10)  oxyCODONE    IR 5 milliGRAM(s) Oral every 4 hours PRN Moderate Pain (4 - 6)      OBJECTIVE:    Vital Signs Last 24 Hrs  T(C): 36.4 (22 Mar 2018 05:35), Max: 36.8 (21 Mar 2018 13:47)  T(F): 97.6 (22 Mar 2018 05:35), Max: 98.2 (21 Mar 2018 13:47)  HR: 56 (22 Mar 2018 05:35) (56 - 83)  BP: 138/53 (22 Mar 2018 05:35) (93/40 - 138/53)  BP(mean): --  RR: 18 (22 Mar 2018 05:35) (18 - 18)  SpO2: 100% (22 Mar 2018 05:35) (90% - 100%)    CAPILLARY BLOOD GLUCOSE      POCT Blood Glucose.: 223 mg/dL (21 Mar 2018 21:52)  POCT Blood Glucose.: 183 mg/dL (21 Mar 2018 17:35)  POCT Blood Glucose.: 115 mg/dL (21 Mar 2018 12:28)  POCT Blood Glucose.: 127 mg/dL (21 Mar 2018 09:18)    I&O's Summary    20 Mar 2018 07:01  -  21 Mar 2018 07:00  --------------------------------------------------------  IN: 1120 mL / OUT: 577 mL / NET: 543 mL    21 Mar 2018 07:01  -  22 Mar 2018 06:53  --------------------------------------------------------  IN: 120 mL / OUT: 0 mL / NET: 120 mL        PHYSICAL EXAM:  GENERAL: NAD, laying in bed  EYES: EOMI, PERRLA, conjunctiva and sclera clear  CHEST/LUNG:  lower lobe crackles b/l, expiratory wheeze in lower lobes  HEART: Regular rate and rhythm; No murmurs, rubs, or gallops  ABDOMEN: Soft, Nontender, Nondistended; Bowel sounds present  EXTREMITIES: BKA on right lower extremity, warm to touch; AKA on LLE- with dressing intact, no oozing noted through dressing   NEUROLOGY: non-focal  SKIN: No rashes or lesions        LABS:                        7.8    11.68 )-----------( 294      ( 22 Mar 2018 05:00 )             25.0     Auto Eosinophil # x     / Auto Eosinophil % x     / Auto Neutrophil # x     / Auto Neutrophil % x     / BANDS % x                            7.6    12.15 )-----------( 284      ( 21 Mar 2018 22:30 )             24.5     Auto Eosinophil # x     / Auto Eosinophil % x     / Auto Neutrophil # x     / Auto Neutrophil % x     / BANDS % x                            7.5    11.11 )-----------( 282      ( 21 Mar 2018 14:44 )             23.8     Auto Eosinophil # x     / Auto Eosinophil % x     / Auto Neutrophil # x     / Auto Neutrophil % x     / BANDS % x        03-22    142  |  105  |  31<H>  ----------------------------<  179<H>  4.4   |  26  |  1.46<H>  03-21    143  |  108<H>  |  26<H>  ----------------------------<  122<H>  4.2   |  24  |  1.19    Ca    8.2<L>      22 Mar 2018 05:00  Mg     1.7     03-22  Phos  4.7     03-22      CARDIAC MARKERS ( 21 Mar 2018 14:44 )  x     / 0.21 ng/mL / 170 u/L / 2.33 ng/mL / x        CASE DISCUSSED WITH: PMR (Dr. Franklin) - dispo to acute rehab

## 2018-03-22 NOTE — OCCUPATIONAL THERAPY INITIAL EVALUATION ADULT - PERTINENT HX OF CURRENT PROBLEM, REHAB EVAL
73 yo F with a PMH significant for T2DM, PVD s/p R BKA and L transmetatarsal amputation, CAD s/p CABG, CKD stage 3, and HTN who presents to [facility] with fevers and left leg pain. Was found to be in sepsis 2/2 cellulitis and OM of Left LE. Pt now s/p left AKA.

## 2018-03-22 NOTE — PROGRESS NOTE ADULT - PROBLEM SELECTOR PLAN 6
- CKD III at baseline  - DAMARI on CKD with cr at 1.41- likely combination of prerenal secondary to hypotension and post obstructive as patient has not voided after howe removal  - continue to trend Cr daily   - c/w vanc by level - CKD III at baseline  - DAMARI on CKD with cr at 1.41- likely combination of prerenal secondary to hypotension and post obstructive as patient has not voided after howe removal  - bladder scan and straight cath >300mL  - continue to trend Cr daily   - c/w vanc by level -Suspect likely in the post-op setting of anesthesia  -Last straight cath revealed 700cc in the bladder, will c/w straight cath as per protocol and howe if needed  -Urinary retention may be the cause of the patient's DAMARI seen on labs today

## 2018-03-22 NOTE — PROGRESS NOTE ADULT - PROBLEM SELECTOR PLAN 3
- c/w Asa, Coreg, Plavix, Lipitor  - crackles and wheeze on exam today- will give 20 of IV lasix as BPs still labile   - monitor for signs of decompensates HF

## 2018-03-22 NOTE — OCCUPATIONAL THERAPY INITIAL EVALUATION ADULT - DIAGNOSIS, OT EVAL
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

## 2018-03-22 NOTE — OCCUPATIONAL THERAPY INITIAL EVALUATION ADULT - PLANNED THERAPY INTERVENTIONS, OT EVAL
transfer training/ADL retraining/bed mobility training/ROM/balance training/neuromuscular re-education/strengthening

## 2018-03-22 NOTE — PROGRESS NOTE ADULT - ASSESSMENT
72F w/ PMHx of DM2, PVD s/p R BKA and L transmetatarsal amputation, CAD s/p CABG, CKD III, HTN, initially presented on 3/3 with fevers and LLE pain admitted for sepsis 2/2 to cellulitis and OM of LLE, s/p ankle adm 3/3/18 with fevers and LLE pain found to be in sepsis 2/2 cellulitis and OM of LLE, s/p ankle disarticulation by podiatry on 3/10, course c/b SOB likely 2/2 mild acute on chronic decompensated HFpEF, s/p AKA with post op course complicated by hypotension. 72F w/ PMHx of DM2, PVD s/p R BKA and L transmetatarsal amputation, CAD s/p CABG, CKD III, HTN, initially presented on 3/3 with fevers and LLE pain admitted for sepsis 2/2 to cellulitis and OM of LLE, s/p ankle adm 3/3/18 with fevers and LLE pain found to be in sepsis 2/2 cellulitis and OM of LLE, s/p ankle disarticulation by podiatry on 3/10, course c/b SOB likely 2/2 mild acute on chronic decompensated HFpEF, s/p AKA with post op course complicated by hypotension and urinary retention.

## 2018-03-22 NOTE — PROGRESS NOTE ADULT - ASSESSMENT
72M PMHx PVD admitted w/ cellulitis and infection of previous TMA site s/p L ankle disarticulation (Podiatry 3/10), s/p L AKA (3/20). CBC now stable.       -pain management per primary team  -daily cbc, unless change in clinical status or concern for on going bleeding  -daily dressing change, kerlex & ACE.   -abx per ID  -continued medical management per primary team      KENTRELL Bal MD (PGY2)    (Please page C team Vascular [phone] for questions/concerns.) 72M PMHx PVD admitted w/ cellulitis and infection of previous TMA site s/p L ankle disarticulation (Podiatry 3/10), s/p L AKA (3/20). CBC now stable.       -pain management per primary team  -decrease cbc frequency to daily, unless change in clinical status  -daily dressing change, kerlex & ACE.   -abx per ID  -continued medical management per primary team      KENTRELL Bal MD (PGY2)    (Please page C team Vascular [phone] for questions/concerns.)

## 2018-03-22 NOTE — PROGRESS NOTE ADULT - ATTENDING COMMENTS
As above as edited. Seen and evaluated at bedside this afternoon with family present. 71 yo F with DM2, PVD s/p R BKA and LE metatarsal amputation, CAD s/p CABG, CKD3, HTN, chronic stage 2 HFpEF presents with sepsis 2/2 LLE cellulitis and acute osteomyelitis s/p ankle disarticulation 3/18/18 wo resolution of infection now s/p left AKA (POD#2). Hospital course c/b yesterday by hypotension, unclear cause exactly, labs and physical exam of wound site did not reveal any signs of worsening blood loss but mild troponin leak seen. Suspect likely type 2 demand ischemia s/p OR. Will give additional unit of PRBC's today given this. EKG is unchanged. Will c/w tele monitoring for now. Will need to continue with Vancomycin and Zosyn through Monday. Preliminarily, surgical margins are clean.    Patient reports her pain is currently well-controlled. Will change meds for severe pain to oral oxycodone IR with dilaudid to be used as a breakthrough only. Patient's hospital course has also be c/b urinary retention post-operatively. Will c/w straight catheterization per protocol. Bladder scan revealed DAMARI on CKD3, possibly in the setting of urinary retention. Will monitor BMP daily.     Anticipate dc to acute rehab on Monday.

## 2018-03-22 NOTE — PROGRESS NOTE ADULT - ASSESSMENT
72 female with T2DM, PVD s/p R BKA and L transmetatarsal amputation, CAD s/p CABG, CKD stage 3, HTN, HLD here with fevers 103F, chills and left leg pain. Admitted for cellulitis of left lower extremity with warmth and erythema to that leg. Has elevated ESR and CRP.     MRI with diffuse cellulitis with acute osteo involving of first and second metatarsal remanants, with enhance of the third and fourth metatarsal, and fluid collection within the tenotomy gap. Wound cx with MRSA, strep Grp G and proteus; other wound cx with strep dysgalactiae.     s/p OR for ankle disarticulation and incision and drainage of ankle and Achilles tendon abscess. s/p left AKA.    Recommend:   - Continue vanco/ zosyn.  - If no concern for residual infection, can complete a short course of antibiotics anticipate to complete 3/24/2018 pending clinical improvement.    Jose Alberto Palacios MD  Pager [phone]  After 5pm/weekends call [phone]

## 2018-03-22 NOTE — PROGRESS NOTE ADULT - PROBLEM SELECTOR PLAN 8
- DVT ppx heparin subq  - CC diet  - Dispo pending PT eval - CKD III at baseline  - DAMARI on CKD with cr at 1.41- likely combination of prerenal secondary to hypotension and post obstructive as patient has not voided after howe removal  - bladder scan and straight cath >300mL  - continue to trend Cr daily   - c/w vanc by level

## 2018-03-22 NOTE — PROGRESS NOTE ADULT - SUBJECTIVE AND OBJECTIVE BOX
Vascular Surgery Progress Note  C team [id number]      Subjective/interval events:  -pain improved this am  -h/h stable this am  -primary team took down dressing yesterday       Objective:  Vital Signs Last 24 Hrs  T(C): 36.4 (22 Mar 2018 05:35), Max: 36.8 (21 Mar 2018 13:47)  T(F): 97.6 (22 Mar 2018 05:35), Max: 98.2 (21 Mar 2018 13:47)  HR: 77 (22 Mar 2018 10:00) (56 - 83)  BP: 127/47 (22 Mar 2018 09:05) (93/50 - 138/53)  BP(mean): --  RR: 18 (22 Mar 2018 09:05) (18 - 18)  SpO2: 99% (22 Mar 2018 09:05) (93% - 100%)    I&O's Summary    21 Mar 2018 07:01  -  22 Mar 2018 07:00  --------------------------------------------------------  IN: 120 mL / OUT: 0 mL / NET: 120 mL    22 Mar 2018 07:01  -  22 Mar 2018 12:37  --------------------------------------------------------  IN: 0 mL / OUT: 700 mL / NET: -700 mL          PE:  Gen: NAD, depressed affect  Ext: RLE s/p amputation; s/p LLE AKA, ace wrap dry; dressing removed, staples in place, soft, no drainage.       MEDICATIONS  (STANDING):  ALBUTerol/ipratropium for Nebulization 3 milliLiter(s) Nebulizer every 6 hours  aspirin  chewable 81 milliGRAM(s) Oral daily  atorvastatin 20 milliGRAM(s) Oral at bedtime  clopidogrel Tablet 75 milliGRAM(s) Oral daily  dextrose 50% Injectable 25 Gram(s) IV Push once  heparin  Injectable 5000 Unit(s) SubCutaneous every 12 hours  insulin detemir injectable (LEVEMIR) 23 Unit(s) SubCutaneous daily  insulin lispro (HumaLOG) corrective regimen sliding scale   SubCutaneous Before meals and at bedtime  insulin lispro Injectable (HumaLOG) 7 Unit(s) SubCutaneous three times a day before meals  piperacillin/tazobactam IVPB. 3.375 Gram(s) IV Intermittent every 8 hours  sertraline 25 milliGRAM(s) Oral daily  vancomycin  IVPB      vancomycin  IVPB 1000 milliGRAM(s) IV Intermittent every 24 hours    MEDICATIONS  (PRN):  acetaminophen  IVPB. 1000 milliGRAM(s) IV Intermittent once PRN Mild Pain (1 - 3)  HYDROmorphone  Injectable 0.5 milliGRAM(s) IV Push every 4 hours PRN Severe Pain (7 - 10)  oxyCODONE    IR 5 milliGRAM(s) Oral every 4 hours PRN Moderate Pain (4 - 6)        Labs:                          7.8    11.68 )-----------( 294      ( 22 Mar 2018 05:00 )             25.0                         8.3    16.15 )-----------( 411      ( 20 Mar 2018 23:40 )             26.8                         9.0    12.60 )-----------( 526      ( 15 Mar 2018 07:10 )             28.8                         8.3    14.65 )-----------( 358      ( 12 Mar 2018 05:53 )             24.5     03-22    142  |  105  |  31<H>  ----------------------------<  179<H>  4.4   |  26  |  1.46<H>    Ca    8.2<L>      22 Mar 2018 05:00  Phos  4.7     03-22  Mg     1.7     03-22 03-20    142  |  106  |  25<H>  ----------------------------<  212<H>  4.1   |  24  |  1.03    Ca    8.4      20 Mar 2018 04:50  Phos  3.2     03-20  Mg     1.7     03-20    TPro  7.5  /  Alb  2.4<L>  /  TBili  0.5  /  DBili  x   /  AST  45<H>  /  ALT  42<H>  /  AlkPhos  150<H>  03-19      03-15    143  |  112<H>  |  38<H>  ----------------------------<  66<L>  4.6   |  16<L>  |  1.25    Ca    8.2<L>      15 Mar 2018 07:10  Phos  4.2     03-15  Mg     2.2     03-15      03-12    135  |  104  |  45<H>  ----------------------------<  152<H>  4.8   |  19<L>  |  1.84<H>    Ca    8.3<L>      12 Mar 2018 05:53  Phos  4.5     03-12  Mg     2.1     03-12    Type + Screen (03.19.18 @ 17:09)    ABO Interpretation: A    Rh Interpretation: Positive    Antibody Screen: Negative        Type + Screen (03.10.18 @ 06:00)    ABO Interpretation: A    Rh Interpretation: Positive    Antibody Screen: Negative        Imaging:  US Duplex Venous Lower Ext Complete, Bilateral (03.05.18 @ 17:17) >  FINDINGS:    There is normal compressibility of the bilateral common femoral, femoral   and popliteal veins. Calf veins are not visualized in the right lower   extremity due to below the knee amputation. Left peroneal veins are not   visualized.    Doppler examination shows normal spontaneous and phasic flow.    IMPRESSION:     No evidence of bilateral lower extremity deep venous thrombosis.      MR Foot w/ IV Cont, Left (03.05.18 @ 14:18) >    Diffuse cellulitis throughout lower leg and imaged hindfoot. Status post   transmetatarsal amputation of the first through fifth rays. Acute   osteomyelitis involving the first and second metatarsal remnants distally   as above.    Mild edema and enhancement within the third and fourth metatarsal   remnants distally with grossly preserved T1 marrow signal. Differential   considerations include reactive osteitis and early osteomyelitis.    Status post Achilles tenotomy with a fluid collection in the tenotomy   gap. This may be related to postoperative seroma. Superimposed infection   at this site cannot be excluded.      Xray Chest 1 View-PORTABLE IMMEDIATE (03.10.18 @ 12:33) >  IMPRESSION:  Lungs underinflated.    Increased left basilar markings and strand-like opacities could be   compatible with subsegmental atelectatic changes or infiltrate/pneumonia   in the proper clinical context. Clear remaining visualized lungs. No   pleural effusion or pneumothorax.    Stable sternal wires, multiple small surgical clips, and slightly   prominent appearing cardiac and mediastinal silhouettes.    Trachea midline.    Left neck surgical clips. Vascular Surgery Progress Note  C team [id number]      Subjective/interval events:  -pain improved this am  -h/h stable this am  -primary team took down dressing yesterday       Objective:  Vital Signs Last 24 Hrs  T(C): 36.4 (22 Mar 2018 05:35), Max: 36.8 (21 Mar 2018 13:47)  T(F): 97.6 (22 Mar 2018 05:35), Max: 98.2 (21 Mar 2018 13:47)  HR: 77 (22 Mar 2018 10:00) (56 - 83)  BP: 127/47 (22 Mar 2018 09:05) (93/50 - 138/53)  BP(mean): --  RR: 18 (22 Mar 2018 09:05) (18 - 18)  SpO2: 99% (22 Mar 2018 09:05) (93% - 100%)    I&O's Summary    21 Mar 2018 07:01  -  22 Mar 2018 07:00  --------------------------------------------------------  IN: 120 mL / OUT: 0 mL / NET: 120 mL    22 Mar 2018 07:01  -  22 Mar 2018 12:37  --------------------------------------------------------  IN: 0 mL / OUT: 700 mL / NET: -700 mL          PE:  Gen: NAD, depressed affect  Ext: RLE s/p amputation; s/p LLE AKA, ace wrap dry; dressing removed, staples in place, soft, no drainage; dressing stained w/ some blood spotting & small clots mid-incision, no active bleeding w/ clots removed. Redressed w/ clean kerlex x2 & ACE x2      MEDICATIONS  (STANDING):  ALBUTerol/ipratropium for Nebulization 3 milliLiter(s) Nebulizer every 6 hours  aspirin  chewable 81 milliGRAM(s) Oral daily  atorvastatin 20 milliGRAM(s) Oral at bedtime  clopidogrel Tablet 75 milliGRAM(s) Oral daily  dextrose 50% Injectable 25 Gram(s) IV Push once  heparin  Injectable 5000 Unit(s) SubCutaneous every 12 hours  insulin detemir injectable (LEVEMIR) 23 Unit(s) SubCutaneous daily  insulin lispro (HumaLOG) corrective regimen sliding scale   SubCutaneous Before meals and at bedtime  insulin lispro Injectable (HumaLOG) 7 Unit(s) SubCutaneous three times a day before meals  piperacillin/tazobactam IVPB. 3.375 Gram(s) IV Intermittent every 8 hours  sertraline 25 milliGRAM(s) Oral daily  vancomycin  IVPB      vancomycin  IVPB 1000 milliGRAM(s) IV Intermittent every 24 hours    MEDICATIONS  (PRN):  acetaminophen  IVPB. 1000 milliGRAM(s) IV Intermittent once PRN Mild Pain (1 - 3)  HYDROmorphone  Injectable 0.5 milliGRAM(s) IV Push every 4 hours PRN Severe Pain (7 - 10)  oxyCODONE    IR 5 milliGRAM(s) Oral every 4 hours PRN Moderate Pain (4 - 6)        Labs:                          7.8    11.68 )-----------( 294      ( 22 Mar 2018 05:00 )             25.0                         8.3    16.15 )-----------( 411      ( 20 Mar 2018 23:40 )             26.8                         9.0    12.60 )-----------( 526      ( 15 Mar 2018 07:10 )             28.8                         8.3    14.65 )-----------( 358      ( 12 Mar 2018 05:53 )             24.5     03-22    142  |  105  |  31<H>  ----------------------------<  179<H>  4.4   |  26  |  1.46<H>    Ca    8.2<L>      22 Mar 2018 05:00  Phos  4.7     03-22  Mg     1.7     03-22      03-20    142  |  106  |  25<H>  ----------------------------<  212<H>  4.1   |  24  |  1.03    Ca    8.4      20 Mar 2018 04:50  Phos  3.2     03-20  Mg     1.7     03-20    TPro  7.5  /  Alb  2.4<L>  /  TBili  0.5  /  DBili  x   /  AST  45<H>  /  ALT  42<H>  /  AlkPhos  150<H>  03-19      03-15    143  |  112<H>  |  38<H>  ----------------------------<  66<L>  4.6   |  16<L>  |  1.25    Ca    8.2<L>      15 Mar 2018 07:10  Phos  4.2     03-15  Mg     2.2     03-15      03-12    135  |  104  |  45<H>  ----------------------------<  152<H>  4.8   |  19<L>  |  1.84<H>    Ca    8.3<L>      12 Mar 2018 05:53  Phos  4.5     03-12  Mg     2.1     03-12    Type + Screen (03.19.18 @ 17:09)    ABO Interpretation: A    Rh Interpretation: Positive    Antibody Screen: Negative        Type + Screen (03.10.18 @ 06:00)    ABO Interpretation: A    Rh Interpretation: Positive    Antibody Screen: Negative        Imaging:  US Duplex Venous Lower Ext Complete, Bilateral (03.05.18 @ 17:17) >  FINDINGS:    There is normal compressibility of the bilateral common femoral, femoral   and popliteal veins. Calf veins are not visualized in the right lower   extremity due to below the knee amputation. Left peroneal veins are not   visualized.    Doppler examination shows normal spontaneous and phasic flow.    IMPRESSION:     No evidence of bilateral lower extremity deep venous thrombosis.      MR Foot w/ IV Cont, Left (03.05.18 @ 14:18) >    Diffuse cellulitis throughout lower leg and imaged hindfoot. Status post   transmetatarsal amputation of the first through fifth rays. Acute   osteomyelitis involving the first and second metatarsal remnants distally   as above.    Mild edema and enhancement within the third and fourth metatarsal   remnants distally with grossly preserved T1 marrow signal. Differential   considerations include reactive osteitis and early osteomyelitis.    Status post Achilles tenotomy with a fluid collection in the tenotomy   gap. This may be related to postoperative seroma. Superimposed infection   at this site cannot be excluded.      Xray Chest 1 View-PORTABLE IMMEDIATE (03.10.18 @ 12:33) >  IMPRESSION:  Lungs underinflated.    Increased left basilar markings and strand-like opacities could be   compatible with subsegmental atelectatic changes or infiltrate/pneumonia   in the proper clinical context. Clear remaining visualized lungs. No   pleural effusion or pneumothorax.    Stable sternal wires, multiple small surgical clips, and slightly   prominent appearing cardiac and mediastinal silhouettes.    Trachea midline.    Left neck surgical clips. Statement Selected

## 2018-03-22 NOTE — CONSULT NOTE ADULT - SUBJECTIVE AND OBJECTIVE BOX
HPI:  Patient is a 71 yo F with a PMH significant for T2DM, PVD s/p R BKA and L transmetatarsal amputation, CAD s/p CABG, CKD stage 3, and HTN who presented to the ED with fevers and left leg pain. The patient was in her usual state of health till Thursday when she began to experience 10/10 shooting pain down her left leg from her knee down. She denied any numbness or tingling. She also noticed fevers of 103-104 at home associated with chills and fatigue.   Vitals in the ED T- 102.7 (T max 103.1), HR-91 BP-152/58 RR-16 and sating 99% on room air. She received 1L of NS, 925mg tylenol PO, and one dose each of vancomycin and zosyn.     Was found to be in sepsis 2/2 cellulitis and OM of LLE, s/p ankle disarticulation by podiatry on 3/10, course c/b SOB likely 2/2 mild acute on chronic decompensated HFpEF, MI, s/p AKA with post op course complicated by hypotension. antibiotics until 3/24- vancomycin (by level) and zosyn. COntinues to be on IV Lasix for CHF. + hypotension.       REVIEW OF SYSTEMS: No chest pain, shortness of breath, nausea, vomiting or diarhea.      PAST MEDICAL & SURGICAL HISTORY  UTI (urinary tract infection)  Carotid stenosis  PVD (peripheral vascular disease)  CAD (coronary artery disease)  Hypertension  Diabetes  Obesity  Hypercholesterolemia  HTN (hypertension)  DM (diabetes mellitus)  Carotid artery stenosis  PAD (peripheral artery disease)  Stented coronary artery  S/P CABG x 3  CAD (coronary artery disease)  History of hysterectomy  S/P BKA (below knee amputation) unilateral, right  S/P CABG x 3  S/P eye surgery  S/P below knee amputation, right  S/P angioplasty with stent  S/P hysterectomy  Hx of CABG  CAD (Coronary Artery Disease)  HTN (Hypertension)  Diabetes      SOCIAL HISTORY  Smoking - Denied, EtOH - Denied, Drugs - Denied    FUNCTIONAL HISTORY:   Lives with children.   Independent with ambulation using prosthesis, walker     CURRENT FUNCTIONAL STATUS: mod a       FAMILY HISTORY   Family history of diabetes mellitus (Sibling)      RECENT LABS/IMAGING  CBC Full  -  ( 22 Mar 2018 05:00 )  WBC Count : 11.68 K/uL  Hemoglobin : 7.8 g/dL  Hematocrit : 25.0 %  Platelet Count - Automated : 294 K/uL  Mean Cell Volume : 92.6 fL  Mean Cell Hemoglobin : 28.9 pg  Mean Cell Hemoglobin Concentration : 31.2 %  Auto Neutrophil # : x  Auto Lymphocyte # : x  Auto Monocyte # : x  Auto Eosinophil # : x  Auto Basophil # : x  Auto Neutrophil % : x  Auto Lymphocyte % : x  Auto Monocyte % : x  Auto Eosinophil % : x  Auto Basophil % : x    03-22    142  |  105  |  31<H>  ----------------------------<  179<H>  4.4   |  26  |  1.46<H>    Ca    8.2<L>      22 Mar 2018 05:00  Phos  4.7     03-22  Mg     1.7     03-22          VITALS  T(C): 36.7 (03-22-18 @ 13:08), Max: 36.7 (03-21-18 @ 21:16)  HR: 63 (03-22-18 @ 13:08) (56 - 83)  BP: 119/51 (03-22-18 @ 13:08) (104/41 - 138/53)  RR: 17 (03-22-18 @ 13:08) (17 - 18)  SpO2: 94% (03-22-18 @ 13:08) (93% - 100%)  Wt(kg): --    ALLERGIES  sulfa drugs (Hives)  sulfa drugs (Rash)  Sulfac 10% (Hives)      MEDICATIONS   acetaminophen  IVPB. 1000 milliGRAM(s) IV Intermittent once PRN  ALBUTerol/ipratropium for Nebulization 3 milliLiter(s) Nebulizer every 6 hours  aspirin  chewable 81 milliGRAM(s) Oral daily  atorvastatin 20 milliGRAM(s) Oral at bedtime  clopidogrel Tablet 75 milliGRAM(s) Oral daily  dextrose 50% Injectable 25 Gram(s) IV Push once  heparin  Injectable 5000 Unit(s) SubCutaneous every 12 hours  HYDROmorphone  Injectable 0.5 milliGRAM(s) IV Push every 4 hours PRN  insulin detemir injectable (LEVEMIR) 23 Unit(s) SubCutaneous daily  insulin lispro (HumaLOG) corrective regimen sliding scale   SubCutaneous Before meals and at bedtime  insulin lispro Injectable (HumaLOG) 7 Unit(s) SubCutaneous three times a day before meals  oxyCODONE    IR 5 milliGRAM(s) Oral every 4 hours PRN  oxyCODONE    IR 10 milliGRAM(s) Oral every 6 hours PRN  piperacillin/tazobactam IVPB. 3.375 Gram(s) IV Intermittent every 8 hours  sertraline 25 milliGRAM(s) Oral daily  vancomycin  IVPB      vancomycin  IVPB 1000 milliGRAM(s) IV Intermittent every 24 hours      ----------------------------------------------------------------------------------------  PHYSICAL EXAM  Constitutional - NAD, Comfortable  HEENT - NCAT, EOMI  Neck - Supple, No limited ROM  Chest - CTA bilaterally, No wheeze, No rhonchi, No crackles  Cardiovascular - RRR, S1S2, No murmurs  Abdomen - BS+, Soft, NTND  Extremities - L AKA, with dressing   Neurologic Exam -                    Cognitive - Awake, Alert, AAO to self, place, date, year, situation     Communication - Fluent, No dysarthria, no aphasia     Cranial Nerves - CN 2-12 intact     Motor - No focal deficits                       Sensory - Intact to LT     Reflexes - DTR Intact, No primitive reflexive     Balance - WNL Static  Psychiatric - Mood stable, Affect WNL HPI:  Patient is a 73 yo F with a PMH significant for T2DM, PVD s/p R BKA and L transmetatarsal amputation, CAD s/p CABG, CKD stage 3, and HTN who presented to the ED with fevers and left leg pain. The patient was in her usual state of health till Thursday when she began to experience 10/10 shooting pain down her left leg from her knee down. She denied any numbness or tingling. She also noticed fevers of 103-104 at home associated with chills and fatigue.   Vitals in the ED T- 102.7 (T max 103.1), HR-91 BP-152/58 RR-16 and sating 99% on room air. She received 1L of NS, 925mg tylenol PO, and one dose each of vancomycin and zosyn.     Was found to be in sepsis 2/2 cellulitis and OM of LLE, s/p ankle disarticulation by podiatry on 3/10, course c/b SOB likely 2/2 mild acute on chronic decompensated HFpEF, MI, s/p AKA with post op course complicated by hypotension. antibiotics until 3/24- vancomycin (by level) and zosyn. COntinues to be on IV Lasix for CHF. + hypotension, getting transfusion today.      REVIEW OF SYSTEMS: No chest pain, shortness of breath, nausea, vomiting or diarhea.      PAST MEDICAL & SURGICAL HISTORY  UTI (urinary tract infection)  Carotid stenosis  PVD (peripheral vascular disease)  CAD (coronary artery disease)  Hypertension  Diabetes  Obesity  Hypercholesterolemia  HTN (hypertension)  DM (diabetes mellitus)  Carotid artery stenosis  PAD (peripheral artery disease)  Stented coronary artery  S/P CABG x 3  CAD (coronary artery disease)  History of hysterectomy  S/P BKA (below knee amputation) unilateral, right  S/P CABG x 3  S/P eye surgery  S/P below knee amputation, right  S/P angioplasty with stent  S/P hysterectomy  Hx of CABG  CAD (Coronary Artery Disease)  HTN (Hypertension)  Diabetes      SOCIAL HISTORY  Smoking - Denied, EtOH - Denied, Drugs - Denied    FUNCTIONAL HISTORY:   Lives with children.   Independent with ambulation using prosthesis, walker     CURRENT FUNCTIONAL STATUS: mod a       FAMILY HISTORY   Family history of diabetes mellitus (Sibling)      RECENT LABS/IMAGING  CBC Full  -  ( 22 Mar 2018 05:00 )  WBC Count : 11.68 K/uL  Hemoglobin : 7.8 g/dL  Hematocrit : 25.0 %  Platelet Count - Automated : 294 K/uL  Mean Cell Volume : 92.6 fL  Mean Cell Hemoglobin : 28.9 pg  Mean Cell Hemoglobin Concentration : 31.2 %  Auto Neutrophil # : x  Auto Lymphocyte # : x  Auto Monocyte # : x  Auto Eosinophil # : x  Auto Basophil # : x  Auto Neutrophil % : x  Auto Lymphocyte % : x  Auto Monocyte % : x  Auto Eosinophil % : x  Auto Basophil % : x    03-22    142  |  105  |  31<H>  ----------------------------<  179<H>  4.4   |  26  |  1.46<H>    Ca    8.2<L>      22 Mar 2018 05:00  Phos  4.7     03-22  Mg     1.7     03-22          VITALS  T(C): 36.7 (03-22-18 @ 13:08), Max: 36.7 (03-21-18 @ 21:16)  HR: 63 (03-22-18 @ 13:08) (56 - 83)  BP: 119/51 (03-22-18 @ 13:08) (104/41 - 138/53)  RR: 17 (03-22-18 @ 13:08) (17 - 18)  SpO2: 94% (03-22-18 @ 13:08) (93% - 100%)  Wt(kg): --    ALLERGIES  sulfa drugs (Hives)  sulfa drugs (Rash)  Sulfac 10% (Hives)      MEDICATIONS   acetaminophen  IVPB. 1000 milliGRAM(s) IV Intermittent once PRN  ALBUTerol/ipratropium for Nebulization 3 milliLiter(s) Nebulizer every 6 hours  aspirin  chewable 81 milliGRAM(s) Oral daily  atorvastatin 20 milliGRAM(s) Oral at bedtime  clopidogrel Tablet 75 milliGRAM(s) Oral daily  dextrose 50% Injectable 25 Gram(s) IV Push once  heparin  Injectable 5000 Unit(s) SubCutaneous every 12 hours  HYDROmorphone  Injectable 0.5 milliGRAM(s) IV Push every 4 hours PRN  insulin detemir injectable (LEVEMIR) 23 Unit(s) SubCutaneous daily  insulin lispro (HumaLOG) corrective regimen sliding scale   SubCutaneous Before meals and at bedtime  insulin lispro Injectable (HumaLOG) 7 Unit(s) SubCutaneous three times a day before meals  oxyCODONE    IR 5 milliGRAM(s) Oral every 4 hours PRN  oxyCODONE    IR 10 milliGRAM(s) Oral every 6 hours PRN  piperacillin/tazobactam IVPB. 3.375 Gram(s) IV Intermittent every 8 hours  sertraline 25 milliGRAM(s) Oral daily  vancomycin  IVPB      vancomycin  IVPB 1000 milliGRAM(s) IV Intermittent every 24 hours      ----------------------------------------------------------------------------------------  PHYSICAL EXAM  Constitutional - NAD, Comfortable  HEENT - NCAT, EOMI  Neck - Supple, No limited ROM  Chest - CTA bilaterally, No wheeze, No rhonchi, No crackles  Cardiovascular - RRR, S1S2, No murmurs  Abdomen - BS+, Soft, NTND  Extremities - L AKA, with dressing   Neurologic Exam -                    Cognitive - Awake, Alert, AAO to self, place, date, year, situation     Communication - Fluent, No dysarthria, no aphasia     Cranial Nerves - CN 2-12 intact     Motor - 5/5 R hip/knee. 2/5 L hip                          Balance - not tested   Psychiatric - Mood stable, Affect WNL

## 2018-03-23 LAB
BUN SERPL-MCNC: 32 MG/DL — HIGH (ref 7–23)
CALCIUM SERPL-MCNC: 8.1 MG/DL — LOW (ref 8.4–10.5)
CHLORIDE SERPL-SCNC: 107 MMOL/L — SIGNIFICANT CHANGE UP (ref 98–107)
CO2 SERPL-SCNC: 25 MMOL/L — SIGNIFICANT CHANGE UP (ref 22–31)
CREAT SERPL-MCNC: 1.39 MG/DL — HIGH (ref 0.5–1.3)
GLUCOSE SERPL-MCNC: 143 MG/DL — HIGH (ref 70–99)
HCT VFR BLD CALC: 30.6 % — LOW (ref 34.5–45)
HGB BLD-MCNC: 9.6 G/DL — LOW (ref 11.5–15.5)
MAGNESIUM SERPL-MCNC: 1.8 MG/DL — SIGNIFICANT CHANGE UP (ref 1.6–2.6)
MCHC RBC-ENTMCNC: 28.6 PG — SIGNIFICANT CHANGE UP (ref 27–34)
MCHC RBC-ENTMCNC: 31.4 % — LOW (ref 32–36)
MCV RBC AUTO: 91.1 FL — SIGNIFICANT CHANGE UP (ref 80–100)
NRBC # FLD: 0.02 — SIGNIFICANT CHANGE UP
PHOSPHATE SERPL-MCNC: 3.8 MG/DL — SIGNIFICANT CHANGE UP (ref 2.5–4.5)
PLATELET # BLD AUTO: 271 K/UL — SIGNIFICANT CHANGE UP (ref 150–400)
PMV BLD: 11.4 FL — SIGNIFICANT CHANGE UP (ref 7–13)
POTASSIUM SERPL-MCNC: 4.5 MMOL/L — SIGNIFICANT CHANGE UP (ref 3.5–5.3)
POTASSIUM SERPL-SCNC: 4.5 MMOL/L — SIGNIFICANT CHANGE UP (ref 3.5–5.3)
RBC # BLD: 3.36 M/UL — LOW (ref 3.8–5.2)
RBC # FLD: 17.2 % — HIGH (ref 10.3–14.5)
SODIUM SERPL-SCNC: 142 MMOL/L — SIGNIFICANT CHANGE UP (ref 135–145)
VANCOMYCIN TROUGH SERPL-MCNC: 12.6 UG/ML — SIGNIFICANT CHANGE UP (ref 10–20)
WBC # BLD: 11.48 K/UL — HIGH (ref 3.8–10.5)
WBC # FLD AUTO: 11.48 K/UL — HIGH (ref 3.8–10.5)

## 2018-03-23 PROCEDURE — 99232 SBSQ HOSP IP/OBS MODERATE 35: CPT

## 2018-03-23 PROCEDURE — 99233 SBSQ HOSP IP/OBS HIGH 50: CPT | Mod: GC

## 2018-03-23 RX ORDER — FUROSEMIDE 40 MG
20 TABLET ORAL ONCE
Qty: 0 | Refills: 0 | Status: COMPLETED | OUTPATIENT
Start: 2018-03-23 | End: 2018-03-23

## 2018-03-23 RX ORDER — HYDRALAZINE HCL 50 MG
50 TABLET ORAL EVERY 12 HOURS
Qty: 0 | Refills: 0 | Status: DISCONTINUED | OUTPATIENT
Start: 2018-03-23 | End: 2018-03-24

## 2018-03-23 RX ADMIN — Medication 7 UNIT(S): at 13:11

## 2018-03-23 RX ADMIN — Medication 50 MILLIGRAM(S): at 11:52

## 2018-03-23 RX ADMIN — SERTRALINE 25 MILLIGRAM(S): 25 TABLET, FILM COATED ORAL at 09:48

## 2018-03-23 RX ADMIN — Medication 1: at 22:01

## 2018-03-23 RX ADMIN — OXYCODONE HYDROCHLORIDE 10 MILLIGRAM(S): 5 TABLET ORAL at 05:45

## 2018-03-23 RX ADMIN — Medication 7 UNIT(S): at 09:47

## 2018-03-23 RX ADMIN — ATORVASTATIN CALCIUM 20 MILLIGRAM(S): 80 TABLET, FILM COATED ORAL at 21:32

## 2018-03-23 RX ADMIN — Medication 20 MILLIGRAM(S): at 10:51

## 2018-03-23 RX ADMIN — Medication 1: at 13:11

## 2018-03-23 RX ADMIN — PIPERACILLIN AND TAZOBACTAM 25 GRAM(S): 4; .5 INJECTION, POWDER, LYOPHILIZED, FOR SOLUTION INTRAVENOUS at 10:54

## 2018-03-23 RX ADMIN — NYSTATIN CREAM 1 APPLICATION(S): 100000 CREAM TOPICAL at 05:31

## 2018-03-23 RX ADMIN — OXYCODONE HYDROCHLORIDE 10 MILLIGRAM(S): 5 TABLET ORAL at 17:57

## 2018-03-23 RX ADMIN — Medication 250 MILLIGRAM(S): at 09:52

## 2018-03-23 RX ADMIN — Medication 3 MILLILITER(S): at 16:44

## 2018-03-23 RX ADMIN — Medication 3 MILLILITER(S): at 04:21

## 2018-03-23 RX ADMIN — CLOPIDOGREL BISULFATE 75 MILLIGRAM(S): 75 TABLET, FILM COATED ORAL at 09:48

## 2018-03-23 RX ADMIN — Medication 20 MILLIGRAM(S): at 15:06

## 2018-03-23 RX ADMIN — Medication 23 UNIT(S): at 09:47

## 2018-03-23 RX ADMIN — Medication 3 MILLILITER(S): at 21:35

## 2018-03-23 RX ADMIN — OXYCODONE HYDROCHLORIDE 10 MILLIGRAM(S): 5 TABLET ORAL at 17:27

## 2018-03-23 RX ADMIN — OXYCODONE HYDROCHLORIDE 10 MILLIGRAM(S): 5 TABLET ORAL at 06:15

## 2018-03-23 RX ADMIN — Medication 3 MILLILITER(S): at 10:19

## 2018-03-23 RX ADMIN — Medication 2: at 17:21

## 2018-03-23 RX ADMIN — HEPARIN SODIUM 5000 UNIT(S): 5000 INJECTION INTRAVENOUS; SUBCUTANEOUS at 17:23

## 2018-03-23 RX ADMIN — Medication 7 UNIT(S): at 17:21

## 2018-03-23 RX ADMIN — PIPERACILLIN AND TAZOBACTAM 25 GRAM(S): 4; .5 INJECTION, POWDER, LYOPHILIZED, FOR SOLUTION INTRAVENOUS at 04:09

## 2018-03-23 RX ADMIN — PIPERACILLIN AND TAZOBACTAM 25 GRAM(S): 4; .5 INJECTION, POWDER, LYOPHILIZED, FOR SOLUTION INTRAVENOUS at 17:22

## 2018-03-23 RX ADMIN — Medication 50 MILLIGRAM(S): at 17:22

## 2018-03-23 RX ADMIN — NYSTATIN CREAM 1 APPLICATION(S): 100000 CREAM TOPICAL at 18:14

## 2018-03-23 RX ADMIN — HEPARIN SODIUM 5000 UNIT(S): 5000 INJECTION INTRAVENOUS; SUBCUTANEOUS at 05:31

## 2018-03-23 RX ADMIN — Medication 81 MILLIGRAM(S): at 09:48

## 2018-03-23 NOTE — PROGRESS NOTE ADULT - PROBLEM SELECTOR PLAN 8
- CKD III at baseline  - DAMARI on CKD    - continue to trend Cr daily   - c/w vanc by level - CKD III at baseline  - DAMARI on CKD (trending down today)  - likely secondary to post obstructive and prerenal from hypotension  - adequate urine output- continue to trend  - c/w vanc by level

## 2018-03-23 NOTE — PROGRESS NOTE ADULT - SUBJECTIVE AND OBJECTIVE BOX
Vascular Surgery Progress Note  C team [id number]      Subjective/interval events:  -pain stable  -received 1U pRBC yesterday for h/h 7.8/25.0, w/ excess response to 9.9/31.5 on post-transfusion cbc  -h/h stable this am on labs 9.6/30.6      Objective:  Vital Signs Last 24 Hrs  T(C): 37.1 (23 Mar 2018 05:27), Max: 37.1 (22 Mar 2018 15:05)  T(F): 98.8 (23 Mar 2018 05:27), Max: 98.8 (22 Mar 2018 15:05)  HR: 73 (23 Mar 2018 05:27) (63 - 82)  BP: 153/56 (23 Mar 2018 06:26) (119/51 - 178/62)  BP(mean): --  RR: 18 (23 Mar 2018 05:27) (17 - 18)  SpO2: 100% (23 Mar 2018 05:27) (93% - 100%)      I&O's Summary    22 Mar 2018 07:01  -  23 Mar 2018 07:00  --------------------------------------------------------  IN: 230 mL / OUT: 1435 mL / NET: -1205 mL        PE:  Gen: NAD, depressed affect  Ext: RLE s/p amputation; s/p LLE AKA, ace wrap dry; dressing removed, staples in place, soft, no drainage; dressing stained w/ some blood spotting & small clots mid-incision, no active bleeding w/ clots removed. Redressed w/ clean kerlex x2 & ACE x2      MEDICATIONS  (STANDING):  ALBUTerol/ipratropium for Nebulization 3 milliLiter(s) Nebulizer every 6 hours  aspirin  chewable 81 milliGRAM(s) Oral daily  atorvastatin 20 milliGRAM(s) Oral at bedtime  clopidogrel Tablet 75 milliGRAM(s) Oral daily  dextrose 50% Injectable 25 Gram(s) IV Push once  furosemide   Injectable 20 milliGRAM(s) IV Push once  heparin  Injectable 5000 Unit(s) SubCutaneous every 12 hours  insulin detemir injectable (LEVEMIR) 23 Unit(s) SubCutaneous daily  insulin lispro (HumaLOG) corrective regimen sliding scale   SubCutaneous Before meals and at bedtime  insulin lispro Injectable (HumaLOG) 7 Unit(s) SubCutaneous three times a day before meals  nystatin Cream 1 Application(s) Topical two times a day  piperacillin/tazobactam IVPB. 3.375 Gram(s) IV Intermittent every 8 hours  sertraline 25 milliGRAM(s) Oral daily  vancomycin  IVPB      vancomycin  IVPB 1000 milliGRAM(s) IV Intermittent every 24 hours    MEDICATIONS  (PRN):  acetaminophen  IVPB. 1000 milliGRAM(s) IV Intermittent once PRN Mild Pain (1 - 3)  HYDROmorphone  Injectable 0.5 milliGRAM(s) IV Push every 4 hours PRN Severe Pain (7 - 10)  oxyCODONE    IR 5 milliGRAM(s) Oral every 4 hours PRN Moderate Pain (4 - 6)  oxyCODONE    IR 10 milliGRAM(s) Oral every 6 hours PRN Severe Pain (7 - 10)        Labs:                          9.6    11.48 )-----------( 271      ( 23 Mar 2018 04:00 )             30.6                         7.8    11.68 )-----------( 294      ( 22 Mar 2018 05:00 )             25.0                         8.3    16.15 )-----------( 411      ( 20 Mar 2018 23:40 )             26.8                         9.0    12.60 )-----------( 526      ( 15 Mar 2018 07:10 )             28.8                         8.3    14.65 )-----------( 358      ( 12 Mar 2018 05:53 )             24.5     03-23    142  |  107  |  32<H>  ----------------------------<  143<H>  4.5   |  25  |  1.39<H>    Ca    8.1<L>      23 Mar 2018 04:00  Phos  3.8     03-23  Mg     1.8     03-23 03-22    142  |  105  |  31<H>  ----------------------------<  179<H>  4.4   |  26  |  1.46<H>    Ca    8.2<L>      22 Mar 2018 05:00  Phos  4.7     03-22  Mg     1.7     03-22 03-20    142  |  106  |  25<H>  ----------------------------<  212<H>  4.1   |  24  |  1.03    Ca    8.4      20 Mar 2018 04:50  Phos  3.2     03-20  Mg     1.7     03-20    TPro  7.5  /  Alb  2.4<L>  /  TBili  0.5  /  DBili  x   /  AST  45<H>  /  ALT  42<H>  /  AlkPhos  150<H>  03-19      03-15    143  |  112<H>  |  38<H>  ----------------------------<  66<L>  4.6   |  16<L>  |  1.25    Ca    8.2<L>      15 Mar 2018 07:10  Phos  4.2     03-15  Mg     2.2     03-15      03-12    135  |  104  |  45<H>  ----------------------------<  152<H>  4.8   |  19<L>  |  1.84<H>    Ca    8.3<L>      12 Mar 2018 05:53  Phos  4.5     03-12  Mg     2.1     03-12    Type + Screen (03.19.18 @ 17:09)    ABO Interpretation: A    Rh Interpretation: Positive    Antibody Screen: Negative        Type + Screen (03.10.18 @ 06:00)    ABO Interpretation: A    Rh Interpretation: Positive    Antibody Screen: Negative        Imaging:  US Duplex Venous Lower Ext Complete, Bilateral (03.05.18 @ 17:17) >  FINDINGS:    There is normal compressibility of the bilateral common femoral, femoral   and popliteal veins. Calf veins are not visualized in the right lower   extremity due to below the knee amputation. Left peroneal veins are not   visualized.    Doppler examination shows normal spontaneous and phasic flow.    IMPRESSION:     No evidence of bilateral lower extremity deep venous thrombosis.      MR Foot w/ IV Cont, Left (03.05.18 @ 14:18) >    Diffuse cellulitis throughout lower leg and imaged hindfoot. Status post   transmetatarsal amputation of the first through fifth rays. Acute   osteomyelitis involving the first and second metatarsal remnants distally   as above.    Mild edema and enhancement within the third and fourth metatarsal   remnants distally with grossly preserved T1 marrow signal. Differential   considerations include reactive osteitis and early osteomyelitis.    Status post Achilles tenotomy with a fluid collection in the tenotomy   gap. This may be related to postoperative seroma. Superimposed infection   at this site cannot be excluded.      Xray Chest 1 View-PORTABLE IMMEDIATE (03.10.18 @ 12:33) >  IMPRESSION:  Lungs underinflated.    Increased left basilar markings and strand-like opacities could be   compatible with subsegmental atelectatic changes or infiltrate/pneumonia   in the proper clinical context. Clear remaining visualized lungs. No   pleural effusion or pneumothorax.    Stable sternal wires, multiple small surgical clips, and slightly   prominent appearing cardiac and mediastinal silhouettes.    Trachea midline.    Left neck surgical clips. Vascular Surgery Progress Note  C team [id number]      Subjective/interval events:  -pain controlled  -received 1U pRBC yesterday for h/h 7.8/25.0, w/ excess response to 9.9/31.5 on post-transfusion cbc  -h/h stable this am on labs 9.6/30.6      Objective:  Vital Signs Last 24 Hrs  T(C): 37.1 (23 Mar 2018 05:27), Max: 37.1 (22 Mar 2018 15:05)  T(F): 98.8 (23 Mar 2018 05:27), Max: 98.8 (22 Mar 2018 15:05)  HR: 73 (23 Mar 2018 05:27) (63 - 82)  BP: 153/56 (23 Mar 2018 06:26) (119/51 - 178/62)  BP(mean): --  RR: 18 (23 Mar 2018 05:27) (17 - 18)  SpO2: 100% (23 Mar 2018 05:27) (93% - 100%)      I&O's Summary    22 Mar 2018 07:01  -  23 Mar 2018 07:00  --------------------------------------------------------  IN: 230 mL / OUT: 1435 mL / NET: -1205 mL        PE:  Gen: NAD, depressed affect  Ext: RLE s/p amputation; s/p LLE AKA, ace wrap dry; dressing removed, staples in place, soft, no drainage; dressing stained w/ some blood spotting & small clots mid-incision, no active bleeding w/ clots removed. Redressed w/ clean kerlex x2 & ACE x2      MEDICATIONS  (STANDING):  ALBUTerol/ipratropium for Nebulization 3 milliLiter(s) Nebulizer every 6 hours  aspirin  chewable 81 milliGRAM(s) Oral daily  atorvastatin 20 milliGRAM(s) Oral at bedtime  clopidogrel Tablet 75 milliGRAM(s) Oral daily  dextrose 50% Injectable 25 Gram(s) IV Push once  furosemide   Injectable 20 milliGRAM(s) IV Push once  heparin  Injectable 5000 Unit(s) SubCutaneous every 12 hours  insulin detemir injectable (LEVEMIR) 23 Unit(s) SubCutaneous daily  insulin lispro (HumaLOG) corrective regimen sliding scale   SubCutaneous Before meals and at bedtime  insulin lispro Injectable (HumaLOG) 7 Unit(s) SubCutaneous three times a day before meals  nystatin Cream 1 Application(s) Topical two times a day  piperacillin/tazobactam IVPB. 3.375 Gram(s) IV Intermittent every 8 hours  sertraline 25 milliGRAM(s) Oral daily  vancomycin  IVPB      vancomycin  IVPB 1000 milliGRAM(s) IV Intermittent every 24 hours    MEDICATIONS  (PRN):  acetaminophen  IVPB. 1000 milliGRAM(s) IV Intermittent once PRN Mild Pain (1 - 3)  HYDROmorphone  Injectable 0.5 milliGRAM(s) IV Push every 4 hours PRN Severe Pain (7 - 10)  oxyCODONE    IR 5 milliGRAM(s) Oral every 4 hours PRN Moderate Pain (4 - 6)  oxyCODONE    IR 10 milliGRAM(s) Oral every 6 hours PRN Severe Pain (7 - 10)        Labs:                          9.6    11.48 )-----------( 271      ( 23 Mar 2018 04:00 )             30.6                         7.8    11.68 )-----------( 294      ( 22 Mar 2018 05:00 )             25.0                         8.3    16.15 )-----------( 411      ( 20 Mar 2018 23:40 )             26.8                         9.0    12.60 )-----------( 526      ( 15 Mar 2018 07:10 )             28.8                         8.3    14.65 )-----------( 358      ( 12 Mar 2018 05:53 )             24.5     03-23    142  |  107  |  32<H>  ----------------------------<  143<H>  4.5   |  25  |  1.39<H>    Ca    8.1<L>      23 Mar 2018 04:00  Phos  3.8     03-23  Mg     1.8     03-23 03-22    142  |  105  |  31<H>  ----------------------------<  179<H>  4.4   |  26  |  1.46<H>    Ca    8.2<L>      22 Mar 2018 05:00  Phos  4.7     03-22  Mg     1.7     03-22 03-20    142  |  106  |  25<H>  ----------------------------<  212<H>  4.1   |  24  |  1.03    Ca    8.4      20 Mar 2018 04:50  Phos  3.2     03-20  Mg     1.7     03-20    TPro  7.5  /  Alb  2.4<L>  /  TBili  0.5  /  DBili  x   /  AST  45<H>  /  ALT  42<H>  /  AlkPhos  150<H>  03-19      03-15    143  |  112<H>  |  38<H>  ----------------------------<  66<L>  4.6   |  16<L>  |  1.25    Ca    8.2<L>      15 Mar 2018 07:10  Phos  4.2     03-15  Mg     2.2     03-15      03-12    135  |  104  |  45<H>  ----------------------------<  152<H>  4.8   |  19<L>  |  1.84<H>    Ca    8.3<L>      12 Mar 2018 05:53  Phos  4.5     03-12  Mg     2.1     03-12    Type + Screen (03.19.18 @ 17:09)    ABO Interpretation: A    Rh Interpretation: Positive    Antibody Screen: Negative        Type + Screen (03.10.18 @ 06:00)    ABO Interpretation: A    Rh Interpretation: Positive    Antibody Screen: Negative        Imaging:  US Duplex Venous Lower Ext Complete, Bilateral (03.05.18 @ 17:17) >  FINDINGS:    There is normal compressibility of the bilateral common femoral, femoral   and popliteal veins. Calf veins are not visualized in the right lower   extremity due to below the knee amputation. Left peroneal veins are not   visualized.    Doppler examination shows normal spontaneous and phasic flow.    IMPRESSION:     No evidence of bilateral lower extremity deep venous thrombosis.      MR Foot w/ IV Cont, Left (03.05.18 @ 14:18) >    Diffuse cellulitis throughout lower leg and imaged hindfoot. Status post   transmetatarsal amputation of the first through fifth rays. Acute   osteomyelitis involving the first and second metatarsal remnants distally   as above.    Mild edema and enhancement within the third and fourth metatarsal   remnants distally with grossly preserved T1 marrow signal. Differential   considerations include reactive osteitis and early osteomyelitis.    Status post Achilles tenotomy with a fluid collection in the tenotomy   gap. This may be related to postoperative seroma. Superimposed infection   at this site cannot be excluded.      Xray Chest 1 View-PORTABLE IMMEDIATE (03.10.18 @ 12:33) >  IMPRESSION:  Lungs underinflated.    Increased left basilar markings and strand-like opacities could be   compatible with subsegmental atelectatic changes or infiltrate/pneumonia   in the proper clinical context. Clear remaining visualized lungs. No   pleural effusion or pneumothorax.    Stable sternal wires, multiple small surgical clips, and slightly   prominent appearing cardiac and mediastinal silhouettes.    Trachea midline.    Left neck surgical clips.

## 2018-03-23 NOTE — PROGRESS NOTE ADULT - ASSESSMENT
72M PMHx PVD admitted w/ cellulitis and infection of previous TMA site s/p L ankle disarticulation (Podiatry 3/10), s/p L AKA (3/20). CBC now stable.     -pain management per primary team  -daily dressing change, kerlex & ACE.   -abx per ID  -continued medical management per primary team      KENTRELL Bal MD (PGY2)    (Please page C team Vascular [phone] for questions/concerns.) 72M PMHx PVD admitted w/ cellulitis and infection of previous TMA site s/p L ankle disarticulation (Podiatry 3/10), s/p L AKA (3/20). CBC now stable.     -pain management per primary team  -daily dressing change, kerlex & ACE.   -abx per ID  -continued medical management per primary team    On Discharge, patient will require incision to be evaluated for possible staple removal 2weeks post-surgery (~approximately 4/3/18); patient should follow-up as outpatient w/ Dr. CORNEL Moraes, office [phone].          KENTRELL Bal MD (PGY2)    (Please page C team Vascular [phone] for questions/concerns.) 72M PMHx PVD admitted w/ cellulitis and infection of previous TMA site s/p L ankle disarticulation (Podiatry 3/10), s/p L AKA (3/20). CBC now stable.     -pain management per primary team  -daily dressing change, kerlex & ACE.   -abx per ID  -continued medical management per primary team    On Discharge, patient will require incision to be evaluated as outpatient for possible staple removal @ 2weeks post-surgery (~approximately 4/3/18); patient should follow-up as outpatient w/ Dr. CORNEL Moraes, office [phone].          KENTRELL Bal MD (PGY2)    (Please page C team Vascular [phone] for questions/concerns.)

## 2018-03-23 NOTE — PROVIDER CONTACT NOTE (OTHER) - ACTION/TREATMENT ORDERED:
Awaiting order for Santa.
MD Olivares aware. No interventions needed. Will continue to monitor.
Md aware. Tylenol order pending
Will continue to monitor. repeat bladder scanner, straight cath if necessary
continue to monitor, bladder scan again in 8 hours, place howe
given prn Tylenol 650 mg po. will continue monitoring
ice packs given. tylenol given. will cont to monitor.
will administer tylenol as per MD. KO to put in new blood pressure medication. will cont to monitor.

## 2018-03-23 NOTE — PROGRESS NOTE ADULT - SUBJECTIVE AND OBJECTIVE BOX
Shae Shin PGY 1  Pager: [phone]/ [phone]    Patient is a 72y old  Female who presents with a chief complaint of Fevers and leg pain (06 Mar 2018 17:24)    SUBJECTIVE / OVERNIGHT EVENTS:  Patient seen and examined at bedside with no acute events overnight. Post transfusion CBC indicated appropriate response response.      MEDICATIONS  (STANDING):  ALBUTerol/ipratropium for Nebulization 3 milliLiter(s) Nebulizer every 6 hours  aspirin  chewable 81 milliGRAM(s) Oral daily  atorvastatin 20 milliGRAM(s) Oral at bedtime  clopidogrel Tablet 75 milliGRAM(s) Oral daily  dextrose 50% Injectable 25 Gram(s) IV Push once  heparin  Injectable 5000 Unit(s) SubCutaneous every 12 hours  insulin detemir injectable (LEVEMIR) 23 Unit(s) SubCutaneous daily  insulin lispro (HumaLOG) corrective regimen sliding scale   SubCutaneous Before meals and at bedtime  insulin lispro Injectable (HumaLOG) 7 Unit(s) SubCutaneous three times a day before meals  nystatin Cream 1 Application(s) Topical two times a day  piperacillin/tazobactam IVPB. 3.375 Gram(s) IV Intermittent every 8 hours  sertraline 25 milliGRAM(s) Oral daily  vancomycin  IVPB      vancomycin  IVPB 1000 milliGRAM(s) IV Intermittent every 24 hours    MEDICATIONS  (PRN):  acetaminophen  IVPB. 1000 milliGRAM(s) IV Intermittent once PRN Mild Pain (1 - 3)  HYDROmorphone  Injectable 0.5 milliGRAM(s) IV Push every 4 hours PRN Severe Pain (7 - 10)  oxyCODONE    IR 5 milliGRAM(s) Oral every 4 hours PRN Moderate Pain (4 - 6)  oxyCODONE    IR 10 milliGRAM(s) Oral every 6 hours PRN Severe Pain (7 - 10)      OBJECTIVE:    Vital Signs Last 24 Hrs  T(C): 37.1 (23 Mar 2018 05:27), Max: 37.1 (22 Mar 2018 15:05)  T(F): 98.8 (23 Mar 2018 05:27), Max: 98.8 (22 Mar 2018 15:05)  HR: 73 (23 Mar 2018 05:27) (60 - 82)  BP: 153/56 (23 Mar 2018 06:26) (119/45 - 178/62)  BP(mean): --  RR: 18 (23 Mar 2018 05:27) (17 - 18)  SpO2: 100% (23 Mar 2018 05:27) (93% - 100%)    CAPILLARY BLOOD GLUCOSE      POCT Blood Glucose.: 199 mg/dL (22 Mar 2018 21:47)  POCT Blood Glucose.: 233 mg/dL (22 Mar 2018 17:44)  POCT Blood Glucose.: 191 mg/dL (22 Mar 2018 13:01)  POCT Blood Glucose.: 192 mg/dL (22 Mar 2018 09:06)    I&O's Summary    21 Mar 2018 07:01  -  22 Mar 2018 07:00  --------------------------------------------------------  IN: 120 mL / OUT: 0 mL / NET: 120 mL    22 Mar 2018 07:01  -  23 Mar 2018 06:56  --------------------------------------------------------  IN: 230 mL / OUT: 1435 mL / NET: -1205 mL        PHYSICAL EXAM:  GENERAL: NAD, well-developed  EYES: EOMI, PERRLA, conjunctiva and sclera clear  NECK: Supple, No JVD  CHEST/LUNG: Clear to auscultation bilaterally; No wheeze  HEART: Regular rate and rhythm; No murmurs, rubs, or gallops  ABDOMEN: Soft, Nontender, Nondistended; Bowel sounds present  EXTREMITIES:  2+ Peripheral Pulses, No clubbing, cyanosis, or edema  PSYCH: AAOx3  NEUROLOGY: non-focal  SKIN: No rashes or lesions    LABS:                        9.9    9.81  )-----------( 267      ( 22 Mar 2018 19:52 )             31.5     Auto Eosinophil # x     / Auto Eosinophil % x     / Auto Neutrophil # x     / Auto Neutrophil % x     / BANDS % x                            7.8    11.68 )-----------( 294      ( 22 Mar 2018 05:00 )             25.0     Auto Eosinophil # x     / Auto Eosinophil % x     / Auto Neutrophil # x     / Auto Neutrophil % x     / BANDS % x                            7.6    12.15 )-----------( 284      ( 21 Mar 2018 22:30 )             24.5     Auto Eosinophil # x     / Auto Eosinophil % x     / Auto Neutrophil # x     / Auto Neutrophil % x     / BANDS % x        03-22    142  |  105  |  31<H>  ----------------------------<  179<H>  4.4   |  26  |  1.46<H>  03-21    143  |  108<H>  |  26<H>  ----------------------------<  122<H>  4.2   |  24  |  1.19    Ca    8.2<L>      22 Mar 2018 05:00  Mg     1.7     03-22  Phos  4.7     03-22      CARDIAC MARKERS ( 22 Mar 2018 05:00 )  x     / 0.18 ng/mL / 218 u/L / 2.17 ng/mL / x      CARDIAC MARKERS ( 21 Mar 2018 14:44 )  x     / 0.21 ng/mL / 170 u/L / 2.33 ng/mL / x Shae Shin PGY 1  Pager: [phone]/ [phone]    Patient is a 72y old  Female who presents with a chief complaint of Fevers and leg pain (06 Mar 2018 17:24)    SUBJECTIVE / OVERNIGHT EVENTS:  Patient seen and examined at bedside with no acute events overnight. Post transfusion CBC indicated appropriate response response.      MEDICATIONS  (STANDING):  ALBUTerol/ipratropium for Nebulization 3 milliLiter(s) Nebulizer every 6 hours  aspirin  chewable 81 milliGRAM(s) Oral daily  atorvastatin 20 milliGRAM(s) Oral at bedtime  clopidogrel Tablet 75 milliGRAM(s) Oral daily  dextrose 50% Injectable 25 Gram(s) IV Push once  heparin  Injectable 5000 Unit(s) SubCutaneous every 12 hours  insulin detemir injectable (LEVEMIR) 23 Unit(s) SubCutaneous daily  insulin lispro (HumaLOG) corrective regimen sliding scale   SubCutaneous Before meals and at bedtime  insulin lispro Injectable (HumaLOG) 7 Unit(s) SubCutaneous three times a day before meals  nystatin Cream 1 Application(s) Topical two times a day  piperacillin/tazobactam IVPB. 3.375 Gram(s) IV Intermittent every 8 hours  sertraline 25 milliGRAM(s) Oral daily  vancomycin  IVPB      vancomycin  IVPB 1000 milliGRAM(s) IV Intermittent every 24 hours    MEDICATIONS  (PRN):  acetaminophen  IVPB. 1000 milliGRAM(s) IV Intermittent once PRN Mild Pain (1 - 3)  HYDROmorphone  Injectable 0.5 milliGRAM(s) IV Push every 4 hours PRN Severe Pain (7 - 10)  oxyCODONE    IR 5 milliGRAM(s) Oral every 4 hours PRN Moderate Pain (4 - 6)  oxyCODONE    IR 10 milliGRAM(s) Oral every 6 hours PRN Severe Pain (7 - 10)      OBJECTIVE:    Vital Signs Last 24 Hrs  T(C): 37.1 (23 Mar 2018 05:27), Max: 37.1 (22 Mar 2018 15:05)  T(F): 98.8 (23 Mar 2018 05:27), Max: 98.8 (22 Mar 2018 15:05)  HR: 73 (23 Mar 2018 05:27) (60 - 82)  BP: 153/56 (23 Mar 2018 06:26) (119/45 - 178/62)  BP(mean): --  RR: 18 (23 Mar 2018 05:27) (17 - 18)  SpO2: 100% (23 Mar 2018 05:27) (93% - 100%)    CAPILLARY BLOOD GLUCOSE      POCT Blood Glucose.: 199 mg/dL (22 Mar 2018 21:47)  POCT Blood Glucose.: 233 mg/dL (22 Mar 2018 17:44)  POCT Blood Glucose.: 191 mg/dL (22 Mar 2018 13:01)  POCT Blood Glucose.: 192 mg/dL (22 Mar 2018 09:06)    I&O's Summary    21 Mar 2018 07:01  -  22 Mar 2018 07:00  --------------------------------------------------------  IN: 120 mL / OUT: 0 mL / NET: 120 mL    22 Mar 2018 07:01  -  23 Mar 2018 06:56  --------------------------------------------------------  IN: 230 mL / OUT: 1435 mL / NET: -1205 mL        PHYSICAL EXAM:  GENERAL: NAD, well-developed  HEENT: EOMI; supple neck  CHEST/LUNG: crackles b/l mid to lower bases, no wheezes auscultated  HEART: Regular rate and rhythm; No murmurs, rubs, or gallops  ABDOMEN: Soft, Nontender, Nondistended; Bowel sounds present  EXTREMITIES:  BKA on right lower extremity, warm to touch, ; AKA on LLE- warm to touch, with dressing intact, strong femoral pulse  PSYCH: AAOx3  NEUROLOGY: non-focal  SKIN: No rashes or lesions    LABS:                        9.9    9.81  )-----------( 267      ( 22 Mar 2018 19:52 )             31.5     Auto Eosinophil # x     / Auto Eosinophil % x     / Auto Neutrophil # x     / Auto Neutrophil % x     / BANDS % x                            7.8    11.68 )-----------( 294      ( 22 Mar 2018 05:00 )             25.0     Auto Eosinophil # x     / Auto Eosinophil % x     / Auto Neutrophil # x     / Auto Neutrophil % x     / BANDS % x                            7.6    12.15 )-----------( 284      ( 21 Mar 2018 22:30 )             24.5     Auto Eosinophil # x     / Auto Eosinophil % x     / Auto Neutrophil # x     / Auto Neutrophil % x     / BANDS % x        03-22    142  |  105  |  31<H>  ----------------------------<  179<H>  4.4   |  26  |  1.46<H>  03-21    143  |  108<H>  |  26<H>  ----------------------------<  122<H>  4.2   |  24  |  1.19    Ca    8.2<L>      22 Mar 2018 05:00  Mg     1.7     03-22  Phos  4.7     03-22      CARDIAC MARKERS ( 22 Mar 2018 05:00 )  x     / 0.18 ng/mL / 218 u/L / 2.17 ng/mL / x      CARDIAC MARKERS ( 21 Mar 2018 14:44 )  x     / 0.21 ng/mL / 170 u/L / 2.33 ng/mL / x

## 2018-03-23 NOTE — PROVIDER CONTACT NOTE (OTHER) - BACKGROUND
Pt has been straight cathed 2x d/t BS showing >300ml in bladder. Received provider to RN to insert howe if 3rd BS shows >300.
Patient admitted with cellulitis and fever.
patient had howe removed yesterday post op
patient has post op howe removed yesterday
pt admitted for cellulitis
pt admitted for cellulitis.
pt admitted for osteomyelitis.
pt is admitted with cellulitis of LLE

## 2018-03-23 NOTE — PROGRESS NOTE ADULT - PROBLEM SELECTOR PLAN 6
-Suspect likely in the post-op setting of anesthesia  - patient straight cathed x2 over 24 hours- if she continues to retain, will replace howe -Suspect likely in the post-op setting of anesthesia  - patient straight cathed x2 over 24 hours- if she continues to retain, will replace howe  - TOV 48 hours post howe placement

## 2018-03-23 NOTE — PROGRESS NOTE ADULT - SUBJECTIVE AND OBJECTIVE BOX
CC: Patient is a 72y old  Female who presents with a chief complaint of Fevers and leg pain (06 Mar 2018 17:24)    Interval History/ROS: Patient has no complaints. Denies fever, chills.    Allergies  sulfa drugs (Hives)  sulfa drugs (Rash)  Sulfac 10% (Hives)    ANTIMICROBIALS:  piperacillin/tazobactam IVPB. 3.375 every 8 hours  vancomycin  IVPB    vancomycin  IVPB 1000 every 24 hours    PE:    Vital Signs Last 24 Hrs  T(C): 37 (23 Mar 2018 11:15), Max: 37.1 (22 Mar 2018 15:05)  T(F): 98.6 (23 Mar 2018 11:15), Max: 98.8 (22 Mar 2018 15:05)  HR: 71 (23 Mar 2018 11:15) (63 - 82)  BP: 167/71 (23 Mar 2018 11:15) (119/51 - 178/62)  BP(mean): 109 (23 Mar 2018 11:15) (109 - 109)  RR: 18 (23 Mar 2018 11:15) (17 - 18)  SpO2: 95% (23 Mar 2018 11:15) (93% - 100%)    Gen: Awake, alert, NAD  CV: S1+S2 normal, no murmurs  Resp: Clear bilat, no resp distress  Abd: Soft, nontender, +BS  Ext: left leg bandaged s/p AKA  : No Pratt  IV/Skin: No thrombophlebitis  Neuro: no focal deficits    LABS:                          9.6    11.48 )-----------( 271      ( 23 Mar 2018 04:00 )             30.6       03-23    142  |  107  |  32<H>  ----------------------------<  143<H>  4.5   |  25  |  1.39<H>    Ca    8.1<L>      23 Mar 2018 04:00  Phos  3.8     03-23  Mg     1.8     03-23    MICROBIOLOGY:  Vancomycin Level, Trough: 12.6 ug/mL (03-23-18 @ 04:00)  v  ANKLE  03-10-18 --  --  --    ANKLE  03-10-18   NO GROWTH - PRELIMINARY RESULTS  NO ORGANISMS ISOLATED AT 24 HOURS  NO ORGANISMS ISOLATED AT 48 HRS.  NO ORGANISMS ISOLATED AT 72 HRS.  NO GROWTH AFTER 4 DAYS INCUBATION  NO GROWTH AFTER 5 DAYS INCUBATION  --  --    ANUS  03-10-18 --  --  --    OTHER  03-06-18   RARE  STRDYS^Streptococcus dysgalactiae  --  --    LEG - LEFT  03-03-18 --  --  Proteus mirabilis  Staph. aureus *MRSA*  Strep Beta Hemolytic Grp G    BLOOD PERIPHERAL  03-03-18 --  --  --    RADIOLOGY:    No new images.

## 2018-03-23 NOTE — PROGRESS NOTE ADULT - ATTENDING COMMENTS
As above. Seen and evaluated at bedside this afternoon. 73 yo F with DM2, PVD s/p R BKA and LE metatarsal amputation, CAD s/p CABG, CKD3, HTN, chronic stage 2 HFpEF presents with sepsis 2/2 LLE cellulitis and acute osteomyelitis s/p ankle disarticulation 3/18/18 wo resolution of infection now s/p left AKA (POD#3). Patient received 1U PRBC yesterday given troponin leak with good response. Patient has some wheezing on lung exam today, will give additional dose of Lasix 20mg IV x1 now as patient reports some slight SOB. Will need to continue with Vancomycin and Zosyn through Monday. As of now, surgical margins are clean.    Patient reports her pain is currently well-controlled. Will change meds for severe pain to oral oxycodone IR with dilaudid to be used as a breakthrough only. Patient's hospital course has also be c/b urinary retention post-operatively. Patient is still retaining urine today despite multiple straight caths. As such, will place howe catheter.     Anticipate dc to acute rehab on Monday.

## 2018-03-23 NOTE — PROGRESS NOTE ADULT - PROBLEM SELECTOR PLAN 2
- post op patient hypotensive to 90s/40s  - s/p 2 units PRBC (last unit on 3/22- given for evidence of demand ischemia after post op blood loss)  - in the AM patient hypertensive to to 170s  - will monitor BPS during the day and restart antihypertensives as tolerated (hydralazine and imdur) - post op patient hypotensive to 90s/40s  - s/p 2 units PRBC (last unit on 3/22- given for evidence of demand ischemia after post op blood loss)  - in the AM patient hypertensive  to 170s  - will monitor BPS during the day and restart antihypertensives as tolerated- start with hydralazine 50mg BID

## 2018-03-23 NOTE — PROGRESS NOTE ADULT - PROBLEM SELECTOR PLAN 1
- s/p AKA of the LLE by vascular surgery on 3/20  - per ID, continue with antibiotics until 3/24- vancomycin (by level) and zosyn (pending clinical course)  - s/p L ankle disarticulation by podiatry on 3/10  - pain control with oxycontin and IV dilaudid PRN- well controlled per patient  - PM&R eval- patient appropriate candidate for acute rehab ( likely 3/26) - s/p AKA of the LLE by vascular surgery on 3/20; per vascular- patient will be re-evaulated prior to discharge to determine time for staple removal  - per ID, continue with antibiotics until 3/24- vancomycin (by level) and zosyn (pending clinical course)  - s/p L ankle disarticulation by podiatry on 3/10  - pain control with oxycontin and IV dilaudid PRN- well controlled per patient  - PM&R eval- patient appropriate candidate for acute rehab ( likely 3/26)

## 2018-03-23 NOTE — PROGRESS NOTE ADULT - PROBLEM SELECTOR PLAN 7
-DAMARI on CKD3 - DAMARI on CKD3  - likely secondary to post obstructive and prerenal from hypotension  - adequate urine output (.7 mg/kg/hr)   - continue to trend creatinine

## 2018-03-23 NOTE — PROGRESS NOTE ADULT - ASSESSMENT
72F w/ PMHx of DM2, PVD s/p R BKA and L transmetatarsal amputation, CAD s/p CABG, CKD III, HTN, initially presented on 3/3 with fevers and LLE pain admitted for sepsis 2/2 to cellulitis and OM of LLE, s/p ankle adm 3/3/18 with fevers and LLE pain found to be in sepsis 2/2 cellulitis and OM of LLE, s/p ankle disarticulation by podiatry on 3/10, course c/b SOB likely 2/2 mild acute on chronic decompensated HFpEF, s/p AKA with post op course complicated by hypotension and urinary retention, now hemodynamically stable.

## 2018-03-23 NOTE — PROGRESS NOTE ADULT - ASSESSMENT
72 female with T2DM, PVD s/p R BKA and L transmetatarsal amputation, CAD s/p CABG, CKD stage 3, HTN, HLD here with fevers 103F, chills and left leg pain. Admitted for cellulitis of left lower extremity with warmth and erythema to that leg. Has elevated ESR and CRP.     MRI with diffuse cellulitis with acute osteo involving of first and second metatarsal remanants, with enhance of the third and fourth metatarsal, and fluid collection within the tenotomy gap. Wound cx with MRSA, strep Grp G and proteus; other wound cx with strep dysgalactiae.     s/p OR for ankle disarticulation and incision and drainage of ankle and Achilles tendon abscess. Now s/p left AKA.    Recommend:   - Continue vanco/ zosyn.  - If no concern for residual infection, can complete a short course of antibiotics anticipate to complete 3/24/2018.    Jose Alberto Palacios MD  Pager [phone]  After 5pm/weekends call [phone] 72 female with T2DM, PVD s/p R BKA and L transmetatarsal amputation, CAD s/p CABG, CKD stage 3, HTN, HLD here with fevers 103F, chills and left leg pain. Admitted for cellulitis of left lower extremity with warmth and erythema to that leg. Has elevated ESR and CRP.     MRI with diffuse cellulitis with acute osteo involving of first and second metatarsal remanants, with enhance of the third and fourth metatarsal, and fluid collection within the tenotomy gap. Wound cx with MRSA, strep Grp G and proteus; other wound cx with strep dysgalactiae.     s/p OR for ankle disarticulation and incision and drainage of ankle and Achilles tendon abscess. Now s/p left AKA.    Recommend:   - Continue vanco/ zosyn.  - If no concern for residual infection, can complete a short course of antibiotics anticipate to complete 3/24/2018.    Please call with questions.    Jose Alberto Palacios MD  Pager [phone]  After 5pm/weekends call [phone]

## 2018-03-23 NOTE — PROVIDER CONTACT NOTE (OTHER) - ASSESSMENT
Pt reports no pressure or discomfort in lower abd area.
Patient denies any urge to urinate, no pain, upon palpating bladder feels distended
Patient is calm and denies discomforts.
no distress noted. tylenol given.
patient denies any urge to pee, states she feels better and less pressure after urine removal
patient reports "feeling lousy". resting in the bed.
pt is alert and oriented. no distress noted
pt resting in the bed eating lunch. no complaints at this time.

## 2018-03-23 NOTE — PROGRESS NOTE ADULT - PROBLEM SELECTOR PLAN 4
- HgbA1c 8.1  -  c/w levemir 23 units at bedtime and premeal 7 (humalog) - HgbA1c 8.1  -  c/w Levemir 23 units at bedtime and pre-meal 7 (humalog)

## 2018-03-23 NOTE — PROGRESS NOTE ADULT - PROBLEM SELECTOR PLAN 3
- c/w Asa, Coreg, Plavix, Lipitor  - on exam, patient clinically _______ - c/w Asa, Coreg, Plavix, Lipitor  - on exam, patient clinically volume overloaded with crackles b/l to mid back  - will give 20 of IV lasix for now to ensure blood pressure does not drop

## 2018-03-24 LAB
APPEARANCE UR: SIGNIFICANT CHANGE UP
BACTERIA # UR AUTO: SIGNIFICANT CHANGE UP
BILIRUB UR-MCNC: NEGATIVE — SIGNIFICANT CHANGE UP
BLOOD UR QL VISUAL: HIGH
BUN SERPL-MCNC: 27 MG/DL — HIGH (ref 7–23)
CALCIUM SERPL-MCNC: 8.3 MG/DL — LOW (ref 8.4–10.5)
CHLORIDE SERPL-SCNC: 105 MMOL/L — SIGNIFICANT CHANGE UP (ref 98–107)
CO2 SERPL-SCNC: 25 MMOL/L — SIGNIFICANT CHANGE UP (ref 22–31)
COLOR SPEC: YELLOW — SIGNIFICANT CHANGE UP
CREAT SERPL-MCNC: 1.18 MG/DL — SIGNIFICANT CHANGE UP (ref 0.5–1.3)
GLUCOSE SERPL-MCNC: 127 MG/DL — HIGH (ref 70–99)
GLUCOSE UR-MCNC: 100 — SIGNIFICANT CHANGE UP
HCT VFR BLD CALC: 30.3 % — LOW (ref 34.5–45)
HGB BLD-MCNC: 9.7 G/DL — LOW (ref 11.5–15.5)
KETONES UR-MCNC: NEGATIVE — SIGNIFICANT CHANGE UP
LEUKOCYTE ESTERASE UR-ACNC: HIGH
MAGNESIUM SERPL-MCNC: 1.7 MG/DL — SIGNIFICANT CHANGE UP (ref 1.6–2.6)
MCHC RBC-ENTMCNC: 29.1 PG — SIGNIFICANT CHANGE UP (ref 27–34)
MCHC RBC-ENTMCNC: 32 % — SIGNIFICANT CHANGE UP (ref 32–36)
MCV RBC AUTO: 91 FL — SIGNIFICANT CHANGE UP (ref 80–100)
MUCOUS THREADS # UR AUTO: SIGNIFICANT CHANGE UP
NITRITE UR-MCNC: NEGATIVE — SIGNIFICANT CHANGE UP
NON-SQ EPI CELLS # UR AUTO: <1 — SIGNIFICANT CHANGE UP
NRBC # FLD: 0 — SIGNIFICANT CHANGE UP
PH UR: 6 — SIGNIFICANT CHANGE UP (ref 4.6–8)
PHOSPHATE SERPL-MCNC: 2.8 MG/DL — SIGNIFICANT CHANGE UP (ref 2.5–4.5)
PLATELET # BLD AUTO: 265 K/UL — SIGNIFICANT CHANGE UP (ref 150–400)
PMV BLD: 11.6 FL — SIGNIFICANT CHANGE UP (ref 7–13)
POTASSIUM SERPL-MCNC: 4.2 MMOL/L — SIGNIFICANT CHANGE UP (ref 3.5–5.3)
POTASSIUM SERPL-SCNC: 4.2 MMOL/L — SIGNIFICANT CHANGE UP (ref 3.5–5.3)
PROT UR-MCNC: 150 MG/DL — HIGH
RBC # BLD: 3.33 M/UL — LOW (ref 3.8–5.2)
RBC # FLD: 17.3 % — HIGH (ref 10.3–14.5)
RBC CASTS # UR COMP ASSIST: >50 — HIGH (ref 0–?)
SODIUM SERPL-SCNC: 143 MMOL/L — SIGNIFICANT CHANGE UP (ref 135–145)
SP GR SPEC: 1.02 — SIGNIFICANT CHANGE UP (ref 1–1.04)
SQUAMOUS # UR AUTO: SIGNIFICANT CHANGE UP
UROBILINOGEN FLD QL: NORMAL MG/DL — SIGNIFICANT CHANGE UP
VANCOMYCIN TROUGH SERPL-MCNC: 14.8 UG/ML — SIGNIFICANT CHANGE UP (ref 10–20)
WBC # BLD: 15.61 K/UL — HIGH (ref 3.8–10.5)
WBC # FLD AUTO: 15.61 K/UL — HIGH (ref 3.8–10.5)
WBC CLUMPS #/AREA URNS HPF: PRESENT — HIGH (ref 0–?)
WBC UR QL: >50 — HIGH (ref 0–?)
YEAST BUDDING # UR COMP ASSIST: SIGNIFICANT CHANGE UP

## 2018-03-24 PROCEDURE — 99233 SBSQ HOSP IP/OBS HIGH 50: CPT | Mod: GC

## 2018-03-24 RX ORDER — FUROSEMIDE 40 MG
40 TABLET ORAL
Qty: 0 | Refills: 0 | Status: DISCONTINUED | OUTPATIENT
Start: 2018-03-24 | End: 2018-03-27

## 2018-03-24 RX ORDER — HYDRALAZINE HCL 50 MG
50 TABLET ORAL EVERY 8 HOURS
Qty: 0 | Refills: 0 | Status: DISCONTINUED | OUTPATIENT
Start: 2018-03-24 | End: 2018-03-27

## 2018-03-24 RX ORDER — ISOSORBIDE MONONITRATE 60 MG/1
30 TABLET, EXTENDED RELEASE ORAL DAILY
Qty: 0 | Refills: 0 | Status: DISCONTINUED | OUTPATIENT
Start: 2018-03-24 | End: 2018-03-25

## 2018-03-24 RX ORDER — ACETAMINOPHEN 500 MG
650 TABLET ORAL EVERY 6 HOURS
Qty: 0 | Refills: 0 | Status: DISCONTINUED | OUTPATIENT
Start: 2018-03-24 | End: 2018-03-27

## 2018-03-24 RX ADMIN — HEPARIN SODIUM 5000 UNIT(S): 5000 INJECTION INTRAVENOUS; SUBCUTANEOUS at 17:37

## 2018-03-24 RX ADMIN — Medication 7 UNIT(S): at 13:32

## 2018-03-24 RX ADMIN — PIPERACILLIN AND TAZOBACTAM 25 GRAM(S): 4; .5 INJECTION, POWDER, LYOPHILIZED, FOR SOLUTION INTRAVENOUS at 17:36

## 2018-03-24 RX ADMIN — PIPERACILLIN AND TAZOBACTAM 25 GRAM(S): 4; .5 INJECTION, POWDER, LYOPHILIZED, FOR SOLUTION INTRAVENOUS at 01:56

## 2018-03-24 RX ADMIN — Medication 3: at 22:00

## 2018-03-24 RX ADMIN — Medication 50 MILLIGRAM(S): at 22:08

## 2018-03-24 RX ADMIN — Medication 3 MILLILITER(S): at 04:56

## 2018-03-24 RX ADMIN — ISOSORBIDE MONONITRATE 30 MILLIGRAM(S): 60 TABLET, EXTENDED RELEASE ORAL at 09:23

## 2018-03-24 RX ADMIN — NYSTATIN CREAM 1 APPLICATION(S): 100000 CREAM TOPICAL at 13:33

## 2018-03-24 RX ADMIN — Medication 1: at 13:31

## 2018-03-24 RX ADMIN — Medication 40 MILLIGRAM(S): at 17:39

## 2018-03-24 RX ADMIN — OXYCODONE HYDROCHLORIDE 10 MILLIGRAM(S): 5 TABLET ORAL at 01:56

## 2018-03-24 RX ADMIN — Medication 250 MILLIGRAM(S): at 09:25

## 2018-03-24 RX ADMIN — Medication 50 MILLIGRAM(S): at 13:32

## 2018-03-24 RX ADMIN — Medication 3 MILLILITER(S): at 10:30

## 2018-03-24 RX ADMIN — Medication 7 UNIT(S): at 09:23

## 2018-03-24 RX ADMIN — CLOPIDOGREL BISULFATE 75 MILLIGRAM(S): 75 TABLET, FILM COATED ORAL at 09:26

## 2018-03-24 RX ADMIN — Medication 50 MILLIGRAM(S): at 05:38

## 2018-03-24 RX ADMIN — OXYCODONE HYDROCHLORIDE 10 MILLIGRAM(S): 5 TABLET ORAL at 02:56

## 2018-03-24 RX ADMIN — Medication 2: at 17:37

## 2018-03-24 RX ADMIN — ATORVASTATIN CALCIUM 20 MILLIGRAM(S): 80 TABLET, FILM COATED ORAL at 22:00

## 2018-03-24 RX ADMIN — PIPERACILLIN AND TAZOBACTAM 25 GRAM(S): 4; .5 INJECTION, POWDER, LYOPHILIZED, FOR SOLUTION INTRAVENOUS at 09:25

## 2018-03-24 RX ADMIN — SERTRALINE 25 MILLIGRAM(S): 25 TABLET, FILM COATED ORAL at 09:26

## 2018-03-24 RX ADMIN — Medication 23 UNIT(S): at 09:23

## 2018-03-24 RX ADMIN — Medication 7 UNIT(S): at 17:37

## 2018-03-24 RX ADMIN — Medication 3 MILLILITER(S): at 22:32

## 2018-03-24 RX ADMIN — NYSTATIN CREAM 1 APPLICATION(S): 100000 CREAM TOPICAL at 05:38

## 2018-03-24 RX ADMIN — Medication 3 MILLILITER(S): at 16:59

## 2018-03-24 RX ADMIN — HEPARIN SODIUM 5000 UNIT(S): 5000 INJECTION INTRAVENOUS; SUBCUTANEOUS at 05:38

## 2018-03-24 RX ADMIN — Medication 81 MILLIGRAM(S): at 09:26

## 2018-03-24 NOTE — PROGRESS NOTE ADULT - ASSESSMENT
72M PMHx PVD admitted w/ cellulitis and infection of previous TMA site s/p L ankle disarticulation (Podiatry 3/10), s/p L AKA (3/20). CBC remains stable.     -pain management per primary team  -daily dressing change, kerlex & ACE.   -abx per ID  -continued medical management per primary team    On Discharge, patient will require outpatient follow-up to evaluate incision for staple removal @ 2weeks post-surgery (~approximately 4/3/18); patient should follow-up as outpatient w/ Dr. CORNEL Moraes, office [phone].          KENTRELL Bal MD (PGY2)    (Please page C team Vascular [phone] for questions/concerns.)

## 2018-03-24 NOTE — PROGRESS NOTE ADULT - PROBLEM SELECTOR PLAN 2
- patient hypertensive prior to aka- post op complicated by hypotension for 48 hours  - yesterday, SBPs >160  - will add on imdur and continue with hydralazine BID  - will titrate up hydralazine to TID if patient continues to be hypertensive - patient hypertensive prior to aka- post op complicated by hypotension for 48 hours  - yesterday, SBPs >160  - will add on imdur and continue with hydralazine BID  - will titrate up hydralazine to TID

## 2018-03-24 NOTE — PROGRESS NOTE ADULT - PROBLEM SELECTOR PLAN 1
- s/p AKA of the LLE by vascular surgery on 3/20; per vascular- patient will be re-evaulated prior to discharge to determine time for staple removal  - last day of antibiotics today- vancomycin (by level) and zosyn   - s/p L ankle disarticulation by podiatry on 3/10  - pain control with oxycontin - patient has not required IV pain medications since 3/22  - PM&R eval- patient appropriate candidate for acute rehab ( likely 3/26) - s/p AKA of the LLE by vascular surgery on 3/20; per vascular- patient will be re-evaluated prior to discharge to determine time for staple removal  - last day of antibiotics today- vancomycin (by level) and zosyn   - s/p L ankle disarticulation by podiatry on 3/10  - pain control with oxycontin - patient has not required IV pain medications since 3/22  - PM&R eval- patient appropriate candidate for acute rehab ( likely 3/26)

## 2018-03-24 NOTE — PROGRESS NOTE ADULT - SUBJECTIVE AND OBJECTIVE BOX
Shae Shin PGY 1  Pager: [phone]/ [phone]    Patient is a 72y old  Female who presents with a chief complaint of Fevers and leg pain (06 Mar 2018 17:24)      SUBJECTIVE / OVERNIGHT EVENTS:  Patient seen and examined at bedside with no acute complaints overnight. Patient hypertensive since yesterday.    MEDICATIONS  (STANDING):  ALBUTerol/ipratropium for Nebulization 3 milliLiter(s) Nebulizer every 6 hours  aspirin  chewable 81 milliGRAM(s) Oral daily  atorvastatin 20 milliGRAM(s) Oral at bedtime  clopidogrel Tablet 75 milliGRAM(s) Oral daily  dextrose 50% Injectable 25 Gram(s) IV Push once  heparin  Injectable 5000 Unit(s) SubCutaneous every 12 hours  hydrALAZINE 50 milliGRAM(s) Oral every 12 hours  insulin detemir injectable (LEVEMIR) 23 Unit(s) SubCutaneous daily  insulin lispro (HumaLOG) corrective regimen sliding scale   SubCutaneous Before meals and at bedtime  insulin lispro Injectable (HumaLOG) 7 Unit(s) SubCutaneous three times a day before meals  nystatin Cream 1 Application(s) Topical two times a day  piperacillin/tazobactam IVPB. 3.375 Gram(s) IV Intermittent every 8 hours  sertraline 25 milliGRAM(s) Oral daily  vancomycin  IVPB      vancomycin  IVPB 1000 milliGRAM(s) IV Intermittent every 24 hours    MEDICATIONS  (PRN):  acetaminophen  IVPB. 1000 milliGRAM(s) IV Intermittent once PRN Mild Pain (1 - 3)  HYDROmorphone  Injectable 0.5 milliGRAM(s) IV Push every 4 hours PRN Severe Pain (7 - 10)  oxyCODONE    IR 5 milliGRAM(s) Oral every 4 hours PRN Moderate Pain (4 - 6)  oxyCODONE    IR 10 milliGRAM(s) Oral every 6 hours PRN Severe Pain (7 - 10)      OBJECTIVE:    Vital Signs Last 24 Hrs  T(C): 37.2 (24 Mar 2018 05:34), Max: 37.4 (23 Mar 2018 14:53)  T(F): 99 (24 Mar 2018 05:34), Max: 99.3 (23 Mar 2018 14:53)  HR: 75 (24 Mar 2018 05:34) (71 - 76)  BP: 188/69 (24 Mar 2018 05:34) (163/58 - 188/69)  BP(mean): 109 (23 Mar 2018 11:15) (109 - 109)  RR: 18 (24 Mar 2018 05:34) (18 - 19)  SpO2: 95% (24 Mar 2018 05:34) (94% - 98%)    CAPILLARY BLOOD GLUCOSE      POCT Blood Glucose.: 189 mg/dL (23 Mar 2018 21:36)  POCT Blood Glucose.: 219 mg/dL (23 Mar 2018 17:19)  POCT Blood Glucose.: 188 mg/dL (23 Mar 2018 12:35)  POCT Blood Glucose.: 139 mg/dL (23 Mar 2018 09:19)    I&O's Summary    23 Mar 2018 07:01  -  24 Mar 2018 07:00  --------------------------------------------------------  IN: 940 mL / OUT: 1850 mL / NET: -910 mL        PHYSICAL EXAM:  GENERAL: NAD, well-developed  HEENT: EOMI; supple neck  CHEST/LUNG: crackles b/l mid to lower bases, no wheezes  HEART: Regular rate and rhythm; No murmurs, rubs, or gallops  ABDOMEN: Soft, Nontender, Nondistended; Bowel sounds present  EXTREMITIES:  BKA on right lower extremity, warm to touch, ; AKA on LLE- warm to touch, with dressing intact, strong femoral pulse  PSYCH: AAOx3  NEUROLOGY: non-focal  SKIN: No rashes or lesions    LABS:                        9.6    11.48 )-----------( 271      ( 23 Mar 2018 04:00 )             30.6     Auto Eosinophil # x     / Auto Eosinophil % x     / Auto Neutrophil # x     / Auto Neutrophil % x     / BANDS % x                            9.9    9.81  )-----------( 267      ( 22 Mar 2018 19:52 )             31.5     Auto Eosinophil # x     / Auto Eosinophil % x     / Auto Neutrophil # x     / Auto Neutrophil % x     / BANDS % x        03-23    142  |  107  |  32<H>  ----------------------------<  143<H>  4.5   |  25  |  1.39<H>    Ca    8.1<L>      23 Mar 2018 04:00  Mg     1.8     03-23  Phos  3.8     03-23                RESPIRATORY  VENT:    ABG:     VBG:     RADIOLOGY & ADDITIONAL TESTS:  (Imaging Personally Reviewed)    Consultant(s) Notes Reviewed:      Care Discussed with Consultants/Other Providers:

## 2018-03-24 NOTE — PROGRESS NOTE ADULT - PROBLEM SELECTOR PLAN 7
- DAMARI on CKD3  - likely secondary to post obstructive and prerenal from hypotension  - adequate urine output  - continue to trend creatinine

## 2018-03-24 NOTE — PROGRESS NOTE ADULT - PROBLEM SELECTOR PLAN 8
- CKD III at baseline  - DAMARI on CKD (trending down today)  - likely secondary to post obstructive and prerenal from hypotension  - adequate urine output- continue to trend  - c/w vanc by level

## 2018-03-24 NOTE — PROGRESS NOTE ADULT - PROBLEM SELECTOR PLAN 3
- c/w Asa, Coreg, Plavix, Lipitor  - fluid overloaded on examination- will give 40lasix PO BID to transition of lasix in preparation for discharge

## 2018-03-24 NOTE — PROGRESS NOTE ADULT - ATTENDING COMMENTS
Long conversation had with multiple family members at the bedside. Family has noticed patient has had problems with fine manual dexterity, holding utensil for meals, etc. Also noted patient appears to have some psychomotor slowing. Concerned about possibility of UTI. Discussed will send off UA, UCx to assess. Patient also noted to have increasing WBC.    Marked peripheral edema - bilateral LE stumps with 2+ pitting edema, also with pitting edema tracking up patient's torso. Needs to continue diuresis with Lasix - at 40 mg PO BID right now. Monitor volume status.

## 2018-03-25 LAB
BUN SERPL-MCNC: 22 MG/DL — SIGNIFICANT CHANGE UP (ref 7–23)
CALCIUM SERPL-MCNC: 8.4 MG/DL — SIGNIFICANT CHANGE UP (ref 8.4–10.5)
CHLORIDE SERPL-SCNC: 102 MMOL/L — SIGNIFICANT CHANGE UP (ref 98–107)
CO2 SERPL-SCNC: 27 MMOL/L — SIGNIFICANT CHANGE UP (ref 22–31)
CREAT SERPL-MCNC: 1.1 MG/DL — SIGNIFICANT CHANGE UP (ref 0.5–1.3)
GLUCOSE SERPL-MCNC: 204 MG/DL — HIGH (ref 70–99)
HCT VFR BLD CALC: 29.8 % — LOW (ref 34.5–45)
HGB BLD-MCNC: 9.5 G/DL — LOW (ref 11.5–15.5)
MAGNESIUM SERPL-MCNC: 1.5 MG/DL — LOW (ref 1.6–2.6)
MCHC RBC-ENTMCNC: 28.8 PG — SIGNIFICANT CHANGE UP (ref 27–34)
MCHC RBC-ENTMCNC: 31.9 % — LOW (ref 32–36)
MCV RBC AUTO: 90.3 FL — SIGNIFICANT CHANGE UP (ref 80–100)
NRBC # FLD: 0 — SIGNIFICANT CHANGE UP
PHOSPHATE SERPL-MCNC: 2.8 MG/DL — SIGNIFICANT CHANGE UP (ref 2.5–4.5)
PLATELET # BLD AUTO: 267 K/UL — SIGNIFICANT CHANGE UP (ref 150–400)
PMV BLD: 11.6 FL — SIGNIFICANT CHANGE UP (ref 7–13)
POTASSIUM SERPL-MCNC: 4.1 MMOL/L — SIGNIFICANT CHANGE UP (ref 3.5–5.3)
POTASSIUM SERPL-SCNC: 4.1 MMOL/L — SIGNIFICANT CHANGE UP (ref 3.5–5.3)
RBC # BLD: 3.3 M/UL — LOW (ref 3.8–5.2)
RBC # FLD: 17 % — HIGH (ref 10.3–14.5)
SODIUM SERPL-SCNC: 142 MMOL/L — SIGNIFICANT CHANGE UP (ref 135–145)
VANCOMYCIN TROUGH SERPL-MCNC: 8.3 UG/ML — LOW (ref 10–20)
WBC # BLD: 15.65 K/UL — HIGH (ref 3.8–10.5)
WBC # FLD AUTO: 15.65 K/UL — HIGH (ref 3.8–10.5)

## 2018-03-25 PROCEDURE — 99233 SBSQ HOSP IP/OBS HIGH 50: CPT | Mod: GC

## 2018-03-25 RX ORDER — ISOSORBIDE MONONITRATE 60 MG/1
60 TABLET, EXTENDED RELEASE ORAL DAILY
Qty: 0 | Refills: 0 | Status: DISCONTINUED | OUTPATIENT
Start: 2018-03-25 | End: 2018-03-25

## 2018-03-25 RX ORDER — ISOSORBIDE MONONITRATE 60 MG/1
60 TABLET, EXTENDED RELEASE ORAL DAILY
Qty: 0 | Refills: 0 | Status: DISCONTINUED | OUTPATIENT
Start: 2018-03-25 | End: 2018-03-27

## 2018-03-25 RX ORDER — MAGNESIUM SULFATE 500 MG/ML
2 VIAL (ML) INJECTION ONCE
Qty: 0 | Refills: 0 | Status: COMPLETED | OUTPATIENT
Start: 2018-03-25 | End: 2018-03-25

## 2018-03-25 RX ADMIN — Medication 81 MILLIGRAM(S): at 09:55

## 2018-03-25 RX ADMIN — Medication 3: at 22:16

## 2018-03-25 RX ADMIN — Medication 3 MILLILITER(S): at 11:06

## 2018-03-25 RX ADMIN — Medication 50 MILLIGRAM(S): at 05:22

## 2018-03-25 RX ADMIN — NYSTATIN CREAM 1 APPLICATION(S): 100000 CREAM TOPICAL at 13:34

## 2018-03-25 RX ADMIN — Medication 3: at 13:32

## 2018-03-25 RX ADMIN — Medication 50 GRAM(S): at 11:02

## 2018-03-25 RX ADMIN — Medication 4: at 17:48

## 2018-03-25 RX ADMIN — ISOSORBIDE MONONITRATE 30 MILLIGRAM(S): 60 TABLET, EXTENDED RELEASE ORAL at 09:55

## 2018-03-25 RX ADMIN — HEPARIN SODIUM 5000 UNIT(S): 5000 INJECTION INTRAVENOUS; SUBCUTANEOUS at 05:24

## 2018-03-25 RX ADMIN — Medication 2: at 09:55

## 2018-03-25 RX ADMIN — Medication 3 MILLILITER(S): at 05:09

## 2018-03-25 RX ADMIN — Medication 3 MILLILITER(S): at 16:19

## 2018-03-25 RX ADMIN — Medication 3 MILLILITER(S): at 21:27

## 2018-03-25 RX ADMIN — CLOPIDOGREL BISULFATE 75 MILLIGRAM(S): 75 TABLET, FILM COATED ORAL at 09:55

## 2018-03-25 RX ADMIN — ATORVASTATIN CALCIUM 20 MILLIGRAM(S): 80 TABLET, FILM COATED ORAL at 21:44

## 2018-03-25 RX ADMIN — Medication 23 UNIT(S): at 09:57

## 2018-03-25 RX ADMIN — Medication 40 MILLIGRAM(S): at 05:22

## 2018-03-25 RX ADMIN — SERTRALINE 25 MILLIGRAM(S): 25 TABLET, FILM COATED ORAL at 09:55

## 2018-03-25 RX ADMIN — Medication 50 MILLIGRAM(S): at 13:32

## 2018-03-25 RX ADMIN — HEPARIN SODIUM 5000 UNIT(S): 5000 INJECTION INTRAVENOUS; SUBCUTANEOUS at 17:48

## 2018-03-25 RX ADMIN — Medication 50 MILLIGRAM(S): at 21:44

## 2018-03-25 RX ADMIN — Medication 40 MILLIGRAM(S): at 17:48

## 2018-03-25 RX ADMIN — NYSTATIN CREAM 1 APPLICATION(S): 100000 CREAM TOPICAL at 05:24

## 2018-03-25 RX ADMIN — Medication 7 UNIT(S): at 17:48

## 2018-03-25 RX ADMIN — Medication 7 UNIT(S): at 09:54

## 2018-03-25 RX ADMIN — Medication 7 UNIT(S): at 13:32

## 2018-03-25 NOTE — PROGRESS NOTE ADULT - SUBJECTIVE AND OBJECTIVE BOX
Patient is a 72y old  Female who presents with a chief complaint of Fevers and leg pain (06 Mar 2018 17:24)      SUBJECTIVE / OVERNIGHT EVENTS: Hypertensive in AM to  182/71, received standing lasix and hydralazine. Blood cultures drawn but no fever documented.    MEDICATIONS  (STANDING):  ALBUTerol/ipratropium for Nebulization 3 milliLiter(s) Nebulizer every 6 hours  aspirin  chewable 81 milliGRAM(s) Oral daily  atorvastatin 20 milliGRAM(s) Oral at bedtime  clopidogrel Tablet 75 milliGRAM(s) Oral daily  dextrose 50% Injectable 25 Gram(s) IV Push once  furosemide    Tablet 40 milliGRAM(s) Oral two times a day  heparin  Injectable 5000 Unit(s) SubCutaneous every 12 hours  hydrALAZINE 50 milliGRAM(s) Oral every 8 hours  insulin detemir injectable (LEVEMIR) 23 Unit(s) SubCutaneous daily  insulin lispro (HumaLOG) corrective regimen sliding scale   SubCutaneous Before meals and at bedtime  insulin lispro Injectable (HumaLOG) 7 Unit(s) SubCutaneous three times a day before meals  isosorbide   mononitrate ER Tablet (IMDUR) 30 milliGRAM(s) Oral daily  nystatin Cream 1 Application(s) Topical two times a day  sertraline 25 milliGRAM(s) Oral daily    MEDICATIONS  (PRN):  acetaminophen   Tablet 650 milliGRAM(s) Oral every 6 hours PRN For Temp greater than 38 C (100.4 F)  acetaminophen  IVPB. 1000 milliGRAM(s) IV Intermittent once PRN Mild Pain (1 - 3)  oxyCODONE    IR 5 milliGRAM(s) Oral every 4 hours PRN Moderate Pain (4 - 6)  oxyCODONE    IR 10 milliGRAM(s) Oral every 6 hours PRN Severe Pain (7 - 10)        CAPILLARY BLOOD GLUCOSE      POCT Blood Glucose.: 261 mg/dL (24 Mar 2018 21:27)  POCT Blood Glucose.: 215 mg/dL (24 Mar 2018 17:26)  POCT Blood Glucose.: 164 mg/dL (24 Mar 2018 12:59)  POCT Blood Glucose.: 119 mg/dL (24 Mar 2018 08:55)    I&O's Summary    24 Mar 2018 07:01  -  25 Mar 2018 07:00  --------------------------------------------------------  IN: 480 mL / OUT: 2608 mL / NET: -2128 mL        PHYSICAL EXAM:  GENERAL: NAD, well-developed  HEAD:  Atraumatic, Normocephalic  EYES: EOMI, PERRLA, conjunctiva and sclera clear  NECK: Supple, No JVD  CHEST/LUNG: Clear to auscultation bilaterally; No wheeze  HEART: Regular rate and rhythm; No murmurs, rubs, or gallops  ABDOMEN: Soft, Nontender, Nondistended; Bowel sounds present  EXTREMITIES:  2+ Peripheral Pulses, No clubbing, cyanosis, or edema  PSYCH: AAOx3  NEUROLOGY: non-focal  SKIN: No rashes or lesions    LABS:                        9.7    15.61 )-----------( 265      ( 24 Mar 2018 07:00 )             30.3     03-24    143  |  105  |  27<H>  ----------------------------<  127<H>  4.2   |  25  |  1.18    Ca    8.3<L>      24 Mar 2018 07:00  Phos  2.8     03-24  Mg     1.7     03-24            Urinalysis Basic - ( 24 Mar 2018 17:50 )    Color: YELLOW / Appearance: CLOUDY / S.020 / pH: 6.0  Gluc: 100 / Ketone: NEGATIVE  / Bili: NEGATIVE / Urobili: NORMAL mg/dL   Blood: MODERATE / Protein: 150 mg/dL / Nitrite: NEGATIVE   Leuk Esterase: LARGE / RBC: >50 / WBC >50   Sq Epi: OCC / Non Sq Epi: x / Bacteria: FEW        RADIOLOGY & ADDITIONAL TESTS:    Imaging Personally Reviewed:    Consultant(s) Notes Reviewed:      Care Discussed with Consultants/Other Providers: Patient is a 72y old  Female who presents with a chief complaint of Fevers and leg pain (06 Mar 2018 17:24)      SUBJECTIVE / OVERNIGHT EVENTS: Hypertensive in AM to  182/71, received standing lasix and hydralazine. Blood cultures drawn but no fever documented. Patient endorses some mild cough that is associated after swallowing.    MEDICATIONS  (STANDING):  ALBUTerol/ipratropium for Nebulization 3 milliLiter(s) Nebulizer every 6 hours  aspirin  chewable 81 milliGRAM(s) Oral daily  atorvastatin 20 milliGRAM(s) Oral at bedtime  clopidogrel Tablet 75 milliGRAM(s) Oral daily  dextrose 50% Injectable 25 Gram(s) IV Push once  furosemide    Tablet 40 milliGRAM(s) Oral two times a day  heparin  Injectable 5000 Unit(s) SubCutaneous every 12 hours  hydrALAZINE 50 milliGRAM(s) Oral every 8 hours  insulin detemir injectable (LEVEMIR) 23 Unit(s) SubCutaneous daily  insulin lispro (HumaLOG) corrective regimen sliding scale   SubCutaneous Before meals and at bedtime  insulin lispro Injectable (HumaLOG) 7 Unit(s) SubCutaneous three times a day before meals  isosorbide   mononitrate ER Tablet (IMDUR) 30 milliGRAM(s) Oral daily  nystatin Cream 1 Application(s) Topical two times a day  sertraline 25 milliGRAM(s) Oral daily    MEDICATIONS  (PRN):  acetaminophen   Tablet 650 milliGRAM(s) Oral every 6 hours PRN For Temp greater than 38 C (100.4 F)  acetaminophen  IVPB. 1000 milliGRAM(s) IV Intermittent once PRN Mild Pain (1 - 3)  oxyCODONE    IR 5 milliGRAM(s) Oral every 4 hours PRN Moderate Pain (4 - 6)  oxyCODONE    IR 10 milliGRAM(s) Oral every 6 hours PRN Severe Pain (7 - 10)        CAPILLARY BLOOD GLUCOSE      POCT Blood Glucose.: 261 mg/dL (24 Mar 2018 21:27)  POCT Blood Glucose.: 215 mg/dL (24 Mar 2018 17:26)  POCT Blood Glucose.: 164 mg/dL (24 Mar 2018 12:59)  POCT Blood Glucose.: 119 mg/dL (24 Mar 2018 08:55)    I&O's Summary    24 Mar 2018 07:01  -  25 Mar 2018 07:00  --------------------------------------------------------  IN: 480 mL / OUT: 2608 mL / NET: -2128 mL        PHYSICAL EXAM:  GENERAL: NAD  HEAD:  Atraumatic, Normocephalic  EYES: EOMI, PERRLA, conjunctiva and sclera clear  NECK: Supple, No JVD  CHEST/LUNG: Clear to auscultation bilaterally; No wheeze  HEART: Regular rate and rhythm; No murmurs, rubs, or gallops  ABDOMEN: Soft, Nontender, Nondistended; Bowel sounds present  EXTREMITIES:  AKA R, new AKA L with staples, appears clean  PSYCH: AAOx3  NEUROLOGY: non-focal  SKIN: surgical incision appears clean    LABS:                        9.7    15.61 )-----------( 265      ( 24 Mar 2018 07:00 )             30.3     03-24    143  |  105  |  27<H>  ----------------------------<  127<H>  4.2   |  25  |  1.18    Ca    8.3<L>      24 Mar 2018 07:00  Phos  2.8     03-24  Mg     1.7     03-24            Urinalysis Basic - ( 24 Mar 2018 17:50 )    Color: YELLOW / Appearance: CLOUDY / S.020 / pH: 6.0  Gluc: 100 / Ketone: NEGATIVE  / Bili: NEGATIVE / Urobili: NORMAL mg/dL   Blood: MODERATE / Protein: 150 mg/dL / Nitrite: NEGATIVE   Leuk Esterase: LARGE / RBC: >50 / WBC >50   Sq Epi: OCC / Non Sq Epi: x / Bacteria: FEW        RADIOLOGY & ADDITIONAL TESTS:    Imaging Personally Reviewed:    Consultant(s) Notes Reviewed:      Care Discussed with Consultants/Other Providers:

## 2018-03-25 NOTE — PROGRESS NOTE ADULT - PROBLEM SELECTOR PLAN 7
- DAMARI on CKD3  - likely secondary to post obstructive and prerenal from hypotension, resolved  - adequate urine output  - trend creatinine

## 2018-03-25 NOTE — PROGRESS NOTE ADULT - PROBLEM SELECTOR PLAN 1
- s/p AKA of the LLE by vascular surgery on 3/20; per vascular- patient will be re-evaluated prior to discharge to determine time for staple removal  - Abx discontinued yesterday.   - s/p L ankle disarticulation by podiatry on 3/10  - pain control with oxycontin - patient has not required IV pain medications since 3/22  - PM&R eval- patient appropriate candidate for acute rehab ( likely 3/26)

## 2018-03-25 NOTE — PROGRESS NOTE ADULT - PROBLEM SELECTOR PLAN 2
- patient hypertensive prior to aka- post op complicated by hypotension for 48 hours  - Imdur added yesterday and uptitrated to hydralazine TID, also on Lasix.  - Remains hypertensive, will uptitrate Imdur today.

## 2018-03-26 LAB
BACTERIA UR CULT: SIGNIFICANT CHANGE UP
BUN SERPL-MCNC: 20 MG/DL — SIGNIFICANT CHANGE UP (ref 7–23)
CALCIUM SERPL-MCNC: 8.2 MG/DL — LOW (ref 8.4–10.5)
CHLORIDE SERPL-SCNC: 99 MMOL/L — SIGNIFICANT CHANGE UP (ref 98–107)
CO2 SERPL-SCNC: 31 MMOL/L — SIGNIFICANT CHANGE UP (ref 22–31)
CREAT SERPL-MCNC: 0.99 MG/DL — SIGNIFICANT CHANGE UP (ref 0.5–1.3)
GLUCOSE SERPL-MCNC: 185 MG/DL — HIGH (ref 70–99)
HCT VFR BLD CALC: 28.7 % — LOW (ref 34.5–45)
HGB BLD-MCNC: 9.2 G/DL — LOW (ref 11.5–15.5)
MAGNESIUM SERPL-MCNC: 1.6 MG/DL — SIGNIFICANT CHANGE UP (ref 1.6–2.6)
MCHC RBC-ENTMCNC: 28.7 PG — SIGNIFICANT CHANGE UP (ref 27–34)
MCHC RBC-ENTMCNC: 32.1 % — SIGNIFICANT CHANGE UP (ref 32–36)
MCV RBC AUTO: 89.4 FL — SIGNIFICANT CHANGE UP (ref 80–100)
NRBC # FLD: 0 — SIGNIFICANT CHANGE UP
PHOSPHATE SERPL-MCNC: 2.9 MG/DL — SIGNIFICANT CHANGE UP (ref 2.5–4.5)
PLATELET # BLD AUTO: 246 K/UL — SIGNIFICANT CHANGE UP (ref 150–400)
PMV BLD: 12 FL — SIGNIFICANT CHANGE UP (ref 7–13)
POTASSIUM SERPL-MCNC: 3.5 MMOL/L — SIGNIFICANT CHANGE UP (ref 3.5–5.3)
POTASSIUM SERPL-SCNC: 3.5 MMOL/L — SIGNIFICANT CHANGE UP (ref 3.5–5.3)
RBC # BLD: 3.21 M/UL — LOW (ref 3.8–5.2)
RBC # FLD: 17.2 % — HIGH (ref 10.3–14.5)
SODIUM SERPL-SCNC: 140 MMOL/L — SIGNIFICANT CHANGE UP (ref 135–145)
SPECIMEN SOURCE: SIGNIFICANT CHANGE UP
SPECIMEN SOURCE: SIGNIFICANT CHANGE UP
WBC # BLD: 13.41 K/UL — HIGH (ref 3.8–10.5)
WBC # FLD AUTO: 13.41 K/UL — HIGH (ref 3.8–10.5)

## 2018-03-26 PROCEDURE — 99232 SBSQ HOSP IP/OBS MODERATE 35: CPT

## 2018-03-26 PROCEDURE — 99232 SBSQ HOSP IP/OBS MODERATE 35: CPT | Mod: GC

## 2018-03-26 PROCEDURE — 99233 SBSQ HOSP IP/OBS HIGH 50: CPT | Mod: GC

## 2018-03-26 RX ORDER — IPRATROPIUM/ALBUTEROL SULFATE 18-103MCG
3 AEROSOL WITH ADAPTER (GRAM) INHALATION
Qty: 0 | Refills: 0 | COMMUNITY
Start: 2018-03-26

## 2018-03-26 RX ORDER — CARVEDILOL PHOSPHATE 80 MG/1
25 CAPSULE, EXTENDED RELEASE ORAL EVERY 12 HOURS
Qty: 0 | Refills: 0 | Status: DISCONTINUED | OUTPATIENT
Start: 2018-03-26 | End: 2018-03-27

## 2018-03-26 RX ORDER — CALAMINE 8% AND ZINC OXIDE 8% 160 MG/ML
1 LOTION TOPICAL THREE TIMES A DAY
Qty: 0 | Refills: 0 | Status: DISCONTINUED | OUTPATIENT
Start: 2018-03-26 | End: 2018-03-27

## 2018-03-26 RX ORDER — CARVEDILOL PHOSPHATE 80 MG/1
1 CAPSULE, EXTENDED RELEASE ORAL
Qty: 0 | Refills: 0 | COMMUNITY
Start: 2018-03-26

## 2018-03-26 RX ORDER — CARVEDILOL PHOSPHATE 80 MG/1
1 CAPSULE, EXTENDED RELEASE ORAL
Qty: 0 | Refills: 0 | COMMUNITY

## 2018-03-26 RX ORDER — ASPIRIN/CALCIUM CARB/MAGNESIUM 324 MG
1 TABLET ORAL
Qty: 0 | Refills: 0 | COMMUNITY
Start: 2018-03-26

## 2018-03-26 RX ORDER — OXYCODONE HYDROCHLORIDE 5 MG/1
1 TABLET ORAL
Qty: 0 | Refills: 0 | COMMUNITY
Start: 2018-03-26

## 2018-03-26 RX ORDER — ATORVASTATIN CALCIUM 80 MG/1
1 TABLET, FILM COATED ORAL
Qty: 0 | Refills: 0 | DISCHARGE
Start: 2018-03-26

## 2018-03-26 RX ORDER — CARVEDILOL PHOSPHATE 80 MG/1
25 CAPSULE, EXTENDED RELEASE ORAL EVERY 12 HOURS
Qty: 0 | Refills: 0 | Status: DISCONTINUED | OUTPATIENT
Start: 2018-03-26 | End: 2018-03-26

## 2018-03-26 RX ORDER — SERTRALINE 25 MG/1
1 TABLET, FILM COATED ORAL
Qty: 0 | Refills: 0 | COMMUNITY
Start: 2018-03-26

## 2018-03-26 RX ORDER — CALAMINE AND ZINC OXIDE AND PHENOL 160; 10 MG/ML; MG/ML
1 LOTION TOPICAL
Qty: 0 | Refills: 0 | COMMUNITY
Start: 2018-03-26

## 2018-03-26 RX ORDER — ISOSORBIDE MONONITRATE 60 MG/1
1 TABLET, EXTENDED RELEASE ORAL
Qty: 0 | Refills: 0 | COMMUNITY
Start: 2018-03-26

## 2018-03-26 RX ORDER — HYDRALAZINE HCL 50 MG
1 TABLET ORAL
Qty: 0 | Refills: 0 | DISCHARGE
Start: 2018-03-26

## 2018-03-26 RX ORDER — INSULIN ASPART 100 [IU]/ML
0 INJECTION, SOLUTION SUBCUTANEOUS
Qty: 0 | Refills: 0 | COMMUNITY

## 2018-03-26 RX ORDER — INSULIN DETEMIR 100/ML (3)
23 INSULIN PEN (ML) SUBCUTANEOUS
Qty: 0 | Refills: 0 | COMMUNITY
Start: 2018-03-26

## 2018-03-26 RX ORDER — BRIMONIDINE TARTRATE, TIMOLOL MALEATE 2; 5 MG/ML; MG/ML
1 SOLUTION/ DROPS OPHTHALMIC
Qty: 0 | Refills: 0 | COMMUNITY

## 2018-03-26 RX ORDER — SERTRALINE 25 MG/1
1 TABLET, FILM COATED ORAL
Qty: 0 | Refills: 0 | COMMUNITY

## 2018-03-26 RX ORDER — ACETAMINOPHEN 500 MG
2 TABLET ORAL
Qty: 0 | Refills: 0 | COMMUNITY
Start: 2018-03-26

## 2018-03-26 RX ORDER — INSULIN DETEMIR 100/ML (3)
43 INSULIN PEN (ML) SUBCUTANEOUS
Qty: 0 | Refills: 0 | COMMUNITY

## 2018-03-26 RX ORDER — HYDRALAZINE HCL 50 MG
1 TABLET ORAL
Qty: 0 | Refills: 0 | COMMUNITY

## 2018-03-26 RX ORDER — INSULIN LISPRO 100/ML
0 VIAL (ML) SUBCUTANEOUS
Qty: 0 | Refills: 0 | COMMUNITY
Start: 2018-03-26

## 2018-03-26 RX ORDER — CLOPIDOGREL BISULFATE 75 MG/1
1 TABLET, FILM COATED ORAL
Qty: 0 | Refills: 0 | DISCHARGE
Start: 2018-03-26

## 2018-03-26 RX ORDER — FUROSEMIDE 40 MG
1 TABLET ORAL
Qty: 0 | Refills: 0 | COMMUNITY
Start: 2018-03-26

## 2018-03-26 RX ORDER — INSULIN LISPRO 100/ML
7 VIAL (ML) SUBCUTANEOUS
Qty: 0 | Refills: 0 | COMMUNITY
Start: 2018-03-26

## 2018-03-26 RX ORDER — FLUCONAZOLE 150 MG/1
150 TABLET ORAL ONCE
Qty: 0 | Refills: 0 | Status: COMPLETED | OUTPATIENT
Start: 2018-03-26 | End: 2018-03-26

## 2018-03-26 RX ORDER — NYSTATIN CREAM 100000 [USP'U]/G
1 CREAM TOPICAL
Qty: 0 | Refills: 0 | COMMUNITY
Start: 2018-03-26

## 2018-03-26 RX ADMIN — Medication 3 MILLILITER(S): at 09:04

## 2018-03-26 RX ADMIN — Medication 2: at 22:43

## 2018-03-26 RX ADMIN — HEPARIN SODIUM 5000 UNIT(S): 5000 INJECTION INTRAVENOUS; SUBCUTANEOUS at 17:18

## 2018-03-26 RX ADMIN — CARVEDILOL PHOSPHATE 25 MILLIGRAM(S): 80 CAPSULE, EXTENDED RELEASE ORAL at 17:18

## 2018-03-26 RX ADMIN — Medication 1: at 13:16

## 2018-03-26 RX ADMIN — NYSTATIN CREAM 1 APPLICATION(S): 100000 CREAM TOPICAL at 05:31

## 2018-03-26 RX ADMIN — Medication 23 UNIT(S): at 09:38

## 2018-03-26 RX ADMIN — Medication 7 UNIT(S): at 17:37

## 2018-03-26 RX ADMIN — Medication 7 UNIT(S): at 13:16

## 2018-03-26 RX ADMIN — CARVEDILOL PHOSPHATE 25 MILLIGRAM(S): 80 CAPSULE, EXTENDED RELEASE ORAL at 09:37

## 2018-03-26 RX ADMIN — NYSTATIN CREAM 1 APPLICATION(S): 100000 CREAM TOPICAL at 17:18

## 2018-03-26 RX ADMIN — Medication 50 MILLIGRAM(S): at 05:31

## 2018-03-26 RX ADMIN — Medication 7 UNIT(S): at 09:37

## 2018-03-26 RX ADMIN — HEPARIN SODIUM 5000 UNIT(S): 5000 INJECTION INTRAVENOUS; SUBCUTANEOUS at 05:31

## 2018-03-26 RX ADMIN — ATORVASTATIN CALCIUM 20 MILLIGRAM(S): 80 TABLET, FILM COATED ORAL at 21:36

## 2018-03-26 RX ADMIN — CALAMINE 8% AND ZINC OXIDE 8% 1 APPLICATION(S): 160 LOTION TOPICAL at 21:36

## 2018-03-26 RX ADMIN — CLOPIDOGREL BISULFATE 75 MILLIGRAM(S): 75 TABLET, FILM COATED ORAL at 11:21

## 2018-03-26 RX ADMIN — Medication 40 MILLIGRAM(S): at 17:18

## 2018-03-26 RX ADMIN — Medication 81 MILLIGRAM(S): at 11:21

## 2018-03-26 RX ADMIN — Medication 1: at 09:37

## 2018-03-26 RX ADMIN — SERTRALINE 25 MILLIGRAM(S): 25 TABLET, FILM COATED ORAL at 11:21

## 2018-03-26 RX ADMIN — FLUCONAZOLE 150 MILLIGRAM(S): 150 TABLET ORAL at 11:21

## 2018-03-26 RX ADMIN — Medication 50 MILLIGRAM(S): at 21:36

## 2018-03-26 RX ADMIN — ISOSORBIDE MONONITRATE 60 MILLIGRAM(S): 60 TABLET, EXTENDED RELEASE ORAL at 17:18

## 2018-03-26 RX ADMIN — CALAMINE 8% AND ZINC OXIDE 8% 1 APPLICATION(S): 160 LOTION TOPICAL at 11:21

## 2018-03-26 RX ADMIN — Medication 2: at 17:37

## 2018-03-26 RX ADMIN — Medication 3 MILLILITER(S): at 04:09

## 2018-03-26 RX ADMIN — Medication 3 MILLILITER(S): at 21:15

## 2018-03-26 RX ADMIN — Medication 50 MILLIGRAM(S): at 11:21

## 2018-03-26 RX ADMIN — Medication 40 MILLIGRAM(S): at 05:31

## 2018-03-26 RX ADMIN — Medication 3 MILLILITER(S): at 15:35

## 2018-03-26 NOTE — PROGRESS NOTE ADULT - SUBJECTIVE AND OBJECTIVE BOX
Shae Shin PGY 1  Pager: [phone]/ [phone]  Patient is a 72y old  Female who presents with a chief complaint of Fevers and leg pain (06 Mar 2018 17:24)      SUBJECTIVE / OVERNIGHT EVENTS:  Overnight, patient hypertensive to 180s/70s. No other acute events. Patient due for TOV today.      MEDICATIONS  (STANDING):  ALBUTerol/ipratropium for Nebulization 3 milliLiter(s) Nebulizer every 6 hours  aspirin  chewable 81 milliGRAM(s) Oral daily  atorvastatin 20 milliGRAM(s) Oral at bedtime  clopidogrel Tablet 75 milliGRAM(s) Oral daily  dextrose 50% Injectable 25 Gram(s) IV Push once  furosemide    Tablet 40 milliGRAM(s) Oral two times a day  heparin  Injectable 5000 Unit(s) SubCutaneous every 12 hours  hydrALAZINE 50 milliGRAM(s) Oral every 8 hours  insulin detemir injectable (LEVEMIR) 23 Unit(s) SubCutaneous daily  insulin lispro (HumaLOG) corrective regimen sliding scale   SubCutaneous Before meals and at bedtime  insulin lispro Injectable (HumaLOG) 7 Unit(s) SubCutaneous three times a day before meals  isosorbide   mononitrate ER Tablet (IMDUR) 60 milliGRAM(s) Oral daily  nystatin Cream 1 Application(s) Topical two times a day  sertraline 25 milliGRAM(s) Oral daily    MEDICATIONS  (PRN):  acetaminophen   Tablet 650 milliGRAM(s) Oral every 6 hours PRN For Temp greater than 38 C (100.4 F)  acetaminophen  IVPB. 1000 milliGRAM(s) IV Intermittent once PRN Mild Pain (1 - 3)  oxyCODONE    IR 5 milliGRAM(s) Oral every 4 hours PRN Moderate Pain (4 - 6)  oxyCODONE    IR 10 milliGRAM(s) Oral every 6 hours PRN Severe Pain (7 - 10)      OBJECTIVE:    Vital Signs Last 24 Hrs  T(C): 36.7 (26 Mar 2018 05:29), Max: 37.1 (25 Mar 2018 08:45)  T(F): 98 (26 Mar 2018 05:29), Max: 98.8 (25 Mar 2018 08:45)  HR: 73 (26 Mar 2018 05:29) (67 - 84)  BP: 178/76 (26 Mar 2018 05:29) (150/53 - 184/79)  BP(mean): 77 (25 Mar 2018 12:45) (77 - 94)  RR: 18 (26 Mar 2018 05:29) (16 - 18)  SpO2: 98% (26 Mar 2018 05:29) (95% - 98%)    CAPILLARY BLOOD GLUCOSE      POCT Blood Glucose.: 262 mg/dL (25 Mar 2018 21:54)  POCT Blood Glucose.: 312 mg/dL (25 Mar 2018 17:37)  POCT Blood Glucose.: 254 mg/dL (25 Mar 2018 12:58)  POCT Blood Glucose.: 209 mg/dL (25 Mar 2018 09:13)    I&O's Summary    24 Mar 2018 07:01  -  25 Mar 2018 07:00  --------------------------------------------------------  IN: 480 mL / OUT: 2608 mL / NET: -2128 mL    25 Mar 2018 07:01  -  26 Mar 2018 06:52  --------------------------------------------------------  IN: 240 mL / OUT: 1850 mL / NET: -1610 mL        PHYSICAL EXAM:  GENERAL: NAD, well-developed  HEENT: EOMI; supple neck  CHEST/LUNG: crackles b/l mid to lower bases, no wheezes  HEART: Regular rate and rhythm; No murmurs, rubs, or gallops  ABDOMEN: Soft, Nontender, Nondistended; Bowel sounds present  EXTREMITIES:  BKA on right lower extremity, warm to touch, ; AKA on LLE- warm to touch, with dressing intact, strong femoral pulse  PSYCH: AAOx3  NEUROLOGY: non-focal  SKIN: No rashes or lesions      LABS:                        9.5    15.65 )-----------( 267      ( 25 Mar 2018 07:40 )             29.8     Auto Eosinophil # x     / Auto Eosinophil % x     / Auto Neutrophil # x     / Auto Neutrophil % x     / BANDS % x                            9.7    15.61 )-----------( 265      ( 24 Mar 2018 07:00 )             30.3     Auto Eosinophil # x     / Auto Eosinophil % x     / Auto Neutrophil # x     / Auto Neutrophil % x     / BANDS % x            142  |  102  |  22  ----------------------------<  204<H>  4.1   |  27  |  1.10      143  |  105  |  27<H>  ----------------------------<  127<H>  4.2   |  25  |  1.18    Ca    8.4      25 Mar 2018 07:40  Mg     1.5       Phos  2.8               Urinalysis Basic - ( 24 Mar 2018 17:50 )    Color: YELLOW / Appearance: CLOUDY / S.020 / pH: 6.0  Gluc: 100 / Ketone: NEGATIVE  / Bili: NEGATIVE / Urobili: NORMAL mg/dL   Blood: MODERATE / Protein: 150 mg/dL / Nitrite: NEGATIVE   Leuk Esterase: LARGE / RBC: >50 / WBC >50   Sq Epi: OCC / Non Sq Epi: x / Bacteria: FEW          RESPIRATORY  VENT:    ABG:     VBG:     RADIOLOGY & ADDITIONAL TESTS:  (Imaging Personally Reviewed)    Consultant(s) Notes Reviewed:      Care Discussed with Consultants/Other Providers: Shae Shin PGY 1  Pager: [phone]/ [phone]  Patient is a 72y old  Female who presents with a chief complaint of Fevers and leg pain (06 Mar 2018 17:24)      SUBJECTIVE / OVERNIGHT EVENTS:  Overnight, patient hypertensive to 180s/70s. No other acute events. Patient states pain is well controlled.      MEDICATIONS  (STANDING):  ALBUTerol/ipratropium for Nebulization 3 milliLiter(s) Nebulizer every 6 hours  aspirin  chewable 81 milliGRAM(s) Oral daily  atorvastatin 20 milliGRAM(s) Oral at bedtime  clopidogrel Tablet 75 milliGRAM(s) Oral daily  dextrose 50% Injectable 25 Gram(s) IV Push once  furosemide    Tablet 40 milliGRAM(s) Oral two times a day  heparin  Injectable 5000 Unit(s) SubCutaneous every 12 hours  hydrALAZINE 50 milliGRAM(s) Oral every 8 hours  insulin detemir injectable (LEVEMIR) 23 Unit(s) SubCutaneous daily  insulin lispro (HumaLOG) corrective regimen sliding scale   SubCutaneous Before meals and at bedtime  insulin lispro Injectable (HumaLOG) 7 Unit(s) SubCutaneous three times a day before meals  isosorbide   mononitrate ER Tablet (IMDUR) 60 milliGRAM(s) Oral daily  nystatin Cream 1 Application(s) Topical two times a day  sertraline 25 milliGRAM(s) Oral daily    MEDICATIONS  (PRN):  acetaminophen   Tablet 650 milliGRAM(s) Oral every 6 hours PRN For Temp greater than 38 C (100.4 F)  acetaminophen  IVPB. 1000 milliGRAM(s) IV Intermittent once PRN Mild Pain (1 - 3)  oxyCODONE    IR 5 milliGRAM(s) Oral every 4 hours PRN Moderate Pain (4 - 6)  oxyCODONE    IR 10 milliGRAM(s) Oral every 6 hours PRN Severe Pain (7 - 10)      OBJECTIVE:    Vital Signs Last 24 Hrs  T(C): 36.7 (26 Mar 2018 05:29), Max: 37.1 (25 Mar 2018 08:45)  T(F): 98 (26 Mar 2018 05:29), Max: 98.8 (25 Mar 2018 08:45)  HR: 73 (26 Mar 2018 05:29) (67 - 84)  BP: 178/76 (26 Mar 2018 05:29) (150/53 - 184/79)  BP(mean): 77 (25 Mar 2018 12:45) (77 - 94)  RR: 18 (26 Mar 2018 05:29) (16 - 18)  SpO2: 98% (26 Mar 2018 05:29) (95% - 98%)    CAPILLARY BLOOD GLUCOSE      POCT Blood Glucose.: 262 mg/dL (25 Mar 2018 21:54)  POCT Blood Glucose.: 312 mg/dL (25 Mar 2018 17:37)  POCT Blood Glucose.: 254 mg/dL (25 Mar 2018 12:58)  POCT Blood Glucose.: 209 mg/dL (25 Mar 2018 09:13)    I&O's Summary    24 Mar 2018 07:01  -  25 Mar 2018 07:00  --------------------------------------------------------  IN: 480 mL / OUT: 2608 mL / NET: -2128 mL    25 Mar 2018 07:01  -  26 Mar 2018 06:52  --------------------------------------------------------  IN: 240 mL / OUT: 1850 mL / NET: -1610 mL        PHYSICAL EXAM:  GENERAL: NAD, well-developed  HEENT: EOMI; supple neck  CHEST/LUNG: crackles b/l mid to lower bases, no wheezes  HEART: Regular rate and rhythm; No murmurs, rubs, or gallops  ABDOMEN: Soft, Nontender, Nondistended; Bowel sounds present  EXTREMITIES:  BKA on right lower extremity, warm to touch, ; AKA on LLE- warm to touch, with dressing intact, strong femoral pulse  PSYCH: AAOx3  NEUROLOGY: non-focal  SKIN: erythematous rash above the dressing on the LLE, pruritic      LABS:                        9.5    15.65 )-----------( 267      ( 25 Mar 2018 07:40 )             29.8     Auto Eosinophil # x     / Auto Eosinophil % x     / Auto Neutrophil # x     / Auto Neutrophil % x     / BANDS % x                            9.7    15.61 )-----------( 265      ( 24 Mar 2018 07:00 )             30.3     Auto Eosinophil # x     / Auto Eosinophil % x     / Auto Neutrophil # x     / Auto Neutrophil % x     / BANDS % x            142  |  102  |  22  ----------------------------<  204<H>  4.1   |  27  |  1.10      143  |  105  |  27<H>  ----------------------------<  127<H>  4.2   |  25  |  1.18    Ca    8.4      25 Mar 2018 07:40  Mg     1.5       Phos  2.8               Urinalysis Basic - ( 24 Mar 2018 17:50 )    Color: YELLOW / Appearance: CLOUDY / S.020 / pH: 6.0  Gluc: 100 / Ketone: NEGATIVE  / Bili: NEGATIVE / Urobili: NORMAL mg/dL   Blood: MODERATE / Protein: 150 mg/dL / Nitrite: NEGATIVE   Leuk Esterase: LARGE / RBC: >50 / WBC >50   Sq Epi: OCC / Non Sq Epi: x / Bacteria: FEW          RESPIRATORY  VENT:    ABG:     VBG:     RADIOLOGY & ADDITIONAL TESTS:  (Imaging Personally Reviewed)    Consultant(s) Notes Reviewed:      Care Discussed with Consultants/Other Providers: Shae Shin PGY 1  Pager: [phone]/ [phone]  Patient is a 72y old  Female who presents with a chief complaint of Fevers and leg pain (06 Mar 2018 17:24)      SUBJECTIVE / OVERNIGHT EVENTS:  Overnight, patient hypertensive to 180s/70s. No other acute events. Patient states pain is well controlled. Has failed TOV.      MEDICATIONS  (STANDING):  ALBUTerol/ipratropium for Nebulization 3 milliLiter(s) Nebulizer every 6 hours  aspirin  chewable 81 milliGRAM(s) Oral daily  atorvastatin 20 milliGRAM(s) Oral at bedtime  clopidogrel Tablet 75 milliGRAM(s) Oral daily  dextrose 50% Injectable 25 Gram(s) IV Push once  furosemide    Tablet 40 milliGRAM(s) Oral two times a day  heparin  Injectable 5000 Unit(s) SubCutaneous every 12 hours  hydrALAZINE 50 milliGRAM(s) Oral every 8 hours  insulin detemir injectable (LEVEMIR) 23 Unit(s) SubCutaneous daily  insulin lispro (HumaLOG) corrective regimen sliding scale   SubCutaneous Before meals and at bedtime  insulin lispro Injectable (HumaLOG) 7 Unit(s) SubCutaneous three times a day before meals  isosorbide   mononitrate ER Tablet (IMDUR) 60 milliGRAM(s) Oral daily  nystatin Cream 1 Application(s) Topical two times a day  sertraline 25 milliGRAM(s) Oral daily    MEDICATIONS  (PRN):  acetaminophen   Tablet 650 milliGRAM(s) Oral every 6 hours PRN For Temp greater than 38 C (100.4 F)  acetaminophen  IVPB. 1000 milliGRAM(s) IV Intermittent once PRN Mild Pain (1 - 3)  oxyCODONE    IR 5 milliGRAM(s) Oral every 4 hours PRN Moderate Pain (4 - 6)  oxyCODONE    IR 10 milliGRAM(s) Oral every 6 hours PRN Severe Pain (7 - 10)      OBJECTIVE:    Vital Signs Last 24 Hrs  T(C): 36.7 (26 Mar 2018 05:29), Max: 37.1 (25 Mar 2018 08:45)  T(F): 98 (26 Mar 2018 05:29), Max: 98.8 (25 Mar 2018 08:45)  HR: 73 (26 Mar 2018 05:29) (67 - 84)  BP: 178/76 (26 Mar 2018 05:29) (150/53 - 184/79)  BP(mean): 77 (25 Mar 2018 12:45) (77 - 94)  RR: 18 (26 Mar 2018 05:29) (16 - 18)  SpO2: 98% (26 Mar 2018 05:29) (95% - 98%)    CAPILLARY BLOOD GLUCOSE      POCT Blood Glucose.: 262 mg/dL (25 Mar 2018 21:54)  POCT Blood Glucose.: 312 mg/dL (25 Mar 2018 17:37)  POCT Blood Glucose.: 254 mg/dL (25 Mar 2018 12:58)  POCT Blood Glucose.: 209 mg/dL (25 Mar 2018 09:13)    I&O's Summary    24 Mar 2018 07:  -  25 Mar 2018 07:00  --------------------------------------------------------  IN: 480 mL / OUT: 2608 mL / NET: -2128 mL    25 Mar 2018 07:01  -  26 Mar 2018 06:52  --------------------------------------------------------  IN: 240 mL / OUT: 1850 mL / NET: -1610 mL        PHYSICAL EXAM:  GENERAL: NAD, well-developed  HEENT: EOMI; supple neck  CHEST/LUNG: crackles b/l mid to lower bases, no wheezes  HEART: Regular rate and rhythm; No murmurs, rubs, or gallops  ABDOMEN: Soft, Nontender, Nondistended; Bowel sounds present  EXTREMITIES:  BKA on right lower extremity, warm to touch, ; AKA on LLE- warm to touch, with dressing intact, strong femoral pulse  PSYCH: AAOx3  NEUROLOGY: non-focal  SKIN: erythematous rash above the dressing on the LLE, pruritic      LABS:                                    9.2    13.41 )-----------( 246      ( 26 Mar 2018 05:25 )             28.7     WBC Count: 15.65 K/uL (18 @ 07:40)        140  |  99  |  20  ----------------------------<  185<H>  3.5   |  31  |  0.99    Ca    8.2<L>      26 Mar 2018 05:25  Phos  2.9       Mg     1.6         Culture - Blood:   NO ORGANISMS ISOLATED  NO ORGANISMS ISOLATED AT 24 HOURS (18 @ 00:23)    Culture - Urine:   NO GROWTH AT 24 HOURS (18 @ 18:34)    Urinalysis Basic - ( 24 Mar 2018 17:50 )    Color: YELLOW / Appearance: CLOUDY / S.020 / pH: 6.0  Gluc: 100 / Ketone: NEGATIVE  / Bili: NEGATIVE / Urobili: NORMAL mg/dL   Blood: MODERATE / Protein: 150 mg/dL / Nitrite: NEGATIVE   Leuk Esterase: LARGE / RBC: >50 / WBC >50   Sq Epi: OCC / Non Sq Epi: x / Bacteria: FEW      RESPIRATORY  VENT:    ABG:     VBG:     RADIOLOGY & ADDITIONAL TESTS:      (Imaging Personally Reviewed)    Consultant(s) Notes Reviewed:  Vascular Surgery    Care Discussed with Consultants/Other Providers: Dr. Palacios (ID) re: leukocytosis - nothing further to do

## 2018-03-26 NOTE — PROGRESS NOTE ADULT - PROBLEM SELECTOR PLAN 2
- patient hypertensive prior to aka- post op complicated by hypotension for 48 hours  - hypertensive overnight to systolic 180s  - imdur titrated up on 3/25 to 60mg, on hydralazine 50mg TID  - will add clonidine today .1 mg  - yesterday, SBPs >160  - will add on imdur and continue with hydralazine BID  - will titrate up hydralazine to TID - patient hypertensive prior to aka- post op complicated by hypotension for 48 hours  - hypertensive overnight to systolic 180s  - imdur titrated up on 3/25 to 60mg, on hydralazine 50mg TID  - restart coreg today at 25mg BID  - yesterday, SBPs >160    - will titrate up hydralazine to TID - patient hypertensive prior to aka- post op complicated by hypotension for 48 hours  - hypertensive overnight to systolic 180s  - imdur titrated up on 3/25 to 60mg, on hydralazine 50mg TID  - restart coreg today at 25mg BID

## 2018-03-26 NOTE — PROGRESS NOTE ADULT - PROBLEM SELECTOR PLAN 1
- s/p AKA of the LLE by vascular surgery on 3/20; per vascular- patient to follow up as outpatient for management and staple removal   - last day of abx on 3/24   - s/p L ankle disarticulation by podiatry on 3/10  - c/w pain control with oxycontin - has not required pain medication in last 36 hours   - PM&R eval- dispo to acute rehab - s/p AKA of the LLE by vascular surgery on 3/20; per vascular- patient to follow up as outpatient for management and staple removal   - s/p course of abx  - s/p L ankle disarticulation by podiatry on 3/10  - c/w pain control with oxycontin - has not required pain medication in last 36 hours   - PM&R eval- dispo to acute rehab

## 2018-03-26 NOTE — PROGRESS NOTE ADULT - ATTENDING COMMENTS
As above. Patient seen and examined at bedside with multiple family members present. As above. Patient seen and examined at bedside with multiple family members present. Ms. Estrada is a 71 yo F with DM2, PVD s/p R BKA and LE metatarsal amputation, CAD s/p CABG, CKD3, HTN, chronic stage 2 HFpEF who presents with sepsis 2/2 LLE cellulitis and acute osteomyelitis s/p ankle disarticulation 3/18/18 wo resolution of infection now s/p left AKA (POD#6). She is currently s/p abx and surgical margins are clean. Spoke with ID re: leukocytosis, urine and blood cultures are negative, patient is afebrile, so no further w/u is indicated. Patient reports pain is well controlled on oral regimen. However, she retained >999cc of urine again today so will place howe catheter with plans to dc to acute rehab with this. Patient hypertensive again overnight, agree with resumption of Coreg.    Anticipate dc to acute rehab tomorrow pending bed availability (none today). Remains medically stable for discharge.

## 2018-03-26 NOTE — PROGRESS NOTE ADULT - ASSESSMENT
72 female with T2DM, PVD s/p R BKA and L transmetatarsal amputation, CAD s/p CABG, CKD stage 3, HTN, HLD here with fevers 103F, chills and left leg pain. Admitted for cellulitis of left lower extremity with warmth and erythema to that leg. Has elevated ESR and CRP.     s/p OR for ankle disarticulation and incision and drainage of ankle and Achilles tendon abscess. Now s/p left AKA on 3/20/18.    Recommend:   - s/p left AKA now off antibiotics.  - Continue to monitor off antibiotics.  - Leukocytosis improving - blood and urine cxs NGTD.      Jose Alberto Palacios MD  Pager [phone]  After 5pm/weekends call [phone]

## 2018-03-26 NOTE — PROGRESS NOTE ADULT - PROBLEM SELECTOR PLAN 10
- DVT ppx heparin subq  - CC diet  - Dispo to acute rehab likely Monday 3/26 - DVT ppx heparin subq  - CC diet  - Dispo to acute rehab likely Monday 3/26 or Tuesday, 3/27

## 2018-03-26 NOTE — PROGRESS NOTE ADULT - ASSESSMENT
72M PMHx PVD admitted w/ cellulitis and infection of previous TMA site s/p L ankle disarticulation (Podiatry 3/10), s/p L AKA (3/20). CBC remains stable.     -pain management per primary team  -daily dressing change, kerlex & ACE.   -abx per ID  -continued medical management per primary team    On Discharge, patient will require outpatient follow-up to evaluate incision for staple removal @ 2weeks post-surgery (~approximately 4/3/18); patient should follow-up as outpatient w/ Dr. CORNEL Moraes, office [phone].          KENTRELL Bal MD (PGY2)    (Please page C team Vascular [phone] for questions/concerns.)

## 2018-03-26 NOTE — PROGRESS NOTE ADULT - SUBJECTIVE AND OBJECTIVE BOX
Vascular Surgery Progress Note  C team [id number]      Subjective/interval events:  -pain controlled  -reports feeling bettere      Objective:  Vital Signs Last 24 Hrs  T(C): 36.7 (26 Mar 2018 05:29), Max: 36.7 (26 Mar 2018 05:29)  T(F): 98 (26 Mar 2018 05:29), Max: 98 (26 Mar 2018 05:29)  HR: 72 (26 Mar 2018 11:18) (67 - 84)  BP: 121/60 (26 Mar 2018 11:18) (121/60 - 184/79)  BP(mean): --  RR: 18 (26 Mar 2018 05:29) (18 - 18)  SpO2: 98% (26 Mar 2018 05:29) (95% - 98%)      I&O's Summary    25 Mar 2018 07:01  -  26 Mar 2018 07:00  --------------------------------------------------------  IN: 240 mL / OUT: 2850 mL / NET: -2610 mL    26 Mar 2018 07:01  -  26 Mar 2018 13:13  --------------------------------------------------------  IN: 300 mL / OUT: 0 mL / NET: 300 mL        PE:  Gen: NAD, down affect  Ext: RLE s/p amputation; s/p LLE AKA, ace wrap dry; dressing removed, staples in place, soft, no drainage expressed; dressing dry' no evidence of bleeding. Redressed w/ clean kerlex & ACE        MEDICATIONS  (STANDING):  ALBUTerol/ipratropium for Nebulization 3 milliLiter(s) Nebulizer every 6 hours  aspirin  chewable 81 milliGRAM(s) Oral daily  atorvastatin 20 milliGRAM(s) Oral at bedtime  calamine Lotion 1 Application(s) Topical three times a day  carvedilol 25 milliGRAM(s) Oral every 12 hours  clopidogrel Tablet 75 milliGRAM(s) Oral daily  dextrose 50% Injectable 25 Gram(s) IV Push once  furosemide    Tablet 40 milliGRAM(s) Oral two times a day  heparin  Injectable 5000 Unit(s) SubCutaneous every 12 hours  hydrALAZINE 50 milliGRAM(s) Oral every 8 hours  insulin detemir injectable (LEVEMIR) 23 Unit(s) SubCutaneous daily  insulin lispro (HumaLOG) corrective regimen sliding scale   SubCutaneous Before meals and at bedtime  insulin lispro Injectable (HumaLOG) 7 Unit(s) SubCutaneous three times a day before meals  isosorbide   mononitrate ER Tablet (IMDUR) 60 milliGRAM(s) Oral daily  nystatin Cream 1 Application(s) Topical two times a day  sertraline 25 milliGRAM(s) Oral daily    MEDICATIONS  (PRN):  acetaminophen   Tablet 650 milliGRAM(s) Oral every 6 hours PRN For Temp greater than 38 C (100.4 F)  acetaminophen  IVPB. 1000 milliGRAM(s) IV Intermittent once PRN Mild Pain (1 - 3)  oxyCODONE    IR 5 milliGRAM(s) Oral every 4 hours PRN Moderate Pain (4 - 6)  oxyCODONE    IR 10 milliGRAM(s) Oral every 6 hours PRN Severe Pain (7 - 10)          Labs:                          9.2    13.41 )-----------( 246      ( 26 Mar 2018 05:25 )             28.7                         9.7    15.61 )-----------( 265      ( 24 Mar 2018 07:00 )             30.3                         9.6    11.48 )-----------( 271      ( 23 Mar 2018 04:00 )             30.6                         7.8    11.68 )-----------( 294      ( 22 Mar 2018 05:00 )             25.0                         8.3    16.15 )-----------( 411      ( 20 Mar 2018 23:40 )             26.8                         9.0    12.60 )-----------( 526      ( 15 Mar 2018 07:10 )             28.8                         8.3    14.65 )-----------( 358      ( 12 Mar 2018 05:53 )             24.5     03-26    140  |  99  |  20  ----------------------------<  185<H>  3.5   |  31  |  0.99    Ca    8.2<L>      26 Mar 2018 05:25  Phos  2.9     03-26  Mg     1.6     03-26 03-24    143  |  105  |  27<H>  ----------------------------<  127<H>  4.2   |  25  |  1.18    Ca    8.3<L>      24 Mar 2018 07:00  Phos  2.8     03-24  Mg     1.7     03-24 03-23    142  |  107  |  32<H>  ----------------------------<  143<H>  4.5   |  25  |  1.39<H>    Ca    8.1<L>      23 Mar 2018 04:00  Phos  3.8     03-23  Mg     1.8     03-23 03-22    142  |  105  |  31<H>  ----------------------------<  179<H>  4.4   |  26  |  1.46<H>    Ca    8.2<L>      22 Mar 2018 05:00  Phos  4.7     03-22  Mg     1.7     03-22 03-20    142  |  106  |  25<H>  ----------------------------<  212<H>  4.1   |  24  |  1.03    Ca    8.4      20 Mar 2018 04:50  Phos  3.2     03-20  Mg     1.7     03-20    TPro  7.5  /  Alb  2.4<L>  /  TBili  0.5  /  DBili  x   /  AST  45<H>  /  ALT  42<H>  /  AlkPhos  150<H>  03-19      03-15    143  |  112<H>  |  38<H>  ----------------------------<  66<L>  4.6   |  16<L>  |  1.25    Ca    8.2<L>      15 Mar 2018 07:10  Phos  4.2     03-15  Mg     2.2     03-15      03-12    135  |  104  |  45<H>  ----------------------------<  152<H>  4.8   |  19<L>  |  1.84<H>    Ca    8.3<L>      12 Mar 2018 05:53  Phos  4.5     03-12  Mg     2.1     03-12    Type + Screen (03.19.18 @ 17:09)    ABO Interpretation: A    Rh Interpretation: Positive    Antibody Screen: Negative    Type + Screen (03.10.18 @ 06:00)    ABO Interpretation: A    Rh Interpretation: Positive    Antibody Screen: Negative        Imaging:  US Duplex Venous Lower Ext Complete, Bilateral (03.05.18 @ 17:17) >  FINDINGS:    There is normal compressibility of the bilateral common femoral, femoral   and popliteal veins. Calf veins are not visualized in the right lower   extremity due to below the knee amputation. Left peroneal veins are not   visualized.    Doppler examination shows normal spontaneous and phasic flow.    IMPRESSION:     No evidence of bilateral lower extremity deep venous thrombosis.      MR Foot w/ IV Cont, Left (03.05.18 @ 14:18) >    Diffuse cellulitis throughout lower leg and imaged hindfoot. Status post   transmetatarsal amputation of the first through fifth rays. Acute   osteomyelitis involving the first and second metatarsal remnants distally   as above.    Mild edema and enhancement within the third and fourth metatarsal   remnants distally with grossly preserved T1 marrow signal. Differential   considerations include reactive osteitis and early osteomyelitis.    Status post Achilles tenotomy with a fluid collection in the tenotomy   gap. This may be related to postoperative seroma. Superimposed infection   at this site cannot be excluded.      Xray Chest 1 View-PORTABLE IMMEDIATE (03.10.18 @ 12:33) >  IMPRESSION:  Lungs underinflated.    Increased left basilar markings and strand-like opacities could be   compatible with subsegmental atelectatic changes or infiltrate/pneumonia   in the proper clinical context. Clear remaining visualized lungs. No   pleural effusion or pneumothorax.    Stable sternal wires, multiple small surgical clips, and slightly   prominent appearing cardiac and mediastinal silhouettes.    Trachea midline.    Left neck surgical clips.

## 2018-03-26 NOTE — PROGRESS NOTE ADULT - SUBJECTIVE AND OBJECTIVE BOX
Patient is a 71 yo F with a PMH significant for T2DM, PVD s/p R BKA and L transmetatarsal amputation, CAD s/p CABG, CKD stage 3, and HTN who presented to the ED with fevers and left leg pain. The patient was in her usual state of health till Thursday when she began to experience 10/10 shooting pain down her left leg from her knee down. She denied any numbness or tingling. She also noticed fevers of 103-104 at home associated with chills and fatigue.   Vitals in the ED T- 102.7 (T max 103.1), HR-91 BP-152/58 RR-16 and sating 99% on room air. She received 1L of NS, 925mg tylenol PO, and one dose each of vancomycin and zosyn.     Was found to be in sepsis 2/2 cellulitis and OM of LLE, s/p ankle disarticulation by podiatry on 3/10, course c/b SOB likely 2/2 mild acute on chronic decompensated HFpEF, MI, s/p AKA with post op course complicated by hypotension. antibiotics until 3/24- vancomycin (by level) and zosyn. was  on Lasix for CHF. Now off Abx, leukocytosis improving.        REVIEW OF SYSTEMS: No chest pain, shortness of breath, nausea, vomiting or diarhea.          CURRENT FUNCTIONAL STATUS        RECENT LABS/IMAGING  CBC Full  -  ( 26 Mar 2018 05:25 )  WBC Count : 13.41 K/uL  Hemoglobin : 9.2 g/dL  Hematocrit : 28.7 %  Platelet Count - Automated : 246 K/uL  Mean Cell Volume : 89.4 fL  Mean Cell Hemoglobin : 28.7 pg  Mean Cell Hemoglobin Concentration : 32.1 %  Auto Neutrophil # : x  Auto Lymphocyte # : x  Auto Monocyte # : x  Auto Eosinophil # : x  Auto Basophil # : x  Auto Neutrophil % : x  Auto Lymphocyte % : x  Auto Monocyte % : x  Auto Eosinophil % : x  Auto Basophil % : x    03-26    140  |  99  |  20  ----------------------------<  185<H>  3.5   |  31  |  0.99    Ca    8.2<L>      26 Mar 2018 05:25  Phos  2.9     03-26  Mg     1.6     03-26            VITALS  T(C): 36.7 (03-26-18 @ 05:29), Max: 36.7 (03-26-18 @ 05:29)  HR: 68 (03-26-18 @ 15:35) (67 - 84)  BP: 121/60 (03-26-18 @ 11:18) (121/60 - 184/79)  RR: 18 (03-26-18 @ 05:29) (18 - 18)  SpO2: 98% (03-26-18 @ 05:29) (95% - 98%)  Wt(kg): --    MEDICATIONS   acetaminophen   Tablet 650 milliGRAM(s) every 6 hours PRN  acetaminophen  IVPB. 1000 milliGRAM(s) once PRN  ALBUTerol/ipratropium for Nebulization 3 milliLiter(s) every 6 hours  aspirin  chewable 81 milliGRAM(s) daily  atorvastatin 20 milliGRAM(s) at bedtime  calamine Lotion 1 Application(s) three times a day  carvedilol 25 milliGRAM(s) every 12 hours  clopidogrel Tablet 75 milliGRAM(s) daily  dextrose 50% Injectable 25 Gram(s) once  furosemide    Tablet 40 milliGRAM(s) two times a day  heparin  Injectable 5000 Unit(s) every 12 hours  hydrALAZINE 50 milliGRAM(s) every 8 hours  insulin detemir injectable (LEVEMIR) 23 Unit(s) daily  insulin lispro (HumaLOG) corrective regimen sliding scale   Before meals and at bedtime  insulin lispro Injectable (HumaLOG) 7 Unit(s) three times a day before meals  isosorbide   mononitrate ER Tablet (IMDUR) 60 milliGRAM(s) daily  nystatin Cream 1 Application(s) two times a day  oxyCODONE    IR 5 milliGRAM(s) every 4 hours PRN  oxyCODONE    IR 10 milliGRAM(s) every 6 hours PRN  sertraline 25 milliGRAM(s) daily        PHYSICAL EXAM  Constitutional - NAD, Comfortable  HEENT - NCAT, EOMI  Neck - Supple, No limited ROM  Chest - CTA bilaterally, No wheeze, No rhonchi, No crackles  Cardiovascular - RRR, S1S2, No murmurs  Abdomen - BS+, Soft, NTND  Extremities - L AKA, with dressing   Neurologic Exam -                    Cognitive - Awake, Alert, AAO to self, place, date, year, situation     Communication - Fluent, No dysarthria, no aphasia     Cranial Nerves - CN 2-12 intact     Motor - 5/5 R hip/knee. 2/5 L hip                          Balance - not tested   Psychiatric - Mood stable, Affect WNL  --------------------------------------------------------------------

## 2018-03-26 NOTE — PROGRESS NOTE ADULT - PROBLEM SELECTOR PLAN 6
- Suspect likely in the post-op setting of anesthesia  -  TOV today   - if patient continues to retain- can be d/c with howe, with urology follow up  - ? flomax - Suspect likely in the post-op setting of anesthesia  -  TOV today   - if patient continues to retain- can be d/c with howe, with urology follow up

## 2018-03-26 NOTE — PROGRESS NOTE ADULT - ASSESSMENT
s/p L AKA  1. PT- bed mobility,transfers, gait and balance training  2. OT- ADL'S  3. pain control  4. Hypotension- improved  5. Patient would benefit from acute rehab, needs a multidisciplinary team including PT, OT  Can tolerate 3 hours of therapy a day.  Will follow.

## 2018-03-26 NOTE — PROGRESS NOTE ADULT - SUBJECTIVE AND OBJECTIVE BOX
CC: Patient is a 72y old  Female who presents with a chief complaint of Fevers and leg pain     Interval History/ROS: Patient with occasional cough. Has no other complaints. Denies fever, chills.    Allergies  sulfa drugs (Hives)  sulfa drugs (Rash)  Sulfac 10% (Hives)    ANTIMICROBIALS:  None    PE:    Vital Signs Last 24 Hrs  T(C): 36.7 (26 Mar 2018 05:29), Max: 36.7 (26 Mar 2018 05:29)  T(F): 98 (26 Mar 2018 05:29), Max: 98 (26 Mar 2018 05:29)  HR: 72 (26 Mar 2018 11:18) (67 - 84)  BP: 121/60 (26 Mar 2018 11:18) (121/60 - 184/79)  BP(mean): 77 (25 Mar 2018 12:45) (77 - 77)  RR: 18 (26 Mar 2018 05:29) (16 - 18)  SpO2: 98% (26 Mar 2018 05:29) (95% - 98%)    Gen: Awake, alert, NAD  CV: S1+S2 normal, no murmurs  Resp: Clear bilat, no resp distress  Abd: Soft, nontender, +BS  Ext: left leg AKA surgical site intact with staples, no purulence. Mild skin irritation - patient scratching area around leg.  : No Pratt  IV/Skin: No thrombophlebitis  Neuro: no focal deficits      LABS:                          9.2    13.41 )-----------( 246      ( 26 Mar 2018 05:25 )             28.7       03-26    140  |  99  |  20  ----------------------------<  185<H>  3.5   |  31  |  0.99    Ca    8.2<L>      26 Mar 2018 05:25  Phos  2.9     03-26  Mg     1.6     -26    Urinalysis Basic - ( 24 Mar 2018 17:50 )    Color: YELLOW / Appearance: CLOUDY / S.020 / pH: 6.0  Gluc: 100 / Ketone: NEGATIVE  / Bili: NEGATIVE / Urobili: NORMAL mg/dL   Blood: MODERATE / Protein: 150 mg/dL / Nitrite: NEGATIVE   Leuk Esterase: LARGE / RBC: >50 / WBC >50   Sq Epi: OCC / Non Sq Epi: x / Bacteria: FEW    MICROBIOLOGY:  v  BLOOD VENOUS  18 --  --  --      URINE CATHETER  18 --  --  --      ANKLE  03-10-18 --  --  --      ANKLE  03-10-18   NO GROWTH - PRELIMINARY RESULTS  NO ORGANISMS ISOLATED AT 24 HOURS  NO ORGANISMS ISOLATED AT 48 HRS.  NO ORGANISMS ISOLATED AT 72 HRS.  NO GROWTH AFTER 4 DAYS INCUBATION  NO GROWTH AFTER 5 DAYS INCUBATION  --  --      ANUS  03-10-18 --  --  --      OTHER  18   RARE  STRDYS^Streptococcus dysgalactiae  --  --      LEG - LEFT  18 --  --  Proteus mirabilis  Staph. aureus *MRSA*  Strep Beta Hemolytic Grp G    BLOOD PERIPHERAL  18 --  --  --    RADIOLOGY:    No new images.

## 2018-03-26 NOTE — PROGRESS NOTE ADULT - PROBLEM SELECTOR PLAN 3
- c/w Asa, Coreg, Plavix, Lipitor  - c/w lasix 40mg PO BID for diuresis  - on exam, clinically ___________ - c/w Asa, Coreg, Plavix, Lipitor  - c/w lasix 40mg PO BID for diuresis  - on exam, patient with some crackles b/l; c/w diruesis

## 2018-03-26 NOTE — PROGRESS NOTE ADULT - ASSESSMENT
72F w/ PMHx of DM2, PVD s/p R BKA and L transmetatarsal amputation, CAD s/p CABG, CKD III, HTN, initially presented on 3/3 with fevers and LLE pain admitted for sepsis 2/2 to cellulitis and OM of LLE, s/p ankle adm 3/3/18 with fevers and LLE pain found to be in sepsis 2/2 cellulitis and OM of LLE, s/p ankle disarticulation by podiatry on 3/10, course c/b SOB likely 2/2 mild acute on chronic decompensated HFpEF, s/p AKA with post op course complicated by hypotension and urinary retention, now hemodynamically stable, pending discharge to acute rehab.

## 2018-03-27 ENCOUNTER — INPATIENT (INPATIENT)
Facility: HOSPITAL | Age: 73
LOS: 18 days | Discharge: HOME HEALTH | DRG: 949 | End: 2018-04-15
Attending: STUDENT IN AN ORGANIZED HEALTH CARE EDUCATION/TRAINING PROGRAM | Admitting: STUDENT IN AN ORGANIZED HEALTH CARE EDUCATION/TRAINING PROGRAM
Payer: COMMERCIAL

## 2018-03-27 VITALS
RESPIRATION RATE: 14 BRPM | HEART RATE: 73 BPM | SYSTOLIC BLOOD PRESSURE: 153 MMHG | DIASTOLIC BLOOD PRESSURE: 60 MMHG | OXYGEN SATURATION: 98 %

## 2018-03-27 DIAGNOSIS — R33.9 RETENTION OF URINE, UNSPECIFIED: ICD-10-CM

## 2018-03-27 DIAGNOSIS — Z79.4 LONG TERM (CURRENT) USE OF INSULIN: ICD-10-CM

## 2018-03-27 DIAGNOSIS — Z95.1 PRESENCE OF AORTOCORONARY BYPASS GRAFT: ICD-10-CM

## 2018-03-27 DIAGNOSIS — Z89.612 ACQUIRED ABSENCE OF LEFT LEG ABOVE KNEE: ICD-10-CM

## 2018-03-27 DIAGNOSIS — E78.5 HYPERLIPIDEMIA, UNSPECIFIED: ICD-10-CM

## 2018-03-27 DIAGNOSIS — D63.1 ANEMIA IN CHRONIC KIDNEY DISEASE: ICD-10-CM

## 2018-03-27 DIAGNOSIS — B96.1 KLEBSIELLA PNEUMONIAE [K. PNEUMONIAE] AS THE CAUSE OF DISEASES CLASSIFIED ELSEWHERE: ICD-10-CM

## 2018-03-27 DIAGNOSIS — Z47.81 ENCOUNTER FOR ORTHOPEDIC AFTERCARE FOLLOWING SURGICAL AMPUTATION: ICD-10-CM

## 2018-03-27 DIAGNOSIS — I25.10 ATHEROSCLEROTIC HEART DISEASE OF NATIVE CORONARY ARTERY WITHOUT ANGINA PECTORIS: ICD-10-CM

## 2018-03-27 DIAGNOSIS — I73.9 PERIPHERAL VASCULAR DISEASE, UNSPECIFIED: ICD-10-CM

## 2018-03-27 DIAGNOSIS — I12.9 HYPERTENSIVE CHRONIC KIDNEY DISEASE WITH STAGE 1 THROUGH STAGE 4 CHRONIC KIDNEY DISEASE, OR UNSPECIFIED CHRONIC KIDNEY DISEASE: ICD-10-CM

## 2018-03-27 DIAGNOSIS — Z95.5 PRESENCE OF CORONARY ANGIOPLASTY IMPLANT AND GRAFT: ICD-10-CM

## 2018-03-27 DIAGNOSIS — Z89.511 ACQUIRED ABSENCE OF RIGHT LEG BELOW KNEE: Chronic | ICD-10-CM

## 2018-03-27 DIAGNOSIS — Z89.511 ACQUIRED ABSENCE OF RIGHT LEG BELOW KNEE: ICD-10-CM

## 2018-03-27 DIAGNOSIS — G54.6 PHANTOM LIMB SYNDROME WITH PAIN: ICD-10-CM

## 2018-03-27 DIAGNOSIS — R79.89 OTHER SPECIFIED ABNORMAL FINDINGS OF BLOOD CHEMISTRY: ICD-10-CM

## 2018-03-27 DIAGNOSIS — R11.2 NAUSEA WITH VOMITING, UNSPECIFIED: ICD-10-CM

## 2018-03-27 DIAGNOSIS — E11.65 TYPE 2 DIABETES MELLITUS WITH HYPERGLYCEMIA: ICD-10-CM

## 2018-03-27 DIAGNOSIS — E83.42 HYPOMAGNESEMIA: ICD-10-CM

## 2018-03-27 DIAGNOSIS — Z95.1 PRESENCE OF AORTOCORONARY BYPASS GRAFT: Chronic | ICD-10-CM

## 2018-03-27 DIAGNOSIS — N39.0 URINARY TRACT INFECTION, SITE NOT SPECIFIED: ICD-10-CM

## 2018-03-27 DIAGNOSIS — Z89.619 ACQUIRED ABSENCE OF UNSPECIFIED LEG ABOVE KNEE: ICD-10-CM

## 2018-03-27 DIAGNOSIS — Z90.710 ACQUIRED ABSENCE OF BOTH CERVIX AND UTERUS: Chronic | ICD-10-CM

## 2018-03-27 DIAGNOSIS — Z98.89 OTHER SPECIFIED POSTPROCEDURAL STATES: Chronic | ICD-10-CM

## 2018-03-27 DIAGNOSIS — E87.6 HYPOKALEMIA: ICD-10-CM

## 2018-03-27 DIAGNOSIS — Z51.89 ENCOUNTER FOR OTHER SPECIFIED AFTERCARE: ICD-10-CM

## 2018-03-27 DIAGNOSIS — N18.3 CHRONIC KIDNEY DISEASE, STAGE 3 (MODERATE): ICD-10-CM

## 2018-03-27 LAB
BUN SERPL-MCNC: 19 MG/DL — SIGNIFICANT CHANGE UP (ref 7–23)
CALCIUM SERPL-MCNC: 8.1 MG/DL — LOW (ref 8.4–10.5)
CHLORIDE SERPL-SCNC: 99 MMOL/L — SIGNIFICANT CHANGE UP (ref 98–107)
CO2 SERPL-SCNC: 33 MMOL/L — HIGH (ref 22–31)
CREAT SERPL-MCNC: 1.02 MG/DL — SIGNIFICANT CHANGE UP (ref 0.5–1.3)
GLUCOSE SERPL-MCNC: 157 MG/DL — HIGH (ref 70–99)
HCT VFR BLD CALC: 28.1 % — LOW (ref 34.5–45)
HGB BLD-MCNC: 8.8 G/DL — LOW (ref 11.5–15.5)
MAGNESIUM SERPL-MCNC: 1.6 MG/DL — SIGNIFICANT CHANGE UP (ref 1.6–2.6)
MCHC RBC-ENTMCNC: 28.1 PG — SIGNIFICANT CHANGE UP (ref 27–34)
MCHC RBC-ENTMCNC: 31.3 % — LOW (ref 32–36)
MCV RBC AUTO: 89.8 FL — SIGNIFICANT CHANGE UP (ref 80–100)
NRBC # FLD: 0 — SIGNIFICANT CHANGE UP
PHOSPHATE SERPL-MCNC: 3.2 MG/DL — SIGNIFICANT CHANGE UP (ref 2.5–4.5)
PLATELET # BLD AUTO: 190 K/UL — SIGNIFICANT CHANGE UP (ref 150–400)
PMV BLD: 12.1 FL — SIGNIFICANT CHANGE UP (ref 7–13)
POTASSIUM SERPL-MCNC: 3.5 MMOL/L — SIGNIFICANT CHANGE UP (ref 3.5–5.3)
POTASSIUM SERPL-SCNC: 3.5 MMOL/L — SIGNIFICANT CHANGE UP (ref 3.5–5.3)
RBC # BLD: 3.13 M/UL — LOW (ref 3.8–5.2)
RBC # FLD: 17.4 % — HIGH (ref 10.3–14.5)
SODIUM SERPL-SCNC: 140 MMOL/L — SIGNIFICANT CHANGE UP (ref 135–145)
WBC # BLD: 8.77 K/UL — SIGNIFICANT CHANGE UP (ref 3.8–10.5)
WBC # FLD AUTO: 8.77 K/UL — SIGNIFICANT CHANGE UP (ref 3.8–10.5)

## 2018-03-27 PROCEDURE — 99232 SBSQ HOSP IP/OBS MODERATE 35: CPT

## 2018-03-27 PROCEDURE — 99232 SBSQ HOSP IP/OBS MODERATE 35: CPT | Mod: GC

## 2018-03-27 PROCEDURE — 99239 HOSP IP/OBS DSCHRG MGMT >30: CPT

## 2018-03-27 RX ADMIN — Medication 3 MILLILITER(S): at 04:24

## 2018-03-27 RX ADMIN — HEPARIN SODIUM 5000 UNIT(S): 5000 INJECTION INTRAVENOUS; SUBCUTANEOUS at 05:30

## 2018-03-27 RX ADMIN — Medication 50 MILLIGRAM(S): at 13:24

## 2018-03-27 RX ADMIN — NYSTATIN CREAM 1 APPLICATION(S): 100000 CREAM TOPICAL at 05:31

## 2018-03-27 RX ADMIN — CALAMINE 8% AND ZINC OXIDE 8% 1 APPLICATION(S): 160 LOTION TOPICAL at 05:31

## 2018-03-27 RX ADMIN — Medication 7 UNIT(S): at 13:23

## 2018-03-27 RX ADMIN — Medication 50 MILLIGRAM(S): at 05:31

## 2018-03-27 RX ADMIN — Medication 23 UNIT(S): at 09:32

## 2018-03-27 RX ADMIN — CALAMINE 8% AND ZINC OXIDE 8% 1 APPLICATION(S): 160 LOTION TOPICAL at 13:24

## 2018-03-27 RX ADMIN — ISOSORBIDE MONONITRATE 60 MILLIGRAM(S): 60 TABLET, EXTENDED RELEASE ORAL at 12:10

## 2018-03-27 RX ADMIN — Medication 40 MILLIGRAM(S): at 05:31

## 2018-03-27 RX ADMIN — Medication 3 MILLILITER(S): at 09:40

## 2018-03-27 RX ADMIN — Medication 7 UNIT(S): at 09:30

## 2018-03-27 RX ADMIN — SERTRALINE 25 MILLIGRAM(S): 25 TABLET, FILM COATED ORAL at 12:11

## 2018-03-27 RX ADMIN — Medication 81 MILLIGRAM(S): at 12:11

## 2018-03-27 RX ADMIN — Medication 3: at 13:22

## 2018-03-27 RX ADMIN — CLOPIDOGREL BISULFATE 75 MILLIGRAM(S): 75 TABLET, FILM COATED ORAL at 12:10

## 2018-03-27 RX ADMIN — Medication 1: at 09:30

## 2018-03-27 RX ADMIN — CARVEDILOL PHOSPHATE 25 MILLIGRAM(S): 80 CAPSULE, EXTENDED RELEASE ORAL at 05:31

## 2018-03-27 NOTE — PROGRESS NOTE ADULT - NSHPATTENDINGPLANDISCUSS_GEN_ALL_CORE
NP
medicine resident.
patient
patient
patient, ortho resident.
medicine attending.
medicine team
patient
patient, Dr. Constantino
primary medical team
redient
resident
residnet
Dr. Evan Duvall
patient
patient, ID fellow, Dr. Tinoco.
Patient, HS2
resident jai
Patient, HS2
Dr. Jones, Patient, Staff
Patient, HS2
resident
resident
Patient, Family, HS2
Patient, HS2
resident
resident
HS2, Patient, SW/CM
Patient, HS2, Family

## 2018-03-27 NOTE — PROGRESS NOTE ADULT - SUBJECTIVE AND OBJECTIVE BOX
Shae Shin PGY 1  Pager: [phone]/ [phone]    Patient is a 72y old  Female who presents with a chief complaint of Fevers and leg pain (06 Mar 2018 17:24)    SUBJECTIVE / OVERNIGHT EVENTS:  Patient seen and examined at bedside with no acute events overnight. Patient reports pain is well controlled.      MEDICATIONS  (STANDING):  ALBUTerol/ipratropium for Nebulization 3 milliLiter(s) Nebulizer every 6 hours  aspirin  chewable 81 milliGRAM(s) Oral daily  atorvastatin 20 milliGRAM(s) Oral at bedtime  calamine Lotion 1 Application(s) Topical three times a day  carvedilol 25 milliGRAM(s) Oral every 12 hours  clopidogrel Tablet 75 milliGRAM(s) Oral daily  dextrose 50% Injectable 25 Gram(s) IV Push once  furosemide    Tablet 40 milliGRAM(s) Oral two times a day  heparin  Injectable 5000 Unit(s) SubCutaneous every 12 hours  hydrALAZINE 50 milliGRAM(s) Oral every 8 hours  insulin detemir injectable (LEVEMIR) 23 Unit(s) SubCutaneous daily  insulin lispro (HumaLOG) corrective regimen sliding scale   SubCutaneous Before meals and at bedtime  insulin lispro Injectable (HumaLOG) 7 Unit(s) SubCutaneous three times a day before meals  isosorbide   mononitrate ER Tablet (IMDUR) 60 milliGRAM(s) Oral daily  nystatin Cream 1 Application(s) Topical two times a day  sertraline 25 milliGRAM(s) Oral daily    MEDICATIONS  (PRN):  acetaminophen   Tablet 650 milliGRAM(s) Oral every 6 hours PRN For Temp greater than 38 C (100.4 F)  acetaminophen  IVPB. 1000 milliGRAM(s) IV Intermittent once PRN Mild Pain (1 - 3)  oxyCODONE    IR 5 milliGRAM(s) Oral every 4 hours PRN Moderate Pain (4 - 6)  oxyCODONE    IR 10 milliGRAM(s) Oral every 6 hours PRN Severe Pain (7 - 10)      OBJECTIVE:    Vital Signs Last 24 Hrs  T(C): 36.9 (27 Mar 2018 05:29), Max: 37.2 (26 Mar 2018 20:06)  T(F): 98.5 (27 Mar 2018 05:29), Max: 98.9 (26 Mar 2018 20:06)  HR: 65 (27 Mar 2018 05:29) (65 - 80)  BP: 159/55 (27 Mar 2018 05:29) (121/60 - 179/80)  BP(mean): --  RR: 18 (27 Mar 2018 05:29) (18 - 18)  SpO2: 96% (27 Mar 2018 05:29) (96% - 97%)    CAPILLARY BLOOD GLUCOSE      POCT Blood Glucose.: 207 mg/dL (26 Mar 2018 21:59)  POCT Blood Glucose.: 233 mg/dL (26 Mar 2018 17:30)  POCT Blood Glucose.: 193 mg/dL (26 Mar 2018 12:38)  POCT Blood Glucose.: 200 mg/dL (26 Mar 2018 08:51)    I&O's Summary    25 Mar 2018 07:01  -  26 Mar 2018 07:00  --------------------------------------------------------  IN: 240 mL / OUT: 2850 mL / NET: -2610 mL    26 Mar 2018 07:01  -  27 Mar 2018 06:58  --------------------------------------------------------  IN: 900 mL / OUT: 1350 mL / NET: -450 mL        PHYSICAL EXAM:  GENERAL: NAD, well-developed  HEAD:  Atraumatic, Normocephalic  EYES: EOMI, PERRLA, conjunctiva and sclera clear  NECK: Supple, No JVD  CHEST/LUNG: Clear to auscultation bilaterally; No wheeze  HEART: Regular rate and rhythm; No murmurs, rubs, or gallops  ABDOMEN: Soft, Nontender, Nondistended; Bowel sounds present  EXTREMITIES:  RLE with BKA- warm to touch; LLE with AKA, warm to touch with strong femoral pulse, dressing intact   PSYCH: AAOx3  NEUROLOGY: non-focal  SKIN: No rashes or lesions    LABS:                        9.2    13.41 )-----------( 246      ( 26 Mar 2018 05:25 )             28.7     Auto Eosinophil # x     / Auto Eosinophil % x     / Auto Neutrophil # x     / Auto Neutrophil % x     / BANDS % x                            9.5    15.65 )-----------( 267      ( 25 Mar 2018 07:40 )             29.8     Auto Eosinophil # x     / Auto Eosinophil % x     / Auto Neutrophil # x     / Auto Neutrophil % x     / BANDS % x        03-26    140  |  99  |  20  ----------------------------<  185<H>  3.5   |  31  |  0.99  03-25    142  |  102  |  22  ----------------------------<  204<H>  4.1   |  27  |  1.10    Ca    8.2<L>      26 Mar 2018 05:25  Mg     1.6     03-26  Phos  2.9     03-26 Shae Shin PGY 1  Pager: [phone]/ [phone]    Patient is a 72y old  Female who presents with a chief complaint of Fevers and leg pain (06 Mar 2018 17:24)    SUBJECTIVE / OVERNIGHT EVENTS:  Patient seen and examined at bedside with no acute events overnight. Patient reports pain is well controlled.      MEDICATIONS  (STANDING):  ALBUTerol/ipratropium for Nebulization 3 milliLiter(s) Nebulizer every 6 hours  aspirin  chewable 81 milliGRAM(s) Oral daily  atorvastatin 20 milliGRAM(s) Oral at bedtime  calamine Lotion 1 Application(s) Topical three times a day  carvedilol 25 milliGRAM(s) Oral every 12 hours  clopidogrel Tablet 75 milliGRAM(s) Oral daily  dextrose 50% Injectable 25 Gram(s) IV Push once  furosemide    Tablet 40 milliGRAM(s) Oral two times a day  heparin  Injectable 5000 Unit(s) SubCutaneous every 12 hours  hydrALAZINE 50 milliGRAM(s) Oral every 8 hours  insulin detemir injectable (LEVEMIR) 23 Unit(s) SubCutaneous daily  insulin lispro (HumaLOG) corrective regimen sliding scale   SubCutaneous Before meals and at bedtime  insulin lispro Injectable (HumaLOG) 7 Unit(s) SubCutaneous three times a day before meals  isosorbide   mononitrate ER Tablet (IMDUR) 60 milliGRAM(s) Oral daily  nystatin Cream 1 Application(s) Topical two times a day  sertraline 25 milliGRAM(s) Oral daily    MEDICATIONS  (PRN):  acetaminophen   Tablet 650 milliGRAM(s) Oral every 6 hours PRN For Temp greater than 38 C (100.4 F)  acetaminophen  IVPB. 1000 milliGRAM(s) IV Intermittent once PRN Mild Pain (1 - 3)  oxyCODONE    IR 5 milliGRAM(s) Oral every 4 hours PRN Moderate Pain (4 - 6)  oxyCODONE    IR 10 milliGRAM(s) Oral every 6 hours PRN Severe Pain (7 - 10)      OBJECTIVE:    Vital Signs Last 24 Hrs  T(C): 36.9 (27 Mar 2018 05:29), Max: 37.2 (26 Mar 2018 20:06)  T(F): 98.5 (27 Mar 2018 05:29), Max: 98.9 (26 Mar 2018 20:06)  HR: 65 (27 Mar 2018 05:29) (65 - 80)  BP: 159/55 (27 Mar 2018 05:29) (121/60 - 179/80)  BP(mean): --  RR: 18 (27 Mar 2018 05:29) (18 - 18)  SpO2: 96% (27 Mar 2018 05:29) (96% - 97%)    CAPILLARY BLOOD GLUCOSE      POCT Blood Glucose.: 207 mg/dL (26 Mar 2018 21:59)  POCT Blood Glucose.: 233 mg/dL (26 Mar 2018 17:30)  POCT Blood Glucose.: 193 mg/dL (26 Mar 2018 12:38)  POCT Blood Glucose.: 200 mg/dL (26 Mar 2018 08:51)    I&O's Summary    25 Mar 2018 07:01  -  26 Mar 2018 07:00  --------------------------------------------------------  IN: 240 mL / OUT: 2850 mL / NET: -2610 mL    26 Mar 2018 07:01  -  27 Mar 2018 06:58  --------------------------------------------------------  IN: 900 mL / OUT: 1350 mL / NET: -450 mL        PHYSICAL EXAM:  GENERAL: NAD, well-developed  HEAD:  Atraumatic, Normocephalic  EYES: EOMI, PERRLA, conjunctiva and sclera clear  NECK: Supple, No JVD  CHEST/LUNG: Clear to auscultation bilaterally; No wheeze  HEART: Regular rate and rhythm; No murmurs, rubs, or gallops  ABDOMEN: Soft, Nontender, Nondistended; Bowel sounds present  EXTREMITIES:  RLE with BKA- warm to touch; LLE with AKA, warm to touch with strong femoral pulse, dressing intact   NEUROLOGY: non-focal  SKIN: erythema above dressing decreased from yesterday    LABS:                        9.2    13.41 )-----------( 246      ( 26 Mar 2018 05:25 )             28.7     Auto Eosinophil # x     / Auto Eosinophil % x     / Auto Neutrophil # x     / Auto Neutrophil % x     / BANDS % x                            9.5    15.65 )-----------( 267      ( 25 Mar 2018 07:40 )             29.8     Auto Eosinophil # x     / Auto Eosinophil % x     / Auto Neutrophil # x     / Auto Neutrophil % x     / BANDS % x        03-26    140  |  99  |  20  ----------------------------<  185<H>  3.5   |  31  |  0.99  03-25    142  |  102  |  22  ----------------------------<  204<H>  4.1   |  27  |  1.10    Ca    8.2<L>      26 Mar 2018 05:25  Mg     1.6     03-26  Phos  2.9     03-26

## 2018-03-27 NOTE — PROGRESS NOTE ADULT - PROBLEM SELECTOR PLAN 6
- failed TOV x 2, placed howe back on 3/26  - likely precipitant is post op  anesthesia effects  - will be discharged with howe with urology follow up

## 2018-03-27 NOTE — PROGRESS NOTE ADULT - PROBLEM SELECTOR PROBLEM 9
Hypercholesterolemia
Need for prophylactic measure
Hypercholesterolemia
Need for prophylactic measure

## 2018-03-27 NOTE — PROGRESS NOTE ADULT - SUBJECTIVE AND OBJECTIVE BOX
CC: Patient is a 72y old  Female who presents with a chief complaint of Fevers and leg pain    Interval History/ROS: Patient eating breakfast. Has no complaints. Occasional cough - improving. Denies fever, chills.    Allergies  sulfa drugs (Hives)  sulfa drugs (Rash)  Sulfac 10% (Hives)    ANTIMICROBIALS:  None    PE:    Vital Signs Last 24 Hrs  T(C): 36.9 (27 Mar 2018 05:29), Max: 37.2 (26 Mar 2018 20:06)  T(F): 98.5 (27 Mar 2018 05:29), Max: 98.9 (26 Mar 2018 20:06)  HR: 70 (27 Mar 2018 09:41) (65 - 76)  BP: 159/55 (27 Mar 2018 05:29) (121/60 - 159/55)  BP(mean): --  RR: 18 (27 Mar 2018 05:29) (18 - 18)  SpO2: 98% (27 Mar 2018 09:41) (96% - 98%)    Gen: Awake, alert, NAD  CV: S1+S2 normal, no murmurs  Resp: Clear bilat, no resp distress  Abd: Soft, nontender, +BS  Ext: left leg AKA surgical site bandaged  : No Pratt  IV/Skin: No thrombophlebitis  Neuro: no focal deficits      LABS:                          8.8    8.77  )-----------( 190      ( 27 Mar 2018 06:40 )             28.1       03-27    140  |  99  |  19  ----------------------------<  157<H>  3.5   |  33<H>  |  1.02    Ca    8.1<L>      27 Mar 2018 06:40  Phos  3.2     03-27  Mg     1.6     03-27    MICROBIOLOGY:  v  BLOOD VENOUS  03-25-18 --  --  --      URINE CATHETER  03-24-18 --  --  --      ANKLE  03-10-18 --  --  --      ANKLE  03-10-18   NO GROWTH - PRELIMINARY RESULTS  NO ORGANISMS ISOLATED AT 24 HOURS  NO ORGANISMS ISOLATED AT 48 HRS.  NO ORGANISMS ISOLATED AT 72 HRS.  NO GROWTH AFTER 4 DAYS INCUBATION  NO GROWTH AFTER 5 DAYS INCUBATION  --  --      ANUS  03-10-18 --  --  --      OTHER  03-06-18   RARE  STRDYS^Streptococcus dysgalactiae  --  --      LEG - LEFT  03-03-18 --  --  Proteus mirabilis  Staph. aureus *MRSA*  Strep Beta Hemolytic Grp G      BLOOD PERIPHERAL  03-03-18 --  --  --    RADIOLOGY:  No new images.

## 2018-03-27 NOTE — PROGRESS NOTE ADULT - PROBLEM SELECTOR PLAN 1
- s/p AKA of the LLE by vascular surgery on 3/20; per vascular- patient to follow up as outpatient for management and staple removal   - c/w calamine lotion for pruritis above dressing area  - -s/p 22 days of abx  - s/p L ankle disarticulation by podiatry on 3/10  - c/w pain well controlled   - PM&R eval- dispo to acute rehab

## 2018-03-27 NOTE — PROGRESS NOTE ADULT - PROVIDER SPECIALTY LIST ADULT
Anesthesia
Anesthesia
Cardiology
Infectious Disease
Internal Medicine
Podiatry
Rehab Medicine
Rehab Medicine
Vascular Surgery
Internal Medicine
Vascular Surgery
Cardiology
Surgery
Vascular Surgery
Infectious Disease
Internal Medicine

## 2018-03-27 NOTE — PROGRESS NOTE ADULT - PROBLEM SELECTOR PLAN 9
- continue Atorvastatin
- DVT ppx heparin subq  - CC diet  - Dispo to acute rehab likely Monday 3/26
- continue Atorvastatin
- DVT ppx heparin subq  - CC diet  - Dispo pending AKA- likely rehab placement

## 2018-03-27 NOTE — PROGRESS NOTE ADULT - PROBLEM SELECTOR PLAN 2
- continue with current antihypertensive regiment  - c/w hydralazine, imdur and coreg (added back on 3/26)   - BPs well controlled (systolic 120s -150s)

## 2018-03-27 NOTE — PROGRESS NOTE ADULT - ASSESSMENT
72 female with T2DM, PVD s/p R BKA and L transmetatarsal amputation, CAD s/p CABG, CKD stage 3, HTN, HLD here with fevers 103F, chills and left leg pain. Admitted for cellulitis of left lower extremity with warmth and erythema to that leg. Has elevated ESR and CRP.     s/p OR for ankle disarticulation and incision and drainage of ankle and Achilles tendon abscess. Now s/p left AKA on 3/20/18.    Recommend:   - s/p left AKA now off antibiotics.  - Continue to monitor off antibiotics.  - Leukocytosis improved - blood and urine cxs NGTD.    Please call with questions.      Jose Alberto Palacios MD  Pager [phone]  After 5pm/weekends call [phone]

## 2018-03-27 NOTE — SWALLOW BEDSIDE ASSESSMENT ADULT - COMMENTS
As per chart review patient with cough, which is improving.  Patient admitted with fever and leg pain.

## 2018-03-27 NOTE — SWALLOW BEDSIDE ASSESSMENT ADULT - SWALLOW EVAL: DIAGNOSIS
1. Intact oral stage of the swallow given puree, regular solids, and thin fluids marked by adequate collection, formation, and transfer of the bolus.  Patient noted to have a rotary chew pattern for solids.  2. Functional pharyngeal swallow given puree, solids, and thin fluids marked by slight delay in the trigger of the swallow and adequate laryngeal excursion.  No overt s/s of laryngeal penetration or aspiration was noted.

## 2018-03-28 DIAGNOSIS — Z89.612 ACQUIRED ABSENCE OF LEFT LEG ABOVE KNEE: ICD-10-CM

## 2018-03-28 PROCEDURE — 99223 1ST HOSP IP/OBS HIGH 75: CPT

## 2018-03-29 PROCEDURE — 99232 SBSQ HOSP IP/OBS MODERATE 35: CPT

## 2018-03-30 LAB — BACTERIA BLD CULT: SIGNIFICANT CHANGE UP

## 2018-03-30 PROCEDURE — 99232 SBSQ HOSP IP/OBS MODERATE 35: CPT

## 2018-03-31 PROCEDURE — 99232 SBSQ HOSP IP/OBS MODERATE 35: CPT

## 2018-04-01 PROCEDURE — 99232 SBSQ HOSP IP/OBS MODERATE 35: CPT

## 2018-04-02 PROCEDURE — 99233 SBSQ HOSP IP/OBS HIGH 50: CPT

## 2018-04-02 PROCEDURE — 99232 SBSQ HOSP IP/OBS MODERATE 35: CPT

## 2018-04-03 PROCEDURE — 99232 SBSQ HOSP IP/OBS MODERATE 35: CPT

## 2018-04-04 PROCEDURE — 99233 SBSQ HOSP IP/OBS HIGH 50: CPT

## 2018-04-04 PROCEDURE — 99232 SBSQ HOSP IP/OBS MODERATE 35: CPT

## 2018-04-05 PROCEDURE — 99232 SBSQ HOSP IP/OBS MODERATE 35: CPT

## 2018-04-05 PROCEDURE — 99233 SBSQ HOSP IP/OBS HIGH 50: CPT

## 2018-04-06 PROCEDURE — 99232 SBSQ HOSP IP/OBS MODERATE 35: CPT

## 2018-04-06 PROCEDURE — 99233 SBSQ HOSP IP/OBS HIGH 50: CPT

## 2018-04-07 PROCEDURE — 99232 SBSQ HOSP IP/OBS MODERATE 35: CPT

## 2018-04-08 PROCEDURE — 99232 SBSQ HOSP IP/OBS MODERATE 35: CPT

## 2018-04-09 LAB
FUNGUS SPEC QL CULT: SIGNIFICANT CHANGE UP

## 2018-04-09 PROCEDURE — 99233 SBSQ HOSP IP/OBS HIGH 50: CPT

## 2018-04-09 PROCEDURE — 99232 SBSQ HOSP IP/OBS MODERATE 35: CPT

## 2018-04-10 PROCEDURE — 99233 SBSQ HOSP IP/OBS HIGH 50: CPT

## 2018-04-10 PROCEDURE — 99232 SBSQ HOSP IP/OBS MODERATE 35: CPT

## 2018-04-11 PROCEDURE — 99232 SBSQ HOSP IP/OBS MODERATE 35: CPT

## 2018-04-12 PROCEDURE — 99232 SBSQ HOSP IP/OBS MODERATE 35: CPT

## 2018-04-13 PROCEDURE — 99232 SBSQ HOSP IP/OBS MODERATE 35: CPT

## 2018-04-14 PROCEDURE — 99232 SBSQ HOSP IP/OBS MODERATE 35: CPT

## 2018-04-15 PROCEDURE — 99238 HOSP IP/OBS DSCHRG MGMT 30/<: CPT

## 2018-04-15 PROCEDURE — 99232 SBSQ HOSP IP/OBS MODERATE 35: CPT

## 2018-04-20 PROCEDURE — 80074 ACUTE HEPATITIS PANEL: CPT

## 2018-04-20 PROCEDURE — 83735 ASSAY OF MAGNESIUM: CPT

## 2018-04-20 PROCEDURE — 85027 COMPLETE CBC AUTOMATED: CPT

## 2018-04-20 PROCEDURE — 85025 COMPLETE CBC W/AUTO DIFF WBC: CPT

## 2018-04-20 PROCEDURE — 97535 SELF CARE MNGMENT TRAINING: CPT

## 2018-04-20 PROCEDURE — 80053 COMPREHEN METABOLIC PANEL: CPT

## 2018-04-20 PROCEDURE — 87086 URINE CULTURE/COLONY COUNT: CPT

## 2018-04-20 PROCEDURE — 84100 ASSAY OF PHOSPHORUS: CPT

## 2018-04-20 PROCEDURE — 97167 OT EVAL HIGH COMPLEX 60 MIN: CPT

## 2018-04-20 PROCEDURE — 81001 URINALYSIS AUTO W/SCOPE: CPT

## 2018-04-20 PROCEDURE — 97530 THERAPEUTIC ACTIVITIES: CPT

## 2018-04-20 PROCEDURE — 97116 GAIT TRAINING THERAPY: CPT

## 2018-04-20 PROCEDURE — 97110 THERAPEUTIC EXERCISES: CPT

## 2018-04-20 PROCEDURE — 97163 PT EVAL HIGH COMPLEX 45 MIN: CPT

## 2018-04-20 PROCEDURE — 80048 BASIC METABOLIC PNL TOTAL CA: CPT

## 2018-04-27 ENCOUNTER — APPOINTMENT (OUTPATIENT)
Dept: VASCULAR SURGERY | Facility: CLINIC | Age: 73
End: 2018-04-27
Payer: MEDICARE

## 2018-04-27 VITALS
HEART RATE: 64 BPM | WEIGHT: 185 LBS | SYSTOLIC BLOOD PRESSURE: 132 MMHG | DIASTOLIC BLOOD PRESSURE: 65 MMHG | BODY MASS INDEX: 29.73 KG/M2 | TEMPERATURE: 98.6 F | HEIGHT: 66 IN

## 2018-04-27 PROCEDURE — 99024 POSTOP FOLLOW-UP VISIT: CPT

## 2018-04-30 ENCOUNTER — APPOINTMENT (OUTPATIENT)
Dept: PHYSICAL MEDICINE AND REHAB | Facility: CLINIC | Age: 73
End: 2018-04-30
Payer: MEDICARE

## 2018-04-30 VITALS
BODY MASS INDEX: 29.73 KG/M2 | HEIGHT: 66 IN | WEIGHT: 185 LBS | OXYGEN SATURATION: 100 % | HEART RATE: 68 BPM | RESPIRATION RATE: 16 BRPM | SYSTOLIC BLOOD PRESSURE: 127 MMHG | DIASTOLIC BLOOD PRESSURE: 69 MMHG

## 2018-04-30 DIAGNOSIS — S78.112A COMPLETE TRAUMATIC AMPUTATION AT LVL BETWEEN LEFT HIP AND KNEE, INITIAL ENCOUNTER: ICD-10-CM

## 2018-04-30 PROCEDURE — 99215 OFFICE O/P EST HI 40 MIN: CPT

## 2018-05-01 ENCOUNTER — APPOINTMENT (OUTPATIENT)
Dept: UROLOGY | Facility: CLINIC | Age: 73
End: 2018-05-01
Payer: MEDICARE

## 2018-05-01 DIAGNOSIS — Z09 ENCOUNTER FOR FOLLOW-UP EXAMINATION AFTER COMPLETED TREATMENT FOR CONDITIONS OTHER THAN MALIGNANT NEOPLASM: ICD-10-CM

## 2018-05-01 PROCEDURE — 99215 OFFICE O/P EST HI 40 MIN: CPT

## 2018-05-16 ENCOUNTER — APPOINTMENT (OUTPATIENT)
Dept: PHYSICAL MEDICINE AND REHAB | Facility: CLINIC | Age: 73
End: 2018-05-16

## 2018-05-18 ENCOUNTER — APPOINTMENT (OUTPATIENT)
Dept: VASCULAR SURGERY | Facility: CLINIC | Age: 73
End: 2018-05-18
Payer: MEDICARE

## 2018-05-18 VITALS
HEART RATE: 81 BPM | SYSTOLIC BLOOD PRESSURE: 179 MMHG | WEIGHT: 185 LBS | DIASTOLIC BLOOD PRESSURE: 83 MMHG | BODY MASS INDEX: 29.73 KG/M2 | TEMPERATURE: 98.4 F | HEIGHT: 66 IN

## 2018-05-18 DIAGNOSIS — E08.621 DIABETES MELLITUS DUE TO UNDERLYING CONDITION WITH FOOT ULCER: ICD-10-CM

## 2018-05-18 DIAGNOSIS — L97.523 DIABETES MELLITUS DUE TO UNDERLYING CONDITION WITH FOOT ULCER: ICD-10-CM

## 2018-05-18 PROCEDURE — 99024 POSTOP FOLLOW-UP VISIT: CPT

## 2018-05-21 ENCOUNTER — APPOINTMENT (OUTPATIENT)
Dept: UROLOGY | Facility: CLINIC | Age: 73
End: 2018-05-21
Payer: MEDICARE

## 2018-05-21 DIAGNOSIS — R33.9 RETENTION OF URINE, UNSPECIFIED: ICD-10-CM

## 2018-05-21 PROCEDURE — 99215 OFFICE O/P EST HI 40 MIN: CPT

## 2018-05-22 LAB
APPEARANCE: ABNORMAL
BACTERIA: ABNORMAL
BILIRUBIN URINE: NEGATIVE
BLOOD URINE: NEGATIVE
COLOR: YELLOW
GLUCOSE QUALITATIVE U: NEGATIVE MG/DL
HYALINE CASTS: 5 /LPF
KETONES URINE: NEGATIVE
LEUKOCYTE ESTERASE URINE: ABNORMAL
MICROSCOPIC-UA: NORMAL
NITRITE URINE: POSITIVE
PH URINE: 7
PROTEIN URINE: 30 MG/DL
RED BLOOD CELLS URINE: 2 /HPF
SPECIFIC GRAVITY URINE: 1.01
SQUAMOUS EPITHELIAL CELLS: 1 /HPF
UROBILINOGEN URINE: NEGATIVE MG/DL
WHITE BLOOD CELLS URINE: 528 /HPF

## 2018-05-24 ENCOUNTER — MESSAGE (OUTPATIENT)
Age: 73
End: 2018-05-24

## 2018-05-24 DIAGNOSIS — B96.1 URINARY TRACT INFECTION, SITE NOT SPECIFIED: ICD-10-CM

## 2018-05-24 DIAGNOSIS — N39.0 URINARY TRACT INFECTION, SITE NOT SPECIFIED: ICD-10-CM

## 2018-05-24 LAB — BACTERIA UR CULT: ABNORMAL

## 2018-05-25 ENCOUNTER — EMERGENCY (EMERGENCY)
Facility: HOSPITAL | Age: 73
LOS: 1 days | Discharge: HOME | End: 2018-05-25
Attending: EMERGENCY MEDICINE | Admitting: EMERGENCY MEDICINE
Payer: MEDICARE

## 2018-05-25 VITALS
OXYGEN SATURATION: 100 % | TEMPERATURE: 98 F | SYSTOLIC BLOOD PRESSURE: 160 MMHG | HEART RATE: 70 BPM | DIASTOLIC BLOOD PRESSURE: 73 MMHG | RESPIRATION RATE: 16 BRPM

## 2018-05-25 VITALS
OXYGEN SATURATION: 100 % | HEART RATE: 74 BPM | TEMPERATURE: 98 F | SYSTOLIC BLOOD PRESSURE: 189 MMHG | DIASTOLIC BLOOD PRESSURE: 94 MMHG | RESPIRATION RATE: 16 BRPM

## 2018-05-25 DIAGNOSIS — Z95.1 PRESENCE OF AORTOCORONARY BYPASS GRAFT: Chronic | ICD-10-CM

## 2018-05-25 DIAGNOSIS — Z89.511 ACQUIRED ABSENCE OF RIGHT LEG BELOW KNEE: Chronic | ICD-10-CM

## 2018-05-25 DIAGNOSIS — Z90.710 ACQUIRED ABSENCE OF BOTH CERVIX AND UTERUS: Chronic | ICD-10-CM

## 2018-05-25 DIAGNOSIS — Z98.89 OTHER SPECIFIED POSTPROCEDURAL STATES: Chronic | ICD-10-CM

## 2018-05-25 LAB
ALBUMIN SERPL ELPH-MCNC: 3.6 G/DL — SIGNIFICANT CHANGE UP (ref 3.3–5)
ALP SERPL-CCNC: 94 U/L — SIGNIFICANT CHANGE UP (ref 40–120)
ALT FLD-CCNC: 25 U/L — SIGNIFICANT CHANGE UP (ref 4–33)
APTT BLD: 37 SEC — SIGNIFICANT CHANGE UP (ref 27.5–37.4)
AST SERPL-CCNC: 38 U/L — HIGH (ref 4–32)
BASOPHILS # BLD AUTO: 0.02 K/UL — SIGNIFICANT CHANGE UP (ref 0–0.2)
BASOPHILS NFR BLD AUTO: 0.2 % — SIGNIFICANT CHANGE UP (ref 0–2)
BILIRUB SERPL-MCNC: 0.3 MG/DL — SIGNIFICANT CHANGE UP (ref 0.2–1.2)
BUN SERPL-MCNC: 21 MG/DL — SIGNIFICANT CHANGE UP (ref 7–23)
CALCIUM SERPL-MCNC: 9.4 MG/DL — SIGNIFICANT CHANGE UP (ref 8.4–10.5)
CHLORIDE SERPL-SCNC: 85 MMOL/L — LOW (ref 98–107)
CHLORIDE SERPL-SCNC: 88 MMOL/L — LOW (ref 98–107)
CHLORIDE SERPL-SCNC: 91 MMOL/L — LOW (ref 98–107)
CK MB BLD-MCNC: 1.64 NG/ML — SIGNIFICANT CHANGE UP (ref 1–4.7)
CK SERPL-CCNC: 85 U/L — SIGNIFICANT CHANGE UP (ref 25–170)
CO2 SERPL-SCNC: 22 MMOL/L — SIGNIFICANT CHANGE UP (ref 22–31)
CO2 SERPL-SCNC: 24 MMOL/L — SIGNIFICANT CHANGE UP (ref 22–31)
CO2 SERPL-SCNC: 27 MMOL/L — SIGNIFICANT CHANGE UP (ref 22–31)
CREAT SERPL-MCNC: 0.83 MG/DL — SIGNIFICANT CHANGE UP (ref 0.5–1.3)
CREAT SERPL-MCNC: 0.92 MG/DL — SIGNIFICANT CHANGE UP (ref 0.5–1.3)
CREAT SERPL-MCNC: 0.93 MG/DL — SIGNIFICANT CHANGE UP (ref 0.5–1.3)
EOSINOPHIL # BLD AUTO: 0.36 K/UL — SIGNIFICANT CHANGE UP (ref 0–0.5)
EOSINOPHIL NFR BLD AUTO: 3.1 % — SIGNIFICANT CHANGE UP (ref 0–6)
GLUCOSE SERPL-MCNC: 192 MG/DL — HIGH (ref 70–99)
GLUCOSE SERPL-MCNC: 194 MG/DL — HIGH (ref 70–99)
GLUCOSE SERPL-MCNC: 198 MG/DL — HIGH (ref 70–99)
HCT VFR BLD CALC: 36.1 % — SIGNIFICANT CHANGE UP (ref 34.5–45)
HGB BLD-MCNC: 11.8 G/DL — SIGNIFICANT CHANGE UP (ref 11.5–15.5)
IMM GRANULOCYTES # BLD AUTO: 0.04 # — SIGNIFICANT CHANGE UP
IMM GRANULOCYTES NFR BLD AUTO: 0.3 % — SIGNIFICANT CHANGE UP (ref 0–1.5)
INR BLD: 1.03 — SIGNIFICANT CHANGE UP (ref 0.88–1.17)
LIDOCAIN IGE QN: 35.6 U/L — SIGNIFICANT CHANGE UP (ref 7–60)
LYMPHOCYTES # BLD AUTO: 26.9 % — SIGNIFICANT CHANGE UP (ref 13–44)
LYMPHOCYTES # BLD AUTO: 3.08 K/UL — SIGNIFICANT CHANGE UP (ref 1–3.3)
MCHC RBC-ENTMCNC: 28.6 PG — SIGNIFICANT CHANGE UP (ref 27–34)
MCHC RBC-ENTMCNC: 32.7 % — SIGNIFICANT CHANGE UP (ref 32–36)
MCV RBC AUTO: 87.4 FL — SIGNIFICANT CHANGE UP (ref 80–100)
MONOCYTES # BLD AUTO: 0.6 K/UL — SIGNIFICANT CHANGE UP (ref 0–0.9)
MONOCYTES NFR BLD AUTO: 5.2 % — SIGNIFICANT CHANGE UP (ref 2–14)
NEUTROPHILS # BLD AUTO: 7.35 K/UL — SIGNIFICANT CHANGE UP (ref 1.8–7.4)
NEUTROPHILS NFR BLD AUTO: 64.3 % — SIGNIFICANT CHANGE UP (ref 43–77)
NRBC # FLD: 0 — SIGNIFICANT CHANGE UP
PLATELET # BLD AUTO: 432 K/UL — HIGH (ref 150–400)
PMV BLD: 10.3 FL — SIGNIFICANT CHANGE UP (ref 7–13)
POTASSIUM SERPL-MCNC: 4 MMOL/L — SIGNIFICANT CHANGE UP (ref 3.5–5.3)
POTASSIUM SERPL-MCNC: 4.9 MMOL/L — SIGNIFICANT CHANGE UP (ref 3.5–5.3)
POTASSIUM SERPL-MCNC: SIGNIFICANT CHANGE UP MMOL/L (ref 3.5–5.3)
POTASSIUM SERPL-SCNC: 4 MMOL/L — SIGNIFICANT CHANGE UP (ref 3.5–5.3)
POTASSIUM SERPL-SCNC: 4.9 MMOL/L — SIGNIFICANT CHANGE UP (ref 3.5–5.3)
POTASSIUM SERPL-SCNC: SIGNIFICANT CHANGE UP MMOL/L (ref 3.5–5.3)
PROT SERPL-MCNC: 9 G/DL — HIGH (ref 6–8.3)
PROTHROM AB SERPL-ACNC: 11.4 SEC — SIGNIFICANT CHANGE UP (ref 9.8–13.1)
RBC # BLD: 4.13 M/UL — SIGNIFICANT CHANGE UP (ref 3.8–5.2)
RBC # FLD: 14.4 % — SIGNIFICANT CHANGE UP (ref 10.3–14.5)
SODIUM SERPL-SCNC: 123 MMOL/L — LOW (ref 135–145)
SODIUM SERPL-SCNC: 126 MMOL/L — LOW (ref 135–145)
SODIUM SERPL-SCNC: 130 MMOL/L — LOW (ref 135–145)
TROPONIN T SERPL-MCNC: < 0.06 NG/ML — SIGNIFICANT CHANGE UP (ref 0–0.06)
WBC # BLD: 11.45 K/UL — HIGH (ref 3.8–10.5)
WBC # FLD AUTO: 11.45 K/UL — HIGH (ref 3.8–10.5)

## 2018-05-25 PROCEDURE — 99284 EMERGENCY DEPT VISIT MOD MDM: CPT | Mod: 25,GC

## 2018-05-25 PROCEDURE — 93010 ELECTROCARDIOGRAM REPORT: CPT

## 2018-05-25 PROCEDURE — 71045 X-RAY EXAM CHEST 1 VIEW: CPT | Mod: 26

## 2018-05-25 PROCEDURE — 76705 ECHO EXAM OF ABDOMEN: CPT | Mod: 26

## 2018-05-25 RX ORDER — FAMOTIDINE 10 MG/ML
1 INJECTION INTRAVENOUS
Qty: 10 | Refills: 0 | OUTPATIENT
Start: 2018-05-25

## 2018-05-25 RX ORDER — SODIUM CHLORIDE 9 MG/ML
500 INJECTION INTRAMUSCULAR; INTRAVENOUS; SUBCUTANEOUS ONCE
Qty: 0 | Refills: 0 | Status: COMPLETED | OUTPATIENT
Start: 2018-05-25 | End: 2018-05-25

## 2018-05-25 RX ORDER — FAMOTIDINE 10 MG/ML
20 INJECTION INTRAVENOUS ONCE
Qty: 0 | Refills: 0 | Status: COMPLETED | OUTPATIENT
Start: 2018-05-25 | End: 2018-05-25

## 2018-05-25 RX ADMIN — FAMOTIDINE 20 MILLIGRAM(S): 10 INJECTION INTRAVENOUS at 05:00

## 2018-05-25 RX ADMIN — SODIUM CHLORIDE 500 MILLILITER(S): 9 INJECTION INTRAMUSCULAR; INTRAVENOUS; SUBCUTANEOUS at 04:26

## 2018-05-25 NOTE — ED PROVIDER NOTE - ATTENDING CONTRIBUTION TO CARE
Pt was seen and evaluated by me. Pt is a 72 y/o female with PMHx of CAD s/p CABG, DM, HTN, HKD, and PAD s/p BKA presents to ED for epigastric abd pain. Pt states 6 days ago she has some epigastric discomfort that resolved and then returned today after eating. Pt admits to associated nausea with sharp RUQ/epigastric pain. Pt denies any vomiting. Pt denies any fever, chills, SOB, or dysuria. Pt notes she was recently started on antibiotics for UTI. Lungs CTA b/l. RRR. Abd soft with RUQ tenderness. Right BKA.

## 2018-05-25 NOTE — ED PROVIDER NOTE - PMH
CAD (coronary artery disease)    Carotid artery stenosis    DM (diabetes mellitus)    Hypercholesterolemia    Hypertension    Obesity    PAD (peripheral artery disease)  s/p R BKA  S/P CABG x 3  in 2004, [facility]  Stented coronary artery  2010 [facility]

## 2018-05-25 NOTE — ED ADULT NURSE NOTE - OBJECTIVE STATEMENT
BIBEMS, R BKA, L AKA, PMH DM, CABGx3, CAD, HLD, HTN complaining of epigastric pain that started Sunday and subsided. Pain came back earlier yesterday and the patient had one episode of vomiting since. Patient had Hx of Pratt cathetization s/p L BKA and patient is currently on ABx for UTI. Multiple family members at bedside. Providers at bedside. Hypertensive. Pending MD orders, continuous cardiac monitoring in place. Safety maintained, needs attaned, will continue to monitor.

## 2018-05-25 NOTE — ED ADULT TRIAGE NOTE - CHIEF COMPLAINT QUOTE
pt. BIBA from home epigastric pain, started on Sunday, subsided and returned yesterday w/ 1 episode of vomiting.  PMHx HTN, DM ( in the field as per EMS), CAD, R BKA, L AKA, HLD, CABG.

## 2018-05-25 NOTE — ED PROVIDER NOTE - OBJECTIVE STATEMENT
72 y/o female with PMHx of CAD s/p CABG, DM, HTN, HLD, and PAD s/p BKA presents to ED for epigastric abd pain. Pt states 6 days ago she has some epigastric discomfort that resolved and then returned today after eating. Pt admits to associated nausea with sharp RUQ/epigastric pain. Pt denies any vomiting. Pt denies any fever, chills, SOB, or dysuria. Pt notes she was recently started on antibiotics for UTI. Pt notes pain is similar to when she had her bypass previously.

## 2018-05-25 NOTE — ED ADULT NURSE NOTE - PMH
CAD (coronary artery disease)    Carotid artery stenosis    DM (diabetes mellitus)    Hypercholesterolemia    Hypertension    Obesity    PAD (peripheral artery disease)  s/p R BKA  S/P CABG x 3  in 2004, [facility]  Stented coronary artery  2010 [facility]

## 2018-05-29 ENCOUNTER — RX RENEWAL (OUTPATIENT)
Age: 73
End: 2018-05-29

## 2018-06-11 ENCOUNTER — APPOINTMENT (OUTPATIENT)
Dept: UROLOGY | Facility: CLINIC | Age: 73
End: 2018-06-11
Payer: MEDICARE

## 2018-06-11 ENCOUNTER — OUTPATIENT (OUTPATIENT)
Dept: OUTPATIENT SERVICES | Facility: HOSPITAL | Age: 73
LOS: 1 days | End: 2018-06-11
Payer: COMMERCIAL

## 2018-06-11 DIAGNOSIS — R35.0 FREQUENCY OF MICTURITION: ICD-10-CM

## 2018-06-11 DIAGNOSIS — Z98.89 OTHER SPECIFIED POSTPROCEDURAL STATES: Chronic | ICD-10-CM

## 2018-06-11 DIAGNOSIS — Z89.511 ACQUIRED ABSENCE OF RIGHT LEG BELOW KNEE: Chronic | ICD-10-CM

## 2018-06-11 DIAGNOSIS — Z95.1 PRESENCE OF AORTOCORONARY BYPASS GRAFT: Chronic | ICD-10-CM

## 2018-06-11 DIAGNOSIS — Z90.710 ACQUIRED ABSENCE OF BOTH CERVIX AND UTERUS: Chronic | ICD-10-CM

## 2018-06-11 DIAGNOSIS — N31.9 NEUROMUSCULAR DYSFUNCTION OF BLADDER, UNSPECIFIED: ICD-10-CM

## 2018-06-11 PROCEDURE — 51728 CYSTOMETROGRAM W/VP: CPT | Mod: 26

## 2018-06-11 PROCEDURE — 51784 ANAL/URINARY MUSCLE STUDY: CPT

## 2018-06-11 PROCEDURE — 51741 ELECTRO-UROFLOWMETRY FIRST: CPT

## 2018-06-11 PROCEDURE — 51797 INTRAABDOMINAL PRESSURE TEST: CPT

## 2018-06-11 PROCEDURE — 51600 INJECTION FOR BLADDER X-RAY: CPT

## 2018-06-11 PROCEDURE — 51741 ELECTRO-UROFLOWMETRY FIRST: CPT | Mod: 26

## 2018-06-11 PROCEDURE — 51797 INTRAABDOMINAL PRESSURE TEST: CPT | Mod: 26

## 2018-06-11 PROCEDURE — 51728 CYSTOMETROGRAM W/VP: CPT

## 2018-06-11 PROCEDURE — 51784 ANAL/URINARY MUSCLE STUDY: CPT | Mod: 26

## 2018-06-11 PROCEDURE — 76000 FLUOROSCOPY <1 HR PHYS/QHP: CPT | Mod: 26,59

## 2018-06-11 PROCEDURE — 76000 FLUOROSCOPY <1 HR PHYS/QHP: CPT | Mod: 59

## 2018-06-25 ENCOUNTER — APPOINTMENT (OUTPATIENT)
Dept: UROLOGY | Facility: CLINIC | Age: 73
End: 2018-06-25
Payer: MEDICARE

## 2018-06-25 PROCEDURE — 99215 OFFICE O/P EST HI 40 MIN: CPT

## 2018-07-31 ENCOUNTER — APPOINTMENT (OUTPATIENT)
Dept: OPHTHALMOLOGY | Facility: CLINIC | Age: 73
End: 2018-07-31
Payer: MEDICARE

## 2018-07-31 PROCEDURE — 92133 CPTRZD OPH DX IMG PST SGM ON: CPT

## 2018-07-31 PROCEDURE — 92012 INTRM OPH EXAM EST PATIENT: CPT

## 2018-08-10 ENCOUNTER — MEDICATION RENEWAL (OUTPATIENT)
Age: 73
End: 2018-08-10

## 2018-08-20 ENCOUNTER — OUTPATIENT (OUTPATIENT)
Dept: OUTPATIENT SERVICES | Facility: HOSPITAL | Age: 73
LOS: 1 days | End: 2018-08-20
Payer: COMMERCIAL

## 2018-08-20 ENCOUNTER — APPOINTMENT (OUTPATIENT)
Dept: OPHTHALMOLOGY | Facility: HOSPITAL | Age: 73
End: 2018-08-20

## 2018-08-20 DIAGNOSIS — Z95.1 PRESENCE OF AORTOCORONARY BYPASS GRAFT: Chronic | ICD-10-CM

## 2018-08-20 DIAGNOSIS — Z90.710 ACQUIRED ABSENCE OF BOTH CERVIX AND UTERUS: Chronic | ICD-10-CM

## 2018-08-20 DIAGNOSIS — Z98.89 OTHER SPECIFIED POSTPROCEDURAL STATES: Chronic | ICD-10-CM

## 2018-08-20 DIAGNOSIS — Z89.511 ACQUIRED ABSENCE OF RIGHT LEG BELOW KNEE: Chronic | ICD-10-CM

## 2018-08-20 PROCEDURE — 82962 GLUCOSE BLOOD TEST: CPT

## 2018-08-21 ENCOUNTER — APPOINTMENT (OUTPATIENT)
Dept: OPHTHALMOLOGY | Facility: CLINIC | Age: 73
End: 2018-08-21

## 2018-08-24 DIAGNOSIS — H25.811 COMBINED FORMS OF AGE-RELATED CATARACT, RIGHT EYE: ICD-10-CM

## 2018-08-28 ENCOUNTER — APPOINTMENT (OUTPATIENT)
Dept: OPHTHALMOLOGY | Facility: CLINIC | Age: 73
End: 2018-08-28

## 2018-08-30 PROBLEM — E55.9 VITAMIN D DEFICIENCY, UNSPECIFIED: Chronic | Status: ACTIVE | Noted: 2018-08-20

## 2018-08-30 PROBLEM — N17.9 ACUTE KIDNEY FAILURE, UNSPECIFIED: Chronic | Status: ACTIVE | Noted: 2018-08-20

## 2018-08-30 PROBLEM — D64.9 ANEMIA, UNSPECIFIED: Chronic | Status: ACTIVE | Noted: 2018-08-20

## 2018-08-30 PROBLEM — I50.32 CHRONIC DIASTOLIC (CONGESTIVE) HEART FAILURE: Chronic | Status: ACTIVE | Noted: 2018-08-20

## 2018-08-30 PROBLEM — E11.319 TYPE 2 DIABETES MELLITUS WITH UNSPECIFIED DIABETIC RETINOPATHY WITHOUT MACULAR EDEMA: Chronic | Status: ACTIVE | Noted: 2018-08-20

## 2018-08-30 PROBLEM — I27.20 PULMONARY HYPERTENSION, UNSPECIFIED: Chronic | Status: ACTIVE | Noted: 2018-08-20

## 2018-09-07 NOTE — ASU PATIENT PROFILE, ADULT - PSH
Elective surgery  traumatic amputation of the toe  History of hysterectomy    Hx of CABG  3v 2004  S/P angioplasty with stent  2009, 3/2014  S/P below knee amputation, right  6/2010  S/P BKA (below knee amputation) unilateral, right    S/P BKA (below knee amputation), left    S/P CABG x 3    S/P eye surgery  for glaucoma  S/P hysterectomy  2004

## 2018-09-07 NOTE — ASU PATIENT PROFILE, ADULT - PMH
Acute kidney injury superimposed on chronic kidney disease    CAD (coronary artery disease)    Carotid artery stenosis    Chronic diastolic heart failure    Dermatitis    Diabetic retinopathy    DM (diabetes mellitus)    Hypercholesterolemia    Hypertension    Normocytic anemia    Obesity    PAD (peripheral artery disease)  s/p R BKA  Pulmonary HTN    S/P CABG x 3  in 2004, [facility]  Stented coronary artery  2010 [facility]  Vitamin D deficiency

## 2018-09-07 NOTE — ASU PATIENT PROFILE, ADULT - VISION (WITH CORRECTIVE LENSES IF THE PATIENT USUALLY WEARS THEM):
Partially impaired: cannot see medication labels or newsprint, but can see obstacles in path, and the surrounding layout; can count fingers at arm's length/Right eye is blurry

## 2018-09-09 ENCOUNTER — TRANSCRIPTION ENCOUNTER (OUTPATIENT)
Age: 73
End: 2018-09-09

## 2018-09-10 ENCOUNTER — APPOINTMENT (OUTPATIENT)
Dept: OPHTHALMOLOGY | Facility: HOSPITAL | Age: 73
End: 2018-09-10
Payer: MEDICARE

## 2018-09-10 ENCOUNTER — OUTPATIENT (OUTPATIENT)
Dept: OUTPATIENT SERVICES | Facility: HOSPITAL | Age: 73
LOS: 1 days | End: 2018-09-10
Payer: COMMERCIAL

## 2018-09-10 VITALS
HEART RATE: 61 BPM | RESPIRATION RATE: 21 BRPM | DIASTOLIC BLOOD PRESSURE: 49 MMHG | OXYGEN SATURATION: 100 % | SYSTOLIC BLOOD PRESSURE: 129 MMHG

## 2018-09-10 VITALS — TEMPERATURE: 98 F | HEIGHT: 66 IN | WEIGHT: 185.19 LBS

## 2018-09-10 DIAGNOSIS — Z98.89 OTHER SPECIFIED POSTPROCEDURAL STATES: Chronic | ICD-10-CM

## 2018-09-10 DIAGNOSIS — Z90.710 ACQUIRED ABSENCE OF BOTH CERVIX AND UTERUS: Chronic | ICD-10-CM

## 2018-09-10 DIAGNOSIS — Z41.9 ENCOUNTER FOR PROCEDURE FOR PURPOSES OTHER THAN REMEDYING HEALTH STATE, UNSPECIFIED: Chronic | ICD-10-CM

## 2018-09-10 DIAGNOSIS — Z89.511 ACQUIRED ABSENCE OF RIGHT LEG BELOW KNEE: Chronic | ICD-10-CM

## 2018-09-10 DIAGNOSIS — Z89.512 ACQUIRED ABSENCE OF LEFT LEG BELOW KNEE: Chronic | ICD-10-CM

## 2018-09-10 DIAGNOSIS — Z95.1 PRESENCE OF AORTOCORONARY BYPASS GRAFT: Chronic | ICD-10-CM

## 2018-09-10 DIAGNOSIS — H25.811 COMBINED FORMS OF AGE-RELATED CATARACT, RIGHT EYE: ICD-10-CM

## 2018-09-10 LAB — GLUCOSE BLDC GLUCOMTR-MCNC: 153 MG/DL — HIGH (ref 70–99)

## 2018-09-10 PROCEDURE — 82962 GLUCOSE BLOOD TEST: CPT

## 2018-09-10 PROCEDURE — 66982 XCAPSL CTRC RMVL CPLX WO ECP: CPT | Mod: RT

## 2018-09-10 PROCEDURE — C1780: CPT

## 2018-09-10 PROCEDURE — 66984 XCAPSL CTRC RMVL W/O ECP: CPT | Mod: RT

## 2018-09-10 PROCEDURE — 92136 OPHTHALMIC BIOMETRY: CPT | Mod: 26,RT

## 2018-09-11 ENCOUNTER — APPOINTMENT (OUTPATIENT)
Dept: OPHTHALMOLOGY | Facility: CLINIC | Age: 73
End: 2018-09-11
Payer: MEDICARE

## 2018-09-11 DIAGNOSIS — H35.373 PUCKERING OF MACULA, BILATERAL: ICD-10-CM

## 2018-09-11 PROCEDURE — 99024 POSTOP FOLLOW-UP VISIT: CPT

## 2018-09-17 ENCOUNTER — APPOINTMENT (OUTPATIENT)
Dept: OPHTHALMOLOGY | Facility: CLINIC | Age: 73
End: 2018-09-17
Payer: MEDICARE

## 2018-09-17 DIAGNOSIS — H26.9 UNSPECIFIED CATARACT: ICD-10-CM

## 2018-09-17 PROCEDURE — 99024 POSTOP FOLLOW-UP VISIT: CPT

## 2018-09-25 ENCOUNTER — TRANSCRIPTION ENCOUNTER (OUTPATIENT)
Age: 73
End: 2018-09-25

## 2018-10-10 NOTE — PHYSICAL THERAPY INITIAL EVALUATION ADULT - PREDICTED DURATION OF THERAPY (DAYS/WKS), PT EVAL
How Severe Is Your Skin Lesion?: mild
Has Your Skin Lesion Been Treated?: not been treated
Is This A New Presentation, Or A Follow-Up?: Skin Lesion
2 weeks
by discharge

## 2018-10-15 NOTE — DIETITIAN INITIAL EVALUATION ADULT. - ETIOLOGY
Ortho Trauma Attending:  Agree with above PA note.  Note edited where necessary.  Plan for surgery 10/16 pending medical optimization and OR availability.    Trevon La MD  Orthopaedic Trauma Surgery
related to physiological factors

## 2018-10-16 ENCOUNTER — APPOINTMENT (OUTPATIENT)
Dept: OPHTHALMOLOGY | Facility: CLINIC | Age: 73
End: 2018-10-16
Payer: MEDICARE

## 2018-10-16 PROCEDURE — 92015 DETERMINE REFRACTIVE STATE: CPT

## 2018-10-16 PROCEDURE — 99024 POSTOP FOLLOW-UP VISIT: CPT

## 2018-12-17 ENCOUNTER — APPOINTMENT (OUTPATIENT)
Dept: UROLOGY | Facility: CLINIC | Age: 73
End: 2018-12-17
Payer: MEDICARE

## 2018-12-17 LAB
ANION GAP SERPL CALC-SCNC: 10 MMOL/L
BUN SERPL-MCNC: 21 MG/DL
CALCIUM SERPL-MCNC: 9.5 MG/DL
CHLORIDE SERPL-SCNC: 101 MMOL/L
CO2 SERPL-SCNC: 30 MMOL/L
CREAT SERPL-MCNC: 1.1 MG/DL
GLUCOSE SERPL-MCNC: 252 MG/DL
POTASSIUM SERPL-SCNC: 4.8 MMOL/L
SODIUM SERPL-SCNC: 141 MMOL/L

## 2018-12-17 PROCEDURE — 99214 OFFICE O/P EST MOD 30 MIN: CPT | Mod: 25

## 2018-12-17 PROCEDURE — 51798 US URINE CAPACITY MEASURE: CPT

## 2018-12-18 ENCOUNTER — OTHER (OUTPATIENT)
Age: 73
End: 2018-12-18

## 2018-12-21 ENCOUNTER — FORM ENCOUNTER (OUTPATIENT)
Age: 73
End: 2018-12-21

## 2018-12-22 ENCOUNTER — OUTPATIENT (OUTPATIENT)
Dept: OUTPATIENT SERVICES | Facility: HOSPITAL | Age: 73
LOS: 1 days | End: 2018-12-22
Payer: COMMERCIAL

## 2018-12-22 ENCOUNTER — APPOINTMENT (OUTPATIENT)
Dept: ULTRASOUND IMAGING | Facility: IMAGING CENTER | Age: 73
End: 2018-12-22
Payer: MEDICARE

## 2018-12-22 DIAGNOSIS — Z98.89 OTHER SPECIFIED POSTPROCEDURAL STATES: Chronic | ICD-10-CM

## 2018-12-22 DIAGNOSIS — Z41.9 ENCOUNTER FOR PROCEDURE FOR PURPOSES OTHER THAN REMEDYING HEALTH STATE, UNSPECIFIED: Chronic | ICD-10-CM

## 2018-12-22 DIAGNOSIS — Z90.710 ACQUIRED ABSENCE OF BOTH CERVIX AND UTERUS: Chronic | ICD-10-CM

## 2018-12-22 DIAGNOSIS — Z89.511 ACQUIRED ABSENCE OF RIGHT LEG BELOW KNEE: Chronic | ICD-10-CM

## 2018-12-22 DIAGNOSIS — R33.9 RETENTION OF URINE, UNSPECIFIED: ICD-10-CM

## 2018-12-22 DIAGNOSIS — Z95.1 PRESENCE OF AORTOCORONARY BYPASS GRAFT: Chronic | ICD-10-CM

## 2018-12-22 DIAGNOSIS — Z89.512 ACQUIRED ABSENCE OF LEFT LEG BELOW KNEE: Chronic | ICD-10-CM

## 2018-12-22 PROCEDURE — 76775 US EXAM ABDO BACK WALL LIM: CPT

## 2018-12-22 PROCEDURE — 76775 US EXAM ABDO BACK WALL LIM: CPT | Mod: 26

## 2019-01-16 ENCOUNTER — APPOINTMENT (OUTPATIENT)
Dept: OTOLARYNGOLOGY | Facility: CLINIC | Age: 74
End: 2019-01-16

## 2019-01-24 NOTE — ED PROVIDER NOTE - ABDOMINAL TENDER
Dr. Peacock reviewed labs and ok'd patient to start Lemtrada today.    I called and updated Halley on this.   right upper quadrant

## 2019-02-08 NOTE — PROGRESS NOTE ADULT - SUBJECTIVE AND OBJECTIVE BOX
Patient is a 71 yo F with a PMH significant for T2DM, PVD s/p R BKA and L transmetatarsal amputation, CAD s/p CABG, CKD stage 3, and HTN who presented to the ED with fevers and left leg pain. The patient was in her usual state of health till Thursday when she began to experience 10/10 shooting pain down her left leg from her knee down. She denied any numbness or tingling. She also noticed fevers of 103-104 at home associated with chills and fatigue.   Vitals in the ED T- 102.7 (T max 103.1), HR-91 BP-152/58 RR-16 and sating 99% on room air. She received 1L of NS, 925mg tylenol PO, and one dose each of vancomycin and zosyn.     Was found to be in sepsis 2/2 cellulitis and OM of LLE, s/p ankle disarticulation by podiatry on 3/10, course c/b SOB likely 2/2 mild acute on chronic decompensated HFpEF, MI, s/p AKA with post op course complicated by hypotension. antibiotics until 3/24- vancomycin (by level) and zosyn. was  on Lasix for CHF. Now off Abx, leukocytosis improving.    Had swallow eval today, regular  diet.       REVIEW OF SYSTEMS: No chest pain, shortness of breath, nausea, vomiting or diarhea.          CURRENT FUNCTIONAL STATUS mod a                             8.8    8.77  )-----------( 190      ( 27 Mar 2018 06:40 )             28.1   Vital Signs Last 24 Hrs  T(C): 36.5 (27 Mar 2018 12:05), Max: 37.2 (26 Mar 2018 20:06)  T(F): 97.7 (27 Mar 2018 12:05), Max: 98.9 (26 Mar 2018 20:06)  HR: 73 (27 Mar 2018 16:33) (65 - 76)  BP: 153/60 (27 Mar 2018 16:33) (129/50 - 159/55)  BP(mean): --  RR: 14 (27 Mar 2018 16:33) (14 - 18)  SpO2: 98% (27 Mar 2018 16:33) (96% - 98%)        PHYSICAL EXAM  Constitutional - NAD, Comfortable  HEENT - NCAT, EOMI  Neck - Supple, No limited ROM  Chest - CTA bilaterally, No wheeze, No rhonchi, No crackles  Cardiovascular - RRR, S1S2, No murmurs  Abdomen - BS+, Soft, NTND  Extremities - L AKA, with dressing   Neurologic Exam -                    Cognitive - Awake, Alert, AAO to self, place, date, year, situation     Communication - Fluent, No dysarthria, no aphasia     Cranial Nerves - CN 2-12 intact     Motor - 5/5 R hip/knee. 2/5 L hip                          Balance - not tested   Psychiatric - Mood stable, Affect WNL  -------------------------------------------------------------------- normal sinus rhythm

## 2019-02-22 ENCOUNTER — APPOINTMENT (OUTPATIENT)
Dept: OTOLARYNGOLOGY | Facility: CLINIC | Age: 74
End: 2019-02-22
Payer: MEDICARE

## 2019-02-22 VITALS — BODY MASS INDEX: 29.73 KG/M2 | HEIGHT: 66 IN | WEIGHT: 185 LBS

## 2019-02-22 VITALS — HEART RATE: 76 BPM | SYSTOLIC BLOOD PRESSURE: 153 MMHG | DIASTOLIC BLOOD PRESSURE: 72 MMHG

## 2019-02-22 DIAGNOSIS — H20.9 UNSPECIFIED IRIDOCYCLITIS: ICD-10-CM

## 2019-02-22 DIAGNOSIS — S98.312A: ICD-10-CM

## 2019-02-22 DIAGNOSIS — E11.3299 TYPE 2 DIABETES MELLITUS WITH MILD NONPROLIFERATIVE DIABETIC RETINOPATHY WITHOUT MACULAR EDEMA, UNSPECIFIED EYE: ICD-10-CM

## 2019-02-22 DIAGNOSIS — E07.9 DISORDER OF THYROID, UNSPECIFIED: ICD-10-CM

## 2019-02-22 PROCEDURE — 31231 NASAL ENDOSCOPY DX: CPT

## 2019-02-22 PROCEDURE — 99205 OFFICE O/P NEW HI 60 MIN: CPT

## 2019-02-22 RX ORDER — KETOROLAC TROMETHAMINE 5 MG/ML
0.5 SOLUTION OPHTHALMIC
Qty: 1 | Refills: 3 | Status: DISCONTINUED | COMMUNITY
Start: 2017-04-24 | End: 2019-02-22

## 2019-02-22 RX ORDER — TROPICAMIDE 10 MG/ML
1 SOLUTION/ DROPS OPHTHALMIC
Qty: 1 | Refills: 0 | Status: DISCONTINUED | COMMUNITY
Start: 2018-08-10 | End: 2019-02-22

## 2019-02-22 RX ORDER — LEVOFLOXACIN 500 MG/1
500 TABLET, FILM COATED ORAL
Qty: 7 | Refills: 0 | Status: DISCONTINUED | COMMUNITY
Start: 2017-06-30 | End: 2019-02-22

## 2019-02-22 RX ORDER — COLLAGENASE SANTYL 250 [ARB'U]/G
250 OINTMENT TOPICAL DAILY
Qty: 90 | Refills: 5 | Status: DISCONTINUED | COMMUNITY
Start: 2017-09-01 | End: 2019-02-22

## 2019-02-22 RX ORDER — PREDNISONE 10 MG/1
10 TABLET ORAL
Qty: 180 | Refills: 0 | Status: DISCONTINUED | COMMUNITY
Start: 2016-11-11 | End: 2019-02-22

## 2019-02-22 RX ORDER — OFLOXACIN 3 MG/ML
0.3 SOLUTION/ DROPS OPHTHALMIC
Qty: 1 | Refills: 3 | Status: DISCONTINUED | COMMUNITY
Start: 2018-08-10 | End: 2019-02-22

## 2019-02-22 RX ORDER — OFLOXACIN 3 MG/ML
0.3 SOLUTION/ DROPS OPHTHALMIC 4 TIMES DAILY
Qty: 5 | Refills: 1 | Status: DISCONTINUED | COMMUNITY
Start: 2018-02-28 | End: 2019-02-22

## 2019-02-22 RX ORDER — PREDNISOLONE ACETATE 10 MG/ML
1 SUSPENSION/ DROPS OPHTHALMIC
Qty: 10 | Refills: 1 | Status: DISCONTINUED | COMMUNITY
Start: 2018-02-28 | End: 2019-02-22

## 2019-02-22 RX ORDER — KETOROLAC TROMETHAMINE 4 MG/ML
0.4 SOLUTION/ DROPS OPHTHALMIC
Qty: 1 | Refills: 3 | Status: DISCONTINUED | COMMUNITY
Start: 2018-08-10 | End: 2019-02-22

## 2019-02-22 RX ORDER — PREDNISOLONE ACETATE 10 MG/ML
1 SUSPENSION/ DROPS OPHTHALMIC
Qty: 1 | Refills: 3 | Status: DISCONTINUED | COMMUNITY
Start: 2018-08-10 | End: 2019-02-22

## 2019-02-22 RX ORDER — KETOROLAC TROMETHAMINE 5 MG/ML
0.5 SOLUTION OPHTHALMIC
Qty: 1 | Refills: 3 | Status: DISCONTINUED | OUTPATIENT
Start: 2017-04-24 | End: 2019-02-22

## 2019-02-22 RX ORDER — KETOROLAC TROMETHAMINE 4 MG/ML
0.4 SOLUTION/ DROPS OPHTHALMIC TWICE DAILY
Qty: 5 | Refills: 1 | Status: DISCONTINUED | COMMUNITY
Start: 2018-02-28 | End: 2019-02-22

## 2019-02-22 RX ORDER — AMOXICILLIN AND CLAVULANATE POTASSIUM 875; 125 MG/1; MG/1
875-125 TABLET, COATED ORAL
Qty: 14 | Refills: 0 | Status: DISCONTINUED | COMMUNITY
Start: 2018-05-24 | End: 2019-02-22

## 2019-02-22 RX ORDER — BRIMONIDINE TARTRATE, TIMOLOL MALEATE 2; 5 MG/ML; MG/ML
0.2-0.5 SOLUTION/ DROPS OPHTHALMIC
Qty: 1 | Refills: 5 | Status: DISCONTINUED | COMMUNITY
Start: 2017-04-24 | End: 2019-02-22

## 2019-02-22 RX ORDER — SODIUM HYPOCHLORITE 1.25 MG/ML
0.12 SOLUTION TOPICAL
Qty: 200 | Refills: 3 | Status: DISCONTINUED | COMMUNITY
Start: 2017-09-01 | End: 2019-02-22

## 2019-02-22 NOTE — DATA REVIEWED
[de-identified] : CT scan of the sinuses showed pansinusitis disease completely filling the right side with erosion of the medial wall of the maxillary sinus into the nasal cavity. Some blockage on the left

## 2019-02-22 NOTE — PROCEDURE
[Image(s) Captured] : image(s) captured and filed [Topical Lidocaine] : topical lidocaine [Oxymetazoline HCl] : oxymetazoline HCl [Flexible Endoscope] : examined with the flexible endoscope [Serial Number: ___] : Serial Number: [unfilled]

## 2019-02-22 NOTE — REASON FOR VISIT
[Initial Consultation] : an initial consultation for [Family Member] : family member [FreeTextEntry2] : referred by Dr. Edd Dumont, ENT, for chronic maxillary/ethmoid sinusitis, possible fungal sinusitis

## 2019-02-22 NOTE — HISTORY OF PRESENT ILLNESS
[de-identified] : 73 year old female referred by Dr. Edd Dumont, ENT, for chronic maxillary/ethmoid sinusitis, possible fungal sinusitis.  S/p bilateral leg amputation due to complications from diabetes.  States since August 2018 has had nasal congestion, right sided facial pain, sinus pain, sinus pressure, occasional post nasal drip, clear/bloody nasal discharge.  States last fall was prescribed antibiotics, took as prescribed, states symptoms improved but did not complete relief. Cat scan conducted 11/23/18 impression: chronic sinusitis.  A mass/polyp may be present in the right maxillary antrum eroding the medial wall.  Erosion of the left medial maxillary wall also appears to be present.  Possibly the osseous changes are secondary to prior surgery though no history of prior surgery was solicited.  The bony erosion has developed since the previous scan in 2013 and the sinus disease has progressed since that time as well.  Currently on 81 mg aspirin and plavix.

## 2019-02-22 NOTE — CONSULT LETTER
[Dear  ___] : Dear  [unfilled], [Consult Letter:] : I had the pleasure of evaluating your patient, [unfilled]. [Please see my note below.] : Please see my note below. [Consult Closing:] : Thank you very much for allowing me to participate in the care of this patient.  If you have any questions, please do not hesitate to contact me. [Sincerely,] : Sincerely, [FreeTextEntry2] : Edd Dumont MD\par [address] [address] [FreeTextEntry3] : Felix Lim MD, SYDNEE, FACS\par  Department Otolaryngology\par Director of [facility]\par Professor of Otolaryngology, \par [employer]/\[employer] [place] [employer]\"" [employer]\par

## 2019-02-22 NOTE — REVIEW OF SYSTEMS
[Post Nasal Drip] : post nasal drip [Ear Pain] : ear pain [Ear Itch] : ear itch [Nasal Congestion] : nasal congestion [Nose Bleeds] : nose bleeds [Sinus Pain] : sinus pain [Throat Clearing] : throat clearing [Eye Pain] : eye pain [Eyes Itch] : itching of the eyes [Negative] : Heme/Lymph [FreeTextEntry9] : muscle ache

## 2019-02-22 NOTE — PHYSICAL EXAM
[Nasal Endoscopy Performed] : nasal endoscopy was performed, see procedure section for findings [] : septum deviated to the left [Midline] : trachea located in midline position [Normal] : no rashes [FreeTextEntry1] : obese [de-identified] : right hyprtrophy [de-identified] : Left leg amputaion

## 2019-03-01 ENCOUNTER — APPOINTMENT (OUTPATIENT)
Dept: UROLOGY | Facility: CLINIC | Age: 74
End: 2019-03-01

## 2019-03-08 ENCOUNTER — APPOINTMENT (OUTPATIENT)
Dept: UROLOGY | Facility: CLINIC | Age: 74
End: 2019-03-08
Payer: MEDICARE

## 2019-03-08 ENCOUNTER — OUTPATIENT (OUTPATIENT)
Dept: OUTPATIENT SERVICES | Facility: HOSPITAL | Age: 74
LOS: 1 days | End: 2019-03-08
Payer: COMMERCIAL

## 2019-03-08 DIAGNOSIS — Z89.511 ACQUIRED ABSENCE OF RIGHT LEG BELOW KNEE: Chronic | ICD-10-CM

## 2019-03-08 DIAGNOSIS — Z41.9 ENCOUNTER FOR PROCEDURE FOR PURPOSES OTHER THAN REMEDYING HEALTH STATE, UNSPECIFIED: Chronic | ICD-10-CM

## 2019-03-08 DIAGNOSIS — Z89.512 ACQUIRED ABSENCE OF LEFT LEG BELOW KNEE: Chronic | ICD-10-CM

## 2019-03-08 DIAGNOSIS — Z90.710 ACQUIRED ABSENCE OF BOTH CERVIX AND UTERUS: Chronic | ICD-10-CM

## 2019-03-08 DIAGNOSIS — Z98.89 OTHER SPECIFIED POSTPROCEDURAL STATES: Chronic | ICD-10-CM

## 2019-03-08 DIAGNOSIS — N32.81 OVERACTIVE BLADDER: ICD-10-CM

## 2019-03-08 DIAGNOSIS — Z95.1 PRESENCE OF AORTOCORONARY BYPASS GRAFT: Chronic | ICD-10-CM

## 2019-03-08 PROCEDURE — 51702 INSERT TEMP BLADDER CATH: CPT | Mod: 26

## 2019-03-08 PROCEDURE — 99214 OFFICE O/P EST MOD 30 MIN: CPT | Mod: 25

## 2019-03-08 PROCEDURE — 51798 US URINE CAPACITY MEASURE: CPT

## 2019-03-08 PROCEDURE — 51702 INSERT TEMP BLADDER CATH: CPT

## 2019-03-08 RX ORDER — CARVEDILOL 12.5 MG/1
12.5 TABLET, FILM COATED ORAL
Qty: 60 | Refills: 0 | Status: DISCONTINUED | COMMUNITY
Start: 2017-06-30 | End: 2019-03-08

## 2019-03-08 RX ORDER — LOSARTAN POTASSIUM 50 MG/1
50 TABLET, FILM COATED ORAL
Qty: 90 | Refills: 0 | Status: DISCONTINUED | COMMUNITY
Start: 2018-06-04

## 2019-03-08 RX ORDER — AMOXICILLIN AND CLAVULANATE POTASSIUM 500; 125 MG/1; MG/1
500-125 TABLET, FILM COATED ORAL
Qty: 14 | Refills: 0 | Status: COMPLETED | COMMUNITY
Start: 2018-10-19

## 2019-03-08 RX ORDER — CARVEDILOL 6.25 MG/1
6.25 TABLET, FILM COATED ORAL
Qty: 60 | Refills: 0 | Status: DISCONTINUED | COMMUNITY
Start: 2017-03-01 | End: 2019-03-08

## 2019-03-08 RX ORDER — BLOOD SUGAR DIAGNOSTIC
STRIP MISCELLANEOUS
Qty: 400 | Refills: 0 | Status: ACTIVE | COMMUNITY
Start: 2018-06-04

## 2019-03-08 RX ORDER — LANCETS 28 GAUGE
EACH MISCELLANEOUS
Qty: 300 | Refills: 0 | Status: ACTIVE | COMMUNITY
Start: 2018-06-04

## 2019-03-08 RX ORDER — ISOSORBIDE MONONITRATE 60 MG/1
60 TABLET, EXTENDED RELEASE ORAL
Qty: 30 | Refills: 0 | Status: DISCONTINUED | COMMUNITY
Start: 2018-04-15

## 2019-03-08 RX ORDER — CHLORHEXIDINE/GLYCERIN/HE-CELL
JELLY (GRAM) TOPICAL
Qty: 1 | Refills: 11 | Status: ACTIVE | COMMUNITY
Start: 2019-03-08 | End: 1900-01-01

## 2019-03-08 RX ORDER — CHLORHEXIDINE/GLYCERIN/HE-CELL
JELLY (GRAM) TOPICAL
Qty: 1 | Refills: 11 | Status: ACTIVE | OUTPATIENT
Start: 2019-03-08

## 2019-03-08 RX ORDER — CALCIUM CARB/VITAMIN D3/VIT K1 500-100-40
31G X 5/16" TABLET,CHEWABLE ORAL
Qty: 200 | Refills: 0 | Status: ACTIVE | COMMUNITY
Start: 2018-06-28

## 2019-03-08 NOTE — ASSESSMENT
[FreeTextEntry1] : \par \par Impression/plan: 73 y F with T2DM with vasculopathy and DHIC.PVR was 668  ml and pt was able to do CIC successfully.\par \par 1. Rec timed void, CIC 3 times daily. SUPPLIES and scripts given for 14  [place]ian cath straight tips -m 100 per month.\par 2. RTO in a month.\par \par

## 2019-03-08 NOTE — HISTORY OF PRESENT ILLNESS
[FreeTextEntry1] : 73y F with T2 DM with vasculopathy and hx of post op urinary retention sp  L above knee amputation (3/20/18) requiring indwelling Pratt.She was discharged on 4/15/18 with an indwelling Pratt and subsequent CIC by Rehab CIC for 2 weeks. She has been voiding volitionally since her last visit - 5-6 times during the day and no nocturia.Denies UI. Denies UTI since last visit.\par Renal US 12/22/18 - no hydro,no mass no calculi -wnl .\par \par PVR today 668 ml\par Demonstrated and  Taught CIC to pt and dtr Linda kellogg present. Pt was able to do so after 2 attempts and will try same and DO VD and call me in a week kate see how she is doing with CIC .RTO in a month. \par Urodynamic Interpretation : 6/11/18\par Normal bladder sensation. Normal bladder capacity. Patient experiencing detrusor instability. Pt has an appropriate EMG response to involuntary contractions:. The uroflow rate is decreased. The uroflow pattern is normal. The detrusor contractility is normal. The patient has no bladder outlet obstruction. The intravesical voiding pressure is increased. The patient has incomplete bladder emptying, a post void residual of 299 cc. Unable to assess the spincter activity. \par Additional Procedure Related Findings/Comments: DHIC. \par \par PVR -

## 2019-03-08 NOTE — PHYSICAL EXAM
[Urethral Meatus] : the meatus of the urethra showed no abnormalities [FreeTextEntry1] : no POP, neg CST

## 2019-03-12 ENCOUNTER — APPOINTMENT (OUTPATIENT)
Dept: OPHTHALMOLOGY | Facility: CLINIC | Age: 74
End: 2019-03-12
Payer: MEDICARE

## 2019-03-12 DIAGNOSIS — T38.0X5A: ICD-10-CM

## 2019-03-12 DIAGNOSIS — H40.63X2: ICD-10-CM

## 2019-03-12 PROCEDURE — 92133 CPTRZD OPH DX IMG PST SGM ON: CPT

## 2019-03-12 PROCEDURE — 92014 COMPRE OPH EXAM EST PT 1/>: CPT

## 2019-03-12 PROCEDURE — 92083 EXTENDED VISUAL FIELD XM: CPT

## 2019-03-12 RX ORDER — TIMOLOL MALEATE 5 MG/ML
0.5 SOLUTION OPHTHALMIC
Qty: 1 | Refills: 5 | Status: ACTIVE | COMMUNITY
Start: 2019-03-12 | End: 1900-01-01

## 2019-03-12 RX ORDER — BRIMONIDINE TARTRATE 1.5 MG/ML
0.15 SOLUTION/ DROPS OPHTHALMIC TWICE DAILY
Qty: 1 | Refills: 5 | Status: ACTIVE | COMMUNITY
Start: 2019-03-12 | End: 1900-01-01

## 2019-03-18 DIAGNOSIS — R35.0 FREQUENCY OF MICTURITION: ICD-10-CM

## 2019-03-19 DIAGNOSIS — N31.9 NEUROMUSCULAR DYSFUNCTION OF BLADDER, UNSPECIFIED: ICD-10-CM

## 2019-03-19 DIAGNOSIS — N32.81 OVERACTIVE BLADDER: ICD-10-CM

## 2019-03-19 DIAGNOSIS — R33.9 RETENTION OF URINE, UNSPECIFIED: ICD-10-CM

## 2019-04-02 ENCOUNTER — TRANSCRIPTION ENCOUNTER (OUTPATIENT)
Age: 74
End: 2019-04-02

## 2019-04-03 ENCOUNTER — APPOINTMENT (OUTPATIENT)
Dept: OPHTHALMOLOGY | Facility: CLINIC | Age: 74
End: 2019-04-03

## 2019-04-05 ENCOUNTER — APPOINTMENT (OUTPATIENT)
Dept: UROLOGY | Facility: CLINIC | Age: 74
End: 2019-04-05
Payer: MEDICARE

## 2019-04-05 DIAGNOSIS — R33.9 RETENTION OF URINE, UNSPECIFIED: ICD-10-CM

## 2019-04-05 PROCEDURE — 99213 OFFICE O/P EST LOW 20 MIN: CPT

## 2019-04-05 NOTE — ASSESSMENT
[FreeTextEntry1] : \par \par \par \par Impression/plan: 73 y F with T2DM with vasculopathy and DHIC accompanied wih her spouse , pt in w/c.Reviewed VD and pt has been doing CIC 1 to 2 times per day with volumes of 200  to  400  ml.\par \par 1. Rec timed void, CIC 2 times daily.VD.\par 2.Renal US in 6  months .\par 3. RTO in 6 months.

## 2019-04-05 NOTE — HISTORY OF PRESENT ILLNESS
[FreeTextEntry1] : 73 y F with T2DM with vasculopathy and DHIC.She has been able to do CIC with help from family members 1 -2  per day , with volumes of 300 to 400  ml.Last Renal US 12/22/18 - no hydro,no mass no calculi -wnl .\par \par Urodynamic Interpretation : 6/11/18\par Normal bladder sensation. Normal bladder capacity. Patient experiencing detrusor instability. Pt has an appropriate EMG response to involuntary contractions:. The uroflow rate is decreased. The uroflow pattern is normal. The detrusor contractility is normal. The patient has no bladder outlet obstruction. The intravesical voiding pressure is increased. The patient has incomplete bladder emptying, a post void residual of 299 cc. Unable to assess the spincter activity. \par Additional Procedure Related Findings/Comments: DHIC. \par

## 2019-04-05 NOTE — PHYSICAL EXAM
[Normal Appearance] : normal appearance [Well Groomed] : well groomed [Oriented To Time, Place, And Person] : oriented to person, place, and time

## 2019-04-10 ENCOUNTER — APPOINTMENT (OUTPATIENT)
Dept: OPHTHALMOLOGY | Facility: CLINIC | Age: 74
End: 2019-04-10
Payer: MEDICARE

## 2019-04-10 PROCEDURE — 66821 AFTER CATARACT LASER SURGERY: CPT | Mod: LT

## 2019-04-10 NOTE — PATIENT PROFILE ADULT. - NS PRO MODE OF ARRIVAL
stretcher Abdominal cramping, generalized Non-intractable vomiting with nausea, unspecified vomiting type

## 2019-04-16 ENCOUNTER — APPOINTMENT (OUTPATIENT)
Dept: OPHTHALMOLOGY | Facility: CLINIC | Age: 74
End: 2019-04-16
Payer: MEDICARE

## 2019-04-16 DIAGNOSIS — H26.493 OTHER SECONDARY CATARACT, BILATERAL: ICD-10-CM

## 2019-04-16 PROCEDURE — 99024 POSTOP FOLLOW-UP VISIT: CPT

## 2019-04-18 ENCOUNTER — APPOINTMENT (OUTPATIENT)
Dept: PHYSICAL MEDICINE AND REHAB | Facility: CLINIC | Age: 74
End: 2019-04-18

## 2019-05-08 ENCOUNTER — OUTPATIENT (OUTPATIENT)
Dept: OUTPATIENT SERVICES | Facility: HOSPITAL | Age: 74
LOS: 1 days | End: 2019-05-08
Payer: MEDICARE

## 2019-05-08 VITALS
RESPIRATION RATE: 16 BRPM | WEIGHT: 167.99 LBS | HEART RATE: 68 BPM | TEMPERATURE: 98 F | SYSTOLIC BLOOD PRESSURE: 140 MMHG | DIASTOLIC BLOOD PRESSURE: 88 MMHG | HEIGHT: 60 IN

## 2019-05-08 DIAGNOSIS — Z90.710 ACQUIRED ABSENCE OF BOTH CERVIX AND UTERUS: Chronic | ICD-10-CM

## 2019-05-08 DIAGNOSIS — Z98.89 OTHER SPECIFIED POSTPROCEDURAL STATES: Chronic | ICD-10-CM

## 2019-05-08 DIAGNOSIS — J34.3 HYPERTROPHY OF NASAL TURBINATES: ICD-10-CM

## 2019-05-08 DIAGNOSIS — Z95.1 PRESENCE OF AORTOCORONARY BYPASS GRAFT: Chronic | ICD-10-CM

## 2019-05-08 DIAGNOSIS — Z41.9 ENCOUNTER FOR PROCEDURE FOR PURPOSES OTHER THAN REMEDYING HEALTH STATE, UNSPECIFIED: Chronic | ICD-10-CM

## 2019-05-08 DIAGNOSIS — J35.9 CHRONIC DISEASE OF TONSILS AND ADENOIDS, UNSPECIFIED: ICD-10-CM

## 2019-05-08 DIAGNOSIS — Z89.511 ACQUIRED ABSENCE OF RIGHT LEG BELOW KNEE: Chronic | ICD-10-CM

## 2019-05-08 DIAGNOSIS — Z89.512 ACQUIRED ABSENCE OF LEFT LEG BELOW KNEE: Chronic | ICD-10-CM

## 2019-05-08 LAB
ANION GAP SERPL CALC-SCNC: 11 MMO/L — SIGNIFICANT CHANGE UP (ref 7–14)
BUN SERPL-MCNC: 20 MG/DL — SIGNIFICANT CHANGE UP (ref 7–23)
CALCIUM SERPL-MCNC: 9.7 MG/DL — SIGNIFICANT CHANGE UP (ref 8.4–10.5)
CHLORIDE SERPL-SCNC: 102 MMOL/L — SIGNIFICANT CHANGE UP (ref 98–107)
CO2 SERPL-SCNC: 25 MMOL/L — SIGNIFICANT CHANGE UP (ref 22–31)
CREAT SERPL-MCNC: 0.94 MG/DL — SIGNIFICANT CHANGE UP (ref 0.5–1.3)
GLUCOSE SERPL-MCNC: 206 MG/DL — HIGH (ref 70–99)
HBA1C BLD-MCNC: 9.6 % — HIGH (ref 4–5.6)
HCT VFR BLD CALC: 38.6 % — SIGNIFICANT CHANGE UP (ref 34.5–45)
HGB BLD-MCNC: 11.7 G/DL — SIGNIFICANT CHANGE UP (ref 11.5–15.5)
MCHC RBC-ENTMCNC: 27.9 PG — SIGNIFICANT CHANGE UP (ref 27–34)
MCHC RBC-ENTMCNC: 30.3 % — LOW (ref 32–36)
MCV RBC AUTO: 92.1 FL — SIGNIFICANT CHANGE UP (ref 80–100)
NRBC # FLD: 0 K/UL — SIGNIFICANT CHANGE UP (ref 0–0)
PLATELET # BLD AUTO: 238 K/UL — SIGNIFICANT CHANGE UP (ref 150–400)
PMV BLD: 11.7 FL — SIGNIFICANT CHANGE UP (ref 7–13)
POTASSIUM SERPL-MCNC: 4.7 MMOL/L — SIGNIFICANT CHANGE UP (ref 3.5–5.3)
POTASSIUM SERPL-SCNC: 4.7 MMOL/L — SIGNIFICANT CHANGE UP (ref 3.5–5.3)
RBC # BLD: 4.19 M/UL — SIGNIFICANT CHANGE UP (ref 3.8–5.2)
RBC # FLD: 14 % — SIGNIFICANT CHANGE UP (ref 10.3–14.5)
SODIUM SERPL-SCNC: 138 MMOL/L — SIGNIFICANT CHANGE UP (ref 135–145)
WBC # BLD: 9.55 K/UL — SIGNIFICANT CHANGE UP (ref 3.8–10.5)
WBC # FLD AUTO: 9.55 K/UL — SIGNIFICANT CHANGE UP (ref 3.8–10.5)

## 2019-05-08 PROCEDURE — 93010 ELECTROCARDIOGRAM REPORT: CPT

## 2019-05-08 NOTE — H&P PST ADULT - NSICDXPROBLEM_GEN_ALL_CORE_FT
chronic sinusitis:  scheduled right intraoperative ct guided ethmoidectomy, maxillary antrostomy with sinus curettage frontal sinus exploration sphenoidectomy, b/l turbinectomy on 5/21/2019.  preop instructions, gi Prophylaxis given  pt verbalized understanding.  please confirm height & weight on admit, unable to obtain in PST    Hypertension:  continue medication per routine schedule    type II FS on admit:  fs on admit   Instructed to take 34units Levemir on 5/20/19 @ bedtime and hold novolog on morning of surgery  pt verbalized understanding     CAD:  continue medication per routine schedule  Pt with h/o angioplasty 2009 - 1 coronary stent inserted & in 2018 - left leg stent inserted  pt on plavix & aspirin  pt instructed to last dose plavix on 5/15/19 and continue low dose aspirin.    Pt instructed to confirm instruction at cardiology eval appointment  pending copy of cardiology eval & most recent cardiology studies.    snoring:  DARIUS precautions recommended.  OR booking notified via fax.

## 2019-05-08 NOTE — H&P PST ADULT - NEGATIVE GENERAL GENITOURINARY SYMPTOMS
no dysuria/normal urinary frequency/no flank pain R/no bladder infections/no renal colic/no hematuria/no flank pain L

## 2019-05-08 NOTE — H&P PST ADULT - NEGATIVE BREAST SYMPTOMS
no breast lump L/no breast lump R/no nipple discharge L/no breast tenderness L/no nipple discharge R/no breast tenderness R

## 2019-05-08 NOTE — H&P PST ADULT - NSANTHOSAYNRD_GEN_A_CORE
No. DARIUS screening performed.  STOP BANG Legend: 0-2 = LOW Risk; 3-4 = INTERMEDIATE Risk; 5-8 = HIGH Risk

## 2019-05-08 NOTE — H&P PST ADULT - HISTORY OF PRESENT ILLNESS
found to have blockage in carotid artery on left side of neck on routine exam.  Asymptomatic.  Now for carotid endarterectomy 72 y/o female with h/o CAD, old MI - triple CABG 2004, 1 coronary stent placed in 2009 & 1 left leg stent placed in 2018, HTN, glaucoma, & type II DM with left AKA & right BKA - right leg prosthesis in use.   Pt presents to PST for preop eval for scheduled right intraoperative ct guided ethmoidectomy, maxillary antrostomy with sinus curettage frontal sinus exploration sphenoidectomy, b/l turbinectomy on 5/21/2019.  Pt with c/o frequent sinus infections - last episode in December 2018.

## 2019-05-08 NOTE — H&P PST ADULT - NEGATIVE CARDIOVASCULAR SYMPTOMS
no claudication/no dyspnea on exertion/no paroxysmal nocturnal dyspnea/no palpitations/no orthopnea/no peripheral edema/no chest pain

## 2019-05-08 NOTE — H&P PST ADULT - LAST CARDIAC ANGIOGRAM/IMAGING
2009 [facility] 1 coronary stent inserted  & [facility] 2018 1 stent placed in leg

## 2019-05-08 NOTE — H&P PST ADULT - NSICDXPASTSURGICALHX_GEN_ALL_CORE_FT
PAST SURGICAL HISTORY:  Elective surgery traumatic amputation of the toe    History of hysterectomy     Hx of CABG 3v 2004    S/P angioplasty with stent 2009, 3/2014    S/P below knee amputation, right 6/2010    S/P BKA (below knee amputation) unilateral, right     S/P BKA (below knee amputation), left     S/P CABG x 3     S/P eye surgery for glaucoma    S/P hysterectomy 2004

## 2019-05-08 NOTE — H&P PST ADULT - NSICDXPASTMEDICALHX_GEN_ALL_CORE_FT
PAST MEDICAL HISTORY:  Acute kidney injury superimposed on chronic kidney disease     CAD (coronary artery disease)     Carotid artery stenosis     Chronic diastolic heart failure     Diabetic retinopathy     DM (diabetes mellitus) type II    Hypercholesterolemia     Hypertension     Normocytic anemia     Obesity     PAD (peripheral artery disease) s/p R BKA    Pulmonary HTN     S/P CABG x 3 in 2004, [facility]    Stented coronary artery 2010 [facility]    Vitamin D deficiency

## 2019-05-09 PROBLEM — L30.9 DERMATITIS, UNSPECIFIED: Chronic | Status: INACTIVE | Noted: 2018-08-20 | Resolved: 2019-05-08

## 2019-05-15 ENCOUNTER — APPOINTMENT (OUTPATIENT)
Dept: CARDIOLOGY | Facility: CLINIC | Age: 74
End: 2019-05-15
Payer: MEDICARE

## 2019-05-15 ENCOUNTER — NON-APPOINTMENT (OUTPATIENT)
Age: 74
End: 2019-05-15

## 2019-05-15 VITALS
OXYGEN SATURATION: 98 % | HEIGHT: 66 IN | HEART RATE: 68 BPM | SYSTOLIC BLOOD PRESSURE: 140 MMHG | DIASTOLIC BLOOD PRESSURE: 80 MMHG

## 2019-05-15 DIAGNOSIS — I27.20 PULMONARY HYPERTENSION, UNSPECIFIED: ICD-10-CM

## 2019-05-15 PROCEDURE — 99215 OFFICE O/P EST HI 40 MIN: CPT

## 2019-05-15 PROCEDURE — 93000 ELECTROCARDIOGRAM COMPLETE: CPT

## 2019-05-15 RX ORDER — FUROSEMIDE 80 MG/1
TABLET ORAL
Refills: 0 | Status: DISCONTINUED | COMMUNITY
End: 2019-05-15

## 2019-05-16 ENCOUNTER — APPOINTMENT (OUTPATIENT)
Dept: CARDIOLOGY | Facility: CLINIC | Age: 74
End: 2019-05-16
Payer: MEDICARE

## 2019-05-16 PROCEDURE — 93306 TTE W/DOPPLER COMPLETE: CPT

## 2019-05-20 ENCOUNTER — TRANSCRIPTION ENCOUNTER (OUTPATIENT)
Age: 74
End: 2019-05-20

## 2019-05-21 ENCOUNTER — RESULT REVIEW (OUTPATIENT)
Age: 74
End: 2019-05-21

## 2019-05-21 ENCOUNTER — OUTPATIENT (OUTPATIENT)
Dept: OUTPATIENT SERVICES | Facility: HOSPITAL | Age: 74
LOS: 1 days | Discharge: HOME | End: 2019-05-21
Payer: MEDICARE

## 2019-05-21 ENCOUNTER — APPOINTMENT (OUTPATIENT)
Dept: OTOLARYNGOLOGY | Facility: HOSPITAL | Age: 74
End: 2019-05-21

## 2019-05-21 VITALS
WEIGHT: 167.99 LBS | HEIGHT: 60 IN | TEMPERATURE: 98 F | SYSTOLIC BLOOD PRESSURE: 155 MMHG | RESPIRATION RATE: 16 BRPM | HEART RATE: 61 BPM | OXYGEN SATURATION: 98 % | DIASTOLIC BLOOD PRESSURE: 65 MMHG

## 2019-05-21 VITALS
TEMPERATURE: 97 F | OXYGEN SATURATION: 100 % | DIASTOLIC BLOOD PRESSURE: 52 MMHG | SYSTOLIC BLOOD PRESSURE: 150 MMHG | HEART RATE: 78 BPM | RESPIRATION RATE: 14 BRPM

## 2019-05-21 DIAGNOSIS — Z41.9 ENCOUNTER FOR PROCEDURE FOR PURPOSES OTHER THAN REMEDYING HEALTH STATE, UNSPECIFIED: Chronic | ICD-10-CM

## 2019-05-21 DIAGNOSIS — Z89.511 ACQUIRED ABSENCE OF RIGHT LEG BELOW KNEE: Chronic | ICD-10-CM

## 2019-05-21 DIAGNOSIS — Z98.89 OTHER SPECIFIED POSTPROCEDURAL STATES: Chronic | ICD-10-CM

## 2019-05-21 DIAGNOSIS — Z95.1 PRESENCE OF AORTOCORONARY BYPASS GRAFT: Chronic | ICD-10-CM

## 2019-05-21 DIAGNOSIS — Z89.512 ACQUIRED ABSENCE OF LEFT LEG BELOW KNEE: Chronic | ICD-10-CM

## 2019-05-21 DIAGNOSIS — J35.9 CHRONIC DISEASE OF TONSILS AND ADENOIDS, UNSPECIFIED: ICD-10-CM

## 2019-05-21 DIAGNOSIS — Z90.710 ACQUIRED ABSENCE OF BOTH CERVIX AND UTERUS: Chronic | ICD-10-CM

## 2019-05-21 DIAGNOSIS — J34.3 HYPERTROPHY OF NASAL TURBINATES: ICD-10-CM

## 2019-05-21 LAB
GLUCOSE BLDC GLUCOMTR-MCNC: 248 MG/DL — HIGH (ref 70–99)
GLUCOSE BLDC GLUCOMTR-MCNC: 269 MG/DL — HIGH (ref 70–99)
GLUCOSE BLDC GLUCOMTR-MCNC: 272 MG/DL — HIGH (ref 70–99)
GRAM STN WND: SIGNIFICANT CHANGE UP
SPECIMEN SOURCE: SIGNIFICANT CHANGE UP

## 2019-05-21 PROCEDURE — 31287 NASAL/SINUS ENDOSCOPY SURG: CPT | Mod: 50,GC

## 2019-05-21 PROCEDURE — 88311 DECALCIFY TISSUE: CPT | Mod: 26

## 2019-05-21 PROCEDURE — 88305 TISSUE EXAM BY PATHOLOGIST: CPT | Mod: 26

## 2019-05-21 PROCEDURE — 31267 ENDOSCOPY MAXILLARY SINUS: CPT | Mod: 50,GC

## 2019-05-21 PROCEDURE — 31253 NSL/SINS NDSC TOTAL: CPT | Mod: 50,GC

## 2019-05-21 PROCEDURE — 61782 SCAN PROC CRANIAL EXTRA: CPT

## 2019-05-21 RX ORDER — HYDRALAZINE HCL 50 MG
10 TABLET ORAL ONCE
Refills: 0 | Status: COMPLETED | OUTPATIENT
Start: 2019-05-21 | End: 2019-05-21

## 2019-05-21 RX ORDER — INSULIN ASPART 100 [IU]/ML
1 INJECTION, SOLUTION SUBCUTANEOUS
Qty: 0 | Refills: 0 | DISCHARGE

## 2019-05-21 RX ORDER — TIMOLOL 0.5 %
1 DROPS OPHTHALMIC (EYE)
Qty: 0 | Refills: 0 | DISCHARGE

## 2019-05-21 RX ORDER — CARVEDILOL PHOSPHATE 80 MG/1
1 CAPSULE, EXTENDED RELEASE ORAL
Qty: 0 | Refills: 0 | DISCHARGE

## 2019-05-21 RX ORDER — SODIUM CHLORIDE 9 MG/ML
1000 INJECTION, SOLUTION INTRAVENOUS
Refills: 0 | Status: DISCONTINUED | OUTPATIENT
Start: 2019-05-21 | End: 2019-06-05

## 2019-05-21 RX ORDER — SODIUM CHLORIDE 0.65 %
4 AEROSOL, SPRAY (ML) NASAL
Qty: 500 | Refills: 0
Start: 2019-05-21

## 2019-05-21 RX ORDER — INSULIN LISPRO 100/ML
8 VIAL (ML) SUBCUTANEOUS ONCE
Refills: 0 | Status: COMPLETED | OUTPATIENT
Start: 2019-05-21 | End: 2019-05-21

## 2019-05-21 RX ORDER — ONDANSETRON 8 MG/1
4 TABLET, FILM COATED ORAL ONCE
Refills: 0 | Status: DISCONTINUED | OUTPATIENT
Start: 2019-05-21 | End: 2019-06-05

## 2019-05-21 RX ORDER — BRIMONIDINE TARTRATE 2 MG/MG
1 SOLUTION/ DROPS OPHTHALMIC
Qty: 0 | Refills: 0 | DISCHARGE

## 2019-05-21 RX ORDER — FENTANYL CITRATE 50 UG/ML
25 INJECTION INTRAVENOUS
Refills: 0 | Status: DISCONTINUED | OUTPATIENT
Start: 2019-05-21 | End: 2019-05-21

## 2019-05-21 RX ORDER — INSULIN DETEMIR 100/ML (3)
43 INSULIN PEN (ML) SUBCUTANEOUS
Qty: 0 | Refills: 0 | DISCHARGE

## 2019-05-21 RX ORDER — INSULIN ASPART 100 [IU]/ML
15 INJECTION, SOLUTION SUBCUTANEOUS
Qty: 0 | Refills: 0 | DISCHARGE

## 2019-05-21 RX ADMIN — Medication 10 MILLIGRAM(S): at 17:35

## 2019-05-21 RX ADMIN — SODIUM CHLORIDE 30 MILLILITER(S): 9 INJECTION, SOLUTION INTRAVENOUS at 13:14

## 2019-05-21 RX ADMIN — Medication 8 UNIT(S): at 17:19

## 2019-05-21 NOTE — ASU DISCHARGE PLAN (ADULT/PEDIATRIC) - NURSING INSTRUCTIONS
Do not take pain medication on an empty stomach.  Increase fluids and fiber in diet to prevent constipation. No strenuous exercising, aerobics or lifting for two weeks. Liquid and soft diet for the first 24 hours. Resume regular diet as tolerated. Nothing hot in temperature to eat or drink, avoid hot baths.  No nose blowing - sneeze with mouth open. Apply ice to reduce pain and swelling. no tylenol or acetominophen until after 9:00pm tonight

## 2019-05-21 NOTE — ASU DISCHARGE PLAN (ADULT/PEDIATRIC) - ASU DC SPECIAL INSTRUCTIONSFT
1. Take antibiotic as prescribed  2. Use tylenol as needed for pain. Use percocet for severe pain.  3. Start nasal saline sprays tomorrow.    Call Dr. Lim's office for your follow-up appointment - you should be seen tomorrow for packing removal.

## 2019-05-21 NOTE — DISCUSSION/SUMMARY
[Procedure Low Risk] : the procedure risk is low [Patient Intermediate Risk] : the patient is an intermediate risk [As per surgery] : as per surgery [Continue] : Continue medications as currently directed [Optimized for Surgery] : the patient is optimized for surgery [FreeTextEntry3] : May hold Plavix for 7 days prior to planned surgery; restart Plavix, at the discretion of the surgeon, day after procedure. [FreeTextEntry1] : 74-year-old female with significant cardiovascular disease both peripheral arterial and coronary. Last echo in hospital prior to her AKA demonstrated left ventricular dysfunction which was new. Clearly patient does have elevated risk given her multiple morbidities. Her diabetes is poorly controlled at this time and should be better managed, patient was told to follow up with endocrinologist. Currently, her renal function is normal as per her last labs from 5/8/19. If left ventricular function is diminished she would benefit from Entresto.\par \par Current clearance made without review of most recent labs.

## 2019-05-21 NOTE — ASU DISCHARGE PLAN (ADULT/PEDIATRIC) - CARE PROVIDER_API CALL
Felix Lim; SYDNEE)  Otolaryngology  [address] [place] [phone]  Phone: [phone]  Fax: [phone]  Follow Up Time:

## 2019-05-21 NOTE — PHYSICAL EXAM
[General Appearance - Well Developed] : well developed [Normal Appearance] : normal appearance [Well Groomed] : well groomed [No Deformities] : no deformities [General Appearance - Well Nourished] : well nourished [General Appearance - In No Acute Distress] : no acute distress [Normal Conjunctiva] : the conjunctiva exhibited no abnormalities [Eyelids - No Xanthelasma] : the eyelids demonstrated no xanthelasmas [Normal Oral Mucosa] : normal oral mucosa [No Oral Pallor] : no oral pallor [No Oral Cyanosis] : no oral cyanosis [Normal Jugular Venous A Waves Present] : normal jugular venous A waves present [Normal Jugular Venous V Waves Present] : normal jugular venous V waves present [No Jugular Venous Blackman A Waves] : no jugular venous blackman A waves [Respiration, Rhythm And Depth] : normal respiratory rhythm and effort [Exaggerated Use Of Accessory Muscles For Inspiration] : no accessory muscle use [Auscultation Breath Sounds / Voice Sounds] : lungs were clear to auscultation bilaterally [Heart Rate And Rhythm] : heart rate and rhythm were normal [Heart Sounds] : normal S1 and S2 [Murmurs] : no murmurs present [Abdomen Soft] : soft [Abdomen Tenderness] : non-tender [Abnormal Walk] : normal gait [Abdomen Mass (___ Cm)] : no abdominal mass palpated [Gait - Sufficient For Exercise Testing] : the gait was sufficient for exercise testing [Skin Color & Pigmentation] : normal skin color and pigmentation [] : no rash [No Venous Stasis] : no venous stasis [Skin Lesions] : no skin lesions [No Skin Ulcers] : no skin ulcer [No Xanthoma] : no  xanthoma was observed [Oriented To Time, Place, And Person] : oriented to person, place, and time [Mood] : the mood was normal [Affect] : the affect was normal [No Anxiety] : not feeling anxious [FreeTextEntry1] : s/p R ANKIT, s/p L AKA

## 2019-05-21 NOTE — HISTORY OF PRESENT ILLNESS
[Preoperative Visit] : for a medical evaluation prior to surgery [Scheduled Procedure ___] : a [unfilled] [Surgeon Name ___] : surgeon: [unfilled] [Date of Surgery ___] : on [unfilled] [Good] : Good [FreeTextEntry1] : A 74-year-old female with significant atherosclerotic heart disease; status post CABG in 2004 and followup PTCA in 2009. Long-standing hypertensive hyperlipidemic and diabetic with severe peripheral arterial disease, status post right below-knee amputation in 2010 and left above-knee and dictation in 2018 for gangrene. Patient wheelchair bound at this time. Denies any recent chest pain, shortness of breath or palpitations. Nondrinker, nonsmoker. Last echocardiogram in 2018 showed moderate left ventricular dysfunction with moderate to severe pulmonary hypertension. No significant heart disease was seen. Prior history of chronic renal insufficiency, stage III.\par \par She is scheduled for sinus surgery next week. Visit today for preop clearance. Last stress test 5 years ago, no records available for review.

## 2019-05-21 NOTE — ASU PATIENT PROFILE, ADULT - PMH
CAD (coronary artery disease)    Carotid artery stenosis    DM (diabetes mellitus)    Hypercholesterolemia    Hypertension    Obesity    PAD (peripheral artery disease)  s/p R BKA  S/P CABG x 3  in 2004, [facility]  Stented coronary artery  2010 [facility]

## 2019-05-22 ENCOUNTER — APPOINTMENT (OUTPATIENT)
Dept: OTOLARYNGOLOGY | Facility: CLINIC | Age: 74
End: 2019-05-22
Payer: MEDICARE

## 2019-05-22 LAB — SPECIMEN SOURCE: SIGNIFICANT CHANGE UP

## 2019-05-22 PROCEDURE — 99024 POSTOP FOLLOW-UP VISIT: CPT

## 2019-05-22 NOTE — PHYSICAL EXAM
[Normal] : no abnormal secretions [de-identified] : Septum is midline [de-identified] : serosanguineous

## 2019-05-22 NOTE — HISTORY OF PRESENT ILLNESS
[de-identified] :  Pt healing well overnight. Pt has packing in place has dry mouth, tolerating pain.\par

## 2019-05-22 NOTE — REASON FOR VISIT
[Post-Operative Visit] : a post-operative visit [FreeTextEntry2] : packing removal [FreeTextEntry1] : s/p Endoscopic Sinus Surgery

## 2019-05-23 LAB
-  CEFAZOLIN: SIGNIFICANT CHANGE UP
-  CIPROFLOXACIN: SIGNIFICANT CHANGE UP
-  CLINDAMYCIN: SIGNIFICANT CHANGE UP
-  DAPTOMYCIN: SIGNIFICANT CHANGE UP
-  ERYTHROMYCIN: SIGNIFICANT CHANGE UP
-  GENTAMICIN: SIGNIFICANT CHANGE UP
-  LEVOFLOXACIN: SIGNIFICANT CHANGE UP
-  LINEZOLID: SIGNIFICANT CHANGE UP
-  MOXIFLOXACIN(AEROBIC): SIGNIFICANT CHANGE UP
-  OXACILLIN: SIGNIFICANT CHANGE UP
-  PENICILLIN: SIGNIFICANT CHANGE UP
-  RIFAMPIN.: SIGNIFICANT CHANGE UP
-  TETRACYCLINE: SIGNIFICANT CHANGE UP
-  TRIMETHOPRIM/SULFAMETHOXAZOLE: SIGNIFICANT CHANGE UP
-  VANCOMYCIN: SIGNIFICANT CHANGE UP
CULTURE - SURGICAL SITE: SIGNIFICANT CHANGE UP
GRAM STN WND: SIGNIFICANT CHANGE UP
METHOD TYPE: SIGNIFICANT CHANGE UP
ORGANISM # SPEC MICROSCOPIC CNT: SIGNIFICANT CHANGE UP
ORGANISM # SPEC MICROSCOPIC CNT: SIGNIFICANT CHANGE UP

## 2019-05-29 ENCOUNTER — APPOINTMENT (OUTPATIENT)
Dept: OTOLARYNGOLOGY | Facility: CLINIC | Age: 74
End: 2019-05-29

## 2019-05-30 ENCOUNTER — APPOINTMENT (OUTPATIENT)
Dept: PHYSICAL MEDICINE AND REHAB | Facility: CLINIC | Age: 74
End: 2019-05-30
Payer: MEDICARE

## 2019-05-30 VITALS
HEART RATE: 61 BPM | SYSTOLIC BLOOD PRESSURE: 147 MMHG | OXYGEN SATURATION: 99 % | TEMPERATURE: 98.5 F | DIASTOLIC BLOOD PRESSURE: 71 MMHG

## 2019-05-30 PROCEDURE — 99214 OFFICE O/P EST MOD 30 MIN: CPT | Mod: GC

## 2019-05-30 RX ORDER — PREDNISOLONE ACETATE 10 MG/ML
1 SUSPENSION/ DROPS OPHTHALMIC
Qty: 1 | Refills: 0 | Status: DISCONTINUED | COMMUNITY
Start: 2019-04-10 | End: 2019-05-30

## 2019-05-30 RX ORDER — BRIMONIDINE TARTRATE, TIMOLOL MALEATE 2; 5 MG/ML; MG/ML
0.2-0.5 SOLUTION/ DROPS OPHTHALMIC
Qty: 1 | Refills: 3 | Status: DISCONTINUED | OUTPATIENT
Start: 2017-04-24 | End: 2019-05-30

## 2019-05-30 NOTE — ASSESSMENT
[FreeTextEntry1] : Patient is a 74-year-old female PMHx HTN, DM, hx right BKA (2010), hx left AKA (Sept, 2018) whoses current left AK prosthesis is ill fitting and prosthetic socket appears large with possible instability. Will discuss with  concerning switching to a silicone sealing liner and the possible use of a stance phase control or hydraulic SNS knee unit with extension assist versus a stance phase control knee with option of a locking knee joint. Prescription provided to start outpatient physical therapy to work on gait mechanics and training with her new prosthesis, see RX.

## 2019-05-30 NOTE — PHYSICAL EXAM
[FreeTextEntry1] : General: Well developed female in no apparent distress. Patient is awake, alert, and oriented x3. Cooperative.\par Lungs: Clear to auscultation.\par Cardiac: Regular rate and rhythm.\par Abdomen: Bowel sounds present, not distended.\par Extremities::\par \par Left AKA: Conical shape, well healed. No skin adhesions, no skin breakdown, no callus.\par \par Right BKA: Conical shape. Hyperpigmentation with callus noted over the distal anterior tibia. No skin adhesion, no skin breakdown. Full active range of motion at the knee.\par \par Motor:\par Both upper extremities: Tone normal, active range of motion within functional limits with 5/5 motor power throughout. thumb to digit opposition intact bilaterally.\par Both lower extremities: 5/5 motor power throughout.\par \par Functional status: Sit to stand transfer independent. Patient ambulate with RW, right BK prosthesis and left AK prosthesis with a lanyard suspension, stane phase control knee independently in the office. Prosthetic foot appears too adducted with episodic striking of her right prosthetic foot. Patient is a K2 ambulator.

## 2019-05-30 NOTE — HISTORY OF PRESENT ILLNESS
[FreeTextEntry1] : Patient is a 74-year-old female PMHx HTN, DM, hx right BKA (2010), hx left AKA (Sept, 2018) who presents today for a prosthetic evaluation. Patient's main complaint is that the left AK prosthesis is uncomfortable, heavy and has difficulty ambulating with the prosthesis. Patient received both prosthetic legs approximately 9 months ago. Patient reports that the discomfort of the left AK prosthesis is limiting her walking. Patient reports several near falls due to her prosthetic knee joint buckling during ambulation. No skin breakdown reported. Patient has not had outpatient physical therapy since December 2018. Functionally she is a household ambulator with a rolling walker independent. Independent transfers, independent ADLs. Patient lives with her  in a private house which is wheelchair accessible, no home health aide. Patient states her right BK prosthesis is comfortable but did require additional padding and is currently using 5 ply socks

## 2019-05-30 NOTE — REVIEW OF SYSTEMS
[Patient Intake Form Reviewed] : Patient intake form was reviewed [Difficulty Walking] : difficulty walking [Negative] : Gastrointestinal [Fever] : no fever [Recent Change In Weight] : ~T no recent weight change [Muscle Weakness] : no muscle weakness [Skin Wound] : no skin wound

## 2019-06-03 NOTE — DISCHARGE NOTE ADULT - NS MD DC PLAN IMMU FLU REF OTH
Dr. Minaya urogynecologist at [facility]   [phone]      mycolog --use as needed for vaginal irritation     Spacer is ordered     Update me in the Fall and we will decrease your dose to medium dose of HRT    Fasting labs are ordered      Additional Educational Resources:  For additional resources regarding your symptoms, diagnosis, or further health information, please visit the Health Resources section on [email or web address] or the Online Health Resources section in [facility].           Not indicated for this patient

## 2019-06-05 ENCOUNTER — APPOINTMENT (OUTPATIENT)
Dept: OTOLARYNGOLOGY | Facility: CLINIC | Age: 74
End: 2019-06-05
Payer: MEDICARE

## 2019-06-05 PROCEDURE — 99024 POSTOP FOLLOW-UP VISIT: CPT

## 2019-06-11 NOTE — REASON FOR VISIT
[Post-Operative Visit] : a post-operative visit [FreeTextEntry2] : post-op [FreeTextEntry1] : s/p Endoscopic Sinus Surgery

## 2019-06-11 NOTE — HISTORY OF PRESENT ILLNESS
[de-identified] : 74 year old female Pt is healing well. Pt admits to using saline diligently throughout the week. Pt has moderate nasal congestion and mild pain.\par

## 2019-06-11 NOTE — PHYSICAL EXAM
[Normal] : no abnormal secretions [de-identified] : . [de-identified] : large occlusive crust debrided bilaterally

## 2019-06-20 LAB — FUNGUS SPEC QL CULT: SIGNIFICANT CHANGE UP

## 2019-08-26 ENCOUNTER — NON-APPOINTMENT (OUTPATIENT)
Age: 74
End: 2019-08-26

## 2019-08-26 ENCOUNTER — APPOINTMENT (OUTPATIENT)
Dept: OPHTHALMOLOGY | Facility: CLINIC | Age: 74
End: 2019-08-26
Payer: MEDICARE

## 2019-08-26 PROCEDURE — 92083 EXTENDED VISUAL FIELD XM: CPT

## 2019-08-26 PROCEDURE — 92012 INTRM OPH EXAM EST PATIENT: CPT

## 2019-09-04 ENCOUNTER — APPOINTMENT (OUTPATIENT)
Dept: OTOLARYNGOLOGY | Facility: CLINIC | Age: 74
End: 2019-09-04

## 2019-10-06 ENCOUNTER — FORM ENCOUNTER (OUTPATIENT)
Age: 74
End: 2019-10-06

## 2019-10-07 ENCOUNTER — APPOINTMENT (OUTPATIENT)
Dept: UROLOGY | Facility: CLINIC | Age: 74
End: 2019-10-07
Payer: MEDICARE

## 2019-10-07 ENCOUNTER — APPOINTMENT (OUTPATIENT)
Dept: ULTRASOUND IMAGING | Facility: IMAGING CENTER | Age: 74
End: 2019-10-07
Payer: MEDICARE

## 2019-10-07 ENCOUNTER — OUTPATIENT (OUTPATIENT)
Dept: OUTPATIENT SERVICES | Facility: HOSPITAL | Age: 74
LOS: 1 days | End: 2019-10-07
Payer: COMMERCIAL

## 2019-10-07 VITALS
HEART RATE: 69 BPM | RESPIRATION RATE: 17 BRPM | DIASTOLIC BLOOD PRESSURE: 71 MMHG | WEIGHT: 175 LBS | BODY MASS INDEX: 28.12 KG/M2 | SYSTOLIC BLOOD PRESSURE: 166 MMHG | HEIGHT: 66 IN

## 2019-10-07 DIAGNOSIS — Z98.89 OTHER SPECIFIED POSTPROCEDURAL STATES: Chronic | ICD-10-CM

## 2019-10-07 DIAGNOSIS — Z89.512 ACQUIRED ABSENCE OF LEFT LEG BELOW KNEE: Chronic | ICD-10-CM

## 2019-10-07 DIAGNOSIS — R33.9 RETENTION OF URINE, UNSPECIFIED: ICD-10-CM

## 2019-10-07 DIAGNOSIS — Z90.710 ACQUIRED ABSENCE OF BOTH CERVIX AND UTERUS: Chronic | ICD-10-CM

## 2019-10-07 DIAGNOSIS — Z89.511 ACQUIRED ABSENCE OF RIGHT LEG BELOW KNEE: Chronic | ICD-10-CM

## 2019-10-07 DIAGNOSIS — N31.9 NEUROMUSCULAR DYSFUNCTION OF BLADDER, UNSPECIFIED: ICD-10-CM

## 2019-10-07 DIAGNOSIS — Z41.9 ENCOUNTER FOR PROCEDURE FOR PURPOSES OTHER THAN REMEDYING HEALTH STATE, UNSPECIFIED: Chronic | ICD-10-CM

## 2019-10-07 DIAGNOSIS — Z95.1 PRESENCE OF AORTOCORONARY BYPASS GRAFT: Chronic | ICD-10-CM

## 2019-10-07 PROCEDURE — 76770 US EXAM ABDO BACK WALL COMP: CPT | Mod: 26

## 2019-10-07 PROCEDURE — 76770 US EXAM ABDO BACK WALL COMP: CPT

## 2019-10-07 PROCEDURE — 99214 OFFICE O/P EST MOD 30 MIN: CPT

## 2019-10-11 LAB
ANION GAP SERPL CALC-SCNC: 14 MMOL/L
BUN SERPL-MCNC: 16 MG/DL
CALCIUM SERPL-MCNC: 9.9 MG/DL
CHLORIDE SERPL-SCNC: 96 MMOL/L
CO2 SERPL-SCNC: 25 MMOL/L
CREAT SERPL-MCNC: 0.92 MG/DL
GLUCOSE SERPL-MCNC: 241 MG/DL
POTASSIUM SERPL-SCNC: 5.4 MMOL/L
SODIUM SERPL-SCNC: 135 MMOL/L

## 2019-10-28 ENCOUNTER — NON-APPOINTMENT (OUTPATIENT)
Age: 74
End: 2019-10-28

## 2019-10-28 ENCOUNTER — APPOINTMENT (OUTPATIENT)
Dept: OPHTHALMOLOGY | Facility: CLINIC | Age: 74
End: 2019-10-28
Payer: MEDICARE

## 2019-10-28 PROCEDURE — 92014 COMPRE OPH EXAM EST PT 1/>: CPT

## 2019-10-28 PROCEDURE — 92134 CPTRZ OPH DX IMG PST SGM RTA: CPT

## 2019-11-13 ENCOUNTER — APPOINTMENT (OUTPATIENT)
Dept: CARDIOLOGY | Facility: CLINIC | Age: 74
End: 2019-11-13
Payer: MEDICARE

## 2019-11-13 ENCOUNTER — NON-APPOINTMENT (OUTPATIENT)
Age: 74
End: 2019-11-13

## 2019-11-13 VITALS
WEIGHT: 175 LBS | OXYGEN SATURATION: 98 % | BODY MASS INDEX: 28.12 KG/M2 | HEART RATE: 69 BPM | DIASTOLIC BLOOD PRESSURE: 70 MMHG | SYSTOLIC BLOOD PRESSURE: 140 MMHG | HEIGHT: 66 IN

## 2019-11-13 PROCEDURE — 93000 ELECTROCARDIOGRAM COMPLETE: CPT

## 2019-11-13 PROCEDURE — 99215 OFFICE O/P EST HI 40 MIN: CPT

## 2019-11-13 NOTE — PHYSICAL EXAM
[General Appearance - Well Developed] : well developed [Normal Appearance] : normal appearance [Well Groomed] : well groomed [General Appearance - Well Nourished] : well nourished [No Deformities] : no deformities [General Appearance - In No Acute Distress] : no acute distress [Normal Conjunctiva] : the conjunctiva exhibited no abnormalities [Eyelids - No Xanthelasma] : the eyelids demonstrated no xanthelasmas [Normal Oral Mucosa] : normal oral mucosa [No Oral Pallor] : no oral pallor [No Oral Cyanosis] : no oral cyanosis [Normal Jugular Venous A Waves Present] : normal jugular venous A waves present [Normal Jugular Venous V Waves Present] : normal jugular venous V waves present [No Jugular Venous Blackman A Waves] : no jugular venous blackman A waves [Respiration, Rhythm And Depth] : normal respiratory rhythm and effort [Exaggerated Use Of Accessory Muscles For Inspiration] : no accessory muscle use [Auscultation Breath Sounds / Voice Sounds] : lungs were clear to auscultation bilaterally [Heart Rate And Rhythm] : heart rate and rhythm were normal [Heart Sounds] : normal S1 and S2 [Murmurs] : no murmurs present [Abdomen Soft] : soft [Abdomen Tenderness] : non-tender [Abdomen Mass (___ Cm)] : no abdominal mass palpated [Abnormal Walk] : normal gait [Gait - Sufficient For Exercise Testing] : the gait was sufficient for exercise testing [Skin Color & Pigmentation] : normal skin color and pigmentation [] : no rash [No Venous Stasis] : no venous stasis [Skin Lesions] : no skin lesions [No Skin Ulcers] : no skin ulcer [No Xanthoma] : no  xanthoma was observed [Oriented To Time, Place, And Person] : oriented to person, place, and time [Affect] : the affect was normal [Mood] : the mood was normal [No Anxiety] : not feeling anxious [FreeTextEntry1] : s/p R ANKIT, s/p L AKA

## 2019-11-13 NOTE — HISTORY OF PRESENT ILLNESS
[FreeTextEntry1] : A 74-year-old female with significant atherosclerotic heart disease; status post CABG in 2004 and followup PTCA in 2009. Long-standing hypertensive hyperlipidemic and diabetic with severe peripheral arterial disease, status post right below-knee amputation in 2010 and left above-knee and dictation in 2018 for gangrene. Patient wheelchair bound at this time. Denies any recent chest pain, shortness of breath or palpitations. Nondrinker, nonsmoker. Last echocardiogram in 2019 showed normal left ventricular function. No significant heart disease was seen. Prior history of chronic renal insufficiency, stage III.\par

## 2019-11-13 NOTE — DISCUSSION/SUMMARY
[___ Month(s)] : [unfilled] month(s) [FreeTextEntry1] : 74-year-old female with significant cardiovascular disease both peripheral arterial and coronary.\par No evidence of any active ischemic heart disease, will obtain labs from endocrinology for review.  Follow-up in 6 to 12 months or sooner if symptomatic.  Continue current medical regimen.  Blood pressures remain somewhat elevated; would benefit from addition of angiotensin receptor blocker instead of hydralazine if renal function is stable.  Will consider at next visit.\par \par

## 2020-02-07 ENCOUNTER — APPOINTMENT (OUTPATIENT)
Dept: OTOLARYNGOLOGY | Facility: CLINIC | Age: 75
End: 2020-02-07
Payer: MEDICARE

## 2020-02-07 VITALS
HEIGHT: 66 IN | SYSTOLIC BLOOD PRESSURE: 182 MMHG | BODY MASS INDEX: 28.12 KG/M2 | WEIGHT: 175 LBS | HEART RATE: 77 BPM | DIASTOLIC BLOOD PRESSURE: 74 MMHG

## 2020-02-07 DIAGNOSIS — L97.523 NON-PRESSURE CHRONIC ULCER OF OTHER PART OF LEFT FOOT WITH NECROSIS OF MUSCLE: ICD-10-CM

## 2020-02-07 DIAGNOSIS — A49.02 METHICILLIN RESISTANT STAPHYLOCOCCUS AUREUS INFECTION, UNSPECIFIED SITE: ICD-10-CM

## 2020-02-07 PROCEDURE — 99215 OFFICE O/P EST HI 40 MIN: CPT | Mod: 25

## 2020-02-07 PROCEDURE — 31231 NASAL ENDOSCOPY DX: CPT

## 2020-02-07 RX ORDER — MOMETASONE 50 UG/1
50 SPRAY, METERED NASAL DAILY
Qty: 1 | Refills: 5 | Status: ACTIVE | COMMUNITY
Start: 2020-02-07 | End: 1900-01-01

## 2020-02-07 RX ORDER — CLINDAMYCIN HYDROCHLORIDE 300 MG/1
300 CAPSULE ORAL
Qty: 30 | Refills: 0 | Status: DISCONTINUED | COMMUNITY
Start: 2019-05-24 | End: 2020-02-07

## 2020-02-07 NOTE — HISTORY OF PRESENT ILLNESS
[de-identified] : 74 year old female follow up s/p Intraoperative  CTguided right ethmoidectomy, sphenoidectomy, maxillary sinus antrostomy, sinus curettage, and frontal sinus exploration all with removal of diseased tissue; left maxillary antrostomy with removal of tissue, bilateral inferior turbinectomy 5/21/19.  Patient states breathing well.  Denies nasal congestion, sinus pain, sinus pressure, post nasal drip, nasal discharge.

## 2020-02-07 NOTE — PROCEDURE
[Image(s) Captured] : image(s) captured and filed [Topical Lidocaine] : topical lidocaine [Oxymetazoline HCl] : oxymetazoline HCl [Serial Number: ___] : Serial Number: [unfilled] [Flexible Endoscope] : examined with the flexible endoscope [FreeTextEntry6] : Pre-op indication(s): Prior deviated septum and sinusitis\par Post-op indication(s): clear except for left MT early changes\par Verbal consent obtained from patient.\par “Anterior rhinoscopy insufficient to account for symptoms” \par Details for procedure: \par Scope #: \par Type of [id number]:    flexible fiber optic telescope   X  Rigid glass telescope \par Anesthesia and/or vasoconstriction was achieved topically by using: \par 4% Lidocaine spray   0.05% Oxymetazoline     Other ______ \par The following anatomic sites were directly examined in a sequential fashion: \par The scope was introduced in the nasal passage between the middle and inferior turbinates to exam the inferior portion of the middle meatus and the fontanelle, as well as the maxillary ostia. Next, the scope was passed medially and posteriorly to the middle turbinates to examine the sphenoethmoid recess and the superior turbinate region. \par Upon visualization the finders are as follows: \par Nasal Septum:   Normal    Deviated to   left    right   Spur left   right   Perforation    Crust \par Bleeding site cauterized:    Anterior   left   right   Posterior   left   right \par Method:   Silver Nitrate   YAG Laser    Electrocautery ______ \par Right Side: \par * Mucosa: Normal\par * Mucous: Normal\par * Polyp: Normal\par * Inferior Turbinate: Normal\par * Middle Turbinate: early polypoid\par * Superior Turbinate: Normal\par * Inferior Meatus: Normal\par * Middle Meatus: Normal\par * Super Meatus: Normal\par * Sphenoethmoidal Recess: Normal\par Left Side: \par * Mucosa: Normal\par * Mucous: Normal\par * Polyp: Normal\par * Inferior Turbinate: Normal\par * Middle Turbinate: Normal\par * Superior Turbinate: Normal\par * Inferior Meatus: Normal\par * Middle Meatus: Normal\par * Super Meatus: Normal\par * Sphenoethmoidal Recess: Normal\par The patient tolerated the procedure well without any complications.\par \par \par

## 2020-02-07 NOTE — REASON FOR VISIT
[Subsequent Evaluation] : a subsequent evaluation for [Spouse] : spouse [Sinusitis] : sinusitis [Nasal Obstruction] : nasal obstruction [FreeTextEntry2] : follow up s/p Intraoperative  CTguided right ethmoidectomy, sphenoidectomy, maxillary sinus antrostomy, sinus curettage, and frontal sinus exploration all with removal of diseased tissue; left maxillary antrostomy with removal of tissue, bilateral inferior turbinectomy 5/21/19

## 2020-02-07 NOTE — PHYSICAL EXAM
[Midline] : trachea located in midline position [Normal] : no rashes [de-identified] : leg amputation

## 2020-02-19 ENCOUNTER — NON-APPOINTMENT (OUTPATIENT)
Age: 75
End: 2020-02-19

## 2020-02-19 ENCOUNTER — APPOINTMENT (OUTPATIENT)
Dept: OPHTHALMOLOGY | Facility: CLINIC | Age: 75
End: 2020-02-19
Payer: MEDICARE

## 2020-02-19 PROCEDURE — 92014 COMPRE OPH EXAM EST PT 1/>: CPT

## 2020-02-19 PROCEDURE — 92134 CPTRZ OPH DX IMG PST SGM RTA: CPT

## 2020-04-06 ENCOUNTER — APPOINTMENT (OUTPATIENT)
Dept: OPHTHALMOLOGY | Facility: CLINIC | Age: 75
End: 2020-04-06
Payer: MEDICARE

## 2020-04-06 ENCOUNTER — NON-APPOINTMENT (OUTPATIENT)
Age: 75
End: 2020-04-06

## 2020-04-06 PROCEDURE — 99441: CPT

## 2020-05-04 ENCOUNTER — APPOINTMENT (OUTPATIENT)
Dept: OPHTHALMOLOGY | Facility: CLINIC | Age: 75
End: 2020-05-04

## 2020-05-11 ENCOUNTER — NON-APPOINTMENT (OUTPATIENT)
Age: 75
End: 2020-05-11

## 2020-05-11 ENCOUNTER — APPOINTMENT (OUTPATIENT)
Dept: OPHTHALMOLOGY | Facility: CLINIC | Age: 75
End: 2020-05-11
Payer: MEDICARE

## 2020-05-11 PROCEDURE — 67228 TREATMENT X10SV RETINOPATHY: CPT | Mod: LT

## 2020-05-11 PROCEDURE — 92134 CPTRZ OPH DX IMG PST SGM RTA: CPT

## 2020-05-11 PROCEDURE — 92012 INTRM OPH EXAM EST PATIENT: CPT | Mod: 25

## 2020-05-20 ENCOUNTER — APPOINTMENT (OUTPATIENT)
Dept: CARDIOLOGY | Facility: CLINIC | Age: 75
End: 2020-05-20

## 2020-07-06 ENCOUNTER — APPOINTMENT (OUTPATIENT)
Dept: OPHTHALMOLOGY | Facility: CLINIC | Age: 75
End: 2020-07-06

## 2020-07-15 ENCOUNTER — APPOINTMENT (OUTPATIENT)
Dept: OPHTHALMOLOGY | Facility: CLINIC | Age: 75
End: 2020-07-15
Payer: MEDICARE

## 2020-07-15 ENCOUNTER — NON-APPOINTMENT (OUTPATIENT)
Age: 75
End: 2020-07-15

## 2020-07-15 PROCEDURE — 67228 TREATMENT X10SV RETINOPATHY: CPT | Mod: RT

## 2020-07-15 PROCEDURE — 92012 INTRM OPH EXAM EST PATIENT: CPT | Mod: 25

## 2020-07-29 NOTE — PROGRESS NOTE ADULT - PROBLEM SELECTOR PLAN 7
DVT ppx: HSQ
DVT ppx: Heparin SubQ Q8H
29-Jul-2020

## 2020-08-07 ENCOUNTER — APPOINTMENT (OUTPATIENT)
Dept: OTOLARYNGOLOGY | Facility: CLINIC | Age: 75
End: 2020-08-07
Payer: MEDICARE

## 2020-08-07 VITALS
WEIGHT: 178 LBS | HEART RATE: 69 BPM | DIASTOLIC BLOOD PRESSURE: 69 MMHG | SYSTOLIC BLOOD PRESSURE: 148 MMHG | BODY MASS INDEX: 28.61 KG/M2 | HEIGHT: 66 IN | TEMPERATURE: 98.4 F

## 2020-08-07 DIAGNOSIS — B49 UNSPECIFIED MYCOSIS: ICD-10-CM

## 2020-08-07 DIAGNOSIS — J32.9 UNSPECIFIED MYCOSIS: ICD-10-CM

## 2020-08-07 PROCEDURE — 31231 NASAL ENDOSCOPY DX: CPT

## 2020-08-07 PROCEDURE — 99215 OFFICE O/P EST HI 40 MIN: CPT | Mod: 25

## 2020-08-07 RX ORDER — MOMETASONE 50 UG/1
50 SPRAY, METERED NASAL DAILY
Qty: 1 | Refills: 5 | Status: ACTIVE | COMMUNITY
Start: 2020-08-07 | End: 1900-01-01

## 2020-08-07 NOTE — CONSULT LETTER
[Dear  ___] : Dear  [unfilled], [Consult Letter:] : I had the pleasure of evaluating your patient, [unfilled]. [Please see my note below.] : Please see my note below. [Consult Closing:] : Thank you very much for allowing me to participate in the care of this patient.  If you have any questions, please do not hesitate to contact me. [Sincerely,] : Sincerely, [FreeTextEntry3] : Felix Lim MD, SYDNEE, FACS\par  Department Otolaryngology\par Director of [facility]\par Professor of Otolaryngology, \par [employer]\par

## 2020-08-07 NOTE — REASON FOR VISIT
[Subsequent Evaluation] : a subsequent evaluation for [FreeTextEntry2] : follow up s/p Intraoperative CT_guided right ethmoidectomy, sphenoidectomy, maxillary sinus antrostomy, sinus curettage, and frontal sinus exploration all with removal of diseased tissue; left maxillary antrostomy with removal of tissue, bilateral inferior turbinectomy 5/21/19.

## 2020-08-07 NOTE — HISTORY OF PRESENT ILLNESS
[de-identified] : 74 year old female follow up s/p Intraoperative CT_guided right ethmoidectomy, sphenoidectomy, maxillary sinus antrostomy, sinus curettage, and frontal sinus exploration all with removal of diseased tissue; left maxillary antrostomy with removal of tissue, bilateral inferior turbinectomy 5/21/19. Patient states breathing well. Denies nasal congestion, sinus pain, sinus pressure, post nasal drip, nasal discharge.  No complaints with sinuses. \par  [FreeTextEntry2] : clear

## 2020-08-07 NOTE — PHYSICAL EXAM
[Nasal Endoscopy Performed] : nasal endoscopy was performed, see procedure section for findings [] : septum deviated to the left [Midline] : trachea located in midline position [Normal] : no rashes [FreeTextEntry1] : Left leg amputee. [de-identified] : Marked swelling of left middle turbinate [de-identified] : White from left middle meatus [de-identified] : leg amputation

## 2020-08-07 NOTE — PROCEDURE
[Video Captured] : video captured and filed [Image(s) Captured] : image(s) captured and filed [Topical Lidocaine] : topical lidocaine [Flexible Endoscope] : examined with the flexible endoscope [Oxymetazoline HCl] : oxymetazoline HCl [Serial Number: ___] : Serial Number: [unfilled] [Osteomeatal Pathology] : osteomeatal pathology [Recalcitrant Symptoms] : recalcitrant symptoms  [Anatomical Abnormality] : anatomical abnormality [Anterior rhinoscopy insufficient to account for symptoms] : anterior rhinoscopy insufficient to account for symptoms [FreeTextEntry6] : Pre-op indication(s): F/U sinus surgery\par Post-op indication(s):Left infection \par Verbal consent obtained from patient.\par “Anterior rhinoscopy insufficient to account for symptoms” \par Details for procedure: \par Scope #: 202\par Type of scope:    flexible fiber optic telescope   X  Rigid glass telescope \par Anesthesia and/or vasoconstriction was achieved topically by using: \par 4% Lidocaine spray   0.05% Oxymetazoline     Other ______ \par The following anatomic sites were directly examined in a sequential fashion: \par The scope was introduced in the nasal passage between the middle and inferior turbinates to exam the inferior portion of the middle meatus and the fontanelle, as well as the maxillary ostia. Next, the scope was passed medially and posteriorly to the middle turbinates to examine the sphenoethmoid recess and the superior turbinate region. \par Upon visualization the finders are as follows: \par Nasal Septum: normal, slight left\par Bleeding site cauterized:    Anterior   left   right   Posterior   left   right \par Method:   Silver Nitrate   YAG Laser    Electrocautery ______ \par Right Side: \par * Mucosa: Normal\par * Mucous: Normal\par * Polyp: Normal\par * Inferior Turbinate: Normal\par * Middle Turbinate: Normal\par * Superior Turbinate: Normal\par * Inferior Meatus: Normal\par * Middle Meatus: Normal\par * Super Meatus: Normal\par * Sphenoethmoidal Recess: Normal\par Left Side: \par * Mucosa: Normal\par * Mucous: Normal\par * Polyp: Normal\par * Inferior Turbinate: Normal\par * Middle Turbinate: Marked swelling\par * Superior Turbinate: Normal\par * Inferior Meatus: Normal\par * Middle Meatus: White discharge\par * Super Meatus: Normal\par * Sphenoethmoidal Recess: Normal\par The patient tolerated the procedure well without any complications.\par \par \par

## 2020-08-28 ENCOUNTER — APPOINTMENT (OUTPATIENT)
Dept: OTOLARYNGOLOGY | Facility: CLINIC | Age: 75
End: 2020-08-28
Payer: MEDICARE

## 2020-08-28 VITALS
DIASTOLIC BLOOD PRESSURE: 71 MMHG | HEIGHT: 66 IN | BODY MASS INDEX: 28.61 KG/M2 | HEART RATE: 66 BPM | SYSTOLIC BLOOD PRESSURE: 154 MMHG | WEIGHT: 178 LBS

## 2020-08-28 DIAGNOSIS — R93.0 ABNORMAL FINDINGS ON DIAGNOSTIC IMAGING OF SKULL AND HEAD, NOT ELSEWHERE CLASSIFIED: ICD-10-CM

## 2020-08-28 DIAGNOSIS — J32.1 CHRONIC FRONTAL SINUSITIS: ICD-10-CM

## 2020-08-28 DIAGNOSIS — Z83.3 FAMILY HISTORY OF DIABETES MELLITUS: ICD-10-CM

## 2020-08-28 DIAGNOSIS — J32.2 CHRONIC ETHMOIDAL SINUSITIS: ICD-10-CM

## 2020-08-28 DIAGNOSIS — J32.0 CHRONIC MAXILLARY SINUSITIS: ICD-10-CM

## 2020-08-28 PROCEDURE — 31231 NASAL ENDOSCOPY DX: CPT

## 2020-08-28 PROCEDURE — 99214 OFFICE O/P EST MOD 30 MIN: CPT | Mod: 25

## 2020-08-28 RX ORDER — AMOXICILLIN AND CLAVULANATE POTASSIUM 875; 125 MG/1; MG/1
875-125 TABLET, COATED ORAL
Qty: 20 | Refills: 1 | Status: DISCONTINUED | COMMUNITY
Start: 2020-08-07 | End: 2020-08-28

## 2020-08-28 NOTE — REASON FOR VISIT
[Subsequent Evaluation] : a subsequent evaluation for [Family Member] : family member [FreeTextEntry2] : s/p Intraoperative CT_guided right ethmoidectomy, sphenoidectomy, maxillary sinus antrostomy, sinus curettage, and frontal sinus exploration all with removal of diseased tissue; left maxillary antrostomy with removal of tissue, bilateral inferior turbinectomy 5/21/19.

## 2020-08-28 NOTE — PHYSICAL EXAM
[] : septum deviated to the left [Nasal Endoscopy Performed] : nasal endoscopy was performed, see procedure section for findings [Midline] : trachea located in midline position [Normal] : orientation to person, place, and time: normal [de-identified] : leg amputation [FreeTextEntry1] : Left leg amputee.

## 2020-08-28 NOTE — PROCEDURE
[Topical Lidocaine] : topical lidocaine [Image(s) Captured] : image(s) captured and filed [Flexible Endoscope] : examined with the flexible endoscope [Oxymetazoline HCl] : oxymetazoline HCl [Serial Number: ___] : Serial Number: [unfilled] [Anterior rhinoscopy insufficient to account for symptoms] : anterior rhinoscopy insufficient to account for symptoms [Recalcitrant Symptoms] : recalcitrant symptoms  [Paranasal Sinuses Maxillary Sinus] : no maxillary purulence [Normal] : the paranasal sinuses had no abnormalities [Paranasal Sinuses Ethmoid Sinus] : no ethmoid polyps [] : bilateral patent antrostomies [Paranasal Sinuses Sphenoid Sinus] : no spenoid polyps [de-identified] : sinusitis cleared [FreeTextEntry6] : Pre-op indication(s): sinusitis\par Post-op indication(s): cleared\par Verbal consent obtained from patient.\par “Anterior rhinoscopy insufficient to account for symptoms” \par Details for procedure: \par Scope #: 202\par Type of scope:    flexible fiber optic telescope  X   Rigid glass telescope \par Anesthesia and/or vasoconstriction was achieved topically by using: \par 4% Lidocaine spray   0.05% Oxymetazoline     Other ______ \par The following anatomic sites were directly examined in a sequential fashion: \par The scope was introduced in the nasal passage between the middle and inferior turbinates to exam the inferior portion of the middle meatus and the fontanelle, as well as the maxillary ostia. Next, the scope was passed medially and posteriorly to the middle turbinates to examine the sphenoethmoid recess and the superior turbinate region. \par Upon visualization the finders are as follows: \par Nasal Septum:   Normal   \par Bleeding site cauterized:    Anterior   left   right   Posterior   left   right \par Method:   Silver Nitrate   YAG Laser    Electrocautery ______ \par Right Side: \par * Mucosa: Normal\par * Mucous: Normal\par * Polyp: Normal\par * Inferior Turbinate: Normal\par * Middle Turbinate: Normal\par * Superior Turbinate: Normal\par * Inferior Meatus: Normal\par * Middle Meatus: Normal\par * Super Meatus: Normal\par * Sphenoethmoidal Recess: Normal\par Left Side: \par * Mucosa: Normal\par * Mucous: Normal\par * Polyp: Normal\par * Inferior Turbinate: Normal\par * Middle Turbinate: Normal\par * Superior Turbinate: Normal\par * Inferior Meatus: Normal\par * Middle Meatus: Normal\par * Super Meatus: Normal\par * Sphenoethmoidal Recess: Normal\par The patient tolerated the procedure well without any complications.\par \par \par

## 2020-08-28 NOTE — HISTORY OF PRESENT ILLNESS
[Sinus] : sinus surgery [SMR] : submucous resection (SMR) [de-identified] : 75 year old female follow up s/p Intraoperative CT_guided right ethmoidectomy, sphenoidectomy, maxillary sinus antrostomy, sinus curettage, and frontal sinus exploration all with removal of diseased tissue; left maxillary antrostomy with removal of tissue, bilateral inferior turbinectomy 5/21/19. Patient states completing Augmentin, breathing well. Denies nasal congestion, sinus pain, sinus pressure, post nasal drip, nasal discharge. Continues to use Nasonex spray and saline rinses daily. \par

## 2020-09-16 ENCOUNTER — NON-APPOINTMENT (OUTPATIENT)
Age: 75
End: 2020-09-16

## 2020-09-16 ENCOUNTER — APPOINTMENT (OUTPATIENT)
Dept: OPHTHALMOLOGY | Facility: CLINIC | Age: 75
End: 2020-09-16
Payer: MEDICARE

## 2020-09-16 PROCEDURE — 92201 OPSCPY EXTND RTA DRAW UNI/BI: CPT

## 2020-09-16 PROCEDURE — 92012 INTRM OPH EXAM EST PATIENT: CPT

## 2020-09-16 PROCEDURE — 92134 CPTRZ OPH DX IMG PST SGM RTA: CPT

## 2020-11-24 ENCOUNTER — APPOINTMENT (OUTPATIENT)
Dept: OPHTHALMOLOGY | Facility: CLINIC | Age: 75
End: 2020-11-24
Payer: MEDICARE

## 2020-11-24 ENCOUNTER — NON-APPOINTMENT (OUTPATIENT)
Age: 75
End: 2020-11-24

## 2020-11-24 PROCEDURE — 92012 INTRM OPH EXAM EST PATIENT: CPT

## 2020-11-24 PROCEDURE — 92015 DETERMINE REFRACTIVE STATE: CPT

## 2021-01-13 NOTE — PRE-OP CHECKLIST - LAST TOOK
Pt is A&O x4, VSS, on RA. PRN oxycodone for pain management. Sternal incision and radial incisions closed with liquid bandage, SHANEKA/CDI. CTx 3:1 to H2O seal, no crepitus or air leak noted, serosanguinous output. Pacer wires capped. LS clear, dim in bases. BS+ active, flatus+, voiding adequately. Pt is up independently in room, on regular diet- denies N/V. Tele: NSR.      solids

## 2021-01-25 ENCOUNTER — APPOINTMENT (OUTPATIENT)
Dept: OPHTHALMOLOGY | Facility: CLINIC | Age: 76
End: 2021-01-25
Payer: MEDICARE

## 2021-01-25 ENCOUNTER — NON-APPOINTMENT (OUTPATIENT)
Age: 76
End: 2021-01-25

## 2021-01-25 PROCEDURE — 92134 CPTRZ OPH DX IMG PST SGM RTA: CPT

## 2021-01-25 PROCEDURE — 99072 ADDL SUPL MATRL&STAF TM PHE: CPT

## 2021-01-25 PROCEDURE — 92012 INTRM OPH EXAM EST PATIENT: CPT

## 2021-06-03 ENCOUNTER — APPOINTMENT (OUTPATIENT)
Dept: OPHTHALMOLOGY | Facility: CLINIC | Age: 76
End: 2021-06-03

## 2021-07-08 NOTE — PATIENT PROFILE ADULT. - CAREGIVER
HPI:    Patient ID: Nitish Fall is a 82year old female. HPI  Patient is here for follow-up on chronic medical issues as listed below. Last seen in the office on March 31. No changes made at that time. Full blood work was done.   Mostly unremarka RIGHT EYE Right 9/25/14    RJVESNA      Family History   Problem Relation Age of Onset   • Heart Disease Father 79   • Heart Disease Mother [de-identified]        CAD   • Alcohol and Other Disorders Associated Son    • Neurological Disorder Son         Epilipsy   • Lipids Mouth/Throat:      Pharynx: No oropharyngeal exudate. Eyes:      Conjunctiva/sclera: Conjunctivae normal.   Cardiovascular:      Rate and Rhythm: Normal rate. Rhythm irregular. Heart sounds: Normal heart sounds. No murmur heard. No friction rub.  Zachary Cuenca Encourage increased fluid intake. Repeat blood work when she returns later in the year for annual wellness visit.            Meds This Visit:  Requested Prescriptions      No prescriptions requested or ordered in this encounter       Imaging & Referrals: No

## 2021-07-09 ENCOUNTER — APPOINTMENT (OUTPATIENT)
Dept: OPHTHALMOLOGY | Facility: CLINIC | Age: 76
End: 2021-07-09
Payer: MEDICARE

## 2021-07-09 ENCOUNTER — NON-APPOINTMENT (OUTPATIENT)
Age: 76
End: 2021-07-09

## 2021-07-09 PROCEDURE — 92235 FLUORESCEIN ANGRPH MLTIFRAME: CPT

## 2021-07-09 PROCEDURE — 92134 CPTRZ OPH DX IMG PST SGM RTA: CPT

## 2021-07-09 PROCEDURE — 92014 COMPRE OPH EXAM EST PT 1/>: CPT

## 2021-07-09 NOTE — PROGRESS NOTE ADULT - ASSESSMENT
73 yo F hx DM2 (on insulin) c/b neuropathy, PVD with R foot gangrene s/p R AKA (2010) now with prosthesis, CKD (baseline Cr 1.4-1.6), CAD s/p stents and CABG, HTN, s/p CEA (2014) p/w L 5th toe blister, s/p amputation of left LE 4th and 5th digits on 6/28. Patient tolerated procedure well. Patient tolerated procedure well. pending Patient tolerated procedure well. Patient tolerated procedure well. Patient tolerated procedure well. Patient tolerated procedure well.

## 2021-07-28 ENCOUNTER — LABORATORY RESULT (OUTPATIENT)
Age: 76
End: 2021-07-28

## 2021-07-28 ENCOUNTER — NON-APPOINTMENT (OUTPATIENT)
Age: 76
End: 2021-07-28

## 2021-07-28 ENCOUNTER — APPOINTMENT (OUTPATIENT)
Dept: CARDIOLOGY | Facility: CLINIC | Age: 76
End: 2021-07-28
Payer: MEDICARE

## 2021-07-28 VITALS
BODY MASS INDEX: 28.12 KG/M2 | DIASTOLIC BLOOD PRESSURE: 70 MMHG | SYSTOLIC BLOOD PRESSURE: 140 MMHG | OXYGEN SATURATION: 98 % | HEART RATE: 64 BPM | HEIGHT: 66 IN | TEMPERATURE: 97 F | WEIGHT: 175 LBS

## 2021-07-28 DIAGNOSIS — E87.5 HYPERKALEMIA: ICD-10-CM

## 2021-07-28 PROCEDURE — 99214 OFFICE O/P EST MOD 30 MIN: CPT

## 2021-07-28 PROCEDURE — 93000 ELECTROCARDIOGRAM COMPLETE: CPT

## 2021-07-28 RX ORDER — HYDRALAZINE HYDROCHLORIDE 10 MG/1
10 TABLET ORAL
Refills: 0 | Status: DISCONTINUED | COMMUNITY
Start: 2019-05-30 | End: 2021-07-28

## 2021-07-28 NOTE — DISCUSSION/SUMMARY
[___ Month(s)] : in [unfilled] month(s) [FreeTextEntry1] : 76 year-old female with significant cardiovascular disease both peripheral arterial and coronary.\par No evidence of any active ischemic heart disease, will obtain labs from endocrinology for review.  Follow-up in 6 to 12 months or sooner if symptomatic.  Continue current medical regimen.  Blood pressures remain somewhat elevated; already on Losartan, may benefit from more ARB if Creat stable and not hyperkalemic - labs repeated today.

## 2021-07-28 NOTE — CARDIOLOGY SUMMARY
[LVEF ___%] : LVEF [unfilled]% [___] : [unfilled] [de-identified] : 7/28/21, Sinus  Rhythm \par -consider old anterior infarct. \par  -  Diffuse nonspecific T-abnormality. \par  [de-identified] : 5/16/19, 60-65%, Trace MR and TR.

## 2021-07-28 NOTE — HISTORY OF PRESENT ILLNESS
[FreeTextEntry1] : 76-year-old female with significant atherosclerotic heart disease; status post CABG in 2004 and followup PTCA in 2009. Long-standing hypertensive hyperlipidemic and diabetic with severe peripheral arterial disease, status post right below-knee amputation in 2010 and left above-knee and dictation in 2018 for gangrene. Patient wheelchair bound at this time. \par \par Denies any recent chest pain, shortness of breath or palpitations. Nondrinker, nonsmoker. Last echocardiogram in 2019 showed normal left ventricular function. No significant heart disease was seen. Prior history of chronic renal insufficiency, stage III.\par \par \par

## 2021-07-30 LAB
ANION GAP SERPL CALC-SCNC: 14 MMOL/L
BUN SERPL-MCNC: 22 MG/DL
CALCIUM SERPL-MCNC: 8.9 MG/DL
CHLORIDE SERPL-SCNC: 101 MMOL/L
CO2 SERPL-SCNC: 22 MMOL/L
CREAT SERPL-MCNC: 1.04 MG/DL
GLUCOSE SERPL-MCNC: 142 MG/DL
MAGNESIUM SERPL-MCNC: 2.1 MG/DL
POTASSIUM SERPL-SCNC: 4.6 MMOL/L
SODIUM SERPL-SCNC: 137 MMOL/L

## 2021-08-25 NOTE — ASU PATIENT PROFILE, ADULT - ASSIST WITH
What Type Of Note Output Would You Prefer (Optional)?: Standard Output
Hpi Title: Evaluation of Skin Lesions
How Severe Are Your Spot(S)?: mild
Have Your Spot(S) Been Treated In The Past?: has not been treated
toileting/standing

## 2021-09-09 ENCOUNTER — APPOINTMENT (OUTPATIENT)
Dept: OPHTHALMOLOGY | Facility: CLINIC | Age: 76
End: 2021-09-09
Payer: MEDICARE

## 2021-09-09 ENCOUNTER — NON-APPOINTMENT (OUTPATIENT)
Age: 76
End: 2021-09-09

## 2021-09-09 PROCEDURE — 76514 ECHO EXAM OF EYE THICKNESS: CPT

## 2021-09-09 PROCEDURE — 92012 INTRM OPH EXAM EST PATIENT: CPT

## 2021-11-08 ENCOUNTER — APPOINTMENT (OUTPATIENT)
Dept: OPHTHALMOLOGY | Facility: CLINIC | Age: 76
End: 2021-11-08
Payer: MEDICARE

## 2021-11-08 ENCOUNTER — NON-APPOINTMENT (OUTPATIENT)
Age: 76
End: 2021-11-08

## 2021-11-08 PROCEDURE — 92201 OPSCPY EXTND RTA DRAW UNI/BI: CPT

## 2021-11-08 PROCEDURE — 92012 INTRM OPH EXAM EST PATIENT: CPT

## 2021-11-08 PROCEDURE — 92134 CPTRZ OPH DX IMG PST SGM RTA: CPT

## 2021-11-26 NOTE — OCCUPATIONAL THERAPY INITIAL EVALUATION ADULT - PHYSICAL ASSIST/NONPHYSICAL ASSIST:DRESS LOWER BODY, OT EVAL
Patient advised and understood     MD SUSIE Lomas D [facility] Nurse Msg Pool  CT overall normal except fatty liver. Healthy diet. Can f.u w GI if needed for any continued GI upset/distention/symptoms    
1 person assist

## 2021-12-09 NOTE — ED PROVIDER NOTE - NS ED MD DISPO ADMIT [facility] UNIT
Hospitalist Progress Note    Patient: Esther Phelps MRN: [id number]  [facility]: [id number]    YOB: 1937  Age: 82 y.o. Sex: female    DOA: 12/6/2021 LOS:  LOS: 2 days                Assessment/Plan     Patient Active Problem List   Diagnosis Code    Pyelonephritis N14    S/p nephrectomy Z90.5    Right flank pain R10.9    UTI (urinary tract infection) N39.0    Diabetes ([address] E11.9    Legally blind H54.8    Hypertension I10    S/P colectomy Z90.49    Pressure injury of right heel, unstageable ([facility]) L89.610    S/P AKA (above knee amputation), right ([address] Z89.611    Nephrostomy status ([address] Z93.6    Nephrostomy complication ([address] X01.087    Acute renal failure (ARF) ([address] N17.9    CKD stage 3 due to type 2 diabetes mellitus ([address] E11.22, N18.30    Pseudomonas aeruginosa infection A49.8    Displacement of nephrostomy tube ([address] T83.022A    Pressure injury of sacral region, stage 4 ([facility]) L89.154    Abdominal pain R10.9        Chief complaint :  Right flank pain  82 y.o.  female who has history of colon cancer and vaginal cancer with secondary nephrostomy tube and colostomy, legal blindness, right BKA, chronic pain and diabetes and large stage IV sacral decub presents to the emergency room with acute worsening of back pain right-sided. Noted to have dislodged nephrostomy tube. Dislodged nephrostomy tube -  S/p tube exchange     Possible pneumonia  UTI -  On ceftriaxone, azithromycin  Recent urine cultures 11/ with proteus. Follow blood and urine cultures 12/07/2021. ID consulted    DM -  On SSI    HTN -  Controlled    Hypothyroidism -  Continue with synthroid    CKD stage 3 -  Monitor renal function. Sacral decubitus ulcer -  Local wound care. Right AKA -    Legally blind      Disposition : 1-2 days    Review of systems  General: No fevers or chills. Cardiovascular: No chest pain or pressure. No palpitations. Pulmonary: No shortness of breath.    Gastrointestinal: No nausea, vomiting. Physical Exam:  General: Awake, cooperative, no acute distress    HEENT: NC, Atraumatic. PERRLA, anicteric sclerae. Lungs: CTA Bilaterally. No Wheezing/Rhonchi/Rales. Heart:  S1 S2,  No murmur, No Rubs, No Gallops  Abdomen: Soft, Non distended, Non tender.  +Bowel sounds,   Extremities: Right AKA  Psych:   Not anxious or agitated. Neurologic:  No acute neurological deficit. Vital signs/Intake and Output:  Visit Vitals  BP (!) 146/33 (BP 1 Location: Right arm, BP Patient Position: At rest)   Pulse 80   Temp 98.2 °F (36.8 °C)   Resp 18   Ht 5' 2\" (1.575 m)   Wt 64 kg (141 lb)   SpO2 100%   BMI 25.79 kg/m²     Current Shift:  No intake/output data recorded. Last three shifts:  12/07 1901 - 12/09 0700  In: -   Out: 925 [Urine:925]            Labs: Results:       Chemistry Recent Labs     12/09/21 0305 12/08/21  0018 12/07/21  0357   * 128* 223*    138 135*   K 5.0 5.2 5.5    109 108   CO2 18* 19* 17*   BUN 56* 58* 51*   CREA 2.38* 2.66* 1.73*   CA 7.9* 7.8* 8.1*   AGAP 9 10 10   BUCR 24* 22* 29*   * 108 116   TP 5.6* 5.8* 6.4   ALB 1.7* 1.8* 2.1*   GLOB 3.9 4.0 4.3*   AGRAT 0.4* 0.5* 0.5*      CBC w/Diff Recent Labs     12/09/21  0305 12/08/21  0018 12/07/21  0357   WBC 13.3* 16.8* 20.0*   RBC 2.90* 3.07* 3.48*   HGB 7.6* 7.9* 8.8*   HCT 24.8* 26.2* 29.3*   * 495* 502*   GRANS 82* 82* 89*   LYMPH 9* 11* 6*   EOS 2 0 1      Cardiac Enzymes No results for input(s): CPK, CKND1, JANE in the last 72 hours. No lab exists for component: CKRMB, TROIP   Coagulation No results for input(s): PTP, INR, APTT, INREXT, INREXT in the last 72 hours.     Lipid Panel Lab Results   Component Value Date/Time    Cholesterol, total 115 08/28/2019 08:22 AM    HDL Cholesterol 39 (L) 08/28/2019 08:22 AM    LDL, calculated 46.2 08/28/2019 08:22 AM    VLDL, calculated 29.8 08/28/2019 08:22 AM    Triglyceride 149 08/28/2019 08:22 AM    CHOL/HDL Ratio 2.9 08/28/2019 08:22 AM BNP No results for input(s): BNPP in the last 72 hours.    Liver Enzymes Recent Labs     12/09/21  0305   TP 5.6*   ALB 1.7*   *      Thyroid Studies Lab Results   Component Value Date/Time    TSH 3.40 01/24/2020 08:35 AM        Procedures/imaging: see electronic medical records for all procedures/Xrays and details which were not copied into this note but were reviewed prior to creation of [employer] [facility]

## 2021-12-21 NOTE — PRE-OP CHECKLIST - AICD PRESENT
no Nasal Turnover Hinge Flap Text: The defect edges were debeveled with a #15 scalpel blade.  Given the size, depth, location of the defect and the defect being full thickness a nasal turnover hinge flap was deemed most appropriate.  Using a sterile surgical marker, an appropriate hinge flap was drawn incorporating the defect. The area thus outlined was incised with a #15 scalpel blade. The flap was designed to recreate the nasal mucosal lining and the alar rim. The skin margins were undermined to an appropriate distance in all directions utilizing iris scissors.

## 2022-01-27 ENCOUNTER — APPOINTMENT (OUTPATIENT)
Dept: CARDIOLOGY | Facility: CLINIC | Age: 77
End: 2022-01-27
Payer: MEDICARE

## 2022-01-27 ENCOUNTER — NON-APPOINTMENT (OUTPATIENT)
Age: 77
End: 2022-01-27

## 2022-01-27 VITALS
DIASTOLIC BLOOD PRESSURE: 88 MMHG | BODY MASS INDEX: 28.12 KG/M2 | HEIGHT: 66 IN | HEART RATE: 70 BPM | SYSTOLIC BLOOD PRESSURE: 152 MMHG | OXYGEN SATURATION: 99 % | WEIGHT: 175 LBS

## 2022-01-27 PROCEDURE — 93000 ELECTROCARDIOGRAM COMPLETE: CPT

## 2022-01-27 PROCEDURE — 99214 OFFICE O/P EST MOD 30 MIN: CPT

## 2022-01-27 RX ORDER — CARVEDILOL 3.12 MG/1
3.12 TABLET, FILM COATED ORAL TWICE DAILY
Refills: 0 | Status: ACTIVE | COMMUNITY
Start: 2018-06-14

## 2022-01-27 RX ORDER — CLOPIDOGREL BISULFATE 75 MG/1
75 TABLET, FILM COATED ORAL DAILY
Refills: 0 | Status: ACTIVE | COMMUNITY
Start: 2017-06-30

## 2022-01-27 RX ORDER — ASPIRIN 81 MG/1
81 TABLET ORAL DAILY
Refills: 0 | Status: ACTIVE | COMMUNITY
Start: 2019-05-30

## 2022-01-27 NOTE — CARDIOLOGY SUMMARY
[LVEF ___%] : LVEF [unfilled]% [___] : [unfilled] [de-identified] : 1/27/22, Sinus  Rhythm , -Old anterior infarct.  -  Nonspecific T-abnormality. \par 7/28/21, Sinus  Rhythm -consider old anterior infarct.  -  Diffuse nonspecific T-abnormality. \par  [de-identified] : 5/16/19, 60-65%, Trace MR and TR.

## 2022-01-27 NOTE — HISTORY OF PRESENT ILLNESS
[FreeTextEntry1] : 76-year-old female with significant atherosclerotic heart disease; status post CABG in 2004 and followup PTCA in 2009. Long-standing hypertensive hyperlipidemic and diabetic with severe peripheral arterial disease, status post right below-knee amputation in 2010 and left above-knee and dictation in 2018 for gangrene. Patient wheelchair bound at this time. \par \par Denies any recent chest pain, shortness of breath or palpitations. Nondrinker, nonsmoker. Last echocardiogram in 2019 showed normal left ventricular function. No significant heart disease was seen. Prior history of chronic renal insufficiency, stage III.\par \par Has noted increased BP's recently.\par Meds reviewed with patient.\par No recent labs.\par \par

## 2022-01-27 NOTE — DISCUSSION/SUMMARY
[___ Month(s)] : in [unfilled] month(s) [FreeTextEntry1] : 76 year-old female with significant cardiovascular disease both peripheral arterial and coronary.\par No evidence of any active ischemic heart disease, no recent labs available for for review.  Blood pressures remain quite elevated; already on Losartan, increased Losartan to 50 mg daily, will repeat evaluation in 2-3 months. SHould have labs drawn by PCP/endocrinologist. Would benefit from addition of SGLT2 inhibitor. Will repeat echo to assess for evidence of HHD.

## 2022-01-27 NOTE — PHYSICAL EXAM
[Well Developed] : well developed [Well Nourished] : well nourished [No Acute Distress] : no acute distress [Normal Conjunctiva] : normal conjunctiva [Normal Venous Pressure] : normal venous pressure [No Carotid Bruit] : no carotid bruit [Normal S1, S2] : normal S1, S2 [No Murmur] : no murmur [No Rub] : no rub [No Gallop] : no gallop [Clear Lung Fields] : clear lung fields [Good Air Entry] : good air entry [No Respiratory Distress] : no respiratory distress  [Soft] : abdomen soft [Non Tender] : non-tender [No Masses/organomegaly] : no masses/organomegaly [Normal Bowel Sounds] : normal bowel sounds [Normal Gait] : normal gait [No Rash] : no rash [No Skin Lesions] : no skin lesions [Moves all extremities] : moves all extremities [No Focal Deficits] : no focal deficits [Normal Speech] : normal speech [Alert and Oriented] : alert and oriented [Normal memory] : normal memory [de-identified] : s/p right BKA, left AKA

## 2022-03-02 ENCOUNTER — APPOINTMENT (OUTPATIENT)
Dept: OPHTHALMOLOGY | Facility: CLINIC | Age: 77
End: 2022-03-02

## 2022-03-21 ENCOUNTER — NON-APPOINTMENT (OUTPATIENT)
Age: 77
End: 2022-03-21

## 2022-03-21 ENCOUNTER — APPOINTMENT (OUTPATIENT)
Dept: OPHTHALMOLOGY | Facility: CLINIC | Age: 77
End: 2022-03-21
Payer: MEDICARE

## 2022-03-21 PROCEDURE — 92250 FUNDUS PHOTOGRAPHY W/I&R: CPT

## 2022-03-21 PROCEDURE — 92012 INTRM OPH EXAM EST PATIENT: CPT

## 2022-06-08 ENCOUNTER — APPOINTMENT (OUTPATIENT)
Dept: CARDIOLOGY | Facility: CLINIC | Age: 77
End: 2022-06-08
Payer: MEDICARE

## 2022-06-08 ENCOUNTER — NON-APPOINTMENT (OUTPATIENT)
Age: 77
End: 2022-06-08

## 2022-06-08 VITALS
BODY MASS INDEX: 28.12 KG/M2 | HEIGHT: 66 IN | HEART RATE: 66 BPM | DIASTOLIC BLOOD PRESSURE: 70 MMHG | SYSTOLIC BLOOD PRESSURE: 134 MMHG | WEIGHT: 175 LBS | OXYGEN SATURATION: 99 %

## 2022-06-08 PROCEDURE — 93306 TTE W/DOPPLER COMPLETE: CPT

## 2022-06-08 PROCEDURE — 99213 OFFICE O/P EST LOW 20 MIN: CPT

## 2022-06-08 PROCEDURE — 93000 ELECTROCARDIOGRAM COMPLETE: CPT

## 2022-06-08 NOTE — HISTORY OF PRESENT ILLNESS
[FreeTextEntry1] : 77-year-old female with significant atherosclerotic heart disease; status post CABG in 2004 and followup PTCA in 2009. Long-standing hypertensive hyperlipidemic and diabetic with severe peripheral arterial disease, status post right below-knee amputation in 2010 and left above-knee and dictation in 2018 for gangrene. Patient wheelchair bound at this time. \par \par Denies any recent chest pain, shortness of breath or palpitations. Nondrinker, nonsmoker. Last echocardiogram in 2019 showed normal left ventricular function. No significant heart disease was seen. Prior history of chronic renal insufficiency, stage III. Repeat ecvho today (preliminary report) - suboptimal study, could not get IV for contrast, study done sitting in chair, grossly normal LVEF\par \par Has noted increased BP's recently. Improved on higher dosing of ARB.\par Meds reviewed with patient.\par No recent labs. Will get from Dr. Landin.\par \par

## 2022-06-08 NOTE — CARDIOLOGY SUMMARY
[de-identified] : 1/27/22, Sinus  Rhythm , -Old anterior infarct.  -  Nonspecific T-abnormality. \par 7/28/21, Sinus  Rhythm -consider old anterior infarct.  -  Diffuse nonspecific T-abnormality. \par  [de-identified] : 5/16/19, 60-65%, Trace MR and TR. [LVEF ___%] : LVEF [unfilled]% [___] : [unfilled]

## 2022-06-08 NOTE — DISCUSSION/SUMMARY
[___ Month(s)] : in [unfilled] month(s) [FreeTextEntry1] : 77 year-old female with significant cardiovascular disease both peripheral arterial and coronary.\par No evidence of any active ischemic heart disease, no recent labs available for for review.  Blood pressures better, f/u in 6 months. Official echo report pending.

## 2022-06-08 NOTE — PHYSICAL EXAM
[Well Developed] : well developed [Well Nourished] : well nourished [No Acute Distress] : no acute distress [Normal Conjunctiva] : normal conjunctiva [Normal Venous Pressure] : normal venous pressure [No Carotid Bruit] : no carotid bruit [Normal S1, S2] : normal S1, S2 [No Murmur] : no murmur [No Rub] : no rub [No Gallop] : no gallop [Clear Lung Fields] : clear lung fields [Good Air Entry] : good air entry [No Respiratory Distress] : no respiratory distress  [Soft] : abdomen soft [Non Tender] : non-tender [No Masses/organomegaly] : no masses/organomegaly [Normal Bowel Sounds] : normal bowel sounds [Normal Gait] : normal gait [No Rash] : no rash [No Skin Lesions] : no skin lesions [Moves all extremities] : moves all extremities [No Focal Deficits] : no focal deficits [Normal Speech] : normal speech [Normal memory] : normal memory [Alert and Oriented] : alert and oriented [de-identified] : s/p right BKA, left AKA

## 2022-07-21 ENCOUNTER — APPOINTMENT (OUTPATIENT)
Dept: OPHTHALMOLOGY | Facility: CLINIC | Age: 77
End: 2022-07-21

## 2022-07-21 ENCOUNTER — NON-APPOINTMENT (OUTPATIENT)
Age: 77
End: 2022-07-21

## 2022-07-21 PROCEDURE — 92014 COMPRE OPH EXAM EST PT 1/>: CPT

## 2022-07-21 PROCEDURE — 92083 EXTENDED VISUAL FIELD XM: CPT

## 2022-07-21 PROCEDURE — 92133 CPTRZD OPH DX IMG PST SGM ON: CPT

## 2022-07-22 NOTE — PROGRESS NOTE ADULT - PROBLEM SELECTOR PLAN 8
- CKD III at baseline  - DAMARI on CKD (trending down today) Glycopyrrolate Pregnancy And Lactation Text: This medication is Pregnancy Category B and is considered safe during pregnancy. It is unknown if it is excreted breast milk.

## 2022-09-14 NOTE — ED ADULT NURSE NOTE - PRO INTERPRETER NEED 2
Patient: Gokul Baca    Procedure Summary     Date: 09/13/22 Room / Location: [facility] - CATH [facility]    Anesthesia Start: 1501 Anesthesia Stop: 1700    Procedure: CL-EP ABLATION ATRIAL FIBRILLATION Diagnosis:       Chronic atrial fibrillation (HCC)      Chronic atrial fibrillation, unspecified      (See Associated Dx)    Scheduled Providers: Prince Brown M.D.; Elizabeth Hope M.D. Responsible Provider: Rosita Villanueva M.D.    Anesthesia Type: general ASA Status: 3          Final Anesthesia Type: general  Last vitals  BP   Blood Pressure : (!) 140/67    Temp   36.2 °C (97.1 °F)    Pulse   74   Resp   16    SpO2   95 %      Anesthesia Post Evaluation    Patient location during evaluation: PACU  Patient participation: complete - patient participated  Level of consciousness: awake and alert  Pain score: 0    Airway patency: patent  Anesthetic complications: no  Cardiovascular status: adequate and hemodynamically stable  Respiratory status: acceptable  Hydration status: acceptable    PONV: none          No notable events documented.     Nurse Pain Score: 0 (NPRS)        
English

## 2022-10-19 NOTE — ASU DISCHARGE PLAN (ADULT/PEDIATRIC). - SPECIAL INSTRUCTIONS
Discussed in transplant committee. Patient is doing well and is a suitable candidate for LDLT.    
Remove patch after 3 hours.  Throw away the white pad underneath the clear shield.  Start the following DURING THE DAY ONLY (not at night when sleeping):   Ofloxacin 1 drop every 4 hours,   Ketorolac 1 drop 2 times a day, and   Prednisolone acetate (shake well before using!) 1 drop every 2 hours.    Wear clear shield when napping or sleeping at night.  Do not rub eye as there are no stitches in the eye!  No showering tonight.    Please call [phone] if you have any questions or if you have pain or vomiting despite taking Tylenol (after 4:30 PM-9am).  Please call [phone] or [phone] during the day (9am-4:30PM) if you have any questions or concerns or pain or vomiting despite taking Tylenol.

## 2022-11-21 NOTE — PATIENT PROFILE ADULT. - HEALTHCARE QUESTIONS, PROFILE
From: Mushtaq Koroma  To: Corbin Garcia  Sent: 11/21/2022 1:04 PM CST  Subject: Call in Prescription     This message is being sent by Gina Koroma on behalf of Mushtaq Koroma.    Good afternoon Stephany Mcclellan needs additional Abilify as we increased the dosage to 1.5. could you please call the [facility] of [place]? He took his last pills today.    Thank you,  Gina    na

## 2022-12-03 NOTE — ED PROVIDER NOTE - DATE/TIME 1
03-Mar-2018 13:43 I will STOP taking the medications listed below when I get home from the hospital:  None

## 2022-12-14 ENCOUNTER — NON-APPOINTMENT (OUTPATIENT)
Age: 77
End: 2022-12-14

## 2022-12-14 ENCOUNTER — APPOINTMENT (OUTPATIENT)
Dept: CARDIOLOGY | Facility: CLINIC | Age: 77
End: 2022-12-14

## 2022-12-14 VITALS
OXYGEN SATURATION: 98 % | WEIGHT: 178 LBS | DIASTOLIC BLOOD PRESSURE: 60 MMHG | BODY MASS INDEX: 28.61 KG/M2 | HEIGHT: 66 IN | HEART RATE: 69 BPM | SYSTOLIC BLOOD PRESSURE: 140 MMHG

## 2022-12-14 VITALS — SYSTOLIC BLOOD PRESSURE: 120 MMHG | DIASTOLIC BLOOD PRESSURE: 72 MMHG

## 2022-12-14 PROCEDURE — 93000 ELECTROCARDIOGRAM COMPLETE: CPT

## 2022-12-14 PROCEDURE — 99213 OFFICE O/P EST LOW 20 MIN: CPT | Mod: 25

## 2022-12-14 NOTE — HISTORY OF PRESENT ILLNESS
[FreeTextEntry1] : 77-year-old female with significant atherosclerotic heart disease; status post CABG in 2004 and followup PTCA in 2009. Long-standing hypertensive hyperlipidemic and diabetic with severe peripheral arterial disease, status post right below-knee amputation in 2010 and left above-knee and dictation in 2018 for gangrene. Patient wheelchair bound at this time. \par \par Denies any recent chest pain, shortness of breath or palpitations. Nondrinker, nonsmoker. Last echocardiogram in 2022 showed normal left ventricular function. No significant heart disease was seen. Prior history of chronic renal insufficiency, stage III. \par \par Meds reviewed with patient.\par No recent labs, done yesterday. Will get from Dr. Landin.\par \par

## 2022-12-14 NOTE — CARDIOLOGY SUMMARY
[LVEF ___%] : LVEF [unfilled]% [___] : [unfilled] [de-identified] : 12/14/22, Sinus Rhythm, -Old anterior infarct. - Diffuse nonspecific T-abnormality.\par 1/27/22, Sinus  Rhythm , -Old anterior infarct.  -  Nonspecific T-abnormality. \par 7/28/21, Sinus  Rhythm -consider old anterior infarct.  -  Diffuse nonspecific T-abnormality. \par  [de-identified] : 5/16/19, 60-65%, Trace MR and TR.

## 2022-12-14 NOTE — PHYSICAL EXAM
[Well Developed] : well developed [Well Nourished] : well nourished [No Acute Distress] : no acute distress [Normal Conjunctiva] : normal conjunctiva [Normal Venous Pressure] : normal venous pressure [No Carotid Bruit] : no carotid bruit [Normal S1, S2] : normal S1, S2 [No Murmur] : no murmur [No Rub] : no rub [No Gallop] : no gallop [Clear Lung Fields] : clear lung fields [Good Air Entry] : good air entry [No Respiratory Distress] : no respiratory distress  [Soft] : abdomen soft [Non Tender] : non-tender [No Masses/organomegaly] : no masses/organomegaly [Normal Bowel Sounds] : normal bowel sounds [Normal Gait] : normal gait [No Rash] : no rash [No Skin Lesions] : no skin lesions [Moves all extremities] : moves all extremities [No Focal Deficits] : no focal deficits [Normal Speech] : normal speech [Alert and Oriented] : alert and oriented [Normal memory] : normal memory [de-identified] : s/p right BKA, left AKA

## 2022-12-18 NOTE — DISCHARGE NOTE ADULT - NS AS DC DISCHARGE ACCOMPANY
Patient : Misa Gutierrez Age: 35 year old Sex: female   MRN: [id number] Encounter Date: 12/18/2022      History     Chief Complaint   Patient presents with   • Ear Pain     HPI   Patient is a 35-year-old female presents to the emergency department concerned regarding severe right ear pain ongoing for the past 3 days.  She was seen at the walk-in clinic and started on a regimen of Ciprodex drops on 12/16.  She went back to the urgent care yesterday and was started on oral ciprofloxacin in addition to her Cipro drops but she is having worsening swelling and pain.  She states she has been taking Tylenol and ibuprofen without relief.    Allergies   Allergen Reactions   • Sulfa Antibiotics        Current Discharge Medication List      Prior to Admission Medications    Details   ciprofloxacin (CIPRO) 500 MG tablet Take 1 tablet by mouth in the morning and 1 tablet in the evening. Do all this for 7 days.  Qty: 14 tablet, Refills: 0      ciprofloxacin-dexamethasone (Ciprodex) otic suspension Place 4 drops into right ear in the morning and 4 drops in the evening. Do all this for 7 days.  Qty: 7.5 mL, Refills: 0      escitalopram (LEXAPRO) 10 MG tablet Take 1 tablet by mouth daily.  Qty: 90 tablet, Refills: 3      ibuprofen (MOTRIN) 600 MG tablet Take 1 tablet by mouth every 6 hours as needed for Pain.      Cyanocobalamin (VITAMIN B-12 PO) Take 1 Dose by mouth daily.       VITAMIN E PO Take 1 Dose by mouth daily.       Prenatal Vit-Fe Fumarate-FA (PRENATAL VITAMIN PO) Take 1 tablet by mouth daily.      cholecalciferol (VITAMIN D3) 1000 UNITS tablet Take 1,000 Units by mouth daily.         New Prescriptions    Details   amoxicillin-clavulanate (Augmentin) 875-125 MG per tablet Take 1 tablet by mouth every 12 hours for 10 days.  Qty: 20 tablet, Refills: 0      HYDROcodone-acetaminophen (NORCO) 5-325 MG per tablet Take 1-2 tablets by mouth every 6 hours as needed for Pain.  Qty: 10 tablet, Refills: 0             Past Medical  History:   Diagnosis Date   • Acne    • uti hx of uti       Past Surgical History:   Procedure Laterality Date   • AMPUTATION FINGER/THUMB  07/27/2007    thumb surgery   • PAP SMEAR  2014    negative       Family History   Problem Relation Age of Onset   • Bipolar disorder Sister    • Hypertension Maternal Grandfather    • Diabetes Maternal Grandfather    • Myocardial Infarction Maternal Grandfather    • Thyroid Mother    • Thyroid Sister    • Cancer, Breast Neg Hx    • Cancer, Colon Neg Hx        Social History     Tobacco Use   • Smoking status: Never   • Smokeless tobacco: Never   Substance Use Topics   • Alcohol use: Not Currently     Alcohol/week: 0.0 standard drinks     Comment: glass of wine weekly.    • Drug use: No       E-cigarette/Vaping     E-Cigarette/Vaping Substances & Devices       Review of Systems   A complete review of systems is negative or non-contributory except as noted above.        Physical Exam     ED Triage Vitals [12/18/22 1512]   ED Triage Vitals Group      Temp 98.2 °F (36.8 °C)      Heart Rate 87      Resp 16      /75      SpO2 97 %      EtCO2 mmHg       Height 5' 7\" (1.702 m)      Weight 192 lb 0.3 oz (87.1 kg)      Weight Scale Used       BMI (Calculated) 30.07      IBW/kg (Calculated) 61.6       Physical Exam  Vitals and nursing note reviewed.   Constitutional:       General: She is not in acute distress.     Appearance: She is well-developed. She is not diaphoretic.   HENT:      Head: Normocephalic and atraumatic.      Right Ear: Decreased hearing noted. Swelling and tenderness present. No mastoid tenderness.      Left Ear: Tympanic membrane, ear canal and external ear normal. There is no impacted cerumen.      Ears:      Comments: The right canal is erythematous and swollen high with marked tragal tenderness.  Eyes:      General: No scleral icterus.        Right eye: No discharge.         Left eye: No discharge.      Conjunctiva/sclera: Conjunctivae normal.      Pupils:  Pupils are equal, round, and reactive to light.   Pulmonary:      Effort: Pulmonary effort is normal. No respiratory distress.      Breath sounds: Normal breath sounds. No wheezing or rales.   Musculoskeletal:      Cervical back: Normal range of motion and neck supple.   Skin:     General: Skin is warm and dry.      Coloration: Skin is not pale.      Findings: No erythema or rash.   Neurological:      Mental Status: She is alert and oriented to person, place, and time.   Psychiatric:         Behavior: Behavior normal.            ED Course     Procedures    Lab Results     No results found for this visit on 12/18/22.    EKG Results       Radiology Results     Imaging Results    None         ED Medication Orders (From admission, onward)    Ordered Start     Status Ordering Provider    12/18/22 1524 12/18/22 1525  HYDROcodone-acetaminophen (NORCO) 5-325 MG per tablet 2 tablet  ONCE         Last MAR action: Given JOLLY ORTEZ   Patient is treated with a dose of Norco here in the emergency department and a wick is an instilled into the canal as far as the patient is able to tolerate and Ciprodex drops instilled into the wick.  She is provided with a prescription for Augmentin to take in addition to the Ciprodex drops.  She is advised to stop taking the oral Cipro and provided with a short prescription for norco for pain control and discharged in stable condition with instructions to   Use the wick to instill 4 drops of the Ciprofloxacin into the affected right ear twice a day for the next 5-7 days.  Stop oral ciprofloxacin and start amoxicillin clavulanate.  Take ibuprofen as needed as directed for baseline discomfort.  May take Norco as needed and as directed for increased pain.  This medication will make you sleepy.  Do not drive a vehicle or operate machinery while taking it.  Do not take with alcohol or other sedating medications.  Follow-up with your primary care practitioner or ear nose and  throat specialist for further evaluation and treatment as needed for persistent symptoms.  Return to the emergency department promptly as needed for new worsening symptoms.    Patient is provided with a referral to ENT      Clinical Impression     ED Diagnosis   1. Acute diffuse otitis externa of right ear  HYDROcodone-acetaminophen (NORCO) 5-325 MG per tablet    SERVICE TO ENT          Disposition        Discharge 12/18/2022  3:44 PM  Misa Gutierrez discharge to home/self care.                         Amy Agee PA-C  12/18/22 1543     Spouse

## 2022-12-20 NOTE — PHYSICAL THERAPY INITIAL EVALUATION ADULT - ACTIVE RANGE OF MOTION EXAMINATION, REHAB EVAL
[facility] evaluation.  Thank you for the referral! 
bilateral  lower extremity Active ROM was WFL (within functional limits)/bilateral upper extremity Active ROM was WFL (within functional limits)

## 2022-12-29 NOTE — ED PROVIDER NOTE - MUSCULOSKELETAL NECK EXAM
no deformity, pain or tenderness. no restriction of movement/supple/trachea midline Mucosal Advancement Flap Text: Given the location of the defect, shape of the defect and the proximity to free margins a mucosal advancement flap was deemed most appropriate. Incisions were made with a 15 blade scalpel in the appropriate fashion along the cutaneous vermilion border and the mucosal lip. The remaining actinically damaged mucosal tissue was excised.  The mucosal advancement flap was then elevated to the gingival sulcus with care taken to preserve the neurovascular structures and advanced into the primary defect. Care was taken to ensure that precise realignment of the vermilion border was achieved.

## 2022-12-30 ENCOUNTER — APPOINTMENT (OUTPATIENT)
Dept: VASCULAR SURGERY | Facility: CLINIC | Age: 77
End: 2022-12-30
Payer: MEDICARE

## 2022-12-30 VITALS
SYSTOLIC BLOOD PRESSURE: 178 MMHG | OXYGEN SATURATION: 97 % | WEIGHT: 178 LBS | HEIGHT: 66 IN | HEART RATE: 69 BPM | DIASTOLIC BLOOD PRESSURE: 75 MMHG | BODY MASS INDEX: 28.61 KG/M2 | TEMPERATURE: 97.1 F

## 2022-12-30 DIAGNOSIS — R26.9 UNSPECIFIED ABNORMALITIES OF GAIT AND MOBILITY: ICD-10-CM

## 2022-12-30 PROCEDURE — 99204 OFFICE O/P NEW MOD 45 MIN: CPT | Mod: 25

## 2022-12-30 PROCEDURE — 11042 DBRDMT SUBQ TIS 1ST 20SQCM/<: CPT | Mod: RT

## 2022-12-30 NOTE — PROCEDURE
[FreeTextEntry1] : Procedure--- under local anesthesia with the help of #15 blade the full skin thickness eschar and subcutaneous tissue of 1.5 x 1.5 cm is debrided surgically with #15 blade and discarded.  There is no exposed bone or exposed fascia the subcutaneous tissue is also debrided.  The wound is irrigated with copious amount of saline solution and a Xeroform dressing is placed along with the gauze.

## 2022-12-30 NOTE — HISTORY OF PRESENT ILLNESS
[FreeTextEntry1] : This is a 77-year-old female with a history of vasculopathy diabetes who is status post a right below-knee amputation in 2010 done by me and status post left above-knee amputation done in 2018 done at [facility].  The patient had done well with a right below knee prosthesis for years and since September has noticed the wound on the right below-knee amputation stump which does not heal.  Patient states the prosthesis is fitting well she used to walk on the right leg prosthesis but since her amputation on the left leg in 2018 she has not been ambulating. she wears a prosthesis to help her transfer from chair to bed and to commode.\par She had seen her primary care doctor was treated with antibiotics and has not helped the wound on the right stump.  She has no pain she had no issues or no wounds on the right stump of this years.  She claims the prosthesis is well fitting was also fitted in the last 2 to 3 years and is not loose or too tight.

## 2022-12-30 NOTE — PHYSICAL EXAM
[JVD] : no jugular venous distention  [Normal Thyroid] : the thyroid was normal [Normal Breath Sounds] : Normal breath sounds [Normal Heart Sounds] : normal heart sounds [Right Carotid Bruit] : no bruit heard over the right carotid [Left Carotid Bruit] : no bruit heard over the left carotid [2+] : left 2+ [1+] : right 1+ [Right Femoral Bruit] : no bruit heard over the right femoral artery [Left Femoral Bruit] : no bruit heard over the left femoral artery [0] : right 0 [Abdomen Masses] : No abdominal masses [Tender] : nontender [Alert] : alert [Oriented to Person] : oriented to person [Oriented to Place] : oriented to place [Oriented to Time] : oriented to time [Calm] : calm [de-identified] : Well-built well-nourished in a wheelchair [de-identified] : Within normal limits [de-identified] : Within normal limits

## 2022-12-30 NOTE — ASSESSMENT
[FreeTextEntry1] : Patient with pressure ulcer on the right below-knee amputation stump over the tibia without any evidence of infection or cellulitis.  There is adequate capillary bleeding at the debridement site.  There is no suspicion of arterial ischemia of the stump as the stump is warm to touch.  Most likely the ulcer is from the ill fitting prosthesis and pressure.

## 2023-01-19 NOTE — DISCHARGE NOTE ADULT - SOCIAL WORKER'S NAME
Stephani Hernandez LMSW Wartpeel Counseling:  I discussed with the patient the risks of Wartpeel including but not limited to erythema, scaling, itching, weeping, crusting, and pain.

## 2023-02-06 ENCOUNTER — APPOINTMENT (OUTPATIENT)
Dept: OPHTHALMOLOGY | Facility: CLINIC | Age: 78
End: 2023-02-06
Payer: MEDICARE

## 2023-02-06 ENCOUNTER — NON-APPOINTMENT (OUTPATIENT)
Age: 78
End: 2023-02-06

## 2023-02-06 PROCEDURE — 92014 COMPRE OPH EXAM EST PT 1/>: CPT

## 2023-02-06 PROCEDURE — 92134 CPTRZ OPH DX IMG PST SGM RTA: CPT

## 2023-02-24 ENCOUNTER — APPOINTMENT (OUTPATIENT)
Dept: VASCULAR SURGERY | Facility: CLINIC | Age: 78
End: 2023-02-24
Payer: MEDICARE

## 2023-02-24 VITALS
DIASTOLIC BLOOD PRESSURE: 78 MMHG | TEMPERATURE: 97.5 F | OXYGEN SATURATION: 97 % | HEIGHT: 66 IN | SYSTOLIC BLOOD PRESSURE: 199 MMHG | WEIGHT: 178 LBS | BODY MASS INDEX: 28.61 KG/M2 | HEART RATE: 78 BPM

## 2023-02-24 PROCEDURE — 97597 DBRDMT OPN WND 1ST 20 CM/<: CPT

## 2023-02-24 PROCEDURE — 99213 OFFICE O/P EST LOW 20 MIN: CPT | Mod: 25

## 2023-02-24 NOTE — ASSESSMENT
[FreeTextEntry1] : Patient with and nonhealing right below-knee amputation stump ulcer with some slough that is debrided again today

## 2023-02-24 NOTE — HISTORY OF PRESENT ILLNESS
[FreeTextEntry1] : Patient has come for the follow-up of her right below-knee amputation stump ulcer.  The patient had undergone a debridement of skin subcutaneous tissue at the last visit with the local wound care with Xeroform dressing.  Since then the patient states she still has some discolored tissue in the wound has been putting with Xeroform gauze the ulcer has a minimal improvement.  Patient has no significant pain.  The left above-knee amputation stump remains well-healed without any complication.  Patient has not been wearing below-knee amputation prosthesis on the right side.  Patient is a bilateral amputee.

## 2023-02-24 NOTE — PROCEDURE
[FreeTextEntry1] : Procedure--- under local anesthesia 1% lidocaine solution with the help of 15 blade the slough in the right below-knee amputation stump was debrided and removed.  Brisk capillary bleeding was obtained from the deeper tissue and no evidence of deep abscess or tunneling or cellulitis.  The area debrided was subcutaneous tissue of about 1 x 1 cm.

## 2023-03-17 ENCOUNTER — APPOINTMENT (OUTPATIENT)
Dept: VASCULAR SURGERY | Facility: CLINIC | Age: 78
End: 2023-03-17
Payer: MEDICARE

## 2023-03-17 VITALS
WEIGHT: 178 LBS | SYSTOLIC BLOOD PRESSURE: 207 MMHG | OXYGEN SATURATION: 96 % | HEART RATE: 68 BPM | TEMPERATURE: 97.6 F | BODY MASS INDEX: 28.61 KG/M2 | HEIGHT: 66 IN | DIASTOLIC BLOOD PRESSURE: 78 MMHG

## 2023-03-17 PROCEDURE — 97597 DBRDMT OPN WND 1ST 20 CM/<: CPT

## 2023-03-17 PROCEDURE — 99213 OFFICE O/P EST LOW 20 MIN: CPT | Mod: 25

## 2023-03-17 NOTE — PROCEDURE
[FreeTextEntry1] : Debridement--- selective debridement of 0.5 x 0.5 cm of right below-knee amputation stump ulcer is done with the help of a curette and slough and biofilm is removed.

## 2023-03-17 NOTE — HISTORY OF PRESENT ILLNESS
[FreeTextEntry1] : The patient has come for the follow-up of her right below-knee amputation stump ulcer.  Patient has been treated with a Promogran dressing 2 weeks ago has been applying every day and she feels that it is helping.  Patient also has a left above-knee amputation stump which is no complication no open wounds and no ulcer.  Patient had no fever no chills and has been applying Promogran along with gauze Diane and Ace wrap.  She also stated that she has not been using the prosthesis.  There is no fever no chills no new complaints the pain is well controlled with Tylenol

## 2023-03-17 NOTE — ASSESSMENT
[FreeTextEntry1] : The right below-knee amputation stump ulcer is slowly improving with the current treatment as well as with debridement and offloading and not using the prosthesis.  There is no evidence of infection

## 2023-03-22 ENCOUNTER — RX RENEWAL (OUTPATIENT)
Age: 78
End: 2023-03-22

## 2023-03-30 ENCOUNTER — APPOINTMENT (OUTPATIENT)
Dept: OPHTHALMOLOGY | Facility: CLINIC | Age: 78
End: 2023-03-30
Payer: MEDICARE

## 2023-03-30 ENCOUNTER — NON-APPOINTMENT (OUTPATIENT)
Age: 78
End: 2023-03-30

## 2023-03-30 PROCEDURE — 92133 CPTRZD OPH DX IMG PST SGM ON: CPT

## 2023-03-30 PROCEDURE — 92012 INTRM OPH EXAM EST PATIENT: CPT

## 2023-04-07 ENCOUNTER — APPOINTMENT (OUTPATIENT)
Dept: VASCULAR SURGERY | Facility: CLINIC | Age: 78
End: 2023-04-07
Payer: MEDICARE

## 2023-04-07 VITALS
SYSTOLIC BLOOD PRESSURE: 186 MMHG | OXYGEN SATURATION: 98 % | BODY MASS INDEX: 28.73 KG/M2 | TEMPERATURE: 98.2 F | HEART RATE: 76 BPM | DIASTOLIC BLOOD PRESSURE: 80 MMHG | WEIGHT: 178 LBS

## 2023-04-07 PROCEDURE — 97597 DBRDMT OPN WND 1ST 20 CM/<: CPT | Mod: 58

## 2023-04-07 PROCEDURE — 99024 POSTOP FOLLOW-UP VISIT: CPT

## 2023-04-07 NOTE — HISTORY OF PRESENT ILLNESS
[FreeTextEntry1] : The patient has come for the follow-up of her right below-knee amputation stump ulcer which is nonhealing.  The patient was seen 3 weeks ago and has been treated with the local wound care and offloading and patient has not been wearing the prosthesis.  The patient is a dressing the wound regularly and denies any fever or chills and is on no antibiotics.\par The patient also has left above-knee amputation which is well-healed and no issues with that.\par

## 2023-04-07 NOTE — PROCEDURE
[FreeTextEntry1] : Procedure--- the right below-knee amputation stump is selectively debrided with the help of a curette and biofilm and the loose slough is removed.  Area of 1 x 1 cm is debrided until a good capillary bleeding is obtained.  All the yellow and devitalized tissue was removed.  There is some callus around the edges of the ulcer which is also removed.  Selective debridement of 1 x 1 cm is achieved.

## 2023-04-07 NOTE — ASSESSMENT
[FreeTextEntry1] : Patient with a right below-knee amputation stump ulcer which is slowly healing that required a selective debridement today.

## 2023-04-25 NOTE — ED ADULT NURSE NOTE - NS TRANSFER EYEGLASSES PAIRS
2023       Eddie Laird MD  [address] [address]  Via Fax: [phone]      Patient: Lani Hernandez   YOB: 2015   Date of Visit: 2023       Dear Dr. Laird:    Thank you for referring Lani Hernandez to me for evaluation. Below are my notes for this visit with her.    If you have questions, please do not hesitate to call me. I look forward to following your patient along with you.      Sincerely,        Fei Lacy MD        CC: No Recipients  Fei Lacy MD  2023 12:54 PM  Signed  Initial Encounter         Patient: Lani Hernandez Date of Service: 2023   : 2015 MRN: [id number]     SUBJECTIVE:   HISTORY OF PRESENT ILLNESS:  Lani Hernandez is a 7 year old female who presents today for initial neurological evaluation    Headaches x 6-7 months  Previously daily  Now 2x/week  Last HA yesterday    No time preference  No triggers    Denies n/v  Photophobia with HA    Located in forehead    No missed school or activities    OTC meds helpful when HA is  severe    [employer]  Doing well    MRI of brain (2023) - normal brain, sinuitis  Antibiotics x 10 days  Mild improvement in HA      PAST MEDICAL HISTORY:  Past Medical History:   Diagnosis Date   • Otitis media        MEDICATIONS:  Current Outpatient Medications   Medication Sig   • fluticasone (FLONASE) 50 MCG/ACT nasal spray 50 mcg.     No current facility-administered medications for this visit.       ALLERGIES:  ALLERGIES:  No Known Allergies    FAMILY HISTORY:  Family History   Problem Relation Age of Onset   • Patient is unaware of any medical problems Mother    • Patient is unaware of any medical problems Father      Negative HA    Review of Systems   Constitutional: Negative for fatigue and fever.   HENT: Negative for hearing loss.    Eyes: Negative for discharge.   Respiratory: Negative for shortness of breath.    Cardiovascular: Negative for palpitations.   Gastrointestinal: Negative for  nausea and vomiting.   Endocrine: Negative for cold intolerance and heat intolerance.   Genitourinary: Negative for enuresis.   Musculoskeletal: Negative for gait problem.   Skin: Negative for color change.   Allergic/Immunologic: Negative for immunocompromised state.   Neurological: Positive for headaches. Negative for facial asymmetry.   Hematological: Negative for adenopathy.   Psychiatric/Behavioral: Negative for dysphoric mood.         OBJECTIVE:     Physical Exam  Vitals reviewed.   Constitutional:       General: She is active. She is not in acute distress.     Appearance: Normal appearance.   HENT:      Head: Atraumatic.      Nose: Nose normal.   Eyes:      General:         Right eye: No discharge.         Left eye: No discharge.      Funduscopic exam:     Right eye: No papilledema.         Left eye: No papilledema.   Neurological:      Mental Status: She is alert and oriented for age.      Cranial Nerves: Cranial nerves 2-12 are intact.      Sensory: Sensation is intact.      Motor: Motor function is intact.      Coordination: Romberg sign negative.      Gait: Gait is intact. Gait and tandem walk normal.   Psychiatric:         Attention and Perception: Attention normal.         Mood and Affect: Mood normal.         Speech: Speech normal.         Behavior: Behavior normal.         Visit Vitals  BP (!) 145/87 (BP Location: LUE - Left upper extremity, Patient Position: Sitting, Cuff Size: Small Adult)   Pulse 131   Ht 4' 0.54\" (1.233 m)   Wt 23.7 kg (52 lb 4 oz)   BMI 15.59 kg/m²         Assessment AND PLAN:   This is a 7 year old year-old female who presents with     1. Chronic mixed headache syndrome        Return if symptoms worsen or fail to improve, for FOLLOW UP.    HA have decreased in frequency  OTC meds effective  No missed activities  Normal MRI     recommend switch to Ibuprofen from Tylenol for pain relief    Discussed rescue and preventative medications as well as HA triggers  Discussed risk of side  effects.    Recommend to maintain HA log - Call in 1-2 weeks to review  Call if change in headaches or other concerns.    The patient and mother indicated understanding of the diagnosis and agreed with the plan of care.     Total time I spent today on this appointment is 45 minutes.  This includes chart review and documenting.    Fei Lacy MD             1 pair

## 2023-04-28 ENCOUNTER — APPOINTMENT (OUTPATIENT)
Dept: VASCULAR SURGERY | Facility: CLINIC | Age: 78
End: 2023-04-28
Payer: MEDICARE

## 2023-04-28 VITALS
OXYGEN SATURATION: 97 % | HEIGHT: 66 IN | DIASTOLIC BLOOD PRESSURE: 62 MMHG | TEMPERATURE: 98.2 F | SYSTOLIC BLOOD PRESSURE: 166 MMHG | BODY MASS INDEX: 28.61 KG/M2 | HEART RATE: 66 BPM | WEIGHT: 178 LBS

## 2023-04-28 PROCEDURE — 99213 OFFICE O/P EST LOW 20 MIN: CPT

## 2023-04-28 NOTE — ASSESSMENT
[FreeTextEntry1] : Patient with a right below-knee amputation stump pressure ulcer on the tibia with minimal change with Xeroform gauze.  In spite of not wearing the prosthesis the ulcer remains unchanged with the current treatment.

## 2023-04-28 NOTE — HISTORY OF PRESENT ILLNESS
[FreeTextEntry1] : The patient is seen for the follow-up of her right below-knee amputation stump nonhealing wound.  At the last visit the patient was treated with Xeroform gauze to be changed every other day and patient has been doing the dressings and is offloading the stump and not wearing the processes.  Patient states the wound is very slow to heal has not seen much difference in the size and also has no fever no chills and no other new complaints.  The left above-knee stump remains healed without any issues.

## 2023-05-26 ENCOUNTER — APPOINTMENT (OUTPATIENT)
Dept: VASCULAR SURGERY | Facility: CLINIC | Age: 78
End: 2023-05-26
Payer: MEDICARE

## 2023-05-26 VITALS
WEIGHT: 178 LBS | DIASTOLIC BLOOD PRESSURE: 74 MMHG | BODY MASS INDEX: 28.61 KG/M2 | OXYGEN SATURATION: 98 % | HEIGHT: 66 IN | TEMPERATURE: 98.2 F | SYSTOLIC BLOOD PRESSURE: 179 MMHG | HEART RATE: 65 BPM

## 2023-05-26 PROCEDURE — 99213 OFFICE O/P EST LOW 20 MIN: CPT

## 2023-05-26 NOTE — ASSESSMENT
[FreeTextEntry1] : Improving right below-knee amputation stump ulcer with the current treatment with Promogran dressing.

## 2023-05-26 NOTE — HISTORY OF PRESENT ILLNESS
[FreeTextEntry1] : The patient has come to the office accompanied by her  for the follow-up of the right below-knee amputation stump ulcer.  At the last visit patient was switched from Xeroform to Promogran dressing and patient has been doing well.  Patient states the ulcer appears to be almost closed and smaller.  Patient has no pain.  Patient is not using the prosthesis as advised.  There is no fever no chills and no new issues

## 2023-06-23 ENCOUNTER — APPOINTMENT (OUTPATIENT)
Dept: VASCULAR SURGERY | Facility: CLINIC | Age: 78
End: 2023-06-23
Payer: MEDICARE

## 2023-06-23 VITALS
BODY MASS INDEX: 28.61 KG/M2 | HEIGHT: 66 IN | TEMPERATURE: 98.1 F | HEART RATE: 67 BPM | OXYGEN SATURATION: 98 % | DIASTOLIC BLOOD PRESSURE: 75 MMHG | SYSTOLIC BLOOD PRESSURE: 167 MMHG | WEIGHT: 178 LBS

## 2023-06-23 DIAGNOSIS — L97.812 NON-PRESSURE CHRONIC ULCER OF OTHER PART OF RIGHT LOWER LEG WITH FAT LAYER EXPOSED: ICD-10-CM

## 2023-06-23 PROCEDURE — 99212 OFFICE O/P EST SF 10 MIN: CPT

## 2023-06-23 NOTE — HISTORY OF PRESENT ILLNESS
[FreeTextEntry1] : The patient has come to the office for with her  for the follow-up of the right below-knee amputation stump ulcer.  Patient is being treated with Promogran dressing and has been slowly improving.  The patient states today that it looks like the ulcer is healed.  Patient has no pain no new complaints and no new symptoms.  Patient is still not abiding processes as advised.

## 2023-06-23 NOTE — ASSESSMENT
[FreeTextEntry1] : Patient with healed right below-knee amputation stump ulcer without any new complaints.

## 2023-07-06 ENCOUNTER — APPOINTMENT (OUTPATIENT)
Dept: OPHTHALMOLOGY | Facility: CLINIC | Age: 78
End: 2023-07-06
Payer: MEDICARE

## 2023-07-06 ENCOUNTER — NON-APPOINTMENT (OUTPATIENT)
Age: 78
End: 2023-07-06

## 2023-07-06 PROCEDURE — 92012 INTRM OPH EXAM EST PATIENT: CPT

## 2023-08-03 ENCOUNTER — APPOINTMENT (OUTPATIENT)
Dept: CARDIOLOGY | Facility: CLINIC | Age: 78
End: 2023-08-03
Payer: MEDICARE

## 2023-08-03 ENCOUNTER — NON-APPOINTMENT (OUTPATIENT)
Age: 78
End: 2023-08-03

## 2023-08-03 VITALS
OXYGEN SATURATION: 99 % | HEART RATE: 64 BPM | BODY MASS INDEX: 28.61 KG/M2 | SYSTOLIC BLOOD PRESSURE: 130 MMHG | WEIGHT: 178 LBS | HEIGHT: 66 IN | DIASTOLIC BLOOD PRESSURE: 60 MMHG

## 2023-08-03 DIAGNOSIS — E78.2 MIXED HYPERLIPIDEMIA: ICD-10-CM

## 2023-08-03 DIAGNOSIS — E11.9 TYPE 2 DIABETES MELLITUS W/OUT COMPLICATIONS: ICD-10-CM

## 2023-08-03 PROCEDURE — 99214 OFFICE O/P EST MOD 30 MIN: CPT | Mod: 25

## 2023-08-03 PROCEDURE — 93000 ELECTROCARDIOGRAM COMPLETE: CPT

## 2023-08-03 NOTE — HISTORY OF PRESENT ILLNESS
[FreeTextEntry1] : 78-year-old female with significant atherosclerotic heart disease; status post CABG in 2004 and followup PTCA in 2009. Long-standing hypertensive hyperlipidemic and diabetic with severe peripheral arterial disease, status post right below-knee amputation in 2010 and left above-knee and dictation in 2018 for gangrene. Patient wheelchair bound at this time.   Denies any recent chest pain, shortness of breath or palpitations. Nondrinker, nonsmoker. Last echocardiogram in 2022 showed normal left ventricular function. No significant heart disease was seen. Prior history of chronic renal insufficiency, stage III.   Meds reviewed with patient.

## 2023-08-03 NOTE — PHYSICAL EXAM
[Well Developed] : well developed [Well Nourished] : well nourished [No Acute Distress] : no acute distress [Normal Conjunctiva] : normal conjunctiva [Normal Venous Pressure] : normal venous pressure [No Carotid Bruit] : no carotid bruit [Normal S1, S2] : normal S1, S2 [No Murmur] : no murmur [No Rub] : no rub [No Gallop] : no gallop [Clear Lung Fields] : clear lung fields [Good Air Entry] : good air entry [No Respiratory Distress] : no respiratory distress  [Soft] : abdomen soft [Non Tender] : non-tender [No Masses/organomegaly] : no masses/organomegaly [Normal Bowel Sounds] : normal bowel sounds [Normal Gait] : normal gait [No Rash] : no rash [No Skin Lesions] : no skin lesions [Moves all extremities] : moves all extremities [No Focal Deficits] : no focal deficits [Normal Speech] : normal speech [Alert and Oriented] : alert and oriented [Normal memory] : normal memory [de-identified] : s/p right BKA, left AKA

## 2023-08-03 NOTE — DISCUSSION/SUMMARY
[___ Month(s)] : in [unfilled] month(s) [FreeTextEntry1] : 78 year-old female with significant cardiovascular disease both peripheral arterial and coronary. No evidence of any active ischemic heart disease, labs show LDL>70, will titrate Lipitor.  Blood pressures better, f/u in 6 months.  [EKG obtained to assist in diagnosis and management of assessed problem(s)] : EKG obtained to assist in diagnosis and management of assessed problem(s)

## 2023-08-03 NOTE — CARDIOLOGY SUMMARY
[de-identified] : 8/3/23, SR, inferior and anterolateral MI 12/14/22, Sinus Rhythm, -Old anterior infarct. - Diffuse nonspecific T-abnormality. 1/27/22, Sinus  Rhythm , -Old anterior infarct.  -  Nonspecific T-abnormality.  7/28/21, Sinus  Rhythm -consider old anterior infarct.  -  Diffuse nonspecific T-abnormality.   [de-identified] : 5/16/19, 60-65%, Trace MR and TR. [LVEF ___%] : LVEF [unfilled]% [___] : [unfilled]

## 2023-08-04 ENCOUNTER — APPOINTMENT (OUTPATIENT)
Dept: VASCULAR SURGERY | Facility: CLINIC | Age: 78
End: 2023-08-04
Payer: MEDICARE

## 2023-08-04 VITALS
DIASTOLIC BLOOD PRESSURE: 67 MMHG | HEIGHT: 66 IN | SYSTOLIC BLOOD PRESSURE: 167 MMHG | WEIGHT: 178 LBS | OXYGEN SATURATION: 96 % | TEMPERATURE: 97.7 F | HEART RATE: 66 BPM | BODY MASS INDEX: 28.61 KG/M2

## 2023-08-04 DIAGNOSIS — L97.812 NON-PRESSURE CHRONIC ULCER OF OTHER PART OF RIGHT LOWER LEG WITH FAT LAYER EXPOSED: ICD-10-CM

## 2023-08-04 DIAGNOSIS — Z89.612 ACQUIRED ABSENCE OF LEFT LEG ABOVE KNEE: ICD-10-CM

## 2023-08-04 DIAGNOSIS — T87.9 UNSPECIFIED COMPLICATIONS OF AMPUTATION STUMP: ICD-10-CM

## 2023-08-04 DIAGNOSIS — Z89.511 ACQUIRED ABSENCE OF RIGHT LEG BELOW KNEE: ICD-10-CM

## 2023-08-04 PROCEDURE — 99214 OFFICE O/P EST MOD 30 MIN: CPT

## 2023-08-04 NOTE — PLAN
[TextEntry] : The plan at this stage to stop the dressings to the right below-knee amputation stump.  Patient should continue put some protective dry dressing and gauze to the healed ulcer area.  Patient is advised to start using the prosthesis without walking on it for a short duration of time in the day and slowly advance to weightbearing with the prosthesis if the wound is stable and does not reopen.  The patient understands and will gradually increase activity with and the timing of the processes and will follow-up with me as needed.  No appointment is made at this time at the patient's request and if the wound does not reopen there is new issues patient will come and see me.  The patient is discharged from the office at this stage

## 2023-08-04 NOTE — HISTORY OF PRESENT ILLNESS
[FreeTextEntry1] : The patient is seen today for the follow-up of her right below-knee amputation stump ulcer.  Patient has been treated conservatively with the local wound care and offloading by not using the prosthesis.  The patient states the wound is healed and has no pain or discomfort

## 2023-08-04 NOTE — PHYSICAL EXAM
[TextEntry] : The patient is seen and examined in the wheelchair.  The right below-knee amputation stump ulcer is completely healed fully epithelialized and no open areas.  There is no hematoma no cellulitis no infection and no new issues.

## 2023-09-13 NOTE — ED ADULT NURSE NOTE - SKIN INTEGRITY
Pt is refusing Catheter and NP is at bedside to explain abdelrahman for urianary cath. Post residual is greater than 649 ml.  
wound(s)
aid from Home

## 2023-09-15 NOTE — PHYSICAL THERAPY INITIAL EVALUATION ADULT - FUNCTIONAL LIMITATIONS, PT EVAL
Price (Do Not Change): 0.00
Detail Level: Simple
Instructions: This plan will send the code FBSE to the PM system.  DO NOT or CHANGE the price.
home management/self-care

## 2023-10-02 ENCOUNTER — NON-APPOINTMENT (OUTPATIENT)
Age: 78
End: 2023-10-02

## 2023-10-02 ENCOUNTER — APPOINTMENT (OUTPATIENT)
Dept: OPHTHALMOLOGY | Facility: CLINIC | Age: 78
End: 2023-10-02
Payer: MEDICARE

## 2023-10-02 PROCEDURE — 92012 INTRM OPH EXAM EST PATIENT: CPT

## 2023-10-02 PROCEDURE — 92250 FUNDUS PHOTOGRAPHY W/I&R: CPT

## 2023-10-20 NOTE — ASU PATIENT PROFILE, ADULT - PRO INTERPRETER NEED 2
English Cheilitis Normal Treatment: I recommended application of Vaseline or Aquaphor numerous times a day (as often as every hour) and before going to bed.

## 2023-11-01 ENCOUNTER — APPOINTMENT (OUTPATIENT)
Dept: OPHTHALMOLOGY | Facility: CLINIC | Age: 78
End: 2023-11-01
Payer: MEDICARE

## 2023-11-01 ENCOUNTER — NON-APPOINTMENT (OUTPATIENT)
Age: 78
End: 2023-11-01

## 2023-11-01 PROCEDURE — 92083 EXTENDED VISUAL FIELD XM: CPT

## 2023-11-01 PROCEDURE — 92133 CPTRZD OPH DX IMG PST SGM ON: CPT

## 2023-11-01 PROCEDURE — 92012 INTRM OPH EXAM EST PATIENT: CPT

## 2023-12-21 ENCOUNTER — NON-APPOINTMENT (OUTPATIENT)
Age: 78
End: 2023-12-21

## 2023-12-21 ENCOUNTER — APPOINTMENT (OUTPATIENT)
Dept: CARDIOLOGY | Facility: CLINIC | Age: 78
End: 2023-12-21
Payer: MEDICARE

## 2023-12-21 VITALS
HEIGHT: 66 IN | OXYGEN SATURATION: 98 % | SYSTOLIC BLOOD PRESSURE: 164 MMHG | DIASTOLIC BLOOD PRESSURE: 70 MMHG | HEART RATE: 70 BPM

## 2023-12-21 PROCEDURE — 99214 OFFICE O/P EST MOD 30 MIN: CPT | Mod: 25

## 2023-12-21 PROCEDURE — 93000 ELECTROCARDIOGRAM COMPLETE: CPT

## 2023-12-21 NOTE — CARDIOLOGY SUMMARY
[de-identified] : 12/21/2023, SR, old inferior and anterolateral MI 8/3/23, SR, inferior and anterolateral MI 12/14/22, Sinus Rhythm, -Old anterior infarct. - Diffuse nonspecific T-abnormality. 1/27/22, Sinus  Rhythm , -Old anterior infarct.  -  Nonspecific T-abnormality.  7/28/21, Sinus  Rhythm -consider old anterior infarct.  -  Diffuse nonspecific T-abnormality.   [de-identified] : 5/16/19, 60-65%, Trace MR and TR. [LVEF ___%] : LVEF [unfilled]% [___] : [unfilled]

## 2023-12-21 NOTE — HISTORY OF PRESENT ILLNESS
[Preoperative Visit] : for a medical evaluation prior to surgery [Scheduled Procedure ___] : a [unfilled] [Surgeon Name ___] : surgeon: [unfilled] [Good] : Good [Fever] : no fever [Chills] : no chills [Fatigue] : no fatigue [Chest Pain] : no chest pain [Cough] : no cough [Diabetes] : diabetes [Cardiovascular Disease] : cardiovascular disease [Pulmonary Disease] : no pulmonary disease [Anti-Platelet Agents] : anti-platelet agents [Nicotine Dependence] : no nicotine dependence [Prior Anesthesia] : No prior anesthesia [Prev Anesthesia Reaction] : no previous anesthesia reaction [Anesthesia Reaction] : no anesthesia reaction [Sudden Death] : no sudden death [Clotting Disorder] : no clotting disorder [Bleeding Disorder] : no bleeding disorder [de-identified] : Fax: [phone] [FreeTextEntry1] : 78-year-old female with significant atherosclerotic heart disease; status post CABG in 2004 and followup PTCA in 2009. Long-standing hypertensive hyperlipidemic and diabetic with severe peripheral arterial disease, status post right below-knee amputation in 2010 and left above-knee and dictation in 2018 for gangrene. Patient wheelchair bound at this time.   Denies any recent chest pain, shortness of breath or palpitations. Nondrinker, nonsmoker. Last echocardiogram in 2022 showed normal left ventricular function. No significant heart disease was seen. Prior history of chronic renal insufficiency, stage III.   Meds reviewed with patient.  12/21/2023: Patient is asymptomatic, patient denies CP, SOB, Palpitations, Lightheadedness, Syncope. Patient presents for medical clearance for oral surgery. Patient is currently on clopidogrel.

## 2023-12-21 NOTE — DISCUSSION/SUMMARY
[___ Month(s)] : in [unfilled] month(s) [Procedure Low Risk] : the procedure risk is low [Patient Intermediate Risk] : the patient is an intermediate risk [Optimized for Surgery] : the patient is optimized for surgery [Continue] : Continue medications as currently directed [FreeTextEntry3] : Hold clopidogrel 7 days prior to procedure [FreeTextEntry1] : 78 year-old female with significant cardiovascular disease both peripheral arterial and coronary. No evidence of any active ischemic heart disease, labs show LDL>70, will titrate Lipitor.  Blood pressures better, f/u in 6 months.  [EKG obtained to assist in diagnosis and management of assessed problem(s)] : EKG obtained to assist in diagnosis and management of assessed problem(s)

## 2023-12-21 NOTE — PHYSICAL EXAM
[Well Developed] : well developed [Well Nourished] : well nourished [No Acute Distress] : no acute distress [Normal S1, S2] : normal S1, S2 [No Murmur] : no murmur [No Rub] : no rub [No Gallop] : no gallop [Clear Lung Fields] : clear lung fields [Good Air Entry] : good air entry [No Respiratory Distress] : no respiratory distress  [Moves all extremities] : moves all extremities [No Focal Deficits] : no focal deficits [Normal Speech] : normal speech [Alert and Oriented] : alert and oriented [Normal memory] : normal memory [de-identified] : s/p right BKA, left AKA

## 2023-12-27 RX ORDER — LOSARTAN POTASSIUM 50 MG/1
50 TABLET, FILM COATED ORAL DAILY
Qty: 90 | Refills: 2 | Status: ACTIVE | COMMUNITY
Start: 2022-01-27 | End: 1900-01-01

## 2024-01-01 NOTE — PROGRESS NOTE ADULT - PROBLEM SELECTOR PLAN 4
stable  - continue Hydralazine, isosorbide mononitrate, and coreg

## 2024-02-07 ENCOUNTER — NON-APPOINTMENT (OUTPATIENT)
Age: 79
End: 2024-02-07

## 2024-02-07 ENCOUNTER — APPOINTMENT (OUTPATIENT)
Dept: CARDIOLOGY | Facility: CLINIC | Age: 79
End: 2024-02-07
Payer: MEDICARE

## 2024-02-07 VITALS
HEIGHT: 66 IN | SYSTOLIC BLOOD PRESSURE: 120 MMHG | HEART RATE: 54 BPM | DIASTOLIC BLOOD PRESSURE: 60 MMHG | OXYGEN SATURATION: 98 %

## 2024-02-07 DIAGNOSIS — E78.00 PURE HYPERCHOLESTEROLEMIA, UNSPECIFIED: ICD-10-CM

## 2024-02-07 DIAGNOSIS — I10 ESSENTIAL (PRIMARY) HYPERTENSION: ICD-10-CM

## 2024-02-07 DIAGNOSIS — I25.5 ISCHEMIC CARDIOMYOPATHY: ICD-10-CM

## 2024-02-07 DIAGNOSIS — I25.10 ATHEROSCLEROTIC HEART DISEASE OF NATIVE CORONARY ARTERY W/OUT ANGINA PECTORIS: ICD-10-CM

## 2024-02-07 PROCEDURE — G2211 COMPLEX E/M VISIT ADD ON: CPT

## 2024-02-07 PROCEDURE — 93000 ELECTROCARDIOGRAM COMPLETE: CPT

## 2024-02-07 PROCEDURE — 99214 OFFICE O/P EST MOD 30 MIN: CPT

## 2024-02-07 NOTE — CARDIOLOGY SUMMARY
[LVEF ___%] : LVEF [unfilled]% [___] : [unfilled] [de-identified] : 2/7/24, Sinus Rhythm  -(probably not recent) Inferior and Old Extensive anterior-lateral infarct. 8/3/23, SR, inferior and anterolateral MI 12/14/22, Sinus Rhythm, -Old anterior infarct. - Diffuse nonspecific T-abnormality. 1/27/22, Sinus  Rhythm , -Old anterior infarct.  -  Nonspecific T-abnormality.  7/28/21, Sinus  Rhythm -consider old anterior infarct.  -  Diffuse nonspecific T-abnormality.   [de-identified] : 5/16/19, 60-65%, Trace MR and TR.

## 2024-02-07 NOTE — HISTORY OF PRESENT ILLNESS
[FreeTextEntry1] : 78-year-old female with significant atherosclerotic heart disease; status post CABG in 2004 and followup PTCA in 2009. Long-standing hypertensive hyperlipidemic and diabetic with severe peripheral arterial disease, status post right below-knee amputation in 2010 and left above-knee and dictation in 2018 for gangrene. Patient wheelchair bound at this time.   Denies any recent chest pain, shortness of breath or palpitations. Nondrinker, nonsmoker. Last echocardiogram in 2022 showed normal left ventricular function. No significant valvular heart disease was seen. Prior history of chronic renal insufficiency, stage III now improved.  Meds reviewed with patient. Labs from PCP reviewed.

## 2024-02-07 NOTE — DISCUSSION/SUMMARY
[___ Month(s)] : in [unfilled] month(s) [FreeTextEntry1] : 78 year-old female with significant cardiovascular disease both peripheral arterial and coronary. No evidence of any active ischemic heart disease, labs show LDL~70.  Blood pressures much better, f/u in 6 months.  [EKG obtained to assist in diagnosis and management of assessed problem(s)] : EKG obtained to assist in diagnosis and management of assessed problem(s)

## 2024-02-07 NOTE — PHYSICAL EXAM
[Well Developed] : well developed [Well Nourished] : well nourished [No Acute Distress] : no acute distress [Normal Conjunctiva] : normal conjunctiva [Normal Venous Pressure] : normal venous pressure [No Carotid Bruit] : no carotid bruit [Normal S1, S2] : normal S1, S2 [No Murmur] : no murmur [No Rub] : no rub [No Gallop] : no gallop [Clear Lung Fields] : clear lung fields [Good Air Entry] : good air entry [No Respiratory Distress] : no respiratory distress  [Soft] : abdomen soft [Non Tender] : non-tender [No Masses/organomegaly] : no masses/organomegaly [Normal Bowel Sounds] : normal bowel sounds [Normal Gait] : normal gait [No Rash] : no rash [No Skin Lesions] : no skin lesions [Moves all extremities] : moves all extremities [No Focal Deficits] : no focal deficits [Normal Speech] : normal speech [Alert and Oriented] : alert and oriented [Normal memory] : normal memory [de-identified] : s/p right BKA, left AKA

## 2024-02-22 ENCOUNTER — APPOINTMENT (OUTPATIENT)
Dept: OPHTHALMOLOGY | Facility: CLINIC | Age: 79
End: 2024-02-22

## 2024-02-27 NOTE — PROGRESS NOTE ADULT - SUBJECTIVE AND OBJECTIVE BOX
February 27, 2024      [facility]  [address]  [place] [address]  Phone: [phone]  Fax: [phone]       Patient: Ray Amaya   YOB: 1991  Date of Visit: 02/27/2024    To Whom It May Concern:    Sita Amaya  was at [facility] on 02/27/2024. The patient may return to work/school on 2/28/2024 with no restrictions. If you have any questions or concerns, or if I can be of further assistance, please do not hesitate to contact me.    Sincerely,    Paulo Oswald LPN      Chelsey Olivares MD  Medicine Team 2  Pager [phone]        Subjective        VITAL SIGNS:  Vital Signs Last 24 Hrs  T(C): 37 (09 Mar 2018 05:43), Max: 38 (08 Mar 2018 13:22)  T(F): 98.6 (09 Mar 2018 05:43), Max: 100.4 (08 Mar 2018 13:22)  HR: 63 (09 Mar 2018 05:43) (63 - 80)  BP: 124/56 (09 Mar 2018 05:43) (124/56 - 162/70)  BP(mean): --  RR: 18 (09 Mar 2018 05:43) (17 - 18)  SpO2: 96% (09 Mar 2018 05:43) (96% - 100%)      PHYSICAL EXAM:     GENERAL: no acute distress  HEENT: PERRLA, EOMI, moist oropharynx   RESPIRATORY: CTAB, no w/c   CARDIOVASCULAR: RRR, no murmurs, gallops, rubs  ABDOMINAL: soft, non-tender, non-distended, positive bowel sounds   EXTREMITIES: no clubbing, cyanosis, or edema  NEUROLOGICAL: alert and oriented x 3, non-focal  SKIN: no rashes or lesions   MUSCULOSKELETAL: no gross joint deformity                          7.3    22.94 )-----------( 333      ( 09 Mar 2018 05:40 )             22.2     03-09    143  |  107  |  36<H>  ----------------------------<  123<H>  4.4   |  20<L>  |  1.43<H>    Ca    8.6      09 Mar 2018 05:40  Phos  4.6     03-09  Mg     1.9     03-09        CAPILLARY BLOOD GLUCOSE      POCT Blood Glucose.: 124 mg/dL (08 Mar 2018 22:42)  POCT Blood Glucose.: 139 mg/dL (08 Mar 2018 17:09)  POCT Blood Glucose.: 162 mg/dL (08 Mar 2018 12:49)  POCT Blood Glucose.: 127 mg/dL (08 Mar 2018 08:26)      MEDICATIONS  (STANDING):  aspirin enteric coated 81 milliGRAM(s) Oral daily  atorvastatin 20 milliGRAM(s) Oral at bedtime  clopidogrel Tablet 75 milliGRAM(s) Oral daily  collagenase Ointment 1 Application(s) Topical daily  dextrose 50% Injectable 25 Gram(s) IV Push once  heparin  Injectable 5000 Unit(s) SubCutaneous every 12 hours  hydrALAZINE 50 milliGRAM(s) Oral two times a day  insulin detemir injectable (LEVEMIR) 29 Unit(s) SubCutaneous at bedtime  insulin lispro (HumaLOG) corrective regimen sliding scale   SubCutaneous Before meals and at bedtime  insulin lispro Injectable (HumaLOG) 10 Unit(s) SubCutaneous three times a day before meals  isosorbide   mononitrate ER Tablet (IMDUR) 30 milliGRAM(s) Oral daily  piperacillin/tazobactam IVPB. 3.375 Gram(s) IV Intermittent every 8 hours  sertraline 25 milliGRAM(s) Oral daily  vancomycin  IVPB 1250 milliGRAM(s) IV Intermittent every 24 hours    MEDICATIONS  (PRN):  acetaminophen   Tablet 650 milliGRAM(s) Oral every 6 hours PRN For Temp greater than 38 C (100.4 F)  benzocaine 15 mG/menthol 3.6 mG Lozenge 1 Lozenge Oral three times a day PRN Sore Throat  guaiFENesin   Syrup  (Sugar-Free) 200 milliGRAM(s) Oral every 6 hours PRN Cough Chelsey Olivares MD  Medicine Team 2  Pager [phone]        Subjective: Patient seen and examined. No events overnight. This morning patient still complains of a cough. Denies CP, SOB, nausea, vomiting, or diarrhea. Says her leg pain is better controlled.         VITAL SIGNS:  Vital Signs Last 24 Hrs  T(C): 37 (09 Mar 2018 05:43), Max: 38 (08 Mar 2018 13:22)  T(F): 98.6 (09 Mar 2018 05:43), Max: 100.4 (08 Mar 2018 13:22)  HR: 63 (09 Mar 2018 05:43) (63 - 80)  BP: 124/56 (09 Mar 2018 05:43) (124/56 - 162/70)  BP(mean): --  RR: 18 (09 Mar 2018 05:43) (17 - 18)  SpO2: 96% (09 Mar 2018 05:43) (96% - 100%)      PHYSICAL EXAM:     GENERAL: no acute distress  HEENT: PERRLA, EOMI, moist oropharynx   RESPIRATORY: poor effort but lungs grossly clear to ausculation   CARDIOVASCULAR: RRR, no murmurs  ABDOMINAL: soft, non-tender, non-distended, positive bowel sounds   EXTREMITIES: right BKA, left foot covered with clean dressing, erythema improved  NEUROLOGICAL: alert and oriented x 3, no focal deficits   SKIN: no rashes or lesions   MUSCULOSKELETAL: right BKA, left transmetatarsal amputation                           7.3    22.94 )-----------( 333      ( 09 Mar 2018 05:40 )             22.2     03-09    143  |  107  |  36<H>  ----------------------------<  123<H>  4.4   |  20<L>  |  1.43<H>    Ca    8.6      09 Mar 2018 05:40  Phos  4.6     03-09  Mg     1.9     03-09        CAPILLARY BLOOD GLUCOSE      POCT Blood Glucose.: 124 mg/dL (08 Mar 2018 22:42)  POCT Blood Glucose.: 139 mg/dL (08 Mar 2018 17:09)  POCT Blood Glucose.: 162 mg/dL (08 Mar 2018 12:49)  POCT Blood Glucose.: 127 mg/dL (08 Mar 2018 08:26)      MEDICATIONS  (STANDING):  aspirin enteric coated 81 milliGRAM(s) Oral daily  atorvastatin 20 milliGRAM(s) Oral at bedtime  clopidogrel Tablet 75 milliGRAM(s) Oral daily  collagenase Ointment 1 Application(s) Topical daily  dextrose 50% Injectable 25 Gram(s) IV Push once  heparin  Injectable 5000 Unit(s) SubCutaneous every 12 hours  hydrALAZINE 50 milliGRAM(s) Oral two times a day  insulin detemir injectable (LEVEMIR) 29 Unit(s) SubCutaneous at bedtime  insulin lispro (HumaLOG) corrective regimen sliding scale   SubCutaneous Before meals and at bedtime  insulin lispro Injectable (HumaLOG) 10 Unit(s) SubCutaneous three times a day before meals  isosorbide   mononitrate ER Tablet (IMDUR) 30 milliGRAM(s) Oral daily  piperacillin/tazobactam IVPB. 3.375 Gram(s) IV Intermittent every 8 hours  sertraline 25 milliGRAM(s) Oral daily  vancomycin  IVPB 1250 milliGRAM(s) IV Intermittent every 24 hours    MEDICATIONS  (PRN):  acetaminophen   Tablet 650 milliGRAM(s) Oral every 6 hours PRN For Temp greater than 38 C (100.4 F)  benzocaine 15 mG/menthol 3.6 mG Lozenge 1 Lozenge Oral three times a day PRN Sore Throat  guaiFENesin   Syrup  (Sugar-Free) 200 milliGRAM(s) Oral every 6 hours PRN Cough

## 2024-04-16 ENCOUNTER — NON-APPOINTMENT (OUTPATIENT)
Age: 79
End: 2024-04-16

## 2024-04-16 ENCOUNTER — APPOINTMENT (OUTPATIENT)
Dept: OPHTHALMOLOGY | Facility: CLINIC | Age: 79
End: 2024-04-16
Payer: MEDICARE

## 2024-04-16 PROCEDURE — 92014 COMPRE OPH EXAM EST PT 1/>: CPT

## 2024-04-16 PROCEDURE — 92133 CPTRZD OPH DX IMG PST SGM ON: CPT

## 2024-05-06 NOTE — OCCUPATIONAL THERAPY INITIAL EVALUATION ADULT - RANGE OF MOTION EXAMINATION, UPPER EXTREMITY
Chief complaint:   Chief Complaint   Patient presents with    Head Injury     1       Vitals:  Visit Vitals  BP (!) 152/70 (BP Location: RUE - Right upper extremity, Patient Position: Sitting, Cuff Size: Regular)   Pulse 87   Temp 99.1 °F (37.3 °C) (Oral)   Resp 17   Ht 5' 6\" (1.676 m)   Wt 77.7 kg (171 lb 6.5 oz)   SpO2 96%   BMI 27.67 kg/m²       HISTORY OF PRESENT ILLNESS     This 73-year-old female with a past medical history of coronary artery disease on Plavix and Eliquis presents to the walk-in clinic today complaining of head injury which occurred about 1 hour prior to arrival.  She reports she was washing her head in the bathtub when she put her add up and struck her head on the bathtub faucet.  She denies any loss of consciousness.  She denies any neck pain.  She denies any numbness or tingling or weakness.  She denies any visual disturbances.  She has not had any nausea or vomiting.  She denies any alteration in her mental status.  She does report she does have a headache but reports she has chronic headaches secondary to sinus issues.  She has not had any other pain or trauma.  She denies feeling lightheaded or dizzy.  She denies any other pain or trauma.  She has otherwise been in her usual state otherwise been well.        Other significant problems:  Patient Active Problem List    Diagnosis Date Noted    Abnormal nuclear stress test 09/06/2023     Priority: High    Discogenic low back pain 01/09/2017     Priority: Medium    Benign essential HTN 10/14/2013     Priority: Medium    LV dysfunction 10/09/2023     Priority: Low    Dilated cardiomyopathy  (CMD) 10/09/2023     Priority: Low    HTN (hypertension), benign 10/09/2023     Priority: Low    Chronic systolic congestive heart failure, NYHA class 2  (CMD) 10/09/2023     Priority: Low    Permanent atrial fibrillation (CMD)chadsvasc score 5( age/gender/htn/cad/hf) on xarelto 10/09/2023     Priority: Low    Abnormal echocardiogram 09/06/2023      Priority: Low    Shortness of breath 09/06/2023     Priority: Low    Dizziness 06/21/2022     Priority: Low    PAF (paroxysmal atrial fibrillation)  (CMD) 06/21/2022     Priority: Low    GERD (gastroesophageal reflux disease) 06/21/2022     Priority: Low    Anxious depression 06/21/2022     Priority: Low    Debility 06/21/2022     Priority: Low    Diarrhea 12/20/2019     Priority: Low    Major depressive disorder, recurrent episode, mild (CMD) 07/09/2019     Priority: Low    LVEF 53%, 3/2017 10/16/2018     Priority: Low    CHB (complete heart block) (CMD) 04/17/2018     Priority: Low    Normal left ventricular function, ejection fraction 53% per echo 3/21/2017 04/15/2018     Priority: Low    Acute UTI (urinary tract infection) 08/23/2017     Priority: Low    Frequent PVCs 03/19/2017     Priority: Low    Long term current use of anticoagulant, on Xarelto 03/19/2017     Priority: Low    Chronic right-sided low back pain without sciatica 08/22/2016     Priority: Low    Mixed dyslipidemia 04/15/2016     Priority: Low    Mild persistent asthma without complication (CMD) 10/20/2015     Priority: Low    Abnormal PFTs (pulmonary function tests) 10/08/2015     Priority: Low    Anemia 10/08/2015     Priority: Low    Chronic Atrial Fibrillation - s/p AVN ablation, CHADsVASc score is 3, on Xarelto 08/31/2015     Priority: Low     On Warfarin      Osteopenia 01/14/2015     Priority: Low     Failed fosamax due to emesis.        AV aspen ablation 8/29/2014 10/02/2014     Priority: Low     EF = 50% per Echo 5/31/2016.      BiV Pacemaker-Medtronic 08/01/2014     Priority: Low     Dual chamber ppm Implanted 7/8/2014 for Tachy-Yang Syndrome.    Upgrade to BiV ppm 10/10/23 due to drop in EF.       Tachycardia-bradycardia syndrome (CMS/HCC) 07/08/2014     Priority: Low    Vertigo 07/08/2014     Priority: Low    Atrial flutter (CMD) 05/23/2014     Priority: Low    Preventative health care 05/06/2014     Priority: Low     5/6/2014 Pt  dropped off 3 hemoccult cards today. All three cards were negative.       Angina, class III (CMS/[facility]) 10/15/2013     Priority: Low    Unspecified vitamin D deficiency 05/10/2013     Priority: Low    Psoriasis-eczema overlap condition 03/14/2013     Priority: Low    Chronic pain 02/13/2013     Priority: Low    Hot flashes 06/25/2012     Priority: Low    Diabetes mellitus with peripheral vascular disease  (CMD) 06/25/2012     Priority: Low     >>OVERVIEW FOR TYPE 2 DIABETES MELLITUS WITHOUT COMPLICATION, WITHOUT LONG-TERM CURRENT USE OF INSULIN (CMS/[facility]) WRITTEN ON 11/23/2015  2:51 PM BY JOSE FRIEND MD    Last Monofilament Exam:  10/14/2015 , 9/22/14  Last Diabetic Eye Exam:  2014** informed her to make an appt .        Panic disorder without agoraphobia with mild panic attacks 02/06/2012     Priority: Low    Hyperlipemia, mixed 09/29/2011     Priority: Low    Postsurgical percutaneous transluminal coronary angioplasty status 09/29/2011     Priority: Low    Atherosclerosis of native coronary artery of native heart without angina pectoris 09/29/2011     Priority: Low    Acquired hypothyroidism 09/29/2011     Priority: Low     Normal TSH 3/2013.      Edema 09/29/2011     Priority: Low    Knee pain 09/29/2011     Priority: Low       PAST MEDICAL, FAMILY AND SOCIAL HISTORY     Medications:  Current Outpatient Medications   Medication Sig Dispense Refill    Lancets (freestyle) Misc USE TO TEST BLOOD SUGAR 1- 2 TIMES DAILY 100 each 3    [START ON 5/8/2024] ALPRAZolam (XANAX) 0.5 MG tablet Do not start before May 8, 2024. Take 1 tablet by mouth 2 times daily as needed for anxiety 60 tablet 3    Vitamin D, Ergocalciferol, 1.25 mg (50,000 units) capsule TAKE 1 CAPSULE BY MOUTH 2 TIMES MONTHLY 6 capsule 0    iron polysaccharide complex (Ferrex 150) 150 MG capsule Take 1 capsule by mouth daily (with dinner). 90 capsule 0    estradiol (ESTRACE) 0.1 MG/GM vaginal cream PLACE A GRAPE SIZE AMOUNT( 0.5 GM) IN VAGINA AND ON THE  URETHRA USING YOUR FINGER EVERY OTHER NIGHT BEFORE BEDTIME AS DIRECTED 42.5 g 1    levothyroxine 100 MCG tablet Take 1 tablet by mouth daily. 90 tablet 1    ezetimibe (ZETIA) 10 MG tablet Take 1 tablet by mouth every evening. 90 tablet 1    blood glucose test strip Test blood sugar 1-2 times daily. Diagnosis: Diabetes. 200 each 1    blood glucose meter Test blood sugar 1-2 times daily. Diagnosis: Diabetes. 1 kit 0    Plavix 75 MG tablet Take 1 tablet by mouth daily. 90 tablet 1    nitroGLYCERIN (NITROSTAT) 0.4 MG sublingual tablet PLACE 1 UNDER UNDER THE TONGUE EVERY 5 MINUTES AS NEEDED FOR CHEST PAIN. CALL 911 IF SYMPTOMS PERSIST AFTER 3 DOSES 25 tablet 3    azelastine (ASTELIN) 0.1 % nasal spray Spray 1 spray in each nostril in the morning and 1 spray in the evening. Use in each nostril as directed 30 mL 1    losartan (COZAAR) 50 MG tablet Take 1 tablet by mouth daily. 90 tablet 3    metoPROLOL succinate (Toprol XL) 25 MG 24 hr tablet Take 1 tablet by mouth daily. 90 tablet 3    Respiratory Syncytial Virus, Adjuvanted (Arexvy) 120 MCG/0.5ML Recon Susp Inject 0.5 mLs into the muscle. 0.5 mL 0    Effexor XR 37.5 MG 24 hr capsule Take 3 capsules by mouth daily. 270 capsule 3    atorvastatin (LIPITOR) 80 MG tablet TAKE 1 TABLET BY MOUTH EVERY NIGHT 90 tablet 3    pantoprazole (PROTONIX) 40 MG tablet Take 1 tablet by mouth daily. 90 tablet 1    Lancets (freestyle) Misc USE TO TEST BLOOD SUGAR 1-2 TIMES DAILY 100 each 3    Icosapent Ethyl (Vascepa) 0.5 g Cap Take 1 g by mouth nightly. NOTE dose remains the same, but now requires 2 capsules to achieve that dose. 180 capsule 3    rivaroxaban (Xarelto) 20 MG Tab Take 1 tablet by mouth daily. 90 tablet 3    Premarin vaginal cream Place a small grape size amount inside the vagina using your finger (0.5g if using the applicator)  every other night before bed 30 g 12    ondansetron (ZOFRAN ODT) 4 MG disintegrating tablet Place 1 tablet onto the tongue every 6 hours as needed  for Nausea. 10 tablet 0    diphenoxylate-atropine (LOMOTIL) 2.5-0.025 MG tablet Take 1 tablet by mouth 4 times daily as needed for Diarrhea. 5 tablet 0    hydrOXYzine (ATARAX) 25 MG tablet Take 1 tablet by mouth every 6 hours as needed for Itching. 90 tablet 0    benzonatate (TESSALON PERLES) 100 MG capsule Take 1 capsule by mouth 3 times daily as needed for Cough. 45 capsule 0     No current facility-administered medications for this visit.       Allergies:  ALLERGIES:   Allergen Reactions    Alendronate Sodium [Fosamax] VOMITING    Albuterol Sulfate INSOMNIA    Biaxin [Clarithromycin] SWELLING     Mouth swells    Celexa SWELLING     Mouth swelling      Codeine PRURITUS    Dilaudid [Hydromorphone Hcl] Other (See Comments)     hallucinations    Dye [Contrast Media] RASH    Erythromycin SWELLING     Mouth swells    Iodine   (Environmental Or Med) HIVES    Lasix PRURITUS     Vaginal itch      Morphine Sulfate Other (See Comments)     hallucinations    Neocin [Neomycin-Bacitracin-Polymyxin] SWELLING     Mouth swelling    Nitrofurantoin RASH    Oxycodone Nausea & Vomiting    Percocet [Oxycodone-Acetaminophen] Nausea & Vomiting    Sulfa Antibiotics PRURITUS     Vaginal itch    Tramadol HIVES and Other (See Comments)     Swollen lips    Valium [Diazepam] Other (See Comments)     hallucinations    Vicodin [Hydrocodone-Acetaminophen] Nausea & Vomiting    Zoloft Other (See Comments)     unknown       Past Medical  History/Surgeries:  Past Medical History:   Diagnosis Date    Anemia     Anxiety Disorder 1998    Asthma (CMD)     Followed by Dr. Vick [phone] [facility]    Bronchitis     Chronic kidney disease 2009    kidney stones    Chronic pain     back    Chronic sinusitis     ever since i was a kid    Coronary Artery Disease     Degenerative arthritis of spine 2002    legs, back, arms    Depression 1988    Diabetes mellitus  (CMD)     borderline per pt    Fracture     right and  left big and little toes    Ganglion cyst 12/2017    left ankle    GERD (gastroesophageal reflux disease)     Hyperlipidemia     Hypothyroidism     IBS (irritable bowel syndrome)     Thyroiditis 1992    Type 2 diabetes mellitus without complication, without long-term current use of insulin (CMS/[facility]) 06/25/2012    Last Monofilament Exam:       10/14/2015 , 9/22/14  Last Diabetic Eye Exam:        2014** informed her to make an appt .          Past Surgical History:   Procedure Laterality Date    Ablation av node - cv  08/29/2014    AV junction ablation. Device programmed VVIR 80 - 110.    Cardiac catherization  04/17/2009    left heart, moderate left anterior descending artery disease totally occluded RCA    Cardiac catherization  12/05/2008    right and left heart, stable CAD, patent grafts    Cardiac catherization  09/26/2007    left heart, severe native CAD, patent LIMA, LAD, AO-PDA, AO-ramus itermedius, LVEF 60%    Cardiac catherization  03/13/2007    left heart, severe disease of Ramus intermedius and LAD with in-stent restenosis.  Patent LIMA-LAD    Cardiac catheterization/possible ptca/possible stent  06/17/2019    Cardiac surgery      Coronary angiogram/possible ptca - cv N/A 09/06/2023    Coronary angioplasty  09/2002    Coronary angioplasty with stent placement  09/10/2009    ostium of vein graft using 3.5 x18 Xience stent    Coronary angioplasty with stent placement  11/2005    Coronary angioplasty with stent placement  05/2003    Coronary angioplasty with stent placement  03/11/2020    s/p successful deployment of overlapping 3 x 15 mm, 3 x 40 mm and 3 x 35 mm Orsiro TOBIAS from distal to proximal    Coronary artery bypass graft  03/21/2007    Redo    Coronary artery bypass graft  06/2002    Echo heart limited  07/20/2014    EF 62%. Originally scheduled for DSE but HR changed very little. Reschedule for nuclear STT.    Echo heart resting  10/22/2013    Very technically difficult study. EF 60%. Grade II/IV DD.  Sev incr LA size. Trace MR, TR. No AR.    Echo heart resting  2014    EF 60%. Mild LA enlargement. Trace TR.    Echocardiogram  2008    EF 52%    Ep pacer  2014    Claudine. Dual chamber Medtronic    Icd bi-ventric generator insert  10/10/2023    Left heart cath including left venticulography w md sup and interp  10/16/2013    Known severely diseased LAD & chronically occluded RCA in past. Patent SVG to PDA, RI, & LIMA to LAD. Normal LVSF, EF 55%.    Myocardl perf rest stress  2012    Normal    Myocardl perf rest stress  2014    Normal regadenoson    Removal gallbladder  1998    Stress echo  2006    normal maximal stress test    Stress echocardiogram  2012    Normal treadmill    Stress test  2007    adenosine stress test- normal    Us carotid duplex bilateral  2007    Scattered bilateral plaquing    Vascular surgery         Family History:  Family History   Problem Relation Age of Onset    Diabetes Mother     Heart disease Father     Coronary Artery Disease Son         s/p 4vCABG at 48 yo    Cancer, Breast Neg Hx        Social History:  Social History     Tobacco Use    Smoking status: Former     Current packs/day: 0.00     Average packs/day: 0.3 packs/day for 4.0 years (1.0 ttl pk-yrs)     Types: Cigarettes     Start date: 10/24/1967     Quit date: 10/24/1971     Years since quittin.5     Passive exposure: Past    Smokeless tobacco: Never    Tobacco comments:     had a lot of second hand smoke exposure due to her prior job as a     Substance Use Topics    Alcohol use: No     Alcohol/week: 0.0 standard drinks of alcohol     Comment: quit 25 years ago       REVIEW OF SYSTEMS     Review of Systems   Constitutional:  Negative for chills and fever.   HENT:  Negative for sore throat.    Eyes:  Negative for visual disturbance.   Respiratory:  Negative for cough and shortness of breath.    Gastrointestinal:  Negative for nausea and vomiting.    Neurological:  Positive for headaches. Negative for dizziness, syncope, speech difficulty, weakness, light-headedness and numbness.       PHYSICAL EXAM     Physical Exam  Vitals and nursing note reviewed.   Constitutional:       General: She is not in acute distress.     Appearance: She is well-developed. She is not ill-appearing, toxic-appearing or diaphoretic.   HENT:      Head: Normocephalic and atraumatic.      Right Ear: External ear normal.      Left Ear: External ear normal.      Nose: Nose normal.      Neck: Neck supple.   Eyes:      General:         Right eye: No discharge.         Left eye: No discharge.      Conjunctiva/sclera: Conjunctivae normal.   Pulmonary:      Effort: No respiratory distress.   Musculoskeletal:         General: Normal range of motion.   Skin:     General: Skin is warm and dry.      Capillary Refill: Capillary refill takes less than 2 seconds.      Findings: No rash.   Neurological:      Mental Status: She is alert and oriented to person, place, and time.      Cranial Nerves: No cranial nerve deficit.      Sensory: No sensory deficit.      Motor: No weakness.      Coordination: Coordination normal.      Gait: Gait normal.      Deep Tendon Reflexes: Reflexes normal.         ASSESSMENT/PLAN         73-year-old female presenting to the walk-in clinic today complaining of head injury which occurred about an hour prior to arrival.  She is nontoxic appearing with stable vital signs.  She is unremarkable neurological exam.  She is on both Plavix and Xarelto.  I did discuss with the patient given that she is on blood thinners and after striking her head she should have CT of the head for further evaluation.  I did recommend transfer to the emergency department however the patient declined.  Patient will sign out against medical advice for transfer.  She is alert and oriented and of decisional making capacity.  Did advise that she continue to monitor for any worsening signs or symptoms and  if headache becomes more severe or she gets any confusion, unsteadiness or any neurologic changes wish she should call 911 and go directly to the emergency department.  She otherwise will follow up with her primary care provider for recheck evaluation next week.  Patient was understanding agreeable to that plan and was discharged.          The encounter diagnosis was Injury of head, initial encounter.     bilateral UE Active ROM was WFL  (within functional limits)

## 2024-05-12 NOTE — PROGRESS NOTE ADULT - PROBLEM SELECTOR PROBLEM 5
CAD (coronary artery disease) Alert-The patient is alert, awake and responds to voice. The patient is oriented to time, place, and person. The triage nurse is able to obtain subjective information.

## 2024-08-07 ENCOUNTER — NON-APPOINTMENT (OUTPATIENT)
Age: 79
End: 2024-08-07

## 2024-08-08 ENCOUNTER — NON-APPOINTMENT (OUTPATIENT)
Age: 79
End: 2024-08-08

## 2024-08-08 ENCOUNTER — APPOINTMENT (OUTPATIENT)
Dept: CARDIOLOGY | Facility: CLINIC | Age: 79
End: 2024-08-08

## 2024-08-08 PROCEDURE — G2211 COMPLEX E/M VISIT ADD ON: CPT

## 2024-08-08 PROCEDURE — 99214 OFFICE O/P EST MOD 30 MIN: CPT

## 2024-08-08 PROCEDURE — 93000 ELECTROCARDIOGRAM COMPLETE: CPT

## 2024-08-11 NOTE — DISCUSSION/SUMMARY
[___ Month(s)] : in [unfilled] month(s) [EKG obtained to assist in diagnosis and management of assessed problem(s)] : EKG obtained to assist in diagnosis and management of assessed problem(s) [FreeTextEntry1] : 78 year-old female with significant cardiovascular disease both peripheral arterial and coronary. No evidence of any active ischemic heart disease, labs show LDL~70.  Blood pressures much better, f/u in 6 months.

## 2024-08-11 NOTE — PHYSICAL EXAM
[Normal Conjunctiva] : normal conjunctiva [Normal Venous Pressure] : normal venous pressure [No Carotid Bruit] : no carotid bruit [Soft] : abdomen soft [Non Tender] : non-tender [No Masses/organomegaly] : no masses/organomegaly [Normal Bowel Sounds] : normal bowel sounds [Normal Gait] : normal gait [No Rash] : no rash [No Skin Lesions] : no skin lesions [Well Developed] : well developed [Well Nourished] : well nourished [No Acute Distress] : no acute distress [Normal S1, S2] : normal S1, S2 [No Murmur] : no murmur [No Rub] : no rub [No Gallop] : no gallop [Clear Lung Fields] : clear lung fields [Good Air Entry] : good air entry [No Respiratory Distress] : no respiratory distress  [Moves all extremities] : moves all extremities [No Focal Deficits] : no focal deficits [Normal Speech] : normal speech [Alert and Oriented] : alert and oriented [Normal memory] : normal memory [de-identified] : Wheel chair bound [de-identified] : s/p right BKA, left AKA

## 2024-08-11 NOTE — PHYSICAL EXAM
[Normal Conjunctiva] : normal conjunctiva [Normal Venous Pressure] : normal venous pressure [No Carotid Bruit] : no carotid bruit [Soft] : abdomen soft [Non Tender] : non-tender [No Masses/organomegaly] : no masses/organomegaly [Normal Bowel Sounds] : normal bowel sounds [Normal Gait] : normal gait [No Rash] : no rash [No Skin Lesions] : no skin lesions [Well Developed] : well developed [Well Nourished] : well nourished [No Acute Distress] : no acute distress [Normal S1, S2] : normal S1, S2 [No Murmur] : no murmur [No Rub] : no rub [No Gallop] : no gallop [Clear Lung Fields] : clear lung fields [Good Air Entry] : good air entry [No Respiratory Distress] : no respiratory distress  [Moves all extremities] : moves all extremities [No Focal Deficits] : no focal deficits [Normal Speech] : normal speech [Alert and Oriented] : alert and oriented [Normal memory] : normal memory [de-identified] : Wheel chair bound [de-identified] : s/p right BKA, left AKA

## 2024-08-11 NOTE — CARDIOLOGY SUMMARY
[LVEF ___%] : LVEF [unfilled]% [___] : [unfilled] [de-identified] : 5/16/19, 60-65%, Trace MR and TR. [de-identified] : 2/7/24, Sinus Rhythm  -(probably not recent) Inferior and Old Extensive anterior-lateral infarct. 8/3/23, SR, inferior and anterolateral MI 12/14/22, Sinus Rhythm, -Old anterior infarct. - Diffuse nonspecific T-abnormality. 1/27/22, Sinus  Rhythm , -Old anterior infarct.  -  Nonspecific T-abnormality.  7/28/21, Sinus  Rhythm -consider old anterior infarct.  -  Diffuse nonspecific T-abnormality.

## 2024-08-11 NOTE — PHYSICAL EXAM
[Normal Conjunctiva] : normal conjunctiva [Normal Venous Pressure] : normal venous pressure [No Carotid Bruit] : no carotid bruit [Soft] : abdomen soft [Non Tender] : non-tender [No Masses/organomegaly] : no masses/organomegaly [Normal Bowel Sounds] : normal bowel sounds [Normal Gait] : normal gait [No Rash] : no rash [No Skin Lesions] : no skin lesions [Well Developed] : well developed [Well Nourished] : well nourished [No Acute Distress] : no acute distress [Normal S1, S2] : normal S1, S2 [No Murmur] : no murmur [No Rub] : no rub [No Gallop] : no gallop [Clear Lung Fields] : clear lung fields [Good Air Entry] : good air entry [No Respiratory Distress] : no respiratory distress  [Moves all extremities] : moves all extremities [No Focal Deficits] : no focal deficits [Normal Speech] : normal speech [Alert and Oriented] : alert and oriented [Normal memory] : normal memory [de-identified] : Wheel chair bound [de-identified] : s/p right BKA, left AKA

## 2024-08-11 NOTE — CARDIOLOGY SUMMARY
[LVEF ___%] : LVEF [unfilled]% [___] : [unfilled] [de-identified] : 5/16/19, 60-65%, Trace MR and TR. [de-identified] : 2/7/24, Sinus Rhythm  -(probably not recent) Inferior and Old Extensive anterior-lateral infarct. 8/3/23, SR, inferior and anterolateral MI 12/14/22, Sinus Rhythm, -Old anterior infarct. - Diffuse nonspecific T-abnormality. 1/27/22, Sinus  Rhythm , -Old anterior infarct.  -  Nonspecific T-abnormality.  7/28/21, Sinus  Rhythm -consider old anterior infarct.  -  Diffuse nonspecific T-abnormality.

## 2024-08-11 NOTE — CARDIOLOGY SUMMARY
[LVEF ___%] : LVEF [unfilled]% [___] : [unfilled] [de-identified] : 5/16/19, 60-65%, Trace MR and TR. [de-identified] : 2/7/24, Sinus Rhythm  -(probably not recent) Inferior and Old Extensive anterior-lateral infarct. 8/3/23, SR, inferior and anterolateral MI 12/14/22, Sinus Rhythm, -Old anterior infarct. - Diffuse nonspecific T-abnormality. 1/27/22, Sinus  Rhythm , -Old anterior infarct.  -  Nonspecific T-abnormality.  7/28/21, Sinus  Rhythm -consider old anterior infarct.  -  Diffuse nonspecific T-abnormality.

## 2024-08-11 NOTE — PHYSICAL EXAM
[Normal Conjunctiva] : normal conjunctiva [Normal Venous Pressure] : normal venous pressure [No Carotid Bruit] : no carotid bruit [Soft] : abdomen soft [Non Tender] : non-tender [No Masses/organomegaly] : no masses/organomegaly [Normal Bowel Sounds] : normal bowel sounds [Normal Gait] : normal gait [No Rash] : no rash [No Skin Lesions] : no skin lesions [Well Developed] : well developed [Well Nourished] : well nourished [No Acute Distress] : no acute distress [Normal S1, S2] : normal S1, S2 [No Murmur] : no murmur [No Rub] : no rub [No Gallop] : no gallop [Clear Lung Fields] : clear lung fields [Good Air Entry] : good air entry [No Respiratory Distress] : no respiratory distress  [Moves all extremities] : moves all extremities [No Focal Deficits] : no focal deficits [Normal Speech] : normal speech [Alert and Oriented] : alert and oriented [Normal memory] : normal memory [de-identified] : Wheel chair bound [de-identified] : s/p right BKA, left AKA

## 2024-08-11 NOTE — CARDIOLOGY SUMMARY
[LVEF ___%] : LVEF [unfilled]% [___] : [unfilled] [de-identified] : 2/7/24, Sinus Rhythm  -(probably not recent) Inferior and Old Extensive anterior-lateral infarct. 8/3/23, SR, inferior and anterolateral MI 12/14/22, Sinus Rhythm, -Old anterior infarct. - Diffuse nonspecific T-abnormality. 1/27/22, Sinus  Rhythm , -Old anterior infarct.  -  Nonspecific T-abnormality.  7/28/21, Sinus  Rhythm -consider old anterior infarct.  -  Diffuse nonspecific T-abnormality.   [de-identified] : 5/16/19, 60-65%, Trace MR and TR.

## 2024-08-21 ENCOUNTER — APPOINTMENT (OUTPATIENT)
Dept: CARDIOLOGY | Facility: CLINIC | Age: 79
End: 2024-08-21
Payer: MEDICARE

## 2024-08-21 PROCEDURE — 93306 TTE W/DOPPLER COMPLETE: CPT

## 2024-08-28 ENCOUNTER — APPOINTMENT (OUTPATIENT)
Dept: CARDIOLOGY | Facility: CLINIC | Age: 79
End: 2024-08-28
Payer: MEDICARE

## 2024-08-28 PROCEDURE — 93015 CV STRESS TEST SUPVJ I&R: CPT

## 2024-08-28 PROCEDURE — A9500: CPT

## 2024-08-28 PROCEDURE — 78452 HT MUSCLE IMAGE SPECT MULT: CPT

## 2024-09-17 ENCOUNTER — APPOINTMENT (OUTPATIENT)
Dept: OPHTHALMOLOGY | Facility: CLINIC | Age: 79
End: 2024-09-17
Payer: MEDICARE

## 2024-09-17 ENCOUNTER — NON-APPOINTMENT (OUTPATIENT)
Age: 79
End: 2024-09-17

## 2024-09-17 PROCEDURE — 92012 INTRM OPH EXAM EST PATIENT: CPT

## 2024-09-17 PROCEDURE — 92083 EXTENDED VISUAL FIELD XM: CPT

## 2024-12-01 ENCOUNTER — INPATIENT (INPATIENT)
Facility: HOSPITAL | Age: 79
LOS: 4 days | Discharge: HOME | End: 2024-12-06
Attending: STUDENT IN AN ORGANIZED HEALTH CARE EDUCATION/TRAINING PROGRAM | Admitting: STUDENT IN AN ORGANIZED HEALTH CARE EDUCATION/TRAINING PROGRAM
Payer: MEDICARE

## 2024-12-01 VITALS
SYSTOLIC BLOOD PRESSURE: 178 MMHG | RESPIRATION RATE: 20 BRPM | DIASTOLIC BLOOD PRESSURE: 78 MMHG | OXYGEN SATURATION: 97 % | TEMPERATURE: 98 F | HEART RATE: 79 BPM

## 2024-12-01 DIAGNOSIS — Z95.1 PRESENCE OF AORTOCORONARY BYPASS GRAFT: Chronic | ICD-10-CM

## 2024-12-01 DIAGNOSIS — Z89.512 ACQUIRED ABSENCE OF LEFT LEG BELOW KNEE: Chronic | ICD-10-CM

## 2024-12-01 DIAGNOSIS — Z98.89 OTHER SPECIFIED POSTPROCEDURAL STATES: Chronic | ICD-10-CM

## 2024-12-01 DIAGNOSIS — I21.4 NON-ST ELEVATION (NSTEMI) MYOCARDIAL INFARCTION: ICD-10-CM

## 2024-12-01 DIAGNOSIS — Z89.511 ACQUIRED ABSENCE OF RIGHT LEG BELOW KNEE: Chronic | ICD-10-CM

## 2024-12-01 DIAGNOSIS — Z90.710 ACQUIRED ABSENCE OF BOTH CERVIX AND UTERUS: Chronic | ICD-10-CM

## 2024-12-01 DIAGNOSIS — Z41.9 ENCOUNTER FOR PROCEDURE FOR PURPOSES OTHER THAN REMEDYING HEALTH STATE, UNSPECIFIED: Chronic | ICD-10-CM

## 2024-12-01 LAB
A1C WITH ESTIMATED AVERAGE GLUCOSE RESULT: 7.5 % — HIGH (ref 4–5.6)
ADD ON TEST-SPECIMEN IN LAB: SIGNIFICANT CHANGE UP
ALBUMIN SERPL ELPH-MCNC: 3.8 G/DL — SIGNIFICANT CHANGE UP (ref 3.3–5)
ALP SERPL-CCNC: 74 U/L — SIGNIFICANT CHANGE UP (ref 40–120)
ALT FLD-CCNC: 21 U/L — SIGNIFICANT CHANGE UP (ref 4–33)
ANION GAP SERPL CALC-SCNC: 15 MMOL/L — HIGH (ref 7–14)
APTT BLD: 32.5 SEC — SIGNIFICANT CHANGE UP (ref 24.5–35.6)
AST SERPL-CCNC: 32 U/L — SIGNIFICANT CHANGE UP (ref 4–32)
BASOPHILS # BLD AUTO: 0.04 K/UL — SIGNIFICANT CHANGE UP (ref 0–0.2)
BASOPHILS NFR BLD AUTO: 0.4 % — SIGNIFICANT CHANGE UP (ref 0–2)
BILIRUB SERPL-MCNC: <0.2 MG/DL — SIGNIFICANT CHANGE UP (ref 0.2–1.2)
BLD GP AB SCN SERPL QL: NEGATIVE — SIGNIFICANT CHANGE UP
BUN SERPL-MCNC: 61 MG/DL — HIGH (ref 7–23)
CALCIUM SERPL-MCNC: 9.2 MG/DL — SIGNIFICANT CHANGE UP (ref 8.4–10.5)
CHLORIDE SERPL-SCNC: 101 MMOL/L — SIGNIFICANT CHANGE UP (ref 98–107)
CK MB BLD-MCNC: 9.2 % — HIGH (ref 0–2.5)
CK MB CFR SERPL CALC: 18.8 NG/ML — HIGH
CK SERPL-CCNC: 204 U/L — HIGH (ref 25–170)
CO2 SERPL-SCNC: 20 MMOL/L — LOW (ref 22–31)
CREAT SERPL-MCNC: 1.17 MG/DL — SIGNIFICANT CHANGE UP (ref 0.5–1.3)
EGFR: 48 ML/MIN/1.73M2 — LOW
EOSINOPHIL # BLD AUTO: 0.32 K/UL — SIGNIFICANT CHANGE UP (ref 0–0.5)
EOSINOPHIL NFR BLD AUTO: 3.1 % — SIGNIFICANT CHANGE UP (ref 0–6)
ESTIMATED AVERAGE GLUCOSE: 169 — SIGNIFICANT CHANGE UP
GAS PNL BLDV: SIGNIFICANT CHANGE UP
GLUCOSE SERPL-MCNC: 324 MG/DL — HIGH (ref 70–99)
HCT VFR BLD CALC: 24.7 % — LOW (ref 34.5–45)
HCT VFR BLD CALC: 24.8 % — LOW (ref 34.5–45)
HGB BLD-MCNC: 7.6 G/DL — LOW (ref 11.5–15.5)
HGB BLD-MCNC: 7.7 G/DL — LOW (ref 11.5–15.5)
IANC: 6.59 K/UL — SIGNIFICANT CHANGE UP (ref 1.8–7.4)
IMM GRANULOCYTES NFR BLD AUTO: 0.5 % — SIGNIFICANT CHANGE UP (ref 0–0.9)
INR BLD: 0.95 RATIO — SIGNIFICANT CHANGE UP (ref 0.85–1.16)
LYMPHOCYTES # BLD AUTO: 2.85 K/UL — SIGNIFICANT CHANGE UP (ref 1–3.3)
LYMPHOCYTES # BLD AUTO: 27.3 % — SIGNIFICANT CHANGE UP (ref 13–44)
MCHC RBC-ENTMCNC: 28.6 PG — SIGNIFICANT CHANGE UP (ref 27–34)
MCHC RBC-ENTMCNC: 28.6 PG — SIGNIFICANT CHANGE UP (ref 27–34)
MCHC RBC-ENTMCNC: 30.8 G/DL — LOW (ref 32–36)
MCHC RBC-ENTMCNC: 31 G/DL — LOW (ref 32–36)
MCV RBC AUTO: 92.2 FL — SIGNIFICANT CHANGE UP (ref 80–100)
MCV RBC AUTO: 92.9 FL — SIGNIFICANT CHANGE UP (ref 80–100)
MONOCYTES # BLD AUTO: 0.58 K/UL — SIGNIFICANT CHANGE UP (ref 0–0.9)
MONOCYTES NFR BLD AUTO: 5.6 % — SIGNIFICANT CHANGE UP (ref 2–14)
NEUTROPHILS # BLD AUTO: 6.59 K/UL — SIGNIFICANT CHANGE UP (ref 1.8–7.4)
NEUTROPHILS NFR BLD AUTO: 63.1 % — SIGNIFICANT CHANGE UP (ref 43–77)
NRBC # BLD: 0 /100 WBCS — SIGNIFICANT CHANGE UP (ref 0–0)
NRBC # BLD: 0 /100 WBCS — SIGNIFICANT CHANGE UP (ref 0–0)
NRBC # FLD: 0.02 K/UL — HIGH (ref 0–0)
NRBC # FLD: 0.02 K/UL — HIGH (ref 0–0)
NT-PROBNP SERPL-SCNC: 637 PG/ML — HIGH
PLATELET # BLD AUTO: 229 K/UL — SIGNIFICANT CHANGE UP (ref 150–400)
PLATELET # BLD AUTO: 249 K/UL — SIGNIFICANT CHANGE UP (ref 150–400)
POTASSIUM SERPL-MCNC: 5.7 MMOL/L — HIGH (ref 3.5–5.3)
POTASSIUM SERPL-SCNC: 5.7 MMOL/L — HIGH (ref 3.5–5.3)
PROT SERPL-MCNC: 7 G/DL — SIGNIFICANT CHANGE UP (ref 6–8.3)
PROTHROM AB SERPL-ACNC: 11 SEC — SIGNIFICANT CHANGE UP (ref 9.9–13.4)
RBC # BLD: 2.66 M/UL — LOW (ref 3.8–5.2)
RBC # BLD: 2.69 M/UL — LOW (ref 3.8–5.2)
RBC # FLD: 14.5 % — SIGNIFICANT CHANGE UP (ref 10.3–14.5)
RBC # FLD: 14.7 % — HIGH (ref 10.3–14.5)
RH IG SCN BLD-IMP: POSITIVE — SIGNIFICANT CHANGE UP
SODIUM SERPL-SCNC: 136 MMOL/L — SIGNIFICANT CHANGE UP (ref 135–145)
TROPONIN T, HIGH SENSITIVITY RESULT: 191 NG/L — CRITICAL HIGH
TROPONIN T, HIGH SENSITIVITY RESULT: 257 NG/L — CRITICAL HIGH
TROPONIN T, HIGH SENSITIVITY RESULT: 96 NG/L — CRITICAL HIGH
WBC # BLD: 10.43 K/UL — SIGNIFICANT CHANGE UP (ref 3.8–10.5)
WBC # BLD: 10.88 K/UL — HIGH (ref 3.8–10.5)
WBC # FLD AUTO: 10.43 K/UL — SIGNIFICANT CHANGE UP (ref 3.8–10.5)
WBC # FLD AUTO: 10.88 K/UL — HIGH (ref 3.8–10.5)

## 2024-12-01 PROCEDURE — 71045 X-RAY EXAM CHEST 1 VIEW: CPT | Mod: 26

## 2024-12-01 PROCEDURE — 99291 CRITICAL CARE FIRST HOUR: CPT

## 2024-12-01 PROCEDURE — 99223 1ST HOSP IP/OBS HIGH 75: CPT

## 2024-12-01 RX ORDER — HEPARIN SODIUM,PORCINE 1000/ML
4400 VIAL (ML) INJECTION EVERY 6 HOURS
Refills: 0 | Status: DISCONTINUED | OUTPATIENT
Start: 2024-12-01 | End: 2024-12-03

## 2024-12-01 RX ORDER — HEPARIN SODIUM,PORCINE 1000/ML
VIAL (ML) INJECTION
Qty: 25000 | Refills: 0 | Status: DISCONTINUED | OUTPATIENT
Start: 2024-12-01 | End: 2024-12-03

## 2024-12-01 RX ORDER — HEPARIN SODIUM,PORCINE 1000/ML
4400 VIAL (ML) INJECTION ONCE
Refills: 0 | Status: COMPLETED | OUTPATIENT
Start: 2024-12-01 | End: 2024-12-01

## 2024-12-01 RX ORDER — TICAGRELOR 90 MG/1
180 TABLET ORAL ONCE
Refills: 0 | Status: COMPLETED | OUTPATIENT
Start: 2024-12-01 | End: 2024-12-01

## 2024-12-01 RX ADMIN — Medication 900 UNIT(S)/HR: at 22:22

## 2024-12-01 RX ADMIN — Medication 324 MILLIGRAM(S): at 18:45

## 2024-12-01 RX ADMIN — Medication 4400 UNIT(S): at 22:21

## 2024-12-01 RX ADMIN — TICAGRELOR 180 MILLIGRAM(S): 90 TABLET ORAL at 19:50

## 2024-12-01 NOTE — ED ADULT NURSE NOTE - OBJECTIVE STATEMENT
Patient presents to ED for diarrhea, "food poisoning-like symptoms" x3 days and chest pain radiating to left arm since today. History of stents, DM2, CABG, left AKA, right BKA. She is A&Ox3, not a good historian. She states pain is tolerable right now. Denies SOB, N/V, fevers. Respirations even, unlabored on room air. No pallor, no cyanosis. Labs drawn and sent. Patient presents to ED for diarrhea, "food poisoning-like symptoms" x3 days and chest pain radiating to left arm since today. History of stents, DM2, CABG, left AKA, right BKA. Glucose monitor in left upper arm. She is A&Ox3, not a good historian. She states pain is tolerable right now. Denies SOB, N/V, fevers. Respirations even, unlabored on room air. No pallor, no cyanosis. Labs drawn and sent. Patient presents to ED for diarrhea, "food poisoning-like symptoms" x3 days and chest pain radiating to left arm since today. History of stents, DM2, CABG, left AKA, right BKA. Glucose monitor in left upper arm. She is A&Ox3, not a good historian. She states pain is tolerable right now. Denies SOB, N/V, fevers. Respirations even, unlabored on room air. No pallor, no cyanosis. Labs drawn and sent. 20G IV placed in right AC. Patient presents to ED for diarrhea, "food poisoning-like symptoms" x3 days and chest pain radiating to left arm since today. History of stents, DM2, CABG, left AKA, right BKA. Glucose monitor in left upper arm. She is A&Ox4, calm, cooperative. She states pain is tolerable right now. Denies SOB, N/V, fevers. Respirations even, unlabored on room air. No pallor, no cyanosis. Labs drawn and sent. 20G IV placed in right AC.

## 2024-12-01 NOTE — CONSULT NOTE ADULT - NS ATTEND AMEND GEN_ALL_CORE FT
79F w HTN, DM, severe PAD w R BKA, and CAD (sp 3v CABG 20 yrs ago, on last cath 2014, only graft left patent was LIMA-LAD, natives with  of LAD and RCA, OM w severe stented w BMS at that time) who presented with 15 min of resting chest pain, found to have Hgb of 7 (from 12 several years ago) and rising elevated troponins from 90s->280s; her ECG shows Inferior Q waves w chronic diffuse ST/TW changes, all of which are largely unchanged from prior tracings. She is currently chest pain free and receiving blood transfusion.    Regarding the etiology of her chest pain and trop elevation, I suspect the most likely etiology is an NSTEMI secondary to supply demand imbalance with severe known fixed CAD and marked anemia, however the ddx certainly includes an ACS event; I doubt her LIMA has been acutely compromised given how stable she is and asymptomatic she now is from a cardiac perspective. More likely if any new coronary lesion would be present from her last cath in 2014 her OM with BMS >10 yrs old would be the culprit, though not through stent thrombosis (which her presentation is not at all consistent with) but either de liya plaque rupture in the OM unrelated to her stent, or alternatively chronic/indolent ISR that is now showing symptoms secondary to anemia as above. Ultimately, she will need a TTE, a robust evaluation to uncover the etiology of her anemia, transfusion to keep hgb >8, and if after this, she continues to experience angina we can consider LHC at that time. For now would continue medical management and we will continue to follow her care closely.

## 2024-12-01 NOTE — CONSULT NOTE ADULT - ASSESSMENT
79F PMH CAD/CABG (2003), hypertension, hyperlipidemia, type 2 diabetes mellitus with right and left AKA presenting for chest pain for 1 day with t wave inversions in lateral leads and elevated troponin to 96 to 191 concerning for NSTEMI.  Patient currently chest pain free

## 2024-12-01 NOTE — H&P ADULT - ASSESSMENT
79F w/ hx CAD s/p CABG (2004), HTN, HLD, IDDM2, severe PAD with right and left AKA presenting for chest pain for 1 day found to have NSTEMI and acute anemia

## 2024-12-01 NOTE — H&P ADULT - NSHPPHYSICALEXAM_GEN_ALL_CORE
Vital Signs Last 24 Hrs  T(C): 36.8 (12-01-24 @ 22:41), Max: 36.8 (12-01-24 @ 22:41)  T(F): 98.2 (12-01-24 @ 22:41), Max: 98.2 (12-01-24 @ 22:41)  HR: 77 (12-01-24 @ 22:41) (74 - 80)  BP: 118/48 (12-01-24 @ 22:41) (118/48 - 178/78)  BP(mean): 70 (12-01-24 @ 22:41) (70 - 91)  RR: 16 (12-01-24 @ 22:41) (14 - 20)  SpO2: 99% (12-01-24 @ 22:41) (97% - 100%)

## 2024-12-01 NOTE — H&P ADULT - PROBLEM SELECTOR PLAN 2
hgb 7.7 compared to 12.1 next most recent hgb. Stable on repeat. Unclear if recent diarrhea episode related. Discussed risks and benefits of NSTEMI treatment at length with patient and daughter at bedside. Agree to cautiously cont. for now    -Trend cbc  -T/S sent, consent in chart  -Given cardiac hx, up trending cardiac enzymes, decision to target Hgb 8. Ordered for 1uprbc  -F/u anemia work up  -Monitor for bleeding

## 2024-12-01 NOTE — ED PROVIDER NOTE - ATTENDING CONTRIBUTION TO CARE
Sugar England MD attending physician patient comes in with left-sided chest pain and arm pain history of CABG x 3 vessels.  Also hypertension high cholesterol and diabetes.  She has been following with cardiology.  CABG was found after she was nauseous and vomiting had a stress test and was found to have three-vessel disease.  Also status post amputations both legs, and a history of CKD had an endarterectomy    Meds include Plavix aspirin losartan insulin for her diabetes and atorvastatin    Vital signs include blood pressure 178/78 respiratory rate 20 heart rate 79 temp 98.1 O2 sat 97    Pt alert and can phonate well  h at/nc  perrl, conj clear, sclera anicteric,  neck supple  Left neck scar from endarterectomy  cor rrr pos s1s2 no murmur  lungs clear to asno wheeze  abd soft no r/g/t  ext bilateral amputations lower extremities  neueo awake, lucid normal gait moves all extremities with strength  psych normal affect      EKG with my read shows sinus rhythm rate 78 teaser flipped V3 through V6 Q in 3 and aVF more pronounced from May 2019 with lateral changes and inferior changes but some similarities also 1 and aVL have reciprocal question of ST depression in teaser flipped which was not present before no peaked T's on EKG    High concern for coronary disease EKG is being repeated    Labs have returned hemoglobin 7.7, potassium is 5.7 mildly hemolyzed creatinine is 1.17 sugar 324 GFR 48 troponin is 96  pH is 7.30 lactate 2.8    X-ray without focal consolidations.

## 2024-12-01 NOTE — ED PROVIDER NOTE - DATE/TIME 2
Well appearing 10year old with likely viral syndrome; reviewed supportive care; push fluids and discussed signs of dehydration or trouble breathing; notify us for any concerns; mom agrees to plan and will tell us if there is worsening abd pain 01-Dec-2024 21:35

## 2024-12-01 NOTE — H&P ADULT - PROBLEM SELECTOR PLAN 4
On Lantus 30U QHS and Novolog 20Us w/ breakfast and lunch. Will reduce basal inpatient    -Will order Lantus 15U QHS and FS/ ISS for now  -NPO as per cardiology  -A1c 7.5

## 2024-12-01 NOTE — ED PROVIDER NOTE - PROGRESS NOTE DETAILS
Dao Lares MD PGY3: trop positive, repeat ekg with tw changes from prior laterally. cardiology consulted, will see pt. RONALD Hannah PGY-3: At this time, patient with no blood on FABIANO.

## 2024-12-01 NOTE — CONSULT NOTE ADULT - PROBLEM SELECTOR RECOMMENDATION 9
Admit to Telemetry  Heparin gtt for ACS  Received Aspirn 324mg, start Aspirn 81mg daily  Give Brilinta Load 180mg then Brilinta 90mg BID  Atorvastatin 80mg daily  Obtain Lipid panel, TSH, Type + Screen x1  Daily CBC, BMP Mag Phos, PT/INR/PTT  TTE in am to evaluate LV function  NPO for ischemic workup  Plan discussed with Jim Merlos Fellow  Cardiology to follow

## 2024-12-01 NOTE — H&P ADULT - NSICDXPASTMEDICALHX_GEN_ALL_CORE_FT
PAST MEDICAL HISTORY:  Acute kidney injury superimposed on chronic kidney disease     CAD (coronary artery disease)     Carotid artery stenosis     Chronic diastolic heart failure     Diabetic retinopathy     DM (diabetes mellitus) type II    Hypercholesterolemia     Hypertension     Normocytic anemia     Obesity     PAD (peripheral artery disease) s/p R BKA    Pulmonary HTN     S/P CABG x 3 in 2004, [facility]    Stented coronary artery 2010 [facility]    Vitamin D deficiency

## 2024-12-01 NOTE — H&P ADULT - NSHPLABSRESULTS_GEN_ALL_CORE
I have reviewed the labs, imaging and ekg. EKG with NSR HR 78 QTc 446, TWI V4-V6 more notable than prior. CXR w/o focal consolidations

## 2024-12-01 NOTE — H&P ADULT - HISTORY OF PRESENT ILLNESS
79F w/ hx CAD s/p CABG (2004), HTN, HLD, IDDM2, severe PAD with right and left AKA presenting for chest pain for 1 day with t wave inversions in lateral leads and elevated troponin to 96. Pt endorses having Thanksgiving dinner 4 days ago and having many episodes of diarrhea as well as abdominal pain day of. Improved the next day with diarrhea ending yesterday. Diarrhea described as dark and loose/ watery. Denies wendy blood. Starting last night pt noticed L sided substernal chest pressure radiating up and down L arm. Associated with vague palpitations and throbbing in head. Pt's  called daughter this AM regarding symptoms and pt was brought to hospital. Pt currently denies any notable thoracic symptoms. Denies fevers, chills, cough, SOB, or edema. Denies any hx of bleed in past. Had stool occult test earlier this year.    In ER: Given ASA 324mg, heparin gtt, Brilinta 180mg, Cardiology evaluation

## 2024-12-01 NOTE — H&P ADULT - TIME BILLING
Need to interview and examine patient, discuss care with family, discuss case with ER provider, provide counseling, coordinate care, place orders, document, personally review imaging, ekg and review prior medical records

## 2024-12-01 NOTE — CONSULT NOTE ADULT - SUBJECTIVE AND OBJECTIVE BOX
HISTORY OF PRESENT ILLNESS:    This is a 79-year-old woman with past medical history CAD/CABG (2003), hypertension, hyperlipidemia, type 2 diabetes mellitus with right and left AKA presenting for chest pain for 1 day with t wave inversions in lateral leads and elevated troponin to 96.  Cardiology called on consult for t wave inversions in lateral leads and elevated troponin to 96.  Patient reports she had 1 day of midsternal chest radiating to left arm denies SOB, denies diaphoresis.  Patient states chest pain has resolved in ED.  Patient reports 2 days ago she had abdominal pain following thanksgiving dinner followed with diarrhea. Patient reports no fever, schills, recent sick contact, SOB, MCDANIEL. Patient reports seeing Dr. Howell, Cardiologist last seen in 3/2024.  Patient had NST in 8/2024 with no inducible ischemia and TTE with EF 55%.  Denies ever having chest pain before.  Denies history of smoking.    Allergies    sulfa drugs (Hives)  Sulfac 10% (Hives)  sulfa drugs (Rash)    Intolerances    	    MEDICATIONS:    Losartan 50mg  Atorvastatin 40  Plavix 75  ASA 81  Coreg 6.25 BID    PAST MEDICAL & SURGICAL HISTORY:  S/P CABG x 3  in 2004, [facility]      Stented coronary artery  2010 [facility]      PAD (peripheral artery disease)  s/p R BKA      Carotid artery stenosis      DM (diabetes mellitus)  type II      Hypercholesterolemia      Obesity      Hypertension      CAD (coronary artery disease)      Acute kidney injury superimposed on chronic kidney disease      Chronic diastolic heart failure      Vitamin D deficiency      Normocytic anemia      Pulmonary HTN      Diabetic retinopathy      Hx of CABG  3v 2004      S/P hysterectomy  2004      S/P angioplasty with stent  2009, 3/2014      S/P below knee amputation, right  6/2010      S/P eye surgery  for glaucoma      S/P CABG x 3      S/P BKA (below knee amputation) unilateral, right      History of hysterectomy      Elective surgery  traumatic amputation of the toe      S/P BKA (below knee amputation), left          FAMILY HISTORY:  Family history of diabetes mellitus (Sibling)        SOCIAL HISTORY:    Lives with , retired , denies smoking or etoh    REVIEW OF SYSTEMS:    CONSTITUTIONAL: Chest Pain  EYES/ENT: No visual changes;  No vertigo or throat pain   NECK: No pain or stiffness  RESPIRATORY: No cough, wheezing, hemoptysis; No shortness of breath  CARDIOVASCULAR: Chest Pain   GASTROINTESTINAL: No abdominal or epigastric pain. No nausea, vomiting, or hematemesis.  GENITOURINARY: No dysuria, frequency or hematuria  NEUROLOGICAL: No numbness or weakness  SKIN: No itching, burning, rashes, or lesions   All other review of systems is negative unless indicated above.    PHYSICAL EXAM:  T(C): 36.7 (12-01-24 @ 15:46), Max: 36.7 (12-01-24 @ 15:46)  HR: 80 (12-01-24 @ 19:30) (78 - 80)  BP: 152/65 (12-01-24 @ 18:49) (152/65 - 178/78)  RR: 18 (12-01-24 @ 19:30) (18 - 20)  SpO2: 100% (12-01-24 @ 19:30) (97% - 100%)  Wt(kg): --  I&O's Summary      Appearance: No Acute Distress	  HEENT:  Normal oral mucosa, PERRL, EOMI	  Cardiovascular: Normal S1 S2, No JVD, No murmurs/rubs/gallops  Respiratory: Lungs clear to auscultation bilaterally  Gastrointestinal:  Soft, Non-tender, + BS	  Skin: No rashes, No ecchymoses, No cyanosis	  Neurologic: Non-focal  Extremities: bilat aka  Vascular: Peripheral pulses palpable 2+ bilaterally  Psychiatry: A & O x 3, Mood & affect appropriate    LABS:	 	    CBC Full  -  ( 01 Dec 2024 17:17 )  WBC Count : 10.43 K/uL  Hemoglobin : 7.7 g/dL  Hematocrit : 24.8 %  Platelet Count - Automated : 229 K/uL  Mean Cell Volume : 92.2 fL  Mean Cell Hemoglobin : 28.6 pg  Mean Cell Hemoglobin Concentration : 31.0 g/dL  Auto Neutrophil # : 6.59 K/uL  Auto Lymphocyte # : 2.85 K/uL  Auto Monocyte # : 0.58 K/uL  Auto Eosinophil # : 0.32 K/uL  Auto Basophil # : 0.04 K/uL  Auto Neutrophil % : 63.1 %  Auto Lymphocyte % : 27.3 %  Auto Monocyte % : 5.6 %  Auto Eosinophil % : 3.1 %  Auto Basophil % : 0.4 %    12-01    136  |  101  |  61[H]  ----------------------------<  324[H]  5.7[H]   |  20[L]  |  1.17    Ca    9.2      01 Dec 2024 17:17    TPro  7.0  /  Alb  3.8  /  TBili  <0.2  /  DBili  x   /  AST  32  /  ALT  21  /  AlkPhos  74  12-01      proBNP:   Lipid Profile:   HgA1c:   TSH:       CARDIAC MARKERS:            TELEMETRY: 	  SR  ECG:  	  RADIOLOGY:  OTHER: 	    PREVIOUS DIAGNOSTIC TESTING:    [ ] Echocardiogram:  [ ] Catheterization:  [ ] Stress Test:

## 2024-12-01 NOTE — ED PROVIDER NOTE - PHYSICAL EXAMINATION
CONSTITUTIONAL: awake; in no acute distress.   HEAD: Normocephalic; atraumatic.  EYES: no conjunctival injection. no scleral icterus  CARD: S1, S2 normal; no murmurs, gallops, or rubs. Regular rate   RESP: No wheezes, rales or rhonchi. Good air movement bilaterally.   ABD: soft ntnd, no guarding, no distention, no rigidity.   EXT: b/l bka  NEURO: Alert, oriented, grossly unremarkable  PSYCH: Cooperative, appropriate.

## 2024-12-01 NOTE — ED PROVIDER NOTE - OBJECTIVE STATEMENT
79-year-old female past medical history CAD status post CABG, hypertension, hyperlipidemia, diabetes presenting for chest pain.  Has had intermittent burning chest pain for approximately 2 to 3 days, radiates to left arm.  No associated fever, chills, cough.  No travel or sick contacts.

## 2024-12-01 NOTE — H&P ADULT - PROBLEM SELECTOR PLAN 1
Appreciate cardiology recommendations. Given up trending cardiac enzymes and EKG findings, will cont. treatment. Pt currently chest pain free. Given DAPT load. 08/2024 Stress TTE with EF 55%, relatively normal function w/o wall motion abnormalities    -Cont. asa, Brilinta and atorvastatin  -Heparin gtt  -Trend pTT  -Telemetry  -TTE ordered  -Trend cardiac enzymes for now  -Will keep NPO  -F/u cardiology recommendations regarding next steps in workup/ treatment  -Discussed with ER team, given episode of junctional mendez rhythm in ER, will hold Coreg for now

## 2024-12-01 NOTE — ED ADULT NURSE REASSESSMENT NOTE - NS ED NURSE REASSESS COMMENT FT1
Pt lying in stretcher, not in acute distress, denies CP or SOB at this time. Respirations are even and unlabored, NSR on monitor, pt on zole as Md ordered. labs drawn and sent. 20G IV placed in left AC. Heparin drip initiated. As per RN Ap (day shift) Ap obtained actual weight of 73kg. Bed in lowest position, safety maintained.

## 2024-12-01 NOTE — ED ADULT NURSE REASSESSMENT NOTE - NS ED NURSE REASSESS COMMENT FT1
Notified by tele tech that patient is having increasing frequency of junctional rhythms and the episodes are becoming longer in duration, where heart rate is effectively 40s bpm. Printout given to MD England and patient placed on Zoll monitor. VS stable.

## 2024-12-01 NOTE — ED ADULT NURSE NOTE - NSFALLRISKINTERV_ED_ALL_ED

## 2024-12-01 NOTE — H&P ADULT - RESPIRATORY AND THORAX
Pt sent by  for eval for pilonidal abscess x 3-4 days. Pt denies fevers, drainage, radiating pain. No pmh.
details…

## 2024-12-01 NOTE — H&P ADULT - PROBLEM SELECTOR PLAN 3
Hx of CABG 2003 or 2004  -Cont. asa and Brilinta  -Cont. atorvastatin QHS  -F/u cardiology recommendations  -See above

## 2024-12-01 NOTE — ED ADULT TRIAGE NOTE - CHIEF COMPLAINT QUOTE
Pt c/o intermittent L sided chest pain described as burning pain x 2-3 days. Pt states pain improved in triage. PHx DM2, CAD with stent

## 2024-12-02 ENCOUNTER — RESULT REVIEW (OUTPATIENT)
Age: 79
End: 2024-12-02

## 2024-12-02 DIAGNOSIS — D64.9 ANEMIA, UNSPECIFIED: ICD-10-CM

## 2024-12-02 DIAGNOSIS — Z95.1 PRESENCE OF AORTOCORONARY BYPASS GRAFT: ICD-10-CM

## 2024-12-02 DIAGNOSIS — E78.5 HYPERLIPIDEMIA, UNSPECIFIED: ICD-10-CM

## 2024-12-02 DIAGNOSIS — R09.89 OTHER SPECIFIED SYMPTOMS AND SIGNS INVOLVING THE CIRCULATORY AND RESPIRATORY SYSTEMS: ICD-10-CM

## 2024-12-02 DIAGNOSIS — E11.9 TYPE 2 DIABETES MELLITUS WITHOUT COMPLICATIONS: ICD-10-CM

## 2024-12-02 DIAGNOSIS — I10 ESSENTIAL (PRIMARY) HYPERTENSION: ICD-10-CM

## 2024-12-02 DIAGNOSIS — I21.4 NON-ST ELEVATION (NSTEMI) MYOCARDIAL INFARCTION: ICD-10-CM

## 2024-12-02 DIAGNOSIS — I73.9 PERIPHERAL VASCULAR DISEASE, UNSPECIFIED: ICD-10-CM

## 2024-12-02 DIAGNOSIS — Z29.9 ENCOUNTER FOR PROPHYLACTIC MEASURES, UNSPECIFIED: ICD-10-CM

## 2024-12-02 LAB
ALBUMIN SERPL ELPH-MCNC: 3.6 G/DL — SIGNIFICANT CHANGE UP (ref 3.3–5)
ALP SERPL-CCNC: 69 U/L — SIGNIFICANT CHANGE UP (ref 40–120)
ALT FLD-CCNC: 21 U/L — SIGNIFICANT CHANGE UP (ref 4–33)
ANION GAP SERPL CALC-SCNC: 11 MMOL/L — SIGNIFICANT CHANGE UP (ref 7–14)
APTT BLD: 106.9 SEC — HIGH (ref 24.5–35.6)
APTT BLD: 51.1 SEC — HIGH (ref 24.5–35.6)
APTT BLD: >200 SEC — CRITICAL HIGH (ref 24.5–35.6)
AST SERPL-CCNC: 35 U/L — HIGH (ref 4–32)
BILIRUB SERPL-MCNC: <0.2 MG/DL — SIGNIFICANT CHANGE UP (ref 0.2–1.2)
BLD GP AB SCN SERPL QL: NEGATIVE — SIGNIFICANT CHANGE UP
BUN SERPL-MCNC: 60 MG/DL — HIGH (ref 7–23)
CALCIUM SERPL-MCNC: 9 MG/DL — SIGNIFICANT CHANGE UP (ref 8.4–10.5)
CHLORIDE SERPL-SCNC: 104 MMOL/L — SIGNIFICANT CHANGE UP (ref 98–107)
CHOLEST SERPL-MCNC: 95 MG/DL — SIGNIFICANT CHANGE UP
CK MB BLD-MCNC: 5.5 % — HIGH (ref 0–2.5)
CK MB BLD-MCNC: 5.6 % — HIGH (ref 0–2.5)
CK MB BLD-MCNC: 6.4 % — HIGH (ref 0–2.5)
CK MB CFR SERPL CALC: 12.9 NG/ML — HIGH
CK MB CFR SERPL CALC: 16.7 NG/ML — HIGH
CK MB CFR SERPL CALC: 9.2 NG/ML — HIGH
CK MB CFR SERPL CALC: 9.2 NG/ML — HIGH
CK SERPL-CCNC: 164 U/L — SIGNIFICANT CHANGE UP (ref 25–170)
CK SERPL-CCNC: 166 U/L — SIGNIFICANT CHANGE UP (ref 25–170)
CK SERPL-CCNC: 203 U/L — HIGH (ref 25–170)
CO2 SERPL-SCNC: 22 MMOL/L — SIGNIFICANT CHANGE UP (ref 22–31)
CREAT SERPL-MCNC: 1.25 MG/DL — SIGNIFICANT CHANGE UP (ref 0.5–1.3)
EGFR: 44 ML/MIN/1.73M2 — LOW
FERRITIN SERPL-MCNC: 59 NG/ML — SIGNIFICANT CHANGE UP (ref 13–330)
GLUCOSE BLDC GLUCOMTR-MCNC: 143 MG/DL — HIGH (ref 70–99)
GLUCOSE BLDC GLUCOMTR-MCNC: 160 MG/DL — HIGH (ref 70–99)
GLUCOSE BLDC GLUCOMTR-MCNC: 191 MG/DL — HIGH (ref 70–99)
GLUCOSE BLDC GLUCOMTR-MCNC: 272 MG/DL — HIGH (ref 70–99)
GLUCOSE BLDC GLUCOMTR-MCNC: 287 MG/DL — HIGH (ref 70–99)
GLUCOSE SERPL-MCNC: 256 MG/DL — HIGH (ref 70–99)
HCT VFR BLD CALC: 23.4 % — LOW (ref 34.5–45)
HCT VFR BLD CALC: 29.5 % — LOW (ref 34.5–45)
HDLC SERPL-MCNC: 26 MG/DL — LOW
HGB BLD-MCNC: 7.3 G/DL — LOW (ref 11.5–15.5)
HGB BLD-MCNC: 9.7 G/DL — LOW (ref 11.5–15.5)
IRON SATN MFR SERPL: 31 % — SIGNIFICANT CHANGE UP (ref 14–50)
IRON SATN MFR SERPL: 86 UG/DL — SIGNIFICANT CHANGE UP (ref 30–160)
LIPID PNL WITH DIRECT LDL SERPL: 45 MG/DL — SIGNIFICANT CHANGE UP
MAGNESIUM SERPL-MCNC: 2.1 MG/DL — SIGNIFICANT CHANGE UP (ref 1.6–2.6)
MCHC RBC-ENTMCNC: 28.7 PG — SIGNIFICANT CHANGE UP (ref 27–34)
MCHC RBC-ENTMCNC: 29.7 PG — SIGNIFICANT CHANGE UP (ref 27–34)
MCHC RBC-ENTMCNC: 31.2 G/DL — LOW (ref 32–36)
MCHC RBC-ENTMCNC: 32.9 G/DL — SIGNIFICANT CHANGE UP (ref 32–36)
MCV RBC AUTO: 90.2 FL — SIGNIFICANT CHANGE UP (ref 80–100)
MCV RBC AUTO: 92.1 FL — SIGNIFICANT CHANGE UP (ref 80–100)
NON HDL CHOLESTEROL: 69 MG/DL — SIGNIFICANT CHANGE UP
NRBC # BLD: 0 /100 WBCS — SIGNIFICANT CHANGE UP (ref 0–0)
NRBC # BLD: 0 /100 WBCS — SIGNIFICANT CHANGE UP (ref 0–0)
NRBC # FLD: 0.02 K/UL — HIGH (ref 0–0)
NRBC # FLD: 0.03 K/UL — HIGH (ref 0–0)
PLATELET # BLD AUTO: 238 K/UL — SIGNIFICANT CHANGE UP (ref 150–400)
PLATELET # BLD AUTO: 241 K/UL — SIGNIFICANT CHANGE UP (ref 150–400)
POTASSIUM SERPL-MCNC: 4.8 MMOL/L — SIGNIFICANT CHANGE UP (ref 3.5–5.3)
POTASSIUM SERPL-SCNC: 4.8 MMOL/L — SIGNIFICANT CHANGE UP (ref 3.5–5.3)
PROT SERPL-MCNC: 6.6 G/DL — SIGNIFICANT CHANGE UP (ref 6–8.3)
RBC # BLD: 2.54 M/UL — LOW (ref 3.8–5.2)
RBC # BLD: 3.27 M/UL — LOW (ref 3.8–5.2)
RBC # FLD: 14.6 % — HIGH (ref 10.3–14.5)
RBC # FLD: 14.9 % — HIGH (ref 10.3–14.5)
RH IG SCN BLD-IMP: POSITIVE — SIGNIFICANT CHANGE UP
SODIUM SERPL-SCNC: 137 MMOL/L — SIGNIFICANT CHANGE UP (ref 135–145)
TIBC SERPL-MCNC: 280 UG/DL — SIGNIFICANT CHANGE UP (ref 220–430)
TRANSFERRIN SERPL-MCNC: 224 MG/DL — SIGNIFICANT CHANGE UP (ref 200–360)
TRIGL SERPL-MCNC: 135 MG/DL — SIGNIFICANT CHANGE UP
TROPONIN T, HIGH SENSITIVITY RESULT: 281 NG/L — CRITICAL HIGH
TROPONIN T, HIGH SENSITIVITY RESULT: 304 NG/L — CRITICAL HIGH
TROPONIN T, HIGH SENSITIVITY RESULT: 312 NG/L — CRITICAL HIGH
TROPONIN T, HIGH SENSITIVITY RESULT: 343 NG/L — CRITICAL HIGH
TSH SERPL-MCNC: 2.06 UIU/ML — SIGNIFICANT CHANGE UP (ref 0.27–4.2)
UIBC SERPL-MCNC: 194 UG/DL — SIGNIFICANT CHANGE UP (ref 110–370)
WBC # BLD: 12.14 K/UL — HIGH (ref 3.8–10.5)
WBC # BLD: 9.82 K/UL — SIGNIFICANT CHANGE UP (ref 3.8–10.5)
WBC # FLD AUTO: 12.14 K/UL — HIGH (ref 3.8–10.5)
WBC # FLD AUTO: 9.82 K/UL — SIGNIFICANT CHANGE UP (ref 3.8–10.5)

## 2024-12-02 PROCEDURE — 99223 1ST HOSP IP/OBS HIGH 75: CPT

## 2024-12-02 PROCEDURE — 99233 SBSQ HOSP IP/OBS HIGH 50: CPT

## 2024-12-02 PROCEDURE — 93306 TTE W/DOPPLER COMPLETE: CPT | Mod: 26

## 2024-12-02 RX ORDER — GLUCAGON INJECTION, SOLUTION 0.5 MG/.1ML
1 INJECTION, SOLUTION SUBCUTANEOUS ONCE
Refills: 0 | Status: DISCONTINUED | OUTPATIENT
Start: 2024-12-02 | End: 2024-12-06

## 2024-12-02 RX ORDER — ACETAMINOPHEN 500MG 500 MG/1
650 TABLET, COATED ORAL EVERY 6 HOURS
Refills: 0 | Status: DISCONTINUED | OUTPATIENT
Start: 2024-12-02 | End: 2024-12-06

## 2024-12-02 RX ORDER — INSULIN GLARGINE 100 [IU]/ML
15 INJECTION, SOLUTION SUBCUTANEOUS AT BEDTIME
Refills: 0 | Status: DISCONTINUED | OUTPATIENT
Start: 2024-12-02 | End: 2024-12-04

## 2024-12-02 RX ORDER — 0.9 % SODIUM CHLORIDE 0.9 %
1000 INTRAVENOUS SOLUTION INTRAVENOUS
Refills: 0 | Status: DISCONTINUED | OUTPATIENT
Start: 2024-12-02 | End: 2024-12-06

## 2024-12-02 RX ORDER — ACETAMINOPHEN, DIPHENHYDRAMINE HCL, PHENYLEPHRINE HCL 325; 25; 5 MG/1; MG/1; MG/1
3 TABLET ORAL AT BEDTIME
Refills: 0 | Status: DISCONTINUED | OUTPATIENT
Start: 2024-12-02 | End: 2024-12-06

## 2024-12-02 RX ORDER — TICAGRELOR 90 MG/1
90 TABLET ORAL EVERY 12 HOURS
Refills: 0 | Status: COMPLETED | OUTPATIENT
Start: 2024-12-02 | End: 2024-12-03

## 2024-12-02 RX ADMIN — Medication 500 UNIT(S)/HR: at 19:19

## 2024-12-02 RX ADMIN — Medication 81 MILLIGRAM(S): at 09:17

## 2024-12-02 RX ADMIN — Medication 0 UNIT(S)/HR: at 15:51

## 2024-12-02 RX ADMIN — Medication 6: at 23:32

## 2024-12-02 RX ADMIN — Medication 650 UNIT(S)/HR: at 23:31

## 2024-12-02 RX ADMIN — Medication 700 UNIT(S)/HR: at 07:29

## 2024-12-02 RX ADMIN — Medication 900 UNIT(S)/HR: at 00:49

## 2024-12-02 RX ADMIN — Medication 500 UNIT(S)/HR: at 16:57

## 2024-12-02 RX ADMIN — Medication 6: at 05:27

## 2024-12-02 RX ADMIN — Medication 700 UNIT(S)/HR: at 06:45

## 2024-12-02 RX ADMIN — Medication 40 MILLIGRAM(S): at 21:17

## 2024-12-02 RX ADMIN — TICAGRELOR 90 MILLIGRAM(S): 90 TABLET ORAL at 09:21

## 2024-12-02 RX ADMIN — Medication 2: at 12:13

## 2024-12-02 RX ADMIN — Medication 0 UNIT(S)/HR: at 05:40

## 2024-12-02 RX ADMIN — INSULIN GLARGINE 15 UNIT(S): 100 INJECTION, SOLUTION SUBCUTANEOUS at 21:18

## 2024-12-02 RX ADMIN — TICAGRELOR 90 MILLIGRAM(S): 90 TABLET ORAL at 21:17

## 2024-12-02 NOTE — PROGRESS NOTE ADULT - PROBLEM SELECTOR PLAN 1
Appreciate cardiology recommendations. Given up trending cardiac enzymes and EKG findings, will cont. treatment. Pt currently chest pain free. Given DAPT load. 08/2024 Stress TTE with EF 55%, relatively normal function w/o wall motion abnormalities    - Cont. asa, Brilinta and atorvastatin  - c/w Heparin gtt, dose per aPTT  - Telemetry  - TTE pending   - Trend cardiac enzymes  - episode of junctional rhythm in ED - holding coreg  - cardiology following - NPO for now pending possible ischemic evaluation

## 2024-12-02 NOTE — PATIENT PROFILE ADULT - FALL HARM RISK - FACTORS
[General Appearance - Well Developed] : well developed [Normal Appearance] : normal appearance [Well Groomed] : well groomed [General Appearance - Well Nourished] : well nourished [No Deformities] : no deformities [General Appearance - In No Acute Distress] : no acute distress [Normal Conjunctiva] : the conjunctiva exhibited no abnormalities [Eyelids - No Xanthelasma] : the eyelids demonstrated no xanthelasmas [Normal Oral Mucosa] : normal oral mucosa [No Oral Pallor] : no oral pallor [No Oral Cyanosis] : no oral cyanosis [Normal Jugular Venous A Waves Present] : normal jugular venous A waves present [Normal Jugular Venous V Waves Present] : normal jugular venous V waves present [No Jugular Venous Andrea A Waves] : no jugular venous andrea A waves [Respiration, Rhythm And Depth] : normal respiratory rhythm and effort [Exaggerated Use Of Accessory Muscles For Inspiration] : no accessory muscle use [Abdomen Soft] : soft [Abdomen Tenderness] : non-tender [Abdomen Mass (___ Cm)] : no abdominal mass palpated [Abnormal Walk] : normal gait [Gait - Sufficient For Exercise Testing] : the gait was sufficient for exercise testing [Nail Clubbing] : no clubbing of the fingernails [Cyanosis, Localized] : no localized cyanosis [Petechial Hemorrhages (___cm)] : no petechial hemorrhages [Skin Color & Pigmentation] : normal skin color and pigmentation [] : no rash [No Venous Stasis] : no venous stasis [Skin Lesions] : no skin lesions [No Skin Ulcers] : no skin ulcer [No Xanthoma] : no  xanthoma was observed [Oriented To Time, Place, And Person] : oriented to person, place, and time [Affect] : the affect was normal [Mood] : the mood was normal [No Anxiety] : not feeling anxious [Normal Rate] : normal [Normal S1] : normal S1 [Normal S2] : normal S2 [No Murmur] : no murmurs heard [2+] : left 2+ [No Abnormalities] : the abdominal aorta was not enlarged and no bruit was heard [No Pitting Edema] : no pitting edema present [FreeTextEntry1] : some lower lobe crackles bilateral [S3] : no S3 [S4] : no S4 [Right Carotid Bruit] : no bruit heard over the right carotid [Left Carotid Bruit] : no bruit heard over the left carotid [Right Femoral Bruit] : no bruit heard over the right femoral artery [Left Femoral Bruit] : no bruit heard over the left femoral artery Weakness/Other

## 2024-12-03 LAB
APTT BLD: 75.5 SEC — HIGH (ref 24.5–35.6)
CK MB BLD-MCNC: 4.6 % — HIGH (ref 0–2.5)
CK MB CFR SERPL CALC: 6.6 NG/ML — HIGH
CK SERPL-CCNC: 142 U/L — SIGNIFICANT CHANGE UP (ref 25–170)
GLUCOSE BLDC GLUCOMTR-MCNC: 202 MG/DL — HIGH (ref 70–99)
GLUCOSE BLDC GLUCOMTR-MCNC: 248 MG/DL — HIGH (ref 70–99)
GLUCOSE BLDC GLUCOMTR-MCNC: 271 MG/DL — HIGH (ref 70–99)
GLUCOSE BLDC GLUCOMTR-MCNC: 283 MG/DL — HIGH (ref 70–99)
HCT VFR BLD CALC: 26.2 % — LOW (ref 34.5–45)
HGB BLD-MCNC: 8.7 G/DL — LOW (ref 11.5–15.5)
MCHC RBC-ENTMCNC: 29.8 PG — SIGNIFICANT CHANGE UP (ref 27–34)
MCHC RBC-ENTMCNC: 33.2 G/DL — SIGNIFICANT CHANGE UP (ref 32–36)
MCV RBC AUTO: 89.7 FL — SIGNIFICANT CHANGE UP (ref 80–100)
NRBC # BLD: 0 /100 WBCS — SIGNIFICANT CHANGE UP (ref 0–0)
NRBC # FLD: 0.02 K/UL — HIGH (ref 0–0)
PLATELET # BLD AUTO: 237 K/UL — SIGNIFICANT CHANGE UP (ref 150–400)
RBC # BLD: 2.92 M/UL — LOW (ref 3.8–5.2)
RBC # FLD: 15.1 % — HIGH (ref 10.3–14.5)
TROPONIN T, HIGH SENSITIVITY RESULT: 251 NG/L — CRITICAL HIGH
WBC # BLD: 9.42 K/UL — SIGNIFICANT CHANGE UP (ref 3.8–10.5)
WBC # FLD AUTO: 9.42 K/UL — SIGNIFICANT CHANGE UP (ref 3.8–10.5)

## 2024-12-03 PROCEDURE — 99233 SBSQ HOSP IP/OBS HIGH 50: CPT

## 2024-12-03 RX ORDER — CLOPIDOGREL 75 MG/1
75 TABLET, FILM COATED ORAL DAILY
Refills: 0 | Status: DISCONTINUED | OUTPATIENT
Start: 2024-12-04 | End: 2024-12-06

## 2024-12-03 RX ORDER — CHLORHEXIDINE GLUCONATE 1.2 MG/ML
1 RINSE ORAL DAILY
Refills: 0 | Status: DISCONTINUED | OUTPATIENT
Start: 2024-12-03 | End: 2024-12-06

## 2024-12-03 RX ORDER — CARVEDILOL 25 MG/1
3.12 TABLET, FILM COATED ORAL EVERY 12 HOURS
Refills: 0 | Status: DISCONTINUED | OUTPATIENT
Start: 2024-12-03 | End: 2024-12-05

## 2024-12-03 RX ORDER — ENOXAPARIN SODIUM 30 MG/.3ML
40 INJECTION SUBCUTANEOUS EVERY 24 HOURS
Refills: 0 | Status: DISCONTINUED | OUTPATIENT
Start: 2024-12-03 | End: 2024-12-06

## 2024-12-03 RX ADMIN — Medication 4: at 13:15

## 2024-12-03 RX ADMIN — ENOXAPARIN SODIUM 40 MILLIGRAM(S): 30 INJECTION SUBCUTANEOUS at 17:09

## 2024-12-03 RX ADMIN — Medication 6: at 05:56

## 2024-12-03 RX ADMIN — Medication 6: at 18:05

## 2024-12-03 RX ADMIN — Medication 550 UNIT(S)/HR: at 07:26

## 2024-12-03 RX ADMIN — INSULIN GLARGINE 15 UNIT(S): 100 INJECTION, SOLUTION SUBCUTANEOUS at 22:54

## 2024-12-03 RX ADMIN — Medication 40 MILLIGRAM(S): at 22:04

## 2024-12-03 RX ADMIN — TICAGRELOR 90 MILLIGRAM(S): 90 TABLET ORAL at 21:04

## 2024-12-03 RX ADMIN — CARVEDILOL 3.12 MILLIGRAM(S): 25 TABLET, FILM COATED ORAL at 17:09

## 2024-12-03 RX ADMIN — Medication 81 MILLIGRAM(S): at 09:33

## 2024-12-03 RX ADMIN — TICAGRELOR 90 MILLIGRAM(S): 90 TABLET ORAL at 09:32

## 2024-12-03 NOTE — PROVIDER CONTACT NOTE (CRITICAL VALUE NOTIFICATION) - BACKGROUND
PMH) DM (diabetes mellitus)  (PMH) Diabetic retinopathy  (PMH) Pulmonary HTN
(PMH) DM (diabetes mellitus)  (PMH) Diabetic retinopathy  (PMH) Pulmonary HTN

## 2024-12-03 NOTE — PROGRESS NOTE ADULT - TIME BILLING
Time-based billing (NON-critical care).     51 minutes spent on total encounter; more than 50% of the visit was spent counseling and / or coordinating care by the attending physician.  The necessity of the time spent during the encounter on this date of service was due to:     review of laboratory data, radiology results, consultants' recommendations, documentation in [place], discussion with patient and cardiology attending

## 2024-12-03 NOTE — PROGRESS NOTE ADULT - ASSESSMENT
79F PMH CAD/CABG (2003), hypertension, hyperlipidemia, type 2 diabetes mellitus with right and left AKA presenting for chest pain for 1 day with t wave inversions in lateral leads and elevated troponin to 96 to 191 concerning for NSTEMI.  Patient currently chest pain free    Patient w/ hx of CAD s/p ?3v CABG ~20 years ago  On available cath reports, only MOMIN-LAD graft was mentioned to be patent which suggests that other vein grafts are chronically occluded  Most recent C with  of the LAD and RCA, and tight OM lesion that was stented with a BMS    Suspect that patient's current presentation related to supply-demand mismatch from anemia in the setting of known severe CAD  Recent outpatient nuclear stress test with scar and hypokinesis in the RCA territory, consistent with TTE here with EF 40-45% w/ inferior/inferolateral WMA    - recommend workup of anemia per primary team  - continue ASA  - would change brillinta to plavix 75mg qd starting tomorrow  - can stop heparin gtt  - restart home coreg 3.125mg BID, uptitrate as tolerated  - would assess for anginal symptoms with ambulation/PT

## 2024-12-03 NOTE — PROVIDER CONTACT NOTE (CRITICAL VALUE NOTIFICATION) - ASSESSMENT
Pt resting in bed, no complains of pain or discomfort.
Pt resting in bed, no complaints of  pain or discomfort. VS stable.

## 2024-12-03 NOTE — PROGRESS NOTE ADULT - PROBLEM SELECTOR PLAN 1
Appreciate cardiology recommendations. Given up trending cardiac enzymes and EKG findings, will cont. treatment. Pt currently chest pain free. Given DAPT load. 08/2024 Stress TTE with EF 55%, relatively normal function w/o wall motion abnormalities    - Cont. asa, Brilinta and atorvastatin  - s/p heparin gtt  - Telemetry monitoring  - TTE: LVEF 40-45%, grade 1 diastolic dysfunction  - Trend cardiac enzymes  - episode of junctional rhythm in ED - holding coreg  - cardiology following - limited utility of NST and high threshold for LHC in patient with significant prior stenoses, would prefer to have patient exert herself and treat with medical management for symptoms

## 2024-12-04 ENCOUNTER — TRANSCRIPTION ENCOUNTER (OUTPATIENT)
Age: 79
End: 2024-12-04

## 2024-12-04 LAB
ANION GAP SERPL CALC-SCNC: 10 MMOL/L — SIGNIFICANT CHANGE UP (ref 7–14)
BUN SERPL-MCNC: 31 MG/DL — HIGH (ref 7–23)
CALCIUM SERPL-MCNC: 8.4 MG/DL — SIGNIFICANT CHANGE UP (ref 8.4–10.5)
CHLORIDE SERPL-SCNC: 109 MMOL/L — HIGH (ref 98–107)
CO2 SERPL-SCNC: 22 MMOL/L — SIGNIFICANT CHANGE UP (ref 22–31)
CREAT SERPL-MCNC: 1.04 MG/DL — SIGNIFICANT CHANGE UP (ref 0.5–1.3)
EGFR: 55 ML/MIN/1.73M2 — LOW
GLUCOSE BLDC GLUCOMTR-MCNC: 212 MG/DL — HIGH (ref 70–99)
GLUCOSE BLDC GLUCOMTR-MCNC: 225 MG/DL — HIGH (ref 70–99)
GLUCOSE BLDC GLUCOMTR-MCNC: 233 MG/DL — HIGH (ref 70–99)
GLUCOSE BLDC GLUCOMTR-MCNC: 272 MG/DL — HIGH (ref 70–99)
GLUCOSE SERPL-MCNC: 222 MG/DL — HIGH (ref 70–99)
HCT VFR BLD CALC: 27.3 % — LOW (ref 34.5–45)
HGB BLD-MCNC: 8.4 G/DL — LOW (ref 11.5–15.5)
MAGNESIUM SERPL-MCNC: 1.9 MG/DL — SIGNIFICANT CHANGE UP (ref 1.6–2.6)
MCHC RBC-ENTMCNC: 28.8 PG — SIGNIFICANT CHANGE UP (ref 27–34)
MCHC RBC-ENTMCNC: 30.8 G/DL — LOW (ref 32–36)
MCV RBC AUTO: 93.5 FL — SIGNIFICANT CHANGE UP (ref 80–100)
MRSA PCR RESULT.: SIGNIFICANT CHANGE UP
NRBC # BLD: 0 /100 WBCS — SIGNIFICANT CHANGE UP (ref 0–0)
NRBC # FLD: 0 K/UL — SIGNIFICANT CHANGE UP (ref 0–0)
PHOSPHATE SERPL-MCNC: 3.6 MG/DL — SIGNIFICANT CHANGE UP (ref 2.5–4.5)
PLATELET # BLD AUTO: 209 K/UL — SIGNIFICANT CHANGE UP (ref 150–400)
POTASSIUM SERPL-MCNC: 5.4 MMOL/L — HIGH (ref 3.5–5.3)
POTASSIUM SERPL-SCNC: 5.4 MMOL/L — HIGH (ref 3.5–5.3)
RBC # BLD: 2.92 M/UL — LOW (ref 3.8–5.2)
RBC # FLD: 15.8 % — HIGH (ref 10.3–14.5)
S AUREUS DNA NOSE QL NAA+PROBE: SIGNIFICANT CHANGE UP
SODIUM SERPL-SCNC: 141 MMOL/L — SIGNIFICANT CHANGE UP (ref 135–145)
WBC # BLD: 9.5 K/UL — SIGNIFICANT CHANGE UP (ref 3.8–10.5)
WBC # FLD AUTO: 9.5 K/UL — SIGNIFICANT CHANGE UP (ref 3.8–10.5)

## 2024-12-04 PROCEDURE — 99233 SBSQ HOSP IP/OBS HIGH 50: CPT

## 2024-12-04 PROCEDURE — 99232 SBSQ HOSP IP/OBS MODERATE 35: CPT

## 2024-12-04 RX ORDER — INSULIN GLARGINE 100 [IU]/ML
22 INJECTION, SOLUTION SUBCUTANEOUS AT BEDTIME
Refills: 0 | Status: DISCONTINUED | OUTPATIENT
Start: 2024-12-04 | End: 2024-12-05

## 2024-12-04 RX ORDER — SENNOSIDES 8.6 MG
2 TABLET ORAL ONCE
Refills: 0 | Status: COMPLETED | OUTPATIENT
Start: 2024-12-04 | End: 2024-12-04

## 2024-12-04 RX ADMIN — ENOXAPARIN SODIUM 40 MILLIGRAM(S): 30 INJECTION SUBCUTANEOUS at 18:21

## 2024-12-04 RX ADMIN — Medication 3 UNIT(S): at 18:18

## 2024-12-04 RX ADMIN — Medication 2 TABLET(S): at 06:51

## 2024-12-04 RX ADMIN — CLOPIDOGREL 75 MILLIGRAM(S): 75 TABLET, FILM COATED ORAL at 12:02

## 2024-12-04 RX ADMIN — CARVEDILOL 3.12 MILLIGRAM(S): 25 TABLET, FILM COATED ORAL at 18:21

## 2024-12-04 RX ADMIN — CARVEDILOL 3.12 MILLIGRAM(S): 25 TABLET, FILM COATED ORAL at 05:35

## 2024-12-04 RX ADMIN — Medication 81 MILLIGRAM(S): at 12:02

## 2024-12-04 RX ADMIN — Medication 4: at 09:08

## 2024-12-04 RX ADMIN — INSULIN GLARGINE 22 UNIT(S): 100 INJECTION, SOLUTION SUBCUTANEOUS at 21:38

## 2024-12-04 RX ADMIN — Medication 4: at 18:19

## 2024-12-04 RX ADMIN — Medication 40 MILLIGRAM(S): at 21:38

## 2024-12-04 RX ADMIN — Medication 6: at 13:16

## 2024-12-04 NOTE — DISCHARGE NOTE PROVIDER - NSDCCPTREATMENT_GEN_ALL_CORE_FT
PRINCIPAL PROCEDURE  Procedure: Complete transthoracic echocardiography (TTE)  Findings and Treatment: CONCLUSIONS:      1. Technically very difficult study performed with the patient in the supine position.   2. Technically difficult image quality.   3. Left ventricular systolic function is mildly to moderately decreased with anejection fraction visually estimated at 40 to 45%. There are regional wall motion abnormalities present. Hypokinesis of the inferolateral wall, basal to mid inferior wall, and basal inferoseptum. There is mild (grade 1) left ventricular diastolic dysfunction.   4. The right ventricle is not well visualized.   5. Structurally normal mitral valve with normal leaflet excursion. There is calcification of the mitral valve annulus. There is mild mitral regurgitation.   6. No prior echocardiogram is available for comparison.

## 2024-12-04 NOTE — DISCHARGE NOTE PROVIDER - CARE PROVIDER_API CALL
Rosalio Howell  Cardiovascular Disease  [address] [place] [address]  Phone: [phone]  Fax: [phone]  Follow Up Time: 1 week

## 2024-12-04 NOTE — PROGRESS NOTE ADULT - TIME BILLING
Time-based billing (NON-critical care).     37 minutes spent on total encounter; more than 50% of the visit was spent counseling and / or coordinating care by the attending physician.  The necessity of the time spent during the encounter on this date of service was due to:     review of laboratory data, radiology results, consultants' recommendations, documentation in [place], discussion with patient

## 2024-12-04 NOTE — PROGRESS NOTE ADULT - ASSESSMENT
79F PMH CAD/CABG (2003), hypertension, hyperlipidemia, type 2 diabetes mellitus with right and left AKA presenting for chest pain for 1 day with t wave inversions in lateral leads and elevated troponin to 96 to 191 concerning for NSTEMI.  Patient currently chest pain free    Patient w/ hx of CAD s/p ?3v CABG ~20 years ago  On available cath reports, only MOMIN-LAD graft was mentioned to be patent which suggests that other vein grafts are chronically occluded  Most recent C with  of the LAD and RCA, and tight OM lesion that was stented with a BMS    Suspect that patient's current presentation related to supply-demand mismatch from anemia in the setting of known severe CAD  Recent outpatient nuclear stress test with scar and hypokinesis in the RCA territory, consistent with TTE here with EF 40-45% w/ inferior/inferolateral WMA    - recommend workup of anemia per primary team  - continue ASA, plavix 75mg qd  - coreg 3.125mg BID, uptitrate as tolerated  - would assess for anginal symptoms with ambulation/PT

## 2024-12-04 NOTE — DISCHARGE NOTE PROVIDER - NSDCCPCAREPLAN_GEN_ALL_CORE_FT
PRINCIPAL DISCHARGE DIAGNOSIS  Diagnosis: NSTEMI, initial episode of care  Assessment and Plan of Treatment: You were admitted with chest pain which we suspect may have been a heart attack (myocardial infarction). You were seen by the cardiologist and recommended for medical management to decrease the stress on your heart.     PRINCIPAL DISCHARGE DIAGNOSIS  Diagnosis: NSTEMI, initial episode of care  Assessment and Plan of Treatment: You were admitted with chest pain which we suspect may have been a heart attack (myocardial infarction). You were seen by the cardiologist and recommended for medical management to decrease the stress on your heart. Your echocardiogram of the heart demonstrated evidence of a decreased heart strength (formally known as heart failure with reduced ejection fraction).  Please follow up with your cardiologist Dr Rosalio Howell within 1 week of discharge.         SECONDARY DISCHARGE DIAGNOSES  Diagnosis: Anemia  Assessment and Plan of Treatment: Your hemoglobin levels were lower than they have been previously and you required a blood transfusion in the setting of your chest pain.   Please follow up with your primary care physician and discuss outpatient work-up for sources of bleeding.

## 2024-12-04 NOTE — DISCHARGE NOTE PROVIDER - NSDCFUSCHEDAPPT_GEN_ALL_CORE_FT
Brittany Hallman  [facility] Physician [facility]  OPHTHALM [address] [facility]  Scheduled Appointment: 01/21/2025    Rosalio Howell  [facility]  CARDIOLOGY [address] [facility]  Scheduled Appointment: 02/12/2025

## 2024-12-04 NOTE — DISCHARGE NOTE PROVIDER - ATTENDING DISCHARGE PHYSICAL EXAMINATION:
PHYSICAL EXAM:  Vital Signs Last 24 Hrs  T(C): 36.8 (06 Dec 2024 06:30), Max: 37.3 (05 Dec 2024 21:21)  T(F): 98.2 (06 Dec 2024 06:30), Max: 99.1 (05 Dec 2024 21:21)  HR: 61 (06 Dec 2024 06:30) (61 - 76)  BP: 129/52 (06 Dec 2024 06:30) (129/52 - 167/60)  BP(mean): --  RR: 18 (06 Dec 2024 06:30) (17 - 18)  SpO2: 100% (06 Dec 2024 06:30) (99% - 100%)    Parameters below as of 06 Dec 2024 06:30  Patient On (Oxygen Delivery Method): room air      CONSTITUTIONAL: NAD, well-groomed  EYES: PERRLA; conjunctiva and sclera clear  ENMT: Moist oral mucosa, no pharyngeal injection or exudates  NECK: Supple, no palpable masses; no thyromegaly  RESPIRATORY: Normal respiratory effort; lungs are clear to auscultation bilaterally  CARDIOVASCULAR: Regular rate and rhythm, normal S1 and S2, no murmur/rub/gallop; No lower extremity edema; Peripheral pulses are 2+ bilaterally  ABDOMEN: Nontender to palpation, normoactive bowel sounds, no rebound/guarding; No hepatosplenomegaly  MUSCULOSKELETAL: no clubbing or cyanosis of digits; no joint swelling or tenderness to palpation; b/l LE AKA  PSYCH: A+O to person, place, and time; affect appropriate  NEUROLOGY: CN 2-12 are intact and symmetric; no gross sensory deficits   SKIN: No rashes; no palpable lesions

## 2024-12-04 NOTE — DISCHARGE NOTE PROVIDER - NSDCFUADDAPPT_GEN_ALL_CORE_FT
Please follow up with your cardiologist Dr Rosalio Howell within 1 week of discharge. Please call his office at [phone] to help schedule a sooner appointment.

## 2024-12-04 NOTE — DISCHARGE NOTE PROVIDER - NSDCMRMEDTOKEN_GEN_ALL_CORE_FT
atorvastatin 40 mg oral tablet: 1 tab(s) orally once a day (at bedtime)  brimonidine 0.2% ophthalmic solution: 1 drop(s) in each affected eye 2 times a day  carvedilol 3.125 mg oral tablet: 1 tab(s) orally 2 times a day  clopidogrel 75 mg oral tablet: 1 tab(s) orally once a day  fluticasone 50 mcg/inh inhalation powder: 1 inhaled once a day  insulin glargine 100 units/mL subcutaneous solution: 30 unit(s) subcutaneous once a day (at bedtime)  losartan 50 mg oral tablet: 1 tab(s) orally once a day  NovoLOG 100 units/mL subcutaneous solution: 20 unit(s) subcutaneous 2 times a day with meals  timolol hemihydrate 0.5% ophthalmic solution: 1 drop(s) to each affected eye once a day   aspirin 81 mg oral delayed release tablet: 1 tab(s) orally once a day  atorvastatin 40 mg oral tablet: 1 tab(s) orally once a day (at bedtime)  brimonidine 0.2% ophthalmic solution: 1 drop(s) in each affected eye 2 times a day  carvedilol 6.25 mg oral tablet: 1 tab(s) orally every 12 hours  clopidogrel 75 mg oral tablet: 1 tab(s) orally once a day  fluticasone 50 mcg/inh inhalation powder: 1 inhaled once a day  insulin glargine 100 units/mL subcutaneous solution: 30 unit(s) subcutaneous once a day (at bedtime)  isosorbide mononitrate 30 mg oral tablet, extended release: 1 tab(s) orally once a day  losartan 25 mg oral tablet: 1 tab(s) orally once a day  NovoLOG 100 units/mL subcutaneous solution: 20 unit(s) subcutaneous 2 times a day with meals  timolol hemihydrate 0.5% ophthalmic solution: 1 drop(s) to each affected eye once a day

## 2024-12-04 NOTE — DISCHARGE NOTE PROVIDER - HOSPITAL COURSE
Patient is a 78yo F with PMH significant for CAD s/p CABG 2004, HTN, HLD, IDDM2, and severe PAD s/p b/l LE AKA who presented with chest pain. She was admitted for NSTEMI and seen by cardiology, with troponin peaking and trending down. Hemoglobin was also decreased from her baseline, and she received PRBC transfusion with improvement. Hemoglobin remained stable and she had no evidence of acute or active bleeding.    TTE demonstrated HFrEF with LVEF 40-45% and inferior and inferolateral WMAs. Recent outpatient nuclear stress test demonstrated scar and hypokinesis of the RCA territory. Prior cath had shown  of the LAD and PCA, tight OM lesion that was stented with a BMS. LIMA-LAD graft patent but other vein grafts chronically occluded.     LHC and repeat NST was deferred. Patient was recommended to undergo exertion through ambulation with prosthetics or use of her wheelchair to determine anginal pain.    Patient is a 80yo F with PMH significant for CAD s/p CABG 2004, HTN, HLD, IDDM2, and severe PAD s/p b/l LE AKA who presented with chest pain. She was admitted for NSTEMI and seen by cardiology, with troponin peaking and trending down. Hemoglobin was also decreased from her baseline, and she received PRBC transfusion with improvement. Hemoglobin remained stable and she had no evidence of acute or active bleeding.    TTE demonstrated HFrEF with LVEF 40-45% and inferior and inferolateral WMAs. Recent outpatient nuclear stress test demonstrated scar and hypokinesis of the RCA territory. Prior cath had shown  of the LAD and PCA, tight OM lesion that was stented with a BMS. LIMA-LAD graft patent but other vein grafts chronically occluded.     LHC and repeat NST was deferred. Patient was recommended to undergo exertion through ambulation with prosthetics or use of her wheelchair to determine anginal pain. She demonstrated no evidence of angina with exertion in her wheelchair. Cardiology recommended no inpatient ischemic evaluation. She will need to follow up within 1 week with her cardiologist Dr Rosalio Howell.    She will need an outpatient evaluation for sources of possible bleeding.    She is hemodynamically stable and medically ready for discharge to home.

## 2024-12-05 ENCOUNTER — NON-APPOINTMENT (OUTPATIENT)
Age: 79
End: 2024-12-05

## 2024-12-05 LAB
ANION GAP SERPL CALC-SCNC: 12 MMOL/L — SIGNIFICANT CHANGE UP (ref 7–14)
BUN SERPL-MCNC: 26 MG/DL — HIGH (ref 7–23)
CALCIUM SERPL-MCNC: 8.7 MG/DL — SIGNIFICANT CHANGE UP (ref 8.4–10.5)
CHLORIDE SERPL-SCNC: 111 MMOL/L — HIGH (ref 98–107)
CO2 SERPL-SCNC: 21 MMOL/L — LOW (ref 22–31)
CREAT SERPL-MCNC: 1.05 MG/DL — SIGNIFICANT CHANGE UP (ref 0.5–1.3)
EGFR: 55 ML/MIN/1.73M2 — LOW
GLUCOSE BLDC GLUCOMTR-MCNC: 214 MG/DL — HIGH (ref 70–99)
GLUCOSE BLDC GLUCOMTR-MCNC: 220 MG/DL — HIGH (ref 70–99)
GLUCOSE BLDC GLUCOMTR-MCNC: 236 MG/DL — HIGH (ref 70–99)
GLUCOSE BLDC GLUCOMTR-MCNC: 277 MG/DL — HIGH (ref 70–99)
GLUCOSE SERPL-MCNC: 207 MG/DL — HIGH (ref 70–99)
HCT VFR BLD CALC: 27.8 % — LOW (ref 34.5–45)
HGB BLD-MCNC: 8.9 G/DL — LOW (ref 11.5–15.5)
MAGNESIUM SERPL-MCNC: 1.8 MG/DL — SIGNIFICANT CHANGE UP (ref 1.6–2.6)
MCHC RBC-ENTMCNC: 29.9 PG — SIGNIFICANT CHANGE UP (ref 27–34)
MCHC RBC-ENTMCNC: 32 G/DL — SIGNIFICANT CHANGE UP (ref 32–36)
MCV RBC AUTO: 93.3 FL — SIGNIFICANT CHANGE UP (ref 80–100)
NRBC # BLD: 0 /100 WBCS — SIGNIFICANT CHANGE UP (ref 0–0)
NRBC # FLD: 0 K/UL — SIGNIFICANT CHANGE UP (ref 0–0)
PHOSPHATE SERPL-MCNC: 3.5 MG/DL — SIGNIFICANT CHANGE UP (ref 2.5–4.5)
PLATELET # BLD AUTO: 210 K/UL — SIGNIFICANT CHANGE UP (ref 150–400)
POTASSIUM SERPL-MCNC: 4.3 MMOL/L — SIGNIFICANT CHANGE UP (ref 3.5–5.3)
POTASSIUM SERPL-SCNC: 4.3 MMOL/L — SIGNIFICANT CHANGE UP (ref 3.5–5.3)
RBC # BLD: 2.98 M/UL — LOW (ref 3.8–5.2)
RBC # FLD: 15.8 % — HIGH (ref 10.3–14.5)
SODIUM SERPL-SCNC: 144 MMOL/L — SIGNIFICANT CHANGE UP (ref 135–145)
WBC # BLD: 9.78 K/UL — SIGNIFICANT CHANGE UP (ref 3.8–10.5)
WBC # FLD AUTO: 9.78 K/UL — SIGNIFICANT CHANGE UP (ref 3.8–10.5)

## 2024-12-05 PROCEDURE — 99233 SBSQ HOSP IP/OBS HIGH 50: CPT

## 2024-12-05 RX ORDER — CARVEDILOL 25 MG/1
6.25 TABLET, FILM COATED ORAL EVERY 12 HOURS
Refills: 0 | Status: DISCONTINUED | OUTPATIENT
Start: 2024-12-05 | End: 2024-12-06

## 2024-12-05 RX ORDER — LOSARTAN POTASSIUM 100 MG/1
25 TABLET, FILM COATED ORAL DAILY
Refills: 0 | Status: DISCONTINUED | OUTPATIENT
Start: 2024-12-05 | End: 2024-12-06

## 2024-12-05 RX ORDER — INSULIN GLARGINE 100 [IU]/ML
25 INJECTION, SOLUTION SUBCUTANEOUS AT BEDTIME
Refills: 0 | Status: DISCONTINUED | OUTPATIENT
Start: 2024-12-05 | End: 2024-12-06

## 2024-12-05 RX ADMIN — Medication 6: at 16:00

## 2024-12-05 RX ADMIN — Medication 4: at 08:50

## 2024-12-05 RX ADMIN — ENOXAPARIN SODIUM 40 MILLIGRAM(S): 30 INJECTION SUBCUTANEOUS at 18:53

## 2024-12-05 RX ADMIN — Medication 5 UNIT(S): at 16:00

## 2024-12-05 RX ADMIN — CLOPIDOGREL 75 MILLIGRAM(S): 75 TABLET, FILM COATED ORAL at 13:47

## 2024-12-05 RX ADMIN — CARVEDILOL 6.25 MILLIGRAM(S): 25 TABLET, FILM COATED ORAL at 18:53

## 2024-12-05 RX ADMIN — CHLORHEXIDINE GLUCONATE 1 APPLICATION(S): 1.2 RINSE ORAL at 13:49

## 2024-12-05 RX ADMIN — INSULIN GLARGINE 25 UNIT(S): 100 INJECTION, SOLUTION SUBCUTANEOUS at 22:24

## 2024-12-05 RX ADMIN — Medication 3 UNIT(S): at 08:50

## 2024-12-05 RX ADMIN — Medication 40 MILLIGRAM(S): at 21:40

## 2024-12-05 RX ADMIN — Medication 4: at 12:45

## 2024-12-05 RX ADMIN — CARVEDILOL 3.12 MILLIGRAM(S): 25 TABLET, FILM COATED ORAL at 05:26

## 2024-12-05 RX ADMIN — Medication 81 MILLIGRAM(S): at 13:47

## 2024-12-05 NOTE — PROGRESS NOTE ADULT - SKIN
Prior Authorization Form:      DEMOGRAPHICS:                     Patient Name:  Isabell Jim  Patient :  1978            Insurance:  Payor: [employer] / Plan: [place] / Product Type: *No Product type* /   Insurance ID Number:    Payer/Plan Subscr  Sex Relation Sub. Ins. ID Effective Group Num   1.  [id number] [birth year]1966 Male Spouse [id number]                                   [address]         DIAGNOSIS & PROCEDURE:                       Procedure/Operation: EGD           CPT Code: 18783    Diagnosis:  EPIGASTRIC PAIN, DIARRHEA     ICD10 Code: R10.13, R19.7    Location:  [address] [place] [address]    Surgeon:  Smith Brandon INFORMATION:                          Date: 10/31/23    Time: 930AM              Anesthesia:  MAC/TIVA                                                       Status:  Outpatient        Special Comments:         Electronically signed by Humberto Hinson MA on 2023 at 3:53 PM
warm and dry
warm and dry

## 2024-12-05 NOTE — PROGRESS NOTE ADULT - PROBLEM SELECTOR PLAN 1
Appreciate cardiology recommendations. Given up trending cardiac enzymes and EKG findings, will cont. treatment. Pt currently chest pain free. Given DAPT load. 08/2024 Stress TTE with EF 55%, relatively normal function w/o wall motion abnormalities    - Cont. asa, Brilinta and atorvastatin  - s/p heparin gtt  - Telemetry monitoring  - TTE: LVEF 40-45%, grade 1 diastolic dysfunction  - Trend cardiac enzymes  - titrating up carvedilol  - ACE/ARBs per cardiology  - cardiology following - limited utility of NST and high threshold for LHC in patient with significant prior stenoses, would prefer to have patient exert herself and treat with medical management for symptoms  - plan to have patient exert herself with PT now that wheelchair and prostheses at bedside Appreciate cardiology recommendations. Given up trending cardiac enzymes and EKG findings, will cont. treatment. Pt currently chest pain free. Given DAPT load. 08/2024 Stress TTE with EF 55%, relatively normal function w/o wall motion abnormalities    - Cont. asa, Brilinta and atorvastatin  - s/p heparin gtt  - Telemetry monitoring  - TTE: LVEF 40-45%, grade 1 diastolic dysfunction  - Trend cardiac enzymes  - titrating up carvedilol  - ACE/ARBs per cardiology  - cardiology following - limited utility of NST and high threshold for LHC in patient with significant prior stenoses, would prefer to have patient exert herself and treat with medical management for symptoms  - plan to have patient exert herself with PT now that wheelchair and prostheses at bedside    Outpatient cardiologist Dr Howell aware, would like patient to follow up with him within 1 week of discharge. His office will help with transition of care.

## 2024-12-05 NOTE — PHYSICAL THERAPY INITIAL EVALUATION ADULT - MANUAL MUSCLE TESTING RESULTS, REHAB EVAL
Patients bilateral upper extremity strength grossly 5/5, bilateral hip strength grossly 3/5 upon MMT and functional assessment

## 2024-12-05 NOTE — PHYSICAL THERAPY INITIAL EVALUATION ADULT - RANGE OF MOTION EXAMINATION, REHAB EVAL
right hip mobility, WFL, right BKA, left AKA/bilateral upper extremity ROM was WNL (within normal limits)

## 2024-12-05 NOTE — PHYSICAL THERAPY INITIAL EVALUATION ADULT - GENERAL OBSERVATIONS, REHAB EVAL
Patient found semi-reclined in bed NAD, A&Ox4, +tele monitor, OK for PT per RN Carolyn, wheelchair and right lower extremity prosthetic at bedside, HR 70, denies chest pain throughout session

## 2024-12-05 NOTE — PHYSICAL THERAPY INITIAL EVALUATION ADULT - IMPAIRED TRANSFERS: BED/CHAIR, REHAB EVAL
patient denies chest pain and or dyspnea on exertion while completing bed to wheelchair transfers, HR ~70-83 throughout,/impaired balance/decreased flexibility/decreased ROM/decreased strength

## 2024-12-05 NOTE — PHYSICAL THERAPY INITIAL EVALUATION ADULT - NSPTDISCHREC_GEN_A_CORE
home with home PT services to address current functional limitations to optimize safety within the home environment to improve transfers/Home PT

## 2024-12-05 NOTE — PHYSICAL THERAPY INITIAL EVALUATION ADULT - PERTINENT HX OF CURRENT PROBLEM, REHAB EVAL
Patient is a 79 year old female with history of severe PAD with right and left AKA presenting for chest pain for 1 day found to have NSTEMI and acute anemia. Pt currently chest pain free. 08/2024 Stress TTE with EF 55%, relatively normal function w/o wall motion abnormalities. Per cardiology,  limited utility of nuclear stress test and high threshold for left heart catheterization in patient with significant prior stenoses, would prefer to have patient exert herself and treat with medical management for symptoms.

## 2024-12-05 NOTE — PROGRESS NOTE ADULT - TIME BILLING
Time-based billing (NON-critical care).     50 minutes spent on total encounter; more than 50% of the visit was spent counseling and / or coordinating care by the attending physician.  The necessity of the time spent during the encounter on this date of service was due to:     review of laboratory data, radiology results, consultants' recommendations, documentation in [place], discussion with patient/daughter Time-based billing (NON-critical care).     50 minutes spent on total encounter; more than 50% of the visit was spent counseling and / or coordinating care by the attending physician.  The necessity of the time spent during the encounter on this date of service was due to:     review of laboratory data, radiology results, consultants' recommendations, documentation in [place], discussion with patient/daughter, outpatient cardiologist

## 2024-12-05 NOTE — PROGRESS NOTE ADULT - ASSESSMENT
79F PMH CAD/CABG (2003), hypertension, hyperlipidemia, type 2 diabetes mellitus with right and left AKA presenting for chest pain for 1 day with t wave inversions in lateral leads and elevated troponin to 96 to 191 concerning for NSTEMI.  Patient currently chest pain free    Patient w/ hx of CAD s/p ?3v CABG ~20 years ago  On available cath reports, only MOMIN-LAD graft was mentioned to be patent which suggests that other vein grafts are chronically occluded  Most recent C with  of the LAD and RCA, and tight OM lesion that was stented with a BMS    Suspect that patient's current presentation related to supply-demand mismatch from anemia in the setting of known severe CAD  Recent outpatient nuclear stress test with scar and hypokinesis in the RCA territory, consistent with TTE here with EF 40-45% w/ inferior/inferolateral WMA    - workup of anemia per primary team  - continue ASA, plavix 75mg qd  - increase coreg to 6.25mg BID  - please assess for anginal symptoms with ambulation/PT

## 2024-12-06 ENCOUNTER — TRANSCRIPTION ENCOUNTER (OUTPATIENT)
Age: 79
End: 2024-12-06

## 2024-12-06 VITALS
RESPIRATION RATE: 18 BRPM | DIASTOLIC BLOOD PRESSURE: 60 MMHG | SYSTOLIC BLOOD PRESSURE: 131 MMHG | OXYGEN SATURATION: 100 % | HEART RATE: 72 BPM | TEMPERATURE: 99 F

## 2024-12-06 LAB
GLUCOSE BLDC GLUCOMTR-MCNC: 200 MG/DL — HIGH (ref 70–99)
GLUCOSE BLDC GLUCOMTR-MCNC: 221 MG/DL — HIGH (ref 70–99)

## 2024-12-06 PROCEDURE — 99239 HOSP IP/OBS DSCHRG MGMT >30: CPT

## 2024-12-06 PROCEDURE — 99233 SBSQ HOSP IP/OBS HIGH 50: CPT

## 2024-12-06 RX ORDER — INSULIN GLARGINE 100 [IU]/ML
28 INJECTION, SOLUTION SUBCUTANEOUS AT BEDTIME
Refills: 0 | Status: DISCONTINUED | OUTPATIENT
Start: 2024-12-06 | End: 2024-12-06

## 2024-12-06 RX ORDER — ISOSORBIDE MONONITRATE 10 MG
30 TABLET ORAL DAILY
Refills: 0 | Status: DISCONTINUED | OUTPATIENT
Start: 2024-12-06 | End: 2024-12-06

## 2024-12-06 RX ORDER — CLOPIDOGREL 75 MG/1
1 TABLET, FILM COATED ORAL
Qty: 14 | Refills: 0
Start: 2024-12-06 | End: 2024-12-19

## 2024-12-06 RX ORDER — CLOPIDOGREL 75 MG/1
1 TABLET, FILM COATED ORAL
Qty: 0 | Refills: 0 | DISCHARGE
Start: 2024-12-06

## 2024-12-06 RX ORDER — ISOSORBIDE MONONITRATE 10 MG
1 TABLET ORAL
Qty: 14 | Refills: 0
Start: 2024-12-06 | End: 2024-12-19

## 2024-12-06 RX ORDER — CARVEDILOL 25 MG/1
1 TABLET, FILM COATED ORAL
Qty: 28 | Refills: 0
Start: 2024-12-06 | End: 2024-12-19

## 2024-12-06 RX ORDER — LOSARTAN POTASSIUM 100 MG/1
1 TABLET, FILM COATED ORAL
Qty: 14 | Refills: 0
Start: 2024-12-06 | End: 2024-12-19

## 2024-12-06 RX ORDER — LOSARTAN POTASSIUM 100 MG/1
1 TABLET, FILM COATED ORAL
Refills: 0 | DISCHARGE

## 2024-12-06 RX ADMIN — Medication 8 UNIT(S): at 12:40

## 2024-12-06 RX ADMIN — Medication 4: at 12:41

## 2024-12-06 RX ADMIN — Medication 2: at 09:00

## 2024-12-06 RX ADMIN — Medication 81 MILLIGRAM(S): at 12:44

## 2024-12-06 RX ADMIN — LOSARTAN POTASSIUM 25 MILLIGRAM(S): 100 TABLET, FILM COATED ORAL at 06:37

## 2024-12-06 RX ADMIN — CARVEDILOL 6.25 MILLIGRAM(S): 25 TABLET, FILM COATED ORAL at 06:37

## 2024-12-06 RX ADMIN — CLOPIDOGREL 75 MILLIGRAM(S): 75 TABLET, FILM COATED ORAL at 12:45

## 2024-12-06 RX ADMIN — Medication 30 MILLIGRAM(S): at 12:44

## 2024-12-06 RX ADMIN — Medication 5 UNIT(S): at 09:00

## 2024-12-06 RX ADMIN — CHLORHEXIDINE GLUCONATE 1 APPLICATION(S): 1.2 RINSE ORAL at 12:41

## 2024-12-06 NOTE — DISCHARGE NOTE NURSING/CASE MANAGEMENT/SOCIAL WORK - NSDCPEFALRISK_GEN_ALL_CORE
For information on Fall & Injury Prevention, visit: [email or web address] OR  [email or web address] OR  https://www.cdc.gov/steadi/patient.html

## 2024-12-06 NOTE — PROGRESS NOTE ADULT - PROBLEM SELECTOR PLAN 6
- Cont. atorvastatin QHS
never

## 2024-12-06 NOTE — PROGRESS NOTE ADULT - CARDIOVASCULAR
regular rate and rhythm/S1 S2 present

## 2024-12-06 NOTE — PROGRESS NOTE ADULT - RESPIRATORY
clear to auscultation bilaterally/no wheezes/no rales/no rhonchi

## 2024-12-06 NOTE — PROGRESS NOTE ADULT - MUSCULOSKELETAL
ROM intact/strength 5/5 bilateral upper extremities
ROM intact/strength 5/5 bilateral upper extremities
strength 5/5 bilateral upper extremities/extremities exam
ROM intact/strength 5/5 bilateral upper extremities
strength 5/5 bilateral upper extremities

## 2024-12-06 NOTE — PROGRESS NOTE ADULT - EYES
PERRL/EOMI/conjunctiva clear/non icteric sclera
PERRL/EOMI/conjunctiva clear/normal
PERRL/EOMI/conjunctiva clear/normal
PERRL/EOMI/conjunctiva clear/non icteric sclera

## 2024-12-06 NOTE — PROGRESS NOTE ADULT - NEUROLOGICAL
sensation intact/responds to pain

## 2024-12-06 NOTE — DISCHARGE NOTE NURSING/CASE MANAGEMENT/SOCIAL WORK - NSDCFUADDAPPT_GEN_ALL_CORE_FT
Please follow up with your cardiologist Dr Rosalio Howell within 1 week of discharge. Please call his office at [phone] to help schedule a sooner appointment.

## 2024-12-06 NOTE — PROGRESS NOTE ADULT - PROBLEM SELECTOR PROBLEM 7
Peripheral artery disease

## 2024-12-06 NOTE — PROGRESS NOTE ADULT - PSYCHIATRIC
normal/normal affect/alert and oriented x3/normal behavior/depressed/flat affect

## 2024-12-06 NOTE — PROGRESS NOTE ADULT - PROVIDER SPECIALTY LIST ADULT
Cardiology
Cardiology
Hospitalist
Cardiology
Cardiology
Hospitalist

## 2024-12-06 NOTE — PROGRESS NOTE ADULT - SUBJECTIVE AND OBJECTIVE BOX
Cardiology Progress Note  ------------------------------------------------------------------------------------------  SUBJECTIVE:   - No events overnight.  - Denies CP, SOB  - worked with PT yesterday- denied any chest pain, SOB, palpitions  -------------------------------------------------------------------------------------------    VS:  T(F): 97.4 (), Max: 97.8 ()  HR: 72 () (70 - 72)  BP: 124/56 () (124/56 - 144/62)  RR: 18 ()  SpO2: 98% ()  I&O's Summary    02 Dec 2024 07:01  -  03 Dec 2024 07:00  --------------------------------------------------------  IN: 0 mL / OUT: 3550 mL / NET: -3550 mL      Appearance: No Acute Distress	  HEENT:  Normal oral mucosa, PERRL, EOMI	  Cardiovascular: Normal S1 S2, No JVD, No murmurs/rubs/gallops  Respiratory: Lungs clear to auscultation bilaterally  Gastrointestinal:  Soft, Non-tender, + BS	  Skin: No rashes, No ecchymoses, No cyanosis	  Neurologic: Non-focal  Extremities: bilat aka  Vascular: Peripheral pulses palpable 2+ bilaterally  Psychiatry: A & O x 3, Mood & affect appropriate    -------------------------------------------------------------------------------------------  LABS:                          8.7    9.42  )-----------( 237      ( 03 Dec 2024 05:20 )             26.2     1202    137  |  104  |  60[H]  ----------------------------<  256[H]  4.8   |  22  |  1.25    Ca    9.0      02 Dec 2024 04:20  Mg     2.10     12-02    TPro  6.6  /  Alb  3.6  /  TBili  <0.2  /  DBili  x   /  AST  35[H]  /  ALT  21  /  AlkPhos  69  12-02    PT/INR - ( 01 Dec 2024 22:18 )   PT: 11.0 sec;   INR: 0.95 ratio         PTT - ( 03 Dec 2024 05:20 )  PTT:75.5 sec  CARDIAC MARKERS ( 03 Dec 2024 05:20 )  251 ng/L / x     / x     / x     / x     / 6.6 ng/mL  CARDIAC MARKERS ( 02 Dec 2024 22:00 )  312 ng/L / x     / x     / x     / x     / 9.2 ng/mL  CARDIAC MARKERS ( 02 Dec 2024 14:22 )  343 ng/L / x     / x     / x     / x     / 12.9 ng/mL  CARDIAC MARKERS ( 02 Dec 2024 04:20 )  281 ng/L / x     / x     / x     / x     / 16.7 ng/mL  CARDIAC MARKERS ( 01 Dec 2024 22:23 )  257 ng/L / x     / x     / x     / x     / 18.8 ng/mL      Total Cholesterol: 95  LDL: --  HDL: 26  T        -------------------------------------------------------------------------------------------  Meds:  acetaminophen     Tablet .. 650 milliGRAM(s) Oral every 6 hours PRN  aspirin enteric coated 81 milliGRAM(s) Oral daily  atorvastatin 40 milliGRAM(s) Oral at bedtime  dextrose 5%. 1000 milliLiter(s) IV Continuous <Continuous>  dextrose 5%. 1000 milliLiter(s) IV Continuous <Continuous>  dextrose 50% Injectable 25 Gram(s) IV Push once  dextrose 50% Injectable 12.5 Gram(s) IV Push once  dextrose 50% Injectable 25 Gram(s) IV Push once  dextrose Oral Gel 15 Gram(s) Oral once PRN  glucagon  Injectable 1 milliGRAM(s) IntraMuscular once  heparin   Injectable 4400 Unit(s) IV Push every 6 hours PRN  heparin  Infusion.  Unit(s)/Hr IV Continuous <Continuous>  insulin glargine Injectable (LANTUS) 15 Unit(s) SubCutaneous at bedtime  insulin lispro (ADMELOG) corrective regimen sliding scale   SubCutaneous every 6 hours  melatonin 3 milliGRAM(s) Oral at bedtime PRN  ticagrelor 90 milliGRAM(s) Oral every 12 hours    -------------------------------------------------------------------------------------------  
Cardiology Progress Note  ------------------------------------------------------------------------------------------  SUBJECTIVE:     - No events overnight. Denies CP, SOB or Palpitations.   -------------------------------------------------------------------------------------------    VS:  T(F): 97.4 (), Max: 97.8 ()  HR: 72 () (70 - 72)  BP: 124/56 () (124/56 - 144/62)  RR: 18 ()  SpO2: 98% ()  I&O's Summary    02 Dec 2024 07:01  -  03 Dec 2024 07:00  --------------------------------------------------------  IN: 0 mL / OUT: 3550 mL / NET: -3550 mL      Appearance: No Acute Distress	  HEENT:  Normal oral mucosa, PERRL, EOMI	  Cardiovascular: Normal S1 S2, No JVD, No murmurs/rubs/gallops  Respiratory: Lungs clear to auscultation bilaterally  Gastrointestinal:  Soft, Non-tender, + BS	  Skin: No rashes, No ecchymoses, No cyanosis	  Neurologic: Non-focal  Extremities: bilat aka  Vascular: Peripheral pulses palpable 2+ bilaterally  Psychiatry: A & O x 3, Mood & affect appropriate    -------------------------------------------------------------------------------------------  LABS:                          8.7    9.42  )-----------( 237      ( 03 Dec 2024 05:20 )             26.2     12-02    137  |  104  |  60[H]  ----------------------------<  256[H]  4.8   |  22  |  1.25    Ca    9.0      02 Dec 2024 04:20  Mg     2.10     12-02    TPro  6.6  /  Alb  3.6  /  TBili  <0.2  /  DBili  x   /  AST  35[H]  /  ALT  21  /  AlkPhos  69  12-02    PT/INR - ( 01 Dec 2024 22:18 )   PT: 11.0 sec;   INR: 0.95 ratio         PTT - ( 03 Dec 2024 05:20 )  PTT:75.5 sec  CARDIAC MARKERS ( 03 Dec 2024 05:20 )  251 ng/L / x     / x     / x     / x     / 6.6 ng/mL  CARDIAC MARKERS ( 02 Dec 2024 22:00 )  312 ng/L / x     / x     / x     / x     / 9.2 ng/mL  CARDIAC MARKERS ( 02 Dec 2024 14:22 )  343 ng/L / x     / x     / x     / x     / 12.9 ng/mL  CARDIAC MARKERS ( 02 Dec 2024 04:20 )  281 ng/L / x     / x     / x     / x     / 16.7 ng/mL  CARDIAC MARKERS ( 01 Dec 2024 22:23 )  257 ng/L / x     / x     / x     / x     / 18.8 ng/mL      Total Cholesterol: 95  LDL: --  HDL: 26  T        -------------------------------------------------------------------------------------------  Meds:  acetaminophen     Tablet .. 650 milliGRAM(s) Oral every 6 hours PRN  aspirin enteric coated 81 milliGRAM(s) Oral daily  atorvastatin 40 milliGRAM(s) Oral at bedtime  dextrose 5%. 1000 milliLiter(s) IV Continuous <Continuous>  dextrose 5%. 1000 milliLiter(s) IV Continuous <Continuous>  dextrose 50% Injectable 25 Gram(s) IV Push once  dextrose 50% Injectable 12.5 Gram(s) IV Push once  dextrose 50% Injectable 25 Gram(s) IV Push once  dextrose Oral Gel 15 Gram(s) Oral once PRN  glucagon  Injectable 1 milliGRAM(s) IntraMuscular once  heparin   Injectable 4400 Unit(s) IV Push every 6 hours PRN  heparin  Infusion.  Unit(s)/Hr IV Continuous <Continuous>  insulin glargine Injectable (LANTUS) 15 Unit(s) SubCutaneous at bedtime  insulin lispro (ADMELOG) corrective regimen sliding scale   SubCutaneous every 6 hours  melatonin 3 milliGRAM(s) Oral at bedtime PRN  ticagrelor 90 milliGRAM(s) Oral every 12 hours    -------------------------------------------------------------------------------------------  
Cardiology Progress Note  ------------------------------------------------------------------------------------------  SUBJECTIVE:   - No events overnight.  - Brief episode of chest pain last night ~10pm at rest, resolved spontaneously  -------------------------------------------------------------------------------------------    VS:  T(F): 97.4 (), Max: 97.8 ()  HR: 72 () (70 - 72)  BP: 124/56 () (124/56 - 144/62)  RR: 18 ()  SpO2: 98% ()  I&O's Summary    02 Dec 2024 07:01  -  03 Dec 2024 07:00  --------------------------------------------------------  IN: 0 mL / OUT: 3550 mL / NET: -3550 mL      Appearance: No Acute Distress	  HEENT:  Normal oral mucosa, PERRL, EOMI	  Cardiovascular: Normal S1 S2, No JVD, No murmurs/rubs/gallops  Respiratory: Lungs clear to auscultation bilaterally  Gastrointestinal:  Soft, Non-tender, + BS	  Skin: No rashes, No ecchymoses, No cyanosis	  Neurologic: Non-focal  Extremities: bilat aka  Vascular: Peripheral pulses palpable 2+ bilaterally  Psychiatry: A & O x 3, Mood & affect appropriate    -------------------------------------------------------------------------------------------  LABS:                          8.7    9.42  )-----------( 237      ( 03 Dec 2024 05:20 )             26.2     12-02    137  |  104  |  60[H]  ----------------------------<  256[H]  4.8   |  22  |  1.25    Ca    9.0      02 Dec 2024 04:20  Mg     2.10     12-02    TPro  6.6  /  Alb  3.6  /  TBili  <0.2  /  DBili  x   /  AST  35[H]  /  ALT  21  /  AlkPhos  69  12-02    PT/INR - ( 01 Dec 2024 22:18 )   PT: 11.0 sec;   INR: 0.95 ratio         PTT - ( 03 Dec 2024 05:20 )  PTT:75.5 sec  CARDIAC MARKERS ( 03 Dec 2024 05:20 )  251 ng/L / x     / x     / x     / x     / 6.6 ng/mL  CARDIAC MARKERS ( 02 Dec 2024 22:00 )  312 ng/L / x     / x     / x     / x     / 9.2 ng/mL  CARDIAC MARKERS ( 02 Dec 2024 14:22 )  343 ng/L / x     / x     / x     / x     / 12.9 ng/mL  CARDIAC MARKERS ( 02 Dec 2024 04:20 )  281 ng/L / x     / x     / x     / x     / 16.7 ng/mL  CARDIAC MARKERS ( 01 Dec 2024 22:23 )  257 ng/L / x     / x     / x     / x     / 18.8 ng/mL      Total Cholesterol: 95  LDL: --  HDL: 26  T        -------------------------------------------------------------------------------------------  Meds:  acetaminophen     Tablet .. 650 milliGRAM(s) Oral every 6 hours PRN  aspirin enteric coated 81 milliGRAM(s) Oral daily  atorvastatin 40 milliGRAM(s) Oral at bedtime  dextrose 5%. 1000 milliLiter(s) IV Continuous <Continuous>  dextrose 5%. 1000 milliLiter(s) IV Continuous <Continuous>  dextrose 50% Injectable 25 Gram(s) IV Push once  dextrose 50% Injectable 12.5 Gram(s) IV Push once  dextrose 50% Injectable 25 Gram(s) IV Push once  dextrose Oral Gel 15 Gram(s) Oral once PRN  glucagon  Injectable 1 milliGRAM(s) IntraMuscular once  heparin   Injectable 4400 Unit(s) IV Push every 6 hours PRN  heparin  Infusion.  Unit(s)/Hr IV Continuous <Continuous>  insulin glargine Injectable (LANTUS) 15 Unit(s) SubCutaneous at bedtime  insulin lispro (ADMELOG) corrective regimen sliding scale   SubCutaneous every 6 hours  melatonin 3 milliGRAM(s) Oral at bedtime PRN  ticagrelor 90 milliGRAM(s) Oral every 12 hours    -------------------------------------------------------------------------------------------  
Cardiology Progress Note  ------------------------------------------------------------------------------------------  SUBJECTIVE:   - No events overnight.  - Denies CP, SOB  - has not been able to ambulate or work with PT yet  -------------------------------------------------------------------------------------------    VS:  T(F): 97.4 (), Max: 97.8 ()  HR: 72 () (70 - 72)  BP: 124/56 () (124/56 - 144/62)  RR: 18 ()  SpO2: 98% ()  I&O's Summary    02 Dec 2024 07:01  -  03 Dec 2024 07:00  --------------------------------------------------------  IN: 0 mL / OUT: 3550 mL / NET: -3550 mL      Appearance: No Acute Distress	  HEENT:  Normal oral mucosa, PERRL, EOMI	  Cardiovascular: Normal S1 S2, No JVD, No murmurs/rubs/gallops  Respiratory: Lungs clear to auscultation bilaterally  Gastrointestinal:  Soft, Non-tender, + BS	  Skin: No rashes, No ecchymoses, No cyanosis	  Neurologic: Non-focal  Extremities: bilat aka  Vascular: Peripheral pulses palpable 2+ bilaterally  Psychiatry: A & O x 3, Mood & affect appropriate    -------------------------------------------------------------------------------------------  LABS:                          8.7    9.42  )-----------( 237      ( 03 Dec 2024 05:20 )             26.2     12-02    137  |  104  |  60[H]  ----------------------------<  256[H]  4.8   |  22  |  1.25    Ca    9.0      02 Dec 2024 04:20  Mg     2.10     12-02    TPro  6.6  /  Alb  3.6  /  TBili  <0.2  /  DBili  x   /  AST  35[H]  /  ALT  21  /  AlkPhos  69  12-02    PT/INR - ( 01 Dec 2024 22:18 )   PT: 11.0 sec;   INR: 0.95 ratio         PTT - ( 03 Dec 2024 05:20 )  PTT:75.5 sec  CARDIAC MARKERS ( 03 Dec 2024 05:20 )  251 ng/L / x     / x     / x     / x     / 6.6 ng/mL  CARDIAC MARKERS ( 02 Dec 2024 22:00 )  312 ng/L / x     / x     / x     / x     / 9.2 ng/mL  CARDIAC MARKERS ( 02 Dec 2024 14:22 )  343 ng/L / x     / x     / x     / x     / 12.9 ng/mL  CARDIAC MARKERS ( 02 Dec 2024 04:20 )  281 ng/L / x     / x     / x     / x     / 16.7 ng/mL  CARDIAC MARKERS ( 01 Dec 2024 22:23 )  257 ng/L / x     / x     / x     / x     / 18.8 ng/mL      Total Cholesterol: 95  LDL: --  HDL: 26  T        -------------------------------------------------------------------------------------------  Meds:  acetaminophen     Tablet .. 650 milliGRAM(s) Oral every 6 hours PRN  aspirin enteric coated 81 milliGRAM(s) Oral daily  atorvastatin 40 milliGRAM(s) Oral at bedtime  dextrose 5%. 1000 milliLiter(s) IV Continuous <Continuous>  dextrose 5%. 1000 milliLiter(s) IV Continuous <Continuous>  dextrose 50% Injectable 25 Gram(s) IV Push once  dextrose 50% Injectable 12.5 Gram(s) IV Push once  dextrose 50% Injectable 25 Gram(s) IV Push once  dextrose Oral Gel 15 Gram(s) Oral once PRN  glucagon  Injectable 1 milliGRAM(s) IntraMuscular once  heparin   Injectable 4400 Unit(s) IV Push every 6 hours PRN  heparin  Infusion.  Unit(s)/Hr IV Continuous <Continuous>  insulin glargine Injectable (LANTUS) 15 Unit(s) SubCutaneous at bedtime  insulin lispro (ADMELOG) corrective regimen sliding scale   SubCutaneous every 6 hours  melatonin 3 milliGRAM(s) Oral at bedtime PRN  ticagrelor 90 milliGRAM(s) Oral every 12 hours    -------------------------------------------------------------------------------------------  
[facility]  Cabrera Escobar DO  Available via MS Teams  In house pager [phone]    SUBJECTIVE / OVERNIGHT EVENTS:  No acute events overnight. Patient seen and examined at bedside this morning.  Denies chest pain, dyspnea, lightheadedness, dizziness, palpitations.    Worked with PT today and exerted herself in the wheelchair without anginal symptoms.    ADDITIONAL REVIEW OF SYSTEMS:    MEDICATIONS  (STANDING):  aspirin enteric coated 81 milliGRAM(s) Oral daily  atorvastatin 40 milliGRAM(s) Oral at bedtime  carvedilol 6.25 milliGRAM(s) Oral every 12 hours  chlorhexidine 2% Cloths 1 Application(s) Topical daily  clopidogrel Tablet 75 milliGRAM(s) Oral daily  dextrose 5%. 1000 milliLiter(s) (100 mL/Hr) IV Continuous <Continuous>  dextrose 5%. 1000 milliLiter(s) (50 mL/Hr) IV Continuous <Continuous>  dextrose 50% Injectable 25 Gram(s) IV Push once  dextrose 50% Injectable 12.5 Gram(s) IV Push once  dextrose 50% Injectable 25 Gram(s) IV Push once  enoxaparin Injectable 40 milliGRAM(s) SubCutaneous every 24 hours  glucagon  Injectable 1 milliGRAM(s) IntraMuscular once  insulin glargine Injectable (LANTUS) 28 Unit(s) SubCutaneous at bedtime  insulin lispro (ADMELOG) corrective regimen sliding scale   SubCutaneous three times a day before meals  insulin lispro (ADMELOG) corrective regimen sliding scale   SubCutaneous at bedtime  insulin lispro Injectable (ADMELOG) 8 Unit(s) SubCutaneous three times a day before meals  isosorbide   mononitrate ER Tablet (IMDUR) 30 milliGRAM(s) Oral daily  losartan 25 milliGRAM(s) Oral daily    MEDICATIONS  (PRN):  acetaminophen     Tablet .. 650 milliGRAM(s) Oral every 6 hours PRN Temp greater or equal to 38C (100.4F), Mild Pain (1 - 3)  dextrose Oral Gel 15 Gram(s) Oral once PRN Blood Glucose LESS THAN 70 milliGRAM(s)/deciliter  melatonin 3 milliGRAM(s) Oral at bedtime PRN Insomnia      I&O's Summary    05 Dec 2024 07:01  -  06 Dec 2024 07:00  --------------------------------------------------------  IN: 0 mL / OUT: 500 mL / NET: -500 mL        PHYSICAL EXAM:  Vital Signs Last 24 Hrs  T(C): 36.8 (06 Dec 2024 06:30), Max: 37.3 (05 Dec 2024 21:21)  T(F): 98.2 (06 Dec 2024 06:30), Max: 99.1 (05 Dec 2024 21:21)  HR: 61 (06 Dec 2024 06:30) (61 - 76)  BP: 129/52 (06 Dec 2024 06:30) (129/52 - 167/60)  BP(mean): --  RR: 18 (06 Dec 2024 06:30) (17 - 18)  SpO2: 100% (06 Dec 2024 06:30) (99% - 100%)    Parameters below as of 06 Dec 2024 06:30  Patient On (Oxygen Delivery Method): room air      LABS:                        8.9    9.78  )-----------( 210      ( 05 Dec 2024 06:26 )             27.8     12-05    144  |  111[H]  |  26[H]  ----------------------------<  207[H]  4.3   |  21[L]  |  1.05    Ca    8.7      05 Dec 2024 06:26  Phos  3.5     12-05  Mg     1.80     12-05            Urinalysis Basic - ( 05 Dec 2024 06:26 )    Color: x / Appearance: x / SG: x / pH: x  Gluc: 207 mg/dL / Ketone: x  / Bili: x / Urobili: x   Blood: x / Protein: x / Nitrite: x   Leuk Esterase: x / RBC: x / WBC x   Sq Epi: x / Non Sq Epi: x / Bacteria: x      
[facility]  Cabrera EscobarDO  Available via MS Teams  In house pager [phone]    SUBJECTIVE / OVERNIGHT EVENTS:  No acute events overnight. Patient seen and examined at bedside this morning.  No episodes of chest pain reported.  Wheelchair and prosthetics at bedside.    Spoke to patient's daughter Adrianna over the phone while at bedside. Discussed cardiology recommendations.   Plan for exertion to elicit if angina or anginal equivalents still present to determine titration of medical management with cardiology.    ADDITIONAL REVIEW OF SYSTEMS:    MEDICATIONS  (STANDING):  aspirin enteric coated 81 milliGRAM(s) Oral daily  atorvastatin 40 milliGRAM(s) Oral at bedtime  carvedilol 3.125 milliGRAM(s) Oral every 12 hours  chlorhexidine 2% Cloths 1 Application(s) Topical daily  clopidogrel Tablet 75 milliGRAM(s) Oral daily  dextrose 5%. 1000 milliLiter(s) (100 mL/Hr) IV Continuous <Continuous>  dextrose 5%. 1000 milliLiter(s) (50 mL/Hr) IV Continuous <Continuous>  dextrose 50% Injectable 25 Gram(s) IV Push once  dextrose 50% Injectable 12.5 Gram(s) IV Push once  dextrose 50% Injectable 25 Gram(s) IV Push once  enoxaparin Injectable 40 milliGRAM(s) SubCutaneous every 24 hours  glucagon  Injectable 1 milliGRAM(s) IntraMuscular once  insulin glargine Injectable (LANTUS) 22 Unit(s) SubCutaneous at bedtime  insulin lispro (ADMELOG) corrective regimen sliding scale   SubCutaneous three times a day before meals  insulin lispro (ADMELOG) corrective regimen sliding scale   SubCutaneous at bedtime  insulin lispro Injectable (ADMELOG) 3 Unit(s) SubCutaneous three times a day before meals    MEDICATIONS  (PRN):  acetaminophen     Tablet .. 650 milliGRAM(s) Oral every 6 hours PRN Temp greater or equal to 38C (100.4F), Mild Pain (1 - 3)  dextrose Oral Gel 15 Gram(s) Oral once PRN Blood Glucose LESS THAN 70 milliGRAM(s)/deciliter  melatonin 3 milliGRAM(s) Oral at bedtime PRN Insomnia      I&O's Summary    04 Dec 2024 07:01  -  05 Dec 2024 07:00  --------------------------------------------------------  IN: 480 mL / OUT: 1000 mL / NET: -520 mL        PHYSICAL EXAM:  Vital Signs Last 24 Hrs  T(C): 37.1 (05 Dec 2024 05:00), Max: 37.2 (04 Dec 2024 21:40)  T(F): 98.7 (05 Dec 2024 05:00), Max: 98.9 (04 Dec 2024 21:40)  HR: 70 (05 Dec 2024 05:00) (70 - 78)  BP: 127/56 (05 Dec 2024 05:00) (127/56 - 148/52)  BP(mean): --  RR: 18 (05 Dec 2024 05:00) (18 - 18)  SpO2: 100% (05 Dec 2024 05:00) (98% - 100%)    Parameters below as of 05 Dec 2024 05:00  Patient On (Oxygen Delivery Method): room air        LABS:                        8.9    9.78  )-----------( 210      ( 05 Dec 2024 06:26 )             27.8     12-05    144  |  111[H]  |  26[H]  ----------------------------<  207[H]  4.3   |  21[L]  |  1.05    Ca    8.7      05 Dec 2024 06:26  Phos  3.5     12-05  Mg     1.80     12-05            Urinalysis Basic - ( 05 Dec 2024 06:26 )    Color: x / Appearance: x / SG: x / pH: x  Gluc: 207 mg/dL / Ketone: x  / Bili: x / Urobili: x   Blood: x / Protein: x / Nitrite: x   Leuk Esterase: x / RBC: x / WBC x   Sq Epi: x / Non Sq Epi: x / Bacteria: x      
[facility] of [facility]  Cabrera Escobar DO  Available via MS Teams  In house pager [phone]    SUBJECTIVE / OVERNIGHT EVENTS:  No acute events overnight. Patient seen and examined at bedside this morning.  Reports feeling a tinge of pain in her chest again last night when trying to lift herself upward.    ADDITIONAL REVIEW OF SYSTEMS:    MEDICATIONS  (STANDING):  aspirin enteric coated 81 milliGRAM(s) Oral daily  atorvastatin 40 milliGRAM(s) Oral at bedtime  carvedilol 3.125 milliGRAM(s) Oral every 12 hours  chlorhexidine 2% Cloths 1 Application(s) Topical daily  clopidogrel Tablet 75 milliGRAM(s) Oral daily  dextrose 5%. 1000 milliLiter(s) (100 mL/Hr) IV Continuous <Continuous>  dextrose 5%. 1000 milliLiter(s) (50 mL/Hr) IV Continuous <Continuous>  dextrose 50% Injectable 25 Gram(s) IV Push once  dextrose 50% Injectable 12.5 Gram(s) IV Push once  dextrose 50% Injectable 25 Gram(s) IV Push once  enoxaparin Injectable 40 milliGRAM(s) SubCutaneous every 24 hours  glucagon  Injectable 1 milliGRAM(s) IntraMuscular once  insulin glargine Injectable (LANTUS) 15 Unit(s) SubCutaneous at bedtime  insulin lispro (ADMELOG) corrective regimen sliding scale   SubCutaneous at bedtime  insulin lispro (ADMELOG) corrective regimen sliding scale   SubCutaneous three times a day before meals    MEDICATIONS  (PRN):  acetaminophen     Tablet .. 650 milliGRAM(s) Oral every 6 hours PRN Temp greater or equal to 38C (100.4F), Mild Pain (1 - 3)  dextrose Oral Gel 15 Gram(s) Oral once PRN Blood Glucose LESS THAN 70 milliGRAM(s)/deciliter  melatonin 3 milliGRAM(s) Oral at bedtime PRN Insomnia      I&O's Summary    03 Dec 2024 07:01  -  04 Dec 2024 07:00  --------------------------------------------------------  IN: 600 mL / OUT: 1400 mL / NET: -800 mL        PHYSICAL EXAM:  Vital Signs Last 24 Hrs  T(C): 37.1 (04 Dec 2024 11:29), Max: 37.2 (03 Dec 2024 20:09)  T(F): 98.8 (04 Dec 2024 11:29), Max: 99 (03 Dec 2024 20:09)  HR: 70 (04 Dec 2024 11:29) (67 - 78)  BP: 125/50 (04 Dec 2024 11:29) (121/53 - 145/54)  BP(mean): --  RR: 18 (04 Dec 2024 11:29) (18 - 18)  SpO2: 100% (04 Dec 2024 11:29) (98% - 100%)    Parameters below as of 04 Dec 2024 11:29  Patient On (Oxygen Delivery Method): room air          LABS:                        8.4    9.50  )-----------( 209      ( 04 Dec 2024 06:25 )             27.3     12-04    141  |  109[H]  |  31[H]  ----------------------------<  222[H]  5.4[H]   |  22  |  1.04    Ca    8.4      04 Dec 2024 06:25  Phos  3.6     12-04  Mg     1.90     12-04      PTT - ( 03 Dec 2024 05:20 )  PTT:75.5 sec  CARDIAC MARKERS ( 03 Dec 2024 05:20 )  x     / x     / x     / x     / 6.6 ng/mL  CARDIAC MARKERS ( 02 Dec 2024 22:00 )  x     / x     / x     / x     / 9.2 ng/mL  CARDIAC MARKERS ( 02 Dec 2024 14:22 )  x     / x     / x     / x     / 12.9 ng/mL      Urinalysis Basic - ( 04 Dec 2024 06:25 )    Color: x / Appearance: x / SG: x / pH: x  Gluc: 222 mg/dL / Ketone: x  / Bili: x / Urobili: x   Blood: x / Protein: x / Nitrite: x   Leuk Esterase: x / RBC: x / WBC x   Sq Epi: x / Non Sq Epi: x / Bacteria: x          
[facility]  Cabrera Escobar DO  Available via MS Teams  In house pager [phone]    SUBJECTIVE / OVERNIGHT EVENTS:  No acute events overnight. Patient seen and examined at bedside this morning.  States no more chest pain since yesterday morning.     Had some mild left leg pain but now improved.    ADDITIONAL REVIEW OF SYSTEMS:    MEDICATIONS  (STANDING):  aspirin enteric coated 81 milliGRAM(s) Oral daily  atorvastatin 40 milliGRAM(s) Oral at bedtime  carvedilol 3.125 milliGRAM(s) Oral every 12 hours  dextrose 5%. 1000 milliLiter(s) (100 mL/Hr) IV Continuous <Continuous>  dextrose 5%. 1000 milliLiter(s) (50 mL/Hr) IV Continuous <Continuous>  dextrose 50% Injectable 25 Gram(s) IV Push once  dextrose 50% Injectable 12.5 Gram(s) IV Push once  dextrose 50% Injectable 25 Gram(s) IV Push once  glucagon  Injectable 1 milliGRAM(s) IntraMuscular once  insulin glargine Injectable (LANTUS) 15 Unit(s) SubCutaneous at bedtime  insulin lispro (ADMELOG) corrective regimen sliding scale   SubCutaneous every 6 hours  ticagrelor 90 milliGRAM(s) Oral every 12 hours    MEDICATIONS  (PRN):  acetaminophen     Tablet .. 650 milliGRAM(s) Oral every 6 hours PRN Temp greater or equal to 38C (100.4F), Mild Pain (1 - 3)  dextrose Oral Gel 15 Gram(s) Oral once PRN Blood Glucose LESS THAN 70 milliGRAM(s)/deciliter  melatonin 3 milliGRAM(s) Oral at bedtime PRN Insomnia      I&O's Summary    02 Dec 2024 07:01  -  03 Dec 2024 07:00  --------------------------------------------------------  IN: 0 mL / OUT: 3550 mL / NET: -3550 mL        PHYSICAL EXAM:  Vital Signs Last 24 Hrs  T(C): 36.3 (03 Dec 2024 12:00), Max: 36.6 (02 Dec 2024 15:00)  T(F): 97.3 (03 Dec 2024 12:00), Max: 97.8 (02 Dec 2024 15:00)  HR: 72 (03 Dec 2024 12:00) (70 - 72)  BP: 147/57 (03 Dec 2024 12:00) (124/56 - 147/57)  BP(mean): --  RR: 18 (03 Dec 2024 12:00) (18 - 18)  SpO2: 100% (03 Dec 2024 12:00) (98% - 100%)    Parameters below as of 03 Dec 2024 12:00  Patient On (Oxygen Delivery Method): room air          LABS:                        8.7    9.42  )-----------( 237      ( 03 Dec 2024 05:20 )             26.2     12-02    137  |  104  |  60[H]  ----------------------------<  256[H]  4.8   |  22  |  1.25    Ca    9.0      02 Dec 2024 04:20  Mg     2.10     12-02    TPro  6.6  /  Alb  3.6  /  TBili  <0.2  /  DBili  x   /  AST  35[H]  /  ALT  21  /  AlkPhos  69  12-02    PT/INR - ( 01 Dec 2024 22:18 )   PT: 11.0 sec;   INR: 0.95 ratio         PTT - ( 03 Dec 2024 05:20 )  PTT:75.5 sec  CARDIAC MARKERS ( 03 Dec 2024 05:20 )  x     / x     / x     / x     / 6.6 ng/mL  CARDIAC MARKERS ( 02 Dec 2024 22:00 )  x     / x     / x     / x     / 9.2 ng/mL  CARDIAC MARKERS ( 02 Dec 2024 14:22 )  x     / x     / x     / x     / 12.9 ng/mL  CARDIAC MARKERS ( 02 Dec 2024 04:20 )  x     / x     / x     / x     / 16.7 ng/mL  CARDIAC MARKERS ( 01 Dec 2024 22:23 )  x     / x     / x     / x     / 18.8 ng/mL  CARDIAC MARKERS ( 01 Dec 2024 18:30 )  x     / x     / x     / x     / 14.2 ng/mL  CARDIAC MARKERS ( 01 Dec 2024 17:17 )  x     / x     / x     / x     / 10.2 ng/mL      Urinalysis Basic - ( 02 Dec 2024 04:20 )    Color: x / Appearance: x / SG: x / pH: x  Gluc: 256 mg/dL / Ketone: x  / Bili: x / Urobili: x   Blood: x / Protein: x / Nitrite: x   Leuk Esterase: x / RBC: x / WBC x   Sq Epi: x / Non Sq Epi: x / Bacteria: x      
[facility] Division of [facility] Medicine  Cabrera EscobarDO  Available via MS Teams  In house pager [phone]    SUBJECTIVE / OVERNIGHT EVENTS:  Admitted overnight, no acute events in interim. Seen and examined at bedside this morning. Daughter at bedside.  Patient reports having a similar tightness in her chest substernally radiating to the left arm again this morning, since subsided. Very short, lasting only a few seconds.     States her CABG was done 12/3/2004.  Daughter reports amputations of b/l legs (AKA) occurred due to diabetic foot infections.    ADDITIONAL REVIEW OF SYSTEMS:    MEDICATIONS  (STANDING):  aspirin enteric coated 81 milliGRAM(s) Oral daily  dextrose 5%. 1000 milliLiter(s) (50 mL/Hr) IV Continuous <Continuous>  dextrose 5%. 1000 milliLiter(s) (100 mL/Hr) IV Continuous <Continuous>  dextrose 50% Injectable 25 Gram(s) IV Push once  dextrose 50% Injectable 12.5 Gram(s) IV Push once  dextrose 50% Injectable 25 Gram(s) IV Push once  glucagon  Injectable 1 milliGRAM(s) IntraMuscular once  heparin  Infusion.  Unit(s)/Hr (9 mL/Hr) IV Continuous <Continuous>  insulin glargine Injectable (LANTUS) 15 Unit(s) SubCutaneous at bedtime  insulin lispro (ADMELOG) corrective regimen sliding scale   SubCutaneous every 6 hours  ticagrelor 90 milliGRAM(s) Oral every 12 hours    MEDICATIONS  (PRN):  acetaminophen     Tablet .. 650 milliGRAM(s) Oral every 6 hours PRN Temp greater or equal to 38C (100.4F), Mild Pain (1 - 3)  dextrose Oral Gel 15 Gram(s) Oral once PRN Blood Glucose LESS THAN 70 milliGRAM(s)/deciliter  heparin   Injectable 4400 Unit(s) IV Push every 6 hours PRN For aPTT less than 40  melatonin 3 milliGRAM(s) Oral at bedtime PRN Insomnia      I&O's Summary      PHYSICAL EXAM:  Vital Signs Last 24 Hrs  T(C): 36.4 (02 Dec 2024 09:35), Max: 36.8 (01 Dec 2024 22:41)  T(F): 97.6 (02 Dec 2024 09:35), Max: 98.2 (01 Dec 2024 22:41)  HR: 77 (02 Dec 2024 09:35) (71 - 80)  BP: 160/68 (02 Dec 2024 09:35) (118/48 - 178/78)  BP(mean): 70 (01 Dec 2024 22:41) (70 - 91)  RR: 18 (02 Dec 2024 09:35) (14 - 20)  SpO2: 99% (02 Dec 2024 09:35) (97% - 100%)    Parameters below as of 02 Dec 2024 09:35  Patient On (Oxygen Delivery Method): room air      LABS:                        7.3    9.82  )-----------( 241      ( 02 Dec 2024 04:20 )             23.4     12-02    137  |  104  |  60[H]  ----------------------------<  256[H]  4.8   |  22  |  1.25    Ca    9.0      02 Dec 2024 04:20  Mg     2.10     12-02    TPro  6.6  /  Alb  3.6  /  TBili  <0.2  /  DBili  x   /  AST  35[H]  /  ALT  21  /  AlkPhos  69  12-02    PT/INR - ( 01 Dec 2024 22:18 )   PT: 11.0 sec;   INR: 0.95 ratio         PTT - ( 02 Dec 2024 04:20 )  PTT:>200.0 sec  CARDIAC MARKERS ( 02 Dec 2024 04:20 )  x     / x     / x     / x     / 16.7 ng/mL  CARDIAC MARKERS ( 01 Dec 2024 22:23 )  x     / x     / x     / x     / 18.8 ng/mL  CARDIAC MARKERS ( 01 Dec 2024 18:30 )  x     / x     / x     / x     / 14.2 ng/mL  CARDIAC MARKERS ( 01 Dec 2024 17:17 )  x     / x     / x     / x     / 10.2 ng/mL      Urinalysis Basic - ( 02 Dec 2024 04:20 )    Color: x / Appearance: x / SG: x / pH: x  Gluc: 256 mg/dL / Ketone: x  / Bili: x / Urobili: x   Blood: x / Protein: x / Nitrite: x   Leuk Esterase: x / RBC: x / WBC x   Sq Epi: x / Non Sq Epi: x / Bacteria: x

## 2024-12-06 NOTE — PROGRESS NOTE ADULT - CONSTITUTIONAL
well-groomed/no distress

## 2024-12-06 NOTE — PROGRESS NOTE ADULT - ASSESSMENT
79F PMH CAD/CABG (2003), hypertension, hyperlipidemia, type 2 diabetes mellitus with right and left AKA presenting for chest pain for 1 day with t wave inversions in lateral leads and elevated troponin to 96 to 191 concerning for NSTEMI.  Patient currently chest pain free    Patient w/ hx of CAD s/p ?3v CABG ~20 years ago  On available cath reports, only MOMIN-LAD graft was mentioned to be patent which suggests that other vein grafts are chronically occluded  Most recent C with  of the LAD and RCA, and tight OM lesion that was stented with a BMS    Suspect that patient's current presentation related to supply-demand mismatch from anemia in the setting of known severe CAD  Recent outpatient nuclear stress test with scar and hypokinesis in the RCA territory, consistent with TTE here with EF 40-45% w/ inferior/inferolateral WMA      - continue ASA, plavix 75mg qd, atorvastatin 40mg qd  - continune coreg 6.25mg BID  - start imdur 30mg qd  - no plan for ischemic evaluation this admission    No further recommendations from cardiology perspective at this time  We will sign off, reach out as needed  Patient should follow up with her cardiologist upon discharge 79F PMH CAD/CABG (2003), hypertension, hyperlipidemia, type 2 diabetes mellitus with right and left AKA presenting for chest pain for 1 day with t wave inversions in lateral leads and elevated troponin to 96 to 191 concerning for NSTEMI.  Patient currently chest pain free    Patient w/ hx of CAD s/p ?3v CABG ~20 years ago  On available cath reports, only MOMIN-LAD graft was mentioned to be patent which suggests that other vein grafts are chronically occluded  Most recent C with  of the LAD and RCA, and tight OM lesion that was stented with a BMS    Suspect that patient's current presentation related to supply-demand mismatch from anemia in the setting of known severe CAD  Recent outpatient nuclear stress test with scar and hypokinesis in the RCA territory, consistent with TTE here with EF 40-45% w/ inferior/inferolateral WMA      - continue ASA, plavix 75mg qd, atorvastatin 40mg qd  - continue coreg 6.25mg BID  - continue losartan 25mg   - start imdur 30mg qd  - no plan for ischemic evaluation this admission    No further recommendations from cardiology perspective at this time  We will sign off, reach out as needed  Patient should follow up with her cardiologist upon discharge

## 2024-12-06 NOTE — PROGRESS NOTE ADULT - ATTENDING COMMENTS
79F w HTN, DM, severe PAD w R BKA, and CAD (sp 3v CABG 20 yrs ago, on last cath 2014, only graft left patent was LIMA-LAD, natives with  of LAD and RCA, OM w severe stented w BMS at that time) who presented with 15 min of resting chest pain, found to have Hgb of 7 (from 12 several years ago) and rising elevated troponins from 90s->280s; her ECG shows Inferior Q waves w chronic diffuse ST/TW changes, all of which are largely unchanged from prior tracings. TTE w midrange LVEF 40-45%. She is currently chest pain free after receiving blood transfusion.     Regarding the etiology of her chest pain and trop elevation, I suspect the most likely etiology is an NSTEMI secondary to supply demand imbalance with severe known fixed CAD and marked anemia, however the ddx certainly includes an ACS event; I doubt her LIMA has been acutely compromised given how stable she is and asymptomatic she now is from a cardiac perspective. More likely if any new coronary lesion would be present from her last cath in 2014 her OM with BMS >10 yrs old would be the culprit, though not through stent thrombosis (which her presentation is not at all consistent with) but either de liya plaque rupture in the OM unrelated to her stent, or alternatively chronic/indolent ISR that is now showing symptoms secondary to anemia as above. Now that her hgb has improved would have her traverse the hallway in her wheelchair to see if she has any exertional angina. If she does we can discuss risk/benefits of LHC vs medical management, however if she is asymptomatic with exertion would continue medical management as outline in fellow's plan above.     Agree with meds as outlined in fellow's note above  Pt awaiting mobilization with PT and we will follow up to assess if she has any angina with her usual exertion in wheelchair .
79F w HTN, DM, severe PAD w R BKA, and CAD (sp 3v CABG 20 yrs ago, on last cath 2014, only graft left patent was LIMA-LAD, natives with  of LAD and RCA, OM w severe stented w BMS at that time) who presented with 15 min of resting chest pain, found to have Hgb of 7 (from 12 several years ago) and rising elevated troponins from 90s->280s; her ECG shows Inferior Q waves w chronic diffuse ST/TW changes, all of which are largely unchanged from prior tracings. TTE w midrange LVEF 40-45%. She is currently chest pain free after receiving blood transfusion.     Regarding the etiology of her chest pain and trop elevation, I suspect the most likely etiology is an NSTEMI secondary to supply demand imbalance with severe known fixed CAD and marked anemia, however the ddx certainly includes an ACS event; I doubt her LIMA has been acutely compromised given how stable she is and asymptomatic she now is from a cardiac perspective. More likely if any new coronary lesion would be present from her last cath in 2014 her OM with BMS >10 yrs old would be the culprit, though not through stent thrombosis (which her presentation is not at all consistent with) but either de liya plaque rupture in the OM unrelated to her stent, or alternatively chronic/indolent ISR that is now showing symptoms secondary to anemia as above. Now that her hgb has improved would have her traverse the hallway in her wheelchair to see if she has any exertional angina. If she does we can discuss risk/benefits of LHC, however if she is asymptomatic with exertion would continue medical management as outline in fellow's plan above.
79F w HTN, DM, severe PAD w R BKA, and CAD (sp 3v CABG 20 yrs ago, on last cath 2014, only graft left patent was LIMA-LAD, natives with  of LAD and RCA, OM w severe stented w BMS at that time) who presented with 15 min of resting chest pain, found to have Hgb of 7 (from 12 several years ago) and rising elevated troponins from 90s->280s; her ECG shows Inferior Q waves w chronic diffuse ST/TW changes, all of which are largely unchanged from prior tracings. TTE w midrange LVEF 40-45%. She has been chest pain free since her blood transfusion.     Regarding the etiology of her chest pain and trop elevation, I suspect the most likely etiology is an NSTEMI secondary to supply demand imbalance with severe known fixed CAD and marked anemia. Her symptoms have resolved with blood transfusion.  She has been able to work w PT/ in her wheelchair without angina. Agree with adding on imdur as above and close outpatient follow up.
79F w HTN, DM, severe PAD w R BKA, and CAD (sp 3v CABG 20 yrs ago, on last cath 2014, only graft left patent was LIMA-LAD, natives with  of LAD and RCA, OM w severe stented w BMS at that time) who presented with 15 min of resting chest pain, found to have Hgb of 7 (from 12 several years ago) and rising elevated troponins from 90s->280s; her ECG shows Inferior Q waves w chronic diffuse ST/TW changes, all of which are largely unchanged from prior tracings. TTE w midrange LVEF 40-45%. She is currently chest pain free after receiving blood transfusion.     Regarding the etiology of her chest pain and trop elevation, I suspect the most likely etiology is an NSTEMI secondary to supply demand imbalance with severe known fixed CAD and marked anemia, however the ddx certainly includes an ACS event; I doubt her LIMA has been acutely compromised given how stable she is and asymptomatic she now is from a cardiac perspective. More likely if any new coronary lesion would be present from her last cath in 2014 her OM with BMS >10 yrs old would be the culprit, though not through stent thrombosis (which her presentation is not at all consistent with) but either de liya plaque rupture in the OM unrelated to her stent, or alternatively chronic/indolent ISR that is now showing symptoms secondary to anemia as above. Now that her hgb has improved would have her traverse the hallway in her wheelchair to see if she has any exertional angina. If she does we can discuss risk/benefits of LHC vs medical management, however if she is asymptomatic with exertion would continue medical management as outline in fellow's plan above.     Agree with asa and plavix  Pt awaiting mobilization with PT and we will follow up to assess if she has any angina with her usual exertion in wheelchair

## 2024-12-06 NOTE — PROGRESS NOTE ADULT - PROBLEM SELECTOR PLAN 8
DVT PPx  - lovenox
DVT PPx  - pt on heparin gtt

## 2024-12-06 NOTE — DISCHARGE NOTE NURSING/CASE MANAGEMENT/SOCIAL WORK - FINANCIAL ASSISTANCE
[facility] provides services at a reduced cost to those who are determined to be eligible through [facility]’s financial assistance program. Information regarding [facility]’s financial assistance program can be found by going to [email or web address] or by calling [phone].

## 2024-12-06 NOTE — PROGRESS NOTE ADULT - GASTROINTESTINAL
soft/nontender/nondistended

## 2024-12-06 NOTE — PROGRESS NOTE ADULT - TIME BILLING
Time-based billing (NON-critical care).     35 minutes spent on total encounter; more than 50% of the visit was spent counseling and / or coordinating care by the attending physician.  The necessity of the time spent during the encounter on this date of service was due to:     review of laboratory data, radiology results, consultants' recommendations, documentation in [place], discussion with patient Protopic Pregnancy And Lactation Text: This medication is Pregnancy Category C. It is unknown if this medication is excreted in breast milk when applied topically.

## 2024-12-06 NOTE — PROGRESS NOTE ADULT - PROBLEM SELECTOR PLAN 1
Appreciate cardiology recommendations. Given up trending cardiac enzymes and EKG findings, will cont. treatment. Pt currently chest pain free. Given DAPT load. 08/2024 Stress TTE with EF 55%, relatively normal function w/o wall motion abnormalities    - Cont. asa, Brilinta and atorvastatin  - s/p heparin gtt  - Telemetry monitoring  - TTE: LVEF 40-45%, grade 1 diastolic dysfunction  - Trend cardiac enzymes  - titrated up carvedilol  - continue losartan  - add Imdur  - cardiology following - limited utility of NST and high threshold for LHC in patient with significant prior stenoses, would prefer to have patient exert herself and treat with medical management for symptoms  - no angina or anginal equivalents on exertion with wheelchair with PT  - no ischemic evaluation this admission per cardiology    Outpatient cardiologist Dr Howell aware, would like patient to follow up with him within 1 week of discharge. His office will help with transition of care.

## 2024-12-06 NOTE — DISCHARGE NOTE NURSING/CASE MANAGEMENT/SOCIAL WORK - PATIENT PORTAL LINK FT
You can access the FollowMyHealth Patient Portal offered by [facility] by registering at the following website: [email or web address]. By joining [facility]’s FollowMyHealth portal, you will also be able to view your health information using other applications (apps) compatible with our system.

## 2024-12-06 NOTE — PROGRESS NOTE ADULT - PROBLEM SELECTOR PLAN 2
hgb 7.7 compared to 12.1 next most recent known hgb 2021. Stable on repeat. Unclear if recent diarrhea episode related.   Hb responded well to >8 after PRBC transfusion.    - Trend CBC  - will need to determine anemia work-up - limited utility of iron studies post-transfusion  - Monitor for bleeding; currently no evidence of overt bleeding and no clinical utility of FOBT
hgb 7.7 compared to 12.1 next most recent known hgb 2021. Stable on repeat. Unclear if recent diarrhea episode related. Discussed risks and benefits of NSTEMI treatment at length with patient and daughter at bedside. Agree to cautiously cont. for now    - Trend cbc  - T/S sent, consent in chart  - Given cardiac hx, up trending cardiac enzymes, decision to target Hgb 8. Ordered for 1u prbc  - F/u anemia work up  - Monitor for bleeding
hgb 7.7 compared to 12.1 next most recent known hgb 2021. Stable on repeat. Unclear if recent diarrhea episode related.   Hb responded well to >8 after PRBC transfusion.    - Trend CBC  - will need to determine anemia work-up - limited utility of iron studies post-transfusion  - Monitor for bleeding; currently no evidence of overt bleeding and no clinical utility of FOBT

## 2024-12-06 NOTE — PROGRESS NOTE ADULT - PROBLEM SELECTOR PLAN 5
- Trend BP  - Holding Coreg for now  - Cont. Losartan for now
- Trend BP  - Titrated up carvedilol, continue carvedilol, add Imdur
- Trend BP  - Titrating up carvedilol

## 2024-12-06 NOTE — PHARMACOTHERAPY INTERVENTION NOTE - COMMENTS
Discharge medications reviewed with the patient. Current medication schedule was discussed in detail including: medication name, indication, dose, administration times, treatment duration, side effects, and special instructions. Questions and concerns were answered and addressed. Patient demonstrated understanding. Patient provided with educational handout.    New medications: isosorbide mononitrate    Yahaira Rios, ZulyD, [facility]  Clinical Pharmacy Specialist  [id number]

## 2024-12-06 NOTE — PROGRESS NOTE ADULT - PROBLEM SELECTOR PLAN 3
Hx of CABG 2003 or 2004  - Cont. asa and Brilinta  - Cont. atorvastatin QHS  - F/u cardiology recommendations  - See above

## 2024-12-06 NOTE — PROGRESS NOTE ADULT - PROBLEM SELECTOR PLAN 7
s/p b/l LE AKA  c/w DAPT
s/p b/l LE AKA  c/w DAPT, on heparin gtt
s/p b/l LE AKA  c/w DAPT

## 2024-12-06 NOTE — PROGRESS NOTE ADULT - PROBLEM SELECTOR PLAN 4
Home insulin regimen: insulin lispro 22U with breakfast, 20U with lunch, and 36U with dinner; insulin glargine 30U qhs  Reduced regimen for now while inpatient    - Will increase basal glargine to 25U qhs, pre-prandial lispro to 5U qac TID + SS  - A1c 7.5
Home insulin regimen: insulin lispro 22U with breakfast, 20U with lunch, and 36U with dinner; insulin glargine 30U qhs  Reduced regimen for now while NPO and inpatient    - Will c/w Lantus 15U QHS and FS/ ISS for now, titrate as warranted  - NPO as per cardiology  - A1c 7.5
Home insulin regimen: insulin lispro 22U with breakfast, 20U with lunch, and 36U with dinner; insulin glargine 30U qhs  Reduced regimen for now while inpatient    - Will c/w Lantus 15U QHS and FS/ ISS for now, titrate as warranted  - A1c 7.5
Home insulin regimen: insulin lispro 22U with breakfast, 20U with lunch, and 36U with dinner; insulin glargine 30U qhs  Reduced regimen for now while inpatient    - Will increase Lantus to 22U qhs, resume pre-prandial lispro with 3U qac TID + SS  - A1c 7.5
Home insulin regimen: insulin lispro 22U with breakfast, 20U with lunch, and 36U with dinner; insulin glargine 30U qhs  Reduced regimen for now while inpatient    - Will increase basal glargine to 28U qhs, pre-prandial lispro to 8U qac TID + SS  - A1c 7.5    Plan to restart home regimen on discharge given the discrepancy between home and in-hospital dietary/functional habits. Should follow up with her endocrinologist.

## 2024-12-07 ENCOUNTER — INPATIENT (INPATIENT)
Facility: HOSPITAL | Age: 79
LOS: 8 days | Discharge: HOME HEALTH | End: 2024-12-16
Attending: INTERNAL MEDICINE | Admitting: INTERNAL MEDICINE
Payer: MEDICARE

## 2024-12-07 VITALS
DIASTOLIC BLOOD PRESSURE: 72 MMHG | HEART RATE: 95 BPM | WEIGHT: 175.05 LBS | RESPIRATION RATE: 18 BRPM | TEMPERATURE: 103 F | OXYGEN SATURATION: 100 % | HEIGHT: 66 IN | SYSTOLIC BLOOD PRESSURE: 192 MMHG

## 2024-12-07 DIAGNOSIS — Z41.9 ENCOUNTER FOR PROCEDURE FOR PURPOSES OTHER THAN REMEDYING HEALTH STATE, UNSPECIFIED: Chronic | ICD-10-CM

## 2024-12-07 DIAGNOSIS — K85.90 ACUTE PANCREATITIS WITHOUT NECROSIS OR INFECTION, UNSPECIFIED: ICD-10-CM

## 2024-12-07 DIAGNOSIS — I10 ESSENTIAL (PRIMARY) HYPERTENSION: ICD-10-CM

## 2024-12-07 DIAGNOSIS — E11.9 TYPE 2 DIABETES MELLITUS WITHOUT COMPLICATIONS: ICD-10-CM

## 2024-12-07 DIAGNOSIS — I25.10 ATHEROSCLEROTIC HEART DISEASE OF NATIVE CORONARY ARTERY WITHOUT ANGINA PECTORIS: ICD-10-CM

## 2024-12-07 DIAGNOSIS — R74.8 ABNORMAL LEVELS OF OTHER SERUM ENZYMES: ICD-10-CM

## 2024-12-07 DIAGNOSIS — Z98.89 OTHER SPECIFIED POSTPROCEDURAL STATES: Chronic | ICD-10-CM

## 2024-12-07 DIAGNOSIS — A41.9 SEPSIS, UNSPECIFIED ORGANISM: ICD-10-CM

## 2024-12-07 DIAGNOSIS — Z89.511 ACQUIRED ABSENCE OF RIGHT LEG BELOW KNEE: Chronic | ICD-10-CM

## 2024-12-07 DIAGNOSIS — Z89.512 ACQUIRED ABSENCE OF LEFT LEG BELOW KNEE: Chronic | ICD-10-CM

## 2024-12-07 DIAGNOSIS — E78.5 HYPERLIPIDEMIA, UNSPECIFIED: ICD-10-CM

## 2024-12-07 DIAGNOSIS — Z90.710 ACQUIRED ABSENCE OF BOTH CERVIX AND UTERUS: Chronic | ICD-10-CM

## 2024-12-07 DIAGNOSIS — Z95.1 PRESENCE OF AORTOCORONARY BYPASS GRAFT: Chronic | ICD-10-CM

## 2024-12-07 DIAGNOSIS — Z29.9 ENCOUNTER FOR PROPHYLACTIC MEASURES, UNSPECIFIED: ICD-10-CM

## 2024-12-07 LAB
ADD ON TEST-SPECIMEN IN LAB: SIGNIFICANT CHANGE UP
ADD ON TEST-SPECIMEN IN LAB: SIGNIFICANT CHANGE UP
ALBUMIN SERPL ELPH-MCNC: 3.7 G/DL — SIGNIFICANT CHANGE UP (ref 3.3–5)
ALP SERPL-CCNC: 158 U/L — HIGH (ref 40–120)
ALT FLD-CCNC: 140 U/L — HIGH (ref 4–33)
ANION GAP SERPL CALC-SCNC: 18 MMOL/L — HIGH (ref 7–14)
APPEARANCE UR: ABNORMAL
APTT BLD: 31.6 SEC — SIGNIFICANT CHANGE UP (ref 24.5–35.6)
AST SERPL-CCNC: 242 U/L — HIGH (ref 4–32)
B-OH-BUTYR SERPL-SCNC: 1.2 MMOL/L — HIGH (ref 0–0.4)
BACTERIA # UR AUTO: ABNORMAL /HPF
BASOPHILS # BLD AUTO: 0.01 K/UL — SIGNIFICANT CHANGE UP (ref 0–0.2)
BASOPHILS NFR BLD AUTO: 0.1 % — SIGNIFICANT CHANGE UP (ref 0–2)
BILIRUB SERPL-MCNC: 2.6 MG/DL — HIGH (ref 0.2–1.2)
BILIRUB UR-MCNC: NEGATIVE — SIGNIFICANT CHANGE UP
BUN SERPL-MCNC: 35 MG/DL — HIGH (ref 7–23)
CALCIUM SERPL-MCNC: 9.2 MG/DL — SIGNIFICANT CHANGE UP (ref 8.4–10.5)
CAST: 0 /LPF — SIGNIFICANT CHANGE UP (ref 0–4)
CHLORIDE SERPL-SCNC: 103 MMOL/L — SIGNIFICANT CHANGE UP (ref 98–107)
CK MB BLD-MCNC: 1.5 % — SIGNIFICANT CHANGE UP (ref 0–2.5)
CK MB CFR SERPL CALC: 1.6 NG/ML — SIGNIFICANT CHANGE UP
CK SERPL-CCNC: 110 U/L — SIGNIFICANT CHANGE UP (ref 25–170)
CO2 SERPL-SCNC: 17 MMOL/L — LOW (ref 22–31)
COLOR SPEC: YELLOW — SIGNIFICANT CHANGE UP
CREAT SERPL-MCNC: 1.17 MG/DL — SIGNIFICANT CHANGE UP (ref 0.5–1.3)
DIFF PNL FLD: ABNORMAL
EGFR: 48 ML/MIN/1.73M2 — LOW
EOSINOPHIL # BLD AUTO: 0 K/UL — SIGNIFICANT CHANGE UP (ref 0–0.5)
EOSINOPHIL NFR BLD AUTO: 0 % — SIGNIFICANT CHANGE UP (ref 0–6)
FLUAV AG NPH QL: SIGNIFICANT CHANGE UP
FLUBV AG NPH QL: SIGNIFICANT CHANGE UP
GLUCOSE BLDC GLUCOMTR-MCNC: 366 MG/DL — HIGH (ref 70–99)
GLUCOSE SERPL-MCNC: 234 MG/DL — HIGH (ref 70–99)
GLUCOSE UR QL: NEGATIVE MG/DL — SIGNIFICANT CHANGE UP
HCT VFR BLD CALC: 31.7 % — LOW (ref 34.5–45)
HGB BLD-MCNC: 10.3 G/DL — LOW (ref 11.5–15.5)
IANC: 10.24 K/UL — HIGH (ref 1.8–7.4)
IMM GRANULOCYTES NFR BLD AUTO: 0.3 % — SIGNIFICANT CHANGE UP (ref 0–0.9)
INR BLD: 1.09 RATIO — SIGNIFICANT CHANGE UP (ref 0.85–1.16)
KETONES UR-MCNC: ABNORMAL MG/DL
LACTATE SERPL-SCNC: 2 MMOL/L — SIGNIFICANT CHANGE UP (ref 0.5–2)
LACTATE SERPL-SCNC: 2.2 MMOL/L — HIGH (ref 0.5–2)
LEUKOCYTE ESTERASE UR-ACNC: ABNORMAL
LYMPHOCYTES # BLD AUTO: 0.53 K/UL — LOW (ref 1–3.3)
LYMPHOCYTES # BLD AUTO: 4.7 % — LOW (ref 13–44)
MCHC RBC-ENTMCNC: 29.6 PG — SIGNIFICANT CHANGE UP (ref 27–34)
MCHC RBC-ENTMCNC: 32.5 G/DL — SIGNIFICANT CHANGE UP (ref 32–36)
MCV RBC AUTO: 91.1 FL — SIGNIFICANT CHANGE UP (ref 80–100)
MONOCYTES # BLD AUTO: 0.39 K/UL — SIGNIFICANT CHANGE UP (ref 0–0.9)
MONOCYTES NFR BLD AUTO: 3.5 % — SIGNIFICANT CHANGE UP (ref 2–14)
NEUTROPHILS # BLD AUTO: 10.24 K/UL — HIGH (ref 1.8–7.4)
NEUTROPHILS NFR BLD AUTO: 91.4 % — HIGH (ref 43–77)
NITRITE UR-MCNC: NEGATIVE — SIGNIFICANT CHANGE UP
NRBC # BLD: 0 /100 WBCS — SIGNIFICANT CHANGE UP (ref 0–0)
NRBC # FLD: 0 K/UL — SIGNIFICANT CHANGE UP (ref 0–0)
PH UR: 5.5 — SIGNIFICANT CHANGE UP (ref 5–8)
PLATELET # BLD AUTO: 267 K/UL — SIGNIFICANT CHANGE UP (ref 150–400)
POTASSIUM SERPL-MCNC: 5.4 MMOL/L — HIGH (ref 3.5–5.3)
POTASSIUM SERPL-SCNC: 5.4 MMOL/L — HIGH (ref 3.5–5.3)
PROT SERPL-MCNC: 7.6 G/DL — SIGNIFICANT CHANGE UP (ref 6–8.3)
PROT UR-MCNC: 100 MG/DL
PROTHROM AB SERPL-ACNC: 12.6 SEC — SIGNIFICANT CHANGE UP (ref 9.9–13.4)
RBC # BLD: 3.48 M/UL — LOW (ref 3.8–5.2)
RBC # FLD: 15.3 % — HIGH (ref 10.3–14.5)
RBC CASTS # UR COMP ASSIST: 1 /HPF — SIGNIFICANT CHANGE UP (ref 0–4)
REVIEW: SIGNIFICANT CHANGE UP
RSV RNA NPH QL NAA+NON-PROBE: SIGNIFICANT CHANGE UP
SARS-COV-2 RNA SPEC QL NAA+PROBE: SIGNIFICANT CHANGE UP
SODIUM SERPL-SCNC: 138 MMOL/L — SIGNIFICANT CHANGE UP (ref 135–145)
SP GR SPEC: 1.04 — HIGH (ref 1–1.03)
SQUAMOUS # UR AUTO: 13 /HPF — HIGH (ref 0–5)
TROPONIN T, HIGH SENSITIVITY RESULT: 192 NG/L — CRITICAL HIGH
UROBILINOGEN FLD QL: 0.2 MG/DL — SIGNIFICANT CHANGE UP (ref 0.2–1)
WBC # BLD: 11.2 K/UL — HIGH (ref 3.8–10.5)
WBC # FLD AUTO: 11.2 K/UL — HIGH (ref 3.8–10.5)
WBC UR QL: 714 /HPF — HIGH (ref 0–5)

## 2024-12-07 PROCEDURE — 99223 1ST HOSP IP/OBS HIGH 75: CPT

## 2024-12-07 PROCEDURE — 74177 CT ABD & PELVIS W/CONTRAST: CPT | Mod: 26,MC

## 2024-12-07 PROCEDURE — 71045 X-RAY EXAM CHEST 1 VIEW: CPT | Mod: 26

## 2024-12-07 PROCEDURE — 76705 ECHO EXAM OF ABDOMEN: CPT | Mod: 26

## 2024-12-07 PROCEDURE — 99285 EMERGENCY DEPT VISIT HI MDM: CPT

## 2024-12-07 RX ORDER — PIPERACILLIN SODIUM AND TAZOBACTAM SODIUM 4; .5 G/20ML; G/20ML
3.38 INJECTION, POWDER, LYOPHILIZED, FOR SOLUTION INTRAVENOUS EVERY 8 HOURS
Refills: 0 | Status: DISCONTINUED | OUTPATIENT
Start: 2024-12-07 | End: 2024-12-09

## 2024-12-07 RX ORDER — ACETAMINOPHEN 500MG 500 MG/1
650 TABLET, COATED ORAL ONCE
Refills: 0 | Status: DISCONTINUED | OUTPATIENT
Start: 2024-12-07 | End: 2024-12-07

## 2024-12-07 RX ORDER — PIPERACILLIN SODIUM AND TAZOBACTAM SODIUM 4; .5 G/20ML; G/20ML
3.38 INJECTION, POWDER, LYOPHILIZED, FOR SOLUTION INTRAVENOUS ONCE
Refills: 0 | Status: COMPLETED | OUTPATIENT
Start: 2024-12-07 | End: 2024-12-07

## 2024-12-07 RX ORDER — ACETAMINOPHEN 500MG 500 MG/1
1000 TABLET, COATED ORAL ONCE
Refills: 0 | Status: COMPLETED | OUTPATIENT
Start: 2024-12-07 | End: 2024-12-07

## 2024-12-07 RX ORDER — ISOSORBIDE MONONITRATE 10 MG
30 TABLET ORAL DAILY
Refills: 0 | Status: DISCONTINUED | OUTPATIENT
Start: 2024-12-07 | End: 2024-12-07

## 2024-12-07 RX ORDER — VANCOMYCIN HCL 900 MCG/MG
1000 POWDER (GRAM) MISCELLANEOUS ONCE
Refills: 0 | Status: COMPLETED | OUTPATIENT
Start: 2024-12-07 | End: 2024-12-07

## 2024-12-07 RX ORDER — CLOPIDOGREL 75 MG/1
75 TABLET, FILM COATED ORAL DAILY
Refills: 0 | Status: DISCONTINUED | OUTPATIENT
Start: 2024-12-07 | End: 2024-12-14

## 2024-12-07 RX ORDER — ONDANSETRON HYDROCHLORIDE 4 MG/1
4 TABLET, FILM COATED ORAL ONCE
Refills: 0 | Status: COMPLETED | OUTPATIENT
Start: 2024-12-07 | End: 2024-12-07

## 2024-12-07 RX ORDER — CARVEDILOL 25 MG/1
6.25 TABLET, FILM COATED ORAL EVERY 12 HOURS
Refills: 0 | Status: DISCONTINUED | OUTPATIENT
Start: 2024-12-07 | End: 2024-12-09

## 2024-12-07 RX ORDER — INSULIN GLARGINE 100 [IU]/ML
24 INJECTION, SOLUTION SUBCUTANEOUS AT BEDTIME
Refills: 0 | Status: DISCONTINUED | OUTPATIENT
Start: 2024-12-07 | End: 2024-12-13

## 2024-12-07 RX ORDER — 0.9 % SODIUM CHLORIDE 0.9 %
1000 INTRAVENOUS SOLUTION INTRAVENOUS
Refills: 0 | Status: DISCONTINUED | OUTPATIENT
Start: 2024-12-07 | End: 2024-12-08

## 2024-12-07 RX ORDER — ENOXAPARIN SODIUM 30 MG/.3ML
40 INJECTION SUBCUTANEOUS EVERY 24 HOURS
Refills: 0 | Status: DISCONTINUED | OUTPATIENT
Start: 2024-12-07 | End: 2024-12-08

## 2024-12-07 RX ORDER — 0.9 % SODIUM CHLORIDE 0.9 %
1000 INTRAVENOUS SOLUTION INTRAVENOUS
Refills: 0 | Status: DISCONTINUED | OUTPATIENT
Start: 2024-12-07 | End: 2024-12-16

## 2024-12-07 RX ORDER — SODIUM CHLORIDE 9 MG/ML
500 INJECTION, SOLUTION INTRAMUSCULAR; INTRAVENOUS; SUBCUTANEOUS ONCE
Refills: 0 | Status: COMPLETED | OUTPATIENT
Start: 2024-12-07 | End: 2024-12-07

## 2024-12-07 RX ORDER — GLUCAGON INJECTION, SOLUTION 0.5 MG/.1ML
1 INJECTION, SOLUTION SUBCUTANEOUS ONCE
Refills: 0 | Status: DISCONTINUED | OUTPATIENT
Start: 2024-12-07 | End: 2024-12-16

## 2024-12-07 RX ORDER — LOSARTAN POTASSIUM 100 MG/1
25 TABLET, FILM COATED ORAL DAILY
Refills: 0 | Status: DISCONTINUED | OUTPATIENT
Start: 2024-12-07 | End: 2024-12-07

## 2024-12-07 RX ORDER — PIPERACILLIN SODIUM AND TAZOBACTAM SODIUM 4; .5 G/20ML; G/20ML
3.38 INJECTION, POWDER, LYOPHILIZED, FOR SOLUTION INTRAVENOUS ONCE
Refills: 0 | Status: DISCONTINUED | OUTPATIENT
Start: 2024-12-07 | End: 2024-12-07

## 2024-12-07 RX ADMIN — Medication 5: at 22:04

## 2024-12-07 RX ADMIN — Medication 4 MILLIGRAM(S): at 18:00

## 2024-12-07 RX ADMIN — ENOXAPARIN SODIUM 40 MILLIGRAM(S): 30 INJECTION SUBCUTANEOUS at 22:45

## 2024-12-07 RX ADMIN — ACETAMINOPHEN 500MG 400 MILLIGRAM(S): 500 TABLET, COATED ORAL at 16:14

## 2024-12-07 RX ADMIN — ACETAMINOPHEN 500MG 1000 MILLIGRAM(S): 500 TABLET, COATED ORAL at 17:30

## 2024-12-07 RX ADMIN — Medication 250 MILLIGRAM(S): at 17:54

## 2024-12-07 RX ADMIN — PIPERACILLIN SODIUM AND TAZOBACTAM SODIUM 200 GRAM(S): 4; .5 INJECTION, POWDER, LYOPHILIZED, FOR SOLUTION INTRAVENOUS at 16:00

## 2024-12-07 RX ADMIN — SODIUM CHLORIDE 500 MILLILITER(S): 9 INJECTION, SOLUTION INTRAMUSCULAR; INTRAVENOUS; SUBCUTANEOUS at 16:05

## 2024-12-07 RX ADMIN — PIPERACILLIN SODIUM AND TAZOBACTAM SODIUM 25 GRAM(S): 4; .5 INJECTION, POWDER, LYOPHILIZED, FOR SOLUTION INTRAVENOUS at 23:27

## 2024-12-07 RX ADMIN — INSULIN GLARGINE 24 UNIT(S): 100 INJECTION, SOLUTION SUBCUTANEOUS at 22:45

## 2024-12-07 RX ADMIN — Medication 4 MILLIGRAM(S): at 16:30

## 2024-12-07 RX ADMIN — Medication 75 MILLILITER(S): at 22:45

## 2024-12-07 RX ADMIN — ONDANSETRON HYDROCHLORIDE 4 MILLIGRAM(S): 4 TABLET, FILM COATED ORAL at 16:08

## 2024-12-07 NOTE — H&P ADULT - NSICDXPASTMEDICALHX_GEN_ALL_CORE_FT
PAST MEDICAL HISTORY:  Acute kidney injury superimposed on chronic kidney disease     CAD (coronary artery disease)     Carotid artery stenosis     Chronic diastolic heart failure     Diabetic retinopathy     DM (diabetes mellitus) type II    Hypercholesterolemia     Hypertension     Normocytic anemia     Obesity     PAD (peripheral artery disease) s/p R BKA    Pulmonary HTN     S/P CABG x 3 in 2004, [facility]    Stented coronary artery 2010 [facility]    Vitamin D deficiency

## 2024-12-07 NOTE — H&P ADULT - PROBLEM SELECTOR PLAN 7
- Continue coreg with hold parameters for now. Will hold imdur and losartan for now. Re-add as BP tolerates and patient receives treatment

## 2024-12-07 NOTE — H&P ADULT - NSHPPHYSICALEXAM_GEN_ALL_CORE
Vital Signs Last 24 Hrs  T(C): 37 (07 Dec 2024 20:09), Max: 39.2 (07 Dec 2024 12:47)  T(F): 98.6 (07 Dec 2024 20:09), Max: 102.5 (07 Dec 2024 12:47)  HR: 88 (07 Dec 2024 20:09) (88 - 96)  BP: 103/50 (07 Dec 2024 20:09) (103/50 - 192/72)  BP(mean): --  RR: 16 (07 Dec 2024 20:09) (16 - 18)  SpO2: 95% (07 Dec 2024 20:09) (95% - 100%)    Parameters below as of 07 Dec 2024 20:09  Patient On (Oxygen Delivery Method): room air    CONSTITUTIONAL: NAD  HEENT: Moist oral mucosa, no pharyngeal injection or exudates  RESPIRATORY: Normal respiratory effort, lungs are clear to auscultation bilaterally  CARDIOVASCULAR: Regular rate and rhythm, normal S1 and S2, no murmur/rub/gallop, peripheral pulses in upper extremities palpable  ABDOMEN: Soft, nondistended, nontender to palpation, quiet bowel sounds, no rebound/guarding  EXTREMITIES: AKA bilaterally  PSYCH: A+O to person, place, and time  NEUROLOGY: Facial expression symmetric, no gross sensory deficits appreciated, moves all extremities spontaneously  SKIN: No rashes, no palpable lesions

## 2024-12-07 NOTE — H&P ADULT - PROBLEM SELECTOR PLAN 4
- Reported to be on basal and bolus pre-meal at home (though reported to be twice a day)  - Hold pre-meal for now given NPO, re-add as indicated when diet able to be advanced  - Dose reduced home 30U lantus to 24U at bedtime given NPO status, adjust as indicated based on sugars  - Low insulin sliding scale q6hrs while NPO  - A1C of 7.5 on 12/1/24  - DASH/TLC/CC diet when advanced from NPO  - Trace ketones in urine with low bicarb and elevated fingersticks. Will check BHB

## 2024-12-07 NOTE — H&P ADULT - PROBLEM SELECTOR PLAN 5
- History of CABG  - Continue aspirin and plavix daily  - Continue coreg, holding imdur and losartan for now  - Atorvastatin on hold for now  - DASH/TLC/CC diet when advanced from NPO    #Elevated troponin  - Troponin of 192 on admission, last on December 3rd of 251. Normal CKMB, CK  - No active chest pain endorsed  - Lower concern for ACS at this time. LHC and NST deferred during last admission days ago  - Can repeat troponin in AM

## 2024-12-07 NOTE — ED ADULT NURSE NOTE - NSFALLRISKINTERV_ED_ALL_ED
Assistance OOB with selected safe patient handling equipment if applicable/Assistance with ambulation/Communicate fall risk and risk factors to all staff, patient, and family/Monitor gait and stability/Provide visual cue: yellow wristband, yellow gown, etc/Reinforce activity limits and safety measures with patient and family/Call bell, personal items and telephone in reach/Instruct patient to call for assistance before getting out of bed/chair/stretcher/Non-slip footwear applied when patient is off stretcher/[place] to call system/Physically safe environment - no spills, clutter or unnecessary equipment/Purposeful Proactive Rounding/Room/bathroom lighting operational, light cord in reach

## 2024-12-07 NOTE — H&P ADULT - ASSESSMENT
79 year old woman with history of CAD s/p CABG (2004), HTN, HLD, IDDM2, severe PAD with right and left AKA presenting with complaint of one day of abdominal pain. Found to have acute pancreatitis. 79 year old woman with history of CAD s/p CABG (2004), HTN, HLD, IDDM2, severe PAD with right and left AKA presenting with complaint of one day of abdominal pain. Meeting sepsis criteria and found to have acute pancreatitis.

## 2024-12-07 NOTE — H&P ADULT - NSHPREVIEWOFSYSTEMS_GEN_ALL_CORE
Constitutional/General: ( ) Acute distress   Eyes: ( ) Changes in vision, ( ) double vision, ( ) blurry vision  ENT: ( ) Nasal congestion or rhinorrhea, ( ) changes in hearing, ( ) odynophagia or dysphagia   Skin: ( ) Rashes  Cardiovascular: ( ) Chest pain, ( ) heart palpitations, ( ) orthopnea/PND  Pulmonary: ( ) Shortness of breath, ( ) cough, ( ) pleuritic chest pain, ( ) hemoptysis   Gastrointestinal: ( ) Nausea, (YES) vomiting, ( ) diarrhea, ( ) bloating, ( ) constipation, (YES) abdominal pain  Genitourinary: ( ) Dysuria, ( ) frequency, ( ) change in urine odor/appearance   Musculoskeletal: ( ) Changes in strength, ( ) joint tenderness or swelling  Neurologic: ( ) Changes in memory, ( ) headache, ( ) weakness, ( ) paresthesias, ( ) imbalance   Endocrine: ( ) Heat/cold intolerance, ( ) weight change, ( ) excessive sweating, ( ) polydipsia/polyuria  Psychology: ( ) Changes in mood, ( ) anxiety, ( ) depression.  Heme/Lymph: ( ) Easy bruising

## 2024-12-07 NOTE — H&P ADULT - ATTENDING COMMENTS
79 year old woman with history of CAD s/p CABG (2004), HTN, HLD, IDDM2, severe PAD with right and left AKA presenting with complaint of one day of abdominal pain found to have acute pancreatitis on imaging/labs meeting sepsis criteria.     ##Sepsis   -pt febrile with leukocytosis meeting sepsis criteria  -potential sources include positive UA (though poor sample given epithelial cells), colitis, or inflammatory reaction 2/2 pancreatitis  -Continue zosyn for now, received dose of vanc in ED  -Monitor fever curve  -Follow up blood and urine cultures  -Continue with maintenance fluids in setting of pancreatitis    ##Acute pancreatitis  - Abdominal pain, lipase over 3K, signs of acute interstitial pancreatitis on imaging  - Unclear etiology - denies alcohol use, no recent instrumentation of bile ducts, no evidence of choledocholithiasis on abdominal ultrasound. No new medications. Triglycerides not elevated. Can consider eval of IgG subsets for autoimmune cause  - NPO for now, advance as tolerated  - Morphine PRNs for pain  - Gentle IVF given mildly reduced LVEF with regional WMAs seen on recent echo    ##Transaminitis  -Total bilirubin also elevated  -hold atorvastatin  -repeat CMP in AM with bilirubin fractionation  -LDH/haptoglobin in AM    ##T2DM  Reported to be on basal and bolus pre-meal at home (though reported to be twice a day)  - Hold pre-meal insulin for now given NPO, re-add as indicated when diet able to be advanced  - Dose reduced home 30U lantus to 24U at bedtime given NPO status, adjust as indicated based on sugars  - Low insulin sliding scale q6hrs while NPO  - A1C of 7.5 on 12/1/24  - DASH/TLC/CC diet when advanced from NPO  -f/u BHB    ##CAD  -History of CABG, recent admit for NSTEMI  - Continue aspirin and plavix daily  - Continue coreg, holding imdur and losartan for now  - Atorvastatin on hold for now  - DASH/TLC/CC diet when advanced from NPO  -troponins elevated on labs however overall downtrending from last admission, patient denying chest pain at this time, low concern for ACS  -LHC and NST deferred during last admission several days ago.    ##HLD   -hold statin as above due to transaminitis  -can restart if LFT's normalize    ##HTN  -c/w coreg with hold parameters  -restart other BP medications if patient condition improves    ##DVT PPx   -lovenox

## 2024-12-07 NOTE — H&P ADULT - PROBLEM SELECTOR PLAN 6
- Hold atorvastatin for now in setting of elevated liver enzymes  - Re-add as indicated  - Lipid panel overall re-assuring for no urgency to re-add at this time

## 2024-12-07 NOTE — H&P ADULT - PROBLEM SELECTOR PLAN 1
- Febrile, tachycardic above 90 on admission  - Possible sources of urinary tract given previously reported burning with urination. Has positive UA, though with fair amount of epithelial cells. Could also be related to colitis seen on CT imaging  - Could be febrile in setting of inflammatory reaction given acute pancreatitis  - Continue zosyn for now, received dose of vanc in ED  - Monitor fever curve  - Follow up blood and urine cultures  - Continue with maintenance fluids in setting of pancreatitis

## 2024-12-07 NOTE — ED ADULT NURSE REASSESSMENT NOTE - NS ED NURSE REASSESS COMMENT FT1
2 calls made to unit assigned unsuccessfully. rpt endorsed to night RN. pt resting comfortably, on cm in nSR at this time with no complaints.  at bedside.
Received pt after handoff in stable condition.  No complaints made at this time. Report was given to nurse Fallon MORALES. Pt transported to unit in stable condition. No complaints made at this time.
pt returned from ultrasound, IV abx complete, pt pain has since resolved. urine collected and sent. pt pending disposition

## 2024-12-07 NOTE — H&P ADULT - PROBLEM SELECTOR PLAN 3
- Elevated AST/ALT to 242/140, though evidence of moderate hemolysis  - Also with elevated Tbili  - Hold atorvastatin for now  - Repeat in AM along with fractionation of bilirubin  - LDH and hapto in AM
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
## 2024-12-07 NOTE — PATIENT PROFILE ADULT - FALL HARM RISK - HARM RISK INTERVENTIONS

## 2024-12-07 NOTE — ED PROVIDER NOTE - AVIAN FLU WORK
[Normal Growth] : growth [Normal Development] : development  [No Elimination Concerns] : elimination [Continue Regimen] : feeding [No Skin Concerns] : skin [Normal Sleep Pattern] : sleep [Physical Growth and Development] : physical growth and development [Social and Academic Competence] : social and academic competence [Emotional Well-Being] : emotional well-being [MCV] : meningococcal conjugate vaccine [Full Activity without restrictions including Physical Education & Athletics] : Full Activity without restrictions including Physical Education & Athletics [] : The components of the vaccine(s) to be administered today are listed in the plan of care. The disease(s) for which the vaccine(s) are intended to prevent and the risks have been discussed with the caretaker.  The risks are also included in the appropriate vaccination information statements which have been provided to the patient's caregiver.  The caregiver has given consent to vaccinate. [FreeTextEntry6] : spoke w mom via telephone for  [employer] consent  No

## 2024-12-07 NOTE — ED PROVIDER NOTE - HOW PATIENT ADDRESSED, PROFILE
Sean Detail Level: Detailed Depth Of Biopsy: dermis Was A Bandage Applied: Yes Size Of Lesion In Cm: 0 Biopsy Type: H and E Biopsy Method: Dermablade Anesthesia Type: 1% lidocaine with epinephrine Anesthesia Volume In Cc (Will Not Render If 0): 0.5 Hemostasis: Drysol Wound Care: Petrolatum Dressing: bandage Destruction After The Procedure: No Type Of Destruction Used: Curettage Curettage Text: The wound bed was treated with curettage after the biopsy was performed. Cryotherapy Text: The wound bed was treated with cryotherapy after the biopsy was performed. Electrodesiccation Text: The wound bed was treated with electrodesiccation after the biopsy was performed. Electrodesiccation And Curettage Text: The wound bed was treated with electrodesiccation and curettage after the biopsy was performed. Silver Nitrate Text: The wound bed was treated with silver nitrate after the biopsy was performed. Lab: [id number] Lab Facility: [id number] Consent: Written consent was obtained and risks were reviewed including but not limited to scarring, infection, bleeding, scabbing, incomplete removal, nerve damage and allergy to anesthesia. Post-Care Instructions: I reviewed with the patient in detail post-care instructions. Patient is to keep the biopsy site dry overnight, and then apply bacitracin twice daily until healed. Patient may apply hydrogen peroxide soaks to remove any crusting. Notification Instructions: Patient will be notified of biopsy results. However, patient instructed to call the office if not contacted within 2 weeks. Billing Type: Third-Party Bill Information: Selecting Yes will display possible errors in your note based on the variables you have selected. This validation is only offered as a suggestion for you. PLEASE NOTE THAT THE VALIDATION TEXT WILL BE REMOVED WHEN YOU FINALIZE YOUR NOTE. IF YOU WANT TO FAX A PRELIMINARY NOTE YOU WILL NEED TO TOGGLE THIS TO 'NO' IF YOU DO NOT WANT IT IN YOUR FAXED NOTE.

## 2024-12-07 NOTE — ED PROVIDER NOTE - NSICDXPASTMEDICALHX_GEN_ALL_CORE_FT
PAST MEDICAL HISTORY:  Acute kidney injury superimposed on chronic kidney disease     CAD (coronary artery disease)     Carotid artery stenosis     Chronic diastolic heart failure     Diabetic retinopathy     DM (diabetes mellitus) type II    Hypercholesterolemia     Hypertension     Normocytic anemia     Obesity     PAD (peripheral artery disease) s/p R BKA    Pulmonary HTN     S/P CABG x 3 in 2004, [facility]    Stented coronary artery 2010 [facility]    Vitamin D deficiency

## 2024-12-07 NOTE — PATIENT PROFILE ADULT - FUNCTIONAL ASSESSMENT - BASIC MOBILITY 6.
4-calculated by average/Not able to assess (calculate score using [facility] averaging method)

## 2024-12-07 NOTE — H&P ADULT - HISTORY OF PRESENT ILLNESS
79 year old woman with history of CAD s/p CABG (2004), HTN, HLD, IDDM2, severe PAD with right and left AKA presenting with complaint of one day of abdominal pain. Patient describes pain as gas  79 year old woman with history of CAD s/p CABG (2004), HTN, HLD, IDDM2, severe PAD with right and left AKA presenting with complaint of one day of abdominal pain. Patient describes pain as gas pain that didn't go away. Started last night. Had episode of emesis early this morning. No diarrhea reported by patient. Has not had much to eat today in setting of pain and vomiting. Medications given in the ED helped her pain. Patient denied alcohol use, denied new medications. Reported she had some burning with urination the other day, but denied most recently having any. No yellowing of eyes or skin reported. No chest pain, no difficulty breathing. Gas pain located in epigastric region of abdomen, no radiation reported.    In the ED, febrile to 102.5 TMax, hypertensive to SBP 170s, saturating well on room air. Given doses of vanc, zosyn, 500cc bolus, 4mg IV morphine x 2 doses.  79 year old woman with history of CAD s/p CABG (2004), HTN, HLD, IDDM2, severe PAD with right and left AKA presenting with complaint of one day of abdominal pain. Patient describes pain as gas pain that didn't go away. Started last night. Had episode of emesis early this morning. No diarrhea reported by patient. Has not had much to eat today in setting of pain and vomiting. Medications given in the ED helped her pain. Patient denied alcohol use, denied new medications. Reported she had some burning with urination the other day, but denied most recently having any. No yellowing of eyes or skin reported. No chest pain, no difficulty breathing. Gas pain located in epigastric region of abdomen, no radiation reported. 100.6F temperature reported at home.    In the ED, febrile to 102.5 TMax, hypertensive to SBP 170s, saturating well on room air. Given doses of vanc, zosyn, 500cc bolus, 4mg IV morphine x 2 doses.  79 year old woman with history of CAD s/p CABG (2004), HTN, HLD, IDDM2, severe PAD with right and left AKA presenting with complaint of one day hx of abdominal pain. Was recently admitted to [facility] and discharged 12/6 for NSTEMI but did not report any abdominal symptoms at that time. Patient describes pain as gas pain that didn't go away that initially started last night. Had episode of NBNB emesis early this morning. No diarrhea reported by patient. Has not had much to eat today in setting of pain and vomiting. Medications given in the ED helped her pain. Patient denied alcohol use, denied new medications. Reported she had some burning with urination the other day, but denied any recent dysuria. No yellowing of eyes or skin reported. No chest pain, no difficulty breathing. Gas pain located in epigastric region of abdomen, no radiation reported. 100.6F temperature reported at home.    In the ED, febrile to 102.5 TMax, hypertensive to SBP 170s, saturating well on room air. Given doses of vanc, zosyn, 500cc bolus, 4mg IV morphine x 2 doses.

## 2024-12-07 NOTE — ED ADULT TRIAGE NOTE - CHIEF COMPLAINT QUOTE
Patient c/o epigastric pain, nausea and vomiting starting last night. Reports fever this morning. Denies diarrhea. Phx DM2, CAD, stent, CABG, HTN, HLD, PAD, HF, R BKA, L AKA

## 2024-12-07 NOTE — ED PROVIDER NOTE - ATTENDING CONTRIBUTION TO CARE
80 yo F hx HTN, HLD, DM2, PAD s/p LE AKA and BKA, recently admitted for NSTEMI (no cath/angio), presenting with c/o epigastric pain that started last night with associated fever. Pain is different than pain that brought her to ER during visit for NSTEMI. No acute changes in EKG. Plan for sepsis work up, cardiac monitor, CT abd/pelv, meds, fluids, admit.    VITALS: reviewed  GEN: NAD, A & O x 4  HEAD/EYES: NCAT, EOMI, anicteric sclerae,   ENT: mucus membranes moist, oropharynx WNL, trachea midline,  RESP: lungs CTA with equal breath sounds bilaterally, chest wall nontender and atraumatic  CV: heart with reg rhythm S1, S2, distal pulses intact and symmetric bilaterally  ABDOMEN: normoactive bowel sounds, soft, nondistended, epigastric ttp, no palpable masses  : no CVAT  MSK: extremities atraumatic and nontender, no edema, no asymmetry.  SKIN: warm, dry, no rash, no bruising, no cyanosis. color appropriate for ethnicity  NEURO: alert, mentating appropriately, no facial asymmetry.   PSYCH: Affect appropriate

## 2024-12-07 NOTE — H&P ADULT - PROBLEM SELECTOR PLAN 2
- Abdominal pain, lipase over 3K, signs of acute interstitial pancreatitis on imaging  - Unclear cause of episode; no alcohol use, no recent instrumentation of bile ducts, no evidence of choledocholithiasis on abdominal ultrasound. No new medications. Could be idiopathic. Can check IgG subsets for autoimmune eval  - NPO for now, advance as tolerated  - Morphine PRNs for pain  - Gentle IVF given mildly reduced LVEF with regional WMAs seen on recent echo - Abdominal pain, lipase over 3K, signs of acute interstitial pancreatitis on imaging  - Unclear cause of episode; no alcohol use, no recent instrumentation of bile ducts, no evidence of choledocholithiasis on abdominal ultrasound. No new medications. Triglycerides not elevated. Could be idiopathic. Can check IgG subsets for autoimmune eval  - NPO for now, advance as tolerated  - Morphine PRNs for pain  - Gentle IVF given mildly reduced LVEF with regional WMAs seen on recent echo

## 2024-12-07 NOTE — ED PROVIDER NOTE - CLINICAL SUMMARY MEDICAL DECISION MAKING FREE TEXT BOX
78yo F with PMH significant for CAD s/p CABG 2004, HTN, HLD, IDDM2, and severe PAD s/p b/l FLOWER CORDERO, recently admitted 80yo F with PMH significant for CAD s/p CABG 2004, HTN, HLD, IDDM2, and severe PAD s/p b/l [facility] CORDERO, recently admitted 12/1-12/6 for NSTEMI but did not undergo cath, returns to the ED for abdominal pain, nausea, vomiting, and fever. Patient states she was discharged yesterday but by yesterday night began having severe epigastric pain. Today, had one episode of nonbloody nonbilious emesis and developed a fever to 102F. Denies chest pain, shortness of breath, diarrhea.    Febrile to 102F. Abdomen tender in epigastrium > RUQ, soft, +guarding.    Concern for pancreatitis vs cholecystitis vs viral gastritis, will obtain sepsis screening labs, fluid rescuscitate, antipyretics, symptomatic treatment, CT, admit

## 2024-12-07 NOTE — H&P ADULT - NSHPLABSRESULTS_GEN_ALL_CORE
< from: CT Abdomen and Pelvis w/ IV Cont (12.07.24 @ 15:38) >    IMPRESSION:  1. Findings suspicious for acute interstitial pancreatitis with probable   rectal wall thickening of the adjacent duodenum and gallbladder.   Correlation with lipase is suggested.  2. The urinary bladder is markedly thick-walled, which could be due to   cystitis or sequelae of chronic bladder outlet obstruction.  3. The right hemicolon is mildly thick-walled, which could be due to   underdistention or colitis. No evidence of bowel obstruction.        --- End of Report ---    < end of copied text >

## 2024-12-07 NOTE — ED ADULT NURSE NOTE - OBJECTIVE STATEMENT
pt A&Ox3 bedbound. pt c/o epigastric pain, nausea and vomiting starting last night around 4am. pt endorses 1 episode of vomiting. pt endorses fever. pt denies diarrhea. respirations even and unlabored. PIV#20 left AC, labs drawn and sent. vs as noted. family at bedside. Phx DM2, CAD, stent, CABG, HTN, HLD, PAD, HF, R BKA, L AKA.

## 2024-12-08 DIAGNOSIS — I21.4 NON-ST ELEVATION (NSTEMI) MYOCARDIAL INFARCTION: ICD-10-CM

## 2024-12-08 DIAGNOSIS — N17.9 ACUTE KIDNEY FAILURE, UNSPECIFIED: ICD-10-CM

## 2024-12-08 LAB
ALBUMIN SERPL ELPH-MCNC: 2.9 G/DL — LOW (ref 3.3–5)
ALBUMIN SERPL ELPH-MCNC: 3.1 G/DL — LOW (ref 3.3–5)
ALP SERPL-CCNC: 132 U/L — HIGH (ref 40–120)
ALP SERPL-CCNC: 135 U/L — HIGH (ref 40–120)
ALT FLD-CCNC: 120 U/L — HIGH (ref 4–33)
ALT FLD-CCNC: 149 U/L — HIGH (ref 4–33)
ANION GAP SERPL CALC-SCNC: 14 MMOL/L — SIGNIFICANT CHANGE UP (ref 7–14)
ANION GAP SERPL CALC-SCNC: 16 MMOL/L — HIGH (ref 7–14)
AST SERPL-CCNC: 112 U/L — HIGH (ref 4–32)
AST SERPL-CCNC: 155 U/L — HIGH (ref 4–32)
B-OH-BUTYR SERPL-SCNC: <0 MMOL/L — SIGNIFICANT CHANGE UP (ref 0–0.4)
BILIRUB DIRECT SERPL-MCNC: 3.9 MG/DL — HIGH (ref 0–0.3)
BILIRUB INDIRECT FLD-MCNC: -0.2 MG/DL — LOW (ref 0–1)
BILIRUB SERPL-MCNC: 3.2 MG/DL — HIGH (ref 0.2–1.2)
BILIRUB SERPL-MCNC: 3.7 MG/DL — HIGH (ref 0.2–1.2)
BLD GP AB SCN SERPL QL: NEGATIVE — SIGNIFICANT CHANGE UP
BUN SERPL-MCNC: 44 MG/DL — HIGH (ref 7–23)
BUN SERPL-MCNC: 50 MG/DL — HIGH (ref 7–23)
CALCIUM SERPL-MCNC: 8.2 MG/DL — LOW (ref 8.4–10.5)
CALCIUM SERPL-MCNC: 8.3 MG/DL — LOW (ref 8.4–10.5)
CHLORIDE SERPL-SCNC: 101 MMOL/L — SIGNIFICANT CHANGE UP (ref 98–107)
CHLORIDE SERPL-SCNC: 102 MMOL/L — SIGNIFICANT CHANGE UP (ref 98–107)
CK MB BLD-MCNC: 4.8 % — HIGH (ref 0–2.5)
CK MB CFR SERPL CALC: 10.5 NG/ML — HIGH
CK SERPL-CCNC: 220 U/L — HIGH (ref 25–170)
CO2 SERPL-SCNC: 19 MMOL/L — LOW (ref 22–31)
CO2 SERPL-SCNC: 19 MMOL/L — LOW (ref 22–31)
CREAT ?TM UR-MCNC: 67 MG/DL — SIGNIFICANT CHANGE UP
CREAT SERPL-MCNC: 1.82 MG/DL — HIGH (ref 0.5–1.3)
CREAT SERPL-MCNC: 2.92 MG/DL — HIGH (ref 0.5–1.3)
EGFR: 16 ML/MIN/1.73M2 — LOW
EGFR: 28 ML/MIN/1.73M2 — LOW
GAS PNL BLDV: SIGNIFICANT CHANGE UP
GLUCOSE BLDC GLUCOMTR-MCNC: 228 MG/DL — HIGH (ref 70–99)
GLUCOSE BLDC GLUCOMTR-MCNC: 232 MG/DL — HIGH (ref 70–99)
GLUCOSE BLDC GLUCOMTR-MCNC: 249 MG/DL — HIGH (ref 70–99)
GLUCOSE BLDC GLUCOMTR-MCNC: 255 MG/DL — HIGH (ref 70–99)
GLUCOSE BLDC GLUCOMTR-MCNC: 297 MG/DL — HIGH (ref 70–99)
GLUCOSE BLDC GLUCOMTR-MCNC: 372 MG/DL — HIGH (ref 70–99)
GLUCOSE SERPL-MCNC: 212 MG/DL — HIGH (ref 70–99)
GLUCOSE SERPL-MCNC: 323 MG/DL — HIGH (ref 70–99)
HAPTOGLOB SERPL-MCNC: 214 MG/DL — HIGH (ref 34–200)
HCT VFR BLD CALC: 25.5 % — LOW (ref 34.5–45)
HCT VFR BLD CALC: 26 % — LOW (ref 34.5–45)
HGB BLD-MCNC: 7.9 G/DL — LOW (ref 11.5–15.5)
HGB BLD-MCNC: 8.3 G/DL — LOW (ref 11.5–15.5)
LDH SERPL L TO P-CCNC: 258 U/L — HIGH (ref 135–225)
LIDOCAIN IGE QN: 1592 U/L — HIGH (ref 7–60)
MAGNESIUM SERPL-MCNC: 1.6 MG/DL — SIGNIFICANT CHANGE UP (ref 1.6–2.6)
MAGNESIUM SERPL-MCNC: 1.7 MG/DL — SIGNIFICANT CHANGE UP (ref 1.6–2.6)
MCHC RBC-ENTMCNC: 28.7 PG — SIGNIFICANT CHANGE UP (ref 27–34)
MCHC RBC-ENTMCNC: 29.7 PG — SIGNIFICANT CHANGE UP (ref 27–34)
MCHC RBC-ENTMCNC: 30.4 G/DL — LOW (ref 32–36)
MCHC RBC-ENTMCNC: 32.5 G/DL — SIGNIFICANT CHANGE UP (ref 32–36)
MCV RBC AUTO: 91.4 FL — SIGNIFICANT CHANGE UP (ref 80–100)
MCV RBC AUTO: 94.5 FL — SIGNIFICANT CHANGE UP (ref 80–100)
NRBC # BLD: 0 /100 WBCS — SIGNIFICANT CHANGE UP (ref 0–0)
NRBC # BLD: 0 /100 WBCS — SIGNIFICANT CHANGE UP (ref 0–0)
NRBC # FLD: 0 K/UL — SIGNIFICANT CHANGE UP (ref 0–0)
NRBC # FLD: 0 K/UL — SIGNIFICANT CHANGE UP (ref 0–0)
PHOSPHATE SERPL-MCNC: 3.9 MG/DL — SIGNIFICANT CHANGE UP (ref 2.5–4.5)
PHOSPHATE SERPL-MCNC: 4.5 MG/DL — SIGNIFICANT CHANGE UP (ref 2.5–4.5)
PLATELET # BLD AUTO: 194 K/UL — SIGNIFICANT CHANGE UP (ref 150–400)
PLATELET # BLD AUTO: 203 K/UL — SIGNIFICANT CHANGE UP (ref 150–400)
POTASSIUM SERPL-MCNC: 4.3 MMOL/L — SIGNIFICANT CHANGE UP (ref 3.5–5.3)
POTASSIUM SERPL-MCNC: 5 MMOL/L — SIGNIFICANT CHANGE UP (ref 3.5–5.3)
POTASSIUM SERPL-SCNC: 4.3 MMOL/L — SIGNIFICANT CHANGE UP (ref 3.5–5.3)
POTASSIUM SERPL-SCNC: 5 MMOL/L — SIGNIFICANT CHANGE UP (ref 3.5–5.3)
PROT SERPL-MCNC: 6.1 G/DL — SIGNIFICANT CHANGE UP (ref 6–8.3)
PROT SERPL-MCNC: 6.1 G/DL — SIGNIFICANT CHANGE UP (ref 6–8.3)
RBC # BLD: 2.75 M/UL — LOW (ref 3.8–5.2)
RBC # BLD: 2.79 M/UL — LOW (ref 3.8–5.2)
RBC # FLD: 15.2 % — HIGH (ref 10.3–14.5)
RBC # FLD: 15.6 % — HIGH (ref 10.3–14.5)
RH IG SCN BLD-IMP: POSITIVE — SIGNIFICANT CHANGE UP
SODIUM SERPL-SCNC: 134 MMOL/L — LOW (ref 135–145)
SODIUM SERPL-SCNC: 137 MMOL/L — SIGNIFICANT CHANGE UP (ref 135–145)
SODIUM UR-SCNC: 62 MMOL/L — SIGNIFICANT CHANGE UP
TROPONIN T, HIGH SENSITIVITY RESULT: 418 NG/L — CRITICAL HIGH
TROPONIN T, HIGH SENSITIVITY RESULT: 957 NG/L — CRITICAL HIGH
UUN UR-MCNC: 277.5 MG/DL — SIGNIFICANT CHANGE UP
WBC # BLD: 7.29 K/UL — SIGNIFICANT CHANGE UP (ref 3.8–10.5)
WBC # BLD: 8.28 K/UL — SIGNIFICANT CHANGE UP (ref 3.8–10.5)
WBC # FLD AUTO: 7.29 K/UL — SIGNIFICANT CHANGE UP (ref 3.8–10.5)
WBC # FLD AUTO: 8.28 K/UL — SIGNIFICANT CHANGE UP (ref 3.8–10.5)

## 2024-12-08 PROCEDURE — 99233 SBSQ HOSP IP/OBS HIGH 50: CPT

## 2024-12-08 PROCEDURE — 93010 ELECTROCARDIOGRAM REPORT: CPT

## 2024-12-08 PROCEDURE — 76770 US EXAM ABDO BACK WALL COMP: CPT | Mod: 26

## 2024-12-08 RX ORDER — HEPARIN SODIUM,PORCINE 1000/ML
4000 VIAL (ML) INJECTION EVERY 6 HOURS
Refills: 0 | Status: DISCONTINUED | OUTPATIENT
Start: 2024-12-08 | End: 2024-12-09

## 2024-12-08 RX ORDER — NALOXONE HCL 0.4 MG/ML
0.4 AMPUL (ML) INJECTION ONCE
Refills: 0 | Status: DISCONTINUED | OUTPATIENT
Start: 2024-12-08 | End: 2024-12-16

## 2024-12-08 RX ORDER — HEPARIN SODIUM,PORCINE 1000/ML
VIAL (ML) INJECTION
Qty: 25000 | Refills: 0 | Status: DISCONTINUED | OUTPATIENT
Start: 2024-12-08 | End: 2024-12-09

## 2024-12-08 RX ORDER — 0.9 % SODIUM CHLORIDE 0.9 %
1000 INTRAVENOUS SOLUTION INTRAVENOUS
Refills: 0 | Status: DISCONTINUED | OUTPATIENT
Start: 2024-12-08 | End: 2024-12-08

## 2024-12-08 RX ORDER — HYDROMORPHONE HYDROCHLORIDE 2 MG/1
0.5 TABLET ORAL EVERY 6 HOURS
Refills: 0 | Status: DISCONTINUED | OUTPATIENT
Start: 2024-12-08 | End: 2024-12-09

## 2024-12-08 RX ORDER — NALOXONE HCL 0.4 MG/ML
0.4 AMPUL (ML) INJECTION ONCE
Refills: 0 | Status: COMPLETED | OUTPATIENT
Start: 2024-12-08 | End: 2024-12-08

## 2024-12-08 RX ORDER — HEPARIN SODIUM,PORCINE 1000/ML
4000 VIAL (ML) INJECTION ONCE
Refills: 0 | Status: COMPLETED | OUTPATIENT
Start: 2024-12-08 | End: 2024-12-08

## 2024-12-08 RX ADMIN — PIPERACILLIN SODIUM AND TAZOBACTAM SODIUM 25 GRAM(S): 4; .5 INJECTION, POWDER, LYOPHILIZED, FOR SOLUTION INTRAVENOUS at 21:49

## 2024-12-08 RX ADMIN — Medication 5: at 04:29

## 2024-12-08 RX ADMIN — Medication 75 MILLILITER(S): at 10:55

## 2024-12-08 RX ADMIN — Medication 3: at 10:53

## 2024-12-08 RX ADMIN — PIPERACILLIN SODIUM AND TAZOBACTAM SODIUM 25 GRAM(S): 4; .5 INJECTION, POWDER, LYOPHILIZED, FOR SOLUTION INTRAVENOUS at 05:12

## 2024-12-08 RX ADMIN — Medication 2: at 22:32

## 2024-12-08 RX ADMIN — PIPERACILLIN SODIUM AND TAZOBACTAM SODIUM 25 GRAM(S): 4; .5 INJECTION, POWDER, LYOPHILIZED, FOR SOLUTION INTRAVENOUS at 14:18

## 2024-12-08 RX ADMIN — Medication 950 UNIT(S)/HR: at 19:44

## 2024-12-08 RX ADMIN — Medication 75 MILLILITER(S): at 18:53

## 2024-12-08 RX ADMIN — Medication 3: at 16:00

## 2024-12-08 RX ADMIN — Medication 4000 UNIT(S): at 18:49

## 2024-12-08 RX ADMIN — INSULIN GLARGINE 24 UNIT(S): 100 INJECTION, SOLUTION SUBCUTANEOUS at 21:49

## 2024-12-08 RX ADMIN — CLOPIDOGREL 75 MILLIGRAM(S): 75 TABLET, FILM COATED ORAL at 12:11

## 2024-12-08 RX ADMIN — HYDROMORPHONE HYDROCHLORIDE 0.5 MILLIGRAM(S): 2 TABLET ORAL at 22:42

## 2024-12-08 RX ADMIN — Medication 81 MILLIGRAM(S): at 12:12

## 2024-12-08 RX ADMIN — Medication 950 UNIT(S)/HR: at 18:50

## 2024-12-08 NOTE — RAPID RESPONSE TEAM SUMMARY - NSOTHERINTERVENTIONSRRT_GEN_ALL_CORE
-monitor for further respiratory depression, caution w/ opiates  -VBG to eval for hypercarbia if w/ persistent lethargy -monitor for further respiratory depression, caution w/ opiates  -VBG to eval for hypercarbia in AM, no evidence of such at the time being  -monitor on tele, consider cards eval for role re: ILR or pacing given sinus pause on tele if pt continues to have evidence of bradyarrhythmia -monitor for further respiratory depression, caution w/ opiates  -VBG to eval for hypercarbia in AM, no evidence of such at the time being  -monitor on tele, consider cards eval for role re: ILR or pacing given sinus pause on tele if pt continues to have evidence of bradyarrhythmia  -keep atropine, pads @ bedside

## 2024-12-08 NOTE — RAPID RESPONSE TEAM SUMMARY - NSADDTLFINDINGSRRT_GEN_ALL_CORE
Unofficial POCUS also completed given degree of renal failure to eval role for fluid resuscitation - IVC ~1.1cm w/ grossly decreased LVSF. A-line predominant throughout lung fields bilaterally. Trace L pleural effusion.

## 2024-12-08 NOTE — CONSULT NOTE ADULT - ASSESSMENT
[facility] 2017   of the LAD and RCA, patent LIMA to LAD graft, OM stented with  BMS    TTE 12/2/2024   1. Technically very difficult study performed with the patient in the supine position.   2. Technically difficult image quality.   3. Left ventricular systolic function is mildly to moderately decreased with anejection fraction visually estimated at 40 to 45%. There are regional wall motion abnormalities present. Hypokinesis of the inferolateral wall, basal to mid inferior wall, and basal inferoseptum. There is mild (grade 1) left ventricular diastolic dysfunction.   4. The right ventricle is not well visualized.   5. Structurally normal mitral valve with normal leaflet excursion. There is calcification of the mitral valve annulus. There is mild mitral regurgitation.   6. No prior echocardiogram is available for comparison.    A/P    79 year old woman with history of CAD s/p CABG (2004), HTN, HLD, IDDM2, severe PAD with right and left AKA presenting with complaint of one day of abdominal pain. Meeting sepsis criteria and found to have acute pancreatitis.    1. Elevated troponin  - [facility] and NST deferred during last admission (last week)  - Trop 192>418, c/p epigastric pain  - TTE 12/2/24 mild-mod LV dysfunction(EF 40-45%)  - trend trop, get EKG (EKG not in the chart)    2. Acute pancreatitis  -on Abx f/u GI recs    3. CAD s/p CABG (2004)  -asa, plavix  -statin held for abn LFT    4. DVT ppx- lovenox     [facility] 2017   of the LAD and RCA, patent LIMA to LAD graft, OM stented with  BMS    TTE 12/2/2024   1. Technically very difficult study performed with the patient in the supine position.   2. Technically difficult image quality.   3. Left ventricular systolic function is mildly to moderately decreased with anejection fraction visually estimated at 40 to 45%. There are regional wall motion abnormalities present. Hypokinesis of the inferolateral wall, basal to mid inferior wall, and basal inferoseptum. There is mild (grade 1) left ventricular diastolic dysfunction.   4. The right ventricle is not well visualized.   5. Structurally normal mitral valve with normal leaflet excursion. There is calcification of the mitral valve annulus. There is mild mitral regurgitation.   6. No prior echocardiogram is available for comparison.    A/P    79 year old woman with history of CAD s/p CABG (2004), HTN, HLD, IDDM2, severe PAD with right and left AKA presenting with complaint of one day of abdominal pain. Meeting sepsis criteria and found to have acute pancreatitis.    1. Elevated troponin  - [facility] and NST deferred during last admission (last week)  - Trop 192>418, c/p epigastric pain  - TTE 12/2/24 mild-mod LV dysfunction(EF 40-45%)  - trend trop, add CK/CKMB, get EKG (EKG not in the chart)  - if acute EKG changes, will discuss ACS workup    2. Acute pancreatitis  -on Abx f/u GI recs    3. CAD s/p CABG (2004)  -asa, plavix  -statin held for abn LFT    4. DVT ppx- lovenox

## 2024-12-08 NOTE — CONSULT NOTE ADULT - SUBJECTIVE AND OBJECTIVE BOX
Chief Complaint:  Patient is a 79y old  Female who presents with a chief complaint of Abdominal pain (07 Dec 2024 19:41)      HPI:  79 year old woman with history of CAD s/p CABG (2004), HTN, HLD, IDDM2, severe PAD with right and left AKA presenting with complaint of one day hx of abdominal pain. Was recently admitted to [facility] and discharged 12/6 for NSTEMI but did not report any abdominal symptoms at that time. Patient describes pain as gas pain that didn't go away that initially started last night. Had episode of NBNB emesis early this morning. No diarrhea reported by patient. Has not had much to eat today in setting of pain and vomiting. Medications given in the ED helped her pain. Patient denied alcohol use, denied new medications. Reported she had some burning with urination the other day, but denied any recent dysuria. No yellowing of eyes or skin reported. No chest pain, no difficulty breathing. Gas pain located in epigastric region of abdomen, no radiation reported. 100.6F temperature reported at home.    In the ED, febrile to 102.5 TMax, hypertensive to SBP 170s, saturating well on room air. Given doses of vanc, zosyn, 500cc bolus, 4mg IV morphine x 2 doses. Labs notable for bilirubin 3.7, Alk phos 135, , , crt 1.82, lipase >3000. Imaging w/ acute interstitial pancreatitis, CBD WNL.        Allergies:  sulfa drugs (Hives)  sulfa drugs (Rash)  Sulfac 10% (Hives)      Home Medications:    Hospital Medications:  aspirin enteric coated 81 milliGRAM(s) Oral daily  carvedilol 6.25 milliGRAM(s) Oral every 12 hours  clopidogrel Tablet 75 milliGRAM(s) Oral daily  dextrose 5%. 1000 milliLiter(s) IV Continuous <Continuous>  dextrose 5%. 1000 milliLiter(s) IV Continuous <Continuous>  dextrose 50% Injectable 25 Gram(s) IV Push once  dextrose 50% Injectable 12.5 Gram(s) IV Push once  dextrose 50% Injectable 25 Gram(s) IV Push once  dextrose Oral Gel 15 Gram(s) Oral once PRN  enoxaparin Injectable 40 milliGRAM(s) SubCutaneous every 24 hours  glucagon  Injectable 1 milliGRAM(s) IntraMuscular once  insulin glargine Injectable (LANTUS) 24 Unit(s) SubCutaneous at bedtime  insulin lispro (ADMELOG) corrective regimen sliding scale   SubCutaneous every 6 hours  lactated ringers. 1000 milliLiter(s) IV Continuous <Continuous>  morphine  - Injectable 2 milliGRAM(s) IV Push every 4 hours PRN  morphine  - Injectable 4 milliGRAM(s) IV Push every 4 hours PRN  piperacillin/tazobactam IVPB.. 3.375 Gram(s) IV Intermittent every 8 hours      PMHX/PSHX:  CAD (coronary artery disease)    S/P CABG x 3    Stented coronary artery    PAD (peripheral artery disease)    Carotid artery stenosis    DM (diabetes mellitus)    HTN (hypertension)    Hypercholesterolemia    Obesity    Diabetes    Hypertension    CAD (coronary artery disease)    PVD (peripheral vascular disease)    Carotid stenosis    UTI (urinary tract infection)    Acute kidney injury superimposed on chronic kidney disease    Chronic diastolic heart failure    Vitamin D deficiency    Normocytic anemia    Pulmonary HTN    Diabetic retinopathy    Dermatitis    Diabetes    HTN (Hypertension)    CAD (Coronary Artery Disease)    Hx of CABG    S/P hysterectomy    S/P angioplasty with stent    S/P below knee amputation, right    S/P eye surgery    S/P CABG x 3    S/P BKA (below knee amputation) unilateral, right    History of hysterectomy    Elective surgery    S/P BKA (below knee amputation), left        Family history:  Family history of diabetes mellitus (Sibling)        Denies family history of colon cancer/polyps, stomach cancer/polyps, pancreatic cancer/masses, liver cancer/disease, ovarian cancer and endometrial cancer.    Social History:   Tob: Denies  EtOH: Denies  Illicit Drugs: Denies    ROS:     General:  No wt loss, fevers, chills, night sweats, fatigue  Eyes:  Good vision, no reported pain  ENT:  No sore throat, pain, runny nose, dysphagia  CV:  No pain, palpitations, hypo/hypertension  Pulm:  No dyspnea, cough, tachypnea, wheezing  GI:  see HPI  :  No pain, bleeding, incontinence, nocturia  Muscle:  No pain, weakness  Neuro:  No weakness, tingling, memory problems  Psych:  No fatigue, insomnia, mood problems, depression  Endocrine:  No polyuria, polydipsia, cold/heat intolerance  Heme:  No petechiae, ecchymosis, easy bruisability  Skin:  No rash, tattoos, scars, edema    PHYSICAL EXAM:     GENERAL:  No acute distress  HEENT:  NCAT, no scleral icterus   CHEST:  no respiratory distress  HEART:  Regular rate and rhythm  ABDOMEN:  Soft, non-tender, non-distended, normoactive bowel sounds,  no masses, no hepato-splenomegaly, no signs of chronic liver disease  EXTREMITIES: No edema  SKIN:  No rash/erythema/ecchymoses/petechiae/wounds/abscess/warm/dry  NEURO:  Alert and oriented x 3, no asterixis    Vital Signs:  Vital Signs Last 24 Hrs  T(C): 36.8 (08 Dec 2024 12:34), Max: 39.2 (07 Dec 2024 12:47)  T(F): 98.3 (08 Dec 2024 12:34), Max: 102.5 (07 Dec 2024 12:47)  HR: 81 (08 Dec 2024 12:34) (79 - 96)  BP: 97/43 (08 Dec 2024 12:34) (97/43 - 192/72)  BP(mean): --  RR: 16 (08 Dec 2024 12:34) (16 - 18)  SpO2: 100% (08 Dec 2024 12:34) (95% - 100%)    Parameters below as of 08 Dec 2024 12:34  Patient On (Oxygen Delivery Method): room air      Daily Height in cm: 167.64 (07 Dec 2024 12:47)    Daily     LABS:                        7.9    8.28  )-----------( 203      ( 08 Dec 2024 04:24 )             26.0     Mean Cell Volume: 94.5 fL (12-08-24 @ 04:24)    12-08    137  |  102  |  44[H]  ----------------------------<  323[H]  4.3   |  19[L]  |  1.82[H]    Ca    8.3[L]      08 Dec 2024 04:24  Phos  4.5     12-08  Mg     1.70     12-08    TPro  6.1  /  Alb  3.1[L]  /  TBili  3.7[H]  /  DBili  3.9[H]  /  AST  155[H]  /  ALT  149[H]  /  AlkPhos  135[H]  12-08    LIVER FUNCTIONS - ( 08 Dec 2024 04:24 )  Alb: 3.1 g/dL / Pro: 6.1 g/dL / ALK PHOS: 135 U/L / ALT: 149 U/L / AST: 155 U/L / GGT: x           PT/INR - ( 07 Dec 2024 14:50 )   PT: 12.6 sec;   INR: 1.09 ratio         PTT - ( 07 Dec 2024 14:50 )  PTT:31.6 sec  Urinalysis Basic - ( 08 Dec 2024 04:24 )    Color: x / Appearance: x / SG: x / pH: x  Gluc: 323 mg/dL / Ketone: x  / Bili: x / Urobili: x   Blood: x / Protein: x / Nitrite: x   Leuk Esterase: x / RBC: x / WBC x   Sq Epi: x / Non Sq Epi: x / Bacteria: x      Amylase Serum--      Lipase serum>3000       Ammonia--                          7.9    8.28  )-----------( 203      ( 08 Dec 2024 04:24 )             26.0                         10.3   11.20 )-----------( 267      ( 07 Dec 2024 14:50 )             31.7       Imaging:           Chief Complaint:  Patient is a 79y old  Female who presents with a chief complaint of Abdominal pain (07 Dec 2024 19:41)      HPI:  79 year old woman with history of CAD s/p CABG (2004), HTN, HLD, IDDM2, severe PAD with right and left AKA presenting with complaint of one day hx of abdominal pain. Was recently admitted to [facility] and discharged 12/6 for NSTEMI but did not report any abdominal symptoms at that time. Patient describes pain as gas pain that didn't go away that initially started last night. Had episode of NBNB emesis early this morning. No diarrhea reported by patient. Has not had much to eat today in setting of pain and vomiting. Medications given in the ED helped her pain. Patient denied alcohol use, denied new medications. Reported she had some burning with urination the other day, but denied any recent dysuria. No yellowing of eyes or skin reported. No chest pain, no difficulty breathing. Gas pain located in epigastric region of abdomen, no radiation reported. 100.6F temperature reported at home.    In the ED, febrile to 102.5 TMax, hypertensive to SBP 170s, saturating well on room air. Given doses of vanc, zosyn, 500cc bolus, 4mg IV morphine x 2 doses. Labs notable for bilirubin 3.7, Alk phos 135, , , crt 1.82, lipase >3000. Imaging w/ acute interstitial pancreatitis, CBD WNL.        Allergies:  sulfa drugs (Hives)  sulfa drugs (Rash)  Sulfac 10% (Hives)      Home Medications:    Hospital Medications:  aspirin enteric coated 81 milliGRAM(s) Oral daily  carvedilol 6.25 milliGRAM(s) Oral every 12 hours  clopidogrel Tablet 75 milliGRAM(s) Oral daily  dextrose 5%. 1000 milliLiter(s) IV Continuous <Continuous>  dextrose 5%. 1000 milliLiter(s) IV Continuous <Continuous>  dextrose 50% Injectable 25 Gram(s) IV Push once  dextrose 50% Injectable 12.5 Gram(s) IV Push once  dextrose 50% Injectable 25 Gram(s) IV Push once  dextrose Oral Gel 15 Gram(s) Oral once PRN  enoxaparin Injectable 40 milliGRAM(s) SubCutaneous every 24 hours  glucagon  Injectable 1 milliGRAM(s) IntraMuscular once  insulin glargine Injectable (LANTUS) 24 Unit(s) SubCutaneous at bedtime  insulin lispro (ADMELOG) corrective regimen sliding scale   SubCutaneous every 6 hours  lactated ringers. 1000 milliLiter(s) IV Continuous <Continuous>  morphine  - Injectable 2 milliGRAM(s) IV Push every 4 hours PRN  morphine  - Injectable 4 milliGRAM(s) IV Push every 4 hours PRN  piperacillin/tazobactam IVPB.. 3.375 Gram(s) IV Intermittent every 8 hours      PMHX/PSHX:  CAD (coronary artery disease)    S/P CABG x 3    Stented coronary artery    PAD (peripheral artery disease)    Carotid artery stenosis    DM (diabetes mellitus)    HTN (hypertension)    Hypercholesterolemia    Obesity    Diabetes    Hypertension    CAD (coronary artery disease)    PVD (peripheral vascular disease)    Carotid stenosis    UTI (urinary tract infection)    Acute kidney injury superimposed on chronic kidney disease    Chronic diastolic heart failure    Vitamin D deficiency    Normocytic anemia    Pulmonary HTN    Diabetic retinopathy    Dermatitis    Diabetes    HTN (Hypertension)    CAD (Coronary Artery Disease)    Hx of CABG    S/P hysterectomy    S/P angioplasty with stent    S/P below knee amputation, right    S/P eye surgery    S/P CABG x 3    S/P BKA (below knee amputation) unilateral, right    History of hysterectomy    Elective surgery    S/P BKA (below knee amputation), left        Family history:  Family history of diabetes mellitus (Sibling)        Denies family history of colon cancer/polyps, stomach cancer/polyps, pancreatic cancer/masses, liver cancer/disease, ovarian cancer and endometrial cancer.    Social History:   Tob: Denies  EtOH: Denies  Illicit Drugs: Denies    ROS:     General:  No wt loss, fevers, chills, night sweats, fatigue  Eyes:  Good vision, no reported pain  ENT:  No sore throat, pain, runny nose, dysphagia  CV:  No pain, palpitations, hypo/hypertension  Pulm:  No dyspnea, cough, tachypnea, wheezing  GI:  see HPI  :  No pain, bleeding, incontinence, nocturia  Muscle:  No pain, weakness  Neuro:  No weakness, tingling, memory problems  Psych:  No fatigue, insomnia, mood problems, depression  Endocrine:  No polyuria, polydipsia, cold/heat intolerance  Heme:  No petechiae, ecchymosis, easy bruisability  Skin:  No rash, tattoos, scars, edema    PHYSICAL EXAM:     GENERAL:  No acute distress  HEENT:  NCAT, no scleral icterus   CHEST:  no respiratory distress  HEART:  Regular rate and rhythm  ABDOMEN:  Soft, mildly TTP in epigastric region.   EXTREMITIES: No edema  SKIN:  No rash/erythema/ecchymoses/petechiae/wounds/abscess/warm/dry  NEURO:  Alert and oriented x 3, no asterixis    Vital Signs:  Vital Signs Last 24 Hrs  T(C): 36.8 (08 Dec 2024 12:34), Max: 39.2 (07 Dec 2024 12:47)  T(F): 98.3 (08 Dec 2024 12:34), Max: 102.5 (07 Dec 2024 12:47)  HR: 81 (08 Dec 2024 12:34) (79 - 96)  BP: 97/43 (08 Dec 2024 12:34) (97/43 - 192/72)  BP(mean): --  RR: 16 (08 Dec 2024 12:34) (16 - 18)  SpO2: 100% (08 Dec 2024 12:34) (95% - 100%)    Parameters below as of 08 Dec 2024 12:34  Patient On (Oxygen Delivery Method): room air      Daily Height in cm: 167.64 (07 Dec 2024 12:47)    Daily     LABS:                        7.9    8.28  )-----------( 203      ( 08 Dec 2024 04:24 )             26.0     Mean Cell Volume: 94.5 fL (12-08-24 @ 04:24)    12-08    137  |  102  |  44[H]  ----------------------------<  323[H]  4.3   |  19[L]  |  1.82[H]    Ca    8.3[L]      08 Dec 2024 04:24  Phos  4.5     12-08  Mg     1.70     12-08    TPro  6.1  /  Alb  3.1[L]  /  TBili  3.7[H]  /  DBili  3.9[H]  /  AST  155[H]  /  ALT  149[H]  /  AlkPhos  135[H]  12-08    LIVER FUNCTIONS - ( 08 Dec 2024 04:24 )  Alb: 3.1 g/dL / Pro: 6.1 g/dL / ALK PHOS: 135 U/L / ALT: 149 U/L / AST: 155 U/L / GGT: x           PT/INR - ( 07 Dec 2024 14:50 )   PT: 12.6 sec;   INR: 1.09 ratio         PTT - ( 07 Dec 2024 14:50 )  PTT:31.6 sec  Urinalysis Basic - ( 08 Dec 2024 04:24 )    Color: x / Appearance: x / SG: x / pH: x  Gluc: 323 mg/dL / Ketone: x  / Bili: x / Urobili: x   Blood: x / Protein: x / Nitrite: x   Leuk Esterase: x / RBC: x / WBC x   Sq Epi: x / Non Sq Epi: x / Bacteria: x      Amylase Serum--      Lipase serum>3000       Ammonia--                          7.9    8.28  )-----------( 203      ( 08 Dec 2024 04:24 )             26.0                         10.3   11.20 )-----------( 267      ( 07 Dec 2024 14:50 )             31.7       Imaging:    < from: US Abdomen Limited (12.07.24 @ 18:38) >  IMPRESSION:      1. Pericholecystic fluid, likely related to pancreatitis.  2. No gallstones.        --- End of Report --    < end of copied text >

## 2024-12-08 NOTE — PROGRESS NOTE ADULT - SUBJECTIVE AND OBJECTIVE BOX
Andre Reeder MD  [facility] Division of Hospital Medicine  Pager: [phone]  Available via MS Teams    SUBJECTIVE / OVERNIGHT EVENTS:    no specific complains on assessment (pt did get pain meds prior)   states she was having epigastric pain prior. Severe. Constant.     MEDICATIONS  (STANDING):  aspirin enteric coated 81 milliGRAM(s) Oral daily  carvedilol 6.25 milliGRAM(s) Oral every 12 hours  clopidogrel Tablet 75 milliGRAM(s) Oral daily  dextrose 5%. 1000 milliLiter(s) (50 mL/Hr) IV Continuous <Continuous>  dextrose 5%. 1000 milliLiter(s) (100 mL/Hr) IV Continuous <Continuous>  dextrose 50% Injectable 25 Gram(s) IV Push once  dextrose 50% Injectable 12.5 Gram(s) IV Push once  dextrose 50% Injectable 25 Gram(s) IV Push once  glucagon  Injectable 1 milliGRAM(s) IntraMuscular once  heparin   Injectable 4000 Unit(s) IV Push once  heparin  Infusion.  Unit(s)/Hr (9.5 mL/Hr) IV Continuous <Continuous>  insulin glargine Injectable (LANTUS) 24 Unit(s) SubCutaneous at bedtime  insulin lispro (ADMELOG) corrective regimen sliding scale   SubCutaneous every 6 hours  lactated ringers. 1000 milliLiter(s) (75 mL/Hr) IV Continuous <Continuous>  piperacillin/tazobactam IVPB.. 3.375 Gram(s) IV Intermittent every 8 hours    MEDICATIONS  (PRN):  dextrose Oral Gel 15 Gram(s) Oral once PRN Blood Glucose LESS THAN 70 milliGRAM(s)/deciliter  heparin   Injectable 4000 Unit(s) IV Push every 6 hours PRN For aPTT less than 40  HYDROmorphone  Injectable 0.5 milliGRAM(s) IV Push every 6 hours PRN Moderate & severe Pain      I&O's Summary    07 Dec 2024 07:01  -  08 Dec 2024 07:00  --------------------------------------------------------  IN: 0 mL / OUT: 250 mL / NET: -250 mL    08 Dec 2024 07:01  -  08 Dec 2024 18:40  --------------------------------------------------------  IN: 775 mL / OUT: 850 mL / NET: -75 mL        PHYSICAL EXAM:  Vital Signs Last 24 Hrs  T(C): 37.2 (08 Dec 2024 16:26), Max: 37.2 (08 Dec 2024 16:26)  T(F): 99 (08 Dec 2024 16:26), Max: 99 (08 Dec 2024 16:26)  HR: 85 (08 Dec 2024 16:26) (79 - 88)  BP: 114/48 (08 Dec 2024 16:26) (97/43 - 117/45)  BP(mean): --  RR: 17 (08 Dec 2024 16:26) (16 - 17)  SpO2: 100% (08 Dec 2024 16:26) (95% - 100%)    Parameters below as of 08 Dec 2024 16:26  Patient On (Oxygen Delivery Method): room air      CONSTITUTIONAL: NAD   EYES: conjunctiva and sclera clear  ENMT: Moist oral mucosa   NECK: Supple   RESPIRATORY: Normal respiratory effort; lungs are clear to auscultation bilaterally  CARDIOVASCULAR: Regular rate and rhythm, normal S1 and S2, no murmur/rub/gallop; Peripheral pulses are 2+ bilaterally  ABDOMEN: mild tenderness to the epigastrium, normoactive bowel sounds, no rebound/guarding   MUSCULOSKELETAL: right and left AKA noted  PSYCH: A+O to person, place, and time; affect appropriate  NEUROLOGY: moves all extremities   SKIN: No rashes    LABS:                        8.3    7.29  )-----------( 194      ( 08 Dec 2024 15:47 )             25.5     12-08    137  |  102  |  44[H]  ----------------------------<  323[H]  4.3   |  19[L]  |  1.82[H]    Ca    8.3[L]      08 Dec 2024 04:24  Phos  4.5     12-08  Mg     1.70     12-08    TPro  6.1  /  Alb  3.1[L]  /  TBili  3.7[H]  /  DBili  3.9[H]  /  AST  155[H]  /  ALT  149[H]  /  AlkPhos  135[H]  12-08    PT/INR - ( 07 Dec 2024 14:50 )   PT: 12.6 sec;   INR: 1.09 ratio         PTT - ( 07 Dec 2024 14:50 )  PTT:31.6 sec  CARDIAC MARKERS ( 08 Dec 2024 15:47 )  x     / x     / x     / x     / 10.5 ng/mL  CARDIAC MARKERS ( 07 Dec 2024 14:50 )  x     / x     / x     / x     / 1.6 ng/mL      Urinalysis Basic - ( 08 Dec 2024 04:24 )    Color: x / Appearance: x / SG: x / pH: x  Gluc: 323 mg/dL / Ketone: x  / Bili: x / Urobili: x   Blood: x / Protein: x / Nitrite: x   Leuk Esterase: x / RBC: x / WBC x   Sq Epi: x / Non Sq Epi: x / Bacteria: x        Urinalysis with Rflx Culture (collected 07 Dec 2024 18:59)          COORDINATION OF CARE:  Communication: discussed plan of care with ACP

## 2024-12-08 NOTE — RAPID RESPONSE TEAM SUMMARY - NSSITUATIONBACKGROUNDRRT_GEN_ALL_CORE
79 year old woman with history of CAD s/p CABG (2004), HTN, HLD, IDDM2, severe PAD with right and left AKA presenting with complaint of one day of abdominal pain. Meeting sepsis criteria and found to have acute pancreatitis. RRT called for pt unresponsiveness.     Upon arrival, VS: BP 72/31, HR 46, T 98.9, SpO2 97%, . Pt mildly responsive to sternal rub with pupils <2mm, also bradypneic w/ RR . Given 0.4mg narcan w/ improvement in mentation - opened eyes to name and answering questions but still slightly somnolent so given additional dose 0.4mg narcan IV to which pt awakened further and was AOx3. VS following administration of narcan: /46, HR 50s, SpO2 97% and RR 20.  79 year old woman with history of CAD s/p CABG (2004), HTN, HLD, IDDM2, severe PAD with right and left AKA presenting with complaint of one day of abdominal pain. Meeting sepsis criteria and found to have acute pancreatitis. RRT called for pt unresponsiveness.     Upon arrival, VS: BP 72/31, HR 46, T 98.9, SpO2 97%, . Pt mildly responsive to sternal rub with pupils <2mm, also bradypneic w/ RR ~10. Given 0.4mg narcan w/ improvement in mentation - opened eyes to name and answering questions but still slightly somnolent so given additional dose 0.4mg narcan IV to which pt awakened further and was AOx3. VS following administration of narcan: /46, HR 50s, SpO2 97% and RR 20.     RRT called again 2:04AM for continued lethargy. Upon arrival, pt similar to prior, somnolent but arousable, following commands and answering questions appropriately. Pupils ~3-4mm. VS remained stable during RRT w/o hypoxia, hypotension, fever, or bradypnea. VBG returned from prior RRT w/ pH 7.21/pCO2 42 and anion gap of 18 - suggestive of primary metabolic acidosis i/s/o her renal failure. Additionally, noted on telemetry to have 3rd degree heart block and sinus pause ~6 seconds prior to RRT prior to resumption of normal sinus rhythm at ~80bpm. Has not received any AV cindy blocking agents, only has received dilaudid in past ~3-4 hours - pt mentating similar to prior on current assessment, no concern for impending airway compromise.  79 year old woman with history of CAD s/p CABG (2004), HTN, HLD, IDDM2, severe PAD with right and left AKA presenting with complaint of one day of abdominal pain. Meeting sepsis criteria and found to have acute pancreatitis. RRT called for pt unresponsiveness.     Upon arrival, VS: BP 72/31, HR 46, T 98.9, SpO2 97%, . Pt mildly responsive to sternal rub with pupils <2mm, also bradypneic w/ RR ~10. Given 0.4mg narcan w/ improvement in mentation - opened eyes to name and answering questions but still slightly somnolent so given additional dose 0.4mg narcan IV to which pt awakened further and was AOx3. VS following administration of narcan: /46, HR 50s, SpO2 97% and RR 20.     RRT called again 2:04AM for continued lethargy. Upon arrival, pt similar to prior, somnolent but arousable, following commands and answering questions appropriately. Pupils ~3-4mm. VS remained stable during RRT w/o hypoxia, hypotension, fever, or bradypnea. VBG returned from prior RRT w/ pH 7.21/pCO2 42 and anion gap of 18 - suggestive of primary metabolic acidosis i/s/o her renal failure. Additionally, noted on telemetry to have 3rd degree heart block and sinus pause ~6 seconds prior to RRT prior to resumption of normal sinus rhythm at ~80bpm. Has not received any AV cindy blocking agents, only has received dilaudid in past ~3-4 hours which could drive sinus bradycardia - pt mentating similar to prior on current assessment, no concern for impending airway compromise.  79 year old woman with history of CAD s/p CABG (2004), HTN, HLD, IDDM2, severe PAD with right and left AKA presenting with complaint of one day of abdominal pain. Meeting sepsis criteria and found to have acute pancreatitis. RRT called for pt unresponsiveness.     Upon arrival, VS: BP 72/31, HR 46, T 98.9, SpO2 97%, . Pt mildly responsive to sternal rub with pupils <2mm, also bradypneic w/ RR ~10. Given 0.4mg narcan w/ improvement in mentation - opened eyes to name and answering questions but still slightly somnolent so given additional dose 0.4mg narcan IV to which pt awakened further and was AOx3. VS following administration of narcan: /46, HR 50s, SpO2 97% and RR 20.     RRT called again 2:04AM for continued lethargy. Upon arrival, pt similar to prior, somnolent but arousable, following commands and answering questions appropriately. Pupils ~3-4mm. VS remained stable during RRT w/o hypoxia, hypotension, fever, or bradypnea. VBG returned from prior RRT w/ pH 7.21/pCO2 42 and anion gap of 18 - suggestive of primary metabolic acidosis i/s/o her renal failure. Additionally, noted on telemetry to have 3rd degree heart block and sinus pause ~6 seconds prior to RRT prior to resumption of normal sinus rhythm at ~80bpm. Has not received any AV cindy blocking agents, only has received dilaudid in past ~3-4 hours which could drive sinus bradycardia vs ischemia given pt being treated for NSTEMI - pt mentating similar to prior on current assessment, no concern for impending airway compromise.

## 2024-12-08 NOTE — PROGRESS NOTE ADULT - PROBLEM SELECTOR PLAN 1
- Febrile, tachycardic above 90 on admission  - Possible sources of urinary tract given previously reported burning with urination. Has positive UA, though with fair amount of epithelial cells. Could also be related to colitis seen on CT imaging  - Could be febrile in setting of inflammatory reaction given acute pancreatitis  - Continue zosyn  - Monitor fever curve  - Follow up blood and urine cultures  - Continue with maintenance fluids in setting of pancreatitis

## 2024-12-08 NOTE — CONSULT NOTE ADULT - ASSESSMENT
incomplete note 79 year old woman with history of CAD s/p CABG (2004), HTN, HLD, IDDM2, severe PAD with right and left AKA, recent admission for NSTEMI presenting with abdominal pain, found to have pancreatitis.   Etiology unknown, though concern for gallstone pancreatitis, however, no stones seen on ultrasound. CBD WNL. No alcohol use, triglycerides WNL.     #Interstitial pancreatitis   #CAD s/p cabg  #NSTEMI   #Elevated bilirubin     Recommendations   -continue with fluids, pain management  -clear liquid diet for now, advance as tolerated  -please obtain MRCP to rule out any underlying biliary stones     Note incomplete until finalized by attending signature/attestation.    Angelica Caldwell  GI/Hepatology Fellow    MONDAY-FRIDAY 8AM-5PM:  Pager# [phone] ([facility]) or [phone] ([facility])    NON-URGENT CONSULTS:  Please email [email or web address]  AT NIGHT AND ON WEEKENDS:  Contact on-call GI fellow from 5pm-8am and on weekends/holidays

## 2024-12-08 NOTE — PROGRESS NOTE ADULT - PROBLEM SELECTOR PLAN 2
- patient was worked up last admission, 1 week prior and LHC and NST was deferred   - TTE 12/2/24 (EF 40-45%), There are regional wall motion abnormalities present. Hypokinesis of the inferolateral wall, basal to mid inferior wall, and basal inferoseptum. There is mild (grade 1) left ventricular diastolic dysfunction.  - trop now uptrending to 900+   - cardiology consulted  - start hep ggt   - continue to trend trop till downtrended

## 2024-12-08 NOTE — CONSULT NOTE ADULT - SUBJECTIVE AND OBJECTIVE BOX
Ady Villatoro MD  Interventional Cardiology / Endovascular Specialist  [place] Office : [phone] [place] [address] [place] [address]  Tel:   [place] Office : [address] [facility] [place] [address]  Tel: [phone]  Cell : [phone]    HISTORY OF PRESENTING ILLNESS:  HPI:  79 year old woman with history of CAD s/p CABG (2004), HTN, HLD, IDDM2, severe PAD with right and left AKA presenting with complaint of one day hx of abdominal pain. Was recently admitted to [facility] and discharged 12/6 for NSTEMI but did not report any abdominal symptoms at that time. Patient describes pain as gas pain that didn't go away that initially started last night. Had episode of NBNB emesis early this morning. No diarrhea reported by patient. Has not had much to eat today in setting of pain and vomiting. Medications given in the ED helped her pain. Patient denied alcohol use, denied new medications. Reported she had some burning with urination the other day, but denied any recent dysuria. No yellowing of eyes or skin reported. No chest pain, no difficulty breathing. Gas pain located in epigastric region of abdomen, no radiation reported. 100.6F temperature reported at home.    In the ED, febrile to 102.5 TMax, hypertensive to SBP 170s, saturating well on room air. Given doses of vanc, zosyn, 500cc bolus, 4mg IV morphine x 2 doses. Labs notable for bilirubin 3.7, Alk phos 135, , , crt 1.82, lipase >3000. Imaging w/ acute interstitial pancreatitis, CBD WNL.        Cardioloy called for elevated Trop.    	  MEDICATIONS:  aspirin enteric coated 81 milliGRAM(s) Oral daily  carvedilol 6.25 milliGRAM(s) Oral every 12 hours  clopidogrel Tablet 75 milliGRAM(s) Oral daily  enoxaparin Injectable 40 milliGRAM(s) SubCutaneous every 24 hours    piperacillin/tazobactam IVPB.. 3.375 Gram(s) IV Intermittent every 8 hours      HYDROmorphone  Injectable 0.5 milliGRAM(s) IV Push every 6 hours PRN      dextrose 50% Injectable 25 Gram(s) IV Push once  dextrose 50% Injectable 12.5 Gram(s) IV Push once  dextrose 50% Injectable 25 Gram(s) IV Push once  dextrose Oral Gel 15 Gram(s) Oral once PRN  glucagon  Injectable 1 milliGRAM(s) IntraMuscular once  insulin glargine Injectable (LANTUS) 24 Unit(s) SubCutaneous at bedtime  insulin lispro (ADMELOG) corrective regimen sliding scale   SubCutaneous every 6 hours    dextrose 5%. 1000 milliLiter(s) IV Continuous <Continuous>  dextrose 5%. 1000 milliLiter(s) IV Continuous <Continuous>  lactated ringers. 1000 milliLiter(s) IV Continuous <Continuous>      PAST MEDICAL/SURGICAL HISTORY  PAST MEDICAL & SURGICAL HISTORY:  S/P CABG x 3  in 2004, [facility]      Stented coronary artery  2010 [facility]      PAD (peripheral artery disease)  s/p R BKA      Carotid artery stenosis      DM (diabetes mellitus)  type II      Hypercholesterolemia      Obesity      Hypertension      CAD (coronary artery disease)      Acute kidney injury superimposed on chronic kidney disease      Chronic diastolic heart failure      Vitamin D deficiency      Normocytic anemia      Pulmonary HTN      Diabetic retinopathy      Hx of CABG  3v 2004      S/P hysterectomy  2004      S/P angioplasty with stent  2009, 3/2014      S/P below knee amputation, right  6/2010      S/P eye surgery  for glaucoma      S/P CABG x 3      S/P BKA (below knee amputation) unilateral, right      History of hysterectomy      Elective surgery  traumatic amputation of the toe      S/P BKA (below knee amputation), left          SOCIAL HISTORY: Substance Use (street drugs): ( x ) never used  (  ) other:    FAMILY HISTORY:  Family history of diabetes mellitus (Sibling)            PHYSICAL EXAM:  T(C): 36.8 (12-08-24 @ 12:34), Max: 37 (12-07-24 @ 20:09)  HR: 81 (12-08-24 @ 12:34) (79 - 88)  BP: 97/43 (12-08-24 @ 12:34) (97/43 - 117/45)  RR: 16 (12-08-24 @ 12:34) (16 - 16)  SpO2: 100% (12-08-24 @ 12:34) (95% - 100%)  Wt(kg): --  I&O's Summary    07 Dec 2024 07:01  -  08 Dec 2024 07:00  --------------------------------------------------------  IN: 0 mL / OUT: 250 mL / NET: -250 mL    08 Dec 2024 07:01  -  08 Dec 2024 15:52  --------------------------------------------------------  IN: 475 mL / OUT: 750 mL / NET: -275 mL          GENERAL: NAD  EYES: EOMI, PERRLA, conjunctiva and sclera clear  ENMT: No tonsillar erythema, exudates, or enlargement; Moist mucous membranes, Good dentition, No lesions  Cardiovascular: Normal S1 S2, No JVD, No murmurs, No edema  Respiratory: Lungs clear to auscultation	  Gastrointestinal:  Soft, Non-tender, + BS	  Extremities: s/p b/l AKA                                    8.3    7.29  )-----------( 194      ( 08 Dec 2024 15:47 )             25.5     12-08    137  |  102  |  44[H]  ----------------------------<  323[H]  4.3   |  19[L]  |  1.82[H]    Ca    8.3[L]      08 Dec 2024 04:24  Phos  4.5     12-08  Mg     1.70     12-08    TPro  6.1  /  Alb  3.1[L]  /  TBili  3.7[H]  /  DBili  3.9[H]  /  AST  155[H]  /  ALT  149[H]  /  AlkPhos  135[H]  12-08    proBNP:   Lipid Profile:   HgA1c:   TSH:     Consultant(s) Notes Reviewed:  [x ] YES  [ ] NO    Care Discussed with Consultants/Other Providers [ x] YES  [ ] NO    Imaging Personally Reviewed independently:  [x] YES  [ ] NO    All labs, radiologic studies, vitals, orders and medications list reviewed. Patient is seen and examined at bedside. Case discussed with medical team.

## 2024-12-08 NOTE — PROGRESS NOTE ADULT - ASSESSMENT
79 year old woman with history of CAD s/p CABG (2004), HTN, HLD, IDDM2, severe PAD with right and left AKA presenting with complaint of one day of abdominal pain. Meeting sepsis criteria and found to have acute pancreatitis.

## 2024-12-09 ENCOUNTER — RESULT REVIEW (OUTPATIENT)
Age: 79
End: 2024-12-09

## 2024-12-09 LAB
ADD ON TEST-SPECIMEN IN LAB: SIGNIFICANT CHANGE UP
ALBUMIN SERPL ELPH-MCNC: 2.8 G/DL — LOW (ref 3.3–5)
ALBUMIN SERPL ELPH-MCNC: 2.9 G/DL — LOW (ref 3.3–5)
ALBUMIN SERPL ELPH-MCNC: 3.2 G/DL — LOW (ref 3.3–5)
ALP SERPL-CCNC: 143 U/L — HIGH (ref 40–120)
ALP SERPL-CCNC: 155 U/L — HIGH (ref 40–120)
ALP SERPL-CCNC: 159 U/L — HIGH (ref 40–120)
ALT FLD-CCNC: 293 U/L — HIGH (ref 4–33)
ALT FLD-CCNC: 582 U/L — HIGH (ref 4–33)
ALT FLD-CCNC: 622 U/L — HIGH (ref 4–33)
ANION GAP SERPL CALC-SCNC: 18 MMOL/L — HIGH (ref 7–14)
ANION GAP SERPL CALC-SCNC: 19 MMOL/L — HIGH (ref 7–14)
ANION GAP SERPL CALC-SCNC: 20 MMOL/L — HIGH (ref 7–14)
ANISOCYTOSIS BLD QL: SLIGHT — SIGNIFICANT CHANGE UP
APTT BLD: 160.7 SEC — CRITICAL HIGH (ref 24.5–35.6)
APTT BLD: 69.3 SEC — HIGH (ref 24.5–35.6)
APTT BLD: 92 SEC — HIGH (ref 24.5–35.6)
AST SERPL-CCNC: 374 U/L — HIGH (ref 4–32)
AST SERPL-CCNC: 782 U/L — HIGH (ref 4–32)
AST SERPL-CCNC: 873 U/L — HIGH (ref 4–32)
BASOPHILS # BLD AUTO: 0 K/UL — SIGNIFICANT CHANGE UP (ref 0–0.2)
BASOPHILS NFR BLD AUTO: 0 % — SIGNIFICANT CHANGE UP (ref 0–2)
BILIRUB DIRECT SERPL-MCNC: 3.6 MG/DL — HIGH (ref 0–0.3)
BILIRUB INDIRECT FLD-MCNC: 0 MG/DL — SIGNIFICANT CHANGE UP (ref 0–1)
BILIRUB SERPL-MCNC: 2.9 MG/DL — HIGH (ref 0.2–1.2)
BILIRUB SERPL-MCNC: 3.3 MG/DL — HIGH (ref 0.2–1.2)
BILIRUB SERPL-MCNC: 3.6 MG/DL — HIGH (ref 0.2–1.2)
BLOOD GAS VENOUS COMPREHENSIVE RESULT: SIGNIFICANT CHANGE UP
BUN SERPL-MCNC: 56 MG/DL — HIGH (ref 7–23)
BUN SERPL-MCNC: 59 MG/DL — HIGH (ref 7–23)
BUN SERPL-MCNC: 66 MG/DL — HIGH (ref 7–23)
BURR CELLS BLD QL SMEAR: PRESENT — SIGNIFICANT CHANGE UP
CALCIUM SERPL-MCNC: 8.1 MG/DL — LOW (ref 8.4–10.5)
CALCIUM SERPL-MCNC: 8.4 MG/DL — SIGNIFICANT CHANGE UP (ref 8.4–10.5)
CALCIUM SERPL-MCNC: 8.5 MG/DL — SIGNIFICANT CHANGE UP (ref 8.4–10.5)
CHLORIDE SERPL-SCNC: 100 MMOL/L — SIGNIFICANT CHANGE UP (ref 98–107)
CHLORIDE SERPL-SCNC: 100 MMOL/L — SIGNIFICANT CHANGE UP (ref 98–107)
CHLORIDE SERPL-SCNC: 99 MMOL/L — SIGNIFICANT CHANGE UP (ref 98–107)
CK MB BLD-MCNC: 3.5 % — HIGH (ref 0–2.5)
CK MB CFR SERPL CALC: 7.1 NG/ML — HIGH
CK SERPL-CCNC: 202 U/L — HIGH (ref 25–170)
CO2 SERPL-SCNC: 15 MMOL/L — LOW (ref 22–31)
CO2 SERPL-SCNC: 16 MMOL/L — LOW (ref 22–31)
CO2 SERPL-SCNC: 16 MMOL/L — LOW (ref 22–31)
CREAT SERPL-MCNC: 3.68 MG/DL — HIGH (ref 0.5–1.3)
CREAT SERPL-MCNC: 3.88 MG/DL — HIGH (ref 0.5–1.3)
CREAT SERPL-MCNC: 4.51 MG/DL — HIGH (ref 0.5–1.3)
DACRYOCYTES BLD QL SMEAR: SLIGHT — SIGNIFICANT CHANGE UP
EGFR: 10 ML/MIN/1.73M2 — LOW
EGFR: 11 ML/MIN/1.73M2 — LOW
EGFR: 12 ML/MIN/1.73M2 — LOW
EOSINOPHIL # BLD AUTO: 0 K/UL — SIGNIFICANT CHANGE UP (ref 0–0.5)
EOSINOPHIL NFR BLD AUTO: 0 % — SIGNIFICANT CHANGE UP (ref 0–6)
GIANT PLATELETS BLD QL SMEAR: PRESENT — SIGNIFICANT CHANGE UP
GLUCOSE BLDC GLUCOMTR-MCNC: 242 MG/DL — HIGH (ref 70–99)
GLUCOSE BLDC GLUCOMTR-MCNC: 242 MG/DL — HIGH (ref 70–99)
GLUCOSE BLDC GLUCOMTR-MCNC: 244 MG/DL — HIGH (ref 70–99)
GLUCOSE BLDC GLUCOMTR-MCNC: 246 MG/DL — HIGH (ref 70–99)
GLUCOSE BLDC GLUCOMTR-MCNC: 247 MG/DL — HIGH (ref 70–99)
GLUCOSE BLDC GLUCOMTR-MCNC: 291 MG/DL — HIGH (ref 70–99)
GLUCOSE SERPL-MCNC: 209 MG/DL — HIGH (ref 70–99)
GLUCOSE SERPL-MCNC: 229 MG/DL — HIGH (ref 70–99)
GLUCOSE SERPL-MCNC: 230 MG/DL — HIGH (ref 70–99)
HCT VFR BLD CALC: 22.1 % — LOW (ref 34.5–45)
HCT VFR BLD CALC: 24.9 % — LOW (ref 34.5–45)
HCT VFR BLD CALC: 25.4 % — LOW (ref 34.5–45)
HGB BLD-MCNC: 7.4 G/DL — LOW (ref 11.5–15.5)
HGB BLD-MCNC: 8 G/DL — LOW (ref 11.5–15.5)
HGB BLD-MCNC: 8 G/DL — LOW (ref 11.5–15.5)
IANC: 7.19 K/UL — SIGNIFICANT CHANGE UP (ref 1.8–7.4)
IGG FLD-MCNC: 910 MG/DL — SIGNIFICANT CHANGE UP (ref 610–1660)
IGG1 SER-MCNC: 538 MG/DL — SIGNIFICANT CHANGE UP (ref 240–1118)
IGG2 SER-MCNC: 314 MG/DL — SIGNIFICANT CHANGE UP (ref 124–549)
IGG3 SER-MCNC: 34.5 MG/DL — SIGNIFICANT CHANGE UP (ref 15–102)
IGG4 SER-MCNC: 38.9 MG/DL — SIGNIFICANT CHANGE UP (ref 1–123)
INR BLD: 1.43 RATIO — HIGH (ref 0.85–1.16)
LACTATE SERPL-SCNC: 4.1 MMOL/L — CRITICAL HIGH (ref 0.5–2)
LIDOCAIN IGE QN: 1037 U/L — HIGH (ref 7–60)
LYMPHOCYTES # BLD AUTO: 0.81 K/UL — LOW (ref 1–3.3)
LYMPHOCYTES # BLD AUTO: 9.6 % — LOW (ref 13–44)
MAGNESIUM SERPL-MCNC: 1.7 MG/DL — SIGNIFICANT CHANGE UP (ref 1.6–2.6)
MAGNESIUM SERPL-MCNC: 1.7 MG/DL — SIGNIFICANT CHANGE UP (ref 1.6–2.6)
MAGNESIUM SERPL-MCNC: 2.4 MG/DL — SIGNIFICANT CHANGE UP (ref 1.6–2.6)
MANUAL SMEAR VERIFICATION: SIGNIFICANT CHANGE UP
MCHC RBC-ENTMCNC: 29 PG — SIGNIFICANT CHANGE UP (ref 27–34)
MCHC RBC-ENTMCNC: 29.6 PG — SIGNIFICANT CHANGE UP (ref 27–34)
MCHC RBC-ENTMCNC: 29.7 PG — SIGNIFICANT CHANGE UP (ref 27–34)
MCHC RBC-ENTMCNC: 31.5 G/DL — LOW (ref 32–36)
MCHC RBC-ENTMCNC: 32.1 G/DL — SIGNIFICANT CHANGE UP (ref 32–36)
MCHC RBC-ENTMCNC: 33.5 G/DL — SIGNIFICANT CHANGE UP (ref 32–36)
MCV RBC AUTO: 88.8 FL — SIGNIFICANT CHANGE UP (ref 80–100)
MCV RBC AUTO: 92 FL — SIGNIFICANT CHANGE UP (ref 80–100)
MCV RBC AUTO: 92.2 FL — SIGNIFICANT CHANGE UP (ref 80–100)
METAMYELOCYTES # FLD: 0.9 % — SIGNIFICANT CHANGE UP (ref 0–1)
MONOCYTES # BLD AUTO: 0.14 K/UL — SIGNIFICANT CHANGE UP (ref 0–0.9)
MONOCYTES NFR BLD AUTO: 1.7 % — LOW (ref 2–14)
MRSA PCR RESULT.: SIGNIFICANT CHANGE UP
NEUTROPHILS # BLD AUTO: 7.24 K/UL — SIGNIFICANT CHANGE UP (ref 1.8–7.4)
NEUTROPHILS NFR BLD AUTO: 79.1 % — HIGH (ref 43–77)
NEUTS BAND # BLD: 7 % — HIGH (ref 0–6)
NRBC # BLD: 0 /100 WBCS — SIGNIFICANT CHANGE UP (ref 0–0)
NRBC # BLD: 0 /100 WBCS — SIGNIFICANT CHANGE UP (ref 0–0)
NRBC # FLD: 0.02 K/UL — HIGH (ref 0–0)
NRBC # FLD: 0.03 K/UL — HIGH (ref 0–0)
OVALOCYTES BLD QL SMEAR: SIGNIFICANT CHANGE UP
PHOSPHATE SERPL-MCNC: 5.2 MG/DL — HIGH (ref 2.5–4.5)
PHOSPHATE SERPL-MCNC: 5.6 MG/DL — HIGH (ref 2.5–4.5)
PHOSPHATE SERPL-MCNC: 5.7 MG/DL — HIGH (ref 2.5–4.5)
PLAT MORPH BLD: ABNORMAL
PLATELET # BLD AUTO: 186 K/UL — SIGNIFICANT CHANGE UP (ref 150–400)
PLATELET # BLD AUTO: 195 K/UL — SIGNIFICANT CHANGE UP (ref 150–400)
PLATELET # BLD AUTO: 241 K/UL — SIGNIFICANT CHANGE UP (ref 150–400)
PLATELET COUNT - ESTIMATE: NORMAL — SIGNIFICANT CHANGE UP
POIKILOCYTOSIS BLD QL AUTO: SIGNIFICANT CHANGE UP
POLYCHROMASIA BLD QL SMEAR: SLIGHT — SIGNIFICANT CHANGE UP
POTASSIUM SERPL-MCNC: 5 MMOL/L — SIGNIFICANT CHANGE UP (ref 3.5–5.3)
POTASSIUM SERPL-MCNC: 5 MMOL/L — SIGNIFICANT CHANGE UP (ref 3.5–5.3)
POTASSIUM SERPL-MCNC: 5.2 MMOL/L — SIGNIFICANT CHANGE UP (ref 3.5–5.3)
POTASSIUM SERPL-SCNC: 5 MMOL/L — SIGNIFICANT CHANGE UP (ref 3.5–5.3)
POTASSIUM SERPL-SCNC: 5 MMOL/L — SIGNIFICANT CHANGE UP (ref 3.5–5.3)
POTASSIUM SERPL-SCNC: 5.2 MMOL/L — SIGNIFICANT CHANGE UP (ref 3.5–5.3)
PROT SERPL-MCNC: 5.9 G/DL — LOW (ref 6–8.3)
PROT SERPL-MCNC: 6.1 G/DL — SIGNIFICANT CHANGE UP (ref 6–8.3)
PROT SERPL-MCNC: 6.4 G/DL — SIGNIFICANT CHANGE UP (ref 6–8.3)
PROTHROM AB SERPL-ACNC: 17 SEC — HIGH (ref 9.9–13.4)
RBC # BLD: 2.49 M/UL — LOW (ref 3.8–5.2)
RBC # BLD: 2.7 M/UL — LOW (ref 3.8–5.2)
RBC # BLD: 2.76 M/UL — LOW (ref 3.8–5.2)
RBC # FLD: 15.1 % — HIGH (ref 10.3–14.5)
RBC # FLD: 15.5 % — HIGH (ref 10.3–14.5)
RBC # FLD: 15.7 % — HIGH (ref 10.3–14.5)
RBC BLD AUTO: ABNORMAL
S AUREUS DNA NOSE QL NAA+PROBE: SIGNIFICANT CHANGE UP
SODIUM SERPL-SCNC: 134 MMOL/L — LOW (ref 135–145)
SODIUM SERPL-SCNC: 134 MMOL/L — LOW (ref 135–145)
SODIUM SERPL-SCNC: 135 MMOL/L — SIGNIFICANT CHANGE UP (ref 135–145)
TROPONIN T, HIGH SENSITIVITY RESULT: 1327 NG/L — CRITICAL HIGH
VARIANT LYMPHS # BLD: 1.7 % — SIGNIFICANT CHANGE UP (ref 0–6)
WBC # BLD: 11.85 K/UL — HIGH (ref 3.8–10.5)
WBC # BLD: 8.41 K/UL — SIGNIFICANT CHANGE UP (ref 3.8–10.5)
WBC # BLD: 8.63 K/UL — SIGNIFICANT CHANGE UP (ref 3.8–10.5)
WBC # FLD AUTO: 11.85 K/UL — HIGH (ref 3.8–10.5)
WBC # FLD AUTO: 8.41 K/UL — SIGNIFICANT CHANGE UP (ref 3.8–10.5)
WBC # FLD AUTO: 8.63 K/UL — SIGNIFICANT CHANGE UP (ref 3.8–10.5)

## 2024-12-09 PROCEDURE — 99291 CRITICAL CARE FIRST HOUR: CPT

## 2024-12-09 PROCEDURE — 99232 SBSQ HOSP IP/OBS MODERATE 35: CPT | Mod: GC

## 2024-12-09 PROCEDURE — 99233 SBSQ HOSP IP/OBS HIGH 50: CPT

## 2024-12-09 PROCEDURE — 93321 DOPPLER ECHO F-UP/LMTD STD: CPT | Mod: 26

## 2024-12-09 PROCEDURE — 93308 TTE F-UP OR LMTD: CPT | Mod: 26

## 2024-12-09 PROCEDURE — 93010 ELECTROCARDIOGRAM REPORT: CPT

## 2024-12-09 RX ORDER — HEPARIN SODIUM,PORCINE 1000/ML
850 VIAL (ML) INJECTION
Qty: 25000 | Refills: 0 | Status: DISCONTINUED | OUTPATIENT
Start: 2024-12-09 | End: 2024-12-11

## 2024-12-09 RX ORDER — SODIUM CHLORIDE 9 MG/ML
500 INJECTION, SOLUTION INTRAMUSCULAR; INTRAVENOUS; SUBCUTANEOUS ONCE
Refills: 0 | Status: DISCONTINUED | OUTPATIENT
Start: 2024-12-09 | End: 2024-12-09

## 2024-12-09 RX ORDER — NALOXONE HCL 0.4 MG/ML
0.4 AMPUL (ML) INJECTION ONCE
Refills: 0 | Status: COMPLETED | OUTPATIENT
Start: 2024-12-09 | End: 2024-12-09

## 2024-12-09 RX ORDER — SODIUM CHLORIDE 9 MG/ML
250 INJECTION, SOLUTION INTRAMUSCULAR; INTRAVENOUS; SUBCUTANEOUS ONCE
Refills: 0 | Status: COMPLETED | OUTPATIENT
Start: 2024-12-09 | End: 2024-12-09

## 2024-12-09 RX ORDER — CHLORHEXIDINE GLUCONATE 1.2 MG/ML
1 RINSE ORAL DAILY
Refills: 0 | Status: DISCONTINUED | OUTPATIENT
Start: 2024-12-09 | End: 2024-12-16

## 2024-12-09 RX ORDER — BRIMONIDINE TARTRATE 1.5 MG/ML
1 SOLUTION/ DROPS OPHTHALMIC
Refills: 0 | Status: DISCONTINUED | OUTPATIENT
Start: 2024-12-09 | End: 2024-12-16

## 2024-12-09 RX ORDER — 0.9 % SODIUM CHLORIDE 0.9 %
1000 INTRAVENOUS SOLUTION INTRAVENOUS
Refills: 0 | Status: DISCONTINUED | OUTPATIENT
Start: 2024-12-09 | End: 2024-12-10

## 2024-12-09 RX ORDER — PANTOPRAZOLE SODIUM 40 MG/1
40 TABLET, DELAYED RELEASE ORAL DAILY
Refills: 0 | Status: DISCONTINUED | OUTPATIENT
Start: 2024-12-10 | End: 2024-12-16

## 2024-12-09 RX ORDER — DOPAMINE HYDROCHLORIDE 0.8 MG/ML
1.5 INJECTION, SOLUTION INTRAVENOUS
Qty: 400 | Refills: 0 | Status: DISCONTINUED | OUTPATIENT
Start: 2024-12-09 | End: 2024-12-10

## 2024-12-09 RX ORDER — PIPERACILLIN SODIUM AND TAZOBACTAM SODIUM 4; .5 G/20ML; G/20ML
3.38 INJECTION, POWDER, LYOPHILIZED, FOR SOLUTION INTRAVENOUS EVERY 12 HOURS
Refills: 0 | Status: DISCONTINUED | OUTPATIENT
Start: 2024-12-09 | End: 2024-12-13

## 2024-12-09 RX ORDER — PANTOPRAZOLE SODIUM 40 MG/1
40 TABLET, DELAYED RELEASE ORAL ONCE
Refills: 0 | Status: COMPLETED | OUTPATIENT
Start: 2024-12-09 | End: 2024-12-09

## 2024-12-09 RX ADMIN — Medication 75 MILLILITER(S): at 17:48

## 2024-12-09 RX ADMIN — Medication 700 UNIT(S)/HR: at 08:07

## 2024-12-09 RX ADMIN — Medication 81 MILLIGRAM(S): at 12:07

## 2024-12-09 RX ADMIN — Medication 6 UNIT(S)/HR: at 13:50

## 2024-12-09 RX ADMIN — Medication 2: at 17:38

## 2024-12-09 RX ADMIN — Medication 700 UNIT(S)/HR: at 05:01

## 2024-12-09 RX ADMIN — Medication 25 GRAM(S): at 08:07

## 2024-12-09 RX ADMIN — INSULIN GLARGINE 24 UNIT(S): 100 INJECTION, SOLUTION SUBCUTANEOUS at 21:42

## 2024-12-09 RX ADMIN — Medication 50 MILLILITER(S): at 12:01

## 2024-12-09 RX ADMIN — Medication 0 UNIT(S)/HR: at 02:38

## 2024-12-09 RX ADMIN — PIPERACILLIN SODIUM AND TAZOBACTAM SODIUM 25 GRAM(S): 4; .5 INJECTION, POWDER, LYOPHILIZED, FOR SOLUTION INTRAVENOUS at 17:38

## 2024-12-09 RX ADMIN — BRIMONIDINE TARTRATE 1 DROP(S): 1.5 SOLUTION/ DROPS OPHTHALMIC at 21:42

## 2024-12-09 RX ADMIN — DOPAMINE HYDROCHLORIDE 4.47 MICROGRAM(S)/KG/MIN: 0.8 INJECTION, SOLUTION INTRAVENOUS at 11:06

## 2024-12-09 RX ADMIN — Medication 75 MILLILITER(S): at 19:42

## 2024-12-09 RX ADMIN — PIPERACILLIN SODIUM AND TAZOBACTAM SODIUM 25 GRAM(S): 4; .5 INJECTION, POWDER, LYOPHILIZED, FOR SOLUTION INTRAVENOUS at 05:57

## 2024-12-09 RX ADMIN — DOPAMINE HYDROCHLORIDE 14.9 MICROGRAM(S)/KG/MIN: 0.8 INJECTION, SOLUTION INTRAVENOUS at 04:59

## 2024-12-09 RX ADMIN — Medication 3: at 12:01

## 2024-12-09 RX ADMIN — CLOPIDOGREL 75 MILLIGRAM(S): 75 TABLET, FILM COATED ORAL at 12:06

## 2024-12-09 RX ADMIN — Medication 2: at 06:01

## 2024-12-09 RX ADMIN — Medication 700 UNIT(S)/HR: at 03:41

## 2024-12-09 RX ADMIN — PANTOPRAZOLE SODIUM 40 MILLIGRAM(S): 40 TABLET, DELAYED RELEASE ORAL at 10:32

## 2024-12-09 RX ADMIN — DOPAMINE HYDROCHLORIDE 14.9 MICROGRAM(S)/KG/MIN: 0.8 INJECTION, SOLUTION INTRAVENOUS at 08:08

## 2024-12-09 NOTE — DIETITIAN INITIAL EVALUATION ADULT - ADD RECOMMEND
- Continue to advance diet as tolerated, defer consistencies to team  - Ensure max 1x daily (150 kcal, 30 g pro) once off clear liquids  - Monitor PO intake, tolerance to diet/supplement, nutrition related lab values, weight trends, BMs/GI distress, hydration status, skin integrity

## 2024-12-09 NOTE — RAPID RESPONSE TEAM SUMMARY - NSSITUATIONBACKGROUNDRRT_GEN_ALL_CORE
79 year old woman with history of CAD s/p CABG (2004), HTN, HLD, IDDM2, severe PAD with right and left AKA presenting with complaint of one day of abdominal pain. Meeting sepsis criteria and found to have acute pancreatitis. RRT called for sinus pause on telemetry.    Upon arrival, /12, HR 86, RR 14, T 98.1, SpO2 99%, . Still lethargic but interactive. Telemetry notable for progressively worsening heart block (1st degree -> 3rd degree -> asystole >14 seconds) prior to resumption of NSR in 70s-80s on 12 lead EKG captured during RRT. Prior labs without significant actionable electrolyte disturbance. TTE w/ wall motion abnormalities affecting inferolateral wall, basal to mid inferior wall, and basal inferoseptum - ischemia could also be  of bradyarrhythmia given dilaudid already addressed w/ narcan.  79 year old woman with history of CAD s/p CABG (2004), HTN, HLD, IDDM2, severe PAD with right and left AKA presenting with complaint of one day of abdominal pain. Meeting sepsis criteria and found to have acute pancreatitis. RRT called for sinus pause on telemetry.    Upon arrival, /12, HR 86, RR 14, T 98.1, SpO2 99%, . Still lethargic but interactive. Telemetry notable for progressively worsening heart block (1st degree -> 3rd degree -> asystole >14 seconds) prior to resumption of NSR in 70s-80s on 12 lead EKG captured during RRT. Prior labs without significant actionable electrolyte disturbance. TTE w/ wall motion abnormalities affecting inferolateral wall, basal to mid inferior wall, and basal inferoseptum - ischemia could also be  of bradyarrhythmia as opposed to medication given dilaudid already addressed w/ narcan and coreg not administered today. Pt to be transferred to CCU.

## 2024-12-09 NOTE — DIETITIAN INITIAL EVALUATION ADULT - ORAL INTAKE PTA/DIET HISTORY
Patient seen for assessment w/ daughter at bedside. Reports reduced PO intake a few days prior to admission due to abdominal pain. A1c is 7.5% per chart.

## 2024-12-09 NOTE — CONSULT NOTE ADULT - ASSESSMENT
79yoF w/ PMHx CAD s/p CABG and PCI, HTN, HLD, DMII, severe PAD with b/l AKA initially presented with abdominal pain found to have acute pancreatitis.  Seen by GI recommending a MRCP, on abx and IVF.  Hospital course complicated by sinus arrest up to 27 seconds, as seen on telemetry, patient was lethargic at the time.  Transferred to CCU for further monitoring.  Currently remains lethargic but arousable to voice, on telemetry NSR.  Endorses chest pain and abdominal pain when she first arrived but symptoms has since resolved.  Labs significant for WBC 11.85, worsening DAMARI, uptrending troponins, pH 7.22. TTE mildly reduced LV function EF 40-45%.      Sinus Arrest   Suspect likely vagal mediated   Continue to monitor on telemetry  Correct electrolyte dysfunction  Hold AV cindy blockers   Keep K>4 Mg>2

## 2024-12-09 NOTE — CONSULT NOTE ADULT - SUBJECTIVE AND OBJECTIVE BOX
Date of Admission: 12/7/2024    CHIEF COMPLAINT: abdominal pain     HISTORY OF PRESENT ILLNESS:  This is a 79yoF w/ PMHx CAD s/p CABG and PCI, HTN, HLD, DMII, severe PAD with b/l AKA initially presented with abdominal pain found to have acute pancreatitis.  Seen by GI recommending a MRCP, on abx and IVF.  Hospital course complicated by sinus arrest up to 27 seconds, as seen on telemetry, patient was lethargic at the time.  Transferred to CCU for further monitoring.  Currently remains lethargic but arousable to voice, on telemetry NSR.  Endorses chest pain and abdominal pain when she first arrived but symptoms has since resolved.  Labs significant for WBC 11.85, worsening DAMARI, uptrending troponins, pH 7.22. TTE mildly reduced LV function EF 40-45%.      Allergies  sulfa drugs (Hives)  sulfa drugs (Rash)  Sulfac 10% (Hives)  Intolerances    MEDICATIONS:  aspirin enteric coated 81 milliGRAM(s) Oral daily  clopidogrel Tablet 75 milliGRAM(s) Oral daily  DOPamine Infusion 1.5 MICROgram(s)/kG/Min IV Continuous <Continuous>  heparin  Infusion 850 Unit(s)/Hr IV Continuous <Continuous>  piperacillin/tazobactam IVPB.. 3.375 Gram(s) IV Intermittent every 8 hours  dextrose 50% Injectable 25 Gram(s) IV Push once  dextrose 50% Injectable 12.5 Gram(s) IV Push once  dextrose 50% Injectable 25 Gram(s) IV Push once  dextrose Oral Gel 15 Gram(s) Oral once PRN  glucagon  Injectable 1 milliGRAM(s) IntraMuscular once  insulin glargine Injectable (LANTUS) 24 Unit(s) SubCutaneous at bedtime  insulin lispro (ADMELOG) corrective regimen sliding scale   SubCutaneous every 6 hours  dextrose 5%. 1000 milliLiter(s) IV Continuous <Continuous>  dextrose 5%. 1000 milliLiter(s) IV Continuous <Continuous>  lactated ringers. 1000 milliLiter(s) IV Continuous <Continuous>    PAST MEDICAL & SURGICAL HISTORY:  S/P CABG x 3  in 2004, [facility]  Stented coronary artery  2010 [facility]  PAD (peripheral artery disease)  s/p R BKA  Carotid artery stenosis  DM (diabetes mellitus)  type II  Hypercholesterolemia  Obesity  Hypertension  CAD (coronary artery disease)  Acute kidney injury superimposed on chronic kidney disease  Chronic diastolic heart failure  Vitamin D deficiency  Normocytic anemia  Pulmonary HTN  Diabetic retinopathy  Hx of CABG  3v 2004  S/P hysterectomy  2004  S/P angioplasty with stent  2009, 3/2014  S/P below knee amputation, right  6/2010  S/P eye surgery  for glaucoma  S/P CABG x 3  S/P BKA (below knee amputation) unilateral, right  History of hysterectomy  Elective surgery  traumatic amputation of the toe  S/P BKA (below knee amputation), left    FAMILY HISTORY:  Family history of diabetes mellitus (Sibling)    REVIEW OF SYSTEMS:  See HPI. Otherwise, 10 point ROS done and otherwise negative.    PHYSICAL EXAM:  T(C): 36.6 (12-09-24 @ 12:00), Max: 37.2 (12-08-24 @ 16:26)  HR: 66 (12-09-24 @ 11:00) (66 - 105)  BP: 94/46 (12-09-24 @ 11:00) (81/41 - 144/61)  RR: 13 (12-09-24 @ 11:00) (9 - 21)  SpO2: 98% (12-09-24 @ 11:00) (93% - 100%)  Wt(kg): --  I&O's Summary    08 Dec 2024 07:01  -  09 Dec 2024 07:00  --------------------------------------------------------  IN: 1813.5 mL / OUT: 1150 mL / NET: 663.5 mL    09 Dec 2024 07:01  -  09 Dec 2024 12:16  --------------------------------------------------------  IN: 65.9 mL / OUT: 60 mL / NET: 5.9 mL    Appearance: Normal	  HEENT:   Normal oral mucosa, PERRL, EOMI	  Lymphatic: No lymphadenopathy  Cardiovascular: Normal S1 S2, No JVD, No murmurs, No edema  Respiratory: Lungs clear to auscultation	  Psychiatry: A & O x 3, Mood & affect appropriate  Gastrointestinal:  Soft, Non-tender, + BS	  Skin: No rashes, No ecchymoses, No cyanosis	  Neurologic: Non-focal  Extremities: Normal range of motion, No clubbing, cyanosis or edema  Vascular: Peripheral pulses palpable 2+ bilaterally    LABS:	 	  CBC Full  -  ( 09 Dec 2024 05:47 )  WBC Count : 11.85 K/uL  Hemoglobin : 8.0 g/dL  Hematocrit : 25.4 %  Platelet Count - Automated : 241 K/uL  Mean Cell Volume : 92.0 fL  Mean Cell Hemoglobin : 29.0 pg  Mean Cell Hemoglobin Concentration : 31.5 g/dL  Auto Neutrophil # : x  Auto Lymphocyte # : x  Auto Monocyte # : x  Auto Eosinophil # : x  Auto Basophil # : x  Auto Neutrophil % : x  Auto Lymphocyte % : x  Auto Monocyte % : x  Auto Eosinophil % : x  Auto Basophil % : x    12-09    135  |  100  |  59[H]  ----------------------------<  229[H]  5.2   |  15[L]  |  3.88[H]  12-09    134[L]  |  100  |  56[H]  ----------------------------<  209[H]  5.0   |  16[L]  |  3.68[H]    Ca    8.5      09 Dec 2024 05:47  Ca    8.4      09 Dec 2024 01:49  Phos  5.6     12-09  Phos  5.2     12-09  Mg     1.70     12-09  Mg     1.70     12-09    TPro  5.9[L]  /  Alb  2.9[L]  /  TBili  3.3[H]  /  DBili  x   /  AST  374[H]  /  ALT  293[H]  /  AlkPhos  143[H]  12-09  TPro  6.1  /  Alb  2.9[L]  /  TBili  3.2[H]  /  DBili  x   /  AST  112[H]  /  ALT  120[H]  /  AlkPhos  132[H]  12-08

## 2024-12-09 NOTE — DIETITIAN INITIAL EVALUATION ADULT - PROBLEM SELECTOR PLAN 2
- Abdominal pain, lipase over 3K, signs of acute interstitial pancreatitis on imaging  - Unclear cause of episode; no alcohol use, no recent instrumentation of bile ducts, no evidence of choledocholithiasis on abdominal ultrasound. No new medications. Triglycerides not elevated. Could be idiopathic. Can check IgG subsets for autoimmune eval  - NPO for now, advance as tolerated  - Morphine PRNs for pain  - Gentle IVF given mildly reduced LVEF with regional WMAs seen on recent echo

## 2024-12-09 NOTE — PROGRESS NOTE ADULT - ASSESSMENT
79F w HTN, DM, severe PAD w R BKA, and CAD (sp 3v CABG 20 yrs ago, on last cath 2014, only graft left patent was LIMA-LAD, natives with  of LAD and RCA, OM w severe stented w BMS at that time) who presented with 15 min of resting chest pain, found to have Hgb of 7 (from 12 several years ago) and rising elevated troponins from 90s->280s; her ECG shows Inferior Q waves w chronic diffuse ST/TW changes, all of which are largely unchanged from prior tracings. TTE w midrange LVEF 40-45%. She has been chest pain free since her blood transfusion.   Overnight on 12/8 had telemetry notable for progressively worsening heart block (1st degree -> 3rd degree -> asystole >14 seconds).  Pt returned to NSR by the time RRT arrived to bedside.   Labs without significant actionable electrolyte disturbance.  TTE w/ wall motion abnormalities affecting inferolateral wall, basal to mid inferior wall, and basal infero-septum; ischemia could also be  of bradyarrhythmia as opposed to medication given.  Dilaudid was addressed w/ narcan and coreg not administered today.     Plan:     === NEUROLOGY ===      -Mental status: somnolent AAO x 3, not baseline  -Sedation: None  -Pain control: None    === RESPIRATORY ===     -EARLINE    === CARDIOVASCULAR ===    ##CHB bradycardia  -EKG suggestive of high grade AV Block, with intermittent 3rd degree   -Patients HR currently NSR 70 BPM, BP90/43.   -DDX: Iatrogenic vs conduction defect vs ischemia  -Atropine and Zoll pads at bedside  -Dopamine gtt, titrate to HR > 60  -Consider TVP if HD unstable  -Hold AV cindy blockers  -EP consult and f/u recommendations.   -Check hsTrop x 2 sets to evaluate for ACS  -Check TSH and if elevated, reflex free T4 and T3  -Check TTE to evaluate RV/LV function and to eveluate valvular disease  -NPO after midnight. Possible PPM placement     #CAD s/p CABG (2004)  -asa, plavix  -statin held for abn LFT  -Most recent [facility] with  of the LAD and RCA, and tight OM lesion that was stented with a BMS  -Recent outpatient nuclear stress test with scar and hypokinesis in the RCA territory, consistent with TTE here with EF 40-45% w/ inferior/inferolateral WMA       === RENAL/ ===     #DAMARI   -noted to have acute cr elevation from 1 on admisison to 1.8   - bladder scan elevated, howe now placed   - likely post ob, but check urine ltyes   - renal US pending   - hold nephrotoxic agents   -of note did get contrast on admit, but early for ZENOBIA.    #Hypertension  -Hold coreg in setting of CHB  -Will hold imdur and losartan for now while on dopa      === GASTROINTESTINAL ===    #Acute pancreatitis  -Abdominal pain, lipase over 3K, signs of acute interstitial pancreatitis on imaging  -Unclear cause of episode; no alcohol use, no recent instrumentation of bile ducts, no evidence of choledocholithiasis on abdominal ultrasound. No new medications. Triglycerides not elevated. Could be idiopathic.   -GI consult   -ADAT  -MRCP ordered   -Gentle IVF given mildly reduced LVEF with regional WMAs seen on recent echo.    #Elevated liver enzymes.   -Elevated AST/ALT to 242/140, though evidence of moderate hemolysis  -Also with elevated Tbili  -Hold atorvastatin for now  -GI consult  -MRCP ordered       === ENDO  ===    #Type 2 diabetes mellitus.   -Reported to be on basal and bolus pre-meal at home (though reported to be twice a day)  -Hold pre-meal for now given NPO, re-add as indicated when diet able to be advanced  -Dose reduced home 30U lantus to 24U at bedtime given NPO status, adjust as indicated based on sugars  -Low insulin sliding scale q6hrs while NPO  -A1C of 7.5 on 12/1/24  -DASH/TLC/CC diet when advanced from NPO      === HEMATOLOGIC/ONC ===    - H/H & plts stable   - Monitor H/H and plts   - DVT PPX: heparin gtt     === INFECTIOUS DISEASE ===     #Leukocytosis  - no fever  - Continue to monitor with daily CBC  - Pan culture if febrile  - Trend fever curve  - No indication for antibiotics at this time    Lines/Tubes: PIV x 2,     Dispo: Maintain in CCU while critically ill.     Ethics: Full code                    79F w HTN, DM, severe PAD w R BKA, and CAD (sp 3v CABG 20 yrs ago, on last cath 2014, only graft left patent was LIMA-LAD, natives with  of LAD and RCA, OM w severe stented w BMS at that time) who presented with 15 min of resting chest pain, found to have Hgb of 7 (from 12 several years ago) and rising elevated troponins from 90s->280s; her ECG shows Inferior Q waves w chronic diffuse ST/TW changes, all of which are largely unchanged from prior tracings. TTE w midrange LVEF 40-45%. She has been chest pain free since her blood transfusion.   Overnight on 12/8 had telemetry notable for progressively worsening heart block (1st degree -> 3rd degree -> asystole >14 seconds).  Pt returned to NSR by the time RRT arrived to bedside.   Labs without significant actionable electrolyte disturbance.  TTE w/ wall motion abnormalities affecting inferolateral wall, basal to mid inferior wall, and basal infero-septum; ischemia could also be  of bradyarrhythmia as opposed to medication given.  Dilaudid was addressed w/ narcan and coreg not administered today.     Plan:     === NEUROLOGY ===      -Mental status: somnolent AAO x 3, not baseline  -Sedation: None  -Pain control: None    === RESPIRATORY ===     -EARLINE    === CARDIOVASCULAR ===    ##CHB bradycardia  -EKG suggestive of high grade AV Block, with intermittent 3rd degree   -Patients HR currently NSR 70 BPM, BP90/43.   -DDX: Iatrogenic vs conduction defect vs ischemia  -Atropine and Zoll pads at bedside  -Dopamine gtt, titrate to HR > 60  -Consider TVP if HD unstable  -Hold AV cindy blockers  -EP consult and f/u recommendations.   -Check hsTrop x 2 sets to evaluate for ACS  -Check TSH and if elevated, reflex free T4 and T3  -Check TTE to evaluate RV/LV function and to eveluate valvular disease  -NPO after midnight. Possible PPM placement     #CAD s/p CABG (2004)  -asa, plavix  -statin held for abn LFT  -Most recent [facility] with  of the LAD and RCA, and tight OM lesion that was stented with a BMS  -Recent outpatient nuclear stress test with scar and hypokinesis in the RCA territory, consistent with TTE here with EF 40-45% w/ inferior/inferolateral WMA       === RENAL/ ===     #DAMARI   -noted to have acute cr elevation from 1 on admisison to 1.8   - bladder scan elevated, howe now placed   - likely post ob, but check urine ltyes   - renal US pending   - hold nephrotoxic agents   -of note did get contrast on admit, but early for ZENOBIA.    #Hypertension  -Hold coreg in setting of CHB  -Will hold imdur and losartan for now while on dopa      === GASTROINTESTINAL ===    #Acute pancreatitis  -Abdominal pain, lipase over 3K, signs of acute interstitial pancreatitis on imaging  -Unclear cause of episode; no alcohol use, no recent instrumentation of bile ducts, no evidence of choledocholithiasis on abdominal ultrasound. No new medications. Triglycerides not elevated. Could be idiopathic.   -GI consult   -ADAT  -MRCP ordered   -Gentle IVF given mildly reduced LVEF with regional WMAs seen on recent echo.    #Elevated liver enzymes.   -Elevated AST/ALT to 242/140, though evidence of moderate hemolysis  -Also with elevated Tbili  -Hold atorvastatin for now  -GI consult  -MRCP ordered       === ENDO  ===    #Type 2 diabetes mellitus.   -Reported to be on basal and bolus pre-meal at home (though reported to be twice a day)  -Hold pre-meal for now given NPO, re-add as indicated when diet able to be advanced  -Dose reduced home 30U lantus to 24U at bedtime given NPO status, adjust as indicated based on sugars  -Low insulin sliding scale q6hrs while NPO  -A1C of 7.5 on 12/1/24  -DASH/TLC/CC diet when advanced from NPO      === HEMATOLOGIC/ONC ===    - H/H & plts stable   - Monitor H/H and plts   - DVT PPX: heparin gtt     === INFECTIOUS DISEASE ===     #Leukocytosis  - no fever  - Continue to monitor with daily CBC  - Trend fever curve  - Possible sources of urinary tract given previously reported burning with urination. Has positive UA, though with fair amount of epithelial cells. Could also be related to colitis seen on CT imaging  - Could be febrile in setting of inflammatory reaction given acute pancreatitis  - Continue zosyn      Lines/Tubes: PIV x 2,     Dispo: Maintain in CCU while critically ill.     Ethics: Full code

## 2024-12-09 NOTE — PROGRESS NOTE ADULT - SUBJECTIVE AND OBJECTIVE BOX
Ady Villatoro MD  Interventional Cardiology / Advance Heart Failure and Cardiac Transplant Specialist  [place] Office : [address] [place] [address] [place] [address]  Tel:    [place] Office : [address] [facility] [place] [address]  Tel: [phone]  Cell : [phone]       Pt seen and examined at bedside, not in distress  	  MEDICATIONS:  aspirin enteric coated 81 milliGRAM(s) Oral daily  clopidogrel Tablet 75 milliGRAM(s) Oral daily  DOPamine Infusion 1.5 MICROgram(s)/kG/Min IV Continuous <Continuous>  heparin  Infusion 850 Unit(s)/Hr IV Continuous <Continuous>    piperacillin/tazobactam IVPB.. 3.375 Gram(s) IV Intermittent every 12 hours          dextrose 50% Injectable 25 Gram(s) IV Push once  dextrose 50% Injectable 12.5 Gram(s) IV Push once  dextrose 50% Injectable 25 Gram(s) IV Push once  dextrose Oral Gel 15 Gram(s) Oral once PRN  glucagon  Injectable 1 milliGRAM(s) IntraMuscular once  insulin glargine Injectable (LANTUS) 24 Unit(s) SubCutaneous at bedtime  insulin lispro (ADMELOG) corrective regimen sliding scale   SubCutaneous three times a day before meals  insulin lispro (ADMELOG) corrective regimen sliding scale   SubCutaneous at bedtime    brimonidine 0.2% Ophthalmic Solution 1 Drop(s) Both EYES two times a day  chlorhexidine 2% Cloths 1 Application(s) Topical daily  dextrose 5%. 1000 milliLiter(s) IV Continuous <Continuous>  dextrose 5%. 1000 milliLiter(s) IV Continuous <Continuous>  lactated ringers. 1000 milliLiter(s) IV Continuous <Continuous>      PAST MEDICAL/SURGICAL HISTORY  PAST MEDICAL & SURGICAL HISTORY:  S/P CABG x 3  in 2004, [facility]      Stented coronary artery  2010 [facility]      PAD (peripheral artery disease)  s/p R BKA      Carotid artery stenosis      DM (diabetes mellitus)  type II      Hypercholesterolemia      Obesity      Hypertension      CAD (coronary artery disease)      Acute kidney injury superimposed on chronic kidney disease      Chronic diastolic heart failure      Vitamin D deficiency      Normocytic anemia      Pulmonary HTN      Diabetic retinopathy      Hx of CABG  3v 2004      S/P hysterectomy  2004      S/P angioplasty with stent  2009, 3/2014      S/P below knee amputation, right  6/2010      S/P eye surgery  for glaucoma      S/P CABG x 3      S/P BKA (below knee amputation) unilateral, right      History of hysterectomy      Elective surgery  traumatic amputation of the toe      S/P BKA (below knee amputation), left          SOCIAL HISTORY: Substance Use (street drugs): ( x ) never used  (  ) other:    FAMILY HISTORY:  Family history of diabetes mellitus (Sibling)           PHYSICAL EXAM:  T(C): 36.6 (12-09-24 @ 12:00), Max: 37.1 (12-08-24 @ 20:04)  HR: 67 (12-09-24 @ 15:00) (64 - 105)  BP: 107/49 (12-09-24 @ 15:00) (81/41 - 144/61)  RR: 19 (12-09-24 @ 15:00) (9 - 21)  SpO2: 98% (12-09-24 @ 15:00) (93% - 100%)  Wt(kg): --  I&O's Summary    08 Dec 2024 07:01  -  09 Dec 2024 07:00  --------------------------------------------------------  IN: 1813.5 mL / OUT: 1150 mL / NET: 663.5 mL    09 Dec 2024 07:01  -  09 Dec 2024 16:56  --------------------------------------------------------  IN: 494.9 mL / OUT: 110 mL / NET: 384.9 mL          GENERAL: NAD  EYES:   PERRLA   ENMT:   Moist mucous membranes, Good dentition, No lesions  Cardiovascular: Normal S1 S2, No JVD, No murmurs, No edema  Respiratory: Lungs clear to auscultation	  Gastrointestinal:  Soft, Non-tender, + BS	  Extremities: s/p b/l AKA                                    7.4    8.63  )-----------( 186      ( 09 Dec 2024 16:10 )             22.1     12-09    135  |  100  |  59[H]  ----------------------------<  229[H]  5.2   |  15[L]  |  3.88[H]    Ca    8.5      09 Dec 2024 05:47  Phos  5.6     12-09  Mg     1.70     12-09    TPro  6.4  /  Alb  3.2[L]  /  TBili  3.6[H]  /  DBili  3.6[H]  /  AST  782[H]  /  ALT  582[H]  /  AlkPhos  159[H]  12-09    proBNP:   Lipid Profile:   HgA1c:   TSH:     Consultant(s) Notes Reviewed:  [x ] YES  [ ] NO    Care Discussed with Consultants/Other Providers [ x] YES  [ ] NO    Imaging Personally Reviewed independently:  [x] YES  [ ] NO    All labs, radiologic studies, vitals, orders and medications list reviewed. Patient is seen and examined at bedside. Case discussed with medical team.         Ady Villatoro MD  Interventional Cardiology / Advance Heart Failure and Cardiac Transplant Specialist  [place] Office : [address] [place] [address] [place] [address]  Tel:    [place] Office : [address] [facility] [place] [address]  Tel: [phone]  Cell : [phone]       Pt seen and examined at bedside, not in distress, transferred to CCU for asystoli  	  MEDICATIONS:  aspirin enteric coated 81 milliGRAM(s) Oral daily  clopidogrel Tablet 75 milliGRAM(s) Oral daily  DOPamine Infusion 1.5 MICROgram(s)/kG/Min IV Continuous <Continuous>  heparin  Infusion 850 Unit(s)/Hr IV Continuous <Continuous>    piperacillin/tazobactam IVPB.. 3.375 Gram(s) IV Intermittent every 12 hours          dextrose 50% Injectable 25 Gram(s) IV Push once  dextrose 50% Injectable 12.5 Gram(s) IV Push once  dextrose 50% Injectable 25 Gram(s) IV Push once  dextrose Oral Gel 15 Gram(s) Oral once PRN  glucagon  Injectable 1 milliGRAM(s) IntraMuscular once  insulin glargine Injectable (LANTUS) 24 Unit(s) SubCutaneous at bedtime  insulin lispro (ADMELOG) corrective regimen sliding scale   SubCutaneous three times a day before meals  insulin lispro (ADMELOG) corrective regimen sliding scale   SubCutaneous at bedtime    brimonidine 0.2% Ophthalmic Solution 1 Drop(s) Both EYES two times a day  chlorhexidine 2% Cloths 1 Application(s) Topical daily  dextrose 5%. 1000 milliLiter(s) IV Continuous <Continuous>  dextrose 5%. 1000 milliLiter(s) IV Continuous <Continuous>  lactated ringers. 1000 milliLiter(s) IV Continuous <Continuous>      PAST MEDICAL/SURGICAL HISTORY  PAST MEDICAL & SURGICAL HISTORY:  S/P CABG x 3  in 2004, [facility]      Stented coronary artery  2010 [facility]      PAD (peripheral artery disease)  s/p R BKA      Carotid artery stenosis      DM (diabetes mellitus)  type II      Hypercholesterolemia      Obesity      Hypertension      CAD (coronary artery disease)      Acute kidney injury superimposed on chronic kidney disease      Chronic diastolic heart failure      Vitamin D deficiency      Normocytic anemia      Pulmonary HTN      Diabetic retinopathy      Hx of CABG  3v 2004      S/P hysterectomy  2004      S/P angioplasty with stent  2009, 3/2014      S/P below knee amputation, right  6/2010      S/P eye surgery  for glaucoma      S/P CABG x 3      S/P BKA (below knee amputation) unilateral, right      History of hysterectomy      Elective surgery  traumatic amputation of the toe      S/P BKA (below knee amputation), left          SOCIAL HISTORY: Substance Use (street drugs): ( x ) never used  (  ) other:    FAMILY HISTORY:  Family history of diabetes mellitus (Sibling)           PHYSICAL EXAM:  T(C): 36.6 (12-09-24 @ 12:00), Max: 37.1 (12-08-24 @ 20:04)  HR: 67 (12-09-24 @ 15:00) (64 - 105)  BP: 107/49 (12-09-24 @ 15:00) (81/41 - 144/61)  RR: 19 (12-09-24 @ 15:00) (9 - 21)  SpO2: 98% (12-09-24 @ 15:00) (93% - 100%)  Wt(kg): --  I&O's Summary    08 Dec 2024 07:01  -  09 Dec 2024 07:00  --------------------------------------------------------  IN: 1813.5 mL / OUT: 1150 mL / NET: 663.5 mL    09 Dec 2024 07:01  -  09 Dec 2024 16:56  --------------------------------------------------------  IN: 494.9 mL / OUT: 110 mL / NET: 384.9 mL          GENERAL: NAD  EYES:   PERRLA   ENMT:   Moist mucous membranes, Good dentition, No lesions  Cardiovascular: Normal S1 S2, No JVD, No murmurs, No edema  Respiratory: Lungs clear to auscultation	  Gastrointestinal:  Soft, Non-tender, + BS	  Extremities: s/p b/l AKA                                    7.4    8.63  )-----------( 186      ( 09 Dec 2024 16:10 )             22.1     12-09    135  |  100  |  59[H]  ----------------------------<  229[H]  5.2   |  15[L]  |  3.88[H]    Ca    8.5      09 Dec 2024 05:47  Phos  5.6     12-09  Mg     1.70     12-09    TPro  6.4  /  Alb  3.2[L]  /  TBili  3.6[H]  /  DBili  3.6[H]  /  AST  782[H]  /  ALT  582[H]  /  AlkPhos  159[H]  12-09    proBNP:   Lipid Profile:   HgA1c:   TSH:     Consultant(s) Notes Reviewed:  [x ] YES  [ ] NO    Care Discussed with Consultants/Other Providers [ x] YES  [ ] NO    Imaging Personally Reviewed independently:  [x] YES  [ ] NO    All labs, radiologic studies, vitals, orders and medications list reviewed. Patient is seen and examined at bedside. Case discussed with medical team.

## 2024-12-09 NOTE — PROGRESS NOTE ADULT - ASSESSMENT
79 year old woman with history of CAD s/p CABG (2004), HTN, HLD, IDDM2, severe PAD with right and left AKA, recent admission for NSTEMI presenting with abdominal pain, found to have pancreatitis.   Etiology unknown, though concern for gallstone pancreatitis, however, no stones seen on ultrasound. CBD WNL. No alcohol use, triglycerides WNL.     #Interstitial pancreatitis   #CAD s/p cabg  #NSTEMI   #Elevated bilirubin     Recommendations   -continue with fluids, pain management  -clear liquid diet for now, advance as tolerated  -please obtain MRCP to rule out any underlying biliary stones     Note incomplete until finalized by attending signature/attestation.    Angelica Caldwell  GI/Hepatology Fellow    MONDAY-FRIDAY 8AM-5PM:  Pager# [phone] ([facility]) or [phone] ([facility])    NON-URGENT CONSULTS:  Please email [email or web address] OR [email or web address]  AT NIGHT AND ON WEEKENDS:  Contact on-call GI fellow from 5pm-8am and on weekends/holidays       79 year old woman with history of CAD s/p CABG (2004), HTN, HLD, IDDM2, severe PAD with right and left AKA, recent admission for NSTEMI presenting with abdominal pain, found to have pancreatitis.   Etiology unknown, though concern for gallstone pancreatitis, however, no stones seen on ultrasound. CBD WNL. No alcohol use, triglycerides WNL.     #Interstitial pancreatitis   #CAD s/p cabg  #NSTEMI   #Elevated bilirubin     Recommendations   -continue with fluids, pain management  -clear liquid diet for now, advance as tolerated  -please obtain MRCP to rule out any underlying biliary stones   - Needs colonoscopy outpatient for colitis seen on CT.    Note incomplete until finalized by attending signature/attestation.    Angelica Caldwell  GI/Hepatology Fellow    MONDAY-FRIDAY 8AM-5PM:  Pager# [phone] ([facility]) or [phone] ([facility])    NON-URGENT CONSULTS:  Please email [email or web address] OR [email or web address]  AT NIGHT AND ON WEEKENDS:  Contact on-call GI fellow from 5pm-8am and on weekends/holidays

## 2024-12-09 NOTE — PROGRESS NOTE ADULT - ASSESSMENT
EKg SR TWI V2-V6, inferior leads    [facility] 2017   of the LAD and RCA, patent LIMA to LAD graft, OM stented with  BMS    TTE 12/2/2024   1. Technically very difficult study performed with the patient in the supine position.   2. Technically difficult image quality.   3. Left ventricular systolic function is mildly to moderately decreased with anejection fraction visually estimated at 40 to 45%. There are regional wall motion abnormalities present. Hypokinesis of the inferolateral wall, basal to mid inferior wall, and basal inferoseptum. There is mild (grade 1) left ventricular diastolic dysfunction.   4. The right ventricle is not well visualized.   5. Structurally normal mitral valve with normal leaflet excursion. There is calcification of the mitral valve annulus. There is mild mitral regurgitation.   6. No prior echocardiogram is available for comparison.    A/P    79 year old woman with history of CAD s/p CABG (2004), HTN, HLD, IDDM2, severe PAD with right and left AKA presenting with complaint of one day of abdominal pain. Meeting sepsis criteria and found to have acute pancreatitis.    1. Sinus pause, CHB on tele  - s/p RRT, EP consulted for possible PPM  - on dopamin gtt  - c/w tele    2. Elevated troponin  - [facility] and NST deferred during last admission (last week)  - Trop 192>418, c/p epigastric pain  - TTE 12/2/24 mild-mod LV dysfunction(EF 40-45%)  -s/p RRT, Trop 1329 12/9, Trop trend trop, add CK/CKMB,   - EKg SR TWI V2-V6, inferior leads, will discuss ACS workup     2. Acute pancreatitis  -on Abx f/u GI recs    3. CAD s/p CABG (2004)  -asa, plavix  -statin held for abn LFT    4. DVT ppx- lovenox   EKg SR TWI V2-V6, inferior leads    [facility] 2017   of the LAD and RCA, patent LIMA to LAD graft, OM stented with  BMS    TTE 12/2/2024   1. Technically very difficult study performed with the patient in the supine position.   2. Technically difficult image quality.   3. Left ventricular systolic function is mildly to moderately decreased with anejection fraction visually estimated at 40 to 45%. There are regional wall motion abnormalities present. Hypokinesis of the inferolateral wall, basal to mid inferior wall, and basal inferoseptum. There is mild (grade 1) left ventricular diastolic dysfunction.   4. The right ventricle is not well visualized.   5. Structurally normal mitral valve with normal leaflet excursion. There is calcification of the mitral valve annulus. There is mild mitral regurgitation.   6. No prior echocardiogram is available for comparison.    A/P    79 year old woman with history of CAD s/p CABG (2004), HTN, HLD, IDDM2, severe PAD with right and left AKA presenting with complaint of one day of abdominal pain. Meeting sepsis criteria and found to have acute pancreatitis.    1. Sinus pause, asystole   - s/p RRT, EP consulted for possible PPM  - on dopamin gtt  - c/w tele    2. Elevated troponin  - [facility] and NST deferred during last admission (last week)  - Trop 192>418, c/p epigastric pain  - TTE 12/2/24 mild-mod LV dysfunction(EF 40-45%)  -s/p RRT, Trop 1329 12/9, Trop trend trop, add CK/CKMB,   - EKg SR TWI V2-V6, inferior leads, will discuss ACS workup     2. Acute pancreatitis  -on Abx f/u GI recs    3. CAD s/p CABG (2004)  -asa, plavix  -statin held for abn LFT    4. DVT ppx- lovenox

## 2024-12-09 NOTE — DIETITIAN INITIAL EVALUATION ADULT - NSICDXPASTMEDICALHX_GEN_ALL_CORE_FT
PAST MEDICAL HISTORY:  Acute kidney injury superimposed on chronic kidney disease     CAD (coronary artery disease)     Carotid artery stenosis     Chronic diastolic heart failure     Diabetic retinopathy     DM (diabetes mellitus) type II    Hypercholesterolemia     Hypertension     Normocytic anemia     Obesity     PAD (peripheral artery disease) s/p R BKA    Pulmonary HTN     S/P CABG x 3 in 2004, [facility]    Stented coronary artery 2010 [facility]    Vitamin D deficiency

## 2024-12-09 NOTE — CONSULT NOTE ADULT - NS ATTEND AMEND GEN_ALL_CORE FT
Vagal mediated pauses. No syncope. Would continue to monitor in the CCU. If needs TVP, would not be able to receive MRCP. If decision is to proceed with an ERCP, would place TVP in. EP to follow.
I reviewed the overnight course of events on the unit, re-confirming the patient history. I discussed the care with the patient and their family. The plan of care was discussed with the ACP team and modifications were made to the notation where appropriate. Differential diagnosis and plan of care discussed with patient after the evaluation. Advanced care planning was discussed with patient and family.  Advanced care planning forms were reviewed and discussed.  Risks, benefits and alternatives of cardiac procedures were discussed in detail and all questions were answered. 35 minutes spent on total encounter of which more than fifty percent of the encounter was spent counseling and/or coordinating care by the attending physician.

## 2024-12-09 NOTE — PROGRESS NOTE ADULT - SUBJECTIVE AND OBJECTIVE BOX
CCU Service  Cardiology   Spect: [id number] ([facility])     PATIENT: NIK MELISSA, MRN: [id number]    79F w HTN, DM, severe PAD w r BKA, L aka, CAD (sp 3v CABG 20 yrs ago, last cath , only graft patent was LIMA-LAD,   of the LAD and RCA, and tight OM lesion that was stented with a BMS   p/w resting chest pain, found to have Hgb of 7 (from 12 several years ago) and rising elevated troponins from 90s->280s; her ECG shows Inferior Q waves w chronic diffuse ST/TW changes, all of which are largely unchanged from prior tracings. TTE w midrange LVEF 40-45%. She has been chest pain free since her blood transfusion.   Overnight on  had telemetry notable for progressively worsening heart block (1st degree -> 3rd degree -> asystole >14 seconds).  Pt returned to NSR by the time RRT arrived to bedside.   Labs without significant actionable electrolyte disturbance.  TTE w/ wall motion abnormalities affecting inferolateral wall, basal to mid inferior wall, and basal infero-septum; ischemia could also be  of bradyarrhythmia as opposed to medication given.  Dilaudid was addressed w/ narcan and coreg not administered today.     transferred to CCU for CHB on dopamine at 3mcg/kg/mn    INTERVAL HISTORY/OVERNIGHT EVENTS: Denies cp, palp, dizziness, sob, orthopnea. Endorses nausea and vomiting    TELEMETRY: SR    EKG:       ALLERGIES: Allergies    sulfa drugs (Hives)  sulfa drugs (Rash)  Sulfac 10% (Hives)    Intolerances        MEDICATIONS:  MEDICATIONS  (STANDING):  aspirin enteric coated 81 milliGRAM(s) Oral daily  clopidogrel Tablet 75 milliGRAM(s) Oral daily  dextrose 5%. 1000 milliLiter(s) (50 mL/Hr) IV Continuous <Continuous>  dextrose 5%. 1000 milliLiter(s) (100 mL/Hr) IV Continuous <Continuous>  dextrose 50% Injectable 25 Gram(s) IV Push once  dextrose 50% Injectable 12.5 Gram(s) IV Push once  dextrose 50% Injectable 25 Gram(s) IV Push once  DOPamine Infusion 5 MICROgram(s)/kG/Min (14.9 mL/Hr) IV Continuous <Continuous>  glucagon  Injectable 1 milliGRAM(s) IntraMuscular once  heparin  Infusion.  Unit(s)/Hr (9.5 mL/Hr) IV Continuous <Continuous>  insulin glargine Injectable (LANTUS) 24 Unit(s) SubCutaneous at bedtime  insulin lispro (ADMELOG) corrective regimen sliding scale   SubCutaneous every 6 hours  naloxone Injectable 0.4 milliGRAM(s) IV Push once  piperacillin/tazobactam IVPB.. 3.375 Gram(s) IV Intermittent every 8 hours    MEDICATIONS  (PRN):  dextrose Oral Gel 15 Gram(s) Oral once PRN Blood Glucose LESS THAN 70 milliGRAM(s)/deciliter  heparin   Injectable 4000 Unit(s) IV Push every 6 hours PRN For aPTT less than 40        OBJECTIVE:  ICU Vital Signs Last 24 Hrs  T(C): 36.7 (09 Dec 2024 04:22), Max: 37.2 (08 Dec 2024 16:26)  T(F): 98.1 (09 Dec 2024 04:22), Max: 99 (08 Dec 2024 16:26)  HR: 75 (09 Dec 2024 07:00) (70 - 105)  BP: 126/50 (09 Dec 2024 07:00) (81/41 - 144/61)  BP(mean): 72 (09 Dec 2024 07:00) (57 - 87)  ABP: --  ABP(mean): --  RR: 12 (09 Dec 2024 07:00) (9 - 21)  SpO2: 94% (09 Dec 2024 07:00) (93% - 100%)    O2 Parameters below as of 09 Dec 2024 07:00  Patient On (Oxygen Delivery Method): room air            I&O's Summary    08 Dec 2024 07:01  -  09 Dec 2024 07:00  --------------------------------------------------------  IN: 1813.5 mL / OUT: 1150 mL / NET: 663.5 mL      Daily     Daily Weight in k.6 (09 Dec 2024 04:22)      PHYSICAL EXAMINATION:  General: Comfortable, no acute distress, cooperative with exam.  HEENT: Moist mucous membranes.  Respiratory: CTAB, normal respiratory effort, no coughing, wheezes, crackles, or rales.  CV: RRR, S1S2, no murmurs, rubs or gallops. No JVD. Distal pulses intact.  Abdominal: Soft, nontender, nondistended, no rebound or guarding, normal bowel sounds.  Neurology: AOx3, no focal neuro defects, BUSH x 4.  Extremities: No pitting edema, + Peripheral pulses.          LABS:                          8.0    11.85 )-----------( 241      ( 09 Dec 2024 05:47 )             25.4         135  |  100  |  59[H]  ----------------------------<  229[H]  5.2   |  15[L]  |  3.88[H]    Ca    8.5      09 Dec 2024 05:47  Phos  5.6       Mg     1.70         TPro  5.9[L]  /  Alb  2.9[L]  /  TBili  3.3[H]  /  DBili  x   /  AST  374[H]  /  ALT  293[H]  /  AlkPhos  143[H]      LIVER FUNCTIONS - ( 09 Dec 2024 01:49 )  Alb: 2.9 g/dL / Pro: 5.9 g/dL / ALK PHOS: 143 U/L / ALT: 293 U/L / AST: 374 U/L / GGT: x           PT/INR - ( 09 Dec 2024 01:49 )   PT: 17.0 sec;   INR: 1.43 ratio         PTT - ( 09 Dec 2024 01:49 )  PTT:160.7 sec  CKMB Units: 7.1 ng/mL ( @ 05:47)  CKMB Units: 10.5 ng/mL ( @ 15:47)    CARDIAC MARKERS ( 09 Dec 2024 05:47 )  x     / x     / x     / x     / 7.1 ng/mL  CARDIAC MARKERS ( 08 Dec 2024 15:47 )  x     / x     / x     / x     / 10.5 ng/mL  CARDIAC MARKERS ( 07 Dec 2024 14:50 )  x     / x     / x     / x     / 1.6 ng/mL      Urinalysis Basic - ( 09 Dec 2024 05:47 )    Color: x / Appearance: x / SG: x / pH: x  Gluc: 229 mg/dL / Ketone: x  / Bili: x / Urobili: x   Blood: x / Protein: x / Nitrite: x   Leuk Esterase: x / RBC: x / WBC x   Sq Epi: x / Non Sq Epi: x / Bacteria: x        echo:  TTE 2024   1. Technically very difficult study performed with the patient in the supine position.   2. Technically difficult image quality.   3. Left ventricular systolic function is mildly to moderately decreased with anejection fraction visually estimated at 40 to 45%. There are regional wall motion abnormalities present. Hypokinesis of the inferolateral wall, basal to mid inferior wall, and basal inferoseptum. There is mild (grade 1) left ventricular diastolic dysfunction.   4. The right ventricle is not well visualized.   5. Structurally normal mitral valve with normal leaflet excursion. There is calcification of the mitral valve annulus. There is mild mitral regurgitation.   6. No prior echocardiogram is available for comparison.        Assessment and Plan:   · Assessment	  79F w HTN, DM, severe PAD w r BKA, L aka, CAD (sp 3v CABG 20 yrs ago, last cath , only graft patent was LIMA-LAD, natives with  of LAD and RCA, OM w severe stented w BMS at that time) who presented with 15 min of resting chest pain, found to have Hgb of 7 (from 12 several years ago) and rising elevated troponins from 90s->280s; her ECG shows Inferior Q waves w chronic diffuse ST/TW changes, all of which are largely unchanged from prior tracings. TTE w midrange LVEF 40-45%. She has been chest pain free since her blood transfusion.   Overnight on  had telemetry notable for progressively worsening heart block (1st degree -> 3rd degree -> asystole >14 seconds).  Pt returned to NSR by the time RRT arrived to bedside.   Labs without significant actionable electrolyte disturbance.  TTE w/ wall motion abnormalities affecting inferolateral wall, basal to mid inferior wall, and basal infero-septum; ischemia could also be  of bradyarrhythmia as opposed to medication given.  Dilaudid was addressed w/ narcan and coreg not administered today.     Plan:     === NEUROLOGY ===      -Mental status: somnolent AAO x 3, not baseline  -Sedation: None  -Pain control: None    === RESPIRATORY ===     -EARLINE    === CARDIOVASCULAR ===    ##CHB bradycardia  -EKG suggestive of high grade AV Block, with intermittent 3rd degree   -Patients HR currently NSR 70 BPM, BP90/43.   -DDX: Iatrogenic vs conduction defect vs ischemia  -Atropine and Zoll pads at bedside  -Dopamine gtt, titrate to HR > 60  -Consider TVP if HD unstable  -Hold AV cindy blockers  -EP consult and f/u recommendations.   -Check hsTrop x 2 sets to evaluate for ACS  -Check TSH and if elevated, reflex free T4 and T3  -Check TTE to evaluate RV/LV function and to eveluate valvular disease  -NPO after midnight. Possible PPM placement     #CAD s/p CABG ()  -asa, plavix  -statin held for abn LFT  -Most recent LHC with  of the LAD and RCA, and tight OM lesion that was stented with a BMS  -Recent outpatient nuclear stress test with scar and hypokinesis in the RCA territory, consistent with TTE here with EF 40-45% w/ inferior/inferolateral WMA       === RENAL/ ===     #DAMARI   -noted to have acute cr elevation from 1 on admisison to 1.8   - bladder scan elevated, howe now placed   - likely post ob, but check urine ltyes   - renal US pending   - hold nephrotoxic agents   -of note did get contrast on admit, but early for ZENOBIA.    #Hypertension  -Hold coreg in setting of CHB  -Will hold imdur and losartan for now while on dopa      === GASTROINTESTINAL ===    #Acute pancreatitis  -Abdominal pain, lipase over 3K, signs of acute interstitial pancreatitis on imaging  -Unclear cause of episode; no alcohol use, no recent instrumentation of bile ducts, no evidence of choledocholithiasis on abdominal ultrasound. No new medications. Triglycerides not elevated. Could be idiopathic.   -GI consult   -ADAT  -MRCP ordered   -Gentle IVF given mildly reduced LVEF with regional WMAs seen on recent echo.    #Elevated liver enzymes.   -Elevated AST/ALT to 242/140, though evidence of moderate hemolysis  -Also with elevated Tbili  -Hold atorvastatin for now  -GI consult  -MRCP ordered       === ENDO  ===    #Type 2 diabetes mellitus.   -Reported to be on basal and bolus pre-meal at home (though reported to be twice a day)  -Hold pre-meal for now given NPO, re-add as indicated when diet able to be advanced  -Dose reduced home 30U lantus to 24U at bedtime given NPO status, adjust as indicated based on sugars  -Low insulin sliding scale q6hrs while NPO  -A1C of 7.5 on 24  -DASH/TLC/CC diet when advanced from NPO      === HEMATOLOGIC/ONC ===    - H/H & plts stable   - Monitor H/H and plts   - DVT PPX: heparin gtt     === INFECTIOUS DISEASE ===     #Leukocytosis  - no fever  - Continue to monitor with daily CBC  - Trend fever curve  - Possible sources of urinary tract given previously reported burning with urination. Has positive UA, though with fair amount of epithelial cells. Could also be related to colitis seen on CT imaging  - Could be febrile in setting of inflammatory reaction given acute pancreatitis  - Continue zosyn      Lines/Tubes: PIV x 2,     Dispo: Maintain in CCU while critically ill.     Ethics: Full code

## 2024-12-09 NOTE — DIETITIAN INITIAL EVALUATION ADULT - OTHER INFO
79 year old woman with history of CAD s/p CABG (2004), HTN, HLD, IDDM2, severe PAD with right and left AKA, recent admission for NSTEMI presenting with abdominal pain, found to have pancreatitis. Transferred to CCU for arrythmia per chart.    Patient advanced to clear liquids today per chart. Consumed a little of jello today per observation. Reports some pain w/o N/V at this time. Has no food allergies. Daughter/patient unsure of UBW at this time as it's difficult to weight 2/2 B/L AKA. Per HIE, noted w/ weight 80.7 kg (8/8). ABW is 73.6 (12/9) or 79.4 kg (12/7) per chart, ?accuracy of weights at this time. No edema or pressure injuries noted per RN flow sheet.    Patient/daughter declined nutrition education at this time. Provided heart healthy consistent carbohydrate handout and notified RD remains available as needed.

## 2024-12-09 NOTE — PROGRESS NOTE ADULT - SUBJECTIVE AND OBJECTIVE BOX
Chief Complaint:  Patient is a 79y old  Female who presents with a chief complaint of Abdominal pain (09 Dec 2024 08:18)      Interval Events:   -asystole, transferred to CCU   -denying abdominal pain       Allergies:  sulfa drugs (Hives)  sulfa drugs (Rash)  Sulfac 10% (Hives)      Hospital Medications:  aspirin enteric coated 81 milliGRAM(s) Oral daily  clopidogrel Tablet 75 milliGRAM(s) Oral daily  dextrose 5%. 1000 milliLiter(s) IV Continuous <Continuous>  dextrose 5%. 1000 milliLiter(s) IV Continuous <Continuous>  dextrose 50% Injectable 25 Gram(s) IV Push once  dextrose 50% Injectable 12.5 Gram(s) IV Push once  dextrose 50% Injectable 25 Gram(s) IV Push once  dextrose Oral Gel 15 Gram(s) Oral once PRN  DOPamine Infusion 5 MICROgram(s)/kG/Min IV Continuous <Continuous>  glucagon  Injectable 1 milliGRAM(s) IntraMuscular once  heparin   Injectable 4000 Unit(s) IV Push every 6 hours PRN  heparin  Infusion.  Unit(s)/Hr IV Continuous <Continuous>  insulin glargine Injectable (LANTUS) 24 Unit(s) SubCutaneous at bedtime  insulin lispro (ADMELOG) corrective regimen sliding scale   SubCutaneous every 6 hours  naloxone Injectable 0.4 milliGRAM(s) IV Push once  pantoprazole  Injectable 40 milliGRAM(s) IV Push once  piperacillin/tazobactam IVPB.. 3.375 Gram(s) IV Intermittent every 8 hours        PHYSICAL EXAM:   Vital Signs:  Vital Signs Last 24 Hrs  T(C): 36.5 (09 Dec 2024 08:00), Max: 37.2 (08 Dec 2024 16:26)  T(F): 97.7 (09 Dec 2024 08:00), Max: 99 (08 Dec 2024 16:26)  HR: 74 (09 Dec 2024 09:00) (70 - 105)  BP: 120/56 (09 Dec 2024 09:00) (81/41 - 144/61)  BP(mean): 76 (09 Dec 2024 09:00) (57 - 87)  RR: 13 (09 Dec 2024 09:00) (9 - 21)  SpO2: 100% (09 Dec 2024 09:00) (93% - 100%)    Parameters below as of 09 Dec 2024 09:00  Patient On (Oxygen Delivery Method): room air      Daily     Daily Weight in k.6 (09 Dec 2024 04:22)    GENERAL:  No acute distress  HEENT:  NCAT, no scleral icterus  CHEST: no resp distress  HEART:  RRR  ABDOMEN:  Soft, non-tender, non-distended, normoactive bowel sounds, no masses, no hepato-splenomegaly, no signs of chronic liver disease  EXTREMITIES:  No cyanosis, clubbing, or edema  SKIN:  No rash/erythema/ecchymoses/petechiae/wounds/abscess/warm/dry  NEURO:  Alert and oriented x 3, no asterixis, no tremor    LABS:                        8.0    11.85 )-----------( 241      ( 09 Dec 2024 05:47 )             25.4     Mean Cell Volume: 92.0 fL (24 @ 05:47)        135  |  100  |  59[H]  ----------------------------<  229[H]  5.2   |  15[L]  |  3.88[H]    Ca    8.5      09 Dec 2024 05:47  Phos  5.6       Mg     1.70         TPro  5.9[L]  /  Alb  2.9[L]  /  TBili  3.3[H]  /  DBili  x   /  AST  374[H]  /  ALT  293[H]  /  AlkPhos  143[H]      LIVER FUNCTIONS - ( 09 Dec 2024 01:49 )  Alb: 2.9 g/dL / Pro: 5.9 g/dL / ALK PHOS: 143 U/L / ALT: 293 U/L / AST: 374 U/L / GGT: x           PT/INR - ( 09 Dec 2024 01:49 )   PT: 17.0 sec;   INR: 1.43 ratio         PTT - ( 09 Dec 2024 01:49 )  PTT:160.7 sec  Urinalysis Basic - ( 09 Dec 2024 05:47 )    Color: x / Appearance: x / SG: x / pH: x  Gluc: 229 mg/dL / Ketone: x  / Bili: x / Urobili: x   Blood: x / Protein: x / Nitrite: x   Leuk Esterase: x / RBC: x / WBC x   Sq Epi: x / Non Sq Epi: x / Bacteria: x      Amylase Serum--      Lipase [id number]       Ammonia--        Imaging:      < from: US Abdomen Limited (24 @ 18:38) >    IMPRESSION:      1. Pericholecystic fluid, likely related to pancreatitis.  2. No gallstones.        --- End of Report ---    < end of copied text >

## 2024-12-09 NOTE — PROVIDER CONTACT NOTE (CRITICAL VALUE NOTIFICATION) - ASSESSMENT
pt lethargic. rapid response called, see rapid sheet
pt lethargic. rapid response called, see rapid sheet

## 2024-12-09 NOTE — DIETITIAN INITIAL EVALUATION ADULT - PERTINENT MEDS FT
MEDICATIONS  (STANDING):  aspirin enteric coated 81 milliGRAM(s) Oral daily  clopidogrel Tablet 75 milliGRAM(s) Oral daily  dextrose 5%. 1000 milliLiter(s) (50 mL/Hr) IV Continuous <Continuous>  dextrose 5%. 1000 milliLiter(s) (100 mL/Hr) IV Continuous <Continuous>  dextrose 50% Injectable 25 Gram(s) IV Push once  dextrose 50% Injectable 12.5 Gram(s) IV Push once  dextrose 50% Injectable 25 Gram(s) IV Push once  DOPamine Infusion 1.5 MICROgram(s)/kG/Min (4.47 mL/Hr) IV Continuous <Continuous>  glucagon  Injectable 1 milliGRAM(s) IntraMuscular once  heparin  Infusion 850 Unit(s)/Hr (6 mL/Hr) IV Continuous <Continuous>  insulin glargine Injectable (LANTUS) 24 Unit(s) SubCutaneous at bedtime  insulin lispro (ADMELOG) corrective regimen sliding scale   SubCutaneous every 6 hours  lactated ringers. 1000 milliLiter(s) (50 mL/Hr) IV Continuous <Continuous>  naloxone Injectable 0.4 milliGRAM(s) IV Push once  piperacillin/tazobactam IVPB.. 3.375 Gram(s) IV Intermittent every 12 hours    MEDICATIONS  (PRN):  dextrose Oral Gel 15 Gram(s) Oral once PRN Blood Glucose LESS THAN 70 milliGRAM(s)/deciliter

## 2024-12-09 NOTE — DIETITIAN INITIAL EVALUATION ADULT - NS FNS DIET ORDER
Diet, Clear Liquid:   Consistent Carbohydrate {Evening Snack} (CSTCHOSN)  DASH/TLC {Sodium & Cholesterol Restricted} (DASH) (12-09-24 @ 11:00)

## 2024-12-09 NOTE — PROGRESS NOTE ADULT - SUBJECTIVE AND OBJECTIVE BOX
HPI:  79 year old woman with history of CAD s/p CABG (2004), HTN, HLD, IDDM2, severe PAD with right and left AKA presenting with complaint of one day hx of abdominal pain. Was recently admitted to [facility] and discharged 12/6 for NSTEMI but did not report any abdominal symptoms at that time. Patient describes pain as gas pain that didn't go away that initially started last night. Had episode of NBNB emesis early this morning. No diarrhea reported by patient. Has not had much to eat today in setting of pain and vomiting. Medications given in the ED helped her pain. Patient denied alcohol use, denied new medications. Reported she had some burning with urination the other day, but denied any recent dysuria. No yellowing of eyes or skin reported. No chest pain, no difficulty breathing. Gas pain located in epigastric region of abdomen, no radiation reported. 100.6F temperature reported at home.    In the ED, febrile to 102.5 TMax, hypertensive to SBP 170s, saturating well on room air. Given doses of vanc, zosyn, 500cc bolus, 4mg IV morphine x 2 doses.  (07 Dec 2024 19:41)  	     Allergies    sulfa drugs (Hives)  sulfa drugs (Rash)  Sulfac 10% (Hives)    Intolerances    	  MEDICATIONS:  aspirin enteric coated 81 milliGRAM(s) Oral daily  clopidogrel Tablet 75 milliGRAM(s) Oral daily  DOPamine Infusion 5 MICROgram(s)/kG/Min IV Continuous <Continuous>  heparin   Injectable 4000 Unit(s) IV Push every 6 hours PRN  heparin  Infusion.  Unit(s)/Hr IV Continuous <Continuous>    piperacillin/tazobactam IVPB.. 3.375 Gram(s) IV Intermittent every 8 hours          dextrose 50% Injectable 25 Gram(s) IV Push once  dextrose 50% Injectable 12.5 Gram(s) IV Push once  dextrose 50% Injectable 25 Gram(s) IV Push once  dextrose Oral Gel 15 Gram(s) Oral once PRN  glucagon  Injectable 1 milliGRAM(s) IntraMuscular once  insulin glargine Injectable (LANTUS) 24 Unit(s) SubCutaneous at bedtime  insulin lispro (ADMELOG) corrective regimen sliding scale   SubCutaneous every 6 hours    dextrose 5%. 1000 milliLiter(s) IV Continuous <Continuous>  dextrose 5%. 1000 milliLiter(s) IV Continuous <Continuous>  sodium chloride 0.9% Bolus 250 milliLiter(s) IV Bolus once      PAST MEDICAL & SURGICAL HISTORY:  S/P CABG x 3 in 2004, [facility]  Stented coronary artery 2010 [facility]  PAD (peripheral artery disease) s/p R BKA  Carotid artery stenosis  DM (diabetes mellitus) type II  Hypercholesterolemia  Obesity  Hypertension  CAD (coronary artery disease)  Acute kidney injury superimposed on chronic kidney disease  Chronic diastolic heart failure  Vitamin D deficiency  Normocytic anemia  Pulmonary HTN  Diabetic retinopathy  Hx of CABG 3v 2004  S/P hysterectomy 2004  S/P angioplasty with stent 2009, 3/2014  S/P below knee amputation, right 6/2010  S/P eye surgery for glaucoma  S/P CABG x 3  S/P BKA (below knee amputation) unilateral, right  History of hysterectomy  Elective surgery traumatic amputation of the toe  S/P BKA (below knee amputation), left          FAMILY HISTORY:  Family history of diabetes mellitus (Sibling)        SUBSTANCE USE  Tobacco Usage:  denies  Alcohol Usage: denies  Recreational drugs: denies        T(C): 36.3 (12-09-24 @ 03:08), Max: 37.2 (12-08-24 @ 16:26)  HR: 71 (12-09-24 @ 03:08) (71 - 89)  BP: 90/40 (12-09-24 @ 03:08) (81/41 - 114/48)  RR: 18 (12-09-24 @ 03:08) (16 - 18)  SpO2: 96% (12-09-24 @ 03:08) (96% - 100%)  Wt(kg): --  I&O's Summary    07 Dec 2024 07:01  -  08 Dec 2024 07:00  --------------------------------------------------------  IN: 0 mL / OUT: 250 mL / NET: -250 mL    08 Dec 2024 07:01  -  09 Dec 2024 04:42  --------------------------------------------------------  IN: 1813.5 mL / OUT: 1150 mL / NET: 663.5 mL        Physical Exam:  General: NAD  Cardiovascular: Normal S1 S2, No JVD, No murmurs, No edema  Respiratory: Lungs clear to auscultation	  Gastrointestinal:  Soft, Non-tender, + BS	  Skin: warm and dry, No rashes, No ecchymoses, No cyanosis	  Extremities:  Extremities: s/p b/l AKA      CBC Full  -  ( 09 Dec 2024 01:49 )  WBC Count : 8.41 K/uL  Hemoglobin : 8.0 g/dL  Hematocrit : 24.9 %  Platelet Count - Automated : 195 K/uL  Mean Cell Volume : 92.2 fL  Mean Cell Hemoglobin : 29.6 pg  Mean Cell Hemoglobin Concentration : 32.1 g/dL  Auto Neutrophil # : 7.24 K/uL  Auto Lymphocyte # : 0.81 K/uL  Auto Monocyte # : 0.14 K/uL  Auto Eosinophil # : 0.00 K/uL  Auto Basophil # : 0.00 K/uL  Auto Neutrophil % : 79.1 %  Auto Lymphocyte % : 9.6 %  Auto Monocyte % : 1.7 %  Auto Eosinophil % : 0.0 %  Auto Basophil % : 0.0 %    12-09    134[L]  |  100  |  56[H]  ----------------------------<  209[H]  5.0   |  16[L]  |  3.68[H]  12-08    134[L]  |  101  |  50[H]  ----------------------------<  212[H]  5.0   |  19[L]  |  2.92[H]    Ca    8.4      09 Dec 2024 01:49  Ca    8.2[L]      08 Dec 2024 18:50  Phos  5.2     12-09  Phos  3.9     12-08  Mg     1.70     12-09  Mg     1.60     12-08    TPro  5.9[L]  /  Alb  2.9[L]  /  TBili  3.3[H]  /  DBili  x   /  AST  374[H]  /  ALT  293[H]  /  AlkPhos  143[H]  12-09  TPro  6.1  /  Alb  2.9[L]  /  TBili  3.2[H]  /  DBili  x   /  AST  112[H]  /  ALT  120[H]  /  AlkPhos  132[H]  12-08    proBNP: 637  HgA1c: 7.5  TSH: 2.06 uIU/mL  CARDIAC MARKERS ( 08 Dec 2024 15:47 )  957 ng/L / x     / x     / x     / x     / 10.5 ng/mL  CARDIAC MARKERS ( 08 Dec 2024 04:24 )  418 ng/L / x     / x     / x     / x     / x      CARDIAC MARKERS ( 07 Dec 2024 14:50 )  192 ng/L / x     / x     / x     / x     / 1.6 ng/mL  CARDIAC MARKERS ( 03 Dec 2024 05:20 )  251 ng/L / x     / x     / x     / x     / 6.6 ng/mL  CARDIAC MARKERS ( 02 Dec 2024 22:00 )  312 ng/L / x     / x     / x     / x     / 9.2 ng/mL      Diagnostic testing:  cath:  [facility] 2017   of the LAD and RCA, patent LIMA to LAD graft, OM stented with  BMS    echo:  TTE 12/2/2024   1. Technically very difficult study performed with the patient in the supine position.   2. Technically difficult image quality.   3. Left ventricular systolic function is mildly to moderately decreased with anejection fraction visually estimated at 40 to 45%. There are regional wall motion abnormalities present. Hypokinesis of the inferolateral wall, basal to mid inferior wall, and basal inferoseptum. There is mild (grade 1) left ventricular diastolic dysfunction.   4. The right ventricle is not well visualized.   5. Structurally normal mitral valve with normal leaflet excursion. There is calcification of the mitral valve annulus. There is mild mitral regurgitation.   6. No prior echocardiogram is available for comparison.   CCU ACCEPT NOTE:      HPI: 79 year old woman with history of CAD s/p CABG (2004), HTN, HLD, IDDM2, severe PAD with right and left AKA presenting with complaint of one day hx of abdominal pain. Was recently admitted to [facility] and discharged 12/6 for NSTEMI but did not report any abdominal symptoms at that time. Patient describes pain as gas pain that didn't go away that initially started last night. Had episode of NBNB emesis early this morning. No diarrhea reported by patient. Has not had much to eat today in setting of pain and vomiting. Medications given in the ED helped her pain. Patient denied alcohol use, denied new medications. Reported she had some burning with urination the other day, but denied any recent dysuria. No yellowing of eyes or skin reported. No chest pain, no difficulty breathing. Gas pain located in epigastric region of abdomen, no radiation reported. 100.6F temperature reported at home.    In the ED, febrile to 102.5 TMax, hypertensive to SBP 170s, saturating well on room air. Given doses of vanc, zosyn, 500cc bolus, 4mg IV morphine x 2 doses.  (07 Dec 2024 19:41)  	     Allergies    sulfa drugs (Hives)  sulfa drugs (Rash)  Sulfac 10% (Hives)    Intolerances    	  MEDICATIONS:  aspirin enteric coated 81 milliGRAM(s) Oral daily  clopidogrel Tablet 75 milliGRAM(s) Oral daily  DOPamine Infusion 5 MICROgram(s)/kG/Min IV Continuous <Continuous>  heparin   Injectable 4000 Unit(s) IV Push every 6 hours PRN  heparin  Infusion.  Unit(s)/Hr IV Continuous <Continuous>    piperacillin/tazobactam IVPB.. 3.375 Gram(s) IV Intermittent every 8 hours          dextrose 50% Injectable 25 Gram(s) IV Push once  dextrose 50% Injectable 12.5 Gram(s) IV Push once  dextrose 50% Injectable 25 Gram(s) IV Push once  dextrose Oral Gel 15 Gram(s) Oral once PRN  glucagon  Injectable 1 milliGRAM(s) IntraMuscular once  insulin glargine Injectable (LANTUS) 24 Unit(s) SubCutaneous at bedtime  insulin lispro (ADMELOG) corrective regimen sliding scale   SubCutaneous every 6 hours    dextrose 5%. 1000 milliLiter(s) IV Continuous <Continuous>  dextrose 5%. 1000 milliLiter(s) IV Continuous <Continuous>  sodium chloride 0.9% Bolus 250 milliLiter(s) IV Bolus once      PAST MEDICAL & SURGICAL HISTORY:  S/P CABG x 3 in 2004, [facility]  Stented coronary artery 2010 [facility]  PAD (peripheral artery disease) s/p R BKA  Carotid artery stenosis  DM (diabetes mellitus) type II  Hypercholesterolemia  Obesity  Hypertension  CAD (coronary artery disease)  Acute kidney injury superimposed on chronic kidney disease  Chronic diastolic heart failure  Vitamin D deficiency  Normocytic anemia  Pulmonary HTN  Diabetic retinopathy  Hx of CABG 3v 2004  S/P hysterectomy 2004  S/P angioplasty with stent 2009, 3/2014  S/P below knee amputation, right 6/2010  S/P eye surgery for glaucoma  S/P CABG x 3  S/P BKA (below knee amputation) unilateral, right  History of hysterectomy  Elective surgery traumatic amputation of the toe  S/P BKA (below knee amputation), left          FAMILY HISTORY:  Family history of diabetes mellitus (Sibling)        SUBSTANCE USE  Tobacco Usage:  denies  Alcohol Usage: denies  Recreational drugs: denies        T(C): 36.3 (12-09-24 @ 03:08), Max: 37.2 (12-08-24 @ 16:26)  HR: 71 (12-09-24 @ 03:08) (71 - 89)  BP: 90/40 (12-09-24 @ 03:08) (81/41 - 114/48)  RR: 18 (12-09-24 @ 03:08) (16 - 18)  SpO2: 96% (12-09-24 @ 03:08) (96% - 100%)  Wt(kg): --  I&O's Summary    07 Dec 2024 07:01  -  08 Dec 2024 07:00  --------------------------------------------------------  IN: 0 mL / OUT: 250 mL / NET: -250 mL    08 Dec 2024 07:01  -  09 Dec 2024 04:42  --------------------------------------------------------  IN: 1813.5 mL / OUT: 1150 mL / NET: 663.5 mL        Physical Exam:  General: NAD  Cardiovascular: Normal S1 S2, No JVD, No murmurs, No edema  Respiratory: Lungs clear to auscultation	  Gastrointestinal:  Soft, Non-tender, + BS	  Skin: warm and dry, No rashes, No ecchymoses, No cyanosis	  Extremities:  Extremities: s/p b/l AKA      CBC Full  -  ( 09 Dec 2024 01:49 )  WBC Count : 8.41 K/uL  Hemoglobin : 8.0 g/dL  Hematocrit : 24.9 %  Platelet Count - Automated : 195 K/uL  Mean Cell Volume : 92.2 fL  Mean Cell Hemoglobin : 29.6 pg  Mean Cell Hemoglobin Concentration : 32.1 g/dL  Auto Neutrophil # : 7.24 K/uL  Auto Lymphocyte # : 0.81 K/uL  Auto Monocyte # : 0.14 K/uL  Auto Eosinophil # : 0.00 K/uL  Auto Basophil # : 0.00 K/uL  Auto Neutrophil % : 79.1 %  Auto Lymphocyte % : 9.6 %  Auto Monocyte % : 1.7 %  Auto Eosinophil % : 0.0 %  Auto Basophil % : 0.0 %    12-09    134[L]  |  100  |  56[H]  ----------------------------<  209[H]  5.0   |  16[L]  |  3.68[H]  12-08    134[L]  |  101  |  50[H]  ----------------------------<  212[H]  5.0   |  19[L]  |  2.92[H]    Ca    8.4      09 Dec 2024 01:49  Ca    8.2[L]      08 Dec 2024 18:50  Phos  5.2     12-09  Phos  3.9     12-08  Mg     1.70     12-09  Mg     1.60     12-08    TPro  5.9[L]  /  Alb  2.9[L]  /  TBili  3.3[H]  /  DBili  x   /  AST  374[H]  /  ALT  293[H]  /  AlkPhos  143[H]  12-09  TPro  6.1  /  Alb  2.9[L]  /  TBili  3.2[H]  /  DBili  x   /  AST  112[H]  /  ALT  120[H]  /  AlkPhos  132[H]  12-08    proBNP: 637  HgA1c: 7.5  TSH: 2.06 uIU/mL  CARDIAC MARKERS ( 08 Dec 2024 15:47 )  957 ng/L / x     / x     / x     / x     / 10.5 ng/mL  CARDIAC MARKERS ( 08 Dec 2024 04:24 )  418 ng/L / x     / x     / x     / x     / x      CARDIAC MARKERS ( 07 Dec 2024 14:50 )  192 ng/L / x     / x     / x     / x     / 1.6 ng/mL  CARDIAC MARKERS ( 03 Dec 2024 05:20 )  251 ng/L / x     / x     / x     / x     / 6.6 ng/mL  CARDIAC MARKERS ( 02 Dec 2024 22:00 )  312 ng/L / x     / x     / x     / x     / 9.2 ng/mL      Diagnostic testing:  cath:  [facility] 2017   of the LAD and RCA, patent LIMA to LAD graft, OM stented with  BMS    echo:  TTE 12/2/2024   1. Technically very difficult study performed with the patient in the supine position.   2. Technically difficult image quality.   3. Left ventricular systolic function is mildly to moderately decreased with anejection fraction visually estimated at 40 to 45%. There are regional wall motion abnormalities present. Hypokinesis of the inferolateral wall, basal to mid inferior wall, and basal inferoseptum. There is mild (grade 1) left ventricular diastolic dysfunction.   4. The right ventricle is not well visualized.   5. Structurally normal mitral valve with normal leaflet excursion. There is calcification of the mitral valve annulus. There is mild mitral regurgitation.   6. No prior echocardiogram is available for comparison.

## 2024-12-09 NOTE — DIETITIAN INITIAL EVALUATION ADULT - PERTINENT LABORATORY DATA
12-09    135  |  100  |  59[H]  ----------------------------<  229[H]  5.2   |  15[L]  |  3.88[H]    Ca    8.5      09 Dec 2024 05:47  Phos  5.6     12-09  Mg     1.70     12-09    TPro  6.4  /  Alb  3.2[L]  /  TBili  3.6[H]  /  DBili  3.6[H]  /  AST  782[H]  /  ALT  582[H]  /  AlkPhos  159[H]  12-09  POCT Blood Glucose.: 291 mg/dL (12-09-24 @ 11:53)  A1C with Estimated Average Glucose Result: 7.5 % (12-01-24 @ 18:30)

## 2024-12-10 ENCOUNTER — TRANSCRIPTION ENCOUNTER (OUTPATIENT)
Age: 79
End: 2024-12-10

## 2024-12-10 DIAGNOSIS — E87.20 ACIDOSIS, UNSPECIFIED: ICD-10-CM

## 2024-12-10 DIAGNOSIS — N17.9 ACUTE KIDNEY FAILURE, UNSPECIFIED: ICD-10-CM

## 2024-12-10 DIAGNOSIS — N19 UNSPECIFIED KIDNEY FAILURE: ICD-10-CM

## 2024-12-10 LAB
ADD ON TEST-SPECIMEN IN LAB: SIGNIFICANT CHANGE UP
ADD ON TEST-SPECIMEN IN LAB: SIGNIFICANT CHANGE UP
ALBUMIN SERPL ELPH-MCNC: 2.7 G/DL — LOW (ref 3.3–5)
ALP SERPL-CCNC: 155 U/L — HIGH (ref 40–120)
ALT FLD-CCNC: 564 U/L — HIGH (ref 4–33)
ANION GAP SERPL CALC-SCNC: 18 MMOL/L — HIGH (ref 7–14)
APTT BLD: 73.2 SEC — HIGH (ref 24.5–35.6)
AST SERPL-CCNC: 576 U/L — HIGH (ref 4–32)
BILIRUB SERPL-MCNC: 2.6 MG/DL — HIGH (ref 0.2–1.2)
BLOOD GAS VENOUS COMPREHENSIVE RESULT: SIGNIFICANT CHANGE UP
BUN SERPL-MCNC: 70 MG/DL — HIGH (ref 7–23)
CALCIUM SERPL-MCNC: 8.2 MG/DL — LOW (ref 8.4–10.5)
CHLORIDE SERPL-SCNC: 99 MMOL/L — SIGNIFICANT CHANGE UP (ref 98–107)
CK MB BLD-MCNC: 2.8 % — HIGH (ref 0–2.5)
CK MB CFR SERPL CALC: 8.6 NG/ML — HIGH
CK SERPL-CCNC: 310 U/L — HIGH (ref 25–170)
CO2 SERPL-SCNC: 15 MMOL/L — LOW (ref 22–31)
CREAT SERPL-MCNC: 5.07 MG/DL — HIGH (ref 0.5–1.3)
EGFR: 8 ML/MIN/1.73M2 — LOW
GLUCOSE BLDC GLUCOMTR-MCNC: 220 MG/DL — HIGH (ref 70–99)
GLUCOSE BLDC GLUCOMTR-MCNC: 232 MG/DL — HIGH (ref 70–99)
GLUCOSE BLDC GLUCOMTR-MCNC: 233 MG/DL — HIGH (ref 70–99)
GLUCOSE BLDC GLUCOMTR-MCNC: 239 MG/DL — HIGH (ref 70–99)
GLUCOSE SERPL-MCNC: 196 MG/DL — HIGH (ref 70–99)
HCT VFR BLD CALC: 21.9 % — LOW (ref 34.5–45)
HGB BLD-MCNC: 7.3 G/DL — LOW (ref 11.5–15.5)
MAGNESIUM SERPL-MCNC: 2.4 MG/DL — SIGNIFICANT CHANGE UP (ref 1.6–2.6)
MCHC RBC-ENTMCNC: 29.2 PG — SIGNIFICANT CHANGE UP (ref 27–34)
MCHC RBC-ENTMCNC: 33.3 G/DL — SIGNIFICANT CHANGE UP (ref 32–36)
MCV RBC AUTO: 87.6 FL — SIGNIFICANT CHANGE UP (ref 80–100)
NRBC # BLD: 0 /100 WBCS — SIGNIFICANT CHANGE UP (ref 0–0)
NRBC # FLD: 0.02 K/UL — HIGH (ref 0–0)
PHOSPHATE SERPL-MCNC: 5.3 MG/DL — HIGH (ref 2.5–4.5)
PLATELET # BLD AUTO: 182 K/UL — SIGNIFICANT CHANGE UP (ref 150–400)
POTASSIUM SERPL-MCNC: 4.9 MMOL/L — SIGNIFICANT CHANGE UP (ref 3.5–5.3)
POTASSIUM SERPL-SCNC: 4.9 MMOL/L — SIGNIFICANT CHANGE UP (ref 3.5–5.3)
PROT SERPL-MCNC: 5.8 G/DL — LOW (ref 6–8.3)
RBC # BLD: 2.5 M/UL — LOW (ref 3.8–5.2)
RBC # FLD: 15.1 % — HIGH (ref 10.3–14.5)
SODIUM SERPL-SCNC: 132 MMOL/L — LOW (ref 135–145)
TROPONIN T, HIGH SENSITIVITY RESULT: 1477 NG/L — CRITICAL HIGH
WBC # BLD: 8.84 K/UL — SIGNIFICANT CHANGE UP (ref 3.8–10.5)
WBC # FLD AUTO: 8.84 K/UL — SIGNIFICANT CHANGE UP (ref 3.8–10.5)

## 2024-12-10 PROCEDURE — 99291 CRITICAL CARE FIRST HOUR: CPT

## 2024-12-10 PROCEDURE — 99232 SBSQ HOSP IP/OBS MODERATE 35: CPT | Mod: GC

## 2024-12-10 PROCEDURE — 74183 MRI ABD W/O CNTR FLWD CNTR: CPT | Mod: 26

## 2024-12-10 PROCEDURE — 99223 1ST HOSP IP/OBS HIGH 75: CPT | Mod: GC

## 2024-12-10 PROCEDURE — 99232 SBSQ HOSP IP/OBS MODERATE 35: CPT

## 2024-12-10 RX ORDER — SODIUM BICARBONATE 84 MG/ML
650 INJECTION, SOLUTION INTRAVENOUS THREE TIMES A DAY
Refills: 0 | Status: DISCONTINUED | OUTPATIENT
Start: 2024-12-10 | End: 2024-12-13

## 2024-12-10 RX ORDER — ETHACRYNIC ACID 25 MG/1
50 TABLET ORAL ONCE
Refills: 0 | Status: COMPLETED | OUTPATIENT
Start: 2024-12-10 | End: 2024-12-10

## 2024-12-10 RX ORDER — ACETAMINOPHEN 500MG 500 MG/1
1000 TABLET, COATED ORAL ONCE
Refills: 0 | Status: COMPLETED | OUTPATIENT
Start: 2024-12-10 | End: 2024-12-10

## 2024-12-10 RX ORDER — BUMETANIDE 1 MG/1
2 TABLET ORAL ONCE
Refills: 0 | Status: COMPLETED | OUTPATIENT
Start: 2024-12-10 | End: 2024-12-10

## 2024-12-10 RX ORDER — BUMETANIDE 1 MG/1
2 TABLET ORAL
Refills: 0 | Status: DISCONTINUED | OUTPATIENT
Start: 2024-12-10 | End: 2024-12-11

## 2024-12-10 RX ORDER — ACETAMINOPHEN 500MG 500 MG/1
650 TABLET, COATED ORAL ONCE
Refills: 0 | Status: COMPLETED | OUTPATIENT
Start: 2024-12-10 | End: 2024-12-10

## 2024-12-10 RX ADMIN — PIPERACILLIN SODIUM AND TAZOBACTAM SODIUM 25 GRAM(S): 4; .5 INJECTION, POWDER, LYOPHILIZED, FOR SOLUTION INTRAVENOUS at 17:14

## 2024-12-10 RX ADMIN — BUMETANIDE 2 MILLIGRAM(S): 1 TABLET ORAL at 17:14

## 2024-12-10 RX ADMIN — Medication 2: at 07:31

## 2024-12-10 RX ADMIN — Medication 81 MILLIGRAM(S): at 11:33

## 2024-12-10 RX ADMIN — PIPERACILLIN SODIUM AND TAZOBACTAM SODIUM 25 GRAM(S): 4; .5 INJECTION, POWDER, LYOPHILIZED, FOR SOLUTION INTRAVENOUS at 05:03

## 2024-12-10 RX ADMIN — Medication 2: at 17:15

## 2024-12-10 RX ADMIN — CLOPIDOGREL 75 MILLIGRAM(S): 75 TABLET, FILM COATED ORAL at 11:33

## 2024-12-10 RX ADMIN — BRIMONIDINE TARTRATE 1 DROP(S): 1.5 SOLUTION/ DROPS OPHTHALMIC at 17:15

## 2024-12-10 RX ADMIN — ACETAMINOPHEN 500MG 650 MILLIGRAM(S): 500 TABLET, COATED ORAL at 10:20

## 2024-12-10 RX ADMIN — Medication 2: at 11:33

## 2024-12-10 RX ADMIN — PANTOPRAZOLE SODIUM 40 MILLIGRAM(S): 40 TABLET, DELAYED RELEASE ORAL at 11:33

## 2024-12-10 RX ADMIN — ACETAMINOPHEN 500MG 400 MILLIGRAM(S): 500 TABLET, COATED ORAL at 20:24

## 2024-12-10 RX ADMIN — ACETAMINOPHEN 500MG 1000 MILLIGRAM(S): 500 TABLET, COATED ORAL at 20:39

## 2024-12-10 RX ADMIN — BUMETANIDE 2 MILLIGRAM(S): 1 TABLET ORAL at 14:52

## 2024-12-10 RX ADMIN — SODIUM BICARBONATE 650 MILLIGRAM(S): 84 INJECTION, SOLUTION INTRAVENOUS at 21:19

## 2024-12-10 RX ADMIN — ETHACRYNIC ACID 50 MILLIGRAM(S): 25 TABLET ORAL at 10:46

## 2024-12-10 RX ADMIN — ACETAMINOPHEN 500MG 650 MILLIGRAM(S): 500 TABLET, COATED ORAL at 09:24

## 2024-12-10 RX ADMIN — BRIMONIDINE TARTRATE 1 DROP(S): 1.5 SOLUTION/ DROPS OPHTHALMIC at 05:03

## 2024-12-10 RX ADMIN — CHLORHEXIDINE GLUCONATE 1 APPLICATION(S): 1.2 RINSE ORAL at 11:35

## 2024-12-10 RX ADMIN — Medication 6 UNIT(S)/HR: at 07:32

## 2024-12-10 RX ADMIN — INSULIN GLARGINE 24 UNIT(S): 100 INJECTION, SOLUTION SUBCUTANEOUS at 21:18

## 2024-12-10 NOTE — DISCHARGE NOTE PROVIDER - NSDCMRMEDTOKEN_GEN_ALL_CORE_FT
aspirin 81 mg oral delayed release tablet: 1 tab(s) orally once a day  atorvastatin 40 mg oral tablet: 1 tab(s) orally once a day (at bedtime)  brimonidine 0.2% ophthalmic solution: 1 drop(s) in each affected eye 2 times a day  carvedilol 6.25 mg oral tablet: 1 tab(s) orally every 12 hours  clopidogrel 75 mg oral tablet: 1 tab(s) orally once a day  fluticasone 50 mcg/inh inhalation powder: 1 inhaled once a day  insulin glargine 100 units/mL subcutaneous solution: 30 unit(s) subcutaneous once a day (at bedtime)  isosorbide mononitrate 30 mg oral tablet, extended release: 1 tab(s) orally once a day  losartan 25 mg oral tablet: 1 tab(s) orally once a day  NovoLOG 100 units/mL subcutaneous solution: 20 unit(s) subcutaneous 2 times a day with meals  timolol hemihydrate 0.5% ophthalmic solution: 1 drop(s) to each affected eye once a day   apixaban 5 mg oral tablet: 1 tab(s) orally 2 times a day  aspirin 81 mg oral delayed release tablet: 1 tab(s) orally once a day  brimonidine 0.2% ophthalmic solution: 1 drop(s) in each affected eye 2 times a day  gabapentin 300 mg oral capsule: 1 cap(s) orally once a day (at bedtime)  hydrALAZINE 10 mg oral tablet: 1 tab(s) orally every 8 hours  insulin glargine 100 units/mL subcutaneous solution: 30 unit(s) subcutaneous once a day (at bedtime)  lidocaine 4% topical film: Apply topically to affected area once a day  NovoLOG 100 units/mL injectable solution: 36 unit(s) injectable once a day With dinner  pantoprazole 40 mg oral granule, delayed release: 40 milligram(s) orally 2 times a day

## 2024-12-10 NOTE — DISCHARGE NOTE PROVIDER - NSDCCPCAREPLAN_GEN_ALL_CORE_FT
PRINCIPAL DISCHARGE DIAGNOSIS  Diagnosis: Acute pancreatitis  Assessment and Plan of Treatment: You came with abdominal pain, found to have acute pancreatitis.   Seen by Gastroenterology, etiology of acute pancreatitis may be 2/2 to passed stone vs. resolving ischemic injury  - S/p MRCP 12/10: Acute interstitial pancreatitis. No evidence of parenchymal necrosis or walled off fluid collection. No gallstones or choledocholithiasis  - No need for ERCP per GI  - Please follow up with your PMD after discharge        SECONDARY DISCHARGE DIAGNOSES  Diagnosis: Asystole  Assessment and Plan of Treatment: On 12/8 Rapid response for asystole (your heart stopped beating), you were transferred to cardiac care until and were started on a dopamine drip. Seen by Electrophysiology (EP) and Cardiology, felt to be vagally mediated (a normal response from the body/brain).  - TTE similar to echo 1 week prior - with exception echogenicity in LA on one view only, EF 46%  - Repeat TTE: A mobile echodensity is visualized in the left atrium intermittently (seen only in the parasternal long axis view). This may represent a hypermobile interatrial septum  - No indication for pacing at this time  - PMD and EP follow up as outpatient      Diagnosis: ATN (acute tubular necrosis)  Assessment and Plan of Treatment: You had worsening renal function (creatinine) likely as a resul of blood flow into your kidneys during the time when the heart stopped from beating. Your kidneys are now recovering well but not down to normal. You were seen by Nephrology team and received IV diuresis. You have a catheter inserted to urinate (Pratt), unfortunately you could not void so you are going home with Pratt cathether and need to f/up with Urology as outpatient for a trial of void.  - A blood pressure medications (losartan) is on hold until your kidney function is back to normal  - Please follow up with your PMD to repeat blood work to ensure your kidney function continues to improve  - Follow up with Urology for trial of void    Diagnosis: Colitis  Assessment and Plan of Treatment: CT Abd: right hemicolon is mildly thick-walled, which could be due to underdistention or colitis  - Per GI recs: Needs colonoscopy outpatient in 6-8 weeks  - Gastroenterology Clinic number: [phone] ([address]) f/up with Dr. Mar.    Diagnosis: Elevated liver enzymes  Assessment and Plan of Treatment: Likely in the setting of recent acute pancreatitis, possible that pt passed a stone  - Transaminitis are improving  - Monitor liver enzymes as outpatient with your PMD  - Your cholesterol medication is on hold until your liver enzymes are back to normal      Diagnosis: Type 2 diabetes mellitus  Assessment and Plan of Treatment: A1c of 7.5  - Please take your sugar levels at home regularly  - Please follow up with your PMD and take your medications as prescribed    Diagnosis: Atrial fibrillation  Assessment and Plan of Treatment: Developed A.Fib on the weekend prior to discharge and your were started on a blood thinner called eliquis, please take it as prescribed along with ASA (you are not on plavix any longer)  - Please follow up with your PMD and take your medications as prescribed       PRINCIPAL DISCHARGE DIAGNOSIS  Diagnosis: Acute pancreatitis  Assessment and Plan of Treatment: You came with abdominal pain, found to have acute pancreatitis.   Seen by Gastroenterology, etiology of acute pancreatitis may be 2/2 to passed stone vs. resolving ischemic injury  - S/p MRCP 12/10: Acute interstitial pancreatitis. No evidence of parenchymal necrosis or walled off fluid collection. No gallstones or choledocholithiasis  - No need for ERCP per GI  - Please follow up with your PMD after discharge        SECONDARY DISCHARGE DIAGNOSES  Diagnosis: Asystole  Assessment and Plan of Treatment: On 12/8 Rapid response for asystole (your heart stopped beating), you were transferred to cardiac care until and were started on a dopamine drip. Seen by Electrophysiology (EP) and Cardiology, felt to be vagally mediated (a normal response from the body/brain).  - TTE similar to echo 1 week prior - with exception echogenicity in LA on one view only, EF 46%  - Repeat TTE: A mobile echodensity is visualized in the left atrium intermittently (seen only in the parasternal long axis view). This may represent a hypermobile interatrial septum  - No indication for pacing at this time  - PMD and EP follow up as outpatient      Diagnosis: ATN (acute tubular necrosis)  Assessment and Plan of Treatment: You had worsening renal function (creatinine) likely as a resul of blood flow into your kidneys during the time when the heart stopped from beating. Your kidneys are now recovering well but not down to normal. You were seen by Nephrology team and received IV diuresis. You have a catheter inserted to urinate (Pratt), unfortunately you could not void so you are going home with Pratt cathether and need to f/up with Urology as outpatient for a trial of void.  - A blood pressure medications (losartan) is on hold until your kidney function is back to normal  - Please follow up with your PMD to repeat blood work to ensure your kidney function continues to improve  - Follow up with Urology for trial of void in 2-3 weeks as outpatient    Diagnosis: Colitis  Assessment and Plan of Treatment: CT Abd: right hemicolon is mildly thick-walled, which could be due to underdistention or colitis  - Per GI recs: Needs colonoscopy outpatient in 6-8 weeks  - Gastroenterology Clinic number: [phone] ([facility] [address] [place] [address]) f/up with Dr. Mar.    Diagnosis: Elevated liver enzymes  Assessment and Plan of Treatment: Likely in the setting of recent acute pancreatitis, possible that pt passed a stone  - Transaminitis are improving  - Monitor liver enzymes as outpatient with your PMD  - Your cholesterol medication is on hold until your liver enzymes are back to normal      Diagnosis: Type 2 diabetes mellitus  Assessment and Plan of Treatment: A1c of 7.5  - Please take your sugar levels at home regularly  - Please follow up with your PMD and take your medications as prescribed    Diagnosis: Atrial fibrillation  Assessment and Plan of Treatment: Developed A.Fib on the weekend prior to discharge and your were started on a blood thinner called eliquis, please take it as prescribed along with ASA (you are not on plavix any longer)  - Please follow up with your PMD and take your medications as prescribed       PRINCIPAL DISCHARGE DIAGNOSIS  Diagnosis: Acute pancreatitis  Assessment and Plan of Treatment: You came with abdominal pain, found to have acute pancreatitis.   Seen by Gastroenterology, etiology of acute pancreatitis may be 2/2 to passed stone vs. resolving ischemic injury  - S/p MRCP 12/10: Acute interstitial pancreatitis. No evidence of parenchymal necrosis or walled off fluid collection. No gallstones or choledocholithiasis  - No need for ERCP per GI  - Please follow up with your PMD after discharge        SECONDARY DISCHARGE DIAGNOSES  Diagnosis: Asystole  Assessment and Plan of Treatment: On 12/8 Rapid response for asystole (your heart stopped beating), you were transferred to cardiac care until and were started on a dopamine drip. Seen by Electrophysiology (EP) and Cardiology, felt to be vagally mediated (a normal response from the body/brain).  - TTE similar to echo 1 week prior - with exception echogenicity in LA on one view only, EF 46%  - Repeat TTE: A mobile echodensity is visualized in the left atrium intermittently (seen only in the parasternal long axis view). This may represent a hypermobile interatrial septum  - No indication for pacing at this time  - PMD and EP follow up as outpatient      Diagnosis: ATN (acute tubular necrosis)  Assessment and Plan of Treatment: You had worsening renal function (creatinine) likely as a resul of blood flow into your kidneys during the time when the heart stopped from beating. Your kidneys are now recovering well but not down to normal. You were seen by Nephrology team and received IV diuresis. You have a catheter inserted to urinate (Pratt), unfortunately you could not void so you are going home with Pratt cathether and need to f/up with Urology as outpatient for a trial of void.  - A blood pressure medications (losartan) is on hold until your kidney function is back to normal  - Please follow up with your PMD to repeat blood work to ensure your kidney function continues to improve  - Follow up with Urology for trial of void in 2-3 weeks as outpatient    Diagnosis: Colitis  Assessment and Plan of Treatment: CT Abd: right hemicolon is mildly thick-walled, which could be due to underdistention or colitis  - Per GI recs: Needs colonoscopy outpatient in 6-8 weeks  - Gastroenterology Clinic number: [phone] ([facility] [address] [place] [address]) f/up with Dr. Mar.    Diagnosis: Elevated liver enzymes  Assessment and Plan of Treatment: Likely in the setting of recent acute pancreatitis, possible that pt passed a stone  - Transaminitis are improving  - Monitor liver enzymes as outpatient with your PMD  - Your cholesterol medication is on hold until your liver enzymes are back to normal      Diagnosis: Type 2 diabetes mellitus  Assessment and Plan of Treatment: A1c of 7.5  - Please take your sugar levels at home regularly  - Your medications for Diabetes were adjusted while in the hospital, please follow up soon with your PMD (in 1-2 weeks) to see if further adjustments to your regimen needs to be made  - Please follow up with your PMD and take your medications as prescribed    Diagnosis: Atrial fibrillation  Assessment and Plan of Treatment: Developed A.Fib on the weekend prior to discharge and your were started on a blood thinner called eliquis, please take it as prescribed along with ASA (you are not on plavix any longer)  - Please follow up with your PMD and take your medications as prescribed       PRINCIPAL DISCHARGE DIAGNOSIS  Diagnosis: Acute pancreatitis  Assessment and Plan of Treatment: You came with abdominal pain, found to have acute pancreatitis.   Seen by Gastroenterology, etiology of acute pancreatitis may be 2/2 to passed stone vs. resolving ischemic injury  - S/p MRCP 12/10: Acute interstitial pancreatitis. No evidence of parenchymal necrosis or walled off fluid collection. No gallstones or choledocholithiasis  - No need for ERCP per GI  - Please follow up with your PMD after discharge        SECONDARY DISCHARGE DIAGNOSES  Diagnosis: Asystole  Assessment and Plan of Treatment: On 12/8 Rapid response for asystole (your heart stopped beating), you were transferred to cardiac care until and were started on a dopamine drip. Seen by Electrophysiology (EP) and Cardiology, felt to be vagally mediated (a normal response from the body/brain).  - TTE similar to echo 1 week prior - with exception echogenicity in LA on one view only, EF 46%  - Repeat TTE: A mobile echodensity is visualized in the left atrium intermittently (seen only in the parasternal long axis view). This may represent a hypermobile interatrial septum  - No indication for pacing at this time  - PMD and EP follow up as outpatient      Diagnosis: ATN (acute tubular necrosis)  Assessment and Plan of Treatment: You had worsening renal function (creatinine) likely as a resul of blood flow into your kidneys during the time when the heart stopped from beating. Your kidneys are now recovering well but not down to normal. You were seen by Nephrology team and received IV diuresis. You have a catheter inserted to urinate (Pratt), unfortunately you could not void so you are going home with Pratt cathether and need to f/up with Urology as outpatient for a trial of void.  - A blood pressure medications (losartan) is on hold until your kidney function is back to normal  - Please follow up with your PMD to repeat blood work to ensure your kidney function continues to improve  - Follow up with Urology for trial of void in 2-3 weeks as outpatient    Diagnosis: Colitis  Assessment and Plan of Treatment: CT Abd: right hemicolon is mildly thick-walled, which could be due to underdistention or colitis  - Per GI recs: Needs colonoscopy outpatient in 6-8 weeks  - Gastroenterology Clinic number: [phone] ([facility] [address] [place] [address]) f/up with Dr. Mar.    Diagnosis: Elevated liver enzymes  Assessment and Plan of Treatment: Likely in the setting of recent acute pancreatitis, possible that pt passed a stone  - Transaminitis are improving  - Monitor liver enzymes as outpatient with your PMD  - Your cholesterol medication is on hold until your liver enzymes are back to normal      Diagnosis: Type 2 diabetes mellitus  Assessment and Plan of Treatment: A1c of 7.5  - Please take your sugar levels at home regularly  - Your medications for Diabetes were adjusted while in the hospital, please follow up soon with your PMD (in 1-2 weeks) to see if further adjustments to your regimen needs to be made  - You are currently on lantus 28 units at bedtime and Novolog 8 units with each meal  - Please follow up with your PMD and take your medications as prescribed    Diagnosis: Atrial fibrillation  Assessment and Plan of Treatment: Developed A.Fib on the weekend prior to discharge and your were started on a blood thinner called eliquis, please take it as prescribed along with ASA (you are not on plavix any longer)  - Please follow up with your PMD and take your medications as prescribed

## 2024-12-10 NOTE — CONSULT NOTE ADULT - PROBLEM SELECTOR RECOMMENDATION 9
Patient with acute kidney failure secondary to hypotension iso CHB with episode of asystole and possibly sepsis/hypovolemia due to acute pancreatitis and/or UTI, further compounded by IV contrast study on 12/7/24. [facility] pt's baseline Scr as far back as 2019 has been ~0.9-1.2. However, her Scr likely not an accurate estimate of her eGFR given B/L leg amputations and decreased muscle mass, so unclear if pt also has a component of CKD. Her blood pressures were noted to be persistently low on 12/8/24 and 12/9/24, but have improved a bit today (12/10/24). Pt has a howe but is oliguric. UA showed moderate blood with no RBCs, moderate leukocyte esterase with 714 WBCs, no nitrites, many bacteria, and 13 epithelial cells. UCx positive for 50,000-99,000 cFu/mL gram negative rods. CT A/P on admission showed no hydro but this was prior to DAMARI. Would obtain a dedicated renal US. Pt on LR @ 75cc/h per CCU team for pancreatitis. Would give trial of Bumex 2mg IVP and monitor UOP. If pt remains oliguric and kidney function continues to worse, will need to consider dialysis. Avoid nephrotoxins and dose meds per eGFR. Pt. with severe/oliguric DAMARI in setting of hypotension, complete heart block with episode of asystole, recent IV contrast use, and possible infection. Pt. with likely ATN. Scr on admission (12/7/24) was 1.17, increased to 2.92 on 12/8/24. Scr progressively worsened to 5.07 today (12/10/24). Of note, Scr likely not an accurate estimate of her eGFR given B/L leg amputations and decreased muscle mass. UA showed moderate blood with no RBCs, moderate leukocyte esterase with 714 WBCs, no nitrites, many bacteria, and 13 epithelial cells. UCx positive for gram negative rods. Obtain kidney sonogram. Pt. on LR @ 75cc/h as per CCU team for pancreatitis. Pt. oliguric, recommend trial of Bumex 2 mg IVP and monitor UOP. Will need to consider HD/CRRT if pt. remains oliguric and/or kidney function continues to worsen. Avoid nephrotoxins and dose meds per eGFR.

## 2024-12-10 NOTE — PROGRESS NOTE ADULT - SUBJECTIVE AND OBJECTIVE BOX
Gastroenterology Progress Note    Interval Events:   -MRCP with no underlying CBD stones  -Denies any ongoing nausea, vomiting or abd pain     Allergies:  sulfa drugs (Hives)  sulfa drugs (Rash)  Sulfac 10% (Hives)      Hospital Medications:  aspirin enteric coated 81 milliGRAM(s) Oral daily  brimonidine 0.2% Ophthalmic Solution 1 Drop(s) Both EYES two times a day  buMETAnide Injectable 2 milliGRAM(s) IV Push two times a day  chlorhexidine 2% Cloths 1 Application(s) Topical daily  clopidogrel Tablet 75 milliGRAM(s) Oral daily  dextrose 5%. 1000 milliLiter(s) IV Continuous <Continuous>  dextrose 5%. 1000 milliLiter(s) IV Continuous <Continuous>  dextrose 50% Injectable 25 Gram(s) IV Push once  dextrose 50% Injectable 12.5 Gram(s) IV Push once  dextrose 50% Injectable 25 Gram(s) IV Push once  dextrose Oral Gel 15 Gram(s) Oral once PRN  glucagon  Injectable 1 milliGRAM(s) IntraMuscular once  heparin  Infusion 850 Unit(s)/Hr IV Continuous <Continuous>  insulin glargine Injectable (LANTUS) 24 Unit(s) SubCutaneous at bedtime  insulin lispro (ADMELOG) corrective regimen sliding scale   SubCutaneous three times a day before meals  insulin lispro (ADMELOG) corrective regimen sliding scale   SubCutaneous at bedtime  naloxone Injectable 0.4 milliGRAM(s) IV Push once  pantoprazole   Suspension 40 milliGRAM(s) Oral daily  piperacillin/tazobactam IVPB.. 3.375 Gram(s) IV Intermittent every 12 hours  sodium bicarbonate 650 milliGRAM(s) Oral three times a day      ROS: 14 point ROS negative unless otherwise state in subjective    PHYSICAL EXAM:   Vital Signs:  Vital Signs Last 24 Hrs  T(C): 36.8 (10 Dec 2024 13:46), Max: 36.9 (10 Dec 2024 04:00)  T(F): 98.3 (10 Dec 2024 13:46), Max: 98.4 (10 Dec 2024 04:00)  HR: 65 (10 Dec 2024 16:00) (64 - 70)  BP: 120/50 (10 Dec 2024 16:00) (97/47 - 128/54)  BP(mean): 71 (10 Dec 2024 16:00) (61 - 84)  RR: 16 (10 Dec 2024 16:00) (12 - 22)  SpO2: 98% (10 Dec 2024 16:00) (96% - 100%)    Parameters below as of 10 Dec 2024 16:00  Patient On (Oxygen Delivery Method): room air      Daily     Daily Weight in k.7 (10 Dec 2024 05:00)    GENERAL:  No acute distress  HEENT:  NCAT, no scleral icterus  CHEST: no resp distress  HEART:  RRR  ABDOMEN:  Soft, non-tender, non-distended, normoactive bowel sound  NEURO:  Alert and oriented x 3    LABS:                        7.3    8.84  )-----------( 182      ( 10 Dec 2024 01:30 )             21.9     Mean Cell Volume: 87.6 fL (12-10-24 @ 01:30)    12-10    132[L]  |  99  |  70[H]  ----------------------------<  196[H]  4.9   |  15[L]  |  5.07[H]    Ca    8.2[L]      10 Dec 2024 01:30  Phos  5.3     12-10  Mg     2.40     12-10    TPro  5.8[L]  /  Alb  2.7[L]  /  TBili  2.6[H]  /  DBili  x   /  AST  576[H]  /  ALT  564[H]  /  AlkPhos  155[H]  12-10    LIVER FUNCTIONS - ( 10 Dec 2024 01:30 )  Alb: 2.7 g/dL / Pro: 5.8 g/dL / ALK PHOS: 155 U/L / ALT: 564 U/L / AST: 576 U/L / GGT: x           PT/INR - ( 09 Dec 2024 01:49 )   PT: 17.0 sec;   INR: 1.43 ratio         PTT - ( 10 Dec 2024 01:30 )  PTT:73.2 sec  Urinalysis Basic - ( 10 Dec 2024 01:30 )    Color: x / Appearance: x / SG: x / pH: x  Gluc: 196 mg/dL / Ketone: x  / Bili: x / Urobili: x   Blood: x / Protein: x / Nitrite: x   Leuk Esterase: x / RBC: x / WBC x   Sq Epi: x / Non Sq Epi: x / Bacteria: x      Imaging:    < from: MR MRCP w/wo IV Cont (12.10.24 @ 13:41) >  IMPRESSION:  Acute interstitial pancreatitis. No evidence of parenchymal necrosis or   walled off fluid collection.  No gallstones or choledocholithiasis.    --- End of Report ---    < end of copied text >

## 2024-12-10 NOTE — PROGRESS NOTE ADULT - ASSESSMENT
EKg SR TWI V2-V6, inferior leads    [facility] 2017   of the LAD and RCA, patent LIMA to LAD graft, OM stented with  BMS    TTE 12/2/2024   1. Technically very difficult study performed with the patient in the supine position.   2. Technically difficult image quality.   3. Left ventricular systolic function is mildly to moderately decreased with anejection fraction visually estimated at 40 to 45%. There are regional wall motion abnormalities present. Hypokinesis of the inferolateral wall, basal to mid inferior wall, and basal inferoseptum. There is mild (grade 1) left ventricular diastolic dysfunction.   4. The right ventricle is not well visualized.   5. Structurally normal mitral valve with normal leaflet excursion. There is calcification of the mitral valve annulus. There is mild mitral regurgitation.   6. No prior echocardiogram is available for comparison.    A/P    79 year old woman with history of CAD s/p CABG (2004), HTN, HLD, IDDM2, severe PAD with right and left AKA presenting with complaint of one day of abdominal pain. Meeting sepsis criteria and found to have acute pancreatitis.    1. Sinus pause, asystole   - s/p RRT, EP consulted for possible PPM, as per them possibly vagally mediated   - s/p dopamin gtt  - c/w tele    2. Elevated troponin  - [facility] and NST deferred during last admission (last week)  - Trop 192>418, c/p epigastric pain  - TTE 12/2/24 mild-mod LV dysfunction(EF 40-45%)  -s/p RRT, Trop 1329 12/9, Trop trend trop, add CK/CKMB,   - EKg SR TWI V2-V6, inferior leads, will discuss ACS workup   - repeat echo shows same LV function as 1 week ago     2. Acute pancreatitis  -on Abx f/u GI recs  - for MRCP     3. CAD s/p CABG (2004)  -asa, plavix  -statin held for abn LFT    4. DVT ppx- on IV heparin

## 2024-12-10 NOTE — PROGRESS NOTE ADULT - ASSESSMENT
Chantell Estrada is a 79 year old woman with history of CAD s/p CABG (2004), HTN, HLD, IDDM2, severe PAD with right and left AKA, recent admission for NSTEMI presenting with abdominal pain, found to have pancreatitis with no alcohol use, triglycerides WNL.  Advanced GI consulted to r/o CBD tones      #Interstitial pancreatitis   #CAD s/p cabg  #NSTEMI   #Elevated LFTs (improving)  Presenting  with complaint of one day of abdominal pain found to have pancreatitis and colitis with changes of LFT. Pt with no ongoing ETOH use with lipid panel shpwing normal TG. Further workup with MRCP with no gallstones or choledocholithiasis. Labs overall improving with overall improved pain. Etiology may be 2/2 to passed stone vs. resolving ischemic injury. No plans for any ERCP at this time     Recommendations   -Ok for low fat diet as tolerated   -Trend LFTs   - Needs colonoscopy outpatient for colitis seen on CT in 6-8 weeks     GI will plan to sign off at this time. Please feel free to reach out to our team with any follow up questions. Please provide patient with Gastroenterology Clinic number to confirm/arrange appointment; [phone] ([facility] at [address] [place] [address]) for follow up with Dr. Mar    All recommendations are tentative until note is attested by attending.

## 2024-12-10 NOTE — DISCHARGE NOTE PROVIDER - HOSPITAL COURSE
79F w HTN, DM, severe PAD w r BKA, L aka, CAD (sp 3v CABG 20 yrs ago, last cath 2014, only graft patent was LIMA-LAD,   of the LAD and RCA, and tight OM lesion that was stented with a BMS   presenting with abdominal pain, found to have pancreatitis. Etiology unknown, though concern for gallstone pancreatitis, however, no stones seen on ultrasound. CBD WNL. No alcohol use, triglycerides WNL.   Pt also found to have Hgb of 7 and rising  troponins from 90s->280s; her ECG largely unchanged from prior tracings. TTE w midrange LVEF 40-45%. She has been chest pain free since her blood transfusion.     Overnight on 12/8 RRT called for asystole. Pt returned to NSR by the time RRT arrived to bedside. EP consulted and suspect likely vagal mediated response  transferred to CCU for CHB on dopamine at 3mcg/kg/mn   79F w HTN, DM, severe PAD w r BKA, L aka, CAD (sp 3v CABG 20 yrs ago, last cath 2014, only graft patent was LIMA-LAD,   of the LAD and RCA, and tight OM lesion that was stented with a BMS   presenting with abdominal pain, found to have pancreatitis. Etiology unknown, though concern for gallstone pancreatitis, however, no stones seen on ultrasound. CBD WNL. No alcohol use, triglycerides WNL.   Pt also found to have Hgb of 7 and rising  troponins from 90s->280s; her ECG largely unchanged from prior tracings. TTE w midrange LVEF 40-45%. She has been chest pain free since her blood transfusion.     Overnight on 12/8 RRT called for asystole. Pt returned to NSR by the time RRT arrived to bedside. EP consulted and suspect likely vagal mediated response  transferred to CCU for CHB on dopamine at 3mcg/kg/mn  Pts echo similar to echo 1 week ago - with exception echogenicity in LA on one view only - repeat echo.  Pt with worsening renal function. Likely iso infection and hypo profusion during asystole Nephrology consented patient for hemodialysis - will attempt to diurese with Bumex      OUT PATIENT  - Needs colonoscopy outpatient for colitis seen on CT in 6-8 weeks    Please provide patient with Gastroenterology Clinic number to confirm/arrange appointment;   [phone] ([facility] at [address] [place] [address]) for follow up with Dr. Mar   79F w HTN, DM, severe PAD w r BKA, L aka, CAD (sp 3v CABG 20 yrs ago, last cath 2014, only graft patent was LIMA-LAD,   of the LAD and RCA, and tight OM lesion that was stented with a BMS   presenting with abdominal pain, found to have pancreatitis. Etiology unknown, though concern for gallstone pancreatitis, however, no stones seen on ultrasound. CBD WNL. No alcohol use, triglycerides WNL.   Pt also found to have Hgb of 7 and rising  troponins from 90s->280s; her ECG largely unchanged from prior tracings. TTE w midrange LVEF 40-45%. She has been chest pain free since her blood transfusion.     Overnight on 12/8 RRT called for asystole. Pt returned to NSR by the time RRT arrived to bedside. EP consulted and suspect likely vagal mediated response  transferred to CCU for CHB on dopamine at 3mcg/kg/mn  Pts echo similar to echo 1 week ago - with exception echogenicity in LA on one view only - repeat echo.  GI consulted and recommending MRCP   Pt with worsening renal function. Likely iso infection and hypo profusion during asystole Nephrology consented patient for hemodialysis - will attempt to diurese with Bumex      OUT PATIENT  - Needs colonoscopy outpatient for colitis seen on CT in 6-8 weeks    Please provide patient with Gastroenterology Clinic number to confirm/arrange appointment;   [phone] ([facility] at [address] [place] [address]) for follow up with Dr. Mar 79F w HTN, DM, severe PAD w r BKA, L aka, CAD (sp 3v CABG 20 yrs ago, last cath 2014, only graft patent was LIMA-LAD,   of the LAD and RCA, and tight OM lesion that was stented with a BMS   presenting with abdominal pain, found to have pancreatitis. Etiology unknown, though concern for gallstone pancreatitis, however, no stones seen on ultrasound. CBD WNL. No alcohol use, triglycerides WNL.   Pt also found to have Hgb of 7 and rising  troponins from 90s->280s; her ECG largely unchanged from prior tracings. TTE w midrange LVEF 40-45%. She has been chest pain free since her blood transfusion.     Overnight on 12/8 RRT called for asystole. Pt returned to NSR by the time RRT arrived to bedside. EP consulted and suspect likely vagal mediated response  transferred to CCU for CHB on dopamine at 3mcg/kg/mn  Pts echo similar to echo 1 week ago - with exception echogenicity in LA on one view only - repeat echo.  EP consulted of which patient suspected likely vagally mediated, and recommending to further address potential electrolyte disturbances. Private cardiology Dr. Villatoro following.   GI consulted this admission, recommending MRCP to rule out underlying biliary stones, of which when performed demonstrated no evidence of cholelithiasis/choledocholithiasis and redemonstrating acute interstitial pancreatitis, and based on findings no role for ERCP. Diet advanced and tolerating well.   Pt with worsening renal function. Likely iso infection and hypo profusion during asystole Nephrology consented patient for hemodialysis - will attempt to diurese with Bumex, of which urine output improving and serum creatinine slowly downtrending after escalation in diuresis to Bumex drip 12/11/24.  Patient with ongoing neuropathic pain, patient to be started on gabapentin 300 mg daily as renal dosing in setting of DAMARI.       OUT PATIENT  - Needs colonoscopy outpatient for colitis seen on CT in 6-8 weeks    Please provide patient with Gastroenterology Clinic number to confirm/arrange appointment;   [phone] ([facility] at [address] [place] [address]) for follow up with Dr. Mar 79F w HTN, DM, severe PAD w r BKA, L aka, CAD (sp 3v CABG 20 yrs ago, last cath 2014, only graft patent was LIMA-LAD,   of the LAD and RCA, and tight OM lesion that was stented with a BMS   presenting with abdominal pain, found to have pancreatitis. Etiology unknown, though concern for gallstone pancreatitis, however, no stones seen on ultrasound. CBD WNL. No alcohol use, triglycerides WNL.   Pt also found to have Hgb of 7 and rising  troponins from 90s->280s; her ECG largely unchanged from prior tracings. TTE w midrange LVEF 40-45%. She has been chest pain free since her blood transfusion.     Overnight on 12/8 RRT called for asystole. Pt returned to NSR by the time RRT arrived to bedside. EP consulted and suspect likely vagal mediated response  transferred to CCU for CHB on dopamine at 3mcg/kg/mn  Pts echo similar to echo 1 week ago - with exception echogenicity in LA on one view only - repeat echo.  EP consulted of which patient suspected likely vagally mediated, and recommending to further address potential electrolyte disturbances. Private cardiology Dr. Villatoro following.   GI consulted this admission, recommending MRCP to rule out underlying biliary stones, of which when performed demonstrated no evidence of cholelithiasis/choledocholithiasis and redemonstrating acute interstitial pancreatitis, and based on findings no role for ERCP. Diet advanced and tolerating well.   Pt with worsening renal function. Likely iso infection and hypo profusion during asystole Nephrology consented patient for hemodialysis - will attempt to diurese with Bumex, of which urine output improving and serum creatinine slowly downtrending after escalation in diuresis to Bumex drip 12/11/24.  Patient with ongoing neuropathic pain, patient to be started on gabapentin 300 mg daily as renal dosing in setting of DAMARI. Urine culture from 12/8/24 positive for Klebsiella, patient receiving Zosyn and culture sensitive to Zosyn.      OUT PATIENT  - Needs colonoscopy outpatient for colitis seen on CT in 6-8 weeks    Please provide patient with Gastroenterology Clinic number to confirm/arrange appointment;   [phone] ([facility] at [address] [place] [address]) for follow up with Dr. Mar 79F w HTN, DM, severe PAD w r BKA, L aka, CAD (sp 3v CABG 20 yrs ago, last cath 2014, only graft patent was LIMA-LAD,   of the LAD and RCA, and tight OM lesion that was stented with a BMS   presenting with abdominal pain, found to have pancreatitis. Etiology unknown, though concern for gallstone pancreatitis, however, no stones seen on ultrasound. CBD WNL. No alcohol use, triglycerides WNL. On 12/8 RRT for asystole, transferred to CCU for CHF and started on dopamine, felt to be vagally mediated by EP. Course c/b ATN which is improving.      TTE similar to echo 1 week prior - with exception echogenicity in LA on one view only, EF 46%  - Repeat TTE: A mobile echodensity is visualized in the left atrium intermittently (seen only in the parasternal long axis view). This may represent a hypermobile interatrial septum  - Hold AV cindy blockers  - No indication for pacing at this time, EP follow up in the office  - Developed A.Fib w/ aberrancy and pt was started on Eliquis+ASA, off plavix at this time, c/w current regimen on discharge    Seen by GI, apprec recs, etiology of acute pancreatitis may be 2/2 to passed stone vs. resolving ischemic injury  - S/p MRCP 12/10: Acute interstitial pancreatitis. No evidence of parenchymal necrosis or walled off fluid collection. No gallstones or choledocholithiasis  - No need for ERCP per GI, tolerating diet very well at this time  - Abdominal pain has resolved  - CT Abd: right hemicolon is mildly thick-walled, which could be due to underdistention or colitis  - S/p IV zosyn for 5 days thru 12/13  - Per GI recs: Needs colonoscopy outpatient for colitis seen on CT in 6-8 weeks  - Gastroenterology Clinic number: [phone] ([facility] [address] [place] [address]) f/up with Dr. Mar.    Pt with worsening renal function/ATN. Likely iso infection and hypo profusion during asystole, seen by Renal, urine output improving and serum creatinine slowly downtrending after escalation in diuresis to Bumex drip 12/11/24.  - Losartan remains on hold  - Serum creatinine slowly downtrending, peaked at 5.73, continues to improve Cr <2 on discharge  - Failed TOV, will go home with ANGÉLICA Pratt f/up as outpatient for TOV  - Metabolic acidosis has resolved, off sodium bicarb at this time    Elevated liver enzymes.   - Likely in the setting of recent acute pancreatitis, possible that pt passed a stone  - S/p MRCP 12/10: Acute interstitial pancreatitis. No evidence of parenchymal necrosis or walled off fluid collection. No gallstones or choledocholithiasis  - Transaminitis are improving  - Holding statin at this time, to be resumed once LFTs are down to baseline  - Monitor LFTs.    Type 2 diabetes mellitus.   - On lantus 30U qhs and lispro 20/20/36 at home  - A1c of 7.5, under controlled for pt's age  - C/w carb consistent diet, c/w MISS  - C/w gabapentin 300mg qhs for diabetic neuropathy  - Holding statin at this time due to transaminitis, to be resumed once LFT's are WNL    PT --> HPT.  Medically stable for discharge home with HC/HPT (going home with Pratt cath).   79F w HTN, DM, severe PAD w r BKA, L aka, CAD (sp 3v CABG 20 yrs ago, last cath 2014, only graft patent was LIMA-LAD,   of the LAD and RCA, and tight OM lesion that was stented with a BMS   presenting with abdominal pain, found to have pancreatitis. Etiology unknown, though concern for gallstone pancreatitis, however, no stones seen on ultrasound. CBD WNL. No alcohol use, triglycerides WNL. On 12/8 RRT for asystole, transferred to CCU for CHF and started on dopamine, felt to be vagally mediated by EP. Course c/b ATN which is improving.      TTE similar to echo 1 week prior - with exception echogenicity in LA on one view only, EF 46%  - Repeat TTE: A mobile echodensity is visualized in the left atrium intermittently (seen only in the parasternal long axis view). This may represent a hypermobile interatrial septum  - Hold AV cindy blockers  - No indication for pacing at this time, EP follow up in the office  - Developed A.Fib w/ aberrancy and pt was started on Eliquis+ASA, off plavix at this time, c/w current regimen on discharge    Seen by GI, apprec recs, etiology of acute pancreatitis may be 2/2 to passed stone vs. resolving ischemic injury  - S/p MRCP 12/10: Acute interstitial pancreatitis. No evidence of parenchymal necrosis or walled off fluid collection. No gallstones or choledocholithiasis  - No need for ERCP per GI, tolerating diet very well at this time  - Abdominal pain has resolved  - CT Abd: right hemicolon is mildly thick-walled, which could be due to underdistention or colitis  - S/p IV zosyn for 5 days thru 12/13  - Per GI recs: Needs colonoscopy outpatient for colitis seen on CT in 6-8 weeks  - Gastroenterology Clinic number: [phone] ([facility] [address] [place] [address]) f/up with Dr. Mar.    Pt with worsening renal function/ATN. Likely iso infection and hypo profusion during asystole, seen by Renal, urine output improving and serum creatinine slowly downtrending after escalation in diuresis to Bumex drip 12/11/24.  - Losartan remains on hold  - Serum creatinine slowly downtrending, peaked at 5.73, continues to improve Cr <2 on discharge  - Failed TOV, will go home with ANGÉLICA Pratt f/up as outpatient for TOV  - Metabolic acidosis has resolved, off sodium bicarb at this time    Elevated liver enzymes.   - Likely in the setting of recent acute pancreatitis, possible that pt passed a stone  - S/p MRCP 12/10: Acute interstitial pancreatitis. No evidence of parenchymal necrosis or walled off fluid collection. No gallstones or choledocholithiasis  - Transaminitis are improving  - Holding statin at this time, to be resumed once LFTs are down to baseline  - Monitor LFTs.    Type 2 diabetes mellitus.   - On lantus 30U qhs and lispro 20/20/36 at home  - A1c of 7.5, under controlled for pt's age  - C/w carb consistent diet, c/w MISS  - C/w gabapentin 300mg qhs for diabetic neuropathy  - Holding statin at this time due to transaminitis, to be resumed once LFT's are WNL    PT --> HPT.  Medically stable for discharge home with HC/HPT (going home with Pratt cath).  PMD, Urology, GI and EP follow up as outpatient.

## 2024-12-10 NOTE — DISCHARGE NOTE PROVIDER - NSDCFUSCHEDAPPT_GEN_ALL_CORE_FT
Brittany Hallman  [facility] Physician [facility]  OPHTHALM [address] [facility]  Scheduled Appointment: 01/21/2025    Rosalio Howell  [facility]  CARDIOLOGY [address] [facility]  Scheduled Appointment: 02/12/2025

## 2024-12-10 NOTE — CONSULT NOTE ADULT - SUBJECTIVE AND OBJECTIVE BOX
[facility] DIVISION OF KIDNEY DISEASES AND HYPERTENSION -- INITIAL CONSULT NOTE  --------------------------------------------------------------------------------  HPI:        PAST HISTORY  --------------------------------------------------------------------------------  PAST MEDICAL & SURGICAL HISTORY:  S/P CABG x 3 in 2004, [facility]  Stented coronary artery 2010 [facility]  PAD (peripheral artery disease) s/p R BKA  Carotid artery stenosis  DM (diabetes mellitus) type II  Hypercholesterolemia  Obesity  Hypertension  Chronic diastolic heart failure  Vitamin D deficiency  Normocytic anemia  Pulmonary HTN  Diabetic retinopathy  S/P hysterectomy 2004  S/P angioplasty with stent 2009, 3/2014  S/P below knee amputation, right 6/2010  S/P eye surgery for glaucoma      FAMILY HISTORY:  Family history of diabetes mellitus (Sibling)      PAST SOCIAL HISTORY:    ALLERGIES & MEDICATIONS  --------------------------------------------------------------------------------  Allergies    sulfa drugs (Hives)  sulfa drugs (Rash)  Sulfac 10% (Hives)      Standing Inpatient Medications  aspirin enteric coated 81 milliGRAM(s) Oral daily  brimonidine 0.2% Ophthalmic Solution 1 Drop(s) Both EYES two times a day  chlorhexidine 2% Cloths 1 Application(s) Topical daily  clopidogrel Tablet 75 milliGRAM(s) Oral daily  dextrose 5%. 1000 milliLiter(s) IV Continuous <Continuous>  dextrose 5%. 1000 milliLiter(s) IV Continuous <Continuous>  dextrose 50% Injectable 25 Gram(s) IV Push once  dextrose 50% Injectable 12.5 Gram(s) IV Push once  dextrose 50% Injectable 25 Gram(s) IV Push once  glucagon  Injectable 1 milliGRAM(s) IntraMuscular once  heparin  Infusion 850 Unit(s)/Hr IV Continuous <Continuous>  insulin glargine Injectable (LANTUS) 24 Unit(s) SubCutaneous at bedtime  insulin lispro (ADMELOG) corrective regimen sliding scale   SubCutaneous three times a day before meals  insulin lispro (ADMELOG) corrective regimen sliding scale   SubCutaneous at bedtime  lactated ringers. 1000 milliLiter(s) IV Continuous <Continuous>  naloxone Injectable 0.4 milliGRAM(s) IV Push once  pantoprazole   Suspension 40 milliGRAM(s) Oral daily  piperacillin/tazobactam IVPB.. 3.375 Gram(s) IV Intermittent every 12 hours    PRN Inpatient Medications  dextrose Oral Gel 15 Gram(s) Oral once PRN      REVIEW OF SYSTEMS  --------------------------------------------------------------------------------  Gen: No fever, see HPI  Respiratory: No dyspnea  CV: No chest pain, see HPI  GI: No abdominal pain, diarrhea, nausea, vomiting; see HPI  : +Pratt cath  Skin: No rashes  MSK: R BKA, L AKA, no thigh edema  Neuro: No dizziness/lightheadedness  Heme: No bleeding    All other systems were reviewed and are negative, except as noted.    VITALS/PHYSICAL EXAM  --------------------------------------------------------------------------------  T(C): 36.6 (12-10-24 @ 08:00), Max: 37 (12-09-24 @ 16:00)  HR: 65 (12-10-24 @ 10:00) (64 - 70)  BP: 107/50 (12-10-24 @ 10:00) (94/46 - 126/56)  RR: 22 (12-10-24 @ 10:00) (12 - 22)  SpO2: 97% (12-10-24 @ 10:00) (96% - 100%)  Wt(kg): --        12-09-24 @ 07:01  -  12-10-24 @ 07:00  --------------------------------------------------------  IN: 2235.9 mL / OUT: 135 mL / NET: 2100.9 mL    12-10-24 @ 07:01  -  12-10-24 @ 10:49  --------------------------------------------------------  IN: 168 mL / OUT: 5 mL / NET: 163 mL      PHYSICAL EXAM:  Gen: NAD  Neuro: non-focal  HEENT: Anicteric, No JVD  Pulm: CTA B/L  CV: +S1S2  Abd: soft, non-tender/non-distended  : +Pratt cath  Extremities: R BKA, L AKA, no thigh edema  Skin: Warm    LABS/STUDIES  --------------------------------------------------------------------------------              7.3    8.84  >-----------<  182      [12-10-24 @ 01:30]              21.9     132  |  99  |  70  ----------------------------<  196      [12-10-24 @ 01:30]  4.9   |  15  |  5.07        Ca     8.2     [12-10-24 @ 01:30]      Mg     2.40     [12-10-24 @ 01:30]      Phos  5.3     [12-10-24 @ 01:30]    TPro  5.8  /  Alb  2.7  /  TBili  2.6  /  DBili  x   /  AST  576  /  ALT  564  /  AlkPhos  155  [12-10-24 @ 01:30]    PT/INR: PT 17.0 , INR 1.43       [12-09-24 @ 01:49]  PTT: 73.2       [12-10-24 @ 01:30]      Creatinine Trend:  SCr 5.07 [12-10 @ 01:30]  SCr 4.51 [12-09 @ 16:10]  SCr 3.88 [12-09 @ 05:47]  SCr 3.68 [12-09 @ 01:49]  SCr 2.92 [12-08 @ 18:50]    Urine Creatinine 67      [12-08-24 @ 12:27]  Urine Sodium 62      [12-08-24 @ 12:27]  Urine Urea Nitrogen 277.5      [12-08-24 @ 12:27]    Iron 86, TIBC 280, %sat 31      [12-02-24 @ 04:20]  Ferritin 59      [12-02-24 @ 04:20]  TSH 2.06      [12-02-24 @ 04:20]  Lipid: chol 124, TG 97, HDL 42, LDL --      [12-07-24 @ 14:50] [facility] DIVISION OF KIDNEY DISEASES AND HYPERTENSION -- INITIAL CONSULT NOTE  --------------------------------------------------------------------------------  HPI: 79F with history of CAD s/p CABG (2004) and PCI (2010), HTN, HLD, IDDM2, and severe PAD c/b right BKA and left AKA who was admitted to the hospital for acute pancreatitis. Hospital course c/b complete heart block prompting upgrade to CCU. Nephrology was consulted for DAMARI.     In the ED, febrile to 102.5 TMax, hypertensive to SBP 170s, saturating well on room air. Given doses of vanc, zosyn, 500cc bolus, 4mg IV morphine x 2 doses. CT A/P with IV contrast done on 12/7/24 was suggestive of acute interstitial pancreatitis, and bladder wall was thickened, but kidneys were normal. Seen by GI recommending a MRCP. Hospital course complicated by sinus arrest up to 27 seconds, patient was lethargic at the time per chart review. Transferred to CCU for further monitoring.  Labs significant for WBC 11.85, worsening DAMARI, uptrending troponins. TTE mildly reduced LV function EF 40-45%. Scr on admission (12/7/24) was 1.17 and has been increasing daily, up to 5.07 on 12/10/24, associated with hyponatremia (Na 132) and acidosis (SCO2 15, pH 7.25). Based on review of [facility], pt's baseline Scr as far back as 2019 has been ~0.9-1.2. Her blood pressures were noted to be persistently low on 12/8/24 and 12/9/24, but have improved a bit today (12/10/24). Pt has a howe but is oliguric. UA showed moderate blood with no RBCs, moderate leukocyte esterase with 714 WBCs, no nitrites, many bacteria, and 13 epithelial cells. UCx positive for 50,000-99,000 cFu/mL gram negative rods.       Pt seen and examined at bedside in CCU. She is awake and alert. Pt was recently admitted to [facility] 12/1-12/6 for NSTEMI. She had chest pain at that time that improved. She stated that during the weekend of her discharge, she developed abdominal pain, as well as some chest pain. She dad one episode of NBNB emesis on morning of admission (12/7/24). No diarrhea reported by patient. She also stated she felt febrile at home (100.6F recorded at home). She currently notes resolution of initial symptoms. She noted no changes in her urination. She was unaware of any prior kidney problems and never needed to see a nephrologist.     PAST HISTORY  --------------------------------------------------------------------------------  PAST MEDICAL & SURGICAL HISTORY:  S/P CABG x 3 in 2004, [facility]  Stented coronary artery 2010 [facility]  PAD (peripheral artery disease) s/p R BKA  Carotid artery stenosis  DM (diabetes mellitus) type II  Hypercholesterolemia  Obesity  Hypertension  Chronic diastolic heart failure  Vitamin D deficiency  Normocytic anemia  Pulmonary HTN  Diabetic retinopathy  S/P hysterectomy 2004  S/P angioplasty with stent 2009, 3/2014  S/P below knee amputation, right 6/2010  S/P eye surgery for glaucoma      FAMILY HISTORY:  Family history of diabetes mellitus (Sibling)      PAST SOCIAL HISTORY:    ALLERGIES & MEDICATIONS  --------------------------------------------------------------------------------  Allergies    sulfa drugs (Hives)  sulfa drugs (Rash)  Sulfac 10% (Hives)      Standing Inpatient Medications  aspirin enteric coated 81 milliGRAM(s) Oral daily  brimonidine 0.2% Ophthalmic Solution 1 Drop(s) Both EYES two times a day  chlorhexidine 2% Cloths 1 Application(s) Topical daily  clopidogrel Tablet 75 milliGRAM(s) Oral daily  dextrose 5%. 1000 milliLiter(s) IV Continuous <Continuous>  dextrose 5%. 1000 milliLiter(s) IV Continuous <Continuous>  dextrose 50% Injectable 25 Gram(s) IV Push once  dextrose 50% Injectable 12.5 Gram(s) IV Push once  dextrose 50% Injectable 25 Gram(s) IV Push once  glucagon  Injectable 1 milliGRAM(s) IntraMuscular once  heparin  Infusion 850 Unit(s)/Hr IV Continuous <Continuous>  insulin glargine Injectable (LANTUS) 24 Unit(s) SubCutaneous at bedtime  insulin lispro (ADMELOG) corrective regimen sliding scale   SubCutaneous three times a day before meals  insulin lispro (ADMELOG) corrective regimen sliding scale   SubCutaneous at bedtime  lactated ringers. 1000 milliLiter(s) IV Continuous <Continuous>  naloxone Injectable 0.4 milliGRAM(s) IV Push once  pantoprazole   Suspension 40 milliGRAM(s) Oral daily  piperacillin/tazobactam IVPB.. 3.375 Gram(s) IV Intermittent every 12 hours    PRN Inpatient Medications  dextrose Oral Gel 15 Gram(s) Oral once PRN      REVIEW OF SYSTEMS  --------------------------------------------------------------------------------  Gen: No fever, see HPI  Respiratory: No dyspnea  CV: No chest pain, see HPI  GI: No abdominal pain, diarrhea, nausea, vomiting; see HPI  : +Howe cath  Skin: No rashes  MSK: R BKA, L AKA, no thigh edema  Neuro: No dizziness/lightheadedness  Heme: No bleeding    All other systems were reviewed and are negative, except as noted.    VITALS/PHYSICAL EXAM  --------------------------------------------------------------------------------  T(C): 36.6 (12-10-24 @ 08:00), Max: 37 (12-09-24 @ 16:00)  HR: 65 (12-10-24 @ 10:00) (64 - 70)  BP: 107/50 (12-10-24 @ 10:00) (94/46 - 126/56)  RR: 22 (12-10-24 @ 10:00) (12 - 22)  SpO2: 97% (12-10-24 @ 10:00) (96% - 100%)  Wt(kg): --        12-09-24 @ 07:01  -  12-10-24 @ 07:00  --------------------------------------------------------  IN: 2235.9 mL / OUT: 135 mL / NET: 2100.9 mL    12-10-24 @ 07:01  -  12-10-24 @ 10:49  --------------------------------------------------------  IN: 168 mL / OUT: 5 mL / NET: 163 mL      PHYSICAL EXAM:  Gen: NAD  Neuro: non-focal  HEENT: Anicteric, No JVD  Pulm: CTA B/L  CV: +S1S2  Abd: soft, non-tender/non-distended  : +Howe cath  Extremities: R BKA, L AKA, no thigh edema  Skin: Warm    LABS/STUDIES  --------------------------------------------------------------------------------              7.3    8.84  >-----------<  182      [12-10-24 @ 01:30]              21.9     132  |  99  |  70  ----------------------------<  196      [12-10-24 @ 01:30]  4.9   |  15  |  5.07        Ca     8.2     [12-10-24 @ 01:30]      Mg     2.40     [12-10-24 @ 01:30]      Phos  5.3     [12-10-24 @ 01:30]    TPro  5.8  /  Alb  2.7  /  TBili  2.6  /  DBili  x   /  AST  576  /  ALT  564  /  AlkPhos  155  [12-10-24 @ 01:30]    PT/INR: PT 17.0 , INR 1.43       [12-09-24 @ 01:49]  PTT: 73.2       [12-10-24 @ 01:30]      Creatinine Trend:  SCr 5.07 [12-10 @ 01:30]  SCr 4.51 [12-09 @ 16:10]  SCr 3.88 [12-09 @ 05:47]  SCr 3.68 [12-09 @ 01:49]  SCr 2.92 [12-08 @ 18:50]    Urine Creatinine 67      [12-08-24 @ 12:27]  Urine Sodium 62      [12-08-24 @ 12:27]  Urine Urea Nitrogen 277.5      [12-08-24 @ 12:27]    Iron 86, TIBC 280, %sat 31      [12-02-24 @ 04:20]  Ferritin 59      [12-02-24 @ 04:20]  TSH 2.06      [12-02-24 @ 04:20]  Lipid: chol 124, TG 97, HDL 42, LDL --      [12-07-24 @ 14:50] [facility] DIVISION OF KIDNEY DISEASES AND HYPERTENSION -- INITIAL CONSULT NOTE  --------------------------------------------------------------------------------  HPI: 79F with history of CAD s/p CABG (2004) and PCI (2010), HTN, HLD, IDDM2, and severe PAD c/b right BKA and left AKA who was admitted to the hospital for acute pancreatitis. Hospital course c/b complete heart block prompting upgrade to CCU. Nephrology was consulted for DAMARI.     In the ED, febrile to 102.5 TMax, hypertensive to SBP 170s, saturating well on room air. Given doses of vanc, zosyn, 500cc bolus, 4mg IV morphine x 2 doses. CT A/P with IV contrast done on 12/7/24 was suggestive of acute interstitial pancreatitis, and bladder wall was thickened, but kidneys were normal. Seen by GI recommending a MRCP. Hospital course complicated by sinus arrest up to 27 seconds, patient was lethargic at the time per chart review. Transferred to CCU for further monitoring.  Labs significant for WBC 11.85, worsening DAMARI, uptrending troponins. TTE mildly reduced LV function EF 40-45%. Scr on admission (12/7/24) was 1.17 and has been increasing daily, up to 5.07 on 12/10/24, associated with hyponatremia (Na 132) and acidosis (SCO2 15, pH 7.25). Based on review of [facility], pt's baseline Scr as far back as 2019 has been ~0.9-1.2. Her blood pressures were noted to be persistently low on 12/8/24 and 12/9/24, but have improved a bit today (12/10/24). Pt has a howe but is oliguric. UA showed moderate blood with no RBCs, moderate leukocyte esterase with 714 WBCs, no nitrites, many bacteria, and 13 epithelial cells. UCx positive for 50,000-99,000 cFu/mL gram negative rods.     Pt seen and examined at bedside in CCU. She is awake and alert. Pt was recently admitted to [facility] 12/1-12/6 for NSTEMI. She had chest pain at that time that improved. She stated that during the weekend of her discharge, she developed abdominal pain, as well as some chest pain. She dad one episode of NBNB emesis on morning of admission (12/7/24). No diarrhea reported by patient. She also stated she felt febrile at home (100.6F recorded at home). She currently notes resolution of initial symptoms. She noted no changes in her urination. She was unaware of any prior kidney problems and never needed to see a nephrologist.     PAST HISTORY  --------------------------------------------------------------------------------  PAST MEDICAL & SURGICAL HISTORY:  S/P CABG x 3 in 2004, [facility]  Stented coronary artery 2010 [facility]  PAD (peripheral artery disease) s/p R BKA  Carotid artery stenosis  DM (diabetes mellitus) type II  Hypercholesterolemia  Obesity  Hypertension  Chronic diastolic heart failure  Vitamin D deficiency  Normocytic anemia  Pulmonary HTN  Diabetic retinopathy  S/P hysterectomy 2004  S/P angioplasty with stent 2009, 3/2014  S/P below knee amputation, right 6/2010  S/P eye surgery for glaucoma      FAMILY HISTORY:  Family history of diabetes mellitus (Sibling)      PAST SOCIAL HISTORY:    ALLERGIES & MEDICATIONS  --------------------------------------------------------------------------------  Allergies    sulfa drugs (Hives)  sulfa drugs (Rash)  Sulfac 10% (Hives)      Standing Inpatient Medications  aspirin enteric coated 81 milliGRAM(s) Oral daily  brimonidine 0.2% Ophthalmic Solution 1 Drop(s) Both EYES two times a day  chlorhexidine 2% Cloths 1 Application(s) Topical daily  clopidogrel Tablet 75 milliGRAM(s) Oral daily  dextrose 5%. 1000 milliLiter(s) IV Continuous <Continuous>  dextrose 5%. 1000 milliLiter(s) IV Continuous <Continuous>  dextrose 50% Injectable 25 Gram(s) IV Push once  dextrose 50% Injectable 12.5 Gram(s) IV Push once  dextrose 50% Injectable 25 Gram(s) IV Push once  glucagon  Injectable 1 milliGRAM(s) IntraMuscular once  heparin  Infusion 850 Unit(s)/Hr IV Continuous <Continuous>  insulin glargine Injectable (LANTUS) 24 Unit(s) SubCutaneous at bedtime  insulin lispro (ADMELOG) corrective regimen sliding scale   SubCutaneous three times a day before meals  insulin lispro (ADMELOG) corrective regimen sliding scale   SubCutaneous at bedtime  lactated ringers. 1000 milliLiter(s) IV Continuous <Continuous>  naloxone Injectable 0.4 milliGRAM(s) IV Push once  pantoprazole   Suspension 40 milliGRAM(s) Oral daily  piperacillin/tazobactam IVPB.. 3.375 Gram(s) IV Intermittent every 12 hours    PRN Inpatient Medications  dextrose Oral Gel 15 Gram(s) Oral once PRN      REVIEW OF SYSTEMS  --------------------------------------------------------------------------------  Gen: No fever, see HPI  Respiratory: No dyspnea  CV: No chest pain, see HPI  GI: No abdominal pain, diarrhea, nausea, vomiting; see HPI  : +Howe cath  Skin: No rashes  MSK: R BKA, L AKA, no thigh edema  Neuro: No dizziness/lightheadedness  Heme: No bleeding    All other systems were reviewed and are negative, except as noted.    VITALS/PHYSICAL EXAM  --------------------------------------------------------------------------------  T(C): 36.6 (12-10-24 @ 08:00), Max: 37 (12-09-24 @ 16:00)  HR: 65 (12-10-24 @ 10:00) (64 - 70)  BP: 107/50 (12-10-24 @ 10:00) (94/46 - 126/56)  RR: 22 (12-10-24 @ 10:00) (12 - 22)  SpO2: 97% (12-10-24 @ 10:00) (96% - 100%)  Wt(kg): --        12-09-24 @ 07:01  -  12-10-24 @ 07:00  --------------------------------------------------------  IN: 2235.9 mL / OUT: 135 mL / NET: 2100.9 mL    12-10-24 @ 07:01  -  12-10-24 @ 10:49  --------------------------------------------------------  IN: 168 mL / OUT: 5 mL / NET: 163 mL      PHYSICAL EXAM:  Gen: NAD  Neuro: non-focal  HEENT: Anicteric, No JVD  Pulm: CTA B/L  CV: +S1S2  Abd: soft, non-tender/non-distended  : +Howe cath  Extremities: R BKA, L AKA, no thigh edema  Skin: Warm    LABS/STUDIES  --------------------------------------------------------------------------------              7.3    8.84  >-----------<  182      [12-10-24 @ 01:30]              21.9     132  |  99  |  70  ----------------------------<  196      [12-10-24 @ 01:30]  4.9   |  15  |  5.07        Ca     8.2     [12-10-24 @ 01:30]      Mg     2.40     [12-10-24 @ 01:30]      Phos  5.3     [12-10-24 @ 01:30]    TPro  5.8  /  Alb  2.7  /  TBili  2.6  /  DBili  x   /  AST  576  /  ALT  564  /  AlkPhos  155  [12-10-24 @ 01:30]    PT/INR: PT 17.0 , INR 1.43       [12-09-24 @ 01:49]  PTT: 73.2       [12-10-24 @ 01:30]      Creatinine Trend:  SCr 5.07 [12-10 @ 01:30]  SCr 4.51 [12-09 @ 16:10]  SCr 3.88 [12-09 @ 05:47]  SCr 3.68 [12-09 @ 01:49]  SCr 2.92 [12-08 @ 18:50]    Urine Creatinine 67      [12-08-24 @ 12:27]  Urine Sodium 62      [12-08-24 @ 12:27]  Urine Urea Nitrogen 277.5      [12-08-24 @ 12:27]    Iron 86, TIBC 280, %sat 31      [12-02-24 @ 04:20]  Ferritin 59      [12-02-24 @ 04:20]  TSH 2.06      [12-02-24 @ 04:20]  Lipid: chol 124, TG 97, HDL 42, LDL --      [12-07-24 @ 14:50] [facility] DIVISION OF KIDNEY DISEASES AND HYPERTENSION -- INITIAL CONSULT NOTE  --------------------------------------------------------------------------------  HPI: 79F with history of CAD s/p CABG (2004) and PCI (2010), HTN, HLD, IDDM2, and severe PAD c/b right BKA and left AKA who was admitted to the hospital for acute pancreatitis. Hospital course c/b complete heart block hence pt. transferred to CCU. Nephrology was consulted for DAMARI.     In the ED, febrile to 102.5 TMax, hypertensive to SBP 170s, saturating well on room air. Given doses of vanc, zosyn, 500cc bolus, 4mg IV morphine x 2 doses. CT A/P with IV contrast done on 12/7/24 was suggestive of acute interstitial pancreatitis, and bladder wall was thickened, but kidneys were normal. Seen by GI recommending a MRCP. Hospital course complicated by sinus arrest up to 27 seconds, patient was lethargic at the time per chart review. Transferred to CCU for further monitoring.  Labs significant for WBC 11.85, worsening DAMARI, uptrending troponins. TTE mildly reduced LV function EF 40-45%. Scr on admission (12/7/24) was 1.17, increased to 2.92 on 12/8/24. Scr progressively worsened to 5.07 today (12/10/24), associated with hyponatremia (Na 132) and acidosis (SCO2 15, pH 7.25). Based on review of [facility], pt's baseline Scr as far back as 2019 has been ~0.9-1.2. Her blood pressures were noted to be persistently low on 12/8/24 and 12/9/24, but have improved a bit today (12/10/24). Pt has a howe but is oliguric. UA showed moderate blood with no RBCs, moderate leukocyte esterase with 714 WBCs, no nitrites, many bacteria, and 13 epithelial cells. UCx positive for 50,000-99,000 cFu/mL gram negative rods.     Pt seen and examined at bedside in CCU. She is awake and alert. Pt was recently admitted to [facility] 12/1-12/6 for NSTEMI. She had chest pain at that time that improved. She stated that during the weekend of her discharge, she developed abdominal pain, as well as some chest pain. She had one episode of NBNB emesis on morning of admission (12/7/24). No diarrhea reported by patient. She also stated she felt febrile at home (100.6F recorded at home). She currently notes resolution of initial symptoms. She noted no changes in her urination. She was unaware of any prior kidney problems and never needed to see a nephrologist.     PAST HISTORY  --------------------------------------------------------------------------------  PAST MEDICAL & SURGICAL HISTORY:  S/P CABG x 3 in 2004, [facility]  Stented coronary artery 2010 [facility]  PAD (peripheral artery disease) s/p R BKA  Carotid artery stenosis  DM (diabetes mellitus) type II  Hypercholesterolemia  Obesity  Hypertension  Chronic diastolic heart failure  Vitamin D deficiency  Normocytic anemia  Pulmonary HTN  Diabetic retinopathy  S/P hysterectomy 2004  S/P angioplasty with stent 2009, 3/2014  S/P below knee amputation, right 6/2010  S/P eye surgery for glaucoma      FAMILY HISTORY:  Family history of diabetes mellitus (Sibling)      PAST SOCIAL HISTORY:    ALLERGIES & MEDICATIONS  --------------------------------------------------------------------------------  Allergies    sulfa drugs (Hives)  sulfa drugs (Rash)  Sulfac 10% (Hives)    Standing Inpatient Medications  aspirin enteric coated 81 milliGRAM(s) Oral daily  brimonidine 0.2% Ophthalmic Solution 1 Drop(s) Both EYES two times a day  chlorhexidine 2% Cloths 1 Application(s) Topical daily  clopidogrel Tablet 75 milliGRAM(s) Oral daily  dextrose 5%. 1000 milliLiter(s) IV Continuous <Continuous>  dextrose 5%. 1000 milliLiter(s) IV Continuous <Continuous>  dextrose 50% Injectable 25 Gram(s) IV Push once  dextrose 50% Injectable 12.5 Gram(s) IV Push once  dextrose 50% Injectable 25 Gram(s) IV Push once  glucagon  Injectable 1 milliGRAM(s) IntraMuscular once  heparin  Infusion 850 Unit(s)/Hr IV Continuous <Continuous>  insulin glargine Injectable (LANTUS) 24 Unit(s) SubCutaneous at bedtime  insulin lispro (ADMELOG) corrective regimen sliding scale   SubCutaneous three times a day before meals  insulin lispro (ADMELOG) corrective regimen sliding scale   SubCutaneous at bedtime  lactated ringers. 1000 milliLiter(s) IV Continuous <Continuous>  naloxone Injectable 0.4 milliGRAM(s) IV Push once  pantoprazole   Suspension 40 milliGRAM(s) Oral daily  piperacillin/tazobactam IVPB.. 3.375 Gram(s) IV Intermittent every 12 hours    PRN Inpatient Medications  dextrose Oral Gel 15 Gram(s) Oral once PRN      REVIEW OF SYSTEMS  --------------------------------------------------------------------------------  Gen: No fever, see HPI  Respiratory: No dyspnea  CV: No chest pain, see HPI  GI: No abdominal pain, diarrhea, nausea, vomiting; see HPI  : +Howe cath  Skin: No rashes  MSK: R BKA, L AKA, no thigh edema  Neuro: No dizziness/lightheadedness  Heme: No bleeding    All other systems were reviewed and are negative, except as noted.    VITALS/PHYSICAL EXAM  --------------------------------------------------------------------------------  T(C): 36.6 (12-10-24 @ 08:00), Max: 37 (12-09-24 @ 16:00)  HR: 65 (12-10-24 @ 10:00) (64 - 70)  BP: 107/50 (12-10-24 @ 10:00) (94/46 - 126/56)  RR: 22 (12-10-24 @ 10:00) (12 - 22)  SpO2: 97% (12-10-24 @ 10:00) (96% - 100%)  Wt(kg): --    12-09-24 @ 07:01  -  12-10-24 @ 07:00  --------------------------------------------------------  IN: 2235.9 mL / OUT: 135 mL / NET: 2100.9 mL    12-10-24 @ 07:01  -  12-10-24 @ 10:49  --------------------------------------------------------  IN: 168 mL / OUT: 5 mL / NET: 163 mL    PHYSICAL EXAM:  Gen: resting, NAD  Neuro: Awake, alert  HEENT: Anicteric, No JVD  Pulm: CTA B/L  CV: +S1S2  Abd: soft, non-tender/non-distended  : +Howe cath  Extremities: R BKA, L AKA, no thigh edema  Skin: Warm    LABS/STUDIES  --------------------------------------------------------------------------------              7.3    8.84  >-----------<  182      [12-10-24 @ 01:30]              21.9     132  |  99  |  70  ----------------------------<  196      [12-10-24 @ 01:30]  4.9   |  15  |  5.07        Ca     8.2     [12-10-24 @ 01:30]      Mg     2.40     [12-10-24 @ 01:30]      Phos  5.3     [12-10-24 @ 01:30]    TPro  5.8  /  Alb  2.7  /  TBili  2.6  /  DBili  x   /  AST  576  /  ALT  564  /  AlkPhos  155  [12-10-24 @ 01:30]    Creatinine Trend:  SCr 5.07 [12-10 @ 01:30]  SCr 4.51 [12-09 @ 16:10]  SCr 3.88 [12-09 @ 05:47]  SCr 3.68 [12-09 @ 01:49]  SCr 2.92 [12-08 @ 18:50]    Urine Creatinine 67      [12-08-24 @ 12:27]  Urine Sodium 62      [12-08-24 @ 12:27]  Urine Urea Nitrogen 277.5      [12-08-24 @ 12:27] [facility] DIVISION OF KIDNEY DISEASES AND HYPERTENSION -- INITIAL CONSULT NOTE  --------------------------------------------------------------------------------  HPI: 79F with history of CAD s/p CABG (2004) and PCI (2010), HTN, HLD, IDDM2, and severe PAD c/b right BKA and left AKA who was admitted to the hospital for acute pancreatitis. Hospital course c/b complete heart block hence pt. transferred to CCU. Nephrology was consulted for DAMARI.     In the ED, febrile to 102.5 TMax, hypertensive to SBP 170s, saturating well on room air. Given doses of vanc, zosyn, 500cc bolus, 4mg IV morphine x 2 doses. CT A/P with IV contrast done on 12/7/24 was suggestive of acute interstitial pancreatitis, and bladder wall was thickened, but kidneys were normal. Seen by GI recommending a MRCP. Hospital course complicated by sinus arrest up to 27 seconds, patient was lethargic at the time per chart review. Transferred to CCU for further monitoring.  Labs significant for WBC 11.85, worsening DAMARI, uptrending troponins. TTE mildly reduced LV function EF 40-45%. Scr on admission (12/7/24) was 1.17, increased to 2.92 on 12/8/24. Scr progressively worsened to 5.07 today (12/10/24), associated with hyponatremia (Na 132) and acidosis (SCO2 15, pH 7.25). Based on review of [facility], pt's baseline Scr as far back as 2019 has been ~0.9-1.2. Her blood pressures were noted to be persistently low on 12/8/24 and 12/9/24, but have improved a bit today (12/10/24). Pt has a howe but is oliguric. UA showed moderate blood with no RBCs, moderate leukocyte esterase with 714 WBCs, no nitrites, many bacteria, and 13 epithelial cells. UCx positive for 50,000-99,000 cFu/mL gram negative rods.     Pt seen and examined at bedside in CCU. She is awake and alert. Pt was recently admitted to [facility] 12/1-12/6 for NSTEMI. She had chest pain at that time that improved. She stated that during the weekend of her discharge, she developed abdominal pain, as well as some chest pain. She had one episode of NBNB emesis on morning of admission (12/7/24). No diarrhea reported by patient. She also stated she felt febrile at home (100.6F recorded at home). She currently notes resolution of initial symptoms. She noted no changes in her urination. She was unaware of any prior kidney problems and never needed to see a nephrologist.     PAST HISTORY  --------------------------------------------------------------------------------  PAST MEDICAL & SURGICAL HISTORY:  S/P CABG x 3 in 2004, [facility]  Stented coronary artery 2010 [facility]  PAD (peripheral artery disease) s/p R BKA  Carotid artery stenosis  DM (diabetes mellitus) type II  Hypercholesterolemia  Obesity  Hypertension  Chronic diastolic heart failure  Vitamin D deficiency  Normocytic anemia  Pulmonary HTN  Diabetic retinopathy  S/P hysterectomy 2004  S/P angioplasty with stent 2009, 3/2014  S/P below knee amputation, right 6/2010  S/P eye surgery for glaucoma      FAMILY HISTORY:  Family history of diabetes mellitus (Sibling)      PAST SOCIAL HISTORY:    ALLERGIES & MEDICATIONS  --------------------------------------------------------------------------------  Allergies    sulfa drugs (Hives)  sulfa drugs (Rash)  Sulfac 10% (Hives)    Standing Inpatient Medications  aspirin enteric coated 81 milliGRAM(s) Oral daily  brimonidine 0.2% Ophthalmic Solution 1 Drop(s) Both EYES two times a day  chlorhexidine 2% Cloths 1 Application(s) Topical daily  clopidogrel Tablet 75 milliGRAM(s) Oral daily  dextrose 5%. 1000 milliLiter(s) IV Continuous <Continuous>  dextrose 5%. 1000 milliLiter(s) IV Continuous <Continuous>  dextrose 50% Injectable 25 Gram(s) IV Push once  dextrose 50% Injectable 12.5 Gram(s) IV Push once  dextrose 50% Injectable 25 Gram(s) IV Push once  glucagon  Injectable 1 milliGRAM(s) IntraMuscular once  heparin  Infusion 850 Unit(s)/Hr IV Continuous <Continuous>  insulin glargine Injectable (LANTUS) 24 Unit(s) SubCutaneous at bedtime  insulin lispro (ADMELOG) corrective regimen sliding scale   SubCutaneous three times a day before meals  insulin lispro (ADMELOG) corrective regimen sliding scale   SubCutaneous at bedtime  lactated ringers. 1000 milliLiter(s) IV Continuous <Continuous>  naloxone Injectable 0.4 milliGRAM(s) IV Push once  pantoprazole   Suspension 40 milliGRAM(s) Oral daily  piperacillin/tazobactam IVPB.. 3.375 Gram(s) IV Intermittent every 12 hours    PRN Inpatient Medications  dextrose Oral Gel 15 Gram(s) Oral once PRN      REVIEW OF SYSTEMS  --------------------------------------------------------------------------------  Gen: No fever, see HPI  Respiratory: No dyspnea  CV: No chest pain, see HPI  GI: No abdominal pain, diarrhea, nausea, vomiting, see HPI  : +Howe cath  Skin: No rashes  MSK: R BKA, L AKA  Neuro: No dizziness/lightheadedness  Heme: No bleeding    All other systems were reviewed and are negative, except as noted.    VITALS/PHYSICAL EXAM  --------------------------------------------------------------------------------  T(C): 36.6 (12-10-24 @ 08:00), Max: 37 (12-09-24 @ 16:00)  HR: 65 (12-10-24 @ 10:00) (64 - 70)  BP: 107/50 (12-10-24 @ 10:00) (94/46 - 126/56)  RR: 22 (12-10-24 @ 10:00) (12 - 22)  SpO2: 97% (12-10-24 @ 10:00) (96% - 100%)  Wt(kg): --    12-09-24 @ 07:01  -  12-10-24 @ 07:00  --------------------------------------------------------  IN: 2235.9 mL / OUT: 135 mL / NET: 2100.9 mL    12-10-24 @ 07:01  -  12-10-24 @ 10:49  --------------------------------------------------------  IN: 168 mL / OUT: 5 mL / NET: 163 mL    PHYSICAL EXAM:  Gen: resting, NAD  Neuro: Awake, alert  HEENT: Anicteric, No JVD  Pulm: CTA B/L  CV: +S1S2  Abd: soft, non-tender/non-distended  : +Howe cath  Extremities: R BKA, L AKA, no thigh edema  Skin: Warm    LABS/STUDIES  --------------------------------------------------------------------------------              7.3    8.84  >-----------<  182      [12-10-24 @ 01:30]              21.9     132  |  99  |  70  ----------------------------<  196      [12-10-24 @ 01:30]  4.9   |  15  |  5.07        Ca     8.2     [12-10-24 @ 01:30]      Mg     2.40     [12-10-24 @ 01:30]      Phos  5.3     [12-10-24 @ 01:30]    TPro  5.8  /  Alb  2.7  /  TBili  2.6  /  DBili  x   /  AST  576  /  ALT  564  /  AlkPhos  155  [12-10-24 @ 01:30]    Creatinine Trend:  SCr 5.07 [12-10 @ 01:30]  SCr 4.51 [12-09 @ 16:10]  SCr 3.88 [12-09 @ 05:47]  SCr 3.68 [12-09 @ 01:49]  SCr 2.92 [12-08 @ 18:50]    Urine Creatinine 67      [12-08-24 @ 12:27]  Urine Sodium 62      [12-08-24 @ 12:27]  Urine Urea Nitrogen 277.5      [12-08-24 @ 12:27]

## 2024-12-10 NOTE — CONSULT NOTE ADULT - ATTENDING COMMENTS
Pt. with severe/oliguric DAMARI and metabolic acidosis. Scr increased to 5.07 today. Assessment and plan for DAMARI and metabolic acidosis as outlined above. Monitor labs and urine output. Avoid any potential nephrotoxins. Dose medications as per eGFR. Will need to consider HD/CRRT, if DAMARI continues to worsen.

## 2024-12-10 NOTE — CONSULT NOTE ADULT - PROBLEM SELECTOR RECOMMENDATION 2
Pt with acidosis iso DAMARI. SCO2 15 and pH 7.25 on 12/10/24. For now, continue to monitor. Pt. with metabolic acidosis iso DAMARI. SCO2 low at 15 and pH low at 7.25 today. Recommend oral sodium bicarbonate 650 mg TID. Monitor SCO2.

## 2024-12-10 NOTE — PROGRESS NOTE ADULT - SUBJECTIVE AND OBJECTIVE BOX
CCU Service  Cardiology   Spect: [id number] ([facility])     PATIENT: NIK MELISSA, MRN: [id number]      79F w HTN, DM, severe PAD w r BKA, L aka, CAD (sp 3v CABG 20 yrs ago, last cath , only graft patent was LIMA-LAD,   of the LAD and RCA, and tight OM lesion that was stented with a BMS   p/w resting chest pain, found to have Hgb of 7 (from 12 several years ago) and rising elevated troponins from 90s->280s; her ECG shows Inferior Q waves w chronic diffuse ST/TW changes, all of which are largely unchanged from prior tracings. TTE w midrange LVEF 40-45%. She has been chest pain free since her blood transfusion.   Overnight on  had telemetry notable for progressively worsening heart block (1st degree -> 3rd degree -> asystole >14 seconds).  Pt returned to NSR by the time RRT arrived to bedside.   Labs without significant actionable electrolyte disturbance.  TTE w/ wall motion abnormalities affecting inferolateral wall, basal to mid inferior wall, and basal infero-septum; ischemia could also be  of bradyarrhythmia as opposed to medication given.  Dilaudid was addressed w/ narcan and coreg not administered today.     transferred to CCU for CHB on dopamine at 3mcg/kg/mn    INTERVAL HISTORY/OVERNIGHT EVENTS: Denies cp, palp, dizziness, sob, orthopnea. Endorses nausea and vomiting    TELEMETRY: SR          ALLERGIES: Allergies    sulfa drugs (Hives)  sulfa drugs (Rash)  Sulfac 10% (Hives)    Intolerances        MEDICATIONS:  MEDICATIONS  (STANDING):  aspirin enteric coated 81 milliGRAM(s) Oral daily  brimonidine 0.2% Ophthalmic Solution 1 Drop(s) Both EYES two times a day  chlorhexidine 2% Cloths 1 Application(s) Topical daily  clopidogrel Tablet 75 milliGRAM(s) Oral daily  dextrose 5%. 1000 milliLiter(s) (50 mL/Hr) IV Continuous <Continuous>  dextrose 5%. 1000 milliLiter(s) (100 mL/Hr) IV Continuous <Continuous>  dextrose 50% Injectable 25 Gram(s) IV Push once  dextrose 50% Injectable 12.5 Gram(s) IV Push once  dextrose 50% Injectable 25 Gram(s) IV Push once  glucagon  Injectable 1 milliGRAM(s) IntraMuscular once  heparin  Infusion 850 Unit(s)/Hr (6 mL/Hr) IV Continuous <Continuous>  insulin glargine Injectable (LANTUS) 24 Unit(s) SubCutaneous at bedtime  insulin lispro (ADMELOG) corrective regimen sliding scale   SubCutaneous three times a day before meals  insulin lispro (ADMELOG) corrective regimen sliding scale   SubCutaneous at bedtime  lactated ringers. 1000 milliLiter(s) (75 mL/Hr) IV Continuous <Continuous>  naloxone Injectable 0.4 milliGRAM(s) IV Push once  pantoprazole   Suspension 40 milliGRAM(s) Oral daily  piperacillin/tazobactam IVPB.. 3.375 Gram(s) IV Intermittent every 12 hours    MEDICATIONS  (PRN):  dextrose Oral Gel 15 Gram(s) Oral once PRN Blood Glucose LESS THAN 70 milliGRAM(s)/deciliter        OBJECTIVE:  ICU Vital Signs Last 24 Hrs  T(C): 36.9 (10 Dec 2024 04:00), Max: 37 (09 Dec 2024 16:00)  T(F): 98.4 (10 Dec 2024 04:00), Max: 98.6 (09 Dec 2024 16:00)  HR: 67 (10 Dec 2024 06:00) (64 - 91)  BP: 100/43 (10 Dec 2024 06:00) (94/46 - 144/61)  BP(mean): 61 (10 Dec 2024 06:00) (55 - 86)  ABP: --  ABP(mean): --  RR: 18 (10 Dec 2024 06:00) (12 - 22)  SpO2: 97% (10 Dec 2024 06:00) (94% - 100%)    O2 Parameters below as of 10 Dec 2024 06:00  Patient On (Oxygen Delivery Method): room air            I&O's Summary    08 Dec 2024 07:01  -  09 Dec 2024 07:00  --------------------------------------------------------  IN: 1813.5 mL / OUT: 1150 mL / NET: 663.5 mL    09 Dec 2024 07:01  -  10 Dec 2024 06:21  --------------------------------------------------------  IN: 2235.9 mL / OUT: 135 mL / NET: 2100.9 mL      Daily     Daily Weight in k.7 (10 Dec 2024 05:00)      PHYSICAL EXAMINATION:  General: Comfortable, no acute distress, cooperative with exam.  HEENT: Moist mucous membranes.  Respiratory: CTAB, normal respiratory effort, no coughing, wheezes, crackles, or rales.  CV: RRR, S1S2, no murmurs, rubs or gallops. No JVD. Distal pulses intact.  Abdominal: Soft, nontender, nondistended, no rebound or guarding, normal bowel sounds.  Neurology: AOx3, no focal neuro defects, BUSH x 4.  Extremities: No pitting edema, + Peripheral pulses.          LABS:                          7.3    8.84  )-----------( 182      ( 10 Dec 2024 01:30 )             21.9     12-10    132[L]  |  99  |  70[H]  ----------------------------<  196[H]  4.9   |  15[L]  |  5.07[H]    Ca    8.2[L]      10 Dec 2024 01:30  Phos  5.3     12-10  Mg     2.40     12-10    TPro  5.8[L]  /  Alb  2.7[L]  /  TBili  2.6[H]  /  DBili  x   /  AST  576[H]  /  ALT  564[H]  /  AlkPhos  155[H]  12-10    LIVER FUNCTIONS - ( 10 Dec 2024 01:30 )  Alb: 2.7 g/dL / Pro: 5.8 g/dL / ALK PHOS: 155 U/L / ALT: 564 U/L / AST: 576 U/L / GGT: x           PT/INR - ( 09 Dec 2024 01:49 )   PT: 17.0 sec;   INR: 1.43 ratio         PTT - ( 10 Dec 2024 01:30 )  PTT:73.2 sec    CARDIAC MARKERS ( 09 Dec 2024 05:47 )  x     / x     / x     / x     / 7.1 ng/mL  CARDIAC MARKERS ( 08 Dec 2024 15:47 )  x     / x     / x     / x     / 10.5 ng/mL      Urinalysis Basic - ( 10 Dec 2024 01:30 )    Color: x / Appearance: x / SG: x / pH: x  Gluc: 196 mg/dL / Ketone: x  / Bili: x / Urobili: x   Blood: x / Protein: x / Nitrite: x   Leuk Esterase: x / RBC: x / WBC x   Sq Epi: x / Non Sq Epi: x / Bacteria: x    echo:  TTE 2024   1. Technically very difficult study performed with the patient in the supine position.   2. Technically difficult image quality.   3. Left ventricular systolic function is mildly to moderately decreased with anejection fraction visually estimated at 40 to 45%. There are regional wall motion abnormalities present. Hypokinesis of the inferolateral wall, basal to mid inferior wall, and basal inferoseptum. There is mild (grade 1) left ventricular diastolic dysfunction.   4. The right ventricle is not well visualized.   5. Structurally normal mitral valve with normal leaflet excursion. There is calcification of the mitral valve annulus. There is mild mitral regurgitation.   6. No prior echocardiogram is available for comparison.        Assessment and Plan:   · Assessment	  79F w HTN, DM, severe PAD w r BKA, L aka, CAD (sp 3v CABG 20 yrs ago, last cath , only graft patent was LIMA-LAD, natives with  of LAD and RCA, OM w severe stented w BMS at that time) who presented with 15 min of resting chest pain, found to have Hgb of 7 (from 12 several years ago) and rising elevated troponins from 90s->280s; her ECG shows Inferior Q waves w chronic diffuse ST/TW changes, all of which are largely unchanged from prior tracings. TTE w midrange LVEF 40-45%. She has been chest pain free since her blood transfusion.   Overnight on  had telemetry notable for progressively worsening heart block (1st degree -> 3rd degree -> asystole >14 seconds).  Pt returned to NSR by the time RRT arrived to bedside.   Labs without significant actionable electrolyte disturbance.  TTE w/ wall motion abnormalities affecting inferolateral wall, basal to mid inferior wall, and basal infero-septum; ischemia could also be  of bradyarrhythmia as opposed to medication given.  Dilaudid was addressed w/ narcan and coreg not administered today.     Plan:     === NEUROLOGY ===      -Mental status: somnolent AAO x 3, not baseline  -Sedation: None  -Pain control: None    === RESPIRATORY ===     -EARLINE    === CARDIOVASCULAR ===    ##CHB bradycardia  -EKG suggestive of high grade AV Block, with intermittent 3rd degree   -Patients HR currently NSR 70 BPM, BP90/43.   -DDX: Iatrogenic vs conduction defect vs ischemia  -Atropine and Zoll pads at bedside  -Dopamine gtt, titrate to HR > 60  -Consider TVP if HD unstable  -Hold AV cindy blockers  -EP consult and f/u recommendations.   -Check hsTrop x 2 sets to evaluate for ACS  -Check TSH and if elevated, reflex free T4 and T3  -Check TTE to evaluate RV/LV function and to eveluate valvular disease  -NPO after midnight. Possible PPM placement     #CAD s/p CABG ()  -asa, plavix  -statin held for abn LFT  -Most recent C with  of the LAD and RCA, and tight OM lesion that was stented with a BMS  -Recent outpatient nuclear stress test with scar and hypokinesis in the RCA territory, consistent with TTE here with EF 40-45% w/ inferior/inferolateral WMA       === RENAL/ ===     #DAMARI   -noted to have acute cr elevation from 1 on admisison to 1.8   - bladder scan elevated, howe now placed   - likely post ob, but check urine ltyes   - renal US pending   - hold nephrotoxic agents   -of note did get contrast on admit, but early for ZENOBIA.    #Hypertension  -Hold coreg in setting of CHB  -Will hold imdur and losartan for now while on dopa      === GASTROINTESTINAL ===    #Acute pancreatitis  -Abdominal pain, lipase over 3K, signs of acute interstitial pancreatitis on imaging  -Unclear cause of episode; no alcohol use, no recent instrumentation of bile ducts, no evidence of choledocholithiasis on abdominal ultrasound. No new medications. Triglycerides not elevated. Could be idiopathic.   -GI consult   -ADAT  -MRCP ordered   -Gentle IVF given mildly reduced LVEF with regional WMAs seen on recent echo.    #Elevated liver enzymes.   -Elevated AST/ALT to 242/140, though evidence of moderate hemolysis  -Also with elevated Tbili  -Hold atorvastatin for now  -GI consult  -MRCP ordered       === ENDO  ===    #Type 2 diabetes mellitus.   -Reported to be on basal and bolus pre-meal at home (though reported to be twice a day)  -Hold pre-meal for now given NPO, re-add as indicated when diet able to be advanced  -Dose reduced home 30U lantus to 24U at bedtime given NPO status, adjust as indicated based on sugars  -Low insulin sliding scale q6hrs while NPO  -A1C of 7.5 on 24  -DASH/TLC/CC diet when advanced from NPO      === HEMATOLOGIC/ONC ===    - H/H & plts stable   - Monitor H/H and plts   - DVT PPX: heparin gtt     === INFECTIOUS DISEASE ===     #Leukocytosis  - no fever  - Continue to monitor with daily CBC  - Trend fever curve  - Possible sources of urinary tract given previously reported burning with urination. Has positive UA, though with fair amount of epithelial cells. Could also be related to colitis seen on CT imaging  - Could be febrile in setting of inflammatory reaction given acute pancreatitis  - Continue zosyn      Lines/Tubes: PIV x 2,    CCU Service  Cardiology   Spect: [id number] ([facility])     PATIENT: NIK MELISSA, MRN: [id number]      79F w HTN, DM, severe PAD w r BKA, L aka, CAD (sp 3v CABG 20 yrs ago, last cath , only graft patent was LIMA-LAD,   of the LAD and RCA, and tight OM lesion that was stented with a BMS   p/w resting chest pain, found to have Hgb of 7 (from 12 several years ago) and rising elevated troponins from 90s->280s; her ECG shows Inferior Q waves w chronic diffuse ST/TW changes, all of which are largely unchanged from prior tracings. TTE w midrange LVEF 40-45%. She has been chest pain free since her blood transfusion.   Overnight on  had telemetry notable for progressively worsening heart block (1st degree -> 3rd degree -> asystole >14 seconds).  Pt returned to NSR by the time RRT arrived to bedside.   Labs without significant actionable electrolyte disturbance.  TTE w/ wall motion abnormalities affecting inferolateral wall, basal to mid inferior wall, and basal infero-septum; ischemia could also be  of bradyarrhythmia as opposed to medication given.  Dilaudid was addressed w/ narcan and coreg not administered today.     transferred to CCU for CHB on dopamine at 3mcg/kg/mn    INTERVAL HISTORY/OVERNIGHT EVENTS: Denies cp, palp, dizziness, sob, orthopnea. Endorses nausea and vomiting    TELEMETRY: SR          ALLERGIES: Allergies    sulfa drugs (Hives)  sulfa drugs (Rash)  Sulfac 10% (Hives)    Intolerances        MEDICATIONS:  MEDICATIONS  (STANDING):  aspirin enteric coated 81 milliGRAM(s) Oral daily  brimonidine 0.2% Ophthalmic Solution 1 Drop(s) Both EYES two times a day  chlorhexidine 2% Cloths 1 Application(s) Topical daily  clopidogrel Tablet 75 milliGRAM(s) Oral daily  dextrose 5%. 1000 milliLiter(s) (50 mL/Hr) IV Continuous <Continuous>  dextrose 5%. 1000 milliLiter(s) (100 mL/Hr) IV Continuous <Continuous>  dextrose 50% Injectable 25 Gram(s) IV Push once  dextrose 50% Injectable 12.5 Gram(s) IV Push once  dextrose 50% Injectable 25 Gram(s) IV Push once  glucagon  Injectable 1 milliGRAM(s) IntraMuscular once  heparin  Infusion 850 Unit(s)/Hr (6 mL/Hr) IV Continuous <Continuous>  insulin glargine Injectable (LANTUS) 24 Unit(s) SubCutaneous at bedtime  insulin lispro (ADMELOG) corrective regimen sliding scale   SubCutaneous three times a day before meals  insulin lispro (ADMELOG) corrective regimen sliding scale   SubCutaneous at bedtime  lactated ringers. 1000 milliLiter(s) (75 mL/Hr) IV Continuous <Continuous>  naloxone Injectable 0.4 milliGRAM(s) IV Push once  pantoprazole   Suspension 40 milliGRAM(s) Oral daily  piperacillin/tazobactam IVPB.. 3.375 Gram(s) IV Intermittent every 12 hours    MEDICATIONS  (PRN):  dextrose Oral Gel 15 Gram(s) Oral once PRN Blood Glucose LESS THAN 70 milliGRAM(s)/deciliter        OBJECTIVE:  ICU Vital Signs Last 24 Hrs  T(C): 36.9 (10 Dec 2024 04:00), Max: 37 (09 Dec 2024 16:00)  T(F): 98.4 (10 Dec 2024 04:00), Max: 98.6 (09 Dec 2024 16:00)  HR: 67 (10 Dec 2024 06:00) (64 - 91)  BP: 100/43 (10 Dec 2024 06:00) (94/46 - 144/61)  BP(mean): 61 (10 Dec 2024 06:00) (55 - 86)  ABP: --  ABP(mean): --  RR: 18 (10 Dec 2024 06:00) (12 - 22)  SpO2: 97% (10 Dec 2024 06:00) (94% - 100%)    O2 Parameters below as of 10 Dec 2024 06:00  Patient On (Oxygen Delivery Method): room air            I&O's Summary    08 Dec 2024 07:01  -  09 Dec 2024 07:00  --------------------------------------------------------  IN: 1813.5 mL / OUT: 1150 mL / NET: 663.5 mL    09 Dec 2024 07:01  -  10 Dec 2024 06:21  --------------------------------------------------------  IN: 2235.9 mL / OUT: 135 mL / NET: 2100.9 mL      Daily     Daily Weight in k.7 (10 Dec 2024 05:00)      PHYSICAL EXAMINATION:  General: Comfortable, no acute distress, cooperative with exam.  HEENT: Moist mucous membranes.  Respiratory: CTAB, normal respiratory effort, no coughing, wheezes, crackles, or rales.  CV: RRR, S1S2, no murmurs, rubs or gallops. No JVD. Distal pulses intact.  Abdominal: Soft, nontender, nondistended, no rebound or guarding, normal bowel sounds.  Neurology: AOx3, no focal neuro defects, BUSH x 4.  Extremities: No pitting edema, + Peripheral pulses.          LABS:                          7.3    8.84  )-----------( 182      ( 10 Dec 2024 01:30 )             21.9     12-10    132[L]  |  99  |  70[H]  ----------------------------<  196[H]  4.9   |  15[L]  |  5.07[H]    Ca    8.2[L]      10 Dec 2024 01:30  Phos  5.3     12-10  Mg     2.40     12-10    TPro  5.8[L]  /  Alb  2.7[L]  /  TBili  2.6[H]  /  DBili  x   /  AST  576[H]  /  ALT  564[H]  /  AlkPhos  155[H]  12-10    LIVER FUNCTIONS - ( 10 Dec 2024 01:30 )  Alb: 2.7 g/dL / Pro: 5.8 g/dL / ALK PHOS: 155 U/L / ALT: 564 U/L / AST: 576 U/L / GGT: x           PT/INR - ( 09 Dec 2024 01:49 )   PT: 17.0 sec;   INR: 1.43 ratio         PTT - ( 10 Dec 2024 01:30 )  PTT:73.2 sec    CARDIAC MARKERS ( 09 Dec 2024 05:47 )  x     / x     / x     / x     / 7.1 ng/mL  CARDIAC MARKERS ( 08 Dec 2024 15:47 )  x     / x     / x     / x     / 10.5 ng/mL      Urinalysis Basic - ( 10 Dec 2024 01:30 )    Color: x / Appearance: x / SG: x / pH: x  Gluc: 196 mg/dL / Ketone: x  / Bili: x / Urobili: x   Blood: x / Protein: x / Nitrite: x   Leuk Esterase: x / RBC: x / WBC x   Sq Epi: x / Non Sq Epi: x / Bacteria: x    echo:  TTE 2024   1. Technically very difficult study performed with the patient in the supine position.   2. Technically difficult image quality.   3. Left ventricular systolic function is mildly to moderately decreased with anejection fraction visually estimated at 40 to 45%. There are regional wall motion abnormalities present. Hypokinesis of the inferolateral wall, basal to mid inferior wall, and basal inferoseptum. There is mild (grade 1) left ventricular diastolic dysfunction.   4. The right ventricle is not well visualized.   5. Structurally normal mitral valve with normal leaflet excursion. There is calcification of the mitral valve annulus. There is mild mitral regurgitation.   6. No prior echocardiogram is available for comparison.        Assessment and Plan:   · Assessment	  79F w HTN, DM, severe PAD w r BKA, L aka, CAD (sp 3v CABG 20 yrs ago, last cath , only graft patent was LIMA-LAD, natives with  of LAD and RCA, OM w severe stented w BMS at that time) who presented with 15 min of resting chest pain, found to have Hgb of 7 (from 12 several years ago) and rising elevated troponins from 90s->280s; her ECG shows Inferior Q waves w chronic diffuse ST/TW changes, all of which are largely unchanged from prior tracings. TTE w midrange LVEF 40-45%. She has been chest pain free since her blood transfusion.   Overnight on  had telemetry notable for progressively worsening heart block (1st degree -> 3rd degree -> asystole >14 seconds).  Pt returned to NSR by the time RRT arrived to bedside.   Labs without significant actionable electrolyte disturbance.  TTE w/ wall motion abnormalities affecting inferolateral wall, basal to mid inferior wall, and basal infero-septum; ischemia could also be  of bradyarrhythmia as opposed to medication given.  Dilaudid was addressed w/ narcan and coreg not administered today.     Plan:     === NEUROLOGY ===      -Mental status: somnolent AAO x 3, not baseline  -Sedation: None  -Pain control: None    === RESPIRATORY ===     -EARLINE    === CARDIOVASCULAR ===    ##CHB bradycardia  -EKG suggestive of high grade AV Block, with intermittent 3rd degree   -Patients HR currently NSR 70 BPM, BP90/43.   -DDX: Iatrogenic vs conduction defect vs ischemia  -Atropine and Zoll pads at bedside  -Dopamine gtt, titrate to HR > 60  -Consider TVP if HD unstable  -Hold AV cindy blockers  -EP consult and f/u recommendations.   - hsTrop  -Check TSH and if elevated, reflex free T4 and T3  -Check TTE to evaluate RV/LV function and to eveluate valvular disease  -NPO after midnight. Possible PPM placement     #CAD s/p CABG ()  -asa, plavix  -statin held for abn LFT  -Most recent C with  of the LAD and RCA, and tight OM lesion that was stented with a BMS  -Recent outpatient nuclear stress test with scar and hypokinesis in the RCA territory, consistent with TTE here with EF 40-45% w/ inferior/inferolateral WMA       === RENAL/ ===     #DAMARI   -noted to have acute cr elevation from 1 on admisison to 1.8   - bladder scan elevated, howe now placed   - likely post ob, but check urine ltyes   - renal US pending   - hold nephrotoxic agents   -of note did get contrast on admit, but early for ZENOBIA.    #Hypertension  -Hold coreg in setting of CHB  -Will hold imdur and losartan for now while on dopa      === GASTROINTESTINAL ===    #Acute pancreatitis  -Abdominal pain, lipase over 3K, signs of acute interstitial pancreatitis on imaging  -Unclear cause of episode; no alcohol use, no recent instrumentation of bile ducts, no evidence of choledocholithiasis on abdominal ultrasound. No new medications. Triglycerides not elevated. Could be idiopathic.   -GI consult   -ADAT  -MRCP ordered   -Gentle IVF given mildly reduced LVEF with regional WMAs seen on recent echo.    #Elevated liver enzymes.   -Elevated AST/ALT to 242/140, though evidence of moderate hemolysis  -Also with elevated Tbili  -Hold atorvastatin for now  -GI consult  -MRCP ordered       === ENDO  ===    #Type 2 diabetes mellitus.   -Reported to be on basal and bolus pre-meal at home (though reported to be twice a day)  -Hold pre-meal for now given NPO, re-add as indicated when diet able to be advanced  -Dose reduced home 30U lantus to 24U at bedtime given NPO status, adjust as indicated based on sugars  -Low insulin sliding scale q6hrs while NPO  -A1C of 7.5 on 24  -DASH/TLC/CC diet when advanced from NPO      === HEMATOLOGIC/ONC ===    - H/H & plts stable   - Monitor H/H and plts   - DVT PPX: heparin gtt     === INFECTIOUS DISEASE ===     #Leukocytosis  - no fever  - Continue to monitor with daily CBC  - Trend fever curve  - Possible sources of urinary tract given previously reported burning with urination. Has positive UA, though with fair amount of epithelial cells. Could also be related to colitis seen on CT imaging  - Could be febrile in setting of inflammatory reaction given acute pancreatitis  - Continue zosyn      Lines/Tubes: PIV x 2,    CCU Service  Cardiology   Spect: [id number] ([facility])     PATIENT: NIK MELISSA, MRN: [id number]      79F w HTN, DM, severe PAD w r BKA, L aka, CAD (sp 3v CABG 20 yrs ago, last cath , only graft patent was LIMA-LAD,   of the LAD and RCA, and tight OM lesion that was stented with a BMS   p/w resting chest pain, found to have Hgb of 7 (from 12 several years ago) and rising elevated troponins from 90s->280s; her ECG shows Inferior Q waves w chronic diffuse ST/TW changes, all of which are largely unchanged from prior tracings. TTE w midrange LVEF 40-45%. She has been chest pain free since her blood transfusion.   Overnight on  had telemetry notable for progressively worsening heart block (1st degree -> 3rd degree -> asystole >14 seconds).  Pt returned to NSR by the time RRT arrived to bedside.   Labs without significant actionable electrolyte disturbance.  TTE w/ wall motion abnormalities affecting inferolateral wall, basal to mid inferior wall, and basal infero-septum; ischemia could also be  of bradyarrhythmia as opposed to medication given.  Dilaudid was addressed w/ narcan and coreg not administered today.     transferred to CCU for CHB on dopamine at 3mcg/kg/mn    INTERVAL HISTORY/OVERNIGHT EVENTS:   Anuric overnight  Denies cp, palp, dizziness, sob, orthopnea. Endorses nausea and vomiting    TELEMETRY: SR          ALLERGIES: Allergies    sulfa drugs (Hives)  sulfa drugs (Rash)  Sulfac 10% (Hives)    Intolerances        MEDICATIONS:  MEDICATIONS  (STANDING):  aspirin enteric coated 81 milliGRAM(s) Oral daily  brimonidine 0.2% Ophthalmic Solution 1 Drop(s) Both EYES two times a day  chlorhexidine 2% Cloths 1 Application(s) Topical daily  clopidogrel Tablet 75 milliGRAM(s) Oral daily  dextrose 5%. 1000 milliLiter(s) (50 mL/Hr) IV Continuous <Continuous>  dextrose 5%. 1000 milliLiter(s) (100 mL/Hr) IV Continuous <Continuous>  dextrose 50% Injectable 25 Gram(s) IV Push once  dextrose 50% Injectable 12.5 Gram(s) IV Push once  dextrose 50% Injectable 25 Gram(s) IV Push once  glucagon  Injectable 1 milliGRAM(s) IntraMuscular once  heparin  Infusion 850 Unit(s)/Hr (6 mL/Hr) IV Continuous <Continuous>  insulin glargine Injectable (LANTUS) 24 Unit(s) SubCutaneous at bedtime  insulin lispro (ADMELOG) corrective regimen sliding scale   SubCutaneous three times a day before meals  insulin lispro (ADMELOG) corrective regimen sliding scale   SubCutaneous at bedtime  lactated ringers. 1000 milliLiter(s) (75 mL/Hr) IV Continuous <Continuous>  naloxone Injectable 0.4 milliGRAM(s) IV Push once  pantoprazole   Suspension 40 milliGRAM(s) Oral daily  piperacillin/tazobactam IVPB.. 3.375 Gram(s) IV Intermittent every 12 hours    MEDICATIONS  (PRN):  dextrose Oral Gel 15 Gram(s) Oral once PRN Blood Glucose LESS THAN 70 milliGRAM(s)/deciliter        OBJECTIVE:  ICU Vital Signs Last 24 Hrs  T(C): 36.9 (10 Dec 2024 04:00), Max: 37 (09 Dec 2024 16:00)  T(F): 98.4 (10 Dec 2024 04:00), Max: 98.6 (09 Dec 2024 16:00)  HR: 67 (10 Dec 2024 06:00) (64 - 91)  BP: 100/43 (10 Dec 2024 06:00) (94/46 - 144/61)  BP(mean): 61 (10 Dec 2024 06:00) (55 - 86)  ABP: --  ABP(mean): --  RR: 18 (10 Dec 2024 06:00) (12 - 22)  SpO2: 97% (10 Dec 2024 06:00) (94% - 100%)    O2 Parameters below as of 10 Dec 2024 06:00  Patient On (Oxygen Delivery Method): room air            I&O's Summary    08 Dec 2024 07:01  -  09 Dec 2024 07:00  --------------------------------------------------------  IN: 1813.5 mL / OUT: 1150 mL / NET: 663.5 mL    09 Dec 2024 07:01  -  10 Dec 2024 06:21  --------------------------------------------------------  IN: 2235.9 mL / OUT: 135 mL / NET: 2100.9 mL      Daily     Daily Weight in k.7 (10 Dec 2024 05:00)      PHYSICAL EXAMINATION:  General: Comfortable, no acute distress, cooperative with exam.  HEENT: Moist mucous membranes.  Respiratory: CTAB, normal respiratory effort, no coughing, wheezes, crackles, or rales.  CV: RRR, S1S2, no murmurs, rubs or gallops. No JVD. Distal pulses intact.  Abdominal: Soft, nontender, nondistended, no rebound or guarding, normal bowel sounds.  Neurology: AOx3, no focal neuro defects, BUSH x 4.  Extremities: No pitting edema, + Peripheral pulses.          LABS:                          7.3    8.84  )-----------( 182      ( 10 Dec 2024 01:30 )             21.9     12-10    132[L]  |  99  |  70[H]  ----------------------------<  196[H]  4.9   |  15[L]  |  5.07[H]    Ca    8.2[L]      10 Dec 2024 01:30  Phos  5.3     12-10  Mg     2.40     12-10    TPro  5.8[L]  /  Alb  2.7[L]  /  TBili  2.6[H]  /  DBili  x   /  AST  576[H]  /  ALT  564[H]  /  AlkPhos  155[H]  12-10    LIVER FUNCTIONS - ( 10 Dec 2024 01:30 )  Alb: 2.7 g/dL / Pro: 5.8 g/dL / ALK PHOS: 155 U/L / ALT: 564 U/L / AST: 576 U/L / GGT: x           PT/INR - ( 09 Dec 2024 01:49 )   PT: 17.0 sec;   INR: 1.43 ratio         PTT - ( 10 Dec 2024 01:30 )  PTT:73.2 sec    CARDIAC MARKERS ( 09 Dec 2024 05:47 )  x     / x     / x     / x     / 7.1 ng/mL  CARDIAC MARKERS ( 08 Dec 2024 15:47 )  x     / x     / x     / x     / 10.5 ng/mL      Urinalysis Basic - ( 10 Dec 2024 01:30 )    Color: x / Appearance: x / SG: x / pH: x  Gluc: 196 mg/dL / Ketone: x  / Bili: x / Urobili: x   Blood: x / Protein: x / Nitrite: x   Leuk Esterase: x / RBC: x / WBC x   Sq Epi: x / Non Sq Epi: x / Bacteria: x    echo:  TTE 2024   1. Technically very difficult study performed with the patient in the supine position.   2. Technically difficult image quality.   3. Left ventricular systolic function is mildly to moderately decreased with anejection fraction visually estimated at 40 to 45%. There are regional wall motion abnormalities present. Hypokinesis of the inferolateral wall, basal to mid inferior wall, and basal inferoseptum. There is mild (grade 1) left ventricular diastolic dysfunction.   4. The right ventricle is not well visualized.   5. Structurally normal mitral valve with normal leaflet excursion. There is calcification of the mitral valve annulus. There is mild mitral regurgitation.   6. No prior echocardiogram is available for comparison.        Assessment and Plan:   · Assessment	  79F w HTN, DM, severe PAD w r BKA, L aka, CAD (sp 3v CABG 20 yrs ago, last cath , only graft patent was LIMA-LAD, natives with  of LAD and RCA, OM w severe stented w BMS at that time) who presented with 15 min of resting chest pain, found to have Hgb of 7 (from 12 several years ago) and rising elevated troponins from 90s->280s; her ECG shows Inferior Q waves w chronic diffuse ST/TW changes, all of which are largely unchanged from prior tracings. TTE w midrange LVEF 40-45%. She has been chest pain free since her blood transfusion.   Overnight on  had telemetry notable for progressively worsening heart block (1st degree -> 3rd degree -> asystole >14 seconds).  Pt returned to NSR by the time RRT arrived to bedside.   Labs without significant actionable electrolyte disturbance.  TTE w/ wall motion abnormalities affecting inferolateral wall, basal to mid inferior wall, and basal infero-septum; ischemia could also be  of bradyarrhythmia as opposed to medication given.  Dilaudid was addressed w/ narcan and coreg not administered today.     Plan:     === NEUROLOGY ===      -Mental status: somnolent AAO x 3, not baseline  -Sedation: None  -Pain control: None    === RESPIRATORY ===     -EARLINE    === CARDIOVASCULAR ===    ##CHB bradycardia  -EKG suggestive of high grade AV Block, with intermittent 3rd degree   -Patients HR currently NSR 70 BPM, BP90/43.   -DDX: Iatrogenic vs conduction defect vs ischemia  -Atropine and Zoll pads at bedside  -Dopamine gtt, titrate to HR > 60  -Consider TVP if HD unstable  -Hold AV cindy blockers  -EP consult and f/u recommendations.   - hsTrop  -Check TSH and if elevated, reflex free T4 and T3  -Check TTE to evaluate RV/LV function and to eveluate valvular disease  -NPO after midnight. Possible PPM placement     #CAD s/p CABG ()  -asa, plavix  -statin held for abn LFT  -Most recent [facility] with  of the LAD and RCA, and tight OM lesion that was stented with a BMS  -Recent outpatient nuclear stress test with scar and hypokinesis in the RCA territory, consistent with TTE here with EF 40-45% w/ inferior/inferolateral WMA       === RENAL/ ===     #DAMARI   -noted to have acute cr elevation from 1 on admisison to 1.8   - bladder scan elevated, howe now placed   - likely post ob, but check urine ltyes   - renal US pending   - hold nephrotoxic agents   -of note did get contrast on admit, but early for ZENOBIA.    #Hypertension  -Hold coreg in setting of CHB  -Will hold imdur and losartan for now while on dopa      === GASTROINTESTINAL ===    #Acute pancreatitis  -Abdominal pain, lipase over 3K, signs of acute interstitial pancreatitis on imaging  -Unclear cause of episode; no alcohol use, no recent instrumentation of bile ducts, no evidence of choledocholithiasis on abdominal ultrasound. No new medications. Triglycerides not elevated. Could be idiopathic.   -GI consult   -ADAT  -MRCP ordered   -Gentle IVF given mildly reduced LVEF with regional WMAs seen on recent echo.    #Elevated liver enzymes.   -Elevated AST/ALT to 242/140, though evidence of moderate hemolysis  -Also with elevated Tbili  -Hold atorvastatin for now  -GI consult  -MRCP ordered       === ENDO  ===    #Type 2 diabetes mellitus.   -Reported to be on basal and bolus pre-meal at home (though reported to be twice a day)  -Hold pre-meal for now given NPO, re-add as indicated when diet able to be advanced  -Dose reduced home 30U lantus to 24U at bedtime given NPO status, adjust as indicated based on sugars  -Low insulin sliding scale q6hrs while NPO  -A1C of 7.5 on 24  -DASH/TLC/CC diet when advanced from NPO      === HEMATOLOGIC/ONC ===    - H/H & plts stable   - Monitor H/H and plts   - DVT PPX: heparin gtt     === INFECTIOUS DISEASE ===     #Leukocytosis  - no fever  - Continue to monitor with daily CBC  - Trend fever curve  - Possible sources of urinary tract given previously reported burning with urination. Has positive UA, though with fair amount of epithelial cells. Could also be related to colitis seen on CT imaging  - Could be febrile in setting of inflammatory reaction given acute pancreatitis  - Continue zosyn      Lines/Tubes: PIV x 2,    CCU Service  Cardiology   Spect: [phone] ([facility])     PATIENT: NIK MELISSA, MRN: [id number]      79F w HTN, DM, severe PAD w r BKA, L aka, CAD (sp 3v CABG 20 yrs ago, last cath , only graft patent was LIMA-LAD,   of the LAD and RCA, and tight OM lesion that was stented with a BMS   p/w resting chest pain, found to have Hgb of 7 (from 12 several years ago) and rising elevated troponins from 90s->280s; her ECG shows Inferior Q waves w chronic diffuse ST/TW changes, all of which are largely unchanged from prior tracings. TTE w midrange LVEF 40-45%. She has been chest pain free since her blood transfusion.   Overnight on  had telemetry notable for progressively worsening heart block (1st degree -> 3rd degree -> asystole >14 seconds).  Pt returned to NSR by the time RRT arrived to bedside.   Labs without significant actionable electrolyte disturbance.  TTE w/ wall motion abnormalities affecting inferolateral wall, basal to mid inferior wall, and basal infero-septum; ischemia could also be  of bradyarrhythmia as opposed to medication given.  Dilaudid was addressed w/ narcan and coreg not administered today.     transferred to CCU for CHB on dopamine at 3mcg/kg/mn    INTERVAL HISTORY/OVERNIGHT EVENTS:   Anuric overnight  Denies cp, palp, dizziness, sob, orthopnea. r hip upper leg pain - tylenol    TELEMETRY: SR, no ectopy          ALLERGIES: Allergies    sulfa drugs (Hives)  sulfa drugs (Rash)  Sulfac 10% (Hives)    Intolerances        MEDICATIONS:  MEDICATIONS  (STANDING):  aspirin enteric coated 81 milliGRAM(s) Oral daily  brimonidine 0.2% Ophthalmic Solution 1 Drop(s) Both EYES two times a day  chlorhexidine 2% Cloths 1 Application(s) Topical daily  clopidogrel Tablet 75 milliGRAM(s) Oral daily  dextrose 5%. 1000 milliLiter(s) (50 mL/Hr) IV Continuous <Continuous>  dextrose 5%. 1000 milliLiter(s) (100 mL/Hr) IV Continuous <Continuous>  dextrose 50% Injectable 25 Gram(s) IV Push once  dextrose 50% Injectable 12.5 Gram(s) IV Push once  dextrose 50% Injectable 25 Gram(s) IV Push once  glucagon  Injectable 1 milliGRAM(s) IntraMuscular once  heparin  Infusion 850 Unit(s)/Hr (6 mL/Hr) IV Continuous <Continuous>  insulin glargine Injectable (LANTUS) 24 Unit(s) SubCutaneous at bedtime  insulin lispro (ADMELOG) corrective regimen sliding scale   SubCutaneous three times a day before meals  insulin lispro (ADMELOG) corrective regimen sliding scale   SubCutaneous at bedtime  lactated ringers. 1000 milliLiter(s) (75 mL/Hr) IV Continuous <Continuous>  naloxone Injectable 0.4 milliGRAM(s) IV Push once  pantoprazole   Suspension 40 milliGRAM(s) Oral daily  piperacillin/tazobactam IVPB.. 3.375 Gram(s) IV Intermittent every 12 hours    MEDICATIONS  (PRN):  dextrose Oral Gel 15 Gram(s) Oral once PRN Blood Glucose LESS THAN 70 milliGRAM(s)/deciliter        OBJECTIVE:  ICU Vital Signs Last 24 Hrs  T(C): 36.9 (10 Dec 2024 04:00), Max: 37 (09 Dec 2024 16:00)  T(F): 98.4 (10 Dec 2024 04:00), Max: 98.6 (09 Dec 2024 16:00)  HR: 67 (10 Dec 2024 06:00) (64 - 91)  BP: 100/43 (10 Dec 2024 06:00) (94/46 - 144/61)  BP(mean): 61 (10 Dec 2024 06:00) (55 - 86)  ABP: --  ABP(mean): --  RR: 18 (10 Dec 2024 06:00) (12 - 22)  SpO2: 97% (10 Dec 2024 06:00) (94% - 100%)    O2 Parameters below as of 10 Dec 2024 06:00  Patient On (Oxygen Delivery Method): room air            I&O's Summary    08 Dec 2024 07:01  -  09 Dec 2024 07:00  --------------------------------------------------------  IN: 1813.5 mL / OUT: 1150 mL / NET: 663.5 mL    09 Dec 2024 07:01  -  10 Dec 2024 06:21  --------------------------------------------------------  IN: 2235.9 mL / OUT: 135 mL / NET: 2100.9 mL      Daily     Daily Weight in k.7 (10 Dec 2024 05:00)      PHYSICAL EXAMINATION:  General: Comfortable, no acute distress, cooperative with exam.  HEENT: Moist mucous membranes.  Respiratory: CTAB, normal respiratory effort, no coughing, wheezes, crackles, or rales.  CV: RRR, S1S2, no murmurs, rubs or gallops. No JVD. Distal pulses intact.  Abdominal: Soft, nontender, nondistended, no rebound or guarding, normal bowel sounds.  Neurology: AOx3, no focal neuro defects, BUSH x 4.  Extremities: No pitting edema, + Peripheral pulses.          LABS:                          7.3    8.84  )-----------( 182      ( 10 Dec 2024 01:30 )             21.9     12-10    132[L]  |  99  |  70[H]  ----------------------------<  196[H]  4.9   |  15[L]  |  5.07[H]    Ca    8.2[L]      10 Dec 2024 01:30  Phos  5.3     12-10  Mg     2.40     12-10    TPro  5.8[L]  /  Alb  2.7[L]  /  TBili  2.6[H]  /  DBili  x   /  AST  576[H]  /  ALT  564[H]  /  AlkPhos  155[H]  12-10    LIVER FUNCTIONS - ( 10 Dec 2024 01:30 )  Alb: 2.7 g/dL / Pro: 5.8 g/dL / ALK PHOS: 155 U/L / ALT: 564 U/L / AST: 576 U/L / GGT: x           PT/INR - ( 09 Dec 2024 01:49 )   PT: 17.0 sec;   INR: 1.43 ratio         PTT - ( 10 Dec 2024 01:30 )  PTT:73.2 sec    CARDIAC MARKERS ( 09 Dec 2024 05:47 )  x     / x     / x     / x     / 7.1 ng/mL  CARDIAC MARKERS ( 08 Dec 2024 15:47 )  x     / x     / x     / x     / 10.5 ng/mL      Urinalysis Basic - ( 10 Dec 2024 01:30 )    Color: x / Appearance: x / SG: x / pH: x  Gluc: 196 mg/dL / Ketone: x  / Bili: x / Urobili: x   Blood: x / Protein: x / Nitrite: x   Leuk Esterase: x / RBC: x / WBC x   Sq Epi: x / Non Sq Epi: x / Bacteria: x    echo:  TTE 2024   1. Technically very difficult study performed with the patient in the supine position.   2. Technically difficult image quality.   3. Left ventricular systolic function is mildly to moderately decreased with anejection fraction visually estimated at 40 to 45%. There are regional wall motion abnormalities present. Hypokinesis of the inferolateral wall, basal to mid inferior wall, and basal inferoseptum. There is mild (grade 1) left ventricular diastolic dysfunction.   4. The right ventricle is not well visualized.   5. Structurally normal mitral valve with normal leaflet excursion. There is calcification of the mitral valve annulus. There is mild mitral regurgitation.   6. No prior echocardiogram is available for comparison.    < from: TTE Limited W or WO Ultrasound Enhancing Agent (24 @ 16:50) >  CONCLUSIONS:      1. Left ventricular cavity is normal in size. Left ventricular wall thickness is normal. Left ventricular systolic function is mildly to moderately decreased with an ejection fraction of 46 % by [place]'s method of disks. Regional wall motion abnormalities present.   2. There is hypokinesis of the basal inferolateral, mid inferolateral, basal inferior and basal inferoseptal walls.   3. Echogenic mass is seen possibly in the left atrium, finding is not seen in every view. Unable to determine if it represents artifact or pathologic finding. Consider repeat TTE vs FOREST. (Prior images of study on 24 were technically difficult, comparison is limited).    < end of copied text >          Assessment and Plan:   · Assessment	  79F w HTN, DM, severe PAD w r BKA, L aka, CAD (sp 3v CABG 20 yrs ago, last cath , only graft patent was LIMA-LAD, natives with  of LAD and RCA, OM w severe stented w BMS at that time) who presented with 15 min of resting chest pain, found to have Hgb of 7 (from 12 several years ago) and rising elevated troponins from 90s->280s; her ECG shows Inferior Q waves w chronic diffuse ST/TW changes, all of which are largely unchanged from prior tracings. TTE w midrange LVEF 40-45%. She has been chest pain free since her blood transfusion.   Overnight on  had telemetry notable for progressively worsening heart block (1st degree -> 3rd degree -> asystole >14 seconds).  Pt returned to NSR by the time RRT arrived to bedside.   Labs without significant actionable electrolyte disturbance.  TTE w/ wall motion abnormalities affecting inferolateral wall, basal to mid inferior wall, and basal infero-septum; ischemia could also be  of bradyarrhythmia as opposed to medication given.  Dilaudid was addressed w/ narcan and coreg not administered today.     Plan:     === NEUROLOGY ===      -Mental status: somnolent AAO x 3, not baseline  -Sedation: None  -Pain control: None    === RESPIRATORY ===     -EARLINE    === CARDIOVASCULAR ===    ##CHB bradycardia  -EKG suggestive of high grade AV Block, with intermittent 3rd degree   -Patients HR currently NSR 70 BPM, BP90/43.   -DDX: Iatrogenic vs conduction defect vs ischemia  -Atropine and Zoll pads at bedside  -Dopamine gtt, titrate to HR > 60  -Consider TVP if HD unstable  -Hold AV cindy blockers  -EP consult and f/u recommendations.   - hsTrop  -Check TSH and if elevated, reflex free T4 and T3  -Check TTE to evaluate RV/LV function and to eveluate valvular disease  -NPO after midnight. Possible PPM placement     #CAD s/p CABG ()  -asa, plavix  -statin held for abn LFT  -Most recent C with  of the LAD and RCA, and tight OM lesion that was stented with a BMS  -Recent outpatient nuclear stress test with scar and hypokinesis in the RCA territory, consistent with TTE here with EF 40-45% w/ inferior/inferolateral WMA     - heparin gtt x 48 hours - concern ACS (-)  - Repeat echo shows Echogenic mass is seen possibly in the left atrium, finding is not seen in every view. Unable to determine if it represents artifact or pathologic finding. Consider repeat TTE vs FOREST. (Prior images of study on 24 were technically difficult, comparison is limited).        === RENAL/ ===     #DAMARI   -noted to have acute cr elevation from 1 on admisison to 1.8   - bladder scan elevated, howe now placed   - likely post ob, but check urine ltyes   - renal US pending   - hold nephrotoxic agents   -of note did get contrast on admit, but early for ZENOBIA.    #Hypertension  -Hold coreg in setting of CHB  -Will hold imdur and losartan for now while on dopa      === GASTROINTESTINAL ===    #Acute pancreatitis  -Abdominal pain, lipase over 3K, signs of acute interstitial pancreatitis on imaging  -Unclear cause of episode; no alcohol use, no recent instrumentation of bile ducts, no evidence of choledocholithiasis on abdominal ultrasound. No new medications. Triglycerides not elevated. Could be idiopathic.   -GI consult   -ADAT  -MRCP ordered   -Gentle IVF given mildly reduced LVEF with regional WMAs seen on recent echo.    #Elevated liver enzymes.   -Elevated AST/ALT to 242/140, though evidence of moderate hemolysis  -Also with elevated Tbili  -Hold atorvastatin for now  -GI consult  -MRCP ordered       === ENDO  ===    #Type 2 diabetes mellitus.   -Reported to be on basal and bolus pre-meal at home (though reported to be twice a day)  -Hold pre-meal for now given NPO, re-add as indicated when diet able to be advanced  -Dose reduced home 30U lantus to 24U at bedtime given NPO status, adjust as indicated based on sugars  -Low insulin sliding scale q6hrs while NPO  -A1C of 7.5 on 24  -DASH/TLC/CC diet when advanced from NPO      === HEMATOLOGIC/ONC ===    - H/H & plts stable   - Monitor H/H and plts   - DVT PPX: heparin gtt     === INFECTIOUS DISEASE ===     #Leukocytosis  - no fever  - Continue to monitor with daily CBC  - Trend fever curve  - Possible sources of urinary tract given previously reported burning with urination. Has positive UA, though with fair amount of epithelial cells. Could also be related to colitis seen on CT imaging  - Could be febrile in setting of inflammatory reaction given acute pancreatitis  - Continue zosyn      Lines/Tubes: PIV x 2,    CCU Service  Cardiology   Spect: [phone] ([facility])     PATIENT: NIK MELISSA, MRN: [id number]      79F w HTN, DM, severe PAD w r BKA, L aka, CAD (sp 3v CABG 20 yrs ago, last cath , only graft patent was LIMA-LAD,   of the LAD and RCA, and tight OM lesion that was stented with a BMS   p/w resting chest pain, found to have Hgb of 7 (from 12 several years ago) and rising elevated troponins from 90s->280s; her ECG shows Inferior Q waves w chronic diffuse ST/TW changes, all of which are largely unchanged from prior tracings. TTE w midrange LVEF 40-45%. She has been chest pain free since her blood transfusion.   Overnight on  had telemetry notable for progressively worsening heart block (1st degree -> 3rd degree -> asystole >14 seconds).  Pt returned to NSR by the time RRT arrived to bedside.   Labs without significant actionable electrolyte disturbance.  TTE w/ wall motion abnormalities affecting inferolateral wall, basal to mid inferior wall, and basal infero-septum; ischemia could also be  of bradyarrhythmia as opposed to medication given.  Dilaudid was addressed w/ narcan and coreg not administered today.     transferred to CCU for CHB on dopamine at 3mcg/kg/mn    INTERVAL HISTORY/OVERNIGHT EVENTS:   Anuric overnight  Denies cp, palp, dizziness, sob, orthopnea. r hip upper leg pain - tylenol    TELEMETRY: SR, no ectopy          ALLERGIES: Allergies    sulfa drugs (Hives)  sulfa drugs (Rash)  Sulfac 10% (Hives)    Intolerances        MEDICATIONS:  MEDICATIONS  (STANDING):  aspirin enteric coated 81 milliGRAM(s) Oral daily  brimonidine 0.2% Ophthalmic Solution 1 Drop(s) Both EYES two times a day  chlorhexidine 2% Cloths 1 Application(s) Topical daily  clopidogrel Tablet 75 milliGRAM(s) Oral daily  dextrose 5%. 1000 milliLiter(s) (50 mL/Hr) IV Continuous <Continuous>  dextrose 5%. 1000 milliLiter(s) (100 mL/Hr) IV Continuous <Continuous>  dextrose 50% Injectable 25 Gram(s) IV Push once  dextrose 50% Injectable 12.5 Gram(s) IV Push once  dextrose 50% Injectable 25 Gram(s) IV Push once  glucagon  Injectable 1 milliGRAM(s) IntraMuscular once  heparin  Infusion 850 Unit(s)/Hr (6 mL/Hr) IV Continuous <Continuous>  insulin glargine Injectable (LANTUS) 24 Unit(s) SubCutaneous at bedtime  insulin lispro (ADMELOG) corrective regimen sliding scale   SubCutaneous three times a day before meals  insulin lispro (ADMELOG) corrective regimen sliding scale   SubCutaneous at bedtime  lactated ringers. 1000 milliLiter(s) (75 mL/Hr) IV Continuous <Continuous>  naloxone Injectable 0.4 milliGRAM(s) IV Push once  pantoprazole   Suspension 40 milliGRAM(s) Oral daily  piperacillin/tazobactam IVPB.. 3.375 Gram(s) IV Intermittent every 12 hours    MEDICATIONS  (PRN):  dextrose Oral Gel 15 Gram(s) Oral once PRN Blood Glucose LESS THAN 70 milliGRAM(s)/deciliter        OBJECTIVE:  ICU Vital Signs Last 24 Hrs  T(C): 36.9 (10 Dec 2024 04:00), Max: 37 (09 Dec 2024 16:00)  T(F): 98.4 (10 Dec 2024 04:00), Max: 98.6 (09 Dec 2024 16:00)  HR: 67 (10 Dec 2024 06:00) (64 - 91)  BP: 100/43 (10 Dec 2024 06:00) (94/46 - 144/61)  BP(mean): 61 (10 Dec 2024 06:00) (55 - 86)  ABP: --  ABP(mean): --  RR: 18 (10 Dec 2024 06:00) (12 - 22)  SpO2: 97% (10 Dec 2024 06:00) (94% - 100%)    O2 Parameters below as of 10 Dec 2024 06:00  Patient On (Oxygen Delivery Method): room air            I&O's Summary    08 Dec 2024 07:01  -  09 Dec 2024 07:00  --------------------------------------------------------  IN: 1813.5 mL / OUT: 1150 mL / NET: 663.5 mL    09 Dec 2024 07:01  -  10 Dec 2024 06:21  --------------------------------------------------------  IN: 2235.9 mL / OUT: 135 mL / NET: 2100.9 mL      Daily     Daily Weight in k.7 (10 Dec 2024 05:00)      PHYSICAL EXAMINATION:  General: Comfortable, no acute distress, cooperative with exam.  HEENT: Moist mucous membranes.  Respiratory: CTAB, normal respiratory effort, no coughing, wheezes, crackles, or rales.  CV: RRR, S1S2, no murmurs, rubs or gallops. No JVD. Distal pulses intact.  Abdominal: Soft, nontender, nondistended, no rebound or guarding, normal bowel sounds.  Neurology: AOx3, no focal neuro defects, BUSH x 4.  Extremities: No pitting edema, + Peripheral pulses.          LABS:                          7.3    8.84  )-----------( 182      ( 10 Dec 2024 01:30 )             21.9     12-10    132[L]  |  99  |  70[H]  ----------------------------<  196[H]  4.9   |  15[L]  |  5.07[H]    Ca    8.2[L]      10 Dec 2024 01:30  Phos  5.3     12-10  Mg     2.40     12-10    TPro  5.8[L]  /  Alb  2.7[L]  /  TBili  2.6[H]  /  DBili  x   /  AST  576[H]  /  ALT  564[H]  /  AlkPhos  155[H]  12-10    LIVER FUNCTIONS - ( 10 Dec 2024 01:30 )  Alb: 2.7 g/dL / Pro: 5.8 g/dL / ALK PHOS: 155 U/L / ALT: 564 U/L / AST: 576 U/L / GGT: x           PT/INR - ( 09 Dec 2024 01:49 )   PT: 17.0 sec;   INR: 1.43 ratio         PTT - ( 10 Dec 2024 01:30 )  PTT:73.2 sec    CARDIAC MARKERS ( 09 Dec 2024 05:47 )  x     / x     / x     / x     / 7.1 ng/mL  CARDIAC MARKERS ( 08 Dec 2024 15:47 )  x     / x     / x     / x     / 10.5 ng/mL      Urinalysis Basic - ( 10 Dec 2024 01:30 )    Color: x / Appearance: x / SG: x / pH: x  Gluc: 196 mg/dL / Ketone: x  / Bili: x / Urobili: x   Blood: x / Protein: x / Nitrite: x   Leuk Esterase: x / RBC: x / WBC x   Sq Epi: x / Non Sq Epi: x / Bacteria: x    echo:  TTE 2024   1. Technically very difficult study performed with the patient in the supine position.   2. Technically difficult image quality.   3. Left ventricular systolic function is mildly to moderately decreased with anejection fraction visually estimated at 40 to 45%. There are regional wall motion abnormalities present. Hypokinesis of the inferolateral wall, basal to mid inferior wall, and basal inferoseptum. There is mild (grade 1) left ventricular diastolic dysfunction.   4. The right ventricle is not well visualized.   5. Structurally normal mitral valve with normal leaflet excursion. There is calcification of the mitral valve annulus. There is mild mitral regurgitation.   6. No prior echocardiogram is available for comparison.    < from: TTE Limited W or WO Ultrasound Enhancing Agent (24 @ 16:50) >  CONCLUSIONS:      1. Left ventricular cavity is normal in size. Left ventricular wall thickness is normal. Left ventricular systolic function is mildly to moderately decreased with an ejection fraction of 46 % by Montana's method of disks. Regional wall motion abnormalities present.   2. There is hypokinesis of the basal inferolateral, mid inferolateral, basal inferior and basal inferoseptal walls.   3. Echogenic mass is seen possibly in the left atrium, finding is not seen in every view. Unable to determine if it represents artifact or pathologic finding. Consider repeat TTE vs FOREST. (Prior images of study on 24 were technically difficult, comparison is limited).    < end of copied text >          Assessment and Plan:   · Assessment	  79F w HTN, DM, severe PAD w r BKA, L aka, CAD (sp 3v CABG 20 yrs ago, last cath , only graft patent was LIMA-LAD, natives with  of LAD and RCA, OM w severe stented w BMS at that time) who presented with 15 min of resting chest pain, found to have Hgb of 7 (from 12 several years ago) and rising elevated troponins from 90s->280s; her ECG shows Inferior Q waves w chronic diffuse ST/TW changes, all of which are largely unchanged from prior tracings. TTE w midrange LVEF 40-45%. She has been chest pain free since her blood transfusion.   Overnight on  had telemetry notable for progressively worsening heart block (1st degree -> 3rd degree -> asystole >14 seconds).  Pt returned to NSR by the time RRT arrived to bedside.   Labs without significant actionable electrolyte disturbance.  TTE w/ wall motion abnormalities affecting inferolateral wall, basal to mid inferior wall, and basal infero-septum; ischemia could also be  of bradyarrhythmia as opposed to medication given.  Dilaudid was addressed w/ narcan and coreg not administered today.     Plan:     === NEUROLOGY ===      -Mental status: somnolent AAO x 3, not baseline  -Sedation: None  -Pain control: None    === RESPIRATORY ===     -EARLINE    === CARDIOVASCULAR ===    ##CHB bradycardia  -EKG suggestive of high grade AV Block, with intermittent 3rd degree   -Patients HR currently NSR 70 BPM, BP90/43.   -DDX: Iatrogenic vs conduction defect vs ischemia  -Atropine and Zoll pads at bedside  -Dopamine gtt, titrate to HR > 60  -Consider TVP if HD unstable  -Hold AV cindy blockers  -EP consult and f/u recommendations for RRT for asystole with escape beats   - suspect likely vagal mediated   Hold narcotics  Continue to monitor     #CAD s/p CABG ()  -asa, plavix  -statin held for abn LFT  -Most recent C with  of the LAD and RCA, and tight OM lesion that was stented with a BMS  -Recent outpatient nuclear stress test with scar and hypokinesis in the RCA territory, consistent with TTE here with EF 40-45% w/ inferior/inferolateral WMA     - heparin gtt x 48 hours - concern ACS (-)  - Repeat echo shows Echogenic mass is seen possibly in the left atrium, finding is not seen in every view. Repeat limited LA echo  -       === RENAL/ ===     #DAMARI   -noted to have acute cr elevation from 1 on admisison to 1.8   - bladder scan elevated, howe now placed   - likely post ob, but check urine ltyes   - renal US pending   - hold nephrotoxic agents   -of note did get contrast on admit, but early for ZENOBIA.    #Hypertension  -Hold coreg in setting of CHB  -Will hold imdur and losartan for now while on dopa      === GASTROINTESTINAL ===    #Acute pancreatitis  -Abdominal pain, lipase over 3K, signs of acute interstitial pancreatitis on imaging  -Unclear cause of episode; no alcohol use, no recent instrumentation of bile ducts, no evidence of choledocholithiasis on abdominal ultrasound. No new medications. Triglycerides not elevated. Could be idiopathic.   -GI recs appreciated -Ok for low fat diet as tolerated , -ADAT, MRCP ordered , Gentle IVF given mildly reduced LVEF with regional WMAs seen on recent echo., Trend LFTs   < from: MR MRCP w/wo IV Cont (12.10.24 @ 13:41) >  Acute interstitial pancreatitis. No evidence of parenchymal necrosis or   walled off fluid collection.  No gallstones or choledocholithiasis.    - Needs colonoscopy outpatient for colitis seen on CT in 6-8 weeks   GI will plan to sign off at this time. Please feel free to reach out to our team with any follow up questions. Please provide patient with Gastroenterology Clinic number to confirm/arrange appointment; [phone] ([facility] at [address] [place] [address]) for follow up with Dr. Mar      #Elevated liver enzymes.   -Elevated AST/ALT to 242/140, though evidence of moderate hemolysis  -Also with elevated Tbili  -Hold atorvastatin for now  -GI consult  -MRCP ordered       === ENDO  ===    #Type 2 diabetes mellitus.   -Reported to be on basal and bolus pre-meal at home (though reported to be twice a day)  -Hold pre-meal for now given NPO, re-add as indicated when diet able to be advanced  -Dose reduced home 30U lantus to 24U at bedtime given NPO status, adjust as indicated based on sugars  -Low insulin sliding scale q6hrs while NPO  -A1C of 7.5 on 24  -DASH/TLC/CC diet when advanced from NPO      === HEMATOLOGIC/ONC ===    - H/H & plts stable   - Monitor H/H and plts   - DVT PPX: heparin gtt     === INFECTIOUS DISEASE ===     #Leukocytosis  - no fever  - Continue to monitor with daily CBC  - Trend fever curve  - Possible sources of urinary tract given previously reported burning with urination. Has positive UA, though with fair amount of epithelial cells. Could also be related to colitis seen on CT imaging  - Could be febrile in setting of inflammatory reaction given acute pancreatitis  - Continue zosyn      Lines/Tubes: PIV x 2,

## 2024-12-10 NOTE — PROGRESS NOTE ADULT - SUBJECTIVE AND OBJECTIVE BOX
Interval History:  Decreased urine output  Off dopamine     MEDICATIONS  (STANDING):  aspirin enteric coated 81 milliGRAM(s) Oral daily  brimonidine 0.2% Ophthalmic Solution 1 Drop(s) Both EYES two times a day  chlorhexidine 2% Cloths 1 Application(s) Topical daily  clopidogrel Tablet 75 milliGRAM(s) Oral daily  dextrose 5%. 1000 milliLiter(s) (50 mL/Hr) IV Continuous <Continuous>  dextrose 5%. 1000 milliLiter(s) (100 mL/Hr) IV Continuous <Continuous>  dextrose 50% Injectable 25 Gram(s) IV Push once  dextrose 50% Injectable 12.5 Gram(s) IV Push once  dextrose 50% Injectable 25 Gram(s) IV Push once  glucagon  Injectable 1 milliGRAM(s) IntraMuscular once  heparin  Infusion 850 Unit(s)/Hr (6 mL/Hr) IV Continuous <Continuous>  insulin glargine Injectable (LANTUS) 24 Unit(s) SubCutaneous at bedtime  insulin lispro (ADMELOG) corrective regimen sliding scale   SubCutaneous three times a day before meals  insulin lispro (ADMELOG) corrective regimen sliding scale   SubCutaneous at bedtime  lactated ringers. 1000 milliLiter(s) (75 mL/Hr) IV Continuous <Continuous>  naloxone Injectable 0.4 milliGRAM(s) IV Push once  pantoprazole   Suspension 40 milliGRAM(s) Oral daily  piperacillin/tazobactam IVPB.. 3.375 Gram(s) IV Intermittent every 12 hours    MEDICATIONS  (PRN):  dextrose Oral Gel 15 Gram(s) Oral once PRN Blood Glucose LESS THAN 70 milliGRAM(s)/deciliter    Vital Signs Last 24 Hrs  T(C): 36.6 (12-10-24 @ 08:00), Max: 37 (12-09-24 @ 16:00)  T(F): 97.8 (12-10-24 @ 08:00), Max: 98.6 (12-09-24 @ 16:00)  HR: 65 (12-10-24 @ 10:00) (64 - 70)  BP: 107/50 (12-10-24 @ 10:00) (94/46 - 126/56)  BP(mean): 65 (12-10-24 @ 10:00) (55 - 84)  RR: 22 (12-10-24 @ 10:00) (12 - 22)  SpO2: 97% (12-10-24 @ 10:00) (96% - 100%)    Appearance: Normal	  HEENT:   Normal oral mucosa, PERRL, EOMI	  Lymphatic: No lymphadenopathy  Cardiovascular: Normal S1 S2, No JVD, No murmurs, No edema  Respiratory: Lungs clear to auscultation		  Extremities: Normal range of motion, No clubbing, cyanosis or edema  Vascular: Peripheral pulses palpable 2+ bilaterally    LABS:	 	    CBC Full  -  ( 10 Dec 2024 01:30 )  WBC Count : 8.84 K/uL  Hemoglobin : 7.3 g/dL  Hematocrit : 21.9 %  Platelet Count - Automated : 182 K/uL  Mean Cell Volume : 87.6 fL  Mean Cell Hemoglobin : 29.2 pg  Mean Cell Hemoglobin Concentration : 33.3 g/dL  Auto Neutrophil # : x  Auto Lymphocyte # : x  Auto Monocyte # : x  Auto Eosinophil # : x  Auto Basophil # : x  Auto Neutrophil % : x  Auto Lymphocyte % : x  Auto Monocyte % : x  Auto Eosinophil % : x  Auto Basophil % : x    12-10    132[L]  |  99  |  70[H]  ----------------------------<  196[H]  4.9   |  15[L]  |  5.07[H]  12-09    134[L]  |  99  |  66[H]  ----------------------------<  230[H]  5.0   |  16[L]  |  4.51[H]    Ca    8.2[L]      10 Dec 2024 01:30  Ca    8.1[L]      09 Dec 2024 16:10  Phos  5.3     12-10  Phos  5.7     12-09  Mg     2.40     12-10  Mg     2.40     12-09    TPro  5.8[L]  /  Alb  2.7[L]  /  TBili  2.6[H]  /  DBili  x   /  AST  576[H]  /  ALT  564[H]  /  AlkPhos  155[H]  12-10  TPro  6.1  /  Alb  2.8[L]  /  TBili  2.9[H]  /  DBili  x   /  AST  873[H]  /  ALT  622[H]  /  AlkPhos  155[H]  12-09    PT/INR - ( 09 Dec 2024 01:49 )   PT: 17.0 sec;   INR: 1.43 ratio         PTT - ( 10 Dec 2024 01:30 )  PTT:73.2 sec    LIVER FUNCTIONS - ( 10 Dec 2024 01:30 )  Alb: 2.7 g/dL / Pro: 5.8 g/dL / ALK PHOS: 155 U/L / ALT: 564 U/L / AST: 576 U/L / GGT: x

## 2024-12-10 NOTE — PROGRESS NOTE ADULT - SUBJECTIVE AND OBJECTIVE BOX
Ady Villatoro MD  Interventional Cardiology / Advance Heart Failure and Cardiac Transplant Specialist  [place] Office : [address] [place] [address] [place] [address]  Tel:   [place] Office : [address] [facility] [place] [address]  Tel: [phone]       Pt is lying in bed comfortable not in distress, lethargic  	  MEDICATIONS:  aspirin enteric coated 81 milliGRAM(s) Oral daily  clopidogrel Tablet 75 milliGRAM(s) Oral daily  heparin  Infusion 850 Unit(s)/Hr IV Continuous <Continuous>  piperacillin/tazobactam IVPB.. 3.375 Gram(s) IV Intermittent every 12 hours  pantoprazole   Suspension 40 milliGRAM(s) Oral daily  dextrose 50% Injectable 25 Gram(s) IV Push once  dextrose 50% Injectable 12.5 Gram(s) IV Push once  dextrose 50% Injectable 25 Gram(s) IV Push once  dextrose Oral Gel 15 Gram(s) Oral once PRN  glucagon  Injectable 1 milliGRAM(s) IntraMuscular once  insulin glargine Injectable (LANTUS) 24 Unit(s) SubCutaneous at bedtime  insulin lispro (ADMELOG) corrective regimen sliding scale   SubCutaneous three times a day before meals  insulin lispro (ADMELOG) corrective regimen sliding scale   SubCutaneous at bedtime    brimonidine 0.2% Ophthalmic Solution 1 Drop(s) Both EYES two times a day  chlorhexidine 2% Cloths 1 Application(s) Topical daily  dextrose 5%. 1000 milliLiter(s) IV Continuous <Continuous>  dextrose 5%. 1000 milliLiter(s) IV Continuous <Continuous>  lactated ringers. 1000 milliLiter(s) IV Continuous <Continuous>      PAST MEDICAL/SURGICAL HISTORY  PAST MEDICAL & SURGICAL HISTORY:  S/P CABG x 3  in 2004, [facility]      Stented coronary artery  2010 [facility]      PAD (peripheral artery disease)  s/p R BKA      Carotid artery stenosis      DM (diabetes mellitus)  type II      Hypercholesterolemia      Obesity      Hypertension      CAD (coronary artery disease)      Acute kidney injury superimposed on chronic kidney disease      Chronic diastolic heart failure      Vitamin D deficiency      Normocytic anemia      Pulmonary HTN      Diabetic retinopathy      Hx of CABG  3v 2004      S/P hysterectomy  2004      S/P angioplasty with stent  2009, 3/2014      S/P below knee amputation, right  6/2010      S/P eye surgery  for glaucoma      S/P CABG x 3      S/P BKA (below knee amputation) unilateral, right      History of hysterectomy      Elective surgery  traumatic amputation of the toe      S/P BKA (below knee amputation), left          SOCIAL HISTORY: Substance Use (street drugs): ( x ) never used  (  ) other:    FAMILY HISTORY:  Family history of diabetes mellitus (Sibling)         PHYSICAL EXAM:  T(C): 36.8 (12-10-24 @ 13:46), Max: 37 (12-09-24 @ 16:00)  HR: 64 (12-10-24 @ 14:00) (64 - 70)  BP: 121/49 (12-10-24 @ 14:00) (97/47 - 124/51)  RR: 22 (12-10-24 @ 14:00) (12 - 22)  SpO2: 98% (12-10-24 @ 14:00) (96% - 100%)  Wt(kg): --  I&O's Summary    09 Dec 2024 07:01  -  10 Dec 2024 07:00  --------------------------------------------------------  IN: 2235.9 mL / OUT: 135 mL / NET: 2100.9 mL    10 Dec 2024 07:01  -  10 Dec 2024 15:11  --------------------------------------------------------  IN: 342 mL / OUT: 5 mL / NET: 337 mL          EYES:   PERRLA   ENMT:   Moist mucous membranes, Good dentition, No lesions  Cardiovascular: Normal S1 S2, No JVD, No murmurs, No edema  Respiratory: Lungs clear to auscultation	  Gastrointestinal:  Soft, Non-tender, + BS	  Extremities: no edema                                    7.3    8.84  )-----------( 182      ( 10 Dec 2024 01:30 )             21.9     12-10    132[L]  |  99  |  70[H]  ----------------------------<  196[H]  4.9   |  15[L]  |  5.07[H]    Ca    8.2[L]      10 Dec 2024 01:30  Phos  5.3     12-10  Mg     2.40     12-10    TPro  5.8[L]  /  Alb  2.7[L]  /  TBili  2.6[H]  /  DBili  x   /  AST  576[H]  /  ALT  564[H]  /  AlkPhos  155[H]  12-10    proBNP:   Lipid Profile:   HgA1c:   TSH:     Consultant(s) Notes Reviewed:  [x ] YES  [ ] NO    Care Discussed with Consultants/Other Providers [ x] YES  [ ] NO    Imaging Personally Reviewed independently:  [x] YES  [ ] NO    All labs, radiologic studies, vitals, orders and medications list reviewed. Patient is seen and examined at bedside. Case discussed with medical team.

## 2024-12-10 NOTE — PROGRESS NOTE ADULT - ASSESSMENT
79yoF w/ PMHx CAD s/p CABG and PCI, HTN, HLD, DMII, severe PAD with b/l AKA initially presented with abdominal pain found to have acute pancreatitis.  Seen by GI recommending a MRCP, on abx and IVF.  Hospital course complicated by sinus arrest up to 27 seconds, as seen on telemetry, patient was lethargic at the time.  Transferred to CCU for further monitoring.  Currently remains lethargic but arousable to voice, on telemetry NSR.  Endorses chest pain and abdominal pain when she first arrived but symptoms has since resolved.  Labs significant for WBC 11.85, worsening DAMARI, uptrending troponins, pH 7.22. TTE mildly reduced LV function EF 40-45%.      Sinus Arrest   Suspect likely vagal mediated   Continue to monitor on telemetry  Correct electrolyte dysfunction  Hold AV cindy blockers   Keep K>4 Mg>2   Management as per CCU and GI

## 2024-12-10 NOTE — DISCHARGE NOTE PROVIDER - PROVIDER TOKENS
PROVIDER:[TOKEN:[[id number]],FOLLOWUP:[2 months]] PROVIDER:[TOKEN:[[id number]],FOLLOWUP:[2 months]],PROVIDER:[TOKEN:[[id number]]]

## 2024-12-10 NOTE — DISCHARGE NOTE PROVIDER - NSFOLLOWUPCLINICS_GEN_ALL_ED_FT
CANDIS Nogueira [facility] [place]  Urology  [address] [place] [address], [place] [address]  Phone: [phone]  Fax:

## 2024-12-10 NOTE — DISCHARGE NOTE PROVIDER - CARE PROVIDER_API CALL
Farooq Mar  Gastroenterology  [address] [place] [address]  Phone: [phone]  Fax: [phone]  Follow Up Time: 2 months   Farooq Mar  Gastroenterology  [address] [place] [id number]  Phone: [phone]  Fax: [phone]  Follow Up Time: 2 months    Martinez Ribera  Cardiac Electrophysiology  [address] [place] [address], [address]  [place] [address]  Phone: [phone]  Fax: [phone]  Follow Up Time:

## 2024-12-11 ENCOUNTER — RESULT REVIEW (OUTPATIENT)
Age: 79
End: 2024-12-11

## 2024-12-11 LAB
-  AMOXICILLIN/CLAVULANIC ACID: SIGNIFICANT CHANGE UP
-  AMOXICILLIN/CLAVULANIC ACID: SIGNIFICANT CHANGE UP
-  AMPICILLIN/SULBACTAM: SIGNIFICANT CHANGE UP
-  AMPICILLIN/SULBACTAM: SIGNIFICANT CHANGE UP
-  AMPICILLIN: SIGNIFICANT CHANGE UP
-  AMPICILLIN: SIGNIFICANT CHANGE UP
-  AZTREONAM: SIGNIFICANT CHANGE UP
-  AZTREONAM: SIGNIFICANT CHANGE UP
-  CEFAZOLIN: SIGNIFICANT CHANGE UP
-  CEFAZOLIN: SIGNIFICANT CHANGE UP
-  CEFEPIME: SIGNIFICANT CHANGE UP
-  CEFEPIME: SIGNIFICANT CHANGE UP
-  CEFOXITIN: SIGNIFICANT CHANGE UP
-  CEFOXITIN: SIGNIFICANT CHANGE UP
-  CEFTRIAXONE: SIGNIFICANT CHANGE UP
-  CEFTRIAXONE: SIGNIFICANT CHANGE UP
-  CEFUROXIME: SIGNIFICANT CHANGE UP
-  CEFUROXIME: SIGNIFICANT CHANGE UP
-  CIPROFLOXACIN: SIGNIFICANT CHANGE UP
-  CIPROFLOXACIN: SIGNIFICANT CHANGE UP
-  ERTAPENEM: SIGNIFICANT CHANGE UP
-  ERTAPENEM: SIGNIFICANT CHANGE UP
-  GENTAMICIN: SIGNIFICANT CHANGE UP
-  GENTAMICIN: SIGNIFICANT CHANGE UP
-  IMIPENEM: SIGNIFICANT CHANGE UP
-  IMIPENEM: SIGNIFICANT CHANGE UP
-  LEVOFLOXACIN: SIGNIFICANT CHANGE UP
-  LEVOFLOXACIN: SIGNIFICANT CHANGE UP
-  MEROPENEM: SIGNIFICANT CHANGE UP
-  MEROPENEM: SIGNIFICANT CHANGE UP
-  NITROFURANTOIN: SIGNIFICANT CHANGE UP
-  NITROFURANTOIN: SIGNIFICANT CHANGE UP
-  PIPERACILLIN/TAZOBACTAM: SIGNIFICANT CHANGE UP
-  PIPERACILLIN/TAZOBACTAM: SIGNIFICANT CHANGE UP
-  TOBRAMYCIN: SIGNIFICANT CHANGE UP
-  TOBRAMYCIN: SIGNIFICANT CHANGE UP
-  TRIMETHOPRIM/SULFAMETHOXAZOLE: SIGNIFICANT CHANGE UP
-  TRIMETHOPRIM/SULFAMETHOXAZOLE: SIGNIFICANT CHANGE UP
ADD ON TEST-SPECIMEN IN LAB: SIGNIFICANT CHANGE UP
ALBUMIN SERPL ELPH-MCNC: 2.5 G/DL — LOW (ref 3.3–5)
ALBUMIN SERPL ELPH-MCNC: 2.8 G/DL — LOW (ref 3.3–5)
ALP SERPL-CCNC: 171 U/L — HIGH (ref 40–120)
ALP SERPL-CCNC: 199 U/L — HIGH (ref 40–120)
ALT FLD-CCNC: 428 U/L — HIGH (ref 4–33)
ALT FLD-CCNC: 429 U/L — HIGH (ref 4–33)
ANION GAP SERPL CALC-SCNC: 19 MMOL/L — HIGH (ref 7–14)
ANION GAP SERPL CALC-SCNC: 20 MMOL/L — HIGH (ref 7–14)
APTT BLD: 73.6 SEC — HIGH (ref 24.5–35.6)
AST SERPL-CCNC: 242 U/L — HIGH (ref 4–32)
AST SERPL-CCNC: 248 U/L — HIGH (ref 4–32)
BILIRUB SERPL-MCNC: 2.2 MG/DL — HIGH (ref 0.2–1.2)
BILIRUB SERPL-MCNC: 2.3 MG/DL — HIGH (ref 0.2–1.2)
BLOOD GAS VENOUS COMPREHENSIVE RESULT: SIGNIFICANT CHANGE UP
BUN SERPL-MCNC: 76 MG/DL — HIGH (ref 7–23)
BUN SERPL-MCNC: 82 MG/DL — HIGH (ref 7–23)
CALCIUM SERPL-MCNC: 8.1 MG/DL — LOW (ref 8.4–10.5)
CALCIUM SERPL-MCNC: 8.6 MG/DL — SIGNIFICANT CHANGE UP (ref 8.4–10.5)
CHLORIDE SERPL-SCNC: 93 MMOL/L — LOW (ref 98–107)
CHLORIDE SERPL-SCNC: 96 MMOL/L — LOW (ref 98–107)
CK MB BLD-MCNC: 0.9 % — SIGNIFICANT CHANGE UP (ref 0–2.5)
CK MB CFR SERPL CALC: 4.3 NG/ML — SIGNIFICANT CHANGE UP
CK SERPL-CCNC: 476 U/L — HIGH (ref 25–170)
CO2 SERPL-SCNC: 15 MMOL/L — LOW (ref 22–31)
CO2 SERPL-SCNC: 18 MMOL/L — LOW (ref 22–31)
CREAT SERPL-MCNC: 5.17 MG/DL — HIGH (ref 0.5–1.3)
CREAT SERPL-MCNC: 5.73 MG/DL — HIGH (ref 0.5–1.3)
CULTURE RESULTS: ABNORMAL
CULTURE RESULTS: ABNORMAL
EGFR: 7 ML/MIN/1.73M2 — LOW
EGFR: 8 ML/MIN/1.73M2 — LOW
GLUCOSE BLDC GLUCOMTR-MCNC: 177 MG/DL — HIGH (ref 70–99)
GLUCOSE BLDC GLUCOMTR-MCNC: 204 MG/DL — HIGH (ref 70–99)
GLUCOSE BLDC GLUCOMTR-MCNC: 210 MG/DL — HIGH (ref 70–99)
GLUCOSE BLDC GLUCOMTR-MCNC: 245 MG/DL — HIGH (ref 70–99)
GLUCOSE SERPL-MCNC: 180 MG/DL — HIGH (ref 70–99)
GLUCOSE SERPL-MCNC: 184 MG/DL — HIGH (ref 70–99)
HCT VFR BLD CALC: 21.8 % — LOW (ref 34.5–45)
HCT VFR BLD CALC: 23.2 % — LOW (ref 34.5–45)
HGB BLD-MCNC: 7.3 G/DL — LOW (ref 11.5–15.5)
HGB BLD-MCNC: 8.3 G/DL — LOW (ref 11.5–15.5)
MAGNESIUM SERPL-MCNC: 2.4 MG/DL — SIGNIFICANT CHANGE UP (ref 1.6–2.6)
MAGNESIUM SERPL-MCNC: 2.4 MG/DL — SIGNIFICANT CHANGE UP (ref 1.6–2.6)
MCHC RBC-ENTMCNC: 28.7 PG — SIGNIFICANT CHANGE UP (ref 27–34)
MCHC RBC-ENTMCNC: 29.9 PG — SIGNIFICANT CHANGE UP (ref 27–34)
MCHC RBC-ENTMCNC: 33.5 G/DL — SIGNIFICANT CHANGE UP (ref 32–36)
MCHC RBC-ENTMCNC: 35.8 G/DL — SIGNIFICANT CHANGE UP (ref 32–36)
MCV RBC AUTO: 83.5 FL — SIGNIFICANT CHANGE UP (ref 80–100)
MCV RBC AUTO: 85.8 FL — SIGNIFICANT CHANGE UP (ref 80–100)
METHOD TYPE: SIGNIFICANT CHANGE UP
METHOD TYPE: SIGNIFICANT CHANGE UP
NRBC # BLD: 0 /100 WBCS — SIGNIFICANT CHANGE UP (ref 0–0)
NRBC # BLD: 0 /100 WBCS — SIGNIFICANT CHANGE UP (ref 0–0)
NRBC # FLD: 0.04 K/UL — HIGH (ref 0–0)
NRBC # FLD: 0.05 K/UL — HIGH (ref 0–0)
ORGANISM # SPEC MICROSCOPIC CNT: ABNORMAL
PHOSPHATE SERPL-MCNC: 5.1 MG/DL — HIGH (ref 2.5–4.5)
PHOSPHATE SERPL-MCNC: 5.5 MG/DL — HIGH (ref 2.5–4.5)
PLATELET # BLD AUTO: 201 K/UL — SIGNIFICANT CHANGE UP (ref 150–400)
PLATELET # BLD AUTO: 247 K/UL — SIGNIFICANT CHANGE UP (ref 150–400)
POTASSIUM SERPL-MCNC: 4 MMOL/L — SIGNIFICANT CHANGE UP (ref 3.5–5.3)
POTASSIUM SERPL-MCNC: 4.2 MMOL/L — SIGNIFICANT CHANGE UP (ref 3.5–5.3)
POTASSIUM SERPL-SCNC: 4 MMOL/L — SIGNIFICANT CHANGE UP (ref 3.5–5.3)
POTASSIUM SERPL-SCNC: 4.2 MMOL/L — SIGNIFICANT CHANGE UP (ref 3.5–5.3)
PROT SERPL-MCNC: 5.5 G/DL — LOW (ref 6–8.3)
PROT SERPL-MCNC: 6.2 G/DL — SIGNIFICANT CHANGE UP (ref 6–8.3)
RBC # BLD: 2.54 M/UL — LOW (ref 3.8–5.2)
RBC # BLD: 2.78 M/UL — LOW (ref 3.8–5.2)
RBC # FLD: 14.4 % — SIGNIFICANT CHANGE UP (ref 10.3–14.5)
RBC # FLD: 14.6 % — HIGH (ref 10.3–14.5)
SODIUM SERPL-SCNC: 128 MMOL/L — LOW (ref 135–145)
SODIUM SERPL-SCNC: 133 MMOL/L — LOW (ref 135–145)
SPECIMEN SOURCE: SIGNIFICANT CHANGE UP
SPECIMEN SOURCE: SIGNIFICANT CHANGE UP
TROPONIN T, HIGH SENSITIVITY RESULT: 994 NG/L — CRITICAL HIGH
WBC # BLD: 10.42 K/UL — SIGNIFICANT CHANGE UP (ref 3.8–10.5)
WBC # BLD: 8.69 K/UL — SIGNIFICANT CHANGE UP (ref 3.8–10.5)
WBC # FLD AUTO: 10.42 K/UL — SIGNIFICANT CHANGE UP (ref 3.8–10.5)
WBC # FLD AUTO: 8.69 K/UL — SIGNIFICANT CHANGE UP (ref 3.8–10.5)

## 2024-12-11 PROCEDURE — 99291 CRITICAL CARE FIRST HOUR: CPT

## 2024-12-11 PROCEDURE — 76770 US EXAM ABDO BACK WALL COMP: CPT | Mod: 26

## 2024-12-11 PROCEDURE — 99233 SBSQ HOSP IP/OBS HIGH 50: CPT | Mod: GC

## 2024-12-11 PROCEDURE — 99232 SBSQ HOSP IP/OBS MODERATE 35: CPT

## 2024-12-11 PROCEDURE — 93308 TTE F-UP OR LMTD: CPT | Mod: 26,GC

## 2024-12-11 RX ORDER — POLYETHYLENE GLYCOL 3350 17 G/17G
17 POWDER, FOR SOLUTION ORAL DAILY
Refills: 0 | Status: DISCONTINUED | OUTPATIENT
Start: 2024-12-11 | End: 2024-12-16

## 2024-12-11 RX ORDER — SENNOSIDES 8.6 MG
2 TABLET ORAL AT BEDTIME
Refills: 0 | Status: DISCONTINUED | OUTPATIENT
Start: 2024-12-11 | End: 2024-12-16

## 2024-12-11 RX ORDER — BUMETANIDE 1 MG/1
2 TABLET ORAL ONCE
Refills: 0 | Status: COMPLETED | OUTPATIENT
Start: 2024-12-11 | End: 2024-12-11

## 2024-12-11 RX ORDER — LIDOCAINE 40 MG/G
1 CREAM TOPICAL DAILY
Refills: 0 | Status: DISCONTINUED | OUTPATIENT
Start: 2024-12-11 | End: 2024-12-16

## 2024-12-11 RX ORDER — BUMETANIDE 1 MG/1
1 TABLET ORAL
Qty: 20 | Refills: 0 | Status: DISCONTINUED | OUTPATIENT
Start: 2024-12-11 | End: 2024-12-12

## 2024-12-11 RX ORDER — DIGOXIN 250 MCG
250 TABLET ORAL ONCE
Refills: 0 | Status: DISCONTINUED | OUTPATIENT
Start: 2024-12-11 | End: 2024-12-11

## 2024-12-11 RX ORDER — ACETAMINOPHEN 500MG 500 MG/1
1000 TABLET, COATED ORAL ONCE
Refills: 0 | Status: COMPLETED | OUTPATIENT
Start: 2024-12-11 | End: 2024-12-11

## 2024-12-11 RX ORDER — GABAPENTIN 300 MG/1
300 CAPSULE ORAL DAILY
Refills: 0 | Status: DISCONTINUED | OUTPATIENT
Start: 2024-12-11 | End: 2024-12-13

## 2024-12-11 RX ADMIN — SODIUM BICARBONATE 650 MILLIGRAM(S): 84 INJECTION, SOLUTION INTRAVENOUS at 14:46

## 2024-12-11 RX ADMIN — ACETAMINOPHEN 500MG 400 MILLIGRAM(S): 500 TABLET, COATED ORAL at 05:19

## 2024-12-11 RX ADMIN — INSULIN GLARGINE 24 UNIT(S): 100 INJECTION, SOLUTION SUBCUTANEOUS at 21:37

## 2024-12-11 RX ADMIN — Medication 81 MILLIGRAM(S): at 11:05

## 2024-12-11 RX ADMIN — POLYETHYLENE GLYCOL 3350 17 GRAM(S): 17 POWDER, FOR SOLUTION ORAL at 11:05

## 2024-12-11 RX ADMIN — BUMETANIDE 5 MG/HR: 1 TABLET ORAL at 12:30

## 2024-12-11 RX ADMIN — PIPERACILLIN SODIUM AND TAZOBACTAM SODIUM 25 GRAM(S): 4; .5 INJECTION, POWDER, LYOPHILIZED, FOR SOLUTION INTRAVENOUS at 17:21

## 2024-12-11 RX ADMIN — ACETAMINOPHEN 500MG 1000 MILLIGRAM(S): 500 TABLET, COATED ORAL at 20:15

## 2024-12-11 RX ADMIN — PANTOPRAZOLE SODIUM 40 MILLIGRAM(S): 40 TABLET, DELAYED RELEASE ORAL at 11:05

## 2024-12-11 RX ADMIN — LIDOCAINE 1 PATCH: 40 CREAM TOPICAL at 23:47

## 2024-12-11 RX ADMIN — BRIMONIDINE TARTRATE 1 DROP(S): 1.5 SOLUTION/ DROPS OPHTHALMIC at 17:22

## 2024-12-11 RX ADMIN — PIPERACILLIN SODIUM AND TAZOBACTAM SODIUM 25 GRAM(S): 4; .5 INJECTION, POWDER, LYOPHILIZED, FOR SOLUTION INTRAVENOUS at 05:19

## 2024-12-11 RX ADMIN — Medication 4: at 17:22

## 2024-12-11 RX ADMIN — SODIUM BICARBONATE 650 MILLIGRAM(S): 84 INJECTION, SOLUTION INTRAVENOUS at 05:19

## 2024-12-11 RX ADMIN — BUMETANIDE 2 MILLIGRAM(S): 1 TABLET ORAL at 05:18

## 2024-12-11 RX ADMIN — BUMETANIDE 2 MILLIGRAM(S): 1 TABLET ORAL at 11:04

## 2024-12-11 RX ADMIN — LIDOCAINE 1 PATCH: 40 CREAM TOPICAL at 19:13

## 2024-12-11 RX ADMIN — ACETAMINOPHEN 500MG 1000 MILLIGRAM(S): 500 TABLET, COATED ORAL at 05:34

## 2024-12-11 RX ADMIN — SODIUM BICARBONATE 650 MILLIGRAM(S): 84 INJECTION, SOLUTION INTRAVENOUS at 21:37

## 2024-12-11 RX ADMIN — CHLORHEXIDINE GLUCONATE 1 APPLICATION(S): 1.2 RINSE ORAL at 11:07

## 2024-12-11 RX ADMIN — Medication 2: at 07:40

## 2024-12-11 RX ADMIN — BRIMONIDINE TARTRATE 1 DROP(S): 1.5 SOLUTION/ DROPS OPHTHALMIC at 05:19

## 2024-12-11 RX ADMIN — LIDOCAINE 1 PATCH: 40 CREAM TOPICAL at 11:05

## 2024-12-11 RX ADMIN — Medication 6 UNIT(S)/HR: at 08:17

## 2024-12-11 RX ADMIN — GABAPENTIN 300 MILLIGRAM(S): 300 CAPSULE ORAL at 21:37

## 2024-12-11 RX ADMIN — BUMETANIDE 5 MG/HR: 1 TABLET ORAL at 20:00

## 2024-12-11 RX ADMIN — CLOPIDOGREL 75 MILLIGRAM(S): 75 TABLET, FILM COATED ORAL at 11:06

## 2024-12-11 RX ADMIN — Medication 2 TABLET(S): at 21:37

## 2024-12-11 RX ADMIN — Medication 4: at 11:07

## 2024-12-11 RX ADMIN — ACETAMINOPHEN 500MG 400 MILLIGRAM(S): 500 TABLET, COATED ORAL at 20:00

## 2024-12-11 NOTE — PROGRESS NOTE ADULT - ASSESSMENT
EKg SR TWI V2-V6, inferior leads    LHC 2017   of the LAD and RCA, patent LIMA to LAD graft, OM stented with  BMS    TTE 12/2/2024   1. Technically very difficult study performed with the patient in the supine position.   2. Technically difficult image quality.   3. Left ventricular systolic function is mildly to moderately decreased with anejection fraction visually estimated at 40 to 45%. There are regional wall motion abnormalities present. Hypokinesis of the inferolateral wall, basal to mid inferior wall, and basal inferoseptum. There is mild (grade 1) left ventricular diastolic dysfunction.   4. The right ventricle is not well visualized.   5. Structurally normal mitral valve with normal leaflet excursion. There is calcification of the mitral valve annulus. There is mild mitral regurgitation.   6. No prior echocardiogram is available for comparison.    TTE 12/9/2024   1. Left ventricular cavity is normal in size. Left ventricular wall thickness is normal. Left ventricular systolic function is mildly to moderately decreased with an ejection fraction of 46 % by Montana's method of disks. Regional wall motion abnormalities present.   2. There is hypokinesis of the basal inferolateral, mid inferolateral, basal inferior and basal inferoseptal walls.   3. Echogenic mass is seen possibly in the left atrium, finding is not seen in every view. Unable to determine if it represents artifact or pathologic finding. Consider repeat TTE vs FOREST. (Prior images of study on 12/2/24 were technically difficult, comparison is limited).    TTE limited 12/11/2024   1. Limited repeat study to evaluatepossible left atrial mass. A mobile echodensity is visualized in the left atrium intermittently (seen only in the parasternal long axis view). This may represent a hypermobile interatrial septum.   2. Compared to the transthoracic echocardiogram performed on 12/9/2024, there have been no significant interval changes.      A/P    79 year old woman with history of CAD s/p CABG (2004), HTN, HLD, IDDM2, severe PAD with right and left AKA presenting with complaint of one day of abdominal pain. Meeting sepsis criteria and found to have acute pancreatitis.    1. Sinus pause, asystole   - s/p RRT, EP consulted for possible PPM, as per them possibly vagally mediated   - s/p dopamin gtt  - c/w tele    2. Elevated troponin  - LHC and NST deferred during last admission (last week)  - Trop 192>418, c/p epigastric pain  - TTE 12/2/24 mild-mod LV dysfunction(EF 40-45%)  -s/p RRT, Trop 1329 12/9, Trop trend trop, add CK/CKMB,   - EKg SR TWI V2-V6, inferior leads, will discuss ACS workup   - repeat echo shows same LV function as 1 week ago     2. Acute pancreatitis  -on Abx f/u GI recs  - for MRCP     3. CAD s/p CABG (2004)  -asa, plavix  -statin held for abn LFT    4. DVT ppx- on IV heparin

## 2024-12-11 NOTE — PROGRESS NOTE ADULT - SUBJECTIVE AND OBJECTIVE BOX
[facility] DIVISION OF KIDNEY DISEASES AND HYPERTENSION --    Chief Complaint: DAMARI    24 hour events/subjective: Patient seen and examined at bedside in CCU. Received one dose of ethacrynic acid 50mg PO and one dose of Bumex 2mg IV yesterday and pt responded with 930cc of urine output in last 24h. Pt reports arm and neck pain this morning that started at night. Also has some jaw pain. No fever, chills, HA, dizziness, SOB, chest pain, or abdominal pain.      PAST HISTORY  --------------------------------------------------------------------------------  No significant changes to PMH, PSH, FHx, SHx, unless otherwise noted    ALLERGIES & MEDICATIONS  --------------------------------------------------------------------------------  Allergies    sulfa drugs (Hives)  sulfa drugs (Rash)  Sulfac 10% (Hives)      Standing Inpatient Medications  aspirin enteric coated 81 milliGRAM(s) Oral daily  brimonidine 0.2% Ophthalmic Solution 1 Drop(s) Both EYES two times a day  buMETAnide Injectable 2 milliGRAM(s) IV Push two times a day  chlorhexidine 2% Cloths 1 Application(s) Topical daily  clopidogrel Tablet 75 milliGRAM(s) Oral daily  dextrose 5%. 1000 milliLiter(s) IV Continuous <Continuous>  dextrose 5%. 1000 milliLiter(s) IV Continuous <Continuous>  dextrose 50% Injectable 25 Gram(s) IV Push once  dextrose 50% Injectable 12.5 Gram(s) IV Push once  dextrose 50% Injectable 25 Gram(s) IV Push once  glucagon  Injectable 1 milliGRAM(s) IntraMuscular once  heparin  Infusion 850 Unit(s)/Hr IV Continuous <Continuous>  insulin glargine Injectable (LANTUS) 24 Unit(s) SubCutaneous at bedtime  insulin lispro (ADMELOG) corrective regimen sliding scale   SubCutaneous three times a day before meals  insulin lispro (ADMELOG) corrective regimen sliding scale   SubCutaneous at bedtime  naloxone Injectable 0.4 milliGRAM(s) IV Push once  pantoprazole   Suspension 40 milliGRAM(s) Oral daily  piperacillin/tazobactam IVPB.. 3.375 Gram(s) IV Intermittent every 12 hours  sodium bicarbonate 650 milliGRAM(s) Oral three times a day    PRN Inpatient Medications  dextrose Oral Gel 15 Gram(s) Oral once PRN      REVIEW OF SYSTEMS  --------------------------------------------------------------------------------  Gen: No fever  Respiratory: No dyspnea  CV: No chest pain  GI: No abdominal pain, diarrhea, nausea, vomiting  : +howe catheter  Skin: No rashes  MSK: R BKA, L AKA, +neck pain, +arm pain  Neuro: No dizziness/lightheadedness  Heme: No bleeding    All other systems were reviewed and are negative, except as noted.    VITALS/PHYSICAL EXAM  --------------------------------------------------------------------------------  T(C): 36.9 (12-11-24 @ 04:00), Max: 36.9 (12-11-24 @ 04:00)  HR: 63 (12-11-24 @ 07:00) (60 - 69)  BP: 120/52 (12-11-24 @ 07:00) (107/50 - 139/60)  RR: 17 (12-11-24 @ 07:00) (12 - 23)  SpO2: 100% (12-11-24 @ 07:00) (97% - 100%)  Wt(kg): --        12-10-24 @ 07:01  -  12-11-24 @ 07:00  --------------------------------------------------------  IN: 994 mL / OUT: 930 mL / NET: 64 mL      PHYSICAL EXAM:  Gen: resting, NAD  Neuro: Awake, alert  HEENT: Anicteric, No JVD  Pulm: CTA B/L  CV: +S1S2  Abd: soft, non-tender/non-distended  : +Howe cath  Extremities: R BKA, L AKA, no thigh edema  Skin: Warm    LABS/STUDIES  --------------------------------------------------------------------------------              7.3    8.69  >-----------<  201      [12-11-24 @ 02:20]              21.8     128  |  93  |  82  ----------------------------<  180      [12-11-24 @ 02:20]  4.2   |  15  |  5.73        Ca     8.1     [12-11-24 @ 02:20]      Mg     2.40     [12-11-24 @ 02:20]      Phos  5.1     [12-11-24 @ 02:20]    TPro  5.5  /  Alb  2.5  /  TBili  2.3  /  DBili  x   /  AST  242  /  ALT  429  /  AlkPhos  171  [12-11-24 @ 02:20]      PTT: 73.6       [12-11-24 @ 02:20]    Creatinine Trend:  SCr 5.73 [12-11 @ 02:20]  SCr 5.07 [12-10 @ 01:30]  SCr 4.51 [12-09 @ 16:10]  SCr 3.88 [12-09 @ 05:47]  SCr 3.68 [12-09 @ 01:49]    Urine Creatinine 67      [12-08-24 @ 12:27]  Urine Sodium 62      [12-08-24 @ 12:27]  Urine Urea Nitrogen 277.5      [12-08-24 @ 12:27]    Iron 86, TIBC 280, %sat 31      [12-02-24 @ 04:20]  Ferritin 59      [12-02-24 @ 04:20]  TSH 2.06      [12-02-24 @ 04:20]  Lipid: chol 124, TG 97, HDL 42, LDL --      [12-07-24 @ 14:50] [facility] DIVISION OF KIDNEY DISEASES AND HYPERTENSION --    Chief Complaint: DAMARI    24 hour events/subjective: Patient seen and examined at bedside in CCU. Received one dose of ethacrynic acid 50 mg PO and one dose of Bumex 2mg IV yesterday. Pt. non-oliguric with ~930cc of urine output in last 24h. Pt. gives history of generalized body (including arm, neck and jaw) pain this morning. No fever, chills, HA, dizziness, SOB, chest pain, or abdominal pain.    PAST HISTORY  --------------------------------------------------------------------------------  No significant changes to PMH, PSH, FHx, SHx, unless otherwise noted    ALLERGIES & MEDICATIONS  --------------------------------------------------------------------------------  Allergies    sulfa drugs (Hives)  sulfa drugs (Rash)  Sulfac 10% (Hives)    Standing Inpatient Medications  aspirin enteric coated 81 milliGRAM(s) Oral daily  brimonidine 0.2% Ophthalmic Solution 1 Drop(s) Both EYES two times a day  buMETAnide Injectable 2 milliGRAM(s) IV Push two times a day  chlorhexidine 2% Cloths 1 Application(s) Topical daily  clopidogrel Tablet 75 milliGRAM(s) Oral daily  dextrose 5%. 1000 milliLiter(s) IV Continuous <Continuous>  dextrose 5%. 1000 milliLiter(s) IV Continuous <Continuous>  dextrose 50% Injectable 25 Gram(s) IV Push once  dextrose 50% Injectable 12.5 Gram(s) IV Push once  dextrose 50% Injectable 25 Gram(s) IV Push once  glucagon  Injectable 1 milliGRAM(s) IntraMuscular once  heparin  Infusion 850 Unit(s)/Hr IV Continuous <Continuous>  insulin glargine Injectable (LANTUS) 24 Unit(s) SubCutaneous at bedtime  insulin lispro (ADMELOG) corrective regimen sliding scale   SubCutaneous three times a day before meals  insulin lispro (ADMELOG) corrective regimen sliding scale   SubCutaneous at bedtime  naloxone Injectable 0.4 milliGRAM(s) IV Push once  pantoprazole   Suspension 40 milliGRAM(s) Oral daily  piperacillin/tazobactam IVPB.. 3.375 Gram(s) IV Intermittent every 12 hours  sodium bicarbonate 650 milliGRAM(s) Oral three times a day    PRN Inpatient Medications  dextrose Oral Gel 15 Gram(s) Oral once PRN    REVIEW OF SYSTEMS  --------------------------------------------------------------------------------  Gen: No fever  HEENT: +Jaw pain  Respiratory: No dyspnea  CV: No chest pain  GI: No abdominal pain, diarrhea, nausea, vomiting  : Pratt catheter  Skin: No rashes  MSK: R BKA, L AKA, +neck pain, +arm pain  Neuro: No dizziness/lightheadedness  Heme: No bleeding    All other systems were reviewed and are negative, except as noted.    VITALS/PHYSICAL EXAM  --------------------------------------------------------------------------------  T(C): 36.9 (12-11-24 @ 04:00), Max: 36.9 (12-11-24 @ 04:00)  HR: 63 (12-11-24 @ 07:00) (60 - 69)  BP: 120/52 (12-11-24 @ 07:00) (107/50 - 139/60)  RR: 17 (12-11-24 @ 07:00) (12 - 23)  SpO2: 100% (12-11-24 @ 07:00) (97% - 100%)  Wt(kg): --    12-10-24 @ 07:01  -  12-11-24 @ 07:00  --------------------------------------------------------  IN: 994 mL / OUT: 930 mL / NET: 64 mL    PHYSICAL EXAM:  Gen: resting  Neuro: Awake, alert  HEENT: Anicteric, No JVD  Pulm: CTA B/L  CV: +S1S2  Abd: Soft, NT/ND  : +Pratt cath  Extremities: R BKA, L AKA, no thigh edema  Skin: Warm    LABS/STUDIES  --------------------------------------------------------------------------------              7.3    8.69  >-----------<  201      [12-11-24 @ 02:20]              21.8     128  |  93  |  82  ----------------------------<  180      [12-11-24 @ 02:20]  4.2   |  15  |  5.73        Ca     8.1     [12-11-24 @ 02:20]      Mg     2.40     [12-11-24 @ 02:20]      Phos  5.1     [12-11-24 @ 02:20]    TPro  5.5  /  Alb  2.5  /  TBili  2.3  /  DBili  x   /  AST  242  /  ALT  429  /  AlkPhos  171  [12-11-24 @ 02:20]    Creatinine Trend:  SCr 5.73 [12-11 @ 02:20]  SCr 5.07 [12-10 @ 01:30]  SCr 4.51 [12-09 @ 16:10]  SCr 3.88 [12-09 @ 05:47]  SCr 3.68 [12-09 @ 01:49]    Urine Creatinine 67      [12-08-24 @ 12:27]  Urine Sodium 62      [12-08-24 @ 12:27]  Urine Urea Nitrogen 277.5      [12-08-24 @ 12:27]

## 2024-12-11 NOTE — PROGRESS NOTE ADULT - SUBJECTIVE AND OBJECTIVE BOX
interval history:  no acute overnight event  Tele with SR at 60s      PAST MEDICAL & SURGICAL HISTORY:  CAD (coronary artery disease)    S/P CABG x 3  in 2004, [facility]    Stented coronary artery  2010 [facility]    PAD (peripheral artery disease)  s/p R BKA    Carotid artery stenosis    DM (diabetes mellitus)  type II    HTN (hypertension)    Hypercholesterolemia    Obesity    Diabetes    Hypertension    CAD (coronary artery disease)    PVD (peripheral vascular disease)    Carotid stenosis    UTI (urinary tract infection)    Acute kidney injury superimposed on chronic kidney disease    Chronic diastolic heart failure    Vitamin D deficiency    Normocytic anemia    Pulmonary HTN    Diabetic retinopathy    Dermatitis    Diabetes    HTN (Hypertension)    CAD (Coronary Artery Disease)    Hx of CABG  3v 2004    S/P hysterectomy  2004    S/P angioplasty with stent  2009, 3/2014    S/P below knee amputation, right  6/2010    S/P eye surgery  for glaucoma    S/P CABG x 3    S/P BKA (below knee amputation) unilateral, right    History of hysterectomy    Elective surgery  traumatic amputation of the toe    S/P BKA (below knee amputation), left        MEDICATIONS  (STANDING):  aspirin enteric coated 81 milliGRAM(s) Oral daily  brimonidine 0.2% Ophthalmic Solution 1 Drop(s) Both EYES two times a day  buMETAnide Injectable 2 milliGRAM(s) IV Push two times a day  chlorhexidine 2% Cloths 1 Application(s) Topical daily  clopidogrel Tablet 75 milliGRAM(s) Oral daily  dextrose 5%. 1000 milliLiter(s) (50 mL/Hr) IV Continuous <Continuous>  dextrose 5%. 1000 milliLiter(s) (100 mL/Hr) IV Continuous <Continuous>  dextrose 50% Injectable 25 Gram(s) IV Push once  dextrose 50% Injectable 12.5 Gram(s) IV Push once  dextrose 50% Injectable 25 Gram(s) IV Push once  glucagon  Injectable 1 milliGRAM(s) IntraMuscular once  heparin  Infusion 850 Unit(s)/Hr (6 mL/Hr) IV Continuous <Continuous>  insulin glargine Injectable (LANTUS) 24 Unit(s) SubCutaneous at bedtime  insulin lispro (ADMELOG) corrective regimen sliding scale   SubCutaneous three times a day before meals  insulin lispro (ADMELOG) corrective regimen sliding scale   SubCutaneous at bedtime  lidocaine   4% Patch 1 Patch Transdermal daily  naloxone Injectable 0.4 milliGRAM(s) IV Push once  pantoprazole   Suspension 40 milliGRAM(s) Oral daily  piperacillin/tazobactam IVPB.. 3.375 Gram(s) IV Intermittent every 12 hours  senna 2 Tablet(s) Oral at bedtime  sodium bicarbonate 650 milliGRAM(s) Oral three times a day    MEDICATIONS  (PRN):  dextrose Oral Gel 15 Gram(s) Oral once PRN Blood Glucose LESS THAN 70 milliGRAM(s)/deciliter  polyethylene glycol 3350 17 Gram(s) Oral daily PRN Constipation            Vital Signs Last 24 Hrs  T(C): 36.6 (11 Dec 2024 08:00), Max: 36.9 (11 Dec 2024 04:00)  T(F): 97.8 (11 Dec 2024 08:00), Max: 98.5 (11 Dec 2024 04:00)  HR: 65 (11 Dec 2024 09:00) (60 - 69)  BP: 135/53 (11 Dec 2024 09:00) (96/39 - 139/60)  BP(mean): 77 (11 Dec 2024 09:00) (57 - 84)  RR: 17 (11 Dec 2024 09:00) (12 - 23)  SpO2: 98% (11 Dec 2024 09:00) (97% - 100%)    Parameters below as of 11 Dec 2024 09:00  Patient On (Oxygen Delivery Method): room air                INTERPRETATION OF TELEMETRY: SR at 60s with occasional PVCs    ECG:        LABS:                        7.3    8.69  )-----------( 201      ( 11 Dec 2024 02:20 )             21.8     12-11    128[L]  |  93[L]  |  82[H]  ----------------------------<  180[H]  4.2   |  15[L]  |  5.73[H]    Ca    8.1[L]      11 Dec 2024 02:20  Phos  5.1     12-11  Mg     2.40     12-11    TPro  5.5[L]  /  Alb  2.5[L]  /  TBili  2.3[H]  /  DBili  x   /  AST  242[H]  /  ALT  429[H]  /  AlkPhos  171[H]  12-11    CARDIAC MARKERS ( 11 Dec 2024 02:20 )  x     / x     / x     / x     / 4.3 ng/mL  CARDIAC MARKERS ( 10 Dec 2024 01:30 )  x     / x     / x     / x     / 8.6 ng/mL      PTT - ( 11 Dec 2024 02:20 )  PTT:73.6 sec  Urinalysis Basic - ( 11 Dec 2024 02:20 )    Color: x / Appearance: x / SG: x / pH: x  Gluc: 180 mg/dL / Ketone: x  / Bili: x / Urobili: x   Blood: x / Protein: x / Nitrite: x   Leuk Esterase: x / RBC: x / WBC x   Sq Epi: x / Non Sq Epi: x / Bacteria: x      I&O's Summary    10 Dec 2024 07:01  -  11 Dec 2024 07:00  --------------------------------------------------------  IN: 994 mL / OUT: 980 mL / NET: 14 mL    11 Dec 2024 07:01  -  11 Dec 2024 09:35  --------------------------------------------------------  IN: 126 mL / OUT: 150 mL / NET: -24 mL      BNP  RADIOLOGY & ADDITIONAL STUDIES:      PHYSICAL EXAM:    GENERAL: In no apparent distress, well nourished, and hydrated.  HEART: Regular rate and rhythm; No murmurs, rubs, or gallops.  PULMONARY: Clear to auscultation and percussion.  No rales, wheezing, or rhonchi bilaterally.  ABDOMEN: Soft, Nontender, Nondistended; Bowel sounds present  EXTREMITIES:  2+ Peripheral Pulses, No clubbing, cyanosis, or edema

## 2024-12-11 NOTE — PROGRESS NOTE ADULT - SUBJECTIVE AND OBJECTIVE BOX
Ady Villatoro MD  Interventional Cardiology / Advance Heart Failure and Cardiac Transplant Specialist  [place] Office : [phone] [place] [address] [place] [address]  Tel:    [place] Office : [phone] [facility] [place] [address]  Tel: [phone]  Cell : [phone]       Pt is lying in bed comfortable not in distress, no chest pains no SOB no palpitations  	  MEDICATIONS:  aspirin enteric coated 81 milliGRAM(s) Oral daily  buMETAnide Infusion 1 mG/Hr IV Continuous <Continuous>  clopidogrel Tablet 75 milliGRAM(s) Oral daily    piperacillin/tazobactam IVPB.. 3.375 Gram(s) IV Intermittent every 12 hours        pantoprazole   Suspension 40 milliGRAM(s) Oral daily  polyethylene glycol 3350 17 Gram(s) Oral daily PRN  senna 2 Tablet(s) Oral at bedtime    dextrose 50% Injectable 25 Gram(s) IV Push once  dextrose 50% Injectable 12.5 Gram(s) IV Push once  dextrose 50% Injectable 25 Gram(s) IV Push once  dextrose Oral Gel 15 Gram(s) Oral once PRN  glucagon  Injectable 1 milliGRAM(s) IntraMuscular once  insulin glargine Injectable (LANTUS) 24 Unit(s) SubCutaneous at bedtime  insulin lispro (ADMELOG) corrective regimen sliding scale   SubCutaneous three times a day before meals  insulin lispro (ADMELOG) corrective regimen sliding scale   SubCutaneous at bedtime    brimonidine 0.2% Ophthalmic Solution 1 Drop(s) Both EYES two times a day  chlorhexidine 2% Cloths 1 Application(s) Topical daily  dextrose 5%. 1000 milliLiter(s) IV Continuous <Continuous>  dextrose 5%. 1000 milliLiter(s) IV Continuous <Continuous>  lidocaine   4% Patch 1 Patch Transdermal daily  sodium bicarbonate 650 milliGRAM(s) Oral three times a day      PAST MEDICAL/SURGICAL HISTORY  PAST MEDICAL & SURGICAL HISTORY:  S/P CABG x 3  in 2004, [facility]      Stented coronary artery  2010 [facility]      PAD (peripheral artery disease)  s/p R BKA      Carotid artery stenosis      DM (diabetes mellitus)  type II      Hypercholesterolemia      Obesity      Hypertension      CAD (coronary artery disease)      Acute kidney injury superimposed on chronic kidney disease      Chronic diastolic heart failure      Vitamin D deficiency      Normocytic anemia      Pulmonary HTN      Diabetic retinopathy      Hx of CABG  3v 2004      S/P hysterectomy  2004      S/P angioplasty with stent  2009, 3/2014      S/P below knee amputation, right  6/2010      S/P eye surgery  for glaucoma      S/P CABG x 3      S/P BKA (below knee amputation) unilateral, right      History of hysterectomy      Elective surgery  traumatic amputation of the toe      S/P BKA (below knee amputation), left          SOCIAL HISTORY: Substance Use (street drugs): ( x ) never used  (  ) other:    FAMILY HISTORY:  Family history of diabetes mellitus (Sibling)           PHYSICAL EXAM:  T(C): 36.9 (12-11-24 @ 16:00), Max: 36.9 (12-11-24 @ 04:00)  HR: 69 (12-11-24 @ 16:00) (60 - 69)  BP: 149/61 (12-11-24 @ 16:00) (96/39 - 149/61)  RR: 19 (12-11-24 @ 16:00) (13 - 23)  SpO2: 100% (12-11-24 @ 16:00) (98% - 100%)  Wt(kg): --  I&O's Summary    10 Dec 2024 07:01  -  11 Dec 2024 07:00  --------------------------------------------------------  IN: 994 mL / OUT: 980 mL / NET: 14 mL    11 Dec 2024 07:01  -  11 Dec 2024 16:32  --------------------------------------------------------  IN: 482 mL / OUT: 1175 mL / NET: -693 mL          GENERAL: NAD  EYES:   PERRLA   ENMT:   Moist mucous membranes, Good dentition, No lesions  Cardiovascular: Normal S1 S2, No JVD, No murmurs, No edema  Respiratory: Lungs clear to auscultation	  Gastrointestinal:  Soft, Non-tender, + BS	  Extremities: no edema                                    8.3    10.42 )-----------( 247      ( 11 Dec 2024 16:04 )             23.2     12-11    128[L]  |  93[L]  |  82[H]  ----------------------------<  180[H]  4.2   |  15[L]  |  5.73[H]    Ca    8.1[L]      11 Dec 2024 02:20  Phos  5.1     12-11  Mg     2.40     12-11    TPro  5.5[L]  /  Alb  2.5[L]  /  TBili  2.3[H]  /  DBili  x   /  AST  242[H]  /  ALT  429[H]  /  AlkPhos  171[H]  12-11    proBNP:   Lipid Profile:   HgA1c:   TSH:     Consultant(s) Notes Reviewed:  [x ] YES  [ ] NO    Care Discussed with Consultants/Other Providers [ x] YES  [ ] NO    Imaging Personally Reviewed independently:  [x] YES  [ ] NO    All labs, radiologic studies, vitals, orders and medications list reviewed. Patient is seen and examined at bedside. Case discussed with medical team.

## 2024-12-11 NOTE — PROGRESS NOTE ADULT - PROBLEM SELECTOR PLAN 1
Pt. with severe/oliguric DAMARI in the setting of hypotension, complete heart block with episode of asystole, recent IV contrast use, and possible infection. Pt. with likely ATN. Scr on admission (12/7/24) was 1.17, increased to 2.92 on 12/8/24. Scr progressively worsened to 5.07 on 12/10/24. Of note, Scr likely not an accurate estimate of her eGFR given B/L leg amputations and decreased muscle mass. UA showed moderate blood with no RBCs, moderate leukocyte esterase with 714 WBCs, no nitrites, many bacteria, and 13 epithelial cells. UCx positive for klebsiella. Renal US showed small kidneys (8.7/7.9cm) suggestive of advanced CKD, and no hydronephrosis. Pt. given one dose of ethacrynic acid 50mg PO and one dose of Bumex 2mg IV yesterday and pt responded with 930cc of urine output in last 24h. Scr worsened to 5.73 today (12/11/24). Pt is off IVF and on Bumex 2mg IVP BID per CCU team. Will need to consider HD/CRRT if pt. remains oliguric and/or kidney function continues to worsen. Avoid nephrotoxins and dose meds per eGFR. Pt. with severe/oliguric DAMARI in the setting of hypotension, complete heart block with episode of asystole, recent IV contrast use, and infection/UTI. Pt. with likely ATN. Scr on admission (12/7/24) was 1.17, increased to 2.92 on 12/8/24. Scr progressively worsened to 5.73 today (12/11/24). Of note, Scr likely not an accurate estimate of her GFR given B/L leg amputations and decreased muscle mass. Renal US showed small kidneys (8.7/7.9cm) suggestive of advanced CKD, and no hydronephrosis. Pt. was oliguric, received one dose of oral ethacrynic acid 50 mg and one dose of Bumex 2 mg IV yesterday. Pt. currently non-oliguric with ~930cc of urine output in last 24h. Pt. currently on Bumex 2mg IVP BID as per CCU team. Monitor BP and UOP. Avoid nephrotoxins and dose meds per eGFR. Will need to consider HD/CRRT if kidney function continues to worsen.

## 2024-12-11 NOTE — PROGRESS NOTE ADULT - PROBLEM SELECTOR PLAN 2
Pt. with metabolic acidosis iso DAMARI. SCO2 low at 15 and pH low at 7.25 (12/10/24). Recommend oral sodium bicarbonate 650 mg TID. Monitor SCO2. Pt. with metabolic acidosis iso DAMARI. SCO2 low at 15 today. Increase oral sodium bicarbonate to 1300 mg TID. Monitor SCO2.

## 2024-12-11 NOTE — PROGRESS NOTE ADULT - SUBJECTIVE AND OBJECTIVE BOX
CCU Service  Cardiology   Spect: [id number] ([facility])     PATIENT: NIK MELISSA, MRN: [id number]    CHIEF COMPLAINT: Patient is a 79y old  Female who presents with a chief complaint of Abdominal pain (10 Dec 2024 16:40)      transferred to CCU for    INTERVAL HISTORY/OVERNIGHT EVENTS:     TELEMETRY:     EKG:       ALLERGIES: Allergies    sulfa drugs (Hives)  sulfa drugs (Rash)  Sulfac 10% (Hives)    Intolerances        MEDICATIONS:  MEDICATIONS  (STANDING):  aspirin enteric coated 81 milliGRAM(s) Oral daily  brimonidine 0.2% Ophthalmic Solution 1 Drop(s) Both EYES two times a day  buMETAnide Injectable 2 milliGRAM(s) IV Push two times a day  chlorhexidine 2% Cloths 1 Application(s) Topical daily  clopidogrel Tablet 75 milliGRAM(s) Oral daily  dextrose 5%. 1000 milliLiter(s) (50 mL/Hr) IV Continuous <Continuous>  dextrose 5%. 1000 milliLiter(s) (100 mL/Hr) IV Continuous <Continuous>  dextrose 50% Injectable 25 Gram(s) IV Push once  dextrose 50% Injectable 12.5 Gram(s) IV Push once  dextrose 50% Injectable 25 Gram(s) IV Push once  glucagon  Injectable 1 milliGRAM(s) IntraMuscular once  heparin  Infusion 850 Unit(s)/Hr (6 mL/Hr) IV Continuous <Continuous>  insulin glargine Injectable (LANTUS) 24 Unit(s) SubCutaneous at bedtime  insulin lispro (ADMELOG) corrective regimen sliding scale   SubCutaneous three times a day before meals  insulin lispro (ADMELOG) corrective regimen sliding scale   SubCutaneous at bedtime  naloxone Injectable 0.4 milliGRAM(s) IV Push once  pantoprazole   Suspension 40 milliGRAM(s) Oral daily  piperacillin/tazobactam IVPB.. 3.375 Gram(s) IV Intermittent every 12 hours  sodium bicarbonate 650 milliGRAM(s) Oral three times a day    MEDICATIONS  (PRN):  dextrose Oral Gel 15 Gram(s) Oral once PRN Blood Glucose LESS THAN 70 milliGRAM(s)/deciliter        OBJECTIVE:  ICU Vital Signs Last 24 Hrs  T(C): 36.9 (11 Dec 2024 04:00), Max: 36.9 (11 Dec 2024 04:00)  T(F): 98.5 (11 Dec 2024 04:00), Max: 98.5 (11 Dec 2024 04:00)  HR: 64 (11 Dec 2024 05:00) (63 - 69)  BP: 123/53 (11 Dec 2024 05:00) (100/43 - 139/60)  BP(mean): 73 (11 Dec 2024 05:00) (61 - 84)  ABP: --  ABP(mean): --  RR: 19 (11 Dec 2024 05:00) (12 - 23)  SpO2: 100% (11 Dec 2024 05:00) (97% - 100%)    O2 Parameters below as of 11 Dec 2024 05:00  Patient On (Oxygen Delivery Method): room air            I&O's Summary    09 Dec 2024 07:01  -  10 Dec 2024 07:00  --------------------------------------------------------  IN: 2235.9 mL / OUT: 135 mL / NET: 2100.9 mL    10 Dec 2024 07:01  -  11 Dec 2024 05:41  --------------------------------------------------------  IN: 982 mL / OUT: 755 mL / NET: 227 mL      Daily     Daily       PHYSICAL EXAMINATION:  General: Comfortable, no acute distress, cooperative with exam.  HEENT: Moist mucous membranes.  Respiratory: CTAB, normal respiratory effort, no coughing, wheezes, crackles, or rales.  CV: RRR, S1S2, no murmurs, rubs or gallops. No JVD. Distal pulses intact.  Abdominal: Soft, nontender, nondistended, no rebound or guarding, normal bowel sounds.  Neurology: AOx3, no focal neuro defects, BUSH x 4.  Extremities: No pitting edema, + Peripheral pulses.          LABS:                          7.3    8.69  )-----------( 201      ( 11 Dec 2024 02:20 )             21.8     12-11    128[L]  |  93[L]  |  82[H]  ----------------------------<  180[H]  4.2   |  15[L]  |  5.73[H]    Ca    8.1[L]      11 Dec 2024 02:20  Phos  5.1     12-11  Mg     2.40     12-11    TPro  5.5[L]  /  Alb  2.5[L]  /  TBili  2.3[H]  /  DBili  x   /  AST  242[H]  /  ALT  429[H]  /  AlkPhos  171[H]  12-11    LIVER FUNCTIONS - ( 11 Dec 2024 02:20 )  Alb: 2.5 g/dL / Pro: 5.5 g/dL / ALK PHOS: 171 U/L / ALT: 429 U/L / AST: 242 U/L / GGT: x           PTT - ( 11 Dec 2024 02:20 )  PTT:73.6 sec    CARDIAC MARKERS ( 10 Dec 2024 01:30 )  x     / x     / x     / x     / 8.6 ng/mL  CARDIAC MARKERS ( 09 Dec 2024 05:47 )  x     / x     / x     / x     / 7.1 ng/mL      Urinalysis Basic - ( 11 Dec 2024 02:20 )    Color: x / Appearance: x / SG: x / pH: x  Gluc: 180 mg/dL / Ketone: x  / Bili: x / Urobili: x   Blood: x / Protein: x / Nitrite: x   Leuk Esterase: x / RBC: x / WBC x   Sq Epi: x / Non Sq Epi: x / Bacteria: x        echo:  TTE 12/2/2024   1. Technically very difficult study performed with the patient in the supine position.   2. Technically difficult image quality.   3. Left ventricular systolic function is mildly to moderately decreased with anejection fraction visually estimated at 40 to 45%. There are regional wall motion abnormalities present. Hypokinesis of the inferolateral wall, basal to mid inferior wall, and basal inferoseptum. There is mild (grade 1) left ventricular diastolic dysfunction.   4. The right ventricle is not well visualized.   5. Structurally normal mitral valve with normal leaflet excursion. There is calcification of the mitral valve annulus. There is mild mitral regurgitation.   6. No prior echocardiogram is available for comparison.    < from: TTE Limited W or WO Ultrasound Enhancing Agent (12.09.24 @ 16:50) >  CONCLUSIONS:      1. Left ventricular cavity is normal in size. Left ventricular wall thickness is normal. Left ventricular systolic function is mildly to moderately decreased with an ejection fraction of 46 % by [place]'s method of disks. Regional wall motion abnormalities present.   2. There is hypokinesis of the basal inferolateral, mid inferolateral, basal inferior and basal inferoseptal walls.   3. Echogenic mass is seen possibly in the left atrium, finding is not seen in every view. Unable to determine if it represents artifact or pathologic finding. Consider repeat TTE vs FOREST. (Prior images of study on 12/2/24 were technically difficult, comparison is limited).    < end of copied text >          Assessment and Plan:   · Assessment	  79F w HTN, DM, severe PAD w r BKA, L aka, CAD (sp 3v CABG 20 yrs ago, last cath 2014, only graft patent was LIMA-LAD, natives with  of LAD and RCA, OM w severe stented w BMS at that time) who presented with 15 min of resting chest pain, found to have Hgb of 7 (from 12 several years ago) and rising elevated troponins from 90s->280s; her ECG shows Inferior Q waves w chronic diffuse ST/TW changes, all of which are largely unchanged from prior tracings. TTE w midrange LVEF 40-45%. She has been chest pain free since her blood transfusion.   Overnight on 12/8 had telemetry notable for progressively worsening heart block (1st degree -> 3rd degree -> asystole >14 seconds).  Pt returned to NSR by the time RRT arrived to bedside.   Labs without significant actionable electrolyte disturbance.  TTE w/ wall motion abnormalities affecting inferolateral wall, basal to mid inferior wall, and basal infero-septum; ischemia could also be  of bradyarrhythmia as opposed to medication given.  Dilaudid was addressed w/ narcan and coreg not administered today.     Plan:     === NEUROLOGY ===      -Mental status: somnolent AAO x 3, not baseline  -Sedation: None  -Pain control: None    === RESPIRATORY ===     -EARLINE    === CARDIOVASCULAR ===    ##CHB bradycardia  -EKG suggestive of high grade AV Block, with intermittent 3rd degree   -Patients HR currently NSR 70 BPM, BP90/43.   -DDX: Iatrogenic vs conduction defect vs ischemia  -Atropine and Zoll pads at bedside  -Dopamine gtt, titrate to HR > 60  -Consider TVP if HD unstable  -Hold AV cindy blockers  -EP consult and f/u recommendations for RRT for asystole with escape beats   - suspect likely vagal mediated   Hold narcotics  Continue to monitor     #CAD s/p CABG (2004)  -asa, plavix  -statin held for abn LFT  -Most recent [facility] with  of the LAD and RCA, and tight OM lesion that was stented with a BMS  -Recent outpatient nuclear stress test with scar and hypokinesis in the RCA territory, consistent with TTE here with EF 40-45% w/ inferior/inferolateral WMA     - heparin gtt x 48 hours - concern ACS (12/9-12/11)  - Repeat echo shows Echogenic mass is seen possibly in the left atrium, finding is not seen in every view. Repeat limited LA echo  -       === RENAL/ ===     #DAMARI   -noted to have acute cr elevation from 1 on admisison to 1.8   - bladder scan elevated, howe now placed   - likely post ob, but check urine ltyes   - renal US pending   - hold nephrotoxic agents   -of note did get contrast on admit, but early for ZENOBIA.    #Hypertension  -Hold coreg in setting of CHB  -Will hold imdur and losartan for now while on dopa      === GASTROINTESTINAL ===    #Acute pancreatitis  -Abdominal pain, lipase over 3K, signs of acute interstitial pancreatitis on imaging  -Unclear cause of episode; no alcohol use, no recent instrumentation of bile ducts, no evidence of choledocholithiasis on abdominal ultrasound. No new medications. Triglycerides not elevated. Could be idiopathic.   -GI recs appreciated -Ok for low fat diet as tolerated , -ADAT, MRCP ordered , Gentle IVF given mildly reduced LVEF with regional WMAs seen on recent echo., Trend LFTs   < from: MR MRCP w/wo IV Cont (12.10.24 @ 13:41) >  Acute interstitial pancreatitis. No evidence of parenchymal necrosis or   walled off fluid collection.  No gallstones or choledocholithiasis.    - Needs colonoscopy outpatient for colitis seen on CT in 6-8 weeks   GI will plan to sign off at this time. Please feel free to reach out to our team with any follow up questions. Please provide patient with Gastroenterology Clinic number to confirm/arrange appointment; [phone] ([facility] at [address] [place] [address] for follow up with Dr. Mar      #Elevated liver enzymes.   -Elevated AST/ALT to 242/140, though evidence of moderate hemolysis  -Also with elevated Tbili  -Hold atorvastatin for now  -GI consult  -MRCP ordered       === ENDO  ===    #Type 2 diabetes mellitus.   -Reported to be on basal and bolus pre-meal at home (though reported to be twice a day)  -Hold pre-meal for now given NPO, re-add as indicated when diet able to be advanced  -Dose reduced home 30U lantus to 24U at bedtime given NPO status, adjust as indicated based on sugars  -Low insulin sliding scale q6hrs while NPO  -A1C of 7.5 on 12/1/24  -DASH/TLC/CC diet when advanced from NPO      === HEMATOLOGIC/ONC ===    - H/H & plts stable   - Monitor H/H and plts   - DVT PPX: heparin gtt     === INFECTIOUS DISEASE ===     #Leukocytosis  - no fever  - Continue to monitor with daily CBC  - Trend fever curve  - Possible sources of urinary tract given previously reported burning with urination. Has positive UA, though with fair amount of epithelial cells. Could also be related to colitis seen on CT imaging  - Could be febrile in setting of inflammatory reaction given acute pancreatitis  - Continue zosyn      Lines/Tubes: PIV x 2,   79F w HTN, DM, severe PAD w r BKA, L aka, CAD (sp 3v CABG 20 yrs ago, last cath 2014, only graft patent was LIMA-LAD,   of the LAD and RCA, and tight OM lesion that was stented with a BMS   p/w abdominal pain, resting chest pain, Anemia, rising troponin, DAMARI admitted for acute pancreatitis, NSTEMI and renal fx      transferred to CCU after astylole on dopamine at 3mcg/kg/mn    INTERVAL HISTORY/OVERNIGHT EVENTS:   Anuric overnight  Denies cp, palp, dizziness, sob, orthopnea. r hip upper leg pain - tylenol    TELEMETRY: SR, no ectopy {\rtf1\piprrk29004\ansi\mcyitxq8588\ftnbj\uc1\deff0  {\fonttbl{\f0 \fnil Segoe UI;}{\f1 \fnil \fcharset0 Segoe UI;}{\f2 \fnil Times New Jean-Paul;}}  {\colortbl ;\sse927\xukyd396\nlfg690 ;\red0\green0\blue0 ;\red0\green0\blue0 ;}  {\stylesheet{\f0\fs20 Normal;}{\cs1 Default Paragraph Font;}{\cs2\f0\fs16 Line Number;}{\cs3\f2\fs24 Hyperlink;}}  {\*\revtbl{Unknown;}}  \lmihst16208\whiohi22214\iynpu1355\lhuva7981\uwhnb7954\ovcvc6375\ydjafir346\jfpqvqr627\nogrowautofit\ljjves980\formshade\nofeaturethrottle1\dntblnsbdb\fet4\aendnotes\aftnnrlc\pgbrdrhead\pgbrdrfoot  \sectd\gkxpqw00579\cvozkd98335\guttersxn0\bkyynmow5355\qtrnqzto6125\clokpeis8376\rtdssokr3606\xzoepye247\cstqsut712\sbkpage\pgncont\pgndec  \plain\plain\f0\fs24\pard\plain\f0\fs24\plain\f0\fs20\zeno4486\hich\f0\dbch\f0\loch\f0\fs20\par  CCU Service\par  Cardiology \par  Spect: [phone] ([facility]) \par  \par  PATIENT: NIK MELISSA, MRN: [id number]\par  \par  79F w HTN, DM, severe PAD w r BKA, L aka, CAD (sp 3v CABG 20 yrs ago, last cath 2014, only graft patent was LIMA-LAD,   of the LAD and RCA, and tight OM lesion that was stented with a BMS   p/w  abdominal pain, chest pain, anemia and admitted for acute   pacreatitis, NSTEMI \par  \par  \par  transferred to CCU after asystolic event on dopamine at 3mcg/kg/mn and worsening renal function\par  \par  INTERVAL HISTORY/OVERNIGHT EVENTS: \par  Urine output improving with Bumex\par  Denies cp, palp, dizziness, sob, orthopnea. \par  \par  TELEMETRY: SR, no ectopy\par  \par  \par  \par  ALLERGIES: Allergies\par  \par  sulfa drugs (Hives)\par  sulfa drugs (Rash)\par  Sulfac 10% (Hives)\par  \par  Intolerances\par  \par  \par  \par  MEDICATIONS:\par  MEDICATIONS  (STANDING):\par  \plain\f1\fs20\ityx4367\hich\f1\dbch\f1\loch\f1\fs20\b aspirin enteric coated 81 milliGRAM(s) Oral daily\plain\f0\fs20\gjqo1649\hich\f0\dbch\f0\loch\f0\fs20\par  brimonidine 0.2% Ophthalmic Solution 1 Drop(s) Both EYES two times a day\par  \plain\f1\fs20\fewn6770\hich\f1\dbch\f1\loch\f1\fs20\b buMETAnide Injectable 2 milliGRAM(s) IV Push two times a day\plain\f0\fs20\bskw7256\hich\f0\dbch\f0\loch\f0\fs20\par  chlorhexidine 2% Cloths 1 Application(s) Topical daily\par  \plain\f1\fs20\pnlr0056\hich\f1\dbch\f1\loch\f1\fs20\b clopidogrel Tablet 75 milliGRAM(s) Oral daily\plain\f0\fs20\xrkh3976\hich\f0\dbch\f0\loch\f0\fs20\par  dextrose 5%. 1000 milliLiter(s) (50 mL/Hr) IV Continuous <Continuous>\par  dextrose 5%. 1000 milliLiter(s) (100 mL/Hr) IV Continuous <Continuous>\par  dextrose 50% Injectable 25 Gram(s) IV Push once\par  dextrose 50% Injectable 12.5 Gram(s) IV Push once\par  dextrose 50% Injectable 25 Gram(s) IV Push once\par  glucagon  Injectable 1 milliGRAM(s) IntraMuscular once\par  \plain\f1\fs20\gouk4201\hich\f1\dbch\f1\loch\f1\fs20\b heparin  Infusion 850 Unit(s)/Hr (6 mL/Hr) IV Continuous <Continuous>\plain\f0\fs20\lxyn3653\hich\f0\dbch\f0\loch\f0\fs20\par  insulin glargine Injectable (LANTUS) 24 Unit(s) SubCutaneous at bedtime\par  insulin lispro (ADMELOG) corrective regimen sliding scale   SubCutaneous three times a day before meals\par  insulin lispro (ADMELOG) corrective regimen sliding scale   SubCutaneous at bedtime\par  naloxone Injectable 0.4 milliGRAM(s) IV Push once\par  \plain\f1\fs20\ualn1329\hich\f1\dbch\f1\loch\f1\fs20\b pantoprazole   Suspension 40 milliGRAM(s) Oral daily\par  piperacillin/tazobactam IVPB.. 3.375 Gram(s) IV Intermittent every 12 hours\par  sodium bicarbonate 650 milliGRAM(s) Oral three times a day\plain\f0\fs20\scdg0497\hich\f0\dbch\f0\loch\f0\fs20\par  \par  MEDICATIONS  (PRN):\par  dextrose Oral Gel 15 Gram(s) Oral once PRN Blood Glucose LESS THAN 70 milliGRAM(s)/deciliter\par  \par  \par  \par  OBJECTIVE:\par  ICU Vital Signs Last 24 Hrs\par  T(C): 36.9 (11 Dec 2024 04:00), Max: 36.9 (11 Dec 2024 04:00)\par  T(F): 98.5 (11 Dec 2024 04:00), Max: 98.5 (11 Dec 2024 04:00)\par  HR: 64 (11 Dec 2024 05:00) (63 - 69)\par  BP: 123/53 (11 Dec 2024 05:00) (100/43 - 139/60)\par  BP(mean): 73 (11 Dec 2024 05:00) (61 - 84)\par  ABP: --\par  ABP(mean): --\par  RR: 19 (11 Dec 2024 05:00) (12 - 23)\par  SpO2: 100% (11 Dec 2024 05:00) (97% - 100%)\par  \par  O2 Parameters below as of 11 Dec 2024 05:00\par  Patient On (Oxygen Delivery Method): room air\par  \par  \par  \par  \par  \par  I&O's Summary\par  \par  09 Dec 2024 07:01  -  10 Dec 2024 07:00\par  --------------------------------------------------------\par  IN: 2235.9 mL / OUT: 135 mL / NET: 2100.9 mL\par  \par  10 Dec 2024 07:01  -  11 Dec 2024 05:41\par  --------------------------------------------------------\par  IN: 982 mL / OUT: 755 mL / NET: 227 mL\par  \par  \par  Daily   \par  Daily \par  \par  \par  PHYSICAL EXAMINATION:\par  General: Comfortable, no acute distress, cooperative with exam.\par  HEENT: Moist mucous membranes.\par  Respiratory: CTAB, normal respiratory effort, no coughing, wheezes, crackles, or rales.\par  CV: RRR, S1S2, no murmurs, rubs or gallops. No JVD. Distal pulses intact.\par  Abdominal: Soft, nontender, nondistended, no rebound or guarding, normal bowel sounds.\par  Neurology: AOx3, no focal neuro defects, BUSH x 4.\par  Extremities: No pitting edema, + Peripheral pulses.\par  \par  \par  \par  \par  LABS:\par  \par           \par             7.3  \par  8.69  )-----------( 201      ( 11 Dec 2024 02:20 )\par             21.8 \par  \par  12-11\par  \par  128[L]  |  93[L]  |  82[H]\par  ----------------------------<  180[H]\par  4.2   |  15[L]  |  5.73[H]\par  \par  Ca    8.1[L]      11 Dec 2024 02:20\par  Phos  5.1     12-11\par  Mg     2.40     12-11\par  \par  TPro  5.5[L]  /  Alb  2.5[L]  /  TBili  2.3[H]  /  DBili  x   /  AST  242[H]  /  ALT  429[H]  /  AlkPhos  171[H]  12-11\par  \par  LIVER FUNCTIONS - ( 11 Dec 2024 02:20 )\par  Alb: 2.5 g/dL / Pro: 5.5 g/dL / ALK PHOS: 171 U/L / ALT: 429 U/L / AST: 242 U/L / GGT: x       \par  \par  PTT - ( 11 Dec 2024 02:20 )  PTT:73.6 sec\par  \par  CARDIAC MARKERS ( 10 Dec 2024 01:30 )\par  x     / x     / x     / x     / 8.6 ng/mL\par  CARDIAC MARKERS ( 09 Dec 2024 05:47 )\par  x     / x     / x     / x     / 7.1 ng/mL\par  \par  \par  Urinalysis Basic - ( 11 Dec 2024 02:20 )\par  \par  Color: x / Appearance: x / SG: x / pH: x\par  Gluc: 180 mg/dL / Ketone: x  / Bili: x / Urobili: x \par  Blood: x / Protein: x / Nitrite: x \par  Leuk Esterase: x / RBC: x / WBC x \par  Sq Epi: x / Non Sq Epi: x / Bacteria: x\par  \par  \par  \par  \ql\plain\f0\fs24\plain\f1\fs16\owdn7214\hich\f1\dbch\f1\loch\f1\cf2\fs16 echo:\par  TTE 12/2/2024\par   1. Technically very difficult study performed with the patient in the supine position.\par   2. Technically difficult image quality.\par   3. Left ventricular systolic function is mildly to moderately decreased with anejection fraction visually estimated at 40 to 45%. There are regional wall motion abnormalities present. Hypokinesis of the inferolateral wall, basal to mid inferior wall,   and basal inferoseptum. There is mild (grade 1) left ventricular diastolic dysfunction.\par   4. The right ventricle is not well visualized.\par   5. Structurally normal mitral valve with normal leaflet excursion. There is calcification of the mitral valve annulus. There is mild mitral regurgitation.\par   6. No prior echocardiogram is available for comparison.\par  \par  < from: TTE Limited W or WO Ultrasound Enhancing Agent (12.09.24 @ 16:50) >\par  CONCLUSIONS:\par   \par   1. Left ventricular cavity is normal in size. Left ventricular wall thickness is normal. Left ventricular systolic function is mildly to moderately decreased with an ejection fraction of 46 % by [place]'s method of disks. Regional wall motion abnormalities   present.\par   2. There is hypokinesis of the basal inferolateral, mid inferolateral, basal inferior and basal inferoseptal walls.\par   3.\plain\f1\fs16\yxbu7228\hich\f1\dbch\f1\loch\f1\cf2\fs16\b  Echogenic mass is seen possibly in the left atrium, finding is not seen in every view. Unable to determine if it represents artifact or pathologic finding. Consider repeat TTE vs FOREST. (Prior   images of study on 12/2/24 were technically difficult, comparison is limited).\plain\f1\fs16\rcoq1566\hich\f1\dbch\f1\loch\f1\cf2\fs16\par  \par  < end of copied text >\par  \par  \par  \par  \par  {\*\bkmkstart [id number]}{\*\bkmkend [id number]}\plain\f1\fs16\xasf0611\hich\f1\dbch\f1\loch\f1\cf2\fs16\b Assessment and Plan:\plain\f1\fs16\pdbc9277\hich\f1\dbch\f1\loch\f1\cf2\fs16  \par  \trowd\kwhilp73\lastrow\udqnxmu73\trpaddfl3\lwrifdp29\trpaddfr3\trpaddt0\trpaddft3\trpaddb0\trpaddfb3\trleft0\fxfp718  \clvertalt\vrdywo93\clpadft3\geqrta79\clpadfr3\clpadl0\clpadfl3\clpadb0\clpadfb3\xwtrs5728  \clvertalt\nksohj75\clpadft3\lyxfsx66\clpadfr3\clpadl0\clpadfl3\clpadb0\clpadfb3\mprtx3405  \pard\intbl\ssparaaux0\s0\fi-120\li120\ql\plain\f0\fs24{\*\bkmkstart [id number]}{\*\bkmkend [id number]}\plain\f1\fs16\degn1723\hich\f1\dbch\f1\loch\f1\cf2\fs16 \'b7 \plain\f1\fs16\qkse6198\hich\f1\dbch\f1\loch\f1\cf2\fs16\b Assessment\plain\f1\fs16\mymo3005\hich\f1\dbch\f1\loch\f1\cf2\fs16\cell  \pard\intbl\ssparaaux0\s0\li300\ri300\ql\plain\f0\fs24\plain\f1\fs16\lspj5075\hich\f1\dbch\f1\loch\f1\cf2\fs16\cell  \intbl\row  \pard\ssparaaux0\s0\ql\plain\f0\fs24\plain\f1\fs16\rvim6968\hich\f1\dbch\f1\loch\f1\cf2\fs16 79F w HTN, DM, severe PAD w r BKA, L aka, CAD (sp 3v CABG 20 yrs ago, last cath 2014, only graft patent was LIMA-LAD, natives with  of LAD and RCA, OM w severe   stented w BMS at that time) who presented with 15 min of resting chest pain, found to have Hgb of 7 (from 12 several years ago) and rising elevated troponins from 90s->280s; her ECG shows Inferior Q waves w chronic diffuse ST/TW changes, all of which   are largely unchanged from prior tracings. TTE w midrange LVEF 40-45%. She has been chest pain free since her blood transfusion.   Overnight on 12/8 had telemetry notable for progressively worsening heart block (1st degree -> 3rd degree -> asystole >14   seconds).  Pt returned to NSR by the time RRT arrived to bedside.   Labs without significant actionable electrolyte disturbance.  TTE w/ wall motion abnormalities affecting inferolateral wall, basal to mid inferior wall, and basal infero-septum; ischemia   could also be  of bradyarrhythmia as opposed to medication given.  Dilaudid was addressed w/ narcan and coreg not administered today. \par  \par  Plan: \par  \par  === NEUROLOGY ===\par    \par  -Mental status: somnolent AAO x 3, not baseline\par  -Sedation: None\par  -Pain control: None\par  \par  === RESPIRATORY ===\par   \par  -EARLINE\par  \par  === CARDIOVASCULAR ===\par  \par  ##CHB bradycardia\par  -EKG suggestive of high grade AV Block, with intermittent 3rd degree \par  -Patients HR currently NSR 70 BPM, BP90/43. \par  -DDX: Iatrogenic vs conduction defect vs ischemia\par  -Atropine and Zoll pads at bedside\par  -Dopamine gtt, titrate to HR > 60 - Dc'd 12/9\par  -Consider TVP if HD unstable\par  -Hold AV cindy blockers\par  \plain\f1\fs16\hjgm3002\hich\f1\dbch\f1\loch\f1\cf2\fs16\b -EP consult and f/u recommendations for RRT for asystole with escape beats \par  - suspect likely vagal mediated \plain\f1\fs16\wsgv4290\hich\f1\dbch\f1\loch\f1\cf2\fs16\par  Hold narcotics\par  Continue to monitor \par  \par  #CAD s/p CABG (2004)\par  \plain\f1\fs16\zwgx0422\hich\f1\dbch\f1\loch\f1\cf2\fs16\b -asa, plavix\par  -statin held for abn LFT\plain\f1\fs16\nkmb0132\hich\f1\dbch\f1\loch\f1\cf2\fs16\par  -Most recent [facility] with  of the LAD and RCA, and tight OM lesion that was stented with a BMS\par  -Recent outpatient nuclear stress test with scar and hypokinesis in the RCA territory, consistent with TTE here with EF 40-45% w/ inferior/inferolateral WMA   \par  \plain\f1\fs16\ahzc8083\hich\f1\dbch\f1\loch\f1\cf2\fs16\b - heparin gtt x 48 hours - concern ACS (12/9-12/11)\par  - Repeat echo\plain\f1\fs16\edlh1941\hich\f1\dbch\f1\loch\f1\cf2\fs16  \plain\f1\fs16\fvmd0274\hich\f1\dbch\f1\loch\f1\cf2\fs16\b shows Echogenic mass is seen possibly in the left atrium, finding is not seen in every view. Repeat limited LA echo\par  - f/u limited echo LA thrombus\par  \par  #Hypertension\par  -Holding antihypertensives, coreg in setting of CHB, imdur and losartan \plain\f1\fs16\[id number]\hich\f1\dbch\f1\loch\f1\cf2\fs16\par  \par  === RENAL/ ===\par  \par   #DAMARI \par  -noted to have acute cr elevation from 1 on admisison to 1.8 \par  - bladder scan elevated, howe now placed \par  - worsening renal function, pt aneuric with creatinine > 5\par  - 12/10 nephrology recs appreciated \par  \plain\f1\fs16\falq2364\hich\f1\dbch\f1\loch\f1\cf2\fs16\b - sodium bicarbonate 650 mg TID. Monitor SCO2\par  - .\plain\f1\fs16\tlgh1447\hich\f1\dbch\f1\loch\f1\cf2\fs16\b\strike\plain\f1\fs16\dhsv9671\hich\f1\dbch\f1\loch\f1\cf2\fs16\b trial of Bumex 2 mg IVP\par  - uo improving to 75cc/hr\plain\f1\fs16\[id number]\hich\f1\dbch\f1\loch\f1\cf2\fs16\par  \par  \par  \par  \par  === GASTROINTESTINAL ===\par  \par  #Acute pancreatitis\par  -Abdominal pain, lipase over 3K, signs of acute interstitial pancreatitis on imaging\par  -Unclear cause of episode; no alcohol use, no recent instrumentation of bile ducts, no evidence of choledocholithiasis on abdominal ultrasound. No new medications. Triglycerides not elevated. Could be idiopathic. \par  -GI recs appreciated -Ok for low fat diet as tolerated , -ADAT, MRCP ordered , Gentle IVF given mildly reduced LVEF with regional WMAs seen on recent echo., Trend LFTs \par  < from: MR MRCP w/wo IV Cont (12.10.24 @ 13:41) >\par  Acute interstitial pancreatitis. No evidence of parenchymal necrosis or walled off fluid collection.\par  No gallstones or choledocholithiasis.\par  \plain\f1\fs16\[id number]\hich\f1\dbch\f1\loch\f1\cf2\fs16\b Bili and LFTs downtrending without obstructing lesions on MRCP\par  Low fat Diet as tolerated\par  No plans for endoscopic intervention at this time\par  Trend Bili, liver enzymes \par  Holding atorvastatin\plain\f1\fs16\[id number]\hich\f1\dbch\f1\loch\f1\cf2\fs16\par  \par  - Needs colonoscopy outpatient for colitis seen on CT in 6-8 weeks \par  GI will plan to sign off at this time. Please feel free to reach out to our team with any follow up questions. Please provide patient with Gastroenterology Clinic number to confirm/arrange appointment; [phone] ([facility] at [address] [place] [address]) for follow up with Dr. Mar\par  \par  \par  \par   === ENDO  ===\par  \par  #Type 2 diabetes mellitus. \par  -Reported to be on basal and bolus pre-meal at home (though reported to be twice a day)\par  -Hold pre-meal for now given NPO, re-add as indicated when diet able to be advanced\par  -Dose reduced home 30U lantus to 24U at bedtime given NPO status, adjust as indicated based on sugars\par  -Low insulin sliding scale q6hrs while NPO\par  -A1C of 7.5 on 12/1/24\par  -DASH/TLC/CC diet when advanced from NPO\par  \par  \par  === HEMATOLOGIC/ONC ===\par  \par  - H/H & plts stable \par  - Monitor H/H and plts \par  - stable\par  \par  - DVT PPX: heparin gtt \par  \par  === INFECTIOUS DISEASE ===\par  \par   #Leukocytosis\par  - no fever\par  - Continue to monitor with daily CBC\par  - Trend fever curve\par  - Possible sources of urinary tract given previously reported burning with urination. \plain\f1\fs16\[id number]\hich\f1\dbch\f1\loch\f1\cf2\fs16\b Has positive UA,\plain\f1\fs16\drvy0547\hich\f1\dbch\f1\loch\f1\cf2\fs16  though with fair amount of epithelial   cells. Could also be related to colitis seen on CT imaging\par  - Could be febrile in setting of inflammatory reaction given acute pancreatitis\par  - Continue zosyn\par  \par  \par  Lines/Tubes: PIV x 2, \par  \plain\f0\fs20\[id number]\hich\f0\dbch\f0\loch\f0\fs20\par  }     CCU Service  Cardiology   Spect: [phone] ([facility])     PATIENT: NIK MELISSA, MRN: [id number]    79F w HTN, DM, severe PAD w r BKA, L aka, CAD (sp 3v CABG 20 yrs ago, last cath 2014, only graft patent was LIMA-LAD,   of the LAD and RCA, and tight OM lesion that was stented with a BMS   p/w  abdominal pain, chest pain, anemia and admitted for acute pacreatitis, NSTEMI       transferred to CCU after asystolic event on dopamine at 3mcg/kg/mn and worsening renal function    INTERVAL HISTORY/OVERNIGHT EVENTS:   Urine output improving with Bumex  Denies cp, palp, dizziness, sob, orthopnea.     TELEMETRY: SR, no ectopy        ALLERGIES: Allergies    sulfa drugs (Hives)  sulfa drugs (Rash)  Sulfac 10% (Hives)    Intolerances        MEDICATIONS:  MEDICATIONS  (STANDING):  aspirin enteric coated 81 milliGRAM(s) Oral daily  brimonidine 0.2% Ophthalmic Solution 1 Drop(s) Both EYES two times a day  buMETAnide Injectable 2 milliGRAM(s) IV Push two times a day  chlorhexidine 2% Cloths 1 Application(s) Topical daily  clopidogrel Tablet 75 milliGRAM(s) Oral daily  dextrose 5%. 1000 milliLiter(s) (50 mL/Hr) IV Continuous <Continuous>  dextrose 5%. 1000 milliLiter(s) (100 mL/Hr) IV Continuous <Continuous>  dextrose 50% Injectable 25 Gram(s) IV Push once  dextrose 50% Injectable 12.5 Gram(s) IV Push once  dextrose 50% Injectable 25 Gram(s) IV Push once  glucagon  Injectable 1 milliGRAM(s) IntraMuscular once  heparin  Infusion 850 Unit(s)/Hr (6 mL/Hr) IV Continuous <Continuous>  insulin glargine Injectable (LANTUS) 24 Unit(s) SubCutaneous at bedtime  insulin lispro (ADMELOG) corrective regimen sliding scale   SubCutaneous three times a day before meals  insulin lispro (ADMELOG) corrective regimen sliding scale   SubCutaneous at bedtime  naloxone Injectable 0.4 milliGRAM(s) IV Push once  pantoprazole   Suspension 40 milliGRAM(s) Oral daily  piperacillin/tazobactam IVPB.. 3.375 Gram(s) IV Intermittent every 12 hours  sodium bicarbonate 650 milliGRAM(s) Oral three times a day    MEDICATIONS  (PRN):  dextrose Oral Gel 15 Gram(s) Oral once PRN Blood Glucose LESS THAN 70 milliGRAM(s)/deciliter        OBJECTIVE:  ICU Vital Signs Last 24 Hrs  T(C): 36.9 (11 Dec 2024 04:00), Max: 36.9 (11 Dec 2024 04:00)  T(F): 98.5 (11 Dec 2024 04:00), Max: 98.5 (11 Dec 2024 04:00)  HR: 64 (11 Dec 2024 05:00) (63 - 69)  BP: 123/53 (11 Dec 2024 05:00) (100/43 - 139/60)  BP(mean): 73 (11 Dec 2024 05:00) (61 - 84)  ABP: --  ABP(mean): --  RR: 19 (11 Dec 2024 05:00) (12 - 23)  SpO2: 100% (11 Dec 2024 05:00) (97% - 100%)    O2 Parameters below as of 11 Dec 2024 05:00  Patient On (Oxygen Delivery Method): room air            I&O's Summary    09 Dec 2024 07:01  -  10 Dec 2024 07:00  --------------------------------------------------------  IN: 2235.9 mL / OUT: 135 mL / NET: 2100.9 mL    10 Dec 2024 07:01  -  11 Dec 2024 05:41  --------------------------------------------------------  IN: 982 mL / OUT: 755 mL / NET: 227 mL      Daily     Daily       PHYSICAL EXAMINATION:  General: Comfortable, no acute distress, cooperative with exam.  HEENT: Moist mucous membranes.  Respiratory: CTAB, normal respiratory effort, no coughing, wheezes, crackles, or rales.  CV: RRR, S1S2, no murmurs, rubs or gallops. No JVD. Distal pulses intact.  Abdominal: Soft, nontender, nondistended, no rebound or guarding, normal bowel sounds.  Neurology: AOx3, no focal neuro defects, BUSH x 4.  Extremities: No pitting edema, + Peripheral pulses.          LABS:                          7.3    8.69  )-----------( 201      ( 11 Dec 2024 02:20 )             21.8     12-11    128[L]  |  93[L]  |  82[H]  ----------------------------<  180[H]  4.2   |  15[L]  |  5.73[H]    Ca    8.1[L]      11 Dec 2024 02:20  Phos  5.1     12-11  Mg     2.40     12-11    TPro  5.5[L]  /  Alb  2.5[L]  /  TBili  2.3[H]  /  DBili  x   /  AST  242[H]  /  ALT  429[H]  /  AlkPhos  171[H]  12-11    LIVER FUNCTIONS - ( 11 Dec 2024 02:20 )  Alb: 2.5 g/dL / Pro: 5.5 g/dL / ALK PHOS: 171 U/L / ALT: 429 U/L / AST: 242 U/L / GGT: x           PTT - ( 11 Dec 2024 02:20 )  PTT:73.6 sec    CARDIAC MARKERS ( 10 Dec 2024 01:30 )  x     / x     / x     / x     / 8.6 ng/mL  CARDIAC MARKERS ( 09 Dec 2024 05:47 )  x     / x     / x     / x     / 7.1 ng/mL      Urinalysis Basic - ( 11 Dec 2024 02:20 )    Color: x / Appearance: x / SG: x / pH: x  Gluc: 180 mg/dL / Ketone: x  / Bili: x / Urobili: x   Blood: x / Protein: x / Nitrite: x   Leuk Esterase: x / RBC: x / WBC x   Sq Epi: x / Non Sq Epi: x / Bacteria: x        echo:  TTE 12/2/2024   1. Technically very difficult study performed with the patient in the supine position.   2. Technically difficult image quality.   3. Left ventricular systolic function is mildly to moderately decreased with anejection fraction visually estimated at 40 to 45%. There are regional wall motion abnormalities present. Hypokinesis of the inferolateral wall, basal to mid inferior wall, and basal inferoseptum. There is mild (grade 1) left ventricular diastolic dysfunction.   4. The right ventricle is not well visualized.   5. Structurally normal mitral valve with normal leaflet excursion. There is calcification of the mitral valve annulus. There is mild mitral regurgitation.   6. No prior echocardiogram is available for comparison.    < from: TTE Limited W or WO Ultrasound Enhancing Agent (12.09.24 @ 16:50) >  CONCLUSIONS:      1. Left ventricular cavity is normal in size. Left ventricular wall thickness is normal. Left ventricular systolic function is mildly to moderately decreased with an ejection fraction of 46 % by [place]'s method of disks. Regional wall motion abnormalities present.   2. There is hypokinesis of the basal inferolateral, mid inferolateral, basal inferior and basal inferoseptal walls.   3. Echogenic mass is seen possibly in the left atrium, finding is not seen in every view. Unable to determine if it represents artifact or pathologic finding. Consider repeat TTE vs FOREST. (Prior images of study on 12/2/24 were technically difficult, comparison is limited).    < end of copied text >          Assessment and Plan:   · Assessment	  79F w HTN, DM, severe PAD w r BKA, L aka, CAD (sp 3v CABG 20 yrs ago, last cath 2014, only graft patent was LIMA-LAD, natives with  of LAD and RCA, OM w severe stented w BMS at that time) who presented with 15 min of resting chest pain, found to have Hgb of 7 (from 12 several years ago) and rising elevated troponins from 90s->280s; her ECG shows Inferior Q waves w chronic diffuse ST/TW changes, all of which are largely unchanged from prior tracings. TTE w midrange LVEF 40-45%. She has been chest pain free since her blood transfusion.   Overnight on 12/8 had telemetry notable for progressively worsening heart block (1st degree -> 3rd degree -> asystole >14 seconds).  Pt returned to NSR by the time RRT arrived to bedside.   Labs without significant actionable electrolyte disturbance.  TTE w/ wall motion abnormalities affecting inferolateral wall, basal to mid inferior wall, and basal infero-septum; ischemia could also be  of bradyarrhythmia as opposed to medication given.  Dilaudid was addressed w/ narcan and coreg not administered today.     Plan:     === NEUROLOGY ===      -Mental status: somnolent AAO x 3, not baseline  -Sedation: None  -Pain control: None    === RESPIRATORY ===     -EARLINE    === CARDIOVASCULAR ===    ##CHB bradycardia  -EKG suggestive of high grade AV Block, with intermittent 3rd degree   -Patients HR currently NSR 70 BPM, BP90/43.   -DDX: Iatrogenic vs conduction defect vs ischemia  -Atropine and Zoll pads at bedside  -Dopamine gtt, titrate to HR > 60 - Dc'd 12/9  -Consider TVP if HD unstable  -Hold AV cindy blockers  -EP consult and f/u recommendations for RRT for asystole with escape beats   - suspect likely vagal mediated   Hold narcotics  Continue to monitor     #CAD s/p CABG (2004)  -asa, plavix  -statin held for abn LFT  -Most recent [facility] with  of the LAD and RCA, and tight OM lesion that was stented with a BMS  -Recent outpatient nuclear stress test with scar and hypokinesis in the RCA territory, consistent with TTE here with EF 40-45% w/ inferior/inferolateral WMA     - heparin gtt x 48 hours - concern ACS (12/9-12/11)  - Repeat echo shows Echogenic mass is seen possibly in the left atrium, finding is not seen in every view. Repeat limited LA echo  - f/u limited echo LA thrombus    #Hypertension  -Holding antihypertensives, coreg in setting of CHB, imdur and losartan     === RENAL/ ===     #DAMARI   -noted to have acute cr elevation from 1 on admisison to 1.8   - bladder scan elevated, howe now placed   - worsening renal function, pt aneuric with creatinine > 5  - 12/10 nephrology recs appreciated      metabolic acidosis iso DAMARI. SCO2 low at 15 today. Increase oral sodium bicarbonate to 1300 mg TID. Monitor SCO2.  - sodium bicarbonate 650 mg TID. Monitor SCO2  - .trial of Bumex 2 mg IVP +gtt  - uo improving           === GASTROINTESTINAL ===    #Acute pancreatitis  -Abdominal pain, lipase over 3K, signs of acute interstitial pancreatitis on imaging  -Unclear cause of episode; no alcohol use, no recent instrumentation of bile ducts, no evidence of choledocholithiasis on abdominal ultrasound. No new medications. Triglycerides not elevated. Could be idiopathic.   -GI recs appreciated -Ok for low fat diet as tolerated , -ADAT, MRCP ordered , Gentle IVF given mildly reduced LVEF with regional WMAs seen on recent echo., Trend LFTs   < from: MR MRCP w/wo IV Cont (12.10.24 @ 13:41) >  Acute interstitial pancreatitis. No evidence of parenchymal necrosis or walled off fluid collection.  No gallstones or choledocholithiasis.  Bili and LFTs downtrending without obstructing lesions on MRCP  Low fat Diet as tolerated  No plans for endoscopic intervention at this time  Trend Bili, liver enzymes   Holding atorvastatin    - Needs colonoscopy outpatient for colitis seen on CT in 6-8 weeks   GI will plan to sign off at this time. Please feel free to reach out to our team with any follow up questions. Please provide patient with Gastroenterology Clinic number to confirm/arrange appointment; [phone] ([facility] at [address] [place] [address]) for follow up with Dr. Mar         === ENDO  ===    #Type 2 diabetes mellitus.   -Reported to be on basal and bolus pre-meal at home (though reported to be twice a day)  -Hold pre-meal for now given NPO, re-add as indicated when diet able to be advanced  -Dose reduced home 30U lantus to 24U at bedtime given NPO status, adjust as indicated based on sugars  -Low insulin sliding scale q6hrs while NPO  -A1C of 7.5 on 12/1/24  -DASH/TLC/CC diet when advanced from NPO      === HEMATOLOGIC/ONC ===    - H/H & plts stable   - Monitor H/H and plts   - stable    - DVT PPX: heparin gtt     === INFECTIOUS DISEASE ===     #Leukocytosis  - no fever  - Continue to monitor with daily CBC  - Trend fever curve  - Possible sources of urinary tract given previously reported burning with urination. Has positive UA, though with fair amount of epithelial cells. Could also be related to colitis seen on CT imaging  - Could be febrile in setting of inflammatory reaction given acute pancreatitis  - Continue zosyn      Lines/Tubes: PIV x 2,

## 2024-12-11 NOTE — PROGRESS NOTE ADULT - ASSESSMENT
79yoF w/ PMHx CAD s/p CABG and PCI, HTN, HLD, DMII, severe PAD with b/l AKA initially presented with abdominal pain found to have acute pancreatitis.  Seen by GI recommending a MRCP, on abx and IVF.  Hospital course complicated by sinus arrest up to 27 seconds, as seen on telemetry, patient was lethargic at the time.  Transferred to CCU for further monitoring.  Currently remains lethargic but arousable to voice, on telemetry NSR.  Endorses chest pain and abdominal pain when she first arrived but symptoms has since resolved.  Labs significant for WBC 11.85, worsening DAMARI, uptrending troponins, pH 7.22. TTE mildly reduced LV function EF 40-45%.        ## acute pancreatitis  ## DAMARI  ## Sinus Arrest -Suspect likely vagal mediated     Continue to monitor on telemetry, in SR at 60s overnight  If having more episodes, will need to consider TVP.  Correct electrolyte dysfunction  Hold AV cindy blockers   Keep K>4 Mg>2   Management as per CCU and nephrology

## 2024-12-12 LAB
ALBUMIN SERPL ELPH-MCNC: 2.8 G/DL — LOW (ref 3.3–5)
ALP SERPL-CCNC: 246 U/L — HIGH (ref 40–120)
ALT FLD-CCNC: 449 U/L — HIGH (ref 4–33)
ANION GAP SERPL CALC-SCNC: 18 MMOL/L — HIGH (ref 7–14)
AST SERPL-CCNC: 219 U/L — HIGH (ref 4–32)
BILIRUB SERPL-MCNC: 2 MG/DL — HIGH (ref 0.2–1.2)
BUN SERPL-MCNC: 78 MG/DL — HIGH (ref 7–23)
CALCIUM SERPL-MCNC: 9.1 MG/DL — SIGNIFICANT CHANGE UP (ref 8.4–10.5)
CHLORIDE SERPL-SCNC: 96 MMOL/L — LOW (ref 98–107)
CO2 SERPL-SCNC: 20 MMOL/L — LOW (ref 22–31)
CREAT SERPL-MCNC: 4.81 MG/DL — HIGH (ref 0.5–1.3)
CULTURE RESULTS: SIGNIFICANT CHANGE UP
CULTURE RESULTS: SIGNIFICANT CHANGE UP
EGFR: 9 ML/MIN/1.73M2 — LOW
GLUCOSE BLDC GLUCOMTR-MCNC: 177 MG/DL — HIGH (ref 70–99)
GLUCOSE BLDC GLUCOMTR-MCNC: 260 MG/DL — HIGH (ref 70–99)
GLUCOSE BLDC GLUCOMTR-MCNC: 278 MG/DL — HIGH (ref 70–99)
GLUCOSE BLDC GLUCOMTR-MCNC: 335 MG/DL — HIGH (ref 70–99)
GLUCOSE SERPL-MCNC: 182 MG/DL — HIGH (ref 70–99)
HCT VFR BLD CALC: 27.3 % — LOW (ref 34.5–45)
HGB BLD-MCNC: 9.2 G/DL — LOW (ref 11.5–15.5)
MAGNESIUM SERPL-MCNC: 2.3 MG/DL — SIGNIFICANT CHANGE UP (ref 1.6–2.6)
MCHC RBC-ENTMCNC: 28.6 PG — SIGNIFICANT CHANGE UP (ref 27–34)
MCHC RBC-ENTMCNC: 33.7 G/DL — SIGNIFICANT CHANGE UP (ref 32–36)
MCV RBC AUTO: 84.8 FL — SIGNIFICANT CHANGE UP (ref 80–100)
NRBC # BLD: 0 /100 WBCS — SIGNIFICANT CHANGE UP (ref 0–0)
NRBC # FLD: 0.06 K/UL — HIGH (ref 0–0)
PHOSPHATE SERPL-MCNC: 6.1 MG/DL — HIGH (ref 2.5–4.5)
PLATELET # BLD AUTO: 300 K/UL — SIGNIFICANT CHANGE UP (ref 150–400)
POTASSIUM SERPL-MCNC: 3.7 MMOL/L — SIGNIFICANT CHANGE UP (ref 3.5–5.3)
POTASSIUM SERPL-SCNC: 3.7 MMOL/L — SIGNIFICANT CHANGE UP (ref 3.5–5.3)
PROT SERPL-MCNC: 7 G/DL — SIGNIFICANT CHANGE UP (ref 6–8.3)
RBC # BLD: 3.22 M/UL — LOW (ref 3.8–5.2)
RBC # FLD: 14.8 % — HIGH (ref 10.3–14.5)
SODIUM SERPL-SCNC: 134 MMOL/L — LOW (ref 135–145)
SPECIMEN SOURCE: SIGNIFICANT CHANGE UP
SPECIMEN SOURCE: SIGNIFICANT CHANGE UP
WBC # BLD: 10.92 K/UL — HIGH (ref 3.8–10.5)
WBC # FLD AUTO: 10.92 K/UL — HIGH (ref 3.8–10.5)

## 2024-12-12 PROCEDURE — 99232 SBSQ HOSP IP/OBS MODERATE 35: CPT

## 2024-12-12 PROCEDURE — 99291 CRITICAL CARE FIRST HOUR: CPT

## 2024-12-12 PROCEDURE — 99233 SBSQ HOSP IP/OBS HIGH 50: CPT | Mod: GC

## 2024-12-12 RX ORDER — HYDRALAZINE HYDROCHLORIDE 10 MG/1
10 TABLET ORAL EVERY 8 HOURS
Refills: 0 | Status: DISCONTINUED | OUTPATIENT
Start: 2024-12-12 | End: 2024-12-16

## 2024-12-12 RX ORDER — BUMETANIDE 1 MG/1
0.5 TABLET ORAL
Qty: 20 | Refills: 0 | Status: DISCONTINUED | OUTPATIENT
Start: 2024-12-12 | End: 2024-12-12

## 2024-12-12 RX ORDER — BUMETANIDE 1 MG/1
1 TABLET ORAL EVERY 12 HOURS
Refills: 0 | Status: DISCONTINUED | OUTPATIENT
Start: 2024-12-12 | End: 2024-12-13

## 2024-12-12 RX ADMIN — Medication 2: at 22:18

## 2024-12-12 RX ADMIN — INSULIN GLARGINE 24 UNIT(S): 100 INJECTION, SOLUTION SUBCUTANEOUS at 22:18

## 2024-12-12 RX ADMIN — CLOPIDOGREL 75 MILLIGRAM(S): 75 TABLET, FILM COATED ORAL at 12:03

## 2024-12-12 RX ADMIN — Medication 6: at 12:03

## 2024-12-12 RX ADMIN — HYDRALAZINE HYDROCHLORIDE 10 MILLIGRAM(S): 10 TABLET ORAL at 22:21

## 2024-12-12 RX ADMIN — PIPERACILLIN SODIUM AND TAZOBACTAM SODIUM 25 GRAM(S): 4; .5 INJECTION, POWDER, LYOPHILIZED, FOR SOLUTION INTRAVENOUS at 18:58

## 2024-12-12 RX ADMIN — PANTOPRAZOLE SODIUM 40 MILLIGRAM(S): 40 TABLET, DELAYED RELEASE ORAL at 12:02

## 2024-12-12 RX ADMIN — Medication 2: at 07:56

## 2024-12-12 RX ADMIN — SODIUM BICARBONATE 650 MILLIGRAM(S): 84 INJECTION, SOLUTION INTRAVENOUS at 05:36

## 2024-12-12 RX ADMIN — SODIUM BICARBONATE 650 MILLIGRAM(S): 84 INJECTION, SOLUTION INTRAVENOUS at 22:22

## 2024-12-12 RX ADMIN — BRIMONIDINE TARTRATE 1 DROP(S): 1.5 SOLUTION/ DROPS OPHTHALMIC at 17:37

## 2024-12-12 RX ADMIN — Medication 8: at 17:37

## 2024-12-12 RX ADMIN — BUMETANIDE 1 MILLIGRAM(S): 1 TABLET ORAL at 17:09

## 2024-12-12 RX ADMIN — BRIMONIDINE TARTRATE 1 DROP(S): 1.5 SOLUTION/ DROPS OPHTHALMIC at 05:36

## 2024-12-12 RX ADMIN — CHLORHEXIDINE GLUCONATE 1 APPLICATION(S): 1.2 RINSE ORAL at 17:09

## 2024-12-12 RX ADMIN — PIPERACILLIN SODIUM AND TAZOBACTAM SODIUM 25 GRAM(S): 4; .5 INJECTION, POWDER, LYOPHILIZED, FOR SOLUTION INTRAVENOUS at 05:36

## 2024-12-12 RX ADMIN — BUMETANIDE 2.5 MG/HR: 1 TABLET ORAL at 06:50

## 2024-12-12 RX ADMIN — Medication 81 MILLIGRAM(S): at 12:02

## 2024-12-12 RX ADMIN — LIDOCAINE 1 PATCH: 40 CREAM TOPICAL at 12:02

## 2024-12-12 RX ADMIN — SODIUM BICARBONATE 650 MILLIGRAM(S): 84 INJECTION, SOLUTION INTRAVENOUS at 13:27

## 2024-12-12 NOTE — PROGRESS NOTE ADULT - SUBJECTIVE AND OBJECTIVE BOX
interval history:  no acute overnight event  Tele with SR at 60s  good urine output overnight      PAST MEDICAL & SURGICAL HISTORY:  CAD (coronary artery disease)    S/P CABG x 3  in 2004, [facility]    Stented coronary artery  2010 [facility]    PAD (peripheral artery disease)  s/p R BKA    Carotid artery stenosis    DM (diabetes mellitus)  type II    HTN (hypertension)    Hypercholesterolemia    Obesity    Diabetes    Hypertension    CAD (coronary artery disease)    PVD (peripheral vascular disease)    Carotid stenosis    UTI (urinary tract infection)    Acute kidney injury superimposed on chronic kidney disease    Chronic diastolic heart failure    Vitamin D deficiency    Normocytic anemia    Pulmonary HTN    Diabetic retinopathy    Dermatitis    Diabetes    HTN (Hypertension)    CAD (Coronary Artery Disease)    Hx of CABG  3v 2004    S/P hysterectomy  2004    S/P angioplasty with stent  2009, 3/2014    S/P below knee amputation, right  6/2010    S/P eye surgery  for glaucoma    S/P CABG x 3    S/P BKA (below knee amputation) unilateral, right    History of hysterectomy    Elective surgery  traumatic amputation of the toe    S/P BKA (below knee amputation), left        MEDICATIONS  (STANDING):  aspirin enteric coated 81 milliGRAM(s) Oral daily  brimonidine 0.2% Ophthalmic Solution 1 Drop(s) Both EYES two times a day  buMETAnide Infusion 0.5 mG/Hr (2.5 mL/Hr) IV Continuous <Continuous>  chlorhexidine 2% Cloths 1 Application(s) Topical daily  clopidogrel Tablet 75 milliGRAM(s) Oral daily  dextrose 5%. 1000 milliLiter(s) (50 mL/Hr) IV Continuous <Continuous>  dextrose 5%. 1000 milliLiter(s) (100 mL/Hr) IV Continuous <Continuous>  dextrose 50% Injectable 25 Gram(s) IV Push once  dextrose 50% Injectable 12.5 Gram(s) IV Push once  dextrose 50% Injectable 25 Gram(s) IV Push once  gabapentin 300 milliGRAM(s) Oral daily  glucagon  Injectable 1 milliGRAM(s) IntraMuscular once  insulin glargine Injectable (LANTUS) 24 Unit(s) SubCutaneous at bedtime  insulin lispro (ADMELOG) corrective regimen sliding scale   SubCutaneous three times a day before meals  insulin lispro (ADMELOG) corrective regimen sliding scale   SubCutaneous at bedtime  lidocaine   4% Patch 1 Patch Transdermal daily  naloxone Injectable 0.4 milliGRAM(s) IV Push once  pantoprazole   Suspension 40 milliGRAM(s) Oral daily  piperacillin/tazobactam IVPB.. 3.375 Gram(s) IV Intermittent every 12 hours  senna 2 Tablet(s) Oral at bedtime  sodium bicarbonate 650 milliGRAM(s) Oral three times a day    MEDICATIONS  (PRN):  dextrose Oral Gel 15 Gram(s) Oral once PRN Blood Glucose LESS THAN 70 milliGRAM(s)/deciliter  polyethylene glycol 3350 17 Gram(s) Oral daily PRN Constipation            Vital Signs Last 24 Hrs  T(C): 36.9 (12 Dec 2024 04:00), Max: 36.9 (11 Dec 2024 16:00)  T(F): 98.4 (12 Dec 2024 04:00), Max: 98.5 (11 Dec 2024 16:00)  HR: 65 (12 Dec 2024 06:00) (60 - 71)  BP: 152/59 (12 Dec 2024 06:00) (104/43 - 158/65)  BP(mean): 87 (12 Dec 2024 06:00) (57 - 90)  RR: 17 (12 Dec 2024 06:00) (14 - 23)  SpO2: 99% (12 Dec 2024 06:00) (98% - 100%)    Parameters below as of 12 Dec 2024 06:00  Patient On (Oxygen Delivery Method): room air                INTERPRETATION OF TELEMETRY: SR at 60s     ECG:        LABS:                        9.2    10.92 )-----------( 300      ( 12 Dec 2024 04:20 )             27.3     12-12    134[L]  |  96[L]  |  78[H]  ----------------------------<  182[H]  3.7   |  20[L]  |  4.81[H]    Ca    9.1      12 Dec 2024 04:20  Phos  6.1     12-12  Mg     2.30     12-12    TPro  7.0  /  Alb  2.8[L]  /  TBili  2.0[H]  /  DBili  x   /  AST  219[H]  /  ALT  449[H]  /  AlkPhos  246[H]  12-12    CARDIAC MARKERS ( 11 Dec 2024 02:20 )  x     / x     / x     / x     / 4.3 ng/mL      PTT - ( 11 Dec 2024 02:20 )  PTT:73.6 sec  Urinalysis Basic - ( 12 Dec 2024 04:20 )    Color: x / Appearance: x / SG: x / pH: x  Gluc: 182 mg/dL / Ketone: x  / Bili: x / Urobili: x   Blood: x / Protein: x / Nitrite: x   Leuk Esterase: x / RBC: x / WBC x   Sq Epi: x / Non Sq Epi: x / Bacteria: x      I&O's Summary    11 Dec 2024 07:01  -  12 Dec 2024 07:00  --------------------------------------------------------  IN: 1414.5 mL / OUT: 4685 mL / NET: -3270.5 mL      BNP  RADIOLOGY & ADDITIONAL STUDIES:      PHYSICAL EXAM:    GENERAL: In no apparent distress, well nourished, and hydrated.  HEART: Regular rate and rhythm; No murmurs, rubs, or gallops.  PULMONARY: Clear to auscultation and percussion.  No rales, wheezing, or rhonchi bilaterally.  ABDOMEN: Soft, Nontender, Nondistended; Bowel sounds present  EXTREMITIES:  2+ Peripheral Pulses, No clubbing, cyanosis, or edema

## 2024-12-12 NOTE — PROGRESS NOTE ADULT - ASSESSMENT
79F w HTN, DM, severe PAD w r BKA, L aka, CAD (sp 3v CABG 20 yrs ago, last cath 2014, only graft patent was LIMA-LAD, natives with  of LAD and RCA, OM w severe stented w BMS at that time) who presented with 15 min of resting chest pain, found to have Hgb of 7 (from 12 several years ago) and rising elevated troponins from 90s->280s; her ECG shows Inferior Q waves w chronic diffuse ST/TW changes, all of which are largely unchanged from prior tracings. TTE w midrange LVEF 40-45%. She has been chest pain free since her blood transfusion.   Overnight on 12/8 had telemetry notable for progressively worsening heart block (1st degree -> 3rd degree -> asystole >14 seconds).  Pt returned to NSR by the time RRT arrived to bedside.   Labs without significant actionable electrolyte disturbance.  TTE w/ wall motion abnormalities affecting inferolateral wall, basal to mid inferior wall, and basal infero-septum; ischemia could also be  of bradyarrhythmia as opposed to medication given.  Dilaudid was addressed w/ narcan and coreg not administered today.     Plan:     === NEUROLOGY ===      -Mental status: somnolent AAO x 3, not baseline  -Sedation: None  -Pain control: None    === RESPIRATORY ===     -EARLINE    === CARDIOVASCULAR ===    ##CHB bradycardia  -EKG suggestive of high grade AV Block, with intermittent 3rd degree   -Patients HR currently NSR 70 BPM, BP90/43.   -DDX: Iatrogenic vs conduction defect vs ischemia  -Atropine and Zoll pads at bedside  -Dopamine gtt, titrate to HR > 60 - Dc'd 12/9  -Consider TVP if HD unstable  -Hold AV cindy blockers  -EP consult and f/u recommendations for RRT for asystole with escape beats   - suspect likely vagal mediated   Hold narcotics  Continue to monitor     #CAD s/p CABG (2004)  -asa, plavix  -statin held for abn LFT  -Most recent [facility] with  of the LAD and RCA, and tight OM lesion that was stented with a BMS  -Recent outpatient nuclear stress test with scar and hypokinesis in the RCA territory, consistent with TTE here with EF 40-45% w/ inferior/inferolateral WMA     - heparin gtt x 48 hours - concern ACS (12/9-12/11)  - Repeat echo shows Echogenic mass is seen possibly in the left atrium, finding is not seen in every view. Repeat limited LA echo  - f/u limited echo LA thrombus    #Hypertension  -Holding antihypertensives, coreg in setting of CHB, imdur and losartan     === RENAL/ ===     #DAMARI   -noted to have acute cr elevation from 1 on admisison to 1.8   - bladder scan elevated, howe now placed   - worsening renal function, pt aneuric with creatinine > 5  - 12/10 nephrology recs appreciated      metabolic acidosis iso DAMARI. SCO2 low at 15 today. Increase oral sodium bicarbonate to 1300 mg TID. Monitor SCO2.  - sodium bicarbonate 650 mg TID. Monitor SCO2  - .trial of Bumex 2 mg IVP +gtt  - uo improving           === GASTROINTESTINAL ===    #Acute pancreatitis  -Abdominal pain, lipase over 3K, signs of acute interstitial pancreatitis on imaging  -Unclear cause of episode; no alcohol use, no recent instrumentation of bile ducts, no evidence of choledocholithiasis on abdominal ultrasound. No new medications. Triglycerides not elevated. Could be idiopathic.   -GI recs appreciated -Ok for low fat diet as tolerated , -ADAT, MRCP ordered , Gentle IVF given mildly reduced LVEF with regional WMAs seen on recent echo., Trend LFTs   < from: MR MRCP w/wo IV Cont (12.10.24 @ 13:41) >  Acute interstitial pancreatitis. No evidence of parenchymal necrosis or walled off fluid collection.  No gallstones or choledocholithiasis.  Bili and LFTs downtrending without obstructing lesions on MRCP  Low fat Diet as tolerated  No plans for endoscopic intervention at this time  Trend Bili, liver enzymes   Holding atorvastatin    - Needs colonoscopy outpatient for colitis seen on CT in 6-8 weeks   GI will plan to sign off at this time. Please feel free to reach out to our team with any follow up questions. Please provide patient with Gastroenterology Clinic number to confirm/arrange appointment; [phone] ([facility]ulty Practice at [address] [place] [address]) for follow up with Dr. Mar         === ENDO  ===    #Type 2 diabetes mellitus.   -Reported to be on basal and bolus pre-meal at home (though reported to be twice a day)  -Hold pre-meal for now given NPO, re-add as indicated when diet able to be advanced  -Dose reduced home 30U lantus to 24U at bedtime given NPO status, adjust as indicated based on sugars  -Low insulin sliding scale q6hrs while NPO  -A1C of 7.5 on 12/1/24  -DASH/TLC/CC diet when advanced from NPO      === HEMATOLOGIC/ONC ===    - H/H & plts stable   - Monitor H/H and plts   - stable    - DVT PPX: heparin gtt     === INFECTIOUS DISEASE ===     #Leukocytosis  - no fever  - Continue to monitor with daily CBC  - Trend fever curve  - Possible sources of urinary tract given previously reported burning with urination. Has positive UA, though with fair amount of epithelial cells. Could also be related to colitis seen on CT imaging  - Could be febrile in setting of inflammatory reaction given acute pancreatitis  - Continue zosyn      Lines/Tubes: PIV x 2,         Attestation Statements:    Attestation Statements:  Patient is critically ill, requiring critical care services.     Attending: I have personally and independently provided 55 minutes of critical care services.  This excludes any time spent on separate procedures or teaching.     Attending comments: 79 year old wman with CAD sp CABG HTN HLD PAD sp amputations- R and L AKAs with recent admission for chest pain. Sent home on Friday-->returned last night with abdominal pain/band like chest pain and N/V.  Diagnosed with acute pancreatits. Had asystole overnight and transferred to CCU    TTE    1. Left ventricular cavity is normal in size. Left ventricular wall thickness is normal. Left ventricular systolic function is mildly to moderately decreased with an ejection fraction of 46 % by [place]'s method of disks. Regional wall motion abnormalities present.   2. There is hypokinesis of the basal inferolateral, mid inferolateral, basal inferior and basal inferoseptal walls.   3. Echogenic mass is seen possibly in the left atrium, finding is not seen in every view. Unable to determine if it represents artifact or pathologic finding. Consider repeat TTE vs FOREST. (Prior images of study on 12/2/24 were technically difficult, comparison is limited).    MEDICATIONS  (STANDING):  Heparin gtt  ASA   Plavix  Bumex 2 mg IV BID  Zosyn    #CV  EP followup appreciated  continue heparin gtt- dc this afternoon    #Renal- DAMARI- now Cr 5.7 in setting of contrast and ATN from arrest  Continue to monitor  Bumex  started    #GI- Acute pancreatitis  Follow lipase  IVF  GI input appreciated  Recheck LFTs.   79F w HTN, DM, severe PAD w r BKA, L aka, CAD (sp 3v CABG 20 yrs ago, last cath 2014, only graft patent was LIMA-LAD, natives with  of LAD and RCA, OM w severe stented w BMS at that time) who presented with 15 min of resting chest pain, found to have Hgb of 7 (from 12 several years ago) and rising elevated troponins from 90s->280s; her ECG shows Inferior Q waves w chronic diffuse ST/TW changes, all of which are largely unchanged from prior tracings. TTE w midrange LVEF 40-45%. She has been chest pain free since her blood transfusion.   Overnight on 12/8 had telemetry notable for progressively worsening heart block (1st degree -> 3rd degree -> asystole >14 seconds).  Pt returned to NSR by the time RRT arrived to bedside.   Labs without significant actionable electrolyte disturbance.  TTE w/ wall motion abnormalities affecting inferolateral wall, basal to mid inferior wall, and basal infero-septum; ischemia could also be  of bradyarrhythmia as opposed to medication given.  Dilaudid was addressed w/ narcan and coreg not administered today.     Plan:     === NEUROLOGY ===      -Mental status: somnolent AAO x 3, not baseline  -Sedation: None  -Pain control: None    === RESPIRATORY ===     -ERALINE    === CARDIOVASCULAR ===    ##CHB bradycardia  -EKG suggestive of high grade AV Block, with intermittent 3rd degree   -Patients HR currently NSR 70 BPM, BP90/43.   -DDX: Iatrogenic vs conduction defect vs ischemia  -Atropine and Zoll pads at bedside  -Dopamine gtt, titrate to HR > 60 - Dc'd 12/9  -Consider TVP if HD unstable  -Hold AV cindy blockers  -EP consult and f/u recommendations for RRT for asystole with escape beats   - suspect likely vagal mediated   Hold narcotics  Continue to monitor     #CAD s/p CABG (2004)  -asa, plavix  -statin held for abn LFT  -Most recent [facility] with  of the LAD and RCA, and tight OM lesion that was stented with a BMS  -Recent outpatient nuclear stress test with scar and hypokinesis in the RCA territory, consistent with TTE here with EF 40-45% w/ inferior/inferolateral WMA     - heparin gtt x 48 hours - concern ACS (12/9-12/11) completed  - Repeat echo EF 46% shows Echogenic mass is seen possibly in the left atrium, finding is not seen in every view. Repeat limited LA echo  - f/u limited echo LA thrombus    #Hypertension  -Holding antihypertensives, coreg in setting of CHB, imdur and losartan     === RENAL/ ===     #DAMARI   -noted to have acute cr elevation from 1 on admisison to 1.8   - bladder scan elevated, howe now placed   - worsening renal function, pt aneuric with creatinine > 5  - Creat 4.81  -  nephrology recs appreciated      metabolic acidosis iso DAMARI. SCO2 low at 15 today. Increase oral sodium bicarbonate to 1300 mg TID. Monitor SCO2.  - sodium bicarbonate 650 mg TID. Monitor SCO2  - .trial of Bumex 2 mg IVP +gtt  - uo improving INTAKE 1414.5/ OUTPUT 4685/ -Net 3270.5           === GASTROINTESTINAL ===    #Acute pancreatitis  -Abdominal pain, lipase over 3K, signs of acute interstitial pancreatitis on imaging  -Unclear cause of episode; no alcohol use, no recent instrumentation of bile ducts, no evidence of choledocholithiasis on abdominal ultrasound. No new medications. Triglycerides not elevated. Could be idiopathic.   -GI recs appreciated -Ok for low fat diet as tolerated , -ADAT, MRCP ordered , Gentle IVF given mildly reduced LVEF with regional WMAs seen on recent echo., Trend LFTs   < from: MR MRCP w/wo IV Cont (12.10.24 @ 13:41) >  Acute interstitial pancreatitis. No evidence of parenchymal necrosis or walled off fluid collection.  No gallstones or choledocholithiasis.  Bili and LFTs downtrending without obstructing lesions on MRCP  Low fat Diet as tolerated  No plans for endoscopic intervention at this time  Trend Bili, liver enzymes   Holding atorvastatin    - Needs colonoscopy outpatient for colitis seen on CT in 6-8 weeks   GI will plan to sign off at this time. Please feel free to reach out to our team with any follow up questions. Please provide patient with Gastroenterology Clinic number to confirm/arrange appointment; [phone] (Faculty Practice at [address] [place] [address]) for follow up with Dr. Mar         === ENDO  ===    #Type 2 diabetes mellitus.   -Reported to be on basal and bolus pre-meal at home (though reported to be twice a day)  -Hold pre-meal for now given NPO, re-add as indicated when diet able to be advanced  -Dose reduced home 30U lantus to 24U at bedtime given NPO status, adjust as indicated based on sugars  -Low insulin sliding scale q6hrs while NPO  -A1C of 7.5 on 12/1/24  -DASH/TLC/CC diet when advanced from NPO      === HEMATOLOGIC/ONC ===    - H/H & plts stable   - Monitor H/H and plts   - stable    - DVT PPX: heparin gtt     === INFECTIOUS DISEASE ===     #Leukocytosis  - no fever  - Continue to monitor with daily CBC  - Trend fever curve  - Possible sources of urinary tract given previously reported burning with urination. Has positive UA, though with fair amount of epithelial cells. Could also be related to colitis seen on CT imaging  - Could be febrile in setting of inflammatory reaction given acute pancreatitis  - Continue zosyn      Lines/Tubes: PIV x 2,        79F w HTN, DM, severe PAD w r BKA, L aka, CAD (sp 3v CABG 20 yrs ago, last cath 2014, only graft patent was LIMA-LAD, natives with  of LAD and RCA, OM w severe stented w BMS at that time) who presented with 15 min of resting chest pain, found to have Hgb of 7 (from 12 several years ago) and rising elevated troponins from 90s->280s; her ECG shows Inferior Q waves w chronic diffuse ST/TW changes, all of which are largely unchanged from prior tracings. TTE w midrange LVEF 40-45%. She has been chest pain free since her blood transfusion.   Overnight on 12/8 had telemetry notable for progressively worsening heart block (1st degree -> 3rd degree -> asystole >14 seconds).  Pt returned to NSR by the time RRT arrived to bedside.   Labs without significant actionable electrolyte disturbance.  TTE w/ wall motion abnormalities affecting inferolateral wall, basal to mid inferior wall, and basal infero-septum; ischemia could also be  of bradyarrhythmia as opposed to medication given.  Dilaudid was addressed w/ narcan and coreg not administered today.     Plan:     === NEUROLOGY ===      -Mental status: somnolent AAO x 3, not baseline  -Sedation: None  -Pain control: None    === RESPIRATORY ===     -EARLINE    === CARDIOVASCULAR ===    ##CHB bradycardia  -EKG suggestive of high grade AV Block, with intermittent 3rd degree   -Patients HR currently NSR 70 BPM, BP90/43.   -DDX: Iatrogenic vs conduction defect vs ischemia  -Atropine and Zoll pads at bedside  -Dopamine gtt, titrate to HR > 60 - Dc'd 12/9  -Consider TVP if HD unstable  -Hold AV cindy blockers  -EP consult and f/u recommendations for RRT for asystole with escape beats   - suspect likely vagal mediated   Hold narcotics  Continue to monitor     #CAD s/p CABG (2004)  -asa, plavix  -statin held for abn LFT  -Most recent [facility] with  of the LAD and RCA, and tight OM lesion that was stented with a BMS  -Recent outpatient nuclear stress test with scar and hypokinesis in the RCA territory, consistent with TTE here with EF 40-45% w/ inferior/inferolateral WMA     - heparin gtt x 48 hours - concern ACS (12/9-12/11) completed  - Repeat echo EF 46% shows Echogenic mass is seen possibly in the left atrium, finding is not seen in every view. Repeat limited LA echo  - f/u limited echo LA thrombus    #Hypertension  -Holding antihypertensives, coreg in setting of CHB, imdur and losartan     === RENAL/ ===     #DAMARI   -noted to have acute cr elevation from 1 on admisison to 1.8   - bladder scan elevated, howe now placed   - worsening renal function, pt aneuric with creatinine > 5  - Creat 4.81  -  nephrology recs appreciated      metabolic acidosis iso DAMARI. SCO2 low at 15 today. Increase oral sodium bicarbonate to 1300 mg TID. Monitor SCO2.  - sodium bicarbonate 650 mg TID. Monitor SCO2  - .trial of Bumex 2 mg IVP +gtt  - uo improving INTAKE 1414.5/ OUTPUT 4685/ -Net 3270.5           === GASTROINTESTINAL ===    #Acute pancreatitis  -Abdominal pain, lipase over 3K, signs of acute interstitial pancreatitis on imaging  -Unclear cause of episode; no alcohol use, no recent instrumentation of bile ducts, no evidence of choledocholithiasis on abdominal ultrasound. No new medications. Triglycerides not elevated. Could be idiopathic.   -GI recs appreciated -Ok for low fat diet as tolerated , -ADAT, MRCP ordered , Gentle IVF given mildly reduced LVEF with regional WMAs seen on recent echo., Trend LFTs   < from: MR MRCP w/wo IV Cont (12.10.24 @ 13:41) >  Acute interstitial pancreatitis. No evidence of parenchymal necrosis or walled off fluid collection.  No gallstones or choledocholithiasis.  Bili and LFTs downtrending without obstructing lesions on MRCP  Low fat Diet as tolerated  No plans for endoscopic intervention at this time  Trend Bili, liver enzymes   Holding atorvastatin    - Needs colonoscopy outpatient for colitis seen on CT in 6-8 weeks   GI will plan to sign off at this time. Please feel free to reach out to our team with any follow up questions. Please provide patient with Gastroenterology Clinic number to confirm/arrange appointment; [phone] (Faculty Practice at [address] [place] [address]) for follow up with Dr. Mar         === ENDO  ===    #Type 2 diabetes mellitus.   -Reported to be on basal and bolus pre-meal at home (though reported to be twice a day)  -Hold pre-meal for now given NPO, re-add as indicated when diet able to be advanced  -Dose reduced home 30U lantus to 24U at bedtime given NPO status, adjust as indicated based on sugars  -Low insulin sliding scale q6hrs while NPO  -A1C of 7.5 on 12/1/24  -DASH/TLC/CC diet when advanced from NPO      === HEMATOLOGIC/ONC ===    - H/H & plts stable   - Monitor H/H and plts   - stable    - DVT PPX: heparin gtt     === INFECTIOUS DISEASE ===     #Leukocytosis  - no fever  - Continue to monitor with daily CBC  - Trend fever curve  - Possible sources of urinary tract given previously reported burning with urination. Has positive UA, though with fair amount of epithelial cells. Could also be related to colitis seen on CT imaging  - Could be febrile in setting of inflammatory reaction given acute pancreatitis  - Continue zosyn      Lines/Tubes: PIV x 2

## 2024-12-12 NOTE — PROGRESS NOTE ADULT - ASSESSMENT
EKg SR TWI V2-V6, inferior leads    LHC 2017   of the LAD and RCA, patent LIMA to LAD graft, OM stented with  BMS    TTE 12/2/2024   1. Technically very difficult study performed with the patient in the supine position.   2. Technically difficult image quality.   3. Left ventricular systolic function is mildly to moderately decreased with anejection fraction visually estimated at 40 to 45%. There are regional wall motion abnormalities present. Hypokinesis of the inferolateral wall, basal to mid inferior wall, and basal inferoseptum. There is mild (grade 1) left ventricular diastolic dysfunction.   4. The right ventricle is not well visualized.   5. Structurally normal mitral valve with normal leaflet excursion. There is calcification of the mitral valve annulus. There is mild mitral regurgitation.   6. No prior echocardiogram is available for comparison.    TTE 12/9/2024   1. Left ventricular cavity is normal in size. Left ventricular wall thickness is normal. Left ventricular systolic function is mildly to moderately decreased with an ejection fraction of 46 % by [place]'s method of disks. Regional wall motion abnormalities present.   2. There is hypokinesis of the basal inferolateral, mid inferolateral, basal inferior and basal inferoseptal walls.   3. Echogenic mass is seen possibly in the left atrium, finding is not seen in every view. Unable to determine if it represents artifact or pathologic finding. Consider repeat TTE vs FOREST. (Prior images of study on 12/2/24 were technically difficult, comparison is limited).    TTE limited 12/11/2024   1. Limited repeat study to evaluatepossible left atrial mass. A mobile echodensity is visualized in the left atrium intermittently (seen only in the parasternal long axis view). This may represent a hypermobile interatrial septum.   2. Compared to the transthoracic echocardiogram performed on 12/9/2024, there have been no significant interval changes.      A/P    79 year old woman with history of CAD s/p CABG (2004), HTN, HLD, IDDM2, severe PAD with right and left AKA presenting with complaint of one day of abdominal pain. Meeting sepsis criteria and found to have acute pancreatitis.    1. Sinus pause, asystole   - s/p RRT, EP consulted for possible PPM, as per them possibly vagally mediated   - s/p dopamin gtt  - SR 60s on tele c/w tele    2. Elevated troponin  - LHC and NST deferred during last admission (last week)  - Trop 192>418, c/p epigastric pain  - TTE 12/2/24 mild-mod LV dysfunction(EF 40-45%)  - s/p RRT, Trop 1329->1477->994 s/p heparin gtt  - EKg SR TWI V2-V6, inferior leads, will discuss ACS workup   - repeat echo shows same LV function as 1 week ago     2. Acute pancreatitis  -on Abx f/u GI recs  - MRCP 12/10 Acute interstitial pancreatitis. No evidence of parenchymal necrosis or walled off fluid collection. No gallstones or choledocholithiasis.    3. CAD s/p CABG (2004)  -asa, plavix  -statin held for abn LFT    4. DVT ppx- lovenox

## 2024-12-12 NOTE — PROGRESS NOTE ADULT - ASSESSMENT
79yoF w/ PMHx CAD s/p CABG and PCI, HTN, HLD, DMII, severe PAD with b/l AKA initially presented with abdominal pain found to have acute pancreatitis.  Seen by GI recommending a MRCP, on abx and IVF.  Hospital course complicated by sinus arrest up to 27 seconds, as seen on telemetry, patient was lethargic at the time.  Transferred to CCU for further monitoring.  Currently remains lethargic but arousable to voice, on telemetry NSR.  Endorses chest pain and abdominal pain when she first arrived but symptoms has since resolved.  Labs significant for WBC 11.85, worsening DAMARI, uptrending troponins, pH 7.22. TTE mildly reduced LV function EF 40-45%.        ## acute pancreatitis  ## DAMARI  ## Sinus Arrest -Suspect likely vagal mediated     Continue to monitor on telemetry, in SR at 60s overnight, no pauses  If having more episodes, will need to consider TVP  Correct electrolyte dysfunction  Hold AV cindy blockers   Keep K>4 Mg>2   Management as per CCU and nephrology

## 2024-12-12 NOTE — PROGRESS NOTE ADULT - SUBJECTIVE AND OBJECTIVE BOX
[facility] DIVISION OF KIDNEY DISEASES AND HYPERTENSION --    Chief Complaint: DAMARI    24 hour events/subjective: Patient seen and examined at bedside in CCU. Started on Bumex infusion yesterday by CCU team with significant urinary response of ~4.7L in last 24h. Pt. still gives history of generalized body pain this morning. No fever, chills, HA, dizziness, SOB, chest pain, or abdominal pain.      PAST HISTORY  --------------------------------------------------------------------------------  No significant changes to PMH, PSH, FHx, SHx, unless otherwise noted    ALLERGIES & MEDICATIONS  --------------------------------------------------------------------------------  Allergies    sulfa drugs (Hives)  sulfa drugs (Rash)  Sulfac 10% (Hives)      Standing Inpatient Medications  aspirin enteric coated 81 milliGRAM(s) Oral daily  brimonidine 0.2% Ophthalmic Solution 1 Drop(s) Both EYES two times a day  buMETAnide Infusion 0.5 mG/Hr IV Continuous <Continuous>  chlorhexidine 2% Cloths 1 Application(s) Topical daily  clopidogrel Tablet 75 milliGRAM(s) Oral daily  dextrose 5%. 1000 milliLiter(s) IV Continuous <Continuous>  dextrose 5%. 1000 milliLiter(s) IV Continuous <Continuous>  dextrose 50% Injectable 25 Gram(s) IV Push once  dextrose 50% Injectable 12.5 Gram(s) IV Push once  dextrose 50% Injectable 25 Gram(s) IV Push once  gabapentin 300 milliGRAM(s) Oral daily  glucagon  Injectable 1 milliGRAM(s) IntraMuscular once  insulin glargine Injectable (LANTUS) 24 Unit(s) SubCutaneous at bedtime  insulin lispro (ADMELOG) corrective regimen sliding scale   SubCutaneous at bedtime  insulin lispro (ADMELOG) corrective regimen sliding scale   SubCutaneous three times a day before meals  lidocaine   4% Patch 1 Patch Transdermal daily  naloxone Injectable 0.4 milliGRAM(s) IV Push once  pantoprazole   Suspension 40 milliGRAM(s) Oral daily  piperacillin/tazobactam IVPB.. 3.375 Gram(s) IV Intermittent every 12 hours  senna 2 Tablet(s) Oral at bedtime  sodium bicarbonate 650 milliGRAM(s) Oral three times a day    PRN Inpatient Medications  dextrose Oral Gel 15 Gram(s) Oral once PRN  polyethylene glycol 3350 17 Gram(s) Oral daily PRN      REVIEW OF SYSTEMS  --------------------------------------------------------------------------------  Gen: No fever  Respiratory: No dyspnea  CV: No chest pain  GI: No abdominal pain, diarrhea, nausea, vomiting  : Pratt catheter  Skin: No rashes  MSK: R BKA, L AKA, +neck pain, +arm pain  Neuro: No dizziness/lightheadedness  Heme: No bleeding    All other systems were reviewed and are negative, except as noted.    VITALS/PHYSICAL EXAM  --------------------------------------------------------------------------------  T(C): 36.9 (12-12-24 @ 04:00), Max: 36.9 (12-11-24 @ 16:00)  HR: 65 (12-12-24 @ 06:00) (60 - 71)  BP: 152/59 (12-12-24 @ 06:00) (96/39 - 158/65)  RR: 17 (12-12-24 @ 06:00) (14 - 23)  SpO2: 99% (12-12-24 @ 06:00) (98% - 100%)  Wt(kg): --        12-11-24 @ 07:01  -  12-12-24 @ 07:00  --------------------------------------------------------  IN: 1414.5 mL / OUT: 4685 mL / NET: -3270.5 mL      PHYSICAL EXAM:  Gen: resting  Neuro: Awake, alert  HEENT: Anicteric, No JVD  Pulm: CTA B/L  CV: +S1S2  Abd: Soft, NT/ND  : +Pratt cath  Extremities: R BKA, L AKA, no thigh edema  Skin: Warm    LABS/STUDIES  --------------------------------------------------------------------------------              9.2    10.92 >-----------<  300      [12-12-24 @ 04:20]              27.3     134  |  96  |  78  ----------------------------<  182      [12-12-24 @ 04:20]  3.7   |  20  |  4.81        Ca     9.1     [12-12-24 @ 04:20]      Mg     2.30     [12-12-24 @ 04:20]      Phos  6.1     [12-12-24 @ 04:20]    TPro  7.0  /  Alb  2.8  /  TBili  2.0  /  DBili  x   /  AST  219  /  ALT  449  /  AlkPhos  246  [12-12-24 @ 04:20]      PTT: 73.6       [12-11-24 @ 02:20]      Creatinine Trend:  SCr 4.81 [12-12 @ 04:20]  SCr 5.17 [12-11 @ 16:04]  SCr 5.73 [12-11 @ 02:20]  SCr 5.07 [12-10 @ 01:30]  SCr 4.51 [12-09 @ 16:10]    Urine Creatinine 67      [12-08-24 @ 12:27]  Urine Sodium 62      [12-08-24 @ 12:27]  Urine Urea Nitrogen 277.5      [12-08-24 @ 12:27]    Iron 86, TIBC 280, %sat 31      [12-02-24 @ 04:20]  Ferritin 59      [12-02-24 @ 04:20]  TSH 2.06      [12-02-24 @ 04:20]  Lipid: chol 124, TG 97, HDL 42, LDL --      [12-07-24 @ 14:50] [facility] DIVISION OF KIDNEY DISEASES AND HYPERTENSION --    Chief Complaint: DAMARI    24 hour events/subjective: Pt. seen and examined at bedside in CCU. Started on Bumex infusion yesterday by CCU team with significant urinary response (~4.7L in last 24h). Pt. continues to have generalized body pain this morning. No fever, chills, HA, dizziness, SOB, chest pain, or abdominal pain.    PAST HISTORY  --------------------------------------------------------------------------------  No significant changes to PMH, PSH, FHx, SHx, unless otherwise noted    ALLERGIES & MEDICATIONS  --------------------------------------------------------------------------------  Allergies    sulfa drugs (Hives)  sulfa drugs (Rash)  Sulfac 10% (Hives)    Standing Inpatient Medications  aspirin enteric coated 81 milliGRAM(s) Oral daily  brimonidine 0.2% Ophthalmic Solution 1 Drop(s) Both EYES two times a day  buMETAnide Infusion 0.5 mG/Hr IV Continuous <Continuous>  chlorhexidine 2% Cloths 1 Application(s) Topical daily  clopidogrel Tablet 75 milliGRAM(s) Oral daily  dextrose 5%. 1000 milliLiter(s) IV Continuous <Continuous>  dextrose 5%. 1000 milliLiter(s) IV Continuous <Continuous>  dextrose 50% Injectable 25 Gram(s) IV Push once  dextrose 50% Injectable 12.5 Gram(s) IV Push once  dextrose 50% Injectable 25 Gram(s) IV Push once  gabapentin 300 milliGRAM(s) Oral daily  glucagon  Injectable 1 milliGRAM(s) IntraMuscular once  insulin glargine Injectable (LANTUS) 24 Unit(s) SubCutaneous at bedtime  insulin lispro (ADMELOG) corrective regimen sliding scale   SubCutaneous at bedtime  insulin lispro (ADMELOG) corrective regimen sliding scale   SubCutaneous three times a day before meals  lidocaine   4% Patch 1 Patch Transdermal daily  naloxone Injectable 0.4 milliGRAM(s) IV Push once  pantoprazole   Suspension 40 milliGRAM(s) Oral daily  piperacillin/tazobactam IVPB.. 3.375 Gram(s) IV Intermittent every 12 hours  senna 2 Tablet(s) Oral at bedtime  sodium bicarbonate 650 milliGRAM(s) Oral three times a day    PRN Inpatient Medications  dextrose Oral Gel 15 Gram(s) Oral once PRN  polyethylene glycol 3350 17 Gram(s) Oral daily PRN    REVIEW OF SYSTEMS  --------------------------------------------------------------------------------  Gen: No fever  Respiratory: No dyspnea  CV: No chest pain  GI: No abdominal pain, diarrhea, nausea, vomiting  : Pratt catheter  Skin: No rashes  MSK: R BKA, L AKA, +neck pain, +arm pain  Neuro: No dizziness/lightheadedness  Heme: No bleeding    All other systems were reviewed and are negative, except as noted.    VITALS/PHYSICAL EXAM  --------------------------------------------------------------------------------  T(C): 36.9 (12-12-24 @ 04:00), Max: 36.9 (12-11-24 @ 16:00)  HR: 65 (12-12-24 @ 06:00) (60 - 71)  BP: 152/59 (12-12-24 @ 06:00) (96/39 - 158/65)  RR: 17 (12-12-24 @ 06:00) (14 - 23)  SpO2: 99% (12-12-24 @ 06:00) (98% - 100%)  Wt(kg): --    12-11-24 @ 07:01  -  12-12-24 @ 07:00  --------------------------------------------------------  IN: 1414.5 mL / OUT: 4685 mL / NET: -3270.5 mL    PHYSICAL EXAM:  Gen: resting  Neuro: Awake, alert  HEENT: Anicteric, No JVD  Pulm: CTA B/L  CV: +S1S2  Abd: Soft, NT/ND  : +Pratt cath  Extremities: R BKA, L AKA, no thigh edema  Skin: Warm    LABS/STUDIES  --------------------------------------------------------------------------------              9.2    10.92 >-----------<  300      [12-12-24 @ 04:20]              27.3     134  |  96  |  78  ----------------------------<  182      [12-12-24 @ 04:20]  3.7   |  20  |  4.81        Ca     9.1     [12-12-24 @ 04:20]      Mg     2.30     [12-12-24 @ 04:20]      Phos  6.1     [12-12-24 @ 04:20]    TPro  7.0  /  Alb  2.8  /  TBili  2.0  /  DBili  x   /  AST  219  /  ALT  449  /  AlkPhos  246  [12-12-24 @ 04:20]    Creatinine Trend:  SCr 4.81 [12-12 @ 04:20]  SCr 5.17 [12-11 @ 16:04]  SCr 5.73 [12-11 @ 02:20]  SCr 5.07 [12-10 @ 01:30]  SCr 4.51 [12-09 @ 16:10]

## 2024-12-12 NOTE — PROGRESS NOTE ADULT - PROBLEM SELECTOR PLAN 1
Pt. with severe/oliguric DAMARI in the setting of hypotension, complete heart block with episode of asystole, recent IV contrast use, and infection/UTI. Pt. with likely ATN. Scr on admission (12/7/24) was 1.17, increased to 2.92 on 12/8/24. Scr peaked at 5.73 (12/11/24). SCr remains elevated, but improved to 4.8 today (12/12/24). Of note, Scr likely not an accurate estimate of her GFR given B/L leg amputations and decreased muscle mass. Renal US showed small kidneys (8.7/7.9cm) suggestive of advanced CKD, and no hydronephrosis. Pt. was oliguric, but responded well to Bumex IVP and was started on Bumex infusion on 12/11/24 per CCU team. Pt. currently non-oliguric with ~4.7L of urine output in last 24h. Monitor BP and UOP. Avoid nephrotoxins and dose meds per eGFR. Will need to consider HD/CRRT if kidney function continues to worsen. Pt. with DAMARI in the setting of hypotension, complete heart block with episode of asystole, recent IV contrast use, and infection/UTI. Pt. with likely ATN. Scr on admission (12/7/24) was 1.17, increased to 2.92 on 12/8/24. Scr peaked at 5.73 (12/11/24). Scr decreased to 4.8 today (12/12/24). Renal US showed small kidneys but no hydronephrosis. Pt. was oliguric, but responded well to IV diuretics. Pt. started on Bumex infusion on 12/11/24 per CCU team. Pt. noted to have increased UOP of ~4.7L of urine output in last 24h. Monitor BP and UOP. Avoid nephrotoxins and dose meds per eGFR.

## 2024-12-12 NOTE — PROGRESS NOTE ADULT - CRITICAL CARE ATTENDING COMMENT
79 year old wman with CAD sp CABG HTN HLD PAD sp amputations- R and L AKAs with recent admission for chest pain. Sent home on Friday-->returned last night with abdominal pain/band like chest pain and N/V.  Diagnosed with acute pancreatits. Had asystole overnight and transferred to CCU    TTE    1. Left ventricular cavity is normal in size. Left ventricular wall thickness is normal. Left ventricular systolic function is mildly to moderately decreased with an ejection fraction of 46 % by Montana's method of disks. Regional wall motion abnormalities present.   2. There is hypokinesis of the basal inferolateral, mid inferolateral, basal inferior and basal inferoseptal walls.   3. Echogenic mass is seen possibly in the left atrium, finding is not seen in every view. Unable to determine if it represents artifact or pathologic finding. Consider repeat TTE vs FOREST. (Prior images of study on 12/2/24 were technically difficult, comparison is limited).    MEDICATIONS  (STANDING):  Heparin gtt  ASA   Plavix  Bumex 2 mg IV BID  Zosyn    #CV  EP followup appreciated  continue heparin gtt- dc this afternoon    #Renal- DAMARI- now Cr 5.7 in setting of contrast and ATN from arrest  Continue to monitor  Bumex  started    #GI- Acute pancreatitis  Follow lipase  IVF  GI input appreciated  Recheck LFTs
79 year old wman with CAD sp CABG HTN HLD PAD sp amputations- R and L AKAs with recent admission for chest pain. Sent home on Friday-->returned last night with abdominal pain/band like chest pain and N/V.  Diagnosed with acute pancreatits. Had asystole overnight and transferred to CCU    TTE 12/4/24:   1. Technically very difficult study performed with the patient in the supine position.   2. Technically difficult image quality.   3. Left ventricular systolic function is mildly to moderately decreased with anejection fraction visually estimated at 40 to 45%. There are regional wall motion abnormalities present. Hypokinesis of the inferolateral wall, basal to mid inferior wall, and basal inferoseptum. There is mild (grade 1) left ventricular diastolic dysfunction.   4. The right ventricle is not well visualized.   5. Structurally normal mitral valve with normal leaflet excursion. There is calcification of the mitral valve annulus. There is mild mitral regurgitation.   6. No prior echocardiogram is available for comparison.    MEDICATIONS  (STANDING):  Dopamine gtt  Heparin gtt  ASA   Plavix  Zosyn    #CV  recheck TTE  Continue dopamine gtt  continue heparin gtt    #Renal- DAMARI- now Cr 3.68 in setting of contrast and ATN from arrest  Continue to monitor    #GI- Acute pancreatitis  Follow lipase  IVF  GI input appreciated  Recheck LFTs
79 year old wman with CAD sp CABG HTN HLD PAD sp amputations- R and L AKAs with recent admission for chest pain. Sent home on Friday-->returned last night with abdominal pain/band like chest pain and N/V.  Diagnosed with acute pancreatitis. Had asystole overnight and transferred to CCU    TTE    1. Left ventricular cavity is normal in size. Left ventricular wall thickness is normal. Left ventricular systolic function is mildly to moderately decreased with an ejection fraction of 46 % by Montana's method of disks. Regional wall motion abnormalities present.   2. There is hypokinesis of the basal inferolateral, mid inferolateral, basal inferior and basal inferoseptal walls.   3. Echogenic mass is seen possibly in the left atrium, finding is not seen in every view. Unable to determine if it represents artifact or pathologic finding. Consider repeat TTE vs FOREST. (Prior images of study on 12/2/24 were technically difficult, comparison is limited).    MEDICATIONS  (STANDING):  ASA   Plavix  Bumex gtt  Zosyn    Uo net negative 3.2 L    #CV  EP followup appreciated  No further ectopy or arrhythmia    #Renal- DAMARI- now Cr 4.8 (down from 5.7) in setting of contrast and ATN from arrest  Continue to monitor  Bumex  started; Uo excellent  Can dc gtt and transition to IV Bumex    #GI- Acute pancreatitis  Improving  GI input appreciated
79 year old wman with CAD sp CABG HTN HLD PAD sp amputations- R and L AKAs with recent admission for chest pain. Sent home on Friday-->returned last night with abdominal pain/band like chest pain and N/V.  Diagnosed with acute pancreatits. Had asystole overnight and transferred to CCU    TTE    1. Left ventricular cavity is normal in size. Left ventricular wall thickness is normal. Left ventricular systolic function is mildly to moderately decreased with an ejection fraction of 46 % by [place]'s method of disks. Regional wall motion abnormalities present.   2. There is hypokinesis of the basal inferolateral, mid inferolateral, basal inferior and basal inferoseptal walls.   3. Echogenic mass is seen possibly in the left atrium, finding is not seen in every view. Unable to determine if it represents artifact or pathologic finding. Consider repeat TTE vs FOREST. (Prior images of study on 12/2/24 were technically difficult, comparison is limited).    MEDICATIONS  (STANDING):  Heparin gtt  ASA   Plavix  Zosyn    #CV  EP followup appreciated  continue heparin gtt    #Renal- DAMARI- now Cr 3.68 in setting of contrast and ATN from arrest  Continue to monitor    #GI- Acute pancreatitis  Follow lipase  IVF  GI input appreciated  Recheck LFTs

## 2024-12-12 NOTE — PROGRESS NOTE ADULT - PROBLEM SELECTOR PLAN 2
Pt. with metabolic acidosis iso DAMARI. SCO2 improved to 20 today on oral sodium bicarbonate to 650mg TID. Monitor SCO2. Pt. with metabolic acidosis iso DAMARI. SCO2 improved to 20 today. Pt. on oral sodium bicarbonate to 650 mg TID. Monitor SCO2.

## 2024-12-12 NOTE — PROGRESS NOTE ADULT - SUBJECTIVE AND OBJECTIVE BOX
Ady Villatoro MD  Interventional Cardiology / Advance Heart Failure and Cardiac Transplant Specialist  [place] Office : [address] [place] [address] [place] [address]  Tel:    [place] Office : [address] [facility] [place] [address]  Tel: [phone]  Cell : [phone]       Pt is lying in bed comfortable not in distress, no chest pains no SOB no palpitations  	  MEDICATIONS:  aspirin enteric coated 81 milliGRAM(s) Oral daily  buMETAnide 1 milliGRAM(s) Oral every 12 hours  clopidogrel Tablet 75 milliGRAM(s) Oral daily    piperacillin/tazobactam IVPB.. 3.375 Gram(s) IV Intermittent every 12 hours      gabapentin 300 milliGRAM(s) Oral daily    pantoprazole   Suspension 40 milliGRAM(s) Oral daily  polyethylene glycol 3350 17 Gram(s) Oral daily PRN  senna 2 Tablet(s) Oral at bedtime    dextrose 50% Injectable 25 Gram(s) IV Push once  dextrose 50% Injectable 12.5 Gram(s) IV Push once  dextrose 50% Injectable 25 Gram(s) IV Push once  dextrose Oral Gel 15 Gram(s) Oral once PRN  glucagon  Injectable 1 milliGRAM(s) IntraMuscular once  insulin glargine Injectable (LANTUS) 24 Unit(s) SubCutaneous at bedtime  insulin lispro (ADMELOG) corrective regimen sliding scale   SubCutaneous three times a day before meals  insulin lispro (ADMELOG) corrective regimen sliding scale   SubCutaneous at bedtime    brimonidine 0.2% Ophthalmic Solution 1 Drop(s) Both EYES two times a day  chlorhexidine 2% Cloths 1 Application(s) Topical daily  dextrose 5%. 1000 milliLiter(s) IV Continuous <Continuous>  dextrose 5%. 1000 milliLiter(s) IV Continuous <Continuous>  lidocaine   4% Patch 1 Patch Transdermal daily  sodium bicarbonate 650 milliGRAM(s) Oral three times a day      PAST MEDICAL/SURGICAL HISTORY  PAST MEDICAL & SURGICAL HISTORY:  S/P CABG x 3  in 2004, [facility]      Stented coronary artery  2010 [facility]      PAD (peripheral artery disease)  s/p R BKA      Carotid artery stenosis      DM (diabetes mellitus)  type II      Hypercholesterolemia      Obesity      Hypertension      CAD (coronary artery disease)      Acute kidney injury superimposed on chronic kidney disease      Chronic diastolic heart failure      Vitamin D deficiency      Normocytic anemia      Pulmonary HTN      Diabetic retinopathy      Hx of CABG  3v 2004      S/P hysterectomy  2004      S/P angioplasty with stent  2009, 3/2014      S/P below knee amputation, right  6/2010      S/P eye surgery  for glaucoma      S/P CABG x 3      S/P BKA (below knee amputation) unilateral, right      History of hysterectomy      Elective surgery  traumatic amputation of the toe      S/P BKA (below knee amputation), left          SOCIAL HISTORY: Substance Use (street drugs): ( x ) never used  (  ) other:    FAMILY HISTORY:  Family history of diabetes mellitus (Sibling)           PHYSICAL EXAM:  T(C): 36.5 (12-12-24 @ 12:29), Max: 36.9 (12-11-24 @ 16:00)  HR: 68 (12-12-24 @ 14:00) (60 - 71)  BP: 169/55 (12-12-24 @ 14:00) (104/43 - 169/55)  RR: 16 (12-12-24 @ 14:00) (9 - 23)  SpO2: 100% (12-12-24 @ 14:00) (98% - 100%)  Wt(kg): --  I&O's Summary    11 Dec 2024 07:01  -  12 Dec 2024 07:00  --------------------------------------------------------  IN: 1414.5 mL / OUT: 4685 mL / NET: -3270.5 mL    12 Dec 2024 07:01  -  12 Dec 2024 15:11  --------------------------------------------------------  IN: 5 mL / OUT: 1600 mL / NET: -1595 mL          GENERAL: NAD  EYES:   PERRLA   ENMT:   Moist mucous membranes, Good dentition, No lesions  Cardiovascular: Normal S1 S2, No JVD, No murmurs, No edema  Respiratory: Lungs clear to auscultation	  Gastrointestinal:  Soft, Non-tender, + BS	  Extremities: no edema                                    9.2    10.92 )-----------( 300      ( 12 Dec 2024 04:20 )             27.3     12-12    134[L]  |  96[L]  |  78[H]  ----------------------------<  182[H]  3.7   |  20[L]  |  4.81[H]    Ca    9.1      12 Dec 2024 04:20  Phos  6.1     12-12  Mg     2.30     12-12    TPro  7.0  /  Alb  2.8[L]  /  TBili  2.0[H]  /  DBili  x   /  AST  219[H]  /  ALT  449[H]  /  AlkPhos  246[H]  12-12    proBNP:   Lipid Profile:   HgA1c:   TSH:     Consultant(s) Notes Reviewed:  [x ] YES  [ ] NO    Care Discussed with Consultants/Other Providers [ x] YES  [ ] NO    Imaging Personally Reviewed independently:  [x] YES  [ ] NO    All labs, radiologic studies, vitals, orders and medications list reviewed. Patient is seen and examined at bedside. Case discussed with medical team.

## 2024-12-12 NOTE — PROGRESS NOTE ADULT - SUBJECTIVE AND OBJECTIVE BOX
CHIEF COMPLAINT::    INTERVAL HISTORY:    REVIEW OF SYSTEMS: all others negative    MEDICATIONS  (STANDING):  aspirin enteric coated 81 milliGRAM(s) Oral daily  brimonidine 0.2% Ophthalmic Solution 1 Drop(s) Both EYES two times a day  buMETAnide Infusion 1 mG/Hr (5 mL/Hr) IV Continuous <Continuous>  chlorhexidine 2% Cloths 1 Application(s) Topical daily  clopidogrel Tablet 75 milliGRAM(s) Oral daily  dextrose 5%. 1000 milliLiter(s) (50 mL/Hr) IV Continuous <Continuous>  dextrose 5%. 1000 milliLiter(s) (100 mL/Hr) IV Continuous <Continuous>  dextrose 50% Injectable 25 Gram(s) IV Push once  dextrose 50% Injectable 12.5 Gram(s) IV Push once  dextrose 50% Injectable 25 Gram(s) IV Push once  gabapentin 300 milliGRAM(s) Oral daily  glucagon  Injectable 1 milliGRAM(s) IntraMuscular once  insulin glargine Injectable (LANTUS) 24 Unit(s) SubCutaneous at bedtime  insulin lispro (ADMELOG) corrective regimen sliding scale   SubCutaneous at bedtime  insulin lispro (ADMELOG) corrective regimen sliding scale   SubCutaneous three times a day before meals  lidocaine   4% Patch 1 Patch Transdermal daily  naloxone Injectable 0.4 milliGRAM(s) IV Push once  pantoprazole   Suspension 40 milliGRAM(s) Oral daily  piperacillin/tazobactam IVPB.. 3.375 Gram(s) IV Intermittent every 12 hours  senna 2 Tablet(s) Oral at bedtime  sodium bicarbonate 650 milliGRAM(s) Oral three times a day    MEDICATIONS  (PRN):  dextrose Oral Gel 15 Gram(s) Oral once PRN Blood Glucose LESS THAN 70 milliGRAM(s)/deciliter  polyethylene glycol 3350 17 Gram(s) Oral daily PRN Constipation      Objective:  Vital Signs Last 24 Hrs  T(C): 36.9 (12 Dec 2024 04:00), Max: 36.9 (11 Dec 2024 16:00)  T(F): 98.4 (12 Dec 2024 04:00), Max: 98.5 (11 Dec 2024 16:00)  HR: 65 (12 Dec 2024 06:00) (60 - 71)  BP: 152/59 (12 Dec 2024 06:00) (96/39 - 158/65)  BP(mean): 87 (12 Dec 2024 06:00) (57 - 90)  RR: 17 (12 Dec 2024 06:00) (14 - 23)  SpO2: 99% (12 Dec 2024 06:00) (98% - 100%)    Parameters below as of 12 Dec 2024 06:00  Patient On (Oxygen Delivery Method): room air      ICU Vital Signs Last 24 Hrs  T(C): 36.9 (12 Dec 2024 04:00), Max: 36.9 (11 Dec 2024 16:00)  T(F): 98.4 (12 Dec 2024 04:00), Max: 98.5 (11 Dec 2024 16:00)  HR: 65 (12 Dec 2024 06:00) (60 - 71)  BP: 152/59 (12 Dec 2024 06:00) (96/39 - 158/65)  BP(mean): 87 (12 Dec 2024 06:00) (57 - 90)  ABP: --  ABP(mean): --  RR: 17 (12 Dec 2024 06:00) (14 - 23)  SpO2: 99% (12 Dec 2024 06:00) (98% - 100%)    O2 Parameters below as of 12 Dec 2024 06:00  Patient On (Oxygen Delivery Method): room air            Adult Advanced Hemodynamics Last 24 Hrs  CVP(mm Hg): --  CVP(cm H2O): --  CO: --  CI: --  PA: --  PA(mean): --  PCWP: --  SVR: --  SVRI: --  PVR: --  PVRI: --      12-10 @ : @ 07:00  --------------------------------------------------------  IN: 994 mL / OUT: 980 mL / NET: 14 mL     @ 07:  -   @ 06:28  --------------------------------------------------------  IN: 1417 mL / OUT: 4360 mL / NET: -2943 mL      Daily     Daily Weight in k.8 (12 Dec 2024 05:00)    PHYSICAL EXAM:      Constitutional:    Eyes:    ENMT:    Neck:    Breasts:    Back:    Respiratory:    Cardiovascular:    Gastrointestinal:    Genitourinary:    Rectal:    Extremities:    Vascular:    Neurological:    Skin:    Lymph Nodes:    Musculoskeletal:    Psychiatric:        TELEMETRY:     EKG:     CARDIAC CATH:     ECHO:    IMAGIN.2    10.92 )-----------( 300      ( 12 Dec 2024 04:20 )             27.3     12-12    134[L]  |  96[L]  |  78[H]  ----------------------------<  182[H]  3.7   |  20[L]  |  4.81[H]    Ca    9.1      12 Dec 2024 04:20  Phos  6.1     12-12  Mg     2.30     12-12    TPro  7.0  /  Alb  2.8[L]  /  TBili  2.0[H]  /  DBili  x   /  AST  219[H]  /  ALT  449[H]  /  AlkPhos  246[H]  12-12    LIVER FUNCTIONS - ( 12 Dec 2024 04:20 )  Alb: 2.8 g/dL / Pro: 7.0 g/dL / ALK PHOS: 246 U/L / ALT: 449 U/L / AST: 219 U/L / GGT: x           PTT - ( 11 Dec 2024 02:20 )  PTT:73.6 sec      *BLOOD GAS/ARTERIAL/MIXED/VENOUS  *LACTATE  Urinalysis Basic - ( 12 Dec 2024 04:20 )    Color: x / Appearance: x / SG: x / pH: x  Gluc: 182 mg/dL / Ketone: x  / Bili: x / Urobili: x   Blood: x / Protein: x / Nitrite: x   Leuk Esterase: x / RBC: x / WBC x   Sq Epi: x / Non Sq Epi: x / Bacteria: x        HEALTH ISSUES - PROBLEM Dx:  Acute pancreatitis    Elevated liver enzymes    Type 2 diabetes mellitus    CAD (coronary artery disease)    Hyperlipidemia    Hypertension    Prophylactic measure    Sepsis    Non-ST elevation MI (NSTEMI)    DAMARI (acute kidney injury)    Acute kidney failure    Kidney failure    Acidosis    Metabolic acidosis          HEALTH ISSUES - R/O PROBLEM Dx:      Jude Garland, KASSIDYU NP   #[id number] CHIEF COMPLAINT: Abdominal Pain CCU Aystole    INTERVAL HISTORY: A+OX3 denies chest pain, reports breathing better    REVIEW OF SYSTEMS: all others negative    MEDICATIONS  (STANDING):  aspirin enteric coated 81 milliGRAM(s) Oral daily  brimonidine 0.2% Ophthalmic Solution 1 Drop(s) Both EYES two times a day  buMETAnide Infusion 1 mG/Hr (5 mL/Hr) IV Continuous <Continuous>  chlorhexidine 2% Cloths 1 Application(s) Topical daily  clopidogrel Tablet 75 milliGRAM(s) Oral daily  dextrose 5%. 1000 milliLiter(s) (50 mL/Hr) IV Continuous <Continuous>  dextrose 5%. 1000 milliLiter(s) (100 mL/Hr) IV Continuous <Continuous>  dextrose 50% Injectable 25 Gram(s) IV Push once  dextrose 50% Injectable 12.5 Gram(s) IV Push once  dextrose 50% Injectable 25 Gram(s) IV Push once  gabapentin 300 milliGRAM(s) Oral daily  glucagon  Injectable 1 milliGRAM(s) IntraMuscular once  insulin glargine Injectable (LANTUS) 24 Unit(s) SubCutaneous at bedtime  insulin lispro (ADMELOG) corrective regimen sliding scale   SubCutaneous at bedtime  insulin lispro (ADMELOG) corrective regimen sliding scale   SubCutaneous three times a day before meals  lidocaine   4% Patch 1 Patch Transdermal daily  naloxone Injectable 0.4 milliGRAM(s) IV Push once  pantoprazole   Suspension 40 milliGRAM(s) Oral daily  piperacillin/tazobactam IVPB.. 3.375 Gram(s) IV Intermittent every 12 hours  senna 2 Tablet(s) Oral at bedtime  sodium bicarbonate 650 milliGRAM(s) Oral three times a day    MEDICATIONS  (PRN):  dextrose Oral Gel 15 Gram(s) Oral once PRN Blood Glucose LESS THAN 70 milliGRAM(s)/deciliter  polyethylene glycol 3350 17 Gram(s) Oral daily PRN Constipation      Objective:  Vital Signs Last 24 Hrs  T(C): 36.9 (12 Dec 2024 04:00), Max: 36.9 (11 Dec 2024 16:00)  T(F): 98.4 (12 Dec 2024 04:00), Max: 98.5 (11 Dec 2024 16:00)  HR: 65 (12 Dec 2024 06:00) (60 - 71)  BP: 152/59 (12 Dec 2024 06:00) (96/39 - 158/65)  BP(mean): 87 (12 Dec 2024 06:00) (57 - 90)  RR: 17 (12 Dec 2024 06:00) (14 - 23)  SpO2: 99% (12 Dec 2024 06:00) (98% - 100%)    Parameters below as of 12 Dec 2024 06:00  Patient On (Oxygen Delivery Method): room air      ICU Vital Signs Last 24 Hrs  T(C): 36.9 (12 Dec 2024 04:00), Max: 36.9 (11 Dec 2024 16:00)  T(F): 98.4 (12 Dec 2024 04:00), Max: 98.5 (11 Dec 2024 16:00)  HR: 65 (12 Dec 2024 06:00) (60 - 71)  BP: 152/59 (12 Dec 2024 06:00) (96/39 - 158/65)  BP(mean): 87 (12 Dec 2024 06:00) (57 - 90)  ABP: --  ABP(mean): --  RR: 17 (12 Dec 2024 06:00) (14 - 23)  SpO2: 99% (12 Dec 2024 06:00) (98% - 100%)    O2 Parameters below as of 12 Dec 2024 06:00  Patient On (Oxygen Delivery Method): room air            Adult Advanced Hemodynamics Last 24 Hrs  CVP(mm Hg): --  CVP(cm H2O): --  CO: --  CI: --  PA: --  PA(mean): --  PCWP: --  SVR: --  SVRI: --  PVR: --  PVRI: --      12-10 @ : @ 07:00  --------------------------------------------------------  IN: 994 mL / OUT: 980 mL / NET: 14 mL     @ 07: @ 06:28  --------------------------------------------------------  IN: 1417 mL / OUT: 4360 mL / NET: -2943 mL      Daily     Daily Weight in k.8 (12 Dec 2024 05:00)    PHYSICAL EXAMINATION:  General: Comfortable, no acute distress, cooperative with exam.  HEENT: Moist mucous membranes.  Respiratory: CTAB, normal respiratory effort, no coughing, wheezes, crackles, or rales.  CV: RRR, S1S2, no murmurs, rubs or gallops. No JVD. Distal pulses intact.  Abdominal: Soft, nontender, nondistended, no rebound or guarding, normal bowel sounds.  Neurology: AOx3, no focal neuro defects, BUSH x 4.  Extremities: LAKA, RBKA No sacral edema, + Peripheral pulses.        TELEMETRY: SR    EKG:     CARDIAC CATH:     ECHO:  < from: TTE Limited W or WO Ultrasound Enhancing Agent (24 @ 16:50) >    TRANSTHORACIC ECHOCARDIOGRAM REPORT  ________________________________________________________________________________                                      _______       Pt. Name:       NIK MELISSA Study Date:    2024  MRN:            [id number]      YOB: 1945  Accession #:    [id number]      Age:           79 years  Account#:       [id number]       Gender:        F  Heart Rate:                    Height:        65.00 in (165.10 cm)  Rhythm:                        Weight: 175.00 lb (79.38 kg)  Blood Pressure: 112/55 mmHg    BSA/BMI:       1.87 m² / 29.12 kg/m²  ________________________________________________________________________________________  Referring Physician:    [id number] Graciela Rivera  Interpreting Physician: Tremaine Conte MD  Primary Sonographer:    Valeria Guan [facility]    CPT:                LIMITED SPECTRAL - 07523.m;ECHO TTE W CON FU LTD -                      .m;DEFINITY ECHO CONTRAST PER ML - .m  Indication(s):      Chest pain, unspecified - R07.9  Procedure:          Limited transthoracic echocardiogram. Spectral Doppler                      performed.  Ordering Location:  [facility]  Admission Status:   Inpatient  Contrast Injection: Verbal consent was obtained for injection of Ultrasonic                      Enhancing Agent following a discussion of risks and                      benefits.                      Endocardial visualization enhanced with 2 ml of Definity                      Ultrasound enhancing agent (Lot#:[id number] Exp.Date:).  UEA Reaction:       Patient had no adverse reaction after injection of                      Ultrasound Enhancing Agent.  Study Information:  Image quality for this study is good.    _______________________________________________________________________________________     CONCLUSIONS:      1. Left ventricular cavity is normal in size. Left ventricular wall thickness is normal. Left ventricular systolic function is mildly to moderately decreased with an ejection fraction of 46 % by Montana's method of disks. Regional wall motion abnormalities present.   2. There is hypokinesis of the basal inferolateral, mid inferolateral, basal inferior and basal inferoseptal walls.   3. Echogenic mass is seen possibly in the left atrium, finding is not seen in every view. Unable to determine if it represents artifact or pathologic finding. Consider repeat TTE vs FOREST. (Prior images of study on 24 were technically difficult, comparison is limited).    ________________________________________________________________________________________  FINDINGS:     Left Ventricle:  The left ventricular cavity is normal in size. Left ventricular wall thickness is normal. Left ventricular systolic function is mildly to moderately decreased with a calculated ejection fraction of 46 % by the Montana's biplane method of disks. There are regional wall motion abnormalities present. Left ventricular regional wall motion assessment reveals hypokinesis of the basal inferolateral, mid inferolateral, basal inferior and basal inferoseptal walls.     Left Atrium:  Echogenic mass is seen possibly in the left atrium, finding is not seen in every view. Unable to determine if it represents artifact or pathologic finding. Consider repeat TTE vs FOREST. (Prior images of study on 24 were technically difficult, comparison is limited).  __________________________________________________________________    < end of copied text >      IMAGIN.2    10.92 )-----------( 300      ( 12 Dec 2024 04:20 )             27.3     12-    134[L]  |  96[L]  |  78[H]  ----------------------------<  182[H]  3.7   |  20[L]  |  4.81[H]    Ca    9.1      12 Dec 2024 04:20  Phos  6.1     12-12  Mg     2.30     12-12    TPro  7.0  /  Alb  2.8[L]  /  TBili  2.0[H]  /  DBili  x   /  AST  219[H]  /  ALT  449[H]  /  AlkPhos  246[H]  12-12    LIVER FUNCTIONS - ( 12 Dec 2024 04:20 )  Alb: 2.8 g/dL / Pro: 7.0 g/dL / ALK PHOS: 246 U/L / ALT: 449 U/L / AST: 219 U/L / GGT: x           PTT - ( 11 Dec 2024 02:20 )  PTT:73.6 sec      *BLOOD GAS/ARTERIAL/MIXED/VENOUS  *LACTATE  Urinalysis Basic - ( 12 Dec 2024 04:20 )    Color: x / Appearance: x / SG: x / pH: x  Gluc: 182 mg/dL / Ketone: x  / Bili: x / Urobili: x   Blood: x / Protein: x / Nitrite: x   Leuk Esterase: x / RBC: x / WBC x   Sq Epi: x / Non Sq Epi: x / Bacteria: x        HEALTH ISSUES - PROBLEM Dx:  Acute pancreatitis    Elevated liver enzymes    Type 2 diabetes mellitus    CAD (coronary artery disease)    Hyperlipidemia    Hypertension    Prophylactic measure    Sepsis    Non-ST elevation MI (NSTEMI)    DAMARI (acute kidney injury)    Acute kidney failure    Kidney failure    Acidosis    Metabolic acidosis          HEALTH ISSUES - R/O PROBLEM Dx:      Jude Garland, CCU NP   #[id number]

## 2024-12-13 DIAGNOSIS — N17.0 ACUTE KIDNEY FAILURE WITH TUBULAR NECROSIS: ICD-10-CM

## 2024-12-13 DIAGNOSIS — K52.9 NONINFECTIVE GASTROENTERITIS AND COLITIS, UNSPECIFIED: ICD-10-CM

## 2024-12-13 DIAGNOSIS — I46.9 CARDIAC ARREST, CAUSE UNSPECIFIED: ICD-10-CM

## 2024-12-13 LAB
ADD ON TEST-SPECIMEN IN LAB: SIGNIFICANT CHANGE UP
ALBUMIN SERPL ELPH-MCNC: 3.3 G/DL — SIGNIFICANT CHANGE UP (ref 3.3–5)
ALP SERPL-CCNC: 322 U/L — HIGH (ref 40–120)
ALT FLD-CCNC: 354 U/L — HIGH (ref 4–33)
ANION GAP SERPL CALC-SCNC: 15 MMOL/L — HIGH (ref 7–14)
ANION GAP SERPL CALC-SCNC: 19 MMOL/L — HIGH (ref 7–14)
AST SERPL-CCNC: 146 U/L — HIGH (ref 4–32)
BILIRUB DIRECT SERPL-MCNC: 1.3 MG/DL — HIGH (ref 0–0.3)
BILIRUB INDIRECT FLD-MCNC: 0.4 MG/DL — SIGNIFICANT CHANGE UP (ref 0–1)
BILIRUB SERPL-MCNC: 1.7 MG/DL — HIGH (ref 0.2–1.2)
BUN SERPL-MCNC: 63 MG/DL — HIGH (ref 7–23)
BUN SERPL-MCNC: 68 MG/DL — HIGH (ref 7–23)
CALCIUM SERPL-MCNC: 9 MG/DL — SIGNIFICANT CHANGE UP (ref 8.4–10.5)
CALCIUM SERPL-MCNC: 9.5 MG/DL — SIGNIFICANT CHANGE UP (ref 8.4–10.5)
CHLORIDE SERPL-SCNC: 94 MMOL/L — LOW (ref 98–107)
CHLORIDE SERPL-SCNC: 95 MMOL/L — LOW (ref 98–107)
CO2 SERPL-SCNC: 26 MMOL/L — SIGNIFICANT CHANGE UP (ref 22–31)
CO2 SERPL-SCNC: 27 MMOL/L — SIGNIFICANT CHANGE UP (ref 22–31)
CREAT SERPL-MCNC: 2.94 MG/DL — HIGH (ref 0.5–1.3)
CREAT SERPL-MCNC: 3.3 MG/DL — HIGH (ref 0.5–1.3)
EGFR: 14 ML/MIN/1.73M2 — LOW
EGFR: 16 ML/MIN/1.73M2 — LOW
GLUCOSE BLDC GLUCOMTR-MCNC: 217 MG/DL — HIGH (ref 70–99)
GLUCOSE BLDC GLUCOMTR-MCNC: 275 MG/DL — HIGH (ref 70–99)
GLUCOSE BLDC GLUCOMTR-MCNC: 367 MG/DL — HIGH (ref 70–99)
GLUCOSE BLDC GLUCOMTR-MCNC: 395 MG/DL — HIGH (ref 70–99)
GLUCOSE SERPL-MCNC: 191 MG/DL — HIGH (ref 70–99)
GLUCOSE SERPL-MCNC: 315 MG/DL — HIGH (ref 70–99)
HCT VFR BLD CALC: 29 % — LOW (ref 34.5–45)
HGB BLD-MCNC: 10.3 G/DL — LOW (ref 11.5–15.5)
MAGNESIUM SERPL-MCNC: 1.9 MG/DL — SIGNIFICANT CHANGE UP (ref 1.6–2.6)
MAGNESIUM SERPL-MCNC: 2 MG/DL — SIGNIFICANT CHANGE UP (ref 1.6–2.6)
MCHC RBC-ENTMCNC: 29.6 PG — SIGNIFICANT CHANGE UP (ref 27–34)
MCHC RBC-ENTMCNC: 35.5 G/DL — SIGNIFICANT CHANGE UP (ref 32–36)
MCV RBC AUTO: 83.3 FL — SIGNIFICANT CHANGE UP (ref 80–100)
NRBC # BLD: 0 /100 WBCS — SIGNIFICANT CHANGE UP (ref 0–0)
NRBC # FLD: 0.09 K/UL — HIGH (ref 0–0)
PHOSPHATE SERPL-MCNC: 3.6 MG/DL — SIGNIFICANT CHANGE UP (ref 2.5–4.5)
PHOSPHATE SERPL-MCNC: 5.1 MG/DL — HIGH (ref 2.5–4.5)
PLATELET # BLD AUTO: 390 K/UL — SIGNIFICANT CHANGE UP (ref 150–400)
POTASSIUM SERPL-MCNC: 3 MMOL/L — LOW (ref 3.5–5.3)
POTASSIUM SERPL-MCNC: 3.9 MMOL/L — SIGNIFICANT CHANGE UP (ref 3.5–5.3)
POTASSIUM SERPL-SCNC: 3 MMOL/L — LOW (ref 3.5–5.3)
POTASSIUM SERPL-SCNC: 3.9 MMOL/L — SIGNIFICANT CHANGE UP (ref 3.5–5.3)
PROT SERPL-MCNC: 8 G/DL — SIGNIFICANT CHANGE UP (ref 6–8.3)
RBC # BLD: 3.48 M/UL — LOW (ref 3.8–5.2)
RBC # FLD: 14.7 % — HIGH (ref 10.3–14.5)
SODIUM SERPL-SCNC: 137 MMOL/L — SIGNIFICANT CHANGE UP (ref 135–145)
SODIUM SERPL-SCNC: 139 MMOL/L — SIGNIFICANT CHANGE UP (ref 135–145)
WBC # BLD: 16.55 K/UL — HIGH (ref 3.8–10.5)
WBC # FLD AUTO: 16.55 K/UL — HIGH (ref 3.8–10.5)

## 2024-12-13 PROCEDURE — 99231 SBSQ HOSP IP/OBS SF/LOW 25: CPT

## 2024-12-13 PROCEDURE — 99233 SBSQ HOSP IP/OBS HIGH 50: CPT

## 2024-12-13 PROCEDURE — 99233 SBSQ HOSP IP/OBS HIGH 50: CPT | Mod: GC

## 2024-12-13 RX ORDER — SODIUM BICARBONATE 84 MG/ML
325 INJECTION, SOLUTION INTRAVENOUS THREE TIMES A DAY
Refills: 0 | Status: DISCONTINUED | OUTPATIENT
Start: 2024-12-13 | End: 2024-12-16

## 2024-12-13 RX ORDER — POTASSIUM CHLORIDE 600 MG/1
10 TABLET, EXTENDED RELEASE ORAL
Refills: 0 | Status: COMPLETED | OUTPATIENT
Start: 2024-12-13 | End: 2024-12-13

## 2024-12-13 RX ORDER — ACETAMINOPHEN 500MG 500 MG/1
650 TABLET, COATED ORAL EVERY 6 HOURS
Refills: 0 | Status: DISCONTINUED | OUTPATIENT
Start: 2024-12-13 | End: 2024-12-16

## 2024-12-13 RX ORDER — GABAPENTIN 300 MG/1
300 CAPSULE ORAL AT BEDTIME
Refills: 0 | Status: DISCONTINUED | OUTPATIENT
Start: 2024-12-14 | End: 2024-12-16

## 2024-12-13 RX ORDER — POTASSIUM CHLORIDE 600 MG/1
40 TABLET, EXTENDED RELEASE ORAL ONCE
Refills: 0 | Status: COMPLETED | OUTPATIENT
Start: 2024-12-13 | End: 2024-12-13

## 2024-12-13 RX ORDER — INSULIN GLARGINE 100 [IU]/ML
26 INJECTION, SOLUTION SUBCUTANEOUS AT BEDTIME
Refills: 0 | Status: DISCONTINUED | OUTPATIENT
Start: 2024-12-13 | End: 2024-12-16

## 2024-12-13 RX ORDER — INSULIN GLARGINE 100 [IU]/ML
30 INJECTION, SOLUTION SUBCUTANEOUS
Refills: 0 | DISCHARGE

## 2024-12-13 RX ORDER — INSULIN ASPART 100 [IU]/ML
36 INJECTION, SOLUTION INTRAVENOUS; SUBCUTANEOUS
Refills: 0 | DISCHARGE

## 2024-12-13 RX ORDER — BRIMONIDINE TARTRATE 1.5 MG/ML
1 SOLUTION/ DROPS OPHTHALMIC
Refills: 0 | DISCHARGE

## 2024-12-13 RX ORDER — POTASSIUM CHLORIDE 600 MG/1
40 TABLET, EXTENDED RELEASE ORAL
Refills: 0 | Status: DISCONTINUED | OUTPATIENT
Start: 2024-12-13 | End: 2024-12-13

## 2024-12-13 RX ADMIN — LIDOCAINE 1 PATCH: 40 CREAM TOPICAL at 23:00

## 2024-12-13 RX ADMIN — POTASSIUM CHLORIDE 100 MILLIEQUIVALENT(S): 600 TABLET, EXTENDED RELEASE ORAL at 09:26

## 2024-12-13 RX ADMIN — HYDRALAZINE HYDROCHLORIDE 10 MILLIGRAM(S): 10 TABLET ORAL at 06:04

## 2024-12-13 RX ADMIN — BUMETANIDE 1 MILLIGRAM(S): 1 TABLET ORAL at 06:04

## 2024-12-13 RX ADMIN — POTASSIUM CHLORIDE 100 MILLIEQUIVALENT(S): 600 TABLET, EXTENDED RELEASE ORAL at 10:37

## 2024-12-13 RX ADMIN — CLOPIDOGREL 75 MILLIGRAM(S): 75 TABLET, FILM COATED ORAL at 11:08

## 2024-12-13 RX ADMIN — Medication 2: at 21:36

## 2024-12-13 RX ADMIN — CHLORHEXIDINE GLUCONATE 1 APPLICATION(S): 1.2 RINSE ORAL at 11:46

## 2024-12-13 RX ADMIN — Medication 4 UNIT(S): at 18:35

## 2024-12-13 RX ADMIN — PIPERACILLIN SODIUM AND TAZOBACTAM SODIUM 25 GRAM(S): 4; .5 INJECTION, POWDER, LYOPHILIZED, FOR SOLUTION INTRAVENOUS at 18:32

## 2024-12-13 RX ADMIN — PIPERACILLIN SODIUM AND TAZOBACTAM SODIUM 25 GRAM(S): 4; .5 INJECTION, POWDER, LYOPHILIZED, FOR SOLUTION INTRAVENOUS at 06:02

## 2024-12-13 RX ADMIN — GABAPENTIN 300 MILLIGRAM(S): 300 CAPSULE ORAL at 11:17

## 2024-12-13 RX ADMIN — BRIMONIDINE TARTRATE 1 DROP(S): 1.5 SOLUTION/ DROPS OPHTHALMIC at 06:03

## 2024-12-13 RX ADMIN — INSULIN GLARGINE 26 UNIT(S): 100 INJECTION, SOLUTION SUBCUTANEOUS at 22:01

## 2024-12-13 RX ADMIN — BRIMONIDINE TARTRATE 1 DROP(S): 1.5 SOLUTION/ DROPS OPHTHALMIC at 18:38

## 2024-12-13 RX ADMIN — HYDRALAZINE HYDROCHLORIDE 10 MILLIGRAM(S): 10 TABLET ORAL at 14:42

## 2024-12-13 RX ADMIN — Medication 10: at 13:30

## 2024-12-13 RX ADMIN — POTASSIUM CHLORIDE 40 MILLIEQUIVALENT(S): 600 TABLET, EXTENDED RELEASE ORAL at 09:25

## 2024-12-13 RX ADMIN — Medication 4 UNIT(S): at 13:30

## 2024-12-13 RX ADMIN — SODIUM BICARBONATE 325 MILLIGRAM(S): 84 INJECTION, SOLUTION INTRAVENOUS at 14:56

## 2024-12-13 RX ADMIN — LIDOCAINE 1 PATCH: 40 CREAM TOPICAL at 11:09

## 2024-12-13 RX ADMIN — SODIUM BICARBONATE 325 MILLIGRAM(S): 84 INJECTION, SOLUTION INTRAVENOUS at 21:41

## 2024-12-13 RX ADMIN — Medication 10: at 18:36

## 2024-12-13 RX ADMIN — LIDOCAINE 1 PATCH: 40 CREAM TOPICAL at 19:45

## 2024-12-13 RX ADMIN — POTASSIUM CHLORIDE 100 MILLIEQUIVALENT(S): 600 TABLET, EXTENDED RELEASE ORAL at 11:43

## 2024-12-13 RX ADMIN — Medication 81 MILLIGRAM(S): at 11:18

## 2024-12-13 RX ADMIN — SODIUM BICARBONATE 650 MILLIGRAM(S): 84 INJECTION, SOLUTION INTRAVENOUS at 06:03

## 2024-12-13 RX ADMIN — Medication 4: at 09:15

## 2024-12-13 RX ADMIN — PANTOPRAZOLE SODIUM 40 MILLIGRAM(S): 40 TABLET, DELAYED RELEASE ORAL at 11:08

## 2024-12-13 NOTE — PHYSICAL THERAPY INITIAL EVALUATION ADULT - NSPTDISCHREC_GEN_A_CORE
to be determined, pt's prostheses not in hospital - pt baseline function is wheelchair bound, follow PT notes

## 2024-12-13 NOTE — PROGRESS NOTE ADULT - PROBLEM SELECTOR PLAN 1
Pt. with DAMARI in the setting of hypotension, complete heart block with episode of asystole, recent IV contrast use, and infection/UTI. Pt. with likely ATN. Scr on admission (12/7/24) was 1.17, increased to 2.92 on 12/8/24. Scr peaked at 5.73 (12/11/24). Scr decreased to 3.3 today (12/13/24). Renal US showed small kidneys but no hydronephrosis. Pt. was oliguric, but responded well to IV Bumex and was transitioned to PO Bumex. Pt. noted to have ~4.5L of urine output in last 24h. Labs reviewed. Pt hypokalemic (3.3). Recommend holding diuretics. Monitor BP and UOP. Avoid nephrotoxins and dose meds per eGFR. Pt. with DAMARI in the setting of hypotension, complete heart block with episode of asystole, recent IV contrast use, and infection/UTI. Pt. with likely ATN. Scr on admission (12/7/24) was 1.17, increased to 2.92 on 12/8/24. Scr peaked at 5.73 (12/11/24). Renal US showed small kidneys but no hydronephrosis. DAMARI resolving now. Scr decreased to 3.3 today (12/13/24). Pt. with increased urine output in last 24h. Monitor BP and UOP. Avoid nephrotoxins and dose meds per eGFR. Pt. with DAMARI in the setting of hypotension, complete heart block with episode of asystole, recent IV contrast use, and infection/UTI. Pt. with likely ATN. Scr on admission (12/7/24) was 1.17, increased to 2.92 on 12/8/24. Scr peaked at 5.73 (12/11/24). Renal US showed small kidneys but no hydronephrosis. DAMARI resolving now. Scr decreased to 3.3 today (12/13/24). Pt. with increased urine output in last 24h. Pt. also with hypokalemia on AM labs, recommend to hold diuretics. Monitor BP and UOP. Avoid nephrotoxins and dose meds per eGFR.

## 2024-12-13 NOTE — PROGRESS NOTE ADULT - PROBLEM SELECTOR PLAN 1
Overnight 12/8 RRT for asystole, pt returned to NSR by the time RRT arrived to bedside  - EP consulted and suspect likely vagal mediated response, apprec recs  - S/p CCU for CHF and dopamine drip d/c 12/9  - TTE similar to echo 1 week ago - with exception echogenicity in LA on one view only, EF 46%  - Repeat TTE: A mobile echodensity is visualized in the left atrium intermittently (seen only in the parasternal long axis view). This may represent a hypermobile interatrial septum  - Hold AV cindy blockers  - Correct electrolyte dysfunction, Keep K>4 Mg>2  - No indication for pacing at this time, EP follow up in the office  - Telemetry monitoring, reviewed today by me, HR lingering around 60's

## 2024-12-13 NOTE — PROGRESS NOTE ADULT - SUBJECTIVE AND OBJECTIVE BOX
Ady Villatoro MD  Interventional Cardiology / Advance Heart Failure and Cardiac Transplant Specialist  [place] Office : [phone] [place] [address] [place] [address]  Tel:    [place] Office : [phone] [facility] [place] [address]  Tel: [phone]  Cell : [phone]       Pt is lying in bed comfortable not in distress, no chest pains no SOB no palpitations  	  MEDICATIONS:  aspirin enteric coated 81 milliGRAM(s) Oral daily  clopidogrel Tablet 75 milliGRAM(s) Oral daily  hydrALAZINE 10 milliGRAM(s) Oral every 8 hours    piperacillin/tazobactam IVPB.. 3.375 Gram(s) IV Intermittent every 12 hours      gabapentin 300 milliGRAM(s) Oral daily    pantoprazole   Suspension 40 milliGRAM(s) Oral daily  polyethylene glycol 3350 17 Gram(s) Oral daily PRN  senna 2 Tablet(s) Oral at bedtime    dextrose 50% Injectable 25 Gram(s) IV Push once  dextrose 50% Injectable 12.5 Gram(s) IV Push once  dextrose 50% Injectable 25 Gram(s) IV Push once  dextrose Oral Gel 15 Gram(s) Oral once PRN  glucagon  Injectable 1 milliGRAM(s) IntraMuscular once  insulin glargine Injectable (LANTUS) 24 Unit(s) SubCutaneous at bedtime  insulin lispro (ADMELOG) corrective regimen sliding scale   SubCutaneous three times a day before meals  insulin lispro (ADMELOG) corrective regimen sliding scale   SubCutaneous at bedtime  insulin lispro Injectable (ADMELOG) 4 Unit(s) SubCutaneous three times a day before meals    brimonidine 0.2% Ophthalmic Solution 1 Drop(s) Both EYES two times a day  chlorhexidine 2% Cloths 1 Application(s) Topical daily  dextrose 5%. 1000 milliLiter(s) IV Continuous <Continuous>  dextrose 5%. 1000 milliLiter(s) IV Continuous <Continuous>  lidocaine   4% Patch 1 Patch Transdermal daily  sodium bicarbonate 325 milliGRAM(s) Oral three times a day      PAST MEDICAL/SURGICAL HISTORY  PAST MEDICAL & SURGICAL HISTORY:  S/P CABG x 3  in 2004, [facility]      Stented coronary artery  2010 [facility]      PAD (peripheral artery disease)  s/p R BKA      Carotid artery stenosis      DM (diabetes mellitus)  type II      Hypercholesterolemia      Obesity      Hypertension      CAD (coronary artery disease)      Acute kidney injury superimposed on chronic kidney disease      Chronic diastolic heart failure      Vitamin D deficiency      Normocytic anemia      Pulmonary HTN      Diabetic retinopathy      Hx of CABG  3v 2004      S/P hysterectomy  2004      S/P angioplasty with stent  2009, 3/2014      S/P below knee amputation, right  6/2010      S/P eye surgery  for glaucoma      S/P CABG x 3      S/P BKA (below knee amputation) unilateral, right      History of hysterectomy      Elective surgery  traumatic amputation of the toe      S/P BKA (below knee amputation), left          SOCIAL HISTORY: Substance Use (street drugs): ( x ) never used  (  ) other:    FAMILY HISTORY:  Family history of diabetes mellitus (Sibling)           PHYSICAL EXAM:  T(C): 36.3 (12-13-24 @ 12:42), Max: 36.7 (12-12-24 @ 16:00)  HR: 68 (12-13-24 @ 12:42) (63 - 71)  BP: 103/54 (12-13-24 @ 12:42) (103/54 - 183/78)  RR: 18 (12-13-24 @ 12:42) (11 - 21)  SpO2: 100% (12-13-24 @ 12:42) (98% - 100%)  Wt(kg): --  I&O's Summary    12 Dec 2024 07:01  -  13 Dec 2024 07:00  --------------------------------------------------------  IN: 615 mL / OUT: 5500 mL / NET: -4885 mL          GENERAL: NAD  EYES:   PERRLA   ENMT:   Moist mucous membranes, Good dentition, No lesions  Cardiovascular: Normal S1 S2, No JVD, No murmurs, No edema  Respiratory: Lungs clear to auscultation	  Gastrointestinal:  Soft, Non-tender, + BS	  Extremities: no edema                                    10.3   16.55 )-----------( 390      ( 13 Dec 2024 06:40 )             29.0     12-13    139  |  94[L]  |  68[H]  ----------------------------<  191[H]  3.0[L]   |  26  |  3.30[H]    Ca    9.5      13 Dec 2024 06:40  Phos  5.1     12-13  Mg     2.00     12-13    TPro  8.0  /  Alb  3.3  /  TBili  1.7[H]  /  DBili  1.3[H]  /  AST  146[H]  /  ALT  354[H]  /  AlkPhos  322[H]  12-13    proBNP:   Lipid Profile:   HgA1c:   TSH:     Consultant(s) Notes Reviewed:  [x ] YES  [ ] NO    Care Discussed with Consultants/Other Providers [ x] YES  [ ] NO    Imaging Personally Reviewed independently:  [x] YES  [ ] NO    All labs, radiologic studies, vitals, orders and medications list reviewed. Patient is seen and examined at bedside. Case discussed with medical team.

## 2024-12-13 NOTE — PROGRESS NOTE ADULT - PROBLEM SELECTOR PLAN 2
Pt. with metabolic acidosis iso DAMARI. SCO2 improved to 26 today. Pt. on oral sodium bicarbonate to 325 mg TID. Pt also with increased urine output as noted above. Would hold PO sodium bicarbonate now that renal function is improving. Monitor SCO2. Pt. with metabolic acidosis in setting of DAMARI, on oral sodium bicarbonate therapy. SCO2 improved to 26 today. Recommend to discontinue oral sodium bicarbonate therapy. Monitor SCO2.

## 2024-12-13 NOTE — PROGRESS NOTE ADULT - ASSESSMENT
79F w/ PMHx HTN, Type 2 DM, severe PAD w/ R BKA, L AKA, CAD (s/p 3v CABG 2004, last cath 2014, only graft patent was LIMA-LAD, natives with  of LAD and RCA, OM w severe stented w BMS at that time) initially adm for abdominal pain, found to have acute pancreatitis. On 12/8 RRT for asystole, transferred to CCU for CHF and started on dopamine, felt to be vagally mediated by EP. Course c/b ATN which is now improving. Now transferred to floors for further care.

## 2024-12-13 NOTE — PROVIDER CONTACT NOTE (OTHER) - BACKGROUND
Patient admitted on 12/07/2024 for acute pancreatitis without infection or necrosis
Patient admitted on 12/07/2024 for acute pancreatitis without infection or necrosis
PMH R BKA, L AKA, HTN, DM, CAD  patient admitted with pancreatitis
Patient admitted on 12/07/2024 for acute pancreatitis without infection or necrosis

## 2024-12-13 NOTE — PHYSICAL THERAPY INITIAL EVALUATION ADULT - PERTINENT HX OF CURRENT PROBLEM, REHAB EVAL
78 y/o female with h/o HTN, DM, severe PAD with Right BKA, Left aka, CAD (s/p 3 vessel CABG 20 years ago) who presented with 15 minutes of resting chest pain, found to have Hgb of 7 and rising troponin levels and CP relieved after PRBC. Also found to have acute pancreatitis, signs of acute interstitial pancreatitis on imaging. Pt transferred to CCU after a RRT for likely vagal mediated bradycardia, pauses and ventricular standstill.

## 2024-12-13 NOTE — PROGRESS NOTE ADULT - PROBLEM SELECTOR PLAN 9
Wheelchair bound at baseline (R BKA/L AKA), prosthesis are at home  PT eval --> to be determined, pt's prostheses not in hospital    Discussed with daughter Gabriella at bedside at length on 12/13.

## 2024-12-13 NOTE — PROGRESS NOTE ADULT - SUBJECTIVE AND OBJECTIVE BOX
Patient is a 79y old  Female who presents with a chief complaint of Abdominal pain (13 Dec 2024 13:02)      INTERVAL HPI/OVERNIGHT EVENTS:  Transferred from CCU yesterday, Daughter Gabriella at bedside.  Seen by me this afternoon, feeling better, good appetite, minimal abdominal pain, no nausea/vomiting, no SOB or chest pain. No diarrhea.    Review of Systems: 12 point review of systems otherwise negative    MEDICATIONS  (STANDING):  aspirin enteric coated 81 milliGRAM(s) Oral daily  brimonidine 0.2% Ophthalmic Solution 1 Drop(s) Both EYES two times a day  chlorhexidine 2% Cloths 1 Application(s) Topical daily  clopidogrel Tablet 75 milliGRAM(s) Oral daily  dextrose 5%. 1000 milliLiter(s) (100 mL/Hr) IV Continuous <Continuous>  dextrose 5%. 1000 milliLiter(s) (50 mL/Hr) IV Continuous <Continuous>  dextrose 50% Injectable 25 Gram(s) IV Push once  dextrose 50% Injectable 12.5 Gram(s) IV Push once  dextrose 50% Injectable 25 Gram(s) IV Push once  gabapentin 300 milliGRAM(s) Oral daily  glucagon  Injectable 1 milliGRAM(s) IntraMuscular once  hydrALAZINE 10 milliGRAM(s) Oral every 8 hours  insulin glargine Injectable (LANTUS) 24 Unit(s) SubCutaneous at bedtime  insulin lispro (ADMELOG) corrective regimen sliding scale   SubCutaneous three times a day before meals  insulin lispro (ADMELOG) corrective regimen sliding scale   SubCutaneous at bedtime  insulin lispro Injectable (ADMELOG) 4 Unit(s) SubCutaneous three times a day before meals  lidocaine   4% Patch 1 Patch Transdermal daily  naloxone Injectable 0.4 milliGRAM(s) IV Push once  pantoprazole   Suspension 40 milliGRAM(s) Oral daily  piperacillin/tazobactam IVPB.. 3.375 Gram(s) IV Intermittent every 12 hours  senna 2 Tablet(s) Oral at bedtime  sodium bicarbonate 325 milliGRAM(s) Oral three times a day    MEDICATIONS  (PRN):  dextrose Oral Gel 15 Gram(s) Oral once PRN Blood Glucose LESS THAN 70 milliGRAM(s)/deciliter  polyethylene glycol 3350 17 Gram(s) Oral daily PRN Constipation      Allergies    sulfa drugs (Hives)  sulfa drugs (Rash)  Sulfac 10% (Hives)    Intolerances          Vital Signs Last 24 Hrs  T(C): 37 (13 Dec 2024 18:00), Max: 37 (13 Dec 2024 18:00)  T(F): 98.6 (13 Dec 2024 18:00), Max: 98.6 (13 Dec 2024 18:00)  HR: 66 (13 Dec 2024 18:00) (63 - 68)  BP: 145/50 (13 Dec 2024 18:00) (103/54 - 159/69)  BP(mean): --  RR: 18 (13 Dec 2024 18:00) (12 - 18)  SpO2: 99% (13 Dec 2024 18:00) (98% - 100%)    Parameters below as of 13 Dec 2024 18:00  Patient On (Oxygen Delivery Method): room air      CAPILLARY BLOOD GLUCOSE      POCT Blood Glucose.: 367 mg/dL (13 Dec 2024 18:00)  POCT Blood Glucose.: 395 mg/dL (13 Dec 2024 13:08)  POCT Blood Glucose.: 217 mg/dL (13 Dec 2024 08:37)  POCT Blood Glucose.: 278 mg/dL (12 Dec 2024 22:13)      12-12 @ 07:01  -  12-13 @ 07:00  --------------------------------------------------------  IN: 615 mL / OUT: 5500 mL / NET: -4885 mL        Physical Exam:    Daily     Daily   General:  Well appearing, NAD, not cachetic  HEENT:  Nonicteric, PERRLA  CV:  RRR, no murmur, no JVD  Lungs:  CTA B/L, no wheezes, rales, rhonchi  Abdomen:  Soft, non-tender, no distended, positive BS, no hepatosplenomegaly  +HOWE in place  Extremities:  R BKA, L AKA  Skin:  Warm and dry, no rashes  :  No howe  Neuro:  AAOx3, non-focal, CN II-XII grossly intact  No Restraints    LABS:                        10.3   16.55 )-----------( 390      ( 13 Dec 2024 06:40 )             29.0     12-13    137  |  95[L]  |  63[H]  ----------------------------<  315[H]  3.9   |  27  |  2.94[H]    Ca    9.0      13 Dec 2024 18:08  Phos  3.6     12-13  Mg     1.90     12-13    TPro  8.0  /  Alb  3.3  /  TBili  1.7[H]  /  DBili  1.3[H]  /  AST  146[H]  /  ALT  354[H]  /  AlkPhos  322[H]  12-13      Urinalysis Basic - ( 13 Dec 2024 18:08 )    Color: x / Appearance: x / SG: x / pH: x  Gluc: 315 mg/dL / Ketone: x  / Bili: x / Urobili: x   Blood: x / Protein: x / Nitrite: x   Leuk Esterase: x / RBC: x / WBC x   Sq Epi: x / Non Sq Epi: x / Bacteria: x          RADIOLOGY & ADDITIONAL TESTS:  Reviewed by me

## 2024-12-13 NOTE — PROGRESS NOTE ADULT - SUBJECTIVE AND OBJECTIVE BOX
[facility] DIVISION OF KIDNEY DISEASES AND HYPERTENSION --    Chief Complaint: DAMARI    24 hour events/subjective: Pt. seen and examined at bedside. Pt transitioned to PO Bumex and no longer in CCU, on medical floor now. Non-oliguric. No fever, chills, HA, dizziness, SOB, chest pain, or abdominal pain..      PAST HISTORY  --------------------------------------------------------------------------------  No significant changes to PMH, PSH, FHx, SHx, unless otherwise noted    ALLERGIES & MEDICATIONS  --------------------------------------------------------------------------------  Allergies    sulfa drugs (Hives)  sulfa drugs (Rash)  Sulfac 10% (Hives)      Standing Inpatient Medications  aspirin enteric coated 81 milliGRAM(s) Oral daily  brimonidine 0.2% Ophthalmic Solution 1 Drop(s) Both EYES two times a day  chlorhexidine 2% Cloths 1 Application(s) Topical daily  clopidogrel Tablet 75 milliGRAM(s) Oral daily  dextrose 5%. 1000 milliLiter(s) IV Continuous <Continuous>  dextrose 5%. 1000 milliLiter(s) IV Continuous <Continuous>  dextrose 50% Injectable 25 Gram(s) IV Push once  dextrose 50% Injectable 12.5 Gram(s) IV Push once  dextrose 50% Injectable 25 Gram(s) IV Push once  gabapentin 300 milliGRAM(s) Oral daily  glucagon  Injectable 1 milliGRAM(s) IntraMuscular once  hydrALAZINE 10 milliGRAM(s) Oral every 8 hours  insulin glargine Injectable (LANTUS) 24 Unit(s) SubCutaneous at bedtime  insulin lispro (ADMELOG) corrective regimen sliding scale   SubCutaneous three times a day before meals  insulin lispro (ADMELOG) corrective regimen sliding scale   SubCutaneous at bedtime  insulin lispro Injectable (ADMELOG) 4 Unit(s) SubCutaneous three times a day before meals  lidocaine   4% Patch 1 Patch Transdermal daily  naloxone Injectable 0.4 milliGRAM(s) IV Push once  pantoprazole   Suspension 40 milliGRAM(s) Oral daily  piperacillin/tazobactam IVPB.. 3.375 Gram(s) IV Intermittent every 12 hours  senna 2 Tablet(s) Oral at bedtime  sodium bicarbonate 325 milliGRAM(s) Oral three times a day    PRN Inpatient Medications  dextrose Oral Gel 15 Gram(s) Oral once PRN  polyethylene glycol 3350 17 Gram(s) Oral daily PRN      REVIEW OF SYSTEMS  --------------------------------------------------------------------------------  Gen: No fever  Respiratory: No dyspnea  CV: No chest pain  GI: No abdominal pain, diarrhea, nausea, vomiting  : Pratt catheter  Skin: No rashes  MSK: ROEL PHAM, MYLENE CORDERO  Neuro: No dizziness/lightheadedness  Heme: No bleeding    All other systems were reviewed and are negative, except as noted.    VITALS/PHYSICAL EXAM  --------------------------------------------------------------------------------  T(C): 36.3 (12-13-24 @ 12:42), Max: 36.7 (12-12-24 @ 16:00)  HR: 68 (12-13-24 @ 12:42) (63 - 71)  BP: 103/54 (12-13-24 @ 12:42) (103/54 - 183/78)  RR: 18 (12-13-24 @ 12:42) (11 - 21)  SpO2: 100% (12-13-24 @ 12:42) (98% - 100%)  Wt(kg): --        12-12-24 @ 07:01  -  12-13-24 @ 07:00  --------------------------------------------------------  IN: 615 mL / OUT: 5500 mL / NET: -4885 mL      PHYSICAL EXAM:  Gen: resting  Neuro: Awake, alert  HEENT: Anicteric, No JVD  Pulm: CTA B/L  CV: +S1S2  Abd: Soft, NT/ND  : +Pratt cath  Extremities: R BKA, L AKA, no thigh edema  Skin: Warm    LABS/STUDIES  --------------------------------------------------------------------------------              10.3   16.55 >-----------<  390      [12-13-24 @ 06:40]              29.0     139  |  94  |  68  ----------------------------<  191      [12-13-24 @ 06:40]  3.0   |  26  |  3.30        Ca     9.5     [12-13-24 @ 06:40]      Mg     2.00     [12-13-24 @ 06:40]      Phos  5.1     [12-13-24 @ 06:40]    TPro  7.0  /  Alb  2.8  /  TBili  2.0  /  DBili  x   /  AST  219  /  ALT  449  /  AlkPhos  246  [12-12-24 @ 04:20]    Creatinine Trend:  SCr 3.30 [12-13 @ 06:40]  SCr 4.81 [12-12 @ 04:20]  SCr 5.17 [12-11 @ 16:04]  SCr 5.73 [12-11 @ 02:20]  SCr 5.07 [12-10 @ 01:30]    Urine Creatinine 67      [12-08-24 @ 12:27]  Urine Sodium 62      [12-08-24 @ 12:27]  Urine Urea Nitrogen 277.5      [12-08-24 @ 12:27]    Iron 86, TIBC 280, %sat 31      [12-02-24 @ 04:20]  Ferritin 59      [12-02-24 @ 04:20]  TSH 2.06      [12-02-24 @ 04:20]  Lipid: chol 124, TG 97, HDL 42, LDL --      [12-07-24 @ 14:50] [facility] DIVISION OF KIDNEY DISEASES AND HYPERTENSION --    Chief Complaint: DAMARI    24 hour events/subjective: Pt. seen and examined at bedside. Pt. transferred to medical floors from CCU. Pt. currently non-oliguric. Pt. feels better, denies fever, chills, HA, dizziness, SOB, chest pain, or abdominal pain..    PAST HISTORY  --------------------------------------------------------------------------------  No significant changes to PMH, PSH, FHx, SHx, unless otherwise noted    ALLERGIES & MEDICATIONS  --------------------------------------------------------------------------------  Allergies    sulfa drugs (Hives)  sulfa drugs (Rash)  Sulfac 10% (Hives)      Standing Inpatient Medications  aspirin enteric coated 81 milliGRAM(s) Oral daily  brimonidine 0.2% Ophthalmic Solution 1 Drop(s) Both EYES two times a day  chlorhexidine 2% Cloths 1 Application(s) Topical daily  clopidogrel Tablet 75 milliGRAM(s) Oral daily  dextrose 5%. 1000 milliLiter(s) IV Continuous <Continuous>  dextrose 5%. 1000 milliLiter(s) IV Continuous <Continuous>  dextrose 50% Injectable 25 Gram(s) IV Push once  dextrose 50% Injectable 12.5 Gram(s) IV Push once  dextrose 50% Injectable 25 Gram(s) IV Push once  gabapentin 300 milliGRAM(s) Oral daily  glucagon  Injectable 1 milliGRAM(s) IntraMuscular once  hydrALAZINE 10 milliGRAM(s) Oral every 8 hours  insulin glargine Injectable (LANTUS) 24 Unit(s) SubCutaneous at bedtime  insulin lispro (ADMELOG) corrective regimen sliding scale   SubCutaneous three times a day before meals  insulin lispro (ADMELOG) corrective regimen sliding scale   SubCutaneous at bedtime  insulin lispro Injectable (ADMELOG) 4 Unit(s) SubCutaneous three times a day before meals  lidocaine   4% Patch 1 Patch Transdermal daily  naloxone Injectable 0.4 milliGRAM(s) IV Push once  pantoprazole   Suspension 40 milliGRAM(s) Oral daily  piperacillin/tazobactam IVPB.. 3.375 Gram(s) IV Intermittent every 12 hours  senna 2 Tablet(s) Oral at bedtime  sodium bicarbonate 325 milliGRAM(s) Oral three times a day    PRN Inpatient Medications  dextrose Oral Gel 15 Gram(s) Oral once PRN  polyethylene glycol 3350 17 Gram(s) Oral daily PRN    REVIEW OF SYSTEMS  --------------------------------------------------------------------------------  Gen: No fever  Respiratory: No dyspnea  CV: No chest pain  GI: No abdominal pain, diarrhea, nausea, vomiting  : Increased UOP  Skin: No rashes  MSK: ROEL PHAM, MYLENE CORDERO  Neuro: No dizziness/lightheadedness  Heme: No bleeding    All other systems were reviewed and are negative, except as noted.    VITALS/PHYSICAL EXAM  --------------------------------------------------------------------------------  T(C): 36.3 (12-13-24 @ 12:42), Max: 36.7 (12-12-24 @ 16:00)  HR: 68 (12-13-24 @ 12:42) (63 - 71)  BP: 103/54 (12-13-24 @ 12:42) (103/54 - 183/78)  RR: 18 (12-13-24 @ 12:42) (11 - 21)  SpO2: 100% (12-13-24 @ 12:42) (98% - 100%)  Wt(kg): --    12-12-24 @ 07:01  -  12-13-24 @ 07:00  --------------------------------------------------------  IN: 615 mL / OUT: 5500 mL / NET: -4885 mL    PHYSICAL EXAM:  Gen: resting, NAD  Neuro: Awake, alert  HEENT: Anicteric, No JVD  Pulm: CTA B/L  CV: +S1S2  Abd: Soft, NT/ND  Extremities: R BKA, L AKA, no thigh edema  Skin: Warm    LABS/STUDIES  --------------------------------------------------------------------------------              10.3   16.55 >-----------<  390      [12-13-24 @ 06:40]              29.0     139  |  94  |  68  ----------------------------<  191      [12-13-24 @ 06:40]  3.0   |  26  |  3.30        Ca     9.5     [12-13-24 @ 06:40]      Mg     2.00     [12-13-24 @ 06:40]      Phos  5.1     [12-13-24 @ 06:40]    TPro  7.0  /  Alb  2.8  /  TBili  2.0  /  DBili  x   /  AST  219  /  ALT  449  /  AlkPhos  246  [12-12-24 @ 04:20]    Creatinine Trend:  SCr 3.30 [12-13 @ 06:40]  SCr 4.81 [12-12 @ 04:20]  SCr 5.17 [12-11 @ 16:04]  SCr 5.73 [12-11 @ 02:20]  SCr 5.07 [12-10 @ 01:30]    Urine Creatinine 67      [12-08-24 @ 12:27]  Urine Sodium 62      [12-08-24 @ 12:27]  Urine Urea Nitrogen 277.5      [12-08-24 @ 12:27]    Iron 86, TIBC 280, %sat 31      [12-02-24 @ 04:20]  Ferritin 59      [12-02-24 @ 04:20]  TSH 2.06      [12-02-24 @ 04:20]  Lipid: chol 124, TG 97, HDL 42, LDL --      [12-07-24 @ 14:50]

## 2024-12-13 NOTE — CHART NOTE - NSCHARTNOTEFT_GEN_A_CORE
CCU Transfer Note  ---------------------------    Transfer from: CCU  Transfer to:  (  ) Medicine    ( x ) Telemetry    (  ) RCU    (  ) Palliative    (  ) Stroke Unit    (  ) _______________  Accepting Physician: Dr. Choudhary      CCU COURSE  79F w HTN, DM, severe PAD w r BKA, L aka, CAD (sp 3v CABG 20 yrs ago, last cath 2014, only graft patent was LIMA-LAD, natives with  of LAD and RCA, OM w severe stented w BMS at that time) who presented with 15 min of resting chest pain, found to have Hgb of 7 (from 12 several years ago) and rising elevated troponins from 90s->280s; her ECG shows Inferior Q waves w chronic diffuse ST/TW changes, all of which are largely unchanged from prior tracings. TTE w midrange LVEF 40-45%. She has been chest pain free since her blood transfusion.   Overnight on 12/8 had telemetry notable for progressively worsening heart block (1st degree -> 3rd degree -> asystole >14 seconds).  Pt returned to NSR by the time RRT arrived to bedside.   Labs without significant actionable electrolyte disturbance.  TTE w/ wall motion abnormalities affecting inferolateral wall, basal to mid inferior wall, and basal infero-septum; ischemia could also be  of bradyarrhythmia as opposed to medication given.  Dilaudid was addressed w/ narcan and coreg not administered today.       OBJECTIVE --  Vital Signs Last 24 Hrs  T(C): 36.7 (12 Dec 2024 16:00), Max: 36.9 (12 Dec 2024 00:00)  T(F): 98.1 (12 Dec 2024 16:00), Max: 98.5 (12 Dec 2024 00:00)  HR: 63 (12 Dec 2024 18:00) (60 - 71)  BP: 143/51 (12 Dec 2024 18:00) (104/43 - 181/61)  BP(mean): 78 (12 Dec 2024 18:00) (63 - 112)  RR: 16 (12 Dec 2024 18:00) (9 - 23)  SpO2: 100% (12 Dec 2024 18:00) (98% - 100%)    Parameters below as of 12 Dec 2024 18:00  Patient On (Oxygen Delivery Method): room air        I&O's Summary    11 Dec 2024 07:01  -  12 Dec 2024 07:00  --------------------------------------------------------  IN: 1414.5 mL / OUT: 4685 mL / NET: -3270.5 mL    12 Dec 2024 07:01  -  12 Dec 2024 18:49  --------------------------------------------------------  IN: 615 mL / OUT: 2700 mL / NET: -2085 mL        MEDICATIONS  (STANDING):  aspirin enteric coated 81 milliGRAM(s) Oral daily  brimonidine 0.2% Ophthalmic Solution 1 Drop(s) Both EYES two times a day  buMETAnide 1 milliGRAM(s) Oral every 12 hours  chlorhexidine 2% Cloths 1 Application(s) Topical daily  clopidogrel Tablet 75 milliGRAM(s) Oral daily  dextrose 5%. 1000 milliLiter(s) (50 mL/Hr) IV Continuous <Continuous>  dextrose 5%. 1000 milliLiter(s) (100 mL/Hr) IV Continuous <Continuous>  dextrose 50% Injectable 25 Gram(s) IV Push once  dextrose 50% Injectable 12.5 Gram(s) IV Push once  dextrose 50% Injectable 25 Gram(s) IV Push once  gabapentin 300 milliGRAM(s) Oral daily  glucagon  Injectable 1 milliGRAM(s) IntraMuscular once  hydrALAZINE 10 milliGRAM(s) Oral every 8 hours  insulin glargine Injectable (LANTUS) 24 Unit(s) SubCutaneous at bedtime  insulin lispro (ADMELOG) corrective regimen sliding scale   SubCutaneous three times a day before meals  insulin lispro (ADMELOG) corrective regimen sliding scale   SubCutaneous at bedtime  lidocaine   4% Patch 1 Patch Transdermal daily  naloxone Injectable 0.4 milliGRAM(s) IV Push once  pantoprazole   Suspension 40 milliGRAM(s) Oral daily  piperacillin/tazobactam IVPB.. 3.375 Gram(s) IV Intermittent every 12 hours  senna 2 Tablet(s) Oral at bedtime  sodium bicarbonate 650 milliGRAM(s) Oral three times a day    MEDICATIONS  (PRN):  dextrose Oral Gel 15 Gram(s) Oral once PRN Blood Glucose LESS THAN 70 milliGRAM(s)/deciliter  polyethylene glycol 3350 17 Gram(s) Oral daily PRN Constipation        LABS                                            9.2                   Neurophils% (auto):   x      (12-12 @ 04:20):    10.92)-----------(300          Lymphocytes% (auto):  x                                             27.3                   Eosinphils% (auto):   x        Manual%: Neutrophils x    ; Lymphocytes x    ; Eosinophils x    ; Bands%: x    ; Blasts x                                    134    |  96     |  78                  Calcium: 9.1   / iCa: x      (12-12 @ 04:20)    ----------------------------<  182       Magnesium: 2.30                             3.7     |  20     |  4.81             Phosphorous: 6.1      TPro  7.0    /  Alb  2.8    /  TBili  2.0    /  DBili  x      /  AST  219    /  ALT  449    /  AlkPhos  246    12 Dec 2024 04:20            ASSESSMENT & PLAN:     === NEUROLOGY ===      -Mental status: somnolent AAO x 3, not baseline  -Sedation: None  -Pain control: None    === RESPIRATORY ===     -EARLINE    === CARDIOVASCULAR ===    ##CHB bradycardia  -EKG suggestive of high grade AV Block, with intermittent 3rd degree   -Patients HR currently NSR 70 BPM, BP90/43.   -DDX: Iatrogenic vs conduction defect vs ischemia  -Atropine and Zoll pads at bedside  -Dopamine gtt, titrate to HR > 60 - Dc'd 12/9  -Consider TVP if HD unstable  -Hold AV cindy blockers  -EP consult and f/u recommendations for RRT for asystole with escape beats   - suspect likely vagal mediated   Hold narcotics  Continue to monitor     #CAD s/p CABG (2004)  -asa, plavix  -statin held for abn LFT  -Most recent [facility] with  of the LAD and RCA, and tight OM lesion that was stented with a BMS  -Recent outpatient nuclear stress test with scar and hypokinesis in the RCA territory, consistent with TTE here with EF 40-45% w/ inferior/inferolateral WMA     - heparin gtt x 48 hours - concern ACS (12/9-12/11) completed  - Repeat echo EF 46% shows Echogenic mass is seen possibly in the left atrium, finding is not seen in every view. Repeat limited LA echo  - f/u limited echo LA thrombus    #Hypertension  -Holding antihypertensives, coreg in setting of CHB, imdur and losartan     === RENAL/ ===     #DAMARI   -noted to have acute cr elevation from 1 on admisison to 1.8   - bladder scan elevated, howe now placed   - worsening renal function, pt aneuric with creatinine > 5  - Creat 4.81  -  nephrology recs appreciated      metabolic acidosis iso DAMARI. SCO2 low at 15 today. Increase oral sodium bicarbonate to 1300 mg TID. Monitor SCO2.  - sodium bicarbonate 650 mg TID. Monitor SCO2  - .trial of Bumex 2 mg IVP +gtt  - uo improving INTAKE 1414.5/ OUTPUT 4685/ -Net 3270.5           === GASTROINTESTINAL ===    #Acute pancreatitis  -Abdominal pain, lipase over 3K, signs of acute interstitial pancreatitis on imaging  -Unclear cause of episode; no alcohol use, no recent instrumentation of bile ducts, no evidence of choledocholithiasis on abdominal ultrasound. No new medications. Triglycerides not elevated. Could be idiopathic.   -GI recs appreciated -Ok for low fat diet as tolerated , -ADAT, MRCP ordered , Gentle IVF given mildly reduced LVEF with regional WMAs seen on recent echo., Trend LFTs   < from: MR MRCP w/wo IV Cont (12.10.24 @ 13:41) >  Acute interstitial pancreatitis. No evidence of parenchymal necrosis or walled off fluid collection.  No gallstones or choledocholithiasis.  Bili and LFTs downtrending without obstructing lesions on MRCP  Low fat Diet as tolerated  No plans for endoscopic intervention at this time  Trend Bili, liver enzymes   Holding atorvastatin    - Needs colonoscopy outpatient for colitis seen on CT in 6-8 weeks   GI will plan to sign off at this time. Please feel free to reach out to our team with any follow up questions. Please provide patient with Gastroenterology Clinic number to confirm/arrange appointment; [phone] ([facility] [address] [place] [address]) for follow up with Dr. Mar         === ENDO  ===    #Type 2 diabetes mellitus.   -Reported to be on basal and bolus pre-meal at home (though reported to be twice a day)  -Hold pre-meal for now given NPO, re-add as indicated when diet able to be advanced  -Dose reduced home 30U lantus to 24U at bedtime given NPO status, adjust as indicated based on sugars  -Low insulin sliding scale q6hrs while NPO  -A1C of 7.5 on 12/1/24  -DASH/TLC/CC diet when advanced from NPO      === HEMATOLOGIC/ONC ===    - H/H & plts stable   - Monitor H/H and plts   - stable    - DVT PPX: heparin gtt     === INFECTIOUS DISEASE ===     #Leukocytosis  - no fever  - Continue to monitor with daily CBC  - Trend fever curve  - Possible sources of urinary tract given previously reported burning with urination. Has positive UA, though with fair amount of epithelial cells. Could also be related to colitis seen on CT imaging  - Could be febrile in setting of inflammatory reaction given acute pancreatitis  - Continue zosyn      Lines/Tubes: PIV x 2    For Follow-Up:  [ ] Needs OP Colonoscopy for CT Colitis  [ ] F/U Cardiology Recs (Ady Villatoro)  [ ] F/U Nephro Recs  [ ] Monitor Hyperglycemia, A1c 7.5%    Alex Silveira MD PGY-4
Discussed w/ Nephro Dr. Barragan, recommend to hold Bumex given UO and low potassium. Bumex D/C'd.    Triston Dyer PA-C  pager [phone]
Report give to Dr. Gautam Choudhary and was accepted into [place]  [id number]

## 2024-12-13 NOTE — PROGRESS NOTE ADULT - PROBLEM SELECTOR PLAN 2
Etiology unknown, no stones on US, CBD WNL. No alcohol use, triglycerides WNL  - F/up IgG4  - Seen by GI, apprec recs, Etiology may be 2/2 to passed stone vs. resolving ischemic injury  - S/p MRCP 12/10: Acute interstitial pancreatitis. No evidence of parenchymal necrosis or walled off fluid collection. No gallstones or choledocholithiasis  - No need for ERCP per GI, tolerating diet very well at this time  - Tylenol prn, no opiods for now

## 2024-12-13 NOTE — PROGRESS NOTE ADULT - ASSESSMENT
79yoF w/ PMHx CAD s/p CABG and PCI, HTN, HLD, DMII, severe PAD with b/l AKA initially presented with abdominal pain found to have acute pancreatitis.  Seen by GI recommending a MRCP, on abx and IVF.  Hospital course complicated by sinus arrest up to 27 seconds, as seen on telemetry, patient was lethargic at the time.  Transferred to CCU for further monitoring.  Currently remains lethargic but arousable to voice, on telemetry NSR.  Endorses chest pain and abdominal pain when she first arrived but symptoms has since resolved.  Labs significant for WBC 11.85, worsening DAMARI, uptrending troponins, pH 7.22. TTE mildly reduced LV function EF 40-45%.        ## acute pancreatitis  ## DAMARI  ## Sinus Arrest -Suspect likely vagal mediated     Continue to monitor on telemetry, in SR at 60s overnight, no pauses  Correct electrolyte dysfunction  Hold AV cindy blockers   Keep K>4 Mg>2   Management as per CCU and nephrology

## 2024-12-13 NOTE — PROGRESS NOTE ADULT - ASSESSMENT
EKg SR TWI V2-V6, inferior leads    LHC 2017   of the LAD and RCA, patent LIMA to LAD graft, OM stented with  BMS    TTE 12/2/2024   1. Technically very difficult study performed with the patient in the supine position.   2. Technically difficult image quality.   3. Left ventricular systolic function is mildly to moderately decreased with anejection fraction visually estimated at 40 to 45%. There are regional wall motion abnormalities present. Hypokinesis of the inferolateral wall, basal to mid inferior wall, and basal inferoseptum. There is mild (grade 1) left ventricular diastolic dysfunction.   4. The right ventricle is not well visualized.   5. Structurally normal mitral valve with normal leaflet excursion. There is calcification of the mitral valve annulus. There is mild mitral regurgitation.   6. No prior echocardiogram is available for comparison.    TTE 12/9/2024   1. Left ventricular cavity is normal in size. Left ventricular wall thickness is normal. Left ventricular systolic function is mildly to moderately decreased with an ejection fraction of 46 % by [place]'s method of disks. Regional wall motion abnormalities present.   2. There is hypokinesis of the basal inferolateral, mid inferolateral, basal inferior and basal inferoseptal walls.   3. Echogenic mass is seen possibly in the left atrium, finding is not seen in every view. Unable to determine if it represents artifact or pathologic finding. Consider repeat TTE vs FOREST. (Prior images of study on 12/2/24 were technically difficult, comparison is limited).    TTE limited 12/11/2024   1. Limited repeat study to evaluatepossible left atrial mass. A mobile echodensity is visualized in the left atrium intermittently (seen only in the parasternal long axis view). This may represent a hypermobile interatrial septum.   2. Compared to the transthoracic echocardiogram performed on 12/9/2024, there have been no significant interval changes.      A/P    79 year old woman with history of CAD s/p CABG (2004), HTN, HLD, IDDM2, severe PAD with right and left AKA presenting with complaint of one day of abdominal pain. Meeting sepsis criteria and found to have acute pancreatitis.    1. Sinus pause, asystole   - s/p RRT, EP consulted for possible PPM, as per them possibly vagally mediated   - s/p dopamin gtt  - SR 60s on tele c/w tele    2. Elevated troponin  - LHC and NST deferred during last admission (last week)  - Trop 192>418, c/p epigastric pain  - TTE 12/2/24 mild-mod LV dysfunction(EF 40-45%)  - s/p RRT, Trop 1329->1477->994 s/p heparin gtt  - EKg SR TWI V2-V6, inferior leads, will discuss ACS workup   - repeat echo shows same LV function as 1 week ago     2. Acute pancreatitis  -on Abx f/u GI recs  - MRCP 12/10 Acute interstitial pancreatitis. No evidence of parenchymal necrosis or walled off fluid collection. No gallstones or choledocholithiasis.    3. CAD s/p CABG (2004)  -asa, plavix  -statin held for abn LFT    4. HTN  - hydralazine

## 2024-12-13 NOTE — PROVIDER CONTACT NOTE (OTHER) - ACTION/TREATMENT ORDERED:
Continue to monitor
insulin to be given sliding scale and standing. continue to monitor patient. care ongoing.

## 2024-12-13 NOTE — PHYSICAL THERAPY INITIAL EVALUATION ADULT - ADDITIONAL COMMENTS
Pt wheelchair bound and states she was able to transfer independently to wheelchair with her right leg prosthesis donned.  Pt has b/l prosthesis however rarely uses left prosthesis.     her prosthesis is currently at home.

## 2024-12-13 NOTE — PROGRESS NOTE ADULT - SUBJECTIVE AND OBJECTIVE BOX
interval history:  no acute overnight event  Tele with SR at 60s      PAST MEDICAL & SURGICAL HISTORY:  CAD (coronary artery disease)    S/P CABG x 3  in 2004, [facility]    Stented coronary artery  2010 [facility]    PAD (peripheral artery disease)  s/p R BKA    Carotid artery stenosis    DM (diabetes mellitus)  type II    HTN (hypertension)    Hypercholesterolemia    Obesity    Diabetes    Hypertension    CAD (coronary artery disease)    PVD (peripheral vascular disease)    Carotid stenosis    UTI (urinary tract infection)    Acute kidney injury superimposed on chronic kidney disease    Chronic diastolic heart failure    Vitamin D deficiency    Normocytic anemia    Pulmonary HTN    Diabetic retinopathy    Dermatitis    Diabetes    HTN (Hypertension)    CAD (Coronary Artery Disease)    Hx of CABG  3v 2004    S/P hysterectomy  2004    S/P angioplasty with stent  2009, 3/2014    S/P below knee amputation, right  6/2010    S/P eye surgery  for glaucoma    S/P CABG x 3    S/P BKA (below knee amputation) unilateral, right    History of hysterectomy    Elective surgery  traumatic amputation of the toe    S/P BKA (below knee amputation), left        MEDICATIONS  (STANDING):  aspirin enteric coated 81 milliGRAM(s) Oral daily  brimonidine 0.2% Ophthalmic Solution 1 Drop(s) Both EYES two times a day  buMETAnide 1 milliGRAM(s) Oral every 12 hours  chlorhexidine 2% Cloths 1 Application(s) Topical daily  clopidogrel Tablet 75 milliGRAM(s) Oral daily  dextrose 5%. 1000 milliLiter(s) (50 mL/Hr) IV Continuous <Continuous>  dextrose 5%. 1000 milliLiter(s) (100 mL/Hr) IV Continuous <Continuous>  dextrose 50% Injectable 25 Gram(s) IV Push once  dextrose 50% Injectable 12.5 Gram(s) IV Push once  dextrose 50% Injectable 25 Gram(s) IV Push once  gabapentin 300 milliGRAM(s) Oral daily  glucagon  Injectable 1 milliGRAM(s) IntraMuscular once  hydrALAZINE 10 milliGRAM(s) Oral every 8 hours  insulin glargine Injectable (LANTUS) 24 Unit(s) SubCutaneous at bedtime  insulin lispro (ADMELOG) corrective regimen sliding scale   SubCutaneous three times a day before meals  insulin lispro (ADMELOG) corrective regimen sliding scale   SubCutaneous at bedtime  lidocaine   4% Patch 1 Patch Transdermal daily  naloxone Injectable 0.4 milliGRAM(s) IV Push once  pantoprazole   Suspension 40 milliGRAM(s) Oral daily  piperacillin/tazobactam IVPB.. 3.375 Gram(s) IV Intermittent every 12 hours  senna 2 Tablet(s) Oral at bedtime  sodium bicarbonate 650 milliGRAM(s) Oral three times a day    MEDICATIONS  (PRN):  dextrose Oral Gel 15 Gram(s) Oral once PRN Blood Glucose LESS THAN 70 milliGRAM(s)/deciliter  polyethylene glycol 3350 17 Gram(s) Oral daily PRN Constipation            Vital Signs Last 24 Hrs  T(C): 36.6 (13 Dec 2024 05:00), Max: 36.7 (12 Dec 2024 16:00)  T(F): 97.8 (13 Dec 2024 05:00), Max: 98.1 (12 Dec 2024 16:00)  HR: 63 (13 Dec 2024 05:00) (63 - 71)  BP: 159/69 (13 Dec 2024 05:00) (131/55 - 183/78)  BP(mean): 78 (12 Dec 2024 18:00) (78 - 112)  RR: 12 (13 Dec 2024 05:00) (9 - 21)  SpO2: 100% (13 Dec 2024 05:00) (98% - 100%)    Parameters below as of 13 Dec 2024 05:00  Patient On (Oxygen Delivery Method): room air                INTERPRETATION OF TELEMETRY: SR at 60s    ECG:        LABS:                        10.3   x     )-----------( 390      ( 13 Dec 2024 06:40 )             29.0     12-12    134[L]  |  96[L]  |  78[H]  ----------------------------<  182[H]  3.7   |  20[L]  |  4.81[H]    Ca    9.1      12 Dec 2024 04:20  Phos  6.1     12-12  Mg     2.30     12-12    TPro  7.0  /  Alb  2.8[L]  /  TBili  2.0[H]  /  DBili  x   /  AST  219[H]  /  ALT  449[H]  /  AlkPhos  246[H]  12-12          Urinalysis Basic - ( 12 Dec 2024 04:20 )    Color: x / Appearance: x / SG: x / pH: x  Gluc: 182 mg/dL / Ketone: x  / Bili: x / Urobili: x   Blood: x / Protein: x / Nitrite: x   Leuk Esterase: x / RBC: x / WBC x   Sq Epi: x / Non Sq Epi: x / Bacteria: x      I&O's Summary    12 Dec 2024 07:01  -  13 Dec 2024 07:00  --------------------------------------------------------  IN: 615 mL / OUT: 5500 mL / NET: -4885 mL      BNP  RADIOLOGY & ADDITIONAL STUDIES:      PHYSICAL EXAM:    GENERAL: In no apparent distress, well nourished, and hydrated.  HEART: Regular rate and rhythm; No murmurs, rubs, or gallops.  PULMONARY: Clear to auscultation and percussion.  No rales, wheezing, or rhonchi bilaterally.  ABDOMEN: Soft, Nontender, Nondistended; Bowel sounds present  EXTREMITIES:  Peripheral Pulses present, + R BKA

## 2024-12-13 NOTE — PROVIDER CONTACT NOTE (OTHER) - ASSESSMENT
Patient AOx4, denies chest pain and sob. BP is 92/39, HR 78, O2 sat 96% on room air, and temp 97.9
BP is 94/41, HR 88, temp 97.8, O2 sat 99% on room air. Patient is AOx4, denies chest pain, dizziness, and sob.
Patient AOx4, denies chest pain, sob, headache. Urine in howe is cloudy. BP 96/43, HR 83, temp 97.9, O2 sat 99% on room air
patient A&Ox4. Denies chest pain / SOB. Vitals as charted.

## 2024-12-14 LAB
ALBUMIN SERPL ELPH-MCNC: 3 G/DL — LOW (ref 3.3–5)
ALP SERPL-CCNC: 381 U/L — HIGH (ref 40–120)
ALT FLD-CCNC: 272 U/L — HIGH (ref 4–33)
ANION GAP SERPL CALC-SCNC: 18 MMOL/L — HIGH (ref 7–14)
AST SERPL-CCNC: 160 U/L — HIGH (ref 4–32)
BILIRUB SERPL-MCNC: 1.4 MG/DL — HIGH (ref 0.2–1.2)
BUN SERPL-MCNC: 64 MG/DL — HIGH (ref 7–23)
CALCIUM SERPL-MCNC: 9.4 MG/DL — SIGNIFICANT CHANGE UP (ref 8.4–10.5)
CHLORIDE SERPL-SCNC: 96 MMOL/L — LOW (ref 98–107)
CO2 SERPL-SCNC: 24 MMOL/L — SIGNIFICANT CHANGE UP (ref 22–31)
CREAT SERPL-MCNC: 2.71 MG/DL — HIGH (ref 0.5–1.3)
EGFR: 18 ML/MIN/1.73M2 — LOW
GLUCOSE BLDC GLUCOMTR-MCNC: 164 MG/DL — HIGH (ref 70–99)
GLUCOSE BLDC GLUCOMTR-MCNC: 190 MG/DL — HIGH (ref 70–99)
GLUCOSE BLDC GLUCOMTR-MCNC: 192 MG/DL — HIGH (ref 70–99)
GLUCOSE BLDC GLUCOMTR-MCNC: 231 MG/DL — HIGH (ref 70–99)
GLUCOSE BLDC GLUCOMTR-MCNC: 355 MG/DL — HIGH (ref 70–99)
GLUCOSE SERPL-MCNC: 128 MG/DL — HIGH (ref 70–99)
HCT VFR BLD CALC: 28.7 % — LOW (ref 34.5–45)
HGB BLD-MCNC: 9.9 G/DL — LOW (ref 11.5–15.5)
MAGNESIUM SERPL-MCNC: 1.8 MG/DL — SIGNIFICANT CHANGE UP (ref 1.6–2.6)
MCHC RBC-ENTMCNC: 29.6 PG — SIGNIFICANT CHANGE UP (ref 27–34)
MCHC RBC-ENTMCNC: 34.5 G/DL — SIGNIFICANT CHANGE UP (ref 32–36)
MCV RBC AUTO: 85.9 FL — SIGNIFICANT CHANGE UP (ref 80–100)
NRBC # BLD: 0 /100 WBCS — SIGNIFICANT CHANGE UP (ref 0–0)
NRBC # FLD: 0.03 K/UL — HIGH (ref 0–0)
PHOSPHATE SERPL-MCNC: 4 MG/DL — SIGNIFICANT CHANGE UP (ref 2.5–4.5)
PLATELET # BLD AUTO: 382 K/UL — SIGNIFICANT CHANGE UP (ref 150–400)
POTASSIUM SERPL-MCNC: 3.7 MMOL/L — SIGNIFICANT CHANGE UP (ref 3.5–5.3)
POTASSIUM SERPL-SCNC: 3.7 MMOL/L — SIGNIFICANT CHANGE UP (ref 3.5–5.3)
PROT SERPL-MCNC: 7.7 G/DL — SIGNIFICANT CHANGE UP (ref 6–8.3)
RBC # BLD: 3.34 M/UL — LOW (ref 3.8–5.2)
RBC # FLD: 15.9 % — HIGH (ref 10.3–14.5)
SODIUM SERPL-SCNC: 138 MMOL/L — SIGNIFICANT CHANGE UP (ref 135–145)
WBC # BLD: 14.33 K/UL — HIGH (ref 3.8–10.5)
WBC # FLD AUTO: 14.33 K/UL — HIGH (ref 3.8–10.5)

## 2024-12-14 PROCEDURE — 99232 SBSQ HOSP IP/OBS MODERATE 35: CPT

## 2024-12-14 RX ORDER — APIXABAN 2.5 MG/1
5 TABLET, FILM COATED ORAL
Refills: 0 | Status: DISCONTINUED | OUTPATIENT
Start: 2024-12-14 | End: 2024-12-16

## 2024-12-14 RX ORDER — POTASSIUM CHLORIDE 600 MG/1
40 TABLET, EXTENDED RELEASE ORAL ONCE
Refills: 0 | Status: COMPLETED | OUTPATIENT
Start: 2024-12-14 | End: 2024-12-14

## 2024-12-14 RX ADMIN — SODIUM BICARBONATE 325 MILLIGRAM(S): 84 INJECTION, SOLUTION INTRAVENOUS at 21:50

## 2024-12-14 RX ADMIN — HYDRALAZINE HYDROCHLORIDE 10 MILLIGRAM(S): 10 TABLET ORAL at 22:56

## 2024-12-14 RX ADMIN — PANTOPRAZOLE SODIUM 40 MILLIGRAM(S): 40 TABLET, DELAYED RELEASE ORAL at 13:01

## 2024-12-14 RX ADMIN — APIXABAN 5 MILLIGRAM(S): 2.5 TABLET, FILM COATED ORAL at 19:13

## 2024-12-14 RX ADMIN — BRIMONIDINE TARTRATE 1 DROP(S): 1.5 SOLUTION/ DROPS OPHTHALMIC at 05:51

## 2024-12-14 RX ADMIN — CHLORHEXIDINE GLUCONATE 1 APPLICATION(S): 1.2 RINSE ORAL at 13:09

## 2024-12-14 RX ADMIN — INSULIN GLARGINE 26 UNIT(S): 100 INJECTION, SOLUTION SUBCUTANEOUS at 21:52

## 2024-12-14 RX ADMIN — LIDOCAINE 1 PATCH: 40 CREAM TOPICAL at 13:05

## 2024-12-14 RX ADMIN — Medication 2: at 08:58

## 2024-12-14 RX ADMIN — LIDOCAINE 1 PATCH: 40 CREAM TOPICAL at 20:00

## 2024-12-14 RX ADMIN — SODIUM BICARBONATE 325 MILLIGRAM(S): 84 INJECTION, SOLUTION INTRAVENOUS at 05:50

## 2024-12-14 RX ADMIN — BRIMONIDINE TARTRATE 1 DROP(S): 1.5 SOLUTION/ DROPS OPHTHALMIC at 18:48

## 2024-12-14 RX ADMIN — HYDRALAZINE HYDROCHLORIDE 10 MILLIGRAM(S): 10 TABLET ORAL at 13:01

## 2024-12-14 RX ADMIN — Medication 4: at 12:58

## 2024-12-14 RX ADMIN — SODIUM BICARBONATE 325 MILLIGRAM(S): 84 INJECTION, SOLUTION INTRAVENOUS at 13:01

## 2024-12-14 RX ADMIN — Medication 6 UNIT(S): at 18:47

## 2024-12-14 RX ADMIN — Medication 6 UNIT(S): at 12:57

## 2024-12-14 RX ADMIN — Medication 81 MILLIGRAM(S): at 13:01

## 2024-12-14 RX ADMIN — HYDRALAZINE HYDROCHLORIDE 10 MILLIGRAM(S): 10 TABLET ORAL at 05:50

## 2024-12-14 RX ADMIN — POTASSIUM CHLORIDE 40 MILLIEQUIVALENT(S): 600 TABLET, EXTENDED RELEASE ORAL at 14:53

## 2024-12-14 RX ADMIN — Medication 6 UNIT(S): at 08:58

## 2024-12-14 RX ADMIN — GABAPENTIN 300 MILLIGRAM(S): 300 CAPSULE ORAL at 21:49

## 2024-12-14 RX ADMIN — Medication 0: at 21:51

## 2024-12-14 RX ADMIN — Medication 2: at 18:46

## 2024-12-14 RX ADMIN — CLOPIDOGREL 75 MILLIGRAM(S): 75 TABLET, FILM COATED ORAL at 13:01

## 2024-12-14 NOTE — PROGRESS NOTE ADULT - PROBLEM SELECTOR PLAN 1
Overnight 12/8 RRT for asystole, pt returned to NSR by the time RRT arrived to bedside  - EP consulted and suspect likely vagal mediated response, apprec recs  - S/p CCU for CHF and dopamine drip d/c 12/9  - TTE similar to echo 1 week ago - with exception echogenicity in LA on one view only, EF 46%  - Repeat TTE: A mobile echodensity is visualized in the left atrium intermittently (seen only in the parasternal long axis view). This may represent a hypermobile interatrial septum  - Hold AV cindy blockers  - Correct electrolyte dysfunction, Keep K>4 Mg>2  - No indication for pacing at this time, EP follow up in the office  - Telemetry monitoring, reviewed today by me, HR lingering around 60's    Telemetry with suspicion for Afib vs NSVT - EP contacted, concern for Afib w/ aberrancy.   May warrant AC, will d/w EP/cardiology about initiating and adjusting antiplatelet agents.

## 2024-12-14 NOTE — PROGRESS NOTE ADULT - ASSESSMENT
EKg SR TWI V2-V6, inferior leads    LHC 2017   of the LAD and RCA, patent LIMA to LAD graft, OM stented with  BMS    TTE 12/2/2024   1. Technically very difficult study performed with the patient in the supine position.   2. Technically difficult image quality.   3. Left ventricular systolic function is mildly to moderately decreased with anejection fraction visually estimated at 40 to 45%. There are regional wall motion abnormalities present. Hypokinesis of the inferolateral wall, basal to mid inferior wall, and basal inferoseptum. There is mild (grade 1) left ventricular diastolic dysfunction.   4. The right ventricle is not well visualized.   5. Structurally normal mitral valve with normal leaflet excursion. There is calcification of the mitral valve annulus. There is mild mitral regurgitation.   6. No prior echocardiogram is available for comparison.    TTE 12/9/2024   1. Left ventricular cavity is normal in size. Left ventricular wall thickness is normal. Left ventricular systolic function is mildly to moderately decreased with an ejection fraction of 46 % by [place]'s method of disks. Regional wall motion abnormalities present.   2. There is hypokinesis of the basal inferolateral, mid inferolateral, basal inferior and basal inferoseptal walls.   3. Echogenic mass is seen possibly in the left atrium, finding is not seen in every view. Unable to determine if it represents artifact or pathologic finding. Consider repeat TTE vs FOREST. (Prior images of study on 12/2/24 were technically difficult, comparison is limited).    TTE limited 12/11/2024   1. Limited repeat study to evaluatepossible left atrial mass. A mobile echodensity is visualized in the left atrium intermittently (seen only in the parasternal long axis view). This may represent a hypermobile interatrial septum.   2. Compared to the transthoracic echocardiogram performed on 12/9/2024, there have been no significant interval changes.      A/P    79 year old woman with history of CAD s/p CABG (2004), HTN, HLD, IDDM2, severe PAD with right and left AKA presenting with complaint of one day of abdominal pain. Meeting sepsis criteria and found to have acute pancreatitis.    1. Sinus pause, asystole   - s/p RRT, EP consulted for possible PPM, as per them possibly vagally mediated   - s/p dopamin gtt  - SR 60s on tele c/w tele    2. Elevated troponin  - LHC and NST deferred during last admission (last week)  - Trop 192>418, c/p epigastric pain  - TTE 12/2/24 mild-mod LV dysfunction(EF 40-45%)  - s/p RRT, Trop 1329->1477->994 s/p heparin gtt  - EKg SR TWI V2-V6, inferior leads, will discuss ACS workup   - repeat echo shows same LV function as 1 week ago     2. Acute pancreatitis  -on Abx f/u GI recs  - MRCP 12/10 Acute interstitial pancreatitis. No evidence of parenchymal necrosis or walled off fluid collection. No gallstones or choledocholithiasis.    3. CAD s/p CABG (2004)  -asa, plavix  -statin held for abn LFT    4. HTN  - hydralazine     5. New onset Afib  with Aberrancy  - back in NSR   - h/o asystole no BB   - cont eliquis 5mg q12

## 2024-12-14 NOTE — PROGRESS NOTE ADULT - PROBLEM SELECTOR PLAN 9
Wheelchair bound at baseline (R BKA/L AKA), prosthesis are at home  PT eval --> to be determined, pt's prostheses not in hospital

## 2024-12-14 NOTE — PROGRESS NOTE ADULT - PROBLEM SELECTOR PLAN 2
Etiology unknown, no stones on US, CBD WNL. No alcohol use, triglycerides WNL  - F/up IgG4  - Seen by GI, apprec recs, Etiology may be 2/2 to passed stone vs. resolving ischemic injury  - S/p MRCP 12/10: Acute interstitial pancreatitis. No evidence of parenchymal necrosis or walled off fluid collection. No gallstones or choledocholithiasis  - No need for ERCP per GI, tolerating diet very well at this time  - Tylenol prn, no opioids for now

## 2024-12-14 NOTE — PROGRESS NOTE ADULT - SUBJECTIVE AND OBJECTIVE BOX
Ady Villatoro MD  Interventional Cardiology / Advance Heart Failure and Cardiac Transplant Specialist  [place] Office : [phone] [place] [address] [place] [address]  Tel:   [place] Office : [address] [facility] [place] [address]  Tel: [phone]       Pt is lying in bed comfortable not in distress, no chest pains no SOB no palpitations  	  MEDICATIONS:  apixaban 5 milliGRAM(s) Oral two times a day  aspirin enteric coated 81 milliGRAM(s) Oral daily  hydrALAZINE 10 milliGRAM(s) Oral every 8 hours  acetaminophen     Tablet .. 650 milliGRAM(s) Oral every 6 hours PRN  gabapentin 300 milliGRAM(s) Oral at bedtime  pantoprazole   Suspension 40 milliGRAM(s) Oral daily  polyethylene glycol 3350 17 Gram(s) Oral daily PRN  senna 2 Tablet(s) Oral at bedtime  dextrose 50% Injectable 25 Gram(s) IV Push once  dextrose 50% Injectable 12.5 Gram(s) IV Push once  dextrose 50% Injectable 25 Gram(s) IV Push once  dextrose Oral Gel 15 Gram(s) Oral once PRN  glucagon  Injectable 1 milliGRAM(s) IntraMuscular once  insulin glargine Injectable (LANTUS) 26 Unit(s) SubCutaneous at bedtime  insulin lispro (ADMELOG) corrective regimen sliding scale   SubCutaneous three times a day before meals  insulin lispro (ADMELOG) corrective regimen sliding scale   SubCutaneous at bedtime  insulin lispro Injectable (ADMELOG) 6 Unit(s) SubCutaneous three times a day before meals  brimonidine 0.2% Ophthalmic Solution 1 Drop(s) Both EYES two times a day  chlorhexidine 2% Cloths 1 Application(s) Topical daily  dextrose 5%. 1000 milliLiter(s) IV Continuous <Continuous>  dextrose 5%. 1000 milliLiter(s) IV Continuous <Continuous>  lidocaine   4% Patch 1 Patch Transdermal daily  sodium bicarbonate 325 milliGRAM(s) Oral three times a day      PAST MEDICAL/SURGICAL HISTORY  PAST MEDICAL & SURGICAL HISTORY:  S/P CABG x 3  in 2004, [facility]      Stented coronary artery  2010 [facility]      PAD (peripheral artery disease)  s/p R BKA      Carotid artery stenosis      DM (diabetes mellitus)  type II      Hypercholesterolemia      Obesity      Hypertension      CAD (coronary artery disease)      Acute kidney injury superimposed on chronic kidney disease      Chronic diastolic heart failure      Vitamin D deficiency      Normocytic anemia      Pulmonary HTN      Diabetic retinopathy      Hx of CABG  3v 2004      S/P hysterectomy  2004      S/P angioplasty with stent  2009, 3/2014      S/P below knee amputation, right  6/2010      S/P eye surgery  for glaucoma      S/P CABG x 3      S/P BKA (below knee amputation) unilateral, right      History of hysterectomy      Elective surgery  traumatic amputation of the toe      S/P BKA (below knee amputation), left          SOCIAL HISTORY: Substance Use (street drugs): ( x ) never used  (  ) other:    FAMILY HISTORY:  Family history of diabetes mellitus (Sibling)         PHYSICAL EXAM:  T(C): 36.9 (12-14-24 @ 20:40), Max: 36.9 (12-14-24 @ 14:34)  HR: 65 (12-14-24 @ 20:40) (61 - 74)  BP: 102/53 (12-14-24 @ 20:40) (102/53 - 146/63)  RR: 18 (12-14-24 @ 20:40) (18 - 18)  SpO2: 98% (12-14-24 @ 20:40) (97% - 100%)  Wt(kg): --  I&O's Summary    13 Dec 2024 07:01  -  14 Dec 2024 07:00  --------------------------------------------------------  IN: 800 mL / OUT: 1900 mL / NET: -1100 mL    14 Dec 2024 07:01  -  14 Dec 2024 22:26  --------------------------------------------------------  IN: 550 mL / OUT: 1300 mL / NET: -750 mL          GENERAL: NAD  EYES:   PERRLA   ENMT:   Moist mucous membranes, Good dentition, No lesions  Cardiovascular: Normal S1 S2, No JVD, No murmurs, No edema  Respiratory: Lungs clear to auscultation	  Gastrointestinal:  Soft, Non-tender, + BS	  Extremities: no edema                                    9.9    14.33 )-----------( 382      ( 14 Dec 2024 06:20 )             28.7     12-14    138  |  96[L]  |  64[H]  ----------------------------<  128[H]  3.7   |  24  |  2.71[H]    Ca    9.4      14 Dec 2024 06:20  Phos  4.0     12-14  Mg     1.80     12-14    TPro  7.7  /  Alb  3.0[L]  /  TBili  1.4[H]  /  DBili  x   /  AST  160[H]  /  ALT  272[H]  /  AlkPhos  381[H]  12-14    proBNP:   Lipid Profile:   HgA1c:   TSH:     Consultant(s) Notes Reviewed:  [x ] YES  [ ] NO    Care Discussed with Consultants/Other Providers [ x] YES  [ ] NO    Imaging Personally Reviewed independently:  [x] YES  [ ] NO    All labs, radiologic studies, vitals, orders and medications list reviewed. Patient is seen and examined at bedside. Case discussed with medical team.

## 2024-12-14 NOTE — PROGRESS NOTE ADULT - SUBJECTIVE AND OBJECTIVE BOX
[facility] Division of Hospital Medicine  Cabrera DO Shawn  Available via MS Teams    SUBJECTIVE / OVERNIGHT EVENTS:  No acute events overnight. Patient seen and examined at bedside this morning.  States she feels well. Denies acute complaints.    Attempted to call daughter Linda but could not reach.    Telemetry with Afib noted.    ADDITIONAL REVIEW OF SYSTEMS:    MEDICATIONS  (STANDING):  aspirin enteric coated 81 milliGRAM(s) Oral daily  brimonidine 0.2% Ophthalmic Solution 1 Drop(s) Both EYES two times a day  chlorhexidine 2% Cloths 1 Application(s) Topical daily  clopidogrel Tablet 75 milliGRAM(s) Oral daily  dextrose 5%. 1000 milliLiter(s) (100 mL/Hr) IV Continuous <Continuous>  dextrose 5%. 1000 milliLiter(s) (50 mL/Hr) IV Continuous <Continuous>  dextrose 50% Injectable 25 Gram(s) IV Push once  dextrose 50% Injectable 12.5 Gram(s) IV Push once  dextrose 50% Injectable 25 Gram(s) IV Push once  gabapentin 300 milliGRAM(s) Oral at bedtime  glucagon  Injectable 1 milliGRAM(s) IntraMuscular once  hydrALAZINE 10 milliGRAM(s) Oral every 8 hours  insulin glargine Injectable (LANTUS) 26 Unit(s) SubCutaneous at bedtime  insulin lispro (ADMELOG) corrective regimen sliding scale   SubCutaneous three times a day before meals  insulin lispro (ADMELOG) corrective regimen sliding scale   SubCutaneous at bedtime  insulin lispro Injectable (ADMELOG) 6 Unit(s) SubCutaneous three times a day before meals  lidocaine   4% Patch 1 Patch Transdermal daily  naloxone Injectable 0.4 milliGRAM(s) IV Push once  pantoprazole   Suspension 40 milliGRAM(s) Oral daily  senna 2 Tablet(s) Oral at bedtime  sodium bicarbonate 325 milliGRAM(s) Oral three times a day    MEDICATIONS  (PRN):  acetaminophen     Tablet .. 650 milliGRAM(s) Oral every 6 hours PRN Mild Pain (1 - 3)  dextrose Oral Gel 15 Gram(s) Oral once PRN Blood Glucose LESS THAN 70 milliGRAM(s)/deciliter  polyethylene glycol 3350 17 Gram(s) Oral daily PRN Constipation      I&O's Summary    13 Dec 2024 07:01  -  14 Dec 2024 07:00  --------------------------------------------------------  IN: 800 mL / OUT: 1900 mL / NET: -1100 mL        PHYSICAL EXAM:  Vital Signs Last 24 Hrs  T(C): 36.7 (14 Dec 2024 05:23), Max: 37 (13 Dec 2024 18:00)  T(F): 98.1 (14 Dec 2024 05:23), Max: 98.6 (13 Dec 2024 18:00)  HR: 61 (14 Dec 2024 05:23) (61 - 68)  BP: 142/54 (14 Dec 2024 05:23) (103/54 - 145/50)  BP(mean): --  RR: 18 (14 Dec 2024 05:23) (18 - 18)  SpO2: 100% (14 Dec 2024 05:23) (98% - 100%)    Parameters below as of 14 Dec 2024 05:23  Patient On (Oxygen Delivery Method): room air      CONSTITUTIONAL: NAD, well-groomed  EYES: PERRLA; conjunctiva and sclera clear  ENMT: Moist oral mucosa, no pharyngeal injection or exudates  NECK: Supple, no palpable masses; no thyromegaly  RESPIRATORY: Normal respiratory effort; lungs are clear to auscultation bilaterally  CARDIOVASCULAR: Regular rate and rhythm, normal S1 and S2, no murmur/rub/gallop; No lower extremity edema; Peripheral pulses are 2+ bilaterally  ABDOMEN: Nontender to palpation, normoactive bowel sounds, no rebound/guarding; No hepatosplenomegaly  MUSCULOSKELETAL: no clubbing or cyanosis of digits; no joint swelling or tenderness to palpation; b/l LE amputations  PSYCH: A+O to person, place, and time; affect appropriate  NEUROLOGY: CN 2-12 are intact and symmetric; no gross sensory deficits   SKIN: No rashes; no palpable lesions; mid-sternal incision    LABS:                        9.9    14.33 )-----------( 382      ( 14 Dec 2024 06:20 )             28.7     12-14    138  |  96[L]  |  64[H]  ----------------------------<  128[H]  3.7   |  24  |  2.71[H]    Ca    9.4      14 Dec 2024 06:20  Phos  4.0     12-14  Mg     1.80     12-14    TPro  7.7  /  Alb  3.0[L]  /  TBili  1.4[H]  /  DBili  x   /  AST  160[H]  /  ALT  272[H]  /  AlkPhos  381[H]  12-14          Urinalysis Basic - ( 14 Dec 2024 06:20 )    Color: x / Appearance: x / SG: x / pH: x  Gluc: 128 mg/dL / Ketone: x  / Bili: x / Urobili: x   Blood: x / Protein: x / Nitrite: x   Leuk Esterase: x / RBC: x / WBC x   Sq Epi: x / Non Sq Epi: x / Bacteria: x

## 2024-12-15 LAB
ALBUMIN SERPL ELPH-MCNC: 2.9 G/DL — LOW (ref 3.3–5)
ALP SERPL-CCNC: 424 U/L — HIGH (ref 40–120)
ALT FLD-CCNC: 204 U/L — HIGH (ref 4–33)
ANION GAP SERPL CALC-SCNC: 15 MMOL/L — HIGH (ref 7–14)
AST SERPL-CCNC: 142 U/L — HIGH (ref 4–32)
BILIRUB SERPL-MCNC: 1.3 MG/DL — HIGH (ref 0.2–1.2)
BUN SERPL-MCNC: 54 MG/DL — HIGH (ref 7–23)
CALCIUM SERPL-MCNC: 9 MG/DL — SIGNIFICANT CHANGE UP (ref 8.4–10.5)
CHLORIDE SERPL-SCNC: 98 MMOL/L — SIGNIFICANT CHANGE UP (ref 98–107)
CO2 SERPL-SCNC: 25 MMOL/L — SIGNIFICANT CHANGE UP (ref 22–31)
CREAT SERPL-MCNC: 1.91 MG/DL — HIGH (ref 0.5–1.3)
EGFR: 27 ML/MIN/1.73M2 — LOW
GLUCOSE BLDC GLUCOMTR-MCNC: 118 MG/DL — HIGH (ref 70–99)
GLUCOSE BLDC GLUCOMTR-MCNC: 162 MG/DL — HIGH (ref 70–99)
GLUCOSE BLDC GLUCOMTR-MCNC: 172 MG/DL — HIGH (ref 70–99)
GLUCOSE BLDC GLUCOMTR-MCNC: 185 MG/DL — HIGH (ref 70–99)
GLUCOSE SERPL-MCNC: 106 MG/DL — HIGH (ref 70–99)
HCT VFR BLD CALC: 27.4 % — LOW (ref 34.5–45)
HGB BLD-MCNC: 9 G/DL — LOW (ref 11.5–15.5)
MAGNESIUM SERPL-MCNC: 1.7 MG/DL — SIGNIFICANT CHANGE UP (ref 1.6–2.6)
MCHC RBC-ENTMCNC: 29 PG — SIGNIFICANT CHANGE UP (ref 27–34)
MCHC RBC-ENTMCNC: 32.8 G/DL — SIGNIFICANT CHANGE UP (ref 32–36)
MCV RBC AUTO: 88.4 FL — SIGNIFICANT CHANGE UP (ref 80–100)
NRBC # BLD: 0 /100 WBCS — SIGNIFICANT CHANGE UP (ref 0–0)
NRBC # FLD: 0 K/UL — SIGNIFICANT CHANGE UP (ref 0–0)
PHOSPHATE SERPL-MCNC: 3.5 MG/DL — SIGNIFICANT CHANGE UP (ref 2.5–4.5)
PLATELET # BLD AUTO: 391 K/UL — SIGNIFICANT CHANGE UP (ref 150–400)
POTASSIUM SERPL-MCNC: 3.5 MMOL/L — SIGNIFICANT CHANGE UP (ref 3.5–5.3)
POTASSIUM SERPL-SCNC: 3.5 MMOL/L — SIGNIFICANT CHANGE UP (ref 3.5–5.3)
PROT SERPL-MCNC: 7.1 G/DL — SIGNIFICANT CHANGE UP (ref 6–8.3)
RBC # BLD: 3.1 M/UL — LOW (ref 3.8–5.2)
RBC # FLD: 16.9 % — HIGH (ref 10.3–14.5)
SODIUM SERPL-SCNC: 138 MMOL/L — SIGNIFICANT CHANGE UP (ref 135–145)
WBC # BLD: 14.67 K/UL — HIGH (ref 3.8–10.5)
WBC # FLD AUTO: 14.67 K/UL — HIGH (ref 3.8–10.5)

## 2024-12-15 PROCEDURE — 99232 SBSQ HOSP IP/OBS MODERATE 35: CPT

## 2024-12-15 RX ORDER — POTASSIUM CHLORIDE 600 MG/1
40 TABLET, EXTENDED RELEASE ORAL ONCE
Refills: 0 | Status: COMPLETED | OUTPATIENT
Start: 2024-12-15 | End: 2024-12-15

## 2024-12-15 RX ADMIN — SODIUM BICARBONATE 325 MILLIGRAM(S): 84 INJECTION, SOLUTION INTRAVENOUS at 21:49

## 2024-12-15 RX ADMIN — Medication 100 GRAM(S): at 17:54

## 2024-12-15 RX ADMIN — GABAPENTIN 300 MILLIGRAM(S): 300 CAPSULE ORAL at 21:58

## 2024-12-15 RX ADMIN — Medication 6 UNIT(S): at 18:29

## 2024-12-15 RX ADMIN — Medication 81 MILLIGRAM(S): at 13:12

## 2024-12-15 RX ADMIN — LIDOCAINE 1 PATCH: 40 CREAM TOPICAL at 18:17

## 2024-12-15 RX ADMIN — HYDRALAZINE HYDROCHLORIDE 10 MILLIGRAM(S): 10 TABLET ORAL at 13:13

## 2024-12-15 RX ADMIN — Medication 6 UNIT(S): at 09:30

## 2024-12-15 RX ADMIN — LIDOCAINE 1 PATCH: 40 CREAM TOPICAL at 01:47

## 2024-12-15 RX ADMIN — CHLORHEXIDINE GLUCONATE 1 APPLICATION(S): 1.2 RINSE ORAL at 13:13

## 2024-12-15 RX ADMIN — POTASSIUM CHLORIDE 40 MILLIEQUIVALENT(S): 600 TABLET, EXTENDED RELEASE ORAL at 17:55

## 2024-12-15 RX ADMIN — BRIMONIDINE TARTRATE 1 DROP(S): 1.5 SOLUTION/ DROPS OPHTHALMIC at 05:49

## 2024-12-15 RX ADMIN — HYDRALAZINE HYDROCHLORIDE 10 MILLIGRAM(S): 10 TABLET ORAL at 21:48

## 2024-12-15 RX ADMIN — PANTOPRAZOLE SODIUM 40 MILLIGRAM(S): 40 TABLET, DELAYED RELEASE ORAL at 13:12

## 2024-12-15 RX ADMIN — INSULIN GLARGINE 26 UNIT(S): 100 INJECTION, SOLUTION SUBCUTANEOUS at 23:30

## 2024-12-15 RX ADMIN — Medication 2: at 18:29

## 2024-12-15 RX ADMIN — SODIUM BICARBONATE 325 MILLIGRAM(S): 84 INJECTION, SOLUTION INTRAVENOUS at 13:13

## 2024-12-15 RX ADMIN — Medication 2: at 13:10

## 2024-12-15 RX ADMIN — Medication 6 UNIT(S): at 13:11

## 2024-12-15 RX ADMIN — APIXABAN 5 MILLIGRAM(S): 2.5 TABLET, FILM COATED ORAL at 05:51

## 2024-12-15 RX ADMIN — LIDOCAINE 1 PATCH: 40 CREAM TOPICAL at 13:11

## 2024-12-15 RX ADMIN — SODIUM BICARBONATE 325 MILLIGRAM(S): 84 INJECTION, SOLUTION INTRAVENOUS at 05:51

## 2024-12-15 RX ADMIN — HYDRALAZINE HYDROCHLORIDE 10 MILLIGRAM(S): 10 TABLET ORAL at 05:50

## 2024-12-15 RX ADMIN — APIXABAN 5 MILLIGRAM(S): 2.5 TABLET, FILM COATED ORAL at 17:56

## 2024-12-15 RX ADMIN — BRIMONIDINE TARTRATE 1 DROP(S): 1.5 SOLUTION/ DROPS OPHTHALMIC at 17:56

## 2024-12-15 NOTE — PROGRESS NOTE ADULT - NSPROGADDITIONALINFOA_GEN_ALL_CORE

## 2024-12-15 NOTE — PROGRESS NOTE ADULT - PROBLEM SELECTOR PLAN 2
Etiology unknown, no stones on US, CBD WNL. No alcohol use, triglycerides WNL  - Seen by GI, apprec recs, Etiology may be 2/2 to passed stone vs. resolving ischemic injury  - S/p MRCP 12/10: Acute interstitial pancreatitis. No evidence of parenchymal necrosis or walled off fluid collection. No gallstones or choledocholithiasis  - No need for ERCP per GI, tolerating diet very well at this time  - Tylenol prn, no opioids for now

## 2024-12-15 NOTE — PROGRESS NOTE ADULT - SUBJECTIVE AND OBJECTIVE BOX
Ady Villatoro MD  Interventional Cardiology / Advance Heart Failure and Cardiac Transplant Specialist  [place] Office : [address] [place] [address] [place] [address]  Tel:   [place] Office : [address] [facility] [place] [address]  Tel: [phone]       Pt is lying in bed comfortable not in distress, no chest pains no SOB no palpitations  	  MEDICATIONS:  apixaban 5 milliGRAM(s) Oral two times a day  aspirin enteric coated 81 milliGRAM(s) Oral daily  hydrALAZINE 10 milliGRAM(s) Oral every 8 hours        acetaminophen     Tablet .. 650 milliGRAM(s) Oral every 6 hours PRN  gabapentin 300 milliGRAM(s) Oral at bedtime    pantoprazole   Suspension 40 milliGRAM(s) Oral daily  polyethylene glycol 3350 17 Gram(s) Oral daily PRN  senna 2 Tablet(s) Oral at bedtime    dextrose 50% Injectable 25 Gram(s) IV Push once  dextrose 50% Injectable 12.5 Gram(s) IV Push once  dextrose 50% Injectable 25 Gram(s) IV Push once  dextrose Oral Gel 15 Gram(s) Oral once PRN  glucagon  Injectable 1 milliGRAM(s) IntraMuscular once  insulin glargine Injectable (LANTUS) 26 Unit(s) SubCutaneous at bedtime  insulin lispro (ADMELOG) corrective regimen sliding scale   SubCutaneous at bedtime  insulin lispro (ADMELOG) corrective regimen sliding scale   SubCutaneous three times a day before meals  insulin lispro Injectable (ADMELOG) 6 Unit(s) SubCutaneous three times a day before meals    brimonidine 0.2% Ophthalmic Solution 1 Drop(s) Both EYES two times a day  chlorhexidine 2% Cloths 1 Application(s) Topical daily  dextrose 5%. 1000 milliLiter(s) IV Continuous <Continuous>  dextrose 5%. 1000 milliLiter(s) IV Continuous <Continuous>  lidocaine   4% Patch 1 Patch Transdermal daily  sodium bicarbonate 325 milliGRAM(s) Oral three times a day      PAST MEDICAL/SURGICAL HISTORY  PAST MEDICAL & SURGICAL HISTORY:  S/P CABG x 3  in 2004, [facility]      Stented coronary artery  2010 [facility]      PAD (peripheral artery disease)  s/p R BKA      Carotid artery stenosis      DM (diabetes mellitus)  type II      Hypercholesterolemia      Obesity      Hypertension      CAD (coronary artery disease)      Acute kidney injury superimposed on chronic kidney disease      Chronic diastolic heart failure      Vitamin D deficiency      Normocytic anemia      Pulmonary HTN      Diabetic retinopathy      Hx of CABG  3v 2004      S/P hysterectomy  2004      S/P angioplasty with stent  2009, 3/2014      S/P below knee amputation, right  6/2010      S/P eye surgery  for glaucoma      S/P CABG x 3      S/P BKA (below knee amputation) unilateral, right      History of hysterectomy      Elective surgery  traumatic amputation of the toe      S/P BKA (below knee amputation), left          SOCIAL HISTORY: Substance Use (street drugs): ( x ) never used  (  ) other:    FAMILY HISTORY:  Family history of diabetes mellitus (Sibling)        PHYSICAL EXAM:  T(C): 36.9 (12-15-24 @ 17:30), Max: 37.1 (12-15-24 @ 04:00)  HR: 64 (12-15-24 @ 17:30) (59 - 77)  BP: 158/49 (12-15-24 @ 17:30) (102/53 - 169/51)  RR: 18 (12-15-24 @ 17:30) (18 - 18)  SpO2: 96% (12-15-24 @ 17:30) (96% - 100%)  Wt(kg): --  I&O's Summary    14 Dec 2024 07:01  -  15 Dec 2024 07:00  --------------------------------------------------------  IN: 550 mL / OUT: 1850 mL / NET: -1300 mL    15 Dec 2024 07:01  -  15 Dec 2024 18:23  --------------------------------------------------------  IN: 0 mL / OUT: 200 mL / NET: -200 mL          GENERAL: NAD  EYES:   PERRLA   ENMT:   Moist mucous membranes, Good dentition, No lesions  Cardiovascular: Normal S1 S2, No JVD, No murmurs, No edema  Respiratory: Lungs clear to auscultation	  Gastrointestinal:  Soft, Non-tender, + BS	  Extremities: left AKA right BKA                                    9.0    14.67 )-----------( 391      ( 15 Dec 2024 06:50 )             27.4     12-15    138  |  98  |  54[H]  ----------------------------<  106[H]  3.5   |  25  |  1.91[H]    Ca    9.0      15 Dec 2024 06:50  Phos  3.5     12-15  Mg     1.70     12-15    TPro  7.1  /  Alb  2.9[L]  /  TBili  1.3[H]  /  DBili  x   /  AST  142[H]  /  ALT  204[H]  /  AlkPhos  424[H]  12-15    proBNP:   Lipid Profile:   HgA1c:   TSH:     Consultant(s) Notes Reviewed:  [x ] YES  [ ] NO    Care Discussed with Consultants/Other Providers [ x] YES  [ ] NO    Imaging Personally Reviewed independently:  [x] YES  [ ] NO    All labs, radiologic studies, vitals, orders and medications list reviewed. Patient is seen and examined at bedside. Case discussed with medical team.

## 2024-12-15 NOTE — PROGRESS NOTE ADULT - SUBJECTIVE AND OBJECTIVE BOX
[facility] Division of Hospital Medicine  Cabrera Escobar DO  Available via MS Teams    SUBJECTIVE / OVERNIGHT EVENTS:  No acute events overnight. Patient seen and examined at bedside this morning.  States she feels well.  Discussed with daughter Adrianna over the phone while at bedside.    ADDITIONAL REVIEW OF SYSTEMS:    MEDICATIONS  (STANDING):  apixaban 5 milliGRAM(s) Oral two times a day  aspirin enteric coated 81 milliGRAM(s) Oral daily  brimonidine 0.2% Ophthalmic Solution 1 Drop(s) Both EYES two times a day  chlorhexidine 2% Cloths 1 Application(s) Topical daily  dextrose 5%. 1000 milliLiter(s) (50 mL/Hr) IV Continuous <Continuous>  dextrose 5%. 1000 milliLiter(s) (100 mL/Hr) IV Continuous <Continuous>  dextrose 50% Injectable 25 Gram(s) IV Push once  dextrose 50% Injectable 12.5 Gram(s) IV Push once  dextrose 50% Injectable 25 Gram(s) IV Push once  gabapentin 300 milliGRAM(s) Oral at bedtime  glucagon  Injectable 1 milliGRAM(s) IntraMuscular once  hydrALAZINE 10 milliGRAM(s) Oral every 8 hours  insulin glargine Injectable (LANTUS) 26 Unit(s) SubCutaneous at bedtime  insulin lispro (ADMELOG) corrective regimen sliding scale   SubCutaneous three times a day before meals  insulin lispro (ADMELOG) corrective regimen sliding scale   SubCutaneous at bedtime  insulin lispro Injectable (ADMELOG) 6 Unit(s) SubCutaneous three times a day before meals  lidocaine   4% Patch 1 Patch Transdermal daily  naloxone Injectable 0.4 milliGRAM(s) IV Push once  pantoprazole   Suspension 40 milliGRAM(s) Oral daily  senna 2 Tablet(s) Oral at bedtime  sodium bicarbonate 325 milliGRAM(s) Oral three times a day    MEDICATIONS  (PRN):  acetaminophen     Tablet .. 650 milliGRAM(s) Oral every 6 hours PRN Mild Pain (1 - 3)  dextrose Oral Gel 15 Gram(s) Oral once PRN Blood Glucose LESS THAN 70 milliGRAM(s)/deciliter  polyethylene glycol 3350 17 Gram(s) Oral daily PRN Constipation      I&O's Summary    14 Dec 2024 07:01  -  15 Dec 2024 07:00  --------------------------------------------------------  IN: 550 mL / OUT: 1850 mL / NET: -1300 mL    15 Dec 2024 07:01  -  15 Dec 2024 13:08  --------------------------------------------------------  IN: 0 mL / OUT: 200 mL / NET: -200 mL        PHYSICAL EXAM:  Vital Signs Last 24 Hrs  T(C): 36.8 (15 Dec 2024 11:10), Max: 37.1 (15 Dec 2024 04:00)  T(F): 98.2 (15 Dec 2024 11:10), Max: 98.8 (15 Dec 2024 04:00)  HR: 65 (15 Dec 2024 11:10) (59 - 74)  BP: 157/66 (15 Dec 2024 11:10) (102/53 - 169/51)  BP(mean): --  RR: 18 (15 Dec 2024 11:10) (18 - 18)  SpO2: 99% (15 Dec 2024 11:10) (97% - 100%)    Parameters below as of 15 Dec 2024 11:10  Patient On (Oxygen Delivery Method): room air      CONSTITUTIONAL: NAD, well-groomed  EYES: PERRLA; conjunctiva and sclera clear  ENMT: Moist oral mucosa, no pharyngeal injection or exudates  NECK: Supple, no palpable masses; no thyromegaly  RESPIRATORY: Normal respiratory effort; lungs are clear to auscultation bilaterally  CARDIOVASCULAR: Regular rate and rhythm, normal S1 and S2, no murmur/rub/gallop; No lower extremity edema; Peripheral pulses are 2+ bilaterally  ABDOMEN: Nontender to palpation, normoactive bowel sounds, no rebound/guarding; No hepatosplenomegaly  MUSCULOSKELETAL: no clubbing or cyanosis of digits; no joint swelling or tenderness to palpation; RLE BKA, LLE AKA  PSYCH: A+O to person, place, and time; affect appropriate  NEUROLOGY: CN 2-12 are intact and symmetric; no gross sensory deficits   SKIN: No rashes; no palpable lesions    LABS:                        9.0    14.67 )-----------( 391      ( 15 Dec 2024 06:50 )             27.4     12-15    138  |  98  |  54[H]  ----------------------------<  106[H]  3.5   |  25  |  1.91[H]    Ca    9.0      15 Dec 2024 06:50  Phos  3.5     12-15  Mg     1.70     12-15    TPro  7.1  /  Alb  2.9[L]  /  TBili  1.3[H]  /  DBili  x   /  AST  142[H]  /  ALT  204[H]  /  AlkPhos  424[H]  12-15          Urinalysis Basic - ( 15 Dec 2024 06:50 )    Color: x / Appearance: x / SG: x / pH: x  Gluc: 106 mg/dL / Ketone: x  / Bili: x / Urobili: x   Blood: x / Protein: x / Nitrite: x   Leuk Esterase: x / RBC: x / WBC x   Sq Epi: x / Non Sq Epi: x / Bacteria: x

## 2024-12-15 NOTE — PROGRESS NOTE ADULT - PROBLEM SELECTOR PLAN 1
Overnight 12/8 RRT for asystole, pt returned to NSR by the time RRT arrived to bedside  - EP consulted and suspect likely vagal mediated response, apprec recs  - S/p CCU for CHF and dopamine drip d/c 12/9  - TTE similar to echo 1 week ago - with exception echogenicity in LA on one view only, EF 46%  - Repeat TTE: A mobile echodensity is visualized in the left atrium intermittently (seen only in the parasternal long axis view). This may represent a hypermobile interatrial septum  - Hold AV cindy blockers  - Correct electrolyte dysfunction, Keep K>4 Mg>2  - No indication for pacing at this time, EP follow up in the office  - Telemetry monitoring, reviewed today by me, HR lingering around 60's    Telemetry with Afib w/ aberrancy. Started Eliquis + ASA, stopped clopidogrel (discussed with cardiology).

## 2024-12-16 ENCOUNTER — TRANSCRIPTION ENCOUNTER (OUTPATIENT)
Age: 79
End: 2024-12-16

## 2024-12-16 VITALS
DIASTOLIC BLOOD PRESSURE: 60 MMHG | TEMPERATURE: 99 F | SYSTOLIC BLOOD PRESSURE: 143 MMHG | RESPIRATION RATE: 18 BRPM | HEART RATE: 71 BPM | OXYGEN SATURATION: 100 %

## 2024-12-16 LAB
ALBUMIN SERPL ELPH-MCNC: 3.2 G/DL — LOW (ref 3.3–5)
ALP SERPL-CCNC: 434 U/L — HIGH (ref 40–120)
ALT FLD-CCNC: 182 U/L — HIGH (ref 4–33)
ANION GAP SERPL CALC-SCNC: 11 MMOL/L — SIGNIFICANT CHANGE UP (ref 7–14)
AST SERPL-CCNC: 131 U/L — HIGH (ref 4–32)
BILIRUB SERPL-MCNC: 1 MG/DL — SIGNIFICANT CHANGE UP (ref 0.2–1.2)
BUN SERPL-MCNC: 45 MG/DL — HIGH (ref 7–23)
CALCIUM SERPL-MCNC: 9.2 MG/DL — SIGNIFICANT CHANGE UP (ref 8.4–10.5)
CHLORIDE SERPL-SCNC: 100 MMOL/L — SIGNIFICANT CHANGE UP (ref 98–107)
CO2 SERPL-SCNC: 24 MMOL/L — SIGNIFICANT CHANGE UP (ref 22–31)
CREAT SERPL-MCNC: 1.6 MG/DL — HIGH (ref 0.5–1.3)
EGFR: 33 ML/MIN/1.73M2 — LOW
GLUCOSE BLDC GLUCOMTR-MCNC: 266 MG/DL — HIGH (ref 70–99)
GLUCOSE BLDC GLUCOMTR-MCNC: 301 MG/DL — HIGH (ref 70–99)
GLUCOSE SERPL-MCNC: 235 MG/DL — HIGH (ref 70–99)
HCT VFR BLD CALC: 27.6 % — LOW (ref 34.5–45)
HGB BLD-MCNC: 9.1 G/DL — LOW (ref 11.5–15.5)
IGG4 SER-MCNC: 50.2 MG/DL — SIGNIFICANT CHANGE UP (ref 1–123)
MAGNESIUM SERPL-MCNC: 2 MG/DL — SIGNIFICANT CHANGE UP (ref 1.6–2.6)
MCHC RBC-ENTMCNC: 29.7 PG — SIGNIFICANT CHANGE UP (ref 27–34)
MCHC RBC-ENTMCNC: 33 G/DL — SIGNIFICANT CHANGE UP (ref 32–36)
MCV RBC AUTO: 90.2 FL — SIGNIFICANT CHANGE UP (ref 80–100)
NRBC # BLD: 0 /100 WBCS — SIGNIFICANT CHANGE UP (ref 0–0)
NRBC # FLD: 0.02 K/UL — HIGH (ref 0–0)
PHOSPHATE SERPL-MCNC: 3.2 MG/DL — SIGNIFICANT CHANGE UP (ref 2.5–4.5)
PLATELET # BLD AUTO: 345 K/UL — SIGNIFICANT CHANGE UP (ref 150–400)
POTASSIUM SERPL-MCNC: 4.7 MMOL/L — SIGNIFICANT CHANGE UP (ref 3.5–5.3)
POTASSIUM SERPL-SCNC: 4.7 MMOL/L — SIGNIFICANT CHANGE UP (ref 3.5–5.3)
PROT SERPL-MCNC: 7 G/DL — SIGNIFICANT CHANGE UP (ref 6–8.3)
RBC # BLD: 3.06 M/UL — LOW (ref 3.8–5.2)
RBC # FLD: 17.2 % — HIGH (ref 10.3–14.5)
SODIUM SERPL-SCNC: 135 MMOL/L — SIGNIFICANT CHANGE UP (ref 135–145)
WBC # BLD: 15.94 K/UL — HIGH (ref 3.8–10.5)
WBC # FLD AUTO: 15.94 K/UL — HIGH (ref 3.8–10.5)

## 2024-12-16 PROCEDURE — 99232 SBSQ HOSP IP/OBS MODERATE 35: CPT | Mod: GC

## 2024-12-16 PROCEDURE — 99239 HOSP IP/OBS DSCHRG MGMT >30: CPT

## 2024-12-16 RX ORDER — PANTOPRAZOLE SODIUM 40 MG/1
40 TABLET, DELAYED RELEASE ORAL
Qty: 60 | Refills: 0
Start: 2024-12-16 | End: 2025-01-14

## 2024-12-16 RX ORDER — APIXABAN 2.5 MG/1
1 TABLET, FILM COATED ORAL
Qty: 60 | Refills: 0
Start: 2024-12-16 | End: 2025-01-14

## 2024-12-16 RX ORDER — HYDRALAZINE HYDROCHLORIDE 10 MG/1
1 TABLET ORAL
Qty: 90 | Refills: 0
Start: 2024-12-16 | End: 2025-01-14

## 2024-12-16 RX ORDER — INSULIN GLARGINE 100 [IU]/ML
28 INJECTION, SOLUTION SUBCUTANEOUS AT BEDTIME
Refills: 0 | Status: DISCONTINUED | OUTPATIENT
Start: 2024-12-16 | End: 2024-12-16

## 2024-12-16 RX ORDER — FLUTICASONE PROPIONATE 220 MCG
1 AEROSOL WITH ADAPTER (GRAM) INHALATION
Refills: 0 | DISCHARGE

## 2024-12-16 RX ORDER — LIDOCAINE 40 MG/G
1 CREAM TOPICAL
Qty: 30 | Refills: 0
Start: 2024-12-16 | End: 2025-01-14

## 2024-12-16 RX ORDER — INSULIN ASPART 100 [IU]/ML
20 INJECTION, SOLUTION INTRAVENOUS; SUBCUTANEOUS
Refills: 0 | DISCHARGE

## 2024-12-16 RX ORDER — GABAPENTIN 300 MG/1
1 CAPSULE ORAL
Qty: 30 | Refills: 0
Start: 2024-12-16 | End: 2025-01-14

## 2024-12-16 RX ADMIN — Medication 81 MILLIGRAM(S): at 13:13

## 2024-12-16 RX ADMIN — Medication 6 UNIT(S): at 09:10

## 2024-12-16 RX ADMIN — APIXABAN 5 MILLIGRAM(S): 2.5 TABLET, FILM COATED ORAL at 17:24

## 2024-12-16 RX ADMIN — BRIMONIDINE TARTRATE 1 DROP(S): 1.5 SOLUTION/ DROPS OPHTHALMIC at 05:49

## 2024-12-16 RX ADMIN — HYDRALAZINE HYDROCHLORIDE 10 MILLIGRAM(S): 10 TABLET ORAL at 05:48

## 2024-12-16 RX ADMIN — Medication 8 UNIT(S): at 13:29

## 2024-12-16 RX ADMIN — SODIUM BICARBONATE 325 MILLIGRAM(S): 84 INJECTION, SOLUTION INTRAVENOUS at 05:48

## 2024-12-16 RX ADMIN — Medication 6: at 09:10

## 2024-12-16 RX ADMIN — Medication 8: at 13:29

## 2024-12-16 RX ADMIN — PANTOPRAZOLE SODIUM 40 MILLIGRAM(S): 40 TABLET, DELAYED RELEASE ORAL at 13:14

## 2024-12-16 RX ADMIN — BRIMONIDINE TARTRATE 1 DROP(S): 1.5 SOLUTION/ DROPS OPHTHALMIC at 17:24

## 2024-12-16 RX ADMIN — HYDRALAZINE HYDROCHLORIDE 10 MILLIGRAM(S): 10 TABLET ORAL at 13:13

## 2024-12-16 RX ADMIN — APIXABAN 5 MILLIGRAM(S): 2.5 TABLET, FILM COATED ORAL at 05:48

## 2024-12-16 RX ADMIN — CHLORHEXIDINE GLUCONATE 1 APPLICATION(S): 1.2 RINSE ORAL at 13:31

## 2024-12-16 NOTE — PROGRESS NOTE ADULT - PROBLEM SELECTOR PLAN 7
CAD s/p CABG (2004)  Most recent [facility] with  of the LAD and RCA, and tight OM lesion that was stented with a BMS  - Recent outpatient nuclear stress test with scar and hypokinesis in the RCA territory, consistent with TTE here with EF 40-45% w/ inferior/inferolateral WMA  - TTE similar to echo 1 week ago - with exception echogenicity in LA on one view only, EF 46%  - Repeat TTE: A mobile echodensity is visualized in the left atrium intermittently (seen only in the parasternal long axis view). This may represent a hypermobile interatrial septum  - C/w ASA/plavix  - Apprec Cards recs  - Statin held for abn LFT, to be resumed once LFT's are WNL
- History of CABG  - Continue aspirin and plavix daily  - Continue coreg, holding imdur and losartan for now  - Atorvastatin on hold for now  - DASH/TLC/CC diet when advanced from NPO
CAD s/p CABG (2004)  Most recent [facility] with  of the LAD and RCA, and tight OM lesion that was stented with a BMS  - Recent outpatient nuclear stress test with scar and hypokinesis in the RCA territory, consistent with TTE here with EF 40-45% w/ inferior/inferolateral WMA  - TTE similar to echo 1 week ago - with exception echogenicity in LA on one view only, EF 46%  - Repeat TTE: A mobile echodensity is visualized in the left atrium intermittently (seen only in the parasternal long axis view). This may represent a hypermobile interatrial septum  - C/w ASA/plavix  - Apprec Cards recs  - Statin held for abn LFT, to be resumed once LFT's are WNL
CAD s/p CABG (2004)  Most recent [facility] with  of the LAD and RCA, and tight OM lesion that was stented with a BMS  - Recent outpatient nuclear stress test with scar and hypokinesis in the RCA territory, consistent with TTE here with EF 40-45% w/ inferior/inferolateral WMA  - TTE similar to echo 1 week ago - with exception echogenicity in LA on one view only, EF 46%  - Repeat TTE: A mobile echodensity is visualized in the left atrium intermittently (seen only in the parasternal long axis view). This may represent a hypermobile interatrial septum  - C/w ASA/plavix  - Apprec Cards recs  - Statin held for abn LFT, to be resumed once LFT's are WNL
CAD s/p CABG (2004)  Most recent [facility] with  of the LAD and RCA, and tight OM lesion that was stented with a BMS  - Recent outpatient nuclear stress test with scar and hypokinesis in the RCA territory, consistent with TTE here with EF 40-45% w/ inferior/inferolateral WMA  - TTE similar to echo 1 week ago - with exception echogenicity in LA on one view only, EF 46%  - Repeat TTE: A mobile echodensity is visualized in the left atrium intermittently (seen only in the parasternal long axis view). This may represent a hypermobile interatrial septum  - C/w ASA/plavix  - Apprec Cards recs  - Statin held for abn LFT, to be resumed once LFT's are WNL

## 2024-12-16 NOTE — PROGRESS NOTE ADULT - PROBLEM SELECTOR PROBLEM 1
DAMARI (acute kidney injury)
DAMARI (acute kidney injury)
Asystole
Asystole
DAMARI (acute kidney injury)
DAMARI (acute kidney injury)
Asystole
Asystole
Sepsis

## 2024-12-16 NOTE — DISCHARGE NOTE NURSING/CASE MANAGEMENT/SOCIAL WORK - PATIENT PORTAL LINK FT
You can access the FollowMyHealth Patient Portal offered by [facility] by registering at the following website: [email or web address]. By joining [email or web address] FollowMyHealth portal, you will also be able to view your health information using other applications (apps) compatible with our system.

## 2024-12-16 NOTE — PROGRESS NOTE ADULT - ASSESSMENT
EKg SR TWI V2-V6, inferior leads    LHC 2017   of the LAD and RCA, patent LIMA to LAD graft, OM stented with  BMS    TTE 12/2/2024   1. Technically very difficult study performed with the patient in the supine position.   2. Technically difficult image quality.   3. Left ventricular systolic function is mildly to moderately decreased with anejection fraction visually estimated at 40 to 45%. There are regional wall motion abnormalities present. Hypokinesis of the inferolateral wall, basal to mid inferior wall, and basal inferoseptum. There is mild (grade 1) left ventricular diastolic dysfunction.   4. The right ventricle is not well visualized.   5. Structurally normal mitral valve with normal leaflet excursion. There is calcification of the mitral valve annulus. There is mild mitral regurgitation.   6. No prior echocardiogram is available for comparison.    TTE 12/9/2024   1. Left ventricular cavity is normal in size. Left ventricular wall thickness is normal. Left ventricular systolic function is mildly to moderately decreased with an ejection fraction of 46 % by [place]'s method of disks. Regional wall motion abnormalities present.   2. There is hypokinesis of the basal inferolateral, mid inferolateral, basal inferior and basal inferoseptal walls.   3. Echogenic mass is seen possibly in the left atrium, finding is not seen in every view. Unable to determine if it represents artifact or pathologic finding. Consider repeat TTE vs FOREST. (Prior images of study on 12/2/24 were technically difficult, comparison is limited).    TTE limited 12/11/2024   1. Limited repeat study to evaluatepossible left atrial mass. A mobile echodensity is visualized in the left atrium intermittently (seen only in the parasternal long axis view). This may represent a hypermobile interatrial septum.   2. Compared to the transthoracic echocardiogram performed on 12/9/2024, there have been no significant interval changes.      A/P    79 year old woman with history of CAD s/p CABG (2004), HTN, HLD, IDDM2, severe PAD with right and left AKA presenting with complaint of one day of abdominal pain. Meeting sepsis criteria and found to have acute pancreatitis.    1. Sinus pause, asystole   - s/p RRT, EP consulted for possible PPM, as per them possibly vagally mediated   - s/p dopamin gtt  - SR 60s on tele c/w tele    2. Elevated troponin  - LHC and NST deferred during last admission (last week)  - Trop 192>418, c/p epigastric pain  - TTE 12/2/24 mild-mod LV dysfunction(EF 40-45%)  - s/p RRT, Trop 1329->1477->994 s/p heparin gtt  - EKg SR TWI V2-V6, inferior leads, will discuss ACS workup   - repeat echo shows same LV function as 1 week ago     2. Acute pancreatitis  -on Abx f/u GI recs  - MRCP 12/10 Acute interstitial pancreatitis. No evidence of parenchymal necrosis or walled off fluid collection. No gallstones or choledocholithiasis.    3. CAD s/p CABG (2004)  -asa, plavix  -statin held for abn LFT    4. HTN  - hydralazine     5. New onset Afib  with Aberrancy  - back in NSR   - 2 sec pause on tele, pt asymptomatic  - h/o asystole no BB   - cont eliquis 5mg q12

## 2024-12-16 NOTE — PROGRESS NOTE ADULT - PROBLEM SELECTOR PLAN 4
Pt with worsening renal function. Likely iso of hypoperfusion during asystole  - Seen by Renal, apprec recs  - S/p IV diuresis with bumex w/ good response  - Losartan remains on hold  - Serum creatinine slowly downtrending, peaked at 5.73, now down to 2.94  - Per Renal recs to hold diuretics given hypokalemia/good UO  - Pratt in place, will pursue TOV over weekend  - Metabolic acidosis is much improved, decreasing sodium bicarb to 325mg TID, can likely be d/c if Cr cont to improve  - Monitor I/O's
Pt with worsening renal function. Likely iso of hypoperfusion during asystole  - Seen by Renal, apprec recs  - S/p IV diuresis with bumex w/ good response  - Losartan remains on hold  - Serum creatinine slowly downtrending, peaked at 5.73, continues to improve Cr <3  - Per Renal recs to hold diuretics given hypokalemia/good UO  - Pratt in place, will pursue TOV over weekend  - Metabolic acidosis is much improved, decreasing sodium bicarb to 325mg TID, can likely be d/c if Cr cont to improve  - Monitor I/O's
Pt with worsening renal function. Likely iso of hypoperfusion during asystole  - Seen by Renal, apprec recs  - S/p IV diuresis with bumex w/ good response  - Losartan remains on hold  - Serum creatinine slowly downtrending, peaked at 5.73, continues to improve Cr <2  - Failed TOV, will go home with ANGÉLICA Pratt f/up as outpatient for TOV  - Metabolic acidosis has resolved, off sodium bicarb at this time  - Monitor I/O's
Pt with worsening renal function. Likely iso of hypoperfusion during asystole  - Seen by Renal, apprec recs  - S/p IV diuresis with bumex w/ good response  - Losartan remains on hold  - Serum creatinine slowly downtrending, peaked at 5.73, continues to improve Cr <3  - Per Renal recs to hold diuretics given hypokalemia/good UO  - Pratt in place, will pursue TOV over weekend  - Metabolic acidosis is much improved, decreasing sodium bicarb to 325mg TID, can likely be d/c if Cr cont to improve  - Monitor I/O's
- Abdominal pain, lipase over 3K, signs of acute interstitial pancreatitis on imaging  - Unclear cause of episode; no alcohol use, no recent instrumentation of bile ducts, no evidence of choledocholithiasis on abdominal ultrasound. No new medications. Triglycerides not elevated. Could be idiopathic.   - GI consult   - ADAT  - MRCP ordered   - Gentle IVF given mildly reduced LVEF with regional WMAs seen on recent echo

## 2024-12-16 NOTE — PROGRESS NOTE ADULT - PROBLEM SELECTOR PLAN 6
- Reported to be on basal and bolus pre-meal at home (though reported to be twice a day)  - Hold pre-meal for now given NPO, re-add as indicated when diet able to be advanced  - Dose reduced home 30U lantus to 24U at bedtime given NPO status, adjust as indicated based on sugars  - Low insulin sliding scale q6hrs while NPO  - A1C of 7.5 on 12/1/24  - DASH/TLC/CC diet when advanced from NPO  - Trace ketones in urine with low bicarb and elevated fingersticks. Will check BHB
On lantus 30U qhs and lispro 20/20/36 at home  - A1c of 7.5, under controlled for pt's age  - Increasing lantus to 26U qhs, adding lispro 6U TID  - Diet changed to carb consistent diet, c/w MISS  - C/w gabapentin 300mg qhs for diabetic neuropathy  - Monitor FS's closely and adjust insulin as needed  - Holding statin at this time due to transaminitis, to be resumed once LFT's are WNL
On lantus 30U qhs and lispro 20/20/36 at home  - A1c of 7.5, under controlled for pt's age  - c/w lantus 26U qhs, lispro 6U TID  - Diet changed to carb consistent diet, c/w MISS  - C/w gabapentin 300mg qhs for diabetic neuropathy  - Monitor FS's closely and adjust insulin as needed  - Holding statin at this time due to transaminitis, to be resumed once LFT's are WNL
On lantus 30U qhs and lispro 20/20/36 at home  - A1c of 7.5, under controlled for pt's age  - Increasing lantus to 26U qhs, adding lispro 6U TID  - Diet changed to carb consistent diet, c/w MISS  - C/w gabapentin 300mg qhs for diabetic neuropathy  - Monitor FS's closely and adjust insulin as needed  - Holding statin at this time due to transaminitis, to be resumed once LFT's are WNL
On lantus 30U qhs and lispro 20/20/36 at home  - A1c of 7.5, under controlled for pt's age  - Increasing lantus to 28U qhs and lispro to 8U TID  - C/w carb consistent diet, c/w MISS  - C/w gabapentin 300mg qhs for diabetic neuropathy  - Monitor FS's closely and adjust insulin as needed  - Holding statin at this time due to transaminitis, to be resumed once LFT's are WNL

## 2024-12-16 NOTE — DISCHARGE NOTE NURSING/CASE MANAGEMENT/SOCIAL WORK - FINANCIAL ASSISTANCE
[facility] provides services at a reduced cost to those who are determined to be eligible through [facility]’s financial assistance program. Information regarding [facility]’s financial assistance program can be found by going to [email or web address] or by calling [phone].

## 2024-12-16 NOTE — PROGRESS NOTE ADULT - PROBLEM SELECTOR PLAN 2
Pt. with metabolic acidosis in setting of DAMARI. DAMARI resolving and SCO2 stable off oral sodium bicarb. Monitor SCO2.

## 2024-12-16 NOTE — PROGRESS NOTE ADULT - PROBLEM SELECTOR PLAN 3
CT Abd: right hemicolon is mildly thick-walled, which could be due to underdistention or colitis  - S/p IV zosyn for 5 days thru 12/13  - Per GI recs: Needs colonoscopy outpatient for colitis seen on CT in 6-8 weeks  - [facility]ology Clinic number: [phone] ([facility] [address] [place] [address]) f/up with Dr. Mar
- noted to have acute cr elevation from 1 on admisison to 1.8   - bladder scan elevated, howe now placed   - likely post ob, but check urine ltyes   - renal US pending   - trend   - hold nephrotoxic agents   - of note did get contrast on admit, but early for ZENOBIA
CT Abd: right hemicolon is mildly thick-walled, which could be due to underdistention or colitis  - Had been on IV zosyn since early on admission, Day 5 12/13  - Will d/c antibiotics at this time  - Per GI recs: Needs colonoscopy outpatient for colitis seen on CT in 6-8 weeks  - Gastroenterology Clinic number: [phone] ([facility] [address] [place] [address]) f/up with Dr. Mar
CT Abd: right hemicolon is mildly thick-walled, which could be due to underdistention or colitis  - Had been on IV zosyn since early on admission, Day 5 12/13  - off abx now  - Per GI recs: Needs colonoscopy outpatient for colitis seen on CT in 6-8 weeks  - Gastroenterology Clinic number: [phone] ([facility] [address] [place] [address]) f/up with Dr. Mar
CT Abd: right hemicolon is mildly thick-walled, which could be due to underdistention or colitis  - Had been on IV zosyn since early on admission, Day 5 12/13  - Will d/c antibiotics at this time  - Per GI recs: Needs colonoscopy outpatient for colitis seen on CT in 6-8 weeks  - Gastroenterology Clinic number: [phone] ([facility] [address] [place] [address]) f/up with Dr. Mar

## 2024-12-16 NOTE — PROGRESS NOTE ADULT - SUBJECTIVE AND OBJECTIVE BOX
Ady Villatoro MD   Interventional Cardiology / Endovascular Specialist   [place] Office : [phone] [place] [address] [place] [address]  Tel:    [place] Office : [address] [facility] [place] [address]  Tel: [phone]   Cell : [phone]      Pt is lying in bed comfortable not in distress, no chest pains no SOB no palpitations  	  MEDICATIONS:  apixaban 5 milliGRAM(s) Oral two times a day  aspirin enteric coated 81 milliGRAM(s) Oral daily  hydrALAZINE 10 milliGRAM(s) Oral every 8 hours        acetaminophen     Tablet .. 650 milliGRAM(s) Oral every 6 hours PRN  gabapentin 300 milliGRAM(s) Oral at bedtime    pantoprazole   Suspension 40 milliGRAM(s) Oral daily  polyethylene glycol 3350 17 Gram(s) Oral daily PRN  senna 2 Tablet(s) Oral at bedtime    dextrose 50% Injectable 25 Gram(s) IV Push once  dextrose 50% Injectable 12.5 Gram(s) IV Push once  dextrose 50% Injectable 25 Gram(s) IV Push once  dextrose Oral Gel 15 Gram(s) Oral once PRN  glucagon  Injectable 1 milliGRAM(s) IntraMuscular once  insulin glargine Injectable (LANTUS) 28 Unit(s) SubCutaneous at bedtime  insulin lispro (ADMELOG) corrective regimen sliding scale   SubCutaneous three times a day before meals  insulin lispro (ADMELOG) corrective regimen sliding scale   SubCutaneous at bedtime  insulin lispro Injectable (ADMELOG) 8 Unit(s) SubCutaneous three times a day before meals    brimonidine 0.2% Ophthalmic Solution 1 Drop(s) Both EYES two times a day  chlorhexidine 2% Cloths 1 Application(s) Topical daily  dextrose 5%. 1000 milliLiter(s) IV Continuous <Continuous>  dextrose 5%. 1000 milliLiter(s) IV Continuous <Continuous>  lidocaine   4% Patch 1 Patch Transdermal daily      PAST MEDICAL/SURGICAL HISTORY  PAST MEDICAL & SURGICAL HISTORY:  S/P CABG x 3  in 2004, [facility]      Stented coronary artery  2010 [facility]      PAD (peripheral artery disease)  s/p R BKA      Carotid artery stenosis      DM (diabetes mellitus)  type II      Hypercholesterolemia      Obesity      Hypertension      CAD (coronary artery disease)      Acute kidney injury superimposed on chronic kidney disease      Chronic diastolic heart failure      Vitamin D deficiency      Normocytic anemia      Pulmonary HTN      Diabetic retinopathy      Hx of CABG  3v 2004      S/P hysterectomy  2004      S/P angioplasty with stent  2009, 3/2014      S/P below knee amputation, right  6/2010      S/P eye surgery  for glaucoma      S/P CABG x 3      S/P BKA (below knee amputation) unilateral, right      History of hysterectomy      Elective surgery  traumatic amputation of the toe      S/P BKA (below knee amputation), left          SOCIAL HISTORY: Substance Use (street drugs): ( x ) never used  (  ) other:    FAMILY HISTORY:  Family history of diabetes mellitus (Sibling)          PHYSICAL EXAM:  T(C): 37 (12-16-24 @ 11:20), Max: 37.1 (12-16-24 @ 01:41)  HR: 71 (12-16-24 @ 11:20) (64 - 73)  BP: 143/60 (12-16-24 @ 11:20) (114/52 - 158/49)  RR: 18 (12-16-24 @ 11:20) (18 - 18)  SpO2: 100% (12-16-24 @ 11:20) (96% - 100%)  Wt(kg): --  I&O's Summary    15 Dec 2024 07:01  -  16 Dec 2024 07:00  --------------------------------------------------------  IN: 0 mL / OUT: 200 mL / NET: -200 mL    16 Dec 2024 07:01  -  16 Dec 2024 13:52  --------------------------------------------------------  IN: 0 mL / OUT: 1200 mL / NET: -1200 mL          GENERAL: NAD  EYES:   PERRLA   ENMT:   Moist mucous membranes, Good dentition, No lesions  Cardiovascular: Normal S1 S2, No JVD, No murmurs, No edema  Respiratory: Lungs clear to auscultation	  Gastrointestinal:  Soft, Non-tender, + BS	  Extremities: left AKA right BKA                                      9.1    15.94 )-----------( 345      ( 16 Dec 2024 06:49 )             27.6     12-16    135  |  100  |  45[H]  ----------------------------<  235[H]  4.7   |  24  |  1.60[H]    Ca    9.2      16 Dec 2024 06:49  Phos  3.2     12-16  Mg     2.00     12-16    TPro  7.0  /  Alb  3.2[L]  /  TBili  1.0  /  DBili  x   /  AST  131[H]  /  ALT  182[H]  /  AlkPhos  434[H]  12-16    proBNP:   Lipid Profile:   HgA1c:   TSH:     Consultant(s) Notes Reviewed:  [x ] YES  [ ] NO    Care Discussed with Consultants/Other Providers [ x] YES  [ ] NO    Imaging Personally Reviewed independently:  [x] YES  [ ] NO    All labs, radiologic studies, vitals, orders and medications list reviewed. Patient is seen and examined at bedside. Case discussed with medical team.

## 2024-12-16 NOTE — PROGRESS NOTE ADULT - PROBLEM SELECTOR PLAN 2
Etiology unknown, no stones on US, CBD WNL. No alcohol use, triglycerides WNL  - Seen by GI, apprec recs, Etiology may be 2/2 to passed stone vs. resolving ischemic injury  - S/p MRCP 12/10: Acute interstitial pancreatitis. No evidence of parenchymal necrosis or walled off fluid collection. No gallstones or choledocholithiasis  - No need for ERCP per GI, tolerating diet very well at this time  - Abdominal pain has resolved  - Tylenol prn

## 2024-12-16 NOTE — PROGRESS NOTE ADULT - TIME BILLING
Time-based billing (NON-critical care).     52 minutes spent on total encounter; more than 50% of the visit was spent counseling and / or coordinating care by the attending physician.  The necessity of the time spent during the encounter on this date of service was due to:     review of laboratory data, radiology results, consultants' recommendations, documentation in [place], discussion with patient/ACP and interdisciplinary staff (such as , social workers, etc). Interventions were performed as documented above.
- Ordering, reviewing, and interpreting labs, testing, and imaging  - Independently obtaining a review of systems and performing a physical exam  - Reviewing consultant documentation/recommendations in addition to discussing plan of care with consultants  - Counselling and educating patient and family regarding interpretation of aforementioned items and plan of care  - Documentation of encounter
- Ordering, reviewing, and interpreting labs, testing, and imaging  - Independently obtaining a review of systems and performing a physical exam  - Reviewing consultant documentation/recommendations in addition to discussing plan of care with consultants  - Counselling and educating patient regarding interpretation of aforementioned items and plan of care  - Documentation of encounter

## 2024-12-16 NOTE — PROGRESS NOTE ADULT - PROBLEM SELECTOR PROBLEM 4
ATN (acute tubular necrosis)
Acute pancreatitis

## 2024-12-16 NOTE — PROGRESS NOTE ADULT - PROBLEM SELECTOR PLAN 1
Pt. with DAMARI in the setting of hypotension, complete heart block with episode of asystole, recent IV contrast use, and infection/UTI. Pt. with likely ATN. Scr on admission (12/7/24) was 1.17, increased to 2.92 on 12/8/24. Scr peaked at 5.73 (12/11/24). Renal US showed small kidneys but no hydronephrosis. DAMARI resolving now. Scr decreased to 1.6 today (12/16/24). Pt. non-oliguric with urine output ~1.2L in last 24h. Bumex has been on hold since 12/13/24. Diuretic therapy can be restarted per cardiology recs for GDMT. Monitor BP and UOP. Avoid nephrotoxins and dose meds per eGFR. Nephrology will sign off. Please reconsult as needed.

## 2024-12-16 NOTE — PROGRESS NOTE ADULT - SUBJECTIVE AND OBJECTIVE BOX
Patient is a 79y old  Female who presents with a chief complaint of Abdominal pain (16 Dec 2024 12:15)      INTERVAL HPI/OVERNIGHT EVENTS:  Seen by me this morning, doing well, no complaints, appetite is good. No abdominal pain.    Review of Systems: 12 point review of systems otherwise negative    MEDICATIONS  (STANDING):  apixaban 5 milliGRAM(s) Oral two times a day  aspirin enteric coated 81 milliGRAM(s) Oral daily  brimonidine 0.2% Ophthalmic Solution 1 Drop(s) Both EYES two times a day  chlorhexidine 2% Cloths 1 Application(s) Topical daily  dextrose 5%. 1000 milliLiter(s) (50 mL/Hr) IV Continuous <Continuous>  dextrose 5%. 1000 milliLiter(s) (100 mL/Hr) IV Continuous <Continuous>  dextrose 50% Injectable 25 Gram(s) IV Push once  dextrose 50% Injectable 12.5 Gram(s) IV Push once  dextrose 50% Injectable 25 Gram(s) IV Push once  gabapentin 300 milliGRAM(s) Oral at bedtime  glucagon  Injectable 1 milliGRAM(s) IntraMuscular once  hydrALAZINE 10 milliGRAM(s) Oral every 8 hours  insulin glargine Injectable (LANTUS) 28 Unit(s) SubCutaneous at bedtime  insulin lispro (ADMELOG) corrective regimen sliding scale   SubCutaneous three times a day before meals  insulin lispro (ADMELOG) corrective regimen sliding scale   SubCutaneous at bedtime  insulin lispro Injectable (ADMELOG) 8 Unit(s) SubCutaneous three times a day before meals  lidocaine   4% Patch 1 Patch Transdermal daily  naloxone Injectable 0.4 milliGRAM(s) IV Push once  pantoprazole   Suspension 40 milliGRAM(s) Oral daily  senna 2 Tablet(s) Oral at bedtime    MEDICATIONS  (PRN):  acetaminophen     Tablet .. 650 milliGRAM(s) Oral every 6 hours PRN Mild Pain (1 - 3)  dextrose Oral Gel 15 Gram(s) Oral once PRN Blood Glucose LESS THAN 70 milliGRAM(s)/deciliter  polyethylene glycol 3350 17 Gram(s) Oral daily PRN Constipation      Allergies    sulfa drugs (Hives)  sulfa drugs (Rash)  Sulfac 10% (Hives)    Intolerances          Vital Signs Last 24 Hrs  T(C): 37 (16 Dec 2024 11:20), Max: 37.1 (16 Dec 2024 01:41)  T(F): 98.6 (16 Dec 2024 11:20), Max: 98.7 (16 Dec 2024 01:41)  HR: 71 (16 Dec 2024 11:20) (64 - 73)  BP: 143/60 (16 Dec 2024 11:20) (114/52 - 158/49)  BP(mean): --  RR: 18 (16 Dec 2024 11:20) (18 - 18)  SpO2: 100% (16 Dec 2024 11:20) (96% - 100%)    Parameters below as of 16 Dec 2024 11:20  Patient On (Oxygen Delivery Method): room air      CAPILLARY BLOOD GLUCOSE      POCT Blood Glucose.: 301 mg/dL (16 Dec 2024 12:57)  POCT Blood Glucose.: 266 mg/dL (16 Dec 2024 08:45)  POCT Blood Glucose.: 172 mg/dL (15 Dec 2024 23:07)  POCT Blood Glucose.: 185 mg/dL (15 Dec 2024 18:08)      12-15 @ 07:01  -  12-16 @ 07:00  --------------------------------------------------------  IN: 0 mL / OUT: 200 mL / NET: -200 mL    12-16 @ 07:01  -  12-16 @ 13:43  --------------------------------------------------------  IN: 0 mL / OUT: 1200 mL / NET: -1200 mL        Physical Exam:    Daily     Daily   General:  Well appearing, NAD, not cachetic  HEENT:  Nonicteric, PERRLA  CV:  RRR, no murmur, no JVD  Lungs:  CTA B/L, no wheezes, rales, rhonchi  Abdomen:  Soft, non-tender, no distended, positive BS, no hepatosplenomegaly  Extremities:  2+ pulses, no c/c, R BKA, LLE AKA  Skin:  Warm and dry, no rashes  : +GAITAN in place w/ clear urine  Neuro:  AAOx3, non-focal, CN II-XII grossly intact  No Restraints    LABS:                        9.1    15.94 )-----------( 345      ( 16 Dec 2024 06:49 )             27.6     12-16    135  |  100  |  45[H]  ----------------------------<  235[H]  4.7   |  24  |  1.60[H]    Ca    9.2      16 Dec 2024 06:49  Phos  3.2     12-16  Mg     2.00     12-16    TPro  7.0  /  Alb  3.2[L]  /  TBili  1.0  /  DBili  x   /  AST  131[H]  /  ALT  182[H]  /  AlkPhos  434[H]  12-16      Urinalysis Basic - ( 16 Dec 2024 06:49 )    Color: x / Appearance: x / SG: x / pH: x  Gluc: 235 mg/dL / Ketone: x  / Bili: x / Urobili: x   Blood: x / Protein: x / Nitrite: x   Leuk Esterase: x / RBC: x / WBC x   Sq Epi: x / Non Sq Epi: x / Bacteria: x          RADIOLOGY & ADDITIONAL TESTS:  Reviewed by me

## 2024-12-16 NOTE — PROGRESS NOTE ADULT - PROBLEM SELECTOR PLAN 5
- Elevated AST/ALT to 242/140, though evidence of moderate hemolysis  - Also with elevated Tbili  - Hold atorvastatin for now  - GI consult  - MRCP ordered
Likely in the setting of recent acute pancreatitis, possible that pt passed a stone  - S/p MRCP 12/10: Acute interstitial pancreatitis. No evidence of parenchymal necrosis or walled off fluid collection. No gallstones or choledocholithiasis  - Transaminitis are improving  - Holding statin at this time, to be resumed once LFTs are down to baseline  - Monitor LFTs
Likely in the setting of recent acute pancreatitis, possible that pt passed a stone  - S/p MRCP 12/10: Acute interstitial pancreatitis. No evidence of parenchymal necrosis or walled off fluid collection. No gallstones or choledocholithiasis  - Transaminitis are improving  - Holding statin at this time, to be resumed once LFT's are down to baseline  - Monitor LFT's

## 2024-12-16 NOTE — PROGRESS NOTE ADULT - ATTENDING COMMENTS
Agree with above      TPro  5.8  /  Alb  2.7  /  TBili  2.6  /  DBili  x   /  AST  576  /  ALT  564  /  AlkPhos  155  12-10 @ 01:30  TPro  6.1  /  Alb  2.8  /  TBili  2.9  /  DBili  x   /  AST  873  /  ALT  622  /  AlkPhos  155  12-09 @ 16:10  TPro  6.4  /  Alb  3.2  /  TBili  3.6  /  DBili  3.6  /  AST  782  /  ALT  582  /  AlkPhos  159  12-09 @ 05:47  TPro  5.9  /  Alb  2.9  /  TBili  3.3  /  DBili  x   /  AST  374  /  ALT  293  /  AlkPhos  143  12-09 @ 01:49  TPro  6.1  /  Alb  2.9  /  TBili  3.2  /  DBili  x   /  AST  112  /  ALT  120  /  AlkPhos  132  12-08 @ 18:50  TPro  6.1  /  Alb  3.1  /  TBili  3.7  /  DBili  3.9  /  AST  155  /  ALT  149  /  AlkPhos  135  12-08 @ 04:24    Bili and LFTs downtrending without obstructing lesions on MRCP  Diet as tolerated  No plans for endoscopic intervention at this time  Trend liver enzymes
Agree with above.    Patient was seen by our team on 12-09-24. I agree with the findings and plan of care as documented in the fellow/resident/medical student/physician assistant/nurse practitioner.    Today we are treating the patient for:   Pancreatitis  Colitis  Abnormal LFTs    ***General note with plan / recommendation:   79 year old woman with history of CAD s/p CABG (2004), HTN, HLD, IDDM2, severe PAD with right and left AKA presenting with complaint of one day of abdominal pain found to have pancreatitis and colitis with changes of LFT.  Patient is being seen by cardiology for sinus pauses status post RRT and now being evaluated by EP for possible PPM. At this time patient is on dopamine gtt. She has elevated troponin and ACS is being ruled out.  At this time no need to hold ASA/Plavix.    When stable obtain MRCP/MRI with contrast (Given the AST/ range and known cardiac pauses / RRT, patient's abnormal LFT maybe secondary to ischemia and ischemic hepatitis and not stones) Will wait for MRI to differentiate.  Trend LFTs  Will also need colonoscopy outpatient 6-8 weeks after discharge to rule out mass due to colitis seen on CT.    Billing:  I have reviewed records from labs, imaging    Other:  - Thank you for involving us in the care of this patient. If you have any questions regarding the plan of care please do not hesitate to call us back.  - For any questions on Monday - Friday 8 am to 5pm please message via [employer] or email [email or web address]   - For any Urgent off hours consults please contact on- call GI team. See Amion schedule ([facility]), [employer] paging system ([facility]), or call hospital  ([facility])  - Rest of management as per primary team
Pt. with DAMARI and metabolic acidosis. Scr decreased to 4.81 today. Pt. with increased UOP, on IV Bumex infusion. Assessment and plan for DAMARI and metabolic acidosis as outlined above. Monitor labs and urine output. Avoid any potential nephrotoxins. Dose medications as per eGFR.
DAMARI with significant improvement   Renal service will sign off at this time. please re-consult if needed
Pt. with DAMARI, resolving now. Scr decreased to 3.3 today. Pt. with increased UOP in last 24 hrs. Assessment and plan for DAMARI and metabolic acidosis as outlined above. Monitor labs and urine output. Avoid any potential nephrotoxins. Dose medications as per eGFR.
Pt. with DAMARI and metabolic acidosis. Scr increased to 5.73 today. Pt. non-oliguric, on IV diuretics. Assessment and plan for DAMARI and metabolic acidosis as outlined above. Monitor labs and urine output. Avoid any potential nephrotoxins. Dose medications as per eGFR. Will need to consider HD/CRRT, if DAMARI continues to worsen.
Do You Have A Family History Of Psoriasis?: no
How Severe Is Your Psoriasis?: moderate
Is This A New Presentation, Or A Follow-Up?: Psoriasis

## 2024-12-16 NOTE — PROGRESS NOTE ADULT - ASSESSMENT
79F w/ PMHx HTN, Type 2 DM, severe PAD w/ R BKA, L AKA, CAD (s/p 3v CABG 2004, last cath 2014, only graft patent was LIMA-LAD, natives with  of LAD and RCA, OM w severe stented w BMS at that time) initially adm for abdominal pain, found to have acute pancreatitis. On 12/8 RRT for asystole, transferred to CCU for CHF and started on dopamine, felt to be vagally mediated by EP. Course c/b ATN which is improving. Transferred to floors for further care.

## 2024-12-16 NOTE — PROGRESS NOTE ADULT - NS ATTEND AMEND GEN_ALL_CORE FT
Vagal mediated pauses in the setting of pain and acute pancreatitis. No further episodes. If having more episodes, will need a TVP. Improving UO today.
I reviewed the overnight course of events on the unit, re-confirming the patient history. I discussed the care with the patient and their family. The plan of care was discussed with the ACP team and modifications were made to the notation where appropriate. Differential diagnosis and plan of care discussed with patient after the evaluation. Advanced care planning was discussed with patient and family.  Advanced care planning forms were reviewed and discussed.  Risks, benefits and alternatives of cardiac procedures were discussed in detail and all questions were answered. 35 minutes spent on total encounter of which more than fifty percent of the encounter was spent counseling and/or coordinating care by the attending physician.
No further episodes. No indication for pacing at this time. Can follow up in the office. EP to sign off.
Vagal mediated pauses in the setting of pain and acute pancreatitis. No further episodes. If having more episodes, will need a TVP. Now with worsening renal failure. Guarded prognosis.
Vagal mediated pauses in the setting of pain and acute pancreatitis. No further episodes. Cr improving. UO improving.

## 2024-12-16 NOTE — PROGRESS NOTE ADULT - PROBLEM SELECTOR PROBLEM 2
Acidosis
Acute pancreatitis
Acidosis
Acute pancreatitis
Acute pancreatitis
Non-ST elevation MI (NSTEMI)
Acute pancreatitis

## 2024-12-16 NOTE — PROGRESS NOTE ADULT - PROVIDER SPECIALTY LIST ADULT
Cardiology
Cardiology
Electrophysiology
Electrophysiology
Gastroenterology
Hospitalist
CCU
Cardiology
Cardiology
Nephrology
CCU
CCU
Cardiology
Cardiology
Electrophysiology
Gastroenterology
Cardiology
Cardiology
Electrophysiology
Nephrology
Hospitalist

## 2024-12-16 NOTE — PROGRESS NOTE ADULT - PROBLEM SELECTOR PLAN 8
Holding antihypertensives, coreg, imdur and losartan  - C/w hydralazine 10mg q8H  - Hold AV cindy blockers  - Monitor BP, currently acceptable
- Hold atorvastatin for now in setting of elevated liver enzymes  - Re-add as indicated  - Lipid panel overall re-assuring for no urgency to re-add at this time
Holding antihypertensives, coreg, imdur and losartan  - C/w hydralazine 10mg q8H  - Hold AV cindy blockers  - Monitor BP, currently acceptable

## 2024-12-16 NOTE — PROGRESS NOTE ADULT - PROBLEM SELECTOR PLAN 9
General:            Awake and active;   Head:		AFOF  Eyes:		Normally set bilaterally  Ears:		Patent bilaterally, no deformities  Nose/Mouth:	Nares patent, palate intact  Neck:		No masses, intact clavicles  Chest/Lungs:      Breath sounds equal to auscultation.   CV:		murmur , normal pulses bilaterally  Abdomen:          Soft nontender nondistended, no masses, bowel sounds present  :		Normal for gestational age  Back:		Intact skin, no sacral dimples or tags  Anus:		Grossly patent  Extremities:	FROM, no hip clicks  Skin:		Pink, no lesions   Neuro exam:	Appropriate tone, activity   Wheelchair bound at baseline (R BKA/L AKA), prosthesis are at home  Home PT on DC, d/c CM, anticipate DC Home today VS tomorrow w/ HC/HPT. Wheelchair bound at baseline (R BKA/L AKA), prosthesis are at home  Home PT on DC, d/c CM, anticipate DC Home today VS tomorrow w/ HC/HPT, going home with Pratt cath in place, will need TOV as outpatient with Urology.

## 2024-12-16 NOTE — PROGRESS NOTE ADULT - SUBJECTIVE AND OBJECTIVE BOX
[facility] DIVISION OF KIDNEY DISEASES AND HYPERTENSION --    Chief Complaint: DAMARI    24 hour events/subjective: Patient seen and examined at bedside. Pt has no acute complaints.      PAST HISTORY  --------------------------------------------------------------------------------  No significant changes to PMH, PSH, FHx, SHx, unless otherwise noted    ALLERGIES & MEDICATIONS  --------------------------------------------------------------------------------  Allergies    sulfa drugs (Hives)  sulfa drugs (Rash)  Sulfac 10% (Hives)      Standing Inpatient Medications  apixaban 5 milliGRAM(s) Oral two times a day  aspirin enteric coated 81 milliGRAM(s) Oral daily  brimonidine 0.2% Ophthalmic Solution 1 Drop(s) Both EYES two times a day  chlorhexidine 2% Cloths 1 Application(s) Topical daily  dextrose 5%. 1000 milliLiter(s) IV Continuous <Continuous>  dextrose 5%. 1000 milliLiter(s) IV Continuous <Continuous>  dextrose 50% Injectable 25 Gram(s) IV Push once  dextrose 50% Injectable 12.5 Gram(s) IV Push once  dextrose 50% Injectable 25 Gram(s) IV Push once  gabapentin 300 milliGRAM(s) Oral at bedtime  glucagon  Injectable 1 milliGRAM(s) IntraMuscular once  hydrALAZINE 10 milliGRAM(s) Oral every 8 hours  insulin glargine Injectable (LANTUS) 28 Unit(s) SubCutaneous at bedtime  insulin lispro (ADMELOG) corrective regimen sliding scale   SubCutaneous three times a day before meals  insulin lispro (ADMELOG) corrective regimen sliding scale   SubCutaneous at bedtime  insulin lispro Injectable (ADMELOG) 6 Unit(s) SubCutaneous three times a day before meals  lidocaine   4% Patch 1 Patch Transdermal daily  naloxone Injectable 0.4 milliGRAM(s) IV Push once  pantoprazole   Suspension 40 milliGRAM(s) Oral daily  senna 2 Tablet(s) Oral at bedtime    PRN Inpatient Medications  acetaminophen     Tablet .. 650 milliGRAM(s) Oral every 6 hours PRN  dextrose Oral Gel 15 Gram(s) Oral once PRN  polyethylene glycol 3350 17 Gram(s) Oral daily PRN      REVIEW OF SYSTEMS  --------------------------------------------------------------------------------  Gen: No fever  Respiratory: No dyspnea  CV: No chest pain  GI: No abdominal pain, diarrhea, nausea, vomiting  : +howe  Skin: No rashes  MSK: R BKA, L AKA  Neuro: No dizziness/lightheadedness  Heme: No bleeding    All other systems were reviewed and are negative, except as noted.    VITALS/PHYSICAL EXAM  --------------------------------------------------------------------------------  T(C): 37 (12-16-24 @ 11:20), Max: 37.1 (12-16-24 @ 01:41)  HR: 71 (12-16-24 @ 11:20) (64 - 77)  BP: 143/60 (12-16-24 @ 11:20) (114/52 - 159/66)  RR: 18 (12-16-24 @ 11:20) (18 - 18)  SpO2: 100% (12-16-24 @ 11:20) (96% - 100%)  Wt(kg): --        12-15-24 @ 07:01  -  12-16-24 @ 07:00  --------------------------------------------------------  IN: 0 mL / OUT: 200 mL / NET: -200 mL    12-16-24 @ 07:01  -  12-16-24 @ 12:16  --------------------------------------------------------  IN: 0 mL / OUT: 1200 mL / NET: -1200 mL      PHYSICAL EXAM:  Gen: resting, NAD  Neuro: Awake, alert  HEENT: Anicteric, No JVD  Pulm: CTA B/L  CV: +S1S2  Abd: Soft, NT/ND  Extremities: R BKA, L AKA, no thigh edema  Skin: Warm    LABS/STUDIES  --------------------------------------------------------------------------------              9.1    15.94 >-----------<  345      [12-16-24 @ 06:49]              27.6     135  |  100  |  45  ----------------------------<  235      [12-16-24 @ 06:49]  4.7   |  24  |  1.60        Ca     9.2     [12-16-24 @ 06:49]      Mg     2.00     [12-16-24 @ 06:49]      Phos  3.2     [12-16-24 @ 06:49]    TPro  7.0  /  Alb  3.2  /  TBili  1.0  /  DBili  x   /  AST  131  /  ALT  182  /  AlkPhos  434  [12-16-24 @ 06:49]      Creatinine Trend:  SCr 1.60 [12-16 @ 06:49]  SCr 1.91 [12-15 @ 06:50]  SCr 2.71 [12-14 @ 06:20]  SCr 2.94 [12-13 @ 18:08]  SCr 3.30 [12-13 @ 06:40]      Iron 86, TIBC 280, %sat 31      [12-02-24 @ 04:20]  Ferritin 59      [12-02-24 @ 04:20]  TSH 2.06      [12-02-24 @ 04:20]  Lipid: chol 124, TG 97, HDL 42, LDL --      [12-07-24 @ 14:50]

## 2024-12-16 NOTE — PROGRESS NOTE ADULT - PROBLEM SELECTOR PLAN 1
Overnight 12/8 RRT for asystole, pt returned to NSR by the time RRT arrived to bedside  - EP consulted and suspect likely vagal mediated response, apprec recs  - S/p CCU for CHF and dopamine drip d/c 12/9  - TTE similar to echo 1 week ago - with exception echogenicity in LA on one view only, EF 46%  - Repeat TTE: A mobile echodensity is visualized in the left atrium intermittently (seen only in the parasternal long axis view). This may represent a hypermobile interatrial septum  - Hold AV cindy blockers  - Correct electrolyte dysfunction, Keep K>4 Mg>2  - No indication for pacing at this time, EP follow up in the office  - Developed A.Fib over weekend w/ aberrancy and pt was started on Eliquis+ASA, off plavix at this time, c/w current regimen on discharge, reviewed telemetry today, no further A.Fib standing

## 2024-12-26 ENCOUNTER — APPOINTMENT (OUTPATIENT)
Dept: UROLOGY | Facility: CLINIC | Age: 79
End: 2024-12-26
Payer: MEDICARE

## 2024-12-26 VITALS
HEART RATE: 86 BPM | TEMPERATURE: 97.8 F | DIASTOLIC BLOOD PRESSURE: 79 MMHG | OXYGEN SATURATION: 99 % | SYSTOLIC BLOOD PRESSURE: 173 MMHG | RESPIRATION RATE: 16 BRPM

## 2024-12-26 DIAGNOSIS — N31.9 NEUROMUSCULAR DYSFUNCTION OF BLADDER, UNSPECIFIED: ICD-10-CM

## 2024-12-26 DIAGNOSIS — R33.9 RETENTION OF URINE, UNSPECIFIED: ICD-10-CM

## 2024-12-26 PROCEDURE — 99205 OFFICE O/P NEW HI 60 MIN: CPT

## 2025-01-21 ENCOUNTER — APPOINTMENT (OUTPATIENT)
Dept: OPHTHALMOLOGY | Facility: CLINIC | Age: 80
End: 2025-01-21
Payer: MEDICARE

## 2025-01-21 PROCEDURE — 92083 EXTENDED VISUAL FIELD XM: CPT

## 2025-01-21 PROCEDURE — 92012 INTRM OPH EXAM EST PATIENT: CPT

## 2025-01-25 NOTE — ED ADULT NURSE NOTE - NSSISCREENINGQ1_ED_A_ED
January 25, 2025     Patient: Omar Monzon   YOB: 1978   Date of Visit: 1/25/2025       To Whom it May Concern:    Omar Monzon was seen in my clinic on 1/25/2025 at 11:15 am.    Please excuse Omar for his absence from work on the date listed above to be able to make his appointment.  May return to work 24 hours after symptoms have improved.    Sincerely,         Rose Duvall CNP    Medical information is confidential and cannot be disclosed without the written consent of the patient or his representative.       No

## 2025-01-31 ENCOUNTER — APPOINTMENT (OUTPATIENT)
Dept: UROLOGY | Facility: CLINIC | Age: 80
End: 2025-01-31
Payer: MEDICARE

## 2025-01-31 VITALS
SYSTOLIC BLOOD PRESSURE: 162 MMHG | BODY MASS INDEX: 23.78 KG/M2 | OXYGEN SATURATION: 99 % | HEIGHT: 66 IN | TEMPERATURE: 97.3 F | RESPIRATION RATE: 16 BRPM | DIASTOLIC BLOOD PRESSURE: 70 MMHG | HEART RATE: 77 BPM | WEIGHT: 148 LBS

## 2025-01-31 DIAGNOSIS — N31.9 NEUROMUSCULAR DYSFUNCTION OF BLADDER, UNSPECIFIED: ICD-10-CM

## 2025-01-31 DIAGNOSIS — R33.9 RETENTION OF URINE, UNSPECIFIED: ICD-10-CM

## 2025-01-31 PROCEDURE — G2211 COMPLEX E/M VISIT ADD ON: CPT

## 2025-01-31 PROCEDURE — 51798 US URINE CAPACITY MEASURE: CPT

## 2025-01-31 PROCEDURE — 99214 OFFICE O/P EST MOD 30 MIN: CPT

## 2025-02-04 NOTE — PROGRESS NOTE ADULT - PROBLEM SELECTOR PLAN 7
General Sunscreen Counseling: I recommended a broad spectrum sunscreen with a SPF of 30 or higher.  I explained that SPF 30 sunscreens block approximately 97 percent of the sun's harmful rays.  Sunscreens should be applied at least 15 minutes prior to expected sun exposure and then every 2 hours after that as long as sun exposure continues. If swimming or exercising sunscreen should be reapplied every 45 minutes to an hour after getting wet or sweating.  One ounce, or the equivalent of a shot glass full of sunscreen, is adequate to protect the skin not covered by a bathing suit. I also recommended a lip balm with a sunscreen as well. Sun protective clothing can be used in lieu of sunscreen but must be worn the entire time you are exposed to the sun's rays. titanium zinc sunscreen Detail Level: Detailed - DAMARI on CKD3  - cr improving, likely in setting of post obstructive urinary retention

## 2025-02-07 ENCOUNTER — APPOINTMENT (OUTPATIENT)
Dept: UROLOGY | Facility: CLINIC | Age: 80
End: 2025-02-07
Payer: MEDICARE

## 2025-02-07 VITALS
SYSTOLIC BLOOD PRESSURE: 178 MMHG | RESPIRATION RATE: 16 BRPM | HEART RATE: 79 BPM | OXYGEN SATURATION: 98 % | DIASTOLIC BLOOD PRESSURE: 76 MMHG | TEMPERATURE: 97.1 F

## 2025-02-07 PROCEDURE — 99214 OFFICE O/P EST MOD 30 MIN: CPT

## 2025-02-12 ENCOUNTER — NON-APPOINTMENT (OUTPATIENT)
Age: 80
End: 2025-02-12

## 2025-02-12 ENCOUNTER — APPOINTMENT (OUTPATIENT)
Dept: CARDIOLOGY | Facility: CLINIC | Age: 80
End: 2025-02-12
Payer: MEDICARE

## 2025-02-12 VITALS
OXYGEN SATURATION: 99 % | SYSTOLIC BLOOD PRESSURE: 146 MMHG | HEIGHT: 66 IN | HEART RATE: 71 BPM | DIASTOLIC BLOOD PRESSURE: 70 MMHG

## 2025-02-12 VITALS — SYSTOLIC BLOOD PRESSURE: 144 MMHG | DIASTOLIC BLOOD PRESSURE: 70 MMHG

## 2025-02-12 DIAGNOSIS — I10 ESSENTIAL (PRIMARY) HYPERTENSION: ICD-10-CM

## 2025-02-12 DIAGNOSIS — I25.5 ISCHEMIC CARDIOMYOPATHY: ICD-10-CM

## 2025-02-12 DIAGNOSIS — I25.10 ATHEROSCLEROTIC HEART DISEASE OF NATIVE CORONARY ARTERY W/OUT ANGINA PECTORIS: ICD-10-CM

## 2025-02-12 DIAGNOSIS — E78.00 PURE HYPERCHOLESTEROLEMIA, UNSPECIFIED: ICD-10-CM

## 2025-02-12 PROCEDURE — G2211 COMPLEX E/M VISIT ADD ON: CPT

## 2025-02-12 PROCEDURE — 93000 ELECTROCARDIOGRAM COMPLETE: CPT

## 2025-02-12 PROCEDURE — 99215 OFFICE O/P EST HI 40 MIN: CPT

## 2025-02-12 RX ORDER — HYDRALAZINE HYDROCHLORIDE 10 MG/1
10 TABLET ORAL 3 TIMES DAILY
Qty: 270 | Refills: 3 | Status: ACTIVE | COMMUNITY

## 2025-02-12 RX ORDER — GABAPENTIN 300 MG/1
300 CAPSULE ORAL
Refills: 0 | Status: ACTIVE | COMMUNITY

## 2025-02-12 RX ORDER — APIXABAN 5 MG/1
5 TABLET, FILM COATED ORAL TWICE DAILY
Refills: 0 | Status: ACTIVE | COMMUNITY

## 2025-02-21 ENCOUNTER — APPOINTMENT (OUTPATIENT)
Dept: UROLOGY | Facility: CLINIC | Age: 80
End: 2025-02-21
Payer: MEDICARE

## 2025-02-21 DIAGNOSIS — R33.9 RETENTION OF URINE, UNSPECIFIED: ICD-10-CM

## 2025-02-21 DIAGNOSIS — R30.0 DYSURIA: ICD-10-CM

## 2025-02-21 PROCEDURE — 99213 OFFICE O/P EST LOW 20 MIN: CPT

## 2025-02-21 PROCEDURE — G2211 COMPLEX E/M VISIT ADD ON: CPT

## 2025-02-21 RX ORDER — CIPROFLOXACIN HYDROCHLORIDE 500 MG/1
500 TABLET, FILM COATED ORAL
Qty: 6 | Refills: 0 | Status: ACTIVE | COMMUNITY
Start: 2025-02-21 | End: 1900-01-01

## 2025-04-17 ENCOUNTER — NON-APPOINTMENT (OUTPATIENT)
Age: 80
End: 2025-04-17

## 2025-04-17 ENCOUNTER — APPOINTMENT (OUTPATIENT)
Dept: CARDIOLOGY | Facility: CLINIC | Age: 80
End: 2025-04-17
Payer: MEDICARE

## 2025-04-17 VITALS
OXYGEN SATURATION: 98 % | HEART RATE: 72 BPM | HEIGHT: 66 IN | DIASTOLIC BLOOD PRESSURE: 68 MMHG | SYSTOLIC BLOOD PRESSURE: 120 MMHG

## 2025-04-17 DIAGNOSIS — I25.5 ISCHEMIC CARDIOMYOPATHY: ICD-10-CM

## 2025-04-17 DIAGNOSIS — I10 ESSENTIAL (PRIMARY) HYPERTENSION: ICD-10-CM

## 2025-04-17 DIAGNOSIS — E78.00 PURE HYPERCHOLESTEROLEMIA, UNSPECIFIED: ICD-10-CM

## 2025-04-17 DIAGNOSIS — I25.10 ATHEROSCLEROTIC HEART DISEASE OF NATIVE CORONARY ARTERY W/OUT ANGINA PECTORIS: ICD-10-CM

## 2025-04-17 PROCEDURE — 99214 OFFICE O/P EST MOD 30 MIN: CPT

## 2025-04-17 PROCEDURE — 93000 ELECTROCARDIOGRAM COMPLETE: CPT

## 2025-04-17 PROCEDURE — G2211 COMPLEX E/M VISIT ADD ON: CPT

## 2025-04-28 NOTE — PATIENT PROFILE ADULT. - SOURCE OF INFORMATION, PROFILE
"Caller: Filoemna Liu \"DE\"    Relationship to patient: Self    Best call back number: [phone]    Chief complaint: CANC. - R/S - CANNOT HAVE BEFORE HER 5/1/25 COLONOSCOPY    Type of visit: INFUSION     Requested date: JUST NOT ON 5/1, 5/6, 5/9 OR 5/16 - PREFERS MID-MORNING OR AFTERNOON     If rescheduling, when is the original appointment: 4/29/25               "
LVM WITH PT WITH NEW APPT DATE AND TIME   
patient

## 2025-04-30 NOTE — H&P ADULT - TIME-BASED
Neurology brief note    MRI brain completed as well as cervical spine with and without contrast.  No evidence of any contrast-enhancing lesions within the brain to explain patient's encephalopathy.  As noted before, this is possible neurocognitive disorder and LP labs are pending.  Cervical spine was added on as patient was moving his left side as well as his right in addition to his hyperreflexia.  No evidence of any cord lesion or compressive myelopathy at that level.  Plan is to still recommend neuropsych testing the outpatient setting and have him follow-up with neurology in the outpatient setting for CSF studies.     Recommendations:  Please place referral for neurology with cognitive specialist upon discharge  Recommend neuropsych evaluation in the outpatient setting    Continue to provide reorientation, reassurance, and stimulation during the day with lights on, blinds open, and the television on alternating with dark, quiet environment at night.  Interruptions during the night should be limited as much as possible.    Please contact neurology while admitted for any further questions.      Laura Arriola MD   Neurologist, [facility]      77

## 2025-05-20 ENCOUNTER — NON-APPOINTMENT (OUTPATIENT)
Age: 80
End: 2025-05-20

## 2025-05-20 ENCOUNTER — APPOINTMENT (OUTPATIENT)
Dept: OPHTHALMOLOGY | Facility: CLINIC | Age: 80
End: 2025-05-20
Payer: MEDICARE

## 2025-05-20 PROCEDURE — 92012 INTRM OPH EXAM EST PATIENT: CPT

## 2025-05-20 PROCEDURE — 92083 EXTENDED VISUAL FIELD XM: CPT

## 2025-06-17 ENCOUNTER — APPOINTMENT (OUTPATIENT)
Dept: UROLOGY | Facility: CLINIC | Age: 80
End: 2025-06-17

## 2025-06-19 ENCOUNTER — APPOINTMENT (OUTPATIENT)
Dept: UROLOGY | Facility: CLINIC | Age: 80
End: 2025-06-19
Payer: MEDICARE

## 2025-06-19 PROCEDURE — G2211 COMPLEX E/M VISIT ADD ON: CPT

## 2025-06-19 PROCEDURE — 99214 OFFICE O/P EST MOD 30 MIN: CPT

## 2025-06-19 RX ORDER — ESTRADIOL 0.1 MG/G
0.1 CREAM VAGINAL
Qty: 1 | Refills: 3 | Status: ACTIVE | COMMUNITY
Start: 2025-06-19 | End: 1900-01-01

## 2025-06-19 RX ORDER — AMOXICILLIN AND CLAVULANATE POTASSIUM 875; 125 MG/1; MG/1
875-125 TABLET, COATED ORAL
Qty: 6 | Refills: 0 | Status: ACTIVE | COMMUNITY
Start: 2025-06-19 | End: 1900-01-01

## 2025-06-23 LAB — BACTERIA UR CULT: ABNORMAL

## 2025-07-16 ENCOUNTER — APPOINTMENT (OUTPATIENT)
Dept: CARDIOLOGY | Facility: CLINIC | Age: 80
End: 2025-07-16
Payer: MEDICARE

## 2025-07-16 ENCOUNTER — NON-APPOINTMENT (OUTPATIENT)
Age: 80
End: 2025-07-16

## 2025-07-16 VITALS — DIASTOLIC BLOOD PRESSURE: 60 MMHG | SYSTOLIC BLOOD PRESSURE: 110 MMHG | OXYGEN SATURATION: 98 % | HEART RATE: 79 BPM

## 2025-07-16 PROCEDURE — 99214 OFFICE O/P EST MOD 30 MIN: CPT

## 2025-07-16 PROCEDURE — 93000 ELECTROCARDIOGRAM COMPLETE: CPT

## 2025-07-16 PROCEDURE — G2211 COMPLEX E/M VISIT ADD ON: CPT

## 2025-07-16 RX ORDER — ROSUVASTATIN CALCIUM 5 MG/1
TABLET, FILM COATED ORAL
Refills: 0 | Status: ACTIVE | COMMUNITY

## 2025-07-24 NOTE — H&P PST ADULT - DOCUMENT STATUS
Patient's blood pressure range in the last 24 hours was: BP  Min: 142/66  Max: 187/79.The patient's inpatient anti-hypertensive regimen is listed below:  Current Antihypertensives  furosemide tablet 20 mg, Daily, Oral  metoprolol succinate (TOPROL-XL) 24 hr split tablet 12.5 mg, Daily, Oral    Plan  - BP is controlled, no changes needed to their regimen     Authored by Resident/PA/NP

## 2025-08-01 ENCOUNTER — APPOINTMENT (OUTPATIENT)
Dept: UROLOGY | Facility: CLINIC | Age: 80
End: 2025-08-01

## 2025-09-11 ENCOUNTER — APPOINTMENT (OUTPATIENT)
Dept: UROLOGY | Facility: CLINIC | Age: 80
End: 2025-09-11
Payer: MEDICARE

## 2025-09-11 DIAGNOSIS — N31.9 NEUROMUSCULAR DYSFUNCTION OF BLADDER, UNSPECIFIED: ICD-10-CM

## 2025-09-11 DIAGNOSIS — R30.0 DYSURIA: ICD-10-CM

## 2025-09-11 DIAGNOSIS — R33.9 RETENTION OF URINE, UNSPECIFIED: ICD-10-CM

## 2025-09-11 PROCEDURE — 99215 OFFICE O/P EST HI 40 MIN: CPT

## 2025-09-11 PROCEDURE — G2211 COMPLEX E/M VISIT ADD ON: CPT
